# Patient Record
Sex: MALE | Race: WHITE | NOT HISPANIC OR LATINO | Employment: OTHER | URBAN - METROPOLITAN AREA
[De-identification: names, ages, dates, MRNs, and addresses within clinical notes are randomized per-mention and may not be internally consistent; named-entity substitution may affect disease eponyms.]

---

## 2017-02-02 DIAGNOSIS — C61 PROSTATE CANCER (H): Primary | ICD-10-CM

## 2017-02-09 DIAGNOSIS — C61 PROSTATE CANCER (H): ICD-10-CM

## 2017-02-09 LAB — PSA SERPL-MCNC: NORMAL UG/L (ref 0–4)

## 2017-02-09 PROCEDURE — 36415 COLL VENOUS BLD VENIPUNCTURE: CPT | Performed by: UROLOGY

## 2017-02-09 PROCEDURE — 84153 ASSAY OF PSA TOTAL: CPT | Performed by: UROLOGY

## 2017-02-17 ENCOUNTER — OFFICE VISIT (OUTPATIENT)
Dept: UROLOGY | Facility: CLINIC | Age: 82
End: 2017-02-17
Payer: COMMERCIAL

## 2017-02-17 VITALS
HEART RATE: 60 BPM | WEIGHT: 220 LBS | DIASTOLIC BLOOD PRESSURE: 78 MMHG | BODY MASS INDEX: 34.46 KG/M2 | SYSTOLIC BLOOD PRESSURE: 126 MMHG

## 2017-02-17 DIAGNOSIS — Z53.9 ERRONEOUS ENCOUNTER--DISREGARD: Primary | ICD-10-CM

## 2017-02-17 DIAGNOSIS — C61 MALIGNANT NEOPLASM OF PROSTATE (H): ICD-10-CM

## 2017-02-17 DIAGNOSIS — C61 MALIGNANT NEOPLASM OF PROSTATE (H): Primary | ICD-10-CM

## 2017-02-17 PROCEDURE — 96402 CHEMO HORMON ANTINEOPL SQ/IM: CPT | Performed by: UROLOGY

## 2017-02-17 PROCEDURE — 99213 OFFICE O/P EST LOW 20 MIN: CPT | Mod: 25 | Performed by: UROLOGY

## 2017-02-17 NOTE — MR AVS SNAPSHOT
After Visit Summary   2/17/2017    Nixon More    MRN: [id number]           Patient Information     Date Of Birth          1935        Visit Information        Provider Department      2/17/2017 10:10 AM Mark Pino MD [facility] [place]        Today's Diagnoses     Malignant neoplasm of prostate (H)    -  1    Malignant neoplasm of prostate          Care Instructions    The following medication was given:     MEDICATION: Eligard  ROUTE: LUQ  SITE: LUQ - Abd  DOSE: 45mg  LOT #: [id number]  :  [place]  EXPIRATION DATE:  08/18    NDC#: 24794-189-54           Follow-ups after your visit        Follow-up notes from your care team     Return in about 6 months (around 8/17/2017) for PSA and Eligard injection.      Who to contact     If you have questions or need follow up information about today's clinic visit or your schedule please contact [facility] [place] directly at [phone].  Normal or non-critical lab and imaging results will be communicated to you by [employer], letter or phone within 4 business days after the clinic has received the results. If you do not hear from us within 7 days, please contact the clinic through [employer] or phone. If you have a critical or abnormal lab result, we will notify you by phone as soon as possible.  Submit refill requests through [employer] or call your pharmacy and they will forward the refill request to us. Please allow 3 business days for your refill to be completed.          Additional Information About Your Visit        MyChart Information     [employer] gives you secure access to your electronic health record. If you see a primary care provider, you can also send messages to your care team and make appointments. If you have questions, please call your primary care clinic.  If you do not have a primary care provider, please call [phone] and they will assist  you.        Care EveryWhere ID     This is your Care EveryWhere ID. This could be used by other organizations to access your [facility] medical records  [id number]        Your Vitals Were     Pulse BMI (Body Mass Index)                60 34.46 kg/m2           Blood Pressure from Last 3 Encounters:   02/17/17 126/78   10/21/16 122/80   09/27/16 112/60    Weight from Last 3 Encounters:   02/17/17 99.8 kg (220 lb)   10/21/16 101.2 kg (223 lb)   09/27/16 101.9 kg (224 lb 9 oz)              We Performed the Following     MD Instruction for Protocol        Primary Care Provider Office Phone # Fax #    Natalya Lewis [phone]       [address] [place] [address]        Thank you!     Thank you for choosing [facility] [place]  for your care. Our goal is always to provide you with excellent care. Hearing back from our patients is one way we can continue to improve our services. Please take a few minutes to complete the written survey that you may receive in the mail after your visit with us. Thank you!             Your Updated Medication List - Protect others around you: Learn how to safely use, store and throw away your medicines at www.disposemymeds.org.          This list is accurate as of: 2/17/17  2:07 PM.  Always use your most recent med list.                   Brand Name Dispense Instructions for use    aspirin 81 MG tablet     100    1 tab po QD (Once per day)       atenolol 50 MG tablet    TENORMIN    90 tablet    Take 1 tablet (50 mg) by mouth daily       EPINEPHrine 0.3 MG/0.3ML injection    EPIPEN 2-MICHAEL    2 each    Inject 0.3 mLs (0.3 mg) into the muscle once as needed for anaphylaxis       fluorouracil 5 % cream    EFUDEX         ipratropium 0.06 % spray    ATROVENT    1 Box    Spray 2 sprays in nostril 4 times daily as needed for rhinitis       leuprolide 45 MG kit    ELIGARD    1 each    Inject 45 mg Subcutaneous  every 6 months.       losartan-hydrochlorothiazide 100-12.5 MG per tablet    HYZAAR    90 tablet    Take 1 tablet by mouth daily       naproxen 500 MG tablet    NAPROSYN    60 tablet    Take 1 tablet (500 mg) by mouth 2 times daily as needed       nitroglycerin 0.4 MG sublingual tablet    NITROSTAT    30 tablet    Place 1 tablet (0.4 mg) under the tongue every 5 minutes as needed for chest pain       PROSTEON PO      Take 2,000 Units by mouth With vitamin D       simvastatin 40 MG tablet    ZOCOR    90 tablet    Take 1 tablet (40 mg) by mouth At Bedtime

## 2017-02-17 NOTE — PROGRESS NOTES
Office Visit Note  [facility]  [phone]    UROLOGIC DIAGNOSES:   Prostate cancer    CURRENT INTERVENTIONS:   S/P radiotherapy with recurrence, on Eligard    HISTORY:   Spencer returns to clinic today for prostate cancer follow-up. He reports doing well with no symptoms. His PSA remains undetectable.      PAST MEDICAL HISTORY:   Past Medical History   Diagnosis Date     Actinic keratosis      Acute prostatitis 9/23/2004     Angina pectoris (H)      CAD (coronary artery disease)      1/5/2011 lateral ischemia on stress echo, 12/2014 normal stress echo     Dyslipidemia      Emphysema of lung (H)      Essential hypertension, benign      HTN (hypertension)      Hypersomnia with sleep apnea, unspecified      on bipap, partially treated with residual apneas     Hypertrophy (benign) of prostate 5/01     Biopsy 5/01 negative for cancer; PSA 5     Lumbago      MALIGN NEOPL PROSTATE 12/15/2006     Palpitations      Personal history of other malignant neoplasm of skin 1997     basal cell cancer       PAST SURGICAL HISTORY:   Past Surgical History   Procedure Laterality Date     C nonspecific procedure  1988     s/p lumbar laminectomy     C nonspecific procedure  child     hernia repair     C 2nd stage chemosurg mohs  1/09/01     Moh's procedure for basal cell carcinoma     C vitrectomy pars plana remove preretinal membrane  3/09       FAMILY HISTORY:   Family History   Problem Relation Age of Onset     Alzheimer Disease Mother      Hypertension Mother      Cardiovascular Father      D:86 complications fo CHF     Prostate Cancer Brother      Prostate Cancer Brother      Lung Cancer Brother 76       SOCIAL HISTORY:   Social History   Substance Use Topics     Smoking status: Former Smoker     Packs/day: 1.00     Years: 30.00     Types: Cigarettes     Quit date: 1/1/1978     Smokeless tobacco: Never Used     Alcohol use 1.0 oz/week     2 Standard drinks or equivalent per week      Comment: socially       Current  Outpatient Prescriptions   Medication     simvastatin (ZOCOR) 40 MG tablet     losartan-hydrochlorothiazide (HYZAAR) 100-12.5 MG per tablet     naproxen (NAPROSYN) 500 MG tablet     ipratropium (ATROVENT) 0.06 % nasal spray     atenolol (TENORMIN) 50 MG tablet     fluorouracil (EFUDEX) 5 % cream     EPINEPHrine (EPIPEN 2-MICHAEL) 0.3 MG/0.3ML injection     nitroglycerin (NITROSTAT) 0.4 MG SL tablet     Multiple Minerals-Vitamins (PROSTEON PO)     [DISCONTINUED] atenolol-chlorthalidone (TENORETIC 50) 50-25 MG per tablet     leuprolide (ELIGARD) 45 MG injection     ASPIRIN 81 MG OR TABS     No current facility-administered medications for this visit.      Facility-Administered Medications Ordered in Other Visits   Medication     leuprolide (ELIGARD) injection 45 mg         PHYSICAL EXAM:    There were no vitals taken for this visit.    HEENT: Normocephalic and atraumatic   Cardiac: Not done  Back/Flank: Not done  CNS/PNS: Not done  Respiratory: Normal non-labored breathing  Abdomen: Soft nontender and nondistended  Peripheral Vascular: Not done  Mental Status: Not done    Penis: Not done  Scrotal Skin: Not done  Testicles: Not done  Epididymis: Not done  Digital Rectal Exam:     Cystoscopy: Not done    Imaging: None    Urinalysis: UA RESULTS:  No results for input(s): COLOR, APPEARANCE, URINEGLC, URINEBILI, URINEKETONE, SG, UBLD, URINEPH, PROTEIN, UROBILINOGEN, NITRITE, LEUKEST, RBCU, WBCU in the last 94030 hours.    PSA: Undetectable    Post Void Residual:     Other labs: None today      IMPRESSION:  Doing well, PSA undetectable    PLAN:  He received another 6 month injection of Eligard today. We discussed the plan. He will return to clinic in 6 months for another PSA and Eligard injection.    Total Time: 15 min                                      Total in Consultation: 15 min      Mark Pino M.D.

## 2017-02-17 NOTE — PATIENT INSTRUCTIONS
The following medication was given:     MEDICATION: Eligard  ROUTE: LUQ  SITE: [facility] - Abd  DOSE: 45mg  LOT #: [id number]  :  Nataliia  EXPIRATION DATE:  08/18    NDC#: 83602-589-67

## 2017-02-17 NOTE — LETTER
2/17/2017       RE: Nixon More  [address] [place] [address] [address] [facility] [address]     Dear Colleague,    Thank you for referring your patient, Nixon More, to the [facility] [place] at [facility]. Please see a copy of my visit note below.    Office Visit Note  [facility]  [phone]    UROLOGIC DIAGNOSES:   Prostate cancer    CURRENT INTERVENTIONS:   S/P radiotherapy with recurrence, on Eligard    HISTORY:   Spencer returns to clinic today for prostate cancer follow-up. He reports doing well with no symptoms. His PSA remains undetectable.      PAST MEDICAL HISTORY:   Past Medical History   Diagnosis Date     Actinic keratosis      Acute prostatitis 9/23/2004     Angina pectoris (H)      CAD (coronary artery disease)      1/5/2011 lateral ischemia on stress echo, 12/2014 normal stress echo     Dyslipidemia      Emphysema of lung (H)      Essential hypertension, benign      HTN (hypertension)      Hypersomnia with sleep apnea, unspecified      on bipap, partially treated with residual apneas     Hypertrophy (benign) of prostate 5/01     Biopsy 5/01 negative for cancer; PSA 5     Lumbago      MALIGN NEOPL PROSTATE 12/15/2006     Palpitations      Personal history of other malignant neoplasm of skin 1997     basal cell cancer       PAST SURGICAL HISTORY:   Past Surgical History   Procedure Laterality Date     C nonspecific procedure  1988     s/p lumbar laminectomy     C nonspecific procedure  child     hernia repair     C 2nd stage chemosurg mohs  1/09/01     Moh's procedure for basal cell carcinoma     C vitrectomy pars plana remove preretinal membrane  3/09       FAMILY HISTORY:   Family History   Problem Relation Age of Onset     Alzheimer Disease Mother      Hypertension Mother      Cardiovascular Father      D:86 complications fo CHF     Prostate Cancer Brother      Prostate Cancer Brother      Lung Cancer Brother 76        SOCIAL HISTORY:   Social History   Substance Use Topics     Smoking status: Former Smoker     Packs/day: 1.00     Years: 30.00     Types: Cigarettes     Quit date: 1/1/1978     Smokeless tobacco: Never Used     Alcohol use 1.0 oz/week     2 Standard drinks or equivalent per week      Comment: socially       Current Outpatient Prescriptions   Medication     simvastatin (ZOCOR) 40 MG tablet     losartan-hydrochlorothiazide (HYZAAR) 100-12.5 MG per tablet     naproxen (NAPROSYN) 500 MG tablet     ipratropium (ATROVENT) 0.06 % nasal spray     atenolol (TENORMIN) 50 MG tablet     fluorouracil (EFUDEX) 5 % cream     EPINEPHrine (EPIPEN 2-MICHAEL) 0.3 MG/0.3ML injection     nitroglycerin (NITROSTAT) 0.4 MG SL tablet     Multiple Minerals-Vitamins (PROSTEON PO)     [DISCONTINUED] atenolol-chlorthalidone (TENORETIC 50) 50-25 MG per tablet     leuprolide (ELIGARD) 45 MG injection     ASPIRIN 81 MG OR TABS     No current facility-administered medications for this visit.      Facility-Administered Medications Ordered in Other Visits   Medication     leuprolide (ELIGARD) injection 45 mg         PHYSICAL EXAM:    There were no vitals taken for this visit.    HEENT: Normocephalic and atraumatic   Cardiac: Not done  Back/Flank: Not done  CNS/PNS: Not done  Respiratory: Normal non-labored breathing  Abdomen: Soft nontender and nondistended  Peripheral Vascular: Not done  Mental Status: Not done    Penis: Not done  Scrotal Skin: Not done  Testicles: Not done  Epididymis: Not done  Digital Rectal Exam:     Cystoscopy: Not done    Imaging: None    Urinalysis: UA RESULTS:  No results for input(s): COLOR, APPEARANCE, URINEGLC, URINEBILI, URINEKETONE, SG, UBLD, URINEPH, PROTEIN, UROBILINOGEN, NITRITE, LEUKEST, RBCU, WBCU in the last [id number] hours.    PSA: Undetectable    Post Void Residual:     Other labs: None today      IMPRESSION:  Doing well, PSA undetectable    PLAN:  He received another 6 month injection of Eligard today. We  discussed the plan. He will return to clinic in 6 months for another PSA and Eligard injection.    Total Time: 15 min                                      Total in Consultation: 15 min      Mark Pino M.D.

## 2017-02-20 DIAGNOSIS — Z53.9 ERRONEOUS ENCOUNTER--DISREGARD: Primary | ICD-10-CM

## 2017-02-21 DIAGNOSIS — C61 MALIGNANT NEOPLASM OF PROSTATE (H): Primary | ICD-10-CM

## 2017-02-22 NOTE — PROGRESS NOTES
This encounter was opened in error. Please disregard.  This encounter was opened in error. Please disregard.

## 2017-02-23 DIAGNOSIS — Z53.9 ERRONEOUS ENCOUNTER--DISREGARD: Primary | ICD-10-CM

## 2017-03-03 ENCOUNTER — MYC MEDICAL ADVICE (OUTPATIENT)
Dept: PEDIATRICS | Facility: CLINIC | Age: 82
End: 2017-03-03

## 2017-03-03 NOTE — TELEPHONE ENCOUNTER
Janett with Dr. Joshua conte to use econazole or clotrimazole of wife's.     Sent [employer] message.

## 2017-03-27 ENCOUNTER — TRANSFERRED RECORDS (OUTPATIENT)
Dept: HEALTH INFORMATION MANAGEMENT | Facility: CLINIC | Age: 82
End: 2017-03-27

## 2017-08-10 DIAGNOSIS — C61 MALIGNANT NEOPLASM OF PROSTATE (H): Primary | ICD-10-CM

## 2017-08-17 DIAGNOSIS — C61 MALIGNANT NEOPLASM OF PROSTATE (H): ICD-10-CM

## 2017-08-17 PROCEDURE — G0103 PSA SCREENING: HCPCS | Performed by: UROLOGY

## 2017-08-18 ENCOUNTER — OFFICE VISIT (OUTPATIENT)
Dept: PEDIATRICS | Facility: CLINIC | Age: 82
End: 2017-08-18
Payer: COMMERCIAL

## 2017-08-18 VITALS
OXYGEN SATURATION: 94 % | DIASTOLIC BLOOD PRESSURE: 82 MMHG | SYSTOLIC BLOOD PRESSURE: 122 MMHG | WEIGHT: 225 LBS | HEIGHT: 67 IN | TEMPERATURE: 97.4 F | BODY MASS INDEX: 35.31 KG/M2 | HEART RATE: 72 BPM

## 2017-08-18 DIAGNOSIS — F32.0 MILD MAJOR DEPRESSION (H): Primary | ICD-10-CM

## 2017-08-18 DIAGNOSIS — E66.01 MORBID OBESITY, UNSPECIFIED OBESITY TYPE (H): ICD-10-CM

## 2017-08-18 DIAGNOSIS — R09.82 POST-NASAL DRIP: ICD-10-CM

## 2017-08-18 LAB — PSA SERPL-ACNC: <0.01 UG/L (ref 0–4)

## 2017-08-18 PROCEDURE — 99214 OFFICE O/P EST MOD 30 MIN: CPT | Performed by: INTERNAL MEDICINE

## 2017-08-18 ASSESSMENT — PATIENT HEALTH QUESTIONNAIRE - PHQ9: SUM OF ALL RESPONSES TO PHQ QUESTIONS 1-9: 8

## 2017-08-18 NOTE — NURSING NOTE
"Chief Complaint   Patient presents with     Depression     Throat Problem       Initial /82 (BP Location: Right arm, Patient Position: Right side, Cuff Size: Adult Large)  Pulse 72  Temp 97.4  F (36.3  C) (Tympanic)  Ht 5' 7\" (1.702 m)  Wt 225 lb (102.1 kg)  SpO2 94%  BMI 35.24 kg/m2 Estimated body mass index is 35.24 kg/(m^2) as calculated from the following:    Height as of this encounter: 5' 7\" (1.702 m).    Weight as of this encounter: 225 lb (102.1 kg).  Medication Reconciliation: complete  "

## 2017-08-18 NOTE — PATIENT INSTRUCTIONS
Try the atrovent twice daily regularly to see if it helps.  If you are not better in 2 wks, let me know and we will try flonase instead.    DEPRESSION: PATIENT SELF CARE PLAN    Self-Care for Depression  Here s the deal. Your body and mind are really not as separate as most people think.  What you do and think affects how you feel and how you feel influences what you do and think. This means if you do things that people who feel good do, it will help you feel better.  Sometimes this is all it takes.  There is also a place for medication and therapy depending on how severe your depression is, so be sure to consult with your medical provider and/ or Behavioral Health Consultant if your symptoms are worsening or not improving.     In order to better manage my stress, I will:   Exercise  Get some form of exercise, every day. This will help reduce pain and release endorphins, the  feel good  chemicals in your brain. This is almost as good as taking antidepressants!  This is not the same as joining a gym and then never going! (they count on that by the way ) It can be as simple as just going for a walk or doing some gardening, anything that will get you moving.        Hygiene   Maintain good hygiene (Get out of bed in the morning, Make your bed, Brush your teeth, Take a shower, and Get dressed like you were going to work, even if you are unemployed).  If your clothes don't fit try to get ones that do.    Diet  I will strive to eat foods that are good for me, drink plenty of water, and avoid excessive sugar, caffeine, alcohol, and other mood-altering substances.  Some foods that are helpful in depression are: complex carbohydrates, B vitamins, flaxseed, fish or fish oil, fresh fruits and vegetables.    Psychotherapy  Schedule counseling to help you rational brain get your brain circuits back on track.    Medication  Start prozac once daily.    Staying Connected With Others  I will stay in touch with my friends, family  members, and my primary care provider/team.    Use your imagination  Be creative.  We all have a creative side; it doesn t matter if it s oil painting, sand castles, or mud pies! This will also kick up the endorphins.    Witness Beauty  (AKA stop and smell the roses) Take a look outside, even in mid-winter. Notice colors, textures. Watch the squirrels and birds.     Service to others  Be of service to others.  There is always someone else in need.  By helping others we can  get out of ourselves  and remember the really important things.  This also provides opportunities for practicing all the other parts of the program.    Humor  Laugh and be silly!  Adjust your TV habits for less news and crime-drama and more comedy.    Control your stress  Try breathing deep, massage therapy, biofeedback, and meditation. Find time to relax each day.

## 2017-08-18 NOTE — PROGRESS NOTES
"  SUBJECTIVE:   Nixon More is a 81 year old male who presents to clinic today for the following health issues:      Abnormal Mood Symptoms      Duration: intermittent during the last 3 months .  Brother  and wife with significant health issues - cancer treatment.  Has had more issues with concentration - eg forgets time of when they are supposed to do things.  Forgets what he is going to do - start walking and then just stop.  Not interested in things he used to be.    Description:  Depression: YES  Anxiety: no  Panic attacks: no     Accompanying signs and symptoms: see PHQ-9 and JAMSHID scores    History (similar episodes/previous evaluation): None    Precipitating or alleviating factors: None    Therapies tried and outcome: Prozac (Fluoxetine).  Doesn't remember if it helped.  Was seeing a counselor but he retired.    PHQ-9 SCORE 2015   Total Score 7 - -   Total Score - 8 8          Phlegm  Pt has had increase in phlegm for the last 2-3 months in the mornings. Used atrovent but didn't think it helped.  Will cough and produce sputum sometimes.      Problem list and histories reviewed & adjusted, as indicated.  Additional history: as documented    Labs reviewed in EPIC    Reviewed and updated as needed this visit by clinical staffTobacco  Allergies  Meds  Problems  Med Hx  Surg Hx  Fam Hx  Soc Hx        Reviewed and updated as needed this visit by Provider  Allergies  Meds  Problems         ROS:  Constitutional, HEENT, cardiovascular, pulmonary, systems are negative, except as otherwise noted.      OBJECTIVE:   /82 (BP Location: Right arm, Patient Position: Right side, Cuff Size: Adult Large)  Pulse 72  Temp 97.4  F (36.3  C) (Tympanic)  Ht 5' 7\" (1.702 m)  Wt 225 lb (102.1 kg)  SpO2 94%  BMI 35.24 kg/m2  Body mass index is 35.24 kg/(m^2).  GENERAL: healthy, alert and no distress  PSYCH: mentation appears normal, affect flat        ASSESSMENT/PLAN:       1. Mild " major depression (H)  Discussed depression, biopsycosocial model of illness.  Treatment options reviewed including counseling, SSRI, SNRI and wellbutrin.  Medication risks and benefits and side-effects discussed.  Pt elected to try medication per orders plus counseling.  Follow-up with physical in 2 mos or sooner if worsening   - FLUoxetine (PROZAC) 20 MG capsule; Take 1 capsule (20 mg) by mouth daily  Dispense: 30 capsule; Refill: 1    2. Morbid obesity, unspecified obesity type (H)  Encouraged increase exercise and dietary modification    3. Post-nasal drip  Discussed.  Trial atrovent x2wks, if uncontrolled notify and would try flonase.  If that fails, can refer to ENT      See Patient Instructions    Natalya Lewis MD  [facility] BEATRICE    25 min with pt and more than 50% of the time was spent in counseling and coordination of care of the above issues

## 2017-08-23 ENCOUNTER — OFFICE VISIT (OUTPATIENT)
Dept: UROLOGY | Facility: CLINIC | Age: 82
End: 2017-08-23
Payer: COMMERCIAL

## 2017-08-23 VITALS
BODY MASS INDEX: 32.58 KG/M2 | DIASTOLIC BLOOD PRESSURE: 78 MMHG | SYSTOLIC BLOOD PRESSURE: 138 MMHG | HEART RATE: 76 BPM | HEIGHT: 69 IN | WEIGHT: 220 LBS

## 2017-08-23 DIAGNOSIS — C61 MALIGNANT NEOPLASM OF PROSTATE (H): Primary | ICD-10-CM

## 2017-08-23 PROCEDURE — 96402 CHEMO HORMON ANTINEOPL SQ/IM: CPT | Performed by: UROLOGY

## 2017-08-23 PROCEDURE — 99213 OFFICE O/P EST LOW 20 MIN: CPT | Mod: 25 | Performed by: UROLOGY

## 2017-08-23 ASSESSMENT — PAIN SCALES - GENERAL: PAINLEVEL: NO PAIN (0)

## 2017-08-23 NOTE — NURSING NOTE
The following medication was given:     MEDICATION: Eligard 45 mg  ROUTE: SQ  SITE: RLQ - Abd.  DOSE: 45mg  LOT #: [id number]  :  Harmeet  EXPIRATION DATE:  11/2018  NDC#: [id number]  Erika Jose LPN

## 2017-08-23 NOTE — MR AVS SNAPSHOT
After Visit Summary   8/23/2017    Nixon More    MRN: [id number]           Patient Information     Date Of Birth          1935        Visit Information        Provider Department      8/23/2017 9:10 AM Mark Pino MD [facility]        Today's Diagnoses     Malignant neoplasm of prostate    -  1       Follow-ups after your visit        Your next 10 appointments already scheduled     Feb 26, 2018  9:00 AM CST   Return Prostate Cancer with Mark Pino MD   [facility]rology [facility] ([facility])    [address]  [address]  [facility] [address] [phone]              Who to contact     If you have questions or need follow up information about today's clinic visit or your schedule please contact [facility] UROLOGY [facility] directly at [phone].  Normal or non-critical lab and imaging results will be communicated to you by MyChart, letter or phone within 4 business days after the clinic has received the results. If you do not hear from us within 7 days, please contact the clinic through MyChart or phone. If you have a critical or abnormal lab result, we will notify you by phone as soon as possible.  Submit refill requests through "[employer] or call your pharmacy and they will forward the refill request to us. Please allow 3 business days for your refill to be completed.          Additional Information About Your Visit        MyChart Information     "[employer] gives you secure access to your electronic health record. If you see a primary care provider, you can also send messages to your care team and make appointments. If you have questions, please call your primary care clinic.  If you do not have a primary care provider, please call [phone] and they will assist you.        Care EveryWhere ID     This is your Care EveryWhere ID. This  "could be used by other organizations to access your [place] medical records  [id number]        Your Vitals Were     Pulse Height BMI (Body Mass Index)             76 1.753 m (5' 9\") 32.49 kg/m2          Blood Pressure from Last 3 Encounters:   08/23/17 138/78   08/18/17 122/82   02/17/17 126/78    Weight from Last 3 Encounters:   08/23/17 99.8 kg (220 lb)   08/18/17 102.1 kg (225 lb)   02/17/17 99.8 kg (220 lb)              Today, you had the following     No orders found for display       Primary Care Provider Office Phone # Fax #    Natalya Lewis [phone]       [facility] [place] [address]        Equal Access to Services     ANTONIA BRAVO : Nenita alvarado Soleni, waaxda luqadaha, qaybta kaalmada adeegyada, anila wall . So [facility] [phone].    ATENCIÓN: Si habla español, tiene a coelho disposición servicios gratuitos de asistencia lingüística. Llame al [phone].    We comply with applicable federal civil rights laws and [place] laws. We do not discriminate on the basis of race, color, national origin, age, disability sex, sexual orientation or gender identity.            Thank you!     Thank you for choosing [facility]OLOGY CLINIC [place]  for your care. Our goal is always to provide you with excellent care. Hearing back from our patients is one way we can continue to improve our services. Please take a few minutes to complete the written survey that you may receive in the mail after your visit with us. Thank you!             Your Updated Medication List - Protect others around you: Learn how to safely use, store and throw away your medicines at www.disposemymeds.org.          This list is accurate as of: 8/23/17 11:59 PM.  Always use your most recent med list.                   Brand Name Dispense Instructions for use Diagnosis    aspirin 81 MG tablet     100    1 tab po QD (Once per day)    Essential " hypertension, benign       atenolol 50 MG tablet    TENORMIN    90 tablet    Take 1 tablet (50 mg) by mouth daily    Essential hypertension with goal blood pressure less than 140/90       EPINEPHrine 0.3 MG/0.3ML injection 2-pack    EPIPEN 2-MICHAEL    2 each    Inject 0.3 mLs (0.3 mg) into the muscle once as needed for anaphylaxis    Bee sting-induced anaphylaxis, undetermined intent, subsequent encounter       fluorouracil 5 % cream    EFUDEX          FLUoxetine 20 MG capsule    PROzac    30 capsule    Take 1 capsule (20 mg) by mouth daily    Mild major depression (H)       ipratropium 0.06 % spray    ATROVENT    1 Box    Spray 2 sprays in nostril 4 times daily as needed for rhinitis    Nasal congestion       leuprolide 45 MG kit    ELIGARD    1 each    Inject 45 mg Subcutaneous every 6 months.        losartan-hydrochlorothiazide 100-12.5 MG per tablet    HYZAAR    90 tablet    Take 1 tablet by mouth daily    Essential hypertension with goal blood pressure less than 140/90       naproxen 500 MG tablet    NAPROSYN    60 tablet    Take 1 tablet (500 mg) by mouth 2 times daily as needed    Hip pain, chronic, unspecified laterality       nitroGLYcerin 0.4 MG sublingual tablet    NITROSTAT    30 tablet    Place 1 tablet (0.4 mg) under the tongue every 5 minutes as needed for chest pain    Cardiac ischemia       PROSTEON PO      Take 2,000 Units by mouth With vitamin D        simvastatin 40 MG tablet    ZOCOR    90 tablet    Take 1 tablet (40 mg) by mouth At Bedtime    Dyslipidemia

## 2017-08-23 NOTE — LETTER
8/23/2017       RE: Nixon More  [address] [place] [address] [address] [facility] [address]     Dear Colleague,    Thank you for referring your patient, Nixon More, to the [facility]OLOG[facility] [place] [facility]. Please see a copy of my visit note below.    Office Visit Note  [facility]  [phone]    UROLOGIC DIAGNOSES:   Prostate cancer    CURRENT INTERVENTIONS:   S/P radiotherapy with recurrence, on Eligard    HISTORY:   Spencer returns to clinic today for prostate cancer follow-up. He reports doing well with no symptoms. His PSA remains undetectable.      PAST MEDICAL HISTORY:   Past Medical History:   Diagnosis Date     Actinic keratosis      Acute prostatitis 9/23/2004     Angina pectoris (H)      CAD (coronary artery disease)     1/5/2011 lateral ischemia on stress echo, 12/2014 normal stress echo     Dyslipidemia      Emphysema of lung (H)      Essential hypertension, benign      HTN (hypertension)      Hypersomnia with sleep apnea, unspecified     on bipap, partially treated with residual apneas     Hypertrophy (benign) of prostate 5/01    Biopsy 5/01 negative for cancer; PSA 5     Lumbago      MALIGN NEOPL PROSTATE 12/15/2006     Palpitations      Personal history of other malignant neoplasm of skin 1997    basal cell cancer       PAST SURGICAL HISTORY:   Past Surgical History:   Procedure Laterality Date     C 2ND STAGE CHEMOSURG MOHS  1/09/01    Moh's procedure for basal cell carcinoma     C NONSPECIFIC PROCEDURE  1988    s/p lumbar laminectomy     C NONSPECIFIC PROCEDURE  child    hernia repair     C VITRECTOMY PARS PLANA REMOVE PRERETINAL MEMBRANE  3/09       FAMILY HISTORY:   Family History   Problem Relation Age of Onset     Alzheimer Disease Mother      Hypertension Mother      Cardiovascular Father      D:86 complications fo CHF     Prostate Cancer Brother      Lung Cancer Brother 76     Prostate Cancer Brother        SOCIAL  "HISTORY:   Social History   Substance Use Topics     Smoking status: Former Smoker     Packs/day: 1.00     Years: 30.00     Types: Cigarettes     Quit date: 1/1/1978     Smokeless tobacco: Never Used     Alcohol use 1.0 oz/week     2 Standard drinks or equivalent per week      Comment: socially       Current Outpatient Prescriptions   Medication     FLUoxetine (PROZAC) 20 MG capsule     simvastatin (ZOCOR) 40 MG tablet     losartan-hydrochlorothiazide (HYZAAR) 100-12.5 MG per tablet     naproxen (NAPROSYN) 500 MG tablet     ipratropium (ATROVENT) 0.06 % nasal spray     atenolol (TENORMIN) 50 MG tablet     Multiple Minerals-Vitamins (PROSTEON PO)     leuprolide (ELIGARD) 45 MG injection     ASPIRIN 81 MG OR TABS     fluorouracil (EFUDEX) 5 % cream     EPINEPHrine (EPIPEN 2-MICHAEL) 0.3 MG/0.3ML injection     nitroglycerin (NITROSTAT) 0.4 MG SL tablet     [DISCONTINUED] atenolol-chlorthalidone (TENORETIC 50) 50-25 MG per tablet     No current facility-administered medications for this visit.      Facility-Administered Medications Ordered in Other Visits   Medication     leuprolide (ELIGARD) injection 45 mg     PHYSICAL EXAM:  /78  Pulse 76  Ht 1.753 m (5' 9\")  Wt 99.8 kg (220 lb)  BMI 32.49 kg/m2    HEENT: Normocephalic and atraumatic   Cardiac: Not done  Back/Flank: Not done  CNS/PNS: Not done  Respiratory: Normal non-labored breathing  Abdomen: Soft nontender and nondistended  Peripheral Vascular: Not done  Mental Status: Not done    Penis: Not done  Scrotal Skin: Not done  Testicles: Not done  Epididymis: Not done  Digital Rectal Exam:     Cystoscopy: Not done    Imaging: None    Urinalysis: UA RESULTS:  No results for input(s): COLOR, APPEARANCE, URINEGLC, URINEBILI, URINEKETONE, SG, UBLD, URINEPH, PROTEIN, UROBILINOGEN, NITRITE, LEUKEST, RBCU, WBCU in the last 91074 hours.    PSA: Undetectable    Post Void Residual:     Other labs: None today      IMPRESSION:  Doing well, PSA undetectable    PLAN:  We " discussed the treatment plan. He wishes to continue the Eligard. He received another injection today and he will return to clinic again in 6 months for another SA and Eligard injection.    Total Time:  15 minutes                                      Total in Consultation:  15 minutes  Mark Pino M.D.

## 2017-08-25 ENCOUNTER — TELEPHONE (OUTPATIENT)
Dept: PEDIATRICS | Facility: CLINIC | Age: 82
End: 2017-08-25

## 2017-08-25 DIAGNOSIS — F32.0 MILD MAJOR DEPRESSION (H): Primary | ICD-10-CM

## 2017-08-25 RX ORDER — FLUOXETINE 10 MG/1
10 CAPSULE ORAL DAILY
Qty: 30 CAPSULE | Refills: 1 | Status: SHIPPED | OUTPATIENT
Start: 2017-08-25 | End: 2017-10-24

## 2017-08-25 NOTE — TELEPHONE ENCOUNTER
Patient calls.  Started fluoxetine on 8/18 at 20 mg capsule daily.  Reports nausea.  Advised patient that nausea usually will go away after 3 weeks, but if the nausea is bad we can decrease the dose if MD agrees.  He has not vomited.       Huddled with Dr. Mittal to send 10 mg capsule and patient can take 10 mg until nausea-free, then increase to 20 mg.  Patient advised, will follow the plan and then increase to 20 mg when can tolerate it.    New script sent.  Glenys Gallardo RN  Message handled by Nurse Triage.

## 2017-09-20 ENCOUNTER — TELEPHONE (OUTPATIENT)
Dept: PEDIATRICS | Facility: CLINIC | Age: 82
End: 2017-09-20

## 2017-09-20 NOTE — TELEPHONE ENCOUNTER
Patient calling that he had a skin cancer  procedure today and was told by ENT MD that he would be a good candidate for niacinamide and is wondering if Dr. Lewis agrees? [phone]

## 2017-09-22 NOTE — TELEPHONE ENCOUNTER
Who did he see and where?  Please call to get report.  I can only find preliminary evidence that it is helpful in patients who have had kidney transplants.

## 2017-09-22 NOTE — TELEPHONE ENCOUNTER
Dr. Ann actually derm for skin ca.  [phone]  States he is recommending 500 MG per day and that it had been recommended to assist in preventing further skin cancers.  Left message on answering machine for Dr. Ann nurse to call back.   Toyin Turcios RN

## 2017-10-04 ENCOUNTER — TELEPHONE (OUTPATIENT)
Dept: PEDIATRICS | Facility: CLINIC | Age: 82
End: 2017-10-04

## 2017-10-04 DIAGNOSIS — R73.01 IMPAIRED FASTING GLUCOSE: ICD-10-CM

## 2017-10-04 DIAGNOSIS — I10 ESSENTIAL HYPERTENSION: Primary | ICD-10-CM

## 2017-10-04 DIAGNOSIS — E78.5 DYSLIPIDEMIA: ICD-10-CM

## 2017-10-04 NOTE — TELEPHONE ENCOUNTER
Patient calling to get labs done prior to his physical. Orders pended for signature if appropriate.   Toyin Turcios RN

## 2017-10-04 NOTE — TELEPHONE ENCOUNTER
Received a call back from Dr. Ann' nurse. She states that he recommends it as a preventative measure for basil cell and squamous cell ca because it is a B vitamin. She will check with Dr. Ann to see if there is a specific study he can point us to.  Toyin Turcios, RN

## 2017-10-04 NOTE — TELEPHONE ENCOUNTER
Patient calling to get update. Advised had not heard back yet from . Called and left detailed message on machine for nurse to call back.   Toyin Turcios RN

## 2017-10-15 NOTE — TELEPHONE ENCOUNTER
I did some more digging and found more information on it - there is some data that it decreases non-melanoma skin cancers in people who have already had one by about 25%.  Taking Oral nicotinamide (niacinamide) at 500 mg twice daily has been shown to be helpful in 2 small studies.  No adverse events were noted.  However, niacinamide can increase risk of muscle damage with statins.  Mychart message to patient.

## 2017-10-18 ENCOUNTER — HOSPITAL ENCOUNTER (OUTPATIENT)
Dept: LAB | Facility: CLINIC | Age: 82
Discharge: HOME | End: 2017-10-18
Attending: INTERNAL MEDICINE | Admitting: INTERNAL MEDICINE
Payer: COMMERCIAL

## 2017-10-18 DIAGNOSIS — E78.5 DYSLIPIDEMIA: ICD-10-CM

## 2017-10-18 DIAGNOSIS — R73.01 IMPAIRED FASTING GLUCOSE: ICD-10-CM

## 2017-10-18 DIAGNOSIS — I10 ESSENTIAL HYPERTENSION: ICD-10-CM

## 2017-10-18 LAB
ANION GAP SERPL CALCULATED.3IONS-SCNC: 4 MMOL/L (ref 3–14)
BUN SERPL-MCNC: 21 MG/DL (ref 7–30)
CALCIUM SERPL-MCNC: 9 MG/DL (ref 8.5–10.1)
CHLORIDE SERPL-SCNC: 107 MMOL/L (ref 94–109)
CHOLEST SERPL-MCNC: 121 MG/DL
CO2 SERPL-SCNC: 29 MMOL/L (ref 20–32)
CREAT SERPL-MCNC: 0.95 MG/DL (ref 0.66–1.25)
GFR SERPL CREATININE-BSD FRML MDRD: 76 ML/MIN/1.7M2
GLUCOSE SERPL-MCNC: 102 MG/DL (ref 70–99)
HDLC SERPL-MCNC: 46 MG/DL
LDLC SERPL CALC-MCNC: 57 MG/DL
NONHDLC SERPL-MCNC: 75 MG/DL
POTASSIUM SERPL-SCNC: 3.8 MMOL/L (ref 3.4–5.3)
SODIUM SERPL-SCNC: 140 MMOL/L (ref 133–144)
TRIGL SERPL-MCNC: 90 MG/DL

## 2017-10-18 PROCEDURE — 80048 BASIC METABOLIC PNL TOTAL CA: CPT | Performed by: INTERNAL MEDICINE

## 2017-10-18 PROCEDURE — 36415 COLL VENOUS BLD VENIPUNCTURE: CPT | Performed by: INTERNAL MEDICINE

## 2017-10-18 PROCEDURE — 80061 LIPID PANEL: CPT | Performed by: INTERNAL MEDICINE

## 2017-10-23 DIAGNOSIS — E78.5 DYSLIPIDEMIA: ICD-10-CM

## 2017-10-23 RX ORDER — SIMVASTATIN 40 MG
40 TABLET ORAL AT BEDTIME
Qty: 90 TABLET | Refills: 3 | Status: CANCELLED | OUTPATIENT
Start: 2017-10-23

## 2017-10-24 ENCOUNTER — OFFICE VISIT (OUTPATIENT)
Dept: PEDIATRICS | Facility: CLINIC | Age: 82
End: 2017-10-24
Payer: COMMERCIAL

## 2017-10-24 VITALS
HEART RATE: 62 BPM | HEIGHT: 69 IN | BODY MASS INDEX: 32.98 KG/M2 | WEIGHT: 222.7 LBS | DIASTOLIC BLOOD PRESSURE: 76 MMHG | SYSTOLIC BLOOD PRESSURE: 130 MMHG | OXYGEN SATURATION: 96 % | TEMPERATURE: 97.6 F

## 2017-10-24 DIAGNOSIS — I10 ESSENTIAL HYPERTENSION WITH GOAL BLOOD PRESSURE LESS THAN 140/90: ICD-10-CM

## 2017-10-24 DIAGNOSIS — F32.0 MILD MAJOR DEPRESSION (H): ICD-10-CM

## 2017-10-24 DIAGNOSIS — R09.81 NASAL CONGESTION: ICD-10-CM

## 2017-10-24 DIAGNOSIS — E78.5 DYSLIPIDEMIA: ICD-10-CM

## 2017-10-24 DIAGNOSIS — T78.2XXD BEE STING-INDUCED ANAPHYLAXIS, UNDETERMINED INTENT, SUBSEQUENT ENCOUNTER: ICD-10-CM

## 2017-10-24 DIAGNOSIS — M25.559 HIP PAIN, CHRONIC, UNSPECIFIED LATERALITY: ICD-10-CM

## 2017-10-24 DIAGNOSIS — Z23 NEED FOR PROPHYLACTIC VACCINATION AND INOCULATION AGAINST INFLUENZA: ICD-10-CM

## 2017-10-24 DIAGNOSIS — G89.29 HIP PAIN, CHRONIC, UNSPECIFIED LATERALITY: ICD-10-CM

## 2017-10-24 DIAGNOSIS — Z00.00 ROUTINE GENERAL MEDICAL EXAMINATION AT A HEALTH CARE FACILITY: Primary | ICD-10-CM

## 2017-10-24 DIAGNOSIS — T63.444D BEE STING-INDUCED ANAPHYLAXIS, UNDETERMINED INTENT, SUBSEQUENT ENCOUNTER: ICD-10-CM

## 2017-10-24 PROCEDURE — 99207 C PAF COMPLETED  NO CHARGE: CPT | Mod: 25 | Performed by: INTERNAL MEDICINE

## 2017-10-24 PROCEDURE — 99397 PER PM REEVAL EST PAT 65+ YR: CPT | Mod: 25 | Performed by: INTERNAL MEDICINE

## 2017-10-24 PROCEDURE — 90662 IIV NO PRSV INCREASED AG IM: CPT | Performed by: INTERNAL MEDICINE

## 2017-10-24 PROCEDURE — G0008 ADMIN INFLUENZA VIRUS VAC: HCPCS | Performed by: INTERNAL MEDICINE

## 2017-10-24 RX ORDER — LOSARTAN POTASSIUM AND HYDROCHLOROTHIAZIDE 12.5; 1 MG/1; MG/1
1 TABLET ORAL DAILY
Qty: 90 TABLET | Refills: 3 | Status: SHIPPED | OUTPATIENT
Start: 2017-10-24 | End: 2018-10-23

## 2017-10-24 RX ORDER — NAPROXEN 500 MG/1
500 TABLET ORAL 2 TIMES DAILY PRN
Qty: 60 TABLET | Refills: 5 | Status: SHIPPED | OUTPATIENT
Start: 2017-10-24 | End: 2018-04-06

## 2017-10-24 RX ORDER — EPINEPHRINE 0.3 MG/.3ML
0.3 INJECTION SUBCUTANEOUS
Qty: 2 ML | Refills: 1 | Status: SHIPPED | OUTPATIENT
Start: 2017-10-24 | End: 2018-04-06

## 2017-10-24 RX ORDER — SIMVASTATIN 40 MG
40 TABLET ORAL AT BEDTIME
Qty: 90 TABLET | Refills: 3 | Status: SHIPPED | OUTPATIENT
Start: 2017-10-24 | End: 2018-10-23

## 2017-10-24 RX ORDER — FLUOXETINE 10 MG/1
10 CAPSULE ORAL DAILY
Qty: 90 CAPSULE | Refills: 3 | Status: SHIPPED | OUTPATIENT
Start: 2017-10-24 | End: 2018-10-23

## 2017-10-24 RX ORDER — ATENOLOL 50 MG/1
50 TABLET ORAL DAILY
Qty: 90 TABLET | Refills: 3 | Status: SHIPPED | OUTPATIENT
Start: 2017-10-24 | End: 2018-10-23

## 2017-10-24 RX ORDER — IPRATROPIUM BROMIDE 42 UG/1
2 SPRAY, METERED NASAL 4 TIMES DAILY PRN
Qty: 3 BOX | Refills: 11 | Status: SHIPPED | OUTPATIENT
Start: 2017-10-24 | End: 2018-04-06

## 2017-10-24 NOTE — PROGRESS NOTES
SUBJECTIVE:   Nixon More is a 82 year old male who presents for Preventive Visit.  Are you in the first 12 months of your Medicare coverage?  No    Physical   Annual:     Getting at least 3 servings of Calcium per day::  Yes    Bi-annual eye exam::  Yes    Dental care twice a year::  Yes    Sleep apnea or symptoms of sleep apnea::  Sleep apnea    Diet::  Regular (no restrictions)    Frequency of exercise::  2-3 days/week    Duration of exercise::  15-30 minutes    Taking medications regularly::  Yes    Medication side effects::  Significant flushing and Other    Additional concerns today::  YES  skin cancer and niacinamide questions  Some balance concerns - if closes eyes feels a little imbalanced.    COGNITIVE SCREEN  1) Repeat 3 items (Banana, Sunrise, Chair)    2) Clock draw: NORMAL  3) 3 item recall: Recalls 3 objects  Results: 3 items recalled: COGNITIVE IMPAIRMENT LESS LIKELY    Mini-CogTM Copyright S Jessie. Licensed by the author for use in [facility]; reprinted with permission ([email or web address]@[email or web address]). All rights reserved.        Reviewed and updated as needed this visit by clinical staff  Tobacco  Allergies  Meds  Problems         Reviewed and updated as needed this visit by Provider  Allergies  Meds  Problems        Social History   Substance Use Topics     Smoking status: Former Smoker     Packs/day: 1.00     Years: 30.00     Types: Cigarettes     Quit date: 1/1/1978     Smokeless tobacco: Never Used     Alcohol use 1.0 oz/week     2 Standard drinks or equivalent per week      Comment: socially       The patient does not drink >3 drinks per day nor >7 drinks per week.      Today's PHQ-2 Score:   PHQ-2 ( 1999 [employer]) 10/24/2017   Q1: Little interest or pleasure in doing things 0   Q2: Feeling down, depressed or hopeless 0   PHQ-2 Score 0   Q1: Little interest or pleasure in doing things Not at all   Q2: Feeling down, depressed or hopeless Not at all   PHQ-2 Score 0     PHQ-9 SCORE 2/13/2015  "9/27/2016 8/18/2017   Total Score 7 - -   Total Score - 8 8         Do you feel safe in your environment - Yes    Do you have a Health Care Directive?: Yes: Advance Directive has been received and scanned.    Current providers sharing in care for this patient include:   Patient Care Team:  Natalya Lewis MD as PCP - General      Hearing impairment: Yes, Difficulty following a conversation in a noisy restaurant or crowded room.    Need to ask people to speak up or repeat themselves.    Ability to successfully perform activities of daily living: Yes, no assistance needed     Fall risk:  Fallen 2 or more times in the past year?: No  Any fall with injury in the past year?: No      Home safety:  none identified      The following health maintenance items are reviewed in Epic and correct as of today:Health Maintenance   Topic Date Due     DEPRESSION ACTION PLAN Q1 YR  09/19/1953     INFLUENZA VACCINE (SYSTEM ASSIGNED)  09/01/2017     JAMSHID QUESTIONNAIRE 1 YEAR  09/27/2017     FALL RISK ASSESSMENT  09/27/2017     PHQ-9 Q6 MONTHS  02/18/2018     BMP Q1 YR  10/18/2018     LIPID MONITORING Q1 YEAR  10/18/2018     DEXA Q3 YR  12/09/2018     ADVANCE DIRECTIVE PLANNING Q5 YRS  10/15/2020     COLONOSCOPY Q10 YR  09/26/2021     TETANUS Q10 YR  08/14/2022     PNEUMOCOCCAL  Completed     Labs reviewed in EPIC        Review of Systems  Constitutional, HEENT, cardiovascular, pulmonary, GI, , musculoskeletal, neuro, skin, endocrine and psych systems are negative, except as otherwise noted.      OBJECTIVE:   /76  Pulse 62  Temp 97.6  F (36.4  C) (Oral)  Ht 5' 9\" (1.753 m)  Wt 222 lb 11.2 oz (101 kg)  SpO2 96%  BMI 32.89 kg/m2 Estimated body mass index is 32.89 kg/(m^2) as calculated from the following:    Height as of this encounter: 5' 9\" (1.753 m).    Weight as of this encounter: 222 lb 11.2 oz (101 kg).  Physical Exam  GENERAL: healthy, alert and no distress  EYES: Eyes grossly normal to inspection, PERRL and " conjunctivae and sclerae normal  HENT: ear canals and TM's normal, nose and mouth without ulcers or lesions  NECK: no adenopathy, no asymmetry, masses, or scars and thyroid normal to palpation  RESP: lungs clear to auscultation - no rales, rhonchi or wheezes  CV: regular rate and rhythm, normal S1 S2, no S3 or S4, no murmur, click or rub, no peripheral edema and peripheral pulses strong  ABDOMEN: soft, nontender, no hepatosplenomegaly, no masses and bowel sounds normal  MS: no gross musculoskeletal defects noted, no edema  SKIN: no suspicious lesions or rashes  NEURO: Normal strength and tone, mentation intact and speech normal  PSYCH: mentation appears normal, affect normal/bright    ASSESSMENT / PLAN:   1. Routine general medical examination at a health care facility  Routine health education discussed: calcium, diet, exercise, weight, safety. Discussed balance and recommended exercise program    2. Mild major depression (H)  Doing better, continue prozac.  Stress and support discussed  - FLUoxetine (PROZAC) 10 MG capsule; Take 1 capsule (10 mg) by mouth daily  Dispense: 90 capsule; Refill: 3    3. Essential hypertension with goal blood pressure less than 140/90  Controlled, refill medications   - losartan-hydrochlorothiazide (HYZAAR) 100-12.5 MG per tablet; Take 1 tablet by mouth daily  Dispense: 90 tablet; Refill: 3  - atenolol (TENORMIN) 50 MG tablet; Take 1 tablet (50 mg) by mouth daily  Dispense: 90 tablet; Refill: 3    4. Dyslipidemia  Refill statin, discussed risk of niacinamide orally while on statin and should avoid  - simvastatin (ZOCOR) 40 MG tablet; Take 1 tablet (40 mg) by mouth At Bedtime  Dispense: 90 tablet; Refill: 3    5. Nasal congestion  Discussed medication use, refilled  - ipratropium (ATROVENT) 0.06 % spray; Spray 2 sprays in nostril 4 times daily as needed for rhinitis  Dispense: 3 Box; Refill: 11    6. Hip pain, chronic, unspecified laterality  refill  - naproxen (NAPROSYN) 500 MG tablet;  "Take 1 tablet (500 mg) by mouth 2 times daily as needed  Dispense: 60 tablet; Refill: 5    7. Bee sting-induced anaphylaxis, undetermined intent, subsequent encounter  refill  - EPINEPHrine (EPIPEN 2-MICHAEL) 0.3 MG/0.3ML injection 2-pack; Inject 0.3 mLs (0.3 mg) into the muscle once as needed for anaphylaxis  Dispense: 2 mL; Refill: 1    8. Need for prophylactic vaccination and inoculation against influenza    - FLU VACCINE, INCREASED ANTIGEN, PRESV FREE, AGE 65+ [06686]  - Vaccine Administration, Initial [42955]    End of Life Planning:  Patient currently has an advanced directive: Yes.  Practitioner is supportive of decision.    COUNSELING:  Reviewed preventive health counseling, as reflected in patient instructions         Estimated body mass index is 32.89 kg/(m^2) as calculated from the following:    Height as of this encounter: 5' 9\" (1.753 m).    Weight as of this encounter: 222 lb 11.2 oz (101 kg).  Weight management plan: Discussed healthy diet and exercise guidelines and patient will follow up in 12 months in clinic to re-evaluate.   reports that he quit smoking about 39 years ago. His smoking use included Cigarettes. He has a 30.00 pack-year smoking history. He has never used smokeless tobacco.        Appropriate preventive services were discussed with this patient, including applicable screening as appropriate for cardiovascular disease, diabetes, osteopenia/osteoporosis, and glaucoma.  As appropriate for age/gender, discussed screening for colorectal cancer, prostate cancer, breast cancer, and cervical cancer. Checklist reviewing preventive services available has been given to the patient.    Reviewed patients plan of care and provided an AVS. The Basic Care Plan (routine screening as documented in Health Maintenance) for Nixon meets the Care Plan requirement. This Care Plan has been established and reviewed with the Patient and spouse.    Counseling Resources:  ATP IV Guidelines  Pooled Cohorts Equation " Calculator  Breast Cancer Risk Calculator  FRAX Risk Assessment  ICSI Preventive Guidelines  Dietary Guidelines for Americans, 2010  2[employer]'s MyPlate  ASA Prophylaxis  Lung CA Screening    Natalya Lewis MD  [facility] [place]

## 2017-10-24 NOTE — PROGRESS NOTES
Injectable Influenza Immunization Documentation    1.  Is the person to be vaccinated sick today?   No    2. Does the person to be vaccinated have an allergy to a component   of the vaccine?   No  Egg Allergy Algorithm Link    3. Has the person to be vaccinated ever had a serious reaction   to influenza vaccine in the past?   No    4. Has the person to be vaccinated ever had Guillain-Barré syndrome?   No    Form completed by Sarah Saldana MA// October 24, 2017 12:07 PM

## 2017-10-24 NOTE — PATIENT INSTRUCTIONS
Preventive Health Recommendations:   Male Ages 65 and over    Yearly exam:             See your health care provider every year in order to  o   Review health changes.   o   Discuss preventive care.    o   Review your medicines if your doctor has prescribed any.      Every year, have a diabetes test (fasting glucose).     Every year, have a cholesterol test.     Shots:     Get a flu shot each year.     Get a tetanus shot every 10 years. You are due in 2022    Nutrition:     Eat at least 5 servings of fruits and vegetables each day.     Eat whole-grain bread, whole-wheat pasta and brown rice instead of white grains and rice.     Talk to your provider about Calcium and Vitamin D.   Lifestyle    Exercise for at least 150 minutes a week (30 minutes a day, 5 days a week). This will help you control your weight and prevent disease.     Limit alcohol to one drink per day.     No smoking.     Wear sunscreen to prevent skin cancer.     See your dentist every six months for an exam and cleaning.     See your eye doctor every 1 to 2 years to screen for conditions such as glaucoma, macular degeneration, cataracts, etc    At your visit today, we discussed your risk for falls and preventive options. I recommend you consider attending a Falls Prevention Program, like Matter of Balance, to reduce your risk of falls. You can find more information on Matter of Balance and locations here: http://www.[place]healthyaging.org/FallsPrevention/MatterBalance.aspx  Fall Prevention  Falls often occur due to slipping, tripping or losing your balance. Millions of people fall every year and injure themselves. Here are ways to reduce your risk of falling again.  Think about your fall, was there anything that caused your fall that can be fixed, removed, or replaced?  Make your home safe by keeping walkways clear of objects you may trip over.  Use non-slip pads under rugs. Do not use area rugs or small throw rugs.  Use non-slip mats in bathtubs and  showers.  Install handrails and lights on staircases.  Do not walk in poorly lit areas.  Do not stand on chairs or wobbly ladders.  Use caution when reaching overhead or looking upward. This position can cause a loss of balance.  Be sure your shoes fit properly, have non-slip bottoms and are in good condition.   Wear shoes both inside and out. Avoid going barefoot or wearing slippers.  Be cautious when going up and down stairs, curbs, and when walking on uneven sidewalks.  If your balance is poor, consider using a cane or walker.  If your fall was related to alcohol use, stop or limit alcohol intake.   If your fall was related to use of sleeping medicines, talk to your doctor about this. You may need to reduce your dosage at bedtime if you awaken during the night to go to the bathroom.    To reduce the need for nighttime bathroom trips:  Avoid drinking fluids for several hours before going to bed  Empty your bladder before going to bed  Men can keep a urinal at the bedside  Stay as active as you can. Balance, flexibility, strength, and endurance all come from exercise. They all play a role in preventing falls. Ask your healthcare provider which types of activity are right for you.  Get your vision checked on a regular basis.  If you have pets, know where they are before you stand up or walk so you don't trip over them.  Use night lights.  Date Last Reviewed: 11/5/2015 2000-2017 [employer]. [address] [place] [address], [place] [address]. All rights reserved. This information is not intended as a substitute for professional medical care. Always follow your healthcare professional's instructions.

## 2017-10-24 NOTE — MR AVS SNAPSHOT
After Visit Summary   10/24/2017    Nixon More    MRN: [id number]           Patient Information     Date Of Birth          1935        Visit Information        Provider Department      10/24/2017 9:50 AM Natalya Lewis MD [facility]        Today's Diagnoses     Routine general medical examination at a health care facility    -  1    Mild major depression (H)        Essential hypertension with goal blood pressure less than 140/90        Dyslipidemia        Nasal congestion        Hip pain, chronic, unspecified laterality        Bee sting-induced anaphylaxis, undetermined intent, subsequent encounter          Care Instructions      Preventive Health Recommendations:   Male Ages 65 and over    Yearly exam:             See your health care provider every year in order to  o   Review health changes.   o   Discuss preventive care.    o   Review your medicines if your doctor has prescribed any.      Every year, have a diabetes test (fasting glucose).     Every year, have a cholesterol test.     Shots:     Get a flu shot each year.     Get a tetanus shot every 10 years. You are due in 2022    Nutrition:     Eat at least 5 servings of fruits and vegetables each day.     Eat whole-grain bread, whole-wheat pasta and brown rice instead of white grains and rice.     Talk to your provider about Calcium and Vitamin D.   Lifestyle    Exercise for at least 150 minutes a week (30 minutes a day, 5 days a week). This will help you control your weight and prevent disease.     Limit alcohol to one drink per day.     No smoking.     Wear sunscreen to prevent skin cancer.     See your dentist every six months for an exam and cleaning.     See your eye doctor every 1 to 2 years to screen for conditions such as glaucoma, macular degeneration, cataracts, etc    At your visit today, we discussed your risk for falls and preventive options. I recommend you consider attending a Falls Prevention Program, like  Matter of Balance, to reduce your risk of falls. You can find more information on Matter of Balance and locations here: http://www.[place]healthyaging.org/FallsPrevention/MatterBalance.aspx  Fall Prevention  Falls often occur due to slipping, tripping or losing your balance. Millions of people fall every year and injure themselves. Here are ways to reduce your risk of falling again.  Think about your fall, was there anything that caused your fall that can be fixed, removed, or replaced?  Make your home safe by keeping walkways clear of objects you may trip over.  Use non-slip pads under rugs. Do not use area rugs or small throw rugs.  Use non-slip mats in bathtubs and showers.  Install handrails and lights on staircases.  Do not walk in poorly lit areas.  Do not stand on chairs or wobbly ladders.  Use caution when reaching overhead or looking upward. This position can cause a loss of balance.  Be sure your shoes fit properly, have non-slip bottoms and are in good condition.   Wear shoes both inside and out. Avoid going barefoot or wearing slippers.  Be cautious when going up and down stairs, curbs, and when walking on uneven sidewalks.  If your balance is poor, consider using a cane or walker.  If your fall was related to alcohol use, stop or limit alcohol intake.   If your fall was related to use of sleeping medicines, talk to your doctor about this. You may need to reduce your dosage at bedtime if you awaken during the night to go to the bathroom.    To reduce the need for nighttime bathroom trips:  Avoid drinking fluids for several hours before going to bed  Empty your bladder before going to bed  Men can keep a urinal at the bedside  Stay as active as you can. Balance, flexibility, strength, and endurance all come from exercise. They all play a role in preventing falls. Ask your healthcare provider which types of activity are right for you.  Get your vision checked on a regular basis.  If you have pets, know where  they are before you stand up or walk so you don't trip over them.  Use night lights.  Date Last Reviewed: 11/5/2015 2000-2017 [employer]. [address] [facility], [place] [address]. All rights reserved. This information is not intended as a substitute for professional medical care. Always follow your healthcare professional's instructions.            Follow-ups after your visit        Follow-up notes from your care team     Return in about 1 year (around 10/24/2018) for Physical Exam.      Your next 10 appointments already scheduled     Feb 26, 2018  9:00 AM CST   Return Prostate Cancer with Mark Pino MD   [facility] ([facility])    [address]  [address]  [facility] [address] [phone]              Who to contact     If you have questions or need follow up information about today's clinic visit or your schedule please contact [facility] directly at [phone].  Normal or non-critical lab and imaging results will be communicated to you by [employer]hart, letter or phone within 4 business days after the clinic has received the results. If you do not hear from us within 7 days, please contact the clinic through [employer] or phone. If you have a critical or abnormal lab result, we will notify you by phone as soon as possible.  Submit refill requests through [employer] or call your pharmacy and they will forward the refill request to us. Please allow 3 business days for your refill to be completed.          Additional Information About Your Visit        [employer] Information     [employer] gives you secure access to your electronic health record. If you see a primary care provider, you can also send messages to your care team and make appointments. If you have questions, please call your primary care clinic.  If you do not have a primary care provider, please call [phone] and they will assist you.       "  Care EveryWhere ID     This is your Care EveryWhere ID. This could be used by other organizations to access your [facility] medical records  [id number]        Your Vitals Were     Pulse Temperature Height Pulse Oximetry BMI (Body Mass Index)       62 97.6  F (36.4  C) (Oral) 5' 9\" (1.753 m) 96% 32.89 kg/m2        Blood Pressure from Last 3 Encounters:   10/24/17 130/76   08/23/17 138/78   08/18/17 122/82    Weight from Last 3 Encounters:   10/24/17 222 lb 11.2 oz (101 kg)   08/23/17 220 lb (99.8 kg)   08/18/17 225 lb (102.1 kg)              Today, you had the following     No orders found for display         Today's Medication Changes          These changes are accurate as of: 10/24/17 10:49 AM.  If you have any questions, ask your nurse or doctor.               These medicines have changed or have updated prescriptions.        Dose/Directions    FLUoxetine 10 MG capsule   Commonly known as:  PROzac   This may have changed:  Another medication with the same name was removed. Continue taking this medication, and follow the directions you see here.   Used for:  Mild major depression (H)   Changed by:  Natalya Lewis MD        Dose:  10 mg   Take 1 capsule (10 mg) by mouth daily   Quantity:  90 capsule   Refills:  3            Where to get your medicines      These medications were sent to [facility] [address] [place] [address]     Phone:  [phone]     atenolol 50 MG tablet    EPINEPHrine 0.3 MG/0.3ML injection 2-pack    FLUoxetine 10 MG capsule    ipratropium 0.06 % spray    losartan-hydrochlorothiazide 100-12.5 MG per tablet    naproxen 500 MG tablet    simvastatin 40 MG tablet                Primary Care Provider Office Phone # Fax #    Natalya Lewis [phone] [address] [place] [address]        Equal Access to Services     ANTONIA BRAVO AH: Nenita Grey, kirstin conner, miguelybxin " anila farias hayshon morenoalysa wall ah. So [facility] [phone].    ATENCIÓN: Si markel george, tiene a coelho disposición servicios gratuitos de asistencia lingüística. Jaren al [phone].    We comply with applicable federal civil rights laws and [place] laws. We do not discriminate on the basis of race, color, national origin, age, disability, sex, sexual orientation, or gender identity.            Thank you!     Thank you for choosing [facility]  for your care. Our goal is always to provide you with excellent care. Hearing back from our patients is one way we can continue to improve our services. Please take a few minutes to complete the written survey that you may receive in the mail after your visit with us. Thank you!             Your Updated Medication List - Protect others around you: Learn how to safely use, store and throw away your medicines at www.disposemymeds.org.          This list is accurate as of: 10/24/17 10:49 AM.  Always use your most recent med list.                   Brand Name Dispense Instructions for use Diagnosis    aspirin 81 MG tablet     100    1 tab po QD (Once per day)    Essential hypertension, benign       atenolol 50 MG tablet    TENORMIN    90 tablet    Take 1 tablet (50 mg) by mouth daily    Essential hypertension with goal blood pressure less than 140/90       EPINEPHrine 0.3 MG/0.3ML injection 2-pack    EPIPEN 2-MICHAEL    2 mL    Inject 0.3 mLs (0.3 mg) into the muscle once as needed for anaphylaxis    Bee sting-induced anaphylaxis, undetermined intent, subsequent encounter       FLUoxetine 10 MG capsule    PROzac    90 capsule    Take 1 capsule (10 mg) by mouth daily    Mild major depression (H)       ipratropium 0.06 % spray    ATROVENT    3 Box    Spray 2 sprays in nostril 4 times daily as needed for rhinitis    Nasal congestion       leuprolide 45 MG kit    ELIGARD    1 each    Inject 45 mg Subcutaneous every 6 months.         losartan-hydrochlorothiazide 100-12.5 MG per tablet    HYZAAR    90 tablet    Take 1 tablet by mouth daily    Essential hypertension with goal blood pressure less than 140/90       naproxen 500 MG tablet    NAPROSYN    60 tablet    Take 1 tablet (500 mg) by mouth 2 times daily as needed    Hip pain, chronic, unspecified laterality       PROSTEON PO      Take 2,000 Units by mouth With vitamin D        simvastatin 40 MG tablet    ZOCOR    90 tablet    Take 1 tablet (40 mg) by mouth At Bedtime    Dyslipidemia

## 2018-01-22 ENCOUNTER — MYC MEDICAL ADVICE (OUTPATIENT)
Dept: PEDIATRICS | Facility: CLINIC | Age: 83
End: 2018-01-22

## 2018-01-22 NOTE — TELEPHONE ENCOUNTER
Patient having sudden urge to have bowel movements.  Advised fiber and monitoring for patterns.

## 2018-02-14 ENCOUNTER — HOSPITAL ENCOUNTER (OUTPATIENT)
Dept: LAB | Facility: CLINIC | Age: 83
Discharge: HOME | End: 2018-02-14
Attending: UROLOGY | Admitting: UROLOGY
Payer: COMMERCIAL

## 2018-02-14 DIAGNOSIS — C61 PROSTATE CANCER (H): Primary | ICD-10-CM

## 2018-02-14 LAB — PSA SERPL-MCNC: <0.01 UG/L (ref 0–4)

## 2018-02-14 PROCEDURE — 36415 COLL VENOUS BLD VENIPUNCTURE: CPT | Performed by: UROLOGY

## 2018-02-14 PROCEDURE — 84153 ASSAY OF PSA TOTAL: CPT | Performed by: UROLOGY

## 2018-02-19 ENCOUNTER — OFFICE VISIT (OUTPATIENT)
Dept: UROLOGY | Facility: CLINIC | Age: 83
End: 2018-02-19
Payer: COMMERCIAL

## 2018-02-19 VITALS
HEART RATE: 80 BPM | DIASTOLIC BLOOD PRESSURE: 80 MMHG | WEIGHT: 222 LBS | BODY MASS INDEX: 31.78 KG/M2 | SYSTOLIC BLOOD PRESSURE: 120 MMHG | HEIGHT: 70 IN

## 2018-02-19 DIAGNOSIS — C61 MALIGNANT NEOPLASM OF PROSTATE (H): ICD-10-CM

## 2018-02-19 DIAGNOSIS — C61 MALIGNANT NEOPLASM OF PROSTATE (H): Primary | ICD-10-CM

## 2018-02-19 PROCEDURE — 99212 OFFICE O/P EST SF 10 MIN: CPT | Mod: 25 | Performed by: UROLOGY

## 2018-02-19 PROCEDURE — 96402 CHEMO HORMON ANTINEOPL SQ/IM: CPT | Performed by: UROLOGY

## 2018-02-19 RX ORDER — HYDROCORTISONE 2.5 %
CREAM (GRAM) TOPICAL
COMMUNITY
Start: 2017-12-11 | End: 2018-10-23

## 2018-02-19 ASSESSMENT — PAIN SCALES - GENERAL: PAINLEVEL: NO PAIN (0)

## 2018-02-19 NOTE — PROGRESS NOTES
Office Visit Note  [facility]  [phone]    UROLOGIC DIAGNOSES:   Prostate cancer    CURRENT INTERVENTIONS:   S/P hormonal therapy with recurrence, on Eligard    HISTORY:   Nixon returns to clinic today for prostate cancer followup. His PSA remains undetectable. He continues to feel well with no urinary complaints.      PAST MEDICAL HISTORY:   Past Medical History:   Diagnosis Date     Actinic keratosis      Acute prostatitis 9/23/2004     Angina pectoris (H)      CAD (coronary artery disease)     1/5/2011 lateral ischemia on stress echo, 12/2014 normal stress echo     Dyslipidemia      Emphysema of lung (H)      Essential hypertension, benign      Hernia, abdominal      HTN (hypertension)      Hypersomnia with sleep apnea, unspecified     on bipap, partially treated with residual apneas     Hypertrophy (benign) of prostate 5/01    Biopsy 5/01 negative for cancer; PSA 5     Lumbago      MALIGN NEOPL PROSTATE 12/15/2006     Palpitations      Personal history of other malignant neoplasm of skin 1997    basal cell cancer     Prostate cancer (H)        PAST SURGICAL HISTORY:   Past Surgical History:   Procedure Laterality Date     C 2ND STAGE CHEMOSURG MOHS  1/09/01    Moh's procedure for basal cell carcinoma     C NONSPECIFIC PROCEDURE  1988    s/p lumbar laminectomy     C NONSPECIFIC PROCEDURE  child    hernia repair     C VITRECTOMY PARS PLANA REMOVE PRERETINAL MEMBRANE  3/09       FAMILY HISTORY:   Family History   Problem Relation Age of Onset     Alzheimer Disease Mother      Hypertension Mother      Cardiovascular Father      D:86 complications fo CHF     Prostate Cancer Brother      Lung Cancer Brother 76     Prostate Cancer Brother        SOCIAL HISTORY:   Social History   Substance Use Topics     Smoking status: Former Smoker     Packs/day: 1.00     Years: 30.00     Types: Cigarettes     Quit date: 1/1/1978     Smokeless tobacco: Never Used     Alcohol use 1.0 oz/week     2 Standard drinks or  equivalent per week      Comment: socially       Current Outpatient Prescriptions   Medication     EPINEPHrine (EPIPEN 2-MICHAEL) 0.3 MG/0.3ML injection 2-pack     losartan-hydrochlorothiazide (HYZAAR) 100-12.5 MG per tablet     atenolol (TENORMIN) 50 MG tablet     simvastatin (ZOCOR) 40 MG tablet     ipratropium (ATROVENT) 0.06 % spray     naproxen (NAPROSYN) 500 MG tablet     FLUoxetine (PROZAC) 10 MG capsule     Multiple Minerals-Vitamins (PROSTEON PO)     [DISCONTINUED] atenolol-chlorthalidone (TENORETIC 50) 50-25 MG per tablet     leuprolide (ELIGARD) 45 MG injection     ASPIRIN 81 MG OR TABS     No current facility-administered medications for this visit.      Facility-Administered Medications Ordered in Other Visits   Medication     leuprolide (ELIGARD) injection 45 mg         PHYSICAL EXAM:    There were no vitals taken for this visit.    HEENT: Normocephalic and atraumatic   Cardiac: Not done  Back/Flank: Not done  CNS/PNS: Not done  Respiratory: Normal non-labored breathing  Abdomen: Soft nontender and nondistended  Peripheral Vascular: Not done  Mental Status: Not done    Penis: Not done  Scrotal Skin: Not done  Testicles: Not done  Epididymis: Not done  Digital Rectal Exam:     Cystoscopy: Not done    Imaging: None    Urinalysis: UA RESULTS:  No results for input(s): COLOR, APPEARANCE, URINEGLC, URINEBILI, URINEKETONE, SG, UBLD, URINEPH, PROTEIN, UROBILINOGEN, NITRITE, LEUKEST, RBCU, WBCU in the last 32834 hours.    PSA: Undetectable    Post Void Residual:     Other labs: None today      IMPRESSION:  Doing well, PSA undetectable.    PLAN:  He received another 6 month injection of Eligard today. I recommended that he come back again in 6 months for another PSA and Eligard injection.    Total Time: 10 minutes                                      Total in Consultation: 10 minutes      Mark Pino M.D.

## 2018-02-19 NOTE — MR AVS SNAPSHOT
After Visit Summary   2/19/2018    Nixon More    MRN: [id number]           Patient Information     Date Of Birth          1935        Visit Information        Provider Department      2/19/2018 9:00 AM Mark Pino MD [facility]        Today's Diagnoses     Malignant neoplasm of prostate (H)    -  1    Malignant neoplasm of prostate           Follow-ups after your visit        Follow-up notes from your care team     Return in about 6 months (around 8/19/2018) for PSA and Eligard injection.      Your next 10 appointments already scheduled     Aug 20, 2018  9:00 AM CDT   Return Prostate Cancer with Mark Pino MD   [facility] ([facility])    [address]  [address]  [facility] [address] [phone]              Future tests that were ordered for you today     Open Future Orders        Priority Expected Expires Ordered    PSA tumor marker Routine  2/19/2019 2/19/2018            Who to contact     If you have questions or need follow up information about today's clinic visit or your schedule please contact [facility] directly at [phone].  Normal or non-critical lab and imaging results will be communicated to you by MyChart, letter or phone within 4 business days after the clinic has received the results. If you do not hear from us within 7 days, please contact the clinic through MyChart or phone. If you have a critical or abnormal lab result, we will notify you by phone as soon as possible.  Submit refill requests through [employer] or call your pharmacy and they will forward the refill request to us. Please allow 3 business days for your refill to be completed.          Additional Information About Your Visit        MyChart Information     [employer] gives you secure access to your electronic health  "record. If you see a primary care provider, you can also send messages to your care team and make appointments. If you have questions, please call your primary care clinic.  If you do not have a primary care provider, please call [phone] and they will assist you.        Care EveryWhere ID     This is your Care EveryWhere ID. This could be used by other organizations to access your [facility] medical records  [id number]        Your Vitals Were     Pulse Height BMI (Body Mass Index)             80 1.765 m (5' 9.5\") 32.31 kg/m2          Blood Pressure from Last 3 Encounters:   02/19/18 120/80   10/24/17 130/76   08/23/17 138/78    Weight from Last 3 Encounters:   02/19/18 100.7 kg (222 lb)   10/24/17 101 kg (222 lb 11.2 oz)   08/23/17 99.8 kg (220 lb)               Primary Care Provider Office Phone # Fax #    Natalya Lewis [phone] [address] [place] [address]        Equal Access to Services     [facility]: Hadii aad ku hadasho Soomaali, waaxda luqadaha, qaybta kaalmada romain, anila wall . So [facility] [phone].    ATENCIÓN: Si habla español, tiene a coelho disposición servicios gratuitos de asistencia lingüística. [facility] [phone].    We comply with applicable federal civil rights laws and [place] laws. We do not discriminate on the basis of race, color, national origin, age, disability, sex, sexual orientation, or gender identity.            Thank you!     Thank you for choosing [facility] [place]  for your care. Our goal is always to provide you with excellent care. Hearing back from our patients is one way we can continue to improve our services. Please take a few minutes to complete the written survey that you may receive in the mail after your visit with us. Thank you!             Your Updated Medication List - Protect others around you: Learn how to safely use, store and throw away your " medicines at www.disposemymeds.org.          This list is accurate as of 2/19/18  9:40 AM.  Always use your most recent med list.                   Brand Name Dispense Instructions for use Diagnosis    aspirin 81 MG tablet     100    1 tab po QD (Once per day)    Essential hypertension, benign       atenolol 50 MG tablet    TENORMIN    90 tablet    Take 1 tablet (50 mg) by mouth daily    Essential hypertension with goal blood pressure less than 140/90       EPINEPHrine 0.3 MG/0.3ML injection 2-pack    EPIPEN 2-MICHAEL    2 mL    Inject 0.3 mLs (0.3 mg) into the muscle once as needed for anaphylaxis    Bee sting-induced anaphylaxis, undetermined intent, subsequent encounter       FLUoxetine 10 MG capsule    PROzac    90 capsule    Take 1 capsule (10 mg) by mouth daily    Mild major depression (H)       hydrocortisone 2.5 % cream           ipratropium 0.06 % spray    ATROVENT    3 Box    Spray 2 sprays in nostril 4 times daily as needed for rhinitis    Nasal congestion       leuprolide 45 MG kit    ELIGARD    1 each    Inject 45 mg Subcutaneous every 6 months.        losartan-hydrochlorothiazide 100-12.5 MG per tablet    HYZAAR    90 tablet    Take 1 tablet by mouth daily    Essential hypertension with goal blood pressure less than 140/90       naproxen 500 MG tablet    NAPROSYN    60 tablet    Take 1 tablet (500 mg) by mouth 2 times daily as needed    Hip pain, chronic, unspecified laterality       PROSTEON PO      Take 2,000 Units by mouth With vitamin D        simvastatin 40 MG tablet    ZOCOR    90 tablet    Take 1 tablet (40 mg) by mouth At Bedtime    Dyslipidemia

## 2018-02-19 NOTE — NURSING NOTE
The following medication was given:     MEDICATION: Eligard 45 mg  ROUTE: SQ  SITE: LLQ - Abd.  DOSE: 45mg  LOT #: [id number]  :  Nataliia  EXPIRATION DATE:  08/2019  NDC#: [id number] Erika Jose LPN

## 2018-02-19 NOTE — NURSING NOTE
Here for prostate cancer f/u. He tolerated the last Eligard well is having some hot flashes. Erika Jose LPN

## 2018-02-19 NOTE — LETTER
2/19/2018       RE: Nixon More  [address] [place] [address] [address] [place] [address]     Dear Colleague,    Thank you for referring your patient, Nixon More, to the [facility] [place] at [facility]. Please see a copy of my visit note below.    Office Visit Note  [facility]  [phone]    UROLOGIC DIAGNOSES:   Prostate cancer    CURRENT INTERVENTIONS:   S/P hormonal therapy with recurrence, on Eligard    HISTORY:   Nixon returns to clinic today for prostate cancer followup. His PSA remains undetectable. He continues to feel well with no urinary complaints.      PAST MEDICAL HISTORY:   Past Medical History:   Diagnosis Date     Actinic keratosis      Acute prostatitis 9/23/2004     Angina pectoris (H)      CAD (coronary artery disease)     1/5/2011 lateral ischemia on stress echo, 12/2014 normal stress echo     Dyslipidemia      Emphysema of lung (H)      Essential hypertension, benign      Hernia, abdominal      HTN (hypertension)      Hypersomnia with sleep apnea, unspecified     on bipap, partially treated with residual apneas     Hypertrophy (benign) of prostate 5/01    Biopsy 5/01 negative for cancer; PSA 5     Lumbago      MALIGN NEOPL PROSTATE 12/15/2006     Palpitations      Personal history of other malignant neoplasm of skin 1997    basal cell cancer     Prostate cancer (H)        PAST SURGICAL HISTORY:   Past Surgical History:   Procedure Laterality Date     C 2ND STAGE CHEMOSURG MOHS  1/09/01    Moh's procedure for basal cell carcinoma     C NONSPECIFIC PROCEDURE  1988    s/p lumbar laminectomy     C NONSPECIFIC PROCEDURE  child    hernia repair     C VITRECTOMY PARS PLANA REMOVE PRERETINAL MEMBRANE  3/09       FAMILY HISTORY:   Family History   Problem Relation Age of Onset     Alzheimer Disease Mother      Hypertension Mother      Cardiovascular Father      D:86 complications fo CHF     Prostate Cancer Brother       Lung Cancer Brother 76     Prostate Cancer Brother        SOCIAL HISTORY:   Social History   Substance Use Topics     Smoking status: Former Smoker     Packs/day: 1.00     Years: 30.00     Types: Cigarettes     Quit date: 1/1/1978     Smokeless tobacco: Never Used     Alcohol use 1.0 oz/week     2 Standard drinks or equivalent per week      Comment: socially       Current Outpatient Prescriptions   Medication     EPINEPHrine (EPIPEN 2-MICHAEL) 0.3 MG/0.3ML injection 2-pack     losartan-hydrochlorothiazide (HYZAAR) 100-12.5 MG per tablet     atenolol (TENORMIN) 50 MG tablet     simvastatin (ZOCOR) 40 MG tablet     ipratropium (ATROVENT) 0.06 % spray     naproxen (NAPROSYN) 500 MG tablet     FLUoxetine (PROZAC) 10 MG capsule     Multiple Minerals-Vitamins (PROSTEON PO)     [DISCONTINUED] atenolol-chlorthalidone (TENORETIC 50) 50-25 MG per tablet     leuprolide (ELIGARD) 45 MG injection     ASPIRIN 81 MG OR TABS     No current facility-administered medications for this visit.      Facility-Administered Medications Ordered in Other Visits   Medication     leuprolide (ELIGARD) injection 45 mg         PHYSICAL EXAM:    There were no vitals taken for this visit.    HEENT: Normocephalic and atraumatic   Cardiac: Not done  Back/Flank: Not done  CNS/PNS: Not done  Respiratory: Normal non-labored breathing  Abdomen: Soft nontender and nondistended  Peripheral Vascular: Not done  Mental Status: Not done    Penis: Not done  Scrotal Skin: Not done  Testicles: Not done  Epididymis: Not done  Digital Rectal Exam:     Cystoscopy: Not done    Imaging: None    Urinalysis: UA RESULTS:  No results for input(s): COLOR, APPEARANCE, URINEGLC, URINEBILI, URINEKETONE, SG, UBLD, URINEPH, PROTEIN, UROBILINOGEN, NITRITE, LEUKEST, RBCU, WBCU in the last 14350 hours.    PSA: Undetectable    Post Void Residual:     Other labs: None today      IMPRESSION:  Doing well, PSA undetectable.    PLAN:  He received another 6 month injection of Eligard today. I  recommended that he come back again in 6 months for another PSA and Eligard injection.    Total Time: 10 minutes                                      Total in Consultation: 10 minutes      Again, thank you for allowing me to participate in the care of your patient.      Sincerely,    Mark Pino MD

## 2018-02-20 ENCOUNTER — TELEPHONE (OUTPATIENT)
Dept: UROLOGY | Facility: CLINIC | Age: 83
End: 2018-02-20

## 2018-02-20 NOTE — TELEPHONE ENCOUNTER
Nixon was seen yesterday but no exam done by provider . He did receive an Eligard injection. Today he noticed a few drops of blood in the urine at end of stream. None since. He is on ASA and takes Naprosyn prn. He was wondering if Eligard could cause this. Informed Nixon that this is not a side effect seen from Eligard. Instructe [facility] to hold blood thinners if he sees blood again. Will send message to Dr Pino to see if he needs to see patient for the hematuria when patient gets back from vacation in 2 weeks. Erika Jose LPN

## 2018-03-22 ENCOUNTER — MYC MEDICAL ADVICE (OUTPATIENT)
Dept: PEDIATRICS | Facility: CLINIC | Age: 83
End: 2018-03-22

## 2018-03-31 ENCOUNTER — HOSPITAL ENCOUNTER (EMERGENCY)
Facility: CLINIC | Age: 83
Discharge: HOME | End: 2018-03-31
Attending: EMERGENCY MEDICINE | Admitting: EMERGENCY MEDICINE
Payer: COMMERCIAL

## 2018-03-31 VITALS
BODY MASS INDEX: 33.34 KG/M2 | OXYGEN SATURATION: 92 % | WEIGHT: 220 LBS | HEART RATE: 78 BPM | HEIGHT: 68 IN | TEMPERATURE: 98 F | RESPIRATION RATE: 17 BRPM | DIASTOLIC BLOOD PRESSURE: 95 MMHG | SYSTOLIC BLOOD PRESSURE: 135 MMHG

## 2018-03-31 DIAGNOSIS — T78.40XA ALLERGIC REACTION TO DRUG, INITIAL ENCOUNTER: ICD-10-CM

## 2018-03-31 DIAGNOSIS — L50.9 URTICARIA: ICD-10-CM

## 2018-03-31 PROCEDURE — 96375 TX/PRO/DX INJ NEW DRUG ADDON: CPT

## 2018-03-31 PROCEDURE — 96374 THER/PROPH/DIAG INJ IV PUSH: CPT

## 2018-03-31 PROCEDURE — 25000128 H RX IP 250 OP 636: Performed by: EMERGENCY MEDICINE

## 2018-03-31 PROCEDURE — 99284 EMERGENCY DEPT VISIT MOD MDM: CPT | Mod: 25

## 2018-03-31 PROCEDURE — 25000125 ZZHC RX 250: Performed by: EMERGENCY MEDICINE

## 2018-03-31 RX ORDER — DIPHENHYDRAMINE HYDROCHLORIDE 50 MG/ML
12.5 INJECTION INTRAMUSCULAR; INTRAVENOUS ONCE
Status: COMPLETED | OUTPATIENT
Start: 2018-03-31 | End: 2018-03-31

## 2018-03-31 RX ORDER — DIPHENHYDRAMINE HCL 25 MG
25 TABLET ORAL EVERY 6 HOURS PRN
Qty: 56 TABLET | Refills: 0 | Status: SHIPPED | OUTPATIENT
Start: 2018-03-31 | End: 2018-07-07

## 2018-03-31 RX ORDER — FAMOTIDINE 20 MG/1
20 TABLET, FILM COATED ORAL 2 TIMES DAILY
Qty: 28 TABLET | Refills: 0 | Status: SHIPPED | OUTPATIENT
Start: 2018-03-31 | End: 2018-04-14

## 2018-03-31 RX ADMIN — FAMOTIDINE 20 MG: 10 INJECTION, SOLUTION INTRAVENOUS at 05:08

## 2018-03-31 RX ADMIN — DIPHENHYDRAMINE HYDROCHLORIDE 12.5 MG: 50 INJECTION, SOLUTION INTRAMUSCULAR; INTRAVENOUS at 05:01

## 2018-03-31 ASSESSMENT — ENCOUNTER SYMPTOMS
TROUBLE SWALLOWING: 0
VOMITING: 0
NAUSEA: 0

## 2018-03-31 NOTE — ED AVS SNAPSHOT
[facility] Emergency Department    [address]    [facility] [address]    Phone:  [phone]    Fax:  [phone]                                       Nixon More   MRN: [id number]    Department:  [facility] Emergency Department   Date of Visit:  3/31/2018           After Visit Summary Signature Page     I have received my discharge instructions, and my questions have been answered. I have discussed any challenges I see with this plan with the nurse or doctor.    ..........................................................................................................................................  Patient/Patient Representative Signature      ..........................................................................................................................................  Patient Representative Print Name and Relationship to Patient    ..................................................               ................................................  Date                                            Time    ..........................................................................................................................................  Reviewed by Signature/Title    ...................................................              ..............................................  Date                                                            Time

## 2018-03-31 NOTE — ED PROVIDER NOTES
"  History     Chief Complaint:  Pruritis and allegic reaction    HPI   Nixon More is a 82 year old male with a history of hypertension, emphysema, and prostate cancer who presents to the ED for evaluation of hives. The patient reports that he took Naproxen an hour prior to arrival, and developed hives and itching shortly afterwards diffusely over his body. He has not taken anything for the symptoms. He denies any vomiting, nausea, or trouble swallowing.    Allergies:  Augmentin  Bactrim  Bees  Diltiazem  Lisinopril  Penicillins  Sulfa drugs    Medications:    Epinephrine  Hyzaar  Tenormin  Zocor  Atrovent  Naproxen  Prozac  Eligard  aspirin    Past Medical History:    Actinic keratosis  CAD  Dyslipidemia  Emphysema  HTN  Hernia  Sleep apnea  Benign hypertrophy of prostate  Lumbago  Prostate cancer  BCC    Past Surgical History:    Moh's procedure  Lumbar laminectomy  Hernia repair  Vitrectomy    Family History:    Mother: alzheimer's disease, HTN  Father: CHF  Brother: prostate cancer, lung cancer,     Social History:  Marital Status:     Former Smoker  Social alcohol use    Review of Systems   HENT: Negative for trouble swallowing.    Gastrointestinal: Negative for nausea and vomiting.   Skin: Positive for rash.   All other systems reviewed and are negative.    Physical Exam     Patient Vitals for the past 24 hrs:   BP Temp Temp src Pulse Resp SpO2 Height Weight   03/31/18 0503  168/103 - - - - 93 % - -   03/31/18 0444  158/115 98  F (36.7  C) Oral 78 17 94 % 1.727 m (5' 8\") 99.8 kg (220 lb)     Physical Exam  Nursing note and vitals reviewed.  Constitutional: Cooperative.   HENT:   Mouth/Throat: Mucous membranes are normal. No edema in oropharynx  Cardiovascular: Normal rate, regular rhythm and normal heart sounds.  No murmur.  Pulmonary/Chest: Effort normal and breath sounds normal. No respiratory distress. No wheezes. No rales.   Abdominal: Soft. Normal appearance and bowel sounds are normal. No " distension. There is no tenderness. There is no rigidity and no guarding.   Neurological: Alert. Oriented x4  Skin: Skin is warm and dry. No rash noted. Diffuse urticaria.   Psychiatric: Normal mood and affect.     Emergency Department Course   Interventions:  0501 Benadryl 12.5 mg IV  0508 Pepcid 20 mg IV  Please see MAR for full list of medications administered in the ED.    Emergency Department Course:  Nursing notes and vitals reviewed. (7750) I performed an exam of the patient as documented above.     IV inserted. Medicine administered as documented above.    (8632) I rechecked the patient and discussed the results of his workup thus far. His rash has completely resolved.     Findings and plan explained to the Patient. Patient discharged home with instructions regarding supportive care, medications, and reasons to return. The importance of close follow-up was reviewed. The patient was prescribed Benadryl and Pepcid.    I personallyanswered all related questions prior to discharge.     Impression & Plan      Medical Decision Making:  Nixon More is a 82 year old male who presents with diffuse urticarial rash. Most likely etiology is the Naproxen he took 1 hour prior to this. Certainly there could be other causes, but time course seems to fit with this. Fortunately, he shows no other signs of systemic anaphylaxis. No voice changes, throat edema, or wheezing in the lungs, or low blood pressure. He has been observed here in the ED for 1.5 hours and has had complete resolution of his rash and symptoms after administration of antihistamines. Plan of care will be supportive with antihistamines on an outpatient basis with appropriate return precaution and instructions to avoid NSAIDS going forward unless directed to do so by his PCP.    Diagnosis:    ICD-10-CM   1. Urticaria L50.9   2. Allergic reaction to drug, initial encounter T78.40XA     Disposition:  discharged to home    Discharge Medications:  New  Prescriptions    DIPHENHYDRAMINE (BENADRYL) 25 MG TABLET    Take 1 tablet (25 mg) by mouth every 6 hours as needed for itching or other (Hives)    FAMOTIDINE (PEPCID) 20 MG TABLET    Take 1 tablet (20 mg) by mouth 2 times daily for 14 days     Scribe Disclosure:  I, Erika Sparks, am serving as a scribe on 3/31/2018 at 4:39 AM to personally document services performed by Naseem Garibay MD based on my observations and the provider's statements to me.     Erika Sparks  3/31/2018   [facility] EMERGENCY DEPARTMENT       Naseem Garibay MD  03/31/18 0677

## 2018-03-31 NOTE — ED AVS SNAPSHOT
[facility] Emergency Department    [address] [place] [address]    Phone:  [phone]    Fax:  [phone]                                       Nixon More   MRN: [id number]    Department:  [facility] Emergency Department   Date of Visit:  3/31/2018           Patient Information     Date Of Birth          1935        Your diagnoses for this visit were:     Urticaria     Allergic reaction to drug, initial encounter        You were seen by Naseem Garibay MD.      Follow-up Information     Follow up with Natalya Lewis MD.    Specialties:  Internal Medicine, Pediatrics    Why:  this next week    Contact information:    [address] [facility]   [place] [id number] [phone]          Discharge Instructions       Discharge Instructions  Hives or Urticaria    Hives are a rash with raised, swollen, red, itchy spots on the skin. They may look like mosquito bites. Hives change in size, shape and location over time.  Hives can be caused by an infection (usually a virus) or an allergic reaction (to medicine, food, insect stings, or many other things). They can also come because of your own body s reaction to things like body temperature changes or pressure on the skin. Sometimes hives are caused by an inherited problem. Hives are not contagious.    Generally, every Emergency Department visit should have a follow-up clinic visit with either a primary or a specialty clinic/provider. Please follow-up as instructed by your emergency provider today.    Return to the Emergency Department if:    You have trouble breathing.    Your lips or tongue swell up, or you have swelling or tightness in the throat.    You have stomach pain or vomiting (throwing up).    You pass out.    What can I do to help myself?    Fill any prescriptions the provider gave you and take them right away.    Antihistamines (H1 blockers) are the primary treatment for hives. You may be given a  prescription for an antihistamine, or your provider may tell you to use one available without a prescription, such as Benadryl  (diphenhydramine). These medicines relieve the itching and can help make the hives go away.    You may be given a prescription for a steroid. Used long-term, these can have side effects, but are in the short-term. You may notice restlessness or increased appetite. Be sure to take all of the medicine as prescribed.     Sometimes your provider will recommend an H2 antihistamine, such as Zantac  (ranitidine) or Pepcid  (famotidine). These are usually used for stomach problems, but also can help with hives.     Avoid scratching! This makes the hives get worse. You may want to put socks on your hands (or on your child s hands) when sleeping.    Avoid tight clothes, or clothes that rub on your skin.    If the itching is too bad, try cool compresses or cool baths. Avoid hot baths and showers, because they can make hives worse.    If your hives were felt to be related to a serious allergic reaction, you may be given an epinephrine auto-injector (such as EpiPen ) to use at home. Use this if you have a serious allergic reaction and call an ambulance right away. This medicine is used to buy time to get to a hospital, but not to replace hospital care.   If you were given a prescription for medicine here today, be sure to read all of the information (including the package insert) that comes with your prescription.  This will include important information about the medicine, its side effects, and any warnings that you need to know about.  The pharmacist who fills the prescription can provide more information and answer questions you may have about the medicine.  If you have questions or concerns that the pharmacist cannot address, please call or return to the Emergency Department.   Remember that you can always come back to the Emergency Department if you are not able to see your regular provider in the  amount of time listed above, if you get any new symptoms, or if there is anything that worries you.      Your next 10 appointments already scheduled     Aug 20, 2018  9:00 AM CDT   Return Prostate Cancer with Mark Pino MD   [facility] [place] ([facility])    [address] [facility]  [address]  [facility] [id number] [phone]              24 Hour Appointment Hotline       To make an appointment at any [facility], call [phone] ([phone]). If you don't have a family doctor or clinic, we will help you find one. [facility] are conveniently located to serve the needs of you and your family.             Review of your medicines      START taking        Dose / Directions Last dose taken    diphenhydrAMINE 25 MG tablet   Commonly known as:  BENADRYL   Dose:  25 mg   Quantity:  56 tablet        Take 1 tablet (25 mg) by mouth every 6 hours as needed for itching or other (Hives)   Refills:  0        famotidine 20 MG tablet   Commonly known as:  PEPCID   Dose:  20 mg   Quantity:  28 tablet        Take 1 tablet (20 mg) by mouth 2 times daily for 14 days   Refills:  0          Our records show that you are taking the medicines listed below. If these are incorrect, please call your family doctor or clinic.        Dose / Directions Last dose taken    aspirin 81 MG tablet   Quantity:  100        1 tab po QD (Once per day)   Refills:  3        atenolol 50 MG tablet   Commonly known as:  TENORMIN   Dose:  50 mg   Quantity:  90 tablet        Take 1 tablet (50 mg) by mouth daily   Refills:  3        EPINEPHrine 0.3 MG/0.3ML injection 2-pack   Commonly known as:  EPIPEN 2-MICHAEL   Dose:  0.3 mg   Quantity:  2 mL        Inject 0.3 mLs (0.3 mg) into the muscle once as needed for anaphylaxis   Refills:  1        FLUoxetine 10 MG capsule   Commonly known as:  PROzac   Dose:  10 mg   Quantity:  90 capsule        Take 1 capsule (10 mg) by mouth daily    Refills:  3        hydrocortisone 2.5 % cream        Refills:  0        ipratropium 0.06 % spray   Commonly known as:  ATROVENT   Dose:  2 spray   Quantity:  3 Box        Spray 2 sprays in nostril 4 times daily as needed for rhinitis   Refills:  11        leuprolide 45 MG kit   Commonly known as:  ELIGARD   Dose:  45 mg   Quantity:  1 each        Inject 45 mg Subcutaneous every 6 months.   Refills:  12        losartan-hydrochlorothiazide 100-12.5 MG per tablet   Commonly known as:  HYZAAR   Dose:  1 tablet   Quantity:  90 tablet        Take 1 tablet by mouth daily   Refills:  3        naproxen 500 MG tablet   Commonly known as:  NAPROSYN   Dose:  500 mg   Quantity:  60 tablet        Take 1 tablet (500 mg) by mouth 2 times daily as needed   Refills:  5        PROSTEON PO   Dose:  2000 Units        Take 2,000 Units by mouth With vitamin D   Refills:  0        simvastatin 40 MG tablet   Commonly known as:  ZOCOR   Dose:  40 mg   Quantity:  90 tablet        Take 1 tablet (40 mg) by mouth At Bedtime   Refills:  3                Prescriptions were sent or printed at these locations (2 Prescriptions)                   Other Prescriptions                Printed at Department/Unit printer (2 of 2)         diphenhydrAMINE (BENADRYL) 25 MG tablet               famotidine (PEPCID) 20 MG tablet                Orders Needing Specimen Collection     None      Pending Results     No orders found from 3/29/2018 to 4/1/2018.            Pending Culture Results     No orders found from 3/29/2018 to 4/1/2018.            Pending Results Instructions     If you had any lab results that were not finalized at the time of your Discharge, you can call the ED Lab Result RN at [phone]. You will be contacted by this team for any positive Lab results or changes in treatment. The nurses are available 7 days a week from 10A to 6:30P.  You can leave a message 24 hours per day and they will return your call.        Test Results From Your  Hospital Stay               Clinical Quality Measure: Blood Pressure Screening     Your blood pressure was checked while you were in the emergency department today. The last reading we obtained was  BP: (!) 135/95 . Please read the guidelines below about what these numbers mean and what you should do about them.  If your systolic blood pressure (the top number) is less than 120 and your diastolic blood pressure (the bottom number) is less than 80, then your blood pressure is normal. There is nothing more that you need to do about it.  If your systolic blood pressure (the top number) is 120-139 or your diastolic blood pressure (the bottom number) is 80-89, your blood pressure may be higher than it should be. You should have your blood pressure rechecked within a year by a primary care provider.  If your systolic blood pressure (the top number) is 140 or greater or your diastolic blood pressure (the bottom number) is 90 or greater, you may have high blood pressure. High blood pressure is treatable, but if left untreated over time it can put you at risk for heart attack, stroke, or kidney failure. You should have your blood pressure rechecked by a primary care provider within the next 4 weeks.  If your provider in the emergency department today gave you specific instructions to follow-up with your doctor or provider even sooner than that, you should follow that instruction and not wait for up to 4 weeks for your follow-up visit.        Thank you for choosing [facility]       Thank you for choosing [facility] for your care. Our goal is always to provide you with excellent care. Hearing back from our patients is one way we can continue to improve our services. Please take a few minutes to complete the written survey that you may receive in the mail after you visit with us. Thank you!        [facility] Information     [facility] gives you secure access to your electronic health record. If you see a primary care provider, you can  also send messages to your care team and make appointments. If you have questions, please call your primary care clinic.  If you do not have a primary care provider, please call [phone] and they will assist you.        Care EveryWhere ID     This is your Care EveryWhere ID. This could be used by other organizations to access your [facility] medical records  [id number]        Equal Access to Services     ANTONIA BRAVO : Hadii leanne Grey, wapark conner, qaheron kaalmaanup hoyos, anila wall . So [facility] [phone].    ATENCIÓN: Si habla español, tiene a coelho disposición servicios gratuitos de asistencia lingüística. Jaren al [phone].    We comply with applicable federal civil rights laws and [place] laws. We do not discriminate on the basis of race, color, national origin, age, disability, sex, sexual orientation, or gender identity.            After Visit Summary       This is your record. Keep this with you and show to your community pharmacist(s) and doctor(s) at your next visit.

## 2018-03-31 NOTE — DISCHARGE INSTRUCTIONS
Discharge Instructions  Hives or Urticaria    Hives are a rash with raised, swollen, red, itchy spots on the skin. They may look like mosquito bites. Hives change in size, shape and location over time.  Hives can be caused by an infection (usually a virus) or an allergic reaction (to medicine, food, insect stings, or many other things). They can also come because of your own body s reaction to things like body temperature changes or pressure on the skin. Sometimes hives are caused by an inherited problem. Hives are not contagious.    Generally, every Emergency Department visit should have a follow-up clinic visit with either a primary or a specialty clinic/provider. Please follow-up as instructed by your emergency provider today.    Return to the Emergency Department if:    You have trouble breathing.    Your lips or tongue swell up, or you have swelling or tightness in the throat.    You have stomach pain or vomiting (throwing up).    You pass out.    What can I do to help myself?    Fill any prescriptions the provider gave you and take them right away.    Antihistamines (H1 blockers) are the primary treatment for hives. You may be given a prescription for an antihistamine, or your provider may tell you to use one available without a prescription, such as Benadryl  (diphenhydramine). These medicines relieve the itching and can help make the hives go away.    You may be given a prescription for a steroid. Used long-term, these can have side effects, but are in the short-term. You may notice restlessness or increased appetite. Be sure to take all of the medicine as prescribed.     Sometimes your provider will recommend an H2 antihistamine, such as Zantac  (ranitidine) or Pepcid  (famotidine). These are usually used for stomach problems, but also can help with hives.     Avoid scratching! This makes the hives get worse. You may want to put socks on your hands (or on your child s hands) when sleeping.    Avoid tight  clothes, or clothes that rub on your skin.    If the itching is too bad, try cool compresses or cool baths. Avoid hot baths and showers, because they can make hives worse.    If your hives were felt to be related to a serious allergic reaction, you may be given an epinephrine auto-injector (such as EpiPen ) to use at home. Use this if you have a serious allergic reaction and call an ambulance right away. This medicine is used to buy time to get to a hospital, but not to replace hospital care.   If you were given a prescription for medicine here today, be sure to read all of the information (including the package insert) that comes with your prescription.  This will include important information about the medicine, its side effects, and any warnings that you need to know about.  The pharmacist who fills the prescription can provide more information and answer questions you may have about the medicine.  If you have questions or concerns that the pharmacist cannot address, please call or return to the Emergency Department.   Remember that you can always come back to the Emergency Department if you are not able to see your regular provider in the amount of time listed above, if you get any new symptoms, or if there is anything that worries you.

## 2018-03-31 NOTE — ED NOTES
Was doing well went to bed at 11 pm  Woke at about 230 and naproxen for left shoulder pain  Now has itching especially hands  Has rash noted especially  Trunk  And under arms   Feeling nauseated   No trouble breathing  Lungs clear    No trouble swallowing here for iram

## 2018-04-06 ENCOUNTER — OFFICE VISIT (OUTPATIENT)
Dept: PEDIATRICS | Facility: CLINIC | Age: 83
End: 2018-04-06
Payer: COMMERCIAL

## 2018-04-06 VITALS
DIASTOLIC BLOOD PRESSURE: 82 MMHG | OXYGEN SATURATION: 97 % | HEART RATE: 66 BPM | WEIGHT: 228.9 LBS | SYSTOLIC BLOOD PRESSURE: 138 MMHG | BODY MASS INDEX: 34.69 KG/M2 | HEIGHT: 68 IN | TEMPERATURE: 97.7 F

## 2018-04-06 DIAGNOSIS — H91.93 BILATERAL HEARING LOSS, UNSPECIFIED HEARING LOSS TYPE: ICD-10-CM

## 2018-04-06 DIAGNOSIS — M19.90 ARTHRITIS: ICD-10-CM

## 2018-04-06 DIAGNOSIS — T78.40XD ALLERGIC REACTION TO DRUG, SUBSEQUENT ENCOUNTER: Primary | ICD-10-CM

## 2018-04-06 DIAGNOSIS — T63.444D BEE STING-INDUCED ANAPHYLAXIS, UNDETERMINED INTENT, SUBSEQUENT ENCOUNTER: ICD-10-CM

## 2018-04-06 DIAGNOSIS — R42 VERTIGO: ICD-10-CM

## 2018-04-06 DIAGNOSIS — T78.2XXD BEE STING-INDUCED ANAPHYLAXIS, UNDETERMINED INTENT, SUBSEQUENT ENCOUNTER: ICD-10-CM

## 2018-04-06 DIAGNOSIS — R09.81 NASAL CONGESTION: ICD-10-CM

## 2018-04-06 PROCEDURE — 99214 OFFICE O/P EST MOD 30 MIN: CPT | Performed by: INTERNAL MEDICINE

## 2018-04-06 RX ORDER — EPINEPHRINE 0.3 MG/.3ML
0.3 INJECTION SUBCUTANEOUS PRN
Qty: 0.6 ML | Refills: 1 | Status: SHIPPED | OUTPATIENT
Start: 2018-04-06 | End: 2019-07-19

## 2018-04-06 RX ORDER — IPRATROPIUM BROMIDE 42 UG/1
2 SPRAY, METERED NASAL 4 TIMES DAILY PRN
Qty: 3 BOX | Refills: 3 | Status: SHIPPED | OUTPATIENT
Start: 2018-04-06 | End: 2019-07-19

## 2018-04-06 RX ORDER — CELECOXIB 100 MG/1
100 CAPSULE ORAL 2 TIMES DAILY PRN
Qty: 180 CAPSULE | Refills: 1 | Status: ON HOLD | OUTPATIENT
Start: 2018-04-06 | End: 2018-12-05

## 2018-04-06 ASSESSMENT — ANXIETY QUESTIONNAIRES
7. FEELING AFRAID AS IF SOMETHING AWFUL MIGHT HAPPEN: NOT AT ALL
GAD7 TOTAL SCORE: 0
2. NOT BEING ABLE TO STOP OR CONTROL WORRYING: NOT AT ALL
5. BEING SO RESTLESS THAT IT IS HARD TO SIT STILL: NOT AT ALL
3. WORRYING TOO MUCH ABOUT DIFFERENT THINGS: NOT AT ALL
IF YOU CHECKED OFF ANY PROBLEMS ON THIS QUESTIONNAIRE, HOW DIFFICULT HAVE THESE PROBLEMS MADE IT FOR YOU TO DO YOUR WORK, TAKE CARE OF THINGS AT HOME, OR GET ALONG WITH OTHER PEOPLE: NOT DIFFICULT AT ALL
1. FEELING NERVOUS, ANXIOUS, OR ON EDGE: NOT AT ALL
6. BECOMING EASILY ANNOYED OR IRRITABLE: NOT AT ALL

## 2018-04-06 ASSESSMENT — PATIENT HEALTH QUESTIONNAIRE - PHQ9: 5. POOR APPETITE OR OVEREATING: NOT AT ALL

## 2018-04-06 NOTE — PATIENT INSTRUCTIONS
Try the celebrex as needed for joint pain.  It is less likely to cause an allergic reaction.  Have benadryl on hand just in case.    Call to schedule with ENT to check your hearing and inner ear.

## 2018-04-06 NOTE — MR AVS SNAPSHOT
After Visit Summary   4/6/2018    Nixon More    MRN: [id number]           Patient Information     Date Of Birth          1935        Visit Information        Provider Department      4/6/2018 12:50 PM Natalya Lewis MD [facility]        Today's Diagnoses     Arthritis    -  1    Nasal congestion        Bee sting-induced anaphylaxis, undetermined intent, subsequent encounter        Vertigo        Bilateral hearing loss, unspecified hearing loss type          Care Instructions    Try the celebrex as needed for joint pain.  It is less likely to cause an allergic reaction.  Have benadryl on hand just in case.    Call to schedule with ENT to check your hearing and inner ear.          Follow-ups after your visit        Additional Services     OTOLARYNGOLOGY REFERRAL       Your provider has referred you to: N: [facility] [place] [phone]   [email or web address]    Please be aware that coverage of these services is subject to the terms and limitations of your health insurance plan.  Call member services at your health plan with any benefit or coverage questions.      Please bring the following with you to your appointment:    (1) Any X-Rays, CTs or MRIs which have been performed.  Contact the facility where they were done to arrange for  prior to your scheduled appointment.   (2) List of current medications  (3) This referral request   (4) Any documents/labs given to you for this referral                  Follow-up notes from your care team     Return in about 6 months (around 10/6/2018) for Physical Exam.      Your next 10 appointments already scheduled     Aug 20, 2018  9:00 AM CDT   Return Prostate Cancer with Mark Pino MD   [facility] ([facility])    [address] [facility]  [address]  [facility] [address] [phone]              Who to contact     If  "you have questions or need follow up information about today's clinic visit or your schedule please contact [facility] directly at [phone].  Normal or non-critical lab and imaging results will be communicated to you by MyChart, letter or phone within 4 business days after the clinic has received the results. If you do not hear from us within 7 days, please contact the clinic through [employer] or phone. If you have a critical or abnormal lab result, we will notify you by phone as soon as possible.  Submit refill requests through [employer] or call your pharmacy and they will forward the refill request to us. Please allow 3 business days for your refill to be completed.          Additional Information About Your Visit        [employer] Information     [employer] gives you secure access to your electronic health record. If you see a primary care provider, you can also send messages to your care team and make appointments. If you have questions, please call your primary care clinic.  If you do not have a primary care provider, please call [phone] and they will assist you.        Care EveryWhere ID     This is your Care EveryWhere ID. This could be used by other organizations to access your [facility] medical records  [id number]        Your Vitals Were     Pulse Temperature Height Pulse Oximetry BMI (Body Mass Index)       66 97.7  F (36.5  C) (Oral) 5' 8\" (1.727 m) 97% 34.8 kg/m2        Blood Pressure from Last 3 Encounters:   04/06/18 138/82   03/31/18 (!) 135/95   02/19/18 120/80    Weight from Last 3 Encounters:   04/06/18 228 lb 14.4 oz (103.8 kg)   03/31/18 220 lb (99.8 kg)   02/19/18 222 lb (100.7 kg)              We Performed the Following     OTOLARYNGOLOGY REFERRAL          Today's Medication Changes          These changes are accurate as of 4/6/18  1:38 PM.  If you have any questions, ask your nurse or doctor.               Start taking these medicines.        Dose/Directions    celecoxib 100 MG " capsule   Commonly known as:  celeBREX   Used for:  Arthritis   Started by:  Natalya Lewis MD        Dose:  100 mg   Take 1 capsule (100 mg) by mouth 2 times daily as needed for moderate pain   Quantity:  180 capsule   Refills:  1         These medicines have changed or have updated prescriptions.        Dose/Directions    EPINEPHrine 0.3 MG/0.3ML injection 2-pack   Commonly known as:  EPIPEN/ADRENACLICK/or ANY BX GENERIC EQUIV   This may have changed:  when to take this   Used for:  Bee sting-induced anaphylaxis, undetermined intent, subsequent encounter   Changed by:  Natalya Lewis MD        Dose:  0.3 mg   Inject 0.3 mLs (0.3 mg) into the muscle as needed for anaphylaxis   Quantity:  0.6 mL   Refills:  1            Where to get your medicines      These medications were sent to [facility] [address] [place] [address]     Phone:  [phone]     celecoxib 100 MG capsule    EPINEPHrine 0.3 MG/0.3ML injection 2-pack    ipratropium 0.06 % spray                Primary Care Provider Office Phone # Fax #    Natalya Lewis [phone] [address] [place] [address]        Equal Access to Services     TETE BRAVO : Hadii leanne paredeso Soomaali, waaxda luqadaha, qaybta kaalmada adeegyada, anila temple. So [facility] [phone].    ATENCIÓN: Si habla español, tiene a coelho disposición servicios gratuitos de asistencia lingüística. Llame al [phone].    We comply with applicable federal civil rights laws and [place] laws. We do not discriminate on the basis of race, color, national origin, age, disability, sex, sexual orientation, or gender identity.            Thank you!     Thank you for choosing [facility]  for your care. Our goal is always to provide you with excellent care. Hearing back from our patients is one way we can continue to improve our services. Please take a  few minutes to complete the written survey that you may receive in the mail after your visit with us. Thank you!             Your Updated Medication List - Protect others around you: Learn how to safely use, store and throw away your medicines at www.disposemymeds.org.          This list is accurate as of 4/6/18  1:38 PM.  Always use your most recent med list.                   Brand Name Dispense Instructions for use Diagnosis    aspirin 81 MG tablet     100    1 tab po QD (Once per day)    Essential hypertension, benign       atenolol 50 MG tablet    TENORMIN    90 tablet    Take 1 tablet (50 mg) by mouth daily    Essential hypertension with goal blood pressure less than 140/90       celecoxib 100 MG capsule    celeBREX    180 capsule    Take 1 capsule (100 mg) by mouth 2 times daily as needed for moderate pain    Arthritis       diphenhydrAMINE 25 MG tablet    BENADRYL    56 tablet    Take 1 tablet (25 mg) by mouth every 6 hours as needed for itching or other (Hives)        EPINEPHrine 0.3 MG/0.3ML injection 2-pack    EPIPEN/ADRENACLICK/or ANY BX GENERIC EQUIV    0.6 mL    Inject 0.3 mLs (0.3 mg) into the muscle as needed for anaphylaxis    Bee sting-induced anaphylaxis, undetermined intent, subsequent encounter       famotidine 20 MG tablet    PEPCID    28 tablet    Take 1 tablet (20 mg) by mouth 2 times daily for 14 days        FLUoxetine 10 MG capsule    PROzac    90 capsule    Take 1 capsule (10 mg) by mouth daily    Mild major depression (H)       hydrocortisone 2.5 % cream           ipratropium 0.06 % spray    ATROVENT    3 Box    Spray 2 sprays in nostril 4 times daily as needed for rhinitis    Nasal congestion       leuprolide 45 MG kit    ELIGARD    1 each    Inject 45 mg Subcutaneous every 6 months.        losartan-hydrochlorothiazide 100-12.5 MG per tablet    HYZAAR    90 tablet    Take 1 tablet by mouth daily    Essential hypertension with goal blood pressure less than 140/90       PROSTEON PO       Take 2,000 Units by mouth With vitamin D        simvastatin 40 MG tablet    ZOCOR    90 tablet    Take 1 tablet (40 mg) by mouth At Bedtime    Dyslipidemia

## 2018-04-06 NOTE — LETTER
My Depression Action Plan  Name: Nixon More   Date of Birth 1935  Date: 4/6/2018    My doctor: Natalya Lewis   My clinic: [address]  [address]  [place] [address] [phone]          GREEN    ZONE   Good Control    What it looks like:     Things are going generally well. You have normal up s and down s. You may even feel depressed from time to time, but bad moods usually last less than a day.   What you need to do:  1. Continue to care for yourself (see self care plan)  2. Check your depression survival kit and update it as needed  3. Follow your physician s recommendations including any medication.  4. Do not stop taking medication unless you consult with your physician first.           YELLOW         ZONE Getting Worse    What it looks like:     Depression is starting to interfere with your life.     It may be hard to get out of bed; you may be starting to isolate yourself from others.    Symptoms of depression are starting to last most all day and this has happened for several days.     You may have suicidal thoughts but they are not constant.   What you need to do:     1. Call your care team, your response to treatment will improve if you keep your care team informed of your progress. Yellow periods are signs an adjustment may need to be made.     2. Continue your self-care, even if you have to fake it!    3. Talk to someone in your support network    4. Open up your depression survival kit           RED    ZONE Medical Alert - Get Help    What it looks like:     Depression is seriously interfering with your life.     You may experience these or other symptoms: You can t get out of bed most days, can t work or engage in other necessary activities, you have trouble taking care of basic hygiene, or basic responsibilities, thoughts of suicide or death that will not go away, self-injurious behavior.     What you need to do:  1. Call your care  team and request a same-day appointment. If they are not available (weekends or after hours) call your local crisis line, emergency room or 911.            Depression Self Care Plan / Survival Kit    Self-Care for Depression  Here s the deal. Your body and mind are really not as separate as most people think.  What you do and think affects how you feel and how you feel influences what you do and think. This means if you do things that people who feel good do, it will help you feel better.  Sometimes this is all it takes.  There is also a place for medication and therapy depending on how severe your depression is, so be sure to consult with your medical provider and/ or Behavioral Health Consultant if your symptoms are worsening or not improving.     In order to better manage my stress, I will:    Exercise  Get some form of exercise, every day. This will help reduce pain and release endorphins, the  feel good  chemicals in your brain. This is almost as good as taking antidepressants!  This is not the same as joining a gym and then never going! (they count on that by the way ) It can be as simple as just going for a walk or doing some gardening, anything that will get you moving.      Hygiene   Maintain good hygiene (Get out of bed in the morning, Make your bed, Brush your teeth, Take a shower, and Get dressed like you were going to work, even if you are unemployed).  If your clothes don't fit try to get ones that do.    Diet  I will strive to eat foods that are good for me, drink plenty of water, and avoid excessive sugar, caffeine, alcohol, and other mood-altering substances.  Some foods that are helpful in depression are: complex carbohydrates, B vitamins, flaxseed, fish or fish oil, fresh fruits and vegetables.    Psychotherapy  I agree to participate in Individual Therapy (if recommended).    Medication  If prescribed medications, I agree to take them.  Missing doses can result in serious side effects.  I  understand that drinking alcohol, or other illicit drug use, may cause potential side effects.  I will not stop my medication abruptly without first discussing it with my provider.    Staying Connected With Others  I will stay in touch with my friends, family members, and my primary care provider/team.    Use your imagination  Be creative.  We all have a creative side; it doesn t matter if it s oil painting, sand castles, or mud pies! This will also kick up the endorphins.    Witness Beauty  (AKA stop and smell the roses) Take a look outside, even in mid-winter. Notice colors, textures. Watch the squirrels and birds.     Service to others  Be of service to others.  There is always someone else in need.  By helping others we can  get out of ourselves  and remember the really important things.  This also provides opportunities for practicing all the other parts of the program.    Humor  Laugh and be silly!  Adjust your TV habits for less news and crime-drama and more comedy.    Control your stress  Try breathing deep, massage therapy, biofeedback, and meditation. Find time to relax each day.     My support system    Clinic Contact:  Phone number:    Contact 1:  Phone number:    Contact 2:  Phone number:    Confucianist/:  Phone number:    Therapist:  Phone number:    Local crisis center:    Phone number:    Other community support:  Phone number:

## 2018-04-06 NOTE — PROGRESS NOTES
"  SUBJECTIVE:   Nixon More is a 82 year old male who presents to clinic today for the following health issues:      ED/UC Followup:    Facility:  [facility]  Date of visit: 3/31/18  Reason for visit: allergic reaction to naproxen  Current Status: given benadryl and doing a lot better    Discuss medication to take place of naproxen  Occasional dizziness x 2 wks usually when t goes to lay down - has happened 3 times in past 2 weeks.  Once with walking and then the other two while going to lay down.  Less than one minute.  Something \"off kilter\".  Did not feel like vertigo, felt more disoriented.  If in shower and closes eyes, feels less stable.         Problem list and histories reviewed & adjusted, as indicated.  Additional history: as documented      Reviewed and updated as needed this visit by clinical staff  Tobacco  Allergies  Meds  Problems       Reviewed and updated as needed this visit by Provider  Allergies  Meds  Problems         ROS:  Constitutional, HEENT, cardiovascular, pulmonary, neuro systems are negative, except as otherwise noted.    OBJECTIVE:     /82 (BP Location: Right arm, Patient Position: Chair, Cuff Size: Adult Regular)  Pulse 66  Temp 97.7  F (36.5  C) (Oral)  Ht 5' 8\" (1.727 m)  Wt 228 lb 14.4 oz (103.8 kg)  SpO2 97%  BMI 34.8 kg/m2  Body mass index is 34.8 kg/(m^2).  GENERAL: healthy, alert and no distress  EYES: Eyes grossly normal to inspection, PERRL and conjunctivae and sclerae normal  HENT: ear canals and TM's normal, nose and mouth without ulcers or lesions  MS: no gross musculoskeletal defects noted, no edema  NEURO: Normal strength and tone, sensory exam grossly normal, mentation intact, gait abnormal: unable to heel-toe walk and Romberg normal        ASSESSMENT/PLAN:       1. Allergic reaction to drug, subsequent encounter  Unclear what type of reaction as only has had once.  Does tolerate aspirin without difficulty. Reviewed uptodate and should be able to " tolerate some other NSAIDs    2. Arthritis  Change to celevrex for arthritis pain control  - celecoxib (CELEBREX) 100 MG capsule; Take 1 capsule (100 mg) by mouth 2 times daily as needed for moderate pain  Dispense: 180 capsule; Refill: 1    3. Nasal congestion  Discussed medication use, refill  - ipratropium (ATROVENT) 0.06 % spray; Spray 2 sprays in nostril 4 times daily as needed for rhinitis  Dispense: 3 Box; Refill: 3    4. Bee sting-induced anaphylaxis, undetermined intent, subsequent encounter  refill  - EPINEPHrine (EPIPEN/ADRENACLICK/OR ANY BX GENERIC EQUIV) 0.3 MG/0.3ML injection 2-pack; Inject 0.3 mLs (0.3 mg) into the muscle as needed for anaphylaxis  Dispense: 0.6 mL; Refill: 1    5. Vertigo  Start with ENT evaluation.  Consider neuro depending on results.  - OTOLARYNGOLOGY REFERRAL    6. Bilateral hearing loss, unspecified hearing loss type  ENT eval  - OTOLARYNGOLOGY REFERRAL    See Patient Instructions    Natalya Lewis MD  [facility]

## 2018-04-07 ASSESSMENT — PATIENT HEALTH QUESTIONNAIRE - PHQ9: SUM OF ALL RESPONSES TO PHQ QUESTIONS 1-9: 4

## 2018-04-07 ASSESSMENT — ANXIETY QUESTIONNAIRES: GAD7 TOTAL SCORE: 0

## 2018-04-09 ENCOUNTER — HOSPITAL ENCOUNTER (EMERGENCY)
Facility: CLINIC | Age: 83
Discharge: HOME | End: 2018-04-09
Attending: EMERGENCY MEDICINE | Admitting: EMERGENCY MEDICINE
Payer: COMMERCIAL

## 2018-04-09 ENCOUNTER — APPOINTMENT (OUTPATIENT)
Dept: GENERAL RADIOLOGY | Facility: CLINIC | Age: 83
End: 2018-04-09
Attending: EMERGENCY MEDICINE
Payer: COMMERCIAL

## 2018-04-09 VITALS
WEIGHT: 220 LBS | RESPIRATION RATE: 18 BRPM | TEMPERATURE: 98.2 F | DIASTOLIC BLOOD PRESSURE: 126 MMHG | SYSTOLIC BLOOD PRESSURE: 188 MMHG | HEART RATE: 89 BPM | HEIGHT: 68 IN | BODY MASS INDEX: 33.34 KG/M2 | OXYGEN SATURATION: 96 %

## 2018-04-09 DIAGNOSIS — R03.0 ELEVATED BLOOD PRESSURE READING: ICD-10-CM

## 2018-04-09 DIAGNOSIS — M65.4 RADIAL STYLOID TENOSYNOVITIS: ICD-10-CM

## 2018-04-09 PROCEDURE — 99283 EMERGENCY DEPT VISIT LOW MDM: CPT

## 2018-04-09 PROCEDURE — 73110 X-RAY EXAM OF WRIST: CPT | Mod: RT

## 2018-04-09 NOTE — ED PROVIDER NOTES
History     Chief Complaint:  Thumb pain     HPI   Nixon More is a right-handed 82-year-old male who presents with right thumb/wrist pain. The patient reports that he first noticed the pain while shaking hands at [employer] yesterday but states that it was manageable and it actually got better throughout the day. However, he reports that he woke up to go to the bathroom and had significant pain in his right hand and thumb.  He notes that he has arthritis in both of his thumbs but this pain is different and is worse. He denies any recent injury or trauma that could be the cause of his pain. He cannot think of any new activities that he has done that could have caused him to hurt his thumb. The patient did get the shingles vaccination one week ago in his left shoulder and is concerned that this may be the cause of his pain.      Allergies:  Augmentin  Bactrim [Sulfamethoxazole W/Trimethoprim]  Bee Venom  Diltiazem  Lisinopril  Naproxen      Medications:    Celebrex   Pepcid   Losartan-HCTZ   Atenolol   Simvastatin   Prozac     Past Medical History:    Arthritis   CAD   Emphysema   Hyperlipidemia   Hypertension   Hernia   Hypersomnia   Lumbago   Prostate cancer  Retinal vein occlusion    Skin cancer     Past Surgical History:    Hernia repair   Moh's Procedure   Lumbar laminectomy   Vitrectomy pars plana     Family History:    Alzheimer's disease   Hypertension   CHF   Prostate cancer   Lung cancer   Prostate cancer     Social History:  Marital Status:    Presents to the ED with wife   Tobacco Use: Former smoker, 30 pack year history, quit 1978.   Alcohol Use: Socially   PCP: Natalya Lewis     Review of Systems   Musculoskeletal:        Positive for right thumb pain.   All other systems reviewed and are negative.    Physical Exam     Patient Vitals for the past 24 hrs:   BP Temp Temp src Pulse Heart Rate Resp SpO2 Height Weight   04/09/18 0349 (!) 188/126 - - 89 - 18 96 % - -   04/09/18 0257 (!) 185/130 98.2  " F (36.8  C) Oral - 83 20 97 % 1.727 m (5' 8\") 99.8 kg (220 lb)        Physical Exam  Constitutional:  Alert, attentive  Cardiovascular:  2+ radial and ulnar pulses to the bilateral upper extremities   Neurological:  5/5 strength to the radian, ulnar and median motor distributions;      sensation intact to light touch to the radian, ulnar and median distributions  MSK:   Tender along the extensor tendon to the thumb and over the radial styloid; pain reproduced with finkelstein's test; no erythema, warmth, or swelling to the wrist or thumb  Skin:    Skin is warm and dry.       Emergency Department Course   Imaging:  XR Wrist 3 Views  No acute fracture or dislocation. There is arthritis at the thumb carpometacarpal joint.    Radiographic findings were communicated with the patient who voiced understanding of the findings.    Emergency Department Course:  Nursing notes and vitals reviewed.    (0305) I entered the room with my scribe, obtained the history, and performed an exam of the patient as documented above.    The patient was sent for a wrist x-ray while in the emergency department, findings above.      The patient is feeling better after he was placed in the thumb spica splint.     (0415) I went to update the patient on the findings.     Findings and plan explained to the patient. Patient discharged home with instructions regarding supportive care, medications, and reasons to return. The importance of close follow-up was reviewed.     Impression & Plan      Medical Decision Making:  This is a well-appearing 82-year-old male who presents for evaluation of right thumb pain.  Exam is consistent with de Quervain's tenosynovitis.  There is no clear connection between this and his recent zoster booster shot.  There is no exam evidence to suggest septic or inflammatory arthritis.  He feels better with placement of a Velcro thumb spica splint.  He recently developed an allergic reaction to NSAIDs, so I recommended " "Tylenol, ice, splint and supportive cares.  Of note, the patient did have asymptomatic hypertension in the ED despite being on medications.  I recommended primary care follow-up for recheck of his blood pressure and orthopedic follow-up should supportive cares not resolve his tenosynovitis.  He should return immediately for worse pain, redness, swelling, or any other concerns.      Diagnosis:    ICD-10-CM    1. Radial styloid tenosynovitis M65.4     \"DeQuervain's tenosynovitis\"   2. Elevated blood pressure reading R03.0      Disposition:  Discharge         [facility] EMERGENCY DEPARTMENT      Scribe disclosure:  I, Ryan Solomon, am serving as a scribe on 4/9/2018 at 3:05 AM to personally document services performed by Dr. Mota based on my observations and the provider's statements to me.                 Naseem Mota MD  04/09/18 0506    "

## 2018-04-09 NOTE — ED AVS SNAPSHOT
" [facility] Emergency Department    [address]    [facility] [address]    Phone:  [phone]    Fax:  [phone]                                       Nixon More   MRN: [id number]    Department:  [facility] Emergency Department   Date of Visit:  4/9/2018           Patient Information     Date Of Birth          1935        Your diagnoses for this visit were:     Radial styloid tenosynovitis \"DeQuervain's tenosynovitis\"    Elevated blood pressure reading        You were seen by Naseem Mota MD.      Follow-up Information     Follow up with Natalya Lewis MD In 3 days.    Specialties:  Internal Medicine, Pediatrics    Contact information:    [facility] [place] [address]  [phone]          Follow up with [facility] In 3 days.    Contact information:    [address] [place] [address], [facility] [address]  [facility] [address]  [phone]          Discharge Instructions         De Quervain Tenosynovitis    De Quervain tenosynovitis is inflammation of tendons and synovium on the thumb side of the wrist. Tendons are fibers that attach muscle to bone. Synovium is a slick membrane that helps tendons move. Movements done over and over can irritate and inflame these tissues. This can cause pain when you touch or grasp objects, turn or twist your wrist, or make a fist. You may also have pain and swelling near the base of the thumb or numbness along the back of your thumb and index finger. You may also feel the thumb catch or snap when you move it.  Treatment will depend on how bad the pain is. It can often be treated with medicines, injections, splinting, and home care. If your case is severe, you may be referred to a specialist to talk about surgery.  Home care  Your healthcare provider may prescribe medicines to relieve pain and reduce inflammation. A steroid medicine may be injected near the tendons. This reduces swelling. The " healthcare provider may also suggest taking over-the-counter medicines like ibuprofen or naproxen. These help reduce inflammation. Take all medicines only as directed.  The following are general care guidelines:    Avoid repetitive movements of your wrist and thumb.    Note any activity that causes pain or swelling. If possible, avoid or limit that activity.    Put a cold pack on your thumb. You can make your own cold pack by wrapping a plastic bag of ice or bag of frozen vegetables in a thin towel. Hold this to your thumb for up to 20 minutes at a time. Don't put ice directly on the skin.    Your healthcare provider may put a splint on the thumb to hold it still. Use the splint as you have been instructed. In some cases, you may need to use a splint 24 hours a day for 4 to 6 weeks. This will allow the wrist and thumb to heal.  Follow-up care  Follow up with your healthcare provider, or as advised. You may need more treatment if your injury is severe or if your symptoms don't get better. This additional treatment may include local injections, physical therapy, and surgery.  When to seek medical advice  Call your healthcare provider right away if any of these occur:    Increase in pain or swelling    If you have fever, chills, redness, warmth, or drainage    Symptoms get worse after taking medicine    Pain spreads farther down the thumb or into the forearm    Pain continues to get in the way of daily life  Date Last Reviewed: 1/18/2016 2000-2017 [employer]. [address] [place] [address], [place] [id number]. All rights reserved. This information is not intended as a substitute for professional medical care. Always follow your healthcare professional's instructions.      Discharge Instructions  Hypertension - High Blood Pressure    During you visit to the Emergency Department, your blood pressure was higher than the recommended blood pressure.  This may be related to stress, pain, medication or other  temporary conditions. In these cases, your blood pressure may return to normal on its own. If you have a history of high blood pressure, you may need to have your provider adjust your medications. Sometimes, your high measurement here may indicate that you have developed high blood pressure that will stay high unless it is treated. As a general rule, high blood pressure causes problems over years rather than days, weeks, or months. So, while it is important to treat blood pressure, it is rarely important to treat blood pressure immediately. Occasionally we will begin a medication in the Emergency Department; more often we will recommend close follow-up for medications with a primary doctor/clinic.    Generally, every Emergency Department visit should have a follow-up clinic visit with either a primary or a specialty clinic/provider. Please follow-up as instructed by your emergency provider today.    Return to the Emergency Department if you start to have:    A severe headache.    Chest pain.    Shortness of breath.    Weakness or numbness that affects one part of the body.    Confusion.    Vision changes.    Significant swelling of legs and/or eyes.    A reaction to any medication started in the Emergency Department.    What can I do to help myself?    Avoid alcohol.    Take any blood pressure medicine that you are prescribed.    Get a good night s sleep.    Lower your salt intake.    Exercise.    Lose weight.    Manage stress.    See your doctor regularly    If blood pressure medication was started in the Emergency Department:    The medicine may not have an immediate effect. The body and brain determine what blood pressure you have. The medicine s job is to retrain the body s  thermostat  to a lower blood pressure.    You will need to follow up with your provider to see how this medicine is working for you.  If you were given a prescription for medicine here today, be sure to read all of the information (including  the package insert) that comes with your prescription.  This will include important information about the medicine, its side effects, and any warnings that you need to know about.  The pharmacist who fills the prescription can provide more information and answer questions you may have about the medicine.  If you have questions or concerns that the pharmacist cannot address, please call or return to the Emergency Department.   Remember that you can always come back to the Emergency Department if you are not able to see your regular provider in the amount of time listed above, if you get any new symptoms, or if there is anything that worries you.      Your next 10 appointments already scheduled     Aug 20, 2018  9:00 AM CDT   Return Prostate Cancer with Mark Pino MD   [facility] ([facility])    [address]  [address]  [facility] [address] [phone]              24 Hour Appointment Hotline       To make an appointment at any [facility], call [phone] ([phone]). If you don't have a family doctor or clinic, we will help you find one. [facility] are conveniently located to serve the needs of you and your family.             Review of your medicines      Our records show that you are taking the medicines listed below. If these are incorrect, please call your family doctor or clinic.        Dose / Directions Last dose taken    aspirin 81 MG tablet   Quantity:  100        1 tab po QD (Once per day)   Refills:  3        atenolol 50 MG tablet   Commonly known as:  TENORMIN   Dose:  50 mg   Quantity:  90 tablet        Take 1 tablet (50 mg) by mouth daily   Refills:  3        celecoxib 100 MG capsule   Commonly known as:  celeBREX   Dose:  100 mg   Quantity:  180 capsule        Take 1 capsule (100 mg) by mouth 2 times daily as needed for moderate pain   Refills:  1        diphenhydrAMINE 25 MG tablet   Commonly  known as:  BENADRYL   Dose:  25 mg   Quantity:  56 tablet        Take 1 tablet (25 mg) by mouth every 6 hours as needed for itching or other (Hives)   Refills:  0        EPINEPHrine 0.3 MG/0.3ML injection 2-pack   Commonly known as:  EPIPEN/ADRENACLICK/or ANY BX GENERIC EQUIV   Dose:  0.3 mg   Quantity:  0.6 mL        Inject 0.3 mLs (0.3 mg) into the muscle as needed for anaphylaxis   Refills:  1        famotidine 20 MG tablet   Commonly known as:  PEPCID   Dose:  20 mg   Quantity:  28 tablet        Take 1 tablet (20 mg) by mouth 2 times daily for 14 days   Refills:  0        FLUoxetine 10 MG capsule   Commonly known as:  PROzac   Dose:  10 mg   Quantity:  90 capsule        Take 1 capsule (10 mg) by mouth daily   Refills:  3        hydrocortisone 2.5 % cream        Refills:  0        ipratropium 0.06 % spray   Commonly known as:  ATROVENT   Dose:  2 spray   Quantity:  3 Box        Spray 2 sprays in nostril 4 times daily as needed for rhinitis   Refills:  3        leuprolide 45 MG kit   Commonly known as:  ELIGARD   Dose:  45 mg   Quantity:  1 each        Inject 45 mg Subcutaneous every 6 months.   Refills:  12        losartan-hydrochlorothiazide 100-12.5 MG per tablet   Commonly known as:  HYZAAR   Dose:  1 tablet   Quantity:  90 tablet        Take 1 tablet by mouth daily   Refills:  3        PROSTEON PO   Dose:  2000 Units        Take 2,000 Units by mouth With vitamin D   Refills:  0        simvastatin 40 MG tablet   Commonly known as:  ZOCOR   Dose:  40 mg   Quantity:  90 tablet        Take 1 tablet (40 mg) by mouth At Bedtime   Refills:  3                Procedures and tests performed during your visit     Wrist XR, G/E 3 views, right      Orders Needing Specimen Collection     None      Pending Results     Date and Time Order Name Status Description    4/9/2018 0310 Wrist XR, G/E 3 views, right Preliminary             Pending Culture Results     No orders found from 4/7/2018 to 4/10/2018.            Pending  Results Instructions     If you had any lab results that were not finalized at the time of your Discharge, you can call the ED Lab Result RN at [phone]. You will be contacted by this team for any positive Lab results or changes in treatment. The nurses are available 7 days a week from 10A to 6:30P.  You can leave a message 24 hours per day and they will return your call.        Test Results From Your Hospital Stay        4/9/2018  4:02 AM      Narrative     XR WRIST RT G/E 3 VW   4/9/2018 3:24 AM     HISTORY: Pain to distal radius and first metacarpal, possible de  Quervain's.     COMPARISON: None.         Impression     IMPRESSION: No acute fracture or dislocation. 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                Clinical Quality Measure: Blood Pressure Screening     Your blood pressure was checked while you were in the emergency department today. The last reading we obtained was  BP: (!) 188/126 . Please read the guidelines below about what these numbers mean and what you should do about them.  If your systolic blood pressure (the top number) is less than 120 and your diastolic blood pressure (the bottom number) is less than 80, then your blood pressure is normal. There is nothing more that you need to do about it.  If your systolic blood pressure (the top number) is 120-139 or your diastolic blood pressure (the bottom number) is 80-89, your blood pressure may be higher than it should be. You should have your blood pressure rechecked within a year by a primary care provider.  If your systolic blood pressure (the top number) is 140 or greater or your diastolic blood pressure (the bottom number) is 90 or greater, you may have high blood pressure. High blood pressure is treatable, but if left untreated over time it can put you at risk for heart attack, stroke, or kidney failure. You should have your blood pressure rechecked by a primary care provider within the next 4 weeks.  If your  provider in the emergency department today gave you specific instructions to follow-up with your doctor or provider even sooner than that, you should follow that instruction and not wait for up to 4 weeks for your follow-up visit.        Thank you for choosing [facility]       Thank you for choosing [facility] for your care. Our goal is always to provide you with excellent care. Hearing back from our patients is one way we can continue to improve our services. Please take a few minutes to complete the written survey that you may receive in the mail after you visit with us. Thank you!        [employer] Information     [employer] gives you secure access to your electronic health record. If you see a primary care provider, you can also send messages to your care team and make appointments. If you have questions, please call your primary care clinic.  If you do not have a primary care provider, please call [phone] and they will assist you.        Care EveryWhere ID     This is your Care EveryWhere ID. This could be used by other organizations to access your [facility] medical records  [id number]        Equal Access to Services     ANTONIA BRAVO : Hadii leanne paredeso Soleni, waaxda lubinduadaha, qaybta kaalmaanup hoyos, anila wall . So [facility] [phone].    ATENCIÓN: Si habla español, tiene a coelho disposición servicios gratuitos de asistencia lingüística. Llame al [phone].    We comply with applicable federal civil rights laws and [place] laws. We do not discriminate on the basis of race, color, national origin, age, disability, sex, sexual orientation, or gender identity.            After Visit Summary       This is your record. Keep this with you and show to your community pharmacist(s) and doctor(s) at your next visit.

## 2018-04-09 NOTE — ED NOTES
Patient presents to ED due to pain to R thumb radiating to lower forearm. Pain developed on Sunday. Denies any injuries    Hx arthritis

## 2018-04-09 NOTE — DISCHARGE INSTRUCTIONS
De Quervain Tenosynovitis    De Quervain tenosynovitis is inflammation of tendons and synovium on the thumb side of the wrist. Tendons are fibers that attach muscle to bone. Synovium is a slick membrane that helps tendons move. Movements done over and over can irritate and inflame these tissues. This can cause pain when you touch or grasp objects, turn or twist your wrist, or make a fist. You may also have pain and swelling near the base of the thumb or numbness along the back of your thumb and index finger. You may also feel the thumb catch or snap when you move it.  Treatment will depend on how bad the pain is. It can often be treated with medicines, injections, splinting, and home care. If your case is severe, you may be referred to a specialist to talk about surgery.  Home care  Your healthcare provider may prescribe medicines to relieve pain and reduce inflammation. A steroid medicine may be injected near the tendons. This reduces swelling. The healthcare provider may also suggest taking over-the-counter medicines like ibuprofen or naproxen. These help reduce inflammation. Take all medicines only as directed.  The following are general care guidelines:    Avoid repetitive movements of your wrist and thumb.    Note any activity that causes pain or swelling. If possible, avoid or limit that activity.    Put a cold pack on your thumb. You can make your own cold pack by wrapping a plastic bag of ice or bag of frozen vegetables in a thin towel. Hold this to your thumb for up to 20 minutes at a time. Don't put ice directly on the skin.    Your healthcare provider may put a splint on the thumb to hold it still. Use the splint as you have been instructed. In some cases, you may need to use a splint 24 hours a day for 4 to 6 weeks. This will allow the wrist and thumb to heal.  Follow-up care  Follow up with your healthcare provider, or as advised. You may need more treatment if your injury is severe or if your  symptoms don't get better. This additional treatment may include local injections, physical therapy, and surgery.  When to seek medical advice  Call your healthcare provider right away if any of these occur:    Increase in pain or swelling    If you have fever, chills, redness, warmth, or drainage    Symptoms get worse after taking medicine    Pain spreads farther down the thumb or into the forearm    Pain continues to get in the way of daily life  Date Last Reviewed: 1/18/2016 2000-2017 [employer]. [address] [place] [address]. All rights reserved. This information is not intended as a substitute for professional medical care. Always follow your healthcare professional's instructions.      Discharge Instructions  Hypertension - High Blood Pressure    During you visit to the Emergency Department, your blood pressure was higher than the recommended blood pressure.  This may be related to stress, pain, medication or other temporary conditions. In these cases, your blood pressure may return to normal on its own. If you have a history of high blood pressure, you may need to have your provider adjust your medications. Sometimes, your high measurement here may indicate that you have developed high blood pressure that will stay high unless it is treated. As a general rule, high blood pressure causes problems over years rather than days, weeks, or months. So, while it is important to treat blood pressure, it is rarely important to treat blood pressure immediately. Occasionally we will begin a medication in the Emergency Department; more often we will recommend close follow-up for medications with a primary doctor/clinic.    Generally, every Emergency Department visit should have a follow-up clinic visit with either a primary or a specialty clinic/provider. Please follow-up as instructed by your emergency provider today.    Return to the Emergency Department if you start to have:    A severe  headache.    Chest pain.    Shortness of breath.    Weakness or numbness that affects one part of the body.    Confusion.    Vision changes.    Significant swelling of legs and/or eyes.    A reaction to any medication started in the Emergency Department.    What can I do to help myself?    Avoid alcohol.    Take any blood pressure medicine that you are prescribed.    Get a good night s sleep.    Lower your salt intake.    Exercise.    Lose weight.    Manage stress.    See your doctor regularly    If blood pressure medication was started in the Emergency Department:    The medicine may not have an immediate effect. The body and brain determine what blood pressure you have. The medicine s job is to retrain the body s  thermostat  to a lower blood pressure.    You will need to follow up with your provider to see how this medicine is working for you.  If you were given a prescription for medicine here today, be sure to read all of the information (including the package insert) that comes with your prescription.  This will include important information about the medicine, its side effects, and any warnings that you need to know about.  The pharmacist who fills the prescription can provide more information and answer questions you may have about the medicine.  If you have questions or concerns that the pharmacist cannot address, please call or return to the Emergency Department.   Remember that you can always come back to the Emergency Department if you are not able to see your regular provider in the amount of time listed above, if you get any new symptoms, or if there is anything that worries you.

## 2018-04-09 NOTE — ED AVS SNAPSHOT
[facility] Emergency Department    [address]    [facility] [address]    Phone:  [phone]    Fax:  [phone]                                       Nixon More   MRN: [id number]    Department:  [facility] Emergency Department   Date of Visit:  4/9/2018           After Visit Summary Signature Page     I have received my discharge instructions, and my questions have been answered. I have discussed any challenges I see with this plan with the nurse or doctor.    ..........................................................................................................................................  Patient/Patient Representative Signature      ..........................................................................................................................................  Patient Representative Print Name and Relationship to Patient    ..................................................               ................................................  Date                                            Time    ..........................................................................................................................................  Reviewed by Signature/Title    ...................................................              ..............................................  Date                                                            Time

## 2018-05-18 ENCOUNTER — TRANSFERRED RECORDS (OUTPATIENT)
Dept: HEALTH INFORMATION MANAGEMENT | Facility: CLINIC | Age: 83
End: 2018-05-18

## 2018-05-30 ENCOUNTER — OFFICE VISIT (OUTPATIENT)
Dept: PEDIATRICS | Facility: CLINIC | Age: 83
End: 2018-05-30
Payer: COMMERCIAL

## 2018-05-30 VITALS
BODY MASS INDEX: 34.06 KG/M2 | DIASTOLIC BLOOD PRESSURE: 70 MMHG | RESPIRATION RATE: 16 BRPM | WEIGHT: 224 LBS | SYSTOLIC BLOOD PRESSURE: 120 MMHG | OXYGEN SATURATION: 96 % | HEART RATE: 80 BPM | TEMPERATURE: 98.5 F

## 2018-05-30 DIAGNOSIS — J20.9 ACUTE BRONCHITIS, UNSPECIFIED ORGANISM: Primary | ICD-10-CM

## 2018-05-30 PROCEDURE — 99213 OFFICE O/P EST LOW 20 MIN: CPT | Performed by: INTERNAL MEDICINE

## 2018-05-30 RX ORDER — AZITHROMYCIN 500 MG/1
500 TABLET, FILM COATED ORAL DAILY
Qty: 3 TABLET | Refills: 0 | Status: SHIPPED | OUTPATIENT
Start: 2018-05-30 | End: 2018-07-07

## 2018-05-30 NOTE — PROGRESS NOTES
SUBJECTIVE:   Nixon More is a 82 year old male who presents to clinic today for the following health issues:      Acute Illness   Acute illness concerns: cough  Onset: 7 days ago    Fever: YES- on day 2    Chills/Sweats: no    Headache (location?): no    Sinus Pressure:no    Conjunctivitis:  no    Ear Pain: no    Rhinorrhea: YES    Congestion: no    Sore Throat: YES- day 1-2     Cough: YES    Wheeze: YES- slight     Decreased Appetite: no    Nausea: no    Vomiting: no    Diarrhea:  no    Dysuria/Freq.: no    Fatigue/Achiness: YES- slight fatigue    Sick/Strep Exposure: no     Therapies Tried and outcome: coricidin       Problem list and histories reviewed & adjusted, as indicated.      Reviewed and updated as needed this visit by clinical staff  Tobacco  Allergies  Meds  Med Hx  Surg Hx  Fam Hx  Soc Hx      Reviewed and updated as needed this visit by Provider  Tobacco  Allergies  Meds  Problems  Med Hx  Surg Hx  Fam Hx  Soc Hx            OBJECTIVE:     /70 (BP Location: Right arm, Patient Position: Chair, Cuff Size: Adult Large)  Pulse 80  Temp 98.5  F (36.9  C) (Oral)  Resp 16  Wt 224 lb (101.6 kg)  SpO2 96%  BMI 34.06 kg/m2  Body mass index is 34.06 kg/(m^2).  GEN: no distress  SKIN: no rashes  HEENT: PERRL. EOMI. Nasal mucosa normal. OP moist without lesions.  NECK: Supple. No LAD or TM.  LUNGS: Clear to auscultation bilaterally. No rhonchi, rales, wheezes or retractions.  CV: Regular rate and rhythm.  No murmurs, rubs or gallops. Pulses 2+ radial.  ABD: BS+      ASSESSMENT/PLAN:       ICD-10-CM    1. Acute bronchitis, unspecified organism J20.9 azithromycin (ZITHROMAX) 500 MG tablet     Sx are clinically c/w bronchitis. No exam changes to suggest pneumonia.    Azithromycin 500mg (antibiotic)   1 pill per day for 3 days   The medication continues working for 1 week after you stop taking the pills        El Carpio MD  [facility]

## 2018-05-30 NOTE — MR AVS SNAPSHOT
After Visit Summary   5/30/2018    Nixon More    MRN: [id number]           Patient Information     Date Of Birth          1935        Visit Information        Provider Department      5/30/2018 2:00 PM El Carpio MD [facility]        Today's Diagnoses     Acute bronchitis, unspecified organism    -  1      Care Instructions    Azithromycin 500mg (antibiotic)   1 pill per day for 3 days   The medication continues working for 1 week after you stop taking the pills           Follow-ups after your visit        Your next 10 appointments already scheduled     Jun 05, 2018  7:30 AM CDT   Vestibular Eval with Ting Hayden, PT   [facility] ([facility])    [address]  [facility] [address] [phone]            Aug 20, 2018  9:00 AM CDT   Return Prostate Cancer with Mark Pino MD   [facility] [place] ([facility])    [address]  [address]  [facility] [address] [phone]              Who to contact     If you have questions or need follow up information about today's clinic visit or your schedule please contact [facility] directly at [phone].  Normal or non-critical lab and imaging results will be communicated to you by MyChart, letter or phone within 4 business days after the clinic has received the results. If you do not hear from us within 7 days, please contact the clinic through MyChart or phone. If you have a critical or abnormal lab result, we will notify you by phone as soon as possible.  Submit refill requests through frintit or call your pharmacy and they will forward the refill request to us. Please allow 3 business days for your refill to be completed.          Additional Information About Your Visit        [facility] Information     frintit gives you secure access to your electronic health record. If you see a  primary care provider, you can also send messages to your care team and make appointments. If you have questions, please call your primary care clinic.  If you do not have a primary care provider, please call [phone] and they will assist you.        Care EveryWhere ID     This is your Care EveryWhere ID. This could be used by other organizations to access your [facility] medical records  [id number]        Your Vitals Were     Pulse Temperature Respirations Pulse Oximetry BMI (Body Mass Index)       80 98.5  F (36.9  C) (Oral) 16 96% 34.06 kg/m2        Blood Pressure from Last 3 Encounters:   05/30/18 120/70   04/09/18 (!) 188/126   04/06/18 138/82    Weight from Last 3 Encounters:   05/30/18 224 lb (101.6 kg)   04/09/18 220 lb (99.8 kg)   04/06/18 228 lb 14.4 oz (103.8 kg)              Today, you had the following     No orders found for display         Today's Medication Changes          These changes are accurate as of 5/30/18 11:59 PM.  If you have any questions, ask your nurse or doctor.               Start taking these medicines.        Dose/Directions    azithromycin 500 MG tablet   Commonly known as:  ZITHROMAX   Used for:  Acute bronchitis, unspecified organism   Started by:  El Carpio MD        Dose:  500 mg   Take 1 tablet (500 mg) by mouth daily   Quantity:  3 tablet   Refills:  0            Where to get your medicines      Some of these will need a paper prescription and others can be bought over the counter.  Ask your nurse if you have questions.     Bring a paper prescription for each of these medications     azithromycin 500 MG tablet                Primary Care Provider Office Phone # Fax #    Natalya Lewis [phone] [address] [place] [address]        Equal Access to Services     [facility]: kirstin Cornell, anila kohli. So [facility] [phone].    ATENCIÓN: Si  markel george, tiene a coelho disposición servicios gratuitos de asistencia lingüística. Jaren terrell [phone].    We comply with applicable federal civil rights laws and [place] laws. We do not discriminate on the basis of race, color, national origin, age, disability, sex, sexual orientation, or gender identity.            Thank you!     Thank you for choosing [facility]  for your care. Our goal is always to provide you with excellent care. Hearing back from our patients is one way we can continue to improve our services. Please take a few minutes to complete the written survey that you may receive in the mail after your visit with us. Thank you!             Your Updated Medication List - Protect others around you: Learn how to safely use, store and throw away your medicines at www.disposemymeds.org.          This list is accurate as of 5/30/18 11:59 PM.  Always use your most recent med list.                   Brand Name Dispense Instructions for use Diagnosis    aspirin 81 MG tablet     100    1 tab po QD (Once per day)    Essential hypertension, benign       atenolol 50 MG tablet    TENORMIN    90 tablet    Take 1 tablet (50 mg) by mouth daily    Essential hypertension with goal blood pressure less than 140/90       azithromycin 500 MG tablet    ZITHROMAX    3 tablet    Take 1 tablet (500 mg) by mouth daily    Acute bronchitis, unspecified organism       celecoxib 100 MG capsule    celeBREX    180 capsule    Take 1 capsule (100 mg) by mouth 2 times daily as needed for moderate pain    Arthritis       diphenhydrAMINE 25 MG tablet    BENADRYL    56 tablet    Take 1 tablet (25 mg) by mouth every 6 hours as needed for itching or other (Hives)        EPINEPHrine 0.3 MG/0.3ML injection 2-pack    EPIPEN/ADRENACLICK/or ANY BX GENERIC EQUIV    0.6 mL    Inject 0.3 mLs (0.3 mg) into the muscle as needed for anaphylaxis    Bee sting-induced anaphylaxis, undetermined intent, subsequent encounter       FLUoxetine  10 MG capsule    PROzac    90 capsule    Take 1 capsule (10 mg) by mouth daily    Mild major depression (H)       hydrocortisone 2.5 % cream           ipratropium 0.06 % spray    ATROVENT    3 Box    Spray 2 sprays in nostril 4 times daily as needed for rhinitis    Nasal congestion       leuprolide 45 MG kit    ELIGARD    1 each    Inject 45 mg Subcutaneous every 6 months.        losartan-hydrochlorothiazide 100-12.5 MG per tablet    HYZAAR    90 tablet    Take 1 tablet by mouth daily    Essential hypertension with goal blood pressure less than 140/90       PROSTEON PO      Take 2,000 Units by mouth With vitamin D        simvastatin 40 MG tablet    ZOCOR    90 tablet    Take 1 tablet (40 mg) by mouth At Bedtime    Dyslipidemia

## 2018-06-04 NOTE — PATIENT INSTRUCTIONS
Azithromycin 500mg (antibiotic)   1 pill per day for 3 days   The medication continues working for 1 week after you stop taking the pills

## 2018-06-05 ENCOUNTER — HOSPITAL ENCOUNTER (OUTPATIENT)
Dept: PHYSICAL THERAPY | Facility: CLINIC | Age: 83
Setting detail: THERAPIES SERIES
End: 2018-06-05
Attending: OTOLARYNGOLOGY
Payer: COMMERCIAL

## 2018-06-05 PROCEDURE — 95992 CANALITH REPOSITIONING PROC: CPT | Mod: GP | Performed by: PHYSICAL THERAPIST

## 2018-06-05 PROCEDURE — G8981 BODY POS CURRENT STATUS: HCPCS | Mod: GP,CI | Performed by: PHYSICAL THERAPIST

## 2018-06-05 PROCEDURE — 97161 PT EVAL LOW COMPLEX 20 MIN: CPT | Mod: GP | Performed by: PHYSICAL THERAPIST

## 2018-06-05 PROCEDURE — 40000840 ZZHC STATISTIC PT VESTIBULAR VISIT: Performed by: PHYSICAL THERAPIST

## 2018-06-05 PROCEDURE — G8982 BODY POS GOAL STATUS: HCPCS | Mod: GP,CH | Performed by: PHYSICAL THERAPIST

## 2018-06-05 NOTE — PROGRESS NOTES
" 06/05/18 0700   Quick Adds   Quick Adds Vestibular Eval   Type of Visit Initial OP PT Evaluation   General Information   Start of Care Date 06/05/18   Referring Physician Dr Jin   Orders Evaluate and Treat as Indicated   Order Date 05/15/18   Medical Diagnosis Disequilibrium   Onset of illness/injury or Date of Surgery 05/15/18   Surgical/Medical history reviewed Yes   Pertinent history of current vestibular problem (include personal factors and/or comorbidities that impact the POC)  Depression   Pertinent history of current problem (include personal factors and/or comorbidities that impact the POC) Patient reports a feeling of disequilibrium for the past approximately 6 months to a year. He denies vertigo but states he feels an \"aura\" with certain movements. He is not able to identify any specific pattern but does remember feeling like this bending over yesterday. He feels less steady at times while walking ie in the yard and garden. He also feels off when standing in the shower and closing his eyes. He has had one fall when he stumbled in the garden and was unable to catch himself. This feeling of lightheadedness, aura, can also happen in bed at times. Patient reports an overall decreased endurance and activity tolerance of up to 2 blocks. He is more comfortable if he has something to hold onto for longer durations ie shopping cart. He states he has a h/o depression and doesnt feel his current medication is working that well. He feels the depression limits his motivation to do the things he needs to do to counteract his aging and problems that come with that ie weakness, imbalance. He is sedentary most of winter months but is more active with yard work and gardening in warmer months. Patient denies hearing or vision changes. No recent medication changes. Pt is just coming off an URI.    Pertinent Visual History  wears bifocals, progressives. Has prism lenses, just in past 3-4 months. cataract surgery R. "   Prior level of function comment independent with all mobility, no AD.    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   Patient/Family Goals Statement improve balance   Fall Risk Screen   Fall screen completed by PT   Have you fallen 2 or more times in the past year? No  (1 fall in garden)   Have you fallen and had an injury in the past year? No   Is patient a fall risk? No   System Outcome Measures   Outcome Measures BPPV   Dizziness Handicap Inventory (score out of 100) A decrease in score by 17.18 or greater indicates a clinically significant change in symptoms. 8   Pain   Patient currently in pain No   Pain comments pt will get buttock pain with walking, alleviated with rest   Cognitive Status Examination   Orientation orientation to person, place and time   Level of Consciousness alert   Follows Commands and Answers Questions 100% of the time   Personal Safety and Judgment intact   Posture   Posture Forward head position;Protracted shoulders   Bed Mobility   Bed Mobility Comments Indep   Transfer Skills   Transfer Comments Indep sit <> stand, good stability, no LOB or AD needed   Gait Special Tests Dynamic Gait Index   Comments 11/12 on 4 item DGI (9 or less indicates increased fall risk)   Balance Special Tests   Balance Special Tests Modified CTSIB Conditions   Balance Special Tests Modified CTSIB Conditions   Condition 1, seconds 30 Seconds   Condition 2, seconds 30 Seconds   Condition 4, seconds 30 Seconds   Condition 5, seconds 5 Seconds   Sensory Examination   Sensory  Perception no deficits were identified   Coordination   Coordination no deficits were identified   Cervicogenic Screen   Neck ROM sufficient for positional testing   Oculomotor Exam   Smooth Pursuit Normal   Saccades Other   Saccades Comments hypometric vertical, horizontal WNL   VOR Normal   Rapid Head Thrust Normal   Convergence Testing Normal   Infrared Goggle Exam or Frenzel Lense Exam   Vestibular Suppressant in Last 24 Hours? No   Exam completed with Infrared Goggles   Spontaneous Nystagmus Negative   Gaze Evoked Nystagmus Negative   Head Shake Horizontal Nystagmus Negative   [place]-Hallpike (right) Negative   Kilo-Hallpike (Left) Upbeating L torsional   HSCC Supine Roll Test (Right) Negative   HSCC Supine Roll Test (Left) Negative   BPPV Canal(s) L Posterior   BPPV Type Canalithasis   Planned Therapy Interventions   Planned Therapy Interventions balance training;gait training;neuromuscular re-education;other (see comments)   Planned Therapy Interventions Comment CRM   Clinical Impression   Criteria for Skilled Therapeutic Interventions Met yes, treatment indicated   PT Diagnosis s/s L BPPV   Influenced by the following impairments intermittent dizziness, imbalance   Functional limitations due to impairments bed mobility, forward bending during household chores and yard work/gardening, walking    Clinical Presentation Stable/Uncomplicated   Clinical Presentation Rationale responded well to initial CRM   Clinical Decision Making (Complexity) Low complexity   Therapy Frequency 1 time/week   Predicted Duration of Therapy Intervention (days/wks) 5   Risk & Benefits of therapy have been explained Yes   Patient, Family & other staff in agreement with plan of care Yes   Education Assessment   Barriers to Learning No barriers   GOALS   PT Eval Goals 1;2;3   Goal 1   Goal Identifier 1   Goal Description Patient will have negative s/s of BPPV for greater safety with bed mobility and bending forward while in the garden to  prevent.   Target Date 06/22/18   Goal 2   Goal Identifier 2   Goal Description Patient will score 0/100 on DHI indicating resolution of dizziness for unrestricted ability to complete his yard work.   Target Date 06/22/18   Goal 3   Goal Identifier 3   Goal Description Patient will be able to stand on non-compliant surfaces with EC for at least 30 seconds for greater safety walking in the house in the dark or standing in the shower with eyes closed.   Target Date 07/10/18   Goal 4   Goal Identifier 4   Goal Description Patient will be able to walk and perform head turns every couple steps without stagger or LOB for greater safety walking thru a store while looking for items.   Target Date 07/10/18   Total Evaluation Time   Total Evaluation Time (Minutes) 45

## 2018-06-13 ENCOUNTER — HOSPITAL ENCOUNTER (OUTPATIENT)
Dept: PHYSICAL THERAPY | Facility: CLINIC | Age: 83
Setting detail: THERAPIES SERIES
End: 2018-06-13
Attending: OTOLARYNGOLOGY
Payer: COMMERCIAL

## 2018-06-13 PROCEDURE — 97112 NEUROMUSCULAR REEDUCATION: CPT | Mod: GP | Performed by: PHYSICAL THERAPIST

## 2018-06-13 PROCEDURE — 95992 CANALITH REPOSITIONING PROC: CPT | Mod: GP | Performed by: PHYSICAL THERAPIST

## 2018-06-13 PROCEDURE — 40000840 ZZHC STATISTIC PT VESTIBULAR VISIT: Performed by: PHYSICAL THERAPIST

## 2018-06-26 ENCOUNTER — HOSPITAL ENCOUNTER (OUTPATIENT)
Dept: PHYSICAL THERAPY | Facility: CLINIC | Age: 83
Setting detail: THERAPIES SERIES
End: 2018-06-26
Attending: OTOLARYNGOLOGY
Payer: COMMERCIAL

## 2018-06-26 PROCEDURE — G8983 BODY POS D/C STATUS: HCPCS | Mod: GP,CH | Performed by: PHYSICAL THERAPIST

## 2018-06-26 PROCEDURE — 40000840 ZZHC STATISTIC PT VESTIBULAR VISIT: Performed by: PHYSICAL THERAPIST

## 2018-06-26 PROCEDURE — G8982 BODY POS GOAL STATUS: HCPCS | Mod: GP,CH | Performed by: PHYSICAL THERAPIST

## 2018-06-26 PROCEDURE — 97750 PHYSICAL PERFORMANCE TEST: CPT | Mod: GP | Performed by: PHYSICAL THERAPIST

## 2018-06-26 PROCEDURE — 97112 NEUROMUSCULAR REEDUCATION: CPT | Mod: GP | Performed by: PHYSICAL THERAPIST

## 2018-06-26 NOTE — PROGRESS NOTES
"Outpatient Physical Therapy Discharge Note     Patient: Nixon More  : 1935    Beginning/End Dates of Reporting Period:  18 to 2018. Patient was seen a total of 3 visits in this EOC. Treatment included vestibular rehab for CRM and balance training.    Referring Provider: Dr Jin    Therapy Diagnosis: s/s L BPPV     Client Self Report: Patient denies dizziness. He feels his balance is better. Has been able to work out in garden without feeling unsteady. Able to shower this AM, close his eyes without touching the wall and felt fine. Does stabilize himself on his bed briefly when first gets out of bed in dark otherwise is ok.     Objective Measurements:  Objective Measure: mCTSIB  Details: 30\" all conditions- WNL. Condition 5 on foam with EC at [facility] only 5\"  Objective Measure: DGI  Details:  without AD (19 or less indicates increased fall risk)    Outcome Measures (most recent score):  Dizziness Handicap Inventory (score out of 100) A decrease in score by 17.18 or greater indicates a clinically significant change in symptoms.: 4 (was 8 at [facility])- improved  Goals:  Goal Identifier 1   Goal Description Patient will have negative s/s of BPPV for greater safety with bed mobility and bending forward while in the garden to prevent.   Target Date 18    Date Met  18   Progress:     Goal Identifier 2   Goal Description Patient will score 0/100 on DHI indicating resolution of dizziness for unrestricted ability to complete his yard work.   Target Date 18   Date Met     Progress: nearly met, score of 4/100     Goal Identifier 3   Goal Description Patient will be able to stand on non-compliant surfaces with EC for at least 30 seconds for greater safety walking in the house in the dark or standing in the shower with eyes closed.   Target Date 07/10/18   Date Met  18   Progress:     Goal Identifier 4   Goal Description Patient will be able to walk and perform head turns every " couple steps without stagger or LOB for greater safety walking thru a store while looking for items.   Target Date 07/10/18   Date Met  06/26/18   Progress:       Progress Toward Goals:   Progress this reporting period: Patient has made improvements in balance and gait stability with resolution of L BPPV. He has met his goals and is performing normal household duties and yard work/gardening without restriction. Patient has met his goals. He may benefit from additional balance training but is not interested at this time. He is independent in his HEP to progress indep with balance.    Plan:  Discharge from therapy.    Discharge:    Reason for Discharge: Patient has met his goals.    Discharge Plan: Patient to continue home program.

## 2018-06-26 NOTE — PROGRESS NOTES
06/26/18 1000   Signing Clinician's Name / Credentials   Signing clinician's name / credentials Ting Hayden PT   Dynamic Gait Index (Mele and Aguilar Greer, 1995)   Gait Level Surface 3   Change in Gait Speed 3   Gait and Horizontal Head Turns 3   Gait with Vertical Head Turns 3   Gait and Pivot Turns 3   Step Over Obstacle 3   Step Around Obstacles 0   Steps 2   Total Dynamic Gait Index Score  (A score of 19 or less has been correlated to an increased risk of falls in community dwelling older adults, patients with vestibular disorders, and patients with MS.)   Total Score (out of 24) 20   Dynamic Gait Index (DGI):The DGI is a measure of balance during gait that is reliable and valid for the elderly and individuals with Parkinson's disease, MS, vestibular disorders, or s/p stroke. Gait assistive device used: none     Patient score: 20/24  Scores ?19/24 indicate an increased risk for falls according to Jen et al 2000  Minimal Detectable Change = 2.9 in community dwelling elderly according to Stoddard et al 2011    Assessment (rationale for performing, application to patient s function & care plan): Assessment to determine fall risk in patient at last visit. Score indicates he is not at an increased risk for falling.     Minutes billed as physical performance test: 7

## 2018-06-30 ENCOUNTER — OFFICE VISIT (OUTPATIENT)
Dept: URGENT CARE | Facility: URGENT CARE | Age: 83
End: 2018-06-30
Payer: COMMERCIAL

## 2018-06-30 VITALS
WEIGHT: 229 LBS | TEMPERATURE: 97.8 F | SYSTOLIC BLOOD PRESSURE: 152 MMHG | DIASTOLIC BLOOD PRESSURE: 88 MMHG | OXYGEN SATURATION: 96 % | HEART RATE: 67 BPM | BODY MASS INDEX: 34.82 KG/M2

## 2018-06-30 DIAGNOSIS — R30.0 DYSURIA: Primary | ICD-10-CM

## 2018-06-30 DIAGNOSIS — R31.9 HEMATURIA, UNSPECIFIED TYPE: ICD-10-CM

## 2018-06-30 LAB
ALBUMIN UR-MCNC: NEGATIVE MG/DL
APPEARANCE UR: ABNORMAL
BILIRUB UR QL STRIP: NEGATIVE
COLOR UR AUTO: YELLOW
ERYTHROCYTE [DISTWIDTH] IN BLOOD BY AUTOMATED COUNT: 13.4 % (ref 10–15)
GLUCOSE UR STRIP-MCNC: NEGATIVE MG/DL
HCT VFR BLD AUTO: 40.8 % (ref 40–53)
HGB BLD-MCNC: 13.1 G/DL (ref 13.3–17.7)
HGB UR QL STRIP: ABNORMAL
KETONES UR STRIP-MCNC: NEGATIVE MG/DL
LEUKOCYTE ESTERASE UR QL STRIP: NEGATIVE
MCH RBC QN AUTO: 30.3 PG (ref 26.5–33)
MCHC RBC AUTO-ENTMCNC: 32.1 G/DL (ref 31.5–36.5)
MCV RBC AUTO: 94 FL (ref 78–100)
NITRATE UR QL: NEGATIVE
PH UR STRIP: 7 PH (ref 5–7)
PLATELET # BLD AUTO: 161 10E9/L (ref 150–450)
RBC # BLD AUTO: 4.32 10E12/L (ref 4.4–5.9)
RBC #/AREA URNS AUTO: >100 /HPF
SOURCE: ABNORMAL
SP GR UR STRIP: 1.02 (ref 1–1.03)
UROBILINOGEN UR STRIP-ACNC: 0.2 EU/DL (ref 0.2–1)
WBC # BLD AUTO: 6.5 10E9/L (ref 4–11)
WBC #/AREA URNS AUTO: ABNORMAL /HPF

## 2018-06-30 PROCEDURE — 99214 OFFICE O/P EST MOD 30 MIN: CPT | Performed by: FAMILY MEDICINE

## 2018-06-30 PROCEDURE — 36415 COLL VENOUS BLD VENIPUNCTURE: CPT | Performed by: FAMILY MEDICINE

## 2018-06-30 PROCEDURE — 85027 COMPLETE CBC AUTOMATED: CPT | Performed by: FAMILY MEDICINE

## 2018-06-30 PROCEDURE — 81001 URINALYSIS AUTO W/SCOPE: CPT | Performed by: FAMILY MEDICINE

## 2018-06-30 PROCEDURE — 87086 URINE CULTURE/COLONY COUNT: CPT | Performed by: FAMILY MEDICINE

## 2018-06-30 RX ORDER — NITROFURANTOIN 25; 75 MG/1; MG/1
100 CAPSULE ORAL 2 TIMES DAILY
Qty: 14 CAPSULE | Refills: 0 | Status: SHIPPED | OUTPATIENT
Start: 2018-06-30 | End: 2018-07-07

## 2018-06-30 ASSESSMENT — ENCOUNTER SYMPTOMS: HEMATURIA: 1

## 2018-06-30 NOTE — PROGRESS NOTES
SUBJECTIVE:   Nixon More is a 82 year old male presenting with a chief complaint of   Chief Complaint   Patient presents with     Urgent Care     UTI     x 1 day small amount this moring. New medication of Celebrex. Had prostate cancer 10 yrs ago     Complaining of hematuria for the last several days. Less each time.     He recently started a nonsteroidal medication he only took 1 dose.    Past medical history however significant for prostate cancer currently on hormone treatment  He is an established patient of [facility].    UTI    Onset of symptoms was 2day(s).  Course of illness is improving  Severity moderate  Current and associated symptoms dysuria and blood in urine  Treatment and measures tried None  Predisposing factors include history of prostate cancer  Patient denies rigors            Review of Systems   Genitourinary: Positive for hematuria.   All other systems reviewed and are negative.      Past Medical History:   Diagnosis Date     Actinic keratosis      Arthritis      CAD (coronary artery disease)     1/5/2011 lateral ischemia on stress echo, 12/2014 normal stress echo     Dyslipidemia      Emphysema of lung (H)      Essential hypertension, benign      Hernia, abdominal      Hypersomnia with sleep apnea, unspecified     on bipap, partially treated with residual apneas     Hypertrophy (benign) of prostate 5/01    Biopsy 5/01 negative for cancer; PSA 5     Lumbago      Prostate cancer (H) 12/15/2006     Skin cancer, basal cell 1997     Family History   Problem Relation Age of Onset     Alzheimer Disease Mother      Hypertension Mother      Cardiovascular Father      D:86 complications fo CHF     Prostate Cancer Brother      Lung Cancer Brother 76     Prostate Cancer Brother      Current Outpatient Prescriptions   Medication Sig Dispense Refill     ASPIRIN 81 MG OR TABS 1 tab po QD (Once per day) 100 3     atenolol (TENORMIN) 50 MG tablet Take 1 tablet (50 mg) by mouth daily 90 tablet 3      azithromycin (ZITHROMAX) 500 MG tablet Take 1 tablet (500 mg) by mouth daily 3 tablet 0     celecoxib (CELEBREX) 100 MG capsule Take 1 capsule (100 mg) by mouth 2 times daily as needed for moderate pain 180 capsule 1     diphenhydrAMINE (BENADRYL) 25 MG tablet Take 1 tablet (25 mg) by mouth every 6 hours as needed for itching or other (Hives) 56 tablet 0     EPINEPHrine (EPIPEN/ADRENACLICK/OR ANY BX GENERIC EQUIV) 0.3 MG/0.3ML injection 2-pack Inject 0.3 mLs (0.3 mg) into the muscle as needed for anaphylaxis 0.6 mL 1     FLUoxetine (PROZAC) 10 MG capsule Take 1 capsule (10 mg) by mouth daily 90 capsule 3     hydrocortisone 2.5 % cream        ipratropium (ATROVENT) 0.06 % spray Spray 2 sprays in nostril 4 times daily as needed for rhinitis 3 Box 3     leuprolide (ELIGARD) 45 MG injection Inject 45 mg Subcutaneous every 6 months. 1 each 12     losartan-hydrochlorothiazide (HYZAAR) 100-12.5 MG per tablet Take 1 tablet by mouth daily 90 tablet 3     Multiple Minerals-Vitamins (PROSTEON PO) Take 2,000 Units by mouth With vitamin D       simvastatin (ZOCOR) 40 MG tablet Take 1 tablet (40 mg) by mouth At Bedtime 90 tablet 3     [DISCONTINUED] atenolol-chlorthalidone (TENORETIC 50) 50-25 MG per tablet Take 1 tablet by mouth daily 30 tablet 1     Social History   Substance Use Topics     Smoking status: Former Smoker     Packs/day: 1.00     Years: 30.00     Types: Cigarettes     Quit date: 1/1/1978     Smokeless tobacco: Never Used     Alcohol use 1.0 oz/week     2 Standard drinks or equivalent per week      Comment: socially       OBJECTIVE  /88 (BP Location: Left arm, Patient Position: Chair, Cuff Size: Adult Large)  Pulse 67  Temp 97.8  F (36.6  C) (Oral)  Wt 229 lb (103.9 kg)  SpO2 96%  BMI 34.82 kg/m2    Physical Exam   Constitutional: He is oriented to person, place, and time. He appears well-developed.   HENT:   Head: Normocephalic.   Eyes: Pupils are equal, round, and reactive to light.   Neck: Normal  range of motion.   Cardiovascular: Normal rate.    Pulmonary/Chest: Effort normal.   Musculoskeletal: Normal range of motion.   Neurological: He is alert and oriented to person, place, and time.   Skin: Skin is warm.   Psychiatric: He has a normal mood and affect.   Nursing note and vitals reviewed.      Labs:  Results for orders placed or performed in visit on 06/30/18 (from the past 24 hour(s))   *UA reflex to Microscopic and Culture ([facility] (except [place] and [place])   Result Value Ref Range    Color Urine Yellow     Appearance Urine Slightly Cloudy     Glucose Urine Negative NEG^Negative mg/dL    Bilirubin Urine Negative NEG^Negative    Ketones Urine Negative NEG^Negative mg/dL    Specific Gravity Urine 1.020 1.003 - 1.035    Blood Urine Large (A) NEG^Negative    pH Urine 7.0 5.0 - 7.0 pH    Protein Albumin Urine Negative NEG^Negative mg/dL    Urobilinogen Urine 0.2 0.2 - 1.0 EU/dL    Nitrite Urine Negative NEG^Negative    Leukocyte Esterase Urine Negative NEG^Negative    Source Midstream Urine    Urine Microscopic   Result Value Ref Range    WBC Urine 0 - 5 OTO5^0 - 5 /HPF    RBC Urine >100 (A) OTO2^O - 2 /HPF       X-Ray was not done.    ASSESSMENT:      ICD-10-CM    1. Dysuria R30.0 *UA reflex to Microscopic and Culture ([facility] (except [place] and [place])     Urine Microscopic     Urine Culture Aerobic Bacterial     CBC with platelets    2. Hematuria  3. Hx of prostate ca.    Hematuria over the last 1-2 days.  This was gross hematuria also has microscopic hematuria [place].      He did take some recent nonsteroidal medication this could be the source of the bleeding.  However he does have a history of prostate  Malignancy and concern would be for renewal of the disease.    At this point I would treat as is as if it were a bacterial infection I will culture his urine.  I will check his hemoglobin to make sure he has not lost too much blood        Needs to  follow-up with his primary care or urology          Medical Decision Making:    Differential Diagnosis:  UTI: UTI, Dysuria, Kidney Stone, Urethritis and Interstitial Cystitis    Serious Comorbid Conditions:  Adult:  Malignancy    PLAN:    UTI Adult:  macrobid    Followup:    Should be seen by physician or urology in the next 1-2 weeks    There are no Patient Instructions on file for this visit.

## 2018-06-30 NOTE — MR AVS SNAPSHOT
After Visit Summary   6/30/2018    Nixon More    MRN: [id number]           Patient Information     Date Of Birth          1935        Visit Information        Provider Department      6/30/2018 1:50 PM Tonio Ledesma MD [facility]        Today's Diagnoses     Dysuria    -  1    Hematuria, unspecified type           Follow-ups after your visit        Your next 10 appointments already scheduled     Aug 20, 2018  9:00 AM CDT   Return Prostate Cancer with Mark Pino MD   [facility] ([facility])    [address]  [address]  [facility] [address] [phone]              Who to contact     If you have questions or need follow up information about today's clinic visit or your schedule please contact [facility] directly at [phone].  Normal or non-critical lab and imaging results will be communicated to you by MyChart, letter or phone within 4 business days after the clinic has received the results. If you do not hear from us within 7 days, please contact the clinic through [employer] or phone. If you have a critical or abnormal lab result, we will notify you by phone as soon as possible.  Submit refill requests through [employer] or call your pharmacy and they will forward the refill request to us. Please allow 3 business days for your refill to be completed.          Additional Information About Your Visit        MyChart Information     [employer] gives you secure access to your electronic health record. If you see a primary care provider, you can also send messages to your care team and make appointments. If you have questions, please call your primary care clinic.  If you do not have a primary care provider, please call [phone] and they will assist you.        Care EveryWhere ID     This is your Care EveryWhere ID. This could be used by other organizations to access  your [facility] medical records  [id number]        Your Vitals Were     Pulse Temperature Pulse Oximetry BMI (Body Mass Index)          67 97.8  F (36.6  C) (Oral) 96% 34.82 kg/m2         Blood Pressure from Last 3 Encounters:   06/30/18 152/88   05/30/18 120/70   04/09/18 (!) 188/126    Weight from Last 3 Encounters:   06/30/18 229 lb (103.9 kg)   05/30/18 224 lb (101.6 kg)   04/09/18 220 lb (99.8 kg)              We Performed the Following     *UA reflex to Microscopic and Culture ([facility] (except [place] and [place])     CBC with platelets     Urine Culture Aerobic Bacterial     Urine Microscopic          Today's Medication Changes          These changes are accurate as of 6/30/18  2:52 PM.  If you have any questions, ask your nurse or doctor.               Start taking these medicines.        Dose/Directions    nitroFURantoin (macrocrystal-monohydrate) 100 MG capsule   Commonly known as:  MACROBID   Used for:  Dysuria, Hematuria, unspecified type   Started by:  Tonio Ledesma MD        Dose:  100 mg   Take 1 capsule (100 mg) by mouth 2 times daily   Quantity:  14 capsule   Refills:  0            Where to get your medicines      These medications were sent to [facility] [address] [place] [address]     Phone:  [phone]     nitroFURantoin (macrocrystal-monohydrate) 100 MG capsule                Primary Care Provider Office Phone # Fax #    Natalya Lewis [phone]       [facility] [place] [address]        Equal Access to Services     [facility] AH: Hadii leanne alvarado Soleni, waaxda luqadaha, qaybta kaalmada romain, anila temple. So [facility] [phone].    ATENCIÓN: Si habla español, tiene a coelho disposición servicios gratuitos de asistencia lingüística. Llame al [phone].    We comply with applicable federal civil rights laws and [place] laws. We  do not discriminate on the basis of race, color, national origin, age, disability, sex, sexual orientation, or gender identity.            Thank you!     Thank you for choosing [facility]  for your care. Our goal is always to provide you with excellent care. Hearing back from our patients is one way we can continue to improve our services. Please take a few minutes to complete the written survey that you may receive in the mail after your visit with us. Thank you!             Your Updated Medication List - Protect others around you: Learn how to safely use, store and throw away your medicines at www.disposemymeds.org.          This list is accurate as of 6/30/18  2:52 PM.  Always use your most recent med list.                   Brand Name Dispense Instructions for use Diagnosis    aspirin 81 MG tablet     100    1 tab po QD (Once per day)    Essential hypertension, benign       atenolol 50 MG tablet    TENORMIN    90 tablet    Take 1 tablet (50 mg) by mouth daily    Essential hypertension with goal blood pressure less than 140/90       azithromycin 500 MG tablet    ZITHROMAX    3 tablet    Take 1 tablet (500 mg) by mouth daily    Acute bronchitis, unspecified organism       celecoxib 100 MG capsule    celeBREX    180 capsule    Take 1 capsule (100 mg) by mouth 2 times daily as needed for moderate pain    Arthritis       diphenhydrAMINE 25 MG tablet    BENADRYL    56 tablet    Take 1 tablet (25 mg) by mouth every 6 hours as needed for itching or other (Hives)        EPINEPHrine 0.3 MG/0.3ML injection 2-pack    EPIPEN/ADRENACLICK/or ANY BX GENERIC EQUIV    0.6 mL    Inject 0.3 mLs (0.3 mg) into the muscle as needed for anaphylaxis    Bee sting-induced anaphylaxis, undetermined intent, subsequent encounter       FLUoxetine 10 MG capsule    PROzac    90 capsule    Take 1 capsule (10 mg) by mouth daily    Mild major depression (H)       hydrocortisone 2.5 % cream           ipratropium 0.06 % spray     ATROVENT    3 Box    Spray 2 sprays in nostril 4 times daily as needed for rhinitis    Nasal congestion       leuprolide 45 MG kit    ELIGARD    1 each    Inject 45 mg Subcutaneous every 6 months.        losartan-hydrochlorothiazide 100-12.5 MG per tablet    HYZAAR    90 tablet    Take 1 tablet by mouth daily    Essential hypertension with goal blood pressure less than 140/90       nitroFURantoin (macrocrystal-monohydrate) 100 MG capsule    MACROBID    14 capsule    Take 1 capsule (100 mg) by mouth 2 times daily    Dysuria, Hematuria, unspecified type       PROSTEON PO      Take 2,000 Units by mouth With vitamin D        simvastatin 40 MG tablet    ZOCOR    90 tablet    Take 1 tablet (40 mg) by mouth At Bedtime    Dyslipidemia

## 2018-07-01 LAB
BACTERIA SPEC CULT: NORMAL
SPECIMEN SOURCE: NORMAL

## 2018-07-02 ENCOUNTER — MYC MEDICAL ADVICE (OUTPATIENT)
Dept: UROLOGY | Facility: CLINIC | Age: 83
End: 2018-07-02

## 2018-07-03 NOTE — TELEPHONE ENCOUNTER
From: Nixon More  To: Mark Pino MD  Sent: 7/2/2018 4:06 PM CDT  Subject: Do I need an appointment?    On Sat I had blood in my urine in the morning, and a very small amount the next two times I urinated. I went to Urgent Care where they cultured a urine sample and did a blood test. The doctor gave me a prescription for antibiotics. The test results are in.     I don't know how to interpret them. Should I continue taking the antibiotics? I made an appointment to see you on July 12th. Should I keep that appointment?

## 2018-07-03 NOTE — TELEPHONE ENCOUNTER
No infection on his culture  Does not need antibiotics  He needs to see me sometime for a urinalysis and cystoscopy in the office.    Called Niox[facility] back with the information as above. He has appt scheduled for 7/12.

## 2018-07-07 ENCOUNTER — TELEPHONE (OUTPATIENT)
Dept: ADMINISTRATIVE | Facility: CLINIC | Age: 83
End: 2018-07-07

## 2018-07-07 ENCOUNTER — APPOINTMENT (OUTPATIENT)
Dept: ULTRASOUND IMAGING | Facility: CLINIC | Age: 83
End: 2018-07-07
Attending: EMERGENCY MEDICINE
Payer: COMMERCIAL

## 2018-07-07 ENCOUNTER — HOSPITAL ENCOUNTER (EMERGENCY)
Facility: CLINIC | Age: 83
Discharge: HOME | End: 2018-07-07
Attending: EMERGENCY MEDICINE | Admitting: EMERGENCY MEDICINE
Payer: COMMERCIAL

## 2018-07-07 VITALS
SYSTOLIC BLOOD PRESSURE: 145 MMHG | DIASTOLIC BLOOD PRESSURE: 99 MMHG | HEART RATE: 80 BPM | RESPIRATION RATE: 18 BRPM | OXYGEN SATURATION: 96 % | TEMPERATURE: 98.2 F

## 2018-07-07 DIAGNOSIS — R31.9 HEMATURIA, UNSPECIFIED TYPE: ICD-10-CM

## 2018-07-07 LAB
ALBUMIN UR-MCNC: 30 MG/DL
ANION GAP SERPL CALCULATED.3IONS-SCNC: 7 MMOL/L (ref 3–14)
APPEARANCE UR: ABNORMAL
BASOPHILS # BLD AUTO: 0.1 10E9/L (ref 0–0.2)
BASOPHILS NFR BLD AUTO: 1.1 %
BILIRUB UR QL STRIP: NEGATIVE
BUN SERPL-MCNC: 25 MG/DL (ref 7–30)
CALCIUM SERPL-MCNC: 8.8 MG/DL (ref 8.5–10.1)
CHLORIDE SERPL-SCNC: 104 MMOL/L (ref 94–109)
CO2 SERPL-SCNC: 27 MMOL/L (ref 20–32)
COLOR UR AUTO: ABNORMAL
CREAT SERPL-MCNC: 0.97 MG/DL (ref 0.66–1.25)
DIFFERENTIAL METHOD BLD: ABNORMAL
EOSINOPHIL # BLD AUTO: 0.4 10E9/L (ref 0–0.7)
EOSINOPHIL NFR BLD AUTO: 6.5 %
ERYTHROCYTE [DISTWIDTH] IN BLOOD BY AUTOMATED COUNT: 13.2 % (ref 10–15)
GFR SERPL CREATININE-BSD FRML MDRD: 74 ML/MIN/1.7M2
GLUCOSE SERPL-MCNC: 134 MG/DL (ref 70–99)
GLUCOSE UR STRIP-MCNC: NEGATIVE MG/DL
HCT VFR BLD AUTO: 38.9 % (ref 40–53)
HGB BLD-MCNC: 12.9 G/DL (ref 13.3–17.7)
HGB UR QL STRIP: ABNORMAL
IMM GRANULOCYTES # BLD: 0 10E9/L (ref 0–0.4)
IMM GRANULOCYTES NFR BLD: 0.3 %
KETONES UR STRIP-MCNC: NEGATIVE MG/DL
LEUKOCYTE ESTERASE UR QL STRIP: NEGATIVE
LYMPHOCYTES # BLD AUTO: 1.6 10E9/L (ref 0.8–5.3)
LYMPHOCYTES NFR BLD AUTO: 26 %
MCH RBC QN AUTO: 30.9 PG (ref 26.5–33)
MCHC RBC AUTO-ENTMCNC: 33.2 G/DL (ref 31.5–36.5)
MCV RBC AUTO: 93 FL (ref 78–100)
MONOCYTES # BLD AUTO: 0.6 10E9/L (ref 0–1.3)
MONOCYTES NFR BLD AUTO: 9.4 %
NEUTROPHILS # BLD AUTO: 3.5 10E9/L (ref 1.6–8.3)
NEUTROPHILS NFR BLD AUTO: 56.7 %
NITRATE UR QL: NEGATIVE
NRBC # BLD AUTO: 0 10*3/UL
NRBC BLD AUTO-RTO: 0 /100
PH UR STRIP: 7 PH (ref 5–7)
PLATELET # BLD AUTO: 163 10E9/L (ref 150–450)
POTASSIUM SERPL-SCNC: 3.6 MMOL/L (ref 3.4–5.3)
RBC # BLD AUTO: 4.18 10E12/L (ref 4.4–5.9)
RBC #/AREA URNS AUTO: 29 /HPF (ref 0–2)
SODIUM SERPL-SCNC: 138 MMOL/L (ref 133–144)
SOURCE: ABNORMAL
SP GR UR STRIP: 1 (ref 1–1.03)
UROBILINOGEN UR STRIP-MCNC: 0 MG/DL (ref 0–2)
WBC # BLD AUTO: 6.2 10E9/L (ref 4–11)
WBC #/AREA URNS AUTO: 3 /HPF (ref 0–5)

## 2018-07-07 PROCEDURE — 81001 URINALYSIS AUTO W/SCOPE: CPT | Performed by: EMERGENCY MEDICINE

## 2018-07-07 PROCEDURE — 99284 EMERGENCY DEPT VISIT MOD MDM: CPT | Mod: 25

## 2018-07-07 PROCEDURE — 80048 BASIC METABOLIC PNL TOTAL CA: CPT | Performed by: EMERGENCY MEDICINE

## 2018-07-07 PROCEDURE — 85025 COMPLETE CBC W/AUTO DIFF WBC: CPT | Performed by: EMERGENCY MEDICINE

## 2018-07-07 PROCEDURE — 76770 US EXAM ABDO BACK WALL COMP: CPT

## 2018-07-07 ASSESSMENT — ENCOUNTER SYMPTOMS
ABDOMINAL PAIN: 0
FLANK PAIN: 0
HEMATURIA: 1
VOMITING: 0
NAUSEA: 0

## 2018-07-07 NOTE — ED AVS SNAPSHOT
[facility] Emergency Department    [address]    [facility] [address]    Phone:  [phone]    Fax:  [phone]                                       Nixon More   MRN: [id number]    Department:  [facility] Emergency Department   Date of Visit:  7/7/2018           Patient Information     Date Of Birth          1935        Your diagnoses for this visit were:     Hematuria, unspecified type        You were seen by Rupinder Bolton MD.      Follow-up Information     Schedule an appointment as soon as possible for a visit with [facility].    Contact information:    [address]  [address]  [facility] [address] [phone]        Discharge Instructions       Please follow up closely with your urologist.     Please return to the ED if your symptoms worsen or if you develop new or concerning symptoms.         Blood in the Urine    Blood in the urine (hematuria) has many possible causes. If it occurs after an injury (such as a car accident or fall), it is most often a sign of bruising to the kidney or bladder. Common causes of blood in the urine include urinary tract infections, kidney stones, inflammation, tumors, or certain other diseases of the kidney or bladder. Menstruation can cause blood to appear in the urine sample, although it is not coming from the urinary tract.  If only a trace amount of blood is present, it will show up on the urine test, even though the urine may be yellow and not pink or red. This may occur with any of the above conditions, as well as heavy exercise or high fever. In this case, your doctor may want to repeat the urine test on another day. This will show if the blood is still present. If it is, then other tests can be done to find out the cause.  Home care  Follow these home care guidelines:    If your urine does not appear bloody (pink, brown or red) then you do not need to restrict your activity  in any way.    If you can see blood in your urine, rest and avoid heavy exertion until your next exam. Do not use aspirin, blood thinners, or anti-platelet or anti-inflammatory medicines. These include ibuprofen and naproxen. These thin the blood and may increase bleeding.  Follow-up care  Follow up with your healthcare provider, or as advised. If you were injured and had blood in your urine, you should have a repeat urine test in 1 to 2 days. Contact your doctor for this test.  A radiologist will review any X-rays that were taken. You will be told of any new findings that may affect your care.  When to seek medical advice  Call your healthcare provider right away if any of these occur:    Bright red blood or blood clots in the urine (if you did not have this before)    Weakness, dizziness or fainting    New groin, abdominal, or back pain    Fever of 100.4 F (38 C) or higher, or as directed by your healthcare provider    Repeated vomiting    Bleeding from the nose or gums or easy bruising  Date Last Reviewed: 9/1/2016 2000-2017 [employer]. [address] [place] [address]. All rights reserved. This information is not intended as a substitute for professional medical care. Always follow your healthcare professional's instructions.          Your next 10 appointments already scheduled     Jul 12, 2018  2:30 PM CDT   Return Visit with Mark Pnio MD   [facility] ([facility])    [address]  [address]  [facility] [address]   [phone]            Aug 20, 2018  9:00 AM CDT   Return Prostate Cancer with Mark Pino MD   [facility] ([facility])    [address] [facility]  [address]  [facility] [id number] [phone]              24 Hour Appointment Hotline       To make an appointment at any [facility], call [phone] ([phone]).  If you don't have a family doctor or clinic, we will help you find one. [facility] are conveniently located to serve the needs of you and your family.             Review of your medicines      Our records show that you are taking the medicines listed below. If these are incorrect, please call your family doctor or clinic.        Dose / Directions Last dose taken    aspirin 81 MG tablet   Quantity:  100        1 tab po QD (Once per day)   Refills:  3        atenolol 50 MG tablet   Commonly known as:  TENORMIN   Dose:  50 mg   Quantity:  90 tablet        Take 1 tablet (50 mg) by mouth daily   Refills:  3        celecoxib 100 MG capsule   Commonly known as:  celeBREX   Dose:  100 mg   Quantity:  180 capsule        Take 1 capsule (100 mg) by mouth 2 times daily as needed for moderate pain   Refills:  1        EPINEPHrine 0.3 MG/0.3ML injection 2-pack   Commonly known as:  EPIPEN/ADRENACLICK/or ANY BX GENERIC EQUIV   Dose:  0.3 mg   Quantity:  0.6 mL        Inject 0.3 mLs (0.3 mg) into the muscle as needed for anaphylaxis   Refills:  1        FLUoxetine 10 MG capsule   Commonly known as:  PROzac   Dose:  10 mg   Quantity:  90 capsule        Take 1 capsule (10 mg) by mouth daily   Refills:  3        hydrocortisone 2.5 % cream        Refills:  0        ipratropium 0.06 % spray   Commonly known as:  ATROVENT   Dose:  2 spray   Quantity:  3 Box        Spray 2 sprays in nostril 4 times daily as needed for rhinitis   Refills:  3        leuprolide 45 MG kit   Commonly known as:  ELIGARD   Dose:  45 mg   Quantity:  1 each        Inject 45 mg Subcutaneous every 6 months.   Refills:  12        losartan-hydrochlorothiazide 100-12.5 MG per tablet   Commonly known as:  HYZAAR   Dose:  1 tablet   Quantity:  90 tablet        Take 1 tablet by mouth daily   Refills:  3        PROSTEON PO   Dose:  2000 Units        Take 2,000 Units by mouth With vitamin D   Refills:  0        simvastatin 40 MG tablet   Commonly known as:  ZOCOR    Dose:  40 mg   Quantity:  90 tablet        Take 1 tablet (40 mg) by mouth At Bedtime   Refills:  3                Procedures and tests performed during your visit     Basic metabolic panel (BMP)    CBC + differential    Retroperitoneal US    UA with Microscopic      Orders Needing Specimen Collection     None      Pending Results     No orders found from 7/5/2018 to 7/8/2018.            Pending Culture Results     No orders found from 7/5/2018 to 7/8/2018.            Pending Results Instructions     If you had any lab results that were not finalized at the time of your Discharge, you can call the ED Lab Result RN at [phone]. You will be contacted by this team for any positive Lab results or changes in treatment. The nurses are available 7 days a week from 10A to 6:30P.  You can leave a message 24 hours per day and they will return your call.        Test Results From Your Hospital Stay        7/7/2018  5:40 PM      Component Results     Component Value Ref Range & Units Status    WBC 6.2 4.0 - 11.0 10e9/L Final    RBC Count 4.18 (L) 4.4 - 5.9 10e12/L Final    Hemoglobin 12.9 (L) 13.3 - 17.7 g/dL Final    Hematocrit 38.9 (L) 40.0 - 53.0 % Final    MCV 93 78 - 100 fl Final    MCH 30.9 26.5 - 33.0 pg Final    MCHC 33.2 31.5 - 36.5 g/dL Final    RDW 13.2 10.0 - 15.0 % Final    Platelet Count 163 150 - 450 10e9/L Final    Diff Method Automated Method  Final    % Neutrophils 56.7 % Final    % Lymphocytes 26.0 % Final    % Monocytes 9.4 % Final    % Eosinophils 6.5 % Final    % Basophils 1.1 % Final    % Immature Granulocytes 0.3 % Final    Nucleated RBCs 0 0 /100 Final    Absolute Neutrophil 3.5 1.6 - 8.3 10e9/L Final    Absolute Lymphocytes 1.6 0.8 - 5.3 10e9/L Final    Absolute Monocytes 0.6 0.0 - 1.3 10e9/L Final    Absolute Eosinophils 0.4 0.0 - 0.7 10e9/L Final    Absolute Basophils 0.1 0.0 - 0.2 10e9/L Final    Abs Immature Granulocytes 0.0 0 - 0.4 10e9/L Final    Absolute Nucleated RBC 0.0  Final          7/7/2018  5:55 PM      Component Results     Component Value Ref Range & Units Status    Sodium 138 133 - 144 mmol/L Final    Potassium 3.6 3.4 - 5.3 mmol/L Final    Chloride 104 94 - 109 mmol/L Final    Carbon Dioxide 27 20 - 32 mmol/L Final    Anion Gap 7 3 - 14 mmol/L Final    Glucose 134 (H) 70 - 99 mg/dL Final    Urea Nitrogen 25 7 - 30 mg/dL Final    Creatinine 0.97 0.66 - 1.25 mg/dL Final    GFR Estimate 74 >60 mL/min/1.7m2 Final    Non  GFR Calc    GFR Estimate If Black >90 >60 mL/min/1.7m2 Final    African American GFR Calc    Calcium 8.8 8.5 - 10.1 mg/dL Final         7/7/2018  6:07 PM      Component Results     Component Value Ref Range & Units Status    Color Urine Red  Final    Appearance Urine Cloudy  Final    Glucose Urine Negative NEG^Negative mg/dL Final    Bilirubin Urine Negative NEG^Negative Final    Ketones Urine Negative NEG^Negative mg/dL Final    Specific Gravity Urine 1.002 (L) 1.003 - 1.035 Final    Blood Urine Large (A) NEG^Negative Final    pH Urine 7.0 5.0 - 7.0 pH Final    Protein Albumin Urine 30 (A) NEG^Negative mg/dL Final    Urobilinogen mg/dL 0.0 0.0 - 2.0 mg/dL Final    Nitrite Urine Negative NEG^Negative Final    Leukocyte Esterase Urine Negative NEG^Negative Final    Source Midstream Urine  Final    WBC Urine 3 0 - 5 /HPF Final    RBC Urine 29 (H) 0 - 2 /HPF Final         7/7/2018  6:59 PM      Narrative     RENAL ULTRASOUND   7/7/2018 6:38 PM     HISTORY: Hematuria.    COMPARISON: CT abdomen 10/28/2010.    FINDINGS:    Right Kidney: The kidney is normal in size and cortical thickness.   There is a 5 cm cyst off the lower pole cortex and otherwise no masses  are visible.  There is no hydronephrosis or renal calculus.     Left Kidney: The kidney is normal in size and cortical thickness.   There is a 1.5 cm cyst centrally in the mid left kidney. No solid  masses are seen.  There is no hydronephrosis or renal calculus.     The visualized portions of the urinary  bladder appear normal.        Impression     IMPRESSION:  Bilateral renal cysts, unchanged. No specific etiology of  hematuria is identified.    PATRICK CACERES MD                Clinical Quality Measure: Blood Pressure Screening     Your blood pressure was checked while you were in the emergency department today. The last reading we obtained was  BP: (!) 156/108 . Please read the guidelines below about what these numbers mean and what you should do about them.  If your systolic blood pressure (the top number) is less than 120 and your diastolic blood pressure (the bottom number) is less than 80, then your blood pressure is normal. There is nothing more that you need to do about it.  If your systolic blood pressure (the top number) is 120-139 or your diastolic blood pressure (the bottom number) is 80-89, your blood pressure may be higher than it should be. You should have your blood pressure rechecked within a year by a primary care provider.  If your systolic blood pressure (the top number) is 140 or greater or your diastolic blood pressure (the bottom number) is 90 or greater, you may have high blood pressure. High blood pressure is treatable, but if left untreated over time it can put you at risk for heart attack, stroke, or kidney failure. You should have your blood pressure rechecked by a primary care provider within the next 4 weeks.  If your provider in the emergency department today gave you specific instructions to follow-up with your doctor or provider even sooner than that, you should follow that instruction and not wait for up to 4 weeks for your follow-up visit.        Thank you for choosing [facility]       Thank you for choosing [facility] for your care. Our goal is always to provide you with excellent care. Hearing back from our patients is one way we can continue to improve our services. Please take a few minutes to complete the written survey that you may receive in the mail after you visit with us.  Thank you!        [facility] Information     [facility] gives you secure access to your electronic health record. If you see a primary care provider, you can also send messages to your care team and make appointments. If you have questions, please call your primary care clinic.  If you do not have a primary care provider, please call [phone] and they will assist you.        Care EveryWhere ID     This is your Care EveryWhere ID. This could be used by other organizations to access your [place] medical records  [id number]        Equal Access to Services     ANTONIA BRAVO : Nenita Grey, wapark conner, maribel tangalenio hoyos, anila temple. So [facility] [phone].    ATENCIÓN: Si habla español, tiene a coelho disposición servicios gratuitos de asistencia lingüística. Llame al [phone].    We comply with applicable federal civil rights laws and [place] laws. We do not discriminate on the basis of race, color, national origin, age, disability, sex, sexual orientation, or gender identity.            After Visit Summary       This is your record. Keep this with you and show to your community pharmacist(s) and doctor(s) at your next visit.

## 2018-07-07 NOTE — ED AVS SNAPSHOT
[facility] Emergency Department    [address]    [facility] [address]    Phone:  [phone]    Fax:  [phone]                                       Nixon More   MRN: [id number]    Department:  [facility] Emergency Department   Date of Visit:  7/7/2018           After Visit Summary Signature Page     I have received my discharge instructions, and my questions have been answered. I have discussed any challenges I see with this plan with the nurse or doctor.    ..........................................................................................................................................  Patient/Patient Representative Signature      ..........................................................................................................................................  Patient Representative Print Name and Relationship to Patient    ..................................................               ................................................  Date                                            Time    ..........................................................................................................................................  Reviewed by Signature/Title    ...................................................              ..............................................  Date                                                            Time

## 2018-07-07 NOTE — TELEPHONE ENCOUNTER
Telephone Encounter  Author: Alexandru Bañuelos MD    Date: 8:29 AM; 7/7/2018  Patient Name: Nixon More  MRN: [id number]    Paged by  that Mr. Nixon J Turgeon called requesting to speak with urology on call.  I contacted the patient.  Briefly, this is a patient of Dr. Pino's with a history of prostate cancer s/p radiation therapy now on androgen deprivation therapy for recurrence.  He calls as he has had some hematuria since last weekend.  Last weekend he was seen at urgent care and started on empiric antibiotics (culture ultimately negative).  He has a scheduled visit with Dr. Pino on the 12th.  He calls this AM as he noted a clot per urethra last night.  He notes clear urine all day yesterday.  He is not having any trouble emptying the bladder currently, and denies any feelings of faintness or lightheadedness.  I discussed with the patient that while a phone conversation does not replace a physical exam, his signs and symptoms do not seem emergent.   Offered evaluation in the ED but the patient wishes to manage at home.  Questions solicited & answered.  The patient will plan to see how the morning goes and either again call on call urology or head to the ED if he has worsening hematuria, clots preventing bladder emptying, or begins feeling faint or lightheaded.    Strict return precautions given, including but not limited to: fevers > 101.4, chills, an inability to keep food or fluids down, pain not controlled with oral medications, increasing hematuria, or an inability to urinate.    --    Harman Bañuelos MD   Urology Resident  pgr: [phone]    8:29 AM; 7/7/2018

## 2018-07-07 NOTE — ED PROVIDER NOTES
History     Chief Complaint:  Hematuria     HPI   Nixon More is an 82 year old male, with a history of prostate cancer, who presents with hematuria.  The patient states that last week he had an episode of hematuria.  He was evaluated at an urgent care.  There is concern for possible urinary tract infection, therefore he is discharged home on antibiotics.  The urine culture returned unremarkable, therefore the antibiotics were discontinued.  The patient notes that his urine remained clear until yesterday when he developed intermittent episodes of hematuria.  This morning, the hematuria is more pronounced and he notes passing a couple of clots, therefore he decided to present to the emergency department.  He denies fever, chills, abdominal pain, flank pain, dysuria, or other concerns/complaints at this time.  He does note that he follows with Dr. Pino of urology for prostate cancer.  He notes that he has an appointment scheduled with Dr. Pino for 7/12.     Allergies:  Augmentin   Bactrim   Diltiazem   Lisinopril   Naproxen     Medications:    Aspirin   Tenormin   Zithromax   Celebrex   Benadryl   Epinephrine injection 2-pack   Prozac   Atrovent   Eligard   Hyzaar   Prosteon   Macrobid   Zocor     Past Medical History:    Actinic keratosis   Arthritis   Coronary artery disease   Dyslipidemia   Emphysema of lung   Essential hypertension, benign   Hernia, abdominal   Hypersomnia with sleep apnea  Hypertrophy (benign) of prostate   Lumbago   Prostate cancer   Skin cancer, basal cell     Past Surgical History:    Hernia repair   Moh's procedure for basal cell carcinoma   S/P lumbar laminectomy NOS   Vitrectomy PARS plana remove preretinal membrane     Family History:    Alzheimer Disease   Hypertension   Cardiovascular   Prostate cancer   Lung cancer     Social History:  The patient was accompanied to the ED by wife.  Smoking Status: Former  Smokeless Tobacco: No  Alcohol Use: Yes   Marital Status:       Review of Systems   Gastrointestinal: Negative for abdominal pain, nausea and vomiting.   Genitourinary: Positive for hematuria. Negative for flank pain and penile pain.   All other systems reviewed and are negative.    Physical Exam   Vitals:  Patient Vitals for the past 24 hrs:   BP Temp Temp src Pulse Resp SpO2   07/07/18 1830 (!) 156/108 - - 79 - 95 %   07/07/18 1730 - - - 82 - 96 %   07/07/18 1729 - - - - - 97 %   07/07/18 1728 - - - - - 95 %   07/07/18 1715 - - - - - 97 %   07/07/18 1705 (!) 162/103 98.2  F (36.8  C) Oral 88 18 99 %     Physical Exam  Constitutional: The patient is oriented to person, place, and time. Alert and cooperative.  HENT:   Right Ear: External ear normal.   Left Ear: External ear normal.   Nose: Nose normal.   Mouth/Throat: Moist mucous membranes.   Eyes: Conjunctivae, EOM and lids are normal.  Neck: Trachea normal. Normal range of motion. Neck supple.   Cardiovascular: Normal rate, regular rhythm, normal heart sounds, and intact distal pulses.    Pulmonary/Chest: Effort normal and breath sounds equal bilaterally. No crackles or wheezing.   Abdominal: Soft. No tenderness. No rebound and no guarding. No CVA tenderness.   Musculoskeletal: Normal range of motion.  No extremity tenderness or edema.  Neurological: Alert and Oriented. Moves all extremities equally.  Skin: Skin is dry. No rash noted.        Emergency Department Course   Imaging:  Radiology findings were communicated with the patient who voiced understanding of the findings.  Bilateral renal cysts, unchanged. No specific etiology of  hematuria is identified.  Per Radiologist.     Laboratory:  Laboratory findings were communicated with the patient who voiced understanding of the findings.  UA: Urine Blood: Large (A)  CBC: RBC: 4.18 (L), HGB: 12.9 (L), HCT: 38.9 (L) o/w WNL. (WBC 6.2,  )   BMP: Glucose: 134 (H) o/w WNL (Creatinine 0.97)    Emergency Department Course:  Nursing notes and vitals reviewed.  1702 I had  my initial encounter with the patient.  I performed an exam of the patient as documented above.   IV was inserted and blood was drawn for laboratory testing, results above.  The patient was sent for a US while in the emergency department, results above.   The patient provided a urine sample here in the emergency department. This was sent for laboratory testing, findings above.  1921 I rechecked the patient.     1930 I discussed the treatment plan with the patient. They expressed understanding of this plan and consented to discharge. They will be discharged home with instructions for care and follow up. In addition, the patient will return to the emergency department with any new or worsening symptoms.  All questions were answered.    Impression & Plan      Medical Decision Making:  Nixon More is an 82 year male with a history of prostate cancer who has undergone radiation who presents to the Emergency Department for evaluation of hematuria. Upon presentation in the ED, the patient is nontoxic appearing. He is hypertensive, but vitals are otherwise within normal limits and stable. On exam, he is well appearing. He is alert, oriented, and neurologic exam is non-focal. Cardio-Pulmonary exam is unremarkable. Abdomen is soft and non-tender throughout. He has no CVA tenderness. The rest of his exam is as mentioned above. Labs were obtained and are as mentioned above. Notably, he does not have a leukocytosis. Hemoglobin is near patients' baseline at 12.9. Urinalysis is remarkable for large blood and 29 RBCs. There is no evidence to suggest urinary tract infection. Creatinine is within normal limits. Ultrasound of the kidney's and bladder was obtained and demonstrates bilateral renal cysts which are unchanged. There is no specific etiology of the hematuria identified. There is no evidence of hydronephrosis or renal calculus. There is no significant evidence of clot build-up in the urinary bladder. These results were  discussed with the patient and he expresses understanding. The patient was then discussed with the on call urologist who reviewed the patients' imaging. He notes that as long as the patient is able to void he can be discharged to home with follow up with Dr. Brar for likely Cystoscopy. I discussed this with the patient who expresses understanding with this plan. He has an appointment scheduled with Dr. Brar on 07/12/2018. Given that he does not have abdominal tenderness on the exam, I do not feel that further imaging of the abdomen is indicated at this time. Given that he is well appearing, hemodynamically stable, with a relatively unchanged hemoglobin and is able to void, he will be discharged to home. I discussed my recommendation for close follow-up with Dr. Brar at his scheduled appointment. Return instructions were given. He was stable/improved at the time of discharge.    Diagnosis:    ICD-10-CM    1. Hematuria, unspecified type R31.9      Disposition:   Discharge     Scribe Disclosure:  I, Jaron Puri, am serving as a scribe at 5:41 PM on 7/7/2018 to document services personally performed by Rupinder Bolton MD, based on my observations and the provider's statements to me.  7/7/2018   [facility] EMERGENCY DEPARTMENT       Rupinder Bolton MD  07/08/18 0130

## 2018-07-08 NOTE — DISCHARGE INSTRUCTIONS
Please follow up closely with your urologist.     Please return to the ED if your symptoms worsen or if you develop new or concerning symptoms.         Blood in the Urine    Blood in the urine (hematuria) has many possible causes. If it occurs after an injury (such as a car accident or fall), it is most often a sign of bruising to the kidney or bladder. Common causes of blood in the urine include urinary tract infections, kidney stones, inflammation, tumors, or certain other diseases of the kidney or bladder. Menstruation can cause blood to appear in the urine sample, although it is not coming from the urinary tract.  If only a trace amount of blood is present, it will show up on the urine test, even though the urine may be yellow and not pink or red. This may occur with any of the above conditions, as well as heavy exercise or high fever. In this case, your doctor may want to repeat the urine test on another day. This will show if the blood is still present. If it is, then other tests can be done to find out the cause.  Home care  Follow these home care guidelines:    If your urine does not appear bloody (pink, brown or red) then you do not need to restrict your activity in any way.    If you can see blood in your urine, rest and avoid heavy exertion until your next exam. Do not use aspirin, blood thinners, or anti-platelet or anti-inflammatory medicines. These include ibuprofen and naproxen. These thin the blood and may increase bleeding.  Follow-up care  Follow up with your healthcare provider, or as advised. If you were injured and had blood in your urine, you should have a repeat urine test in 1 to 2 days. Contact your doctor for this test.  A radiologist will review any X-rays that were taken. You will be told of any new findings that may affect your care.  When to seek medical advice  Call your healthcare provider right away if any of these occur:    Bright red blood or blood clots in the urine (if you did  not have this before)    Weakness, dizziness or fainting    New groin, abdominal, or back pain    Fever of 100.4 F (38 C) or higher, or as directed by your healthcare provider    Repeated vomiting    Bleeding from the nose or gums or easy bruising  Date Last Reviewed: 9/1/2016 2000-2017 [employer]. [address] [place] [address]. All rights reserved. This information is not intended as a substitute for professional medical care. Always follow your healthcare professional's instructions.

## 2018-07-10 DIAGNOSIS — R31.9 HEMATURIA: Primary | ICD-10-CM

## 2018-07-12 ENCOUNTER — OFFICE VISIT (OUTPATIENT)
Dept: UROLOGY | Facility: CLINIC | Age: 83
End: 2018-07-12
Payer: COMMERCIAL

## 2018-07-12 VITALS
SYSTOLIC BLOOD PRESSURE: 150 MMHG | HEIGHT: 67 IN | WEIGHT: 222 LBS | HEART RATE: 68 BPM | OXYGEN SATURATION: 96 % | BODY MASS INDEX: 34.84 KG/M2 | DIASTOLIC BLOOD PRESSURE: 94 MMHG

## 2018-07-12 DIAGNOSIS — R31.0 GROSS HEMATURIA: Primary | ICD-10-CM

## 2018-07-12 LAB
ALBUMIN UR-MCNC: NEGATIVE MG/DL
APPEARANCE UR: CLEAR
BILIRUB UR QL STRIP: NEGATIVE
COLOR UR AUTO: YELLOW
GLUCOSE UR STRIP-MCNC: NEGATIVE MG/DL
HGB UR QL STRIP: ABNORMAL
KETONES UR STRIP-MCNC: NEGATIVE MG/DL
LEUKOCYTE ESTERASE UR QL STRIP: NEGATIVE
NITRATE UR QL: NEGATIVE
PH UR STRIP: 7 PH (ref 5–7)
SOURCE: ABNORMAL
SP GR UR STRIP: 1.02 (ref 1–1.03)
UROBILINOGEN UR STRIP-ACNC: 0.2 EU/DL (ref 0.2–1)

## 2018-07-12 PROCEDURE — 81003 URINALYSIS AUTO W/O SCOPE: CPT | Performed by: UROLOGY

## 2018-07-12 PROCEDURE — 99213 OFFICE O/P EST LOW 20 MIN: CPT | Performed by: UROLOGY

## 2018-07-12 ASSESSMENT — PAIN SCALES - GENERAL: PAINLEVEL: NO PAIN (0)

## 2018-07-12 NOTE — NURSING NOTE
Chief Complaint   Patient presents with     Clinic Care Coordination - Follow-up     Pt here to follow-up for hematuria     Lis Boo CMA

## 2018-07-12 NOTE — LETTER
7/12/2018       RE: Nixon More  [address] [place] [address] [place] [address]     Dear Colleague,    Thank you for referring your patient, Nixon More, to the [facility] [place] at [facility]. Please see a copy of my visit note below.    Office Visit Note  [facility]  [phone]    UROLOGIC DIAGNOSES:   History of prostate cancer    CURRENT INTERVENTIONS:   Hormonal therapy, prior radiotherapy    HISTORY:   Spencer returns to clinic today because he had an episode of painless gross hematuria. This actually happened twice to him. He was in the emergency department and his urinalysis showed hematuria but no evidence of infection. He was counseled to stop his aspirin. The hematuria has cleared.He has never seen hematuria prior to this.      PAST MEDICAL HISTORY:   Past Medical History:   Diagnosis Date     Actinic keratosis      Arthritis      CAD (coronary artery disease)     1/5/2011 lateral ischemia on stress echo, 12/2014 normal stress echo     Dyslipidemia      Emphysema of lung (H)      Essential hypertension, benign      Hernia, abdominal      Hypersomnia with sleep apnea, unspecified     on bipap, partially treated with residual apneas     Hypertrophy (benign) of prostate 5/01    Biopsy 5/01 negative for cancer; PSA 5     Lumbago      Prostate cancer (H) 12/15/2006     Skin cancer, basal cell 1997       PAST SURGICAL HISTORY:   Past Surgical History:   Procedure Laterality Date     HERNIA REPAIR  child     Moh's procedure for basal cell carcinoma  01/2001     s/p lumbar laminectomy NOS  1988     VITRECTOMY PARS PLANA REMOVE PRERETINAL MEMBRANE   3/09       FAMILY HISTORY:   Family History   Problem Relation Age of Onset     Alzheimer Disease Mother      Hypertension Mother      Cardiovascular Father      D:86 complications fo CHF     Prostate Cancer Brother      Lung Cancer Brother 76     Prostate Cancer Brother        SOCIAL  "HISTORY:   Social History   Substance Use Topics     Smoking status: Former Smoker     Packs/day: 1.00     Years: 30.00     Types: Cigarettes     Quit date: 1/1/1978     Smokeless tobacco: Never Used     Alcohol use 1.0 oz/week     2 Standard drinks or equivalent per week      Comment: socially       Current Outpatient Prescriptions   Medication     atenolol (TENORMIN) 50 MG tablet     FLUoxetine (PROZAC) 10 MG capsule     ipratropium (ATROVENT) 0.06 % spray     losartan-hydrochlorothiazide (HYZAAR) 100-12.5 MG per tablet     Multiple Minerals-Vitamins (PROSTEON PO)     simvastatin (ZOCOR) 40 MG tablet     ASPIRIN 81 MG OR TABS     celecoxib (CELEBREX) 100 MG capsule     EPINEPHrine (EPIPEN/ADRENACLICK/OR ANY BX GENERIC EQUIV) 0.3 MG/0.3ML injection 2-pack     hydrocortisone 2.5 % cream     leuprolide (ELIGARD) 45 MG injection     SHINGRIX injection     [DISCONTINUED] atenolol-chlorthalidone (TENORETIC 50) 50-25 MG per tablet     No current facility-administered medications for this visit.      Facility-Administered Medications Ordered in Other Visits   Medication     leuprolide (ELIGARD) injection 45 mg         PHYSICAL EXAM:    BP (!) 150/94 (BP Location: Left arm, Patient Position: Sitting, Cuff Size: Adult Regular)  Pulse 68  Ht 1.702 m (5' 7\")  Wt 100.7 kg (222 lb)  SpO2 96%  BMI 34.77 kg/m2    HEENT: Normocephalic and atraumatic   Cardiac: Not done  Back/Flank: Not done  CNS/PNS: Not done  Respiratory: Normal non-labored breathing  Abdomen: Soft nontender and nondistended  Peripheral Vascular: Not done  Mental Status: Not done    Penis: Not done  Scrotal Skin: Not done  Testicles: Not done  Epididymis: Not done  Digital Rectal Exam:     Cystoscopy: Not done    Imaging: None    Urinalysis: UA RESULTS:  Recent Labs   Lab Test  07/12/18   1440  07/07/18   1736   COLOR  Yellow  Red   APPEARANCE  Clear  Cloudy   URINEGLC  Negative  Negative   URINEBILI  Negative  Negative   URINEKETONE  Negative  Negative   SG "  1.020  1.002*   UBLD  Trace*  Large*   URINEPH  7.0  7.0   PROTEIN  Negative  30*   UROBILINOGEN  0.2   --    NITRITE  Negative  Negative   LEUKEST  Negative  Negative   RBCU   --   29*   WBCU   --   3       PSA:     Post Void Residual:     Other labs: None today      IMPRESSION:  Hematuria,now resolved    PLAN:  His urinalysis appears clear today. I recommended that he stay off of the aspirin for now.He had a renal ultrasound performed that was negative. I recommended that he have a cystoscopy. He is coming back to clinic next month already for his prostate cancer follow-up, I will put in orders to have a cystoscopy added on for that visit.    Total Time: 15 minutes                                      Total in Consultation: 15 minutes      Mark Pino M.D.

## 2018-07-12 NOTE — MR AVS SNAPSHOT
After Visit Summary   7/12/2018    Nixon More    MRN: [id number]           Patient Information     Date Of Birth          1935        Visit Information        Provider Department      7/12/2018 2:30 PM Mark Pino MD [facility]        Today's Diagnoses     Gross hematuria    -  1       Follow-ups after your visit        Follow-up notes from your care team     Return for Add a cysto on to his 8/20 appt.      Your next 10 appointments already scheduled     Aug 20, 2018  9:00 AM CDT   Return Prostate Cancer with Mark Pino MD   [facility]    [address]  [address]  [facility] [address] [phone]              Who to contact     If you have questions or need follow up information about today's clinic visit or your schedule please contact [facility]OLOG[facility] directly at [phone].  Normal or non-critical lab and imaging results will be communicated to you by Quellanhart, letter or phone within 4 business days after the clinic has received the results. If you do not hear from us within 7 days, please contact the clinic through PrePayMet or phone. If you have a critical or abnormal lab result, we will notify you by phone as soon as possible.  Submit refill requests through Face++ or call your pharmacy and they will forward the refill request to us. Please allow 3 business days for your refill to be completed.          Additional Information About Your Visit        MyChart Information     [employer] gives you secure access to your electronic health record. If you see a primary care provider, you can also send messages to your care team and make appointments. If you have questions, please call your primary care clinic.  If you do not have a primary care provider, please call [phone] and they will assist you.       "  Care EveryWhere ID     This is your Care EveryWhere ID. This could be used by other organizations to access your [place] medical records  [id number]        Your Vitals Were     Pulse Height Pulse Oximetry BMI (Body Mass Index)          68 1.702 m (5' 7\") 96% 34.77 kg/m2         Blood Pressure from Last 3 Encounters:   07/12/18 (!) 150/94   07/07/18 (!) 145/99   06/30/18 152/88    Weight from Last 3 Encounters:   07/12/18 100.7 kg (222 lb)   06/30/18 103.9 kg (229 lb)   05/30/18 101.6 kg (224 lb)              We Performed the Following     UA without Microscopic        Primary Care Provider Office Phone # Fax #    Natalya Lewis [phone] [address] [place] [address]        Equal Access to Services     [facility]: Hadii aad ku hadasho Soleni, waaxda luqadaha, qaybta kaalmada adeegyada, anila wall . So [facility] [phone].    ATENCIÓN: Si habla español, tiene a coelho disposición servicios gratuitos de asistencia lingüística. Llame al [phone].    We comply with applicable federal civil rights laws and [place] laws. We do not discriminate on the basis of race, color, national origin, age, disability, sex, sexual orientation, or gender identity.            Thank you!     Thank you for choosing [facility] [place]  for your care. Our goal is always to provide you with excellent care. Hearing back from our patients is one way we can continue to improve our services. Please take a few minutes to complete the written survey that you may receive in the mail after your visit with us. Thank you!             Your Updated Medication List - Protect others around you: Learn how to safely use, store and throw away your medicines at www.disposemymeds.org.          This list is accurate as of 7/12/18  3:08 PM.  Always use your most recent med list.                   Brand Name Dispense Instructions for use Diagnosis    " aspirin 81 MG tablet     100    1 tab po QD (Once per day)    Essential hypertension, benign       atenolol 50 MG tablet    TENORMIN    90 tablet    Take 1 tablet (50 mg) by mouth daily    Essential hypertension with goal blood pressure less than 140/90       celecoxib 100 MG capsule    celeBREX    180 capsule    Take 1 capsule (100 mg) by mouth 2 times daily as needed for moderate pain    Arthritis       EPINEPHrine 0.3 MG/0.3ML injection 2-pack    EPIPEN/ADRENACLICK/or ANY BX GENERIC EQUIV    0.6 mL    Inject 0.3 mLs (0.3 mg) into the muscle as needed for anaphylaxis    Bee sting-induced anaphylaxis, undetermined intent, subsequent encounter       FLUoxetine 10 MG capsule    PROzac    90 capsule    Take 1 capsule (10 mg) by mouth daily    Mild major depression (H)       hydrocortisone 2.5 % cream           ipratropium 0.06 % spray    ATROVENT    3 Box    Spray 2 sprays in nostril 4 times daily as needed for rhinitis    Nasal congestion       leuprolide 45 MG kit    ELIGARD    1 each    Inject 45 mg Subcutaneous every 6 months.        losartan-hydrochlorothiazide 100-12.5 MG per tablet    HYZAAR    90 tablet    Take 1 tablet by mouth daily    Essential hypertension with goal blood pressure less than 140/90       PROSTEON PO      Take 2,000 Units by mouth With vitamin D        SHINGRIX injection   Generic drug:  zoster vaccine recombinant adjuvanted           simvastatin 40 MG tablet    ZOCOR    90 tablet    Take 1 tablet (40 mg) by mouth At Bedtime    Dyslipidemia

## 2018-07-12 NOTE — PROGRESS NOTES
Office Visit Note  [facility]  [phone]    UROLOGIC DIAGNOSES:   History of prostate cancer    CURRENT INTERVENTIONS:   Hormonal therapy, prior radiotherapy    HISTORY:   Spencer returns to clinic today because he had an episode of painless gross hematuria. This actually happened twice to him. He was in the emergency department and his urinalysis showed hematuria but no evidence of infection. He was counseled to stop his aspirin. The hematuria has cleared.He has never seen hematuria prior to this.      PAST MEDICAL HISTORY:   Past Medical History:   Diagnosis Date     Actinic keratosis      Arthritis      CAD (coronary artery disease)     1/5/2011 lateral ischemia on stress echo, 12/2014 normal stress echo     Dyslipidemia      Emphysema of lung (H)      Essential hypertension, benign      Hernia, abdominal      Hypersomnia with sleep apnea, unspecified     on bipap, partially treated with residual apneas     Hypertrophy (benign) of prostate 5/01    Biopsy 5/01 negative for cancer; PSA 5     Lumbago      Prostate cancer (H) 12/15/2006     Skin cancer, basal cell 1997       PAST SURGICAL HISTORY:   Past Surgical History:   Procedure Laterality Date     HERNIA REPAIR  child     Moh's procedure for basal cell carcinoma  01/2001     s/p lumbar laminectomy NOS  1988     VITRECTOMY PARS PLANA REMOVE PRERETINAL MEMBRANE   3/09       FAMILY HISTORY:   Family History   Problem Relation Age of Onset     Alzheimer Disease Mother      Hypertension Mother      Cardiovascular Father      D:86 complications fo CHF     Prostate Cancer Brother      Lung Cancer Brother 76     Prostate Cancer Brother        SOCIAL HISTORY:   Social History   Substance Use Topics     Smoking status: Former Smoker     Packs/day: 1.00     Years: 30.00     Types: Cigarettes     Quit date: 1/1/1978     Smokeless tobacco: Never Used     Alcohol use 1.0 oz/week     2 Standard drinks or equivalent per week      Comment: socially       Current  "Outpatient Prescriptions   Medication     atenolol (TENORMIN) 50 MG tablet     FLUoxetine (PROZAC) 10 MG capsule     ipratropium (ATROVENT) 0.06 % spray     losartan-hydrochlorothiazide (HYZAAR) 100-12.5 MG per tablet     Multiple Minerals-Vitamins (PROSTEON PO)     simvastatin (ZOCOR) 40 MG tablet     ASPIRIN 81 MG OR TABS     celecoxib (CELEBREX) 100 MG capsule     EPINEPHrine (EPIPEN/ADRENACLICK/OR ANY BX GENERIC EQUIV) 0.3 MG/0.3ML injection 2-pack     hydrocortisone 2.5 % cream     leuprolide (ELIGARD) 45 MG injection     SHINGRIX injection     [DISCONTINUED] atenolol-chlorthalidone (TENORETIC 50) 50-25 MG per tablet     No current facility-administered medications for this visit.      Facility-Administered Medications Ordered in Other Visits   Medication     leuprolide (ELIGARD) injection 45 mg         PHYSICAL EXAM:    BP (!) 150/94 (BP Location: Left arm, Patient Position: Sitting, Cuff Size: Adult Regular)  Pulse 68  Ht 1.702 m (5' 7\")  Wt 100.7 kg (222 lb)  SpO2 96%  BMI 34.77 kg/m2    HEENT: Normocephalic and atraumatic   Cardiac: Not done  Back/Flank: Not done  CNS/PNS: Not done  Respiratory: Normal non-labored breathing  Abdomen: Soft nontender and nondistended  Peripheral Vascular: Not done  Mental Status: Not done    Penis: Not done  Scrotal Skin: Not done  Testicles: Not done  Epididymis: Not done  Digital Rectal Exam:     Cystoscopy: Not done    Imaging: None    Urinalysis: UA RESULTS:  Recent Labs   Lab Test  07/12/18   1440  07/07/18   1736   COLOR  Yellow  Red   APPEARANCE  Clear  Cloudy   URINEGLC  Negative  Negative   URINEBILI  Negative  Negative   URINEKETONE  Negative  Negative   SG  1.020  1.002*   UBLD  Trace*  Large*   URINEPH  7.0  7.0   PROTEIN  Negative  30*   UROBILINOGEN  0.2   --    NITRITE  Negative  Negative   LEUKEST  Negative  Negative   RBCU   --   29*   WBCU   --   3       PSA:     Post Void Residual:     Other labs: None today      IMPRESSION:  Hematuria,now " resolved    PLAN:  His urinalysis appears clear today. I recommended that he stay off of the aspirin for now.He had a renal ultrasound performed that was negative. I recommended that he have a cystoscopy. He is coming back to clinic next month already for his prostate cancer follow-up, I will put in orders to have a cystoscopy added on for that visit.    Total Time: 15 minutes                                      Total in Consultation: 15 minutes      Mark Pino M.D.

## 2018-07-24 ENCOUNTER — OFFICE VISIT (OUTPATIENT)
Dept: PEDIATRICS | Facility: CLINIC | Age: 83
End: 2018-07-24
Payer: COMMERCIAL

## 2018-07-24 VITALS
HEIGHT: 67 IN | TEMPERATURE: 99.6 F | BODY MASS INDEX: 35.67 KG/M2 | HEART RATE: 68 BPM | DIASTOLIC BLOOD PRESSURE: 80 MMHG | OXYGEN SATURATION: 97 % | WEIGHT: 227.3 LBS | SYSTOLIC BLOOD PRESSURE: 140 MMHG

## 2018-07-24 DIAGNOSIS — M25.511 ACUTE PAIN OF RIGHT SHOULDER: Primary | ICD-10-CM

## 2018-07-24 PROCEDURE — 99213 OFFICE O/P EST LOW 20 MIN: CPT | Performed by: INTERNAL MEDICINE

## 2018-07-24 NOTE — PROGRESS NOTES
"  SUBJECTIVE:   Nixon More is a 82 year old male who presents to clinic today for the following health issues:      Musculoskeletal problem/pain      Duration: x 1.5 week    Description  Location: right shoulder.  Fell about 1.5 weeks ago but landed on left side.  Right shoulder has had intermittent issues for decades.  Rotation is especially irritating.  Not sure what did that triggered this episode of pain.  Has very minimal ROM    Intensity:  moderate    Accompanying signs and symptoms: none    History  Previous similar problem: no   Previous evaluation:  none    Precipitating or alleviating factors:  Trauma or overuse: no   Aggravating factors include: any range of motion    Therapies tried and outcome: Ibuprofen for pain with relief        Problem list and histories reviewed & adjusted, as indicated.  Additional history: as documented      Reviewed and updated as needed this visit by clinical staff  Tobacco  Allergies  Meds  Problems  Med Hx  Surg Hx  Fam Hx  Soc Hx        Reviewed and updated as needed this visit by Provider  Allergies  Meds  Problems             OBJECTIVE:     /80 (BP Location: Right arm, Patient Position: Chair, Cuff Size: Adult Regular)  Pulse 68  Temp 99.6  F (37.6  C) (Tympanic)  Ht 5' 7\" (1.702 m)  Wt 227 lb 4.8 oz (103.1 kg)  SpO2 97%  BMI 35.6 kg/m2  Body mass index is 35.6 kg/(m^2).  GENERAL: healthy, alert and no distress  MS: marked reduction active and passive ROM right shoulder without erythema, warmth.  Mild tenderness noted        ASSESSMENT/PLAN:       1. Acute pain of right shoulder  Discussed likely frozen shoulder.  Given degree of limitation ROM and pain, refer for injection and then begin PT.  - [facility] PT, HAND, AND CHIROPRACTIC REFERRAL  - ORTHO  REFERRAL    See Patient Instructions    Natalya Lewis MD  [facility] [place]  "

## 2018-07-24 NOTE — MR AVS SNAPSHOT
After Visit Summary   7/24/2018    Nixon More    MRN: [id number]           Patient Information     Date Of Birth          1935        Visit Information        Provider Department      7/24/2018 4:30 PM Natalya Lewis MD [facility]        Today's Diagnoses     Acute pain of right shoulder    -  1      Care Instructions    Take ibuprofen 2 pills four times daily with food as needed to help with pain.  They will call you to schedule the cortisone injection and the physical therapy.          Follow-ups after your visit        Additional Services     [facility] PT, HAND, AND CHIROPRACTIC REFERRAL       === This order will print in the [facility] Scheduling  Office ===    Physical therapy, hand therapy and chiropractic care are available through:    [facility]  [facility]    Call one easy number to schedule at any of the above locations:  [phone].    Your provider has referred you to Physical Therapy at [facility] or [facility]    Indication/Reason for Referral: shoulder pain  Onset of Illness:  1 wk  Therapy Orders:  Evaluate and Treat  Special Programs:  None  Special Request:  None    Additional Comments for the therapist or chiropractor:      Please be aware that coverage of these services is subject to the terms and limitations of your health insurance plan.  Call member services at your health plan with any benefit or coverage questions.      Please bring the following to your appointment:    >>   Your personal calendar for scheduling future appointments  >>   Comfortable clothing            ORTHO  REFERRAL       [facility] is referring you to the Orthopedic  Services at [facility].       The  Representative will assist you in the coordination of your Orthopedic and Musculoskeletal Care as prescribed by your physician.    The  Representative will call you  within 1 business day to help schedule your appointment, or you may contact the  Representative at:    All areas ~ [phone]     Type of Referral : Non Surgical - for shoulder injection      Timeframe requested: 3 - 5 days    Coverage of these services is subject to the terms and limitations of your health insurance plan.  Please call member services at your health plan with any benefit or coverage questions.      If X-rays, CT or MRI's have been performed, please contact the facility where they were done to arrange for , prior to your scheduled appointment.  Please bring this referral request to your appointment and present it to your specialist.                  Follow-up notes from your care team     Return in about 3 months (around 10/24/2018) for Physical Exam.      Your next 10 appointments already scheduled     Aug 20, 2018  9:00 AM CDT   Return Prostate Cancer with Mark Pino MD, [facility] ([facility])    [address]  [address]  [facility] [address] [phone]              Who to contact     If you have questions or need follow up information about today's clinic visit or your schedule please contact [facility] directly at [phone].  Normal or non-critical lab and imaging results will be communicated to you by MyChart, letter or phone within 4 business days after the clinic has received the results. If you do not hear from us within 7 days, please contact the clinic through [employer] or phone. If you have a critical or abnormal lab result, we will notify you by phone as soon as possible.  Submit refill requests through "[employer]" or call your pharmacy and they will forward the refill request to us. Please allow 3 business days for your refill to be completed.          Additional Information About Your Visit        "[employer]" Information     "[employer]" gives you secure access to  "your electronic health record. If you see a primary care provider, you can also send messages to your care team and make appointments. If you have questions, please call your primary care clinic.  If you do not have a primary care provider, please call [phone] and they will assist you.        Care EveryWhere ID     This is your Care EveryWhere ID. This could be used by other organizations to access your [place] medical records  [id number]        Your Vitals Were     Pulse Temperature Height Pulse Oximetry BMI (Body Mass Index)       68 99.6  F (37.6  C) (Tympanic) 5' 7\" (1.702 m) 97% 35.6 kg/m2        Blood Pressure from Last 3 Encounters:   07/24/18 140/80   07/12/18 (!) 150/94   07/07/18 (!) 145/99    Weight from Last 3 Encounters:   07/24/18 227 lb 4.8 oz (103.1 kg)   07/12/18 222 lb (100.7 kg)   06/30/18 229 lb (103.9 kg)              We Performed the Following     GAIL PT, HAND, AND CHIROPRACTIC REFERRAL     ORTHO  REFERRAL        Primary Care Provider Office Phone # Fax #    Natalya Lewis [phone]       [facility] [place] [address]        Equal Access to Services     [facility]: Hadii leanne paredeso Sosusannaali, waaxda luqadaha, qaybta kaalmada adeegyada, anila wall . So [facility] [phone].    ATENCIÓN: Si habla español, tiene a coelho disposición servicios gratuitos de asistencia lingüística. Llame al [phone].    We comply with applicable federal civil rights laws and [place] laws. We do not discriminate on the basis of race, color, national origin, age, disability, sex, sexual orientation, or gender identity.            Thank you!     Thank you for choosing [facility]  for your care. Our goal is always to provide you with excellent care. Hearing back from our patients is one way we can continue to improve our services. Please take a few minutes to complete the written survey that you may receive in the mail after " your visit with us. Thank you!             Your Updated Medication List - Protect others around you: Learn how to safely use, store and throw away your medicines at www.disposemymeds.org.          This list is accurate as of 7/24/18  5:24 PM.  Always use your most recent med list.                   Brand Name Dispense Instructions for use Diagnosis    aspirin 81 MG tablet     100    1 tab po QD (Once per day)    Essential hypertension, benign       atenolol 50 MG tablet    TENORMIN    90 tablet    Take 1 tablet (50 mg) by mouth daily    Essential hypertension with goal blood pressure less than 140/90       celecoxib 100 MG capsule    celeBREX    180 capsule    Take 1 capsule (100 mg) by mouth 2 times daily as needed for moderate pain    Arthritis       CENTRUM SILVER PO           EPINEPHrine 0.3 MG/0.3ML injection 2-pack    EPIPEN/ADRENACLICK/or ANY BX GENERIC EQUIV    0.6 mL    Inject 0.3 mLs (0.3 mg) into the muscle as needed for anaphylaxis    Bee sting-induced anaphylaxis, undetermined intent, subsequent encounter       FLUoxetine 10 MG capsule    PROzac    90 capsule    Take 1 capsule (10 mg) by mouth daily    Mild major depression (H)       hydrocortisone 2.5 % cream           ipratropium 0.06 % spray    ATROVENT    3 Box    Spray 2 sprays in nostril 4 times daily as needed for rhinitis    Nasal congestion       leuprolide 45 MG kit    ELIGARD    1 each    Inject 45 mg Subcutaneous every 6 months.        losartan-hydrochlorothiazide 100-12.5 MG per tablet    HYZAAR    90 tablet    Take 1 tablet by mouth daily    Essential hypertension with goal blood pressure less than 140/90       PROSTEON PO      Take 2,000 Units by mouth With vitamin D        SHINGRIX injection   Generic drug:  zoster vaccine recombinant adjuvanted           simvastatin 40 MG tablet    ZOCOR    90 tablet    Take 1 tablet (40 mg) by mouth At Bedtime    Dyslipidemia

## 2018-07-24 NOTE — PATIENT INSTRUCTIONS
Take ibuprofen 2 pills four times daily with food as needed to help with pain.  They will call you to schedule the cortisone injection and the physical therapy.

## 2018-07-27 ENCOUNTER — RADIANT APPOINTMENT (OUTPATIENT)
Dept: GENERAL RADIOLOGY | Facility: CLINIC | Age: 83
End: 2018-07-27
Attending: FAMILY MEDICINE
Payer: COMMERCIAL

## 2018-07-27 ENCOUNTER — OFFICE VISIT (OUTPATIENT)
Dept: ORTHOPEDICS | Facility: CLINIC | Age: 83
End: 2018-07-27
Payer: COMMERCIAL

## 2018-07-27 VITALS
WEIGHT: 227 LBS | HEIGHT: 67 IN | SYSTOLIC BLOOD PRESSURE: 128 MMHG | DIASTOLIC BLOOD PRESSURE: 84 MMHG | BODY MASS INDEX: 35.63 KG/M2

## 2018-07-27 DIAGNOSIS — M25.511 PAIN IN JOINT OF RIGHT SHOULDER: ICD-10-CM

## 2018-07-27 DIAGNOSIS — M25.511 PAIN IN JOINT OF RIGHT SHOULDER: Primary | ICD-10-CM

## 2018-07-27 DIAGNOSIS — M75.41 IMPINGEMENT SYNDROME OF RIGHT SHOULDER: ICD-10-CM

## 2018-07-27 PROCEDURE — 20610 DRAIN/INJ JOINT/BURSA W/O US: CPT | Mod: RT | Performed by: FAMILY MEDICINE

## 2018-07-27 PROCEDURE — 73030 X-RAY EXAM OF SHOULDER: CPT | Mod: RT

## 2018-07-27 PROCEDURE — 99203 OFFICE O/P NEW LOW 30 MIN: CPT | Mod: 25 | Performed by: FAMILY MEDICINE

## 2018-07-27 RX ORDER — METHYLPREDNISOLONE ACETATE 40 MG/ML
40 INJECTION, SUSPENSION INTRA-ARTICULAR; INTRALESIONAL; INTRAMUSCULAR; SOFT TISSUE
Status: DISCONTINUED | OUTPATIENT
Start: 2018-07-27 | End: 2018-10-23

## 2018-07-27 RX ADMIN — METHYLPREDNISOLONE ACETATE 40 MG: 40 INJECTION, SUSPENSION INTRA-ARTICULAR; INTRALESIONAL; INTRAMUSCULAR; SOFT TISSUE at 10:51

## 2018-07-27 NOTE — PROGRESS NOTES
ASSESSMENT & PLAN  Patient Instructions     1. Pain in joint of right shoulder    2. Impingement syndrome of right shoulder      -Patient has exacerbation of chronic impingement syndrome caused by significant heterotopic calcification under the acromion as well as likely supraspinatus tendinopathy and partial tearing.  -Patient tolerated cortisone injection today without complication.  Patient was given postprocedure instructions.  -Patient will start formal physical therapy.  -Patient was encouraged to avoid all oral NSAIDs due to side effects.  -Patient will follow-up in 4 weeks for reevaluation and progression of activity.  -Call direct clinic number [[phone]] at any time with questions or concerns.    Albert Yeo MD [facility] Orthopedics and Sports Medicine  [facility]          -----    SUBJECTIVE  Nixon More is a/an 82 year old Right handed male who is seen in consultation at the request of  Natalya Lewis M.D. for evaluation of right shoulder pain. The patient is seen by themselves.    Onset: 1-2 month(s) ago. Patient describes injury as felt he did something that made it feel odd. Doesn't recall specific action, just recalls the sharp pain. Possibly a movement with rotation of arm while reaching for an objection  Location of Pain: right anterior shoulder  Rating of Pain at worst: 4/10  Rating of Pain Currently: 1/10  Worsened by: reaching over shoulder height, use of ipad at chest level  Better with: resting hand on head reliefs some pain  Treatments tried: rest/activity avoidance, ibuprofen and home exercises  Associated symptoms: no distal numbness or tingling; denies swelling or warmth  Orthopedic history: YES - Date: 1980s right foot fracture, ongoing shoulder issues in the past,   Relevant surgical history: NO  Past Medical History:   Diagnosis Date     Actinic keratosis      Arthritis      CAD (coronary artery disease)     1/5/2011 lateral ischemia on stress echo,  "12/2014 normal stress echo     Dyslipidemia      Emphysema of lung (H)      Essential hypertension, benign      Hernia, abdominal      Hypersomnia with sleep apnea, unspecified     on bipap, partially treated with residual apneas     Hypertrophy (benign) of prostate 5/01    Biopsy 5/01 negative for cancer; PSA 5     Lumbago      Prostate cancer (H) 12/15/2006     Skin cancer, basal cell 1997     Social History     Social History     Marital status:      Spouse name: N/A     Number of children: N/A     Years of education: N/A     Occupational History     retired      Social History Main Topics     Smoking status: Former Smoker     Packs/day: 1.00     Years: 30.00     Types: Cigarettes     Quit date: 1/1/1978     Smokeless tobacco: Never Used     Alcohol use 1.0 oz/week     2 Standard drinks or equivalent per week      Comment: socially     Drug use: No     Sexual activity: Yes     Partners: Female     Other Topics Concern     Caffeine Concern No     0-2 cans of soda per day.     Exercise No     Seat Belt Yes     Parent/Sibling W/ Cabg, Mi Or Angioplasty Before 65f 55m? No     Social History Narrative         Patient's past medical, surgical, social, and family histories were reviewed today and no changes are noted.    REVIEW OF SYSTEMS:  10 point ROS is negative other than symptoms noted above in HPI, Past Medical History or as stated below  Constitutional: NEGATIVE for fever, chills, change in weight  Skin: NEGATIVE for worrisome rashes, moles or lesions  GI/: NEGATIVE for bowel or bladder changes  Neuro: NEGATIVE for weakness, dizziness or paresthesias    OBJECTIVE:  /84 (BP Location: Left arm, Patient Position: Chair, Cuff Size: Adult Large)  Ht 5' 7\" (1.702 m)  Wt 227 lb (103 kg)  BMI 35.55 kg/m2   General: healthy, alert and in no distress  HEENT: no scleral icterus or conjunctival erythema  Skin: no suspicious lesions or rash. No jaundice.  CV: regular rhythm by palpation  Resp: normal " respiratory effort without conversational dyspnea   Psych: normal mood and affect  Gait: normal steady gait with appropriate coordination and balance  Neuro: normal light touch sensory exam of the bilateral upper extremities.    MSK:  RIGHT SHOULDER  Inspection:    no swelling, bruising, discoloration, or obvious deformity or asymmetry  Palpation:    Tender about the proximal biceps tendon and supraspinatus insertion. Remainder of bony and tendinous landmarks are nontender.  Active Range of Motion:     Abduction 1450, FF 1650, , IR SI joint.      Scapular dyskinesis absent  Strength:    Scapular plane abduction 4+/5,  ER 5-/5, IR 5-/5, biceps 5/5, triceps 5/5  Special Tests:    Positive: Neer's, Fernández', supraspinatus (empty can) and drop arm/painful arc    Negative: crossed arm adduction, Blaine's, Speed's and Yergason's      Independent visualization of the below image:  Unofficial review of x-rays taken in office today of the right shoulder show no acute fractures dislocations.  Patient has significant heterotopic ossification of the undersurface of the acromion.  Patient is a type I acromion.    Large Joint Injection/Arthocentesis  Date/Time: 7/27/2018 10:51 AM  Performed by: YEO, ALBERT  Authorized by: YEO, ALBERT     Indications:  Pain  Needle Size:  25 G  Guidance: landmark guided    Approach:  Posterior  Location:  Shoulder  Site:  R subacromial bursa  Medications:  40 mg methylPREDNISolone acetate 40 MG/ML  Outcome:  Tolerated well, no immediate complications  Procedure discussed: discussed risks, benefits, and alternatives    Consent Given by:  Patient  Prep: patient was prepped and draped in usual sterile fashion              Albert Yeo MD [facility]

## 2018-07-27 NOTE — LETTER
7/27/2018         RE: Nixon More  [address]  [place] [address]        Dear Colleague,    Thank you for referring your patient, Nixon More, to the [facility]. Please see a copy of my visit note below.    ASSESSMENT & PLAN  Patient Instructions     1. Pain in joint of right shoulder    2. Impingement syndrome of right shoulder      -Patient has exacerbation of chronic impingement syndrome caused by significant heterotopic calcification under the acromion as well as likely supraspinatus tendinopathy and partial tearing.  -Patient tolerated cortisone injection today without complication.  Patient was given postprocedure instructions.  -Patient will start formal physical therapy.  -Patient was encouraged to avoid all oral NSAIDs due to side effects.  -Patient will follow-up in 4 weeks for reevaluation and progression of activity.  -Call direct clinic number [[phone]] at any time with questions or concerns.    Albert Yeo MD [facility] Orthopedics and Sports Medicine  [facility]          -----    SUBJECTIVE  Nixon More is a/an 82 year old Right handed male who is seen in consultation at the request of  Natalya Lewis M.D. for evaluation of right shoulder pain. The patient is seen by themselves.    Onset: 1-2 month(s) ago. Patient describes injury as felt he did something that made it feel odd. Doesn't recall specific action, just recalls the sharp pain. Possibly a movement with rotation of arm while reaching for an objection  Location of Pain: right anterior shoulder  Rating of Pain at worst: 4/10  Rating of Pain Currently: 1/10  Worsened by: reaching over shoulder height, use of ipad at chest level  Better with: resting hand on head reliefs some pain  Treatments tried: rest/activity avoidance, ibuprofen and home exercises  Associated symptoms: no distal numbness or tingling; denies swelling or warmth  Orthopedic history: YES - Date: 1980s right  "foot fracture, ongoing shoulder issues in the past,   Relevant surgical history: NO  Past Medical History:   Diagnosis Date     Actinic keratosis      Arthritis      CAD (coronary artery disease)     1/5/2011 lateral ischemia on stress echo, 12/2014 normal stress echo     Dyslipidemia      Emphysema of lung (H)      Essential hypertension, benign      Hernia, abdominal      Hypersomnia with sleep apnea, unspecified     on bipap, partially treated with residual apneas     Hypertrophy (benign) of prostate 5/01    Biopsy 5/01 negative for cancer; PSA 5     Lumbago      Prostate cancer (H) 12/15/2006     Skin cancer, basal cell 1997     Social History     Social History     Marital status:      Spouse name: N/A     Number of children: N/A     Years of education: N/A     Occupational History     retired      Social History Main Topics     Smoking status: Former Smoker     Packs/day: 1.00     Years: 30.00     Types: Cigarettes     Quit date: 1/1/1978     Smokeless tobacco: Never Used     Alcohol use 1.0 oz/week     2 Standard drinks or equivalent per week      Comment: socially     Drug use: No     Sexual activity: Yes     Partners: Female     Other Topics Concern     Caffeine Concern No     0-2 cans of soda per day.     Exercise No     Seat Belt Yes     Parent/Sibling W/ Cabg, Mi Or Angioplasty Before 65f 55m? No     Social History Narrative         Patient's past medical, surgical, social, and family histories were reviewed today and no changes are noted.    REVIEW OF SYSTEMS:  10 point ROS is negative other than symptoms noted above in HPI, Past Medical History or as stated below  Constitutional: NEGATIVE for fever, chills, change in weight  Skin: NEGATIVE for worrisome rashes, moles or lesions  GI/: NEGATIVE for bowel or bladder changes  Neuro: NEGATIVE for weakness, dizziness or paresthesias    OBJECTIVE:  /84 (BP Location: Left arm, Patient Position: Chair, Cuff Size: Adult Large)  Ht 5' 7\" (1.702 m) "  Wt 227 lb (103 kg)  BMI 35.55 kg/m2   General: healthy, alert and in no distress  HEENT: no scleral icterus or conjunctival erythema  Skin: no suspicious lesions or rash. No jaundice.  CV: regular rhythm by palpation  Resp: normal respiratory effort without conversational dyspnea   Psych: normal mood and affect  Gait: normal steady gait with appropriate coordination and balance  Neuro: normal light touch sensory exam of the bilateral upper extremities.    MSK:  RIGHT SHOULDER  Inspection:    no swelling, bruising, discoloration, or obvious deformity or asymmetry  Palpation:    Tender about the proximal biceps tendon and supraspinatus insertion. Remainder of bony and tendinous landmarks are nontender.  Active Range of Motion:     Abduction 1450, FF 1650, , IR SI joint.      Scapular dyskinesis absent  Strength:    Scapular plane abduction 4+/5,  ER 5-/5, IR 5-/5, biceps 5/5, triceps 5/5  Special Tests:    Positive: Neer's, Fernández', supraspinatus (empty can) and drop arm/painful arc    Negative: crossed arm adduction, [place]'s, Speed's and Yergason's      Independent visualization of the below image:  Unofficial review of x-rays taken in office today of the right shoulder show no acute fractures dislocations.  Patient has significant heterotopic ossification of the undersurface of the acromion.  Patient is a type I acromion.    Large Joint Injection/Arthocentesis  Date/Time: 7/27/2018 10:51 AM  Performed by: YEO, ALBERT  Authorized by: YEO, ALBERT     Indications:  Pain  Needle Size:  25 G  Guidance: landmark guided    Approach:  Posterior  Location:  Shoulder  Site:  R subacromial bursa  Medications:  40 mg methylPREDNISolone acetate 40 MG/ML  Outcome:  Tolerated well, no immediate complications  Procedure discussed: discussed risks, benefits, and alternatives    Consent Given by:  Patient  Prep: patient was prepped and draped in usual sterile fashion              Albert Yeo MD [facility]  [facility]  [facility] Care      Again, thank you for allowing me to participate in the care of your patient.        Sincerely,        Albert Yeo, MD

## 2018-07-27 NOTE — MR AVS SNAPSHOT
After Visit Summary   7/27/2018    Nixon More    MRN: [id number]           Patient Information     Date Of Birth          1935        Visit Information        Provider Department      7/27/2018 10:00 AM Yeo, Albert, MD [facility]        Today's Diagnoses     Pain in joint of right shoulder    -  1    Impingement syndrome of right shoulder          Care Instructions    1. Pain in joint of right shoulder    2. Impingement syndrome of right shoulder      -Patient has exacerbation of chronic impingement syndrome caused by significant heterotopic calcification under the acromion as well as likely supraspinatus tendinopathy and partial tearing.  -Patient tolerated cortisone injection today without complication.  Patient was given postprocedure instructions.  -Patient will start formal physical therapy.  -Patient was encouraged to avoid all oral NSAIDs due to side effects.  -Patient will follow-up in 4 weeks for reevaluation and progression of activity.  -Call direct clinic number [[phone]] at any time with questions or concerns.    Albert Yeo MD [facility] Medicine  [facility]                Follow-ups after your visit        Your next 10 appointments already scheduled     Aug 03, 2018 10:10 AM CDT   (Arrive by 9:55 AM)   GAIL Extremity with Brooke Soria, PT   [facility] [place] [facility]    [address]  [facility] [address] [phone]            Aug 20, 2018  9:00 AM CDT   Return Prostate Cancer with Mark Pino MD, UB CYF   [facility] [place] ([facility])    [address]  [address]  [facility] [address] [phone]              Who to contact     If you have questions or need follow up information about today's clinic visit or your schedule please contact [facility] directly at [phone].  Normal or non-critical lab and imaging results will be communicated to you by MyChart, letter or phone within 4 business days after the clinic has received the results. If you do not hear from us within 7 days, please contact the clinic through SocialRadarhart or phone. If you have a critical or abnormal lab result, we will notify you by phone as soon as possible.  Submit refill requests through Sendbloom or call your pharmacy and they will forward the refill request to us. Please allow 3 business days for your refill to be completed.          Additional Information About Your Visit        SocialRadarharFNZ Information     Sendbloom gives you secure access to your electronic health record. If you see a primary care provider, you can also send messages to your care team and make appointments. If you have questions, please call your primary care clinic.  If you do not have a primary care provider, please call [phone] and they will assist you.        Care EveryWhere ID     This is your Care EveryWhere ID. This could be used by other organizations to access your [place] medical records  [id number]        Your Vitals Were     Height BMI (Body Mass Index)                5' 7\" (1.702 m) 35.55 kg/m2           Blood Pressure from Last 3 Encounters:   07/27/18 128/84   07/24/18 140/80   07/12/18 (!) 150/94    Weight from Last 3 Encounters:   07/27/18 227 lb (103 kg)   07/24/18 227 lb 4.8 oz (103.1 kg)   07/12/18 222 lb (100.7 kg)              We Performed the Following     Large Joint Injection/Arthocentesis        Primary Care Provider Office Phone # Fax #    Natalya Lewis [phone] [address] [place] [address]        Equal Access to Services     [facility] : Nenita Grey, kirstin conner, anila kohli. So [facility] [place] [facility] [phone].    ATENCIÓN: Si habla español, tiene a coelho disposición " servicios gratuitos de asistencia lingüística. Jaren terrell [phone].    We comply with applicable federal civil rights laws and [place] laws. We do not discriminate on the basis of race, color, national origin, age, disability, sex, sexual orientation, or gender identity.            Thank you!     Thank you for choosing [facility] SP[facility]IC[facility]  for your care. Our goal is always to provide you with excellent care. Hearing back from our patients is one way we can continue to improve our services. Please take a few minutes to complete the written survey that you may receive in the mail after your visit with us. Thank you!             Your Updated Medication List - Protect others around you: Learn how to safely use, store and throw away your medicines at www.disposemymeds.org.          This list is accurate as of 7/27/18 10:56 AM.  Always use your most recent med list.                   Brand Name Dispense Instructions for use Diagnosis    aspirin 81 MG tablet     100    1 tab po QD (Once per day)    Essential hypertension, benign       atenolol 50 MG tablet    TENORMIN    90 tablet    Take 1 tablet (50 mg) by mouth daily    Essential hypertension with goal blood pressure less than 140/90       celecoxib 100 MG capsule    celeBREX    180 capsule    Take 1 capsule (100 mg) by mouth 2 times daily as needed for moderate pain    Arthritis       CENTRUM SILVER PO           EPINEPHrine 0.3 MG/0.3ML injection 2-pack    EPIPEN/ADRENACLICK/or ANY BX GENERIC EQUIV    0.6 mL    Inject 0.3 mLs (0.3 mg) into the muscle as needed for anaphylaxis    Bee sting-induced anaphylaxis, undetermined intent, subsequent encounter       FLUoxetine 10 MG capsule    PROzac    90 capsule    Take 1 capsule (10 mg) by mouth daily    Mild major depression (H)       hydrocortisone 2.5 % cream           ipratropium 0.06 % spray    ATROVENT    3 Box    Spray 2 sprays in nostril 4 times daily as needed for rhinitis    Nasal congestion        leuprolide 45 MG kit    ELIGARD    1 each    Inject 45 mg Subcutaneous every 6 months.        losartan-hydrochlorothiazide 100-12.5 MG per tablet    HYZAAR    90 tablet    Take 1 tablet by mouth daily    Essential hypertension with goal blood pressure less than 140/90       PROSTEON PO      Take 2,000 Units by mouth With vitamin D        simvastatin 40 MG tablet    ZOCOR    90 tablet    Take 1 tablet (40 mg) by mouth At Bedtime    Dyslipidemia

## 2018-07-27 NOTE — PATIENT INSTRUCTIONS
1. Pain in joint of right shoulder    2. Impingement syndrome of right shoulder      -Patient has exacerbation of chronic impingement syndrome caused by significant heterotopic calcification under the acromion as well as likely supraspinatus tendinopathy and partial tearing.  -Patient tolerated cortisone injection today without complication.  Patient was given postprocedure instructions.  -Patient will start formal physical therapy.  -Patient was encouraged to avoid all oral NSAIDs due to side effects.  -Patient will follow-up in 4 weeks for reevaluation and progression of activity.  -Call direct clinic number [[phone]] at any time with questions or concerns.    Albert Yeo MD [facility]  [facility] [place]

## 2018-07-31 ENCOUNTER — TRANSFERRED RECORDS (OUTPATIENT)
Dept: HEALTH INFORMATION MANAGEMENT | Facility: CLINIC | Age: 83
End: 2018-07-31

## 2018-08-03 ENCOUNTER — THERAPY VISIT (OUTPATIENT)
Dept: PHYSICAL THERAPY | Facility: CLINIC | Age: 83
End: 2018-08-03
Payer: COMMERCIAL

## 2018-08-03 DIAGNOSIS — M25.511 ACUTE PAIN OF RIGHT SHOULDER: Primary | ICD-10-CM

## 2018-08-03 PROCEDURE — 97161 PT EVAL LOW COMPLEX 20 MIN: CPT | Mod: GP | Performed by: PHYSICAL THERAPIST

## 2018-08-03 PROCEDURE — G8984 CARRY CURRENT STATUS: HCPCS | Mod: GP | Performed by: PHYSICAL THERAPIST

## 2018-08-03 PROCEDURE — 97110 THERAPEUTIC EXERCISES: CPT | Mod: GP | Performed by: PHYSICAL THERAPIST

## 2018-08-03 PROCEDURE — G8985 CARRY GOAL STATUS: HCPCS | Mod: GP | Performed by: PHYSICAL THERAPIST

## 2018-08-03 NOTE — MR AVS SNAPSHOT
After Visit Summary   8/3/2018    Nixon More    MRN: [id number]           Patient Information     Date Of Birth          1935        Visit Information        Provider Department      8/3/2018 10:10 AM Brooke Soria PT [facility]        Today's Diagnoses     Acute pain of right shoulder    -  1       Follow-ups after your visit        Your next 10 appointments already scheduled     Aug 10, 2018 10:10 AM CDT   GAIL Extremity with Brooke Soria PT   [facility] (GAIL [place])    [address]  [facility] [id number] [phone]            Aug 20, 2018  9:00 AM CDT   Return Prostate Cancer with Mark Pino MD, UB CYF   [facility] [place] ([facility] [place])    [address]  [address]  [facility] [address] [phone]              Who to contact     If you have questions or need follow up information about today's clinic visit or your schedule please contact [place] [facility] directly at [phone].  Normal or non-critical lab and imaging results will be communicated to you by MyChart, letter or phone within 4 business days after the clinic has received the results. If you do not hear from us within 7 days, please contact the clinic through Canvitahart or phone. If you have a critical or abnormal lab result, we will notify you by phone as soon as possible.  Submit refill requests through "[employer] or call your pharmacy and they will forward the refill request to us. Please allow 3 business days for your refill to be completed.          Additional Information About Your Visit        MyChart Information     "[employer] gives you secure access to your electronic health record. If you see a primary care provider, you can also send messages to your care team and make  appointments. If you have questions, please call your primary care clinic.  If you do not have a primary care provider, please call [phone] and they will assist you.        Care EveryWhere ID     This is your Care EveryWhere ID. This could be used by other organizations to access your [place] medical records  [id number]         Blood Pressure from Last 3 Encounters:   07/27/18 128/84   07/24/18 140/80   07/12/18 (!) 150/94    Weight from Last 3 Encounters:   07/27/18 103 kg (227 lb)   07/24/18 103.1 kg (227 lb 4.8 oz)   07/12/18 100.7 kg (222 lb)              We Performed the Following     HC PT EVAL, LOW COMPLEXITY     GAIL INITIAL EVAL REPORT     THERAPEUTIC EXERCISES        Primary Care Provider Office Phone # Fax #    Natalya Lewis [phone] [address] [place] [address]        Equal Access to Services     [facility] : Hadii leanne paredeso Que, waaxda luqadaha, qaybta kaalmada romain, anila wall . So [facility] [phone].    ATENCIÓN: Si markel george, tiene a coelho disposición servicios gratuitos de asistencia lingüística. Llame al [phone].    We comply with applicable federal civil rights laws and [place] laws. We do not discriminate on the basis of race, color, national origin, age, disability, sex, sexual orientation, or gender identity.            Thank you!     Thank you for choosing [facility]  for your care. Our goal is always to provide you with excellent care. Hearing back from our patients is one way we can continue to improve our services. Please take a few minutes to complete the written survey that you may receive in the mail after your visit with us. Thank you!             Your Updated Medication List - Protect others around you: Learn how to safely use, store and throw away your medicines at www.disposemymeds.org.          This list is accurate as of 8/3/18  11:31 AM.  Always use your most recent med list.                   Brand Name Dispense Instructions for use Diagnosis    aspirin 81 MG tablet     100    1 tab po QD (Once per day)    Essential hypertension, benign       atenolol 50 MG tablet    TENORMIN    90 tablet    Take 1 tablet (50 mg) by mouth daily    Essential hypertension with goal blood pressure less than 140/90       celecoxib 100 MG capsule    celeBREX    180 capsule    Take 1 capsule (100 mg) by mouth 2 times daily as needed for moderate pain    Arthritis       CENTRUM SILVER PO           EPINEPHrine 0.3 MG/0.3ML injection 2-pack    EPIPEN/ADRENACLICK/or ANY BX GENERIC EQUIV    0.6 mL    Inject 0.3 mLs (0.3 mg) into the muscle as needed for anaphylaxis    Bee sting-induced anaphylaxis, undetermined intent, subsequent encounter       FLUoxetine 10 MG capsule    PROzac    90 capsule    Take 1 capsule (10 mg) by mouth daily    Mild major depression (H)       hydrocortisone 2.5 % cream           ipratropium 0.06 % spray    ATROVENT    3 Box    Spray 2 sprays in nostril 4 times daily as needed for rhinitis    Nasal congestion       leuprolide 45 MG kit    ELIGARD    1 each    Inject 45 mg Subcutaneous every 6 months.        losartan-hydrochlorothiazide 100-12.5 MG per tablet    HYZAAR    90 tablet    Take 1 tablet by mouth daily    Essential hypertension with goal blood pressure less than 140/90       PROSTEON PO      Take 2,000 Units by mouth With vitamin D        simvastatin 40 MG tablet    ZOCOR    90 tablet    Take 1 tablet (40 mg) by mouth At Bedtime    Dyslipidemia

## 2018-08-03 NOTE — PROGRESS NOTES
[facility] Initial Evaluation  Subjective:  Patient is a 82 year old male presenting with rehab right shoulder hpi. The history is provided by the patient. No  was used.   Nixon More is a 82 year old male with a right shoulder condition.  Condition occurred with:  Repetition/overuse.  Condition occurred: for unknown reasons.  This is a new condition  Patient reports long standing intermittent right shoulder pain going back to throwing and playing baseball in high school. In July 2018 he was reaching overhead and rotated his arm with the onset of sharp right shoulder pain. An x ray showed: No acute bony abnormality. Moderate degenerative change at the acromioclavicular joint. Distal acromial morphology is type II or  III and humeral acromial distance appears adequate.  He had a cortisone injection on 7/27/18 which gave him nearly 100% pain relief. Patient is right handed. Chief complaints are of intermittent right lateral shoulder pain and mild stiffness with reaching into back pocket. .    Patient reports pain:  Lateral.  Radiates to:  Upper arm.  Pain is described as aching and is intermittent and reported as 1/10 (since cortisone injection 7/27/18).  Associated symptoms:  Loss of motion/stiffness. Pain is the same all the time.  Symptoms are exacerbated by certain positions, using arm behind back and lifting and relieved by rest, NSAID's and other (cotisone injection).  Since onset symptoms are rapidly improving.  Special tests:  X-ray.      General health as reported by patient is good.  Pertinent medical history includes:  Cancer, depression, diabetes, emphysema, high blood pressure, osteoarthritis, overweight and sleep disorder/apnea.  Medical allergies: yes (sulfa, penicillin).  Other surgeries include:  Other (hernia repair).  Current medications:  Anti-depressants, meds to increase bone density, high blood pressure medication and hormone replacement therapy.   Current occupation is retired.    Primary job tasks include:  Driving, lifting and prolonged sitting.        Red flags:  Foot drop and changes in bowel/bladder.                        Objective:  Standing Alignment:    Cervical/Thoracic:  Forward head  Shoulder/UE:  Rounded shoulders, protracted scapula L, protracted scapula R and elevated scapula R                                       Shoulder Evaluation:  ROM:  AROM:    Flexion:  Left:  140    Right:  140  Extension: Left: 65Right: 55  Abduction:  Left: 155   Right:  150    Internal Rotation:  Left:  80    Right:  70  External Rotation:  Left:  80    Right:  80          Flexion/External Rotation:  Left:  WNL    Right:  WNL  Extension/Internal Rotation:  Left:  To T8    Right:  To waist          Strength:    Flexion: Left:5/5   Pain:    Right: 4/5      Pain:  ++  Extension:  Left: 4/5    Pain:    Right: 4-/5    Pain:  Abduction:  Left: 5/5  Pain:    Right: 4/5      Pain:++  Adduction:  Left: 5/5    Pain:      Internal Rotation:  Left:5/5     Pain:    Right: 5/5     Pain:  External Rotation:   Left:5/5     Pain:   Right:5/5      Pain:+            Stability Testing:  normal      Special Tests:      Right shoulder positive for the following special tests:Impingement  Right shoulder negative for the following special tests:Bursal and Rotator cuff tear  Palpation:  normal      Mobility Tests:    Glenohumeral anterior left:  Hypomobile  Glenohumeral anterior right:  Hypomobile  Glenohumeral posterior left:  Hypomobile  Glenohumeral posterior right:  Hypomobile  Glenohumeral inferior left:  Hypomobile  Glenohumeral inferior right:  Hypomobile                                             General     ROS    Assessment/Plan:    Patient is a 82 year old male with right side shoulder complaints.    Patient has the following significant findings with corresponding treatment plan.                Diagnosis 1:  Right shoulder pain    Pain -  hot/cold therapy and  education  Decreased ROM/flexibility - therapeutic exercise  Decreased strength - therapeutic exercise and therapeutic activities  Impaired muscle performance - neuro re-education  Decreased function - therapeutic activities  Impaired posture - neuro re-education    Therapy Evaluation Codes:   1) History comprised of:   Personal factors that impact the plan of care:      None.    Comorbidity factors that impact the plan of care are:      Diabetes, Depression, Emphysema, High blood pressure, Osteoarthritis, Overweight and Sleep disorder/apnea.     Medications impacting care: Anti-depressant, Bone density and High blood pressure.  2) Examination of Body Systems comprised of:   Body structures and functions that impact the plan of care:      Shoulder.   Activity limitations that impact the plan of care are:      Driving, Dressing and Lifting.  3) Clinical presentation characteristics are:   Stable/Uncomplicated.  4) Decision-Making    Low complexity using standardized patient assessment instrument and/or measureable assessment of functional outcome.  Cumulative Therapy Evaluation is: Low complexity.    Previous and current functional limitations:  (See Goal Flow Sheet for this information)    Short term and Long term goals: (See Goal Flow Sheet for this information)     Communication ability:  Patient appears to be able to clearly communicate and understand verbal and written communication and follow directions correctly.  Treatment Explanation - The following has been discussed with the patient:   RX ordered/plan of care  Anticipated outcomes  Possible risks and side effects  This patient would benefit from PT intervention to resume normal activities.   Rehab potential is good.    Frequency:  1 X week, once daily  Duration:  for 6 weeks  Discharge Plan:  Achieve all LTG.  Independent in home treatment program.  Reach maximal therapeutic benefit.    Please refer to the daily flowsheet for treatment today, total  treatment time and time spent performing 1:1 timed codes.

## 2018-08-10 ENCOUNTER — THERAPY VISIT (OUTPATIENT)
Dept: PHYSICAL THERAPY | Facility: CLINIC | Age: 83
End: 2018-08-10
Payer: COMMERCIAL

## 2018-08-10 DIAGNOSIS — M25.511 ACUTE PAIN OF RIGHT SHOULDER: ICD-10-CM

## 2018-08-10 PROCEDURE — 97112 NEUROMUSCULAR REEDUCATION: CPT | Mod: GP | Performed by: PHYSICAL THERAPIST

## 2018-08-10 PROCEDURE — 97110 THERAPEUTIC EXERCISES: CPT | Mod: GP | Performed by: PHYSICAL THERAPIST

## 2018-08-10 NOTE — PROGRESS NOTES
Subjective:  HPI                    Objective:  System    Physical Exam    General     ROS    Assessment/Plan:    SUBJECTIVE  Subjective changes as noted by pt:   Limited home exercise due to vacation. Minimal R shoulder pain.    Current pain level:  (0/10 at rest, 1/10 intermittently)   Changes in function:  Yes (See Goal flowsheet attached for changes in current functional level)     Adverse reaction to treatment or activity:  None    OBJECTIVE  Changes in objective findings:  Yes, Right shoulder extension actively 65 deg, reach up back to 2 inch above waist. Fair scap control with extension and ER and poor with scaption. Improved reps tolerated with PREs.      ASSESSMENT  Nixon continues to require intervention to meet STG and LTG's: PT  Patient's symptoms are resolving.  Patient is ready to progress to more complex exercises.  Response to therapy has shown an improvement in  ROM , muscle control, posture and function  Progress made towards STG/LTG?  Yes (See Goal flowsheet attached for updates on achievement of STG and LTG)    PLAN  Current treatment program is being advanced to more complex exercises.    PTA/ATC plan:  N/A    Please refer to the daily flowsheet for treatment today, total treatment time and time spent performing 1:1 timed codes.

## 2018-08-10 NOTE — MR AVS SNAPSHOT
After Visit Summary   8/10/2018    Nixon More    MRN: [id number]           Patient Information     Date Of Birth          1935        Visit Information        Provider Department      8/10/2018 10:10 AM Brooke Soria, PT [facility] Physical Therapy        Today's Diagnoses     Acute pain of right shoulder           Follow-ups after your visit        Your next 10 appointments already scheduled     Aug 20, 2018  9:00 AM CDT   Return Prostate Cancer with Mark Pino MD, UB CYF   [facility] [place] ([facility] [place])    [address]  [address]  [facility] [address] [phone]            Aug 21, 2018  9:10 AM CDT   GAIL Extremity with Jose Ventura PT   [facility]    [address]  [facility] [address] [phone]              Who to contact     If you have questions or need follow up information about today's clinic visit or your schedule please contact [facility] PHYSICAL THERAPY directly at [phone].  Normal or non-critical lab and imaging results will be communicated to you by MyChart, letter or phone within 4 business days after the clinic has received the results. If you do not hear from us within 7 days, please contact the clinic through [employer] or phone. If you have a critical or abnormal lab result, we will notify you by phone as soon as possible.  Submit refill requests through [email or web address] or call your pharmacy and they will forward the refill request to us. Please allow 3 business days for your refill to be completed.          Additional Information About Your Visit        MyChart Information     [email or web address] gives you secure access to your electronic health record. If you see a primary care provider, you can also send messages to your care team and make  appointments. If you have questions, please call your primary care clinic.  If you do not have a primary care provider, please call [phone] and they will assist you.        Care EveryWhere ID     This is your Care EveryWhere ID. This could be used by other organizations to access your [facility] medical records  [id number]         Blood Pressure from Last 3 Encounters:   07/27/18 128/84   07/24/18 140/80   07/12/18 (!) 150/94    Weight from Last 3 Encounters:   07/27/18 103 kg (227 lb)   07/24/18 103.1 kg (227 lb 4.8 oz)   07/12/18 100.7 kg (222 lb)              We Performed the Following     NEUROMUSCULAR RE-EDUCATION     THERAPEUTIC EXERCISES        Primary Care Provider Office Phone # Fax #    Natalya Lewis [phone] [address] [place] [address]        Equal Access to Services     [facility]: Hadii leanne alvarado Soleni, waaxda luqadaha, qaybta kaalmada romain, anila wall . So [facility] [phone].    ATENCIÓN: Si habla español, tiene a coelho disposición servicios gratuitos de asistencia lingüística. Jaren al [phone].    We comply with applicable federal civil rights laws and [place] laws. We do not discriminate on the basis of race, color, national origin, age, disability, sex, sexual orientation, or gender identity.            Thank you!     Thank you for choosing [facility] PHYSICAL THERAP[facility]  for your care. Our goal is always to provide you with excellent care. Hearing back from our patients is one way we can continue to improve our services. Please take a few minutes to complete the written survey that you may receive in the mail after your visit with us. Thank you!             Your Updated Medication List - Protect others around you: Learn how to safely use, store and throw away your medicines at www.disposemymeds.org.          This list is accurate as of 8/10/18 11:00 AM.  Always use your  most recent med list.                   Brand Name Dispense Instructions for use Diagnosis    aspirin 81 MG tablet     100    1 tab po QD (Once per day)    Essential hypertension, benign       atenolol 50 MG tablet    TENORMIN    90 tablet    Take 1 tablet (50 mg) by mouth daily    Essential hypertension with goal blood pressure less than 140/90       celecoxib 100 MG capsule    celeBREX    180 capsule    Take 1 capsule (100 mg) by mouth 2 times daily as needed for moderate pain    Arthritis       CENTRUM SILVER PO           EPINEPHrine 0.3 MG/0.3ML injection 2-pack    EPIPEN/ADRENACLICK/or ANY BX GENERIC EQUIV    0.6 mL    Inject 0.3 mLs (0.3 mg) into the muscle as needed for anaphylaxis    Bee sting-induced anaphylaxis, undetermined intent, subsequent encounter       FLUoxetine 10 MG capsule    PROzac    90 capsule    Take 1 capsule (10 mg) by mouth daily    Mild major depression (H)       hydrocortisone 2.5 % cream           ipratropium 0.06 % spray    ATROVENT    3 Box    Spray 2 sprays in nostril 4 times daily as needed for rhinitis    Nasal congestion       leuprolide 45 MG kit    ELIGARD    1 each    Inject 45 mg Subcutaneous every 6 months.        losartan-hydrochlorothiazide 100-12.5 MG per tablet    HYZAAR    90 tablet    Take 1 tablet by mouth daily    Essential hypertension with goal blood pressure less than 140/90       PROSTEON PO      Take 2,000 Units by mouth With vitamin D        simvastatin 40 MG tablet    ZOCOR    90 tablet    Take 1 tablet (40 mg) by mouth At Bedtime    Dyslipidemia

## 2018-08-16 DIAGNOSIS — C61 MALIGNANT NEOPLASM OF PROSTATE (H): ICD-10-CM

## 2018-08-16 PROCEDURE — 36415 COLL VENOUS BLD VENIPUNCTURE: CPT | Performed by: UROLOGY

## 2018-08-16 PROCEDURE — 84153 ASSAY OF PSA TOTAL: CPT | Performed by: UROLOGY

## 2018-08-17 LAB — PSA SERPL-MCNC: <0.01 UG/L (ref 0–4)

## 2018-08-20 ENCOUNTER — OFFICE VISIT (OUTPATIENT)
Dept: UROLOGY | Facility: CLINIC | Age: 83
End: 2018-08-20
Payer: COMMERCIAL

## 2018-08-20 VITALS — BODY MASS INDEX: 35.63 KG/M2 | OXYGEN SATURATION: 96 % | HEART RATE: 82 BPM | WEIGHT: 227 LBS | HEIGHT: 67 IN

## 2018-08-20 DIAGNOSIS — R31.0 GROSS HEMATURIA: Primary | ICD-10-CM

## 2018-08-20 DIAGNOSIS — Z85.46 PERSONAL HISTORY OF MALIGNANT NEOPLASM OF PROSTATE: ICD-10-CM

## 2018-08-20 DIAGNOSIS — C61 MALIGNANT NEOPLASM OF PROSTATE (H): ICD-10-CM

## 2018-08-20 PROCEDURE — 96402 CHEMO HORMON ANTINEOPL SQ/IM: CPT | Performed by: UROLOGY

## 2018-08-20 PROCEDURE — 52000 CYSTOURETHROSCOPY: CPT | Performed by: UROLOGY

## 2018-08-20 RX ORDER — CIPROFLOXACIN 500 MG/1
500 TABLET, FILM COATED ORAL ONCE
Qty: 1 TABLET | Refills: 0 | Status: SHIPPED | OUTPATIENT
Start: 2018-08-20 | End: 2019-02-05

## 2018-08-20 ASSESSMENT — PAIN SCALES - GENERAL: PAINLEVEL: NO PAIN (0)

## 2018-08-20 NOTE — MR AVS SNAPSHOT
"              After Visit Summary   8/20/2018    Nixon More    MRN: [id number]           Patient Information     Date Of Birth          1935        Visit Information        Provider Department      8/20/2018 9:00 AM Mark Pino MD; UB CYF [facility] [place]        Today's Diagnoses     Gross hematuria    -  1    Personal history of malignant neoplasm of prostate          Care Instructions         AFTER YOUR CYSTOSCOPY         You have just completed a cystoscopy, or \"cysto\", which allowed your physician to learn more about your bladder (or to remove a stent placed after surgery). We suggest that you continue to avoid caffeine, fruit juice, and alcohol for the next 24 hours, however, you are encouraged to return to your normal activities.       A few things that are considered normal after your cystoscopy:    * small amount of bleeding (or spotting) that clears within the next 24 hours    * slight burning sensation with urination    * sensation to of needing to avoid more frequently    * the feeling of \"air\" in your urine    * mild discomfort that is relieved with Tylonol        Please contact our office promptly if you:    * develop a fever above 101 degrees    * are unable to urinate    * develop bright red blood that does not stop    * severe pain or swelling        And of course, please contact our office with any concerns or questions [phone]                Follow-ups after your visit        Follow-up notes from your care team     Return in about 6 months (around 2/20/2019) for PSA and Eligard injection.      Your next 10 appointments already scheduled     Aug 21, 2018  9:10 AM CDT   GAIL Extremity with Jose Ventura, PT   [facility] ([facility]    [address]  [facility] [address]   [phone]            Feb 11, 2019  9:00 AM CST   Cystoscopy with Mark Earl " "MD Alvarez, UB CYF   [facility] [place] ([facility] [place])    [address]  [address]  [facility] [address] [phone]              Future tests that were ordered for you today     Open Future Orders        Priority Expected Expires Ordered    PSA tumor marker Routine  8/20/2019 8/20/2018            Who to contact     If you have questions or need follow up information about today's clinic visit or your schedule please contact [facility] [place] directly at [phone].  Normal or non-critical lab and imaging results will be communicated to you by [employer], letter or phone within 4 business days after the clinic has received the results. If you do not hear from us within 7 days, please contact the clinic through [employer] or phone. If you have a critical or abnormal lab result, we will notify you by phone as soon as possible.  Submit refill requests through [employer] or call your pharmacy and they will forward the refill request to us. Please allow 3 business days for your refill to be completed.          Additional Information About Your Visit        [employer] Information     [employer] gives you secure access to your electronic health record. If you see a primary care provider, you can also send messages to your care team and make appointments. If you have questions, please call your primary care clinic.  If you do not have a primary care provider, please call [phone] and they will assist you.        Care EveryWhere ID     This is your Care EveryWhere ID. This could be used by other organizations to access your [facility] medical records  [id number]        Your Vitals Were     Pulse Height Pulse Oximetry BMI (Body Mass Index)          82 1.702 m (5' 7\") 96% 35.55 kg/m2         Blood Pressure from Last 3 Encounters:   07/27/18 128/84   07/24/18 140/80   07/12/18 (!) 150/94    Weight from Last 3 Encounters: "   08/20/18 103 kg (227 lb)   07/27/18 103 kg (227 lb)   07/24/18 103.1 kg (227 lb 4.8 oz)                 Today's Medication Changes          These changes are accurate as of 8/20/18  9:50 AM.  If you have any questions, ask your nurse or doctor.               Start taking these medicines.        Dose/Directions    ciprofloxacin 500 MG tablet   Commonly known as:  CIPRO   Used for:  Gross hematuria   Started by:  Mark Pino MD        Dose:  500 mg   Take 1 tablet (500 mg) by mouth once for 1 dose   Quantity:  1 tablet   Refills:  0            Where to get your medicines      These medications were sent to [facility] [address] [address]     Phone:  [phone]     ciprofloxacin 500 MG tablet                Primary Care Provider Office Phone # Fax #    Natalya Lewis [phone] [address] [place] [address]        Equal Access to Services     [facility] AH: Hadii aad ku hadasho Soomaali, waaxda luqadaha, qaybta kaalmada adeegyada, waxay idiin hayaan josefina wall . So [facility] [phone].    ATENCIÓN: Si habla español, tiene a coelho disposición servicios gratuitos de asistencia lingüística. [facility] [phone].    We comply with applicable federal civil rights laws and [place] laws. We do not discriminate on the basis of race, color, national origin, age, disability, sex, sexual orientation, or gender identity.            Thank you!     Thank you for choosing [facility] [place]  for your care. Our goal is always to provide you with excellent care. Hearing back from our patients is one way we can continue to improve our services. Please take a few minutes to complete the written survey that you may receive in the mail after your visit with us. Thank you!             Your Updated Medication List - Protect others around you: Learn how to safely use,  store and throw away your medicines at www.disposemymeds.org.          This list is accurate as of 8/20/18  9:50 AM.  Always use your most recent med list.                   Brand Name Dispense Instructions for use Diagnosis    aspirin 81 MG tablet     100    1 tab po QD (Once per day)    Essential hypertension, benign       atenolol 50 MG tablet    TENORMIN    90 tablet    Take 1 tablet (50 mg) by mouth daily    Essential hypertension with goal blood pressure less than 140/90       celecoxib 100 MG capsule    celeBREX    180 capsule    Take 1 capsule (100 mg) by mouth 2 times daily as needed for moderate pain    Arthritis       CENTRUM SILVER PO           ciprofloxacin 500 MG tablet    CIPRO    1 tablet    Take 1 tablet (500 mg) by mouth once for 1 dose    Gross hematuria       EPINEPHrine 0.3 MG/0.3ML injection 2-pack    EPIPEN/ADRENACLICK/or ANY BX GENERIC EQUIV    0.6 mL    Inject 0.3 mLs (0.3 mg) into the muscle as needed for anaphylaxis    Bee sting-induced anaphylaxis, undetermined intent, subsequent encounter       FLUoxetine 10 MG capsule    PROzac    90 capsule    Take 1 capsule (10 mg) by mouth daily    Mild major depression (H)       hydrocortisone 2.5 % cream           ipratropium 0.06 % spray    ATROVENT    3 Box    Spray 2 sprays in nostril 4 times daily as needed for rhinitis    Nasal congestion       leuprolide 45 MG kit    ELIGARD    1 each    Inject 45 mg Subcutaneous every 6 months.        losartan-hydrochlorothiazide 100-12.5 MG per tablet    HYZAAR    90 tablet    Take 1 tablet by mouth daily    Essential hypertension with goal blood pressure less than 140/90       PROSTEON PO      Take 2,000 Units by mouth With vitamin D        simvastatin 40 MG tablet    ZOCOR    90 tablet    Take 1 tablet (40 mg) by mouth At Bedtime    Dyslipidemia

## 2018-08-20 NOTE — PATIENT INSTRUCTIONS
"     AFTER YOUR CYSTOSCOPY         You have just completed a cystoscopy, or \"cysto\", which allowed your physician to learn more about your bladder (or to remove a stent placed after surgery). We suggest that you continue to avoid caffeine, fruit juice, and alcohol for the next 24 hours, however, you are encouraged to return to your normal activities.       A few things that are considered normal after your cystoscopy:    * small amount of bleeding (or spotting) that clears within the next 24 hours    * slight burning sensation with urination    * sensation to of needing to avoid more frequently    * the feeling of \"air\" in your urine    * mild discomfort that is relieved with Tylonol        Please contact our office promptly if you:    * develop a fever above 101 degrees    * are unable to urinate    * develop bright red blood that does not stop    * severe pain or swelling        And of course, please contact our office with any concerns or questions [phone]        "

## 2018-08-20 NOTE — LETTER
8/20/2018       RE: Nixon More  [address] [place] [address] [address]  [place] [address]     Dear Colleague,    Thank you for referring your patient, Nixon More, to the [facility] [place] at [facility]. Please see a copy of my visit note below.    Office Visit Note  [facility]  [phone]    UROLOGIC DIAGNOSES:   Prostate cancer and hematuria    CURRENT INTERVENTIONS:   Hormonal therapy, prior radiotherapy    HISTORY:   Spencer returns to clinic today for prostate cancer follow-up and we are also planning on performing a cystoscopy today in order to complete his hematuria workup. His PSA remains undetectable. He has seen no further hematuria since his last visit with me.      PAST MEDICAL HISTORY:   Past Medical History:   Diagnosis Date     Actinic keratosis      Arthritis      CAD (coronary artery disease)     1/5/2011 lateral ischemia on stress echo, 12/2014 normal stress echo     Dyslipidemia      Emphysema of lung (H)      Essential hypertension, benign      Hernia, abdominal      Hypersomnia with sleep apnea, unspecified     on bipap, partially treated with residual apneas     Hypertrophy (benign) of prostate 5/01    Biopsy 5/01 negative for cancer; PSA 5     Lumbago      Prostate cancer (H) 12/15/2006     Skin cancer, basal cell 1997       PAST SURGICAL HISTORY:   Past Surgical History:   Procedure Laterality Date     HERNIA REPAIR  child     Moh's procedure for basal cell carcinoma  01/2001     s/p lumbar laminectomy NOS  1988     VITRECTOMY PARS PLANA REMOVE PRERETINAL MEMBRANE   3/09       FAMILY HISTORY:   Family History   Problem Relation Age of Onset     Alzheimer Disease Mother      Hypertension Mother      Cardiovascular Father      D:86 complications fo CHF     Prostate Cancer Brother      Lung Cancer Brother 76     Prostate Cancer Brother        SOCIAL HISTORY:   Social History   Substance Use Topics     Smoking  status: Former Smoker     Packs/day: 1.00     Years: 30.00     Types: Cigarettes     Quit date: 1/1/1978     Smokeless tobacco: Never Used     Alcohol use 1.0 oz/week     2 Standard drinks or equivalent per week      Comment: socially       Current Outpatient Prescriptions   Medication     ASPIRIN 81 MG OR TABS     atenolol (TENORMIN) 50 MG tablet     celecoxib (CELEBREX) 100 MG capsule     EPINEPHrine (EPIPEN/ADRENACLICK/OR ANY BX GENERIC EQUIV) 0.3 MG/0.3ML injection 2-pack     FLUoxetine (PROZAC) 10 MG capsule     hydrocortisone 2.5 % cream     ipratropium (ATROVENT) 0.06 % spray     leuprolide (ELIGARD) 45 MG injection     losartan-hydrochlorothiazide (HYZAAR) 100-12.5 MG per tablet     Multiple Minerals-Vitamins (PROSTEON PO)     Multiple Vitamins-Minerals (CENTRUM SILVER PO)     simvastatin (ZOCOR) 40 MG tablet     [DISCONTINUED] atenolol-chlorthalidone (TENORETIC 50) 50-25 MG per tablet     Current Facility-Administered Medications   Medication     methylPREDNISolone acetate (DEPO-MEDROL) injection 40 mg     Facility-Administered Medications Ordered in Other Visits   Medication     leuprolide (ELIGARD) injection 45 mg         PHYSICAL EXAM:    There were no vitals taken for this visit.    HEENT: Normocephalic and atraumatic   Cardiac: Not done  Back/Flank: Not done  CNS/PNS: Not done  Respiratory: Normal non-labored breathing  Abdomen: Soft nontender and nondistended  Peripheral Vascular: Not done  Mental Status: Not done    Penis: He had a slightly small urethral meatus that required dilation prior to cystoscopy  Scrotal skin: Normal, no lesions  Testicles: Normal to palpation bilaterally  Epididymis: Normal to palpation bilaterally  Lymphatic: Normal inguinal lymph nodes  Digital Rectal Exam:     Cystoscopy: I used sounds to gently dilate open the urethral meatus and then inserted the cystoscope. The bladder showed some radiation changes with prominent capillaries in the bladder neck and the area of the  prostatic urethra but otherwise cystoscopy was normal throughout. No tumors identified throughout.    Imaging: None    Urinalysis: UA RESULTS:  Recent Labs   Lab Test  07/12/18   1440  07/07/18   1736   COLOR  Yellow  Red   APPEARANCE  Clear  Cloudy   URINEGLC  Negative  Negative   URINEBILI  Negative  Negative   URINEKETONE  Negative  Negative   SG  1.020  1.002*   UBLD  Trace*  Large*   URINEPH  7.0  7.0   PROTEIN  Negative  30*   UROBILINOGEN  0.2   --    NITRITE  Negative  Negative   LEUKEST  Negative  Negative   RBCU   --   29*   WBCU   --   3       PSA: Undetectable    Post Void Residual:     Other labs: None today    IMPRESSION:  Gross hematuria, prostate cancer    PLAN:  We discussed his cystoscopy results. He has radiation changes to the bladder but no tumors identified. The hematuria was likely due to his prior radiotherapy for prostate cancer. We discussed his prostate cancer treatment as well. He is doing well with an undetectable PSA. He received another 6 month injection of Eligard today and I will see him back in 6 months for another PSA and Eligard.    Total Time: 20 min                                      Total in Consultation: 15 min    Again, thank you for allowing me to participate in the care of your patient.      Sincerely,    Mark Pino MD

## 2018-08-20 NOTE — NURSING NOTE
The following medication was given:     MEDICATION: Eligard 45 mg  ROUTE: SQ  SITE: LLQ - Abd.  DOSE: 45mg  LOT #: [id number]  :  alicja  EXPIRATION DATE:  04/2020  NDC#: 27824-114-07  KAMRON Ambrocio CMA    Prior to the start of the procedure and with procedural staff participation, I verbally confirmed the patient s identity using two indicators, relevant allergies, that the procedure was appropriate and matched the consent or emergent situation, and that the correct equipment/implants were available. Immediately prior to starting the procedure I conducted the Time Out with the procedural staff and re-confirmed the patient s name, procedure, and site/side. (The Joint Commission universal protocol was followed.)  Yes    Sedation (Moderate or Deep): None  Pt has signed the consent form stating that we will be doing a CYSTOSCOPY (with or without stent removal) today, and that it is the correct procedure. I verbally confirmed the patient s identity using two indicators, relevant allergies, and that the correct equipment was available. Post-op information given to the pt as needed at check-out. I have sent an appropriate antibiotic to the pharmacy in our building as recommended by the MD. KAMRON Ambrocio CMA

## 2018-08-20 NOTE — PROGRESS NOTES
Office Visit Note  [facility]  [phone]    UROLOGIC DIAGNOSES:   Prostate cancer and hematuria    CURRENT INTERVENTIONS:   Hormonal therapy, prior radiotherapy    HISTORY:   Spencer returns to clinic today for prostate cancer follow-up and we are also planning on performing a cystoscopy today in order to complete his hematuria workup. His PSA remains undetectable. He has seen no further hematuria since his last visit with me.      PAST MEDICAL HISTORY:   Past Medical History:   Diagnosis Date     Actinic keratosis      Arthritis      CAD (coronary artery disease)     1/5/2011 lateral ischemia on stress echo, 12/2014 normal stress echo     Dyslipidemia      Emphysema of lung (H)      Essential hypertension, benign      Hernia, abdominal      Hypersomnia with sleep apnea, unspecified     on bipap, partially treated with residual apneas     Hypertrophy (benign) of prostate 5/01    Biopsy 5/01 negative for cancer; PSA 5     Lumbago      Prostate cancer (H) 12/15/2006     Skin cancer, basal cell 1997       PAST SURGICAL HISTORY:   Past Surgical History:   Procedure Laterality Date     HERNIA REPAIR  child     Moh's procedure for basal cell carcinoma  01/2001     s/p lumbar laminectomy NOS  1988     VITRECTOMY PARS PLANA REMOVE PRERETINAL MEMBRANE   3/09       FAMILY HISTORY:   Family History   Problem Relation Age of Onset     Alzheimer Disease Mother      Hypertension Mother      Cardiovascular Father      D:86 complications fo CHF     Prostate Cancer Brother      Lung Cancer Brother 76     Prostate Cancer Brother        SOCIAL HISTORY:   Social History   Substance Use Topics     Smoking status: Former Smoker     Packs/day: 1.00     Years: 30.00     Types: Cigarettes     Quit date: 1/1/1978     Smokeless tobacco: Never Used     Alcohol use 1.0 oz/week     2 Standard drinks or equivalent per week      Comment: socially       Current Outpatient Prescriptions   Medication     ASPIRIN 81 MG OR TABS      atenolol (TENORMIN) 50 MG tablet     celecoxib (CELEBREX) 100 MG capsule     EPINEPHrine (EPIPEN/ADRENACLICK/OR ANY BX GENERIC EQUIV) 0.3 MG/0.3ML injection 2-pack     FLUoxetine (PROZAC) 10 MG capsule     hydrocortisone 2.5 % cream     ipratropium (ATROVENT) 0.06 % spray     leuprolide (ELIGARD) 45 MG injection     losartan-hydrochlorothiazide (HYZAAR) 100-12.5 MG per tablet     Multiple Minerals-Vitamins (PROSTEON PO)     Multiple Vitamins-Minerals (CENTRUM SILVER PO)     simvastatin (ZOCOR) 40 MG tablet     [DISCONTINUED] atenolol-chlorthalidone (TENORETIC 50) 50-25 MG per tablet     Current Facility-Administered Medications   Medication     methylPREDNISolone acetate (DEPO-MEDROL) injection 40 mg     Facility-Administered Medications Ordered in Other Visits   Medication     leuprolide (ELIGARD) injection 45 mg         PHYSICAL EXAM:    There were no vitals taken for this visit.    HEENT: Normocephalic and atraumatic   Cardiac: Not done  Back/Flank: Not done  CNS/PNS: Not done  Respiratory: Normal non-labored breathing  Abdomen: Soft nontender and nondistended  Peripheral Vascular: Not done  Mental Status: Not done    Penis: He had a slightly small urethral meatus that required dilation prior to cystoscopy  Scrotal skin: Normal, no lesions  Testicles: Normal to palpation bilaterally  Epididymis: Normal to palpation bilaterally  Lymphatic: Normal inguinal lymph nodes  Digital Rectal Exam:     Cystoscopy: I used sounds to gently dilate open the urethral meatus and then inserted the cystoscope. The bladder showed some radiation changes with prominent capillaries in the bladder neck and the area of the prostatic urethra but otherwise cystoscopy was normal throughout. No tumors identified throughout.    Imaging: None    Urinalysis: UA RESULTS:  Recent Labs   Lab Test  07/12/18   1440  07/07/18   1736   COLOR  Yellow  Red   APPEARANCE  Clear  Cloudy   URINEGLC  Negative  Negative   URINEBILI  Negative  Negative    URINEKETONE  Negative  Negative   SG  1.020  1.002*   UBLD  Trace*  Large*   URINEPH  7.0  7.0   PROTEIN  Negative  30*   UROBILINOGEN  0.2   --    NITRITE  Negative  Negative   LEUKEST  Negative  Negative   RBCU   --   29*   WBCU   --   3       PSA: Undetectable    Post Void Residual:     Other labs: None today      IMPRESSION:  Gross hematuria, prostate cancer    PLAN:  We discussed his cystoscopy results. He has radiation changes to the bladder but no tumors identified. The hematuria was likely due to his prior radiotherapy for prostate cancer. We discussed his prostate cancer treatment as well. He is doing well with an undetectable PSA. He received another 6 month injection of Eligard today and I will see him back in 6 months for another PSA and Eligard.    Total Time: 20 min                                      Total in Consultation: 15 min      Mark Pino M.D.

## 2018-08-21 ENCOUNTER — THERAPY VISIT (OUTPATIENT)
Dept: PHYSICAL THERAPY | Facility: CLINIC | Age: 83
End: 2018-08-21
Payer: COMMERCIAL

## 2018-08-21 DIAGNOSIS — M25.511 ACUTE PAIN OF RIGHT SHOULDER: ICD-10-CM

## 2018-08-21 PROCEDURE — 97110 THERAPEUTIC EXERCISES: CPT | Mod: GP | Performed by: PHYSICAL THERAPIST

## 2018-09-11 ENCOUNTER — THERAPY VISIT (OUTPATIENT)
Dept: PHYSICAL THERAPY | Facility: CLINIC | Age: 83
End: 2018-09-11
Payer: COMMERCIAL

## 2018-09-11 DIAGNOSIS — M25.511 ACUTE PAIN OF RIGHT SHOULDER: ICD-10-CM

## 2018-09-11 PROCEDURE — 97110 THERAPEUTIC EXERCISES: CPT | Mod: GP | Performed by: PHYSICAL THERAPIST

## 2018-09-11 NOTE — PROGRESS NOTES
Subjective:  HPI                    Objective:  System    Physical Exam    General     ROS    Assessment/Plan:    DISCHARGE REPORT    Progress reporting period is from eval to 9/11/18.       SUBJECTIVE  Subjective changes noted by patient:  .  Subjective: Fair to poor compliance with HEP.  Continued slow progress. Functioning at 85+% of where he wants to be.    Current pain level is  Current Pain level: 0/10.     Previous pain level was   Initial Pain level:  (0-1/10).   Changes in function:  Yes (See Goal flowsheet attached for changes in current functional level)  Adverse reaction to treatment or activity: None    OBJECTIVE  Changes noted in objective findings:  Yes,   Objective: AROM WNL.  Strength 4+/5 for all mvts.     ASSESSMENT/PLAN  Updated problem list and treatment plan: Diagnosis 1:  R shoulder pain  Decreased strength - therapeutic exercise and therapeutic activities  Impaired muscle performance - neuro re-education  Decreased function - therapeutic activities  STG/LTGs have been met or progress has been made towards goals:  Yes (See Goal flow sheet completed today.)  Assessment of Progress: The patient's condition is improving.  Self Management Plans:  Patient has been instructed in a home treatment program.  Patient is independent in a home treatment program.  I have re-evaluated this patient and find that the nature, scope, duration and intensity of the therapy is appropriate for the medical condition of the patient.      Recommendations:  This patient is ready to be discharged from therapy and continue their home treatment program.    Please refer to the daily flowsheet for treatment today, total treatment time and time spent performing 1:1 timed codes.

## 2018-09-11 NOTE — MR AVS SNAPSHOT
After Visit Summary   9/11/2018    Nixon More    MRN: [id number]           Patient Information     Date Of Birth          1935        Visit Information        Provider Department      9/11/2018 9:40 AM Jose Ventura, PT [facility] Physical Therapy        Today's Diagnoses     Acute pain of right shoulder           Follow-ups after your visit        Your next 10 appointments already scheduled     Feb 11, 2019  9:00 AM CST   Cystoscopy with Mark Pino MD, UB CYF   [facility] [place] ([facility])    [address]  [address]  [facility] [address] [phone]              Who to contact     If you have questions or need follow up information about today's clinic visit or your schedule please contact [facility] directly at [phone].  Normal or non-critical lab and imaging results will be communicated to you by MyChart, letter or phone within 4 business days after the clinic has received the results. If you do not hear from us within 7 days, please contact the clinic through [employer] or phone. If you have a critical or abnormal lab result, we will notify you by phone as soon as possible.  Submit refill requests through [employer] or call your pharmacy and they will forward the refill request to us. Please allow 3 business days for your refill to be completed.          Additional Information About Your Visit        MyChart Information     [employer] gives you secure access to your electronic health record. If you see a primary care provider, you can also send messages to your care team and make appointments. If you have questions, please call your primary care clinic.  If you do not have a primary care provider, please call [phone] and they will assist you.        Care EveryWhere ID     This is your Care EveryWhere ID. This  could be used by other organizations to access your [place] medical records  [id number]         Blood Pressure from Last 3 Encounters:   07/27/18 128/84   07/24/18 140/80   07/12/18 (!) 150/94    Weight from Last 3 Encounters:   08/20/18 103 kg (227 lb)   07/27/18 103 kg (227 lb)   07/24/18 103.1 kg (227 lb 4.8 oz)              We Performed the Following     GAIL PROGRESS NOTES REPORT     THERAPEUTIC EXERCISES        Primary Care Provider Office Phone # Fax #    Natalya Lewis [phone] [address] [place] [address]        Equal Access to Services     [facility]: Hadii leanne prakash hadasho Soleni, waaxda luqadaha, qaybta kaalmada adealysayada, anila wall . So [facility] [phone].    ATENCIÓN: Si habla español, tiene a coelho disposición servicios gratuitos de asistencia lingüística. Llame al [phone].    We comply with applicable federal civil rights laws and [place] laws. We do not discriminate on the basis of race, color, national origin, age, disability, sex, sexual orientation, or gender identity.            Thank you!     Thank you for choosing [facility]  for your care. Our goal is always to provide you with excellent care. Hearing back from our patients is one way we can continue to improve our services. Please take a few minutes to complete the written survey that you may receive in the mail after your visit with us. Thank you!             Your Updated Medication List - Protect others around you: Learn how to safely use, store and throw away your medicines at www.disposemymeds.org.          This list is accurate as of 9/11/18 10:18 AM.  Always use your most recent med list.                   Brand Name Dispense Instructions for use Diagnosis    aspirin 81 MG tablet     100    1 tab po QD (Once per day)    Essential hypertension, benign       atenolol 50 MG tablet    TENORMIN    90 tablet     Take 1 tablet (50 mg) by mouth daily    Essential hypertension with goal blood pressure less than 140/90       celecoxib 100 MG capsule    celeBREX    180 capsule    Take 1 capsule (100 mg) by mouth 2 times daily as needed for moderate pain    Arthritis       CENTRUM SILVER PO           EPINEPHrine 0.3 MG/0.3ML injection 2-pack    EPIPEN/ADRENACLICK/or ANY BX GENERIC EQUIV    0.6 mL    Inject 0.3 mLs (0.3 mg) into the muscle as needed for anaphylaxis    Bee sting-induced anaphylaxis, undetermined intent, subsequent encounter       FLUoxetine 10 MG capsule    PROzac    90 capsule    Take 1 capsule (10 mg) by mouth daily    Mild major depression (H)       hydrocortisone 2.5 % cream           ipratropium 0.06 % spray    ATROVENT    3 Box    Spray 2 sprays in nostril 4 times daily as needed for rhinitis    Nasal congestion       leuprolide 45 MG kit    ELIGARD    1 each    Inject 45 mg Subcutaneous every 6 months.        losartan-hydrochlorothiazide 100-12.5 MG per tablet    HYZAAR    90 tablet    Take 1 tablet by mouth daily    Essential hypertension with goal blood pressure less than 140/90       PROSTEON PO      Take 2,000 Units by mouth With vitamin D        simvastatin 40 MG tablet    ZOCOR    90 tablet    Take 1 tablet (40 mg) by mouth At Bedtime    Dyslipidemia

## 2018-09-21 ENCOUNTER — ALLIED HEALTH/NURSE VISIT (OUTPATIENT)
Dept: NURSING | Facility: CLINIC | Age: 83
End: 2018-09-21
Payer: COMMERCIAL

## 2018-09-21 DIAGNOSIS — Z23 NEED FOR PROPHYLACTIC VACCINATION AND INOCULATION AGAINST INFLUENZA: Primary | ICD-10-CM

## 2018-09-21 PROCEDURE — 90662 IIV NO PRSV INCREASED AG IM: CPT

## 2018-09-21 PROCEDURE — G0008 ADMIN INFLUENZA VIRUS VAC: HCPCS

## 2018-10-15 ENCOUNTER — MYC MEDICAL ADVICE (OUTPATIENT)
Dept: PEDIATRICS | Facility: CLINIC | Age: 83
End: 2018-10-15

## 2018-10-15 DIAGNOSIS — Z13.6 CARDIOVASCULAR SCREENING; LDL GOAL LESS THAN 100: Primary | ICD-10-CM

## 2018-10-17 ENCOUNTER — MYC MEDICAL ADVICE (OUTPATIENT)
Dept: PEDIATRICS | Facility: CLINIC | Age: 83
End: 2018-10-17

## 2018-10-17 ENCOUNTER — HOSPITAL ENCOUNTER (OUTPATIENT)
Dept: LAB | Facility: CLINIC | Age: 83
Discharge: HOME | End: 2018-10-17
Attending: INTERNAL MEDICINE | Admitting: INTERNAL MEDICINE
Payer: COMMERCIAL

## 2018-10-17 DIAGNOSIS — Z13.6 CARDIOVASCULAR SCREENING; LDL GOAL LESS THAN 100: ICD-10-CM

## 2018-10-17 DIAGNOSIS — R73.01 IMPAIRED FASTING GLUCOSE: Primary | ICD-10-CM

## 2018-10-17 DIAGNOSIS — R73.01 IMPAIRED FASTING GLUCOSE: ICD-10-CM

## 2018-10-17 LAB
CHOLEST SERPL-MCNC: 124 MG/DL
HDLC SERPL-MCNC: 41 MG/DL
LDLC SERPL CALC-MCNC: 60 MG/DL
NONHDLC SERPL-MCNC: 83 MG/DL
TRIGL SERPL-MCNC: 117 MG/DL

## 2018-10-17 PROCEDURE — 80048 BASIC METABOLIC PNL TOTAL CA: CPT | Performed by: INTERNAL MEDICINE

## 2018-10-17 PROCEDURE — 36415 COLL VENOUS BLD VENIPUNCTURE: CPT | Performed by: INTERNAL MEDICINE

## 2018-10-17 PROCEDURE — 80061 LIPID PANEL: CPT | Performed by: INTERNAL MEDICINE

## 2018-10-18 LAB
ANION GAP SERPL CALCULATED.3IONS-SCNC: 7 MMOL/L (ref 3–14)
BUN SERPL-MCNC: 20 MG/DL (ref 7–30)
CALCIUM SERPL-MCNC: 8.9 MG/DL (ref 8.5–10.1)
CHLORIDE SERPL-SCNC: 107 MMOL/L (ref 94–109)
CO2 SERPL-SCNC: 29 MMOL/L (ref 20–32)
CREAT SERPL-MCNC: 1.03 MG/DL (ref 0.66–1.25)
GFR SERPL CREATININE-BSD FRML MDRD: 69 ML/MIN/1.7M2
GLUCOSE SERPL-MCNC: 100 MG/DL (ref 70–99)
POTASSIUM SERPL-SCNC: 4.2 MMOL/L (ref 3.4–5.3)
SODIUM SERPL-SCNC: 143 MMOL/L (ref 133–144)

## 2018-10-21 DIAGNOSIS — F32.0 MILD MAJOR DEPRESSION (H): ICD-10-CM

## 2018-10-21 DIAGNOSIS — E78.5 DYSLIPIDEMIA: ICD-10-CM

## 2018-10-21 RX ORDER — SIMVASTATIN 40 MG
TABLET ORAL
Qty: 90 TABLET | Refills: 3 | Status: CANCELLED | OUTPATIENT
Start: 2018-10-21

## 2018-10-21 RX ORDER — FLUOXETINE 10 MG/1
CAPSULE ORAL
Qty: 90 CAPSULE | Refills: 3 | Status: CANCELLED | OUTPATIENT
Start: 2018-10-21

## 2018-10-21 NOTE — TELEPHONE ENCOUNTER
"Requested Prescriptions   Pending Prescriptions Disp Refills     simvastatin (ZOCOR) 40 MG tablet [Pharmacy Med Name: SIMVASTATIN 40MG    TAB]  Last Written Prescription Date:  10/24/2017  Last Fill Quantity: 90 tablet,  # refills: 3   Last office visit: 7/24/2018 with prescribing provider:  Natalya Lewis MD    Future Office Visit:   Next 5 appointments (look out 90 days)     Oct 23, 2018  3:30 PM CDT   [facility] Natalya Lewis MD   [facility]    [address]  [address]  [place] [address] [phone]                  90 tablet 3     Sig: TAKE ONE TABLET BY MOUTH AT BEDTIME    Statins Protocol Passed    10/21/2018  5:47 PM       Passed - LDL on file in past 12 months    Recent Labs   Lab Test  10/17/18   0955   LDL  60            Passed - No abnormal creatine kinase in past 12 months    No lab results found.            Passed - Recent (12 mo) or future (30 days) visit within the authorizing provider's specialty    Patient had office visit in the last 12 months or has a visit in the next 30 days with authorizing provider or within the authorizing provider's specialty.  See \"Patient Info\" tab in inbasket, or \"Choose Columns\" in Meds & Orders section of the refill encounter.             Passed - Patient is age 18 or older        FLUoxetine (PROZAC) 10 MG capsule [Pharmacy Med Name: FLUOXETINE 10MG     CAP]  Last Written Prescription Date:  10/24/2017  Last Fill Quantity: 90 capsule,  # refills: 3   Last office visit: 7/24/2018 with prescribing provider:  Natalya Lewis MD    Future Office Visit:   Next 5 appointments (look out 90 days)     Oct 23, 2018  3:30 PM CDT   [facility]   [facility]    [address]  [address]  [place] [address] [phone]                  90 capsule 3     Sig: TAKE ONE CAPSULE BY MOUTH  DAILY    SSRIs Protocol Failed    " "10/21/2018  5:47 PM       Failed - PHQ-9 score less than 5 in past 6 months    Please review last PHQ-9 score.   PHQ-9 SCORE 9/27/2016 8/18/2017 4/6/2018   Total Score - - -   Total Score 8 8 4     JAMSHID-7 SCORE 9/27/2016 4/6/2018   Total Score 1 0                Passed - Patient is age 18 or older       Passed - Recent (6 mo) or future (30 days) visit within the authorizing provider's specialty    Patient had office visit in the last 6 months or has a visit in the next 30 days with authorizing provider or within the authorizing provider's specialty.  See \"Patient Info\" tab in inbasket, or \"Choose Columns\" in Meds & Orders section of the refill encounter.              "

## 2018-10-23 ENCOUNTER — OFFICE VISIT (OUTPATIENT)
Dept: PEDIATRICS | Facility: CLINIC | Age: 83
End: 2018-10-23
Payer: COMMERCIAL

## 2018-10-23 VITALS
WEIGHT: 225.4 LBS | SYSTOLIC BLOOD PRESSURE: 112 MMHG | HEART RATE: 84 BPM | OXYGEN SATURATION: 97 % | HEIGHT: 67 IN | BODY MASS INDEX: 35.38 KG/M2 | TEMPERATURE: 97.7 F | DIASTOLIC BLOOD PRESSURE: 60 MMHG

## 2018-10-23 DIAGNOSIS — G89.29 CHRONIC RIGHT SHOULDER PAIN: ICD-10-CM

## 2018-10-23 DIAGNOSIS — M25.511 CHRONIC RIGHT SHOULDER PAIN: ICD-10-CM

## 2018-10-23 DIAGNOSIS — H81.10 BENIGN PAROXYSMAL POSITIONAL VERTIGO, UNSPECIFIED LATERALITY: ICD-10-CM

## 2018-10-23 DIAGNOSIS — I10 ESSENTIAL HYPERTENSION WITH GOAL BLOOD PRESSURE LESS THAN 140/90: ICD-10-CM

## 2018-10-23 DIAGNOSIS — I20.9 ANGINA PECTORIS (H): ICD-10-CM

## 2018-10-23 DIAGNOSIS — E66.01 MORBID OBESITY (H): ICD-10-CM

## 2018-10-23 DIAGNOSIS — E78.5 DYSLIPIDEMIA: ICD-10-CM

## 2018-10-23 DIAGNOSIS — J43.8 OTHER EMPHYSEMA (H): ICD-10-CM

## 2018-10-23 DIAGNOSIS — Z00.00 ROUTINE HISTORY AND PHYSICAL EXAMINATION OF ADULT: Primary | ICD-10-CM

## 2018-10-23 DIAGNOSIS — H90.3 BILATERAL SENSORINEURAL HEARING LOSS: ICD-10-CM

## 2018-10-23 DIAGNOSIS — F32.0 MILD MAJOR DEPRESSION (H): ICD-10-CM

## 2018-10-23 PROCEDURE — G0439 PPPS, SUBSEQ VISIT: HCPCS | Performed by: INTERNAL MEDICINE

## 2018-10-23 RX ORDER — SIMVASTATIN 40 MG
40 TABLET ORAL AT BEDTIME
Qty: 90 TABLET | Refills: 3 | Status: SHIPPED | OUTPATIENT
Start: 2018-10-23 | End: 2018-12-11

## 2018-10-23 RX ORDER — FLUOXETINE 10 MG/1
10 CAPSULE ORAL DAILY
Qty: 90 CAPSULE | Refills: 3 | Status: SHIPPED | OUTPATIENT
Start: 2018-10-23 | End: 2019-03-15

## 2018-10-23 RX ORDER — LOSARTAN POTASSIUM AND HYDROCHLOROTHIAZIDE 12.5; 1 MG/1; MG/1
1 TABLET ORAL DAILY
Qty: 90 TABLET | Refills: 3 | Status: ON HOLD | OUTPATIENT
Start: 2018-10-23 | End: 2018-12-05

## 2018-10-23 RX ORDER — ATENOLOL 50 MG/1
50 TABLET ORAL DAILY
Qty: 90 TABLET | Refills: 3 | Status: ON HOLD | OUTPATIENT
Start: 2018-10-23 | End: 2018-12-05

## 2018-10-23 ASSESSMENT — ENCOUNTER SYMPTOMS
DIARRHEA: 1
CONSTIPATION: 1
HEMATURIA: 0
ABDOMINAL PAIN: 0
CHILLS: 0
HEMATOCHEZIA: 0
EYE PAIN: 0
DIZZINESS: 1
COUGH: 0

## 2018-10-23 ASSESSMENT — ACTIVITIES OF DAILY LIVING (ADL)
CURRENT_FUNCTION: NO ASSISTANCE NEEDED
I_NEED_ASSISTANCE_FOR_THE_FOLLOWING_DAILY_ACTIVITIES:: NO ASSISTANCE IS NEEDED

## 2018-10-23 NOTE — PROGRESS NOTES
SUBJECTIVE:   Nixon More is a 83 year old male who presents for Preventive Visit.  Are you in the first 12 months of your Medicare coverage?  No    Physical   Annual:     Getting at least 3 servings of Calcium per day:  Yes    Bi-annual eye exam:  Yes    Dental care twice a year:  Yes    Sleep apnea or symptoms of sleep apnea:  Sleep apnea    Diet:  Regular (no restrictions)    Taking medications regularly:  Yes    Medication side effects:  Other    Additional concerns today:  YES    Ability to successfully perform activities of daily living: no assistance needed    Home Safety:  No safety concerns identified    Hearing Impairment: difficulty following a conversation in a noisy restaurant or crowded room, need to ask people to speak up or repeat themselves and difficulty understanding soft or whispered speech  saw ENT in spring but hasn't seen audiology for hearing aids yet.  Balance was better after PT but worsening again and interested in going back.  Shoulder pain worse again and wondering about PT again.    Fall risk:  Fallen 2 or more times in the past year?: No  Any fall with injury in the past year?: No    COGNITIVE SCREEN  1) Repeat 3 items (Leader, Season, Table)    2) Clock draw: NORMAL  3) 3 item recall: Recalls 2 objects   Results: NORMAL clock, 1-2 items recalled: COGNITIVE IMPAIRMENT LESS LIKELY    Mini-CogTM Copyright S Jessie. Licensed by the author for use in [facility]; reprinted with permission (alfa@.[facility]). All rights reserved.        Reviewed and updated as needed this visit by clinical staff  Tobacco  Allergies  Meds  Problems  Med Hx  Surg Hx  Fam Hx  Soc Hx          Reviewed and updated as needed this visit by Provider  Allergies  Meds  Problems        Social History   Substance Use Topics     Smoking status: Former Smoker     Packs/day: 1.00     Years: 30.00     Types: Cigarettes     Quit date: 1/1/1978     Smokeless tobacco: Never Used     Alcohol use 1.0  oz/week     2 Standard drinks or equivalent per week      Comment: socially       Alcohol Use 10/23/2018   If you drink alcohol do you typically have greater than 3 drinks per day OR greater than 7 drinks per week? No       Today's PHQ-2 Score:   PHQ-2 ( 1999 [employer]) 10/23/2018   Q1: Little interest or pleasure in doing things 0   Q2: Feeling down, depressed or hopeless 0   PHQ-2 Score 0   Q1: Little interest or pleasure in doing things Not at all   Q2: Feeling down, depressed or hopeless Not at all   PHQ-2 Score 0       Do you feel safe in your environment - Yes    Do you have a Health Care Directive?: Yes: Advance Directive has been received and scanned.    Current providers sharing in care for this patient include:   Patient Care Team:  Natalya Lewis MD as PCP - General    The following health maintenance items are reviewed in Epic and correct as of today:  Health Maintenance   Topic Date Due     COPD ACTION PLAN Q1 YR  1935     PHQ-9 Q6 MONTHS  10/06/2018     FALL RISK ASSESSMENT  10/24/2018     DEXA Q3 YR  12/09/2018     JAMSHID QUESTIONNAIRE 1 YEAR  04/06/2019     DEPRESSION ACTION PLAN Q1 YR  04/06/2019     BMP Q1 YR  10/17/2019     LIPID MONITORING Q1 YEAR  10/17/2019     ADVANCE DIRECTIVE PLANNING Q5 YRS  10/15/2020     COLONOSCOPY Q10 YR  09/26/2021     TETANUS Q10 YR  08/14/2022     SPIROMETRY ONETIME  Completed     PNEUMOCOCCAL  Completed     INFLUENZA VACCINE  Completed     Labs reviewed in EPIC        Review of Systems   Constitutional: Negative for chills.   HENT: Negative for congestion and ear pain.    Eyes: Negative for pain.   Respiratory: Negative for cough.    Cardiovascular: Negative for chest pain.   Gastrointestinal: Positive for constipation and diarrhea. Negative for abdominal pain and hematochezia.   Genitourinary: Negative for hematuria.   Neurological: Positive for dizziness.   is noticing a little bit more dyspnea - isn't very active.      OBJECTIVE:   /60 (BP Location: Right  "arm, Patient Position: Chair, Cuff Size: Adult Large)  Pulse 84  Temp 97.7  F (36.5  C) (Oral)  Ht 5' 7\" (1.702 m)  Wt 225 lb 6.4 oz (102.2 kg)  SpO2 97%  BMI 35.3 kg/m2 Estimated body mass index is 35.3 kg/(m^2) as calculated from the following:    Height as of this encounter: 5' 7\" (1.702 m).    Weight as of this encounter: 225 lb 6.4 oz (102.2 kg).  Physical Exam  GENERAL: healthy, alert and no distress  EYES: Eyes grossly normal to inspection, PERRL and conjunctivae and sclerae normal  HENT: ear canals and TM's normal, nose and mouth without ulcers or lesions  NECK: no adenopathy, no asymmetry, masses, or scars and thyroid normal to palpation  RESP: lungs clear to auscultation - no rales, rhonchi or wheezes  CV: regular rate and rhythm, normal S1 S2, no S3 or S4, no murmur, click or rub, no peripheral edema and peripheral pulses strong  ABDOMEN: soft, nontender, no hepatosplenomegaly, no masses and bowel sounds normal  MS: no gross musculoskeletal defects noted, no edema  SKIN: no suspicious lesions or rashes  NEURO: Normal strength and tone, mentation intact and speech normal  PSYCH: mentation appears normal, affect normal/bright      ASSESSMENT / PLAN:   1. Routine history and physical examination of adult  Routine health education discussed: calcium, diet, exercise, weight, safety.     2. Bilateral sensorineural hearing loss  Discussed, refer  - OTOLARYNGOLOGY REFERRAL    3. Benign paroxysmal positional vertigo, unspecified laterality  refer  - PHYSICAL THERAPY REFERRAL; Future    4. Chronic right shoulder pain  refer  - GAIL PT, HAND, AND CHIROPRACTIC REFERRAL; Future    5. Essential hypertension with goal blood pressure less than 140/90  refill  - atenolol (TENORMIN) 50 MG tablet; Take 1 tablet (50 mg) by mouth daily  Dispense: 90 tablet; Refill: 3  - losartan-hydrochlorothiazide (HYZAAR) 100-12.5 MG per tablet; Take 1 tablet by mouth daily  Dispense: 90 tablet; Refill: 3    6. Mild major depression " "(H)  refill  - FLUoxetine (PROZAC) 10 MG capsule; Take 1 capsule (10 mg) by mouth daily  Dispense: 90 capsule; Refill: 3    7. Dyslipidemia  refill  - simvastatin (ZOCOR) 40 MG tablet; Take 1 tablet (40 mg) by mouth At Bedtime  Dispense: 90 tablet; Refill: 3    8. Morbid obesity (H)  Reviewed the treatment options for obesity including diet and exercise regimens.  Emphasized that lifestyle change, and most particularly regular exercise, is a critical component of all treatment plans and offered nutrion referral.  Discussed reasonable weight loss goals and time-frame.     9. Angina pectoris (H)  Controlled on medication     10. Other emphysema (H)  Discussed, encouraged exercise and notify if not getting better and can start inhaler      End of Life Planning:  Patient currently has an advanced directive: Yes.  Practitioner is supportive of decision.    COUNSELING:  Reviewed preventive health counseling, as reflected in patient instructions    BP Readings from Last 1 Encounters:   10/23/18 112/60     Estimated body mass index is 35.3 kg/(m^2) as calculated from the following:    Height as of this encounter: 5' 7\" (1.702 m).    Weight as of this encounter: 225 lb 6.4 oz (102.2 kg).      Weight management plan: Discussed healthy diet and exercise guidelines and patient will follow up in 12 months in clinic to re-evaluate.     reports that he quit smoking about 40 years ago. His smoking use included Cigarettes. He has a 30.00 pack-year smoking history. He has never used smokeless tobacco.      Appropriate preventive services were discussed with this patient, including applicable screening as appropriate for cardiovascular disease, diabetes, osteopenia/osteoporosis, and glaucoma.  As appropriate for age/gender, discussed screening for colorectal cancer, prostate cancer, breast cancer, and cervical cancer. Checklist reviewing preventive services available has been given to the patient.    Reviewed patients plan of care and " provided an AVS. The Intermediate Care Plan ( asthma action plan, low back pain action plan, and migraine action plan) for Nixon meets the Care Plan requirement. This Care Plan has been established and reviewed with the Patient and spouse.    Counseling Resources:  ATP IV Guidelines  Pooled Cohorts Equation Calculator  Breast Cancer Risk Calculator  FRAX Risk Assessment  ICSI Preventive Guidelines  Dietary Guidelines for Americans, 2010  [employer] MyPlate  ASA Prophylaxis  Lung CA Screening    Natalya Lewis MD  [facility]

## 2018-10-23 NOTE — PATIENT INSTRUCTIONS
Preventive Health Recommendations:       Male Ages 65 and over    Yearly exam:             See your health care provider every year in order to  o   Review health changes.   o   Discuss preventive care.    o   Review your medicines if your doctor has prescribed any.    Talk with your health care provider about whether you should have a test to screen for prostate cancer (PSA).    Every 3 years, have a diabetes test (fasting glucose). If you are at risk for diabetes, you should have this test more often.    Every 5 years, have a cholesterol test. Have this test more often if you are at risk for high cholesterol or heart disease.     Every 10 years, have a colonoscopy. Or, have a yearly FIT test (stool test). These exams will check for colon cancer.    Talk to with your health care provider about screening for Abdominal Aortic Aneurysm if you have a family history of AAA or have a history of smoking.  Shots:     Get a flu shot each year.     Get a tetanus shot every 10 years - you are due in 2022.     Talk to your doctor about your pneumonia vaccines. You have had all yours  Nutrition:     Eat at least 5 servings of fruits and vegetables each day.     Eat whole-grain bread, whole-wheat pasta and brown rice instead of white grains and rice.     Get adequate Calcium and Vitamin D.   Lifestyle    Exercise for at least 150 minutes a week (30 minutes a day, 5 days a week). This will help you control your weight and prevent disease.     Limit alcohol to one drink per day.     No smoking.     Wear sunscreen to prevent skin cancer.     See your dentist every six months for an exam and cleaning.     See your eye doctor every 1 to 2 years to screen for conditions such as glaucoma, macular degeneration and cataracts.    Follow-up with ENT specialty care about hearing aids.  They will call you about physical therapy for your balance.  They will call you for physical therapy for your shoulder.

## 2018-10-24 ASSESSMENT — PATIENT HEALTH QUESTIONNAIRE - PHQ9: SUM OF ALL RESPONSES TO PHQ QUESTIONS 1-9: 2

## 2018-10-25 ENCOUNTER — HOSPITAL ENCOUNTER (OUTPATIENT)
Dept: PHYSICAL THERAPY | Facility: CLINIC | Age: 83
Setting detail: THERAPIES SERIES
End: 2018-10-25
Attending: INTERNAL MEDICINE
Payer: COMMERCIAL

## 2018-10-25 DIAGNOSIS — H81.10 BENIGN PAROXYSMAL POSITIONAL VERTIGO, UNSPECIFIED LATERALITY: ICD-10-CM

## 2018-10-25 PROCEDURE — G8982 BODY POS GOAL STATUS: HCPCS | Mod: GP,CH | Performed by: PHYSICAL THERAPIST

## 2018-10-25 PROCEDURE — 40000840 ZZHC STATISTIC PT VESTIBULAR VISIT: Performed by: PHYSICAL THERAPIST

## 2018-10-25 PROCEDURE — 97161 PT EVAL LOW COMPLEX 20 MIN: CPT | Mod: GP | Performed by: PHYSICAL THERAPIST

## 2018-10-25 PROCEDURE — G8981 BODY POS CURRENT STATUS: HCPCS | Mod: GP,CI | Performed by: PHYSICAL THERAPIST

## 2018-10-25 PROCEDURE — 97112 NEUROMUSCULAR REEDUCATION: CPT | Mod: GP | Performed by: PHYSICAL THERAPIST

## 2018-10-31 ENCOUNTER — TELEPHONE (OUTPATIENT)
Dept: PEDIATRICS | Facility: CLINIC | Age: 83
End: 2018-10-31

## 2018-10-31 DIAGNOSIS — H53.2 DIPLOPIA: Primary | ICD-10-CM

## 2018-10-31 NOTE — TELEPHONE ENCOUNTER
"----- Message from Deanna Mary PT sent at 10/31/2018  8:57 AM CDT -----  Regarding: Requesting orders for OT to address \"low vision\"  Iveth Lewis,    I am Nixon' PT you referred him to address ongoing sxs of dizziness and balance limitations. I've included a summary of my assessment below:     Positional testing (-) for s/s of BPPV. Pt demonstrates impaired VOR per DVA assessment, headshake (+) horz R nystagmus with sxs of dizziness consistant with chronic/deconditioned L UVH. Postural and gait stability impaired with increased vestibular utilization without inc risk for falls. Recommended PT to adapt VOR and address reported sxs of dizziness with head and body positional changes towards improved participation in daily activities.    During our evaluation he reports ongoing issues with diplopia despite prism lenses. He also reported that through discussions with his ophthalmologist that his vision has been corrected to the best it can be. We discussed this can make driving \"interesting\" for him at times.    I'm messaging to request orders for OT to address compensatory techniques for \"low vision\". I have cc'd Divya Ewing, our OT in [place] who has specialization with our patients for low vision, to this message.    Please let me know if you have any questions or concerns regarding this request. Thank you for your referral and collaborative care.    Deanna King PT, DPT  "

## 2018-10-31 NOTE — ADDENDUM NOTE
Encounter addended by: Deanna Mary PT on: 10/31/2018  9:08 AM<BR>     Actions taken: Sign clinical note, Flowsheet data copied forward, Flowsheet accepted

## 2018-10-31 NOTE — ADDENDUM NOTE
Encounter addended by: Deanna Mary PT on: 10/30/2018 11:28 PM<BR>     Actions taken: Flowsheet accepted

## 2018-10-31 NOTE — PROGRESS NOTES
10/25/18 1400   Quick Adds   Quick Adds Vestibular Eval   Type of Visit Initial OP PT Evaluation   General Information   Start of Care Date 10/25/18   Referring Physician Natalya Lewis MD   Orders Evaluate and Treat as Indicated   Order Date 10/23/18   Medical Diagnosis Benign paroxysmal positional vertigo, unspecified laterality H81.10    Onset of illness/injury or Date of Surgery 10/01/18  (pt reports gradual return of dizziness approx 1 mo)   Precautions/Limitations no known precautions/limitations   Surgical/Medical history reviewed Yes   Pertinent history of current problem (include personal factors and/or comorbidities that impact the POC) Nixon More is a 82 yo M who arrives for vestibular PT evaluation of BPPV referred by his internist, Dr Natalya Lewis, during preventive visit on 10/23/18. Per chart review of MD visit: reports he is noticing a little bit more dyspnea and has a sedentary lifestyle. Dx with B sensorineural hearing loss and discussed referral to otolaryngology. Reported balance was better after PT but worsening again and interested in going back to PT. Shoulder pain is worse again and pt just started PT for R shoulder at [facility]. See [facility] for other PMH. Pt is familiar with this clinic, participating in PT 6/5/18-6/26/18 for 3 visits with treatment of vestibular rehab, CRM and balance training. Per discharge notes: pt improved balance and gait with resolution of L BPPV. He met his goals performing normal household duties and yardwork without restriction. Recommended additional balance training, but was not interested at the time. Today pt reports: Feels dizziness improved following treatment for BPPV. [place] his walking with head turns was better. His dizziness returned, approximates about a month ago without sudden onset, and a bit more noticeable lately. Dizziness accompanied by unsteadiness and recurs: looking up, quick movements of head, walking in the dark, bending over making it difficult at  times to participate in housework or yardwork. Symptoms last for a couple of seconds. He sunil any falls but has had some near falls. His last fall was over the summer. He has continued with daily activities like yardwork, goes down to basement to do laundry, bending over to clean up cat litter box, carries groceries up and down stairs. Pain in knees makes him slow navigating up/down stairs. Pain:  aches from arthritis .  Feels a little less steady getting to bathroom at night. No current exercise, reports he is poorly motivated for exercise. Drives without limitation but  at a distance sees double a little.  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  Participating PT for his R shoulder at [facility]. Feels a little weaker in general from sedentary lifestyle,  used to go for walks and has recumbent bike in basement he uses a couple times in the winter. Has follow-up with [facility] for hearing assessment. Denies unilateral hearing loss, fluctuating hearing loss, tinnitus, vertigo, dizziness with transitions in bed.   Pertinent Visual History  wears bifocals, progressives. Has prism lenses. cataract surgery R.   Prior level of function comment independent with all mobility, no AD.    Improvement after medication Significant   Patient role/Employment history Retired   [facility]   Home/Community Accessibility Comments 2-3 steps to enter home, main level living with flight of stairs to basement, lives with spouse   Current Assistive Devices Standard Cane  (owns but does not use)   Patient/Family Goals Statement improve balance, resolve dizziness with quick movements   Fall Risk Screen   Fall screen completed by PT   Have you fallen 2 or more times in the past year? No  (1 fall in garden in the summer)   Have you fallen and had an injury in the past year? No   Timed Up and Go score (seconds) n/t (see 4 item DGI)   Is patient a fall risk? No   System Outcome Measures   Dizziness Handicap  Inventory (score out of 100) A decrease in score by 17.18 or greater indicates a clinically significant change in symptoms. 14  (low perception of handicap)   Pain   Patient currently in pain No   Pain comments Pain in knees makes him slow navigating up/down stairs. Pain:  aches from arthritis . Also currently addressing R shoulder pain at [facility]   Cognitive Status Examination   Orientation orientation to person, place and time   Level of Consciousness alert   Follows Commands and Answers Questions 100% of the time   Personal Safety and Judgment intact   Bed Mobility   Bed Mobility Comments Indep   Transfer Skills   Transfer Comments Indep sit <> stand, good stability, no LOB or AD needed   Gait   Gait Comments over short distances indoors without AD: reciprocal heel toe stepping pattern, maintained foot clearance without instability, no notable foot drop, or path deviations   Gait Special Tests   Gait Special Tests 25 FOOT TIMED WALK;DYNAMIC GAIT INDEX   Gait Special Tests 25 Foot Timed Walk   Comments 1.2 m/s (1.2-1.4 m/s WNL)   Gait Special Tests Dynamic Gait Index   Comments 11/12 on 4 item DGI (9 or less indicates increased fall risk); small path deviations walking with head turns   Balance Special Tests Modified CTSIB Conditions   Condition 1, seconds 30 Seconds   Condition 2, seconds 30 Seconds   Condition 4, seconds 30 Seconds   Condition 5, seconds 16 Seconds  (3 trials (sec): 2, 18, 30)   Modified CTSIB Comments impaired postural stability with increased vestibular utilization standing on foam EC   Sensory Examination   Sensory Perception no deficits were identified   Coordination   Coordination no deficits were identified   Cervicogenic Screen   Neck ROM sufficient for positional testing   Oculomotor Exam   Smooth Pursuit Normal   Saccades Normal   VOR Abnormal   Rapid Head Thrust Normal   Convergence Testing Normal   VOR Comments (see DVA assessment below)   Infrared Goggle Exam or Frenzel Lense Exam    Vestibular Suppressant in Last 24 Hours? No   Exam completed with Infrared Goggles   Spontaneous Nystagmus Negative   Gaze Evoked Nystagmus Negative   Head Shake Horizontal Nystagmus Horizontal R   [place]-Hallpike (right) Negative   [place]-Hallpike (Left) Negative   HSCC Supine Roll Test (Right) Negative   HSCC Supine Roll Test (Left) Negative   Positional testing Negative   Head Shake Horizontal Nystagmus comments no dizziness   Dynamic Visual Acuity (DVA)   Static Acuity (LogMar) line 9   Horizontal Head Movement at 2 Hz (LogMar) line 3   DVA Comments 6 line difference (1-2 WNL), no dizziness reported   Planned Therapy Interventions   Planned Therapy Interventions balance training;gait training;neuromuscular re-education   Clinical Impression   Criteria for Skilled Therapeutic Interventions Met yes, treatment indicated   PT Diagnosis (+) s/s of left unilateral vestibular hypofunction   Influenced by the following impairments small path deviations walking with head turns; impaired postural stability with increased vestibular utilization standing on foam EC; impaired VOR per DVA assessment; headshake (+) horz R nystagmus consistant with L UVH;    Functional limitations due to impairments Dizziness accompanied by unsteadiness and recurs: looking up, quick movements of head, walking in the dark, bending over making it difficult at times to participate in housework or yardwork, bending over to clean up cat litter box   Clinical Presentation Stable/Uncomplicated   Clinical Decision Making (Complexity) Low complexity   Therapy Frequency 1 time/week   Predicted Duration of Therapy Intervention (days/wks) 4 weeks   Risk & Benefits of therapy have been explained Yes   Patient, Family & other staff in agreement with plan of care Yes   Clinical Impression Comments Positional testing (-) for s/s of BPPV. Pt demonstrates impaired VOR per DVA assessment, headshake (+) horz R nystagmus with sxs of dizziness consistant with  chronic/deconditioned L UVH. Postural and gait stability impaired with increased vestibular utilization without inc risk for falls. Recommended PT to adapt VOR and address reported sxs of dizziness with head and body positional changes towards improved participation in daily activities.   Education Assessment   Barriers to Learning No barriers   Goal 1   Goal Identifier HEP   Goal Description Pt will be independent with home exercise program towards improved VOR, postural and gait stability.   Target Date 12/04/18   Goal 2   Goal Identifier Postural stability (baseline: romberg foam EC x16 sec across 3 trials)   Goal Description Pt to maintain romberg foam EC x30 sec without LOB to demonstrate improved postural stability with increased vestibular utilization.    Target Date 12/04/18   Goal 3   Goal Identifier VOR (baseline DVA: 6 line difference (1-2 WNL)   Goal Description Pt to demonstrate improved VOR per DVA assessment to WNL with 1-2 line difference.    Target Date 12/04/18   Goal 4   Goal Identifier DHI (baseline 14/100)   Goal Description Pt to score 0/100 per DHI with resolved sxs of dizziness with head and body changes and improved stability for housework and yardwork (ie bending to clean litter box)   Target Date 12/04/18   Total Evaluation Time   Total Evaluation Time (Minutes) 57

## 2018-10-31 NOTE — ADDENDUM NOTE
Encounter addended by: Deanna Mary PT on: 10/30/2018 11:19 PM<BR>     Actions taken: Flowsheet accepted

## 2018-11-01 ENCOUNTER — HOSPITAL ENCOUNTER (OUTPATIENT)
Dept: PHYSICAL THERAPY | Facility: CLINIC | Age: 83
Setting detail: THERAPIES SERIES
End: 2018-11-01
Attending: INTERNAL MEDICINE
Payer: COMMERCIAL

## 2018-11-01 PROCEDURE — 97112 NEUROMUSCULAR REEDUCATION: CPT | Mod: GP | Performed by: PHYSICAL THERAPIST

## 2018-11-01 PROCEDURE — 40000840 ZZHC STATISTIC PT VESTIBULAR VISIT: Performed by: PHYSICAL THERAPIST

## 2018-11-05 ENCOUNTER — HOSPITAL ENCOUNTER (OUTPATIENT)
Dept: PHYSICAL THERAPY | Facility: CLINIC | Age: 83
Setting detail: THERAPIES SERIES
End: 2018-11-05
Attending: INTERNAL MEDICINE
Payer: COMMERCIAL

## 2018-11-05 PROCEDURE — 97112 NEUROMUSCULAR REEDUCATION: CPT | Mod: GP | Performed by: PHYSICAL THERAPIST

## 2018-11-05 PROCEDURE — 40000185 ZZHC STATISTIC PT OUTPT VISIT: Performed by: PHYSICAL THERAPIST

## 2018-12-03 ENCOUNTER — APPOINTMENT (OUTPATIENT)
Dept: GENERAL RADIOLOGY | Facility: CLINIC | Age: 83
End: 2018-12-03
Attending: EMERGENCY MEDICINE
Payer: COMMERCIAL

## 2018-12-03 ENCOUNTER — HOSPITAL ENCOUNTER (OUTPATIENT)
Facility: CLINIC | Age: 83
Setting detail: OBSERVATION
Discharge: HOME | End: 2018-12-05
Attending: EMERGENCY MEDICINE | Admitting: INTERNAL MEDICINE
Payer: COMMERCIAL

## 2018-12-03 DIAGNOSIS — I10 HYPERTENSION GOAL BP (BLOOD PRESSURE) < 140/90: ICD-10-CM

## 2018-12-03 DIAGNOSIS — I48.92 ATRIAL FLUTTER, UNSPECIFIED TYPE (H): ICD-10-CM

## 2018-12-03 DIAGNOSIS — I25.10 CORONARY ARTERY DISEASE INVOLVING NATIVE CORONARY ARTERY OF NATIVE HEART WITHOUT ANGINA PECTORIS: Primary | ICD-10-CM

## 2018-12-03 DIAGNOSIS — I47.19 ATRIAL TACHYCARDIA (H): ICD-10-CM

## 2018-12-03 DIAGNOSIS — S22.31XA CLOSED FRACTURE OF ONE RIB OF RIGHT SIDE, INITIAL ENCOUNTER: ICD-10-CM

## 2018-12-03 LAB
ANION GAP SERPL CALCULATED.3IONS-SCNC: 8 MMOL/L (ref 3–14)
BASOPHILS # BLD AUTO: 0.1 10E9/L (ref 0–0.2)
BASOPHILS NFR BLD AUTO: 1.1 %
BUN SERPL-MCNC: 22 MG/DL (ref 7–30)
CALCIUM SERPL-MCNC: 9.4 MG/DL (ref 8.5–10.1)
CHLORIDE SERPL-SCNC: 106 MMOL/L (ref 94–109)
CO2 SERPL-SCNC: 28 MMOL/L (ref 20–32)
CREAT SERPL-MCNC: 1.04 MG/DL (ref 0.66–1.25)
DIFFERENTIAL METHOD BLD: NORMAL
EOSINOPHIL # BLD AUTO: 0.4 10E9/L (ref 0–0.7)
EOSINOPHIL NFR BLD AUTO: 4.7 %
ERYTHROCYTE [DISTWIDTH] IN BLOOD BY AUTOMATED COUNT: 12.8 % (ref 10–15)
ERYTHROCYTE [DISTWIDTH] IN BLOOD BY AUTOMATED COUNT: 12.9 % (ref 10–15)
GFR SERPL CREATININE-BSD FRML MDRD: 69 ML/MIN/1.7M2
GLUCOSE SERPL-MCNC: 112 MG/DL (ref 70–99)
HCT VFR BLD AUTO: 45.4 % (ref 40–53)
HCT VFR BLD AUTO: 46.4 % (ref 40–53)
HGB BLD-MCNC: 14.8 G/DL (ref 13.3–17.7)
HGB BLD-MCNC: 14.9 G/DL (ref 13.3–17.7)
IMM GRANULOCYTES # BLD: 0 10E9/L (ref 0–0.4)
IMM GRANULOCYTES NFR BLD: 0.4 %
INR PPP: 1.03 (ref 0.86–1.14)
LYMPHOCYTES # BLD AUTO: 1.2 10E9/L (ref 0.8–5.3)
LYMPHOCYTES NFR BLD AUTO: 15.2 %
MAGNESIUM SERPL-MCNC: 2.2 MG/DL (ref 1.6–2.3)
MCH RBC QN AUTO: 30.7 PG (ref 26.5–33)
MCH RBC QN AUTO: 31.2 PG (ref 26.5–33)
MCHC RBC AUTO-ENTMCNC: 32.1 G/DL (ref 31.5–36.5)
MCHC RBC AUTO-ENTMCNC: 32.6 G/DL (ref 31.5–36.5)
MCV RBC AUTO: 96 FL (ref 78–100)
MCV RBC AUTO: 96 FL (ref 78–100)
MONOCYTES # BLD AUTO: 0.6 10E9/L (ref 0–1.3)
MONOCYTES NFR BLD AUTO: 7.9 %
NEUTROPHILS # BLD AUTO: 5.4 10E9/L (ref 1.6–8.3)
NEUTROPHILS NFR BLD AUTO: 70.7 %
NRBC # BLD AUTO: 0 10*3/UL
NRBC BLD AUTO-RTO: 0 /100
NT-PROBNP SERPL-MCNC: 1210 PG/ML (ref 0–1800)
PLATELET # BLD AUTO: 181 10E9/L (ref 150–450)
PLATELET # BLD AUTO: 200 10E9/L (ref 150–450)
POTASSIUM SERPL-SCNC: 3.7 MMOL/L (ref 3.4–5.3)
RBC # BLD AUTO: 4.74 10E12/L (ref 4.4–5.9)
RBC # BLD AUTO: 4.86 10E12/L (ref 4.4–5.9)
SODIUM SERPL-SCNC: 142 MMOL/L (ref 133–144)
TROPONIN I BLD-MCNC: 0.01 UG/L (ref 0–0.08)
TROPONIN I SERPL-MCNC: <0.015 UG/L (ref 0–0.04)
TSH SERPL DL<=0.005 MIU/L-ACNC: 2.63 MU/L (ref 0.4–4)
WBC # BLD AUTO: 7.6 10E9/L (ref 4–11)
WBC # BLD AUTO: 9 10E9/L (ref 4–11)

## 2018-12-03 PROCEDURE — 80048 BASIC METABOLIC PNL TOTAL CA: CPT | Performed by: EMERGENCY MEDICINE

## 2018-12-03 PROCEDURE — 25000132 ZZH RX MED GY IP 250 OP 250 PS 637: Performed by: INTERNAL MEDICINE

## 2018-12-03 PROCEDURE — 96366 THER/PROPH/DIAG IV INF ADDON: CPT

## 2018-12-03 PROCEDURE — 93005 ELECTROCARDIOGRAM TRACING: CPT

## 2018-12-03 PROCEDURE — 25000125 ZZHC RX 250: Performed by: INTERNAL MEDICINE

## 2018-12-03 PROCEDURE — 25000128 H RX IP 250 OP 636: Performed by: INTERNAL MEDICINE

## 2018-12-03 PROCEDURE — 83735 ASSAY OF MAGNESIUM: CPT | Performed by: EMERGENCY MEDICINE

## 2018-12-03 PROCEDURE — 85025 COMPLETE CBC W/AUTO DIFF WBC: CPT | Performed by: EMERGENCY MEDICINE

## 2018-12-03 PROCEDURE — 96376 TX/PRO/DX INJ SAME DRUG ADON: CPT

## 2018-12-03 PROCEDURE — 99285 EMERGENCY DEPT VISIT HI MDM: CPT | Mod: 25

## 2018-12-03 PROCEDURE — 96375 TX/PRO/DX INJ NEW DRUG ADDON: CPT

## 2018-12-03 PROCEDURE — 84484 ASSAY OF TROPONIN QUANT: CPT | Mod: 91 | Performed by: INTERNAL MEDICINE

## 2018-12-03 PROCEDURE — 36415 COLL VENOUS BLD VENIPUNCTURE: CPT | Performed by: INTERNAL MEDICINE

## 2018-12-03 PROCEDURE — 25000125 ZZHC RX 250: Performed by: PHYSICIAN ASSISTANT

## 2018-12-03 PROCEDURE — 25000128 H RX IP 250 OP 636: Performed by: EMERGENCY MEDICINE

## 2018-12-03 PROCEDURE — 25000132 ZZH RX MED GY IP 250 OP 250 PS 637: Performed by: PHYSICIAN ASSISTANT

## 2018-12-03 PROCEDURE — 85610 PROTHROMBIN TIME: CPT | Performed by: EMERGENCY MEDICINE

## 2018-12-03 PROCEDURE — 71046 X-RAY EXAM CHEST 2 VIEWS: CPT

## 2018-12-03 PROCEDURE — 85027 COMPLETE CBC AUTOMATED: CPT | Performed by: INTERNAL MEDICINE

## 2018-12-03 PROCEDURE — 96365 THER/PROPH/DIAG IV INF INIT: CPT

## 2018-12-03 PROCEDURE — 73030 X-RAY EXAM OF SHOULDER: CPT | Mod: LT

## 2018-12-03 PROCEDURE — 84484 ASSAY OF TROPONIN QUANT: CPT

## 2018-12-03 PROCEDURE — 83880 ASSAY OF NATRIURETIC PEPTIDE: CPT | Performed by: EMERGENCY MEDICINE

## 2018-12-03 PROCEDURE — 84443 ASSAY THYROID STIM HORMONE: CPT | Performed by: EMERGENCY MEDICINE

## 2018-12-03 PROCEDURE — G0378 HOSPITAL OBSERVATION PER HR: HCPCS

## 2018-12-03 PROCEDURE — 84484 ASSAY OF TROPONIN QUANT: CPT | Performed by: EMERGENCY MEDICINE

## 2018-12-03 PROCEDURE — 99220 ZZC INITIAL OBSERVATION CARE,LEVL III: CPT | Performed by: INTERNAL MEDICINE

## 2018-12-03 RX ORDER — ATENOLOL 25 MG/1
25 TABLET ORAL ONCE
Status: COMPLETED | OUTPATIENT
Start: 2018-12-03 | End: 2018-12-03

## 2018-12-03 RX ORDER — METOPROLOL TARTRATE 1 MG/ML
5-15 INJECTION, SOLUTION INTRAVENOUS EVERY 6 HOURS PRN
Status: DISCONTINUED | OUTPATIENT
Start: 2018-12-03 | End: 2018-12-05 | Stop reason: HOSPADM

## 2018-12-03 RX ORDER — POTASSIUM CHLORIDE 1.5 G/1.58G
20-40 POWDER, FOR SOLUTION ORAL
Status: DISCONTINUED | OUTPATIENT
Start: 2018-12-03 | End: 2018-12-05 | Stop reason: HOSPADM

## 2018-12-03 RX ORDER — HYDROCHLOROTHIAZIDE 12.5 MG/1
12.5 TABLET ORAL DAILY
Status: DISCONTINUED | OUTPATIENT
Start: 2018-12-04 | End: 2018-12-04

## 2018-12-03 RX ORDER — LOSARTAN POTASSIUM AND HYDROCHLOROTHIAZIDE 12.5; 1 MG/1; MG/1
1 TABLET ORAL DAILY
Status: DISCONTINUED | OUTPATIENT
Start: 2018-12-03 | End: 2018-12-03 | Stop reason: CLARIF

## 2018-12-03 RX ORDER — IPRATROPIUM BROMIDE 42 UG/1
2 SPRAY, METERED NASAL 4 TIMES DAILY PRN
Status: DISCONTINUED | OUTPATIENT
Start: 2018-12-03 | End: 2018-12-05 | Stop reason: HOSPADM

## 2018-12-03 RX ORDER — SIMVASTATIN 40 MG
40 TABLET ORAL AT BEDTIME
Status: DISCONTINUED | OUTPATIENT
Start: 2018-12-03 | End: 2018-12-04

## 2018-12-03 RX ORDER — ASPIRIN 81 MG/1
81 TABLET ORAL DAILY
Status: DISCONTINUED | OUTPATIENT
Start: 2018-12-04 | End: 2018-12-05 | Stop reason: HOSPADM

## 2018-12-03 RX ORDER — ONDANSETRON 4 MG/1
4 TABLET, ORALLY DISINTEGRATING ORAL EVERY 6 HOURS PRN
Status: DISCONTINUED | OUTPATIENT
Start: 2018-12-03 | End: 2018-12-05 | Stop reason: HOSPADM

## 2018-12-03 RX ORDER — MAGNESIUM SULFATE HEPTAHYDRATE 40 MG/ML
4 INJECTION, SOLUTION INTRAVENOUS EVERY 4 HOURS PRN
Status: DISCONTINUED | OUTPATIENT
Start: 2018-12-03 | End: 2018-12-05 | Stop reason: HOSPADM

## 2018-12-03 RX ORDER — METOPROLOL TARTRATE 1 MG/ML
5 INJECTION, SOLUTION INTRAVENOUS ONCE
Status: COMPLETED | OUTPATIENT
Start: 2018-12-03 | End: 2018-12-03

## 2018-12-03 RX ORDER — HYDROMORPHONE HYDROCHLORIDE 1 MG/ML
.3-.5 INJECTION, SOLUTION INTRAMUSCULAR; INTRAVENOUS; SUBCUTANEOUS
Status: DISCONTINUED | OUTPATIENT
Start: 2018-12-03 | End: 2018-12-05 | Stop reason: HOSPADM

## 2018-12-03 RX ORDER — DOCUSATE SODIUM 100 MG/1
100 CAPSULE, LIQUID FILLED ORAL 2 TIMES DAILY
Status: DISCONTINUED | OUTPATIENT
Start: 2018-12-03 | End: 2018-12-05 | Stop reason: HOSPADM

## 2018-12-03 RX ORDER — HYDROCODONE BITARTRATE AND ACETAMINOPHEN 5; 325 MG/1; MG/1
1-2 TABLET ORAL EVERY 4 HOURS PRN
Status: DISCONTINUED | OUTPATIENT
Start: 2018-12-03 | End: 2018-12-05 | Stop reason: HOSPADM

## 2018-12-03 RX ORDER — POTASSIUM CHLORIDE 29.8 MG/ML
20 INJECTION INTRAVENOUS
Status: DISCONTINUED | OUTPATIENT
Start: 2018-12-03 | End: 2018-12-05 | Stop reason: HOSPADM

## 2018-12-03 RX ORDER — LOSARTAN POTASSIUM 100 MG/1
100 TABLET ORAL DAILY
Status: DISCONTINUED | OUTPATIENT
Start: 2018-12-04 | End: 2018-12-05 | Stop reason: HOSPADM

## 2018-12-03 RX ORDER — FLUOXETINE 10 MG/1
10 CAPSULE ORAL DAILY
Status: DISCONTINUED | OUTPATIENT
Start: 2018-12-03 | End: 2018-12-05 | Stop reason: HOSPADM

## 2018-12-03 RX ORDER — NALOXONE HYDROCHLORIDE 0.4 MG/ML
.1-.4 INJECTION, SOLUTION INTRAMUSCULAR; INTRAVENOUS; SUBCUTANEOUS
Status: DISCONTINUED | OUTPATIENT
Start: 2018-12-03 | End: 2018-12-05 | Stop reason: HOSPADM

## 2018-12-03 RX ORDER — ONDANSETRON 2 MG/ML
4 INJECTION INTRAMUSCULAR; INTRAVENOUS EVERY 6 HOURS PRN
Status: DISCONTINUED | OUTPATIENT
Start: 2018-12-03 | End: 2018-12-05 | Stop reason: HOSPADM

## 2018-12-03 RX ORDER — OXYCODONE HYDROCHLORIDE 5 MG/1
5 TABLET ORAL ONCE
Status: COMPLETED | OUTPATIENT
Start: 2018-12-03 | End: 2018-12-03

## 2018-12-03 RX ORDER — POTASSIUM CHLORIDE 7.45 MG/ML
10 INJECTION INTRAVENOUS
Status: DISCONTINUED | OUTPATIENT
Start: 2018-12-03 | End: 2018-12-05 | Stop reason: HOSPADM

## 2018-12-03 RX ORDER — POTASSIUM CHLORIDE 1500 MG/1
20-40 TABLET, EXTENDED RELEASE ORAL
Status: DISCONTINUED | OUTPATIENT
Start: 2018-12-03 | End: 2018-12-05 | Stop reason: HOSPADM

## 2018-12-03 RX ORDER — POTASSIUM CL/LIDO/0.9 % NACL 10MEQ/0.1L
10 INTRAVENOUS SOLUTION, PIGGYBACK (ML) INTRAVENOUS
Status: DISCONTINUED | OUTPATIENT
Start: 2018-12-03 | End: 2018-12-05 | Stop reason: HOSPADM

## 2018-12-03 RX ADMIN — HEPARIN SODIUM 950 UNITS/HR: 10000 INJECTION, SOLUTION INTRAVENOUS at 14:04

## 2018-12-03 RX ADMIN — HEPARIN SODIUM 950 UNITS/HR: 10000 INJECTION, SOLUTION INTRAVENOUS at 18:31

## 2018-12-03 RX ADMIN — METOPROLOL TARTRATE 5 MG: 1 INJECTION, SOLUTION INTRAVENOUS at 20:07

## 2018-12-03 RX ADMIN — ATENOLOL 25 MG: 25 TABLET ORAL at 18:30

## 2018-12-03 RX ADMIN — OXYCODONE HYDROCHLORIDE 5 MG: 5 TABLET ORAL at 15:14

## 2018-12-03 RX ADMIN — DOCUSATE SODIUM 100 MG: 100 CAPSULE, LIQUID FILLED ORAL at 20:25

## 2018-12-03 RX ADMIN — SIMVASTATIN 40 MG: 40 TABLET, FILM COATED ORAL at 21:02

## 2018-12-03 RX ADMIN — METOPROLOL TARTRATE 5 MG: 1 INJECTION, SOLUTION INTRAVENOUS at 22:41

## 2018-12-03 RX ADMIN — METOPROLOL TARTRATE 5 MG: 1 INJECTION, SOLUTION INTRAVENOUS at 21:02

## 2018-12-03 RX ADMIN — FLUOXETINE 10 MG: 10 CAPSULE ORAL at 18:29

## 2018-12-03 RX ADMIN — METOPROLOL TARTRATE 5 MG: 5 INJECTION, SOLUTION INTRAVENOUS at 15:14

## 2018-12-03 RX ADMIN — HYDROCODONE BITARTRATE AND ACETAMINOPHEN 1 TABLET: 5; 325 TABLET ORAL at 20:25

## 2018-12-03 RX ADMIN — METOPROLOL TARTRATE 5 MG: 1 INJECTION, SOLUTION INTRAVENOUS at 20:33

## 2018-12-03 RX ADMIN — Medication 4800 UNITS: at 14:05

## 2018-12-03 ASSESSMENT — ENCOUNTER SYMPTOMS
DIZZINESS: 0
LIGHT-HEADEDNESS: 0
ARTHRALGIAS: 1
PALPITATIONS: 0
SHORTNESS OF BREATH: 0
BACK PAIN: 1
WEAKNESS: 0
HEADACHES: 0

## 2018-12-03 NOTE — ED NOTES
[facility] [place]  ED Nurse Handoff Report    Nixon More is a 83 year old male   ED Chief complaint: Fall  . ED Diagnosis:   Final diagnoses:   Atrial tachycardia (H)     Allergies:   Allergies   Allergen Reactions     Augmentin Rash     Type III hypersensitivity     Bactrim [Sulfamethoxazole W/Trimethoprim] Rash     Serum sickness, type III hypersensitivity       Bee Venom Itching     Diltiazem      Lisinopril Cough     Naproxen Hives       Code Status: Full Code  Activity level - Baseline/Home:  Independent. Activity Level - Current:   Stand with Assist. Lift room needed: No. Bariatric: No   Needed: No   Isolation: No. Infection: Not Applicable.     Vital Signs:   Vitals:    12/03/18 1400 12/03/18 1415 12/03/18 1430 12/03/18 1445   BP: (!) 159/139 (!) 120/97 (!) 166/125 (!) 160/132   Pulse:       Resp:       Temp:       TempSrc:       SpO2: 95%   95%       Cardiac Rhythm:  ,      Pain level:    Patient confused: No. Patient Falls Risk: Yes.   Elimination Status: Has voided   Patient Report - Initial Complaint: fall. Focused Assessment:    11:35 ED Triage Notes Filed Pt presents after slipping and falling today, c/o some right sided back pain, denies hitting head, no LOC. Pt able to ambulate. Also c/o left shoulder pain. Pt alert, oriented x3 ABCs intact     Fall, unknown LOC, denied hitting head. Aflutter RVR. Denies SOB, dizziness, CP.   Tests Performed: labs, ekg, imaging. Abnormal Results:   Labs Ordered and Resulted from Time of ED Arrival Up to the Time of Departure from the ED   BASIC METABOLIC PANEL - Abnormal; Notable for the following:        Result Value    Glucose 112 (*)     All other components within normal limits   CBC WITH PLATELETS DIFFERENTIAL   INR   NT PROBNP INPATIENT   TROPONIN I   PLATELETS MONITORED PER HEPARIN TREATMENT PROTOCOL (FOR MEANINGFUL USE   MEASURE WEIGHT   NOTIFY PHYSICIAN   NOTIFY PHYSICIAN   ISTAT GAS OR ELECTROLYTE NURSING POCT   ISTAT TROPONIN NURSING  POCT   TROPONIN POCT     XR Shoulder Left G/E 3 Views   Final Result   IMPRESSION: No acute osseous abnormality.      ANTWON ASTORGA MD      XR Chest 2 Views   Final Result   IMPRESSION: Suspect right seventh rib fracture. No acute pulmonary   abnormality.      ANTWON ASTORGA MD        .   Treatments provided: heparin gtt  Family Comments: spouse at bedside  OBS brochure/video discussed/provided to patient:  No  ED Medications:   Medications   heparin infusion 25,000 units in 0.45% NaCl 250 mL (950 Units/hr Intravenous New Bag 12/3/18 1404)   metoprolol (LOPRESSOR) injection 5 mg (not administered)   oxyCODONE (ROXICODONE) tablet 5 mg (not administered)   heparin Loading Dose bolus dose from infusion pump 4,800 Units (4,800 Units Intravenous Given 12/3/18 1405)     Drips infusing:  Yes  For the majority of the shift, the patient's behavior Green. Interventions performed were n/a.     Severe Sepsis OR Septic Shock Diagnosis Present: No      ED Nurse Name/Phone Number: Yanci Tyrone,   3:08 PM    RECEIVING UNIT ED HANDOFF REVIEW    Above ED Nurse Handoff Report was reviewed: Yes  Reviewed by: Deepali Smith on December 3, 2018 at 4:33 PM

## 2018-12-03 NOTE — ED PROVIDER NOTES
History     Chief Complaint:  Fall    HPI   Nixon More is a 83 year old male with a history of hyperlipidemia who presents to the emergency department for evaluation after a fall. This morning, he reports slipping on the ice while shoveling and he fell onto his right side. He is now endorsing pain in his back on that side and left shoulder pain, so he decided to present to the ED. Here, he denies any chest pain, dizziness, lightheadedness, or difficulty breathing. With the initial EKG in the emergency department, he was found to have atrial flutter. He denies any noticeable palpations; however, when takes his radial pulse, he states he feels something like an extra beat. He denies any weakness or increased lower extremity edema from baseline. He has no history of diabetes or a prior MI. Denies any other injuries from the fall. Denies hitting his head.     Allergies:  Augmentin  Bactrim  Diltiazem  Lisinopril  Naproxen     Medications:    Baby aspirin  Atenolol  Celebrex  Fluoxetine   Leuprolide injection  Losartan-hydrochlorothiazide  Simvastatin     Past Medical History:    AK  Arthritis  CAD  Dyslipidemia  Lung emphysema  HTN  Abdominal hernia  Hypersomnia w MAYITO  BPH  Lumbago  Prostate cancer  BCC    Past Surgical History:    Hernia repair  Moh's procedure  Lumbar laminectomy  Pars plana vitrectomy, remove preretinal membrane    Family History:    Alzheimer's disease  HTN  CHF  Prostate cancer  Lung cancer    Social History:  Marital Status:   [2]  Former smoker for 30 years.   Social alcohol use.      Review of Systems   Respiratory: Negative for shortness of breath.    Cardiovascular: Negative for chest pain, palpitations and leg swelling.   Musculoskeletal: Positive for arthralgias (left shoulder) and back pain.   Neurological: Negative for dizziness, syncope, weakness, light-headedness and headaches.   All other systems reviewed and are negative.      Physical Exam     Patient Vitals for the past  24 hrs:   BP Temp Temp src Pulse Heart Rate Resp SpO2   12/03/18 1500 (!) 174/139 - - - - - 91 %   12/03/18 1445 (!) 160/132 - - - 130 - 95 %   12/03/18 1430 (!) 166/125 - - - 129 - -   12/03/18 1415 (!) 120/97 - - - 128 - -   12/03/18 1400 (!) 159/139 - - - 128 - 95 %   12/03/18 1345 (!) 158/129 - - - 129 - 95 %   12/03/18 1135 (!) 163/124 97.5  F (36.4  C) Oral 133 133 16 97 %     Physical Exam  Constitutional: comfortable and well appearing.   Head: No external signs of trauma noted to head or face.   Eyes: Pupils are equal, round, and reactive to light. Conjunctiva normal. EOMI.  ENT: MMM.  Normal voice.   Neck: no midline spine tenderness to palpation. Normal ROM.   Cardiovascular: irregular tachycardic rhythm; intact distal pulses.    Respiratory: Effort normal. No respiratory distress. Lungs clear to auscultation bilaterally.   GI: Soft. There is no tenderness. There is no rebound.   Musculoskeletal: No deformities appreciated. Normal ROM. No edema noted.  No cervical, thoracic or lumbar spine tenderness to palpation. No focal chest wall tenderness on exam. No crepitus or ecchymosis noted.   Extremities are non-tender with normal ROM.  Neurological: Alert and Oriented x 3. Speech normal. Moves all extremities equally. CN II-XII intact. Normal strength and sensation in extremities.   Psychiatric: Appropriate mood, affect, and behavior.   Skin: Skin is warm and dry. no rash.        Emergency Department Course   ECG:  Indication: Fall  Time: 1149  Vent. Rate 126 bpm. NJ interval *. QRS duration 78. QT/QTc 326/472. P-R-T axis * -7 -8.  Atrial tachycardia. Inferior infarct, age undetermined. Abnormal ECG. Read time: 1150.    Imaging:  Radiographic findings were communicated with the patient who voiced understanding of the findings.    XR Chest 2 views:   Suspect right seventh rib fracture. No acute pulmonary  Abnormality, As per radiology.     XR Shoulder 3 views, Left:   No acute osseous abnormality, as per  radiology.     Laboratory:  CBC: WBC: 7.6, HGB: 14.9, PLT: 200    BNP: 1210    BMP: Glucose 112 (H), o/w WNL (Creatinine: 1.04)    INR: 1.03    1453 Troponin POCT: 0.01  1517 Troponin: <0.015    Interventions:  1404 Heparin infusion, 950 units/hr, IV  1405 Heparin loading dose, 4800 units, IV  1514 Metoprolol, 5 mg, PO  1514 Oxycodone, 5 mg, PO    Please see MAR for full list of medications administered in the ED.    Emergency Department Course:  Nursing notes and vitals reviewed. (1345) I performed an exam of the patient as documented above.      IV inserted. Medicine administered as documented above. Blood drawn. This was sent to the lab for further testing, results above.     The patient was sent for chest and shoulder XRs while in the emergency department, findings above.      EKG obtained in the ED, see results above.     (1435) I consulted with Palma Rodriguez, hospitalist, regarding the patient's history and presentation here in the emergency department. She would like to now the patient's lab results before deciding on a med-tele vs obs disposition.     (1442) I consulted with Dr. Jon, hospitalist, regarding the patient's history and presentation here in the emergency department. She will come observe the patient before deciding on an admission status.     (1451) I rechecked the patient and discussed the results of his workup thus far.      (1500) I consulted with Dr. Jon and Dr. Mota about the patient and what should be done. After her evaluation, Dr. Jon accepts the patient to tele-obs; she is requesting we discontinue the Heparin, start on Metoprolol, and administer IV pain control.     Findings and plan explained to the Patient who consents to admission. Discussed the patient with Dr. Jon, who will admit the patient to a tele-obs bed for further monitoring, evaluation, and treatment.     I personally reviewed the laboratory and imaging results with the Patient and answered all related  questions prior to admission.     Impression & Plan      Medical Decision Makin year old male presenting after a slip and fall on the ice. CXR revealed a likely right 7th rib fracture, no other pathology. X-ray of left shoulder was negative. No indication for head or spine imaging at this time. No other traumatic injuries identified. Patient noted to be tachycardic on arrival and EKG revealed likely atrial flutter. Troponin was negative. Additional labs are unrevealing. He remained tachycardic in the 110's to 120's. Unfortunately he has an allergy to diltiazem so this cannot be given for rate control. He was given a dose of IV metoprolol. He has remained asymptomatic from the atrial flutter. Given this is new onset atrial flutter, patient will be admitted to observation bed for continued evaluation and management. Discussed the patient with Dr. Jon, who accepted the patient.    Diagnosis:    ICD-10-CM    1. Atrial tachycardia (H) I47.1 CBC + differential     Basic metabolic panel (BMP)     INR     BNP     Troponin I     Troponin I     Troponin POCT     Troponin POCT     CANCELED: Troponin I       Disposition:  Admitted to tele-obs under the care of Dr. Jon    Scribe Disclosure:  I, Sunni Salcido, am serving as a scribe on 12/3/2018 at 1:45 PM to personally document services performed by Rand Bell PA-C based on my observations and the provider's statements to me.     Sunni Salcido  12/3/2018   [facility] EMERGENCY DEPARTMENT       Rand Bell PA-C  [phone]

## 2018-12-03 NOTE — H&P
[facility]    Hospitalist History and Physical    Name: Nixon More    MRN: [id number]  YOB: 1935    Age: 83 year old  Date of Admission:  12/3/2018  Date of Service (when I saw the patient): 12/03/18    Assessment & Plan   Nixon More is a 83 year old male with past medical history significant for hypertension, coronary artery disease, hyperlipidemia, emphysema, sleep apnea presented to the emergency room status post mechanical fall with right-sided chest wall pain.  Chest x-ray shows new right rib fractures.  Patient was also found to be tachycardic and EKG showed new atrial flutter with RVR    New diagnosis of atrial flutter with RVR  --Patient asymptomatic  --We will monitor on telemetry  --IV metoprolol as needed for rapid ventricular rate  --Increased baseline prior to admission atenolol from 50 to 75 mg daily  --IV heparin started in the emergency room will  --Cardiac echo in a.m.  --Serial troponins to rule out ischemia  --Cardiology consult in a.m.  --Admit to telemetry abs patient is asymptomatic.      Right-sided chest wall pain with new right rib fractures  --Pain control with as needed Norco  --IV Dilaudid for breakthrough pain  --We will start on Colace for possible constipation related to narcotics  --Avoiding NSAIDs given new anticoagulation    Coronary artery disease  --Continue aspirin, beta-blocker, ARB and statins    Hypertension: Uncontrolled most likely secondary to pain  --Continue beta-blocker: Atenolol increased to 75 mg, losartan and hydrochlorothiazide  --As needed metoprolol for high blood pressure and tachycardia  --Narcotics for pain control to help with stress reaction and high blood pressure as well    Hyperlipidemia  --On statins    Emphysema  --Stable at this time no evidence of shortness of breath or wheezing on exam today  --Continue prior to admission meds    History of sleep apnea  --Patient can use BiPAP with sleep settings during  hospitalization    No known history of diabetes patient says he is borderline  --We will check hemoglobin A1c          DVT Prophylaxis: IV heparin  Code Status: Full Code    Disposition: Admitted for observation as patient needs cardiac evaluation to rule out ischemia and rate control for new flutter    Primary Care Physician   Natalya Lewis    Chief Complaint   Chest wall pain status post fall    History is obtained from the patient    History of Present Illness   Nixon More is a 83 year old male with past medical history significant for coronary artery disease, hypertension, hyperlipidemia, sleep apnea, emphysema presented to the emergency room after falling.  Patient slipped on ice and landed on his back complained of severe right-sided chest wall pain.  Pain was sharp, 10 out of 10,worse on breathing, reproducible, tender to touch.  Came to the emergency room where x-rays showed new rib fractures.  EKG done in the emergency room showed atrial lateral with RVR.  Patient is being admitted for new diagnosis of atrial flutter with RVR.  He denies any chest pain except for pain localized at the chest wall.  Denies no cardia pressure, heaviness or tightness.  No lightheadedness no dizziness no nausea no diaphoresis no swelling no orthopnea no PND.  No cough no fever no chills.  Review of all the other symptoms are negative    Past Medical History    Past Medical History:   Diagnosis Date     Actinic keratosis      Arthritis      CAD (coronary artery disease)     1/5/2011 lateral ischemia on stress echo, 12/2014 normal stress echo     Dyslipidemia      Emphysema of lung (H)      Essential hypertension, benign      Hernia, abdominal      Hypersomnia with sleep apnea, unspecified     on bipap, partially treated with residual apneas     Hypertrophy (benign) of prostate 5/01    Biopsy 5/01 negative for cancer; PSA 5     Lumbago      Mumps      Prostate cancer (H) 12/15/2006     Skin cancer, basal cell 1997     Spider  veins          Past Surgical History   Past Surgical History:   Procedure Laterality Date     HERNIA REPAIR  child     Moh's procedure for basal cell carcinoma  2001     PROSTATE SURGERY       s/p lumbar laminectomy NOS       VITRECTOMY PARS PLANA REMOVE PRERETINAL MEMBRANE   3/09       Prior to Admission Medications   Prior to Admission Medications   Prescriptions Last Dose Informant Patient Reported? Taking?   ASPIRIN 81 MG OR TABS   Yes No   Si tab po QD (Once per day)   EPINEPHrine (EPIPEN/ADRENACLICK/OR ANY BX GENERIC EQUIV) 0.3 MG/0.3ML injection 2-pack   No No   Sig: Inject 0.3 mLs (0.3 mg) into the muscle as needed for anaphylaxis   FLUoxetine (PROZAC) 10 MG capsule   No No   Sig: Take 1 capsule (10 mg) by mouth daily   Multiple Minerals-Vitamins (PROSTEON PO)   Yes No   Sig: Take 2,000 Units by mouth With vitamin D   Multiple Vitamins-Minerals (CENTRUM SILVER PO)   Yes No   atenolol (TENORMIN) 50 MG tablet   No No   Sig: Take 1 tablet (50 mg) by mouth daily   celecoxib (CELEBREX) 100 MG capsule   No No   Sig: Take 1 capsule (100 mg) by mouth 2 times daily as needed for moderate pain   Patient not taking: Reported on 2018   ipratropium (ATROVENT) 0.06 % spray   No No   Sig: Spray 2 sprays in nostril 4 times daily as needed for rhinitis   leuprolide (ELIGARD) 45 MG injection   Yes No   Sig: Inject 45 mg Subcutaneous every 6 months.   losartan-hydrochlorothiazide (HYZAAR) 100-12.5 MG per tablet   No No   Sig: Take 1 tablet by mouth daily   simvastatin (ZOCOR) 40 MG tablet   No No   Sig: Take 1 tablet (40 mg) by mouth At Bedtime      Facility-Administered Medications: None     Allergies   Allergies   Allergen Reactions     Augmentin Rash     Type III hypersensitivity     Bactrim [Sulfamethoxazole W/Trimethoprim] Rash     Serum sickness, type III hypersensitivity       Bee Venom Itching     Diltiazem      Lisinopril Cough     Naproxen Hives       Social History   Social History   Substance Use  Topics     Smoking status: Former Smoker     Packs/day: 1.00     Years: 30.00     Types: Cigarettes     Quit date: 1/1/1978     Smokeless tobacco: Never Used     Alcohol use 1.0 oz/week     2 Standard drinks or equivalent per week      Comment: socially     Social History     Social History Narrative     Lives with family.  Denies smoking.  Remote history of smoking.  Infrequent use of alcohol    Family History   Significant for coronary artery disease and hypertension    Review of Systems   A Comprehensive greater than 10 system review of systems was carried out.  Pertinent positives and negatives are noted above.  Otherwise negative for contributory information.    Physical Exam   Temp: 97.5  F (36.4  C) Temp src: Oral BP: (!) 163/124 Pulse: 133 Heart Rate: 133 Resp: 16 SpO2: 97 % O2 Device: None (Room air)    Vital Signs with Ranges  Temp:  [97.5  F (36.4  C)] 97.5  F (36.4  C)  Pulse:  [133] 133  Heart Rate:  [133] 133  Resp:  [16] 16  BP: (163)/(124) 163/124  SpO2:  [97 %] 97 %  0 lbs 0 oz    GEN:  Alert, oriented x 3, appears comfortable, no overt distress  HEENT:  Normocephalic/atraumatic, no scleral icterus, no nasal discharge, mouth moist.  CV:  Regular tachycardic no murmur S1 + S2 noted, no S3 or S4.  LUNGS: Poor inspiratory effort otherwise clear to auscultation bilaterally without rales/rhonchi/wheezing/retractions.  Symmetric chest rise on inhalation noted.  ABD:  Active bowel sounds, soft, non-tender/non-distended.  No rebound/guarding/rigidity.  EXT:  No edema.  No cyanosis.   SKIN:  Dry to touch, no exanthems noted in the visualized areas.  NEURO:  Symmetric muscle strength,   No new focal deficits appreciated.    Data   Data reviewed today:  I personally reviewed the EKG tracing showing Atrial flutter with RVR.      Recent Labs  Lab 12/03/18  1313   WBC 7.6   HGB 14.9   HCT 46.4   MCV 96          Recent Labs  Lab 12/03/18  1313      POTASSIUM 3.7   CHLORIDE 106   CO2 28   ANIONGAP 8    *   BUN 22   CR 1.04   GFRESTIMATED 69   GFRESTBLACK 83   MARLENE 9.4       Recent Labs  Lab 12/03/18  1313   NTBNPI 1210     No results for input(s): AST, ALT, GGT, ALKPHOS, BILITOTAL, BILICONJ, BILIDIRECT, VIANCA in the last 168 hours.    Invalid input(s): BILIRUBININDIRECT    Recent Labs  Lab 12/03/18  1313   INR 1.03     No results for input(s): LACT in the last 168 hours.  No results for input(s): TSH in the last 168 hours.    Recent Labs  Lab 12/03/18  1453 12/03/18  1313   TROPONIN 0.01  --    TROPI  --  <0.015     No results for input(s): COLOR, APPEARANCE, URINEGLC, URINEBILI, URINEKETONE, SG, UBLD, URINEPH, PROTEIN, UROBILINOGEN, NITRITE, LEUKEST, RBCU, WBCU in the last 168 hours.    Recent Results (from the past 24 hour(s))   XR Chest 2 Views    Narrative    XR CHEST 2 VW 12/3/2018 12:25 PM    HISTORY: Right posterior rib pain after fall.    COMPARISON: None.    FINDINGS: Suspect right seventh posterior rib fracture. No  consolidation, effusion or pneumothorax. Normal heart size.      Impression    IMPRESSION: Suspect right seventh rib fracture. No acute pulmonary  abnormality.    ANTWON ASTORGA MD   XR Shoulder Left G/E 3 Views    Narrative    XR SHOULDER LT G/E 3 VW 12/3/2018 12:29 PM    HISTORY: Shoulder pain after fall.    COMPARISON: 11/29/2011    FINDINGS: No fracture or malalignment. Mild osseous degenerative  change at the acromioclavicular and glenohumeral joints. Visualized  ribs appear intact.      Impression    IMPRESSION: No acute osseous abnormality.    ANTWON ASTORGA MD

## 2018-12-03 NOTE — IP AVS SNAPSHOT
MRN:[id number]                      After Visit Summary   12/3/2018    Nixon More    MRN: [id number]           Thank you!     Thank you for choosing [facility] for your care. Our goal is always to provide you with excellent care. Hearing back from our patients is one way we can continue to improve our services. Please take a few minutes to complete the written survey that you may receive in the mail after you visit. If you would like to speak to someone directly about your visit please contact Patient Relations at [phone]. Thank you!          Patient Information     Date Of Birth          1935        About your hospital stay     You were admitted on:  December 3, 2018 You last received care in the:  [place] [address] [facility]    You were discharged on:  December 5, 2018        Reason for your hospital stay       You had come in due to the fall and chest pain (rib fractures) but you were admitted for atrial flutter that was causing rapid heart rate.                  Who to Call     For medical emergencies, please call 911.  For non-urgent questions about your medical care, please call your primary care provider or clinic, [phone]          Attending Provider     Provider Specialty    Naseem Mota MD Emergency Medicine    [facility] Center[facility]ao cote MD Emergency Medicine    ArabellaRand gee PA-C Emergency Medicine    [facility]ynal[facility]Lemuel [facility]tuck HospitalDianne minor MD Internal Medicine       Primary Care Provider Office Phone # Fax #    Natalya Lewis [phone]      After Care Instructions     Activity       Your activity upon discharge: activity as tolerated            Diet       Follow this diet upon discharge: Regular                  Follow-up Appointments     Follow-up and recommended labs and tests        Follow up with primary care provider, Natalya Lewis, as needed.   You must follow up with cardiology (elctrophysiology) as planned.                  Your  next 10 appointments already scheduled     Feb 11, 2019  9:00 AM CST   Cystoscopy with Mark Pino MD, UB CYF   [facility] ([facility])    [address] [facility]  [address]  [facility] [address] [phone]              Additional Services     Follow-Up with Electrophysiologist                 Future tests that were ordered for you     NM Lexiscan stress test (nuc card)           EKG 12-lead complete w/read - Clinics                 Pending Results     No orders found from 12/1/2018 to 12/4/2018.            Statement of Approval     Ordered          12/05/18 1342  I have reviewed and agree with all the recommendations and orders detailed in this document.  EFFECTIVE NOW     Approved and electronically signed by:  Curry Hendrickson MD             Admission Information     Date & Time Provider Department Dept. Phone    12/3/2018 Dianne Jon MD [place] [address] [facility] [phone]      Your Vitals Were     Blood Pressure Pulse Temperature Respirations Weight Pulse Oximetry    123/82 (BP Location: Left arm) 72 97.9  F (36.6  C) (Oral) 18 99.7 kg (219 lb 12.8 oz) 95%    BMI (Body Mass Index)                   34.43 kg/m2           MyChart Information     [employer] gives you secure access to your electronic health record. If you see a primary care provider, you can also send messages to your care team and make appointments. If you have questions, please call your primary care clinic.  If you do not have a primary care provider, please call [phone] and they will assist you.        Care EveryWhere ID     This is your Care EveryWhere ID. This could be used by other organizations to access your [facility] medical records  [id number]        Equal Access to Services     ANTONIA BRAVO : kirstin Cornell qaybta kaalmada adeegyada, waxay idiin hayaan adeeg kharash la'aan ah. So [facility]  [phone].    ATENCIÓN: Si markel george, tiene a coelho disposición servicios gratuitos de asistencia lingüística. Jaren terrell [phone].    We comply with applicable federal civil rights laws and [place] laws. We do not discriminate on the basis of race, color, national origin, age, disability, sex, sexual orientation, or gender identity.               Review of your medicines      START taking        Dose / Directions    apixaban ANTICOAGULANT 5 MG tablet   Commonly known as:  ELIQUIS   Used for:  Atrial tachycardia (H)        Dose:  5 mg   Take 1 tablet (5 mg) by mouth 2 times daily   Quantity:  60 tablet   Refills:  0       diltiazem ER COATED BEADS 180 MG 24 hr capsule   Commonly known as:  CARDIZEM CD/CARTIA XT   Used for:  Atrial tachycardia (H)        Dose:  180 mg   Start taking on:  12/6/2018   Take 1 capsule (180 mg) by mouth daily   Quantity:  30 capsule   Refills:  0       losartan 100 MG tablet   Commonly known as:  COZAAR   Used for:  Hypertension goal BP (blood pressure) < 140/90        Dose:  100 mg   Start taking on:  12/6/2018   Take 1 tablet (100 mg) by mouth daily   Quantity:  30 tablet   Refills:  0         CONTINUE these medicines which may have CHANGED, or have new prescriptions. If we are uncertain of the size of tablets/capsules you have at home, strength may be listed as something that might have changed.        Dose / Directions    atenolol 50 MG tablet   Commonly known as:  TENORMIN   This may have changed:  how much to take   Used for:  Atrial tachycardia (H)        Dose:  150 mg   Start taking on:  12/6/2018   Take 3 tablets (150 mg) by mouth daily   Quantity:  45 tablet   Refills:  0         CONTINUE these medicines which have NOT CHANGED        Dose / Directions    aspirin 81 MG tablet   Commonly known as:  ASA   Used for:  Essential hypertension, benign        1 tab po QD (Once per day)   Quantity:  100   Refills:  3       CENTRUM SILVER PO        Refills:  0       EPINEPHrine 0.3  MG/0.3ML injection 2-pack   Commonly known as:  EPIPEN/ADRENACLICK/or ANY BX GENERIC EQUIV   Used for:  Bee sting-induced anaphylaxis, undetermined intent, subsequent encounter        Dose:  0.3 mg   Inject 0.3 mLs (0.3 mg) into the muscle as needed for anaphylaxis   Quantity:  0.6 mL   Refills:  1       FLUoxetine 10 MG capsule   Commonly known as:  PROzac   Used for:  Mild major depression (H)        Dose:  10 mg   Take 1 capsule (10 mg) by mouth daily   Quantity:  90 capsule   Refills:  3       ipratropium 0.06 % nasal spray   Commonly known as:  ATROVENT   Used for:  Nasal congestion        Dose:  2 spray   Spray 2 sprays in nostril 4 times daily as needed for rhinitis   Quantity:  3 Box   Refills:  3       leuprolide 45 MG kit   Commonly known as:  ELIGARD        Dose:  45 mg   Inject 45 mg Subcutaneous every 6 months.   Quantity:  1 each   Refills:  12       PROSTEON PO        Dose:  2000 Units   Take 2,000 Units by mouth With vitamin D   Refills:  0       simvastatin 40 MG tablet   Commonly known as:  ZOCOR   Used for:  Dyslipidemia        Dose:  40 mg   Take 1 tablet (40 mg) by mouth At Bedtime   Quantity:  90 tablet   Refills:  3         STOP taking     celecoxib 100 MG capsule   Commonly known as:  celeBREX           losartan-hydrochlorothiazide 100-12.5 MG tablet   Commonly known as:  HYZAAR                Where to get your medicines      These medications were sent to [facility] [place] [address]     Phone:  [phone]     apixaban ANTICOAGULANT 5 MG tablet    atenolol 50 MG tablet    diltiazem ER COATED BEADS 180 MG 24 hr capsule    losartan 100 MG tablet                Protect others around you: Learn how to safely use, store and throw away your medicines at www.disposemymeds.org.             Medication List: This is a list of all your medications and when to take them. Check marks below indicate your daily home  schedule. Keep this list as a reference.      Medications           Morning Afternoon Evening Bedtime As Needed    apixaban ANTICOAGULANT 5 MG tablet   Commonly known as:  ELIQUIS   Take 1 tablet (5 mg) by mouth 2 times daily   Last time this was given:  5 mg on 12/5/2018  9:28 AM                                      aspirin 81 MG tablet   Commonly known as:  ASA   1 tab po QD (Once per day)                                   atenolol 50 MG tablet   Commonly known as:  TENORMIN   Take 3 tablets (150 mg) by mouth daily   Start taking on:  12/6/2018   Last time this was given:  150 mg on 12/5/2018  9:27 AM                                   CENTRUM SILVER PO                                diltiazem ER COATED BEADS 180 MG 24 hr capsule   Commonly known as:  CARDIZEM CD/CARTIA XT   Take 1 capsule (180 mg) by mouth daily   Start taking on:  12/6/2018   Last time this was given:  120 mg on 12/5/2018  9:27 AM                                   EPINEPHrine 0.3 MG/0.3ML injection 2-pack   Commonly known as:  EPIPEN/ADRENACLICK/or ANY BX GENERIC EQUIV   Inject 0.3 mLs (0.3 mg) into the muscle as needed for anaphylaxis                                FLUoxetine 10 MG capsule   Commonly known as:  PROzac   Take 1 capsule (10 mg) by mouth daily   Last time this was given:  10 mg on 12/5/2018  9:27 AM                                   ipratropium 0.06 % nasal spray   Commonly known as:  ATROVENT   Spray 2 sprays in nostril 4 times daily as needed for rhinitis                                   leuprolide 45 MG kit   Commonly known as:  ELIGARD   Inject 45 mg Subcutaneous every 6 months.                                losartan 100 MG tablet   Commonly known as:  COZAAR   Take 1 tablet (100 mg) by mouth daily   Start taking on:  12/6/2018   Last time this was given:  100 mg on 12/5/2018  9:27 AM                                   PROSTEON PO   Take 2,000 Units by mouth With vitamin D                                simvastatin 40 MG  tablet   Commonly known as:  ZOCOR   Take 1 tablet (40 mg) by mouth At Bedtime   Last time this was given:  40 mg on 12/3/2018  9:02 PM                                             More Information        What Is Atrial Flutter/Atrial Fibrillation?      The heart has its own electrical system. This system makes the signals that start each heartbeat. The heartbeat begins in 1 of the 2 upper chambers of the heart (atria). A problem can make the atria beat faster than normal. The atria may beat fast but still evenly. This problem is called atrial flutter. If the atria beat very fast and also unevenly, it is called atrial fibrillation (AFib).  Causes of Atrial Flutter and Atrial Fibrillation  Causes of these problems can include:    Previous heart attack    High blood pressure    Thyroid problems  In many cases, the cause is unknown.  When the Atria Beat Too Fast  The atria may beat fast only once in a while. This is called a paroxysmal heart rhythm problem. If they beat fast all the time, it is a chronic problem.  Atrial Flutter  With atrial flutter, electrical signals travel around and around inside the atria. These circling signals make the atria beat too fast:    Atrial flutter can cause symptoms similar to AFib. It can also lead to the even faster, uneven rhythms of AFib.  Atrial Fibrillation (AFib)  With AFib, cells in the atria send extra electrical signals. These extra signals make the atria beat very fast. They also beat unevenly:    The atria beat so fast and unevenly that they may quiver instead of steve. If the atria don t contract, they don t move enough blood into the 2 lower chambers of the heart (ventricles). This can cause you to feel dizzy or weak.    Blood that doesn t keep moving can pool and form clots in the atria. These clots can move into other parts of the body and cause serious problems such as a stroke.   Symptoms of Atrial Flutter and AFib  These symptoms include the  following:    Palpitations (a fluttering, fast heartbeat)    Weakness or tiredness    Shortness of breath    Chest pain or tightness    Dizziness or lightheadedness    Fainting spells   Date Last Reviewed: 2/26/2014 2000-2018 [employer] [address] [place] [address]. All rights reserved. This information is not intended as a substitute for professional medical care. Always follow your healthcare professional's instructions.                Apixaban Oral tablet  What is this medicine?  APIXABAN (a PIX a ban) is an anticoagulant (blood thinner). It is used to lower the chance of stroke in people with a medical condition called atrial fibrillation. It is also used to treat or prevent blood clots in the lungs or in the veins.  This medicine may be used for other purposes; ask your health care provider or pharmacist if you have questions.  What should I tell my health care provider before I take this medicine?  They need to know if you have any of these conditions:    bleeding disorders    bleeding in the brain    blood in your stools (black or tarry stools) or if you have blood in your vomit    history of stomach bleeding    kidney disease    liver disease    mechanical heart valve    an unusual or allergic reaction to apixaban, other medicines, foods, dyes, or preservatives    pregnant or trying to get pregnant    breast-feeding  How should I use this medicine?  Take this medicine by mouth with a glass of water. Follow the directions on the prescription label. You can take it with or without food. If it upsets your stomach, take it with food. Take your medicine at regular intervals. Do not take it more often than directed. Do not stop taking except on your doctor's advice. Stopping this medicine may increase your risk of a blot clot. Be sure to refill your prescription before you run out of medicine.  Talk to your pediatrician regarding the use of this medicine in children. Special care  may be needed.  Overdosage: If you think you have taken too much of this medicine contact a poison control center or emergency room at once.  NOTE: This medicine is only for you. Do not share this medicine with others.  What if I miss a dose?  If you miss a dose, take it as soon as you can. If it is almost time for your next dose, take only that dose. Do not take double or extra doses.  What may interact with this medicine?  This medicine may interact with the following:    aspirin and aspirin-like medicines    certain medicines for fungal infections like ketoconazole and itraconazole    certain medicines for seizures like carbamazepine and phenytoin    certain medicines that treat or prevent blood clots like warfarin, enoxaparin, and dalteparin    clarithromycin    NSAIDs, medicines for pain and inflammation, like ibuprofen or naproxen    rifampin    ritonavir    [place]'s wort  This list may not describe all possible interactions. Give your health care provider a list of all the medicines, herbs, non-prescription drugs, or dietary supplements you use. Also tell them if you smoke, drink alcohol, or use illegal drugs. Some items may interact with your medicine.  What should I watch for while using this medicine?  Notify your doctor or health care professional and seek emergency treatment if you develop breathing problems; changes in vision; chest pain; severe, sudden headache; pain, swelling, warmth in the leg; trouble speaking; sudden numbness or weakness of the face, arm, or leg. These can be signs that your condition has gotten worse.  If you are going to have surgery, tell your doctor or health care professional that you are taking this medicine.  Tell your health care professional that you use this medicine before you have a spinal or epidural procedure. Sometimes people who take this medicine have bleeding problems around the spine when they have a spinal or epidural procedure. This bleeding is very rare. If  you have a spinal or epidural procedure while on this medicine, call your health care professional immediately if you have back pain, numbness or tingling (especially in your legs and feet), muscle weakness, paralysis, or loss of bladder or bowel control.  Avoid sports and activities that might cause injury while you are using this medicine. Severe falls or injuries can cause unseen bleeding. Be careful when using sharp tools or knives. Consider using an electric razor. Take special care brushing or flossing your teeth. Report any injuries, bruising, or red spots on the skin to your doctor or health care professional.  What side effects may I notice from receiving this medicine?  Side effects that you should report to your doctor or health care professional as soon as possible:    allergic reactions like skin rash, itching or hives, swelling of the face, lips, or tongue    signs and symptoms of bleeding such as bloody or black, tarry stools; red or dark-brown urine; spitting up blood or brown material that looks like coffee grounds; red spots on the skin; unusual bruising or bleeding from the eye, gums, or nose  This list may not describe all possible side effects. Call your doctor for medical advice about side effects. You may report side effects to FDA at 7-443-FDA-7663.  Where should I keep my medicine?  Keep out of the reach of children.  Store at room temperature between 20 and 25 degrees C (68 and 77 degrees F). Throw away any unused medicine after the expiration date.  NOTE: This sheet is a summary. It may not cover all possible information. If you have questions about this medicine, talk to your doctor, pharmacist, or health care provider.  NOTE:This sheet is a summary. It may not cover all possible information. If you have questions about this medicine, talk to your doctor, pharmacist, or health care provider. Copyright  2016 [employer]                Chest Echocardiography (Transthoracic)     During an  echo, images of your heart appear on a monitor.   An echocardiogram (echo) is an imaging test.  A transthoracic echocardiogram is sometimes called by its abbreviation TTE. It may also be called surface echocardiogram because the images are non-invasive taken from the surface of the chest wall. It helps your healthcare provider evaluate your heart. A more invasive type of echocardiogram involves the ultrasound probe being passed into the esophagus to get images (transesophageal).     This test:    Is safe and generally painless    May cause discomfort from the echo probe being pressed against the bony areas of the chest. This is relieved once the probe is moved.    Can be done in a hospital, test center, or doctor s office    Bounces harmless sound waves (ultrasound) off the heart using a transducer or probe (device that looks like a microphone)    Allows your healthcare provider to evaluate the size and shape of your heart, and the size, thickness and movement of your heart's walls, and the heart's pumping strength    Shows if the heart valves are working correctly, if blood is leaking backwards through your heart valves (regurgitation), or if the heart valves are to narrow (stenosis)    Shows if there is a tumor or infectious growth around your heart valves    Will help your healthcare provider find out if there are problems with the outer lining of your heart (pericardium)    Shows problems with the large blood vessels that enter and leave the heart    Demonstrates blood clots in the heart chambers    Shows abnormal holes between heart chambers  Before your echo    Discuss any questions or concerns you have with your healthcare provider.    Mention any over-the-counter or prescription medicines, herbs, or supplements you re taking.    Allow extra time for checking in. Bring your insurance cards, identification, and any co-payments that are required for the test.    Wear a 2-piece outfit for the test. You may be  asked to remove clothing and jewelry from the waist up. If so, you ll be given a short hospital gown.    An IV (intravenous) catheter may be inserted into a vein in your arm or hand. Contrast or bubbles may be injected during the study.  During your echo    Small pads (electrodes) are placed on your chest to monitor your heartbeat.    A transducer coated with gel is moved firmly over your chest. This device creates the sound waves that make images of your heart. If you are overweight, the technician may have to apply more pressure to the chest wall to improve the quality of the images. This pressure can be uncomfortable over bony areas. Tell your technician if you are uncomfortable.    At times, you may be asked to exhale and hold your breath for a few seconds. Air in your lungs can affect the images.    The transducer may also be used to do a Doppler study. This test measures the direction and speed of blood flowing through the heart. During the test, you may hear a  whooshing  sound. This is the sound of blood flowing through the heart.    The technician may use IV contrast to improve the image quality or agitated saline may be used to follow blood flow through the chambers of the heart.    The images of your heart are stored electronically. This is so your healthcare provider can review them later.  After your echo    Return to normal activity unless your healthcare provider tells you otherwise.    Be sure to keep follow-up appointments.  Your test results  Your healthcare provider will discuss your test results with you during a future office visit. The test results help the healthcare provider plan your treatment and any other tests that are needed.  Date Last Reviewed: 12/1/2016 2000-2018 [email or web address]. [address] [place] [address], [place] [address]. All rights reserved. This information is not intended as a substitute for professional medical care. Always follow your healthcare professional's  instructions.                Acetaminophen; Hydrocodone tablets or capsules  Brand Names: Anexsia, Lorcet, Lorcet HD, Lorcet Plus, Lortab, Norco, Verdrocet, Vicodin, Vicodin ES, Vicodin HP, Xodol  What is this medicine?  ACETAMINOPHEN; HYDROCODONE (a set a RUDDY jcarlos fen; jenny droe KOE done) is a pain reliever. It is used to treat moderate to severe pain.  How should I use this medicine?  Take this medicine by mouth with a glass of water. Follow the directions on the prescription label. You can take it with or without food. If it upsets your stomach, take it with food. Do not take your medicine more often than directed.  A special MedGuide will be given to you by the pharmacist with each prescription and refill. Be sure to read this information carefully each time.  Talk to your pediatrician regarding the use of this medicine in children. Special care may be needed.  What side effects may I notice from receiving this medicine?  Side effects that you should report to your doctor or health care professional as soon as possible:    allergic reactions like skin rash, itching or hives, swelling of the face, lips, or tongue    breathing problems    confusion    redness, blistering, peeling or loosening of the skin, including inside the mouth    signs and symptoms of low blood pressure like dizziness; feeling faint or lightheaded, falls; unusually weak or tired    trouble passing urine or change in the amount of urine    yellowing of the eyes or skin  Side effects that usually do not require medical attention (report to your doctor or health care professional if they continue or are bothersome):    constipation    dry mouth    nausea, vomiting    tiredness  What may interact with this medicine?  This medicine may interact with the following medications:    alcohol    antiviral medicines for HIV or AIDS    atropine    antihistamines for allergy, cough and cold    certain antibiotics like erythromycin, clarithromycin    certain  medicines for anxiety or sleep    certain medicines for bladder problems like oxybutynin, tolterodine    certain medicines for depression like amitriptyline, fluoxetine, sertraline    certain medicines for fungal infections like ketoconazole and itraconazole    certain medicines for Parkinson's disease like benztropine, trihexyphenidyl    certain medicines for seizures like carbamazepine, phenobarbital, phenytoin, primidone    certain medicines for stomach problems like dicyclomine, hyoscyamine    certain medicines for travel sickness like scopolamine    general anesthetics like halothane, isoflurane, methoxyflurane, propofol    ipratropium    local anesthetics like lidocaine, pramoxine, tetracaine    MAOIs like Carbex, Eldepryl, Marplan, Nardil, and Parnate    medicines that relax muscles for surgery    other medicines with acetaminophen    other narcotic medicines for pain or cough    phenothiazines like chlorpromazine, mesoridazine, prochlorperazine, thioridazine    rifampin  What if I miss a dose?  If you miss a dose, take it as soon as you can. If it is almost time for your next dose, take only that dose. Do not take double or extra doses.  Where should I keep my medicine?  Keep out of the reach of children. This medicine can be abused. Keep your medicine in a safe place to protect it from theft. Do not share this medicine with anyone. Selling or giving away this medicine is dangerous and against the law.  Store at room temperature between 15 and 30 degrees C (59 and 86 degrees F).  This medicine may cause harm and death if it is taken by other adults, children, or pets. Return medicine that has not been used to an official disposal site. Contact the [facility] at [phone] or your [facility] government to find a site. If you cannot return the medicine, flush it down the toilet. Do not use the medicine after the expiration date.  What should I tell my health care provider before I take this medicine?  They need  to know if you have any of these conditions:    brain tumor    Crohn's disease, inflammatory bowel disease, or ulcerative colitis    drug abuse or addiction    head injury    heart or circulation problems    if you often drink alcohol    kidney disease or problems going to the bathroom    liver disease    lung disease, asthma, or breathing problems    an unusual or allergic reaction to acetaminophen, hydrocodone, other opioid analgesics, other medicines, foods, dyes, or preservatives    pregnant or trying to get pregnant    breast-feeding  What should I watch for while using this medicine?  Tell your doctor or health care professional if your pain does not go away, if it gets worse, or if you have new or a different type of pain. You may develop tolerance to the medicine. Tolerance means that you will need a higher dose of the medicine for pain relief. Tolerance is normal and is expected if you take the medicine for a long time.  Do not suddenly stop taking your medicine because you may develop a severe reaction. Your body becomes used to the medicine. This does NOT mean you are addicted. Addiction is a behavior related to getting and using a drug for a non-medical reason. If you have pain, you have a medical reason to take pain medicine. Your doctor will tell you how much medicine to take. If your doctor wants you to stop the medicine, the dose will be slowly lowered over time to avoid any side effects.  There are different types of narcotic medicines (opiates). If you take more than one type at the same time or if you are taking another medicine that also causes drowsiness, you may have more side effects. Give your health care provider a list of all medicines you use. Your doctor will tell you how much medicine to take. Do not take more medicine than directed. Call emergency for help if you have problems breathing or unusual sleepiness.  Do not take other medicines that contain acetaminophen with this medicine.  Always read labels carefully. If you have questions, ask your doctor or pharmacist.  If you take too much acetaminophen get medical help right away. Too much acetaminophen can be very dangerous and cause liver damage. Even if you do not have symptoms, it is important to get help right away.  You may get drowsy or dizzy. Do not drive, use machinery, or do anything that needs mental alertness until you know how this medicine affects you. Do not stand or sit up quickly, especially if you are an older patient. This reduces the risk of dizzy or fainting spells. Alcohol may interfere with the effect of this medicine. Avoid alcoholic drinks.  The medicine will cause constipation. Try to have a bowel movement at least every 2 to 3 days. If you do not have a bowel movement for 3 days, call your doctor or health care professional.  Your mouth may get dry. Chewing sugarless gum or sucking hard candy, and drinking plenty of water may help. Contact your doctor if the problem does not go away or is severe.  NOTE:This sheet is a summary. It may not cover all possible information. If you have questions about this medicine, talk to your doctor, pharmacist, or health care provider. Copyright  2018 [employer]              Managing Your Pain While in the Hospital  Introduction  Management of your pain is an important part of your healing. Pain can make it hard to eat, sleep, move, walk or breathe. It can affect your mood and how you relate to others. It can even delay your healing after an illness, injury or surgery.  Definitions  There are two main types of pain:    Acute pain has a start and an end. For example, pain from a surgery or heart attack is acute pain. This type of pain lessens during the healing process.    Chronic, or persistent, pain lasts beyond the usual healing period for an injury. Sometimes it does not have a clear cause. The pain may be constant, or it may come and go. This type of pain is best treated at an  outpatient clinic.  Goals for pain  Our goal is to manage your pain so that you can do what is needed to heal quickly. You should expect some pain after surgery or an injury. We treat different kinds of pain in different ways (medicine and non-medicine). We will do whatever we can to control your pain with the least amount of side effects.  Keeping you safe during your hospital stay is very important. We will check your breathing often if we give you strong pain medicines. We may have to wake you up to make sure the medicines are at a safe level. You and your family should tell us if you have breathing problems or other reactions to your medicines.  Communication  To best manage your pain, we need to know:    Where do you feel pain?    How much does it hurt?    What methods of pain control have helped you in the past? What has not helped?    What pain medicine did you take in the week before you came to the hospital? On average, how much of each medicine did you take each day?    Do you use alcohol or illegal (street) drugs? These habits affect how much pain medicine you may need.    Have you had an allergic or bad reaction to any pain medicine?    Besides medicine, are there other methods that you use to help with pain? For example, taking herbal or natural products or applying ice or heat to a painful area.    Are you in a methadone or suboxone treatment program? If so, we will keep you on your meds while you are in the hospital.  Summary  We will ask you about your pain on a regular basis. We want to know if the pain management methods are helping.  Anxiety and depression can make pain worse. If you have these feelings, please let us know. We want you to feel as comfortable as possible during your hospital stay.  For informational purposes only. Not to replace the advice of your health care provider.  Copyright   2009 [employer]. All rights reserved. [employer] 485241 - REV  03/16.            Fall Prevention  Falls often occur due to slipping, tripping or losing your balance. Millions of people fall every year and injure themselves. Here are ways to reduce your risk of falling again.    Think about your fall, was there anything that caused your fall that can be fixed, removed, or replaced?    Make your home safe by keeping walkways clear of objects you may trip over, such as electric cords.    Use non-slip pads under rugs. Don't use area rugs or small throw rugs.    Use non-slip mats in bathtubs and showers.    Install handrails and lights on staircases. The handrails should be on both sides of the stairs.    Don't walk in poorly lit areas.    Don't stand on chairs or wobbly ladders.    Use caution when reaching overhead or looking upward. This position can cause a loss of balance.    Be sure your shoes fit properly, have non-slip bottoms and are in good condition.     Wear shoes both inside and out. Don't go barefoot or wear slippers.    Be cautious when going up and down stairs, curbs, and when walking on uneven sidewalks.    If your balance is poor, consider using a cane or walker.    If your fall was related to alcohol use, stop or limit alcohol intake.     If your fall was related to use of sleeping medicines, talk to your healthcare provider about this. You may need to reduce your dosage at bedtime if you awaken during the night to go to the bathroom.      To reduce the need for nighttime bathroom trips:  ? Don't drink fluids for several hours before going to bed  ? Empty your bladder before going to bed  ? Men can keep a urinal at the bedside    Stay as active as you can. Balance, flexibility, strength, and endurance all come from exercise. They all play a role in preventing falls. Ask your healthcare provider which types of activity are right for you.    Get your vision checked on a regular basis.    If you have pets, know where they are before you stand up or walk so you don't  trip over them.    Use night lights.    Go over all your medicines with a pharmacist or other healthcare provider to see if any of them could make you more likely to fall.  Date Last Reviewed: 4/1/2018 2000-2018 [employer]. [address] [place] [address], [place] [address]. All rights reserved. This information is not intended as a substitute for professional medical care. Always follow your healthcare professional's instructions.

## 2018-12-03 NOTE — ED TRIAGE NOTES
Pt presents after slipping and falling today, c/o some right sided back pain, denies hitting head, no LOC. Pt able to ambulate. Also c/o left shoulder pain. Pt alert, oriented x3 ABCs intact

## 2018-12-03 NOTE — ED NOTES
Rapid Assessment Note    History:   This is an 83-year-old male with a mechanical fall and left shoulder and right posterior chest wall pain.  He denies dizziness, head injury, or other symptoms leading up to or following the injury.  With movement his pain to the ribs is worse but he denies any shortness of breath or other concerns.    Exam:   Constitutional: Alert, attentive, GCS 15  HENT:    Nose: Nose normal.    Mouth/Throat: Oropharynx is clear, mucous membranes are moist   Eyes: EOM are normal.   CV: tachycardic, regular rhythm; no murmurs, rubs or gallups  Chest: Effort normal and breath sounds normal.   GI:  There is no tenderness. No distension. Normal bowel sounds  MSK: Normal range of motion.    C-spine cleared by NEXUS   No tenderness or stepoffs to the C/T/L-spine   Normal, atraumatic inspection to the back    Right posterior chest wall tenderness without crepitus or stepoffs about the 7th and 8th ribs below the scapula   Vague anterior left shoulder tenderness, normal ROM, normal inspection  Neurological: Alert, attentive  Skin: Skin is warm and dry.      Plan of Care:   I evaluated the patient and developed an initial plan of care. I discussed this plan and explained that I, or one of my partners, would be returning to complete the evaluation. Arabella GARZA to follow up on labs and likely admission for atrial tachycardia workup (appears consistent with AFlutter with variable block, likely in part related to atenolol use). Patient initially declined labs and further workup but remained tachycardic and agreed with above plan.      11/5/2018  [employer]    Portions of this medical record were completed by a scribe. UPON MY REVIEW AND AUTHENTICATION BY ELECTRONIC SIGNATURE, this confirms (a) I performed the applicable clinical services, and (b) the record is accurate.        Naseem Mota MD  12/03/18 0184

## 2018-12-03 NOTE — IP AVS SNAPSHOT
[place] [address] [facility]    [address]    [facility] [address]    Phone:  [phone]    Fax:  [phone]                                       After Visit Summary   12/3/2018    Nixon More    MRN: [id number]           After Visit Summary Signature Page     I have received my discharge instructions, and my questions have been answered. I have discussed any challenges I see with this plan with the nurse or doctor.    ..........................................................................................................................................  Patient/Patient Representative Signature      ..........................................................................................................................................  Patient Representative Print Name and Relationship to Patient    ..................................................               ................................................  Date                                   Time    ..........................................................................................................................................  Reviewed by Signature/Title    ...................................................              ..............................................  Date                                               Time          22EPIC Rev 08/18

## 2018-12-03 NOTE — PHARMACY-ADMISSION MEDICATION HISTORY
Admission medication history interview status for this patient is complete. See [facility] admission navigator for allergy information, prior to admission medications and immunization status.     Medication history interview source(s):Patient  Medication history resources (including written lists, pill bottles, clinic record):None  Primary pharmacy:    Changes made to PTA medication list:  Added:   Deleted:   Changed:     Actions taken by pharmacist (provider contacted, etc):None     Additional medication history information:None    Medication reconciliation/reorder completed by provider prior to medication history? No    Do you take OTC medications (eg tylenol, ibuprofen, fish oil, eye/ear drops, etc)? Y(Y/N)    For patients on insulin therapy: NA (Y/N)        Prior to Admission medications    Medication Sig Last Dose Taking? Auth Provider   ASPIRIN 81 MG OR TABS 1 tab po QD (Once per day) 12/2/2018 at pm Yes    atenolol (TENORMIN) 50 MG tablet Take 1 tablet (50 mg) by mouth daily 12/3/2018 at Unknown time Yes Natalya Lewis MD   FLUoxetine (PROZAC) 10 MG capsule Take 1 capsule (10 mg) by mouth daily 12/2/2018 at Unknown time Yes Natalya Lewis MD   ipratropium (ATROVENT) 0.06 % spray Spray 2 sprays in nostril 4 times daily as needed for rhinitis Past Week at Unknown time Yes Natalya Lewis MD   leuprolide (ELIGARD) 45 MG injection Inject 45 mg Subcutaneous every 6 months. august Yes    losartan-hydrochlorothiazide (HYZAAR) 100-12.5 MG per tablet Take 1 tablet by mouth daily 12/3/2018 at Unknown time Yes Natalya Lewis MD   Multiple Vitamins-Minerals (CENTRUM SILVER PO)  12/3/2018 at Unknown time Yes Reported, Patient   simvastatin (ZOCOR) 40 MG tablet Take 1 tablet (40 mg) by mouth At Bedtime 12/2/2018 at pm Yes Natalya Lewis MD   celecoxib (CELEBREX) 100 MG capsule Take 1 capsule (100 mg) by mouth 2 times daily as needed for moderate pain  Patient not taking: Reported on 7/12/2018 More than a month at Unknown  time  Natalya Lewis MD   EPINEPHrine (EPIPEN/ADRENACLICK/OR ANY BX GENERIC EQUIV) 0.3 MG/0.3ML injection 2-pack Inject 0.3 mLs (0.3 mg) into the muscle as needed for anaphylaxis   Natalya Lewis MD   Multiple Minerals-Vitamins (PROSTEON PO) Take 2,000 Units by mouth With vitamin D   Reported, Patient

## 2018-12-04 ENCOUNTER — APPOINTMENT (OUTPATIENT)
Dept: CARDIOLOGY | Facility: CLINIC | Age: 83
End: 2018-12-04
Attending: INTERNAL MEDICINE
Payer: COMMERCIAL

## 2018-12-04 LAB
ANION GAP SERPL CALCULATED.3IONS-SCNC: 6 MMOL/L (ref 3–14)
BUN SERPL-MCNC: 21 MG/DL (ref 7–30)
CALCIUM SERPL-MCNC: 8.9 MG/DL (ref 8.5–10.1)
CHLORIDE SERPL-SCNC: 106 MMOL/L (ref 94–109)
CO2 SERPL-SCNC: 26 MMOL/L (ref 20–32)
CREAT SERPL-MCNC: 1.06 MG/DL (ref 0.66–1.25)
GFR SERPL CREATININE-BSD FRML MDRD: 67 ML/MIN/1.7M2
GLUCOSE SERPL-MCNC: 116 MG/DL (ref 70–99)
HBA1C MFR BLD: 5.9 % (ref 0–5.6)
INTERPRETATION ECG - MUSE: NORMAL
LMWH PPP CHRO-ACNC: 0.16 IU/ML
LMWH PPP CHRO-ACNC: 0.28 IU/ML
MAGNESIUM SERPL-MCNC: 2.2 MG/DL (ref 1.6–2.3)
POTASSIUM SERPL-SCNC: 4 MMOL/L (ref 3.4–5.3)
SODIUM SERPL-SCNC: 138 MMOL/L (ref 133–144)

## 2018-12-04 PROCEDURE — 80048 BASIC METABOLIC PNL TOTAL CA: CPT | Performed by: INTERNAL MEDICINE

## 2018-12-04 PROCEDURE — 85520 HEPARIN ASSAY: CPT | Performed by: INTERNAL MEDICINE

## 2018-12-04 PROCEDURE — 25000132 ZZH RX MED GY IP 250 OP 250 PS 637: Performed by: INTERNAL MEDICINE

## 2018-12-04 PROCEDURE — 99225 ZZC SUBSEQUENT OBSERVATION CARE,LEVEL II: CPT | Performed by: INTERNAL MEDICINE

## 2018-12-04 PROCEDURE — 93306 TTE W/DOPPLER COMPLETE: CPT | Mod: 26 | Performed by: INTERNAL MEDICINE

## 2018-12-04 PROCEDURE — 83036 HEMOGLOBIN GLYCOSYLATED A1C: CPT | Performed by: INTERNAL MEDICINE

## 2018-12-04 PROCEDURE — 36415 COLL VENOUS BLD VENIPUNCTURE: CPT | Performed by: INTERNAL MEDICINE

## 2018-12-04 PROCEDURE — 96366 THER/PROPH/DIAG IV INF ADDON: CPT

## 2018-12-04 PROCEDURE — 25500064 ZZH RX 255 OP 636: Performed by: INTERNAL MEDICINE

## 2018-12-04 PROCEDURE — 99204 OFFICE O/P NEW MOD 45 MIN: CPT | Mod: 25 | Performed by: INTERNAL MEDICINE

## 2018-12-04 PROCEDURE — G0378 HOSPITAL OBSERVATION PER HR: HCPCS

## 2018-12-04 PROCEDURE — 99207 ZZC CDG-MDM COMPONENT: MEETS LOW - DOWN CODED: CPT | Performed by: INTERNAL MEDICINE

## 2018-12-04 PROCEDURE — 83735 ASSAY OF MAGNESIUM: CPT | Performed by: INTERNAL MEDICINE

## 2018-12-04 RX ORDER — VERAPAMIL HYDROCHLORIDE 120 MG/1
120 TABLET, FILM COATED, EXTENDED RELEASE ORAL DAILY
Status: DISCONTINUED | OUTPATIENT
Start: 2018-12-04 | End: 2018-12-04

## 2018-12-04 RX ORDER — DILTIAZEM HYDROCHLORIDE 120 MG/1
120 CAPSULE, COATED, EXTENDED RELEASE ORAL DAILY
Status: DISCONTINUED | OUTPATIENT
Start: 2018-12-04 | End: 2018-12-05

## 2018-12-04 RX ORDER — ATORVASTATIN CALCIUM 40 MG/1
40 TABLET, FILM COATED ORAL EVERY EVENING
Status: DISCONTINUED | OUTPATIENT
Start: 2018-12-04 | End: 2018-12-05 | Stop reason: HOSPADM

## 2018-12-04 RX ORDER — ATENOLOL 50 MG/1
150 TABLET ORAL DAILY
Status: DISCONTINUED | OUTPATIENT
Start: 2018-12-05 | End: 2018-12-04

## 2018-12-04 RX ORDER — ATENOLOL 50 MG/1
100 TABLET ORAL DAILY
Status: DISCONTINUED | OUTPATIENT
Start: 2018-12-05 | End: 2018-12-04

## 2018-12-04 RX ORDER — ATENOLOL 50 MG/1
50 TABLET ORAL ONCE
Status: COMPLETED | OUTPATIENT
Start: 2018-12-04 | End: 2018-12-04

## 2018-12-04 RX ORDER — ATENOLOL 25 MG/1
25 TABLET ORAL ONCE
Status: COMPLETED | OUTPATIENT
Start: 2018-12-04 | End: 2018-12-04

## 2018-12-04 RX ADMIN — HYDROCODONE BITARTRATE AND ACETAMINOPHEN 1 TABLET: 5; 325 TABLET ORAL at 17:36

## 2018-12-04 RX ADMIN — ATENOLOL 75 MG: 25 TABLET ORAL at 08:03

## 2018-12-04 RX ADMIN — APIXABAN 5 MG: 5 TABLET, FILM COATED ORAL at 10:22

## 2018-12-04 RX ADMIN — LOSARTAN POTASSIUM 100 MG: 100 TABLET ORAL at 08:03

## 2018-12-04 RX ADMIN — ATORVASTATIN CALCIUM 40 MG: 40 TABLET, FILM COATED ORAL at 20:30

## 2018-12-04 RX ADMIN — DILTIAZEM HYDROCHLORIDE 120 MG: 120 CAPSULE, COATED, EXTENDED RELEASE ORAL at 17:36

## 2018-12-04 RX ADMIN — ATENOLOL 50 MG: 50 TABLET ORAL at 20:30

## 2018-12-04 RX ADMIN — ATENOLOL 25 MG: 25 TABLET ORAL at 10:22

## 2018-12-04 RX ADMIN — ASPIRIN 81 MG: 81 TABLET, COATED ORAL at 08:03

## 2018-12-04 RX ADMIN — HUMAN ALBUMIN MICROSPHERES AND PERFLUTREN 3 ML: 10; .22 INJECTION, SOLUTION INTRAVENOUS at 13:00

## 2018-12-04 RX ADMIN — DOCUSATE SODIUM 100 MG: 100 CAPSULE, LIQUID FILLED ORAL at 20:30

## 2018-12-04 RX ADMIN — HYDROCODONE BITARTRATE AND ACETAMINOPHEN 1 TABLET: 5; 325 TABLET ORAL at 03:59

## 2018-12-04 RX ADMIN — HYDROCODONE BITARTRATE AND ACETAMINOPHEN 1 TABLET: 5; 325 TABLET ORAL at 12:20

## 2018-12-04 RX ADMIN — HYDROCODONE BITARTRATE AND ACETAMINOPHEN 1 TABLET: 5; 325 TABLET ORAL at 08:03

## 2018-12-04 RX ADMIN — HYDROCHLOROTHIAZIDE 12.5 MG: 25 TABLET ORAL at 08:03

## 2018-12-04 RX ADMIN — APIXABAN 5 MG: 5 TABLET, FILM COATED ORAL at 20:30

## 2018-12-04 RX ADMIN — DOCUSATE SODIUM 100 MG: 100 CAPSULE, LIQUID FILLED ORAL at 08:03

## 2018-12-04 ASSESSMENT — PAIN DESCRIPTION - DESCRIPTORS: DESCRIPTORS: SHARP;STABBING

## 2018-12-04 NOTE — PROGRESS NOTES
"PRIMARY DIAGNOSIS: \"GENERIC\" NURSING (Fall, Atrial fib/flutter)  OUTPATIENT/OBSERVATION GOALS TO BE MET BEFORE DISCHARGE:  1. ADLs back to baseline: Yes    2. Activity and level of assistance: Ambulating independently.    3. Pain status: Improved-controlled with oral pain medications.    4. Return to near baseline physical activity: Yes     Discharge Planner Nurse   Safe discharge environment identified: Yes  Barriers to discharge: Rate control with heart rhythm, BP control, and anticoagulation.       Entered by: Nona Jones 12/04/2018 10:16 AM     Please review provider order for any additional goals.   Nurse to notify provider when observation goals have been met and patient is ready for discharge.    BP and HR elevated this am.  Scheduled medications given with improvement of VS afterwards.  See new medications orders/dose changes.  Continue to monitor.  "

## 2018-12-04 NOTE — PROGRESS NOTES
X-Cover    Patient on diltiazem drip for a.flutter. Notified about pt having a pause of 3 sec. HR is otherwise well controlled. Discussed with nursing staff, decrease rate of IV diltiazem to 2.5 mg /hr. If keeps on having more pauses , then hold the drip.

## 2018-12-04 NOTE — PLAN OF CARE
Problem: Patient Care Overview  Goal: Plan of Care/Patient Progress Review  Outcome: Improving  Acute Traumatic Pain/ Fall       1.  Pain controlled with oral analgesia: Yes       2.  Vital signs stable: Yes       3.  Diagnotic testing complete: Yes       4.  Cleared from consultants (if applicable): No       5. Return to near baseline physical activity: No

## 2018-12-04 NOTE — PROGRESS NOTES
Cardiology consult dictated.  Newly diagnosed atrial flutter of uncertain duration.  Chads VASC score is 5..  Has a history of obstructive sleep apnea which can be associated with closely with the development of atrial fibrillation atrial flutter.  He is rate appears to be coming under control with oral atenolol.  He is asymptomatic with the atrial flutter.  Will anticoagulate with Eliquis, increase the dose of atenolol to 100 mg/day for better rate control and in approximately 4 weeks I will have him see the electrophysiologist's for possible atrial flutter ablation versus electrical cardioversion.  He should have a stress echocardiogram performed this can be performed as an outpatient.  We will have him follow-up also with his regular cardiologist Dr. Rajat Camp who had seen him until 2014.

## 2018-12-04 NOTE — PROVIDER NOTIFICATION
"   12/03/18 2033 12/03/18 2059 12/03/18 2128   Vital Signs   Heart Rate 131 131 131   Pulse/Heart Rate Source Monitor Monitor Monitor   BP (!) 166/126 (!) 152/116 (!) 137/115     Provider notifie, \"Pt HR in upper 120's -130's. Pt given IV Metoprolol 5mg x3 with no change in HR. BP responding 166/126 to now 137/115. Please advise.\"    @[id number] call back from provider. New order for Metoprolol 5 mg x1. Will administer as ordered, monitor and continue POC.   "

## 2018-12-04 NOTE — CONSULTS
Consult Date:  12/04/2018      CARDIOLOGY CONSULTATION      REFERRING PHYSICIAN:  Hospitalist Service.      INDICATION FOR CARDIAC CONSULTATION:  Newly diagnosed atrial flutter.      Dear Doctor,      It is my pleasure to see your patient, Nixon More, who is a pleasant 83-year-old gentleman who was in the past seen by my partner, Dr. Rajat Camp, and was also seen by Mariola Camilo NP, for angina pectoris and peripheral arterial disease.  He was last seen by them in 2014. This is a patient who slipped on the ice yesterday and landed on his back.  The chest x-ray suspected 7th right-sided rib fracture.  However, when he was in the emergency room, it was noticed that he was in atrial flutter with variable block.  The ventricular rate was on the fast side at 126 beats per minute.  The patient was anticoagulated with intravenous heparin and started on intravenous diltiazem.  He had one 3-second pause.  The patient did not know he was in atrial flutter.  He still does not feel the atrial flutter.  He has been switched to atenolol.  He has been given a total of 75 mg this morning.  His ventricular rate is in the 80s now.  Apart from the musculoskeletal chest discomfort, he is having no symptoms of angina pectoris.  He has had no symptoms of angina pectoris since 2014.  There were no laboratory abnormalities to trigger the atrial fibrillation.  His magnesium was normal at 2.2, potassium was 4.0.  The TSH was normal at 2.63.  All his troponins were undetectable.  His proBNP is in the normal range for an 83-year-old.  Last stress test was performed 4 years ago and this showed no evidence of ischemia.  The patient does have obstructive sleep apnea which is a well-known association with atrial fibrillation and atrial flutter.  He does use CPAP.      PAST MEDICAL HISTORY:   1.  Actinic keratosis.    2.  Arthritis.    3.  Coronary artery disease diagnosed by symptoms and a stress test showing lateral ischemia, though a  stress test in 2014 showed no ischemia.   4.  Dyslipidemia.   5.  Emphysema.   6.  Essential hypertension.   7.  Abdominal hernia.   8.  Obstructive sleep apnea treated with CPAP.   9.  Hypertrophy of the prostate.   10.  Lumbago.   11.  Prostate cancer.   12.  Basal cell cancers.     13.  Mumps.       PAST SURGICAL HISTORY:   1.  Hernia repair.    2.  Mohs procedure for basal cell carcinoma.    3.  Prostatectomy.   4.  Status post lumbar laminectomy.   5.  Vitrectomy with removal of preretinal membrane.      FAMILY HISTORY:  He has a family history of both coronary artery disease and hypertension.      SOCIAL HISTORY:  He is .  He is a former smoker and stopped in 1978.  He drinks modestly with two drinks a week.      ALLERGIES:  AUGMENTIN, BACTRIM, BEE VENOM. LISINOPRIL CAUSED COUGH. NAPROXEN CAUSED HIVES. INTOLERANCE TO DILTIAZEM, BUT THE SIDE EFFECT IS UNKNOWN.       MEDICATIONS IN HOSPITAL:  Aspirin 81 mg per day, atenolol 75 mg per day, docusate sodium 100 mg 2 times a day, Prozac 10 mg per day, heparin bolus plus infusion, losartan 100 mg per day, hydrochlorothiazide 12.5 mg per day, oxycodone 5 mg once for 1 dose, simvastatin 40 mg at bedtime.      REVIEW OF SYSTEMS:   CONSTITUTIONAL:  Tiredness.   EYES:  Wears spectacles.   ENT:  Negative.   CARDIOVASCULAR:  As above.   RESPIRATORY:  Has noticed some shortness of breath.  The pattern has been the same over the last year.   GASTROINTESTINAL:  Nil.   GENITOURINARY:  Nil.   MUSCULOSKELETAL:  Significant discomfort on the right side where he landed on the ice.   NEUROLOGIC:  Nil.   PSYCHIATRIC:  Nil.   ENDOCRINE:  Has a raised hemoglobin A1c at 5.9. Random glucose today was raised at 116.     HEMATOLYMPHATIC:  Nil.   ALLERGY/IMMUNOLOGY:  As above.      PHYSICAL EXAMINATION:   GENERAL:  He is a pleasant man in no apparent distress.  He is moderately obese with a BMI of 35.   VITAL SIGNS:  His pulse rate on the telemetry is atrial flutter at a rate of  approximately 88 beats per minute.  He has variable block.  His blood pressure is 128/91, respirations are 20.  Sats are 98%, afebrile at 97 degrees Fahrenheit.   HEENT:  Examination of the head, eyes, ears, neck, nose and throat unremarkable. Jugular venous pulse is not raised.  Carotids are normal with no bruits.  Trachea is not deviated.  Thyroid gland is not enlarged.   HEART:  Osakis beat is not displaced.  Heart sounds 1 variable, heart sound 2 normal.   CHEST:  Clear to percussion and auscultation with no added sounds.   ABDOMEN:  Reveals severe truncal obesity. No organomegaly noted.    EXTREMITIES:  Pedal pulses are 1-2+.  No peripheral edema noted.  No skin lesions noted.     NEUROLOGIC:  He moves all 4 limbs appropriately with no obvious sensory or motor loss.  He is fully oriented to time, place and person with a normal affect.      LABORATORY DATA:  Sodium is 138, potassium is 4.0, BUN is 21, creatinine is 1.06.  GFR 67.  Hemoglobin A1c 5.9. Magnesium 2.2, calcium is 8.9.  Random glucose this morning 116.  White cell count 9.0, hemoglobin 14.8, platelet count is 181,000.      IMPRESSION:   1.  Asymptomatic atrial flutter with rapid ventricular response.  This is in the setting of probable fractured seventh right-sided rib.  The duration of the atrial flutter is unknown as he is asymptomatic with it.  His CHADS-VASc score is 4, 1 for hypertension, 2 for being over 75, and 1 for probable diabetes, given his hemoglobin A1c is 5.9 and random glucose is raised.  However, his CHADS-VASc score is 5.  I reviewed his CT of his chest from 2016 and he has coronary calcification present in the LAD, circumflex and right coronary artery.  That is vascular disease, which gives an extra point.   2.  Obstructive sleep apnea which could be associated with the jean of atrial fibrillation and atrial flutter.   3.  Recent fall on the ice with fractured seventh rib on the right.   4.  Moderate to severe obesity with a BMI  of 35.      PLAN:   1.  Firstly, the patient needs to be anticoagulated long-term with Eliquis 5 mg twice a day.  We will start the Eliquis now, and 2 hours after starting the Eliquis, we will stop the intravenous heparin.   2.  I will increase the dose of atenolol to 100 mg per day for better rate control.  In approximately 3-4 weeks,  I will have the patient see the electrophysiologist to make a decision whether they would like to proceed with atrial flutter ablation versus electrical cardioversion.  Because the patient is asymptomatic with his atrial flutter and it appears that we will be able to get good rate control, there was no necessity to rapidly convert the patient back to sinus rhythm with ARNULFO guidance.  He should have a repeat of his stress test also to determine if there is any evidence of ischemia.  His last stress echocardiogram in  was normal.  I will also have the patient follow up with his usual cardiologist, Dr. Rajat Camp, who last saw him 4 years ago.      It has been my pleasure to be involved in the care of this very nice patient.         MIKA MOLINA MD, [facility]             D: 2018   T: 2018   MT: VINNY      Name:     YULIAS BYRD   MRN:      [id number]        Account:       [id number]   :      1935           Consult Date:  2018      Document: [id number]

## 2018-12-04 NOTE — PROGRESS NOTES
[facility]  Hospitalist Progress Note  Curry Hendrickson MD 12/04/2018    Reason for Stay (Diagnosis): atrial flutter with RVR         Assessment and Plan:      Summary of Stay: Nixon More is a 83 year old male came to attention on 12/3/2018 after having fallen while shoveling snow.  He came in primarily because of the pain on the right side of his chest what is being admitted due to the finding of atrial flutter with rapid ventricular response which apparently was without symptoms.      Workup in the emergency department further showed a right posterior seventh rib fracture.      Problem List:   1. Chest wall trauma associated with mechanical fall on ice.  Evaluation with chest x-ray shows a right seventh rib fracture.  2. Atrial flutter with rapid ventricular response.  The patient was started on a diltiazem drip and has been having some pauses, but otherwise is stable. His HR is not adequately controlled for discharge at this time.   3. Moderate Aortic Root dilatation.     PLAN:  1.  Increase dose of Atenolol and hold further hydrochlorothiazide.   2.  Consider reducing dose of Losartan as well.   3.  Apixaban for stroke prophylaxis.  4.  Follow up with EP recommended in 4 weeks.   5.  Follow up with repeat eval of aortic root in about 6 months (with MRI preferably)    DVT Prophylaxis: apixaban  Code Status: Full Code  Discharge Dispo: home  Estimated Disch Date / # of Days until Disch: likely tomorrow        Interval History (Subjective):      Chart reviewed, pt interviewed.    I ambulated with the patient for about 200 feet, noting that his HR stayed quite stable at 130.   No remarkable dyspnea. No cough. Discussed the importance of walking several times daily to help keep right lung open (to avoid atelectasis related to immobility and splinting).                  Physical Exam:      Last Vital Signs:  BP (!) 146/111 (BP Location: Left arm)  Pulse 133  Temp 98.4  F (36.9  C) (Oral)  Resp 18   Wt 100.6 kg (221 lb 12.8 oz)  SpO2 95%  BMI 34.74 kg/m2    I/O last 3 completed shifts:  In: 480 [P.O.:480]  Out: -     Constitutional: Awake, alert, cooperative, no apparent distress   Respiratory: Clear to auscultation bilaterally, no crackles or wheezing. Good air entry bilat and minimal evident pain with deep breath.   Cardiovascular: IRRIR no murmur noted   Abdomen: Normal bowel sounds, soft, non-distended, non-tender   Skin: No rashes, no cyanosis, dry to touch   Neuro: Alert and oriented x3, no weakness, numbness, memory loss   Extremities: No edema, normal range of motion   Other(s):        All other systems: Negative          Medications:      All current medications were reviewed with changes reflected in problem list.         Data:      All new lab and imaging data was reviewed.   Labs/Imaging:  Results for orders placed or performed during the hospital encounter of 12/03/18 (from the past 24 hour(s))   Troponin I - Now then in 4 hours x 2    Result Value Ref Range    Troponin I ES <0.015 0.000 - 0.045 ug/L   Heparin 10a Level   Result Value Ref Range    Heparin 10A Level 0.16 IU/mL   Basic metabolic panel   Result Value Ref Range    Sodium 138 133 - 144 mmol/L    Potassium 4.0 3.4 - 5.3 mmol/L    Chloride 106 94 - 109 mmol/L    Carbon Dioxide 26 20 - 32 mmol/L    Anion Gap 6 3 - 14 mmol/L    Glucose 116 (H) 70 - 99 mg/dL    Urea Nitrogen 21 7 - 30 mg/dL    Creatinine 1.06 0.66 - 1.25 mg/dL    GFR Estimate 67 >60 mL/min/1.7m2    GFR Estimate If Black 81 >60 mL/min/1.7m2    Calcium 8.9 8.5 - 10.1 mg/dL   Magnesium   Result Value Ref Range    Magnesium 2.2 1.6 - 2.3 mg/dL   Heparin Xa (10a) Level   Result Value Ref Range    Heparin 10A Level 0.28 IU/mL   Hemoglobin A1c   Result Value Ref Range    Hemoglobin A1C 5.9 (H) 0 - 5.6 %   ECHO COMPLETE WITH OPTISON    Narrative    [id number]  [id number]  [id number]  [id number]^PEDRO^ALETHA^AZFRANCA           [facility]  Echocardiography Laboratory  [address] [place] [address]        Name: YULISA BYRD  MRN: [id number]  : 1935  Study Date: 2018 12:33 PM  Age: 83 yrs  Gender: Male  Patient Location: [facility]  Reason For Study: Afib  Ordering Physician: ALETHA VASQUEZ  Referring Physician: Natalya Lewis MD  Performed By: Andreina Robert SUSAN     BSA: 2.1 m2  Height: 67 in  Weight: 221 lb  HR: 126  BP: 147/104 mmHg  _____________________________________________________________________________  __        Procedure  Complete Portable Echo Adult. Contrast Optison.  _____________________________________________________________________________  __        Interpretation Summary     The visual ejection fraction is estimated at 55%.  Mildly decreased right ventricular systolic function  Moderate aortic root dilatation (4.6 cm).  The ascending aorta is Moderately dilated (4.6 cm).        There is no prior TTE available for comparison. Consider a CT scan to better  evaluate the aortic root and ascending aorta.  _____________________________________________________________________________  __        Left Ventricle  The left ventricle is normal in size. There is mild to moderate concentric  left ventricular hypertrophy. Diastolic function not assessed due to atrial  fibrillation. The visual ejection fraction is estimated at 55%. Regional wall  motion abnormalities cannot be excluded due to limited visualization.     Right Ventricle  Mildly decreased right ventricular systolic function. TAPSE 1.3 c,.     Atria  The left atrium is moderate to severely dilated. The right atrium is  moderately dilated. There is no atrial shunt seen.     Mitral Valve  There is mild mitral annular calcification. The mitral valve leaflets are  mildly thickened. There is trace mitral regurgitation.        Tricuspid Valve  The tricuspid valve is normal in structure and function. There is mild to  moderate (1-2+) tricuspid regurgitation. The right ventricular  systolic  pressure is approximated at 24.9 mmHg plus the right atrial pressure.     Aortic Valve  The aortic valve is trileaflet with aortic valve sclerosis. There is trace  aortic regurgitation. No hemodynamically significant valvular aortic stenosis.     Pulmonic Valve  The pulmonic valve is not well seen, but is grossly normal. There is no  pulmonic valvular regurgitation.     Vessels  Moderate aortic root dilatation. The ascending aorta is Moderately dilated.  The inferior vena cava is not dilated.     Pericardium  There is no pericardial effusion.        Rhythm  The rhythm was atrial fibrillation.  _____________________________________________________________________________  __  MMode/2D Measurements & Calculations  IVSd: 1.5 cm     LVIDd: 5.0 cm  LVIDs: 3.1 cm  LVPWd: 1.6 cm  FS: 37.5 %  LV mass(C)d: 341.1 grams  LV mass(C)dI: 161.7 grams/m2  Ao root diam: 4.6 cm  LA dimension: 6.0 cm  asc Aorta Diam: 4.6 cm  LA/Ao: 1.3  LVOT diam: 2.7 cm  LVOT area: 5.5 cm2  LA Volume (BP): 108.0 ml  LA Volume Index (BP): 51.2 ml/m2  RWT: 0.65           Doppler Measurements & Calculations  MV E max kate: 89.2 cm/sec  MV dec time: 0.07 sec  TR max kate: 248.9 cm/sec  TR max P.9 mmHg  E/E' avg: 10.9  Lateral E/e': 8.2  Medial E/e': 13.7           _____________________________________________________________________________  __           Report approved by: Jb Kumar 2018 01:55 PM

## 2018-12-04 NOTE — PHARMACY
Anticoagulation coverage check.  Patient has Medicare D through [facility] fulfilled deductible.    Xarelto/Eliquis  December: $40  January 2019: $240 ($200 deductible)  Feb-Sept: $40/mo  Oct-Dec: $105/mo    Pradaxa is non-formulary and more expensive.    Jantoven  $5/mo      -SNEHA Day, Pharmacy Technician/Liaison, Discharge Pharmacy *[phone]

## 2018-12-04 NOTE — PLAN OF CARE
Problem: Patient Care Overview  Goal: Plan of Care/Patient Progress Review  Outcome: Improving  Acute Traumatic Pain/ Fall   Pt admitted today 12/03/18 with Fall and right 7th Rib fx. Awake A/O x4 cooperative, spouse at bed side. Ambulated in room SBA. BP (!) 137/115  Pulse 133  Temp 99  F (37.2  C) (Oral)  Resp 16  SpO2 98% BP and HR elevated IV metoprolol given, provider notified (see provider notification note). On IV heparin. Right ribcage pain PRN pain medication given x1 with pt report relief in pain. No C/O chest pain or difficulty breathing. Card consult. Plan for Echo tomorrow. Will monitor and POC.       1.  Pain controlled with oral analgesia: Yes      2.  Vital signs stable: Yes      3.  Diagnotic testing complete: Yes      4.  Cleared from consultants (if applicable): No      5. Return to near baseline physical activity: No

## 2018-12-04 NOTE — PROGRESS NOTES
Reviewed echocardiogram.  The ascending aorta and aortic root are moderately dilated.  Ejection fraction is normal.  There is moderate concentric left ventricular hypertrophy present.  I discussed the case with Dr. parents who called me.  He is heart rate rises into the 120s when exercising.  I did increase his dose of atenolol to 100 mg/day this morning.  This may not control his blood heart rate adequately.  His blood pressure also is in the high 130s-140s which is an adequate for a dilated a sending aorta and also represents poorly controlled blood pressure.  I will add in diltiazem long-acting 120 mg/day.  He has under intolerances diltiazem as a medication in this category.  However in discussion with him he does not have history of allergic reaction nor other issues that he can remember.  He thinks maybe he had some gum recession with this drug which is not a common side effect with this.  In the short-term I would like to use this drug.  It would give us better blood pressure control and better heart rate control.  We will perform an MRI scan in 6 months time to follow the ascending aorta and aortic root.

## 2018-12-04 NOTE — PLAN OF CARE
Problem: Patient Care Overview  Goal: Plan of Care/Patient Progress Review  Outcome: Improving  Acute Traumatic Pain/ Fall        1.  Pain controlled with oral analgesia: Yes        2.  Vital signs stable: Yes        3.  Diagnotic testing complete: Yes        4.  Cleared from consultants (if applicable): No        5. Return to near baseline physical activity: No   BP still elevated, MD aware. No new orders at this time. PO Norco given X1 for pain 3/10 to right side. Denies any other issue. Will continue POC.

## 2018-12-04 NOTE — PLAN OF CARE
Problem: Patient Care Overview  Goal: Plan of Care/Patient Progress Review  BP elevated. SBA. Swelling noted around the 7th rib fracture.Tele a fib CVR. Heparin running at 9.5 mL/hr. INR 0.16, goal 0.15-0.35. C/o of 3/10 pain, Norco given at 0400. Echo scheduled today.

## 2018-12-04 NOTE — PLAN OF CARE
"Problem: Patient Care Overview  Goal: Plan of Care/Patient Progress Review  Outcome: No Change  PRIMARY DIAGNOSIS: \"GENERIC\" NURSING (Afib/flutter, Falls)  OUTPATIENT/OBSERVATION GOALS TO BE MET BEFORE DISCHARGE:  1. ADLs back to baseline: Yes    2. Activity and level of assistance: Ambulating independently.    3. Pain status: Improved-controlled with oral pain medications.    4. Return to near baseline physical activity: Yes     Discharge Planner Nurse   Safe discharge environment identified: Yes; home with wife.   Barriers to discharge: Yes: HR control, BP control, transitioning from IV anticoagulants to oral.       Entered by: Nona Jones 12/04/2018 12:41 PM     Please review provider order for any additional goals.   Nurse to notify provider when observation goals have been met and patient is ready for discharge.    Educated on Eliquis, Norco, falls, pain management, afib/flutter, echo (handouts given to pt/wife).  No further questions thus far.  Echo being completed this afternoon.  Pt has been alert, oriented, very pleasant and cooperative.  Up independently.  Fall precautions in place.  Appetite good.  Bowels and bladder stable per pt.  Writer found abdomen to be semi firm and distended, but pt states having firm BMs, passing flautus, and no abdominal pain.  Trace BLEE.  No numbness or tingling.  Denies SOB, palpitations, diaphoresis, N/V/D.  Tele shows afib/flutter  this shift.  HR improving after am medications (100-110's).  BP continues to be elevated.  MD aware and med adjustments made for better HR and BP control.  Walked halls X1 with wife.  LS dim bases with occasional rhonchi on left.  Clears with coughing.  Productive cough.  Writer RADHA sputum at this time.  Right side without discoloration, but trace edema noted.  Pain worse with coughing.  He is tolerating POEs/ADLs with oral pain medication.  Discharge date TBD, but discharge place will be home with wife when cleared by MD.  IV now " SL.  Heparin gtt stopped after lunch because pt now on oral Eliquis.  Cardiologist writes pt may need ablation vs cardioversion in future.  Magnesium and Potassium levels stable and did not need replacement this shift per protocol.

## 2018-12-05 VITALS
TEMPERATURE: 97.9 F | BODY MASS INDEX: 34.43 KG/M2 | OXYGEN SATURATION: 95 % | HEART RATE: 72 BPM | SYSTOLIC BLOOD PRESSURE: 123 MMHG | RESPIRATION RATE: 18 BRPM | WEIGHT: 219.8 LBS | DIASTOLIC BLOOD PRESSURE: 82 MMHG

## 2018-12-05 PROCEDURE — 25000132 ZZH RX MED GY IP 250 OP 250 PS 637: Performed by: INTERNAL MEDICINE

## 2018-12-05 PROCEDURE — G0378 HOSPITAL OBSERVATION PER HR: HCPCS

## 2018-12-05 PROCEDURE — 99217 ZZC OBSERVATION CARE DISCHARGE: CPT | Performed by: INTERNAL MEDICINE

## 2018-12-05 PROCEDURE — 96366 THER/PROPH/DIAG IV INF ADDON: CPT

## 2018-12-05 PROCEDURE — 99214 OFFICE O/P EST MOD 30 MIN: CPT | Mod: 25 | Performed by: NURSE PRACTITIONER

## 2018-12-05 RX ORDER — DILTIAZEM HYDROCHLORIDE 180 MG/1
180 CAPSULE, COATED, EXTENDED RELEASE ORAL DAILY
Qty: 30 CAPSULE | Refills: 0 | Status: SHIPPED | OUTPATIENT
Start: 2018-12-06 | End: 2018-12-11 | Stop reason: DRUGHIGH

## 2018-12-05 RX ORDER — ATENOLOL 50 MG/1
150 TABLET ORAL DAILY
Status: DISCONTINUED | OUTPATIENT
Start: 2018-12-05 | End: 2018-12-05 | Stop reason: HOSPADM

## 2018-12-05 RX ORDER — LOSARTAN POTASSIUM 100 MG/1
100 TABLET ORAL DAILY
Qty: 30 TABLET | Refills: 0 | Status: SHIPPED | OUTPATIENT
Start: 2018-12-06 | End: 2018-12-26

## 2018-12-05 RX ORDER — ATENOLOL 50 MG/1
150 TABLET ORAL DAILY
Qty: 45 TABLET | Refills: 0 | Status: SHIPPED | OUTPATIENT
Start: 2018-12-06 | End: 2019-01-14 | Stop reason: DRUGHIGH

## 2018-12-05 RX ORDER — DILTIAZEM HYDROCHLORIDE 180 MG/1
180 CAPSULE, COATED, EXTENDED RELEASE ORAL DAILY
Status: DISCONTINUED | OUTPATIENT
Start: 2018-12-06 | End: 2018-12-05 | Stop reason: HOSPADM

## 2018-12-05 RX ADMIN — ATENOLOL 150 MG: 50 TABLET ORAL at 09:27

## 2018-12-05 RX ADMIN — ASPIRIN 81 MG: 81 TABLET, COATED ORAL at 09:27

## 2018-12-05 RX ADMIN — FLUOXETINE 10 MG: 10 CAPSULE ORAL at 09:27

## 2018-12-05 RX ADMIN — DILTIAZEM HYDROCHLORIDE 120 MG: 120 CAPSULE, COATED, EXTENDED RELEASE ORAL at 09:27

## 2018-12-05 RX ADMIN — APIXABAN 5 MG: 5 TABLET, FILM COATED ORAL at 09:28

## 2018-12-05 RX ADMIN — LOSARTAN POTASSIUM 100 MG: 100 TABLET ORAL at 09:27

## 2018-12-05 RX ADMIN — HYDROCODONE BITARTRATE AND ACETAMINOPHEN 1 TABLET: 5; 325 TABLET ORAL at 07:53

## 2018-12-05 RX ADMIN — DOCUSATE SODIUM 100 MG: 100 CAPSULE, LIQUID FILLED ORAL at 09:27

## 2018-12-05 RX ADMIN — HYDROCODONE BITARTRATE AND ACETAMINOPHEN 1 TABLET: 5; 325 TABLET ORAL at 14:39

## 2018-12-05 RX ADMIN — HYDROCODONE BITARTRATE AND ACETAMINOPHEN 1 TABLET: 5; 325 TABLET ORAL at 02:11

## 2018-12-05 NOTE — PLAN OF CARE
Problem: Patient Care Overview  Goal: Plan of Care/Patient Progress Review  Outcome: Therapy, progress toward functional goals as expected  Acute Traumatic Pain     1.  Pain controlled with oral analgesia: Yes      2.  Vital signs stable: Yes      3.  Diagnotic testing complete: Yes      4.  Cleared from consultants (if applicable): Yes      5. Return to near baseline physical activity: Yes    VSS.  A&Ox4, CMS intact, low fat/low Na diet. Independent, daily weights, Tele:  A Fib/A Flutter w/ CVR. Admitted with R. Posterior fx of 7th rib after fall while shoveling. Pain is 3/10, given Norco for pain. EF 55%. Atenolol 150 mg, started elliquis. Will do 3-4 weeks of outpatient cardiology. Pt is discharging home with wife today.

## 2018-12-05 NOTE — PLAN OF CARE
Problem: Patient Care Overview  Goal: Plan of Care/Patient Progress Review  Outcome: Improving  Acute Traumatic Pain/ Fall    Pt awake A/O x4 cooperative ambulated in hallway. VSS with slightly elevated  BP (!) 145/114 (BP Location: Left arm)  Pulse 133  Temp 98.2  F (36.8  C) (Oral)  Resp 18  Wt 100.6 kg (221 lb 12.8 oz)  SpO2 93%  BMI 34.74 kg/m2 scheduled atelonol given AM with one time additional dose per MD. Daily atenolol dose increased.Per MD hold off on giving PRN metoprolol. Tele A-Flutter HR goes to higher 120's to low 130's with activity. Diltiazem po started per cardiology. LS clear. C/o ribcage discomfort with activity and cough prn pain medication given with pt report relief in pain. On po Eliquis. Will monitor and continue POC.       1.  Pain controlled with oral analgesia: Yes      2.  Vital signs stable: Yes      3.  Diagnotic testing complete: Yes      4.  Cleared from consultants (if applicable): Yes      5. Return to near baseline physical activity: No

## 2018-12-05 NOTE — PLAN OF CARE
Problem: Patient Care Overview  Goal: Plan of Care/Patient Progress Review  Outcome: Improving  Acute Traumatic Pain     1.  Pain controlled with oral analgesia: Yes      2.  Vital signs stable: Yes      3.  Diagnotic testing complete: Yes      4.  Cleared from consultants (if applicable): Yes      5. Return to near baseline physical activity: No

## 2018-12-05 NOTE — PLAN OF CARE
Problem: Patient Care Overview  Goal: Plan of Care/Patient Progress Review  Outcome: Improving  Acute Traumatic Pain/Fall    1.  Pain controlled with oral analgesia: Yes      2.  Vital signs stable: Yes      3.  Diagnotic testing complete: Yes      4.  Cleared from consultants (if applicable): Yes      5. Return to near baseline physical activity: No

## 2018-12-05 NOTE — DISCHARGE SUMMARY
[facility] Discharge Summary    Nixon More MRN# [id number]   Age: 83 year old YOB: 1935     Date of Admission:  12/3/2018  Date of Discharge::  12/5/2018  Admitting Physician:  Dianne Jon MD  Discharge Physician:  Curry Hendrickson MD     Home clinic: [facility]           Admission Diagnoses:   Atrial tachycardia (H) [I47.1]  Closed fracture of one rib of right side, initial encounter [S22.31XA]  Atrial flutter, unspecified type (H) [I48.92]          Discharge Diagnosis:   Principal Problem:    Atrial flutter with rapid ventricular response (H)  Active Problems:    Hypertension goal BP (blood pressure) < 140/90    Closed fracture of one rib of right side, initial encounter    Dilated aortic root (H)            Procedures:   Plain films left shoulder  Chest x-ray  Echocardiogram       Interpretation Summary     The visual ejection fraction is estimated at 55%.  Mildly decreased right ventricular systolic function  Moderate aortic root dilatation (4.6 cm).  The ascending aorta is Moderately dilated (4.6 cm).        There is no prior TTE available for comparison. Consider a CT scan to better  evaluate the aortic root and ascending aorta.           Discharge Medications:     Current Discharge Medication List      START taking these medications    Details   apixaban ANTICOAGULANT (ELIQUIS) 5 MG tablet Take 1 tablet (5 mg) by mouth 2 times daily  Qty: 60 tablet, Refills: 0    Associated Diagnoses: Atrial tachycardia (H)      diltiazem ER COATED BEADS (CARDIZEM CD/CARTIA XT) 180 MG 24 hr capsule Take 1 capsule (180 mg) by mouth daily  Qty: 30 capsule, Refills: 0    Associated Diagnoses: Atrial tachycardia (H); Atrial flutter, unspecified type (H)      losartan (COZAAR) 100 MG tablet Take 1 tablet (100 mg) by mouth daily  Qty: 30 tablet, Refills: 0    Associated Diagnoses: Hypertension goal BP (blood pressure) < 140/90         CONTINUE these medications which have CHANGED     Details   atenolol (TENORMIN) 50 MG tablet Take 3 tablets (150 mg) by mouth daily  Qty: 45 tablet, Refills: 0    Associated Diagnoses: Atrial tachycardia (H); Atrial flutter, unspecified type (H)         CONTINUE these medications which have NOT CHANGED    Details   ASPIRIN 81 MG OR TABS 1 tab po QD (Once per day)  Qty: 100, Refills: 3    Associated Diagnoses: Essential hypertension, benign      FLUoxetine (PROZAC) 10 MG capsule Take 1 capsule (10 mg) by mouth daily  Qty: 90 capsule, Refills: 3    Associated Diagnoses: Mild major depression (H)      ipratropium (ATROVENT) 0.06 % spray Spray 2 sprays in nostril 4 times daily as needed for rhinitis  Qty: 3 Box, Refills: 3    Associated Diagnoses: Nasal congestion      leuprolide (ELIGARD) 45 MG injection Inject 45 mg Subcutaneous every 6 months.  Qty: 1 each, Refills: 12    Comments: Per Dr. Mark Pino      Multiple Vitamins-Minerals (CENTRUM SILVER PO)       simvastatin (ZOCOR) 40 MG tablet Take 1 tablet (40 mg) by mouth At Bedtime  Qty: 90 tablet, Refills: 3    Associated Diagnoses: Dyslipidemia      EPINEPHrine (EPIPEN/ADRENACLICK/OR ANY BX GENERIC EQUIV) 0.3 MG/0.3ML injection 2-pack Inject 0.3 mLs (0.3 mg) into the muscle as needed for anaphylaxis  Qty: 0.6 mL, Refills: 1    Associated Diagnoses: Bee sting-induced anaphylaxis, undetermined intent, subsequent encounter      Multiple Minerals-Vitamins (PROSTEON PO) Take 2,000 Units by mouth With vitamin D         STOP taking these medications       celecoxib (CELEBREX) 100 MG capsule Comments:   Reason for Stopping:         losartan-hydrochlorothiazide (HYZAAR) 100-12.5 MG per tablet Comments:   Reason for Stopping:                     Consultations:   Consultation during this admission received from cardiology           Hospital Course:   Nixon More is an 83 year old male came to attention on 12/3/2018 after having fallen while shoveling snow.  He came in primarily because of the pain on the right side  "of his chest but was admitted due to the finding of atrial flutter with rapid ventricular response which apparently was without symptoms.       Workup in the emergency department further showed a right posterior seventh rib fracture.      After admission to telemetry bed, the patient was started on a diltiazem drip, but he developed significant pauses and this was discontinued.  He was already receiving atenolol and that dose was increased though with inadequate rate control.  Subsequently, the patient's hydrochlorothiazide was discontinued in order to permit more \"room\" for increasing rate controlling medications.  Final doses of atenolol and diltiazem are noted above.    /82 (BP Location: Left arm)  Pulse 72  Temp 97.9  F (36.6  C) (Oral)  Resp 18  Wt 99.7 kg (219 lb 12.8 oz)  SpO2 95%  BMI 34.43 kg/m2  On the date of discharge, Mr. Schumacher ambulated in the hallways.  His heart rate did not go above 130 but mostly remained well controlled less than 110.  He was not significantly breathless and denied any dizziness and chest pain.  Chest: Clear to auscultation.  Some right anterior lateral chest tenderness is still noted.  Heart: Irregularly irregular rate and rhythm without rubs or murmurs.  Abdomen: Soft, obese, nontender.  Extremities: No remarkable pitting edema noted.  Muscle tone and bulk appears to be within normal limits.  Gait is normal without ataxia.          Discharge Instructions and Follow-Up:   Discharge diet: Regular   Discharge activity: Activity as tolerated   Discharge follow-up: Follow up with Electrophysiology in about 4 weeks.  Follow up w Dr. Lewis as needed.   The patient will need MRI of the aortic root in about 6 months.  I anticipate this will be completed by cardiology in follow-up.           Discharge Disposition:   Discharged to home      Attestation:  I have reviewed today's vital signs, notes, medications, labs and imaging.  Total time: 35 minutes    Curry Hendrickson, " MD

## 2018-12-05 NOTE — PROGRESS NOTES
Cardiology Progress Note  LORELEI Torres     follows with Dr. Camp       Assessment and Plan:   Admit (12/3) after suffering a fall on ice.  Evidence of Aflutter w/ RVR and rib fractures    PMH:  HTN, abnormal stress test, MAYITO, hyperlipidemia, emphysema    NEW Persistent Asymptomatic Atrial Flutter w/ RVR  -duration unclear  -HR improved w/ increased Atenolol 150mg and Diltiazem ER 120mg  -Eliquis started  (CHADS2 VASc score 5)  -mod/severe LAE  -ambulate pt in chase to assess HR   -plan for EP evaluation in 1 month for consideration of DCCV vs ablation    Mechanical Fall  -rib fracture    CAD:  -Hx of angina and abnormal NUC (14)  -multivessel coronary calcifications noted on CT  -denies any recent CP  -outpt NUC recommended    HTN:  -BP improved since admit  -Diltiazem added and Atenolol escalated  -consider adding back pt's hydrochlorothiazide today  -mod LVH    Mod Asc Aorta dilatation  -root 4.6cm/ asc aorta 4.6cm  -out pt cMRI to assess aorta size in 6 mo    Mild RV Dysfunction  -BNP 1200/ NL LVEF  -no significant s & s of HF  -weight down 2 lbs  (219lb)  -low salt diet    Treated MAYITO               Interval History:   Denies CP/SOB/palpitations  Mild rib pain                Medications:       apixaban ANTICOAGULANT  5 mg Oral BID     aspirin  81 mg Oral Daily     atenolol  150 mg Oral Daily     atorvastatin  40 mg Oral QPM     diltiazem ER COATED BEADS  120 mg Oral Daily     docusate sodium  100 mg Oral BID     FLUoxetine  10 mg Oral Daily     losartan  100 mg Oral Daily            Physical Exam:   Blood pressure 133/89, pulse 72, temperature 97.3  F (36.3  C), temperature source Oral, resp. rate 18, weight 99.7 kg (219 lb 12.8 oz), SpO2 94 %.  Wt Readings from Last 3 Encounters:   12/05/18 99.7 kg (219 lb 12.8 oz)   10/23/18 102.2 kg (225 lb 6.4 oz)   08/20/18 103 kg (227 lb)     I/O last 3 completed shifts:  In: 240 [P.O.:240]  Out: -     CONST:  Alert and oriented  NAD  LUNGS:  CTA bilat  CARDIO:  Irreg,  Irreg   Distant heart tones  ABD:  Obese  +BS  EXT:  No edema  1+DP bilat           Data:   TELE:  Aflutter  HRs  bpm    CBC    Recent Labs  Lab 12/03/18  1744 12/03/18  1313   WBC 9.0 7.6   HGB 14.8 14.9    200       BMP    Recent Labs  Lab 12/04/18  0715 12/03/18  1313    142   POTASSIUM 4.0 3.7   CHLORIDE 106 106   MARLENE 8.9 9.4   CO2 26 28   BUN 21 22   CR 1.06 1.04   * 112*     Recent Labs   Lab Test  10/17/18   0955  10/18/17   0830   10/15/15   0854  10/21/14   0800   CHOL  124  121   < >  118  122   HDL  41  46   < >  42  47   LDL  60  57   < >  54  54   TRIG  117  90   < >  109  106   CHOLHDLRATIO   --    --    --   2.8  2.6    < > = values in this interval not displayed.       TROP  Lab Results   Component Value Date    TROPI <0.015 12/03/2018    TROPI <0.015 12/03/2018    TROPI <0.015 12/03/2018    TROPONIN 0.01 12/03/2018       BNP    Recent Labs  Lab 12/03/18  1313   NTBNPI 1210

## 2018-12-05 NOTE — PLAN OF CARE
Problem: Patient Care Overview  Goal: Plan of Care/Patient Progress Review  Acute Traumatic Pain     1.  Pain controlled with oral analgesia: Yes      2.  Vital signs stable: Yes      3.  Diagnotic testing complete: Yes      4.  Cleared from consultants (if applicable): Yes      5. Return to near baseline physical activity: No     A/Ox4, Independent in room, VSS - HR 70-90s, BP 140s/90s, low fat low NA no caffeine diet, pain 3/10 PRN Norco given, reports urinary urgency this shift, Tele A-flutter, atenolol increased to 150mg, CPAP at bedtime, continue with plan of care.

## 2018-12-05 NOTE — PLAN OF CARE
Problem: Patient Care Overview  Goal: Plan of Care/Patient Progress Review  Outcome: Therapy, progress toward functional goals as expected  Acute Traumatic Pain     1.  Pain controlled with oral analgesia: Yes      2.  Vital signs stable: Yes      3.  Diagnotic testing complete: Yes      4.  Cleared from consultants (if applicable): Yes      5. Return to near baseline physical activity: Yes    VSS, /96.  A&Ox4, CMS intact, low fat/low Na diet. Independent, daily weights, Tele:  A Fib/A Flutter w/ CVR. Admitted with R. Posterior fx of 7th rib after fall while shoveling. Pain is 3/10, given Norco for pain. EF 55%. Atenolol 150 mg, started elliquis. Will do 3-4 weeks of outpatient cardiology. Expected to discharge today. Continue monitoring.

## 2018-12-06 ENCOUNTER — TELEPHONE (OUTPATIENT)
Dept: CARDIOLOGY | Facility: CLINIC | Age: 83
End: 2018-12-06

## 2018-12-06 ENCOUNTER — TELEPHONE (OUTPATIENT)
Dept: PEDIATRICS | Facility: CLINIC | Age: 83
End: 2018-12-06

## 2018-12-06 DIAGNOSIS — S22.32XD CLOSED FRACTURE OF ONE RIB OF LEFT SIDE WITH ROUTINE HEALING, SUBSEQUENT ENCOUNTER: Primary | ICD-10-CM

## 2018-12-06 PROBLEM — S22.31XA CLOSED FRACTURE OF ONE RIB OF RIGHT SIDE, INITIAL ENCOUNTER: Status: ACTIVE | Noted: 2018-12-06

## 2018-12-06 PROBLEM — I77.810 DILATED AORTIC ROOT (H): Status: ACTIVE | Noted: 2018-12-06

## 2018-12-06 RX ORDER — HYDROCODONE BITARTRATE AND ACETAMINOPHEN 5; 325 MG/1; MG/1
1 TABLET ORAL EVERY 4 HOURS PRN
Qty: 18 TABLET | Refills: 0 | Status: SHIPPED | OUTPATIENT
Start: 2018-12-06 | End: 2018-12-21

## 2018-12-06 NOTE — TELEPHONE ENCOUNTER
Please call patient to assess for other symptoms - has risk of pneumonia.  Was discharged from hospital yesterday.  It does look like he was getting hydrocodone-acetaminophen 5-325 1 pill about 3x/d and they failed to send him home with pain medication.  If no fever, chills, cough or new symptoms we can do a refill (he is anticoagulated so can't do NSAID).  Please have a partner in clinic do prescription.  He should also get scheduled for hospital follow-up with me.

## 2018-12-06 NOTE — TELEPHONE ENCOUNTER
Please refer to previous telephone encounter for most up to date information.     Elaine Becker MA

## 2018-12-06 NOTE — TELEPHONE ENCOUNTER
Reason for call:  Other   Patient called regarding (reason for call): call back  Additional comments: Please call patient back as soon as possible because he is in pain from a broken rib. He would like pain medication.    Phone number to reach patient:  Home number on file [phone] (home)    Best Time:  ASAP    Can we leave a detailed message on this number?  YES

## 2018-12-06 NOTE — TELEPHONE ENCOUNTER
Called and spoke with pt that Rx was ready. He requested for it to go down to the pharmacy here in [place]. Rx walked down to pharmacy here.    Marita Bañuelos MA on 12/6/2018 at 1:50 PM

## 2018-12-06 NOTE — TELEPHONE ENCOUNTER
":    Scheduled a ED f/u with  on 12/11/18. Pt was advised by hospitalist that he will be sent home with pain med for his on-going rib fracture pain, but didn't receive any rx for pain med. Pt can't take ibuprofen because of he is on apixaban, tylenol doesn't do anything. Pain kept him up all night, so requesting stronger pain med till he see  next week.     Can leave the rx with FD when ready & notify spouse. Please advise.     ED Notes:  Follow up with Electrophysiology in about 4 weeks.  Follow up w Dr. Lewis as needed.   The patient will need MRI of the aortic root in about 6 months.  I anticipate this will be completed by cardiology in follow-up.    ED/Discharge Protocol    \"Hi, my name is Lamar Meredith, a registered nurse, and I am calling on behalf of Dr. Lewis's office at [facility].  I am calling to follow up and see how things are going for you after your recent visit.\"    \"I see that you were in the (ER/UC/IP) on 12/3/18.    How are you doing now that you are home?\" Feeling better, but is in pain from the rib fracture(see notes above)    Is patient experiencing symptoms that may require a hospital visit?  No    Discharge Instructions    \"Let's review your discharge instructions.  What is/are the follow-up recommendations?  Pt. Response: See above    \"Were you instructed to make a follow-up appointment?\"  Pt. Response: Yes.  Has appointment been made?   Yes      \"When you see the provider, I would recommend that you bring your discharge instructions with you.    Medications    \"How many new medications are you on since your hospitalization/ED visit?\"    0-1  \"How many of your current medicines changed (dose, timing, name, etc.) while you were in the hospital/ED visit?\"   0-1  \"Do you have questions about your medications?\"   No  \"Were you newly diagnosed with heart failure, COPD, diabetes or did you have a heart attack?\"   No  For patients on insulin: \"Did you start on insulin in " "the hospital or did you have your insulin dose changed?\"   No    Medication reconciliation completed? Yes    Was MTM referral placed (*Make sure to put transitions as reason for referral)?   No    Call Summary    \"Do you have any questions or concerns about your condition or care plan at the moment?\"    No  Triage nurse advice given: Call us with any questions/concerns    Patient was in ER 1 in the past year (assess appropriateness of ER visits.)      \"If you have questions or things don't continue to improve, we encourage you contact us through the main clinic number,  [phone].  Even if the clinic is not open, triage nurses are available 24/7 to help you.     We would like you to know that our clinic has extended hours (provide information).  We also have urgent care (provide details on closest location and hours/contact info)\"      \"Thank you for your time and take care!\"    Baylee, RN  Triage Nurse                  "

## 2018-12-06 NOTE — TELEPHONE ENCOUNTER
See my previous note.  Does he have any symptoms of pneumonia or new symptoms?  If not, then medication per other note

## 2018-12-06 NOTE — TELEPHONE ENCOUNTER
Patient was evaluated by cardiology while inpatient for aflutter with RVR (plan 1 month f/u with EP to discuss ablataion). Called patient to discuss any post hospital d/c questions he may have, review medication changes, and confirm f/u appts. Patient denied any questions regarding new medications or changes to PTA medications. RN confirmed with patient that he did receive his rx for Eliquis. Patient denied any SOB, chest pain, or light headedness. RN confirmed with patient that we would like him to schedule a 1 month f/u with EP. Patient advised to call clinic with any cardiac related questions or concerns prior to this gustavo't. Patient verbalized understanding and agreed with plan. RN transferred patient to scheduling to arrange 1 month f/u apt with EP.         12/5/18 cardiology progress note  Admit (12/3) after suffering a fall on ice.  Evidence of Aflutter w/ RVR and rib fractures     PMH:  HTN, abnormal stress test, MAYITO, hyperlipidemia, emphysema     NEW Persistent Asymptomatic Atrial Flutter w/ RVR  -duration unclear  -HR improved w/ increased Atenolol 150mg and Diltiazem ER 120mg  -Eliquis started  (CHADS2 VASc score 5)  -mod/severe LAE  -ambulate pt in chase to assess HR   -plan for EP evaluation in 1 month for consideration of DCCV vs ablation     Mechanical Fall  -rib fracture     CAD:  -Hx of angina and abnormal NUC (14)  -multivessel coronary calcifications noted on CT  -denies any recent CP  -outpt NUC recommended     HTN:  -BP improved since admit  -Diltiazem added and Atenolol escalated  -consider adding back pt's hydrochlorothiazide today  -mod LVH     Mod Asc Aorta dilatation  -root 4.6cm/ asc aorta 4.6cm  -out pt cMRI to assess aorta size in 6 mo     Mild RV Dysfunction  -BNP 1200/ NL LVEF  -no significant s & s of HF  -weight down 2 lbs  (219lb)  -low salt diet     Treated MAYITO

## 2018-12-06 NOTE — TELEPHONE ENCOUNTER
Routing to Covering Provider - Patient denies URI symptoms or fever. Please sign pended rx for .     Place RX up front. Call [phone] when this has been done. Kavitha More, Wife, will .      Patient denies fever & any URI symptoms. He is aware of the risk for pneumonia. Is trying to move around and deep breath.     The pain meds in the hospital worked well to keep the pain lower so he could move around more.

## 2018-12-06 NOTE — TELEPHONE ENCOUNTER
Please contact patient for In-patient follow up.  [phone] (home)     Visit date: 12/3 - D/C'd 12/05/2018  Diagnosis listed:Atrial Tachycardia (H), Coronary Artery Disease Involving Native Coronary Artery Of Native Heart Without Angina Pectori  Number of visits in past 12 months:ED 1/ IP 0

## 2018-12-11 ENCOUNTER — OFFICE VISIT (OUTPATIENT)
Dept: PEDIATRICS | Facility: CLINIC | Age: 83
End: 2018-12-11
Payer: COMMERCIAL

## 2018-12-11 VITALS
SYSTOLIC BLOOD PRESSURE: 122 MMHG | HEIGHT: 67 IN | WEIGHT: 231.3 LBS | HEART RATE: 59 BPM | DIASTOLIC BLOOD PRESSURE: 70 MMHG | BODY MASS INDEX: 36.3 KG/M2 | OXYGEN SATURATION: 97 % | TEMPERATURE: 97.8 F

## 2018-12-11 DIAGNOSIS — R79.89 LOW TESTOSTERONE IN MALE: ICD-10-CM

## 2018-12-11 DIAGNOSIS — E55.9 VITAMIN D DEFICIENCY: ICD-10-CM

## 2018-12-11 DIAGNOSIS — I48.92 ATRIAL FLUTTER WITH RAPID VENTRICULAR RESPONSE (H): Primary | ICD-10-CM

## 2018-12-11 DIAGNOSIS — I10 HYPERTENSION GOAL BP (BLOOD PRESSURE) < 140/90: ICD-10-CM

## 2018-12-11 DIAGNOSIS — Z79.811 LONG TERM (CURRENT) USE OF AROMATASE INHIBITORS: ICD-10-CM

## 2018-12-11 DIAGNOSIS — C61 MALIGNANT NEOPLASM OF PROSTATE (H): ICD-10-CM

## 2018-12-11 DIAGNOSIS — S22.32XD CLOSED FRACTURE OF ONE RIB OF LEFT SIDE WITH ROUTINE HEALING, SUBSEQUENT ENCOUNTER: ICD-10-CM

## 2018-12-11 DIAGNOSIS — E78.5 DYSLIPIDEMIA: ICD-10-CM

## 2018-12-11 DIAGNOSIS — M85.80 OSTEOPENIA, UNSPECIFIED LOCATION: ICD-10-CM

## 2018-12-11 PROCEDURE — 99496 TRANSJ CARE MGMT HIGH F2F 7D: CPT | Performed by: INTERNAL MEDICINE

## 2018-12-11 RX ORDER — HYDROCHLOROTHIAZIDE 25 MG/1
25 TABLET ORAL DAILY
Qty: 90 TABLET | Refills: 1 | Status: SHIPPED | OUTPATIENT
Start: 2018-12-11 | End: 2018-12-20

## 2018-12-11 RX ORDER — ATORVASTATIN CALCIUM 40 MG/1
40 TABLET, FILM COATED ORAL DAILY
Qty: 90 TABLET | Refills: 3 | Status: SHIPPED | OUTPATIENT
Start: 2018-12-11 | End: 2019-12-11

## 2018-12-11 RX ORDER — DILTIAZEM HYDROCHLORIDE 120 MG/1
120 CAPSULE, EXTENDED RELEASE ORAL DAILY
Qty: 30 CAPSULE | Refills: 0 | Status: SHIPPED | OUTPATIENT
Start: 2018-12-11 | End: 2018-12-26

## 2018-12-11 ASSESSMENT — MIFFLIN-ST. JEOR: SCORE: 1712.8

## 2018-12-11 NOTE — PROGRESS NOTES
SUBJECTIVE:   Nixon More is a 83 year old male who presents to clinic today for the following health issues:          Hospital Follow-up Visit:    Hospital/Nursing Home/IP Rehab Facility: [facility]  Date of Admission: 12/3/18  Date of Discharge: 12/5/18  Reason(s) for Admission: atrial flutter  Was trying to shovel and fell and hurt self.              Problems taking medications regularly:  None       Medication changes since discharge: None       Problems adhering to non-medication therapy:  None    Summary of hospitalization:  [facility] discharge summary reviewed  Diagnostic Tests/Treatments reviewed.  Follow up needed: none  Other Healthcare Providers Involved in Patient s Care:         None  Update since discharge: improved but has ankle swelling.  Is also more short of breath - notes with activity.  Does not wake from sleep but is sleeping sitting up due to rib.    Post Discharge Medication Reconciliation: discharge medications reconciled, continue medications without change.  Plan of care communicated with patient and family     Coding guidelines for this visit:  Type of Medical   Decision Making Face-to-Face Visit       within 7 Days of discharge Face-to-Face Visit        within 14 days of discharge   Moderate Complexity [id number]   High Complexity [id number]                Problem list and histories reviewed & adjusted, as indicated.  Additional history: as documented    Labs reviewed in EPIC    Reviewed and updated as needed this visit by clinical staff  Tobacco  Allergies  Meds  Problems  Med Hx  Surg Hx  Fam Hx  Soc Hx        Reviewed and updated as needed this visit by Provider  Tobacco  Allergies  Meds  Problems  Med Hx  Surg Hx  Fam Hx         ROS:  Constitutional, HEENT, cardiovascular, pulmonary, gi and gu systems are negative, except as otherwise noted.    OBJECTIVE:     /70 (BP Location: Left arm, Patient Position: Chair, Cuff Size: Adult Large)   " Pulse 59   Temp 97.8  F (36.6  C) (Tympanic)   Ht 1.702 m (5' 7\")   Wt 104.9 kg (231 lb 4.8 oz)   SpO2 97%   BMI 36.23 kg/m    Body mass index is 36.23 kg/m .  GENERAL: healthy, alert and no distress  NECK: no adenopathy, no asymmetry, masses, or scars and thyroid normal to palpation  RESP: lungs clear to auscultation - no rales, rhonchi or wheezes  CV: regular rate and rhythm, normal S1 S2, no S3 or S4, no murmur, click or rub, no peripheral edema and peripheral pulses strong  ABDOMEN: soft, nontender, no hepatosplenomegaly, no masses and bowel sounds normal  MS: 2+ edema to knees  SKIN: no suspicious lesions or rashes  PSYCH: mentation appears normal, affect normal/bright        ASSESSMENT/PLAN:       1. Atrial flutter with rapid ventricular response (H)  Rate controlled but side effects with diltiazem (had previously as well).  Try dropping dose and reassess in 1-2 wks  - diltiazem ER (DILT-XR) 120 MG 24 hr capsule; Take 1 capsule (120 mg) by mouth daily  Dispense: 30 capsule; Refill: 0    2. Closed fracture of one rib of left side with routine healing, subsequent encounter  Continue pain control  - DX Hip/Pelvis/Spine; Future    3. Hypertension goal BP (blood pressure) < 140/90  Controlled but medication side effects.  Drop dilt and add back hydrochlorothiazide and follow-up 1-2 wks  - diltiazem ER (DILT-XR) 120 MG 24 hr capsule; Take 1 capsule (120 mg) by mouth daily  Dispense: 30 capsule; Refill: 0  - hydrochlorothiazide (HYDRODIURIL) 25 MG tablet; Take 1 tablet (25 mg) by mouth daily  Dispense: 90 tablet; Refill: 1    4. Vitamin D deficiency  Replace, check DEXA    5. Osteopenia, unspecified location  Recheck dexa  - DX Hip/Pelvis/Spine; Future    7. Dyslipidemia  Change to atorvastatin as is on diltiazem  - atorvastatin (LIPITOR) 40 MG tablet; Take 1 tablet (40 mg) by mouth daily  Dispense: 90 tablet; Refill: 3    8. Malignant neoplasm of prostate  Recheck dexa, follow-up with urology as scheduled  - " DX Hip/Pelvis/Spine; Future    9. Low testosterone in male  Due to medication, recheck dexa  - DX Hip/Pelvis/Spine; Future    10. Long term (current) use of aromatase inhibitors  Recheck dexa  - DX Hip/Pelvis/Spine; Future    See Patient Instructions    Natalya Lewis MD  [facility]

## 2018-12-11 NOTE — Clinical Note
I would like Mr. More seen in 1-2 weeks rather than a month to help adjust his medication.  He had swelling as a reaction to diltiazem in the past which is why it was on his allergy list.  He had edema when I saw him yesterday.  I droppped the dilt dose and added back hydrochlorothiazide.  I also changed his statin due to dilt.  I can see him in 1-2 weeks if cardiology can't - please let me know

## 2018-12-11 NOTE — PATIENT INSTRUCTIONS
Drop the diltiazem to 120mg daily.    Start back on hydrochlorothiazide.    They will call you to schedule the bone density test.    I will let cardiology know about what is happening and you will see me or them in 1-2 weeks.

## 2018-12-12 ENCOUNTER — CARE COORDINATION (OUTPATIENT)
Dept: CARDIOLOGY | Facility: CLINIC | Age: 83
End: 2018-12-12

## 2018-12-12 ENCOUNTER — TELEPHONE (OUTPATIENT)
Dept: CARDIOLOGY | Facility: CLINIC | Age: 83
End: 2018-12-12

## 2018-12-12 NOTE — PROGRESS NOTES
Received request from Dr. Walker to aet up pt to see Dr. Camp or ZULMA in next 1-2 weeks. Attempted to call pt with recommendations, left message for pt to call back. LPenfield RN

## 2018-12-13 NOTE — TELEPHONE ENCOUNTER
I entirely understand the difficulty of his situation which is why I am glad you facilitated his rapid follow-up with cardiology.  I did discuss with the patient the difficult situation with the patient and that it might require multiple visits to adjust his medications and hopefully with cardiology.  He did state that he was told the simvastatin needed to change. I agree it was missed at discharge, which can happen in a complex patient and it is why we do rapid follow-up in primary care.  Thanks for your fast response and excellent care.

## 2018-12-13 NOTE — PROGRESS NOTES
Pt called back, informed of recommendations from Dr. Walker & Dr. Lewis. Pt transferred to scheduling to arrange. LPenfield RN

## 2018-12-13 NOTE — TELEPHONE ENCOUNTER
Darion Lewis. I had time to review Mr Sturgeons case this evening after the letter you sent to me today. You will note on the med list on the progress note on Dec 5th which my NP, Neisha Bañuelos generated, that Mr Sturgeon was on atorvastatin. I had switched him from simvastatin to atorvastatin the day before when I had started diltiazem because of the known interactions between the two (rhabdo etc). You will also note that we were not the discharging physicians (the hospitalist service were) and in fact, he was discharged before I had time to see him (see note Dec 5th at 3:54 PM). It was at that time that the simvastatin reappeared on his discharge medications instead of atorvastatin . With respect to the diltiazem, Mr More had very difficult to control atrial flutter which required the addition of a calcium blocker in addition to high dose atenolol 150 mg per day. This did bring the atrial flutter under control in hospital. 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 I had to balance the acute issues at hand which brought him into hospital (i.e the atrial flutter) with possible side effects from a very useful drug in this clinical situation. You will note that this drug was only going to be used in the short term, as I clearly stated at the end of the note, until the more definitive procedure could be performed which would be cardioversion or atrial flutter ablation after consultation with the electrophysiologists. The patient would need at least 3 weeks of anticoagulation before cardioversion or ablation could be performed. The patient was asymptomatic and his rate had come under control which is why ARNULFO/ cardioversion was not performed urgently. Typically , this plan works well for the vast majority of patients. I am sorry that this did not work  out as well for Mr Sturgeon but as experience, I am sure, has shown  you ,  even the best laid plans, in some patients do not work out. I called my nurse Lynette Penfield immediately after I read your note to arrange a rapid follow visit for Mr Barnes to assess him further. I hope that this note gives clarity to Mr Sturgeon cardiac care while he was at [facility] and throws light on the significant number of issues you raised . Thank you. Curry Walker

## 2018-12-19 ENCOUNTER — MYC MEDICAL ADVICE (OUTPATIENT)
Dept: PEDIATRICS | Facility: CLINIC | Age: 83
End: 2018-12-19

## 2018-12-20 ENCOUNTER — DOCUMENTATION ONLY (OUTPATIENT)
Dept: CARDIOLOGY | Facility: CLINIC | Age: 83
End: 2018-12-20

## 2018-12-20 ENCOUNTER — OFFICE VISIT (OUTPATIENT)
Dept: CARDIOLOGY | Facility: CLINIC | Age: 83
End: 2018-12-20
Payer: COMMERCIAL

## 2018-12-20 VITALS
WEIGHT: 225 LBS | HEART RATE: 53 BPM | BODY MASS INDEX: 35.24 KG/M2 | DIASTOLIC BLOOD PRESSURE: 79 MMHG | SYSTOLIC BLOOD PRESSURE: 131 MMHG

## 2018-12-20 DIAGNOSIS — I10 BENIGN ESSENTIAL HYPERTENSION: Primary | ICD-10-CM

## 2018-12-20 DIAGNOSIS — I10 BENIGN ESSENTIAL HYPERTENSION: ICD-10-CM

## 2018-12-20 DIAGNOSIS — I48.92 ATRIAL FLUTTER, UNSPECIFIED TYPE (H): ICD-10-CM

## 2018-12-20 DIAGNOSIS — I10 HYPERTENSION GOAL BP (BLOOD PRESSURE) < 140/90: ICD-10-CM

## 2018-12-20 LAB
ANION GAP SERPL CALCULATED.3IONS-SCNC: 7.8 MMOL/L (ref 6–17)
BUN SERPL-MCNC: 34 MG/DL (ref 7–30)
CALCIUM SERPL-MCNC: 10.1 MG/DL (ref 8.5–10.5)
CHLORIDE SERPL-SCNC: 104 MMOL/L (ref 98–107)
CO2 SERPL-SCNC: 29 MMOL/L (ref 23–29)
CREAT SERPL-MCNC: 1.37 MG/DL (ref 0.7–1.3)
GFR SERPL CREATININE-BSD FRML MDRD: 50 ML/MIN/{1.73_M2}
GLUCOSE SERPL-MCNC: 111 MG/DL (ref 70–105)
POTASSIUM SERPL-SCNC: 3.8 MMOL/L (ref 3.5–5.1)
SODIUM SERPL-SCNC: 137 MMOL/L (ref 136–145)

## 2018-12-20 PROCEDURE — 99214 OFFICE O/P EST MOD 30 MIN: CPT | Performed by: PHYSICIAN ASSISTANT

## 2018-12-20 PROCEDURE — 80048 BASIC METABOLIC PNL TOTAL CA: CPT | Performed by: PHYSICIAN ASSISTANT

## 2018-12-20 PROCEDURE — 93000 ELECTROCARDIOGRAM COMPLETE: CPT | Performed by: PHYSICIAN ASSISTANT

## 2018-12-20 PROCEDURE — 36415 COLL VENOUS BLD VENIPUNCTURE: CPT | Performed by: PHYSICIAN ASSISTANT

## 2018-12-20 RX ORDER — HYDROCHLOROTHIAZIDE 25 MG/1
12.5 TABLET ORAL DAILY
Start: 2018-12-20 | End: 2019-03-15 | Stop reason: ALTCHOICE

## 2018-12-20 NOTE — LETTER
12/20/2018    Natalya Lewis MD  [address] [place] [address]    RE: Nixon RILEY Argenis       Dear Colleague,    I had the pleasure of seeing Nixon More in the [facility].        HPI:   I had the pleasure of seeing Spencer when he came accompanied by his wife Kathya for concerns regarding shortness of breath and arrhythmias.  He was recently hospitalized at [facility] and cared for by Dr. Walker.  He had previously seen Dr. Camp for his history of:     1.  Presumed CAD based on abnormal stress echocardiogram 1/2011 showing lateral ischemia with exertion associated with chest/throat discomfort.  He and Dr. Camp opted to treat this medically, and he has remained on medical treatment.  Most recent stress echocardiogram 12/2014 showed no evidence of exercise-induced ischemia  2.  Preserved ejection fraction based on echocardiogram 12/2018  3.  Hypertension/dyslipidemia  4.  Obstructive sleep apnea on BiPAP therapy  5.  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    Spencer last saw us in clinic in 12/2014, at which time he was doing relatively well, but continued to note some dyspnea on exertion as well as buttock claudication.  Aortoiliac duplex ultrasound 12/2014 showed no evidence of severe peripheral disease.    Unfortunately, he sustained a mechanical fall while slipping on ice on 12/3.  He presented to [facility] where he was incidentally noted to be in atrial flutter with a rapid ventricular response.  Heart rate is in the 120s.  He was started on diltiazem and a heparin drip.  He was later given atenolol, which was increased to 150 mg daily.  Dr. Walker recommended continued rate control and added diltiazem.  On discharge 12/5, he was on atenolol 150 mg daily and diltiazem 180 mg daily.  He had been on Eliquis after his heparin drip was stopped at 5 mg twice daily.    In the hospital, Dr. Walker intended his  "simvastatin to be switched to atorvastatin even the use of diltiazem, but upon discharge, he was sent home on his home dose of simvastatin.  This is subsequently been switched by Dr. Lewis, his primary care physician.  In the hospital, Dr. Walker also had reviewed a history of \"intolerance\" to diltiazem.  Spencer was unsure of what this was, but was thinking it was related to swollen gums.  Weighing the risks and benefits of short-term use versus possible gingival hyperplasia, Dr. Walker opted to start diltiazem.  In retrospect, Spencer believes is side effect was actually swollen ankles, as he was discharged from the hospital and had significant amount of lower extremity edema to the point that he could not even get shoes on.  This reminded him of when he had tried diltiazem in the past.  He does not think it had anything to do with his gums.    When he saw Dr. Lewis 12/11, his weight was up to 231 pounds (discharged 12/5 at 219 pounds), he was complaining of significant lower extremity edema and continued shortness of breath.  As above, Dr. Lewis switched his simvastatin to atorvastatin and decreased his diltiazem from 180-120 mg daily due to concerns regarding side effects (ankle swelling). She also added back hydrochlorothiazide at 25 mg daily (as his losartan hydrochlorothiazide 100/12.5 mg daily had been discontinued in the hospital).    Since she saw Dr. Lewis, Spencer thinks that his swelling is quite a bit better and Kathya agrees.  He still notes some lower extremity edema, but states that it is much improved. It's best in the morning and worsens throughout the day.  He does continue to note shortness of breath, and states that it can happen with exertion or even sometimes at rest.  He denies any orthopnea or PND.  Though just recently moved back to sleeping in the bed after sleeping in a recliner for rib discomfort).  Overall, he does not think his shortness of breath is too bad at the current time.    He " "has a pulse oximeter at home and he checks this routinely.  O2 sats are typically above 95%.  Heart rates are typically in the 70s and 80s.    EKG today, which I over read, showed atrial fibrillation at 75 bpm.  EKG during hospitalization 12/3/18 showed what looks like atrial flutter at 126 bpm.  It is difficult to tell if this is typical or atypical, but looks atypical.  Echocardiogram done 12/2018 showed an EF of 55%.  He had mildly reduced right ventricular systolic function.  Left atrial size was moderate to lead to severely dilated with a left atrial volume index of 51.2 mL/m .  Left atrial size of 6.0 cm.  As above, his aortic root and ascending aorta were both enlarged at 4.6 cm  Last stress echocardiogram 12/2014 was negative for stress-induced wall motion abnormalities.  PFTs done 2/2011 showed mild obstruction  Aortoiliac ultrasound 12/2014 showed evidence of plaquing in the aortoiliac arterial system.  Abdominal aorta was normal in size with a maximum dimension of 2.4 cm    Assessment & Plan:    1.  Atrial arrhythmias    As above, came in in what appears to be atypical atrial flutter, but today is in atrial fibrillation (confirmed with Dr. Leger)    He complains of shortness of breath, but states that overall it is \"not life limiting at this point.\"  It does not appear he has evidence of fluid overload or myocardial ischemia to account for shortness of breath and I do think that this is likely related to his arrhythmia.    Heart rate is under good control at home, and on pulse ox is typically in the 60s-80s.      Today we discussed a number of options, including proceeding with a ARNULFO/DC cardioversion ASAP given his complaints of dyspnea and hopes to get off of diltiazem, which he thinks is causing some lower extremity edema even at a small dose.  Heparin was started on admit 12/3, and he was then switched to Eliquis 12/4 after he was seen by Dr. Walker, so if cardioversion were to take place prior " "to 1/4, he would require ARNULFO.    We also discussed the option of waiting until at least January 4 to proceed with cardioversion to see if resumption of sinus rhythm could improve his dyspnea.    We also discussed waiting until he saw Dr. Lee on 1/8, as previously scheduled, to best assess options.  I explained that he would like to not be a candidate for an ablation given that he has now had both atrial flutter and atrial fibrillation.    PLAN:    At this time, he would like to hold off on proceeding with ARNULFO/DC cardioversion or cardioversion until he meets with Dr. Lee.  He states that his breathing is \"not perfect\" but he can certainly live with it for the next few weeks.      On exam he does not appear fluid overloaded, but I have requested that he continue to weigh himself daily.  With hydrochlorothiazide 25 mg daily back on board and reduction in his diltiazem to 180 mg daily, his weight dropped about 5 pounds since he saw Dr. Lewis.    Continue anticoagulation with Eliquis given CHADSVASc of 4 (hypertension, coronary disease/PVD, age)    I have given him my nurse's direct number, and explained that he needs to seek attention right away if he has heart rate starts to get faster than his typical 70-80 bpm it is getting on his pulse ox, his shortness of breath worsens dramatically or if his lower extremity edema worsens dramatically.  He voiced understanding.  In case he does require ARNULFO/DCCV before he sees Dr. Lee, I have reviewed the risks associated with these.  We discussed Risks, Benefits and Indications of proceeding with transesophageal echocardiogram (ARNULFO), including but not limited to use of conscious sedation, sore throat, esophageal injury and discomfort. We discussed Risk, Benefits and Indication of proceeding with direct current cardioversion (DCCV), including but not limited to use of anesthesia, surface burns, [id number] arrhythmias requiring further treatment, discomfort and stroke.  The " patient voiced understanding and understands that a  will be needed given the use of conscious sedation. A consent form will be signed by the procedural physician.      2.  Coronary disease    He has never had an angiogram, but treated presumptively based on typical angina associated with exercise and abnormality noted on stress echocardiogram 1/2011    Troponins were all negative while hospitalized at [facility], despite rapid heart rate    Echocardiogram with normal ejection fraction 12/2018    Remains on statin, aspirin and beta-blocker.    It was recommended he proceed with stress testing, but I have taken the liberty of canceling that and planning to do it once we are sure his atrial arrhythmias are under good heart rate control.  I do not think there is any urgency to this given that he has had no angina, his ejection fraction is normal and he has had negative troponins while hospitalized despite a rapid heart rate.    PLAN:    We will plan getting a stress test in the coming months to assess for ischemia, once his arrhythmias have been sorted out and a definitive plan is in place    Encouraged him to seek attention should he have recurrent angina (which he states he has not had for years)    Due to Eliquis use, will discontinue aspirin for now    3.  Hypertension    Blood pressure is under adequate control on his current doses of hydrochlorothiazide 25 mg daily, losartan 100 mg daily, diltiazem 120 mg daily and atenolol 150 mg daily    PLAN:    BMP today, and will determine if need to reduce hydrochlorothiazide down to 12.5 mg daily as suggested by Dr. Lewis    Notes that blood pressures under good control and swelling is down, and therefore may not need to adjust this if electrolytes and renal function are stable    4.  Obstructive sleep apnea    He is on BiPAP, but admits that he is not seen his sleep physician for a while    Discussed the possible effects of central sleep apnea on atrial arrhythmias and  heart function    PLAN:    Encouraged him to contact his sleep physician and ensure BiPAP is working appropriately, especially as noted echocardiogram with mildly reduced RV systolic function    5.  Ascending aortic aneurysm    Echo with aortic root and ascending aorta enlarged at 4.6 cm    PLAN:    BP control - will await BMP to address hydrochlorothiazide. Goal SBP ~120 mmHg    Recommended outpatient cMRI to assess aorta size in ~6 months (not yet ordered)    Cherise Bennett PA-C, MSPAS      Orders Placed This Encounter   Procedures     Basic metabolic panel     No orders of the defined types were placed in this encounter.    There are no discontinued medications.      Encounter Diagnoses   Name Primary?     Atrial flutter, unspecified type (H)      Benign essential hypertension Yes       CURRENT MEDICATIONS:  Current Outpatient Medications   Medication Sig Dispense Refill     apixaban ANTICOAGULANT (ELIQUIS) 5 MG tablet Take 1 tablet (5 mg) by mouth 2 times daily 60 tablet 0     ASPIRIN 81 MG OR TABS 1 tab po QD (Once per day) 100 3     atenolol (TENORMIN) 50 MG tablet Take 3 tablets (150 mg) by mouth daily 45 tablet 0     atorvastatin (LIPITOR) 40 MG tablet Take 1 tablet (40 mg) by mouth daily 90 tablet 3     diltiazem ER (DILT-XR) 120 MG 24 hr capsule Take 1 capsule (120 mg) by mouth daily 30 capsule 0     EPINEPHrine (EPIPEN/ADRENACLICK/OR ANY BX GENERIC EQUIV) 0.3 MG/0.3ML injection 2-pack Inject 0.3 mLs (0.3 mg) into the muscle as needed for anaphylaxis 0.6 mL 1     FLUoxetine (PROZAC) 10 MG capsule Take 1 capsule (10 mg) by mouth daily 90 capsule 3     hydrochlorothiazide (HYDRODIURIL) 25 MG tablet Take 1 tablet (25 mg) by mouth daily 90 tablet 1     HYDROcodone-acetaminophen (NORCO) 5-325 MG tablet Take 1 tablet by mouth every 4 hours as needed for pain 18 tablet 0     ipratropium (ATROVENT) 0.06 % spray Spray 2 sprays in nostril 4 times daily as needed for rhinitis 3 Box 3     leuprolide (ELIGARD) 45 MG  injection Inject 45 mg Subcutaneous every 6 months. 1 each 12     losartan (COZAAR) 100 MG tablet Take 1 tablet (100 mg) by mouth daily 30 tablet 0     Multiple Minerals-Vitamins (PROSTEON PO) Take 2,000 Units by mouth With vitamin D       Multiple Vitamins-Minerals (CENTRUM SILVER PO)          ALLERGIES     Allergies   Allergen Reactions     Augmentin Rash     Type III hypersensitivity     Bactrim [Sulfamethoxazole W/Trimethoprim] Rash     Serum sickness, type III hypersensitivity       Bee Venom Itching     Lisinopril Cough     Naproxen Hives       PAST MEDICAL HISTORY:  Past Medical History:   Diagnosis Date     Actinic keratosis      Arthritis      CAD (coronary artery disease)     1/5/2011 lateral ischemia on stress echo, 12/2014 normal stress echo     Dyslipidemia      Emphysema of lung (H)      Essential hypertension, benign      Hernia, abdominal      Hypersomnia with sleep apnea, unspecified     on bipap, partially treated with residual apneas     Hypertrophy (benign) of prostate 5/01    Biopsy 5/01 negative for cancer; PSA 5     Lumbago      Mumps      Prostate cancer (H) 12/15/2006     Skin cancer, basal cell 1997     Spider veins        PAST SURGICAL HISTORY:  Past Surgical History:   Procedure Laterality Date     HERNIA REPAIR  child     Moh's procedure for basal cell carcinoma  01/2001     PROSTATE SURGERY       s/p lumbar laminectomy NOS  1988     VITRECTOMY PARS PLANA REMOVE PRERETINAL MEMBRANE   3/09       FAMILY HISTORY:  Family History   Problem Relation Age of Onset     Alzheimer Disease Mother      Hypertension Mother      Cardiovascular Father         D:86 complications fo CHF     Prostate Cancer Brother      Lung Cancer Brother 76     Prostate Cancer Brother        SOCIAL HISTORY:  Social History     Socioeconomic History     Marital status:      Spouse name: None     Number of children: None     Years of education: None     Highest education level: None   Social Needs     Financial  resource strain: None     Food insecurity - worry: None     Food insecurity - inability: None     Transportation needs - medical: None     Transportation needs - non-medical: None   Occupational History     Occupation: retired   Tobacco Use     Smoking status: Former Smoker     Packs/day: 1.00     Years: 30.00     Pack years: 30.00     Types: Cigarettes     Last attempt to quit: 1978     Years since quittin.9     Smokeless tobacco: Never Used   Substance and Sexual Activity     Alcohol use: Yes     Alcohol/week: 1.0 oz     Types: 2 Standard drinks or equivalent per week     Comment: socially     Drug use: No     Sexual activity: Yes     Partners: Female   Other Topics Concern      Service Not Asked     Blood Transfusions Not Asked     Caffeine Concern No     Comment: 0-2 cans of soda per day.     Occupational Exposure Not Asked     Hobby Hazards Not Asked     Sleep Concern Not Asked     Stress Concern Not Asked     Weight Concern Not Asked     Special Diet Not Asked     Back Care Not Asked     Exercise No     Bike Helmet Not Asked     Seat Belt Yes     Self-Exams Not Asked     Parent/sibling w/ CABG, MI or angioplasty before 65F 55M? No   Social History Narrative     None       Review of Systems:  Skin:  Negative     Eyes:  Positive for glasses  ENT:  Negative    Respiratory:  Positive for dyspnea on exertion  Cardiovascular:  Negative for;syncope or near-syncope;fatigue;dizziness;lightheadedness Positive for;palpitations(occasional with changing positions)  Gastroenterology: Negative for melena;hematochezia  Genitourinary:  not assessed    Musculoskeletal:  Positive for joint stiffness  Neurologic:  Negative    Psychiatric:  Negative    Heme/Lymph/Imm:  Negative    Endocrine:  Positive for diabetes(elevated blood sugars)    Physical Exam:  Vitals: /79   Pulse 53   Wt 102.1 kg (225 lb)   BMI 35.24 kg/m       Constitutional:           Skin:  warm and dry to the touch        Head:   normocephalic        Eyes:           ENT:  no pallor or cyanosis, dentition good        Neck:  no carotid bruit;JVP normal   No HJR    Chest:  clear to auscultation;normal symmetry        Cardiac:   irregularly irregular rhythm                Abdomen:  abdomen soft obese      Vascular: pulses full and equal                                 strong radial pulses, slighly diminshed dorsalis pedis and posterior tibia    Extremities and Back:  no deformities, clubbing, cyanosis, erythema observed        Neurological:  no gross motor deficits          Recent Lab Results:  LIPID RESULTS:  Lab Results   Component Value Date    CHOL 124 10/17/2018    HDL 41 10/17/2018    LDL 60 10/17/2018    TRIG 117 10/17/2018    CHOLHDLRATIO 2.8 10/15/2015       LIVER ENZYME RESULTS:  Lab Results   Component Value Date    AST 29 08/18/2011    ALT 29 08/18/2011       CBC RESULTS:  Lab Results   Component Value Date    WBC 9.0 12/03/2018    RBC 4.74 12/03/2018    HGB 14.8 12/03/2018    HCT 45.4 12/03/2018    MCV 96 12/03/2018    MCH 31.2 12/03/2018    MCHC 32.6 12/03/2018    RDW 12.8 12/03/2018     12/03/2018       BMP RESULTS:  Lab Results   Component Value Date     12/20/2018    POTASSIUM 3.8 12/20/2018    CHLORIDE 104 12/20/2018    CO2 29 12/20/2018    ANIONGAP 7.8 12/20/2018     (H) 12/20/2018    BUN 34 (H) 12/20/2018    CR 1.37 (H) 12/20/2018    GFRESTIMATED 50 (L) 12/20/2018    GFRESTBLACK 60 (L) 12/20/2018    MARLENE 10.1 12/20/2018        A1C RESULTS:  Lab Results   Component Value Date    A1C 5.9 (H) 12/04/2018       INR RESULTS:  Lab Results   Component Value Date    INR 1.03 12/03/2018    INR 1.07 11/27/2007       Thank you for allowing me to participate in the care of your patient.    Sincerely,     Nimisha Bennett PA-C     [facility]

## 2018-12-20 NOTE — PATIENT INSTRUCTIONS
1. EKG today looks like it's actually atrial fibrillation.  In the hospital, you look like you had its cousin, atrial flutter. I'll confirm this with MD.    2. Discussed numerous options:   A) ARNULFO/cardioversion (needed if on Eliquis <4 weeks)   B) Cardioversion (if on Eliquis >4 weeks)   C) Meet with Dr. Lee 1/8/2019 as planned - you've decided on this option for now. CALL if need to change! [phone] (my AFib nurses Sofya and Amalia)    3. Stop aspirin while on Eliquis 5 mg twice a day     4. Blood work today and we'll decide on dose of hydrochlorothiazide    5. Will cancel stress test FOR NOW - reschedule once you're back in normal rhythm OR once we know heart rate is under good control

## 2018-12-20 NOTE — LETTER
12/20/2018    Natalya Lewis MD  [address] [place] [address]    RE: Nixon Kingon       Dear Colleague,    I had the pleasure of seeing Nixon More in the [facility].        Thank you HPI:   I had the pleasure of seeing Spencer when he came accompanied by his wife Kathya for concerns regarding shortness of breath and arrhythmias.  He was recently hospitalized at [facility] and cared for by Dr. Walker.  He had previously seen Dr. Camp for his history of:     1.  Presumed CAD based on abnormal stress echocardiogram 1/2011 showing lateral ischemia with exertion associated with chest/throat discomfort.  He and Dr. Camp opted to treat this medically, and he has remained on medical treatment.  Most recent stress echocardiogram 12/2014 showed no evidence of exercise-induced ischemia  2.  Preserved ejection fraction based on echocardiogram 12/2018  3.  Hypertension/dyslipidemia  4.  Obstructive sleep apnea on BiPAP therapy  5.  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    Spencer last saw us in clinic in 12/2014, at which time he was doing relatively well, but continued to note some dyspnea on exertion as well as buttock claudication.  Aortoiliac duplex ultrasound 12/2014 showed no evidence of severe peripheral disease.    Unfortunately, he sustained a mechanical fall while slipping on ice on 12/3.  He presented to [facility] where he was incidentally noted to be in atrial flutter with a rapid ventricular response.  Heart rate is in the 120s.  He was started on diltiazem and a heparin drip.  He was later given atenolol, which was increased to 150 mg daily.  Dr. Walker recommended continued rate control and added diltiazem.  On discharge 12/5, he was on atenolol 150 mg daily and diltiazem 180 mg daily.  He had been on Eliquis after his heparin drip was stopped at 5 mg twice daily.    In the hospital, Dr. Walker intended his  "simvastatin to be switched to atorvastatin even the use of diltiazem, but upon discharge, he was sent home on his home dose of simvastatin.  This is subsequently been switched by Dr. Lewis, his primary care physician.  In the hospital, Dr. Walker also had reviewed a history of \"intolerance\" to diltiazem.  Spencer was unsure of what this was, but was thinking it was related to swollen gums.  Weighing the risks and benefits of short-term use versus possible gingival hyperplasia, Dr. Walker opted to start diltiazem.  In retrospect, Spencer believes is side effect was actually swollen ankles, as he was discharged from the hospital and had significant amount of lower extremity edema to the point that he could not even get shoes on.  This reminded him of when he had tried diltiazem in the past.  He does not think it had anything to do with his gums.    When he saw Dr. Lewis 12/11, his weight was up to 231 pounds (discharged 12/5 at 219 pounds), he was complaining of significant lower extremity edema and continued shortness of breath.  As above, Dr. Lewis switched his simvastatin to atorvastatin and decreased his diltiazem from 180-120 mg daily due to concerns regarding side effects (ankle swelling). She also added back hydrochlorothiazide at 25 mg daily (as his losartan hydrochlorothiazide 100/12.5 mg daily had been discontinued in the hospital).    Since she saw Dr. Lewis, Spencer thinks that his swelling is quite a bit better and Kathya agrees.  He still notes some lower extremity edema, but states that it is much improved. It's best in the morning and worsens throughout the day.  He does continue to note shortness of breath, and states that it can happen with exertion or even sometimes at rest.  He denies any orthopnea or PND.  Though just recently moved back to sleeping in the bed after sleeping in a recliner for rib discomfort).  Overall, he does not think his shortness of breath is too bad at the current time.    He " "has a pulse oximeter at home and he checks this routinely.  O2 sats are typically above 95%.  Heart rates are typically in the 70s and 80s.    EKG today, which I over read, showed atrial fibrillation at 75 bpm.  EKG during hospitalization 12/3/18 showed what looks like atrial flutter at 126 bpm.  It is difficult to tell if this is typical or atypical, but looks atypical.  Echocardiogram done 12/2018 showed an EF of 55%.  He had mildly reduced right ventricular systolic function.  Left atrial size was moderate to lead to severely dilated with a left atrial volume index of 51.2 mL/m .  Left atrial size of 6.0 cm.  As above, his aortic root and ascending aorta were both enlarged at 4.6 cm  Last stress echocardiogram 12/2014 was negative for stress-induced wall motion abnormalities.  PFTs done 2/2011 showed mild obstruction  Aortoiliac ultrasound 12/2014 showed evidence of plaquing in the aortoiliac arterial system.  Abdominal aorta was normal in size with a maximum dimension of 2.4 cm    Assessment & Plan:    1.  Atrial arrhythmias    As above, came in in what appears to be atypical atrial flutter, but today is in atrial fibrillation (confirmed with Dr. Leger)    He complains of shortness of breath, but states that overall it is \"not life limiting at this point.\"  It does not appear he has evidence of fluid overload or myocardial ischemia to account for shortness of breath and I do think that this is likely related to his arrhythmia.    Heart rate is under good control at home, and on pulse ox is typically in the 60s-80s.      Today we discussed a number of options, including proceeding with a ARNULFO/DC cardioversion ASAP given his complaints of dyspnea and hopes to get off of diltiazem, which he thinks is causing some lower extremity edema even at a small dose.  Heparin was started on admit 12/3, and he was then switched to Eliquis 12/4 after he was seen by Dr. Walker, so if cardioversion were to take place prior " "to 1/4, he would require ARNULFO.    We also discussed the option of waiting until at least January 4 to proceed with cardioversion to see if resumption of sinus rhythm could improve his dyspnea.    We also discussed waiting until he saw Dr. Lee on 1/8, as previously scheduled, to best assess options.  I explained that he would like to not be a candidate for an ablation given that he has now had both atrial flutter and atrial fibrillation.    PLAN:    At this time, he would like to hold off on proceeding with ARNULFO/DC cardioversion or cardioversion until he meets with Dr. Lee.  He states that his breathing is \"not perfect\" but he can certainly live with it for the next few weeks.      On exam he does not appear fluid overloaded, but I have requested that he continue to weigh himself daily.  With hydrochlorothiazide 25 mg daily back on board and reduction in his diltiazem to 180 mg daily, his weight dropped about 5 pounds since he saw Dr. Lewis.    Continue anticoagulation with Eliquis given CHADSVASc of 4 (hypertension, coronary disease/PVD, age)    I have given him my nurse's direct number, and explained that he needs to seek attention right away if he has heart rate starts to get faster than his typical 70-80 bpm it is getting on his pulse ox, his shortness of breath worsens dramatically or if his lower extremity edema worsens dramatically.  He voiced understanding.  In case he does require ARNULFO/DCCV before he sees Dr. Lee, I have reviewed the risks associated with these.  We discussed Risks, Benefits and Indications of proceeding with transesophageal echocardiogram (ARNULFO), including but not limited to use of conscious sedation, sore throat, esophageal injury and discomfort. We discussed Risk, Benefits and Indication of proceeding with direct current cardioversion (DCCV), including but not limited to use of anesthesia, surface burns, [id number] arrhythmias requiring further treatment, discomfort and stroke.  The " patient voiced understanding and understands that a  will be needed given the use of conscious sedation. A consent form will be signed by the procedural physician.      2.  Coronary disease    He has never had an angiogram, but treated presumptively based on typical angina associated with exercise and abnormality noted on stress echocardiogram 1/2011    Troponins were all negative while hospitalized at [facility], despite rapid heart rate    Echocardiogram with normal ejection fraction 12/2018    Remains on statin, aspirin and beta-blocker.    It was recommended he proceed with stress testing, but I have taken the liberty of canceling that and planning to do it once we are sure his atrial arrhythmias are under good heart rate control.  I do not think there is any urgency to this given that he has had no angina, his ejection fraction is normal and he has had negative troponins while hospitalized despite a rapid heart rate.    PLAN:    We will plan getting a stress test in the coming months to assess for ischemia, once his arrhythmias have been sorted out and a definitive plan is in place    Encouraged him to seek attention should he have recurrent angina (which he states he has not had for years)    Due to Eliquis use, will discontinue aspirin for now    3.  Hypertension    Blood pressure is under adequate control on his current doses of hydrochlorothiazide 25 mg daily, losartan 100 mg daily, diltiazem 120 mg daily and atenolol 150 mg daily    PLAN:    BMP today, and will determine if need to reduce hydrochlorothiazide down to 12.5 mg daily as suggested by Dr. Lewis    Notes that blood pressures under good control and swelling is down, and therefore may not need to adjust this if electrolytes and renal function are stable    4.  Obstructive sleep apnea    He is on BiPAP, but admits that he is not seen his sleep physician for a while    Discussed the possible effects of central sleep apnea on atrial arrhythmias and  heart function    PLAN:    Encouraged him to contact his sleep physician and ensure BiPAP is working appropriately, especially as noted echocardiogram with mildly reduced RV systolic function    5.  Ascending aortic aneurysm    Echo with aortic root and ascending aorta enlarged at 4.6 cm    PLAN:    BP control - will await BMP to address hydrochlorothiazide. Goal SBP ~120 mmHg    Recommended outpatient cMRI to assess aorta size in ~6 months (not yet ordered)    Cherise Bennett PA-C, MSPAS      Orders Placed This Encounter   Procedures     Basic metabolic panel     No orders of the defined types were placed in this encounter.    There are no discontinued medications.      Encounter Diagnoses   Name Primary?     Atrial flutter, unspecified type (H)      Benign essential hypertension Yes       CURRENT MEDICATIONS:  Current Outpatient Medications   Medication Sig Dispense Refill     apixaban ANTICOAGULANT (ELIQUIS) 5 MG tablet Take 1 tablet (5 mg) by mouth 2 times daily 60 tablet 0     ASPIRIN 81 MG OR TABS 1 tab po QD (Once per day) 100 3     atenolol (TENORMIN) 50 MG tablet Take 3 tablets (150 mg) by mouth daily 45 tablet 0     atorvastatin (LIPITOR) 40 MG tablet Take 1 tablet (40 mg) by mouth daily 90 tablet 3     diltiazem ER (DILT-XR) 120 MG 24 hr capsule Take 1 capsule (120 mg) by mouth daily 30 capsule 0     EPINEPHrine (EPIPEN/ADRENACLICK/OR ANY BX GENERIC EQUIV) 0.3 MG/0.3ML injection 2-pack Inject 0.3 mLs (0.3 mg) into the muscle as needed for anaphylaxis 0.6 mL 1     FLUoxetine (PROZAC) 10 MG capsule Take 1 capsule (10 mg) by mouth daily 90 capsule 3     hydrochlorothiazide (HYDRODIURIL) 25 MG tablet Take 1 tablet (25 mg) by mouth daily 90 tablet 1     HYDROcodone-acetaminophen (NORCO) 5-325 MG tablet Take 1 tablet by mouth every 4 hours as needed for pain 18 tablet 0     ipratropium (ATROVENT) 0.06 % spray Spray 2 sprays in nostril 4 times daily as needed for rhinitis 3 Box 3     leuprolide (ELIGARD) 45 MG  injection Inject 45 mg Subcutaneous every 6 months. 1 each 12     losartan (COZAAR) 100 MG tablet Take 1 tablet (100 mg) by mouth daily 30 tablet 0     Multiple Minerals-Vitamins (PROSTEON PO) Take 2,000 Units by mouth With vitamin D       Multiple Vitamins-Minerals (CENTRUM SILVER PO)          ALLERGIES     Allergies   Allergen Reactions     Augmentin Rash     Type III hypersensitivity     Bactrim [Sulfamethoxazole W/Trimethoprim] Rash     Serum sickness, type III hypersensitivity       Bee Venom Itching     Lisinopril Cough     Naproxen Hives       PAST MEDICAL HISTORY:  Past Medical History:   Diagnosis Date     Actinic keratosis      Arthritis      CAD (coronary artery disease)     1/5/2011 lateral ischemia on stress echo, 12/2014 normal stress echo     Dyslipidemia      Emphysema of lung (H)      Essential hypertension, benign      Hernia, abdominal      Hypersomnia with sleep apnea, unspecified     on bipap, partially treated with residual apneas     Hypertrophy (benign) of prostate 5/01    Biopsy 5/01 negative for cancer; PSA 5     Lumbago      Mumps      Prostate cancer (H) 12/15/2006     Skin cancer, basal cell 1997     Spider veins        PAST SURGICAL HISTORY:  Past Surgical History:   Procedure Laterality Date     HERNIA REPAIR  child     Moh's procedure for basal cell carcinoma  01/2001     PROSTATE SURGERY       s/p lumbar laminectomy NOS  1988     VITRECTOMY PARS PLANA REMOVE PRERETINAL MEMBRANE   3/09       FAMILY HISTORY:  Family History   Problem Relation Age of Onset     Alzheimer Disease Mother      Hypertension Mother      Cardiovascular Father         D:86 complications fo CHF     Prostate Cancer Brother      Lung Cancer Brother 76     Prostate Cancer Brother        SOCIAL HISTORY:  Social History     Socioeconomic History     Marital status:      Spouse name: None     Number of children: None     Years of education: None     Highest education level: None   Social Needs     Financial  resource strain: None     Food insecurity - worry: None     Food insecurity - inability: None     Transportation needs - medical: None     Transportation needs - non-medical: None   Occupational History     Occupation: retired   Tobacco Use     Smoking status: Former Smoker     Packs/day: 1.00     Years: 30.00     Pack years: 30.00     Types: Cigarettes     Last attempt to quit: 1978     Years since quittin.9     Smokeless tobacco: Never Used   Substance and Sexual Activity     Alcohol use: Yes     Alcohol/week: 1.0 oz     Types: 2 Standard drinks or equivalent per week     Comment: socially     Drug use: No     Sexual activity: Yes     Partners: Female   Other Topics Concern      Service Not Asked     Blood Transfusions Not Asked     Caffeine Concern No     Comment: 0-2 cans of soda per day.     Occupational Exposure Not Asked     Hobby Hazards Not Asked     Sleep Concern Not Asked     Stress Concern Not Asked     Weight Concern Not Asked     Special Diet Not Asked     Back Care Not Asked     Exercise No     Bike Helmet Not Asked     Seat Belt Yes     Self-Exams Not Asked     Parent/sibling w/ CABG, MI or angioplasty before 65F 55M? No   Social History Narrative     None       Review of Systems:  Skin:  Negative     Eyes:  Positive for glasses  ENT:  Negative    Respiratory:  Positive for dyspnea on exertion  Cardiovascular:  Negative for;syncope or near-syncope;fatigue;dizziness;lightheadedness Positive for;palpitations(occasional with changing positions)  Gastroenterology: Negative for melena;hematochezia  Genitourinary:  not assessed    Musculoskeletal:  Positive for joint stiffness  Neurologic:  Negative    Psychiatric:  Negative    Heme/Lymph/Imm:  Negative    Endocrine:  Positive for diabetes(elevated blood sugars)    Physical Exam:  Vitals: /79   Pulse 53   Wt 102.1 kg (225 lb)   BMI 35.24 kg/m       Constitutional:           Skin:  warm and dry to the touch        Head:   normocephalic        Eyes:           ENT:  no pallor or cyanosis, dentition good        Neck:  no carotid bruit;JVP normal   No HJR    Chest:  clear to auscultation;normal symmetry        Cardiac:   irregularly irregular rhythm                Abdomen:  abdomen soft obese      Vascular: pulses full and equal                                 strong radial pulses, slighly diminshed dorsalis pedis and posterior tibia    Extremities and Back:  no deformities, clubbing, cyanosis, erythema observed        Neurological:  no gross motor deficits          Recent Lab Results:  LIPID RESULTS:  Lab Results   Component Value Date    CHOL 124 10/17/2018    HDL 41 10/17/2018    LDL 60 10/17/2018    TRIG 117 10/17/2018    CHOLHDLRATIO 2.8 10/15/2015       LIVER ENZYME RESULTS:  Lab Results   Component Value Date    AST 29 08/18/2011    ALT 29 08/18/2011       CBC RESULTS:  Lab Results   Component Value Date    WBC 9.0 12/03/2018    RBC 4.74 12/03/2018    HGB 14.8 12/03/2018    HCT 45.4 12/03/2018    MCV 96 12/03/2018    MCH 31.2 12/03/2018    MCHC 32.6 12/03/2018    RDW 12.8 12/03/2018     12/03/2018       BMP RESULTS:  Lab Results   Component Value Date     12/20/2018    POTASSIUM 3.8 12/20/2018    CHLORIDE 104 12/20/2018    CO2 29 12/20/2018    ANIONGAP 7.8 12/20/2018     (H) 12/20/2018    BUN 34 (H) 12/20/2018    CR 1.37 (H) 12/20/2018    GFRESTIMATED 50 (L) 12/20/2018    GFRESTBLACK 60 (L) 12/20/2018    MARLENE 10.1 12/20/2018        A1C RESULTS:  Lab Results   Component Value Date    A1C 5.9 (H) 12/04/2018       INR RESULTS:  Lab Results   Component Value Date    INR 1.03 12/03/2018    INR 1.07 11/27/2007           CC  Natalya Lewis MD  [address] [place] [address]                for allowing me to participate in the care of your patient.      Sincerely,     JUDAH IslasC     [facility]    cc:   Natalya Lewsi MD  [address] [place] [address]

## 2018-12-20 NOTE — PROGRESS NOTES
Pls call pt and let him know we got blood work back - kidneys are a bit stressed on full hydrochlorothiazide 25 mg daily (started 12/11 by PCP)    * DECREASE hydrochlorothiazide to 12.5 mg daily (1/2 of current Rx) - I have put in Epic.  * Repeat BMP at DI appt 1/8 (I have ordered but needs to be scheduled).  * Remind him to call [facility] docs and make sure BiPAP is working    Component      Latest Ref Rng & Units 12/4/2018 12/20/2018   Sodium      136 - 145 mmol/L 138 137   Potassium      3.5 - 5.1 mmol/L 4.0 3.8   Chloride      98 - 107 mmol/L 106 104   Carbon Dioxide      23 - 29 mmol/L 26 29   Anion Gap      6 - 17 mmol/L 6 7.8   Glucose      70 - 105 mg/dL 116 (H) 111 (H)   Urea Nitrogen      7 - 30 mg/dL 21 34 (H)   Creatinine      0.70 - 1.30 mg/dL 1.06 1.37 (H)   GFR Estimate      >60 mL/min/1.73:m2 67 50 (L)   GFR Estimate If Black      >60 mL/min/1.73:m2 81 60 (L)   Calcium      8.5 - 10.5 mg/dL 8.9 10.1

## 2018-12-20 NOTE — PROGRESS NOTES
HPI:   I had the pleasure of seeing Spencer when he came accompanied by his wife Kathya for concerns regarding shortness of breath and arrhythmias.  He was recently hospitalized at [facility] [place] and cared for by Dr. Walker.  He had previously seen Dr. Camp for his history of:     1.  Presumed CAD based on abnormal stress echocardiogram 1/2011 showing lateral ischemia with exertion associated with chest/throat discomfort.  He and Dr. Camp opted to treat this medically, and he has remained on medical treatment.  Most recent stress echocardiogram 12/2014 showed no evidence of exercise-induced ischemia  2.  Preserved ejection fraction based on echocardiogram 12/2018  3.  Hypertension/dyslipidemia  4.  Obstructive sleep apnea on BiPAP therapy  5.  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    Spencer last saw us in clinic in 12/2014, at which time he was doing relatively well, but continued to note some dyspnea on exertion as well as buttock claudication.  Aortoiliac duplex ultrasound 12/2014 showed no evidence of severe peripheral disease.    Unfortunately, he sustained a mechanical fall while slipping on ice on 12/3.  He presented to [facility] where he was incidentally noted to be in atrial flutter with a rapid ventricular response.  Heart rate is in the 120s.  He was started on diltiazem and a heparin drip.  He was later given atenolol, which was increased to 150 mg daily.  Dr. Walker recommended continued rate control and added diltiazem.  On discharge 12/5, he was on atenolol 150 mg daily and diltiazem 180 mg daily.  He had been on Eliquis after his heparin drip was stopped at 5 mg twice daily.    In the hospital, Dr. Walker intended his simvastatin to be switched to atorvastatin even the use of diltiazem, but upon discharge, he was sent home on his home dose of simvastatin.  This is subsequently been switched by Dr. Lewis, his primary care physician.  In the hospital,  "Dr. Walker also had reviewed a history of \"intolerance\" to diltiazem.  Spencer was unsure of what this was, but was thinking it was related to swollen gums.  Weighing the risks and benefits of short-term use versus possible gingival hyperplasia, Dr. Walker opted to start diltiazem.  In retrospect, Spencer believes is side effect was actually swollen ankles, as he was discharged from the hospital and had significant amount of lower extremity edema to the point that he could not even get shoes on.  This reminded him of when he had tried diltiazem in the past.  He does not think it had anything to do with his gums.    When he saw Dr. Lewis 12/11, his weight was up to 231 pounds (discharged 12/5 at 219 pounds), he was complaining of significant lower extremity edema and continued shortness of breath.  As above, Dr. Lewis switched his simvastatin to atorvastatin and decreased his diltiazem from 180-120 mg daily due to concerns regarding side effects (ankle swelling). She also added back hydrochlorothiazide at 25 mg daily (as his losartan hydrochlorothiazide 100/12.5 mg daily had been discontinued in the hospital).    Since she saw Dr. Lewis, Spencer thinks that his swelling is quite a bit better and Ktahya agrees.  He still notes some lower extremity edema, but states that it is much improved. It's best in the morning and worsens throughout the day.  He does continue to note shortness of breath, and states that it can happen with exertion or even sometimes at rest.  He denies any orthopnea or PND.  Though just recently moved back to sleeping in the bed after sleeping in a recliner for rib discomfort).  Overall, he does not think his shortness of breath is too bad at the current time.    He has a pulse oximeter at home and he checks this routinely.  O2 sats are typically above 95%.  Heart rates are typically in the 70s and 80s.    EKG today, which I over read, showed atrial fibrillation at 75 bpm.  EKG during hospitalization " "12/3/18 showed what looks like atrial flutter at 126 bpm.  It is difficult to tell if this is typical or atypical, but looks atypical.  Echocardiogram done 12/2018 showed an EF of 55%.  He had mildly reduced right ventricular systolic function.  Left atrial size was moderate to lead to severely dilated with a left atrial volume index of 51.2 mL/m .  Left atrial size of 6.0 cm.  As above, his aortic root and ascending aorta were both enlarged at 4.6 cm  Last stress echocardiogram 12/2014 was negative for stress-induced wall motion abnormalities.  PFTs done 2/2011 showed mild obstruction  Aortoiliac ultrasound 12/2014 showed evidence of plaquing in the aortoiliac arterial system.  Abdominal aorta was normal in size with a maximum dimension of 2.4 cm    Assessment & Plan:    1.  Atrial arrhythmias    As above, came in in what appears to be atypical atrial flutter, but today is in atrial fibrillation (confirmed with Dr. Leger)    He complains of shortness of breath, but states that overall it is \"not life limiting at this point.\"  It does not appear he has evidence of fluid overload or myocardial ischemia to account for shortness of breath and I do think that this is likely related to his arrhythmia.    Heart rate is under good control at home, and on pulse ox is typically in the 60s-80s.      Today we discussed a number of options, including proceeding with a ARNULFO/DC cardioversion ASAP given his complaints of dyspnea and hopes to get off of diltiazem, which he thinks is causing some lower extremity edema even at a small dose.  Heparin was started on admit 12/3, and he was then switched to Eliquis 12/4 after he was seen by Dr. Walker, so if cardioversion were to take place prior to 1/4, he would require ARNULFO.    We also discussed the option of waiting until at least January 4 to proceed with cardioversion to see if resumption of sinus rhythm could improve his dyspnea.    We also discussed waiting until he saw Dr." "Jesus on 1/8, as previously scheduled, to best assess options.  I explained that he would like to not be a candidate for an ablation given that he has now had both atrial flutter and atrial fibrillation.    PLAN:    At this time, he would like to hold off on proceeding with ARNULFO/DC cardioversion or cardioversion until he meets with Dr. Lee.  He states that his breathing is \"not perfect\" but he can certainly live with it for the next few weeks.      On exam he does not appear fluid overloaded, but I have requested that he continue to weigh himself daily.  With hydrochlorothiazide 25 mg daily back on board and reduction in his diltiazem to 180 mg daily, his weight dropped about 5 pounds since he saw Dr. Lewis.    Continue anticoagulation with Eliquis given CHADSVASc of 4 (hypertension, coronary disease/PVD, age)    I have given him my nurse's direct number, and explained that he needs to seek attention right away if he has heart rate starts to get faster than his typical 70-80 bpm it is getting on his pulse ox, his shortness of breath worsens dramatically or if his lower extremity edema worsens dramatically.  He voiced understanding.  In case he does require ARNULFO/DCCV before he sees Dr. Lee, I have reviewed the risks associated with these.  We discussed Risks, Benefits and Indications of proceeding with transesophageal echocardiogram (ARNULFO), including but not limited to use of conscious sedation, sore throat, esophageal injury and discomfort. We discussed Risk, Benefits and Indication of proceeding with direct current cardioversion (DCCV), including but not limited to use of anesthesia, surface burns, [id number] arrhythmias requiring further treatment, discomfort and stroke.  The patient voiced understanding and understands that a  will be needed given the use of conscious sedation. A consent form will be signed by the procedural physician.      2.  Coronary disease    He has never had an angiogram, but " treated presumptively based on typical angina associated with exercise and abnormality noted on stress echocardiogram 1/2011    Troponins were all negative while hospitalized at [facility], despite rapid heart rate    Echocardiogram with normal ejection fraction 12/2018    Remains on statin, aspirin and beta-blocker.    It was recommended he proceed with stress testing, but I have taken the liberty of canceling that and planning to do it once we are sure his atrial arrhythmias are under good heart rate control.  I do not think there is any urgency to this given that he has had no angina, his ejection fraction is normal and he has had negative troponins while hospitalized despite a rapid heart rate.    PLAN:    We will plan getting a stress test in the coming months to assess for ischemia, once his arrhythmias have been sorted out and a definitive plan is in place    Encouraged him to seek attention should he have recurrent angina (which he states he has not had for years)    Due to Eliquis use, will discontinue aspirin for now    3.  Hypertension    Blood pressure is under adequate control on his current doses of hydrochlorothiazide 25 mg daily, losartan 100 mg daily, diltiazem 120 mg daily and atenolol 150 mg daily    PLAN:    BMP today, and will determine if need to reduce hydrochlorothiazide down to 12.5 mg daily as suggested by Dr. Lewis    Notes that blood pressures under good control and swelling is down, and therefore may not need to adjust this if electrolytes and renal function are stable    4.  Obstructive sleep apnea    He is on BiPAP, but admits that he is not seen his sleep physician for a while    Discussed the possible effects of central sleep apnea on atrial arrhythmias and heart function    PLAN:    Encouraged him to contact his sleep physician and ensure BiPAP is working appropriately, especially as noted echocardiogram with mildly reduced RV systolic function    5.  Ascending aortic aneurysm    Echo  with aortic root and ascending aorta enlarged at 4.6 cm    PLAN:    BP control - will await BMP to address hydrochlorothiazide. Goal SBP ~120 mmHg    Recommended outpatient cMRI to assess aorta size in ~6 months (not yet ordered)    Cherise Bennett PA-C, MSPAS      Orders Placed This Encounter   Procedures     Basic metabolic panel     No orders of the defined types were placed in this encounter.    There are no discontinued medications.      Encounter Diagnoses   Name Primary?     Atrial flutter, unspecified type (H)      Benign essential hypertension Yes       CURRENT MEDICATIONS:  Current Outpatient Medications   Medication Sig Dispense Refill     apixaban ANTICOAGULANT (ELIQUIS) 5 MG tablet Take 1 tablet (5 mg) by mouth 2 times daily 60 tablet 0     ASPIRIN 81 MG OR TABS 1 tab po QD (Once per day) 100 3     atenolol (TENORMIN) 50 MG tablet Take 3 tablets (150 mg) by mouth daily 45 tablet 0     atorvastatin (LIPITOR) 40 MG tablet Take 1 tablet (40 mg) by mouth daily 90 tablet 3     diltiazem ER (DILT-XR) 120 MG 24 hr capsule Take 1 capsule (120 mg) by mouth daily 30 capsule 0     EPINEPHrine (EPIPEN/ADRENACLICK/OR ANY BX GENERIC EQUIV) 0.3 MG/0.3ML injection 2-pack Inject 0.3 mLs (0.3 mg) into the muscle as needed for anaphylaxis 0.6 mL 1     FLUoxetine (PROZAC) 10 MG capsule Take 1 capsule (10 mg) by mouth daily 90 capsule 3     hydrochlorothiazide (HYDRODIURIL) 25 MG tablet Take 1 tablet (25 mg) by mouth daily 90 tablet 1     HYDROcodone-acetaminophen (NORCO) 5-325 MG tablet Take 1 tablet by mouth every 4 hours as needed for pain 18 tablet 0     ipratropium (ATROVENT) 0.06 % spray Spray 2 sprays in nostril 4 times daily as needed for rhinitis 3 Box 3     leuprolide (ELIGARD) 45 MG injection Inject 45 mg Subcutaneous every 6 months. 1 each 12     losartan (COZAAR) 100 MG tablet Take 1 tablet (100 mg) by mouth daily 30 tablet 0     Multiple Minerals-Vitamins (PROSTEON PO) Take 2,000 Units by mouth With vitamin D        Multiple Vitamins-Minerals (CENTRUM SILVER PO)          ALLERGIES     Allergies   Allergen Reactions     Augmentin Rash     Type III hypersensitivity     Bactrim [Sulfamethoxazole W/Trimethoprim] Rash     Serum sickness, type III hypersensitivity       Bee Venom Itching     Lisinopril Cough     Naproxen Hives       PAST MEDICAL HISTORY:  Past Medical History:   Diagnosis Date     Actinic keratosis      Arthritis      CAD (coronary artery disease)     1/5/2011 lateral ischemia on stress echo, 12/2014 normal stress echo     Dyslipidemia      Emphysema of lung (H)      Essential hypertension, benign      Hernia, abdominal      Hypersomnia with sleep apnea, unspecified     on bipap, partially treated with residual apneas     Hypertrophy (benign) of prostate 5/01    Biopsy 5/01 negative for cancer; PSA 5     Lumbago      Mumps      Prostate cancer (H) 12/15/2006     Skin cancer, basal cell 1997     Spider veins        PAST SURGICAL HISTORY:  Past Surgical History:   Procedure Laterality Date     HERNIA REPAIR  child     Moh's procedure for basal cell carcinoma  01/2001     PROSTATE SURGERY       s/p lumbar laminectomy NOS  1988     VITRECTOMY PARS PLANA REMOVE PRERETINAL MEMBRANE   3/09       FAMILY HISTORY:  Family History   Problem Relation Age of Onset     Alzheimer Disease Mother      Hypertension Mother      Cardiovascular Father         D:86 complications fo CHF     Prostate Cancer Brother      Lung Cancer Brother 76     Prostate Cancer Brother        SOCIAL HISTORY:  Social History     Socioeconomic History     Marital status:      Spouse name: None     Number of children: None     Years of education: None     Highest education level: None   Social Needs     Financial resource strain: None     Food insecurity - worry: None     Food insecurity - inability: None     Transportation needs - medical: None     Transportation needs - non-medical: None   Occupational History     Occupation: retired   Tobacco  Use     Smoking status: Former Smoker     Packs/day: 1.00     Years: 30.00     Pack years: 30.00     Types: Cigarettes     Last attempt to quit: 1978     Years since quittin.9     Smokeless tobacco: Never Used   Substance and Sexual Activity     Alcohol use: Yes     Alcohol/week: 1.0 oz     Types: 2 Standard drinks or equivalent per week     Comment: socially     Drug use: No     Sexual activity: Yes     Partners: Female   Other Topics Concern      Service Not Asked     Blood Transfusions Not Asked     Caffeine Concern No     Comment: 0-2 cans of soda per day.     Occupational Exposure Not Asked     Hobby Hazards Not Asked     Sleep Concern Not Asked     Stress Concern Not Asked     Weight Concern Not Asked     Special Diet Not Asked     Back Care Not Asked     Exercise No     Bike Helmet Not Asked     Seat Belt Yes     Self-Exams Not Asked     Parent/sibling w/ CABG, MI or angioplasty before 65F 55M? No   Social History Narrative     None       Review of Systems:  Skin:  Negative     Eyes:  Positive for glasses  ENT:  Negative    Respiratory:  Positive for dyspnea on exertion  Cardiovascular:  Negative for;syncope or near-syncope;fatigue;dizziness;lightheadedness Positive for;palpitations(occasional with changing positions)  Gastroenterology: Negative for melena;hematochezia  Genitourinary:  not assessed    Musculoskeletal:  Positive for joint stiffness  Neurologic:  Negative    Psychiatric:  Negative    Heme/Lymph/Imm:  Negative    Endocrine:  Positive for diabetes(elevated blood sugars)    Physical Exam:  Vitals: /79   Pulse 53   Wt 102.1 kg (225 lb)   BMI 35.24 kg/m      Constitutional:           Skin:  warm and dry to the touch        Head:  normocephalic        Eyes:           ENT:  no pallor or cyanosis, dentition good        Neck:  no carotid bruit;JVP normal   No HJR    Chest:  clear to auscultation;normal symmetry        Cardiac:   irregularly irregular rhythm                 Abdomen:  abdomen soft obese      Vascular: pulses full and equal                                 strong radial pulses, slighly diminshed dorsalis pedis and posterior tibia    Extremities and Back:  no deformities, clubbing, cyanosis, erythema observed        Neurological:  no gross motor deficits          Recent Lab Results:  LIPID RESULTS:  Lab Results   Component Value Date    CHOL 124 10/17/2018    HDL 41 10/17/2018    LDL 60 10/17/2018    TRIG 117 10/17/2018    CHOLHDLRATIO 2.8 10/15/2015       LIVER ENZYME RESULTS:  Lab Results   Component Value Date    AST 29 08/18/2011    ALT 29 08/18/2011       CBC RESULTS:  Lab Results   Component Value Date    WBC 9.0 12/03/2018    RBC 4.74 12/03/2018    HGB 14.8 12/03/2018    HCT 45.4 12/03/2018    MCV 96 12/03/2018    MCH 31.2 12/03/2018    MCHC 32.6 12/03/2018    RDW 12.8 12/03/2018     12/03/2018       BMP RESULTS:  Lab Results   Component Value Date     12/20/2018    POTASSIUM 3.8 12/20/2018    CHLORIDE 104 12/20/2018    CO2 29 12/20/2018    ANIONGAP 7.8 12/20/2018     (H) 12/20/2018    BUN 34 (H) 12/20/2018    CR 1.37 (H) 12/20/2018    GFRESTIMATED 50 (L) 12/20/2018    GFRESTBLACK 60 (L) 12/20/2018    MARLENE 10.1 12/20/2018        A1C RESULTS:  Lab Results   Component Value Date    A1C 5.9 (H) 12/04/2018       INR RESULTS:  Lab Results   Component Value Date    INR 1.03 12/03/2018    INR 1.07 11/27/2007           CC  Natalya Lewis MD  [id number] [facility] [place] [address]

## 2018-12-21 ENCOUNTER — MYC MEDICAL ADVICE (OUTPATIENT)
Dept: PEDIATRICS | Facility: CLINIC | Age: 83
End: 2018-12-21

## 2018-12-21 DIAGNOSIS — S22.32XD CLOSED FRACTURE OF ONE RIB OF LEFT SIDE WITH ROUTINE HEALING, SUBSEQUENT ENCOUNTER: ICD-10-CM

## 2018-12-21 RX ORDER — HYDROCODONE BITARTRATE AND ACETAMINOPHEN 5; 325 MG/1; MG/1
1 TABLET ORAL EVERY 4 HOURS PRN
Qty: 18 TABLET | Refills: 0 | Status: SHIPPED | OUTPATIENT
Start: 2018-12-21 | End: 2019-03-11

## 2018-12-21 NOTE — PROGRESS NOTES
Spoke with patient regarding BMP results 12/20- to let him know  - kidneys are a bit stressed on full hydrochlorothiazide 25 mg daily (started 12/11 by PCP)    * DECREASE hydrochlorothiazide to 12.5 mg daily (1/2 of current Rx) - I have put in Epic.  * Repeat BMP at DI appt 1/8 (I have ordered but needs to be scheduled).  * Remind him to call [facility] docs and make sure BiPAP is working    Patient provided verbal understanding of above.  BIANCA Gabriel

## 2018-12-21 NOTE — TELEPHONE ENCOUNTER
Wife/Patient requesting refill of Hydrocodone to help control pain from 12/3/18 rib fracture. Please advise.     Refill request for: HYDROCODONE-APAP 5/325 MG - Q4HRS PRN  Walk the RX to our pharmacy.      Last rx written: 12/6/18 # 18    **[place]  reviewed- last filled: 12/6  Last OV: 12/11/18  for closed fracture of 1 rib    Unable to fill per standing order routed to covering provider for approval.    Nona Giron RN

## 2018-12-26 ENCOUNTER — TELEPHONE (OUTPATIENT)
Dept: CARDIOLOGY | Facility: CLINIC | Age: 83
End: 2018-12-26

## 2018-12-26 DIAGNOSIS — I10 HYPERTENSION GOAL BP (BLOOD PRESSURE) < 140/90: ICD-10-CM

## 2018-12-26 DIAGNOSIS — I47.19 ATRIAL TACHYCARDIA (H): ICD-10-CM

## 2018-12-26 DIAGNOSIS — I48.92 ATRIAL FLUTTER WITH RAPID VENTRICULAR RESPONSE (H): ICD-10-CM

## 2018-12-26 RX ORDER — DILTIAZEM HYDROCHLORIDE 120 MG/1
120 CAPSULE, EXTENDED RELEASE ORAL DAILY
Qty: 30 CAPSULE | Refills: 5 | Status: SHIPPED | OUTPATIENT
Start: 2018-12-26 | End: 2019-07-03

## 2018-12-26 NOTE — TELEPHONE ENCOUNTER
Pt wife calling asking about the OV on 1/8 with Dr Lee and wondering if this was for the Cardioversion.  Per Cherise note pt wanted to wait and talk to Dr Lee prior to ARNULFO/Cardioversion.  Wife states understanding.  Pt will have been on Eliquis for 4 weeks once he sees Dr Lee per pt wife.  Eliquis and Diltiazem refills are needed.  escript sent for with 5 monthly refills.  No further questions at this time. Jaguar

## 2018-12-28 DIAGNOSIS — I10 HYPERTENSION GOAL BP (BLOOD PRESSURE) < 140/90: ICD-10-CM

## 2018-12-28 RX ORDER — LOSARTAN POTASSIUM 100 MG/1
100 TABLET ORAL DAILY
Qty: 30 TABLET | Refills: 0 | Status: SHIPPED | OUTPATIENT
Start: 2018-12-28 | End: 2019-01-28

## 2018-12-29 ENCOUNTER — HOSPITAL ENCOUNTER (EMERGENCY)
Facility: CLINIC | Age: 83
Discharge: HOME | End: 2018-12-29
Attending: EMERGENCY MEDICINE | Admitting: EMERGENCY MEDICINE
Payer: COMMERCIAL

## 2018-12-29 ENCOUNTER — APPOINTMENT (OUTPATIENT)
Dept: GENERAL RADIOLOGY | Facility: CLINIC | Age: 83
End: 2018-12-29
Attending: EMERGENCY MEDICINE
Payer: COMMERCIAL

## 2018-12-29 VITALS
SYSTOLIC BLOOD PRESSURE: 142 MMHG | OXYGEN SATURATION: 95 % | DIASTOLIC BLOOD PRESSURE: 111 MMHG | RESPIRATION RATE: 20 BRPM | TEMPERATURE: 98.1 F | WEIGHT: 222 LBS | HEART RATE: 86 BPM | HEIGHT: 68 IN | BODY MASS INDEX: 33.65 KG/M2

## 2018-12-29 DIAGNOSIS — J40 BRONCHITIS: ICD-10-CM

## 2018-12-29 LAB
ANION GAP SERPL CALCULATED.3IONS-SCNC: 7 MMOL/L (ref 3–14)
BASOPHILS # BLD AUTO: 0.1 10E9/L (ref 0–0.2)
BASOPHILS NFR BLD AUTO: 1.5 %
BUN SERPL-MCNC: 27 MG/DL (ref 7–30)
CALCIUM SERPL-MCNC: 8.9 MG/DL (ref 8.5–10.1)
CHLORIDE SERPL-SCNC: 105 MMOL/L (ref 94–109)
CO2 SERPL-SCNC: 28 MMOL/L (ref 20–32)
CREAT SERPL-MCNC: 1.13 MG/DL (ref 0.66–1.25)
DIFFERENTIAL METHOD BLD: NORMAL
EOSINOPHIL # BLD AUTO: 0.2 10E9/L (ref 0–0.7)
EOSINOPHIL NFR BLD AUTO: 4.8 %
ERYTHROCYTE [DISTWIDTH] IN BLOOD BY AUTOMATED COUNT: 13.2 % (ref 10–15)
FLUAV+FLUBV AG SPEC QL: NEGATIVE
FLUAV+FLUBV AG SPEC QL: NEGATIVE
GFR SERPL CREATININE-BSD FRML MDRD: 61 ML/MIN/{1.73_M2}
GLUCOSE SERPL-MCNC: 102 MG/DL (ref 70–99)
HCT VFR BLD AUTO: 44.1 % (ref 40–53)
HGB BLD-MCNC: 14.2 G/DL (ref 13.3–17.7)
IMM GRANULOCYTES # BLD: 0 10E9/L (ref 0–0.4)
IMM GRANULOCYTES NFR BLD: 0.4 %
LYMPHOCYTES # BLD AUTO: 0.9 10E9/L (ref 0.8–5.3)
LYMPHOCYTES NFR BLD AUTO: 19.6 %
MCH RBC QN AUTO: 30.4 PG (ref 26.5–33)
MCHC RBC AUTO-ENTMCNC: 32.2 G/DL (ref 31.5–36.5)
MCV RBC AUTO: 94 FL (ref 78–100)
MONOCYTES # BLD AUTO: 0.7 10E9/L (ref 0–1.3)
MONOCYTES NFR BLD AUTO: 14.6 %
NEUTROPHILS # BLD AUTO: 2.7 10E9/L (ref 1.6–8.3)
NEUTROPHILS NFR BLD AUTO: 59.1 %
NRBC # BLD AUTO: 0 10*3/UL
NRBC BLD AUTO-RTO: 0 /100
PLATELET # BLD AUTO: 179 10E9/L (ref 150–450)
POTASSIUM SERPL-SCNC: 3.9 MMOL/L (ref 3.4–5.3)
RBC # BLD AUTO: 4.67 10E12/L (ref 4.4–5.9)
SODIUM SERPL-SCNC: 140 MMOL/L (ref 133–144)
SPECIMEN SOURCE: NORMAL
TROPONIN I SERPL-MCNC: <0.015 UG/L (ref 0–0.04)
WBC # BLD AUTO: 4.6 10E9/L (ref 4–11)

## 2018-12-29 PROCEDURE — 99285 EMERGENCY DEPT VISIT HI MDM: CPT | Mod: 25

## 2018-12-29 PROCEDURE — 87804 INFLUENZA ASSAY W/OPTIC: CPT | Performed by: EMERGENCY MEDICINE

## 2018-12-29 PROCEDURE — 93005 ELECTROCARDIOGRAM TRACING: CPT

## 2018-12-29 PROCEDURE — 71046 X-RAY EXAM CHEST 2 VIEWS: CPT

## 2018-12-29 PROCEDURE — 84484 ASSAY OF TROPONIN QUANT: CPT | Performed by: EMERGENCY MEDICINE

## 2018-12-29 PROCEDURE — 80048 BASIC METABOLIC PNL TOTAL CA: CPT | Performed by: EMERGENCY MEDICINE

## 2018-12-29 PROCEDURE — 85025 COMPLETE CBC W/AUTO DIFF WBC: CPT | Performed by: EMERGENCY MEDICINE

## 2018-12-29 RX ORDER — PREDNISONE 20 MG/1
TABLET ORAL
Qty: 10 TABLET | Refills: 0 | Status: SHIPPED | OUTPATIENT
Start: 2018-12-29 | End: 2019-01-04

## 2018-12-29 RX ORDER — ALBUTEROL SULFATE 90 UG/1
2 AEROSOL, METERED RESPIRATORY (INHALATION) EVERY 4 HOURS PRN
Qty: 1 INHALER | Refills: 0 | Status: SHIPPED | OUTPATIENT
Start: 2018-12-29 | End: 2019-03-11

## 2018-12-29 ASSESSMENT — ENCOUNTER SYMPTOMS
VOMITING: 1
DIARRHEA: 0
NAUSEA: 1
COUGH: 1
DIZZINESS: 1

## 2018-12-29 ASSESSMENT — MIFFLIN-ST. JEOR: SCORE: 1686.49

## 2018-12-29 NOTE — ED PROVIDER NOTES
History     Chief Complaint:  Cough and Dizziness    The history is provided by the patient and the spouse.      Nixon More is a 83 year old male with past medical history pertinent for atrial flutter, atrial fibrillation, CAD, and hypertension on Eliquis who presents to the emergency department today for evaluation of cough and dizziness. He denies any current dizziness. He highlights a recent history of 3 day hospitalization after falling on his driveway and breaking his rib on 12/3 with incident finding for atrial flutter. He was placed on Eliquis subsequently after discharge. At 1210, patient claims he was having a coughing fit after talking on the phone with subsequent onset of gradually worsening dizziness like wooziness like he was going to faint that prompted him to call EMS. He denies any syncope but asserts he vomited after EMS acquired EKG. His symptoms have resolved. Additionally, patient states he has been coughing the last two days with an episode of emesis around two days ago. He denies any diarrhea. He claims sick contact as his wife has had a cold recently.     Allergies:  Augmentin  Bactrim  Been venom  Lisinopril  Naproxen    Medications:    Eliquis  Lipitor  Losartan  Prozac  Hydrochlorothiazide   Diltiazem  Tenormin   Cozaar     Past Medical History:    Actinic keratosis  Atrial fibrillation  Arthritis  CAD  Dyslipidemia  Atrial flutter  Hypertension  Abdominal hernia  Spinal stenosis  Hypertrophy of prostate  Lumbago  Mumps  Prostate cancer  Basal cell skin cancer  Spider veins     Past Surgical History:    Prostate surgery  Lumbar laminectomy  Hernia repair  Moh's procedure for basal cell carcinoma  Vitrectomy    Family History:    Mother: alzheimer disease, hypertension  Father: CHF  Brother: prostate cancer, lung cancer    Social History:  The patient was accompanied to the ED by wife.  Smoking Status: Former Smoker    Review of Systems   Respiratory: Positive for cough.   "  Gastrointestinal: Positive for nausea and vomiting. Negative for diarrhea.   Neurological: Positive for dizziness. Negative for syncope.   All other systems reviewed and are negative.    Physical Exam     Patient Vitals for the past 24 hrs:   BP Temp Pulse Heart Rate Resp SpO2 Height Weight   12/29/18 1715 (!) 142/111 -- 86 82 20 95 % -- --   12/29/18 1700 (!) 143/105 -- 71 82 15 94 % -- --   12/29/18 1645 (!) 154/96 -- 83 84 11 93 % -- --   12/29/18 1630 (!) 136/113 -- 71 71 10 91 % -- --   12/29/18 1615 (!) 136/103 -- 81 69 8 96 % -- --   12/29/18 1600 (!) 125/97 -- 75 82 12 90 % -- --   12/29/18 1545 131/90 -- 79 77 12 95 % -- --   12/29/18 1530 -- -- -- 94 30 96 % -- --   12/29/18 1515 (!) 115/28 -- 70 69 9 96 % -- --   12/29/18 1500 (!) 137/103 -- 73 84 12 95 % -- --   12/29/18 1445 (!) 144/98 -- 87 67 20 96 % -- --   12/29/18 1430 144/82 -- 79 -- -- 98 % -- --   12/29/18 1402 (!) 145/102 98.1  F (36.7  C) 87 -- 20 98 % 1.727 m (5' 8\") 100.7 kg (222 lb)     Physical Exam  Constitutional: Vital signs reviewed as above.  Head: No external signs of trauma noted.  Nose: non congested  Eyes: Pupils are equal, round, and reactive to light.   Oropharynx: Non erythematous. No exudate noted.  Neck: No JVD noted  Cardiovascular: Normal rate, irregular rhythm and normal heart sounds.  No murmur heard. Equal B/L peripheral pulses.  Pulmonary/Chest: No respiratory distress. Patient has expiratory wheezes. Patient has no rales.   Gastrointestinal: Soft. There is no tenderness.   Musculoskeletal/Extremities: There is B/L 1+ edema noted in the ankles. Normal tone.  Neurological: Patient is alert and oriented to person, place, and time.   Skin: Skin is warm and dry. There is no diaphoresis noted.   Psychiatric: The patient appears calm.    Emergency Department Course     ECG:  ECG taken at 1500, ECG read at 1505  Atrial flutter with variable AV block  Minimal voltage criteria for LVH, may be normal variant  Inferior infarct, " age undetermined  Abnormal ECG  When compared to EKG on 12/3/2018, atrial flutter with RVR has resolved.  Rate 78 bpm. MO interval * ms. QRS duration 76 ms. QT/QTc 436/486 ms. P-R-T axes * -6 5.    Imaging:  Radiology findings were communicated with the patient who voiced understanding of the findings.    Chest XR,  PA & LAT  The lungs appear grossly clear. No pleural effusion or pneumothorax. Due to underpenetration, the right rib suspected fracture is less well seen compared to 12/3/2018.  LILIA OLIVAS MD  Reading per radiology    Laboratory:  Laboratory findings were communicated with the patient who voiced understanding of the findings.    Influenza A/B antigen: A Negative, B Negative  CBC: WBC 4.6, HGB 14.2,   BMP: glucose 102 (H) o/w WNL (Creatinine 1.13)  Troponin (Collected 1441): <0.015    Emergency Department Course:    1440 Nursing notes and vitals reviewed.    1441 IV was inserted and blood was drawn for laboratory testing, results above.    1445 I performed an exam of the patient as documented above.     1530 The patient was sent for a x-ray while in the emergency department, results above.     1608 Recheck and update.    1702 Recheck and update.    1735 I personally reviewed the imaging and laboratory results with the patient and answered all related questions prior to discharge.    Impression & Plan      Medical Decision Making:  Nixon More is a 83 year old male who presents to the emergency department today for evaluation of a cough.  Please see the HPI and exam for specifics.  The patient states he also noted a little bit of lightheadedness but this was transient and is not there right now.  The patient notes that he had quite a bit of coughing prior to the episode of dizziness that led him to call EMS.  He does have a known history of A.  Flutter and is anticoagulated on Eliquis.  He states that because of the cough he has actually been sleeping in a chair the last few nights and  thinks that may be one reason his ankles are more swollen.  The patient is remained stable in the ED.  He has a controlled ventricular response with his atrial flutter.  Lab work is normal.  The patient's influenza test and chest x-ray are negative.  At this time I believe he can be discharged I will encourage close outpatient follow-up but insisted that he return to the ED should he feel concernedly worse as this is a holiday weekend.  Anticipatory guidance given to patient and wife prior to discharge.    Diagnosis:    ICD-10-CM   1. Bronchitis J40     Disposition:   The patient is discharged to home.    Discharge Medications:     Review of your medicines      UNREVIEWED medicines. Ask your doctor about these medicines      Dose / Directions   apixaban ANTICOAGULANT 5 MG tablet  Commonly known as:  ELIQUIS  Used for:  Atrial tachycardia (H)      Dose:  5 mg  Take 1 tablet (5 mg) by mouth 2 times daily  Quantity:  60 tablet  Refills:  5     atenolol 50 MG tablet  Commonly known as:  TENORMIN  Used for:  Atrial tachycardia (H), Atrial flutter, unspecified type (H)      Dose:  150 mg  Take 3 tablets (150 mg) by mouth daily  Quantity:  45 tablet  Refills:  0     atorvastatin 40 MG tablet  Commonly known as:  LIPITOR  Used for:  Dyslipidemia      Dose:  40 mg  Take 1 tablet (40 mg) by mouth daily  Quantity:  90 tablet  Refills:  3     CENTRUM SILVER PO      Refills:  0     diltiazem  MG 24 hr capsule  Commonly known as:  DILT-XR  Used for:  Atrial flutter with rapid ventricular response (H), Hypertension goal BP (blood pressure) < 140/90      Dose:  120 mg  Take 1 capsule (120 mg) by mouth daily  Quantity:  30 capsule  Refills:  5     EPINEPHrine 0.3 MG/0.3ML injection 2-pack  Commonly known as:  EPIPEN/ADRENACLICK/or ANY BX GENERIC EQUIV  Used for:  Bee sting-induced anaphylaxis, undetermined intent, subsequent encounter      Dose:  0.3 mg  Inject 0.3 mLs (0.3 mg) into the muscle as needed for  anaphylaxis  Quantity:  0.6 mL  Refills:  1     FLUoxetine 10 MG capsule  Commonly known as:  PROzac  Used for:  Mild major depression (H)      Dose:  10 mg  Take 1 capsule (10 mg) by mouth daily  Quantity:  90 capsule  Refills:  3     hydrochlorothiazide 25 MG tablet  Commonly known as:  HYDRODIURIL  Used for:  Hypertension goal BP (blood pressure) < 140/90      Dose:  12.5 mg  Take 0.5 tablets (12.5 mg) by mouth daily  Refills:  0     HYDROcodone-acetaminophen 5-325 MG tablet  Commonly known as:  NORCO  Used for:  Closed fracture of one rib of left side with routine healing, subsequent encounter      Dose:  1 tablet  Take 1 tablet by mouth every 4 hours as needed for pain  Quantity:  18 tablet  Refills:  0     ipratropium 0.06 % nasal spray  Commonly known as:  ATROVENT  Used for:  Nasal congestion      Dose:  2 spray  Spray 2 sprays in nostril 4 times daily as needed for rhinitis  Quantity:  3 Box  Refills:  3     leuprolide 45 MG kit  Commonly known as:  ELIGARD      Dose:  45 mg  Inject 45 mg Subcutaneous every 6 months.  Quantity:  1 each  Refills:  12     losartan 100 MG tablet  Commonly known as:  COZAAR  Used for:  Hypertension goal BP (blood pressure) < 140/90      Dose:  100 mg  Take 1 tablet (100 mg) by mouth daily  Quantity:  30 tablet  Refills:  0     PROSTEON PO      Dose:  2000 Units  Take 2,000 Units by mouth With vitamin D  Refills:  0        START taking      Dose / Directions   albuterol 108 (90 Base) MCG/ACT inhaler  Commonly known as:  PROAIR HFA      Dose:  2 puff  Inhale 2 puffs into the lungs every 4 hours as needed for wheezing  Quantity:  1 Inhaler  Refills:  0     predniSONE 20 MG tablet  Commonly known as:  DELTASONE      Take two tablets (= 40mg) each day for 5 (five) days.  Quantity:  10 tablet  Refills:  0           Where to get your medicines      Some of these will need a paper prescription and others can be bought over the counter. Ask your nurse if you have questions.    Bring a  paper prescription for each of these medications    albuterol 108 (90 Base) MCG/ACT inhaler    predniSONE 20 MG tablet       Scribe Disclosure:  I, Patrick Trujillo, am serving as a scribe at 2:53 PM on 12/29/2018 to document services personally performed by Sherman Iniguez DO based on my observations and the provider's statements to me.    [facility] EMERGENCY DEPARTMENT       Sherman Iniguez DO  12/29/18 [id number]

## 2018-12-29 NOTE — ED AVS SNAPSHOT
[facility] Emergency Department  [address]  [facility] [address]  Phone:  [phone]  Fax:  [phone]                                    Nixon More   MRN: [id number]    Department:  [facility] Emergency Department   Date of Visit:  12/29/2018           After Visit Summary Signature Page    I have received my discharge instructions, and my questions have been answered. I have discussed any challenges I see with this plan with the nurse or doctor.    ..........................................................................................................................................  Patient/Patient Representative Signature      ..........................................................................................................................................  Patient Representative Print Name and Relationship to Patient    ..................................................               ................................................  Date                                   Time    ..........................................................................................................................................  Reviewed by Signature/Title    ...................................................              ..............................................  Date                                               Time          22EPIC Rev 08/18

## 2018-12-29 NOTE — ED TRIAGE NOTES
Pt presents with cough and cold for 2 days. Reports hard coughing spell this morning followed by sudden onset dizziness which resolved after about 10 minutes. Denies any pain. Emesis twice today.

## 2018-12-31 LAB — INTERPRETATION ECG - MUSE: NORMAL

## 2019-01-04 ENCOUNTER — OFFICE VISIT (OUTPATIENT)
Dept: PEDIATRICS | Facility: CLINIC | Age: 84
End: 2019-01-04
Payer: COMMERCIAL

## 2019-01-04 VITALS
WEIGHT: 224.2 LBS | BODY MASS INDEX: 33.98 KG/M2 | SYSTOLIC BLOOD PRESSURE: 106 MMHG | HEART RATE: 72 BPM | HEIGHT: 68 IN | TEMPERATURE: 97.7 F | DIASTOLIC BLOOD PRESSURE: 70 MMHG

## 2019-01-04 DIAGNOSIS — J43.8 OTHER EMPHYSEMA (H): ICD-10-CM

## 2019-01-04 DIAGNOSIS — I48.92 ATRIAL FIBRILLATION AND FLUTTER (H): ICD-10-CM

## 2019-01-04 DIAGNOSIS — I48.91 ATRIAL FIBRILLATION AND FLUTTER (H): ICD-10-CM

## 2019-01-04 DIAGNOSIS — S22.31XA CLOSED FRACTURE OF ONE RIB OF RIGHT SIDE, INITIAL ENCOUNTER: ICD-10-CM

## 2019-01-04 DIAGNOSIS — J20.9 ACUTE BRONCHITIS WITH SYMPTOMS > 10 DAYS: Primary | ICD-10-CM

## 2019-01-04 PROCEDURE — 99214 OFFICE O/P EST MOD 30 MIN: CPT | Performed by: INTERNAL MEDICINE

## 2019-01-04 RX ORDER — AZITHROMYCIN 500 MG/1
500 TABLET, FILM COATED ORAL DAILY
Qty: 3 TABLET | Refills: 0 | Status: SHIPPED | OUTPATIENT
Start: 2019-01-04 | End: 2019-02-05

## 2019-01-04 ASSESSMENT — MIFFLIN-ST. JEOR: SCORE: 1696.46

## 2019-01-04 NOTE — PROGRESS NOTES
"  SUBJECTIVE:   Nixon More is a 83 year old male who presents to clinic today for the following health issues:      ED/UC Followup:    Facility:  [facility]  Date of visit: 12/29/18  Reason for visit: Bronchitis - symptoms started about 12/26  Current Status: finished prednisone. currently still SOB and cough - using albuterol but not that much.  Has slight greenish sputum.  Clear rhinorrhea. states SOB is due to Afib seeing cardio Tuesday.  Sleeping up on wedges.  No fever.  Chest pain improving    Leg swelling is down and creatinine better with 1/2 hydrochlorothiazide tab.  Has cardiology appointment next week with cardiology       Problem list and histories reviewed & adjusted, as indicated.  Additional history: as documented    Labs reviewed in EPIC    Reviewed and updated as needed this visit by clinical staff  Tobacco  Allergies  Meds  Problems  Med Hx  Surg Hx  Fam Hx  Soc Hx        Reviewed and updated as needed this visit by Provider  Allergies  Meds  Problems         ROS:  Constitutional, HEENT, cardiovascular, pulmonary, gi and gu systems are negative, except as otherwise noted.    OBJECTIVE:     /70 (BP Location: Right arm, Patient Position: Chair, Cuff Size: Adult Regular)   Pulse 72   Temp 97.7  F (36.5  C) (Tympanic)   Ht 1.727 m (5' 8\")   Wt 101.7 kg (224 lb 3.2 oz)   BMI 34.09 kg/m    Body mass index is 34.09 kg/m .  GENERAL: alert and mild distress  EYES: Eyes grossly normal to inspection, PERRL and conjunctivae and sclerae normal  HENT: ear canals and TM's normal, nose and mouth without ulcers or lesions.  Tonsils and uvula with pettechia  NECK: no adenopathy, no asymmetry, masses, or scars   RESP: lungs with rales bilateral  CV: regular rate and rhythm, normal S1 S2, no S3 or S4, no murmur, click or rub, no peripheral edema and peripheral pulses strong  MS: no gross musculoskeletal defects noted, no edema        ASSESSMENT/PLAN:       1. Acute bronchitis with symptoms > " 10 days  Persistent symptoms despite prednisone and inhaler.  Antibiotic per orders and follow-up if not improving in 2-3d or asap if worsening   - azithromycin (ZITHROMAX) 500 MG tablet; Take 1 tablet (500 mg) by mouth daily  Dispense: 3 tablet; Refill: 0    2. Other emphysema (H)  Inhaler use discussed    3. Atrial fibrillation and flutter (H)  Follow-up with cardiology    4. Closed fracture of one rib of right side, initial encounter  Continue symptomatic cares      See Patient Instructions    Natalya Lewis MD  [facility]

## 2019-01-06 PROBLEM — I48.92 ATRIAL FIBRILLATION AND FLUTTER (H): Status: ACTIVE | Noted: 2018-12-03

## 2019-01-06 PROBLEM — I48.91 ATRIAL FIBRILLATION AND FLUTTER (H): Status: ACTIVE | Noted: 2018-12-03

## 2019-01-08 ENCOUNTER — OFFICE VISIT (OUTPATIENT)
Dept: CARDIOLOGY | Facility: CLINIC | Age: 84
End: 2019-01-08
Attending: NURSE PRACTITIONER
Payer: COMMERCIAL

## 2019-01-08 VITALS
HEIGHT: 68 IN | BODY MASS INDEX: 34.1 KG/M2 | WEIGHT: 225 LBS | DIASTOLIC BLOOD PRESSURE: 72 MMHG | HEART RATE: 86 BPM | SYSTOLIC BLOOD PRESSURE: 123 MMHG

## 2019-01-08 DIAGNOSIS — I48.19 PERSISTENT ATRIAL FIBRILLATION (H): Primary | ICD-10-CM

## 2019-01-08 DIAGNOSIS — R06.09 DOE (DYSPNEA ON EXERTION): ICD-10-CM

## 2019-01-08 DIAGNOSIS — I10 HYPERTENSION GOAL BP (BLOOD PRESSURE) < 140/90: ICD-10-CM

## 2019-01-08 DIAGNOSIS — G47.33 OSA (OBSTRUCTIVE SLEEP APNEA): ICD-10-CM

## 2019-01-08 LAB
ANION GAP SERPL CALCULATED.3IONS-SCNC: 7.1 MMOL/L (ref 6–17)
BUN SERPL-MCNC: 22 MG/DL (ref 7–30)
CALCIUM SERPL-MCNC: 9.9 MG/DL (ref 8.5–10.5)
CHLORIDE SERPL-SCNC: 103 MMOL/L (ref 98–107)
CO2 SERPL-SCNC: 31 MMOL/L (ref 23–29)
CREAT SERPL-MCNC: 1.25 MG/DL (ref 0.7–1.3)
GFR SERPL CREATININE-BSD FRML MDRD: 55 ML/MIN/{1.73_M2}
GLUCOSE SERPL-MCNC: 127 MG/DL (ref 70–105)
POTASSIUM SERPL-SCNC: 4.1 MMOL/L (ref 3.5–5.1)
SODIUM SERPL-SCNC: 137 MMOL/L (ref 136–145)

## 2019-01-08 PROCEDURE — 99214 OFFICE O/P EST MOD 30 MIN: CPT | Performed by: INTERNAL MEDICINE

## 2019-01-08 PROCEDURE — 80048 BASIC METABOLIC PNL TOTAL CA: CPT | Performed by: PHYSICIAN ASSISTANT

## 2019-01-08 PROCEDURE — 36415 COLL VENOUS BLD VENIPUNCTURE: CPT | Performed by: PHYSICIAN ASSISTANT

## 2019-01-08 ASSESSMENT — MIFFLIN-ST. JEOR: SCORE: 1700.09

## 2019-01-08 NOTE — PROGRESS NOTES
HPI and Plan:   See dictation    Orders Placed This Encounter   Procedures     Follow-Up with Cardiac Advanced Practice Provider       No orders of the defined types were placed in this encounter.      There are no discontinued medications.      Encounter Diagnoses   Name Primary?     Persistent atrial fibrillation (H) Yes     Atrial flutter, unspecified type (H)        CURRENT MEDICATIONS:  Current Outpatient Medications   Medication Sig Dispense Refill     albuterol (PROAIR HFA) 108 (90 Base) MCG/ACT inhaler Inhale 2 puffs into the lungs every 4 hours as needed for wheezing 1 Inhaler 0     apixaban ANTICOAGULANT (ELIQUIS) 5 MG tablet Take 1 tablet (5 mg) by mouth 2 times daily 60 tablet 5     atenolol (TENORMIN) 50 MG tablet Take 3 tablets (150 mg) by mouth daily 45 tablet 0     atorvastatin (LIPITOR) 40 MG tablet Take 1 tablet (40 mg) by mouth daily 90 tablet 3     diltiazem ER (DILT-XR) 120 MG 24 hr capsule Take 1 capsule (120 mg) by mouth daily 30 capsule 5     EPINEPHrine (EPIPEN/ADRENACLICK/OR ANY BX GENERIC EQUIV) 0.3 MG/0.3ML injection 2-pack Inject 0.3 mLs (0.3 mg) into the muscle as needed for anaphylaxis 0.6 mL 1     FLUoxetine (PROZAC) 10 MG capsule Take 1 capsule (10 mg) by mouth daily 90 capsule 3     hydrochlorothiazide (HYDRODIURIL) 25 MG tablet Take 0.5 tablets (12.5 mg) by mouth daily       HYDROcodone-acetaminophen (NORCO) 5-325 MG tablet Take 1 tablet by mouth every 4 hours as needed for pain 18 tablet 0     ipratropium (ATROVENT) 0.06 % spray Spray 2 sprays in nostril 4 times daily as needed for rhinitis 3 Box 3     leuprolide (ELIGARD) 45 MG injection Inject 45 mg Subcutaneous every 6 months. 1 each 12     losartan (COZAAR) 100 MG tablet Take 1 tablet (100 mg) by mouth daily 30 tablet 0     Multiple Minerals-Vitamins (PROSTEON PO) Take 2,000 Units by mouth With vitamin D       Multiple Vitamins-Minerals (CENTRUM SILVER PO)        azithromycin (ZITHROMAX) 500 MG tablet Take 1 tablet (500 mg) by  mouth daily (Patient not taking: Reported on 1/8/2019) 3 tablet 0       ALLERGIES     Allergies   Allergen Reactions     Augmentin Rash     Type III hypersensitivity     Bactrim [Sulfamethoxazole W/Trimethoprim] Rash     Serum sickness, type III hypersensitivity       Bee Venom Itching     Lisinopril Cough     Naproxen Hives       PAST MEDICAL HISTORY:  Past Medical History:   Diagnosis Date     Actinic keratosis      Arthritis      CAD (coronary artery disease)     1/5/2011 lateral ischemia on stress echo, 12/2014 normal stress echo     Dyslipidemia      Emphysema of lung (H)      Essential hypertension, benign      Hernia, abdominal      Hypersomnia with sleep apnea, unspecified     on bipap, partially treated with residual apneas     Hypertrophy (benign) of prostate 5/01    Biopsy 5/01 negative for cancer; PSA 5     Lumbago      Mumps      Prostate cancer (H) 12/15/2006     Skin cancer, basal cell 1997     Spider veins        PAST SURGICAL HISTORY:  Past Surgical History:   Procedure Laterality Date     HERNIA REPAIR  child     Moh's procedure for basal cell carcinoma  01/2001     PROSTATE SURGERY       s/p lumbar laminectomy NOS  1988     VITRECTOMY PARS PLANA REMOVE PRERETINAL MEMBRANE   3/09       FAMILY HISTORY:  Family History   Problem Relation Age of Onset     Alzheimer Disease Mother      Hypertension Mother      Cardiovascular Father         D:86 complications fo CHF     Prostate Cancer Brother      Lung Cancer Brother 76     Prostate Cancer Brother        SOCIAL HISTORY:  Social History     Socioeconomic History     Marital status:      Spouse name: None     Number of children: None     Years of education: None     Highest education level: None   Social Needs     Financial resource strain: None     Food insecurity - worry: None     Food insecurity - inability: None     Transportation needs - medical: None     Transportation needs - non-medical: None   Occupational History     Occupation: retired    Tobacco Use     Smoking status: Former Smoker     Packs/day: 1.00     Years: 30.00     Pack years: 30.00     Types: Cigarettes     Last attempt to quit: 1978     Years since quittin.0     Smokeless tobacco: Never Used   Substance and Sexual Activity     Alcohol use: Yes     Alcohol/week: 1.0 oz     Types: 2 Standard drinks or equivalent per week     Comment: socially     Drug use: No     Sexual activity: Yes     Partners: Female   Other Topics Concern      Service Not Asked     Blood Transfusions Not Asked     Caffeine Concern No     Comment: 0-2 cans of soda per day.     Occupational Exposure Not Asked     Hobby Hazards Not Asked     Sleep Concern Not Asked     Stress Concern Not Asked     Weight Concern Not Asked     Special Diet Not Asked     Back Care Not Asked     Exercise No     Bike Helmet Not Asked     Seat Belt Yes     Self-Exams Not Asked     Parent/sibling w/ CABG, MI or angioplasty before 65F 55M? No   Social History Narrative     None       Review of Systems:  Skin:  Negative       Eyes:  Positive for glasses    ENT:  Negative      Respiratory:  Positive for dyspnea on exertion Back R rib pain s/p fracture 2018   Cardiovascular:    Positive for;palpitations    Gastroenterology: Negative      Genitourinary:  not assessed      Musculoskeletal:  Positive for joint stiffness    Neurologic:  Negative      Psychiatric:  Negative      Heme/Lymph/Imm:  Negative      Endocrine:  Positive for diabetes      [id number]

## 2019-01-08 NOTE — LETTER
"1/8/2019      Natalya Lewis MD  [address] [place] [address]      RE: Nixon More       Dear Colleague,    I had the pleasure of seeing Nixon More in the [facility].    Service Date: 01/08/2019      HISTORY OF PRESENT ILLNESS:    I had the pleasure of seeing Mr. Spencer More, a delightful 83-year-old gentleman, for persistent atrial fibrillation and dyspnea.      Mr. More has been followed in our clinic by Dr. Camp.  He has the following medical issues:   1.  Recent onset persistent atrial fibrillation, see details below.   2.  Angina with abnormal stress echocardiogram in 2011 showing lateral ischemia.  Treated medically with resolution of angina.  Normal LV function.   3.  Hypertension.   4.  Dyslipidemia.   5.  Obesity.   6.  Obstructive sleep apnea on BiPAP therapy.   7.  Ascending aorta aneurysm at 4.6 cm (echocardiography 12/2018).     8.  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     Mr. More was noted to be in atypical atrial flutter with RVR during hospitalization in early 12/2018.  He had a fall while after slipping on ice on 12/03/2018.  He was incidentally noted to be tachycardic (atrial flutter) in the ER.  In reviewing the admission electrocardiogram, the arrhythmia looks atypical.  The patient was admitted and was seen in consultation by Dr. Curry Walker.  He was placed on apixaban.  An echocardiogram showed normal LV function with ascending aorta and aortic root dilatation.  He was sent home on atenolol 150 mg daily and diltiazem  mg daily ago.      About 10 days ago, the patient developed cough and was later diagnosed with \"bronchitis.\"  He has not had a fever, but tells me he is still coughing, albeit he has started to feel better.  Before he developed a bronchitis, the patient complained of dyspnea on exertion.  Walking even small distances would make him short of breath.      In looking back " "through his records, a note from 12/2014 by Estrella Camilo clearly states that shortness of breath with exertion was one of his major complaints even back then.  Estrella stated he had a \"difficult time catching his breath for a while, but it is now evident even while walking.\"  His wife acknowledges that dyspnea on exertion has been a chronic problem, but may have been a little worse recently.      The patient has not had chest pain, orthopnea or PND.  He has had some issues with chronic mild lower extremity edema, but this has never been a serious concern.      The patient uses his BiPAP every night.  He does not drink much alcohol.  He quit smoking in 1978, but smoked heavily for approximately 40 years.      PHYSICAL EXAMINATION:   VITAL SIGNS:  Blood pressure 123/72, pulse 86 and irregular, weight 102 kg (225 pounds), height 173 cm.   GENERAL:  He is a pleasant gentleman accompanied by his wife.  He is wearing a mask and is coughing occasionally.  He is in no acute distress.   HEENT:  Normocephalic.   NECK:  Thick, supple, without carotid bruits, 1+ carotid pulses.   LUNGS:  With fair air movement bilaterally.  No crackles or wheezes.   CARDIOVASCULAR:  ?JVP, irregularly irregular rhythm which is not fast, between 70 and 90.  No apparent gallop or murmur.   ABDOMEN:  Quite obese, soft, nontender.   EXTREMITIES:  Trace edema.   SKIN:  No rash.   BACK:  No CVA tenderness.   NEUROLOGIC:  Alert and oriented x3.      DIAGNOSTIC STUDIES:    Recent laboratory tests show sodium 137, potassium 4.1, creatinine 1.25.  Troponin less than 0.015.  Hematocrit 44%.      His most recent 12-lead ECG from 12/29/2018 showed atrial fibrillation with controlled ventricular rate.      His echocardiogram from 12/2018 showed LVEF of 55% with mildly decreased right ventricular systolic function, moderate to severe biatrial enlargement and 1 to 2+ tricuspid regurgitation.  There was aortic root and ascending aorta dilatation as noted above. "        IMPRESSION:    1.  Persistent atrial fibrillation.  The time of onset of this arrhythmia is unknown.  AF was recognized in early December when admitted after a mechanical fall but probably started months or even years earlier.  Echocardiography shows severe biatrial enlargement.      In my opinion, we should not further interfere with his atrial fibrillation.  This is likely a longstanding arrhythmia and the significant atrial remodeling and age of 83 will make maintenance of sinus rhythm quite difficult.  In addition, the patient's dyspnea on exertion is not new.  I do acknowledge a possible recent deterioration in his breathing, but there are other important causes to consider in his case include deconditioning, severe obesity, aging and COPD.      I did discuss with the patient the alternative of pursuing sinus rhythm with antiarrhythmic drug therapy (amiodarone) and cardioversion.  He is not a good candidate for catheter ablation.      After a good discussion, Mr. More has agreed to continue his current medical therapy, essentially rate control and anticoagulation.  He wants to see how he feels after his bronchitis resolves.  If he is unhappy with how he feels and is very limited physically, I encouraged him to give us a call.  I would then start amiodarone and schedule cardioversion 2-3 weeks later.      RECOMMENDATIONS:   1.  Continue atenolol, diltiazem and apixaban.   2.  Follow-up with hCerise in 3 months.   3.  He will need regular surveillance for his aortic disease.     I do appreciate the opportunity to be part of this delightful patient's care.  Please feel free to contact me with any additional questions.         JANY BRAXTON MD, FACC         cc:      Natalya Lewis MD    [facility]   [address] [place] [address]      Nimisha Bennett MD     [facility]   [address]   [place] [id number]          D: 01/08/2019   T: 01/08/2019   MT:        Name:     YULISA BYRD   MRN:      [id number]        Account:      [id number]   :      1935           Service Date: 2019      Document: [id number]           Outpatient Encounter Medications as of 2019   Medication Sig Dispense Refill     albuterol (PROAIR HFA) 108 (90 Base) MCG/ACT inhaler Inhale 2 puffs into the lungs every 4 hours as needed for wheezing 1 Inhaler 0     apixaban ANTICOAGULANT (ELIQUIS) 5 MG tablet Take 1 tablet (5 mg) by mouth 2 times daily 60 tablet 5     atenolol (TENORMIN) 50 MG tablet Take 3 tablets (150 mg) by mouth daily 45 tablet 0     atorvastatin (LIPITOR) 40 MG tablet Take 1 tablet (40 mg) by mouth daily 90 tablet 3     diltiazem ER (DILT-XR) 120 MG 24 hr capsule Take 1 capsule (120 mg) by mouth daily 30 capsule 5     EPINEPHrine (EPIPEN/ADRENACLICK/OR ANY BX GENERIC EQUIV) 0.3 MG/0.3ML injection 2-pack Inject 0.3 mLs (0.3 mg) into the muscle as needed for anaphylaxis 0.6 mL 1     FLUoxetine (PROZAC) 10 MG capsule Take 1 capsule (10 mg) by mouth daily 90 capsule 3     hydrochlorothiazide (HYDRODIURIL) 25 MG tablet Take 0.5 tablets (12.5 mg) by mouth daily       HYDROcodone-acetaminophen (NORCO) 5-325 MG tablet Take 1 tablet by mouth every 4 hours as needed for pain 18 tablet 0     ipratropium (ATROVENT) 0.06 % spray Spray 2 sprays in nostril 4 times daily as needed for rhinitis 3 Box 3     leuprolide (ELIGARD) 45 MG injection Inject 45 mg Subcutaneous every 6 months. 1 each 12     losartan (COZAAR) 100 MG tablet Take 1 tablet (100 mg) by mouth daily 30 tablet 0     Multiple Minerals-Vitamins (PROSTEON PO) Take 2,000 Units by mouth With vitamin D       Multiple Vitamins-Minerals (CENTRUM SILVER PO)        azithromycin (ZITHROMAX) 500 MG tablet Take 1 tablet (500 mg) by mouth daily (Patient not taking: Reported on 2019) 3 tablet 0     No facility-administered encounter medications on file as of 2019.        Again, thank you for allowing me to  participate in the care of your patient.      Sincerely,    Jorge Luis Lee MD     [facility]

## 2019-01-08 NOTE — PROGRESS NOTES
"Service Date: 01/08/2019      HISTORY OF PRESENT ILLNESS:    I had the pleasure of seeing Mr. Spencer More, a delightful 83-year-old gentleman, for persistent atrial fibrillation and dyspnea.      Mr. More has been followed in our clinic by Dr. Camp.  He has the following medical issues:   1.  Recent onset persistent atrial fibrillation, see details below.   2.  Angina with abnormal stress echocardiogram in 2011 showing lateral ischemia.  Treated medically with resolution of angina.  Normal LV function.   3.  Hypertension.   4.  Dyslipidemia.   5.  Obesity.   6.  Obstructive sleep apnea on BiPAP therapy.   7.  Ascending aorta aneurysm at 4.6 cm (echocardiography 12/2018).     8.  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     Mr. More was noted to be in atypical atrial flutter with RVR during hospitalization in early 12/2018.  He had a fall while after slipping on ice on 12/03/2018.  He was incidentally noted to be tachycardic (atrial flutter) in the ER.  In reviewing the admission electrocardiogram, the arrhythmia looks atypical.  The patient was admitted and was seen in consultation by Dr. Curry Walker.  He was placed on apixaban.  An echocardiogram showed normal LV function with ascending aorta and aortic root dilatation.  He was sent home on atenolol 150 mg daily and diltiazem  mg daily ago.      About 10 days ago, the patient developed cough and was later diagnosed with \"bronchitis.\"  He has not had a fever, but tells me he is still coughing, albeit he has started to feel better.  Before he developed a bronchitis, the patient complained of dyspnea on exertion.  Walking even small distances would make him short of breath.      In looking back through his records, a note from 12/2014 by Estrella Camilo clearly states that shortness of breath with exertion was one of his major complaints even back then.  Estrella stated he had a \"difficult time catching his breath for a while, " "but it is now evident even while walking.\"  His wife acknowledges that dyspnea on exertion has been a chronic problem, but may have been a little worse recently.      The patient has not had chest pain, orthopnea or PND.  He has had some issues with chronic mild lower extremity edema, but this has never been a serious concern.      The patient uses his BiPAP every night.  He does not drink much alcohol.  He quit smoking in 1978, but smoked heavily for approximately 40 years.      PHYSICAL EXAMINATION:   VITAL SIGNS:  Blood pressure 123/72, pulse 86 and irregular, weight 102 kg (225 pounds), height 173 cm.   GENERAL:  He is a pleasant gentleman accompanied by his wife.  He is wearing a mask and is coughing occasionally.  He is in no acute distress.   HEENT:  Normocephalic.   NECK:  Thick, supple, without carotid bruits, 1+ carotid pulses.   LUNGS:  With fair air movement bilaterally.  No crackles or wheezes.   CARDIOVASCULAR:  ?JVP, irregularly irregular rhythm which is not fast, between 70 and 90.  No apparent gallop or murmur.   ABDOMEN:  Quite obese, soft, nontender.   EXTREMITIES:  Trace edema.   SKIN:  No rash.   BACK:  No CVA tenderness.   NEUROLOGIC:  Alert and oriented x3.      DIAGNOSTIC STUDIES:    Recent laboratory tests show sodium 137, potassium 4.1, creatinine 1.25.  Troponin less than 0.015.  Hematocrit 44%.      His most recent 12-lead ECG from 12/29/2018 showed atrial fibrillation with controlled ventricular rate.      His echocardiogram from 12/2018 showed LVEF of 55% with mildly decreased right ventricular systolic function, moderate to severe biatrial enlargement and 1 to 2+ tricuspid regurgitation.  There was aortic root and ascending aorta dilatation as noted above.        IMPRESSION:    1.  Persistent atrial fibrillation.  The time of onset of this arrhythmia is unknown.  AF was recognized in early December when admitted after a mechanical fall but probably started months or even years earlier. "  Echocardiography shows severe biatrial enlargement.      In my opinion, we should not further interfere with his atrial fibrillation.  This is likely a longstanding arrhythmia and the significant atrial remodeling and age of 83 will make maintenance of sinus rhythm quite difficult.  In addition, the patient's dyspnea on exertion is not new.  I do acknowledge a possible recent deterioration in his breathing, but there are other important causes to consider in his case include deconditioning, severe obesity, aging and COPD.      I did discuss with the patient the alternative of pursuing sinus rhythm with antiarrhythmic drug therapy (amiodarone) and cardioversion.  He is not a good candidate for catheter ablation.      After a good discussion, Mr. Byrd has agreed to continue his current medical therapy, essentially rate control and anticoagulation.  He wants to see how he feels after his bronchitis resolves.  If he is unhappy with how he feels and is very limited physically, I encouraged him to give us a call.  I would then start amiodarone and schedule cardioversion 2-3 weeks later.      RECOMMENDATIONS:   1.  Continue atenolol, diltiazem and apixaban.   2.  Follow-up with Cherise in 3 months.   3.  He will need regular surveillance for his aortic disease.     I do appreciate the opportunity to be part of this delightful patient's care.  Please feel free to contact me with any additional questions.         JANY BRAXTON MD, [facility]         cc:      Natalya Lewis MD    [facility]   [address] [place] [address]      Nimisha Bennett MD     [facility]   [address]   [place] [address]          D: 2019   T: 2019   MT: LAWRENCE      Name:     YULISA BYRD   MRN:      [id number]        Account:      [id number]   :      1935           Service Date: 2019      Document: [id number]

## 2019-01-08 NOTE — LETTER
1/8/2019    Natalya Lewis MD  [address] [place] [address]    RE: Nixon More       Dear Colleague,    I had the pleasure of seeing Nixon More in the [facility].    HPI and Plan:   See dictation    Orders Placed This Encounter   Procedures     Follow-Up with Cardiac Advanced Practice Provider       No orders of the defined types were placed in this encounter.      There are no discontinued medications.      Encounter Diagnoses   Name Primary?     Persistent atrial fibrillation (H) Yes     Atrial flutter, unspecified type (H)        CURRENT MEDICATIONS:  Current Outpatient Medications   Medication Sig Dispense Refill     albuterol (PROAIR HFA) 108 (90 Base) MCG/ACT inhaler Inhale 2 puffs into the lungs every 4 hours as needed for wheezing 1 Inhaler 0     apixaban ANTICOAGULANT (ELIQUIS) 5 MG tablet Take 1 tablet (5 mg) by mouth 2 times daily 60 tablet 5     atenolol (TENORMIN) 50 MG tablet Take 3 tablets (150 mg) by mouth daily 45 tablet 0     atorvastatin (LIPITOR) 40 MG tablet Take 1 tablet (40 mg) by mouth daily 90 tablet 3     diltiazem ER (DILT-XR) 120 MG 24 hr capsule Take 1 capsule (120 mg) by mouth daily 30 capsule 5     EPINEPHrine (EPIPEN/ADRENACLICK/OR ANY BX GENERIC EQUIV) 0.3 MG/0.3ML injection 2-pack Inject 0.3 mLs (0.3 mg) into the muscle as needed for anaphylaxis 0.6 mL 1     FLUoxetine (PROZAC) 10 MG capsule Take 1 capsule (10 mg) by mouth daily 90 capsule 3     hydrochlorothiazide (HYDRODIURIL) 25 MG tablet Take 0.5 tablets (12.5 mg) by mouth daily       HYDROcodone-acetaminophen (NORCO) 5-325 MG tablet Take 1 tablet by mouth every 4 hours as needed for pain 18 tablet 0     ipratropium (ATROVENT) 0.06 % spray Spray 2 sprays in nostril 4 times daily as needed for rhinitis 3 Box 3     leuprolide (ELIGARD) 45 MG injection Inject 45 mg Subcutaneous every 6 months. 1 each 12     losartan (COZAAR) 100 MG tablet Take 1 tablet (100 mg) by mouth daily  30 tablet 0     Multiple Minerals-Vitamins (PROSTEON PO) Take 2,000 Units by mouth With vitamin D       Multiple Vitamins-Minerals (CENTRUM SILVER PO)        azithromycin (ZITHROMAX) 500 MG tablet Take 1 tablet (500 mg) by mouth daily (Patient not taking: Reported on 1/8/2019) 3 tablet 0       ALLERGIES     Allergies   Allergen Reactions     Augmentin Rash     Type III hypersensitivity     Bactrim [Sulfamethoxazole W/Trimethoprim] Rash     Serum sickness, type III hypersensitivity       Bee Venom Itching     Lisinopril Cough     Naproxen Hives       PAST MEDICAL HISTORY:  Past Medical History:   Diagnosis Date     Actinic keratosis      Arthritis      CAD (coronary artery disease)     1/5/2011 lateral ischemia on stress echo, 12/2014 normal stress echo     Dyslipidemia      Emphysema of lung (H)      Essential hypertension, benign      Hernia, abdominal      Hypersomnia with sleep apnea, unspecified     on bipap, partially treated with residual apneas     Hypertrophy (benign) of prostate 5/01    Biopsy 5/01 negative for cancer; PSA 5     Lumbago      Mumps      Prostate cancer (H) 12/15/2006     Skin cancer, basal cell 1997     Spider veins        PAST SURGICAL HISTORY:  Past Surgical History:   Procedure Laterality Date     HERNIA REPAIR  child     Moh's procedure for basal cell carcinoma  01/2001     PROSTATE SURGERY       s/p lumbar laminectomy NOS  1988     VITRECTOMY PARS PLANA REMOVE PRERETINAL MEMBRANE   3/09       FAMILY HISTORY:  Family History   Problem Relation Age of Onset     Alzheimer Disease Mother      Hypertension Mother      Cardiovascular Father         D:86 complications fo CHF     Prostate Cancer Brother      Lung Cancer Brother 76     Prostate Cancer Brother        SOCIAL HISTORY:  Social History     Socioeconomic History     Marital status:      Spouse name: None     Number of children: None     Years of education: None     Highest education level: None   Social Needs     Financial  resource strain: None     Food insecurity - worry: None     Food insecurity - inability: None     Transportation needs - medical: None     Transportation needs - non-medical: None   Occupational History     Occupation: retired   Tobacco Use     Smoking status: Former Smoker     Packs/day: 1.00     Years: 30.00     Pack years: 30.00     Types: Cigarettes     Last attempt to quit: 1978     Years since quittin.0     Smokeless tobacco: Never Used   Substance and Sexual Activity     Alcohol use: Yes     Alcohol/week: 1.0 oz     Types: 2 Standard drinks or equivalent per week     Comment: socially     Drug use: No     Sexual activity: Yes     Partners: Female   Other Topics Concern      Service Not Asked     Blood Transfusions Not Asked     Caffeine Concern No     Comment: 0-2 cans of soda per day.     Occupational Exposure Not Asked     Hobby Hazards Not Asked     Sleep Concern Not Asked     Stress Concern Not Asked     Weight Concern Not Asked     Special Diet Not Asked     Back Care Not Asked     Exercise No     Bike Helmet Not Asked     Seat Belt Yes     Self-Exams Not Asked     Parent/sibling w/ CABG, MI or angioplasty before 65F 55M? No   Social History Narrative     None       Review of Systems:  Skin:  Negative       Eyes:  Positive for glasses    ENT:  Negative      Respiratory:  Positive for dyspnea on exertion Back R rib pain s/p fracture 2018   Cardiovascular:    Positive for;palpitations    Gastroenterology: Negative      Genitourinary:  not assessed      Musculoskeletal:  Positive for joint stiffness    Neurologic:  Negative      Psychiatric:  Negative      Heme/Lymph/Imm:  Negative      Endocrine:  Positive for diabetes      [id number]          Thank you for allowing me to participate in the care of your patient.      Sincerely,     Jorge Luis Lee MD     [facility]    cc:   LORELEI Manrique CNP  [address]  [place] [address]

## 2019-01-09 ENCOUNTER — TELEPHONE (OUTPATIENT)
Dept: CARDIOLOGY | Facility: CLINIC | Age: 84
End: 2019-01-09

## 2019-01-09 NOTE — TELEPHONE ENCOUNTER
Pt was concerned with Eliquis and ketoconazole topical and per Lexicomp/UptoDate there is no interaction.  Wife made aware as pt was napping.  Jaugar

## 2019-01-14 DIAGNOSIS — I48.19 PERSISTENT ATRIAL FIBRILLATION (H): Primary | ICD-10-CM

## 2019-01-14 RX ORDER — ATENOLOL 100 MG/1
150 TABLET ORAL DAILY
Qty: 135 TABLET | Refills: 3 | Status: SHIPPED | OUTPATIENT
Start: 2019-01-14 | End: 2019-12-27

## 2019-01-25 ENCOUNTER — TRANSFERRED RECORDS (OUTPATIENT)
Dept: HEALTH INFORMATION MANAGEMENT | Facility: CLINIC | Age: 84
End: 2019-01-25

## 2019-01-28 DIAGNOSIS — I10 HYPERTENSION GOAL BP (BLOOD PRESSURE) < 140/90: ICD-10-CM

## 2019-01-28 NOTE — TELEPHONE ENCOUNTER
"Requested Prescriptions   Pending Prescriptions Disp Refills     losartan (COZAAR) 100 MG tablet [Pharmacy Med Name: LOSARTAN 100MG   TAB]  Last Written Prescription Date:  12/28/2018  Last Fill Quantity: 30 tablet,  # refills: 0   Last office visit: 01/04/2019 with prescribing provider:  Natalya Lewis MD   Future Office Visit:     30 tablet 0     Sig: TAKE 1 TABLET BY MOUTH ONCE DAILY    Angiotensin-II Receptors Passed - 1/28/2019 10:57 AM       Passed - Blood pressure under 140/90 in past 12 months    BP Readings from Last 3 Encounters:   01/08/19 123/72   01/04/19 106/70   12/29/18 (!) 142/111                Passed - Recent (12 mo) or future (30 days) visit within the authorizing provider's specialty    Patient had office visit in the last 12 months or has a visit in the next 30 days with authorizing provider or within the authorizing provider's specialty.  See \"Patient Info\" tab in inbasket, or \"Choose Columns\" in Meds & Orders section of the refill encounter.             Passed - Medication is active on med list       Passed - Patient is age 18 or older       Passed - Normal serum creatinine on file in past 12 months    Recent Labs   Lab Test 01/08/19  1214  07/28/16  0858   CR 1.25   < >  --    CREAT  --   --  1.0    < > = values in this interval not displayed.            Passed - Normal serum potassium on file in past 12 months    Recent Labs   Lab Test 01/08/19  1214   POTASSIUM 4.1                      "

## 2019-01-30 RX ORDER — LOSARTAN POTASSIUM 100 MG/1
TABLET ORAL
Qty: 90 TABLET | Refills: 3 | Status: SHIPPED | OUTPATIENT
Start: 2019-01-30 | End: 2019-12-26

## 2019-01-30 NOTE — TELEPHONE ENCOUNTER
Return in about 9 months (around 10/4/2019) for Physical Exam    We sent #30 days supply last time as patient was seeing cardiology-patient saw cardiology on 1/8/19.    Per cardiology, plan was to continue atenolol, diltiazem and apixaban.  Ok to fill losartan?     Routing to PCP to review.    Glenys Gallardo, RN  Message handled by Nurse Triage.

## 2019-02-05 ENCOUNTER — OFFICE VISIT (OUTPATIENT)
Dept: PEDIATRICS | Facility: CLINIC | Age: 84
End: 2019-02-05
Payer: COMMERCIAL

## 2019-02-05 VITALS
HEART RATE: 95 BPM | WEIGHT: 226.2 LBS | DIASTOLIC BLOOD PRESSURE: 76 MMHG | OXYGEN SATURATION: 96 % | SYSTOLIC BLOOD PRESSURE: 118 MMHG | TEMPERATURE: 99.1 F | HEIGHT: 68 IN | BODY MASS INDEX: 34.28 KG/M2

## 2019-02-05 DIAGNOSIS — N40.0 HYPERTROPHY OF PROSTATE WITHOUT URINARY OBSTRUCTION: ICD-10-CM

## 2019-02-05 DIAGNOSIS — N30.01 ACUTE CYSTITIS WITH HEMATURIA: Primary | ICD-10-CM

## 2019-02-05 LAB
ALBUMIN UR-MCNC: 30 MG/DL
APPEARANCE UR: ABNORMAL
BACTERIA #/AREA URNS HPF: ABNORMAL /HPF
BILIRUB UR QL STRIP: NEGATIVE
COLOR UR AUTO: YELLOW
GLUCOSE UR STRIP-MCNC: 100 MG/DL
HGB UR QL STRIP: ABNORMAL
KETONES UR STRIP-MCNC: NEGATIVE MG/DL
LEUKOCYTE ESTERASE UR QL STRIP: ABNORMAL
MUCOUS THREADS #/AREA URNS LPF: PRESENT /LPF
NITRATE UR QL: NEGATIVE
NON-SQ EPI CELLS #/AREA URNS LPF: ABNORMAL /LPF
PH UR STRIP: 6 PH (ref 5–7)
RBC #/AREA URNS AUTO: ABNORMAL /HPF
SOURCE: ABNORMAL
SP GR UR STRIP: 1.02 (ref 1–1.03)
UROBILINOGEN UR STRIP-ACNC: 0.2 EU/DL (ref 0.2–1)
WBC #/AREA URNS AUTO: >100 /HPF

## 2019-02-05 PROCEDURE — 87088 URINE BACTERIA CULTURE: CPT | Performed by: PHYSICIAN ASSISTANT

## 2019-02-05 PROCEDURE — 99214 OFFICE O/P EST MOD 30 MIN: CPT | Performed by: PHYSICIAN ASSISTANT

## 2019-02-05 PROCEDURE — 87086 URINE CULTURE/COLONY COUNT: CPT | Performed by: PHYSICIAN ASSISTANT

## 2019-02-05 PROCEDURE — 87186 SC STD MICRODIL/AGAR DIL: CPT | Performed by: PHYSICIAN ASSISTANT

## 2019-02-05 PROCEDURE — 81001 URINALYSIS AUTO W/SCOPE: CPT | Performed by: PHYSICIAN ASSISTANT

## 2019-02-05 RX ORDER — CIPROFLOXACIN 500 MG/1
500 TABLET, FILM COATED ORAL 2 TIMES DAILY
Qty: 20 TABLET | Refills: 0 | Status: SHIPPED | OUTPATIENT
Start: 2019-02-05 | End: 2019-03-11

## 2019-02-05 ASSESSMENT — MIFFLIN-ST. JEOR: SCORE: 1705.54

## 2019-02-05 NOTE — PROGRESS NOTES
"  SUBJECTIVE:   Nixon More is a 83 year old male, accompanied by wife, who presents to clinic today for the following health issues:    Genitourinary - Male  Onset: 1 day    Description:   Dysuria (painful urination): YES  Hematuria (blood in urine): no   Frequency: YES  Are you urinating at night : YES  Hesitancy (delay in urine): no   Retention (unable to empty): YES  Decrease in urinary flow: YES  Incontinence: YES    Progression of Symptoms:  same    Accompanying Signs & Symptoms:  Fever: no   Back/Flank pain: no   Urethral discharge: no   Testicle lumps/masses/pain: no   Nausea and/or vomiting: YES  Abdominal pain: no     History:   History of frequent UTI's: no   History of kidney stones: no   History of hernias: YES  Personal or Family history of Prostate problems: YES- self --prostate cancer and father  Sexually active: no     Precipitating factors:   none    Alleviating factors:  none    ROS:  ROS otherwise negative    OBJECTIVE:                                                    /76 (BP Location: Right arm, Patient Position: Sitting, Cuff Size: Adult Large)   Pulse 95   Temp 99.1  F (37.3  C) (Tympanic)   Ht 1.727 m (5' 8\")   Wt 102.6 kg (226 lb 3.2 oz)   SpO2 96%   BMI 34.39 kg/m    Body mass index is 34.39 kg/m .   GENERAL: alert, no distress  NECK: no tenderness, no adenopathy  RESP: lungs clear to auscultation - no rales, no rhonchi, no wheezes  CV: regular rates and rhythm, normal S1 S2, no S3 or S4 and no murmur, no click or rub  ABDOMEN: soft, no tenderness  BACK: no CVA tenderness  Diagnostic test results:  Results for orders placed or performed in visit on 02/05/19 (from the past 24 hour(s))   UA reflex to Microscopic and Culture   Result Value Ref Range    Color Urine Yellow     Appearance Urine Slightly Cloudy     Glucose Urine 100 (A) NEG^Negative mg/dL    Bilirubin Urine Negative NEG^Negative    Ketones Urine Negative NEG^Negative mg/dL    Specific Gravity Urine 1.020 1.003 - " 1.035    Blood Urine Moderate (A) NEG^Negative    pH Urine 6.0 5.0 - 7.0 pH    Protein Albumin Urine 30 (A) NEG^Negative mg/dL    Urobilinogen Urine 0.2 0.2 - 1.0 EU/dL    Nitrite Urine Negative NEG^Negative    Leukocyte Esterase Urine Moderate (A) NEG^Negative    Source Midstream Urine    Urine Microscopic   Result Value Ref Range    WBC Urine >100 (A) OTO5^0 - 5 /HPF    RBC Urine O - 2 OTO2^O - 2 /HPF    Squamous Epithelial /LPF Urine Few FEW^Few /LPF    Bacteria Urine Moderate (A) NEG^Negative /HPF    Mucous Urine Present (A) NEG^Negative /LPF        ASSESSMENT/PLAN:                                                    (N30.01) Acute cystitis with hematuria  (primary encounter diagnosis)  Comment: begin antibiotics. UC pending.   Plan: UA reflex to Microscopic and Culture, Urine         Culture Aerobic Bacterial, Urine Microscopic,         ciprofloxacin (CIPRO) 500 MG tablet            (N40.0) Hypertrophy of prostate without urinary obstruction  Comment:   Plan:     Rajesh Grace PA-C  [facility]

## 2019-02-07 LAB
BACTERIA SPEC CULT: ABNORMAL
SPECIMEN SOURCE: ABNORMAL

## 2019-02-11 DIAGNOSIS — C61 MALIGNANT NEOPLASM OF PROSTATE (H): ICD-10-CM

## 2019-02-15 DIAGNOSIS — C61 PROSTATE CANCER (H): Primary | ICD-10-CM

## 2019-02-19 ENCOUNTER — OFFICE VISIT (OUTPATIENT)
Dept: UROLOGY | Facility: CLINIC | Age: 84
End: 2019-02-19
Payer: COMMERCIAL

## 2019-02-19 VITALS
WEIGHT: 225 LBS | DIASTOLIC BLOOD PRESSURE: 60 MMHG | SYSTOLIC BLOOD PRESSURE: 120 MMHG | HEART RATE: 63 BPM | OXYGEN SATURATION: 100 % | BODY MASS INDEX: 34.1 KG/M2 | HEIGHT: 68 IN

## 2019-02-19 DIAGNOSIS — C61 PROSTATE CANCER (H): Primary | ICD-10-CM

## 2019-02-19 DIAGNOSIS — C61 MALIGNANT NEOPLASM OF PROSTATE (H): ICD-10-CM

## 2019-02-19 LAB
ALBUMIN UR-MCNC: NEGATIVE MG/DL
APPEARANCE UR: CLEAR
BILIRUB UR QL STRIP: NEGATIVE
COLOR UR AUTO: YELLOW
GLUCOSE UR STRIP-MCNC: NEGATIVE MG/DL
HGB UR QL STRIP: NEGATIVE
KETONES UR STRIP-MCNC: ABNORMAL MG/DL
LEUKOCYTE ESTERASE UR QL STRIP: NEGATIVE
NITRATE UR QL: NEGATIVE
PH UR STRIP: 6 PH (ref 5–7)
PSA SERPL-MCNC: <0.04 NG/ML (ref 0–4)
SOURCE: ABNORMAL
SP GR UR STRIP: 1.02 (ref 1–1.03)
UROBILINOGEN UR STRIP-ACNC: 0.2 EU/DL (ref 0.2–1)

## 2019-02-19 PROCEDURE — 99213 OFFICE O/P EST LOW 20 MIN: CPT | Mod: 25 | Performed by: UROLOGY

## 2019-02-19 PROCEDURE — 81003 URINALYSIS AUTO W/O SCOPE: CPT | Performed by: UROLOGY

## 2019-02-19 PROCEDURE — 96402 CHEMO HORMON ANTINEOPL SQ/IM: CPT | Performed by: UROLOGY

## 2019-02-19 PROCEDURE — 36415 COLL VENOUS BLD VENIPUNCTURE: CPT | Performed by: UROLOGY

## 2019-02-19 PROCEDURE — 84153 ASSAY OF PSA TOTAL: CPT | Performed by: UROLOGY

## 2019-02-19 ASSESSMENT — MIFFLIN-ST. JEOR: SCORE: 1700.09

## 2019-02-19 ASSESSMENT — PAIN SCALES - GENERAL: PAINLEVEL: NO PAIN (0)

## 2019-02-19 NOTE — NURSING NOTE
Chief Complaint   Patient presents with     Prostate Cancer     Patient here today for 6 month SD PSA and Lupron and Exam and UA     UA RESULTS:  Recent Labs   Lab Test 02/19/19  1014 02/05/19  0856   COLOR Yellow Yellow   APPEARANCE Clear Slightly Cloudy   URINEGLC Negative 100*   URINEBILI Negative Negative   URINEKETONE Trace* Negative   SG 1.020 1.020   UBLD Negative Moderate*   URINEPH 6.0 6.0   PROTEIN Negative 30*   UROBILINOGEN 0.2 0.2   NITRITE Negative Negative   LEUKEST Negative Moderate*   RBCU  --  O - 2   WBCU  --  >100*       Patient was given a 6month Lupron today    The following medication was given:     MEDICATION:  Lupron Depot 45 mg  ROUTE: IM  SITE: LUQ - Gluteus  DOSE: 45mg  LOT #: [id number]  : Melba  EXPIRATION DATE: 05/30/2021  NDC#: 0017-6820-38  Was there drug waste? No  Multi-dose vial: Constanza Roland CMA  February 19, 2019

## 2019-02-19 NOTE — LETTER
RE: Nixon More  [address] [place] [address] [address]  [place] [address]     Dear Colleague,    Thank you for referring your patient, Nixon More, to the [facility] at [facility]. Please see a copy of my visit note below.    Office Visit Note  [facility]  [phone]    UROLOGIC DIAGNOSES:   Prostate cancer and hematuria    CURRENT INTERVENTIONS:   Hormonal therapy, prior radiotherapy, negative hematuria workup in 2018    HISTORY:   Spencer returns to clinic today for prostate cancer follow-up. He continues to have no urinary symptoms or complaints.      PAST MEDICAL HISTORY:   Past Medical History:   Diagnosis Date     Actinic keratosis      Arthritis      CAD (coronary artery disease)     1/5/2011 lateral ischemia on stress echo, 12/2014 normal stress echo     Dyslipidemia      Emphysema of lung (H)      Essential hypertension, benign      Hernia, abdominal      Hypersomnia with sleep apnea, unspecified     on bipap, partially treated with residual apneas     Hypertrophy (benign) of prostate 5/01    Biopsy 5/01 negative for cancer; PSA 5     Lumbago      Mumps      Prostate cancer (H) 12/15/2006     Skin cancer, basal cell 1997     Spider veins        PAST SURGICAL HISTORY:   Past Surgical History:   Procedure Laterality Date     HERNIA REPAIR  child     Moh's procedure for basal cell carcinoma  01/2001     PROSTATE SURGERY       s/p lumbar laminectomy NOS  1988     VITRECTOMY PARS PLANA REMOVE PRERETINAL MEMBRANE   3/09       FAMILY HISTORY:   Family History   Problem Relation Age of Onset     Alzheimer Disease Mother      Hypertension Mother      Cardiovascular Father         D:86 complications fo CHF     Prostate Cancer Brother      Lung Cancer Brother 76     Prostate Cancer Brother        SOCIAL HISTORY:   Social History     Tobacco Use     Smoking status: Former Smoker     Packs/day: 1.00     Years: 30.00     Pack years:  "30.     Types: Cigarettes     Last attempt to quit: 1978     Years since quittin.1     Smokeless tobacco: Never Used   Substance Use Topics     Alcohol use: Yes     Alcohol/week: 1.0 oz     Types: 2 Standard drinks or equivalent per week     Comment: socially       Current Outpatient Medications   Medication     apixaban ANTICOAGULANT (ELIQUIS) 5 MG tablet     atenolol (TENORMIN) 100 MG tablet     atorvastatin (LIPITOR) 40 MG tablet     diltiazem ER (DILT-XR) 120 MG 24 hr capsule     EPINEPHrine (EPIPEN/ADRENACLICK/OR ANY BX GENERIC EQUIV) 0.3 MG/0.3ML injection 2-pack     FLUoxetine (PROZAC) 10 MG capsule     hydrochlorothiazide (HYDRODIURIL) 25 MG tablet     HYDROcodone-acetaminophen (NORCO) 5-325 MG tablet     ipratropium (ATROVENT) 0.06 % spray     losartan (COZAAR) 100 MG tablet     Multiple Minerals-Vitamins (PROSTEON PO)     Multiple Vitamins-Minerals (CENTRUM SILVER PO)     albuterol (PROAIR HFA) 108 (90 Base) MCG/ACT inhaler     leuprolide (ELIGARD) 45 MG injection     No current facility-administered medications for this visit.          PHYSICAL EXAM:    /60 (BP Location: Left arm, Patient Position: Sitting, Cuff Size: Adult Regular)   Pulse 63   Ht 1.727 m (5' 8\")   Wt 102.1 kg (225 lb)   SpO2 100%   BMI 34.21 kg/m       Constitutional: Well developed. Conversant and in no acute distress  Eyes: Anicteric sclera, conjunctiva clear, normal extraocular movements  ENT: Normocephalic and atraumatic,   Skin: Warm and dry. No rashes or lesions  Cardiac: No peripheral edema  Back/Flank: Not done  CNS/PNS: Normal musculature and movements, moves all extremities normally  Respiratory: Normal non-labored breathing  Abdomen: Soft nontender and nondistended  Peripheral Vascular: No peripheral edema  Mental Status/Psych: Alert and Oriented x 3. Normal mood and affect    Penis: Not done  Scrotal Skin: Not done  Testicles: Not done  Epididymis: Not done  Digital Rectal Exam:     Cystoscopy: Not " done    Imaging: None    Urinalysis: UA RESULTS:  Recent Labs   Lab Test 02/05/19  0856   COLOR Yellow   APPEARANCE Slightly Cloudy   URINEGLC 100*   URINEBILI Negative   URINEKETONE Negative   SG 1.020   UBLD Moderate*   URINEPH 6.0   PROTEIN 30*   UROBILINOGEN 0.2   NITRITE Negative   LEUKEST Moderate*   RBCU O - 2   WBCU >100*     PSA: unrdetectable    Post Void Residual:     Other labs: None today      IMPRESSION:  Doing well, PSA undetectable    PLAN:  He is doing well with an undetectable PSA. He has minimal side effects from the hormonal therapy. I recommended that he continue hormonal therapy and he agreed. He received a 6 month injection of Lupron today and I'll see him back in 6 months for another PSA and to likely continue hormonal therapy      Mark Pino M.D.

## 2019-02-19 NOTE — PROGRESS NOTES
Office Visit Note  [facility]  [phone]    UROLOGIC DIAGNOSES:   Prostate cancer and hematuria    CURRENT INTERVENTIONS:   Hormonal therapy, prior radiotherapy, negative hematuria workup in 2018    HISTORY:   Spencer returns to clinic today for prostate cancer follow-up. He continues to have no urinary symptoms or complaints.      PAST MEDICAL HISTORY:   Past Medical History:   Diagnosis Date     Actinic keratosis      Arthritis      CAD (coronary artery disease)     2011 lateral ischemia on stress echo, 2014 normal stress echo     Dyslipidemia      Emphysema of lung (H)      Essential hypertension, benign      Hernia, abdominal      Hypersomnia with sleep apnea, unspecified     on bipap, partially treated with residual apneas     Hypertrophy (benign) of prostate     Biopsy  negative for cancer; PSA 5     Lumbago      Mumps      Prostate cancer (H) 12/15/2006     Skin cancer, basal cell 1997     Spider veins        PAST SURGICAL HISTORY:   Past Surgical History:   Procedure Laterality Date     HERNIA REPAIR  child     Moh's procedure for basal cell carcinoma  2001     PROSTATE SURGERY       s/p lumbar laminectomy NOS  1988     VITRECTOMY PARS PLANA REMOVE PRERETINAL MEMBRANE   3/09       FAMILY HISTORY:   Family History   Problem Relation Age of Onset     Alzheimer Disease Mother      Hypertension Mother      Cardiovascular Father         D:86 complications fo CHF     Prostate Cancer Brother      Lung Cancer Brother 76     Prostate Cancer Brother        SOCIAL HISTORY:   Social History     Tobacco Use     Smoking status: Former Smoker     Packs/day: 1.00     Years: 30.00     Pack years: 30.00     Types: Cigarettes     Last attempt to quit: 1978     Years since quittin.1     Smokeless tobacco: Never Used   Substance Use Topics     Alcohol use: Yes     Alcohol/week: 1.0 oz     Types: 2 Standard drinks or equivalent per week     Comment: socially       Current Outpatient  "Medications   Medication     apixaban ANTICOAGULANT (ELIQUIS) 5 MG tablet     atenolol (TENORMIN) 100 MG tablet     atorvastatin (LIPITOR) 40 MG tablet     diltiazem ER (DILT-XR) 120 MG 24 hr capsule     EPINEPHrine (EPIPEN/ADRENACLICK/OR ANY BX GENERIC EQUIV) 0.3 MG/0.3ML injection 2-pack     FLUoxetine (PROZAC) 10 MG capsule     hydrochlorothiazide (HYDRODIURIL) 25 MG tablet     HYDROcodone-acetaminophen (NORCO) 5-325 MG tablet     ipratropium (ATROVENT) 0.06 % spray     losartan (COZAAR) 100 MG tablet     Multiple Minerals-Vitamins (PROSTEON PO)     Multiple Vitamins-Minerals (CENTRUM SILVER PO)     albuterol (PROAIR HFA) 108 (90 Base) MCG/ACT inhaler     leuprolide (ELIGARD) 45 MG injection     No current facility-administered medications for this visit.          PHYSICAL EXAM:    /60 (BP Location: Left arm, Patient Position: Sitting, Cuff Size: Adult Regular)   Pulse 63   Ht 1.727 m (5' 8\")   Wt 102.1 kg (225 lb)   SpO2 100%   BMI 34.21 kg/m      Constitutional: Well developed. Conversant and in no acute distress  Eyes: Anicteric sclera, conjunctiva clear, normal extraocular movements  ENT: Normocephalic and atraumatic,   Skin: Warm and dry. No rashes or lesions  Cardiac: No peripheral edema  Back/Flank: Not done  CNS/PNS: Normal musculature and movements, moves all extremities normally  Respiratory: Normal non-labored breathing  Abdomen: Soft nontender and nondistended  Peripheral Vascular: No peripheral edema  Mental Status/Psych: Alert and Oriented x 3. Normal mood and affect    Penis: Not done  Scrotal Skin: Not done  Testicles: Not done  Epididymis: Not done  Digital Rectal Exam:     Cystoscopy: Not done    Imaging: None    Urinalysis: UA RESULTS:  Recent Labs   Lab Test 02/05/19  0856   COLOR Yellow   APPEARANCE Slightly Cloudy   URINEGLC 100*   URINEBILI Negative   URINEKETONE Negative   SG 1.020   UBLD Moderate*   URINEPH 6.0   PROTEIN 30*   UROBILINOGEN 0.2   NITRITE Negative   LEUKEST " Moderate*   RBCU O - 2   WBCU >100*       PSA: unrdetectable    Post Void Residual:     Other labs: None today      IMPRESSION:  Doing well, PSA undetectable    PLAN:  He is doing well with an undetectable PSA. He has minimal side effects from the hormonal therapy. I recommended that he continue hormonal therapy and he agreed. He received a 6 month injection of Lupron today and I'll see him back in 6 months for another PSA and to likely continue hormonal therapy      Mark Pino M.D.

## 2019-03-11 ENCOUNTER — TELEPHONE (OUTPATIENT)
Dept: PEDIATRICS | Facility: CLINIC | Age: 84
End: 2019-03-11

## 2019-03-11 ENCOUNTER — VIRTUAL VISIT (OUTPATIENT)
Dept: PEDIATRICS | Facility: CLINIC | Age: 84
End: 2019-03-11
Payer: COMMERCIAL

## 2019-03-11 DIAGNOSIS — M79.644 PAIN OF RIGHT THUMB: Primary | ICD-10-CM

## 2019-03-11 PROCEDURE — G2012 BRIEF CHECK IN BY MD/QHP: HCPCS | Performed by: NURSE PRACTITIONER

## 2019-03-11 RX ORDER — ONDANSETRON 4 MG/1
4 TABLET, ORALLY DISINTEGRATING ORAL EVERY 8 HOURS PRN
Qty: 20 TABLET | Refills: 0 | Status: SHIPPED | OUTPATIENT
Start: 2019-03-11 | End: 2019-03-15

## 2019-03-11 RX ORDER — COLCHICINE 0.6 MG/1
TABLET ORAL
Qty: 30 TABLET | Refills: 0 | Status: SHIPPED | OUTPATIENT
Start: 2019-03-11 | End: 2019-07-19

## 2019-03-11 NOTE — TELEPHONE ENCOUNTER
Reason for call:  Patient reporting a symptom    Symptom or request: continued pain in rt thumb, now has nausea and was denied boarding on return flight home. Is currently in hotel and is miles from ER w/o a car. Wife is unsure what to do.    Duration (how long have symptoms been present): since approx 3am this morning    Have you been treated for this before? No    Additional comments: Please call back ASAP    Phone Number patient can be reached at:  Cell number on file:    Telephone Information:   Mobile [phone] Kavitha King Time:  ASAP    Can we leave a detailed message on this number:  YES    Call taken on 3/11/2019 at 4:52 PM by Katrin Avila

## 2019-03-11 NOTE — TELEPHONE ENCOUNTER
Spouse calling to notify the following:     - pt woke up around 3 am with R thumb pain  - R thumb is swollen & painful to touch  - denies fever, chills, redness, warm to touch, open laceration, bleeding/discharge, injury to thumb, in-grown nail or hx of gout  - they are currently in [place], flight at 2 pm today to come home  - offered an e-visit, they don't have computer to tablet to initiate the e-visit  - requesting a phone visit with a provider asap    After huddling with Estee, scheduled a phone visit with aVlentine today at 10 am.     Baylee, RN  Triage Nurse

## 2019-03-11 NOTE — PROGRESS NOTES
Subjective:   Nixon More is a 83 year old male who scheduled a phone visit to discuss Thumb pain.    CC:   - pt woke up around 3 am with R thumb pain  - R thumb is swollen & painful to touch  - denies fever, chills, redness, warm to touch, open laceration, bleeding/discharge, injury to thumb, in-grown nail or hx of gout  - they are currently in [place], flight at 2 pm today to come home  - requesting a phone visit with a provider asap    He notes his daughter is a NP and thought it was gout. He describes pain as sharp, and is at 4-6/10. He has never had gout before. Aggravating factors: moving, alleviating factors: elevating, icing. Pain started in the middle of the night. He does feel he has been eating gout trigger foods. No new medications recently. Hasn't tried anything to relieve pain yet.     Medical, surgical and social histories reviewed.  Current Outpatient Medications   Medication Sig Dispense Refill     albuterol (PROAIR HFA) 108 (90 Base) MCG/ACT inhaler Inhale 2 puffs into the lungs every 4 hours as needed for wheezing 1 Inhaler 0     apixaban ANTICOAGULANT (ELIQUIS) 5 MG tablet Take 1 tablet (5 mg) by mouth 2 times daily 60 tablet 5     atenolol (TENORMIN) 100 MG tablet Take 1.5 tablets (150 mg) by mouth daily 135 tablet 3     atorvastatin (LIPITOR) 40 MG tablet Take 1 tablet (40 mg) by mouth daily 90 tablet 3     diltiazem ER (DILT-XR) 120 MG 24 hr capsule Take 1 capsule (120 mg) by mouth daily 30 capsule 5     EPINEPHrine (EPIPEN/ADRENACLICK/OR ANY BX GENERIC EQUIV) 0.3 MG/0.3ML injection 2-pack Inject 0.3 mLs (0.3 mg) into the muscle as needed for anaphylaxis 0.6 mL 1     FLUoxetine (PROZAC) 10 MG capsule Take 1 capsule (10 mg) by mouth daily 90 capsule 3     hydrochlorothiazide (HYDRODIURIL) 25 MG tablet Take 0.5 tablets (12.5 mg) by mouth daily       HYDROcodone-acetaminophen (NORCO) 5-325 MG tablet Take 1 tablet by mouth every 4 hours as needed for pain 18 tablet 0     ipratropium (ATROVENT)  0.06 % spray Spray 2 sprays in nostril 4 times daily as needed for rhinitis 3 Box 3     leuprolide (ELIGARD) 45 MG injection Inject 45 mg Subcutaneous every 6 months. 1 each 12     losartan (COZAAR) 100 MG tablet TAKE 1 TABLET BY MOUTH ONCE DAILY 90 tablet 3     Multiple Minerals-Vitamins (PROSTEON PO) Take 2,000 Units by mouth With vitamin D       Multiple Vitamins-Minerals (CENTRUM SILVER PO)          Objective:   Phone visit, speech, thought content within normal limits. Affective component of speech within normal limits. Rate and tone within normal limits.    Assessment:  ASSESSMENT / PLAN:  (M07.719) Pain of right thumb  (primary encounter diagnosis)  Comment: we disucssed options for pain as he is flying home today. We reviewed his symptoms do sound like gout and he admits he has been eating foods that can be  Atrigger. He will hold his statin and losartan during taking colchicine.   Plan: colchicine (COLCYRS) 0.6 MG tablet, ondansetron        (ZOFRAN-ODT) 4 MG ODT tab              There are no Patient Instructions on file for this visit.        A total of 7 minutes was spent on the phone with the patient.

## 2019-03-11 NOTE — TELEPHONE ENCOUNTER
Wife calling back, patient took 2 Colchicine and 1 Tramadol 50 mg, pain isn't improved.     They are in a hotel in [place] right now without a car. They are leaving on a flight tomorrow at 2 pm.     Advised on Tylenol. Call back in the morning with an update.     Wife agrees with plan.

## 2019-03-15 ENCOUNTER — TELEPHONE (OUTPATIENT)
Dept: CARDIOLOGY | Facility: CLINIC | Age: 84
End: 2019-03-15

## 2019-03-15 ENCOUNTER — OFFICE VISIT (OUTPATIENT)
Dept: PEDIATRICS | Facility: CLINIC | Age: 84
End: 2019-03-15
Payer: COMMERCIAL

## 2019-03-15 VITALS
HEIGHT: 68 IN | SYSTOLIC BLOOD PRESSURE: 114 MMHG | HEART RATE: 74 BPM | OXYGEN SATURATION: 97 % | WEIGHT: 222.8 LBS | BODY MASS INDEX: 33.77 KG/M2 | DIASTOLIC BLOOD PRESSURE: 64 MMHG | TEMPERATURE: 98 F

## 2019-03-15 DIAGNOSIS — H26.9 CATARACT OF LEFT EYE, UNSPECIFIED CATARACT TYPE: ICD-10-CM

## 2019-03-15 DIAGNOSIS — G47.30 HYPERSOMNIA WITH SLEEP APNEA: ICD-10-CM

## 2019-03-15 DIAGNOSIS — I10 ESSENTIAL HYPERTENSION: ICD-10-CM

## 2019-03-15 DIAGNOSIS — G47.10 HYPERSOMNIA WITH SLEEP APNEA: ICD-10-CM

## 2019-03-15 DIAGNOSIS — I48.92 ATRIAL FIBRILLATION AND FLUTTER (H): ICD-10-CM

## 2019-03-15 DIAGNOSIS — M79.644 PAIN OF RIGHT THUMB: ICD-10-CM

## 2019-03-15 DIAGNOSIS — I20.9 ANGINA PECTORIS (H): ICD-10-CM

## 2019-03-15 DIAGNOSIS — I48.91 ATRIAL FIBRILLATION AND FLUTTER (H): ICD-10-CM
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
## 2019-03-15 DIAGNOSIS — Z01.818 PREOP GENERAL PHYSICAL EXAM: Primary | ICD-10-CM

## 2019-03-15 DIAGNOSIS — F32.0 MILD MAJOR DEPRESSION (H): ICD-10-CM

## 2019-03-15 DIAGNOSIS — R68.89 EXERCISE INTOLERANCE: ICD-10-CM

## 2019-03-15 PROBLEM — E66.01 MORBID OBESITY (H): Status: RESOLVED | Noted: 2018-10-23 | Resolved: 2019-03-15

## 2019-03-15 PROCEDURE — 99215 OFFICE O/P EST HI 40 MIN: CPT | Performed by: INTERNAL MEDICINE

## 2019-03-15 RX ORDER — ESCITALOPRAM OXALATE 5 MG/1
5 TABLET ORAL DAILY
Qty: 30 TABLET | Refills: 1 | Status: SHIPPED | OUTPATIENT
Start: 2019-03-15 | End: 2019-06-26

## 2019-03-15 ASSESSMENT — MIFFLIN-ST. JEOR: SCORE: 1690.11

## 2019-03-15 NOTE — PROGRESS NOTES
[facility] [place]  [address]  [address]  [place] [address] [phone]  Dept: [phone]    PRE-OP EVALUATION:  Today's date: 3/15/2019    Nixon More (: 1935) presents for pre-operative evaluation assessment as requested by Dr. Riedel.  He requires evaluation and anesthesia risk assessment prior to undergoing surgery/procedure for treatment of cataract Left .    Proposed Surgery/ Procedure: cataract surgery   Date of Surgery/ Procedure: 2019  Time of Surgery/ Procedure: unsure  Hospital/Surgical Facility: [facility]  Fax number for surgical facility: [phone]  Primary Physician: Natalya Lewis  Type of Anesthesia Anticipated: to be determined    Patient has a Health Care Directive or Living Will:  YES on file    1. NO - Do you have a history of heart attack, stroke, stent, bypass or surgery on an artery in the head, neck, heart or legs?  2. NO - Do you ever have any pain or discomfort in your chest?  3. NO - Do you have a history of  Heart Failure?  4. YES - ARE YOUR TROUBLED BY SHORTNESS OF BREATH WHEN WALKING ON THE LEVEL, UP A SLIGHT HILL OR AT NIGHT?  BP has been low, thinks that relates.  Has checked oxygen and it is mid-high 90's during this  5. NO - Do you currently have a cold, bronchitis or other respiratory infection?  6. NO - Do you have a cough, shortness of breath or wheezing?  7. NO - Do you sometimes get pains in the calves of your legs when you walk?  8. NO - Do you or anyone in your family have previous history of blood clots?  9. NO - Do you or does anyone in your family have a serious bleeding problem such as prolonged bleeding following surgeries or cuts?  10. NO - Have you ever had problems with anemia or been told to take iron pills?  11. NO - Have you had any abnormal blood loss such as black, tarry or bloody stools, or abnormal vaginal bleeding?  12. NO - Have you ever had a blood transfusion?  13. YES - HAVE YOU OR  ANY OF YOUR RELATIVES EVER HAD PROBLEMS WITH ANESTHESIA?   14. YES - DO YOU HAVE SLEEP APNEA, EXCESSIVE SNORING OR DAYTIME DROWSINESS?   15. NO - Do you have any prosthetic heart valves?  16. NO - Do you have prosthetic joints?  17. NO - Is there any chance that you may be pregnant?      HPI:     HPI related to upcoming procedure: Repair of second cataract      A-FIB - Patient has a longstanding history of chronic A-fib currently on rate control. Current treatment regimen includes Apixaban for stroke prevention and denies significant symptoms of lightheadedness, palpitations or dyspnea.                                                                                                                                                                             .  HYPERTENSION - Patient has longstanding history of HTN , currently denies any symptoms referable to elevated blood pressure. Specifically denies chest pain, palpitations, dyspnea, orthopnea, PND or peripheral edema. Blood pressure readings have been in normal range, low side of normal.  Just drop hydrochlorothiazide dose to 12.5mg. Current medication regimen is as listed below. Patient denies any side effects of medication.                                                                                                                                                                                          .  SLEEP PROBLEM - Patient has a longstanding history of known MAYITO.. On CPAP with control                                                                                                                                   .    MEDICAL HISTORY:     Patient Active Problem List    Diagnosis Date Noted     Malignant neoplasm of prostate 12/15/2006     Priority: High     S/p radiation therapy and has now recurred.  Seeing Dr. Brar, on [facility].       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     Priority: Medium     Mild, noted on colonoscopy for hematochezia       Bee sting-induced anaphylaxis 05/13/2011     Priority: Medium     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  on bipap, controlled  Problem list name updated by automated process. Provider to review       Other emphysema (H) 06/16/2005     Priority: Medium     Hypertrophy of prostate without urinary obstruction 09/23/2004     Priority: Medium     Health Care Home 01/29/2013     Priority: Low     Maranda Newsome RN-BSN, [facility]  [phone].  FPA / [facility]       DX V65.8 REPLACED WITH [id number] HEALTH CARE HOME (04/08/2013)       Advance Care Planning 08/23/2011     Priority: Low     Advance Care Planning 10/15/2015: Receipt of ACP document:  Received: Health Care Directive which was witnessed or notarized on 10-12-11.  Document previously scanned on 2-25-14.  Validation form completed and sent to be scanned.  Code Status reflects choices in most recent ACP document.  Confirmed/documented  designated decision maker(s).  Added by Patricia Hansen RN Advance Care Planning Liaison with [employer]  Patient states has Advance Directive and will bring in a copy to clinic. 8/23/2011          CARDIOVASCULAR SCREENING; LDL GOAL LESS THAN 100 02/10/2010     Priority: Low     Personal history of other malignant neoplasm of skin 08/26/2002     Priority: Low     Lumbago 08/26/2002     Priority: Low      Past Medical History:   Diagnosis Date     Actinic keratosis      Arthritis      CAD (coronary artery disease)     1/5/2011 lateral ischemia on stress echo, 12/2014 normal stress echo     Dyslipidemia      Emphysema of lung (H)      Essential hypertension, benign      Hernia, abdominal      Hypersomnia with sleep apnea, unspecified     on bipap, partially treated with residual apneas     Hypertrophy (benign) of prostate 5/01    Biopsy 5/01 negative for cancer; PSA 5     Lumbago      Mumps      Prostate cancer (H) 12/15/2006     Skin cancer, basal cell 1997     Spider veins      Past Surgical History:   Procedure Laterality Date     HERNIA REPAIR  child     Moh's procedure for basal cell carcinoma  01/2001     PROSTATE SURGERY       s/p lumbar laminectomy NOS  1988     VITRECTOMY PARS PLANA REMOVE PRERETINAL MEMBRANE   3/09     Current Outpatient Medications   Medication Sig Dispense Refill     apixaban ANTICOAGULANT (ELIQUIS) 5 MG tablet Take 1 tablet (5 mg) by mouth 2 times daily 60 tablet 5     atenolol (TENORMIN) 100 MG tablet Take 1.5 tablets (150 mg) by mouth daily 135 tablet 3     atorvastatin (LIPITOR) 40 MG tablet Take 1 tablet (40 mg) by mouth daily 90 tablet 3     diltiazem ER (DILT-XR) 120 MG 24 hr capsule Take 1 capsule (120 mg) by mouth daily 30 capsule 5     EPINEPHrine (EPIPEN/ADRENACLICK/OR ANY BX GENERIC EQUIV) 0.3 MG/0.3ML injection 2-pack Inject 0.3 mLs (0.3 mg) into the muscle as needed for anaphylaxis 0.6 mL 1     escitalopram (LEXAPRO) 5 MG tablet Take 1 tablet (5 mg) by mouth daily 30  tablet 1     ipratropium (ATROVENT) 0.06 % spray Spray 2 sprays in nostril 4 times daily as needed for rhinitis 3 Box 3     losartan (COZAAR) 100 MG tablet TAKE 1 TABLET BY MOUTH ONCE DAILY 90 tablet 3     Multiple Minerals-Vitamins (PROSTEON PO) Take 2,000 Units by mouth With vitamin D       Multiple Vitamins-Minerals (CENTRUM SILVER PO)        colchicine (COLCYRS) 0.6 MG tablet 0.6 mg 3 times daily on the first day of flare; maximum total dose: 1.8 mg on day 1. Days 2 and beyond: 0.6 mg once or twice daily until flare resolves (Patient not taking: Reported on 3/15/2019) 30 tablet 0     leuprolide (ELIGARD) 45 MG injection Inject 45 mg Subcutaneous every 6 months. 1 each 12     OTC products: None, except as noted above    Allergies   Allergen Reactions     Augmentin Rash     Type III hypersensitivity     Bactrim [Sulfamethoxazole W/Trimethoprim] Rash     Serum sickness, type III hypersensitivity       Bee Venom Itching     Lisinopril Cough     Naproxen Hives      Latex Allergy: NO    Social History     Tobacco Use     Smoking status: Former Smoker     Packs/day: 1.00     Years: 30.00     Pack years: 30.00     Types: Cigarettes     Last attempt to quit: 1978     Years since quittin.2     Smokeless tobacco: Never Used   Substance Use Topics     Alcohol use: Yes     Alcohol/week: 1.0 oz     Types: 2 Standard drinks or equivalent per week     Comment: socially     History   Drug Use No       REVIEW OF SYSTEMS:   Has not been feeling motivated.  Had side effect with higher dose fluoxetine but not sure it is working at 10mg.  Can't find energy to get out of house and try to exercise.      Hand still painful with swelling since ER visit 3/11 but getting better.    Constitutional, HEENT, cardiovascular, pulmonary, gi and gu systems are negative, except as otherwise noted.    EXAM:   /64 (BP Location: Right arm, Patient Position: Chair, Cuff Size: Adult Large)   Pulse 74   Temp 98  F (36.7  C) (Tympanic)    "Ht 1.727 m (5' 8\")   Wt 101.1 kg (222 lb 12.8 oz)   SpO2 97%   BMI 33.88 kg/m    GENERAL APPEARANCE: healthy, alert and no distress  HENT: ear canals and TM's normal and nose and mouth without ulcers or lesions  RESP: lungs clear to auscultation - no rales, rhonchi or wheezes  CV: regular rate and rhythm, normal S1 S2, no S3 or S4 and no murmur, click or rub   ABDOMEN: soft, nontender, no HSM or masses and bowel sounds normal  MS: rt thumb and thenar eminence swelling without erythema  SKIN: no suspicious lesions or rashes  NEURO:  mentation intact and speech normal    DIAGNOSTICS:   No labs or EKG required for low risk surgery (cataract, skin procedure, breast biopsy, etc)    Recent Labs   Lab Test 01/08/19  1214 12/29/18  1441  12/04/18  0715 12/03/18  1744 12/03/18  1313  08/18/11  1202   HGB  --  14.2  --   --  14.8 14.9   < >  --    PLT  --  179  --   --  181 200   < >  --    INR  --   --   --   --   --  1.03  --   --     140   < > 138  --  142   < >  --    POTASSIUM 4.1 3.9   < > 4.0  --  3.7   < >  --    CR 1.25 1.13   < > 1.06  --  1.04   < >  --    A1C  --   --   --  5.9*  --   --   --  5.8    < > = values in this interval not displayed.    Echo with normal EF 12/18    IMPRESSION:   Reason for surgery/procedure: cataract requiring repair  Diagnosis/reason for consult: evaluation for safety prior to surgery    The proposed surgical procedure is considered LOW risk.    REVISED CARDIAC RISK INDEX  The patient has the following serious cardiovascular risks for perioperative complications such as (MI, PE, VFib and 3  AV Block):  Coronary Artery Disease (MI, positive stress test, angina, Qs on EKG)  INTERPRETATION: 0 risks: Class I (very low risk - 0.4% complication rate)    The patient has the following additional risks for perioperative complications:  No identified additional risks      ICD-10-CM    1. Preop general physical exam Z01.818    2. Angina pectoris (H) I20.9    3. Atrial fibrillation and " flutter (H) I48.91     I48.92    4. Essential hypertension I10    5. Hypersomnia with sleep apnea G47.10     G47.30    6. Pain of right thumb M79.644 ORTHO  REFERRAL   7. Cataract of left eye, unspecified cataract type H26.9    8. Fall, initial encounter W19.XXXA    9. Exercise intolerance R68.89    10. Mild major depression (H) F32.0 GAIL PT, HAND, AND CHIROPRACTIC REFERRAL     escitalopram (LEXAPRO) 5 MG tablet       RECOMMENDATIONS:     -Low BP, continue with lower dose hydrochlorothiazide and potentially stop.  Await cardiology input about further changes.  Does not impact ability to have surgery.  Has follow-up with cardiology 3/25.    -Adjust anti-depressant per orders and follow-up to recheck in 4-6 wks    -Falls risk, discussed and ordered PT.    --Patient is to take all scheduled medications on the day of surgery EXCEPT for modifications listed below.    APPROVAL GIVEN to proceed with proposed procedure, without further diagnostic evaluation       Signed Electronically by: Natalya Lewis MD    40 min with pt and more than 50% of the time was spent in counseling and coordination of care of the above issues     Copy of this evaluation report is provided to requesting physician.    [facility] Preop Guidelines    Revised Cardiac Risk Index

## 2019-03-15 NOTE — PATIENT INSTRUCTIONS
Before Your Surgery      Call your surgeon if there is any change in your health. This includes signs of a cold or flu (such as a sore throat, runny nose, cough, rash or fever).    Do not smoke, drink alcohol or take over the counter medicine (unless your surgeon or primary care doctor tells you to) for the 24 hours before and after surgery.    If you take prescribed drugs: do not take losartan-hydrochlorothiazide the morning of surgery, do take everything else that morning    Eating and drinking prior to surgery: follow the instructions from your surgeon    Take a shower or bath the night before surgery. Use the soap your surgeon gave you to gently clean your skin. If you do not have soap from your surgeon, use your regular soap. Do not shave or scrub the surgery site.  Wear clean pajamas and have clean sheets on your bed.     Change the fluoxetine to escitalopram daily and let me know if worsening.  Otherwise follow-up in 4-6 weeks.      They will call you to schedule physical therapy  At your visit today, we discussed your risk for falls and preventive options.   At your visit today, we discussed your risk for falls and preventive options. I recommend you consider attending a Falls Prevention Program, like [facility] [employer] [facility], to reduce your risk of falls. You can find more information on [facility] and locations here: http://www.[place].org/FallsPrevention/[facility].aspx      Fall Prevention  Falls often occur due to slipping, tripping or losing your balance. Millions of people fall every year and injure themselves. Here are ways to reduce your risk of falling again.  Think about your fall, was there anything that caused your fall that can be fixed, removed, or replaced?  Make your home safe by keeping walkways clear of objects you may trip over, such as electric cords.  Use non-slip pads under rugs. Don't use area rugs or small throw rugs.  Use non-slip mats in bathtubs and showers.  Install  handrails and lights on staircases. The handrails should be on both sides of the stairs.  Don't walk in poorly lit areas.  Don't stand on chairs or wobbly ladders.  Use caution when reaching overhead or looking upward. This position can cause a loss of balance.  Be sure your shoes fit properly, have non-slip bottoms and are in good condition.   Wear shoes both inside and out. Don't go barefoot or wear slippers.  Be cautious when going up and down stairs, curbs, and when walking on uneven sidewalks.  If your balance is poor, consider using a cane or walker.  If your fall was related to alcohol use, stop or limit alcohol intake.   If your fall was related to use of sleeping medicines, talk to your healthcare provider about this. You may need to reduce your dosage at bedtime if you awaken during the night to go to the bathroom.    To reduce the need for nighttime bathroom trips:  Don't drink fluids for several hours before going to bed  Empty your bladder before going to bed  Men can keep a urinal at the bedside  Stay as active as you can. Balance, flexibility, strength, and endurance all come from exercise. They all play a role in preventing falls. Ask your healthcare provider which types of activity are right for you.  Get your vision checked on a regular basis.  If you have pets, know where they are before you stand up or walk so you don't trip over them.  Use night lights.  Go over all your medicines with a pharmacist or other healthcare provider to see if any of them could make you more likely to fall.  Date Last Reviewed: 4/1/2018 2000-2018 [employer]. [address] [place] [address], [place] [address]. All rights reserved. This information is not intended as a substitute for professional medical care. Always follow your healthcare professional's instructions.

## 2019-03-15 NOTE — TELEPHONE ENCOUNTER
Patient called reporting in the last two weeks he has been having episodes of lightheadedness.  Has not been monitoring his BP readings as he has been on vacation.  Was seen today for pre op physical and  BP reading was 114/64 and he was feeling fine.  Indicated he is in a funk and not sure if his depression is causing some of his symptoms.  Recommended now that patient is back home from vacation to take BP 1-2 times daily for the next few days and especially when he exhibits his lightheadedness and contact us on Monday with BP readings and symptoms.   Recommended to stay well hydrated and identify times of his episodes.  Offered earlier appt with Cherise Bennett.  Moved appt from 4/4 to 3/25.  Patient will call with update on 3/18.  BIANCA Gabriel

## 2019-03-18 NOTE — TELEPHONE ENCOUNTER
Patient called with BP readings:  3/14  2pm BP 92/57 HR 43  3/15  9am 92/70 HR 67  3/16  9am 102/62 HR 72  9pm 138/92 HR 72  3/17  11am /84 HR 64  2pm /52 HR 52--reports SOB and lightheaded after exertion  9pm /76 HR 73  3/18 9am 111/84 HR 63     Patient recently stopped hydrochlorothiazide on 3/15 as he was seen by PCP for per op physical.  Patient also not sure if his symptoms are related to depression as he has not been himself.  prozac was stopped on 3/15 and lexapro 5mg was initiated.   Instructed patient to continue to monitor BP readings espeically when having lightheadedness.  Will update Dr Lee regarding above.  BIANCA Gabriel

## 2019-03-18 NOTE — TELEPHONE ENCOUNTER
Recent events and BP measurements noted.  Agree with holding hydrochlorothiazide.  Is there a specific question for me?    DI

## 2019-03-19 NOTE — TELEPHONE ENCOUNTER
Spoke to patient regarding recommendation per Dr Lee.  He agreed with holding hydrochlorothiazide.  Continue to monitor BP and HR.  Bring numbers in at follow up appt with Cherise Bennett on 3/25.  Patient provided verbal understanding.  BIANCA Gabriel

## 2019-03-21 ENCOUNTER — TRANSFERRED RECORDS (OUTPATIENT)
Dept: HEALTH INFORMATION MANAGEMENT | Facility: CLINIC | Age: 84
End: 2019-03-21

## 2019-03-25 ENCOUNTER — OFFICE VISIT (OUTPATIENT)
Dept: CARDIOLOGY | Facility: CLINIC | Age: 84
End: 2019-03-25
Attending: INTERNAL MEDICINE
Payer: COMMERCIAL

## 2019-03-25 VITALS
HEIGHT: 66 IN | HEART RATE: 80 BPM | BODY MASS INDEX: 36.16 KG/M2 | WEIGHT: 225 LBS | SYSTOLIC BLOOD PRESSURE: 136 MMHG | DIASTOLIC BLOOD PRESSURE: 82 MMHG

## 2019-03-25 DIAGNOSIS — I77.810 AORTIC ROOT DILATATION (H): ICD-10-CM

## 2019-03-25 DIAGNOSIS — I48.19 PERSISTENT ATRIAL FIBRILLATION (H): Primary | ICD-10-CM

## 2019-03-25 PROCEDURE — 99214 OFFICE O/P EST MOD 30 MIN: CPT | Performed by: PHYSICIAN ASSISTANT

## 2019-03-25 PROCEDURE — 93000 ELECTROCARDIOGRAM COMPLETE: CPT | Performed by: PHYSICIAN ASSISTANT

## 2019-03-25 ASSESSMENT — MIFFLIN-ST. JEOR: SCORE: 1668.34

## 2019-03-25 NOTE — PATIENT INSTRUCTIONS
1. Reviewed BPs. Your cuff read about the same as our cuff, so I think it's verified!    2. Stay off of hydrochlorothiazide as BPs are under good control at home. Consider compression stockings for mild edema and limit salt in diet    3. EKG continued to show atrial fibrillation with controlled response on current medications    4. 24 hour Holter to check average HR and MRA to check aorta before Dr. Camp appt Summer 2019. My nurses are @ [phone]

## 2019-03-25 NOTE — PROGRESS NOTES
"HPI:   I had the pleasure of seeing Spencer when he came for follow up of atrial fibrillation.  He is an 83 year old who sees Dr. Lee and Dr. Camp for his history of:    1. Atrial Arrhythmias with atypical atrial flutter and atrial fibrillation first diagnosed 12/2018 in the setting of a normal ejection fraction.  A rate control/anticoagulation strategy was recommended for his CHADSVASc of 4 (hypertension, presumed coronary disease and age)  2.  On chronic anticoagulation with Eliquis 5 mg twice daily secondary to CHADSVASc of 4  3.  Hypertension, dyslipidemia  4.  Obstructive sleep apnea on BiPAP therapy  5.  Ascending aorta and aortic root dilatation (4.6 cm, 4.6 cm) noted on echocardiogram 12/2018  6.  Presumed coronary disease based on stress echocardiogram 1/2011.  Dr. Camp angioma opted to treat this medically in the absence of symptoms.  Repeat stress test 12/2014 was negative for ischemia    When Spencer was seen 1/2019 by Dr. Lee, he was doing well.  They reviewed his chronic dyspnea, and Dr. Lee noted that with severe biatrial enlargement, he was unlikely to be able to maintain sinus rhythm.  As his dyspnea was not new (though may have been slightly worse with the onset of arrhythmia), Dr. Lee recommended continued rate control/anticoagulation versus amiodarone/DC cardioversion.  Spencer agreed that he would like to proceed with just a rate control/anticoagulation strategy.    Since then, Spencer feels like he is \"stable.\"  He does note that his blood pressure has been low, which prompted Dr. Lewis to discontinue hydrochlorothiazide 25 mg daily 3/2019.  Blood pressures at home (on his cuff which was checked today and is accurate), have been 90s-110s, with the vast majority of these in the 100s.  He was quite surprised to see blood pressure is so elevated today, noting that he took his antihypertensives about an hour ago.    He denies any problems with change in his chronic lower extremity edema.  Denies " "orthopnea or PND.  He thinks his breathing is \"stable.\"  He has not had any problems with chest discomfort.  He has mild orthostasis, but he states that this did get better after discontinuation of the hydrochlorothiazide.    EKG today, which I overread, showed atrial fibrillation at 78 bpm.  EKG during hospitalization 12/3/18 showed atypical atrial flutter at 126 bpm   Echocardiogram done 12/2018 showed an EF of 55%.  He had mildly reduced right ventricular systolic function.  Left atrial size was moderate to lead to severely dilated with a left atrial volume index of 51.2 mL/m .  Left atrial size of 6.0 cm.  As above, his aortic root and ascending aorta were both enlarged at 4.6 cm    Stress echocardiogram 12/2014 was negative for stress-induced wall motion abnormalities.  PFTs done 2/2011 showed mild obstruction    Aortoiliac ultrasound 12/2014 showed evidence of plaquing in the aortoiliac arterial system.  Abdominal aorta was normal in size with a maximum dimension of 2.4 cm    Assessment & Plan:    1.  Persistent atrial arrhythmias    As above, he has had both atypical atrial flutter and atrial fibrillation, and a rate control/anticoagulation strategy has been recommended.  His dyspnea did seem a bit worse with the onset of arrhythmia, but overall, Dr. Lee felt maintenance of sinus rhythm would be quite difficult and as he was comfortable with his breathing status, no attempt at antiarrhythmic therapy/DC cardioversion was made.  Spencer continues to feel like this is a good strategy    He is not having any issues with bleeding on Eliquis 5 mg twice daily    Preserved ejection fraction    PLAN:    Continue Diltiazem 120 mg and atenolol 150 mg daily.  Holter monitor prior to his appointment with Dr. Camp this summer    Continue anticoagulation with Eliquis.  Last hemoglobin 12/2018 normal at 14.2 g/dL      2.  Ascending aortic aneurysm    Echo done 12/2018 while hospitalized showed ascending aorta and aortic root " of 4.6 cm    PLAN:    Continuing tight blood pressure control    Aortic MRA as requested 6/2019 prior to Dr. Camp's appointment    3.  Hypertension    Blood pressure was surprisingly high today, and we checked his cuff which was accurate    Blood pressures at home of been ranging in the 90s-110s, prompting reduction in his antihypertensives    He will continue to watch this carefully, and I explained that we would rather have his blood pressure on the low side given his aortic aneurysm.    PLAN:    Continue atenolol and diltiazem and losartan      Cherise Bennett PA-C, MSPAS      Orders Placed This Encounter   Procedures     MR Cardiac with Contrast     MRA Angiogram chest w & w/o contrast     Follow-Up with Cardiologist     EKG 12-lead complete w/read - Clinics (performed today)     Holter Monitor 24 hour Adult Pediatric     No orders of the defined types were placed in this encounter.    There are no discontinued medications.      Encounter Diagnoses   Name Primary?     Persistent atrial fibrillation (H) Yes     Aortic root dilatation (H)        CURRENT MEDICATIONS:  Current Outpatient Medications   Medication Sig Dispense Refill     apixaban ANTICOAGULANT (ELIQUIS) 5 MG tablet Take 1 tablet (5 mg) by mouth 2 times daily 60 tablet 5     atenolol (TENORMIN) 100 MG tablet Take 1.5 tablets (150 mg) by mouth daily 135 tablet 3     atorvastatin (LIPITOR) 40 MG tablet Take 1 tablet (40 mg) by mouth daily 90 tablet 3     colchicine (COLCYRS) 0.6 MG tablet 0.6 mg 3 times daily on the first day of flare; maximum total dose: 1.8 mg on day 1. Days 2 and beyond: 0.6 mg once or twice daily until flare resolves 30 tablet 0     diltiazem ER (DILT-XR) 120 MG 24 hr capsule Take 1 capsule (120 mg) by mouth daily 30 capsule 5     EPINEPHrine (EPIPEN/ADRENACLICK/OR ANY BX GENERIC EQUIV) 0.3 MG/0.3ML injection 2-pack Inject 0.3 mLs (0.3 mg) into the muscle as needed for anaphylaxis 0.6 mL 1     escitalopram (LEXAPRO) 5 MG tablet Take 1  tablet (5 mg) by mouth daily 30 tablet 1     ipratropium (ATROVENT) 0.06 % spray Spray 2 sprays in nostril 4 times daily as needed for rhinitis 3 Box 3     leuprolide (ELIGARD) 45 MG injection Inject 45 mg Subcutaneous every 6 months. 1 each 12     losartan (COZAAR) 100 MG tablet TAKE 1 TABLET BY MOUTH ONCE DAILY 90 tablet 3     Multiple Minerals-Vitamins (PROSTEON PO) Take 2,000 Units by mouth With vitamin D       Multiple Vitamins-Minerals (CENTRUM SILVER PO)          ALLERGIES     Allergies   Allergen Reactions     Augmentin Rash     Type III hypersensitivity     Bactrim [Sulfamethoxazole W/Trimethoprim] Rash     Serum sickness, type III hypersensitivity       Bee Venom Itching     Lisinopril Cough     Naproxen Hives       PAST MEDICAL HISTORY:  Past Medical History:   Diagnosis Date     Actinic keratosis      Arthritis      CAD (coronary artery disease)     1/5/2011 lateral ischemia on stress echo, 12/2014 normal stress echo     Dyslipidemia      Emphysema of lung (H)      Essential hypertension, benign      Hernia, abdominal      Hypersomnia with sleep apnea, unspecified     on bipap, partially treated with residual apneas     Hypertrophy (benign) of prostate 5/01    Biopsy 5/01 negative for cancer; PSA 5     Lumbago      Mumps      Prostate cancer (H) 12/15/2006     Skin cancer, basal cell 1997     Spider veins        PAST SURGICAL HISTORY:  Past Surgical History:   Procedure Laterality Date     HERNIA REPAIR  child     Moh's procedure for basal cell carcinoma  01/2001     PROSTATE SURGERY       s/p lumbar laminectomy NOS  1988     VITRECTOMY PARS PLANA REMOVE PRERETINAL MEMBRANE   3/09       FAMILY HISTORY:  Family History   Problem Relation Age of Onset     Alzheimer Disease Mother      Hypertension Mother      Cardiovascular Father         D:86 complications fo CHF     Prostate Cancer Brother      Lung Cancer Brother 76     Prostate Cancer Brother        SOCIAL HISTORY:  Social History     Socioeconomic  History     Marital status:      Spouse name: None     Number of children: None     Years of education: None     Highest education level: None   Occupational History     Occupation: retired   Social Needs     Financial resource strain: None     Food insecurity:     Worry: None     Inability: None     Transportation needs:     Medical: None     Non-medical: None   Tobacco Use     Smoking status: Former Smoker     Packs/day: 1.00     Years: 30.00     Pack years: 30.00     Types: Cigarettes     Last attempt to quit: 1978     Years since quittin.2     Smokeless tobacco: Never Used   Substance and Sexual Activity     Alcohol use: Yes     Alcohol/week: 1.0 oz     Types: 2 Standard drinks or equivalent per week     Comment: socially     Drug use: No     Sexual activity: Yes     Partners: Female   Lifestyle     Physical activity:     Days per week: None     Minutes per session: None     Stress: None   Relationships     Social connections:     Talks on phone: None     Gets together: None     Attends Bahai service: None     Active member of club or organization: None     Attends meetings of clubs or organizations: None     Relationship status: None     Intimate partner violence:     Fear of current or ex partner: None     Emotionally abused: None     Physically abused: None     Forced sexual activity: None   Other Topics Concern      Service Not Asked     Blood Transfusions Not Asked     Caffeine Concern No     Comment: 0-2 cans of soda per day.     Occupational Exposure Not Asked     Hobby Hazards Not Asked     Sleep Concern Not Asked     Stress Concern Not Asked     Weight Concern Not Asked     Special Diet Not Asked     Back Care Not Asked     Exercise No     Bike Helmet Not Asked     Seat Belt Yes     Self-Exams Not Asked     Parent/sibling w/ CABG, MI or angioplasty before 65F 55M? No   Social History Narrative     None       Review of Systems:  Skin:  Negative     Eyes:  Positive for  "glasses  ENT:  Negative    Respiratory:  Positive for dyspnea on exertion  Cardiovascular:  Negative for;palpitations;chest pain;lightheadedness;dizziness Positive for;edema;exercise intolerance  Gastroenterology: Negative for melena;hematochezia  Genitourinary:  not assessed    Musculoskeletal:  Positive for joint stiffness  Neurologic:  Negative    Psychiatric:  Negative    Heme/Lymph/Imm:  Negative    Endocrine:  Positive for diabetes    Physical Exam:  Vitals: /82   Pulse 80   Ht 1.676 m (5' 6\")   Wt 102.1 kg (225 lb)   BMI 36.32 kg/m      Constitutional:  cooperative, alert and oriented, well developed, well nourished, in no acute distress        Skin:  warm and dry to the touch        Head:  normocephalic        Eyes:  pupils equal and round;conjunctivae and lids unremarkable;sclera white        ENT:  no pallor or cyanosis, dentition good        Neck:  no carotid bruit;JVP normal   No HJR    Chest:  clear to auscultation;normal symmetry        Cardiac:   irregularly irregular rhythm                Abdomen:  abdomen soft obese      Vascular:   pulses below the femoral arteries are diminished                                    Extremities and Back:  no deformities, clubbing, cyanosis, erythema observed        Neurological:  no gross motor deficits          Recent Lab Results:  LIPID RESULTS:  Lab Results   Component Value Date    CHOL 124 10/17/2018    HDL 41 10/17/2018    LDL 60 10/17/2018    TRIG 117 10/17/2018    CHOLHDLRATIO 2.8 10/15/2015       LIVER ENZYME RESULTS:  Lab Results   Component Value Date    AST 29 08/18/2011    ALT 29 08/18/2011       CBC RESULTS:  Lab Results   Component Value Date    WBC 4.6 12/29/2018    RBC 4.67 12/29/2018    HGB 14.2 12/29/2018    HCT 44.1 12/29/2018    MCV 94 12/29/2018    MCH 30.4 12/29/2018    MCHC 32.2 12/29/2018    RDW 13.2 12/29/2018     12/29/2018       BMP RESULTS:  Lab Results   Component Value Date     01/08/2019    POTASSIUM 4.1 " 01/08/2019    CHLORIDE 103 01/08/2019    CO2 31 (H) 01/08/2019    ANIONGAP 7.1 01/08/2019     (H) 01/08/2019    BUN 22 01/08/2019    CR 1.25 01/08/2019    GFRESTIMATED 55 (L) 01/08/2019    GFRESTBLACK 67 01/08/2019    MARLENE 9.9 01/08/2019        A1C RESULTS:  Lab Results   Component Value Date    A1C 5.9 (H) 12/04/2018       INR RESULTS:  Lab Results   Component Value Date    INR 1.03 12/03/2018    INR 1.07 11/27/2007

## 2019-03-25 NOTE — LETTER
"3/25/2019    Natalya Lewis MD  [address] [place] [address]    RE: Nixon More       Dear Colleague,    I had the pleasure of seeing Nixon RILEY Argenis in the [facility].    HPI:   I had the pleasure of seeing Spencer when he came for follow up of atrial fibrillation.  He is an 83 year old who sees Dr. Lee and Dr. Camp for his history of:    1. Atrial Arrhythmias with atypical atrial flutter and atrial fibrillation first diagnosed 12/2018 in the setting of a normal ejection fraction.  A rate control/anticoagulation strategy was recommended for his CHADSVASc of 4 (hypertension, presumed coronary disease and age)  2.  On chronic anticoagulation with Eliquis 5 mg twice daily secondary to CHADSVASc of 4  3.  Hypertension, dyslipidemia  4.  Obstructive sleep apnea on BiPAP therapy  5.  Ascending aorta and aortic root dilatation (4.6 cm, 4.6 cm) noted on echocardiogram 12/2018  6.  Presumed coronary disease based on stress echocardiogram 1/2011.  Dr. Camp angioma opted to treat this medically in the absence of symptoms.  Repeat stress test 12/2014 was negative for ischemia    When Spencer was seen 1/2019 by Dr. Lee, he was doing well.  They reviewed his chronic dyspnea, and Dr. Lee noted that with severe biatrial enlargement, he was unlikely to be able to maintain sinus rhythm.  As his dyspnea was not new (though may have been slightly worse with the onset of arrhythmia), Dr. Lee recommended continued rate control/anticoagulation versus amiodarone/DC cardioversion.  Spencer agreed that he would like to proceed with just a rate control/anticoagulation strategy.    Since then, Spencer feels like he is \"stable.\"  He does note that his blood pressure has been low, which prompted Dr. Lewis to discontinue hydrochlorothiazide 25 mg daily 3/2019.  Blood pressures at home (on his cuff which was checked today and is accurate), have been 90s-110s, with the vast majority of these in the " "100s.  He was quite surprised to see blood pressure is so elevated today, noting that he took his antihypertensives about an hour ago.    He denies any problems with change in his chronic lower extremity edema.  Denies orthopnea or PND.  He thinks his breathing is \"stable.\"  He has not had any problems with chest discomfort.  He has mild orthostasis, but he states that this did get better after discontinuation of the hydrochlorothiazide.    EKG today, which I overread, showed atrial fibrillation at 78 bpm.  EKG during hospitalization 12/3/18 showed atypical atrial flutter at 126 bpm   Echocardiogram done 12/2018 showed an EF of 55%.  He had mildly reduced right ventricular systolic function.  Left atrial size was moderate to lead to severely dilated with a left atrial volume index of 51.2 mL/m .  Left atrial size of 6.0 cm.  As above, his aortic root and ascending aorta were both enlarged at 4.6 cm    Stress echocardiogram 12/2014 was negative for stress-induced wall motion abnormalities.  PFTs done 2/2011 showed mild obstruction    Aortoiliac ultrasound 12/2014 showed evidence of plaquing in the aortoiliac arterial system.  Abdominal aorta was normal in size with a maximum dimension of 2.4 cm    Assessment & Plan:    1.  Persistent atrial arrhythmias    As above, he has had both atypical atrial flutter and atrial fibrillation, and a rate control/anticoagulation strategy has been recommended.  His dyspnea did seem a bit worse with the onset of arrhythmia, but overall, Dr. Lee felt maintenance of sinus rhythm would be quite difficult and as he was comfortable with his breathing status, no attempt at antiarrhythmic therapy/DC cardioversion was made.  Spencer continues to feel like this is a good strategy    He is not having any issues with bleeding on Eliquis 5 mg twice daily    Preserved ejection fraction    PLAN:    Continue Diltiazem 120 mg and atenolol 150 mg daily.  Holter monitor prior to his appointment with Dr." Conrado this summer    Continue anticoagulation with Eliquis.  Last hemoglobin 12/2018 normal at 14.2 g/dL      2.  Ascending aortic aneurysm    Echo done 12/2018 while hospitalized showed ascending aorta and aortic root of 4.6 cm    PLAN:    Continuing tight blood pressure control    Aortic MRA as requested 6/2019 prior to Dr. Camp's appointment    3.  Hypertension    Blood pressure was surprisingly high today, and we checked his cuff which was accurate    Blood pressures at home of been ranging in the 90s-110s, prompting reduction in his antihypertensives    He will continue to watch this carefully, and I explained that we would rather have his blood pressure on the low side given his aortic aneurysm.    PLAN:    Continue atenolol and diltiazem and losartan      Cherise Bennett PA-C, MSPAS      Orders Placed This Encounter   Procedures     MR Cardiac with Contrast     MRA Angiogram chest w & w/o contrast     Follow-Up with Cardiologist     EKG 12-lead complete w/read - Clinics (performed today)     Holter Monitor 24 hour Adult Pediatric     No orders of the defined types were placed in this encounter.    There are no discontinued medications.      Encounter Diagnoses   Name Primary?     Persistent atrial fibrillation (H) Yes     Aortic root dilatation (H)        CURRENT MEDICATIONS:  Current Outpatient Medications   Medication Sig Dispense Refill     apixaban ANTICOAGULANT (ELIQUIS) 5 MG tablet Take 1 tablet (5 mg) by mouth 2 times daily 60 tablet 5     atenolol (TENORMIN) 100 MG tablet Take 1.5 tablets (150 mg) by mouth daily 135 tablet 3     atorvastatin (LIPITOR) 40 MG tablet Take 1 tablet (40 mg) by mouth daily 90 tablet 3     colchicine (COLCYRS) 0.6 MG tablet 0.6 mg 3 times daily on the first day of flare; maximum total dose: 1.8 mg on day 1. Days 2 and beyond: 0.6 mg once or twice daily until flare resolves 30 tablet 0     diltiazem ER (DILT-XR) 120 MG 24 hr capsule Take 1 capsule (120 mg) by mouth daily 30  capsule 5     EPINEPHrine (EPIPEN/ADRENACLICK/OR ANY BX GENERIC EQUIV) 0.3 MG/0.3ML injection 2-pack Inject 0.3 mLs (0.3 mg) into the muscle as needed for anaphylaxis 0.6 mL 1     escitalopram (LEXAPRO) 5 MG tablet Take 1 tablet (5 mg) by mouth daily 30 tablet 1     ipratropium (ATROVENT) 0.06 % spray Spray 2 sprays in nostril 4 times daily as needed for rhinitis 3 Box 3     leuprolide (ELIGARD) 45 MG injection Inject 45 mg Subcutaneous every 6 months. 1 each 12     losartan (COZAAR) 100 MG tablet TAKE 1 TABLET BY MOUTH ONCE DAILY 90 tablet 3     Multiple Minerals-Vitamins (PROSTEON PO) Take 2,000 Units by mouth With vitamin D       Multiple Vitamins-Minerals (CENTRUM SILVER PO)          ALLERGIES     Allergies   Allergen Reactions     Augmentin Rash     Type III hypersensitivity     Bactrim [Sulfamethoxazole W/Trimethoprim] Rash     Serum sickness, type III hypersensitivity       Bee Venom Itching     Lisinopril Cough     Naproxen Hives       PAST MEDICAL HISTORY:  Past Medical History:   Diagnosis Date     Actinic keratosis      Arthritis      CAD (coronary artery disease)     1/5/2011 lateral ischemia on stress echo, 12/2014 normal stress echo     Dyslipidemia      Emphysema of lung (H)      Essential hypertension, benign      Hernia, abdominal      Hypersomnia with sleep apnea, unspecified     on bipap, partially treated with residual apneas     Hypertrophy (benign) of prostate 5/01    Biopsy 5/01 negative for cancer; PSA 5     Lumbago      Mumps      Prostate cancer (H) 12/15/2006     Skin cancer, basal cell 1997     Spider veins        PAST SURGICAL HISTORY:  Past Surgical History:   Procedure Laterality Date     HERNIA REPAIR  child     Moh's procedure for basal cell carcinoma  01/2001     PROSTATE SURGERY       s/p lumbar laminectomy NOS  1988     VITRECTOMY PARS PLANA REMOVE PRERETINAL MEMBRANE   3/09       FAMILY HISTORY:  Family History   Problem Relation Age of Onset     Alzheimer Disease Mother       Hypertension Mother      Cardiovascular Father         D:86 complications fo CHF     Prostate Cancer Brother      Lung Cancer Brother 76     Prostate Cancer Brother        SOCIAL HISTORY:  Social History     Socioeconomic History     Marital status:      Spouse name: None     Number of children: None     Years of education: None     Highest education level: None   Occupational History     Occupation: retired   Social Needs     Financial resource strain: None     Food insecurity:     Worry: None     Inability: None     Transportation needs:     Medical: None     Non-medical: None   Tobacco Use     Smoking status: Former Smoker     Packs/day: 1.00     Years: 30.00     Pack years: 30.00     Types: Cigarettes     Last attempt to quit: 1978     Years since quittin.2     Smokeless tobacco: Never Used   Substance and Sexual Activity     Alcohol use: Yes     Alcohol/week: 1.0 oz     Types: 2 Standard drinks or equivalent per week     Comment: socially     Drug use: No     Sexual activity: Yes     Partners: Female   Lifestyle     Physical activity:     Days per week: None     Minutes per session: None     Stress: None   Relationships     Social connections:     Talks on phone: None     Gets together: None     Attends Muslim service: None     Active member of club or organization: None     Attends meetings of clubs or organizations: None     Relationship status: None     Intimate partner violence:     Fear of current or ex partner: None     Emotionally abused: None     Physically abused: None     Forced sexual activity: None   Other Topics Concern      Service Not Asked     Blood Transfusions Not Asked     Caffeine Concern No     Comment: 0-2 cans of soda per day.     Occupational Exposure Not Asked     Hobby Hazards Not Asked     Sleep Concern Not Asked     Stress Concern Not Asked     Weight Concern Not Asked     Special Diet Not Asked     Back Care Not Asked     Exercise No     Bike Helmet Not  "Asked     Seat Belt Yes     Self-Exams Not Asked     Parent/sibling w/ CABG, MI or angioplasty before 65F 55M? No   Social History Narrative     None       Review of Systems:  Skin:  Negative     Eyes:  Positive for glasses  ENT:  Negative    Respiratory:  Positive for dyspnea on exertion  Cardiovascular:  Negative for;palpitations;chest pain;lightheadedness;dizziness Positive for;edema;exercise intolerance  Gastroenterology: Negative for melena;hematochezia  Genitourinary:  not assessed    Musculoskeletal:  Positive for joint stiffness  Neurologic:  Negative    Psychiatric:  Negative    Heme/Lymph/Imm:  Negative    Endocrine:  Positive for diabetes    Physical Exam:  Vitals: /82   Pulse 80   Ht 1.676 m (5' 6\")   Wt 102.1 kg (225 lb)   BMI 36.32 kg/m       Constitutional:  cooperative, alert and oriented, well developed, well nourished, in no acute distress        Skin:  warm and dry to the touch        Head:  normocephalic        Eyes:  pupils equal and round;conjunctivae and lids unremarkable;sclera white        ENT:  no pallor or cyanosis, dentition good        Neck:  no carotid bruit;JVP normal   No HJR    Chest:  clear to auscultation;normal symmetry        Cardiac:   irregularly irregular rhythm                Abdomen:  abdomen soft obese      Vascular:   pulses below the femoral arteries are diminished                                    Extremities and Back:  no deformities, clubbing, cyanosis, erythema observed        Neurological:  no gross motor deficits          Recent Lab Results:  LIPID RESULTS:  Lab Results   Component Value Date    CHOL 124 10/17/2018    HDL 41 10/17/2018    LDL 60 10/17/2018    TRIG 117 10/17/2018    CHOLHDLRATIO 2.8 10/15/2015       LIVER ENZYME RESULTS:  Lab Results   Component Value Date    AST 29 08/18/2011    ALT 29 08/18/2011       CBC RESULTS:  Lab Results   Component Value Date    WBC 4.6 12/29/2018    RBC 4.67 12/29/2018    HGB 14.2 12/29/2018    HCT 44.1 " 12/29/2018    MCV 94 12/29/2018    MCH 30.4 12/29/2018    MCHC 32.2 12/29/2018    RDW 13.2 12/29/2018     12/29/2018       BMP RESULTS:  Lab Results   Component Value Date     01/08/2019    POTASSIUM 4.1 01/08/2019    CHLORIDE 103 01/08/2019    CO2 31 (H) 01/08/2019    ANIONGAP 7.1 01/08/2019     (H) 01/08/2019    BUN 22 01/08/2019    CR 1.25 01/08/2019    GFRESTIMATED 55 (L) 01/08/2019    GFRESTBLACK 67 01/08/2019    MARLENE 9.9 01/08/2019        A1C RESULTS:  Lab Results   Component Value Date    A1C 5.9 (H) 12/04/2018       INR RESULTS:  Lab Results   Component Value Date    INR 1.03 12/03/2018    INR 1.07 11/27/2007       Thank you for allowing me to participate in the care of your patient.    Sincerely,     Nimisha Bennett PA-C     [facility]

## 2019-03-25 NOTE — LETTER
"3/25/2019    Natalya Lewis MD  [address] [place] [address]    RE: Nixon More       Dear Colleague,    I had the pleasure of seeing Nixon RILEY Argenis in the [facility].    HPI:   I had the pleasure of seeing Spencer when he came for follow up of atrial fibrillation.  He is an 83 year old who sees Dr. Lee and Dr. Camp for his history of:    1. Atrial Arrhythmias with atypical atrial flutter and atrial fibrillation first diagnosed 12/2018 in the setting of a normal ejection fraction.  A rate control/anticoagulation strategy was recommended for his CHADSVASc of 4 (hypertension, presumed coronary disease and age)  2.  On chronic anticoagulation with Eliquis 5 mg twice daily secondary to CHADSVASc of 4  3.  Hypertension, dyslipidemia  4.  Obstructive sleep apnea on BiPAP therapy  5.  Ascending aorta and aortic root dilatation (4.6 cm, 4.6 cm) noted on echocardiogram 12/2018  6.  Presumed coronary disease based on stress echocardiogram 1/2011.  Dr. Camp angioma opted to treat this medically in the absence of symptoms.  Repeat stress test 12/2014 was negative for ischemia    When Spencer was seen 1/2019 by Dr. Lee, he was doing well.  They reviewed his chronic dyspnea, and Dr. Lee noted that with severe biatrial enlargement, he was unlikely to be able to maintain sinus rhythm.  As his dyspnea was not new (though may have been slightly worse with the onset of arrhythmia), Dr. Lee recommended continued rate control/anticoagulation versus amiodarone/DC cardioversion.  Spencer agreed that he would like to proceed with just a rate control/anticoagulation strategy.    Since then, Spencer feels like he is \"stable.\"  He does note that his blood pressure has been low, which prompted Dr. Lewis to discontinue hydrochlorothiazide 25 mg daily 3/2019.  Blood pressures at home (on his cuff which was checked today and is accurate), have been 90s-110s, with the vast majority of these in the " "100s.  He was quite surprised to see blood pressure is so elevated today, noting that he took his antihypertensives about an hour ago.    He denies any problems with change in his chronic lower extremity edema.  Denies orthopnea or PND.  He thinks his breathing is \"stable.\"  He has not had any problems with chest discomfort.  He has mild orthostasis, but he states that this did get better after discontinuation of the hydrochlorothiazide.    EKG today, which I overread, showed atrial fibrillation at 78 bpm.  EKG during hospitalization 12/3/18 showed atypical atrial flutter at 126 bpm   Echocardiogram done 12/2018 showed an EF of 55%.  He had mildly reduced right ventricular systolic function.  Left atrial size was moderate to lead to severely dilated with a left atrial volume index of 51.2 mL/m .  Left atrial size of 6.0 cm.  As above, his aortic root and ascending aorta were both enlarged at 4.6 cm    Stress echocardiogram 12/2014 was negative for stress-induced wall motion abnormalities.  PFTs done 2/2011 showed mild obstruction    Aortoiliac ultrasound 12/2014 showed evidence of plaquing in the aortoiliac arterial system.  Abdominal aorta was normal in size with a maximum dimension of 2.4 cm    Assessment & Plan:    1.  Persistent atrial arrhythmias    As above, he has had both atypical atrial flutter and atrial fibrillation, and a rate control/anticoagulation strategy has been recommended.  His dyspnea did seem a bit worse with the onset of arrhythmia, but overall, Dr. Lee felt maintenance of sinus rhythm would be quite difficult and as he was comfortable with his breathing status, no attempt at antiarrhythmic therapy/DC cardioversion was made.  Spencer continues to feel like this is a good strategy    He is not having any issues with bleeding on Eliquis 5 mg twice daily    Preserved ejection fraction    PLAN:    Continue Diltiazem 120 mg and atenolol 150 mg daily.  Holter monitor prior to his appointment with Dr." Conrado this summer    Continue anticoagulation with Eliquis.  Last hemoglobin 12/2018 normal at 14.2 g/dL      2.  Ascending aortic aneurysm    Echo done 12/2018 while hospitalized showed ascending aorta and aortic root of 4.6 cm    PLAN:    Continuing tight blood pressure control    Aortic MRA as requested 6/2019 prior to Dr. Camp's appointment    3.  Hypertension    Blood pressure was surprisingly high today, and we checked his cuff which was accurate    Blood pressures at home of been ranging in the 90s-110s, prompting reduction in his antihypertensives    He will continue to watch this carefully, and I explained that we would rather have his blood pressure on the low side given his aortic aneurysm.    PLAN:    Continue atenolol and diltiazem and losartan      Cherise Bennett PA-C, MSPAS      Orders Placed This Encounter   Procedures     MR Cardiac with Contrast     MRA Angiogram chest w & w/o contrast     Follow-Up with Cardiologist     EKG 12-lead complete w/read - Clinics (performed today)     Holter Monitor 24 hour Adult Pediatric     No orders of the defined types were placed in this encounter.    There are no discontinued medications.      Encounter Diagnoses   Name Primary?     Persistent atrial fibrillation (H) Yes     Aortic root dilatation (H)        CURRENT MEDICATIONS:  Current Outpatient Medications   Medication Sig Dispense Refill     apixaban ANTICOAGULANT (ELIQUIS) 5 MG tablet Take 1 tablet (5 mg) by mouth 2 times daily 60 tablet 5     atenolol (TENORMIN) 100 MG tablet Take 1.5 tablets (150 mg) by mouth daily 135 tablet 3     atorvastatin (LIPITOR) 40 MG tablet Take 1 tablet (40 mg) by mouth daily 90 tablet 3     colchicine (COLCYRS) 0.6 MG tablet 0.6 mg 3 times daily on the first day of flare; maximum total dose: 1.8 mg on day 1. Days 2 and beyond: 0.6 mg once or twice daily until flare resolves 30 tablet 0     diltiazem ER (DILT-XR) 120 MG 24 hr capsule Take 1 capsule (120 mg) by mouth daily 30  capsule 5     EPINEPHrine (EPIPEN/ADRENACLICK/OR ANY BX GENERIC EQUIV) 0.3 MG/0.3ML injection 2-pack Inject 0.3 mLs (0.3 mg) into the muscle as needed for anaphylaxis 0.6 mL 1     escitalopram (LEXAPRO) 5 MG tablet Take 1 tablet (5 mg) by mouth daily 30 tablet 1     ipratropium (ATROVENT) 0.06 % spray Spray 2 sprays in nostril 4 times daily as needed for rhinitis 3 Box 3     leuprolide (ELIGARD) 45 MG injection Inject 45 mg Subcutaneous every 6 months. 1 each 12     losartan (COZAAR) 100 MG tablet TAKE 1 TABLET BY MOUTH ONCE DAILY 90 tablet 3     Multiple Minerals-Vitamins (PROSTEON PO) Take 2,000 Units by mouth With vitamin D       Multiple Vitamins-Minerals (CENTRUM SILVER PO)          ALLERGIES     Allergies   Allergen Reactions     Augmentin Rash     Type III hypersensitivity     Bactrim [Sulfamethoxazole W/Trimethoprim] Rash     Serum sickness, type III hypersensitivity       Bee Venom Itching     Lisinopril Cough     Naproxen Hives       PAST MEDICAL HISTORY:  Past Medical History:   Diagnosis Date     Actinic keratosis      Arthritis      CAD (coronary artery disease)     1/5/2011 lateral ischemia on stress echo, 12/2014 normal stress echo     Dyslipidemia      Emphysema of lung (H)      Essential hypertension, benign      Hernia, abdominal      Hypersomnia with sleep apnea, unspecified     on bipap, partially treated with residual apneas     Hypertrophy (benign) of prostate 5/01    Biopsy 5/01 negative for cancer; PSA 5     Lumbago      Mumps      Prostate cancer (H) 12/15/2006     Skin cancer, basal cell 1997     Spider veins        PAST SURGICAL HISTORY:  Past Surgical History:   Procedure Laterality Date     HERNIA REPAIR  child     Moh's procedure for basal cell carcinoma  01/2001     PROSTATE SURGERY       s/p lumbar laminectomy NOS  1988     VITRECTOMY PARS PLANA REMOVE PRERETINAL MEMBRANE   3/09       FAMILY HISTORY:  Family History   Problem Relation Age of Onset     Alzheimer Disease Mother       Hypertension Mother      Cardiovascular Father         D:86 complications fo CHF     Prostate Cancer Brother      Lung Cancer Brother 76     Prostate Cancer Brother        SOCIAL HISTORY:  Social History     Socioeconomic History     Marital status:      Spouse name: None     Number of children: None     Years of education: None     Highest education level: None   Occupational History     Occupation: retired   Social Needs     Financial resource strain: None     Food insecurity:     Worry: None     Inability: None     Transportation needs:     Medical: None     Non-medical: None   Tobacco Use     Smoking status: Former Smoker     Packs/day: 1.00     Years: 30.00     Pack years: 30.00     Types: Cigarettes     Last attempt to quit: 1978     Years since quittin.2     Smokeless tobacco: Never Used   Substance and Sexual Activity     Alcohol use: Yes     Alcohol/week: 1.0 oz     Types: 2 Standard drinks or equivalent per week     Comment: socially     Drug use: No     Sexual activity: Yes     Partners: Female   Lifestyle     Physical activity:     Days per week: None     Minutes per session: None     Stress: None   Relationships     Social connections:     Talks on phone: None     Gets together: None     Attends Holiness service: None     Active member of club or organization: None     Attends meetings of clubs or organizations: None     Relationship status: None     Intimate partner violence:     Fear of current or ex partner: None     Emotionally abused: None     Physically abused: None     Forced sexual activity: None   Other Topics Concern      Service Not Asked     Blood Transfusions Not Asked     Caffeine Concern No     Comment: 0-2 cans of soda per day.     Occupational Exposure Not Asked     Hobby Hazards Not Asked     Sleep Concern Not Asked     Stress Concern Not Asked     Weight Concern Not Asked     Special Diet Not Asked     Back Care Not Asked     Exercise No     Bike Helmet Not  "Asked     Seat Belt Yes     Self-Exams Not Asked     Parent/sibling w/ CABG, MI or angioplasty before 65F 55M? No   Social History Narrative     None       Review of Systems:  Skin:  Negative     Eyes:  Positive for glasses  ENT:  Negative    Respiratory:  Positive for dyspnea on exertion  Cardiovascular:  Negative for;palpitations;chest pain;lightheadedness;dizziness Positive for;edema;exercise intolerance  Gastroenterology: Negative for melena;hematochezia  Genitourinary:  not assessed    Musculoskeletal:  Positive for joint stiffness  Neurologic:  Negative    Psychiatric:  Negative    Heme/Lymph/Imm:  Negative    Endocrine:  Positive for diabetes    Physical Exam:  Vitals: /82   Pulse 80   Ht 1.676 m (5' 6\")   Wt 102.1 kg (225 lb)   BMI 36.32 kg/m       Constitutional:  cooperative, alert and oriented, well developed, well nourished, in no acute distress        Skin:  warm and dry to the touch        Head:  normocephalic        Eyes:  pupils equal and round;conjunctivae and lids unremarkable;sclera white        ENT:  no pallor or cyanosis, dentition good        Neck:  no carotid bruit;JVP normal   No HJR    Chest:  clear to auscultation;normal symmetry        Cardiac:   irregularly irregular rhythm                Abdomen:  abdomen soft obese      Vascular:   pulses below the femoral arteries are diminished                                    Extremities and Back:  no deformities, clubbing, cyanosis, erythema observed        Neurological:  no gross motor deficits          Recent Lab Results:  LIPID RESULTS:  Lab Results   Component Value Date    CHOL 124 10/17/2018    HDL 41 10/17/2018    LDL 60 10/17/2018    TRIG 117 10/17/2018    CHOLHDLRATIO 2.8 10/15/2015       LIVER ENZYME RESULTS:  Lab Results   Component Value Date    AST 29 08/18/2011    ALT 29 08/18/2011       CBC RESULTS:  Lab Results   Component Value Date    WBC 4.6 12/29/2018    RBC 4.67 12/29/2018    HGB 14.2 12/29/2018    HCT 44.1 " 12/29/2018    MCV 94 12/29/2018    MCH 30.4 12/29/2018    MCHC 32.2 12/29/2018    RDW 13.2 12/29/2018     12/29/2018       BMP RESULTS:  Lab Results   Component Value Date     01/08/2019    POTASSIUM 4.1 01/08/2019    CHLORIDE 103 01/08/2019    CO2 31 (H) 01/08/2019    ANIONGAP 7.1 01/08/2019     (H) 01/08/2019    BUN 22 01/08/2019    CR 1.25 01/08/2019    GFRESTIMATED 55 (L) 01/08/2019    GFRESTBLACK 67 01/08/2019    MARLENE 9.9 01/08/2019        A1C RESULTS:  Lab Results   Component Value Date    A1C 5.9 (H) 12/04/2018       INR RESULTS:  Lab Results   Component Value Date    INR 1.03 12/03/2018    INR 1.07 11/27/2007       Thank you for allowing me to participate in the care of your patient.      Sincerely,     JOSE DANIEL Islas-C     [facility]    cc:   Jorge Luis Lee MD  [address]  [place] [id number]

## 2019-03-28 ENCOUNTER — THERAPY VISIT (OUTPATIENT)
Dept: PHYSICAL THERAPY | Facility: CLINIC | Age: 84
End: 2019-03-28
Attending: INTERNAL MEDICINE
Payer: COMMERCIAL

## 2019-03-28 DIAGNOSIS — F32.0 MILD MAJOR DEPRESSION (H): ICD-10-CM

## 2019-03-28 DIAGNOSIS — M62.81 GENERALIZED MUSCLE WEAKNESS: ICD-10-CM

## 2019-03-28 PROCEDURE — 97161 PT EVAL LOW COMPLEX 20 MIN: CPT | Mod: GP | Performed by: PHYSICAL THERAPIST

## 2019-03-28 PROCEDURE — 97110 THERAPEUTIC EXERCISES: CPT | Mod: GP | Performed by: PHYSICAL THERAPIST

## 2019-03-28 PROCEDURE — 97112 NEUROMUSCULAR REEDUCATION: CPT | Mod: GP | Performed by: PHYSICAL THERAPIST

## 2019-03-28 NOTE — PROGRESS NOTES
[facility] Initial Evaluation  Subjective:  The history is provided by the patient. No  was used.   General   Condition requiring PT:  Weakness and poor balance  Associated condition:  Fall  Chronicity:  New  Onset date of current episode/exacerbation comment:  Pt c/o weakness/balance dysfunction following fall 2 months ago.  States tripped on step/curb.  Also notes fall on icy driveway 4-5 months ago.  MD order date 3/15/19.  PMH: BPPV with PT 10/2018, intermittent dizziness, R foot drop following Lx surgery 1988    Site of Pain: weakness vs pain in core and L/E.  Frequency:  Constant  Pain Scale: weakness vs pain.  Associated symptoms:  Loss of strength and dizziness  Pain is:  The same all the time  Symptoms exacerbated by:  Ascending stairs, bending/squatting, descending stairs, standing, walking and transfers  Symptoms relieved by:  Rest  Progression since onset:  Unchanged  General health as reported by patient:  Fair  Please check all that apply to your current or past medical history:  Cancer, depression, heart problems, high blood pressure, incontinence, overweight and sleep disorder/apnea  Medical allergies: See EPIC.  Other surgeries:  Cancer surgery and orthopedic surgery  Medications you are currently taking:  Anti-depressants, meds to increase bone density, cardiac medication, hormone replacement therapy and high blood pressure medication  Occupation comment:  Retired                        Objective:    Gait:    Gait Type:  Antalgic               Ankle/Foot Evaluation  ROM:  AROM is normal.      Strength wnl ankle: L ankel 4/5, R  4-/5.            FUNCTIONAL TESTS:           Proprioception:  Stork Balance Test: Left: 10sec  Right: 5 sec        Lumbar/SI Evaluation  ROM:  Arom wnl lumbar: WFL.    Strength: fair core strength-abdominal 3+/5                                                      Hip Evaluation  HIP AROM:  AROM:    Left Hip:     Normal    Right Hip:    Normal                    Hip Strength:  : glute med and max 4/5. : glute med and max 4-/5.                                   Knee Evaluation:  ROM:  AROM: normal  Strength wnl knee: B knee 4/5.                            General     ROS    Assessment/Plan:    Patient is a 83 year old male with Generalized weakness/balance dysfunction complaints.    Patient has the following significant findings with corresponding treatment plan.                Diagnosis 1:  Generalized weakness/balance dysfunction   Decreased strength - therapeutic exercise and therapeutic activities  Decreased proprioception - neuro re-education and therapeutic activities  Impaired gait - gait training  Impaired muscle performance - neuro re-education  Decreased function - therapeutic activities  Impaired posture - neuro re-education    Therapy Evaluation Codes:     Cumulative Therapy Evaluation is: Low complexity.    Previous and current functional limitations:  (See Goal Flow Sheet for this information)    Short term and Long term goals: (See Goal Flow Sheet for this information)     Communication ability:  Patient appears to be able to clearly communicate and understand verbal and written communication and follow directions correctly.  Treatment Explanation - The following has been discussed with the patient:   RX ordered/plan of care  Anticipated outcomes  Possible risks and side effects  This patient would benefit from PT intervention to resume normal activities.   Rehab potential is good.    Frequency:  1 X week, once daily  Duration:  for 8 weeks  Discharge Plan:  Achieve all LTG.  Independent in home treatment program.  Reach maximal therapeutic benefit.    Please refer to the daily flowsheet for treatment today, total treatment time and time spent performing 1:1 timed codes.

## 2019-04-01 DIAGNOSIS — R32 URINARY INCONTINENCE, UNSPECIFIED TYPE: Primary | ICD-10-CM

## 2019-04-02 ENCOUNTER — TELEPHONE (OUTPATIENT)
Dept: CARDIOLOGY | Facility: CLINIC | Age: 84
End: 2019-04-02

## 2019-04-02 NOTE — TELEPHONE ENCOUNTER
Pt calling because his Pharmacist mention the potential for muscle pain while taking Diltiazem 120 mg and Atorvastatin 40 mg. Pt Diltiazem dose was decreased from 180 mg. Pt does state that he has some pain in his buttocks, groin area, but no muscle aches. Did note that Dr Lewis is no managing pt Atorvastatin, so asked that pt call PCP office and this can be discussed, with a potential to decrease the dose and see if this would help pt issues.  Pt states understanding. JNelsonBIANCA

## 2019-04-04 ENCOUNTER — OFFICE VISIT (OUTPATIENT)
Dept: UROLOGY | Facility: CLINIC | Age: 84
End: 2019-04-04
Payer: COMMERCIAL

## 2019-04-04 ENCOUNTER — TELEPHONE (OUTPATIENT)
Dept: CARDIOLOGY | Facility: CLINIC | Age: 84
End: 2019-04-04

## 2019-04-04 VITALS
HEART RATE: 73 BPM | DIASTOLIC BLOOD PRESSURE: 90 MMHG | OXYGEN SATURATION: 95 % | WEIGHT: 225 LBS | HEIGHT: 68 IN | BODY MASS INDEX: 34.1 KG/M2 | SYSTOLIC BLOOD PRESSURE: 150 MMHG

## 2019-04-04 DIAGNOSIS — R39.15 URINARY URGENCY: Primary | ICD-10-CM

## 2019-04-04 LAB
ALBUMIN UR-MCNC: 30 MG/DL
APPEARANCE UR: CLEAR
BILIRUB UR QL STRIP: NEGATIVE
COLOR UR AUTO: YELLOW
GLUCOSE UR STRIP-MCNC: NEGATIVE MG/DL
HGB UR QL STRIP: ABNORMAL
KETONES UR STRIP-MCNC: NEGATIVE MG/DL
LEUKOCYTE ESTERASE UR QL STRIP: ABNORMAL
NITRATE UR QL: NEGATIVE
PH UR STRIP: 6 PH (ref 5–7)
RESIDUAL VOLUME (RV) (EXTERNAL): 22
SOURCE: ABNORMAL
SP GR UR STRIP: 1.02 (ref 1–1.03)
UROBILINOGEN UR STRIP-ACNC: 0.2 EU/DL (ref 0.2–1)

## 2019-04-04 PROCEDURE — 51798 US URINE CAPACITY MEASURE: CPT | Performed by: UROLOGY

## 2019-04-04 PROCEDURE — 99213 OFFICE O/P EST LOW 20 MIN: CPT | Mod: 25 | Performed by: UROLOGY

## 2019-04-04 PROCEDURE — 81003 URINALYSIS AUTO W/O SCOPE: CPT | Performed by: UROLOGY

## 2019-04-04 RX ORDER — HYDROCHLOROTHIAZIDE 25 MG/1
12.5 TABLET ORAL DAILY
COMMUNITY
End: 2019-07-09

## 2019-04-04 ASSESSMENT — MIFFLIN-ST. JEOR: SCORE: 1700.09

## 2019-04-04 ASSESSMENT — PAIN SCALES - GENERAL: PAINLEVEL: NO PAIN (0)

## 2019-04-04 NOTE — TELEPHONE ENCOUNTER
Pt wife called and states that she can not get an OV for pt until after June and wondering if the MRI that is ordered needs to be done prior to OV.  Explained that yes all testing should be done before OV and that this writer will discuss with Dr Camp team to help with f/u.  No further questions. Jaguar

## 2019-04-04 NOTE — PROGRESS NOTES
Office Visit Note  [facility]  [phone]    UROLOGIC DIAGNOSES:   Prostate cancer and hematuria    CURRENT INTERVENTIONS:   Hormonal therapy, prior radiotherapy, negative hematuria workup in 2018    HISTORY:   Spencer returns to clinic today because he has been experiencing worsening urinary urgency over the past few months. He says that he normally feels fine and sleeps through the night fine but when he stands up. Also reports stronger urge to urinate. He has not had leakage yet but can have a difficult time getting to the bathroom in time. He has seen no gross hematuria or other abnormalities.      PAST MEDICAL HISTORY:   Past Medical History:   Diagnosis Date     Actinic keratosis      Arthritis      CAD (coronary artery disease)     1/5/2011 lateral ischemia on stress echo, 12/2014 normal stress echo     Dyslipidemia      Emphysema of lung (H)      Essential hypertension, benign      Hernia, abdominal      Hypersomnia with sleep apnea, unspecified     on bipap, partially treated with residual apneas     Hypertrophy (benign) of prostate 5/01    Biopsy 5/01 negative for cancer; PSA 5     Lumbago      Mumps      Prostate cancer (H) 12/15/2006     Skin cancer, basal cell 1997     Spider veins        PAST SURGICAL HISTORY:   Past Surgical History:   Procedure Laterality Date     HERNIA REPAIR  child     Moh's procedure for basal cell carcinoma  01/2001     PROSTATE SURGERY       s/p lumbar laminectomy NOS  1988     VITRECTOMY PARS PLANA REMOVE PRERETINAL MEMBRANE   3/09       FAMILY HISTORY:   Family History   Problem Relation Age of Onset     Alzheimer Disease Mother      Hypertension Mother      Cardiovascular Father         D:86 complications fo CHF     Prostate Cancer Brother      Lung Cancer Brother 76     Prostate Cancer Brother        SOCIAL HISTORY:   Social History     Tobacco Use     Smoking status: Former Smoker     Packs/day: 1.00     Years: 30.00     Pack years: 30.00     Types: Cigarettes      Last attempt to quit: 1978     Years since quittin.2     Smokeless tobacco: Never Used   Substance Use Topics     Alcohol use: Yes     Alcohol/week: 1.0 oz     Types: 2 Standard drinks or equivalent per week     Comment: socially       Current Outpatient Medications   Medication     apixaban ANTICOAGULANT (ELIQUIS) 5 MG tablet     atenolol (TENORMIN) 100 MG tablet     atorvastatin (LIPITOR) 40 MG tablet     colchicine (COLCYRS) 0.6 MG tablet     diltiazem ER (DILT-XR) 120 MG 24 hr capsule     EPINEPHrine (EPIPEN/ADRENACLICK/OR ANY BX GENERIC EQUIV) 0.3 MG/0.3ML injection 2-pack     escitalopram (LEXAPRO) 5 MG tablet     ipratropium (ATROVENT) 0.06 % spray     leuprolide (ELIGARD) 45 MG injection     losartan (COZAAR) 100 MG tablet     Multiple Minerals-Vitamins (PROSTEON PO)     Multiple Vitamins-Minerals (CENTRUM SILVER PO)     No current facility-administered medications for this visit.      Facility-Administered Medications Ordered in Other Visits   Medication     leuprolide (LUPRON DEPOT) kit 45 mg         PHYSICAL EXAM:    There were no vitals taken for this visit.    Constitutional: Well developed. Conversant and in no acute distress  Eyes: Anicteric sclera, conjunctiva clear, normal extraocular movements  ENT: Normocephalic and atraumatic,   Skin: Warm and dry. No rashes or lesions  Cardiac: No peripheral edema  Back/Flank: Not done  CNS/PNS: Normal musculature and movements, moves all extremities normally  Respiratory: Normal non-labored breathing  Abdomen: Soft nontender and nondistended  Peripheral Vascular: No peripheral edema  Mental Status/Psych: Alert and Oriented x 3. Normal mood and affect    Penis: Not done  Scrotal Skin: Not done  Testicles: Not done  Epididymis: Not done  Digital Rectal Exam:     Cystoscopy: Not done    Imaging: None    Urinalysis: UA RESULTS:  Recent Labs   Lab Test 19  1014 19  0856   COLOR Yellow Yellow   APPEARANCE Clear Slightly Cloudy   URINEGLC  Negative 100*   URINEBILI Negative Negative   URINEKETONE Trace* Negative   SG 1.020 1.020   UBLD Negative Moderate*   URINEPH 6.0 6.0   PROTEIN Negative 30*   UROBILINOGEN 0.2 0.2   NITRITE Negative Negative   LEUKEST Negative Moderate*   RBCU  --  O - 2   WBCU  --  >100*       PSA: undetectable    Post Void Residual: 22mL    Other labs: None today      IMPRESSION:  Urinary urgency    PLAN:  He has urinary urgency. This can develop  A decade or more after radiation  Treatment for prostate cancer. His radiation was 12 years ago. He is not really worried about the issue he just wanted to make certain that there were no abnormalities. He was provided reassurance that the urinalysis appears normal today and he is emptying his bladder well. We will continue the plan is each other in August for his prostate cancer treatment.    Total Time: 15 minutes                                      Total in Consultation: 15 minutes      Mark Pino M.D.

## 2019-04-04 NOTE — NURSING NOTE
Chief Complaint   Patient presents with     Clinic Care Coordination - Follow-up     Pt here for incontinence   PVR is 22mL  Lis Boo CMA

## 2019-04-04 NOTE — LETTER
4/4/2019       RE: Nixon More  [address] [place] [address] [address]  [place] [address]     Dear Colleague,    Thank you for referring your patient, Nixon More, to the [facility] at [facility]. Please see a copy of my visit note below.    Office Visit Note  [facility]  [phone]    UROLOGIC DIAGNOSES:   Prostate cancer and hematuria    CURRENT INTERVENTIONS:   Hormonal therapy, prior radiotherapy, negative hematuria workup in 2018    HISTORY:   Spencer returns to clinic today because he has been experiencing worsening urinary urgency over the past few months. He says that he normally feels fine and sleeps through the night fine but when he stands up. Also submitted stronger urge to urinate. He has not had leakage yet but can have a difficult time getting to the bathroom in time. He has seen no gross hematuria or other abnormalities.      PAST MEDICAL HISTORY:   Past Medical History:   Diagnosis Date     Actinic keratosis      Arthritis      CAD (coronary artery disease)     1/5/2011 lateral ischemia on stress echo, 12/2014 normal stress echo     Dyslipidemia      Emphysema of lung (H)      Essential hypertension, benign      Hernia, abdominal      Hypersomnia with sleep apnea, unspecified     on bipap, partially treated with residual apneas     Hypertrophy (benign) of prostate 5/01    Biopsy 5/01 negative for cancer; PSA 5     Lumbago      Mumps      Prostate cancer (H) 12/15/2006     Skin cancer, basal cell 1997     Spider veins        PAST SURGICAL HISTORY:   Past Surgical History:   Procedure Laterality Date     HERNIA REPAIR  child     Moh's procedure for basal cell carcinoma  01/2001     PROSTATE SURGERY       s/p lumbar laminectomy NOS  1988     VITRECTOMY PARS PLANA REMOVE PRERETINAL MEMBRANE   3/09       FAMILY HISTORY:   Family History   Problem Relation Age of Onset     Alzheimer Disease Mother      Hypertension Mother       Cardiovascular Father         D:86 complications fo CHF     Prostate Cancer Brother      Lung Cancer Brother 76     Prostate Cancer Brother        SOCIAL HISTORY:   Social History     Tobacco Use     Smoking status: Former Smoker     Packs/day: 1.00     Years: 30.00     Pack years: 30.00     Types: Cigarettes     Last attempt to quit: 1978     Years since quittin.2     Smokeless tobacco: Never Used   Substance Use Topics     Alcohol use: Yes     Alcohol/week: 1.0 oz     Types: 2 Standard drinks or equivalent per week     Comment: socially       Current Outpatient Medications   Medication     apixaban ANTICOAGULANT (ELIQUIS) 5 MG tablet     atenolol (TENORMIN) 100 MG tablet     atorvastatin (LIPITOR) 40 MG tablet     colchicine (COLCYRS) 0.6 MG tablet     diltiazem ER (DILT-XR) 120 MG 24 hr capsule     EPINEPHrine (EPIPEN/ADRENACLICK/OR ANY BX GENERIC EQUIV) 0.3 MG/0.3ML injection 2-pack     escitalopram (LEXAPRO) 5 MG tablet     ipratropium (ATROVENT) 0.06 % spray     leuprolide (ELIGARD) 45 MG injection     losartan (COZAAR) 100 MG tablet     Multiple Minerals-Vitamins (PROSTEON PO)     Multiple Vitamins-Minerals (CENTRUM SILVER PO)     No current facility-administered medications for this visit.      Facility-Administered Medications Ordered in Other Visits   Medication     leuprolide (LUPRON DEPOT) kit 45 mg         PHYSICAL EXAM:    There were no vitals taken for this visit.    Constitutional: Well developed. Conversant and in no acute distress  Eyes: Anicteric sclera, conjunctiva clear, normal extraocular movements  ENT: Normocephalic and atraumatic,   Skin: Warm and dry. No rashes or lesions  Cardiac: No peripheral edema  Back/Flank: Not done  CNS/PNS: Normal musculature and movements, moves all extremities normally  Respiratory: Normal non-labored breathing  Abdomen: Soft nontender and nondistended  Peripheral Vascular: No peripheral edema  Mental Status/Psych: Alert and Oriented x 3. Normal  mood and affect    Penis: Not done  Scrotal Skin: Not done  Testicles: Not done  Epididymis: Not done  Digital Rectal Exam:     Cystoscopy: Not done    Imaging: None    Urinalysis: UA RESULTS:  Recent Labs   Lab Test 02/19/19  1014 02/05/19  0856   COLOR Yellow Yellow   APPEARANCE Clear Slightly Cloudy   URINEGLC Negative 100*   URINEBILI Negative Negative   URINEKETONE Trace* Negative   SG 1.020 1.020   UBLD Negative Moderate*   URINEPH 6.0 6.0   PROTEIN Negative 30*   UROBILINOGEN 0.2 0.2   NITRITE Negative Negative   LEUKEST Negative Moderate*   RBCU  --  O - 2   WBCU  --  >100*       PSA: undetectable    Post Void Residual: 22mL    Other labs: None today      IMPRESSION:  Urinary urgency    PLAN:  He has urinary urgency. This can develop  A decade or more after radiation  Treatment for prostate cancer, history was 12 years ago. He is not really worried about the issue he just wanted to make certain that there were no abnormalities. He was provided reassurance that the urinalysis appears normal today and he is emptying his bladder well. We will continue the plan is each other in August for his prostate cancer treatment.    Total Time: 15 minutes                                      Total in Consultation: 15 minutes      Mark Pino M.D.

## 2019-04-29 ENCOUNTER — MYC MEDICAL ADVICE (OUTPATIENT)
Dept: PEDIATRICS | Facility: CLINIC | Age: 84
End: 2019-04-29

## 2019-04-30 ENCOUNTER — E-VISIT (OUTPATIENT)
Dept: PEDIATRICS | Facility: CLINIC | Age: 84
End: 2019-04-30
Payer: COMMERCIAL

## 2019-04-30 DIAGNOSIS — F32.0 MILD MAJOR DEPRESSION (H): ICD-10-CM

## 2019-04-30 PROCEDURE — 99444 ZZC PHYSICIAN ONLINE EVALUATION & MANAGEMENT SERVICE: CPT | Mod: GY | Performed by: INTERNAL MEDICINE

## 2019-04-30 RX ORDER — ESCITALOPRAM OXALATE 10 MG/1
10 TABLET ORAL DAILY
Qty: 30 TABLET | Refills: 1 | Status: SHIPPED | OUTPATIENT
Start: 2019-04-30 | End: 2019-06-26

## 2019-04-30 ASSESSMENT — PATIENT HEALTH QUESTIONNAIRE - PHQ9
10. IF YOU CHECKED OFF ANY PROBLEMS, HOW DIFFICULT HAVE THESE PROBLEMS MADE IT FOR YOU TO DO YOUR WORK, TAKE CARE OF THINGS AT HOME, OR GET ALONG WITH OTHER PEOPLE: NOT DIFFICULT AT ALL
SUM OF ALL RESPONSES TO PHQ QUESTIONS 1-9: 6
SUM OF ALL RESPONSES TO PHQ QUESTIONS 1-9: 6

## 2019-04-30 NOTE — PATIENT INSTRUCTIONS
Thank you for choosing us for your care. I have placed an order for a prescription so that you can start treatment. View your full visit summary for details by clicking on the link below. Your pharmacist will able to address any questions you may have about the medication.      If you're not feeling better within 4-6 weeks please schedule an appointment.  You can schedule an appointment right here in [facility], or call [phone]  If the visit is for the same symptoms as your e-visit, we'll refund the cost of your e-visit if seen within seven days.      Depression: Tips to Help Yourself    As your healthcare providers help treat your depression, you can also help yourself. Keep in mind that your illness affects you emotionally, physically, mentally, and socially. So full recovery will take time. Take care of your body and your soul, and be patient with yourself as you get better.  Self-care    Educate yourself. Read about treatment and medicine options. If you have the energy, attend local conferences or support groups. Keep a list of useful websites and helpful books and use them as needed. This illness is not your fault. Don t blame yourself for your depression.    Manage early symptoms. If you notice symptoms returning, experience triggers, or identify other factors that may lead to a depressive episode, get help as soon as possible. Ask trusted friends and family to monitor your behavior and let you know if they see anything of concern.    Work with your provider. Find a provider you can trust. Communicate honestly with that person and share information on your treatment for depression and your reaction to medicines.    Be prepared for a crisis. Know what to do if you experience a crisis. Keep the phone number of a crisis hotline and know the location of your community's urgent care centers and the closest emergency department.    Hold off on big decisions. Depression can cloud your judgment. So wait until you  feel better before making major life decisions, such as changing jobs, moving, or getting  or .    Be patient. Recovering from depression is a process. Don t be discouraged if it takes some time to feel better.    Keep it simple. Depression saps your energy and concentration. So you won t be able to do all the things you used to do. Set small goals and do what you can.    Be with others. Don t isolate yourself--you ll only feel worse. Try to be with other people. And take part in fun activities when you can. Go to a movie, [employer], [employer] service, or social event. Talk openly with people you can trust. And accept help when it s offered.  Take care of your body  People with depression often lose the desire to take care of themselves. That only makes their problems worse. During treatment and afterward, make a point to:    Exercise. It s a great way to take care of your body. And studies have shown that exercise helps fight depression.    Avoid drugs and alcohol. These may ease the pain in the short term. But they ll only make your problems worse in the long run.    Get relief from stress. Ask your healthcare provider for relaxation exercises and techniques to help relieve stress.    Eat right. A balanced and healthy diet helps keep your body healthy.  Date Last Reviewed: 1/1/2017 2000-2018 [employer]. [address] [place] [address], [place] [id number]. All rights reserved. This information is not intended as a substitute for professional medical care. Always follow your healthcare professional's instructions.          Depression Affects Your Mind and Body    Everyone feels sad or  blue  from time to time for a few days or weeks. Depression is when these feelings don't go away and they interfere with daily life.  Depression is a real illness that can develop at any age. It is one of the most common mental health problems in the [place] Depression makes you feel sad, helpless, and hopeless. It  gets in the way of your life and relationships. It inhibits your ability to think and act. But, with help, you can feel better again.  Depression affects your whole body  Brain chemicals affect your body as well as your mood. So depression may do more than just make you feel low. You may also feel bad physically. Depression can:    Cause trouble with mental tasks such as remembering, concentrating, or making decisions    Make you feel nervous and jumpy    Cause trouble sleeping. Or you may sleep too much    Change your appetite    Cause headaches, stomachaches, or other aches and pains    Drain your body of energy  Depression and other illness  It is common for people who have chronic health problems to also have depression. It can often be hard to tell which one caused the other. A person might become depressed after finding out they have a health problem. But some studies suggest being depressed may make certain health problems more likely. And some depressed people stop taking care of themselves. This may make them more likely to get sick.  Date Last Reviewed: 1/1/2017 2000-2018 [employer]. [address] [place] [address], [place] [address]. All rights reserved. This information is not intended as a substitute for professional medical care. Always follow your healthcare professional's instructions.

## 2019-05-01 ASSESSMENT — PATIENT HEALTH QUESTIONNAIRE - PHQ9: SUM OF ALL RESPONSES TO PHQ QUESTIONS 1-9: 6

## 2019-05-14 ENCOUNTER — HOSPITAL ENCOUNTER (OUTPATIENT)
Dept: CARDIOLOGY | Facility: CLINIC | Age: 84
Discharge: HOME | End: 2019-05-14
Attending: PHYSICIAN ASSISTANT | Admitting: PHYSICIAN ASSISTANT
Payer: COMMERCIAL

## 2019-05-14 VITALS — DIASTOLIC BLOOD PRESSURE: 84 MMHG | SYSTOLIC BLOOD PRESSURE: 134 MMHG

## 2019-05-14 DIAGNOSIS — I77.810 AORTIC ROOT DILATATION (H): ICD-10-CM

## 2019-05-14 LAB
CREAT BLD-MCNC: 1 MG/DL (ref 0.66–1.25)
GFR SERPL CREATININE-BSD FRML MDRD: 72 ML/MIN/{1.73_M2}

## 2019-05-14 PROCEDURE — 82565 ASSAY OF CREATININE: CPT

## 2019-05-14 PROCEDURE — 71555 MRI ANGIO CHEST W OR W/O DYE: CPT | Mod: 26 | Performed by: INTERNAL MEDICINE

## 2019-05-14 PROCEDURE — 25500064 ZZH RX 255 OP 636: Performed by: PHYSICIAN ASSISTANT

## 2019-05-14 PROCEDURE — A9585 GADOBUTROL INJECTION: HCPCS | Performed by: PHYSICIAN ASSISTANT

## 2019-05-14 PROCEDURE — 71555 MRI ANGIO CHEST W OR W/O DYE: CPT

## 2019-05-14 PROCEDURE — 25000132 ZZH RX MED GY IP 250 OP 250 PS 637: Performed by: INTERNAL MEDICINE

## 2019-05-14 RX ORDER — METHYLPREDNISOLONE SODIUM SUCCINATE 125 MG/2ML
125 INJECTION, POWDER, LYOPHILIZED, FOR SOLUTION INTRAMUSCULAR; INTRAVENOUS
Status: DISCONTINUED | OUTPATIENT
Start: 2019-05-14 | End: 2019-05-15 | Stop reason: HOSPADM

## 2019-05-14 RX ORDER — ACYCLOVIR 200 MG/1
0-1 CAPSULE ORAL
Status: DISCONTINUED | OUTPATIENT
Start: 2019-05-14 | End: 2019-05-15 | Stop reason: HOSPADM

## 2019-05-14 RX ORDER — DIAZEPAM 5 MG
5 TABLET ORAL EVERY 30 MIN PRN
Status: COMPLETED | OUTPATIENT
Start: 2019-05-14 | End: 2019-05-14

## 2019-05-14 RX ORDER — ONDANSETRON 2 MG/ML
4 INJECTION INTRAMUSCULAR; INTRAVENOUS
Status: DISCONTINUED | OUTPATIENT
Start: 2019-05-14 | End: 2019-05-15 | Stop reason: HOSPADM

## 2019-05-14 RX ORDER — DIPHENHYDRAMINE HCL 25 MG
25 CAPSULE ORAL
Status: DISCONTINUED | OUTPATIENT
Start: 2019-05-14 | End: 2019-05-15 | Stop reason: HOSPADM

## 2019-05-14 RX ORDER — DIPHENHYDRAMINE HYDROCHLORIDE 50 MG/ML
25-50 INJECTION INTRAMUSCULAR; INTRAVENOUS
Status: DISCONTINUED | OUTPATIENT
Start: 2019-05-14 | End: 2019-05-15 | Stop reason: HOSPADM

## 2019-05-14 RX ORDER — GADOBUTROL 604.72 MG/ML
5-65 INJECTION INTRAVENOUS ONCE
Status: COMPLETED | OUTPATIENT
Start: 2019-05-14 | End: 2019-05-14

## 2019-05-14 RX ADMIN — DIAZEPAM 2.5 MG: 5 TABLET ORAL at 09:24

## 2019-05-14 RX ADMIN — GADOBUTROL 20 ML: 604.72 INJECTION INTRAVENOUS at 10:16

## 2019-05-14 RX ADMIN — DIAZEPAM 5 MG: 5 TABLET ORAL at 09:06

## 2019-05-14 NOTE — PROGRESS NOTES
Pt here for CMR.  Pt arrived early for sedation per protocol.  Pt has been NPO.   verified.  BP adequate for medication per policy.  Pt given PO Valium with no complications.

## 2019-05-21 ENCOUNTER — HOSPITAL ENCOUNTER (OUTPATIENT)
Dept: CARDIOLOGY | Facility: CLINIC | Age: 84
Discharge: HOME | End: 2019-05-21
Attending: PHYSICIAN ASSISTANT | Admitting: PHYSICIAN ASSISTANT
Payer: COMMERCIAL

## 2019-05-21 DIAGNOSIS — I77.810 AORTIC ROOT DILATATION (H): ICD-10-CM

## 2019-05-21 DIAGNOSIS — I48.19 PERSISTENT ATRIAL FIBRILLATION (H): ICD-10-CM

## 2019-05-21 PROCEDURE — 93225 XTRNL ECG REC<48 HRS REC: CPT

## 2019-05-21 PROCEDURE — 93227 XTRNL ECG REC<48 HR R&I: CPT | Performed by: INTERNAL MEDICINE

## 2019-06-11 ENCOUNTER — OFFICE VISIT (OUTPATIENT)
Dept: CARDIOLOGY | Facility: CLINIC | Age: 84
End: 2019-06-11
Payer: COMMERCIAL

## 2019-06-11 VITALS
BODY MASS INDEX: 34.33 KG/M2 | SYSTOLIC BLOOD PRESSURE: 134 MMHG | WEIGHT: 226.5 LBS | DIASTOLIC BLOOD PRESSURE: 79 MMHG | HEIGHT: 68 IN | HEART RATE: 64 BPM | OXYGEN SATURATION: 96 %

## 2019-06-11 DIAGNOSIS — I48.19 PERSISTENT ATRIAL FIBRILLATION (H): ICD-10-CM

## 2019-06-11 DIAGNOSIS — I25.10 CORONARY ARTERY DISEASE INVOLVING NATIVE CORONARY ARTERY OF NATIVE HEART WITHOUT ANGINA PECTORIS: ICD-10-CM

## 2019-06-11 DIAGNOSIS — I77.810 AORTIC ROOT DILATATION (H): ICD-10-CM

## 2019-06-11 DIAGNOSIS — R06.09 DYSPNEA ON EXERTION: Primary | ICD-10-CM

## 2019-06-11 PROCEDURE — 99204 OFFICE O/P NEW MOD 45 MIN: CPT | Performed by: INTERNAL MEDICINE

## 2019-06-11 PROCEDURE — 93000 ELECTROCARDIOGRAM COMPLETE: CPT | Performed by: INTERNAL MEDICINE

## 2019-06-11 ASSESSMENT — MIFFLIN-ST. JEOR: SCORE: 1706.9

## 2019-06-11 NOTE — LETTER
6/11/2019    Natalya Lewis MD  [address] [place] [address]    RE: Nixon RILEY Argenis       Dear Colleague,    I had the pleasure of seeing Nixon More in the [facility].    HPI and Plan:   This 83-year-old gentleman is seen, accompanied by his wife and daughter, with concerns of shortness of breath and dyspnea, sometimes on exertion.  I have not seen him since 2013.  At one time he had some exertional chest pain with an abnormal stress study but on medical therapy his symptoms resolved and his stress study follow-up was normal.  He has had chronic some degree of dyspnea on exertion.  He is a prior smoker with over 30-pack-year history although he quit perhaps 20 years ago.  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      He had been stable until he was noted, after presenting with a fall while shoveling snow in December 2018, with atrial fibrillation with rapid ventricular response.  An echo continued to show normal left ventricular function and wall motion, very dilated atria, possible mild decrease in RV function, and a dilated a sending aorta at 46 mm.  Anticoagulation and rate control therapy were recommended.  He and his wife and daughter are concerned because he is complaining to dyspnea on exertion, which he feels is worse than it was a few years ago and may be even worse than prior to his diagnosis with atrial fibrillation.  However it is a very vague pattern and it is hard to sort out.  His wife notes sometimes he is just puttering around the house and is short of breath, his daughter notes that sometimes when he is doing things he holds his breath, and he acknowledges that, and then has some shortness of breath.  He does get some dyspnea on exertion but it is difficult to sort out if this is really changed from his history.  When I had him walk he clearly has a somewhat difficult gait with  some balance issues.  He is recently been evaluated in balance clinic.  He and his wife note that when he is at the grocery store using a cart he can power up and down the [place] with much less fatigue and shortness of breath.  They are not aware of any wheezing.   He did have a Holter after starting medical therapy and his average heart rate was controlled.  His peak heart rate 130 but it was not clear when he was doing at that time.  He pressed the button for shortness of breath twice but his heart rate was in the 70s and 80s at those times and he does not remember why he noted shortness of breath.  With his CPAP he is not aware of any orthopnea or PND.  He was first started on diltiazem in addition to his beta-blocker, for rate control, he got some pedal edema.  When the dose was cut back and he was put on a little bit of hydrochlorothiazide that has resolved without any change in his sense of how well he is breathing.  I reviewed remote outside records of PFTs in 2011 which had some mild to moderate decrease in DLCO but essentially normal flow volume loops.  He denies orthostasis, dizziness, syncope.  He has not had any of his chest pain with activity that he had at one point in time.  He had a follow-up MRI recently which showed his ascending aorta at 4.4 x 4.3, and aortic root at 4.5 x 3.9.  He does note that some non-first-degree relatives have had a sending aortic and abdominal aortic dissections.    Exam is detailed below.;  Weight today 226, unchanged in the last 5 months.  BMI 34.  Blood pressure 134/79 with a pulse of low 60s and irregular  Lungs-reasonable air movement, no crackles wheeze or increased effort  Cardiac-distant heart sounds, irregular rhythm without murmur gallop or heave.  There is no JVD or HJR  Abdomen-quite obese but soft and nontender without bruits  Extremities-pulses intact.  There is focal trace pitting edema just at  the sock line but nowhere else.    EKG demonstrates atrial  fibrillation with controlled ventricular response.  Recent labs from the beginning of this year showed normal lites lites and creatinine 1.25 with estimated GFR 55.  Cholesterol good with total cholesterol 124, HDL 41, LDL 60, triglycerides 117, and hemoglobin 14.2 on 12/29/2018    Impression/plan    1-shortness of breath, some dyspnea on exertion but pattern unclear.  I suspect this is multifactorial.  He is always had some shortness of breath.  I think he has some underlying COPD.  He also seems to be holding his breath under certain circumstances.  He is somewhat deconditioned and he has significant gait difficulties which increase the effort of walking.  At this point in time I think a significant heart failure component is unlikely given his normal ejection fraction and the above symptoms and findings.  I have discussed all this with him and his family.  I recommended that we need some further data.  He is going to use his oximeter to assess his oxygen concentrations and his heart rate when he is having any of his sensation of shortness of breath symptoms, and sort out when this is occurring and how much exertion, if any it is associated with.  He will also compare these when he is using a cart in the grocery store versus just walking around his neighborhood.  I will then have him follow-up in a month to reevaluate these.  At that time we may need to decide whether his useful to recommend PFTs to see if there is some reversible component there is a major problem with him.  He does admit that if he paces himself he can go out and do most of the things he likes to do.  I did fill out a handicap parking permit for him today.    2-atrial fibrillation, persistent/chronic.  On anticoagulation and tolerating this without bleeding.  Very large atria so agree unlikely to be able to maintain sinus rhythm even with cardioversion.  I am not convinced it is causing symptoms.  He sometimes thinks he is more symptomatic with  fatigue/dyspnea than before the diagnosis but that seems less likely as he was in rapid response previously and not now.  I will have him assessed to make sure he has appropriate chronotropic response by assessing his heart rate response to activity when he gets dyspnea, using his oximeter as above.    3-previous diagnosis of angina and coronary disease.  Currently normal ejection fraction, no chest discomfort, and again inconsistent that he has a dyspnea symptom that would be an anginal equivalent.  We will again reassess when we get more information about when he is having his limitations and symptoms as above.    4-aortic root and ascending aortic dilatation.  Mild to moderate.  I would recommend a repeat echo in a year given he just had an evaluation with an MRI that was stable over the echo 6 months previously.      We will have him proceed above with further data and then follow-up in a month.    Orders Placed This Encounter   Procedures     Follow-Up with Cardiac Advanced Practice Provider     EKG 12-lead complete w/read - Clinics (performed today)       Orders Placed This Encounter   Medications     Loperamide HCl (IMODIUM OR)     Sig: Take by mouth daily as needed       There are no discontinued medications.      Encounter Diagnoses   Name Primary?     Dyspnea on exertion Yes     Persistent atrial fibrillation (H)      Aortic root dilatation (H)      Coronary artery disease involving native coronary artery of native heart without angina pectoris        CURRENT MEDICATIONS:  Current Outpatient Medications   Medication Sig Dispense Refill     apixaban ANTICOAGULANT (ELIQUIS) 5 MG tablet Take 1 tablet (5 mg) by mouth 2 times daily 60 tablet 5     atenolol (TENORMIN) 100 MG tablet Take 1.5 tablets (150 mg) by mouth daily 135 tablet 3     atorvastatin (LIPITOR) 40 MG tablet Take 1 tablet (40 mg) by mouth daily 90 tablet 3     diltiazem ER (DILT-XR) 120 MG 24 hr capsule Take 1 capsule (120 mg) by mouth daily 30  capsule 5     EPINEPHrine (EPIPEN/ADRENACLICK/OR ANY BX GENERIC EQUIV) 0.3 MG/0.3ML injection 2-pack Inject 0.3 mLs (0.3 mg) into the muscle as needed for anaphylaxis 0.6 mL 1     escitalopram (LEXAPRO) 10 MG tablet Take 1 tablet (10 mg) by mouth daily 30 tablet 1     hydrochlorothiazide (HYDRODIURIL) 25 MG tablet Take 12.5 mg by mouth daily        ipratropium (ATROVENT) 0.06 % spray Spray 2 sprays in nostril 4 times daily as needed for rhinitis 3 Box 3     leuprolide (ELIGARD) 45 MG injection Inject 45 mg Subcutaneous every 6 months. 1 each 12     Loperamide HCl (IMODIUM OR) Take by mouth daily as needed       losartan (COZAAR) 100 MG tablet TAKE 1 TABLET BY MOUTH ONCE DAILY 90 tablet 3     Multiple Minerals-Vitamins (PROSTEON PO) Take 2,000 Units by mouth With vitamin D       Multiple Vitamins-Minerals (CENTRUM SILVER PO) Take by mouth daily        colchicine (COLCYRS) 0.6 MG tablet 0.6 mg 3 times daily on the first day of flare; maximum total dose: 1.8 mg on day 1. Days 2 and beyond: 0.6 mg once or twice daily until flare resolves (Patient not taking: Reported on 4/4/2019) 30 tablet 0     escitalopram (LEXAPRO) 5 MG tablet Take 1 tablet (5 mg) by mouth daily (Patient not taking: Reported on 6/11/2019) 30 tablet 1       ALLERGIES     Allergies   Allergen Reactions     Augmentin Rash     Type III hypersensitivity     Bactrim [Sulfamethoxazole W/Trimethoprim] Rash     Serum sickness, type III hypersensitivity       Bee Venom Itching     Sulfa Drugs Hives     Lisinopril Cough     Naproxen Hives       PAST MEDICAL HISTORY:  Past Medical History:   Diagnosis Date     Actinic keratosis      Arthritis      Atrial fibrillation and flutter (H) 12/3/2018     CAD (coronary artery disease)     1/5/2011 lateral ischemia on stress echo, 12/2014 normal stress echo     Coronary artery disease 1/1/2011    Abnormal stress test 1/2011 and controlled with medical mgmt      Dyslipidemia      Emphysema of lung (H)      Essential  hypertension, benign      Hernia, abdominal      Hypersomnia with sleep apnea, unspecified     on bipap, partially treated with residual apneas     Hypertrophy (benign) of prostate     Biopsy  negative for cancer; PSA 5     Lumbago      Mumps      Prostate cancer (H) 12/15/2006     Skin cancer, basal cell 1997     Spider veins        PAST SURGICAL HISTORY:  Past Surgical History:   Procedure Laterality Date     HERNIA REPAIR  child     Moh's procedure for basal cell carcinoma  2001     PROSTATE SURGERY       s/p lumbar laminectomy NOS  1988     VITRECTOMY PARS PLANA REMOVE PRERETINAL MEMBRANE   3/09       FAMILY HISTORY:  Family History   Problem Relation Age of Onset     Alzheimer Disease Mother      Hypertension Mother      Cardiovascular Father         D:86 complications fo CHF     Prostate Cancer Brother      Lung Cancer Brother 76     Prostate Cancer Brother        SOCIAL HISTORY:  Social History     Socioeconomic History     Marital status:      Spouse name: None     Number of children: None     Years of education: None     Highest education level: None   Occupational History     Occupation: retired   Social Needs     Financial resource strain: None     Food insecurity:     Worry: None     Inability: None     Transportation needs:     Medical: None     Non-medical: None   Tobacco Use     Smoking status: Former Smoker     Packs/day: 1.00     Years: 30.00     Pack years: 30.00     Types: Cigarettes     Start date:      Last attempt to quit: 1978     Years since quittin.4     Smokeless tobacco: Never Used   Substance and Sexual Activity     Alcohol use: Yes     Alcohol/week: 1.0 oz     Types: 2 Standard drinks or equivalent per week     Comment: socially     Drug use: No     Sexual activity: Yes     Partners: Female   Lifestyle     Physical activity:     Days per week: None     Minutes per session: None     Stress: None   Relationships     Social connections:     Talks on phone:  "None     Gets together: None     Attends [employer] service: None     Active member of club or organization: None     Attends meetings of clubs or organizations: None     Relationship status: None     Intimate partner violence:     Fear of current or ex partner: None     Emotionally abused: None     Physically abused: None     Forced sexual activity: None   Other Topics Concern      Service Not Asked     Blood Transfusions Not Asked     Caffeine Concern No     Comment: 0-2 cans of soda per day.     Occupational Exposure Not Asked     Hobby Hazards Not Asked     Sleep Concern Not Asked     Stress Concern Not Asked     Weight Concern Not Asked     Special Diet Not Asked     Back Care Not Asked     Exercise No     Bike Helmet Not Asked     Seat Belt Yes     Self-Exams Not Asked     Parent/sibling w/ CABG, MI or angioplasty before 65F 55M? No   Social History Narrative     None       Review of Systems:  Skin:  Negative       Eyes:  Positive for glasses cataract surgery  ENT:  Negative      Respiratory:  Positive for sleep apnea;CPAP increased GALDAMEZ, stairs   Cardiovascular:  Negative;chest pain;palpitations;dizziness;lightheadedness;syncope or near-syncope;cyanosis Positive for;fatigue;edema;exercise intolerance    Gastroenterology: Negative      Genitourinary:  Positive for urinary frequency;urgency;nocturia    Musculoskeletal:  Negative      Neurologic:  Negative      Psychiatric:  Positive for depression    Heme/Lymph/Imm:  Positive for allergies    Endocrine:  Negative        Physical Exam:  Vitals: /79 (BP Location: Right arm, Cuff Size: Adult Large)   Pulse 64   Ht 1.727 m (5' 8\")   Wt 102.7 kg (226 lb 8 oz)   SpO2 96%   BMI 34.44 kg/m       Constitutional:  cooperative, alert and oriented, well developed, well nourished, in no acute distress        Skin:  warm and dry to the touch          Head:  normocephalic        Eyes:  pupils equal and round;conjunctivae and lids unremarkable;sclera white    "     Lymph:      ENT:  no pallor or cyanosis, dentition good        Neck:  no carotid bruit;JVP normal;carotid pulses are full and equal bilaterally   No HJR    Respiratory:  clear to auscultation;normal symmetry;normal respiratory excursion         Cardiac: normal S1 and S2;no murmurs, gallops or rubs detected irregularly irregular rhythm distant heart sounds            pulses full and equal                                   strong radial pulses, slighly diminshed dorsalis pedis and posterior tibia    GI:  abdomen soft;non-tender;no bruits obese      Extremities and Muscular Skeletal:  no deformities, clubbing, cyanosis, erythema observed   bilateral LE edema;trace;pitting(focally just above sock line)          Neurological:  no gross motor deficits        Psych:  affect appropriate, oriented to time, person and place        CC  Natalya Lewis MD  [address] [place] [address]            Thank you for allowing me to participate in the care of your patient.    Sincerely,     Rajat Camp MD     [facility]

## 2019-06-11 NOTE — PROGRESS NOTES
HPI and Plan:   This 83-year-old gentleman is seen, accompanied by his wife and daughter, with concerns of shortness of breath and dyspnea, sometimes on exertion.  I have not seen him since 2013.  At one time he had some exertional chest pain with an abnormal stress study but on medical therapy his symptoms resolved and his stress study follow-up was normal.  He has had chronic some degree of dyspnea on exertion.  He is a prior smoker with over 30-pack-year history although he quit perhaps 20 years ago.  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      He had been stable until he was noted, after presenting with a fall while shoveling snow in December 2018, with atrial fibrillation with rapid ventricular response.  An echo continued to show normal left ventricular function and wall motion, very dilated atria, possible mild decrease in RV function, and a dilated a sending aorta at 46 mm.  Anticoagulation and rate control therapy were recommended.  He and his wife and daughter are concerned because he is complaining to dyspnea on exertion, which he feels is worse than it was a few years ago and may be even worse than prior to his diagnosis with atrial fibrillation.  However it is a very vague pattern and it is hard to sort out.  His wife notes sometimes he is just puttering around the house and is short of breath, his daughter notes that sometimes when he is doing things he holds his breath, and he acknowledges that, and then has some shortness of breath.  He does get some dyspnea on exertion but it is difficult to sort out if this is really changed from his history.  When I had him walk he clearly has a somewhat difficult gait with some balance issues.  He is recently been evaluated in balance clinic.  He and his wife note that when he is at the grocery store using a cart he can power up and down the [place] with much less fatigue and shortness of breath.   They are not aware of any wheezing.   He did have a Holter after starting medical therapy and his average heart rate was controlled.  His peak heart rate 130 but it was not clear when he was doing at that time.  He pressed the button for shortness of breath twice but his heart rate was in the 70s and 80s at those times and he does not remember why he noted shortness of breath.  With his CPAP he is not aware of any orthopnea or PND.  He was first started on diltiazem in addition to his beta-blocker, for rate control, he got some pedal edema.  When the dose was cut back and he was put on a little bit of hydrochlorothiazide that has resolved without any change in his sense of how well he is breathing.  I reviewed remote outside records of PFTs in 2011 which had some mild to moderate decrease in DLCO but essentially normal flow volume loops.  He denies orthostasis, dizziness, syncope.  He has not had any of his chest pain with activity that he had at one point in time.  He had a follow-up MRI recently which showed his ascending aorta at 4.4 x 4.3, and aortic root at 4.5 x 3.9.  He does note that some non-first-degree relatives have had a sending aortic and abdominal aortic dissections.    Exam is detailed below.;  Weight today 226, unchanged in the last 5 months.  BMI 34.  Blood pressure 134/79 with a pulse of low 60s and irregular  Lungs-reasonable air movement, no crackles wheeze or increased effort  Cardiac-distant heart sounds, irregular rhythm without murmur gallop or heave.  There is no JVD or HJR  Abdomen-quite obese but soft and nontender without bruits  Extremities-pulses intact.  There is focal trace pitting edema just at  the sock line but nowhere else.    EKG demonstrates atrial fibrillation with controlled ventricular response.  Recent labs from the beginning of this year showed normal lites lites and creatinine 1.25 with estimated GFR 55.  Cholesterol good with total cholesterol 124, HDL 41, LDL 60,  triglycerides 117, and hemoglobin 14.2 on 12/29/2018    Impression/plan    1-shortness of breath, some dyspnea on exertion but pattern unclear.  I suspect this is multifactorial.  He is always had some shortness of breath.  I think he has some underlying COPD.  He also seems to be holding his breath under certain circumstances.  He is somewhat deconditioned and he has significant gait difficulties which increase the effort of walking.  At this point in time I think a significant heart failure component is unlikely given his normal ejection fraction and the above symptoms and findings.  I have discussed all this with him and his family.  I recommended that we need some further data.  He is going to use his oximeter to assess his oxygen concentrations and his heart rate when he is having any of his sensation of shortness of breath symptoms, and sort out when this is occurring and how much exertion, if any it is associated with.  He will also compare these when he is using a cart in the grocery store versus just walking around his neighborhood.  I will then have him follow-up in a month to reevaluate these.  At that time we may need to decide whether his useful to recommend PFTs to see if there is some reversible component there is a major problem with him.  He does admit that if he paces himself he can go out and do most of the things he likes to do.  I did fill out a handicap parking permit for him today.    2-atrial fibrillation, persistent/chronic.  On anticoagulation and tolerating this without bleeding.  Very large atria so agree unlikely to be able to maintain sinus rhythm even with cardioversion.  I am not convinced it is causing symptoms.  He sometimes thinks he is more symptomatic with fatigue/dyspnea than before the diagnosis but that seems less likely as he was in rapid response previously and not now.  I will have him assessed to make sure he has appropriate chronotropic response by assessing his heart  rate response to activity when he gets dyspnea, using his oximeter as above.    3-previous diagnosis of angina and coronary disease.  Currently normal ejection fraction, no chest discomfort, and again inconsistent that he has a dyspnea symptom that would be an anginal equivalent.  We will again reassess when we get more information about when he is having his limitations and symptoms as above.    4-aortic root and ascending aortic dilatation.  Mild to moderate.  I would recommend a repeat echo in a year given he just had an evaluation with an MRI that was stable over the echo 6 months previously.      We will have him proceed above with further data and then follow-up in a month.    Orders Placed This Encounter   Procedures     Follow-Up with Cardiac Advanced Practice Provider     EKG 12-lead complete w/read - Clinics (performed today)       Orders Placed This Encounter   Medications     Loperamide HCl (IMODIUM OR)     Sig: Take by mouth daily as needed       There are no discontinued medications.      Encounter Diagnoses   Name Primary?     Dyspnea on exertion Yes     Persistent atrial fibrillation (H)      Aortic root dilatation (H)      Coronary artery disease involving native coronary artery of native heart without angina pectoris        CURRENT MEDICATIONS:  Current Outpatient Medications   Medication Sig Dispense Refill     apixaban ANTICOAGULANT (ELIQUIS) 5 MG tablet Take 1 tablet (5 mg) by mouth 2 times daily 60 tablet 5     atenolol (TENORMIN) 100 MG tablet Take 1.5 tablets (150 mg) by mouth daily 135 tablet 3     atorvastatin (LIPITOR) 40 MG tablet Take 1 tablet (40 mg) by mouth daily 90 tablet 3     diltiazem ER (DILT-XR) 120 MG 24 hr capsule Take 1 capsule (120 mg) by mouth daily 30 capsule 5     EPINEPHrine (EPIPEN/ADRENACLICK/OR ANY BX GENERIC EQUIV) 0.3 MG/0.3ML injection 2-pack Inject 0.3 mLs (0.3 mg) into the muscle as needed for anaphylaxis 0.6 mL 1     escitalopram (LEXAPRO) 10 MG tablet Take 1  tablet (10 mg) by mouth daily 30 tablet 1     hydrochlorothiazide (HYDRODIURIL) 25 MG tablet Take 12.5 mg by mouth daily        ipratropium (ATROVENT) 0.06 % spray Spray 2 sprays in nostril 4 times daily as needed for rhinitis 3 Box 3     leuprolide (ELIGARD) 45 MG injection Inject 45 mg Subcutaneous every 6 months. 1 each 12     Loperamide HCl (IMODIUM OR) Take by mouth daily as needed       losartan (COZAAR) 100 MG tablet TAKE 1 TABLET BY MOUTH ONCE DAILY 90 tablet 3     Multiple Minerals-Vitamins (PROSTEON PO) Take 2,000 Units by mouth With vitamin D       Multiple Vitamins-Minerals (CENTRUM SILVER PO) Take by mouth daily        colchicine (COLCYRS) 0.6 MG tablet 0.6 mg 3 times daily on the first day of flare; maximum total dose: 1.8 mg on day 1. Days 2 and beyond: 0.6 mg once or twice daily until flare resolves (Patient not taking: Reported on 4/4/2019) 30 tablet 0     escitalopram (LEXAPRO) 5 MG tablet Take 1 tablet (5 mg) by mouth daily (Patient not taking: Reported on 6/11/2019) 30 tablet 1       ALLERGIES     Allergies   Allergen Reactions     Augmentin Rash     Type III hypersensitivity     Bactrim [Sulfamethoxazole W/Trimethoprim] Rash     Serum sickness, type III hypersensitivity       Bee Venom Itching     Sulfa Drugs Hives     Lisinopril Cough     Naproxen Hives       PAST MEDICAL HISTORY:  Past Medical History:   Diagnosis Date     Actinic keratosis      Arthritis      Atrial fibrillation and flutter (H) 12/3/2018     CAD (coronary artery disease)     1/5/2011 lateral ischemia on stress echo, 12/2014 normal stress echo     Coronary artery disease 1/1/2011    Abnormal stress test 1/2011 and controlled with medical mgmt      Dyslipidemia      Emphysema of lung (H)      Essential hypertension, benign      Hernia, abdominal      Hypersomnia with sleep apnea, unspecified     on bipap, partially treated with residual apneas     Hypertrophy (benign) of prostate 5/01    Biopsy 5/01 negative for cancer; PSA 5      Lumbago      Mumps      Prostate cancer (H) 12/15/2006     Skin cancer, basal cell 1997     Spider veins        PAST SURGICAL HISTORY:  Past Surgical History:   Procedure Laterality Date     HERNIA REPAIR  child     Moh's procedure for basal cell carcinoma  2001     PROSTATE SURGERY       s/p lumbar laminectomy NOS  1988     VITRECTOMY PARS PLANA REMOVE PRERETINAL MEMBRANE   3/09       FAMILY HISTORY:  Family History   Problem Relation Age of Onset     Alzheimer Disease Mother      Hypertension Mother      Cardiovascular Father         D:86 complications fo CHF     Prostate Cancer Brother      Lung Cancer Brother 76     Prostate Cancer Brother        SOCIAL HISTORY:  Social History     Socioeconomic History     Marital status:      Spouse name: None     Number of children: None     Years of education: None     Highest education level: None   Occupational History     Occupation: retired   Social Needs     Financial resource strain: None     Food insecurity:     Worry: None     Inability: None     Transportation needs:     Medical: None     Non-medical: None   Tobacco Use     Smoking status: Former Smoker     Packs/day: 1.00     Years: 30.00     Pack years: 30.00     Types: Cigarettes     Start date:      Last attempt to quit: 1978     Years since quittin.4     Smokeless tobacco: Never Used   Substance and Sexual Activity     Alcohol use: Yes     Alcohol/week: 1.0 oz     Types: 2 Standard drinks or equivalent per week     Comment: socially     Drug use: No     Sexual activity: Yes     Partners: Female   Lifestyle     Physical activity:     Days per week: None     Minutes per session: None     Stress: None   Relationships     Social connections:     Talks on phone: None     Gets together: None     Attends Nondenominational service: None     Active member of club or organization: None     Attends meetings of clubs or organizations: None     Relationship status: None     Intimate partner violence:      "Fear of current or ex partner: None     Emotionally abused: None     Physically abused: None     Forced sexual activity: None   Other Topics Concern      Service Not Asked     Blood Transfusions Not Asked     Caffeine Concern No     Comment: 0-2 cans of soda per day.     Occupational Exposure Not Asked     Hobby Hazards Not Asked     Sleep Concern Not Asked     Stress Concern Not Asked     Weight Concern Not Asked     Special Diet Not Asked     Back Care Not Asked     Exercise No     Bike Helmet Not Asked     Seat Belt Yes     Self-Exams Not Asked     Parent/sibling w/ CABG, MI or angioplasty before 65F 55M? No   Social History Narrative     None       Review of Systems:  Skin:  Negative       Eyes:  Positive for glasses cataract surgery  ENT:  Negative      Respiratory:  Positive for sleep apnea;CPAP increased GALDAMEZ, stairs   Cardiovascular:  Negative;chest pain;palpitations;dizziness;lightheadedness;syncope or near-syncope;cyanosis Positive for;fatigue;edema;exercise intolerance    Gastroenterology: Negative      Genitourinary:  Positive for urinary frequency;urgency;nocturia    Musculoskeletal:  Negative      Neurologic:  Negative      Psychiatric:  Positive for depression    Heme/Lymph/Imm:  Positive for allergies    Endocrine:  Negative        Physical Exam:  Vitals: /79 (BP Location: Right arm, Cuff Size: Adult Large)   Pulse 64   Ht 1.727 m (5' 8\")   Wt 102.7 kg (226 lb 8 oz)   SpO2 96%   BMI 34.44 kg/m      Constitutional:  cooperative, alert and oriented, well developed, well nourished, in no acute distress        Skin:  warm and dry to the touch          Head:  normocephalic        Eyes:  pupils equal and round;conjunctivae and lids unremarkable;sclera white        Lymph:      ENT:  no pallor or cyanosis, dentition good        Neck:  no carotid bruit;JVP normal;carotid pulses are full and equal bilaterally   No HJR    Respiratory:  clear to auscultation;normal symmetry;normal respiratory " excursion         Cardiac: normal S1 and S2;no murmurs, gallops or rubs detected irregularly irregular rhythm distant heart sounds            pulses full and equal                                   strong radial pulses, slighly diminshed dorsalis pedis and posterior tibia    GI:  abdomen soft;non-tender;no bruits obese      Extremities and Muscular Skeletal:  no deformities, clubbing, cyanosis, erythema observed   bilateral LE edema;trace;pitting(focally just above sock line)          Neurological:  no gross motor deficits        Psych:  affect appropriate, oriented to time, person and place        CC  Natalya Lewis MD  [address] [place] [address]

## 2019-07-03 ENCOUNTER — TELEPHONE (OUTPATIENT)
Dept: CARDIOLOGY | Facility: CLINIC | Age: 84
End: 2019-07-03

## 2019-07-03 DIAGNOSIS — I47.19 ATRIAL TACHYCARDIA (H): ICD-10-CM

## 2019-07-03 DIAGNOSIS — I10 HYPERTENSION GOAL BP (BLOOD PRESSURE) < 140/90: ICD-10-CM

## 2019-07-03 DIAGNOSIS — I48.92 ATRIAL FLUTTER WITH RAPID VENTRICULAR RESPONSE (H): ICD-10-CM

## 2019-07-03 RX ORDER — DILTIAZEM HYDROCHLORIDE 120 MG/1
120 CAPSULE, EXTENDED RELEASE ORAL DAILY
Qty: 90 CAPSULE | Refills: 3 | Status: SHIPPED | OUTPATIENT
Start: 2019-07-03 | End: 2020-06-29

## 2019-07-03 NOTE — TELEPHONE ENCOUNTER
07/03/19 Pt called for refill on Eliquis. Rx sent to [facility]. Pt voiced understanding. Magdalene 10:27 am

## 2019-07-09 ENCOUNTER — OFFICE VISIT (OUTPATIENT)
Dept: CARDIOLOGY | Facility: CLINIC | Age: 84
End: 2019-07-09
Attending: INTERNAL MEDICINE
Payer: COMMERCIAL

## 2019-07-09 ENCOUNTER — DOCUMENTATION ONLY (OUTPATIENT)
Dept: CARDIOLOGY | Facility: CLINIC | Age: 84
End: 2019-07-09

## 2019-07-09 VITALS
HEART RATE: 66 BPM | WEIGHT: 226.9 LBS | HEIGHT: 68 IN | SYSTOLIC BLOOD PRESSURE: 142 MMHG | DIASTOLIC BLOOD PRESSURE: 86 MMHG | BODY MASS INDEX: 34.39 KG/M2

## 2019-07-09 DIAGNOSIS — M79.605 BILATERAL LOWER EXTREMITY PAIN: ICD-10-CM

## 2019-07-09 DIAGNOSIS — R09.89 OTHER SPECIFIED SYMPTOMS AND SIGNS INVOLVING THE CIRCULATORY AND RESPIRATORY SYSTEMS: ICD-10-CM

## 2019-07-09 DIAGNOSIS — I77.9 AORTA DISORDER (H): ICD-10-CM

## 2019-07-09 DIAGNOSIS — I70.90 ATHEROSCLEROSIS OF ARTERY: ICD-10-CM

## 2019-07-09 DIAGNOSIS — M79.604 BILATERAL LOWER EXTREMITY PAIN: ICD-10-CM

## 2019-07-09 DIAGNOSIS — R06.09 DYSPNEA ON EXERTION: ICD-10-CM

## 2019-07-09 DIAGNOSIS — R06.02 SOB (SHORTNESS OF BREATH): Primary | ICD-10-CM

## 2019-07-09 DIAGNOSIS — I48.19 PERSISTENT ATRIAL FIBRILLATION (H): ICD-10-CM

## 2019-07-09 DIAGNOSIS — I73.9 CLAUDICATION OF GLUTEAL REGION (H): Primary | ICD-10-CM

## 2019-07-09 LAB
ANION GAP SERPL CALCULATED.3IONS-SCNC: 11.1 MMOL/L (ref 6–17)
BUN SERPL-MCNC: 24 MG/DL (ref 7–30)
CALCIUM SERPL-MCNC: 10 MG/DL (ref 8.5–10.5)
CHLORIDE SERPL-SCNC: 105 MMOL/L (ref 98–107)
CO2 SERPL-SCNC: 29 MMOL/L (ref 23–29)
CREAT SERPL-MCNC: 1.38 MG/DL (ref 0.7–1.3)
ERYTHROCYTE [DISTWIDTH] IN BLOOD BY AUTOMATED COUNT: 14.1 % (ref 10–15)
GFR SERPL CREATININE-BSD FRML MDRD: 49 ML/MIN/{1.73_M2}
GLUCOSE SERPL-MCNC: 149 MG/DL (ref 70–105)
HCT VFR BLD AUTO: 42.3 % (ref 40–53)
HGB BLD-MCNC: 13.8 G/DL (ref 13.3–17.7)
MCH RBC QN AUTO: 30.5 PG (ref 26.5–33)
MCHC RBC AUTO-ENTMCNC: 32.6 G/DL (ref 31.5–36.5)
MCV RBC AUTO: 94 FL (ref 78–100)
NT-PROBNP SERPL-MCNC: 1759 PG/ML (ref 0–450)
PLATELET # BLD AUTO: 159 10E9/L (ref 150–450)
POTASSIUM SERPL-SCNC: 4.1 MMOL/L (ref 3.5–5.1)
RBC # BLD AUTO: 4.52 10E12/L (ref 4.4–5.9)
SODIUM SERPL-SCNC: 141 MMOL/L (ref 136–145)
WBC # BLD AUTO: 5.8 10E9/L (ref 4–11)

## 2019-07-09 PROCEDURE — 36415 COLL VENOUS BLD VENIPUNCTURE: CPT | Performed by: INTERNAL MEDICINE

## 2019-07-09 PROCEDURE — 99214 OFFICE O/P EST MOD 30 MIN: CPT | Performed by: PHYSICIAN ASSISTANT

## 2019-07-09 PROCEDURE — 80048 BASIC METABOLIC PNL TOTAL CA: CPT | Performed by: INTERNAL MEDICINE

## 2019-07-09 PROCEDURE — 83880 ASSAY OF NATRIURETIC PEPTIDE: CPT | Performed by: INTERNAL MEDICINE

## 2019-07-09 PROCEDURE — 85027 COMPLETE CBC AUTOMATED: CPT | Performed by: INTERNAL MEDICINE

## 2019-07-09 RX ORDER — TORSEMIDE 20 MG/1
20 TABLET ORAL DAILY
Qty: 30 TABLET | Refills: 2 | Status: SHIPPED | OUTPATIENT
Start: 2019-07-09 | End: 2019-07-15

## 2019-07-09 ASSESSMENT — MIFFLIN-ST. JEOR: SCORE: 1708.71

## 2019-07-09 NOTE — PATIENT INSTRUCTIONS
1. Reviewed your continued breathing issues.   O2 sats looked good!  HR came up appropriately with walking     2. Get blood work today. We'll call with results to determine how much torsemide (diuretic) we should use    3.  My nurse Nona: [phone]

## 2019-07-09 NOTE — PROGRESS NOTES
HPI:   I had the pleasure of seeing Spencer when he and Kavitha came for follow up of atrial fibrillation.  He is an 83 year old who sees Dr. Lee and Dr. Camp for his history of:     1. Atrial Arrhythmias with atypical atrial flutter and atrial fibrillation first diagnosed 12/2018 in the setting of a normal ejection fraction.  A rate control/anticoagulation strategy was recommended for his CHADSVASc of 4 (hypertension, presumed coronary disease and age)  2.  On chronic anticoagulation with Eliquis 5 mg twice daily secondary to CHADSVASc of 4  3.   Chronic dyspnea, noted by Dr. Camp 6/2019.  30-pack-year history of tobacco use, quitting~2000  4.  Obstructive sleep apnea on ASV therapy  5.  Ascending aorta and aortic root dilatation (4.6 cm, 4.6 cm) noted on echocardiogram 12/2018  6.  Presumed coronary disease based on stress echocardiogram 1/2011.  Dr. Camp angioma opted to treat this medically in the absence of symptoms.  Repeat stress test 12/2014 was negative for ischemia   7. Hypertension, dyslipidemia      When I last saw Spencer 3/2019, he was doing pretty well.  He had had some hypotension, prompting Natalya Lewis MD to discontinue hydrochlorothiazide.  His cuff was accurate, but his blood pressure was quite elevated at our appointment.  I made no changes, but requested close follow-up.  We got an aortic MRA and Holter monitor prior to his appointment with Dr. Camp.    He saw Dr. Camp 6/2019, and he reviewed his MRA and Holter monitor (see below).  He complained of some shortness of breath, likely due to some underlying COPD.  Dr. Camp noted that his breathing did not change and he was placed on hydrochlorothiazide.  It did not change with reduction in his rate controlling medications.  He had not had any chest discomfort.  He noted that he had a difficult gait with balance issues, and his breathing it seemed much improved when he could use a cart to go up and on the aisles in the grocery store rather than  "when he was just walking around on his own.  Dr. Camp did not believe he had a significant heart failure component, but recommended that he start checking his heart rate and O2 sats when he felt short of breath.      TODAY - O2 sats at home 97% at rest and even when feels SOB, is still in the high 90s.      - Had to stop 3 times while walking across the [place] today. Walked in clinic with me with pulse ox. Didn't have to stop but \"got close.\"  O2 sats stayed >95% and HR yevgeniy quickly from 60s-120s and stayed 110-120 with exertion. SOB was reproduced.    - Legs tired with walking. Hips/buttocks are tight with walking but no claudication in calves. Thinks that this affects gait causing issues with breathing as well.    - No complaints of chest pain, pressure or tightness.  No orthopnea, PND or change in chronic LE edema.       Diagnostic Testing:    EKG 6/2019, which I overread, showed atrial fibrillation at 79 bpm.  EKG during hospitalization 12/3/18 showed atypical atrial flutter at 126 bpm     MRA chest 5/2019 showed aortic root was borderline dilated (4 x 3.9 cm).  The ascending aorta was mildly dilated (4.4 x 4.5 at the level of bifurcation of pulmonary arteries)  Holter monitor 5/21/2019 showed atrial fibrillation with an average heart rate of 75 bpm.  Range was  bpm.  There were no symptom medic positive.  He had 2 \"events\" which correlated with chronic atrial fibrillation with heart rates in the 70s and 80s.  Echocardiogram done 12/2018 showed an EF of 55%.  He had mildly reduced right ventricular systolic function.  Left atrial size was moderately-severely dilated with a left atrial volume index of 51.2 mL/m .  Left atrial size of 6.0 cm.  As above, his aortic root and ascending aorta were both enlarged at 4.6 cm     Stress echocardiogram 12/2014 was negative for stress-induced wall motion abnormalities.    PFTs done 2/2011 showed mild obstruction     Aortoiliac ultrasound 12/2014 showed evidence of " plaquing in the aortoiliac arterial system.  Abdominal aorta was normal in size with a maximum dimension of 2.4 cm    Assessment & Plan:    1.  Shortness of breath    O2 sats OK    Doesn't appear to have chronotropic incompetence given good HR response with exertion. No CP    Appears to have more HJR today; no crackles or significant edema    PLAN:    BMP, BNP and CBC today    Will try torsemide instead of hydrochlorothiazide given R sided abnormalities on Echo 12/2018 and HJR today    Pulmonary w/u.  Sees [facility]      2.  Ascending aortic aneurysm    MRA as above    C/o buttock/leg pain ... ?gluteal claudication?    PLAN:    Dr. Camp recommended echocardiogram to evaluate this 6/2020 given that his MRA was stable    ABIs/Aorta and Iliac arterial ultrasound    3.  Permanent atrial fibrillation    Holter monitor revealed heart rate was under good control, with a good range of heart rates    Remains on anticoagulation, diltiazem and metoprolol    PLAN:    Continue meds    Cherise Bennett PA-C, MSPAS      Orders Placed This Encounter   Procedures     US CAT Doppler with Exercise Bilateral     US Aorta/Ivc/Iliac Duplex Complete     Basic metabolic panel     N terminal pro BNP outpatient     CBC with platelets     Pulmonary Medicine Referral     No orders of the defined types were placed in this encounter.    There are no discontinued medications.      Encounter Diagnoses   Name Primary?     Dyspnea on exertion      Persistent atrial fibrillation (H)      Claudication of gluteal region (H) Yes     Atherosclerosis of artery      Bilateral lower extremity pain      Aorta disorder (H)      Other specified symptoms and signs involving the circulatory and respiratory systems         CURRENT MEDICATIONS:  Current Outpatient Medications   Medication Sig Dispense Refill     apixaban ANTICOAGULANT (ELIQUIS) 5 MG tablet Take 1 tablet (5 mg) by mouth 2 times daily 60 tablet 11     atenolol (TENORMIN) 100 MG tablet Take 1.5  tablets (150 mg) by mouth daily 135 tablet 3     atorvastatin (LIPITOR) 40 MG tablet Take 1 tablet (40 mg) by mouth daily 90 tablet 3     diltiazem ER (DILT-XR) 120 MG 24 hr capsule Take 1 capsule (120 mg) by mouth daily 90 capsule 3     EPINEPHrine (EPIPEN/ADRENACLICK/OR ANY BX GENERIC EQUIV) 0.3 MG/0.3ML injection 2-pack Inject 0.3 mLs (0.3 mg) into the muscle as needed for anaphylaxis 0.6 mL 1     escitalopram (LEXAPRO) 10 MG tablet Take 1 tablet (10 mg) by mouth daily Needs appointment for further refills. 30 tablet 0     hydrochlorothiazide (HYDRODIURIL) 25 MG tablet Take 12.5 mg by mouth daily        ipratropium (ATROVENT) 0.06 % spray Spray 2 sprays in nostril 4 times daily as needed for rhinitis 3 Box 3     leuprolide (ELIGARD) 45 MG injection Inject 45 mg Subcutaneous every 6 months. 1 each 12     Loperamide HCl (IMODIUM OR) Take by mouth daily as needed       losartan (COZAAR) 100 MG tablet TAKE 1 TABLET BY MOUTH ONCE DAILY 90 tablet 3     Multiple Minerals-Vitamins (PROSTEON PO) Take 2,000 Units by mouth With vitamin D       Multiple Vitamins-Minerals (CENTRUM SILVER PO) Take by mouth daily        colchicine (COLCYRS) 0.6 MG tablet 0.6 mg 3 times daily on the first day of flare; maximum total dose: 1.8 mg on day 1. Days 2 and beyond: 0.6 mg once or twice daily until flare resolves (Patient not taking: Reported on 4/4/2019) 30 tablet 0       ALLERGIES     Allergies   Allergen Reactions     Augmentin Rash     Type III hypersensitivity     Bactrim [Sulfamethoxazole W/Trimethoprim] Rash     Serum sickness, type III hypersensitivity       Bee Venom Itching     Sulfa Drugs Hives     Lisinopril Cough     Naproxen Hives       PAST MEDICAL HISTORY:  Past Medical History:   Diagnosis Date     Actinic keratosis      Arthritis      Atrial fibrillation and flutter (H) 12/3/2018     CAD (coronary artery disease)     1/5/2011 lateral ischemia on stress echo, 12/2014 normal stress echo     Coronary artery disease  2011    Abnormal stress test 2011 and controlled with medical mgmt      Dyslipidemia      Emphysema of lung (H)      Essential hypertension, benign      Hernia, abdominal      Hypersomnia with sleep apnea, unspecified     on bipap, partially treated with residual apneas     Hypertrophy (benign) of prostate     Biopsy  negative for cancer; PSA 5     Lumbago      Mumps      Prostate cancer (H) 12/15/2006     Skin cancer, basal cell 1997     Spider veins        PAST SURGICAL HISTORY:  Past Surgical History:   Procedure Laterality Date     HERNIA REPAIR  child     Moh's procedure for basal cell carcinoma  2001     PROSTATE SURGERY       s/p lumbar laminectomy NOS  1988     VITRECTOMY PARS PLANA REMOVE PRERETINAL MEMBRANE   3/09       FAMILY HISTORY:  Family History   Problem Relation Age of Onset     Alzheimer Disease Mother      Hypertension Mother      Cardiovascular Father         D:86 complications fo CHF     Prostate Cancer Brother      Lung Cancer Brother 76     Prostate Cancer Brother        SOCIAL HISTORY:  Social History     Socioeconomic History     Marital status:      Spouse name: None     Number of children: None     Years of education: None     Highest education level: None   Occupational History     Occupation: retired   Social Needs     Financial resource strain: None     Food insecurity:     Worry: None     Inability: None     Transportation needs:     Medical: None     Non-medical: None   Tobacco Use     Smoking status: Former Smoker     Packs/day: 1.00     Years: 30.00     Pack years: 30.00     Types: Cigarettes     Start date:      Last attempt to quit: 1978     Years since quittin.5     Smokeless tobacco: Never Used   Substance and Sexual Activity     Alcohol use: Yes     Alcohol/week: 1.0 oz     Types: 2 Standard drinks or equivalent per week     Comment: socially     Drug use: No     Sexual activity: Yes     Partners: Female   Lifestyle     Physical activity:  "    Days per week: None     Minutes per session: None     Stress: None   Relationships     Social connections:     Talks on phone: None     Gets together: None     Attends Latter day service: None     Active member of club or organization: None     Attends meetings of clubs or organizations: None     Relationship status: None     Intimate partner violence:     Fear of current or ex partner: None     Emotionally abused: None     Physically abused: None     Forced sexual activity: None   Other Topics Concern      Service Not Asked     Blood Transfusions Not Asked     Caffeine Concern No     Comment: 0-2 cans of soda per day.     Occupational Exposure Not Asked     Hobby Hazards Not Asked     Sleep Concern Not Asked     Stress Concern Not Asked     Weight Concern Not Asked     Special Diet Not Asked     Back Care Not Asked     Exercise No     Bike Helmet Not Asked     Seat Belt Yes     Self-Exams Not Asked     Parent/sibling w/ CABG, MI or angioplasty before 65F 55M? No   Social History Narrative     None       Review of Systems:  Skin:  Negative     Eyes:  Positive for glasses  ENT:  Negative    Respiratory:  Positive for sleep apnea;CPAP  Cardiovascular:  chest pain;palpitations;dizziness;lightheadedness;syncope or near-syncope;cyanosis;Negative for Positive for;fatigue;edema;exercise intolerance  Gastroenterology: Negative    Genitourinary:  Positive for urinary frequency;urgency;nocturia  Musculoskeletal:  Positive for joint pain  Neurologic:  Negative    Psychiatric:  Positive for depression  Heme/Lymph/Imm:  Positive for allergies  Endocrine:  Negative      Physical Exam:  Vitals: /86   Pulse 66   Ht 1.727 m (5' 8\")   Wt 102.9 kg (226 lb 14.4 oz)   BMI 34.50 kg/m      Constitutional:  cooperative, alert and oriented, well developed, well nourished, in no acute distress        Skin:  warm and dry to the touch        Head:  normocephalic        Eyes:  pupils equal and round;conjunctivae and lids " unremarkable;sclera white        ENT:  no pallor or cyanosis, dentition good        Neck:  no carotid bruit;JVP normal;carotid pulses are full and equal bilaterally hepatojugular reflux No HJR    Chest:  clear to auscultation;normal symmetry;normal respiratory excursion        Cardiac: normal S1 and S2;no murmurs, gallops or rubs detected irregularly irregular rhythm distant heart sounds              Abdomen:  abdomen soft;non-tender;no bruits obese Firm    Vascular:   pulses below the femoral arteries are diminished                               strong radial pulses, slighly diminshed dorsalis pedis and posterior tibia    Extremities and Back:  no deformities, clubbing, cyanosis, erythema observed        Neurological:  no gross motor deficits          Recent Lab Results:  LIPID RESULTS:  Lab Results   Component Value Date    CHOL 124 10/17/2018    HDL 41 10/17/2018    LDL 60 10/17/2018    TRIG 117 10/17/2018    CHOLHDLRATIO 2.8 10/15/2015       LIVER ENZYME RESULTS:  Lab Results   Component Value Date    AST 29 08/18/2011    ALT 29 08/18/2011       CBC RESULTS:  Lab Results   Component Value Date    WBC 5.8 07/09/2019    RBC 4.52 07/09/2019    HGB 13.8 07/09/2019    HCT 42.3 07/09/2019    MCV 94 07/09/2019    MCH 30.5 07/09/2019    MCHC 32.6 07/09/2019    RDW 14.1 07/09/2019     07/09/2019       BMP RESULTS:  Lab Results   Component Value Date     07/09/2019    POTASSIUM 4.1 07/09/2019    CHLORIDE 105 07/09/2019    CO2 29 07/09/2019    ANIONGAP 11.1 07/09/2019     (H) 07/09/2019    BUN 24 07/09/2019    CR 1.38 (H) 07/09/2019    GFRESTIMATED 49 (L) 07/09/2019    GFRESTBLACK 60 (L) 07/09/2019    MARLENE 10.0 07/09/2019        A1C RESULTS:  Lab Results   Component Value Date    A1C 5.9 (H) 12/04/2018       INR RESULTS:  Lab Results   Component Value Date    INR 1.03 12/03/2018    INR 1.07 11/27/2007           CC  Natalya Lewis MD  [address] [place] [address]

## 2019-07-09 NOTE — LETTER
7/9/2019    Natalya Lewis MD  [address] [place] [address]    RE: Nixon More       Dear Colleague,    I had the pleasure of seeing Nixon RILEY Argenis in the [facility].    HPI:   I had the pleasure of seeing Spencer when he and Kavitha came for follow up of atrial fibrillation.  He is an 83 year old who sees Dr. Lee and Dr. Camp for his history of:     1. Atrial Arrhythmias with atypical atrial flutter and atrial fibrillation first diagnosed 12/2018 in the setting of a normal ejection fraction.  A rate control/anticoagulation strategy was recommended for his CHADSVASc of 4 (hypertension, presumed coronary disease and age)  2.  On chronic anticoagulation with Eliquis 5 mg twice daily secondary to CHADSVASc of 4  3.    Chronic dyspnea, noted by Dr. Camp 6/2019.  30-pack-year history of tobacco use, quitting~2000  4.  Obstructive sleep apnea on  ASV therapy  5.  Ascending aorta and aortic root dilatation (4.6 cm, 4.6 cm) noted on echocardiogram 12/2018  6.  Presumed coronary disease based on stress echocardiogram 1/2011.  Dr. Camp angioma opted to treat this medically in the absence of symptoms.  Repeat stress test 12/2014 was negative for ischemia   7. Hypertension, dyslipidemia      When I last saw Spencer 3/2019, he was doing pretty well.  He had had some hypotension, prompting Natalya Lewis MD to discontinue hydrochlorothiazide.  His cuff was accurate, but his blood pressure was quite elevated at our appointment.  I made no changes, but requested close follow-up.  We got an aortic MRA and Holter monitor prior to his appointment with Dr. Camp.    He saw Dr. Camp 6/2019, and he reviewed his MRA and Holter monitor (see below).  He complained of some shortness of breath, likely due to some underlying COPD.  Dr. Camp noted that his breathing did not change and he was placed on hydrochlorothiazide.  It did not change with reduction in his rate controlling medications.  " He had not had any chest discomfort.  He noted that he had a difficult gait with balance issues, and his breathing it seemed much improved when he could use a cart to go up and on the aisles in the grocery store rather than when he was just walking around on his own.  Dr. Camp did not believe he had a significant heart failure component, but recommended that he start checking his heart rate and O2 sats when he felt short of breath.      TODAY - O2 sats at home 97% at rest and even when feels SOB, is still in the high 90s.      - Had to stop 3 times while walking across the [place] today. Walked in clinic with me with pulse ox. Didn't have to stop but \"got close.\"  O2 sats stayed >95% and HR yevgeniy quickly from 60s-120s and stayed 110-120 with exertion. SOB was reproduced.    - Legs tired with walking. Hips/buttocks are tight with walking but no claudication in calves. Thinks that this affects gait causing issues with breathing as well.    - No complaints of chest pain, pressure or tightness.  No orthopnea, PND or change in chronic LE edema.       Diagnostic Testing:    EKG 6/2019, which I overread, showed atrial fibrillation at 7 9 bpm.  EKG during hospitalization 12/3/18 showed atypical atrial flutter at 126 bpm     MRA chest 5/2019 showed aortic root was borderline dilated (4 x 3.9 cm).  The ascending aorta was mildly dilated (4.4 x 4.5 at the level of bifurcation of pulmonary arteries)  Holter monitor 5/21/2019 showed atrial fibrillation with an average heart rate of 75 bpm.  Range was  bpm.  There were no symptom medic positive.  He had 2 \"events\" which correlated with chronic atrial fibrillation with heart rates in the 70s and 80s.  Echocardiogram done 12/2018 showed an EF of 55%.  He had mildly reduced right ventricular systolic function.  Left atrial size was moderately-severely dilated with a left atrial volume index of 51.2 mL/m .  Left atrial size of 6.0 cm.  As above, his aortic root and ascending " aorta were both enlarged at 4.6 cm     Stress echocardiogram 12/2014 was negative for stress-induced wall motion abnormalities.    PFTs done 2/2011 showed mild obstruction     Aortoiliac ultrasound 12/2014 showed evidence of plaquing in the aortoiliac arterial system.  Abdominal aorta was normal in size with a maximum dimension of 2.4 cm    Assessment & Plan:    1.  Shortness of breath    O2 sats OK    Doesn't appear to have chronotropic incompetence given good HR response with exertion. No CP    Appears to have more HJR today; no crackles or significant edema    PLAN:    BMP, BNP and CBC today    Will try torsemide instead of hydrochlorothiazide given R sided abnormalities on Echo 12/2018 and HJR today    Pulmonary w/u.  Sees [facility]      2.  Ascending aortic aneurysm    MRA as above    C/o buttock/leg pain ... ?gluteal claudication?    PLAN:    Dr. Camp recommended echocardiogram to evaluate this 6/2020 given that his MRA was stable    ABIs/Aorta and Iliac arterial ultrasound    3.  Permanent atrial fibrillation    Holter monitor revealed heart rate was under good control, with a good range of heart rates    Remains on anticoagulation, diltiazem and metoprolol    PLAN:    Continue meds    Cherise Bennett PA-C, MSPAS      Orders Placed This Encounter   Procedures     US CAT Doppler with Exercise Bilateral     US Aorta/Ivc/Iliac Duplex Complete     Basic metabolic panel     N terminal pro BNP outpatient     CBC with platelets     Pulmonary Medicine Referral     No orders of the defined types were placed in this encounter.    There are no discontinued medications.      Encounter Diagnoses   Name Primary?     Dyspnea on exertion      Persistent atrial fibrillation (H)      Claudication of gluteal region (H) Yes     Atherosclerosis of artery      Bilateral lower extremity pain      Aorta disorder (H)      Other specified symptoms and signs involving the circulatory and respiratory systems         CURRENT  MEDICATIONS:  Current Outpatient Medications   Medication Sig Dispense Refill     apixaban ANTICOAGULANT (ELIQUIS) 5 MG tablet Take 1 tablet (5 mg) by mouth 2 times daily 60 tablet 11     atenolol (TENORMIN) 100 MG tablet Take 1.5 tablets (150 mg) by mouth daily 135 tablet 3     atorvastatin (LIPITOR) 40 MG tablet Take 1 tablet (40 mg) by mouth daily 90 tablet 3     diltiazem ER (DILT-XR) 120 MG 24 hr capsule Take 1 capsule (120 mg) by mouth daily 90 capsule 3     EPINEPHrine (EPIPEN/ADRENACLICK/OR ANY BX GENERIC EQUIV) 0.3 MG/0.3ML injection 2-pack Inject 0.3 mLs (0.3 mg) into the muscle as needed for anaphylaxis 0.6 mL 1     escitalopram (LEXAPRO) 10 MG tablet Take 1 tablet (10 mg) by mouth daily Needs appointment for further refills. 30 tablet 0     hydrochlorothiazide (HYDRODIURIL) 25 MG tablet Take 12.5 mg by mouth daily        ipratropium (ATROVENT) 0.06 % spray Spray 2 sprays in nostril 4 times daily as needed for rhinitis 3 Box 3     leuprolide (ELIGARD) 45 MG injection Inject 45 mg Subcutaneous every 6 months. 1 each 12     Loperamide HCl (IMODIUM OR) Take by mouth daily as needed       losartan (COZAAR) 100 MG tablet TAKE 1 TABLET BY MOUTH ONCE DAILY 90 tablet 3     Multiple Minerals-Vitamins (PROSTEON PO) Take 2,000 Units by mouth With vitamin D       Multiple Vitamins-Minerals (CENTRUM SILVER PO) Take by mouth daily        colchicine (COLCYRS) 0.6 MG tablet 0.6 mg 3 times daily on the first day of flare; maximum total dose: 1.8 mg on day 1. Days 2 and beyond: 0.6 mg once or twice daily until flare resolves (Patient not taking: Reported on 4/4/2019) 30 tablet 0       ALLERGIES     Allergies   Allergen Reactions     Augmentin Rash     Type III hypersensitivity     Bactrim [Sulfamethoxazole W/Trimethoprim] Rash     Serum sickness, type III hypersensitivity       Bee Venom Itching     Sulfa Drugs Hives     Lisinopril Cough     Naproxen Hives       PAST MEDICAL HISTORY:  Past Medical History:   Diagnosis Date      Actinic keratosis      Arthritis      Atrial fibrillation and flutter (H) 12/3/2018     CAD (coronary artery disease)     2011 lateral ischemia on stress echo, 2014 normal stress echo     Coronary artery disease 2011    Abnormal stress test 2011 and controlled with medical mgmt      Dyslipidemia      Emphysema of lung (H)      Essential hypertension, benign      Hernia, abdominal      Hypersomnia with sleep apnea, unspecified     on bipap, partially treated with residual apneas     Hypertrophy (benign) of prostate     Biopsy  negative for cancer; PSA 5     Lumbago      Mumps      Prostate cancer (H) 12/15/2006     Skin cancer, basal cell 1997     Spider veins        PAST SURGICAL HISTORY:  Past Surgical History:   Procedure Laterality Date     HERNIA REPAIR  child     Moh's procedure for basal cell carcinoma  2001     PROSTATE SURGERY       s/p lumbar laminectomy NOS  1988     VITRECTOMY PARS PLANA REMOVE PRERETINAL MEMBRANE   3/09       FAMILY HISTORY:  Family History   Problem Relation Age of Onset     Alzheimer Disease Mother      Hypertension Mother      Cardiovascular Father         D:86 complications fo CHF     Prostate Cancer Brother      Lung Cancer Brother 76     Prostate Cancer Brother        SOCIAL HISTORY:  Social History     Socioeconomic History     Marital status:      Spouse name: None     Number of children: None     Years of education: None     Highest education level: None   Occupational History     Occupation: retired   Social Needs     Financial resource strain: None     Food insecurity:     Worry: None     Inability: None     Transportation needs:     Medical: None     Non-medical: None   Tobacco Use     Smoking status: Former Smoker     Packs/day: 1.00     Years: 30.00     Pack years: 30.00     Types: Cigarettes     Start date:      Last attempt to quit: 1978     Years since quittin.5     Smokeless tobacco: Never Used   Substance and Sexual  "Activity     Alcohol use: Yes     Alcohol/week: 1.0 oz     Types: 2 Standard drinks or equivalent per week     Comment: socially     Drug use: No     Sexual activity: Yes     Partners: Female   Lifestyle     Physical activity:     Days per week: None     Minutes per session: None     Stress: None   Relationships     Social connections:     Talks on phone: None     Gets together: None     Attends L[employer] service: None     Active member of club or organization: None     Attends meetings of clubs or organizations: None     Relationship status: None     Intimate partner violence:     Fear of current or ex partner: None     Emotionally abused: None     Physically abused: None     Forced sexual activity: None   Other Topics Concern      Service Not Asked     Blood Transfusions Not Asked     Caffeine Concern No     Comment: 0-2 cans of soda per day.     Occupational Exposure Not Asked     Hobby Hazards Not Asked     Sleep Concern Not Asked     Stress Concern Not Asked     Weight Concern Not Asked     Special Diet Not Asked     Back Care Not Asked     Exercise No     Bike Helmet Not Asked     Seat Belt Yes     Self-Exams Not Asked     Parent/sibling w/ CABG, MI or angioplasty before 65F 55M? No   Social History Narrative     None       Review of Systems:  Skin:  Negative     Eyes:  Positive for glasses  ENT:  Negative    Respiratory:  Positive for sleep apnea;CPAP  Cardiovascular:  chest pain;palpitations;dizziness;lightheadedness;syncope or near-syncope;cyanosis;Negative for Positive for;fatigue;edema;exercise intolerance  Gastroenterology: Negative    Genitourinary:  Positive for urinary frequency;urgency;nocturia  Musculoskeletal:  Positive for joint pain  Neurologic:  Negative    Psychiatric:  Positive for depression  Heme/Lymph/Imm:  Positive for allergies  Endocrine:  Negative      Physical Exam:  Vitals: /86   Pulse 66   Ht 1.727 m (5' 8\")   Wt 102.9 kg (226 lb 14.4 oz)   BMI 34.50 kg/m   "     Constitutional:  cooperative, alert and oriented, well developed, well nourished, in no acute distress        Skin:  warm and dry to the touch        Head:  normocephalic        Eyes:  pupils equal and round;conjunctivae and lids unremarkable;sclera white        ENT:  no pallor or cyanosis, dentition good        Neck:  no carotid bruit;JVP normal;carotid pulses are full and equal bilaterally hepatojugular reflux No HJR    Chest:  clear to auscultation;normal symmetry;normal respiratory excursion        Cardiac: normal S1 and S2;no murmurs, gallops or rubs detected irregularly irregular rhythm distant heart sounds              Abdomen:  abdomen soft;non-tender;no bruits obese Firm    Vascular:   pulses below the femoral arteries are diminished                               strong radial pulses, slighly diminshed dorsalis pedis and posterior tibia    Extremities and Back:  no deformities, clubbing, cyanosis, erythema observed        Neurological:  no gross motor deficits          Recent Lab Results:  LIPID RESULTS:  Lab Results   Component Value Date    CHOL 124 10/17/2018    HDL 41 10/17/2018    LDL 60 10/17/2018    TRIG 117 10/17/2018    CHOLHDLRATIO 2.8 10/15/2015       LIVER ENZYME RESULTS:  Lab Results   Component Value Date    AST 29 08/18/2011    ALT 29 08/18/2011       CBC RESULTS:  Lab Results   Component Value Date    WBC 5.8 07/09/2019    RBC 4.52 07/09/2019    HGB 13.8 07/09/2019    HCT 42.3 07/09/2019    MCV 94 07/09/2019    MCH 30.5 07/09/2019    MCHC 32.6 07/09/2019    RDW 14.1 07/09/2019     07/09/2019       BMP RESULTS:  Lab Results   Component Value Date     07/09/2019    POTASSIUM 4.1 07/09/2019    CHLORIDE 105 07/09/2019    CO2 29 07/09/2019    ANIONGAP 11.1 07/09/2019     (H) 07/09/2019    BUN 24 07/09/2019    CR 1.38 (H) 07/09/2019    GFRESTIMATED 49 (L) 07/09/2019    GFRESTBLACK 60 (L) 07/09/2019    MARLENE 10.0 07/09/2019        A1C RESULTS:  Lab Results   Component Value  Date    A1C 5.9 (H) 12/04/2018       INR RESULTS:  Lab Results   Component Value Date    INR 1.03 12/03/2018    INR 1.07 11/27/2007         Thank you for allowing me to participate in the care of your patient.    Sincerely,     Nimisha Bennett PA-C     [facility]

## 2019-07-09 NOTE — PROGRESS NOTES
Called Spencer to discuss labs.  Hgb fine, BNP up. BMP with Cr up from 1.25 in 1/2019 to 1.38     STOP hydrochlorothiazide 25 mg daily  START torsemide 20 mg   Take 40 mg Wed   Take 40 mg Th   Take 20 mg F-M    CALL Monday with update.  Will have schedulers call him to set up ABIs/US testing    Component      Latest Ref Rng & Units 1/8/2019 7/9/2019   Sodium      136 - 145 mmol/L 137 141   Potassium      3.5 - 5.1 mmol/L 4.1 4.1   Chloride      98 - 107 mmol/L 103 105   Carbon Dioxide      23 - 29 mmol/L 31 (H) 29   Anion Gap      6 - 17 mmol/L 7.1 11.1   Glucose      70 - 105 mg/dL 127 (H) 149 (H)   Urea Nitrogen      7 - 30 mg/dL 22 24   Creatinine      0.70 - 1.30 mg/dL 1.25 1.38 (H)   GFR Estimate      >60 mL/min/1.73:m2 55 (L) 49 (L)   GFR Estimate If Black      >60 mL/min/1.73:m2 67 60 (L)   Calcium      8.5 - 10.5 mg/dL 9.9 10.0     Component      Latest Ref Rng & Units 12/3/2018 7/9/2019   N-Terminal Pro BNP Inpatient      0 - 1,800 pg/mL 1,210    N-Terminal Pro Bnp      0 - 450 pg/mL  1,759 (H)     Component      Latest Ref Rng & Units 12/3/2018 7/9/2019   WBC      4.0 - 11.0 10e9/L 9.0 5.8   RBC Count      4.4 - 5.9 10e12/L 4.74 4.52   Hemoglobin      13.3 - 17.7 g/dL 14.8 13.8   Hematocrit      40.0 - 53.0 % 45.4 42.3   MCV      78 - 100 fl 96 94   MCH      26.5 - 33.0 pg 31.2 30.5   MCHC      31.5 - 36.5 g/dL 32.6 32.6   RDW      10.0 - 15.0 % 12.8 14.1   Platelet Count      150 - 450 10e9/L 181 159

## 2019-07-09 NOTE — LETTER
7/9/2019    Natalya Lewis MD  [address] [place] [address]    RE: Nixon More       Dear Colleague,    I had the pleasure of seeing Nixon RILEY Argenis in the [facility].    HPI:   I had the pleasure of seeing Spencer when he and Kavitha came for follow up of atrial fibrillation.  He is an 83 year old who sees Dr. Lee and Dr. Camp for his history of:     1. Atrial Arrhythmias with atypical atrial flutter and atrial fibrillation first diagnosed 12/2018 in the setting of a normal ejection fraction.  A rate control/anticoagulation strategy was recommended for his CHADSVASc of 4 (hypertension, presumed coronary disease and age)  2.  On chronic anticoagulation with Eliquis 5 mg twice daily secondary to CHADSVASc of 4  3.    Chronic dyspnea, noted by Dr. Camp 6/2019.  30-pack-year history of tobacco use, quitting~2000  4.  Obstructive sleep apnea on  ASV therapy  5.  Ascending aorta and aortic root dilatation (4.6 cm, 4.6 cm) noted on echocardiogram 12/2018  6.  Presumed coronary disease based on stress echocardiogram 1/2011.  Dr. Camp angioma opted to treat this medically in the absence of symptoms.  Repeat stress test 12/2014 was negative for ischemia   7. Hypertension, dyslipidemia      When I last saw Spencer 3/2019, he was doing pretty well.  He had had some hypotension, prompting Natalya Lewis MD to discontinue hydrochlorothiazide.  His cuff was accurate, but his blood pressure was quite elevated at our appointment.  I made no changes, but requested close follow-up.  We got an aortic MRA and Holter monitor prior to his appointment with Dr. Camp.    He saw Dr. Camp 6/2019, and he reviewed his MRA and Holter monitor (see below).  He complained of some shortness of breath, likely due to some underlying COPD.  Dr. Camp noted that his breathing did not change and he was placed on hydrochlorothiazide.  It did not change with reduction in his rate controlling medications.  " He had not had any chest discomfort.  He noted that he had a difficult gait with balance issues, and his breathing it seemed much improved when he could use a cart to go up and on the aisles in the grocery store rather than when he was just walking around on his own.  Dr. Camp did not believe he had a significant heart failure component, but recommended that he start checking his heart rate and O2 sats when he felt short of breath.      TODAY - O2 sats at home 97% at rest and even when feels SOB, is still in the high 90s.      - Had to stop 3 times while walking across the [place] today. Walked in clinic with me with pulse ox. Didn't have to stop but \"got close.\"  O2 sats stayed >95% and HR yevgeniy quickly from 60s-120s and stayed 110-120 with exertion. SOB was reproduced.    - Legs tired with walking. Hips/buttocks are tight with walking but no claudication in calves. Thinks that this affects gait causing issues with breathing as well.    - No complaints of chest pain, pressure or tightness.  No orthopnea, PND or change in chronic LE edema.       Diagnostic Testing:    EKG 6/2019, which I overread, showed atrial fibrillation at 7 9 bpm.  EKG during hospitalization 12/3/18 showed atypical atrial flutter at 126 bpm     MRA chest 5/2019 showed aortic root was borderline dilated (4 x 3.9 cm).  The ascending aorta was mildly dilated (4.4 x 4.5 at the level of bifurcation of pulmonary arteries)  Holter monitor 5/21/2019 showed atrial fibrillation with an average heart rate of 75 bpm.  Range was  bpm.  There were no symptom medic positive.  He had 2 \"events\" which correlated with chronic atrial fibrillation with heart rates in the 70s and 80s.  Echocardiogram done 12/2018 showed an EF of 55%.  He had mildly reduced right ventricular systolic function.  Left atrial size was moderately-severely dilated with a left atrial volume index of 51.2 mL/m .  Left atrial size of 6.0 cm.  As above, his aortic root and ascending " aorta were both enlarged at 4.6 cm     Stress echocardiogram 12/2014 was negative for stress-induced wall motion abnormalities.    PFTs done 2/2011 showed mild obstruction     Aortoiliac ultrasound 12/2014 showed evidence of plaquing in the aortoiliac arterial system.  Abdominal aorta was normal in size with a maximum dimension of 2.4 cm    Assessment & Plan:    1.  Shortness of breath    O2 sats OK    Doesn't appear to have chronotropic incompetence given good HR response with exertion. No CP    Appears to have more HJR today; no crackles or significant edema    PLAN:    BMP, BNP and CBC today    Will try torsemide instead of hydrochlorothiazide given R sided abnormalities on Echo 12/2018 and HJR today    Pulmonary w/u.  Sees [facility]      2.  Ascending aortic aneurysm    MRA as above    C/o buttock/leg pain ... ?gluteal claudication?    PLAN:    Dr. Camp recommended echocardiogram to evaluate this 6/2020 given that his MRA was stable    ABIs/Aorta and Iliac arterial ultrasound    3.  Permanent atrial fibrillation    Holter monitor revealed heart rate was under good control, with a good range of heart rates    Remains on anticoagulation, diltiazem and metoprolol    PLAN:    Continue meds    Cherise Bennett PA-C, MSPAS      Orders Placed This Encounter   Procedures     US CAT Doppler with Exercise Bilateral     US Aorta/Ivc/Iliac Duplex Complete     Basic metabolic panel     N terminal pro BNP outpatient     CBC with platelets     Pulmonary Medicine Referral     No orders of the defined types were placed in this encounter.    There are no discontinued medications.      Encounter Diagnoses   Name Primary?     Dyspnea on exertion      Persistent atrial fibrillation (H)      Claudication of gluteal region (H) Yes     Atherosclerosis of artery      Bilateral lower extremity pain      Aorta disorder (H)      Other specified symptoms and signs involving the circulatory and respiratory systems         CURRENT  MEDICATIONS:  Current Outpatient Medications   Medication Sig Dispense Refill     apixaban ANTICOAGULANT (ELIQUIS) 5 MG tablet Take 1 tablet (5 mg) by mouth 2 times daily 60 tablet 11     atenolol (TENORMIN) 100 MG tablet Take 1.5 tablets (150 mg) by mouth daily 135 tablet 3     atorvastatin (LIPITOR) 40 MG tablet Take 1 tablet (40 mg) by mouth daily 90 tablet 3     diltiazem ER (DILT-XR) 120 MG 24 hr capsule Take 1 capsule (120 mg) by mouth daily 90 capsule 3     EPINEPHrine (EPIPEN/ADRENACLICK/OR ANY BX GENERIC EQUIV) 0.3 MG/0.3ML injection 2-pack Inject 0.3 mLs (0.3 mg) into the muscle as needed for anaphylaxis 0.6 mL 1     escitalopram (LEXAPRO) 10 MG tablet Take 1 tablet (10 mg) by mouth daily Needs appointment for further refills. 30 tablet 0     hydrochlorothiazide (HYDRODIURIL) 25 MG tablet Take 12.5 mg by mouth daily        ipratropium (ATROVENT) 0.06 % spray Spray 2 sprays in nostril 4 times daily as needed for rhinitis 3 Box 3     leuprolide (ELIGARD) 45 MG injection Inject 45 mg Subcutaneous every 6 months. 1 each 12     Loperamide HCl (IMODIUM OR) Take by mouth daily as needed       losartan (COZAAR) 100 MG tablet TAKE 1 TABLET BY MOUTH ONCE DAILY 90 tablet 3     Multiple Minerals-Vitamins (PROSTEON PO) Take 2,000 Units by mouth With vitamin D       Multiple Vitamins-Minerals (CENTRUM SILVER PO) Take by mouth daily        colchicine (COLCYRS) 0.6 MG tablet 0.6 mg 3 times daily on the first day of flare; maximum total dose: 1.8 mg on day 1. Days 2 and beyond: 0.6 mg once or twice daily until flare resolves (Patient not taking: Reported on 4/4/2019) 30 tablet 0       ALLERGIES     Allergies   Allergen Reactions     Augmentin Rash     Type III hypersensitivity     Bactrim [Sulfamethoxazole W/Trimethoprim] Rash     Serum sickness, type III hypersensitivity       Bee Venom Itching     Sulfa Drugs Hives     Lisinopril Cough     Naproxen Hives       PAST MEDICAL HISTORY:  Past Medical History:   Diagnosis Date      Actinic keratosis      Arthritis      Atrial fibrillation and flutter (H) 12/3/2018     CAD (coronary artery disease)     2011 lateral ischemia on stress echo, 2014 normal stress echo     Coronary artery disease 2011    Abnormal stress test 2011 and controlled with medical mgmt      Dyslipidemia      Emphysema of lung (H)      Essential hypertension, benign      Hernia, abdominal      Hypersomnia with sleep apnea, unspecified     on bipap, partially treated with residual apneas     Hypertrophy (benign) of prostate     Biopsy  negative for cancer; PSA 5     Lumbago      Mumps      Prostate cancer (H) 12/15/2006     Skin cancer, basal cell 1997     Spider veins        PAST SURGICAL HISTORY:  Past Surgical History:   Procedure Laterality Date     HERNIA REPAIR  child     Moh's procedure for basal cell carcinoma  2001     PROSTATE SURGERY       s/p lumbar laminectomy NOS  1988     VITRECTOMY PARS PLANA REMOVE PRERETINAL MEMBRANE   3/09       FAMILY HISTORY:  Family History   Problem Relation Age of Onset     Alzheimer Disease Mother      Hypertension Mother      Cardiovascular Father         D:86 complications fo CHF     Prostate Cancer Brother      Lung Cancer Brother 76     Prostate Cancer Brother        SOCIAL HISTORY:  Social History     Socioeconomic History     Marital status:      Spouse name: None     Number of children: None     Years of education: None     Highest education level: None   Occupational History     Occupation: retired   Social Needs     Financial resource strain: None     Food insecurity:     Worry: None     Inability: None     Transportation needs:     Medical: None     Non-medical: None   Tobacco Use     Smoking status: Former Smoker     Packs/day: 1.00     Years: 30.00     Pack years: 30.00     Types: Cigarettes     Start date:      Last attempt to quit: 1978     Years since quittin.5     Smokeless tobacco: Never Used   Substance and Sexual  "Activity     Alcohol use: Yes     Alcohol/week: 1.0 oz     Types: 2 Standard drinks or equivalent per week     Comment: socially     Drug use: No     Sexual activity: Yes     Partners: Female   Lifestyle     Physical activity:     Days per week: None     Minutes per session: None     Stress: None   Relationships     Social connections:     Talks on phone: None     Gets together: None     Attends Cheondoism service: None     Active member of club or organization: None     Attends meetings of clubs or organizations: None     Relationship status: None     Intimate partner violence:     Fear of current or ex partner: None     Emotionally abused: None     Physically abused: None     Forced sexual activity: None   Other Topics Concern      Service Not Asked     Blood Transfusions Not Asked     Caffeine Concern No     Comment: 0-2 cans of soda per day.     Occupational Exposure Not Asked     Hobby Hazards Not Asked     Sleep Concern Not Asked     Stress Concern Not Asked     Weight Concern Not Asked     Special Diet Not Asked     Back Care Not Asked     Exercise No     Bike Helmet Not Asked     Seat Belt Yes     Self-Exams Not Asked     Parent/sibling w/ CABG, MI or angioplasty before 65F 55M? No   Social History Narrative     None       Review of Systems:  Skin:  Negative     Eyes:  Positive for glasses  ENT:  Negative    Respiratory:  Positive for sleep apnea;CPAP  Cardiovascular:  chest pain;palpitations;dizziness;lightheadedness;syncope or near-syncope;cyanosis;Negative for Positive for;fatigue;edema;exercise intolerance  Gastroenterology: Negative    Genitourinary:  Positive for urinary frequency;urgency;nocturia  Musculoskeletal:  Positive for joint pain  Neurologic:  Negative    Psychiatric:  Positive for depression  Heme/Lymph/Imm:  Positive for allergies  Endocrine:  Negative      Physical Exam:  Vitals: /86   Pulse 66   Ht 1.727 m (5' 8\")   Wt 102.9 kg (226 lb 14.4 oz)   BMI 34.50 kg/m   "     Constitutional:  cooperative, alert and oriented, well developed, well nourished, in no acute distress        Skin:  warm and dry to the touch        Head:  normocephalic        Eyes:  pupils equal and round;conjunctivae and lids unremarkable;sclera white        ENT:  no pallor or cyanosis, dentition good        Neck:  no carotid bruit;JVP normal;carotid pulses are full and equal bilaterally hepatojugular reflux No HJR    Chest:  clear to auscultation;normal symmetry;normal respiratory excursion        Cardiac: normal S1 and S2;no murmurs, gallops or rubs detected irregularly irregular rhythm distant heart sounds              Abdomen:  abdomen soft;non-tender;no bruits obese Firm    Vascular:   pulses below the femoral arteries are diminished                               strong radial pulses, slighly diminshed dorsalis pedis and posterior tibia    Extremities and Back:  no deformities, clubbing, cyanosis, erythema observed        Neurological:  no gross motor deficits          Recent Lab Results:  LIPID RESULTS:  Lab Results   Component Value Date    CHOL 124 10/17/2018    HDL 41 10/17/2018    LDL 60 10/17/2018    TRIG 117 10/17/2018    CHOLHDLRATIO 2.8 10/15/2015       LIVER ENZYME RESULTS:  Lab Results   Component Value Date    AST 29 08/18/2011    ALT 29 08/18/2011       CBC RESULTS:  Lab Results   Component Value Date    WBC 5.8 07/09/2019    RBC 4.52 07/09/2019    HGB 13.8 07/09/2019    HCT 42.3 07/09/2019    MCV 94 07/09/2019    MCH 30.5 07/09/2019    MCHC 32.6 07/09/2019    RDW 14.1 07/09/2019     07/09/2019       BMP RESULTS:  Lab Results   Component Value Date     07/09/2019    POTASSIUM 4.1 07/09/2019    CHLORIDE 105 07/09/2019    CO2 29 07/09/2019    ANIONGAP 11.1 07/09/2019     (H) 07/09/2019    BUN 24 07/09/2019    CR 1.38 (H) 07/09/2019    GFRESTIMATED 49 (L) 07/09/2019    GFRESTBLACK 60 (L) 07/09/2019    MARLENE 10.0 07/09/2019        A1C RESULTS:  Lab Results   Component Value  Date    A1C 5.9 (H) 12/04/2018       INR RESULTS:  Lab Results   Component Value Date    INR 1.03 12/03/2018    INR 1.07 11/27/2007           CC  Natalya Lewis MD  [address] [place] [address] [place] [address]                    Thank you for allowing me to participate in the care of your patient.      Sincerely,     Nimisha Bennett PA-C     [facility]    cc:   Natalya Lewis MD  [address] [place] [address]

## 2019-07-15 ENCOUNTER — TELEPHONE (OUTPATIENT)
Dept: CARDIOLOGY | Facility: CLINIC | Age: 84
End: 2019-07-15

## 2019-07-15 DIAGNOSIS — R06.02 SOB (SHORTNESS OF BREATH): ICD-10-CM

## 2019-07-15 RX ORDER — TORSEMIDE 20 MG/1
40 TABLET ORAL DAILY
Start: 2019-07-15 | End: 2019-08-08 | Stop reason: DRUGHIGH

## 2019-07-15 NOTE — TELEPHONE ENCOUNTER
"Pt called to give Cherise SKY \"update\".he discontinued the hydrochlorothiazide as directed.  He took the 40 mg torsemide for 2 days( wed, Thursday) and then took 20 mg for the last 4 days as directed.  His wt was down to 215# over the weekend, but was 218 today. Overall he feels his breathing is better, not as SOB as he was, still has an occasional feeling \"like Im not getting enough air\", but thinks sometimes it worse if he is feeling anxious.  He has made an appointment to see Pulmonary, but is asking \"do I need to keep that\". Advise I will send message to Cherise for review-jed campuzano  "

## 2019-07-15 NOTE — TELEPHONE ENCOUNTER
I spoke w/ margaux, he agrees to stay on the 40mg torsemide, took 20 this am, so will add another dose in for today. He has appt w/ his PMD on Friday so will get a BMP done then. He agrees to keep the pulmonary appt., and I transferred him to scheduling to set up a return appt w/ a BMP-mmunns lpn

## 2019-07-15 NOTE — TELEPHONE ENCOUNTER
Glad he's feeling a bit better    1. Go back up to 40 mg torsemide daily. I have updated Epic.   2. Get BMP before Friday of this week to ensure renal fxn OK after starting torsemide on 7/10 (I have ordered - PCP's is fine    3. Given h/o tobacco use (quit in 2000 with 30 pyh), would keep appt with Pulmonology    4. When we talk about results of BMP later this week we can determine if he'll stay on this dose (and will send new Rx if needed).    5. See me 2-3 weeks with BMP. I've ordered - needs to be scheduled. We'll discuss leg testing at that time

## 2019-07-19 ENCOUNTER — TELEPHONE (OUTPATIENT)
Dept: CARDIOLOGY | Facility: CLINIC | Age: 84
End: 2019-07-19

## 2019-07-19 ENCOUNTER — OFFICE VISIT (OUTPATIENT)
Dept: PEDIATRICS | Facility: CLINIC | Age: 84
End: 2019-07-19
Payer: COMMERCIAL

## 2019-07-19 VITALS
SYSTOLIC BLOOD PRESSURE: 102 MMHG | TEMPERATURE: 97.7 F | HEIGHT: 68 IN | HEART RATE: 83 BPM | WEIGHT: 223.8 LBS | OXYGEN SATURATION: 97 % | DIASTOLIC BLOOD PRESSURE: 60 MMHG | BODY MASS INDEX: 33.92 KG/M2

## 2019-07-19 DIAGNOSIS — Z85.46 PERSONAL HISTORY OF MALIGNANT NEOPLASM OF PROSTATE: ICD-10-CM

## 2019-07-19 DIAGNOSIS — R06.02 SOB (SHORTNESS OF BREATH): ICD-10-CM

## 2019-07-19 DIAGNOSIS — F32.0 MILD MAJOR DEPRESSION (H): ICD-10-CM

## 2019-07-19 DIAGNOSIS — R09.81 NASAL CONGESTION: ICD-10-CM

## 2019-07-19 DIAGNOSIS — T78.2XXD BEE STING-INDUCED ANAPHYLAXIS, UNDETERMINED INTENT, SUBSEQUENT ENCOUNTER: ICD-10-CM

## 2019-07-19 DIAGNOSIS — C61 MALIGNANT NEOPLASM OF PROSTATE (H): Primary | ICD-10-CM

## 2019-07-19 DIAGNOSIS — T63.444D BEE STING-INDUCED ANAPHYLAXIS, UNDETERMINED INTENT, SUBSEQUENT ENCOUNTER: ICD-10-CM

## 2019-07-19 DIAGNOSIS — M85.80 OSTEOPENIA, UNSPECIFIED LOCATION: ICD-10-CM

## 2019-07-19 DIAGNOSIS — M89.9 BONE DISORDER: ICD-10-CM

## 2019-07-19 LAB
ANION GAP SERPL CALCULATED.3IONS-SCNC: 5 MMOL/L (ref 3–14)
BUN SERPL-MCNC: 36 MG/DL (ref 7–30)
CALCIUM SERPL-MCNC: 8.9 MG/DL (ref 8.5–10.1)
CHLORIDE SERPL-SCNC: 107 MMOL/L (ref 94–109)
CO2 SERPL-SCNC: 31 MMOL/L (ref 20–32)
CREAT SERPL-MCNC: 1.38 MG/DL (ref 0.66–1.25)
GFR SERPL CREATININE-BSD FRML MDRD: 47 ML/MIN/{1.73_M2}
GLUCOSE SERPL-MCNC: 142 MG/DL (ref 70–99)
POTASSIUM SERPL-SCNC: 3.7 MMOL/L (ref 3.4–5.3)
PSA SERPL-MCNC: <0.01 UG/L (ref 0–4)
SODIUM SERPL-SCNC: 143 MMOL/L (ref 133–144)

## 2019-07-19 PROCEDURE — 99214 OFFICE O/P EST MOD 30 MIN: CPT | Performed by: INTERNAL MEDICINE

## 2019-07-19 PROCEDURE — 84153 ASSAY OF PSA TOTAL: CPT | Performed by: PHYSICIAN ASSISTANT

## 2019-07-19 PROCEDURE — 80048 BASIC METABOLIC PNL TOTAL CA: CPT | Performed by: PHYSICIAN ASSISTANT

## 2019-07-19 PROCEDURE — 36415 COLL VENOUS BLD VENIPUNCTURE: CPT | Performed by: PHYSICIAN ASSISTANT

## 2019-07-19 RX ORDER — IPRATROPIUM BROMIDE 42 UG/1
2 SPRAY, METERED NASAL 4 TIMES DAILY PRN
Qty: 3 BOX | Refills: 3 | Status: SHIPPED | OUTPATIENT
Start: 2019-07-19 | End: 2020-10-27

## 2019-07-19 RX ORDER — ESCITALOPRAM OXALATE 10 MG/1
10 TABLET ORAL DAILY
Qty: 90 TABLET | Refills: 3 | Status: SHIPPED | OUTPATIENT
Start: 2019-07-19 | End: 2020-07-30

## 2019-07-19 RX ORDER — EPINEPHRINE 0.3 MG/.3ML
0.3 INJECTION SUBCUTANEOUS PRN
Qty: 0.6 ML | Refills: 1 | Status: SHIPPED | OUTPATIENT
Start: 2019-07-19 | End: 2022-11-16

## 2019-07-19 ASSESSMENT — MIFFLIN-ST. JEOR: SCORE: 1694.65

## 2019-07-19 ASSESSMENT — ANXIETY QUESTIONNAIRES
3. WORRYING TOO MUCH ABOUT DIFFERENT THINGS: NOT AT ALL
5. BEING SO RESTLESS THAT IT IS HARD TO SIT STILL: NOT AT ALL
IF YOU CHECKED OFF ANY PROBLEMS ON THIS QUESTIONNAIRE, HOW DIFFICULT HAVE THESE PROBLEMS MADE IT FOR YOU TO DO YOUR WORK, TAKE CARE OF THINGS AT HOME, OR GET ALONG WITH OTHER PEOPLE: NOT DIFFICULT AT ALL
2. NOT BEING ABLE TO STOP OR CONTROL WORRYING: NOT AT ALL
GAD7 TOTAL SCORE: 0
7. FEELING AFRAID AS IF SOMETHING AWFUL MIGHT HAPPEN: NOT AT ALL
1. FEELING NERVOUS, ANXIOUS, OR ON EDGE: NOT AT ALL
6. BECOMING EASILY ANNOYED OR IRRITABLE: NOT AT ALL

## 2019-07-19 ASSESSMENT — PATIENT HEALTH QUESTIONNAIRE - PHQ9
5. POOR APPETITE OR OVEREATING: NOT AT ALL
SUM OF ALL RESPONSES TO PHQ QUESTIONS 1-9: 1

## 2019-07-19 NOTE — PROGRESS NOTES
"Subjective     Nixon More is a 83 year old male who presents to clinic today for the following health issues:    HPI   Depression Followup    How are you doing with your depression since your last visit? Improved overall physical a lot better    Are you having other symptoms that might be associated with depression? No    Have you had a significant life event?  No     Are you feeling anxious or having panic attacks?   No    Do you have any concerns with your use of alcohol or other drugs? No    Social History     Tobacco Use     Smoking status: Former Smoker     Packs/day: 1.00     Years: 30.00     Pack years: 30.00     Types: Cigarettes     Start date:      Last attempt to quit: 1978     Years since quittin.5     Smokeless tobacco: Never Used   Substance Use Topics     Alcohol use: Yes     Alcohol/week: 1.0 oz     Types: 2 Standard drinks or equivalent per week     Comment: socially     Drug use: No     PHQ 2018 10/23/2018 2019   PHQ-9 Total Score 4 2 6   Q9: Thoughts of better off dead/self-harm past 2 weeks Not at all Not at all Not at all     JAMSHID-7 SCORE 2016   Total Score 1 0         Suicide Assessment Five-step Evaluation and Treatment (SAFE-T)    Amount of exercise or physical activity: None    Problems taking medications regularly: No    Medication side effects: none    Diet: regular (no restrictions)      Reviewed and updated as needed this visit by Provider  Tobacco  Meds  Problems         Review of Systems         Objective    /60 (BP Location: Right arm, Patient Position: Chair, Cuff Size: Adult Regular)   Pulse 83   Temp 97.7  F (36.5  C) (Tympanic)   Ht 1.727 m (5' 8\")   Wt 101.5 kg (223 lb 12.8 oz)   SpO2 97%   BMI 34.03 kg/m    Body mass index is 34.03 kg/m .  Physical Exam   GENERAL: healthy, alert and no distress  PSYCH: mentation appears normal, affect normal/bright            Assessment & Plan     1. SOB (shortness of breath)  Discussed need " "for exercise to improve   - Basic metabolic panel    2. Personal history of malignant neoplasm of prostate  Recheck labs and follow-up with urology as scheduled  - PSA tumor marker    3. Mild major depression (H)  Improved.  Discussed stress and coping, encouraged exercise.  Continue medications   - escitalopram (LEXAPRO) 10 MG tablet; Take 1 tablet (10 mg) by mouth daily  Dispense: 90 tablet; Refill: 3    4. Bee sting-induced anaphylaxis, undetermined intent, subsequent encounter  Refill and discusse import of checking expiration and keeping with him   - EPINEPHrine (EPIPEN/ADRENACLICK/OR ANY BX GENERIC EQUIV) 0.3 MG/0.3ML injection 2-pack; Inject 0.3 mLs (0.3 mg) into the muscle as needed for anaphylaxis  Dispense: 0.6 mL; Refill: 1    5. Nasal congestion  refill  - ipratropium (ATROVENT) 0.06 % nasal spray; Spray 2 sprays in nostril 4 times daily as needed for rhinitis  Dispense: 3 Box; Refill: 3    6. Malignant neoplasm of prostate    - DEXA HIP/PELVIS/SPINE - Future; Future    7. Osteopenia, unspecified location    - DEXA HIP/PELVIS/SPINE - Future; Future    8. Bone disorder    - DEXA HIP/PELVIS/SPINE - Future; Future     BMI:   Estimated body mass index is 34.03 kg/m  as calculated from the following:    Height as of this encounter: 1.727 m (5' 8\").    Weight as of this encounter: 101.5 kg (223 lb 12.8 oz).   Weight management plan: Discussed healthy diet and exercise guidelines        See Patient Instructions    Return in about 4 months (around 11/19/2019) for Physical Exam.    Natalya Lewis MD  [facility]    "

## 2019-07-19 NOTE — TELEPHONE ENCOUNTER
Patient called inquiring about his BMP results done at his PCP office today.  Informed patient that results were not completed yet.  Patient indicated he was gong to take torsemide 20mg over the weekend until he hears back from us on Monday.  Will update JOSE DANIEL Guallpa.  BIANCA Gabriel

## 2019-07-19 NOTE — PATIENT INSTRUCTIONS
They will call you to schedule the bone density test.    Continue on the lexapro for at least 6 months and perhaps more.    Follow-up with Dr. Brar and cardiology as scheduled.

## 2019-07-20 ASSESSMENT — ANXIETY QUESTIONNAIRES: GAD7 TOTAL SCORE: 0

## 2019-07-21 NOTE — TELEPHONE ENCOUNTER
Pls let pt know that BMP is stable after the hydrochlorothiazide was switched to torsemide.    He was taking torsemide 40 mg daily and decreased to 20 mg daily over the weekend.    Pls have him pay attn to weight, breathing, swelling, etc. Stay on the 20 mg daily based on the BUN/Cr.    See me with repeat BMP as planned.    Víctorx - Cherise    Component      Latest Ref Rng & Units 1/8/2019 7/9/2019 7/19/2019   Sodium      133 - 144 mmol/L 137 141 143   Potassium      3.4 - 5.3 mmol/L 4.1 4.1 3.7   Chloride      94 - 109 mmol/L 103 105 107   Carbon Dioxide      20 - 32 mmol/L 31 (H) 29 31   Anion Gap      3 - 14 mmol/L 7.1 11.1 5   Glucose      70 - 99 mg/dL 127 (H) 149 (H) 142 (H)   Urea Nitrogen      7 - 30 mg/dL 22 24 36 (H)   Creatinine      0.66 - 1.25 mg/dL 1.25 1.38 (H) 1.38 (H)   GFR Estimate      >60 mL/min/1.73:m2 55 (L) 49 (L) 47 (L)   GFR Estimate If Black      >60 mL/min/1.73:m2 67 60 (L) 55 (L)   Calcium      8.5 - 10.1 mg/dL 9.9 10.0 8.9

## 2019-07-23 ENCOUNTER — HOSPITAL ENCOUNTER (OUTPATIENT)
Dept: ULTRASOUND IMAGING | Facility: CLINIC | Age: 84
End: 2019-07-23
Attending: PHYSICIAN ASSISTANT
Payer: COMMERCIAL

## 2019-07-23 ENCOUNTER — HOSPITAL ENCOUNTER (OUTPATIENT)
Dept: ULTRASOUND IMAGING | Facility: CLINIC | Age: 84
Discharge: HOME | End: 2019-07-23
Attending: PHYSICIAN ASSISTANT | Admitting: PHYSICIAN ASSISTANT
Payer: COMMERCIAL

## 2019-07-23 DIAGNOSIS — I77.9 AORTA DISORDER (H): ICD-10-CM

## 2019-07-23 DIAGNOSIS — R09.89 OTHER SPECIFIED SYMPTOMS AND SIGNS INVOLVING THE CIRCULATORY AND RESPIRATORY SYSTEMS: ICD-10-CM

## 2019-07-23 DIAGNOSIS — I73.9 CLAUDICATION OF GLUTEAL REGION (H): ICD-10-CM

## 2019-07-23 PROCEDURE — 93978 VASCULAR STUDY: CPT | Mod: 26 | Performed by: INTERNAL MEDICINE

## 2019-07-23 PROCEDURE — 93924 LWR XTR VASC STDY BILAT: CPT | Mod: 26 | Performed by: INTERNAL MEDICINE

## 2019-07-23 PROCEDURE — 93924 LWR XTR VASC STDY BILAT: CPT

## 2019-07-23 PROCEDURE — 93978 VASCULAR STUDY: CPT

## 2019-07-31 ENCOUNTER — TRANSFERRED RECORDS (OUTPATIENT)
Dept: HEALTH INFORMATION MANAGEMENT | Facility: CLINIC | Age: 84
End: 2019-07-31

## 2019-08-02 ENCOUNTER — DOCUMENTATION ONLY (OUTPATIENT)
Dept: CARDIOLOGY | Facility: CLINIC | Age: 84
End: 2019-08-02

## 2019-08-05 ENCOUNTER — TRANSFERRED RECORDS (OUTPATIENT)
Dept: HEALTH INFORMATION MANAGEMENT | Facility: CLINIC | Age: 84
End: 2019-08-05

## 2019-08-06 ENCOUNTER — TELEPHONE (OUTPATIENT)
Dept: CARDIOLOGY | Facility: CLINIC | Age: 84
End: 2019-08-06

## 2019-08-06 DIAGNOSIS — R06.09 DYSPNEA ON EXERTION: Primary | ICD-10-CM

## 2019-08-07 NOTE — PROGRESS NOTES
HPI:   I had the pleasure of seeing Spencer when he and Kavitha came for follow up of atrial fibrillation.  He is an 83 year old who sees Dr. Lee and Dr. Camp for his history of:     1. Atrial Arrhythmias with atypical atrial flutter and atrial fibrillation first diagnosed 12/2018 in the setting of a normal ejection fraction.  A rate control/anticoagulation strategy was recommended  2. Chronic anticoagulation with Eliquis 5 mg twice daily secondary to CHADSVASc of 4 (HTN, presumed CAD, age)  3. Chronic dyspnea, noted by Dr. Camp 6/2019.  30-pack-year history of tobacco use, quitting~2000. Saw Dr. Moreno and PFTs showed minimal restriction and no obstruction.  4. Obstructive sleep apnea on ASV therapy for complex Central and Obstructive Sleep Apnea  5. Ascending aorta and aortic root dilatation (4.6 cm, 4.6 cm) noted on echocardiogram 12/2018  6. Presumed coronary disease based on stress echocardiogram 1/2011.  Dr. Camp opted to treat this medically in the absence of symptoms.  Repeat stress test 12/2014 was negative for ischemia   7. Hypertension with some hypotension prompting discontinuation of HCTZ by PCP  8. Dyslipidemia    I saw Spencer 7/9 at which time he continued to c/o a bit of GALDAMEZ. Dr. Camp noted this was unlikely to be due to heart failure and rec'd he start checking his HR and O2 sats when feeling SOB. O2 satts were wnl with exertion, but HRs quickly jumped to 110-120 with exertion. Also c/o hip/buttock with walking, noting that issues with gait also seemed to affect his breathing.    He had some JVD and HJR on exam, so I started torsmemide in lieu of hydrochlorothiazide and recommended ABIs/aorta and iliac arterial US for questionable gluteal claudication. I also recommended he see Pulm given concerns for COPD.    He called back as instructed and was feeling better after starting torsemide.     Saw Dr. Moreno from [facility] 7/31. No obstruction or emphysema noted. Minimal restriction. NIF strength was  "slightly low and repeating this was recommended with possible need for Sniff Test to check Diaphragmatic paralysis.    Interval History:  Feels breathing is better but note that weight really hasn't changed dramatically. Still had to stop while walking across sky[place] due to hip/buttock pain, but not breathing. This is an improvement.  No edema, orthopnea or PND.    Remains compliant with BiPAP/ASV. Dr. Moreno noted that he may be candidate for AFib ablation with him, but could not see echo results from 12/2018, which showed LA was already moderately-severely dilated with volume index of 51 mmHg. Therefore, would likely be unsuccessful despite good compliance with BIPAP.    No palpitations. Breathing, as above, is better.      Diagnostic Testing:  Aortoiliac US 7/23/2019 showed sacular AAA with mild enlargement with max diameter 3.3 x 2.2. <50% stenosis in aorta, iliac arteries  Exercise ABIs 7/23/2019 with normal ABIs and normal response to exercise. Developed calf and thigh tiredness after 2.5 minutes    EKG 7/22/2019 showed AFib at 79 bpm    MRA chest 5/2019 showed aortic root was borderline dilated (4 x 3.9 cm).  The ascending aorta was mildly dilated (4.4 x 4.5 at the level of bifurcation of pulmonary arteries)  Holter monitor 5/21/2019 showed atrial fibrillation with an average heart rate of 75 bpm.  Range was  bpm.  There were no symptom medic positive.  He had 2 \"events\" which correlated with chronic atrial fibrillation with heart rates in the 70s and 80s.  Echocardiogram done 12/2018 showed an EF of 55%.  He had mildly reduced right ventricular systolic function.  Left atrial size was moderately-severely dilated with a left atrial volume index of 51.2 mL/m .  Left atrial size of 6.0 cm.  As above, his aortic root and ascending aorta were both enlarged at 4.6 cm     Stress echocardiogram 12/2014 was negative for stress-induced wall motion abnormalities.     PFTs done 2/2011 showed mild " obstruction    Assessment & Plan:    1. SOB    O2 sats OK. Hgb 7/2019 wnl    No chronotropic intolerance noted on      Has been on torsemide 20-40 mg daily since 7/9 and breathing has definitely improved    Met with Dr. Moreno and no underlying lung dz noted. Has NIF test (?) tomorrow    PLAN:    See what lung test shows    Continue torsemide 20 mg daily. BMP in ~10 days to ensure renal function is not worsening. We'll call with results to see how he's doing and arrange f/u    To watch for sxs/signs of dehydration and call if this occurs      2. Permanent Atrial Fibrillation    Holter 5/2019 showed good avg HR control with good rate variability on CCB and BB    Rate control strategy given asx'c status and low likelihood of success given large LA    Continues warfarin    PLAN:    Continue routine f/u    3. Leg/Buttock Discomfort    No evidence of PAD on ABIs/aorta/iliac US    This actually limited him more than breathing on today's walk into the clinic    PLAN:    I'll let Dr. Lewis know that he's noted continued discomfort despite normal vascular tests    4. Aortic Aneurysm (Root, Ascending and Infrarenal)    MRA as above, with root 4 x 3.9 cm and ascending 4.4 x 4.5 at the level of bifurcation of PAs    Echo 12/2018 with aortic root and ascending aorta of 4.6 cm    Sacular infrarenal AAA with mild enlargement noted (3.3 x 2.2) on US, as above. This has increased from 2.6 x 2.5 in 12/2014    PLAN:    Dr. Camp rec'd echocardiogram for assessment of thoracic aorta 6/2020    I will reach out to Dr. Camp given sacular infrarenal AAA    Cherise Bennett PA-C, MSPAS      Orders Placed This Encounter   Procedures     Basic metabolic panel     Orders Placed This Encounter   Medications     torsemide (DEMADEX) 20 MG tablet     Sig: Take 20 mg by mouth daily     Medications Discontinued During This Encounter   Medication Reason     torsemide (DEMADEX) 20 MG tablet Dose adjustment       Encounter Diagnoses   Name Primary?      SOB (shortness of breath)      Persistent atrial fibrillation (H) Yes       CURRENT MEDICATIONS:  Current Outpatient Medications   Medication Sig Dispense Refill     apixaban ANTICOAGULANT (ELIQUIS) 5 MG tablet Take 1 tablet (5 mg) by mouth 2 times daily 60 tablet 11     atenolol (TENORMIN) 100 MG tablet Take 1.5 tablets (150 mg) by mouth daily 135 tablet 3     atorvastatin (LIPITOR) 40 MG tablet Take 1 tablet (40 mg) by mouth daily 90 tablet 3     diltiazem ER (DILT-XR) 120 MG 24 hr capsule Take 1 capsule (120 mg) by mouth daily 90 capsule 3     EPINEPHrine (EPIPEN/ADRENACLICK/OR ANY BX GENERIC EQUIV) 0.3 MG/0.3ML injection 2-pack Inject 0.3 mLs (0.3 mg) into the muscle as needed for anaphylaxis 0.6 mL 1     escitalopram (LEXAPRO) 10 MG tablet Take 1 tablet (10 mg) by mouth daily 90 tablet 3     ipratropium (ATROVENT) 0.06 % nasal spray Spray 2 sprays in nostril 4 times daily as needed for rhinitis 3 Box 3     leuprolide (ELIGARD) 45 MG injection Inject 45 mg Subcutaneous every 6 months. 1 each 12     Loperamide HCl (IMODIUM OR) Take by mouth daily as needed       losartan (COZAAR) 100 MG tablet TAKE 1 TABLET BY MOUTH ONCE DAILY 90 tablet 3     Multiple Minerals-Vitamins (PROSTEON PO) Take 2,000 Units by mouth With vitamin D       torsemide (DEMADEX) 20 MG tablet Take 20 mg by mouth daily       ASPIRIN NOT PRESCRIBED (INTENTIONAL) continuous prn for other Antiplatelet medication not prescribed intentionally due to Current anticoagulant therapy (warfarin/enoxaparin)       Multiple Vitamins-Minerals (CENTRUM SILVER PO) Take by mouth daily          ALLERGIES     Allergies   Allergen Reactions     Augmentin Rash     Type III hypersensitivity     Bactrim [Sulfamethoxazole W/Trimethoprim] Rash     Serum sickness, type III hypersensitivity       Bee Venom Itching     Sulfa Drugs Hives     Lisinopril Cough     Naproxen Hives       PAST MEDICAL HISTORY:  Past Medical History:   Diagnosis Date     Actinic keratosis       Arthritis      Atrial fibrillation and flutter (H) 12/3/2018     CAD (coronary artery disease)     2011 lateral ischemia on stress echo, 2014 normal stress echo     Coronary artery disease 2011    Abnormal stress test 2011 and controlled with medical mgmt      Dyslipidemia      Emphysema of lung (H)      Essential hypertension, benign      Hernia, abdominal      Hypersomnia with sleep apnea, unspecified     on bipap, partially treated with residual apneas     Hypertrophy (benign) of prostate     Biopsy  negative for cancer; PSA 5     Lumbago      Mumps      Prostate cancer (H) 12/15/2006     Skin cancer, basal cell 1997     Spider veins        PAST SURGICAL HISTORY:  Past Surgical History:   Procedure Laterality Date     HERNIA REPAIR  child     Moh's procedure for basal cell carcinoma  2001     PROSTATE SURGERY       s/p lumbar laminectomy NOS  1988     VITRECTOMY PARS PLANA REMOVE PRERETINAL MEMBRANE   3/09       FAMILY HISTORY:  Family History   Problem Relation Age of Onset     Alzheimer Disease Mother      Hypertension Mother      Cardiovascular Father         D:86 complications fo CHF     Prostate Cancer Brother      Lung Cancer Brother 76     Prostate Cancer Brother        SOCIAL HISTORY:  Social History     Socioeconomic History     Marital status:      Spouse name: None     Number of children: None     Years of education: None     Highest education level: None   Occupational History     Occupation: retired   Social Needs     Financial resource strain: None     Food insecurity:     Worry: None     Inability: None     Transportation needs:     Medical: None     Non-medical: None   Tobacco Use     Smoking status: Former Smoker     Packs/day: 1.00     Years: 30.00     Pack years: 30.00     Types: Cigarettes     Start date:      Last attempt to quit: 1978     Years since quittin.6     Smokeless tobacco: Never Used   Substance and Sexual Activity     Alcohol use: Yes     " Alcohol/week: 1.0 oz     Types: 2 Standard drinks or equivalent per week     Comment: socially     Drug use: No     Sexual activity: Yes     Partners: Female   Lifestyle     Physical activity:     Days per week: None     Minutes per session: None     Stress: None   Relationships     Social connections:     Talks on phone: None     Gets together: None     Attends Christian service: None     Active member of club or organization: None     Attends meetings of clubs or organizations: None     Relationship status: None     Intimate partner violence:     Fear of current or ex partner: None     Emotionally abused: None     Physically abused: None     Forced sexual activity: None   Other Topics Concern      Service Not Asked     Blood Transfusions Not Asked     Caffeine Concern No     Comment: 0-2 cans of soda per day.     Occupational Exposure Not Asked     Hobby Hazards Not Asked     Sleep Concern Not Asked     Stress Concern Not Asked     Weight Concern Not Asked     Special Diet Not Asked     Back Care Not Asked     Exercise No     Bike Helmet Not Asked     Seat Belt Yes     Self-Exams Not Asked     Parent/sibling w/ CABG, MI or angioplasty before 65F 55M? No   Social History Narrative     None       Review of Systems:  Skin:  Negative     Eyes:  Positive for glasses  ENT:  Negative    Respiratory:  Positive for sleep apnea;CPAP  Cardiovascular:  chest pain;palpitations;dizziness;lightheadedness;syncope or near-syncope;cyanosis;Negative for Positive for;fatigue;edema;exercise intolerance  Gastroenterology: Negative    Genitourinary:  Positive for urinary frequency;urgency;nocturia  Musculoskeletal:  Positive for joint pain  Neurologic:  Negative    Psychiatric:  Positive for depression  Heme/Lymph/Imm:  Positive for allergies  Endocrine:  Negative      Physical Exam:  Vitals: /69 (BP Location: Right arm, Cuff Size: Adult Large)   Pulse 72   Ht 1.727 m (5' 8\")   Wt 102.7 kg (226 lb 6.4 oz)   BMI 34.42 " kg/m      Constitutional:  cooperative, alert and oriented, well developed, well nourished, in no acute distress        Skin:  warm and dry to the touch        Head:  normocephalic        Eyes:  pupils equal and round;conjunctivae and lids unremarkable;sclera white        ENT:  no pallor or cyanosis, dentition good        Neck:  no carotid bruit;carotid pulses are full and equal bilaterally JVP 8-10;JVP 10-12 No HJR    Chest:  clear to auscultation;normal symmetry;normal respiratory excursion        Cardiac: normal S1 and S2;no murmurs, gallops or rubs detected irregularly irregular rhythm distant heart sounds              Abdomen:  abdomen soft;non-tender;no bruits obese Firm    Vascular:   pulses below the femoral arteries are diminished                               strong radial pulses, slighly diminshed dorsalis pedis and posterior tibia    Extremities and Back:  no deformities, clubbing, cyanosis, erythema observed;no edema        Neurological:  no gross motor deficits          Recent Lab Results:  LIPID RESULTS:  Lab Results   Component Value Date    CHOL 124 10/17/2018    HDL 41 10/17/2018    LDL 60 10/17/2018    TRIG 117 10/17/2018    CHOLHDLRATIO 2.8 10/15/2015       LIVER ENZYME RESULTS:  Lab Results   Component Value Date    AST 29 08/18/2011    ALT 29 08/18/2011       CBC RESULTS:  Lab Results   Component Value Date    WBC 5.8 07/09/2019    RBC 4.52 07/09/2019    HGB 13.8 07/09/2019    HCT 42.3 07/09/2019    MCV 94 07/09/2019    MCH 30.5 07/09/2019    MCHC 32.6 07/09/2019    RDW 14.1 07/09/2019     07/09/2019       BMP RESULTS:  Lab Results   Component Value Date     08/08/2019    POTASSIUM 3.6 08/08/2019    CHLORIDE 104 08/08/2019    CO2 29 08/08/2019    ANIONGAP 11.6 08/08/2019     (H) 08/08/2019    BUN 30 08/08/2019    CR 1.49 (H) 08/08/2019    GFRESTIMATED 45 (L) 08/08/2019    GFRESTBLACK 55 (L) 08/08/2019    MARLENE 9.3 08/08/2019        A1C RESULTS:  Lab Results   Component Value  Date    A1C 5.9 (H) 12/04/2018

## 2019-08-08 ENCOUNTER — OFFICE VISIT (OUTPATIENT)
Dept: CARDIOLOGY | Facility: CLINIC | Age: 84
End: 2019-08-08
Attending: PHYSICIAN ASSISTANT
Payer: COMMERCIAL

## 2019-08-08 ENCOUNTER — DOCUMENTATION ONLY (OUTPATIENT)
Dept: CARDIOLOGY | Facility: CLINIC | Age: 84
End: 2019-08-08

## 2019-08-08 VITALS
DIASTOLIC BLOOD PRESSURE: 69 MMHG | WEIGHT: 226.4 LBS | HEIGHT: 68 IN | HEART RATE: 72 BPM | BODY MASS INDEX: 34.31 KG/M2 | SYSTOLIC BLOOD PRESSURE: 122 MMHG

## 2019-08-08 DIAGNOSIS — R06.02 SOB (SHORTNESS OF BREATH): ICD-10-CM

## 2019-08-08 DIAGNOSIS — I48.19 PERSISTENT ATRIAL FIBRILLATION (H): Primary | ICD-10-CM

## 2019-08-08 DIAGNOSIS — R06.09 DYSPNEA ON EXERTION: ICD-10-CM

## 2019-08-08 LAB
ANION GAP SERPL CALCULATED.3IONS-SCNC: 11.6 MMOL/L (ref 6–17)
BUN SERPL-MCNC: 30 MG/DL (ref 7–30)
CALCIUM SERPL-MCNC: 9.3 MG/DL (ref 8.5–10.5)
CHLORIDE SERPL-SCNC: 104 MMOL/L (ref 98–107)
CO2 SERPL-SCNC: 29 MMOL/L (ref 23–29)
CREAT SERPL-MCNC: 1.49 MG/DL (ref 0.7–1.3)
GFR SERPL CREATININE-BSD FRML MDRD: 45 ML/MIN/{1.73_M2}
GLUCOSE SERPL-MCNC: 135 MG/DL (ref 70–105)
POTASSIUM SERPL-SCNC: 3.6 MMOL/L (ref 3.5–5.1)
SODIUM SERPL-SCNC: 141 MMOL/L (ref 136–145)

## 2019-08-08 PROCEDURE — 36415 COLL VENOUS BLD VENIPUNCTURE: CPT | Performed by: PHYSICIAN ASSISTANT

## 2019-08-08 PROCEDURE — 80048 BASIC METABOLIC PNL TOTAL CA: CPT | Performed by: PHYSICIAN ASSISTANT

## 2019-08-08 PROCEDURE — 99214 OFFICE O/P EST MOD 30 MIN: CPT | Performed by: PHYSICIAN ASSISTANT

## 2019-08-08 RX ORDER — TORSEMIDE 20 MG/1
20 TABLET ORAL DAILY
COMMUNITY
End: 2019-09-30

## 2019-08-08 ASSESSMENT — MIFFLIN-ST. JEOR: SCORE: 1706.44

## 2019-08-08 NOTE — LETTER
8/8/2019    Natalya Lewis MD  [address] [place] [address]    RE: Nixon More       Dear Colleague,    I had the pleasure of seeing Nixon OSVALDO More in the [facility].    HPI:   I had the pleasure of seeing Spencer when he and Kavitha came for follow up of atrial fibrillation.  He is an 83 year old who sees Dr. Lee and Dr. Camp for his history of:     1. Atrial Arrhythmias with atypical atrial flutter and atrial fibrillation first diagnosed 12/2018 in the setting of a normal ejection fraction.  A rate control/anticoagulation strategy was recommended  2. Chronic anticoagulation with Eliquis 5 mg twice daily secondary to CHADSVASc of 4 (HTN, presumed CAD, age)  3. Chronic dyspnea, noted by Dr. Camp 6/2019.  30-pack-year history of tobacco use, quitting~2000. Saw Dr. Moreno and PFTs showed minimal restriction and no obstruction.  4. Obstructive sleep apnea on ASV therapy  for complex Central and Obstructive Sleep Apnea  5. Ascending aorta and aortic root dilatation (4.6 cm, 4.6 cm) noted on echocardiogram 12/2018  6. Presumed coronary disease based on stress echocardiogram 1/2011.  Dr. Camp opted to treat this medically in the absence of symptoms.  Repeat stress test 12/2014 was negative for ischemia   7. Hypertension with some hypotension prompting discontinuation of HCTZ by PCP  8. Dyslipidemia    I saw Spencer 7/9 at which time he continued to c/o a bit of GALDAMEZ. Dr. Camp noted this was unlikely to be due to heart failure and rec'd he start checking his HR and O2 sats when feeling SOB. O2 satts were wnl with exertion, but HRs quickly jumped to 110-120 with exertion. Also c/o hip/buttock with walking, noting that issues with gait also seemed to affect his breathing.    He had some JVD and HJR on exam, so I started torsmemide in lieu of hydrochlorothiazide and recommended ABIs/aorta and iliac arterial US for questionable gluteal claudication. I also recommended he see  "Pulm given concerns for COPD.    He called back as instructed and was feeling better after starting torsemide.     Saw Dr. Moreno from [facility] 7/31. No obstruction or emphysema noted. Minimal restriction. NIF strength was slightly low and repeating this was recommended with possible need for Sniff Test to check Diaphragmatic paralysis.    Interval History:  Feels breathing is better but note that weight really hasn't changed dramatically. Still had to stop while walking across skyway due to hip/buttock pain, but not breathing. This is an improvement.  No edema, orthopnea or PND.    Remains compliant with BiPAP/ASV. Dr. Moreno noted that he may be candidate for AFib ablation with him, but could not see echo results from 12/2018, which showed LA was already moderately-severely dilated with volume index of 51 mmHg. Therefore, would likely be unsuccessful despite good compliance with BIPAP.    No palpitations. Breathing, as above, is better.      Diagnostic Testing:  Aortoiliac US 7/23/2019 showed sacular AAA with mild enlargement with max diameter 3.3 x 2.2. <50% stenosis in aorta, iliac arteries  Exercise ABIs 7/23/2019 with normal ABIs and normal response to exercise. Developed calf and thigh tiredness after 2.5 minutes    EKG 7/22/2019 showed AFib at 79 bpm    MRA chest 5/2019 showed aortic root was borderline dilated (4 x 3.9 cm).  The ascending aorta was mildly dilated (4.4 x 4.5 at the level of bifurcation of pulmonary arteries)  Holter monitor 5/21/2019 showed atrial fibrillation with an average heart rate of 75 bpm.  Range was  bpm.  There were no symptom medic positive.  He had 2 \"events\" which correlated with chronic atrial fibrillation with heart rates in the 70s and 80s.  Echocardiogram done 12/2018 showed an EF of 55%.  He had mildly reduced right ventricular systolic function.  Left atrial size was moderately-severely dilated with a left atrial volume index of 51.2 mL/m .  Left atrial size of 6.0 " cm.  As above, his aortic root and ascending aorta were both enlarged at 4.6 cm     Stress echocardiogram 12/2014 was negative for stress-induced wall motion abnormalities.     PFTs done 2/2011 showed mild obstruction    Assessment & Plan:    1. SOB    O2 sats OK. Hgb 7/2019 wnl    No chronotropic intolerance noted on      Has been on torsemide 20-40 mg daily since 7/9 and breathing has definitely improved    Met with Dr. Moreno and no underlying lung dz noted. Has NIF test (?) tomorrow    PLAN:    See what lung test shows    Continue torsemide 20 mg daily. BMP in ~10 days to ensure renal function is not worsening. We'll call with results to see how he's doing and arrange f/u    To watch for sxs/signs of dehydration and call if this occurs      2. Permanent Atrial Fibrillation    Holter 5/2019 showed good avg HR control with good rate variability on CCB and BB    Rate control strategy given asx'c status and low likelihood of success given large LA    Continues warfarin    PLAN:    Continue routine f/u    3. Leg/Buttock Discomfort    No evidence of PAD on ABIs/aorta/iliac US    This actually limited him more than breathing on today's walk into the clinic    PLAN:    I'll let Dr. Lewis know that he's noted continued discomfort despite normal vascular tests    4. Aortic Aneurysm (Root, Ascending and Infrarenal)    MRA as above, with root 4 x 3.9 cm and ascending 4.4 x 4.5 at the level of bifurcation of PAs    Echo 12/2018 with aortic root and ascending aorta of 4.6 cm    Sacular infrarenal AAA with mild enlargement noted (3.3 x 2.2) on US, as above. This has increased from 2.6 x 2.5 in 12/2014    PLAN:    Dr. Camp rec'd echocardiogram for assessment of thoracic aorta 6/2020    I will reach out to Dr. Camp given sacular infrarenal AAA    Cherise Bennett PA-C, MSPAS      Orders Placed This Encounter   Procedures     Basic metabolic panel     Orders Placed This Encounter   Medications     torsemide (DEMADEX) 20 MG tablet      Sig: Take 20 mg by mouth daily     Medications Discontinued During This Encounter   Medication Reason     torsemide (DEMADEX) 20 MG tablet Dose adjustment       Encounter Diagnoses   Name Primary?     SOB (shortness of breath)      Persistent atrial fibrillation (H) Yes       CURRENT MEDICATIONS:  Current Outpatient Medications   Medication Sig Dispense Refill     apixaban ANTICOAGULANT (ELIQUIS) 5 MG tablet Take 1 tablet (5 mg) by mouth 2 times daily 60 tablet 11     atenolol (TENORMIN) 100 MG tablet Take 1.5 tablets (150 mg) by mouth daily 135 tablet 3     atorvastatin (LIPITOR) 40 MG tablet Take 1 tablet (40 mg) by mouth daily 90 tablet 3     diltiazem ER (DILT-XR) 120 MG 24 hr capsule Take 1 capsule (120 mg) by mouth daily 90 capsule 3     EPINEPHrine (EPIPEN/ADRENACLICK/OR ANY BX GENERIC EQUIV) 0.3 MG/0.3ML injection 2-pack Inject 0.3 mLs (0.3 mg) into the muscle as needed for anaphylaxis 0.6 mL 1     escitalopram (LEXAPRO) 10 MG tablet Take 1 tablet (10 mg) by mouth daily 90 tablet 3     ipratropium (ATROVENT) 0.06 % nasal spray Spray 2 sprays in nostril 4 times daily as needed for rhinitis 3 Box 3     leuprolide (ELIGARD) 45 MG injection Inject 45 mg Subcutaneous every 6 months. 1 each 12     Loperamide HCl (IMODIUM OR) Take by mouth daily as needed       losartan (COZAAR) 100 MG tablet TAKE 1 TABLET BY MOUTH ONCE DAILY 90 tablet 3     Multiple Minerals-Vitamins (PROSTEON PO) Take 2,000 Units by mouth With vitamin D       torsemide (DEMADEX) 20 MG tablet Take 20 mg by mouth daily       ASPIRIN NOT PRESCRIBED (INTENTIONAL) continuous prn for other Antiplatelet medication not prescribed intentionally due to Current anticoagulant therapy (warfarin/enoxaparin)       Multiple Vitamins-Minerals (CENTRUM SILVER PO) Take by mouth daily          ALLERGIES     Allergies   Allergen Reactions     Augmentin Rash     Type III hypersensitivity     Bactrim [Sulfamethoxazole W/Trimethoprim] Rash     Serum sickness, type  III hypersensitivity       Bee Venom Itching     Sulfa Drugs Hives     Lisinopril Cough     Naproxen Hives       PAST MEDICAL HISTORY:  Past Medical History:   Diagnosis Date     Actinic keratosis      Arthritis      Atrial fibrillation and flutter (H) 12/3/2018     CAD (coronary artery disease)     1/5/2011 lateral ischemia on stress echo, 12/2014 normal stress echo     Coronary artery disease 1/1/2011    Abnormal stress test 1/2011 and controlled with medical mgmt      Dyslipidemia      Emphysema of lung (H)      Essential hypertension, benign      Hernia, abdominal      Hypersomnia with sleep apnea, unspecified     on bipap, partially treated with residual apneas     Hypertrophy (benign) of prostate 5/01    Biopsy 5/01 negative for cancer; PSA 5     Lumbago      Mumps      Prostate cancer (H) 12/15/2006     Skin cancer, basal cell 1997     Spider veins        PAST SURGICAL HISTORY:  Past Surgical History:   Procedure Laterality Date     HERNIA REPAIR  child     Moh's procedure for basal cell carcinoma  01/2001     PROSTATE SURGERY       s/p lumbar laminectomy NOS  1988     VITRECTOMY PARS PLANA REMOVE PRERETINAL MEMBRANE   3/09       FAMILY HISTORY:  Family History   Problem Relation Age of Onset     Alzheimer Disease Mother      Hypertension Mother      Cardiovascular Father         D:86 complications fo CHF     Prostate Cancer Brother      Lung Cancer Brother 76     Prostate Cancer Brother        SOCIAL HISTORY:  Social History     Socioeconomic History     Marital status:      Spouse name: None     Number of children: None     Years of education: None     Highest education level: None   Occupational History     Occupation: retired   Social Needs     Financial resource strain: None     Food insecurity:     Worry: None     Inability: None     Transportation needs:     Medical: None     Non-medical: None   Tobacco Use     Smoking status: Former Smoker     Packs/day: 1.00     Years: 30.00     Pack years:  30.00     Types: Cigarettes     Start date:      Last attempt to quit: 1978     Years since quittin.6     Smokeless tobacco: Never Used   Substance and Sexual Activity     Alcohol use: Yes     Alcohol/week: 1.0 oz     Types: 2 Standard drinks or equivalent per week     Comment: socially     Drug use: No     Sexual activity: Yes     Partners: Female   Lifestyle     Physical activity:     Days per week: None     Minutes per session: None     Stress: None   Relationships     Social connections:     Talks on phone: None     Gets together: None     Attends [employer] service: None     Active member of club or organization: None     Attends meetings of clubs or organizations: None     Relationship status: None     Intimate partner violence:     Fear of current or ex partner: None     Emotionally abused: None     Physically abused: None     Forced sexual activity: None   Other Topics Concern      Service Not Asked     Blood Transfusions Not Asked     Caffeine Concern No     Comment: 0-2 cans of soda per day.     Occupational Exposure Not Asked     Hobby Hazards Not Asked     Sleep Concern Not Asked     Stress Concern Not Asked     Weight Concern Not Asked     Special Diet Not Asked     Back Care Not Asked     Exercise No     Bike Helmet Not Asked     Seat Belt Yes     Self-Exams Not Asked     Parent/sibling w/ CABG, MI or angioplasty before 65F 55M? No   Social History Narrative     None       Review of Systems:  Skin:  Negative     Eyes:  Positive for glasses  ENT:  Negative    Respiratory:  Positive for sleep apnea;CPAP  Cardiovascular:  chest pain;palpitations;dizziness;lightheadedness;syncope or near-syncope;cyanosis;Negative for Positive for;fatigue;edema;exercise intolerance  Gastroenterology: Negative    Genitourinary:  Positive for urinary frequency;urgency;nocturia  Musculoskeletal:  Positive for joint pain  Neurologic:  Negative    Psychiatric:  Positive for depression  Heme/Lymph/Imm:   "Positive for allergies  Endocrine:  Negative      Physical Exam:  Vitals: /69 (BP Location: Right arm, Cuff Size: Adult Large)   Pulse 72   Ht 1.727 m (5' 8\")   Wt 102.7 kg (226 lb 6.4 oz)   BMI 34.42 kg/m       Constitutional:  cooperative, alert and oriented, well developed, well nourished, in no acute distress        Skin:  warm and dry to the touch        Head:  normocephalic        Eyes:  pupils equal and round;conjunctivae and lids unremarkable;sclera white        ENT:  no pallor or cyanosis, dentition good        Neck:  no carotid bruit;carotid pulses are full and equal bilaterally JVP 8-10;JVP 10-12 No HJR    Chest:  clear to auscultation;normal symmetry;normal respiratory excursion        Cardiac: normal S1 and S2;no murmurs, gallops or rubs detected irregularly irregular rhythm distant heart sounds              Abdomen:  abdomen soft;non-tender;no bruits obese Firm    Vascular:   pulses below the femoral arteries are diminished                               strong radial pulses, slighly diminshed dorsalis pedis and posterior tibia    Extremities and Back:  no deformities, clubbing, cyanosis, erythema observed;no edema        Neurological:  no gross motor deficits          Recent Lab Results:  LIPID RESULTS:  Lab Results   Component Value Date    CHOL 124 10/17/2018    HDL 41 10/17/2018    LDL 60 10/17/2018    TRIG 117 10/17/2018    CHOLHDLRATIO 2.8 10/15/2015       LIVER ENZYME RESULTS:  Lab Results   Component Value Date    AST 29 08/18/2011    ALT 29 08/18/2011       CBC RESULTS:  Lab Results   Component Value Date    WBC 5.8 07/09/2019    RBC 4.52 07/09/2019    HGB 13.8 07/09/2019    HCT 42.3 07/09/2019    MCV 94 07/09/2019    MCH 30.5 07/09/2019    MCHC 32.6 07/09/2019    RDW 14.1 07/09/2019     07/09/2019       BMP RESULTS:  Lab Results   Component Value Date     08/08/2019    POTASSIUM 3.6 08/08/2019    CHLORIDE 104 08/08/2019    CO2 29 08/08/2019    ANIONGAP 11.6 08/08/2019    "  (H) 08/08/2019    BUN 30 08/08/2019    CR 1.49 (H) 08/08/2019    GFRESTIMATED 45 (L) 08/08/2019    GFRESTBLACK 55 (L) 08/08/2019    MARLENE 9.3 08/08/2019        A1C RESULTS:  Lab Results   Component Value Date    A1C 5.9 (H) 12/04/2018         Thank you for allowing me to participate in the care of your patient.    Sincerely,     Nimisha Bennett PA-C     [facility]

## 2019-08-08 NOTE — PROGRESS NOTES
Dr. Lewis - I saw Spencer today. Breathing is much better on torsemide. I'll repeat a BMP in ~7-10 days.    He continues to c/o gluteal/thigh discomfort. I got ABIs and aorta/iliac US to ensure did not have dz upstream that could be causing this, but all tests looked OK.    He does have a h/o spinal dz ... given these continued complaints would you consider looking for spinal stenosis?  I think you see him in a few months.    Thx much!    Cherise Bennett, EP PA

## 2019-08-08 NOTE — PATIENT INSTRUCTIONS
1. Reviewed your blood work - kidneys are a bit more stressed but it's really helping your breathing   ** Continue torsemide 20 mg daily   ** Continue dietary potassium   ** Watch for any dehydrations    2. During week of 8.19, get blood work (non-fasting) - [place] is fine. We'll call with results. At that time, we'll see if we need to back off on the torsemide at all    3. I'll let Dr. Lewis know that your buttocks/thighs/hips are still really bothering you, but our leg testing looks fine. ?MRI??    4. My nurse Nona: [phone]

## 2019-08-08 NOTE — PROGRESS NOTES
Dr. Camp - ?? Follow-up for AAA?    I saw Spencer in f/u for GALDAMEZ. Torsemide has helped. He has seen Dr. Moreno and getting sniff test tomorrow. Surprisingly, no COPD/emphysema    Due to c/o gluteal/thigh discomfort with exertion, I got ABIs and aorto/iliac US to look for inflow dz. This did not show any vascular cause of his sxs BUT an infrarenal saccular AAA 3.3 x 2.2 cm noted. Back in 2014, this was 2.6x 2.5 cm.    You are seeing him 6/2020 with echo for his known aortic root/ascending aortic aneurysm f/u. I was not planning on getting AAA US at that time given it was still quite small (3.3 x 2.2). Pls let me know if you'd like anything different.    Chriss Luong

## 2019-08-09 ENCOUNTER — TRANSFERRED RECORDS (OUTPATIENT)
Dept: HEALTH INFORMATION MANAGEMENT | Facility: CLINIC | Age: 84
End: 2019-08-09

## 2019-08-12 ENCOUNTER — DOCUMENTATION ONLY (OUTPATIENT)
Dept: CARDIOLOGY | Facility: CLINIC | Age: 84
End: 2019-08-12

## 2019-08-12 ENCOUNTER — MYC MEDICAL ADVICE (OUTPATIENT)
Dept: PEDIATRICS | Facility: CLINIC | Age: 84
End: 2019-08-12

## 2019-08-12 DIAGNOSIS — M48.062 SPINAL STENOSIS OF LUMBAR REGION WITH NEUROGENIC CLAUDICATION: Primary | ICD-10-CM

## 2019-08-12 NOTE — PROGRESS NOTES
It looked like Dr. Moreno wanted to see the echocardiogram results, so pls make sure he brings that. They could also bring my AVS from last week    Pls have Med Recs send my OV from last week to her ASAP.    Collins FISHER

## 2019-08-12 NOTE — TELEPHONE ENCOUNTER
I called and left a message to bring echo and AVS to Dr Moreno visit, they are out of town, and asked me to leave them the info on phone-mmunns lpn

## 2019-08-12 NOTE — PROGRESS NOTES
Thanks - I will talk to him about options.  He did have mild spinal stenosis on MRI in 2013.  PT might be a good next step.

## 2019-08-12 NOTE — PROGRESS NOTES
"pts wife Kathya called, they are asking \"which papers\" to bring to office visit w/ DR Moreno-I see he had echo, navi-thnx mmunns lpn  "

## 2019-08-15 ENCOUNTER — HOSPITAL ENCOUNTER (OUTPATIENT)
Dept: LAB | Facility: CLINIC | Age: 84
Discharge: HOME | End: 2019-08-15
Attending: UROLOGY | Admitting: PHYSICIAN ASSISTANT
Payer: COMMERCIAL

## 2019-08-15 DIAGNOSIS — R06.02 SOB (SHORTNESS OF BREATH): ICD-10-CM

## 2019-08-15 DIAGNOSIS — Z85.46 PERSONAL HISTORY OF MALIGNANT NEOPLASM OF PROSTATE: Primary | ICD-10-CM

## 2019-08-15 DIAGNOSIS — I48.19 PERSISTENT ATRIAL FIBRILLATION (H): ICD-10-CM

## 2019-08-15 LAB
ANION GAP SERPL CALCULATED.3IONS-SCNC: 3 MMOL/L (ref 3–14)
BUN SERPL-MCNC: 30 MG/DL (ref 7–30)
CALCIUM SERPL-MCNC: 9.3 MG/DL (ref 8.5–10.1)
CHLORIDE SERPL-SCNC: 105 MMOL/L (ref 94–109)
CO2 SERPL-SCNC: 34 MMOL/L (ref 20–32)
CREAT SERPL-MCNC: 1.46 MG/DL (ref 0.66–1.25)
GFR SERPL CREATININE-BSD FRML MDRD: 44 ML/MIN/{1.73_M2}
GLUCOSE SERPL-MCNC: 118 MG/DL (ref 70–99)
POTASSIUM SERPL-SCNC: 4.2 MMOL/L (ref 3.4–5.3)
PSA SERPL-MCNC: <0.01 UG/L (ref 0–4)
SODIUM SERPL-SCNC: 142 MMOL/L (ref 133–144)

## 2019-08-15 PROCEDURE — 84153 ASSAY OF PSA TOTAL: CPT | Performed by: UROLOGY

## 2019-08-15 PROCEDURE — 80048 BASIC METABOLIC PNL TOTAL CA: CPT | Performed by: PHYSICIAN ASSISTANT

## 2019-08-15 PROCEDURE — 36415 COLL VENOUS BLD VENIPUNCTURE: CPT | Performed by: UROLOGY

## 2019-08-15 PROCEDURE — 36415 COLL VENOUS BLD VENIPUNCTURE: CPT | Performed by: PHYSICIAN ASSISTANT

## 2019-08-15 NOTE — TELEPHONE ENCOUNTER
Please call pt - message not read.  Also hasn't read a message since May.  Please confirm he still has [employer] access.

## 2019-08-15 NOTE — TELEPHONE ENCOUNTER
Called and spoke with patient who advised he could walk like 100 ft and then has to rest. He is open to PT he said a barrier can be with completing the exercises. Waiting to hear back with you recommendation. Ting Irwin MA

## 2019-08-16 ENCOUNTER — DOCUMENTATION ONLY (OUTPATIENT)
Dept: CARDIOLOGY | Facility: CLINIC | Age: 84
End: 2019-08-16

## 2019-08-16 NOTE — PROGRESS NOTES
Pls let Spencer and Kathya know that BMP is stable. At our OV 8/8 I kept him on torsemide 20 mg daily.    As long as he thinks this continues to help his breathing, would not make any changes. See me back 3-4 m with BMP but call if issues with breathing, swelling, weight or dehydration on this dose.    Thx - Cherise        Component      Latest Ref Rng & Units 7/9/2019 7/19/2019 8/8/2019 8/15/2019   Sodium      133 - 144 mmol/L 141 143 141 142   Potassium      3.4 - 5.3 mmol/L 4.1 3.7 3.6 4.2   Chloride      94 - 109 mmol/L 105 107 104 105   Carbon Dioxide      20 - 32 mmol/L 29 31 29 34 (H)   Anion Gap      3 - 14 mmol/L 11.1 5 11.6 3   Glucose      70 - 99 mg/dL 149 (H) 142 (H) 135 (H) 118 (H)   Urea Nitrogen      7 - 30 mg/dL 24 36 (H) 30 30   Creatinine      0.66 - 1.25 mg/dL 1.38 (H) 1.38 (H) 1.49 (H) 1.46 (H)   GFR Estimate      >60 mL/min/1.73:m2 49 (L) 47 (L) 45 (L) 44 (L)   GFR Estimate If Black      >60 mL/min/1.73:m2 60 (L) 55 (L) 55 (L) 51 (L)   Calcium      8.5 - 10.1 mg/dL 10.0 8.9 9.3 9.3

## 2019-08-19 NOTE — TELEPHONE ENCOUNTER
Notified patient's wife. She will let him know the referral was placed.  Nicole, [facility]

## 2019-08-19 NOTE — TELEPHONE ENCOUNTER
I called and spoke w/ bryon, Spencer will continue with the 20mg of torsemide, and schedule appt to see Cherise in 3-4 mo w/ a BMP-thy will call back to get that se up-mmindus lpn

## 2019-08-19 NOTE — TELEPHONE ENCOUNTER
Order placed, please let pt know.  Will consider other options if not helping.  Please ask about mychart use, as he hasn't been looking at messages

## 2019-08-21 ENCOUNTER — OFFICE VISIT (OUTPATIENT)
Dept: UROLOGY | Facility: CLINIC | Age: 84
End: 2019-08-21
Payer: COMMERCIAL

## 2019-08-21 VITALS — WEIGHT: 226 LBS | HEART RATE: 78 BPM | BODY MASS INDEX: 34.25 KG/M2 | OXYGEN SATURATION: 96 % | HEIGHT: 68 IN

## 2019-08-21 DIAGNOSIS — C61 PROSTATE CANCER (H): Primary | ICD-10-CM

## 2019-08-21 DIAGNOSIS — C61 MALIGNANT NEOPLASM OF PROSTATE (H): ICD-10-CM

## 2019-08-21 PROCEDURE — 99213 OFFICE O/P EST LOW 20 MIN: CPT | Mod: 25 | Performed by: UROLOGY

## 2019-08-21 PROCEDURE — 96402 CHEMO HORMON ANTINEOPL SQ/IM: CPT | Performed by: UROLOGY

## 2019-08-21 ASSESSMENT — MIFFLIN-ST. JEOR: SCORE: 1704.63

## 2019-08-21 ASSESSMENT — PAIN SCALES - GENERAL: PAINLEVEL: NO PAIN (0)

## 2019-08-21 NOTE — LETTER
8/21/2019       RE: Nixon More  [address] [place] [address] Dr  [place] [address]     Dear Colleague,    Thank you for referring your patient, Nixon More, to the [facility] [place] [facility]. Please see a copy of my visit note below.    Office Visit Note  [facility]  [phone]    UROLOGIC DIAGNOSES:   Prostate cancer and hematuria    CURRENT INTERVENTIONS:   Hormonal therapy, prior radiotherapy, negative hematuria workup in 2018    HISTORY:   Spencer returns to clinic today for prostate cancer follow-up. His PSA remains undetectable. He is taking a loop diuretic in the morning and has frequency in the morning but otherwise no new urinary complaints.      PAST MEDICAL HISTORY:   Past Medical History:   Diagnosis Date     Actinic keratosis      Arthritis      Atrial fibrillation and flutter (H) 12/3/2018     CAD (coronary artery disease)     1/5/2011 lateral ischemia on stress echo, 12/2014 normal stress echo     Coronary artery disease 1/1/2011    Abnormal stress test 1/2011 and controlled with medical mgmt      Dyslipidemia      Emphysema of lung (H)      Essential hypertension, benign      Hernia, abdominal      Hypersomnia with sleep apnea, unspecified     on bipap, partially treated with residual apneas     Hypertrophy (benign) of prostate 5/01    Biopsy 5/01 negative for cancer; PSA 5     Lumbago      Mumps      Prostate cancer (H) 12/15/2006     Skin cancer, basal cell 1997     Spider veins        PAST SURGICAL HISTORY:   Past Surgical History:   Procedure Laterality Date     HERNIA REPAIR  child     Moh's procedure for basal cell carcinoma  01/2001     PROSTATE SURGERY       s/p lumbar laminectomy NOS  1988     VITRECTOMY PARS PLANA REMOVE PRERETINAL MEMBRANE   3/09       FAMILY HISTORY:   Family History   Problem Relation Age of Onset     Alzheimer Disease Mother      Hypertension Mother      Cardiovascular Father  "        D:86 complications fo CHF     Prostate Cancer Brother      Lung Cancer Brother 76     Prostate Cancer Brother        SOCIAL HISTORY:   Social History     Tobacco Use     Smoking status: Former Smoker     Packs/day: 1.00     Years: 30.00     Pack years: 30.00     Types: Cigarettes     Start date:      Last attempt to quit: 1978     Years since quittin.6     Smokeless tobacco: Never Used   Substance Use Topics     Alcohol use: Yes     Alcohol/week: 1.0 oz     Types: 2 Standard drinks or equivalent per week     Comment: socially       Current Outpatient Medications   Medication     apixaban ANTICOAGULANT (ELIQUIS) 5 MG tablet     ASPIRIN NOT PRESCRIBED (INTENTIONAL)     atenolol (TENORMIN) 100 MG tablet     atorvastatin (LIPITOR) 40 MG tablet     diltiazem ER (DILT-XR) 120 MG 24 hr capsule     EPINEPHrine (EPIPEN/ADRENACLICK/OR ANY BX GENERIC EQUIV) 0.3 MG/0.3ML injection 2-pack     escitalopram (LEXAPRO) 10 MG tablet     ipratropium (ATROVENT) 0.06 % nasal spray     leuprolide (ELIGARD) 45 MG injection     Loperamide HCl (IMODIUM OR)     losartan (COZAAR) 100 MG tablet     Multiple Minerals-Vitamins (PROSTEON PO)     Multiple Vitamins-Minerals (CENTRUM SILVER PO)     torsemide (DEMADEX) 20 MG tablet     No current facility-administered medications for this visit.      Facility-Administered Medications Ordered in Other Visits   Medication     leuprolide (LUPRON DEPOT) kit 45 mg         PHYSICAL EXAM:    Pulse 78   Ht 1.727 m (5' 8\")   Wt 102.5 kg (226 lb)   SpO2 96%   BMI 34.36 kg/m       Constitutional: Well developed. Conversant and in no acute distress  Eyes: Anicteric sclera, conjunctiva clear, normal extraocular movements  ENT: Normocephalic and atraumatic,   Skin: Warm and dry. No rashes or lesions  Cardiac: No peripheral edema  Back/Flank: Not done  CNS/PNS: Normal musculature and movements, moves all extremities normally  Respiratory: Normal non-labored breathing  Abdomen: Soft " nontender and nondistended  Peripheral Vascular: No peripheral edema  Mental Status/Psych: Alert and Oriented x 3. Normal mood and affect    Penis: Not done  Scrotal Skin: Not done  Testicles: Not done  Epididymis: Not done  Digital Rectal Exam:     Cystoscopy: Not done    Imaging: None    Urinalysis: UA RESULTS:  Recent Labs   Lab Test 04/04/19  0935  02/05/19  0856   COLOR Yellow   < > Yellow   APPEARANCE Clear   < > Slightly Cloudy   URINEGLC Negative   < > 100*   URINEBILI Negative   < > Negative   URINEKETONE Negative   < > Negative   SG 1.020   < > 1.020   UBLD Trace*   < > Moderate*   URINEPH 6.0   < > 6.0   PROTEIN 30*   < > 30*   UROBILINOGEN 0.2   < > 0.2   NITRITE Negative   < > Negative   LEUKEST Trace*   < > Moderate*   RBCU  --   --  O - 2   WBCU  --   --  >100*    < > = values in this interval not displayed.       PSA: undetectable    Post Void Residual:     Other labs: None today      IMPRESSION:  Doing well, PSA undetectable    PLAN:  He continues to do well with an undetectable PSA.We discussed formal therapy and he wishes to continue hormonal therapy. He received a 6 month injection of Lupron today.I will see him back again in 6 months.      Mark Pino M.D.              Again, thank you for allowing me to participate in the care of your patient.      Sincerely,    Mark Pino MD

## 2019-08-21 NOTE — PROGRESS NOTES
Office Visit Note  [facility]  [phone]    UROLOGIC DIAGNOSES:   Prostate cancer and hematuria    CURRENT INTERVENTIONS:   Hormonal therapy, prior radiotherapy, negative hematuria workup in 2018    HISTORY:   Spencer returns to clinic today for prostate cancer follow-up. His PSA remains undetectable. He is taking a loop diuretic in the morning and has frequency in the morning but otherwise no new urinary complaints.      PAST MEDICAL HISTORY:   Past Medical History:   Diagnosis Date     Actinic keratosis      Arthritis      Atrial fibrillation and flutter (H) 12/3/2018     CAD (coronary artery disease)     1/5/2011 lateral ischemia on stress echo, 12/2014 normal stress echo     Coronary artery disease 1/1/2011    Abnormal stress test 1/2011 and controlled with medical mgmt      Dyslipidemia      Emphysema of lung (H)      Essential hypertension, benign      Hernia, abdominal      Hypersomnia with sleep apnea, unspecified     on bipap, partially treated with residual apneas     Hypertrophy (benign) of prostate 5/01    Biopsy 5/01 negative for cancer; PSA 5     Lumbago      Mumps      Prostate cancer (H) 12/15/2006     Skin cancer, basal cell 1997     Spider veins        PAST SURGICAL HISTORY:   Past Surgical History:   Procedure Laterality Date     HERNIA REPAIR  child     Moh's procedure for basal cell carcinoma  01/2001     PROSTATE SURGERY       s/p lumbar laminectomy NOS  1988     VITRECTOMY PARS PLANA REMOVE PRERETINAL MEMBRANE   3/09       FAMILY HISTORY:   Family History   Problem Relation Age of Onset     Alzheimer Disease Mother      Hypertension Mother      Cardiovascular Father         D:86 complications fo CHF     Prostate Cancer Brother      Lung Cancer Brother 76     Prostate Cancer Brother        SOCIAL HISTORY:   Social History     Tobacco Use     Smoking status: Former Smoker     Packs/day: 1.00     Years: 30.00     Pack years: 30.00     Types: Cigarettes     Start date: 1948     Last  "attempt to quit: 1978     Years since quittin.6     Smokeless tobacco: Never Used   Substance Use Topics     Alcohol use: Yes     Alcohol/week: 1.0 oz     Types: 2 Standard drinks or equivalent per week     Comment: socially       Current Outpatient Medications   Medication     apixaban ANTICOAGULANT (ELIQUIS) 5 MG tablet     ASPIRIN NOT PRESCRIBED (INTENTIONAL)     atenolol (TENORMIN) 100 MG tablet     atorvastatin (LIPITOR) 40 MG tablet     diltiazem ER (DILT-XR) 120 MG 24 hr capsule     EPINEPHrine (EPIPEN/ADRENACLICK/OR ANY BX GENERIC EQUIV) 0.3 MG/0.3ML injection 2-pack     escitalopram (LEXAPRO) 10 MG tablet     ipratropium (ATROVENT) 0.06 % nasal spray     leuprolide (ELIGARD) 45 MG injection     Loperamide HCl (IMODIUM OR)     losartan (COZAAR) 100 MG tablet     Multiple Minerals-Vitamins (PROSTEON PO)     Multiple Vitamins-Minerals (CENTRUM SILVER PO)     torsemide (DEMADEX) 20 MG tablet     No current facility-administered medications for this visit.      Facility-Administered Medications Ordered in Other Visits   Medication     leuprolide (LUPRON DEPOT) kit 45 mg         PHYSICAL EXAM:    Pulse 78   Ht 1.727 m (5' 8\")   Wt 102.5 kg (226 lb)   SpO2 96%   BMI 34.36 kg/m      Constitutional: Well developed. Conversant and in no acute distress  Eyes: Anicteric sclera, conjunctiva clear, normal extraocular movements  ENT: Normocephalic and atraumatic,   Skin: Warm and dry. No rashes or lesions  Cardiac: No peripheral edema  Back/Flank: Not done  CNS/PNS: Normal musculature and movements, moves all extremities normally  Respiratory: Normal non-labored breathing  Abdomen: Soft nontender and nondistended  Peripheral Vascular: No peripheral edema  Mental Status/Psych: Alert and Oriented x 3. Normal mood and affect    Penis: Not done  Scrotal Skin: Not done  Testicles: Not done  Epididymis: Not done  Digital Rectal Exam:     Cystoscopy: Not done    Imaging: None    Urinalysis: UA RESULTS:  Recent Labs "   Lab Test 04/04/19  0935  02/05/19  0856   COLOR Yellow   < > Yellow   APPEARANCE Clear   < > Slightly Cloudy   URINEGLC Negative   < > 100*   URINEBILI Negative   < > Negative   URINEKETONE Negative   < > Negative   SG 1.020   < > 1.020   UBLD Trace*   < > Moderate*   URINEPH 6.0   < > 6.0   PROTEIN 30*   < > 30*   UROBILINOGEN 0.2   < > 0.2   NITRITE Negative   < > Negative   LEUKEST Trace*   < > Moderate*   RBCU  --   --  O - 2   WBCU  --   --  >100*    < > = values in this interval not displayed.       PSA: undetectable    Post Void Residual:     Other labs: None today      IMPRESSION:  Doing well, PSA undetectable    PLAN:  He continues to do well with an undetectable PSA.We discussed formal therapy and he wishes to continue hormonal therapy. He received a 6 month injection of Lupron today.I will see him back again in 6 months.      Mark Pino M.D.

## 2019-08-21 NOTE — NURSING NOTE
Pt denies any voiding trouble.  Pt on new diuretic.  Mid afternoon he is better.  Pt is having leaking issues.  Pt leaks the closer he gets to the toilet.  Pt has had prostate radiation.  KAMRON Hughes CMA

## 2019-08-21 NOTE — LETTER
8/21/2019       RE: Nixon More  [address] [place] [address] [address]  [place] [address]     Dear Colleague,    Thank you for referring your patient, Nixon More, to the [facility] [place] at [facility]. Please see a copy of my visit note below.    Office Visit Note  [facility]  [phone]    UROLOGIC DIAGNOSES:   Prostate cancer and hematuria    CURRENT INTERVENTIONS:   Hormonal therapy, prior radiotherapy, negative hematuria workup in 2018    HISTORY:   Spencer returns to clinic today for prostate cancer follow-up. His PSA remains undetectable. He is taking a loop diuretic in the morning and has frequency in the morning but otherwise no new urinary complaints.      PAST MEDICAL HISTORY:   Past Medical History:   Diagnosis Date     Actinic keratosis      Arthritis      Atrial fibrillation and flutter (H) 12/3/2018     CAD (coronary artery disease)     1/5/2011 lateral ischemia on stress echo, 12/2014 normal stress echo     Coronary artery disease 1/1/2011    Abnormal stress test 1/2011 and controlled with medical mgmt      Dyslipidemia      Emphysema of lung (H)      Essential hypertension, benign      Hernia, abdominal      Hypersomnia with sleep apnea, unspecified     on bipap, partially treated with residual apneas     Hypertrophy (benign) of prostate 5/01    Biopsy 5/01 negative for cancer; PSA 5     Lumbago      Mumps      Prostate cancer (H) 12/15/2006     Skin cancer, basal cell 1997     Spider veins        PAST SURGICAL HISTORY:   Past Surgical History:   Procedure Laterality Date     HERNIA REPAIR  child     Moh's procedure for basal cell carcinoma  01/2001     PROSTATE SURGERY       s/p lumbar laminectomy NOS  1988     VITRECTOMY PARS PLANA REMOVE PRERETINAL MEMBRANE   3/09       FAMILY HISTORY:   Family History   Problem Relation Age of Onset     Alzheimer Disease Mother      Hypertension Mother      Cardiovascular Father  "        D:86 complications fo CHF     Prostate Cancer Brother      Lung Cancer Brother 76     Prostate Cancer Brother        SOCIAL HISTORY:   Social History     Tobacco Use     Smoking status: Former Smoker     Packs/day: 1.00     Years: 30.00     Pack years: 30.00     Types: Cigarettes     Start date:      Last attempt to quit: 1978     Years since quittin.6     Smokeless tobacco: Never Used   Substance Use Topics     Alcohol use: Yes     Alcohol/week: 1.0 oz     Types: 2 Standard drinks or equivalent per week     Comment: socially       Current Outpatient Medications   Medication     apixaban ANTICOAGULANT (ELIQUIS) 5 MG tablet     ASPIRIN NOT PRESCRIBED (INTENTIONAL)     atenolol (TENORMIN) 100 MG tablet     atorvastatin (LIPITOR) 40 MG tablet     diltiazem ER (DILT-XR) 120 MG 24 hr capsule     EPINEPHrine (EPIPEN/ADRENACLICK/OR ANY BX GENERIC EQUIV) 0.3 MG/0.3ML injection 2-pack     escitalopram (LEXAPRO) 10 MG tablet     ipratropium (ATROVENT) 0.06 % nasal spray     leuprolide (ELIGARD) 45 MG injection     Loperamide HCl (IMODIUM OR)     losartan (COZAAR) 100 MG tablet     Multiple Minerals-Vitamins (PROSTEON PO)     Multiple Vitamins-Minerals (CENTRUM SILVER PO)     torsemide (DEMADEX) 20 MG tablet     No current facility-administered medications for this visit.      Facility-Administered Medications Ordered in Other Visits   Medication     leuprolide (LUPRON DEPOT) kit 45 mg         PHYSICAL EXAM:    Pulse 78   Ht 1.727 m (5' 8\")   Wt 102.5 kg (226 lb)   SpO2 96%   BMI 34.36 kg/m       Constitutional: Well developed. Conversant and in no acute distress  Eyes: Anicteric sclera, conjunctiva clear, normal extraocular movements  ENT: Normocephalic and atraumatic,   Skin: Warm and dry. No rashes or lesions  Cardiac: No peripheral edema  Back/Flank: Not done  CNS/PNS: Normal musculature and movements, moves all extremities normally  Respiratory: Normal non-labored breathing  Abdomen: Soft " nontender and nondistended  Peripheral Vascular: No peripheral edema  Mental Status/Psych: Alert and Oriented x 3. Normal mood and affect    Penis: Not done  Scrotal Skin: Not done  Testicles: Not done  Epididymis: Not done  Digital Rectal Exam:     Cystoscopy: Not done    Imaging: None    Urinalysis: UA RESULTS:  Recent Labs   Lab Test 04/04/19  0935  02/05/19  0856   COLOR Yellow   < > Yellow   APPEARANCE Clear   < > Slightly Cloudy   URINEGLC Negative   < > 100*   URINEBILI Negative   < > Negative   URINEKETONE Negative   < > Negative   SG 1.020   < > 1.020   UBLD Trace*   < > Moderate*   URINEPH 6.0   < > 6.0   PROTEIN 30*   < > 30*   UROBILINOGEN 0.2   < > 0.2   NITRITE Negative   < > Negative   LEUKEST Trace*   < > Moderate*   RBCU  --   --  O - 2   WBCU  --   --  >100*    < > = values in this interval not displayed.       PSA: undetectable    Post Void Residual:     Other labs: None today    IMPRESSION:  Doing well, PSA undetectable    PLAN:  He continues to do well with an undetectable PSA.We discussed formal therapy and he wishes to continue hormonal therapy. He received a 6 month injection of Lupron today.I will see him back again in 6 months.      Mark Pino M.D.

## 2019-08-21 NOTE — NURSING NOTE
The following medication was given:     MEDICATION: Lupron Depot 45 mg  ROUTE: IM  SITE: [facility]  DOSE: 45mg  LOT #: [id number]  :  fabrizio  EXPIRATION DATE:  29sep2021  NDC#: 8147-9470-34  KAMRON Jarquin

## 2019-08-22 ENCOUNTER — TELEPHONE (OUTPATIENT)
Dept: NURSING | Facility: CLINIC | Age: 84
End: 2019-08-22

## 2019-08-22 NOTE — TELEPHONE ENCOUNTER
Called patient to follow-up with him after injection. Patient states that he is going to try tylenol and icing for discomfort. If pain does not go away patient will call back. Otherwise, patient will monitor.     Veronica Hansen LPN

## 2019-08-22 NOTE — TELEPHONE ENCOUNTER
.  [facility]: Nurse Triage Note  SITUATION/BACKGROUND                                                      Nixon More is a 83 year old male who wife calls to report soreness on right buttock after injection of Lupron yesterday. Patient receives shot every 6 months. No reaction on previously.  Rates pain 6/10. Slight swelling  And redness noted on entire right buttock. Injection site had bandage still in place.   Wife removed. Injection site appeared normal. No drainage noted. No rash or  change in skin integrity reported.   Wife stated that patient says that buttock pain is better just lying down and not moving. No fever noted.  Denies any swelling of lips, tongue, throat, difficultly breathing or swallowing. Or S/ S of protocol.     Allergies:   Allergies   Allergen Reactions     Augmentin Rash     Type III hypersensitivity     Bactrim [Sulfamethoxazole W/Trimethoprim] Rash     Serum sickness, type III hypersensitivity       Bee Venom Itching     Sulfa Drugs Hives     Lisinopril Cough     Naproxen Hives     Routed to Urology for follow up call regarding Lupron injection.       RECOMMENDATION/PLAN                                                      RECOMMENDED DISPOSITION: home care instructions given per protocol.   Apply cool compress to buttock for swelling and warm for pain relief. Take pain medication for relief. If no improvement report to Clinic with fever or worsening symptoms.  Seek ED care with swelling of tongue, throat, hives, problems with breathing/ swollen.  Will comply with recommendation: Yes    If further questions/concerns or if symptoms do not improve, worsen or new symptoms develop, call your PCP or [phone] to talk with the Resident on call, as soon as possible.    Guideline used: IM injection (Lupron)    Telephone Triage Protocols for Nurses, Fifth Edition, Rand West, RN, RN

## 2019-08-23 ENCOUNTER — NURSE TRIAGE (OUTPATIENT)
Dept: NURSING | Facility: CLINIC | Age: 84
End: 2019-08-23

## 2019-09-09 ENCOUNTER — ANCILLARY PROCEDURE (OUTPATIENT)
Dept: BONE DENSITY | Facility: CLINIC | Age: 84
End: 2019-09-09
Attending: INTERNAL MEDICINE
Payer: COMMERCIAL

## 2019-09-09 DIAGNOSIS — C61 MALIGNANT NEOPLASM OF PROSTATE (H): ICD-10-CM

## 2019-09-09 DIAGNOSIS — M89.9 BONE DISORDER: ICD-10-CM

## 2019-09-09 DIAGNOSIS — M85.80 OSTEOPENIA, UNSPECIFIED LOCATION: ICD-10-CM

## 2019-09-09 PROCEDURE — 77085 DXA BONE DENSITY AXL VRT FX: CPT | Performed by: INTERNAL MEDICINE

## 2019-09-30 DIAGNOSIS — I48.91 ATRIAL FIBRILLATION AND FLUTTER (H): Primary | ICD-10-CM

## 2019-09-30 DIAGNOSIS — I10 BENIGN ESSENTIAL HYPERTENSION: ICD-10-CM

## 2019-09-30 DIAGNOSIS — I48.92 ATRIAL FIBRILLATION AND FLUTTER (H): Primary | ICD-10-CM

## 2019-09-30 RX ORDER — TORSEMIDE 20 MG/1
20 TABLET ORAL DAILY
Qty: 90 TABLET | Refills: 0 | Status: SHIPPED | OUTPATIENT
Start: 2019-09-30 | End: 2020-01-08

## 2019-10-10 ENCOUNTER — TRANSFERRED RECORDS (OUTPATIENT)
Dept: HEALTH INFORMATION MANAGEMENT | Facility: CLINIC | Age: 84
End: 2019-10-10

## 2019-10-16 ENCOUNTER — OFFICE VISIT (OUTPATIENT)
Dept: UROLOGY | Facility: CLINIC | Age: 84
End: 2019-10-16
Payer: COMMERCIAL

## 2019-10-16 VITALS — HEART RATE: 72 BPM | HEIGHT: 68 IN | OXYGEN SATURATION: 97 % | WEIGHT: 226 LBS | BODY MASS INDEX: 34.25 KG/M2

## 2019-10-16 DIAGNOSIS — C61 PROSTATE CANCER (H): Primary | ICD-10-CM

## 2019-10-16 PROCEDURE — 99213 OFFICE O/P EST LOW 20 MIN: CPT | Performed by: UROLOGY

## 2019-10-16 ASSESSMENT — PAIN SCALES - GENERAL: PAINLEVEL: NO PAIN (0)

## 2019-10-16 ASSESSMENT — MIFFLIN-ST. JEOR: SCORE: 1699.63

## 2019-10-16 NOTE — PROGRESS NOTES
Office Visit Note  [facility]  [phone]    UROLOGIC DIAGNOSES:   Prostate cancer and hematuria    CURRENT INTERVENTIONS:   Hormonal therapy, prior radiotherapy, negative hematuria work-up 2018    HISTORY:   Spencer returns to clinic today because after his most recent Lupron injection he had 36 hours where he felt very unwell and had a hard time getting out of bed.  He did not have any fevers during this time.  He had no rash or changes at the injection site.  He says that his symptoms resolved and he has felt fine since that time.  He still has some urinary frequency from the loop diuretic he takes but otherwise he has felt completely fine at that time.      PAST MEDICAL HISTORY:   Past Medical History:   Diagnosis Date     Actinic keratosis      Arthritis      Atrial fibrillation and flutter (H) 12/3/2018     CAD (coronary artery disease)     1/5/2011 lateral ischemia on stress echo, 12/2014 normal stress echo     Coronary artery disease 1/1/2011    Abnormal stress test 1/2011 and controlled with medical mgmt      Dyslipidemia      Emphysema of lung (H)      Essential hypertension, benign      Hernia, abdominal      Hypersomnia with sleep apnea, unspecified     on bipap, partially treated with residual apneas     Hypertrophy (benign) of prostate 5/01    Biopsy 5/01 negative for cancer; PSA 5     Lumbago      Mumps      Prostate cancer (H) 12/15/2006     Skin cancer, basal cell 1997     Spider veins        PAST SURGICAL HISTORY:   Past Surgical History:   Procedure Laterality Date     HERNIA REPAIR  child     Moh's procedure for basal cell carcinoma  01/2001     PROSTATE SURGERY       s/p lumbar laminectomy NOS  1988     VITRECTOMY PARS PLANA REMOVE PRERETINAL MEMBRANE   3/09       FAMILY HISTORY:   Family History   Problem Relation Age of Onset     Alzheimer Disease Mother      Hypertension Mother      Cardiovascular Father         D:86 complications fo CHF     Prostate Cancer Brother      Lung  "Cancer Brother 76     Prostate Cancer Brother        SOCIAL HISTORY:   Social History     Tobacco Use     Smoking status: Former Smoker     Packs/day: 1.00     Years: 30.00     Pack years: 30.00     Types: Cigarettes     Start date:      Last attempt to quit: 1978     Years since quittin.8     Smokeless tobacco: Never Used   Substance Use Topics     Alcohol use: Yes     Alcohol/week: 1.7 standard drinks     Types: 2 Standard drinks or equivalent per week     Comment: socially       Current Outpatient Medications   Medication     apixaban ANTICOAGULANT (ELIQUIS) 5 MG tablet     ASPIRIN NOT PRESCRIBED (INTENTIONAL)     atenolol (TENORMIN) 100 MG tablet     atorvastatin (LIPITOR) 40 MG tablet     diltiazem ER (DILT-XR) 120 MG 24 hr capsule     escitalopram (LEXAPRO) 10 MG tablet     ipratropium (ATROVENT) 0.06 % nasal spray     Loperamide HCl (IMODIUM OR)     losartan (COZAAR) 100 MG tablet     Multiple Minerals-Vitamins (PROSTEON PO)     Multiple Vitamins-Minerals (CENTRUM SILVER PO)     torsemide (DEMADEX) 20 MG tablet     EPINEPHrine (EPIPEN/ADRENACLICK/OR ANY BX GENERIC EQUIV) 0.3 MG/0.3ML injection 2-pack     leuprolide (ELIGARD) 45 MG injection     No current facility-administered medications for this visit.      Facility-Administered Medications Ordered in Other Visits   Medication     leuprolide (LUPRON DEPOT) kit 45 mg         PHYSICAL EXAM:    Pulse 72   Ht 1.727 m (5' 8\")   Wt 102.5 kg (226 lb)   SpO2 97%   BMI 34.36 kg/m      Constitutional: Well developed. Conversant and in no acute distress  Eyes: Anicteric sclera, conjunctiva clear, normal extraocular movements  ENT: Normocephalic and atraumatic,   Skin: Warm and dry. No rashes or lesions  Cardiac: No peripheral edema  Back/Flank: Not done  CNS/PNS: Normal musculature and movements, moves all extremities normally  Respiratory: Normal non-labored breathing  Abdomen: Soft nontender and nondistended  Peripheral Vascular: No peripheral " edema  Mental Status/Psych: Alert and Oriented x 3. Normal mood and affect  I examined the injection site in the gluteal muscle from his Lupron injection.  Examination is normal.  I cannot tell where the injection was given.    Penis: Not done  Scrotal Skin: Not done  Testicles: Not done  Epididymis: Not done  Digital Rectal Exam:     Cystoscopy: Not done    Imaging: None    Urinalysis: UA RESULTS:  Recent Labs   Lab Test 04/04/19  0935  02/05/19  0856   COLOR Yellow   < > Yellow   APPEARANCE Clear   < > Slightly Cloudy   URINEGLC Negative   < > 100*   URINEBILI Negative   < > Negative   URINEKETONE Negative   < > Negative   SG 1.020   < > 1.020   UBLD Trace*   < > Moderate*   URINEPH 6.0   < > 6.0   PROTEIN 30*   < > 30*   UROBILINOGEN 0.2   < > 0.2   NITRITE Negative   < > Negative   LEUKEST Trace*   < > Moderate*   RBCU  --   --  O - 2   WBCU  --   --  >100*    < > = values in this interval not displayed.       PSA: Undetectable    Post Void Residual:     Other labs: None today      IMPRESSION:  Possible reaction from Lupron injection    PLAN:  It is possible that he had a reaction from his Lupron injection but it does not sound like a typical allergic reaction.  He has had previous Lupron injections with no problems.  No evidence of problems at the injection site today after our discussion we did decide to proceed with his next Lupron injection in February and will monitor him closely afterwards.      Mark Pino M.D.

## 2019-10-16 NOTE — NURSING NOTE
Pt was in bed after last shot pt was in bed for 36 hours after last lupron.  Pt states he hurt to move.  Pt wants to talk side effects.  Pt did good with shot before he was in bed for 36 hours.  Pt leaks on the way to the restroom.  Pt wants to talk to JOSESITO Hughes CMA

## 2019-10-16 NOTE — LETTER
10/16/2019       RE: Nixon More  [address] [place] [address] Dr  [place] [address]     Dear Colleague,    Thank you for referring your patient, Nixon More, to the [facility] [place] at [facility]. Please see a copy of my visit note below.    Office Visit Note  [facility]  [phone]    UROLOGIC DIAGNOSES:   Prostate cancer and hematuria    CURRENT INTERVENTIONS:   Hormonal therapy, prior radiotherapy, negative hematuria work-up 2018    HISTORY:   Spencer returns to clinic today because after his most recent Lupron injection he had 36 hours where he felt very unwell and had a hard time getting out of bed.  He did not have any fevers during this time.  He had no rash or changes at the injection site.  He says that his symptoms resolved and he has felt fine since that time.  He still has some urinary frequency from the loop diuretic he takes but otherwise he has felt completely fine at that time.      PAST MEDICAL HISTORY:   Past Medical History:   Diagnosis Date     Actinic keratosis      Arthritis      Atrial fibrillation and flutter (H) 12/3/2018     CAD (coronary artery disease)     1/5/2011 lateral ischemia on stress echo, 12/2014 normal stress echo     Coronary artery disease 1/1/2011    Abnormal stress test 1/2011 and controlled with medical mgmt      Dyslipidemia      Emphysema of lung (H)      Essential hypertension, benign      Hernia, abdominal      Hypersomnia with sleep apnea, unspecified     on bipap, partially treated with residual apneas     Hypertrophy (benign) of prostate 5/01    Biopsy 5/01 negative for cancer; PSA 5     Lumbago      Mumps      Prostate cancer (H) 12/15/2006     Skin cancer, basal cell 1997     Spider veins        PAST SURGICAL HISTORY:   Past Surgical History:   Procedure Laterality Date     HERNIA REPAIR  child     Moh's procedure for basal cell carcinoma  01/2001     PROSTATE SURGERY        "s/p lumbar laminectomy NOS  1988     VITRECTOMY PARS PLANA REMOVE PRERETINAL MEMBRANE   3/09       FAMILY HISTORY:   Family History   Problem Relation Age of Onset     Alzheimer Disease Mother      Hypertension Mother      Cardiovascular Father         D:86 complications fo CHF     Prostate Cancer Brother      Lung Cancer Brother 76     Prostate Cancer Brother        SOCIAL HISTORY:   Social History     Tobacco Use     Smoking status: Former Smoker     Packs/day: 1.00     Years: 30.00     Pack years: 30.00     Types: Cigarettes     Start date:      Last attempt to quit: 1978     Years since quittin.8     Smokeless tobacco: Never Used   Substance Use Topics     Alcohol use: Yes     Alcohol/week: 1.7 standard drinks     Types: 2 Standard drinks or equivalent per week     Comment: socially       Current Outpatient Medications   Medication     apixaban ANTICOAGULANT (ELIQUIS) 5 MG tablet     ASPIRIN NOT PRESCRIBED (INTENTIONAL)     atenolol (TENORMIN) 100 MG tablet     atorvastatin (LIPITOR) 40 MG tablet     diltiazem ER (DILT-XR) 120 MG 24 hr capsule     escitalopram (LEXAPRO) 10 MG tablet     ipratropium (ATROVENT) 0.06 % nasal spray     Loperamide HCl (IMODIUM OR)     losartan (COZAAR) 100 MG tablet     Multiple Minerals-Vitamins (PROSTEON PO)     Multiple Vitamins-Minerals (CENTRUM SILVER PO)     torsemide (DEMADEX) 20 MG tablet     EPINEPHrine (EPIPEN/ADRENACLICK/OR ANY BX GENERIC EQUIV) 0.3 MG/0.3ML injection 2-pack     leuprolide (ELIGARD) 45 MG injection     No current facility-administered medications for this visit.      Facility-Administered Medications Ordered in Other Visits   Medication     leuprolide (LUPRON DEPOT) kit 45 mg         PHYSICAL EXAM:    Pulse 72   Ht 1.727 m (5' 8\")   Wt 102.5 kg (226 lb)   SpO2 97%   BMI 34.36 kg/m       Constitutional: Well developed. Conversant and in no acute distress  Eyes: Anicteric sclera, conjunctiva clear, normal extraocular movements  ENT: " Normocephalic and atraumatic,   Skin: Warm and dry. No rashes or lesions  Cardiac: No peripheral edema  Back/Flank: Not done  CNS/PNS: Normal musculature and movements, moves all extremities normally  Respiratory: Normal non-labored breathing  Abdomen: Soft nontender and nondistended  Peripheral Vascular: No peripheral edema  Mental Status/Psych: Alert and Oriented x 3. Normal mood and affect  I examined the injection site in the gluteal muscle from his Lupron injection.  Examination is normal.  I cannot tell where the injection was given.    Penis: Not done  Scrotal Skin: Not done  Testicles: Not done  Epididymis: Not done  Digital Rectal Exam:     Cystoscopy: Not done    Imaging: None    Urinalysis: UA RESULTS:  Recent Labs   Lab Test 04/04/19  0935  02/05/19  0856   COLOR Yellow   < > Yellow   APPEARANCE Clear   < > Slightly Cloudy   URINEGLC Negative   < > 100*   URINEBILI Negative   < > Negative   URINEKETONE Negative   < > Negative   SG 1.020   < > 1.020   UBLD Trace*   < > Moderate*   URINEPH 6.0   < > 6.0   PROTEIN 30*   < > 30*   UROBILINOGEN 0.2   < > 0.2   NITRITE Negative   < > Negative   LEUKEST Trace*   < > Moderate*   RBCU  --   --  O - 2   WBCU  --   --  >100*    < > = values in this interval not displayed.       PSA: Undetectable    Post Void Residual:     Other labs: None today      IMPRESSION:  Possible reaction from Lupron injection    PLAN:  It is possible that he had a reaction from his Lupron injection but it does not sound like a typical allergic reaction.  He has had previous Lupron injections with no problems.  No evidence of problems at the injection site today after our discussion we did decide to proceed with his next Lupron injection in February and will monitor him closely afterwards.      Mark Pino M.D.              Again, thank you for allowing me to participate in the care of your patient.      Sincerely,    Mark Pino MD

## 2019-10-29 DIAGNOSIS — M79.89 SWELLING OF LIMB: Primary | ICD-10-CM

## 2019-10-30 NOTE — PROGRESS NOTES
"Re-opened in attempt to retrieve Dr. Camp's response from 8/10/2019 (likely sent as \"reply\" and therefore not saved in Epic).  "

## 2019-10-30 NOTE — PROGRESS NOTES
HPI:   I had the pleasure of seeing Spencer when he and Kavitha came for follow up of atrial fibrillation.  He is an 83 year old who sees Dr. Lee and Dr. Camp for his history of:     1. Atrial Arrhythmias with atypical atrial flutter and AFib first diagnosed 12/2018 in the setting of a normal ejection fraction. Rate control/anticoagulation strategy was recommended as he was asx'c  2. Chronic anticoagulation with Eliquis 5 mg twice daily secondary to CHADSVASc of 4 (HTN, presumed CAD, age)  3. Chronic dyspnea, noted by Dr. Camp 6/2019.  30-pack-year history of tobacco use, quitting~2000. Saw Dr. Moreno and PFTs showed minimal restriction and no obstruction.  4. Obstructive sleep apnea on ASV therapy for complex Central and Obstructive Sleep Apnea  5. Ascending aorta and aortic root dilatation (4.6 cm, 4.6 cm) noted on echocardiogram 12/2018  6. Presumed coronary disease based on stress echocardiogram 1/2011.  Dr. Camp opted to treat this medically in the absence of symptoms.  Repeat stress test 12/2014 was negative for ischemia   7. Hypertension with some hypotension prompting discontinuation of HCTZ by PCP  8. Dyslipidemia    I saw Spencer 8/2019 at which time he felt breathing was better after switching hydrochlorothiazide to torsemide. Dr. Moreno from Pulmonology had seen him 7/2019 and no obstruction or emphysema noted. NIF test was recommended. His leg/buttock discomfort remained a bit of a mystery given ABIs/aorta/iliac US were wnl. Continued torsemide was recommended and routine follow-up with BMP was arranged.    Subsequently, Dr Lewis recommended PT vs spinal MRI for his continued leg/buttock discomfort. Dr. Moreno noted that he had mild restrictive lung dz. His NIF test returned at ~75% of normal. Dr. Moreno has recommended he see Neurology.      Interval History:  Notes that breathing is still doing better on the torsemide c/w the HCTZ.  He still had to stop a few times on the way over the [place] but due to  "leg/hip discomfort, not breathing.    Dr. Moreno asked him to see Neurology (Dr. Lackey) and then f/u with Dr. Moreno following. He sees Dr. Lackey in late January.    No complaints of chest pain, pressure or tightness.  No orthopnea, PND or edema. No change in exercise tolerance or breathing. Overall, he is feeling well and \"better.\"    Still using ASV device for apnea.  Hasn't gotten to PT for back yet.    Diagnostic Testing:    PFTs done 8/2019 showed mild restriction. NIF ~75% of normal, without significant change c/w 7/2019  Aortoiliac US 7/23/2019 showed sacular AAA with mild enlargement with max diameter 3.3 x 2.2. <50% stenosis in aorta, iliac arteries  Exercise ABIs 7/23/2019 with normal ABIs and normal response to exercise. Developed calf and thigh tiredness after 2.5 minutes     EKG 7/22/2019 showed AFib at 79 bpm     MRA chest 5/2019 showed aortic root was borderline dilated (4 x 3.9 cm).  The ascending aorta was mildly dilated (4.4 x 4.5 at the level of bifurcation of pulmonary arteries)  Holter monitor 5/21/2019 showed atrial fibrillation with an average heart rate of 75 bpm.  Range was  bpm.  He had 2 \"events\" which correlated with chronic atrial fibrillation with heart rates in the 70-80s.  Echocardiogram done 12/2018 showed an EF of 55%.  He had mildly reduced right ventricular systolic function. LA size was moderately-severely dilated with a left atrial volume index of 51.2 mL/m .  Left atrial size of 6.0 cm.  As above, his aortic root and ascending aorta were both enlarged at 4.6 cm     Stress echocardiogram 12/2014 was negative for stress-induced wall motion abnormalities.    Component      Latest Ref Rng & Units 7/19/2019 8/8/2019 8/15/2019 10/31/2019   Sodium      133 - 144 mmol/L 143 141 142 142   Potassium      3.4 - 5.3 mmol/L 3.7 3.6 4.2 3.4   Chloride      94 - 109 mmol/L 107 104 105 107   Carbon Dioxide      20 - 32 mmol/L 31 29 34 (H) 28   Anion Gap      3 - 14 mmol/L 5 11.6 3 7 "   Glucose      70 - 99 mg/dL 142 (H) 135 (H) 118 (H) 173 (H)   Urea Nitrogen      7 - 30 mg/dL 36 (H) 30 30 28   Creatinine      0.66 - 1.25 mg/dL 1.38 (H) 1.49 (H) 1.46 (H) 1.26 (H)   GFR Estimate      >60 mL/min/1.73:m2 47 (L) 45 (L) 44 (L) 53 (L)   GFR Estimate If Black      >60 mL/min/1.73:m2 55 (L) 55 (L) 51 (L) 61   Calcium      8.5 - 10.1 mg/dL 8.9 9.3 9.3 9.2       Assessment & Plan:    1. SOB    O2 sats and Hgb 7/2019 have been wnl. No chronotropic incompetence noted.     Torsemide improved but did not completely relieve this.  Blood work looks better and he's feeling quite a bit better on the torsemide    PLAN:    Continue torsemide 20 mg daily    Weigh self daily. Call if weight >3 pounds in 1 day or 5 pounds in 1 week      See Neurology as planned    Call if issues prior to appt 6/2020 with Dr. Camp      2. Permanent Atrial Fibrillation    Holter 5/2019 showed good avg HR control with good rate variability on CCB and BB    Rate control strategy given asx'c status and low likelihood of success given large LA    Continues warfarin     PLAN:    Continue routine f/u     3. Leg/Buttock Discomfort    No evidence of PAD on ABIs/aorta/iliac US    Dr. Lewis has recommended PT vs MRI to evaluate for spinal stenosis.     PLAN:    Continue follow-up with Dr. Lewis    PT as recommneded     4. Aortic Aneurysm (Root, Ascending and Infrarenal)    MRA as above, with root 4 x 3.9 cm and ascending 4.4 x 4.5 at the level of bifurcation of PAs    Echo 12/2018 with aortic root and ascending aorta of 4.6 cm    Sacular infrarenal AAA with mild enlargement noted (3.3 x 2.2) on US, as above. This has increased from 2.6 x 2.5 in 12/2014.    Dr. Camp notified of small infrarenal aneurysm, but his reply (8/10/2019) is no longer available in Epic.     PLAN:    Echocardiogram with Dr. Capm for assessment of thoracic aorta 6/2020. At that appt, can determine if/when follow-up for this infrarenal aneurysm should be done      Cherise  KAYLA Bennett, MSPAS      Orders Placed This Encounter   Procedures     Follow-Up with Cardiologist     EKG 12-lead complete w/read - Clinics     Echocardiogram Complete     No orders of the defined types were placed in this encounter.    Medications Discontinued During This Encounter   Medication Reason     Multiple Vitamins-Minerals (CENTRUM SILVER PO) Stopped by Patient         Encounter Diagnoses   Name Primary?     Dyspnea on exertion      Atrial fibrillation and flutter (H) Yes     Benign essential hypertension        CURRENT MEDICATIONS:  Current Outpatient Medications   Medication Sig Dispense Refill     apixaban ANTICOAGULANT (ELIQUIS) 5 MG tablet Take 1 tablet (5 mg) by mouth 2 times daily 60 tablet 11     atenolol (TENORMIN) 100 MG tablet Take 1.5 tablets (150 mg) by mouth daily 135 tablet 3     atorvastatin (LIPITOR) 40 MG tablet Take 1 tablet (40 mg) by mouth daily 90 tablet 3     diltiazem ER (DILT-XR) 120 MG 24 hr capsule Take 1 capsule (120 mg) by mouth daily 90 capsule 3     EPINEPHrine (EPIPEN/ADRENACLICK/OR ANY BX GENERIC EQUIV) 0.3 MG/0.3ML injection 2-pack Inject 0.3 mLs (0.3 mg) into the muscle as needed for anaphylaxis 0.6 mL 1     escitalopram (LEXAPRO) 10 MG tablet Take 1 tablet (10 mg) by mouth daily 90 tablet 3     ipratropium (ATROVENT) 0.06 % nasal spray Spray 2 sprays in nostril 4 times daily as needed for rhinitis 3 Box 3     leuprolide (ELIGARD) 45 MG injection Inject 45 mg Subcutaneous every 6 months. 1 each 12     Loperamide HCl (IMODIUM OR) Take by mouth daily as needed       losartan (COZAAR) 100 MG tablet TAKE 1 TABLET BY MOUTH ONCE DAILY 90 tablet 3     Multiple Minerals-Vitamins (PROSTEON PO) Take 2,000 Units by mouth With vitamin D       torsemide (DEMADEX) 20 MG tablet Take 1 tablet (20 mg) by mouth daily 90 tablet 0     ASPIRIN NOT PRESCRIBED (INTENTIONAL) continuous prn for other Antiplatelet medication not prescribed intentionally due to Current anticoagulant therapy  (warfarin/enoxaparin)         ALLERGIES     Allergies   Allergen Reactions     Augmentin Rash     Type III hypersensitivity     Bactrim [Sulfamethoxazole W/Trimethoprim] Rash     Serum sickness, type III hypersensitivity       Bee Venom Itching     Sulfa Drugs Hives     Lisinopril Cough     Naproxen Hives       PAST MEDICAL HISTORY:  Past Medical History:   Diagnosis Date     Actinic keratosis      Arthritis      Atrial fibrillation and flutter (H) 12/3/2018     CAD (coronary artery disease)     1/5/2011 lateral ischemia on stress echo, 12/2014 normal stress echo     Coronary artery disease 1/1/2011    Abnormal stress test 1/2011 and controlled with medical mgmt      Dyslipidemia      Emphysema of lung (H)      Essential hypertension, benign      Hernia, abdominal      Hypersomnia with sleep apnea, unspecified     on bipap, partially treated with residual apneas     Hypertrophy (benign) of prostate 5/01    Biopsy 5/01 negative for cancer; PSA 5     Lumbago      Mumps      Prostate cancer (H) 12/15/2006     Skin cancer, basal cell 1997     Spider veins        PAST SURGICAL HISTORY:  Past Surgical History:   Procedure Laterality Date     HERNIA REPAIR  child     Moh's procedure for basal cell carcinoma  01/2001     PROSTATE SURGERY       s/p lumbar laminectomy NOS  1988     VITRECTOMY PARS PLANA REMOVE PRERETINAL MEMBRANE   3/09       FAMILY HISTORY:  Family History   Problem Relation Age of Onset     Alzheimer Disease Mother      Hypertension Mother      Cardiovascular Father         D:86 complications fo CHF     Prostate Cancer Brother      Lung Cancer Brother 76     Prostate Cancer Brother        SOCIAL HISTORY:  Social History     Socioeconomic History     Marital status:      Spouse name: None     Number of children: None     Years of education: None     Highest education level: None   Occupational History     Occupation: retired   Social Needs     Financial resource strain: None     Food insecurity:      Worry: None     Inability: None     Transportation needs:     Medical: None     Non-medical: None   Tobacco Use     Smoking status: Former Smoker     Packs/day: 1.00     Years: 30.00     Pack years: 30.00     Types: Cigarettes     Start date:      Last attempt to quit: 1978     Years since quittin.8     Smokeless tobacco: Never Used   Substance and Sexual Activity     Alcohol use: Yes     Alcohol/week: 1.7 standard drinks     Types: 2 Standard drinks or equivalent per week     Comment: socially     Drug use: No     Sexual activity: Yes     Partners: Female   Lifestyle     Physical activity:     Days per week: None     Minutes per session: None     Stress: None   Relationships     Social connections:     Talks on phone: None     Gets together: None     Attends Worship service: None     Active member of club or organization: None     Attends meetings of clubs or organizations: None     Relationship status: None     Intimate partner violence:     Fear of current or ex partner: None     Emotionally abused: None     Physically abused: None     Forced sexual activity: None   Other Topics Concern      Service Not Asked     Blood Transfusions Not Asked     Caffeine Concern No     Comment: 0-2 cans of soda per day.     Occupational Exposure Not Asked     Hobby Hazards Not Asked     Sleep Concern Not Asked     Stress Concern Not Asked     Weight Concern Not Asked     Special Diet Not Asked     Back Care Not Asked     Exercise No     Bike Helmet Not Asked     Seat Belt Yes     Self-Exams Not Asked     Parent/sibling w/ CABG, MI or angioplasty before 65F 55M? No   Social History Narrative     None       Review of Systems:  Skin:  Negative     Eyes:  Positive for glasses  ENT:  Negative    Respiratory:  Positive for sleep apnea;CPAP  Cardiovascular:  chest pain;palpitations;dizziness;lightheadedness;syncope or near-syncope;cyanosis;Negative for Positive for;fatigue;edema;exercise  "intolerance  Gastroenterology: Negative for hematochezia  Genitourinary:  Positive for urinary frequency;urgency;nocturia  Musculoskeletal:  Positive for joint pain  Neurologic:  Negative    Psychiatric:  Positive for depression  Heme/Lymph/Imm:  Positive for allergies  Endocrine:  Negative      Physical Exam:  Vitals: /70   Pulse 62   Ht 1.727 m (5' 8\")   Wt 103.5 kg (228 lb 1.6 oz)   BMI 34.68 kg/m      Constitutional:  cooperative, alert and oriented, well developed, well nourished, in no acute distress        Skin:  warm and dry to the touch        Head:  normocephalic        Eyes:  pupils equal and round;conjunctivae and lids unremarkable;sclera white        ENT:  no pallor or cyanosis, dentition good        Neck:  no carotid bruit;carotid pulses are full and equal bilaterally JVP 8-10;JVP elevated No HJR    Chest:  clear to auscultation;normal symmetry;normal respiratory excursion        Cardiac: normal S1 and S2;no murmurs, gallops or rubs detected irregularly irregular rhythm distant heart sounds              Abdomen:  abdomen soft;non-tender;no bruits obese      Vascular:   pulses below the femoral arteries are diminished                               strong radial pulses, slighly diminshed dorsalis pedis and posterior tibia    Extremities and Back:  no deformities, clubbing, cyanosis, erythema observed        Neurological:  no gross motor deficits          Recent Lab Results:  LIPID RESULTS:  Lab Results   Component Value Date    CHOL 124 10/17/2018    HDL 41 10/17/2018    LDL 60 10/17/2018    TRIG 117 10/17/2018    CHOLHDLRATIO 2.8 10/15/2015       LIVER ENZYME RESULTS:  Lab Results   Component Value Date    AST 29 08/18/2011    ALT 29 08/18/2011       CBC RESULTS:  Lab Results   Component Value Date    WBC 5.8 07/09/2019    RBC 4.52 07/09/2019    HGB 13.8 07/09/2019    HCT 42.3 07/09/2019    MCV 94 07/09/2019    MCH 30.5 07/09/2019    MCHC 32.6 07/09/2019    RDW 14.1 07/09/2019     " 07/09/2019       A1C RESULTS:  Lab Results   Component Value Date    A1C 5.9 (H) 12/04/2018

## 2019-10-31 DIAGNOSIS — M79.89 SWELLING OF LIMB: ICD-10-CM

## 2019-10-31 LAB
ANION GAP SERPL CALCULATED.3IONS-SCNC: 7 MMOL/L (ref 3–14)
BUN SERPL-MCNC: 28 MG/DL (ref 7–30)
CALCIUM SERPL-MCNC: 9.2 MG/DL (ref 8.5–10.1)
CHLORIDE SERPL-SCNC: 107 MMOL/L (ref 94–109)
CO2 SERPL-SCNC: 28 MMOL/L (ref 20–32)
CREAT SERPL-MCNC: 1.26 MG/DL (ref 0.66–1.25)
GFR SERPL CREATININE-BSD FRML MDRD: 53 ML/MIN/{1.73_M2}
GLUCOSE SERPL-MCNC: 173 MG/DL (ref 70–99)
POTASSIUM SERPL-SCNC: 3.4 MMOL/L (ref 3.4–5.3)
SODIUM SERPL-SCNC: 142 MMOL/L (ref 133–144)

## 2019-10-31 PROCEDURE — 80048 BASIC METABOLIC PNL TOTAL CA: CPT | Performed by: PHYSICIAN ASSISTANT

## 2019-10-31 PROCEDURE — 36415 COLL VENOUS BLD VENIPUNCTURE: CPT | Performed by: PHYSICIAN ASSISTANT

## 2019-11-01 ENCOUNTER — OFFICE VISIT (OUTPATIENT)
Dept: CARDIOLOGY | Facility: CLINIC | Age: 84
End: 2019-11-01
Attending: PHYSICIAN ASSISTANT
Payer: COMMERCIAL

## 2019-11-01 VITALS
HEART RATE: 62 BPM | DIASTOLIC BLOOD PRESSURE: 70 MMHG | BODY MASS INDEX: 34.57 KG/M2 | SYSTOLIC BLOOD PRESSURE: 123 MMHG | HEIGHT: 68 IN | WEIGHT: 228.1 LBS

## 2019-11-01 DIAGNOSIS — R06.09 DYSPNEA ON EXERTION: ICD-10-CM

## 2019-11-01 DIAGNOSIS — I48.92 ATRIAL FIBRILLATION AND FLUTTER (H): Primary | ICD-10-CM

## 2019-11-01 DIAGNOSIS — I10 BENIGN ESSENTIAL HYPERTENSION: ICD-10-CM

## 2019-11-01 DIAGNOSIS — I48.91 ATRIAL FIBRILLATION AND FLUTTER (H): Primary | ICD-10-CM

## 2019-11-01 PROCEDURE — 99214 OFFICE O/P EST MOD 30 MIN: CPT | Performed by: PHYSICIAN ASSISTANT

## 2019-11-01 ASSESSMENT — MIFFLIN-ST. JEOR: SCORE: 1709.15

## 2019-11-01 NOTE — PATIENT INSTRUCTIONS
1. Glad breathing is better.  Would continue the torsemide 20 mg daily    2. Consider compression socks, especially if going to be standing a lot and put feet up whenever possible    3. Blood work yesterday showed kidneys were actually a bit better than last time!    4. Continue to weigh self daily!  If weight >3 pounds in 1 day or >5 pounds in 1 week, CALL!!!!!!  [phone] ([place])    5. Increase potassium intake in diet or call and we can give potassium supplement.     6. See Dr. Camp with echo in 6/2020 but CALL if issues prior!  [phone]

## 2019-11-01 NOTE — LETTER
11/1/2019    Natalya Lewis MD  [id number] [place] [address]    RE: Nixon More       Dear Colleague,    I had the pleasure of seeing Nixon RILEY Argenis in the [facility].    HPI:   I had the pleasure of seeing Spencer when he and Kavitha came for follow up of atrial fibrillation.  He is an 83 year old who sees Dr. Lee and Dr. Camp for his history of:     1. Atrial Arrhythmias with atypical atrial flutter and AFib first diagnosed 12/2018 in the setting of a normal ejection fraction. Rate control/anticoagulation strategy was recommended as he was asx'c  2. Chronic anticoagulation with Eliquis 5 mg twice daily secondary to CHADSVASc of 4 (HTN, presumed CAD, age)  3. Chronic dyspnea, noted by Dr. Camp 6/2019.  30-pack-year history of tobacco use, quitting~2000. Saw Dr. Moreno and PFTs showed minimal restriction and no obstruction.  4. Obstructive sleep apnea on ASV therapy for complex Central and Obstructive Sleep Apnea  5. Ascending aorta and aortic root dilatation (4.6 cm, 4.6 cm) noted on echocardiogram 12/2018  6. Presumed coronary disease based on stress echocardiogram 1/2011.  Dr. Camp opted to treat this medically in the absence of symptoms.  Repeat stress test 12/2014 was negative for ischemia   7. Hypertension with some hypotension prompting discontinuation of HCTZ by PCP  8. Dyslipidemia    I saw Spencer 8/2019 at which time he felt breathing was better after switching hydrochlorothiazide to torsemide. Dr. Moreno from Pulmonology had seen him 7/2019 and no obstruction or emphysema noted. NIF test was recommended. His leg/buttock discomfort remained a bit of a mystery given ABIs/aorta/iliac US were wnl. Continued torsemide was recommended and routine follow-up with BMP was arranged.    Subsequently, Dr Lewis recommended PT vs spinal MRI for his continued leg/buttock discomfort. Dr. Moreno noted that he had mild restrictive lung dz. His NIF test returned at ~75% of  "normal. Dr. Moreno has recommended he see Neurology.      Interval History:  Notes that breathing is still doing better on the torsemide c/w the HCTZ.  He still had to stop a few times on the way over the [place] but due to leg/hip discomfort, not breathing.    Dr. Moreno asked him to see Neurology (Dr. Lackey) and then f/u with Dr. Moreno following. He sees Dr. Lackey in late January.    No complaints of chest pain, pressure or tightness.  No orthopnea, PND or edema. No change in exercise tolerance or breathing. Overall, he is feeling well and \"better.\"    Still using ASV device for apnea.  Hasn't gotten to PT for back yet.    Diagnostic Testing:    PFTs done 8/2019 showed mild restriction. NIF ~75% of normal, without significant change c/w 7/2019  Aortoiliac US 7/23/2019 showed sacular AAA with mild enlargement with max diameter 3.3 x 2.2. <50% stenosis in aorta, iliac arteries  Exercise ABIs 7/23/2019 with normal ABIs and normal response to exercise. Developed calf and thigh tiredness after 2.5 minutes     EKG 7/22/2019 showed AFib at 79 bpm     MRA chest 5/2019 showed aortic root was borderline dilated (4 x 3.9 cm).  The ascending aorta was mildly dilated (4.4 x 4.5 at the level of bifurcation of pulmonary arteries)  Holter monitor 5/21/2019 showed atrial fibrillation with an average heart rate of 75 bpm.  Range was  bpm.  He had 2 \"events\" which correlated with chronic atrial fibrillation with heart rates in the 70-80s.  Echocardiogram done 12/2018 showed an EF of 55%.  He had mildly reduced right ventricular systolic function. LA size was moderately-severely dilated with a left atrial volume index of 51.2 mL/m .  Left atrial size of 6.0 cm.  As above, his aortic root and ascending aorta were both enlarged at 4.6 cm     Stress echocardiogram 12/2014 was negative for stress-induced wall motion abnormalities.    Component      Latest Ref Rng & Units 7/19/2019 8/8/2019 8/15/2019 10/31/2019   Sodium      133 - " 144 mmol/L 143 141 142 142   Potassium      3.4 - 5.3 mmol/L 3.7 3.6 4.2 3.4   Chloride      94 - 109 mmol/L 107 104 105 107   Carbon Dioxide      20 - 32 mmol/L 31 29 34 (H) 28   Anion Gap      3 - 14 mmol/L 5 11.6 3 7   Glucose      70 - 99 mg/dL 142 (H) 135 (H) 118 (H) 173 (H)   Urea Nitrogen      7 - 30 mg/dL 36 (H) 30 30 28   Creatinine      0.66 - 1.25 mg/dL 1.38 (H) 1.49 (H) 1.46 (H) 1.26 (H)   GFR Estimate      >60 mL/min/1.73:m2 47 (L) 45 (L) 44 (L) 53 (L)   GFR Estimate If Black      >60 mL/min/1.73:m2 55 (L) 55 (L) 51 (L) 61   Calcium      8.5 - 10.1 mg/dL 8.9 9.3 9.3 9.2       Assessment & Plan:    1. SOB    O2 sats and Hgb 7/2019 have been wnl. No chronotropic incompetence noted.     Torsemide improved but did not completely relieve this.  Blood work looks better and he's feeling quite a bit better on the torsemide    PLAN:    Continue torsemide 20 mg daily    Weigh self daily. Call if weight >3 pounds in 1 day or 5 pounds in 1 week      See Neurology as planned    Call if issues prior to appt 6/2020 with Dr. Camp      2. Permanent Atrial Fibrillation    Holter 5/2019 showed good avg HR control with good rate variability on CCB and BB    Rate control strategy given asx'c status and low likelihood of success given large LA    Continues warfarin     PLAN:    Continue routine f/u     3. Leg/Buttock Discomfort    No evidence of PAD on ABIs/aorta/iliac US    Dr. Lewis has recommended PT vs MRI to evaluate for spinal stenosis.     PLAN:    Continue follow-up with Dr. Lewis    PT as recommneded     4. Aortic Aneurysm (Root, Ascending and Infrarenal)    MRA as above, with root 4 x 3.9 cm and ascending 4.4 x 4.5 at the level of bifurcation of PAs    Echo 12/2018 with aortic root and ascending aorta of 4.6 cm    Sacular infrarenal AAA with mild enlargement noted (3.3 x 2.2) on US, as above. This has increased from 2.6 x 2.5 in 12/2014.    Dr. Camp notified of small infrarenal aneurysm, but his reply  (8/10/2019) is no longer available in Epic.     PLAN:    Echocardiogram with Dr. Camp for assessment of thoracic aorta 6/2020. At that appt, can determine if/when follow-up for this infrarenal aneurysm should be done      Cherise Bennett PA-C, MSPAS      Orders Placed This Encounter   Procedures     Follow-Up with Cardiologist     EKG 12-lead complete w/read - Clinics     Echocardiogram Complete     No orders of the defined types were placed in this encounter.    Medications Discontinued During This Encounter   Medication Reason     Multiple Vitamins-Minerals (CENTRUM SILVER PO) Stopped by Patient         Encounter Diagnoses   Name Primary?     Dyspnea on exertion      Atrial fibrillation and flutter (H) Yes     Benign essential hypertension        CURRENT MEDICATIONS:  Current Outpatient Medications   Medication Sig Dispense Refill     apixaban ANTICOAGULANT (ELIQUIS) 5 MG tablet Take 1 tablet (5 mg) by mouth 2 times daily 60 tablet 11     atenolol (TENORMIN) 100 MG tablet Take 1.5 tablets (150 mg) by mouth daily 135 tablet 3     atorvastatin (LIPITOR) 40 MG tablet Take 1 tablet (40 mg) by mouth daily 90 tablet 3     diltiazem ER (DILT-XR) 120 MG 24 hr capsule Take 1 capsule (120 mg) by mouth daily 90 capsule 3     EPINEPHrine (EPIPEN/ADRENACLICK/OR ANY BX GENERIC EQUIV) 0.3 MG/0.3ML injection 2-pack Inject 0.3 mLs (0.3 mg) into the muscle as needed for anaphylaxis 0.6 mL 1     escitalopram (LEXAPRO) 10 MG tablet Take 1 tablet (10 mg) by mouth daily 90 tablet 3     ipratropium (ATROVENT) 0.06 % nasal spray Spray 2 sprays in nostril 4 times daily as needed for rhinitis 3 Box 3     leuprolide (ELIGARD) 45 MG injection Inject 45 mg Subcutaneous every 6 months. 1 each 12     Loperamide HCl (IMODIUM OR) Take by mouth daily as needed       losartan (COZAAR) 100 MG tablet TAKE 1 TABLET BY MOUTH ONCE DAILY 90 tablet 3     Multiple Minerals-Vitamins (PROSTEON PO) Take 2,000 Units by mouth With vitamin D       torsemide  (DEMADEX) 20 MG tablet Take 1 tablet (20 mg) by mouth daily 90 tablet 0     ASPIRIN NOT PRESCRIBED (INTENTIONAL) continuous prn for other Antiplatelet medication not prescribed intentionally due to Current anticoagulant therapy (warfarin/enoxaparin)         ALLERGIES     Allergies   Allergen Reactions     Augmentin Rash     Type III hypersensitivity     Bactrim [Sulfamethoxazole W/Trimethoprim] Rash     Serum sickness, type III hypersensitivity       Bee Venom Itching     Sulfa Drugs Hives     Lisinopril Cough     Naproxen Hives       PAST MEDICAL HISTORY:  Past Medical History:   Diagnosis Date     Actinic keratosis      Arthritis      Atrial fibrillation and flutter (H) 12/3/2018     CAD (coronary artery disease)     1/5/2011 lateral ischemia on stress echo, 12/2014 normal stress echo     Coronary artery disease 1/1/2011    Abnormal stress test 1/2011 and controlled with medical mgmt      Dyslipidemia      Emphysema of lung (H)      Essential hypertension, benign      Hernia, abdominal      Hypersomnia with sleep apnea, unspecified     on bipap, partially treated with residual apneas     Hypertrophy (benign) of prostate 5/01    Biopsy 5/01 negative for cancer; PSA 5     Lumbago      Mumps      Prostate cancer (H) 12/15/2006     Skin cancer, basal cell 1997     Spider veins        PAST SURGICAL HISTORY:  Past Surgical History:   Procedure Laterality Date     HERNIA REPAIR  child     Moh's procedure for basal cell carcinoma  01/2001     PROSTATE SURGERY       s/p lumbar laminectomy NOS  1988     VITRECTOMY PARS PLANA REMOVE PRERETINAL MEMBRANE   3/09       FAMILY HISTORY:  Family History   Problem Relation Age of Onset     Alzheimer Disease Mother      Hypertension Mother      Cardiovascular Father         D:86 complications fo CHF     Prostate Cancer Brother      Lung Cancer Brother 76     Prostate Cancer Brother        SOCIAL HISTORY:  Social History     Socioeconomic History     Marital status:      Spouse  name: None     Number of children: None     Years of education: None     Highest education level: None   Occupational History     Occupation: retired   Social Needs     Financial resource strain: None     Food insecurity:     Worry: None     Inability: None     Transportation needs:     Medical: None     Non-medical: None   Tobacco Use     Smoking status: Former Smoker     Packs/day: 1.00     Years: 30.00     Pack years: 30.00     Types: Cigarettes     Start date:      Last attempt to quit: 1978     Years since quittin.8     Smokeless tobacco: Never Used   Substance and Sexual Activity     Alcohol use: Yes     Alcohol/week: 1.7 standard drinks     Types: 2 Standard drinks or equivalent per week     Comment: socially     Drug use: No     Sexual activity: Yes     Partners: Female   Lifestyle     Physical activity:     Days per week: None     Minutes per session: None     Stress: None   Relationships     Social connections:     Talks on phone: None     Gets together: None     Attends Rastafari service: None     Active member of club or organization: None     Attends meetings of clubs or organizations: None     Relationship status: None     Intimate partner violence:     Fear of current or ex partner: None     Emotionally abused: None     Physically abused: None     Forced sexual activity: None   Other Topics Concern      Service Not Asked     Blood Transfusions Not Asked     Caffeine Concern No     Comment: 0-2 cans of soda per day.     Occupational Exposure Not Asked     Hobby Hazards Not Asked     Sleep Concern Not Asked     Stress Concern Not Asked     Weight Concern Not Asked     Special Diet Not Asked     Back Care Not Asked     Exercise No     Bike Helmet Not Asked     Seat Belt Yes     Self-Exams Not Asked     Parent/sibling w/ CABG, MI or angioplasty before 65F 55M? No   Social History Narrative     None       Review of Systems:  Skin:  Negative     Eyes:  Positive for glasses  ENT:   "Negative    Respiratory:  Positive for sleep apnea;CPAP  Cardiovascular:  chest pain;palpitations;dizziness;lightheadedness;syncope or near-syncope;cyanosis;Negative for Positive for;fatigue;edema;exercise intolerance  Gastroenterology: Negative for hematochezia  Genitourinary:  Positive for urinary frequency;urgency;nocturia  Musculoskeletal:  Positive for joint pain  Neurologic:  Negative    Psychiatric:  Positive for depression  Heme/Lymph/Imm:  Positive for allergies  Endocrine:  Negative      Physical Exam:  Vitals: /70   Pulse 62   Ht 1.727 m (5' 8\")   Wt 103.5 kg (228 lb 1.6 oz)   BMI 34.68 kg/m       Constitutional:  cooperative, alert and oriented, well developed, well nourished, in no acute distress        Skin:  warm and dry to the touch        Head:  normocephalic        Eyes:  pupils equal and round;conjunctivae and lids unremarkable;sclera white        ENT:  no pallor or cyanosis, dentition good        Neck:  no carotid bruit;carotid pulses are full and equal bilaterally JVP 8-10;JVP elevated No HJR    Chest:  clear to auscultation;normal symmetry;normal respiratory excursion        Cardiac: normal S1 and S2;no murmurs, gallops or rubs detected irregularly irregular rhythm distant heart sounds              Abdomen:  abdomen soft;non-tender;no bruits obese      Vascular:   pulses below the femoral arteries are diminished                               strong radial pulses, slighly diminshed dorsalis pedis and posterior tibia    Extremities and Back:  no deformities, clubbing, cyanosis, erythema observed        Neurological:  no gross motor deficits          Recent Lab Results:  LIPID RESULTS:  Lab Results   Component Value Date    CHOL 124 10/17/2018    HDL 41 10/17/2018    LDL 60 10/17/2018    TRIG 117 10/17/2018    CHOLHDLRATIO 2.8 10/15/2015       LIVER ENZYME RESULTS:  Lab Results   Component Value Date    AST 29 08/18/2011    ALT 29 08/18/2011       CBC RESULTS:  Lab Results   Component " Value Date    WBC 5.8 07/09/2019    RBC 4.52 07/09/2019    HGB 13.8 07/09/2019    HCT 42.3 07/09/2019    MCV 94 07/09/2019    MCH 30.5 07/09/2019    MCHC 32.6 07/09/2019    RDW 14.1 07/09/2019     07/09/2019       A1C RESULTS:  Lab Results   Component Value Date    A1C 5.9 (H) 12/04/2018         Thank you for allowing me to participate in the care of your patient.    Sincerely,     Nimisha Bennett PA-C     [facility]

## 2019-11-08 ENCOUNTER — OFFICE VISIT (OUTPATIENT)
Dept: PEDIATRICS | Facility: CLINIC | Age: 84
End: 2019-11-08
Payer: COMMERCIAL

## 2019-11-08 VITALS
OXYGEN SATURATION: 97 % | HEART RATE: 87 BPM | BODY MASS INDEX: 35.43 KG/M2 | WEIGHT: 225.7 LBS | HEIGHT: 67 IN | DIASTOLIC BLOOD PRESSURE: 60 MMHG | SYSTOLIC BLOOD PRESSURE: 108 MMHG | TEMPERATURE: 96.9 F

## 2019-11-08 DIAGNOSIS — Z91.81 PERSONAL HISTORY OF FALL: ICD-10-CM

## 2019-11-08 DIAGNOSIS — F34.1 DYSTHYMIA: ICD-10-CM

## 2019-11-08 DIAGNOSIS — K62.7 RADIATION PROCTITIS: ICD-10-CM

## 2019-11-08 DIAGNOSIS — M48.062 SPINAL STENOSIS OF LUMBAR REGION WITH NEUROGENIC CLAUDICATION: ICD-10-CM

## 2019-11-08 DIAGNOSIS — E66.01 MORBID OBESITY (H): ICD-10-CM

## 2019-11-08 DIAGNOSIS — R26.9 ABNORMAL GAIT: ICD-10-CM

## 2019-11-08 DIAGNOSIS — J43.8 OTHER EMPHYSEMA (H): ICD-10-CM

## 2019-11-08 DIAGNOSIS — C61 MALIGNANT NEOPLASM OF PROSTATE (H): ICD-10-CM

## 2019-11-08 DIAGNOSIS — M25.552 PAIN OF BOTH HIP JOINTS: ICD-10-CM

## 2019-11-08 DIAGNOSIS — I10 BENIGN ESSENTIAL HYPERTENSION: ICD-10-CM

## 2019-11-08 DIAGNOSIS — E55.9 VITAMIN D DEFICIENCY: ICD-10-CM

## 2019-11-08 DIAGNOSIS — R73.01 IMPAIRED FASTING GLUCOSE: ICD-10-CM

## 2019-11-08 DIAGNOSIS — G47.10 HYPERSOMNIA WITH SLEEP APNEA: ICD-10-CM

## 2019-11-08 DIAGNOSIS — N18.30 CKD (CHRONIC KIDNEY DISEASE) STAGE 3, GFR 30-59 ML/MIN (H): ICD-10-CM

## 2019-11-08 DIAGNOSIS — Z00.00 ENCOUNTER FOR MEDICARE ANNUAL WELLNESS EXAM: Primary | ICD-10-CM

## 2019-11-08 DIAGNOSIS — G47.30 HYPERSOMNIA WITH SLEEP APNEA: ICD-10-CM

## 2019-11-08 DIAGNOSIS — E78.5 DYSLIPIDEMIA: ICD-10-CM

## 2019-11-08 DIAGNOSIS — M25.551 PAIN OF BOTH HIP JOINTS: ICD-10-CM

## 2019-11-08 PROCEDURE — 99213 OFFICE O/P EST LOW 20 MIN: CPT | Mod: 25 | Performed by: INTERNAL MEDICINE

## 2019-11-08 PROCEDURE — 99397 PER PM REEVAL EST PAT 65+ YR: CPT | Performed by: INTERNAL MEDICINE

## 2019-11-08 PROCEDURE — 99207 C PAF COMPLETED  NO CHARGE: CPT | Performed by: INTERNAL MEDICINE

## 2019-11-08 RX ORDER — ALBUTEROL SULFATE 90 UG/1
2 AEROSOL, METERED RESPIRATORY (INHALATION) EVERY 4 HOURS PRN
Qty: 1 INHALER | Refills: 3 | Status: SHIPPED | OUTPATIENT
Start: 2019-11-08 | End: 2020-10-27 | Stop reason: ALTCHOICE

## 2019-11-08 ASSESSMENT — ENCOUNTER SYMPTOMS
NAUSEA: 0
HEADACHES: 0
CONSTIPATION: 0
CHILLS: 0
HEARTBURN: 0
WEAKNESS: 1
NERVOUS/ANXIOUS: 0
COUGH: 0
SORE THROAT: 0
PARESTHESIAS: 1
PALPITATIONS: 0
ABDOMINAL PAIN: 0
SHORTNESS OF BREATH: 1
DIARRHEA: 1
ARTHRALGIAS: 1
HEMATOCHEZIA: 1
FEVER: 0
FREQUENCY: 1
DYSURIA: 0
MYALGIAS: 1
HEMATURIA: 0

## 2019-11-08 ASSESSMENT — MIFFLIN-ST. JEOR: SCORE: 1682.4

## 2019-11-08 ASSESSMENT — ACTIVITIES OF DAILY LIVING (ADL): CURRENT_FUNCTION: NO ASSISTANCE NEEDED

## 2019-11-08 NOTE — PROGRESS NOTES
"SUBJECTIVE:   Nixon More is a 84 year old male who presents for Preventive Visit.  Are you in the first 12 months of your Medicare coverage?  No    Healthy Habits:     In general, how would you rate your overall health?  Fair    Frequency of exercise:  1 day/week    Duration of exercise:  Less than 15 minutes    Do you usually eat at least 4 servings of fruit and vegetables a day, include whole grains    & fiber and avoid regularly eating high fat or \"junk\" foods?  No    Taking medications regularly:  Yes    Medication side effects:  None    Ability to successfully perform activities of daily living:  No assistance needed    Home Safety:  No safety concerns identified    Hearing Impairment:  Difficulty following a conversation in a noisy restaurant or crowded room, feel that people are mumbling or not speaking clearly, difficulty following dialogue in the theater and difficulty understanding soft or whispered speech    In the past 6 months, have you been bothered by leaking of urine? Yes    In general, how would you rate your overall mental or emotional health?  Good      PHQ-2 Total Score: 0    Additional concerns today:  No    Do you feel safe in your environment? Yes    Have you ever done Advance Care Planning? (For example, a Health Directive, POLST, or a discussion with a medical provider or your loved ones about your wishes): Yes, advance care planning is on file.      Fall risk  Fallen 2 or more times in the past year?: Yes  Any fall with injury in the past year?: No  Has some balance issues and if gets off balance has a harder time recovering.  Has had PT referral but didn't go.  Walks a little bent over.    Cognitive Screening   1) Repeat 3 items (Leader, Season, Table)    2) Clock draw: NORMAL  3) 3 item recall: Recalls 3 objects  Results: 3 items recalled: COGNITIVE IMPAIRMENT LESS LIKELY    Mini-CogTM Copyright S Jessie. Licensed by the author for use in [facility]; reprinted with " permission (alfa@[facility]). All rights reserved.      Do you have sleep apnea, excessive snoring or daytime drowsiness?: no    Reviewed and updated as needed this visit by clinical staff  Tobacco  Allergies  Meds  Problems  Med Hx  Surg Hx  Fam Hx  Soc Hx          Reviewed and updated as needed this visit by Provider  Tobacco  Allergies  Meds  Problems  Med Hx  Surg Hx  Fam Hx        Social History     Tobacco Use     Smoking status: Former Smoker     Packs/day: 1.00     Years: 30.00     Pack years: 30.00     Types: Cigarettes     Start date:      Last attempt to quit: 1978     Years since quittin.8     Smokeless tobacco: Never Used   Substance Use Topics     Alcohol use: Yes     Alcohol/week: 1.7 standard drinks     Types: 2 Standard drinks or equivalent per week     Comment: socially         Alcohol Use 2019   Prescreen: >3 drinks/day or >7 drinks/week? No   Prescreen: >3 drinks/day or >7 drinks/week? -       Depression - mostly ok but change in seasons has been hard.  PHQ-9 SCORE 10/23/2018 2019 2019   PHQ-9 Total Score - - -   PHQ-9 Total Score MyChart - 6 (Mild depression) -   PHQ-9 Total Score 2 6 1      Chronic dyspnea - working with cardiology, still with dyspnea at times but normal sats but better since torsemide.  Normal EF but could have component of diastolic dysfunction.    COPD - no inhaler right now, isn't sure if it might help with dyspnea      Current providers sharing in care for this patient include:   Patient Care Team:  Natalya Lewis MD as PCP - General  Natalya Lewis MD as Assigned PCP    The following health maintenance items are reviewed in Epic and correct as of today:  Health Maintenance   Topic Date Due     ANNUAL REVIEW OF HM ORDERS  1935     COPD ACTION PLAN  1935     LIPID  10/17/2019     PHQ-9  2020     JAMSHID ASSESSMENT  2020     BMP  10/31/2020     MEDICARE ANNUAL WELLNESS VISIT  2020     FALL RISK ASSESSMENT   "11/08/2020     COLONOSCOPY  09/26/2021     DTAP/TDAP/TD IMMUNIZATION (3 - Td) 08/14/2022     DEXA  09/09/2022     ADVANCE CARE PLANNING  11/08/2024     SPIROMETRY  Completed     DEPRESSION ACTION PLAN  Completed     INFLUENZA VACCINE  Completed     PNEUMOCOCCAL IMMUNIZATION 65+ LOW/MEDIUM RISK  Completed     ZOSTER IMMUNIZATION  Completed     IPV IMMUNIZATION  Aged Out     MENINGITIS IMMUNIZATION  Aged Out     Labs reviewed in EPIC      Review of Systems   Constitutional: Negative for chills and fever.   HENT: Positive for hearing loss. Negative for congestion, ear pain and sore throat.    Eyes: Positive for visual disturbance.   Respiratory: Positive for shortness of breath. Negative for cough.    Cardiovascular: Positive for peripheral edema. Negative for chest pain and palpitations.   Gastrointestinal: Positive for diarrhea and hematochezia. Negative for abdominal pain, constipation, heartburn and nausea.   Genitourinary: Positive for frequency, impotence and urgency. Negative for discharge, dysuria, genital sores and hematuria.   Musculoskeletal: Positive for arthralgias and myalgias.   Skin: Negative for rash.   Neurological: Positive for weakness and paresthesias. Negative for headaches.   Psychiatric/Behavioral: Negative for mood changes. The patient is not nervous/anxious.    urinary urgency - sees urology, likely due to radiation  BM frequency - likely due to radiation proctitis      OBJECTIVE:   /60 (BP Location: Right arm, Patient Position: Chair, Cuff Size: Adult Large)   Pulse 87   Temp 96.9  F (36.1  C) (Tympanic)   Ht 1.702 m (5' 7\")   Wt 102.4 kg (225 lb 11.2 oz)   SpO2 97%   BMI 35.35 kg/m   Estimated body mass index is 35.35 kg/m  as calculated from the following:    Height as of this encounter: 1.702 m (5' 7\").    Weight as of this encounter: 102.4 kg (225 lb 11.2 oz).  Physical Exam  GENERAL: alert, no distress and elderly  EYES: Eyes grossly normal to inspection, PERRL and " conjunctivae and sclerae normal  HENT: ear canals and TM's normal, nose and mouth without ulcers or lesions  NECK: no adenopathy, no asymmetry, masses, or scars and thyroid normal to palpation  RESP: lungs clear to auscultation - no rales, rhonchi or wheezes  CV: regular rate and rhythm, normal S1 S2, no S3 or S4, no murmur, click or rub, no peripheral edema and peripheral pulses strong  ABDOMEN: soft, nontender, no hepatosplenomegaly, no masses and bowel sounds normal  MS: fay mild ankle edema to lower mid calf  SKIN: no suspicious lesions or rashes  NEURO: Normal strength and tone, mentation intact and speech normal  PSYCH: mentation appears normal, affect normal/bright    Diagnostic Test Results:  Labs reviewed in Epic    ASSESSMENT / PLAN:   1. Encounter for Medicare annual wellness exam  Routine health education discussed: calcium, diet, exercise, weight, safety.     2. Morbid obesity (H)  Discussed import of increasing activity, watching diet    3. CKD (chronic kidney disease) stage 3, GFR 30-59 ml/min (H)  Just checked by cardiology and stable    4. Personal history of fall  Discussed health risk of falls and import of addressing underlying gait abnormality.  Agrees to PT  - PHYSICAL THERAPY REFERRAL; Future    5. Abnormal gait  As above  - PHYSICAL THERAPY REFERRAL; Future    6. Pain of both hip joints  As above  - PHYSICAL THERAPY REFERRAL; Future    7. Other emphysema (H)  Discussed could play a role in his dyspnea, add inhaler and consider daily preventive agent based on response  - albuterol (PROAIR HFA/PROVENTIL HFA/VENTOLIN HFA) 108 (90 Base) MCG/ACT inhaler; Inhale 2 puffs into the lungs every 4 hours as needed for shortness of breath / dyspnea or wheezing  Dispense: 1 Inhaler; Refill: 3    8. Dyslipidemia  Recheck, continue statin  - Lipid panel reflex to direct LDL Fasting; Future    9. Hypersomnia with sleep apnea  Continue CPAP    10. Benign essential hypertension  Controlled, continue medications  "    11. Vitamin D deficiency  recheck  - **Vitamin D Deficiency FUTURE anytime; Future    12. Impaired fasting glucose  Discussed exercise and diet change.  Recent sugar in diabetic range, recheck with fasting  - **A1C FUTURE anytime; Future  - **Glucose FUTURE anytime; Future    13. Dysthymia  Discussed lack of motivation and impact on his overall health, continue medications and consider counseling.  Begin PT    14. Malignant neoplasm of prostate  Follow-up with urology as scheduled    15. Radiation proctitis  Reviewed recs from urology and GI, start fiber supplement    16. Spinal stenosis of lumbar region with neurogenic claudication  PT referral, discussed walking and safety      COUNSELING:  Reviewed preventive health counseling, as reflected in patient instructions    Estimated body mass index is 35.35 kg/m  as calculated from the following:    Height as of this encounter: 1.702 m (5' 7\").    Weight as of this encounter: 102.4 kg (225 lb 11.2 oz).    Weight management plan: Discussed healthy diet and exercise guidelines     reports that he quit smoking about 41 years ago. His smoking use included cigarettes. He started smoking about 71 years ago. He has a 30.00 pack-year smoking history. He has never used smokeless tobacco.      Appropriate preventive services were discussed with this patient, including applicable screening as appropriate for cardiovascular disease, diabetes, osteopenia/osteoporosis, and glaucoma.  As appropriate for age/gender, discussed screening for colorectal cancer, prostate cancer, breast cancer, and cervical cancer. Checklist reviewing preventive services available has been given to the patient.    Reviewed patients plan of care and provided an AVS. The Basic Care Plan (routine screening as documented in Health Maintenance) for Nixon meets the Care Plan requirement. This Care Plan has been established and reviewed with the Patient and spouse.    Counseling Resources:  ATP IV " Guidelines  Pooled Cohorts Equation Calculator  Breast Cancer Risk Calculator  FRAX Risk Assessment  ICSI Preventive Guidelines  Dietary Guidelines for Americans, 2010  [employer] MyPlate  ASA Prophylaxis  Lung CA Screening    Natalya Lewis MD  [facility]    Identified Health Risks:    He is at risk for falling and has been provided with information to reduce the risk of falling at home.

## 2019-11-08 NOTE — PATIENT INSTRUCTIONS
Patient Education   Personalized Prevention Plan  You are due for the preventive services outlined below.  Your care team is available to assist you in scheduling these services.  If you have already completed any of these items, please share that information with your care team to update in your medical record.  Health Maintenance Due   Topic Date Due     ANNUAL REVIEW OF HM ORDERS  1935     COPD Action Plan  1935     Cholesterol Lab  10/17/2019     Annual Wellness Visit  10/23/2019       Preventing Falls in the Home  An adult or child can fall for many reasons. If you are an older adult, you may fall because your reaction time slows down. Your muscles and joints may get stiff, weak, or less flexible because of illness, medicines, or a physical condition. These things can also make a child more likely to fall or be injured in a fall.  Other health problems that make falls more likely include:    Arthritis    Dizziness or lightheadedness when you get out of bed (orthostatic hypotension)    History of a stroke    Dizziness    Anemia    Certain medicines taken for mental illness    Problems with balance or gait    History of falls with or without an injury    Changes in vision (vision impairment)    Changes in thinking skills and memory (cognitive impairment)  Injuries from a fall can include broken bones, dislocated joints, and cuts. When these injuries are serious enough, they can make it impossible for you or a child who is injured in a fall to live on his or her own.  Prevention tips  To help prevent falls and fall-related injuries, follow the tips below.   Floors  Make floors safer by doing the following:     Put nonskid pads under area rugs.    Remove throw rugs.    Replace worn floor coverings.    Tack carpets firmly to each step on carpeted stairs. Put nonskid strips on the edges of uncarpeted stairs.    Keep floors and stairs free of clutter and cords.    Arrange furniture so there are clear  pathways.    Clean up any spills right away.    Wear shoes that fit.  Bathrooms    Make bathrooms safer by doing the following:     Install grab bars in the tub or shower.    Apply nonskid strips or put a nonskid rubber mat in the tub or shower.    Sit on a bath chair to bathe.    Use bathmats with nonskid backing.  Lighting and the environment  Improve lighting in your home by doing the following:     Keep a flashlight in each room. Or put a lamp next to the bed within easy reach.    Put nightlights in the bedrooms, hallways, kitchen, and bathrooms.    Make sure all stairways have good lighting.    Take your time when going up and down stairs.    Put handrails on both sides of stairs and in walkways for more support. To prevent injury to your wrist or arm, don t use handrails to pull yourself up.    Install grab bars to pull yourself up.    Move or rearrange items that you use often. This will make them easier to find or reach.    Look at your home to find any safety hazards. Especially look at doorways, walkways, and the driveway. Remove or repair any safety problems that you find.  Date Last Reviewed: 8/1/2016 2000-2018 [employer]. [address], [place] [address]. All rights reserved. This information is not intended as a substitute for professional medical care. Always follow your healthcare professional's instructions.

## 2019-11-11 PROBLEM — S22.31XA CLOSED FRACTURE OF ONE RIB OF RIGHT SIDE, INITIAL ENCOUNTER: Status: RESOLVED | Noted: 2018-12-06 | Resolved: 2019-11-11

## 2019-12-04 NOTE — ADDENDUM NOTE
Encounter addended by: Deanna Mary, PT on: 12/4/2019 12:17 PM   Actions taken: Flowsheet data copied forward, Flowsheet accepted

## 2019-12-04 NOTE — PROGRESS NOTES
"   11/05/18 0900   Signing Clinician's Name / Credentials   Signing clinician's name / credentials Deanna Palaciomarcel PT, DPT   Session Number   Session Number DISCHARGE NOTE: Episode of care 10/25/18-11/5/18. Pt participated in 3 vestbular PT visits total to address (+) s/s of left unilateral vestibular hypofunction. Pt's final PT visit 11/5/18. On 11/21/18 pt canceled his future PT appointments reporting his is \"done with PT\" and is therefore being discharged at this time. See below for details of final visit.    Authorization status 3 of 5, [facility] approved 5 visits through 12/4/18    Goal 1   Goal Identifier HEP   Goal Description Pt will be independent with home exercise program towards improved VOR, postural and gait stability.   Target Date 12/04/18   Goal 2   Goal Identifier Postural stability (baseline: romberg foam EC x16 sec across 3 trials)   Goal Description Pt to maintain romberg foam EC x30 sec without LOB to demonstrate improved postural stability with increased vestibular utilization.    Target Date 12/04/18   Goal 3   Goal Identifier VOR (baseline DVA: 6 line difference (1-2 WNL)   Goal Description Pt to demonstrate improved VOR per DVA assessment to WNL with 1-2 line difference.    Target Date 12/04/18   Goal 4   Goal Identifier DHI (baseline 14/100)   Goal Description Pt to score 0/100 per DHI with resolved sxs of dizziness with head and body changes and improved stability for housework and yardwork (ie bending to clean litter box)   Target Date 12/04/18   Subjective Report   Subjective Report Pt arrives for 2nd PT session following evaluation. Reports he didn't do exercises provided since last seen. Reports feeling \"down/not motivated\"  to keep up with exs provided. Feels like it is taking too much time. Reports he continues to catch his toes when stepping up/on unsteady surfaces. No falls.   Treatment Interventions   Interventions Neuromuscular Re-education   Neuromuscular Re-education   Skilled " "Intervention (see treatment detail below)   Patient Response (see treatment detail below)   Treatment Detail EDUACTION: therapist re-ed on purpose of exs and encouraged to participate in exs during commercial breaks when watching TV for improved compliance, pt encouraged to identify his goals in therapy to improve motivation - reports he doesnt want to catch toes when walking, reports he is not as unsteady bending over to clean litter box pt reports he is willing to do exercise for 5 minutes once a day, he reports using 2 cushions he stands on for balance exs and relies on the wall to catch his balance during exs DYNAMIC BALANCE: 1) step ups on to 2\" foam - therapist cues for inc EDDIE - pt self tracking reps for multi tasking, reports feeling a bit unsteady, no LOB and maintains foot clearance; 2) step ups on sturdy stair 6\" - set- up with 1 UE support, faded to no UE support, maintains foot clearance without R toes catching- therapist ed on continued ex towards pt's goal of improved foot clearance with step-ups onto landing in kitchen as exericse  kitchen as exercise STATIC BALANCE: 1) romberg foam EC - mild sway, no LOB 3x30 sec, timed duration of exs  for feedback to patient on time to complete exs provided with him agreeing to 5 minutes of exericse,  pt needs additional cues after referancing  handouts for approraite technique, able to continue (I) and self tracking reps - repeated ed on techniques to recover from LOB vs relying on falling into wall behind him, open eyes and stepping response.    Progress progressing towards goals 1 and 2, pt has poor motivation to participate in exs at home but continues to be concerned about his balance with decreased foot clearance uneven surfaces. Unable to complete full reassessment of goals d/t pt canceling future sessions.   Education   Learner Patient   Readiness Acceptance   Method Booklet/handout;Explanation;Demonstration   Response Verbalizes Understanding;Demonstrates " Understanding   Education Comments (above)   Plan   Home program step up/down on foam and firm step,  romberg EO on foam, EC feet apart on foam, VORx1 in standing,    Updates to plan of care DISCHARGE. Pt requests to discontinue therapy following 11/5/18 visit.   Plan for next session reassess DVA, progress gaze stablization, balance and gait HEP prn, address any ongoing sxs of dizziness/unsteadiness : looking up, quick movements of head, walking in the dark, bending over making it difficult at times to participate in housework or yardwork, bending over to clean up cat litter box   Total Session Time   Timed Code Treatment Minutes 30   Total Treatment Time (sum of timed and untimed services) 30

## 2019-12-04 NOTE — ADDENDUM NOTE
Encounter addended by: Deanna Mary, PT on: 12/4/2019 12:36 PM   Actions taken: Flowsheet data copied forward, Clinical Note Signed, Flowsheet accepted

## 2019-12-05 ENCOUNTER — HOSPITAL ENCOUNTER (OUTPATIENT)
Dept: PHYSICAL THERAPY | Facility: CLINIC | Age: 84
Setting detail: THERAPIES SERIES
End: 2019-12-05
Attending: INTERNAL MEDICINE
Payer: COMMERCIAL

## 2019-12-05 DIAGNOSIS — Z91.81 PERSONAL HISTORY OF FALL: ICD-10-CM

## 2019-12-05 DIAGNOSIS — M25.551 PAIN OF BOTH HIP JOINTS: ICD-10-CM

## 2019-12-05 DIAGNOSIS — R26.9 ABNORMAL GAIT: ICD-10-CM

## 2019-12-05 DIAGNOSIS — M25.552 PAIN OF BOTH HIP JOINTS: ICD-10-CM

## 2019-12-05 PROCEDURE — 97110 THERAPEUTIC EXERCISES: CPT | Mod: GP | Performed by: PHYSICAL THERAPIST

## 2019-12-05 PROCEDURE — 97161 PT EVAL LOW COMPLEX 20 MIN: CPT | Mod: GP | Performed by: PHYSICAL THERAPIST

## 2019-12-11 DIAGNOSIS — E78.5 DYSLIPIDEMIA: ICD-10-CM

## 2019-12-12 RX ORDER — ATORVASTATIN CALCIUM 40 MG/1
TABLET, FILM COATED ORAL
Qty: 90 TABLET | Refills: 3 | Status: SHIPPED | OUTPATIENT
Start: 2019-12-12 | End: 2020-12-16

## 2019-12-12 NOTE — TELEPHONE ENCOUNTER
"Routing refill request to provider for review/approval because:  Labs not current:  LDL  T'd up 1 month for provider review.      Requested Prescriptions   Pending Prescriptions Disp Refills     atorvastatin (LIPITOR) 40 MG tablet [Pharmacy Med Name: ATORVASTATIN 40MG   TAB]  0     Sig: TAKE 1 TABLET BY MOUTH ONCE DAILY   Last Written Prescription Date:  12/11/2018  Last Fill Quantity: 90,  # refills: 3   Last office visit: 11/8/2019 with prescribing provider:     Future Office Visit:        Statins Protocol Failed - 12/11/2019  5:46 PM        Failed - LDL on file in past 12 months     Recent Labs   Lab Test 10/17/18  0955   LDL 60             Passed - No abnormal creatine kinase in past 12 months     No lab results found.             Passed - Recent (12 mo) or future (30 days) visit within the authorizing provider's specialty     Patient has had an office visit with the authorizing provider or a provider within the authorizing providers department within the previous 12 mos or has a future within next 30 days. See \"Patient Info\" tab in inbasket, or \"Choose Columns\" in Meds & Orders section of the refill encounter.              Passed - Medication is active on med list        Passed - Patient is age 18 or older      Lanny Mendez RN      "

## 2019-12-20 ENCOUNTER — APPOINTMENT (OUTPATIENT)
Dept: MRI IMAGING | Facility: CLINIC | Age: 84
End: 2019-12-20
Attending: EMERGENCY MEDICINE
Payer: COMMERCIAL

## 2019-12-20 ENCOUNTER — HOSPITAL ENCOUNTER (EMERGENCY)
Facility: CLINIC | Age: 84
Discharge: HOME | End: 2019-12-20
Attending: EMERGENCY MEDICINE | Admitting: EMERGENCY MEDICINE
Payer: COMMERCIAL

## 2019-12-20 VITALS
OXYGEN SATURATION: 97 % | WEIGHT: 224.87 LBS | SYSTOLIC BLOOD PRESSURE: 152 MMHG | HEART RATE: 66 BPM | TEMPERATURE: 98.5 F | RESPIRATION RATE: 16 BRPM | BODY MASS INDEX: 35.22 KG/M2 | DIASTOLIC BLOOD PRESSURE: 113 MMHG

## 2019-12-20 DIAGNOSIS — R11.2 NON-INTRACTABLE VOMITING WITH NAUSEA, UNSPECIFIED VOMITING TYPE: ICD-10-CM

## 2019-12-20 DIAGNOSIS — H53.9 VISION CHANGES: ICD-10-CM

## 2019-12-20 LAB
ANION GAP SERPL CALCULATED.3IONS-SCNC: 7 MMOL/L (ref 3–14)
BASOPHILS # BLD AUTO: 0.1 10E9/L (ref 0–0.2)
BASOPHILS NFR BLD AUTO: 1 %
BUN SERPL-MCNC: 27 MG/DL (ref 7–30)
CALCIUM SERPL-MCNC: 9.9 MG/DL (ref 8.5–10.1)
CHLORIDE SERPL-SCNC: 106 MMOL/L (ref 94–109)
CO2 SERPL-SCNC: 28 MMOL/L (ref 20–32)
CREAT SERPL-MCNC: 1.18 MG/DL (ref 0.66–1.25)
DIFFERENTIAL METHOD BLD: NORMAL
EOSINOPHIL # BLD AUTO: 0.2 10E9/L (ref 0–0.7)
EOSINOPHIL NFR BLD AUTO: 2.8 %
ERYTHROCYTE [DISTWIDTH] IN BLOOD BY AUTOMATED COUNT: 12.9 % (ref 10–15)
GFR SERPL CREATININE-BSD FRML MDRD: 57 ML/MIN/{1.73_M2}
GLUCOSE SERPL-MCNC: 121 MG/DL (ref 70–99)
HCT VFR BLD AUTO: 42.4 % (ref 40–53)
HGB BLD-MCNC: 13.9 G/DL (ref 13.3–17.7)
IMM GRANULOCYTES # BLD: 0 10E9/L (ref 0–0.4)
IMM GRANULOCYTES NFR BLD: 0.4 %
LYMPHOCYTES # BLD AUTO: 1.5 10E9/L (ref 0.8–5.3)
LYMPHOCYTES NFR BLD AUTO: 20.2 %
MCH RBC QN AUTO: 30.8 PG (ref 26.5–33)
MCHC RBC AUTO-ENTMCNC: 32.8 G/DL (ref 31.5–36.5)
MCV RBC AUTO: 94 FL (ref 78–100)
MONOCYTES # BLD AUTO: 0.5 10E9/L (ref 0–1.3)
MONOCYTES NFR BLD AUTO: 6.2 %
NEUTROPHILS # BLD AUTO: 5 10E9/L (ref 1.6–8.3)
NEUTROPHILS NFR BLD AUTO: 69.4 %
NRBC # BLD AUTO: 0 10*3/UL
NRBC BLD AUTO-RTO: 0 /100
PLATELET # BLD AUTO: 190 10E9/L (ref 150–450)
POTASSIUM SERPL-SCNC: 3.7 MMOL/L (ref 3.4–5.3)
RBC # BLD AUTO: 4.51 10E12/L (ref 4.4–5.9)
SODIUM SERPL-SCNC: 141 MMOL/L (ref 133–144)
WBC # BLD AUTO: 7.2 10E9/L (ref 4–11)

## 2019-12-20 PROCEDURE — 70553 MRI BRAIN STEM W/O & W/DYE: CPT

## 2019-12-20 PROCEDURE — 96374 THER/PROPH/DIAG INJ IV PUSH: CPT | Mod: 59

## 2019-12-20 PROCEDURE — 25000132 ZZH RX MED GY IP 250 OP 250 PS 637: Performed by: EMERGENCY MEDICINE

## 2019-12-20 PROCEDURE — 25500064 ZZH RX 255 OP 636: Performed by: EMERGENCY MEDICINE

## 2019-12-20 PROCEDURE — 85025 COMPLETE CBC W/AUTO DIFF WBC: CPT | Performed by: EMERGENCY MEDICINE

## 2019-12-20 PROCEDURE — 70544 MR ANGIOGRAPHY HEAD W/O DYE: CPT

## 2019-12-20 PROCEDURE — A9585 GADOBUTROL INJECTION: HCPCS | Performed by: EMERGENCY MEDICINE

## 2019-12-20 PROCEDURE — 25000128 H RX IP 250 OP 636

## 2019-12-20 PROCEDURE — 70549 MR ANGIOGRAPH NECK W/O&W/DYE: CPT

## 2019-12-20 PROCEDURE — 99285 EMERGENCY DEPT VISIT HI MDM: CPT | Mod: 25

## 2019-12-20 PROCEDURE — 93005 ELECTROCARDIOGRAM TRACING: CPT

## 2019-12-20 PROCEDURE — 80048 BASIC METABOLIC PNL TOTAL CA: CPT | Performed by: EMERGENCY MEDICINE

## 2019-12-20 RX ORDER — LORAZEPAM 1 MG/1
1 TABLET ORAL ONCE
Status: COMPLETED | OUTPATIENT
Start: 2019-12-20 | End: 2019-12-20

## 2019-12-20 RX ORDER — LORAZEPAM 2 MG/ML
0.5 INJECTION INTRAMUSCULAR ONCE
Status: COMPLETED | OUTPATIENT
Start: 2019-12-20 | End: 2019-12-20

## 2019-12-20 RX ORDER — LORAZEPAM 2 MG/ML
INJECTION INTRAMUSCULAR
Status: COMPLETED
Start: 2019-12-20 | End: 2019-12-20

## 2019-12-20 RX ORDER — GADOBUTROL 604.72 MG/ML
10 INJECTION INTRAVENOUS ONCE
Status: COMPLETED | OUTPATIENT
Start: 2019-12-20 | End: 2019-12-20

## 2019-12-20 RX ADMIN — GADOBUTROL 10 ML: 604.72 INJECTION INTRAVENOUS at 18:30

## 2019-12-20 RX ADMIN — LORAZEPAM 0.5 MG: 2 INJECTION INTRAMUSCULAR at 17:45

## 2019-12-20 RX ADMIN — LORAZEPAM 1 MG: 1 TABLET ORAL at 16:44

## 2019-12-20 RX ADMIN — LORAZEPAM 0.5 MG: 2 INJECTION INTRAMUSCULAR; INTRAVENOUS at 17:45

## 2019-12-20 ASSESSMENT — ENCOUNTER SYMPTOMS
VOMITING: 1
WEAKNESS: 0
NAUSEA: 1
LIGHT-HEADEDNESS: 0
DIZZINESS: 0
HEADACHES: 0
NUMBNESS: 0

## 2019-12-20 NOTE — ED PROVIDER NOTES
History     Chief Complaint:  Visual Disturbance     HPI   Nixon More is a 84 year old male with a history of hypertension, hyperlipidemia, CAD, and atrial fibrillation currently on Eliquis who presents accompanied by his daughter for evaluation of a visual disturbance. 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 He reports that he has previously had similar issues that resolve when he closes his eyes for a period of time, however after closing his eyes his problem did not improve today. After approximately five minutes he started to develop nausea and had one episode of vomiting. Within the next few minutes the patient's visual disturbance resolved, and about five minutes after his first episode of vomiting he had two additional episodes of vomiting. At that time the patient's blurred vision was completely resolved. About an hour later the patient had another episode of vomiting. Later in the day the patient's wife talked to her doctor about the patient's symptoms and they advised that he seek evaluation in the ED. Here in the ED, the patient reports feeling completely improved. He denies any recent dizziness, lightheadedness, headache, speech difficulties, numbness, or weakness. He has previously seen an ophthalmologist regarding these episodes of blurred vision.     Allergies:  Augmentin   Bactrim  Sulfa drugs  Lisinopril  Naproxen      Medications:    Albuterol inhaler  Eliquis   Atenolol  Lipitor  Diltiazem ER   EpiPen  Lexapro  Atrovent   Eligard   Imodium  Cozaar  Prosteon  Demadex     Past Medical History:    Actinic keratosis   Arthritis  Atrial fibrillation and flutter   CAD   Dyslipidemia  Hypertension  Emphysema of lung   Abdominal hernia  Hypersomnia with sleep apnea  Hypertrophy of prostate   Lumbago  Mumps  Prostate cancer  Basal cell skin  cancer  Spider veins     Past Surgical History:    Hernia repair  Mohs procedure  Prostate surgery  Lumbar laminectomy  Vitrectomy parsplana remove preretinal membrane     Family History:    Alzheimer disease - Mother   Hypertension - Mother  CHF - Father   Prostate cancer - Brother  Lung cancer - Brother    Social History:  Tobacco use:    Former smoker - 1.00 packs / day for 30 years, quit 1978   Alcohol use:    Positive, socially   Drug use:    Negative   Marital status:       Accompanied to ED by:  Daughter      Review of Systems   Eyes: Positive for visual disturbance.   Gastrointestinal: Positive for nausea and vomiting.   Neurological: Negative for dizziness, weakness, light-headedness, numbness and headaches.   All other systems reviewed and are negative.      Physical Exam   First Vitals:  BP: (!) 137/100  Pulse: 66  Heart Rate: 66  Temp: 98.5  F (36.9  C)  Resp: 16  Weight: 102 kg (224 lb 13.9 oz)  SpO2: 98 %    Physical Exam  Constitutional: Alert, attentive  HENT:    Nose: Nose normal.    Mouth/Throat: Oropharynx is clear, mucous membranes are moist  Eyes: EOM are normal. Pupils are equal, round, and reactive to light.   CV: Regular rate and rhythm, no murmurs, rubs or gallops.  Chest: Effort normal and breath sounds normal.   GI: No distension. There is no tenderness  MSK: Normal range of motion.   Neurological:   GCS 15; A/Ox3; Cranial nerves 2-12 intact;   5/5 strength throughout the upper and lower extremities;   sensation intact to light touch throughout the upper and lower extremities;   2+ DTRs to the bilateral upper and lower extremities (biceps, BRs, patellar, achilles);   normal fine motor coordination intact bilaterally;   normal gait   No meningismus   Skin: Skin is warm and dry.        Emergency Department Course   ECG (16:47:18):  Indication: Screening for cardiovascular disease.   Rate 49 bpm. FL interval * ms. QRS duration 76 ms. QT/QTc 498/449 ms. P-R-T axes * 1 18.    Interpretation: Atrial fibrillation with slow ventricular response, Cannot rule out inferior infarct, Abnormal ECG   Agree with computer interpretation. Yes   No significant change compared to EKG dated 6/11/2019.   Interpreted at 1649 by Dr. Mota.      Imaging:  Radiographic findings were communicated with the patient and family who voiced understanding of the findings.    MRA Neck (Carotids) wo & w Contrast:  MRA NECK:  1.  No evidence of hemodynamically significant stenosis within either internal carotid artery within the neck.  Per radiology.     MR Brain wo & w Contrast:  MRI BRAIN:  1.  Mildly motion degraded exam.  2.  No definite evidence of acute intracranial hemorrhage, mass effect, or infarction.  3.  Moderate nonspecific white matter changes.  4.  Mild to moderate brain parenchymal volume loss  Per radiology.     MRA Brain ([place]) wo Contrast:  MRA HEAD:  1.  No evidence of pathologic vascular occlusion or saccular aneurysm within the visualized intracranial circulation by MR angiography  Per radiology.     Laboratory:  CBC: WNL (WBC 7.2, HGB 13.9, )   BMP: Glucose 121 high, GFR estimate 57 low, o/w WNL (Creatinine 1.18)     Interventions:  1644 Ativan 1 mg PO   1745 Ativan 0.5 mg IV     Emergency Department Course:  Nursing notes and vitals reviewed.  1620: I performed an exam of the patient as documented above.     1911: I updated and reassessed the patient.     I personally reviewed the laboratory results with the patient and daughter and answered all related questions prior to discharge.     Findings and plan explained to the Patient and daughter. Patient discharged home with instructions regarding supportive care, medications, and reasons to return. The importance of close follow-up was reviewed.     Impression & Plan      Medical Decision Making:  This is an 84-year-old male with multiple medical problems who presents for evaluation of vision changes and vomiting episodes  described as above, with suspicion for possible underlying vertiginous episode.  Given double vision and the above symptoms, TIA is also considered.  Screening labs are reassuring and MR brain and MRA head neck are negative for evidence of vertebral artery dissection or cerebral infarction.  Symptoms remain resolved for recheck.  I recommended PCP follow-up in 3 to 5 days and strict return precautions for any worse or recurrent concerning symptoms.  Ophthalmology follow-up in 1 to 2 weeks for recheck.    Diagnosis:    ICD-10-CM   1. Vision changes H53.9   2. Non-intractable vomiting with nausea, unspecified vomiting type R11.2     Disposition:  Discharged to home.       I, Oscar West, am serving as a scribe at 4:20 PM on 12/20/2019 to document services personally performed by Dr. Mota, based on my observations and the provider's statements to me.    [facility] EMERGENCY DEPARTMENT       Naseem Mota MD  12/20/19 8020

## 2019-12-20 NOTE — ED TRIAGE NOTES
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  Pt explains it like his eyes were looking different directions lasting about 5 minutes.  Pt doesn't think he was dizzy, he was able to walk to bathroom.  Pt then had n/v X3 of the course of an hour. Went home and now denies any complaints.  "

## 2019-12-20 NOTE — ED AVS SNAPSHOT
[facility] Emergency Department  [address]  [facility] [address]  Phone:  [phone]  Fax:  [phone]                                    Nixon More   MRN: [id number]    Department:  [facility] Emergency Department   Date of Visit:  12/20/2019           After Visit Summary Signature Page    I have received my discharge instructions, and my questions have been answered. I have discussed any challenges I see with this plan with the nurse or doctor.    ..........................................................................................................................................  Patient/Patient Representative Signature      ..........................................................................................................................................  Patient Representative Print Name and Relationship to Patient    ..................................................               ................................................  Date                                   Time    ..........................................................................................................................................  Reviewed by Signature/Title    ...................................................              ..............................................  Date                                               Time          22EPIC Rev 08/18

## 2019-12-21 LAB — INTERPRETATION ECG - MUSE: NORMAL

## 2019-12-23 ENCOUNTER — TELEPHONE (OUTPATIENT)
Dept: PEDIATRICS | Facility: CLINIC | Age: 84
End: 2019-12-23

## 2019-12-23 DIAGNOSIS — E78.5 DYSLIPIDEMIA: ICD-10-CM

## 2019-12-23 DIAGNOSIS — E55.9 VITAMIN D DEFICIENCY: ICD-10-CM

## 2019-12-23 DIAGNOSIS — R73.01 IMPAIRED FASTING GLUCOSE: ICD-10-CM

## 2019-12-23 DIAGNOSIS — R30.0 DYSURIA: ICD-10-CM

## 2019-12-23 DIAGNOSIS — R30.0 DYSURIA: Primary | ICD-10-CM

## 2019-12-23 DIAGNOSIS — N39.0 ACUTE UTI: ICD-10-CM

## 2019-12-23 LAB
ALBUMIN UR-MCNC: NEGATIVE MG/DL
APPEARANCE UR: CLEAR
BACTERIA #/AREA URNS HPF: ABNORMAL /HPF
BILIRUB UR QL STRIP: NEGATIVE
COLOR UR AUTO: YELLOW
GLUCOSE UR STRIP-MCNC: NEGATIVE MG/DL
HBA1C MFR BLD: 5.5 % (ref 0–5.6)
HGB UR QL STRIP: ABNORMAL
KETONES UR STRIP-MCNC: NEGATIVE MG/DL
LEUKOCYTE ESTERASE UR QL STRIP: ABNORMAL
NITRATE UR QL: NEGATIVE
NON-SQ EPI CELLS #/AREA URNS LPF: ABNORMAL /LPF
PH UR STRIP: 6.5 PH (ref 5–7)
RBC #/AREA URNS AUTO: ABNORMAL /HPF
SOURCE: ABNORMAL
SP GR UR STRIP: 1.01 (ref 1–1.03)
UROBILINOGEN UR STRIP-ACNC: 0.2 EU/DL (ref 0.2–1)
WBC #/AREA URNS AUTO: ABNORMAL /HPF

## 2019-12-23 PROCEDURE — 87086 URINE CULTURE/COLONY COUNT: CPT | Performed by: INTERNAL MEDICINE

## 2019-12-23 PROCEDURE — 81001 URINALYSIS AUTO W/SCOPE: CPT | Performed by: INTERNAL MEDICINE

## 2019-12-23 PROCEDURE — 87186 SC STD MICRODIL/AGAR DIL: CPT | Performed by: INTERNAL MEDICINE

## 2019-12-23 PROCEDURE — 87088 URINE BACTERIA CULTURE: CPT | Performed by: INTERNAL MEDICINE

## 2019-12-23 PROCEDURE — 82947 ASSAY GLUCOSE BLOOD QUANT: CPT | Performed by: INTERNAL MEDICINE

## 2019-12-23 PROCEDURE — 80061 LIPID PANEL: CPT | Performed by: INTERNAL MEDICINE

## 2019-12-23 PROCEDURE — 36415 COLL VENOUS BLD VENIPUNCTURE: CPT | Performed by: INTERNAL MEDICINE

## 2019-12-23 PROCEDURE — 83036 HEMOGLOBIN GLYCOSYLATED A1C: CPT | Performed by: INTERNAL MEDICINE

## 2019-12-23 PROCEDURE — 82306 VITAMIN D 25 HYDROXY: CPT | Performed by: INTERNAL MEDICINE

## 2019-12-23 RX ORDER — CIPROFLOXACIN 500 MG/1
500 TABLET, FILM COATED ORAL 2 TIMES DAILY
Qty: 14 TABLET | Refills: 0 | Status: SHIPPED | OUTPATIENT
Start: 2019-12-23 | End: 2020-10-21

## 2019-12-23 NOTE — TELEPHONE ENCOUNTER
Reason for Call:  Call back /Symptom    Detailed comments: Wife of pt, Kavitha Calling, CTC on file for her, Pt was seen in ER on 12/20/19 and is requesting a Hosp f/u. Pt is also reporting burning during urination since this morning and is concerned about having a UTI. Wife has procedure today and is hoping for a call back in the next couple hours if at all possible. Please advise on both concerns, Thanks!    Phone Number Patient can be reached at: Cell number on file:    Telephone Information:   Mobile [phone]     Number is Wife's number to call back    Best Time: in the next 2 hours    Can we leave a detailed message on this number? YES    Call taken on 12/23/2019 at 8:55 AM by Niharika Garay

## 2019-12-23 NOTE — TELEPHONE ENCOUNTER
Pt needs to at least do a lab-only urine test.  I can't see him tomorrow, could add on 12/27 at 11:50 but that is not soon enough with his urinary symptoms.  Please find out pharmacy he would want.

## 2019-12-23 NOTE — TELEPHONE ENCOUNTER
"Called pt's wife. Spoke with pt and pt's wife.    Pt was seen in ER on 12/20/19. Was recommended a ER f/u appt with PCP.    TC/MA: Please call and schedule appt for ER f/u. When done, route to PCP for advisement on the following concerns.    Dr Lewis: Spoke to the pt. He's been experiencing some mild urinary symptoms since ER visit (on 12/20/19).    C/o intermittent burning with urination, states \"it feels like pressure\". Pain within the shaft of penis. Pain is currently 2/2 out of 10. Worsening delayed urination, Hx of BPH.    Denies flank pain, abdominal pain, fever, Hx of UTI.    Pt was not cath-ed in ER.    Hx of urinary incontinence. Wear pad, admits that he is not changing or washing often. Discussed with pt that they need to dispose or effectively wash daily, as this can increase risks of urinary infections.    Pt has been taking Torsemide, admits to polyuria. Stopped taking today as polyuria inhibits ADLs outside of home.    Please advise. Pt wondering if they need to be treated for UTI.     - Beltran \"Hayder\" BIANCA Rodriguez  Triage [facility]    "

## 2019-12-23 NOTE — TELEPHONE ENCOUNTER
"Called the pt.    Agreed to be seen on Friday, 12/27/19 at 11:50am for ER follow up.    TC/MA: Please schedule accordingly, per Dr Lewis.    Pt agreed to lab test. Pt is currently taking wife to appt and will be out most of the day. Plans to come in after, unsure what time that will be. Review lab hours.    - Beltran \"Hayder\" BIANCA Rodriguez  Triage [facility]    "

## 2019-12-24 LAB
CHOLEST SERPL-MCNC: 115 MG/DL
GLUCOSE SERPL-MCNC: 155 MG/DL (ref 70–99)
HDLC SERPL-MCNC: 36 MG/DL
LDLC SERPL CALC-MCNC: 38 MG/DL
NONHDLC SERPL-MCNC: 79 MG/DL
TRIGL SERPL-MCNC: 207 MG/DL

## 2019-12-26 LAB
BACTERIA SPEC CULT: ABNORMAL
DEPRECATED CALCIDIOL+CALCIFEROL SERPL-MC: 32 UG/L (ref 20–75)
SPECIMEN SOURCE: ABNORMAL

## 2019-12-26 NOTE — PROGRESS NOTES
ED Visit 12/20/19  Patient presents for evaluation of vision changes and vomiting episodes, with suspicion for possible underlying vertiginous episode.  Given double vision and the above symptoms, TIA is also considered.  Screening labs are reassuring and MR brain and MRA head neck are negative for evidence of vertebral artery dissection or cerebral infarction.  Symptoms remain resolved for recheck.    Rupinder ARTEAGA RN, BSN

## 2019-12-27 ENCOUNTER — OFFICE VISIT (OUTPATIENT)
Dept: PEDIATRICS | Facility: CLINIC | Age: 84
End: 2019-12-27
Payer: COMMERCIAL

## 2019-12-27 VITALS
SYSTOLIC BLOOD PRESSURE: 110 MMHG | DIASTOLIC BLOOD PRESSURE: 70 MMHG | OXYGEN SATURATION: 97 % | HEIGHT: 67 IN | TEMPERATURE: 98.1 F | WEIGHT: 229 LBS | BODY MASS INDEX: 35.94 KG/M2 | HEART RATE: 66 BPM | RESPIRATION RATE: 18 BRPM

## 2019-12-27 DIAGNOSIS — N39.0 ACUTE UTI: ICD-10-CM

## 2019-12-27 DIAGNOSIS — I10 HYPERTENSION GOAL BP (BLOOD PRESSURE) < 140/90: ICD-10-CM

## 2019-12-27 DIAGNOSIS — R42 VERTIGO: ICD-10-CM

## 2019-12-27 DIAGNOSIS — I48.19 PERSISTENT ATRIAL FIBRILLATION (H): ICD-10-CM

## 2019-12-27 DIAGNOSIS — H53.9 VISUAL DISTURBANCE: Primary | ICD-10-CM

## 2019-12-27 PROCEDURE — 99214 OFFICE O/P EST MOD 30 MIN: CPT | Performed by: INTERNAL MEDICINE

## 2019-12-27 RX ORDER — LOSARTAN POTASSIUM 100 MG/1
100 TABLET ORAL DAILY
Qty: 90 TABLET | Refills: 3 | Status: SHIPPED | OUTPATIENT
Start: 2019-12-27 | End: 2021-03-12

## 2019-12-27 RX ORDER — ATENOLOL 100 MG/1
150 TABLET ORAL DAILY
Qty: 135 TABLET | Refills: 3 | Status: SHIPPED | OUTPATIENT
Start: 2019-12-27 | End: 2020-12-29

## 2019-12-27 SDOH — HEALTH STABILITY: MENTAL HEALTH: HOW OFTEN DO YOU HAVE 6 OR MORE DRINKS ON ONE OCCASION?: NEVER

## 2019-12-27 SDOH — HEALTH STABILITY: MENTAL HEALTH: HOW OFTEN DO YOU HAVE A DRINK CONTAINING ALCOHOL?: NEVER

## 2019-12-27 ASSESSMENT — MIFFLIN-ST. JEOR: SCORE: 1697.37

## 2019-12-27 NOTE — PROGRESS NOTES
"Subjective     Nixon More is a 84 year old male who presents to clinic today for the following health issues:    HPI   ED/UC Followup:    Facility:  [facility], ED  Date of visit: 12/20/19  Reason for visit: visual disturbances with nausea and emesis  Current Status: Better, although has a question concerning medications    Had episode of double vision.  Has had in past and had prism in glasses to help with it.  Saw optometry a few months ago.  Closes one eye and disorientation will resolve.  Had a milder episode the next day as well.      UTI - on cipro and better    Depression - wondering about SAD light.  Feeling more down.  Wife's cancer worsening and winter always gets him down.    Reviewed and updated as needed this visit by Provider  Allergies  Meds  Problems         Review of Systems         Objective    /70 (BP Location: Right arm, Patient Position: Sitting, Cuff Size: Adult Regular)   Pulse 66   Temp 98.1  F (36.7  C) (Tympanic)   Resp 18   Ht 1.702 m (5' 7\")   Wt 103.9 kg (229 lb)   SpO2 97%   BMI 35.87 kg/m    Body mass index is 35.87 kg/m .  Physical Exam   GENERAL: healthy, alert and no distress  PSYCH: mentation appears normal and affect flat    Diagnostic Test Results:  Labs reviewed in Epic        Assessment & Plan     1. Visual disturbance  Unclear if this is primary issue but history suggestive, refer  - OPHTHALMOLOGY ADULT REFERRAL    2. Persistent atrial fibrillation  refill  - atenolol (TENORMIN) 100 MG tablet; Take 1.5 tablets (150 mg) by mouth daily  Dispense: 135 tablet; Refill: 3    3. Hypertension goal BP (blood pressure) < 140/90  refill  - losartan (COZAAR) 100 MG tablet; Take 1 tablet (100 mg) by mouth daily  Dispense: 90 tablet; Refill: 3    4. Vertigo  May be primary cause.  Just stopped PT for balance as felt too stressful.  Consider ENT evaluation if ophtho evaluation unrevealing    5. Acute UTI  Complete antibiotic, follow-up with urology as " scheduled      See Patient Instructions    No follow-ups on file.    Natalya Lewis MD  [facility]

## 2019-12-27 NOTE — PATIENT INSTRUCTIONS
Call to schedule with an ophthalmologist to talk about the eye issues.  If they think the eye isn't the primary problem, then I can refer you to an ENT to do inner ear testing.    Start a SAD light, use a 10,000 lux light and sit in front of it for about 20-30 mins most mornings.

## 2020-01-05 NOTE — PROGRESS NOTES
12/05/19 0900   Quick Adds   Quick Adds Certification   Type of Visit Initial OP PT Evaluation   General Information   Start of Care Date 12/05/19   Referring Physician Natalya Lewis MD    Orders Evaluate and Treat as Indicated   Order Date 11/08/19   Medical Diagnosis Personal history of fall Z91.81 Abnormal gait R26.9  Pain of both hip joints M25.551, M25.552    Onset of illness/injury or Date of Surgery 11/08/19  (order date (pt reports long hx of balance impairments))   Precautions/Limitations fall precautions   Surgical/Medical history reviewed Yes   Pertinent history of current problem (include personal factors and/or comorbidities that impact the POC) 85 yo M arrives for OP PT evaluation of his balance. REPORTED LIMITATIONS: getting in/out of chairs, negotiating stairs, issues with balance on unsteady surfaces. BALANCE/FALLS: 12/2018 fell in driveway - broken ribs. Has noticed decline in balance over the past year, stumbling or uneven surafces. Other falls without injuries: in the summer, falls forward reaching down to  something over a ledge/sloped driveway, reaching to floor/scooting in office chair, fell off riding lawnmower - has difficulty returning to standing  PRIOR PT: 1) at the [facility] 9 months ago (03/2019) to address wakness/balance dysfunction s/p fall 2 months prior. 2) Pt is familiar with this clinic and this therapist with prior EOC 10/25/18-11/5/18 to address balance impairments and (+) s/s of left unilateral vestibular hypofunction. Pt participated in 2-3 visits during both episodes of care and canceled/requesting d/c d/t poor motivation to participate in therapy/exercise. 3) Pt also has hx of L BPPV with successful treatment in 06/2018. OTHER PMH: R foot drop following Lx surgery 1988. During hospitalization after fall a year ago, found to have AFib/flutter and BP medications adjusted, now taking blood thinner.  MIGRAINES: also reports visual aura migraines as separate as the visual  "distortion he gets that resolves once he closes his eyes.  FATIGUE: Reports shortness of breath/quick to fatigue over the past year since dx with AFib. Reports limited walking tolerance d/t fatigue, but he takes O2 sat and it is above 90%.    Pertinent Visual History   VISUAL DISTURBANCE/\"DIZZINESS\": reports occassionally gets dizzy spells - once every couple of months for about 5 minutes. Describes dizziness as \"feels like I'm cross-eyed\". Denies dizziness provoked by bed mobility, head turns. Eyes crossing can occur when sitting still. Able to resolve sxs by closing his eyes. Most recently saw optometrist a couple months ago. Hx of cateract surgery L eye within the past year. At one point had prism glasses for correction, but still had issues seeing double, saw better without prisms and had them removed. With current perscription, at times with glasses on he will close one eye to sharpen the imagine - done more frequently before his newest lenses.    Previous/Current Treatment Physical Therapy   Improvement after PT No  (endorses poor compliance with therapy/exercise historically)   Current Community Support Housekeeping service;Other/Comments  (yard service)   Patient role/Employment history Retired  ()   Living environment House/[facility]   Home/Community Accessibility Comments 1 level home with 2 stairs to enter and 11 stairs to basement - stairs on both.  STAIRS: reports limitations d/t L knee \"catches\".     Current Assistive Devices Standard Cane   ADL Devices Wall Grab Bar;Raised Toilet Seat   Patient/Family Goals Statement pt not sure what his goal is, but willing to improve walking/balance   General Information Comments EXERCISE: not intersted in exercise, reports he is poorly motivated to participate in both (I) and group exericse, but is willing to try PT again and \"see how it goes\". Years ago would go to [facility] to walk around track and use a couple of machines with spouse. Spouse has other " medical issues now and stopped going.     Fall Risk Screen   Fall screen completed by PT   Have you fallen 2 or more times in the past year? Yes   Have you fallen and had an injury in the past year? Yes   Timed Up and Go score (seconds) n/t (see MARCIE and GRACE for falls assessments)   Is patient a fall risk? Yes;Department fall risk interventions implemented   Fall screen comments Considered inc risk for falls based on falls report of multiple falls and fall with injury ~1 year ago. However, per DGI and EDWARDS gait and postural stability assessments pt is above fall risk cuttoff, but SLS < 5 sec = inc fall risk. Pt reports when he moves impulsively/disctracted he falls   Abuse Screen (yes response referral indicated)   Feels Unsafe at Home or Work/School no   Feels Threatened by Someone no   Does Anyone Try to Keep You From Having Contact with Others or Doing Things Outside Your Home? no   Physical Signs of Abuse Present no   Pain   Patient currently in pain No   Cognitive Status Examination   Level of Consciousness alert   Follows Commands and Answers Questions 100% of the time;able to follow multistep instructions   Personal Safety and Judgment intact   Cognitive Comment MEMORY: Pt reports gradual changes in memory. Able to recall more recent events. Eg. doesnt remember what he did at [facility] for PT or why he was there.    Observation   Observation overweight   Posture   Posture Comments bilat genu valgum   Strength   Strength Comments see functional assessment (30 sec sit <> stand)   Bed Mobility   Bed Mobility Comments indep   Transfer Skills   Transfer Comments Mod(I) - unable to rise without UE support   Gait   Gait Comments no AD , R foot slap, no instability   Gait Special Tests   Gait Special Tests DYNAMIC GAIT INDEX   Gait Special Tests Dynamic Gait Index   Comments 21/24 (</= 19 = inc fall risk)   Balance Special Tests   Balance Special Tests Sit to stand reps;Single leg stance right;Single leg stance left;Edwards  balance   Balance Special Tests Priest Balance   Comments 47/56 (</=45 = inc fall risk)   Balance Special Tests Single Leg Stance Right,   Comments < 3 sec   Balance Special Tests Single Leg Stance Left   Comments < 3 sec   Balance Special Tests Sit to Stand Reps in 30 Seconds   Reps in 30 seconds 9  (13 reps without UE support = norms for age/gender)   Height 18   Comments unable to rise without UE support, completed with BUE support   Sensory Examination   Sensory Perception Comments reports some numbness R shin   Coordination   Coordination no deficits were identified   Muscle Tone   Muscle Tone Comments R foot drop   Modality Interventions   Planned Modality Interventions Comments per therapist discretion   Planned Therapy Interventions   Planned Therapy Interventions balance training;gait training;neuromuscular re-education;strengthening;stretching;transfer training   Clinical Impression   Criteria for Skilled Therapeutic Interventions Met yes, treatment indicated   PT Diagnosis postural and gait instability with increased risk for falls with high level balance tasks   Influenced by the following impairments PMH significant for R foot drop following Lx surgery 1988, Report of: multiple falls and fall with injury within the past year, quick to fatigue since dx with AFib ~ 1 year ago, occasional double vision unable to be corrected by prisms, reports some numbness R shin; assessment: overweight, impaired functional LE strength, R footdrop   Functional limitations due to impairments pt's fatigue limits walking tolerance, impaired dynamic postural stability limits functional bending/reaching, impaired dynamic gait stability limits safety negotiating uneven surfaces, impaired LE strength limits functional transfers from standard height chair   Clinical Presentation Stable/Uncomplicated   Clinical Decision Making (Complexity) Low complexity   Therapy Frequency 2 times/Week   Predicted Duration of Therapy Intervention  "(days/wks) 4 weeks   Risk & Benefits of therapy have been explained Yes   Patient, Family & other staff in agreement with plan of care Yes   Clinical Impression Comments Appropriate to participate in PT to address impairments above/goals below towards decreased fall risk. Plan for vestibular assessment/treatment prn based on any continued dizziness per pt report and based on hx of BPPV. However, suspect current dizziness reported as \"eyes go crossed\" related to his visual impairments that have not been corrected with eyeware. Pt may also benefit from OT assessment from low vision specialist for compensatory strategies. PT plans to communicate with MD and OT prn based on further assessment.   Education Assessment   Barriers to Learning Emotional  (endorses poor compliance with therapy/exercise historically)   GOALS   PT Eval Goals 1;2;3;4   Goal 1   Goal Identifier EXERCISE (baseline: not intersted in exercise, reports he is poorly motivated to participate in both (I) or group exericse, but is willing to try PT again and \"see how it goes\")   Goal Description Pt to report consistancy with exercise routine 3x/week incooporating strengthening, balance and walking program for progress towards all goals and established routine to continue following d/c from therapy for maintainance.   Target Date 01/09/20   Goal 2   Goal Identifier SLS (baseline: < 3 seconds bilat)   Goal Description Pt to maintain SLS >/= 5 sec bilat LEs to demo improved postural stability with decreased risk for falls.   Target Date 01/09/20   Goal 3   Goal Identifier 30 sec sit <> stand (baseline: 9 reps, unable to rise without UE support, completed with BUE support; 13 reps without UE support = norms for age/gender)   Goal Description Pt to complete 9 reps without UE support towards improved functional LE strength and greater ease to rise from chair and toliet in home.   Target Date 01/09/20   Goal 4   Goal Identifier Falls report (baseline: " negotiating uneven surfaces, bending/reaching/scooting, quick movements/multitasking)   Goal Description Pt to demonstrate maintained stability or appropriate balance reactions to prevent falls with high level multitasking/bending/reaching/scooting/obsticle negotiation set-up by therapist, in addition to pt report of no falls throughout EOC.   Target Date 01/09/20   Total Evaluation Time   PT Eval, Low Complexity Minutes (13009) 50   Therapy Certification   Certification date from 12/05/19   Certification date to 01/09/20   Medical Diagnosis Personal history of fall Z91.81 Abnormal gait R26.9  Pain of both hip joints M25.551, M25.552

## 2020-01-05 NOTE — PROGRESS NOTES
"                                                                                              [facility]        OUTPATIENT PHYSICAL THERAPY FUNCTIONAL EVALUATION  PLAN OF TREATMENT FOR OUTPATIENT REHABILITATION  (COMPLETE FOR INITIAL CLAIMS ONLY)  Patient's Last Name, First Name, M.I.  YOB: 1935  Nixon More     Provider's Name   [facility]   Medical Record No.  [id number]     Start of Care Date:  12/05/19   Onset Date:  11/08/19(order date (pt reports long hx of balance impairments))   Type:     _X__PT   ____OT  ____SLP Medical Diagnosis:  Personal history of fall Z91.81 Abnormal gait R26.9  Pain of both hip joints M25.551, M25.552      PT Diagnosis:  postural and gait instability with increased risk for falls with high level balance tasks Visits from SOC:  1                              __________________________________________________________________________________  Plan of Treatment/Functional Goals:  balance training, gait training, neuromuscular re-education, strengthening, stretching, transfer training           GOALS  EXERCISE (baseline: not intersted in exercise, reports he is poorly motivated to participate in both (I) or group exericse, but is willing to try PT again and \"see how it goes\")  Pt to report consistancy with exercise routine 3x/week incooporating strengthening, balance and walking program for progress towards all goals and established routine to continue following d/c from therapy for maintainance.  01/09/20    SLS (baseline: < 3 seconds bilat)  Pt to maintain SLS >/= 5 sec bilat LEs to demo improved postural stability with decreased risk for falls.  01/09/20    30 sec sit <> stand (baseline: 9 reps, unable to rise without UE support, completed with BUE support; 13 reps without UE support = norms for age/gender)  Pt to complete 9 reps without UE support towards improved functional LE strength and greater ease to rise from chair " and teri in home.  01/09/20    Falls report (baseline: negotiating uneven surfaces, bending/reaching/scooting, quick movements/multitasking)  Pt to demonstrate maintained stability or appropriate balance reactions to prevent falls with high level multitasking/bending/reaching/scooting/obsticle negotiation set-up by therapist, in addition to pt report of no falls throughout EOC.  01/09/20          Therapy Frequency:  2 times/Week   Predicted Duration of Therapy Intervention:  4 weeks    Deanna Mary, PT                                    I CERTIFY THE NEED FOR THESE SERVICES FURNISHED UNDER        THIS PLAN OF TREATMENT AND WHILE UNDER MY CARE     (Physician co-signature of this document indicates review and certification of the therapy plan).                Certification Date From:  12/05/19   Certification Date To:  01/09/20    Referring Provider:  Natalya Lewis MD     Initial Assessment  See Epic Evaluation- Start of Care Date: 12/05/19

## 2020-01-05 NOTE — PROGRESS NOTES
"Outpatient Physical Therapy Discharge Note     Patient: Nixon More  : 1935    Beginning/End Dates of Reporting Period:  19: Evaluation only during this EOC. Pt scheduled follow-up appointments but on 19 called to cancel all apts scheduled \"changed his mind, not the right time of the year\"    Referring Provider: Natalya Lewis MD     Therapy Diagnosis: postural and gait instability with increased risk for falls with high level balance tasks     Client Self Report: (see eval)    Goals:  Goal Identifier EXERCISE (baseline: not intersted in exercise, reports he is poorly motivated to participate in both (I) or group exericse, but is willing to try PT again and \"see how it goes\")   Goal Description Pt to report consistancy with exercise routine 3x/week incorporating strengthening, balance and walking program for progress towards all goals and established routine to continue following d/c from therapy for maintenance.   Target Date 20   Date Met      Progress:     Goal Identifier SLS (baseline: < 3 seconds bilat)   Goal Description Pt to maintain SLS >/= 5 sec bilat LEs to demo improved postural stability with decreased risk for falls.   Target Date 20   Date Met      Progress:     Goal Identifier 30 sec sit <> stand (baseline: 9 reps, unable to rise without UE support, completed with BUE support; 13 reps without UE support = norms for age/gender)   Goal Description Pt to complete 9 reps without UE support towards improved functional LE strength and greater ease to rise from chair and toilet in home.   Target Date 20   Date Met      Progress:     Goal Identifier Falls report (baseline: negotiating uneven surfaces, bending/reaching/scooting, quick movements/multitasking)   Goal Description Pt to demonstrate maintained stability or appropriate balance reactions to prevent falls with high level multitasking/bending/reaching/scooting/obstacle negotiation set-up by therapist, in " "addition to pt report of no falls throughout EOC.   Target Date 01/09/20   Date Met      Progress:       Progress Toward Goals:   Unable to reassess. Pt requested discharge from therapy following evaluation.     Plan:  Discharge from therapy.    Discharge:    Reason for Discharge: Patient chooses to discontinue therapy.    PT's plan of care established on evaluation: Appropriate to participate in PT to address goals above towards decreased fall risk. Plan for vestibular assessment/treatment prn based on any continued dizziness per pt report based on hx of BPPV. However, suspect current dizziness reported as \"eyes go crossed\" related to his visual impairments that have not been corrected with eyeware. Pt may also benefit from OT assessment from low vision specialist for compensatory strategies. PT plans to communicate with MD and OT prn based on further assessment.    Discharge Plan: Communication with referring MD re. PT POC on evaluation and pt's preference not to participate in therapy at this time for any preferred follow-up at MD's discretion.     "

## 2020-01-07 ENCOUNTER — TELEPHONE (OUTPATIENT)
Dept: PEDIATRICS | Facility: CLINIC | Age: 85
End: 2020-01-07

## 2020-01-07 ENCOUNTER — HOSPITAL ENCOUNTER (OUTPATIENT)
Dept: LAB | Facility: CLINIC | Age: 85
Discharge: HOME | End: 2020-01-07
Attending: INTERNAL MEDICINE | Admitting: INTERNAL MEDICINE
Payer: COMMERCIAL

## 2020-01-07 DIAGNOSIS — E55.9 VITAMIN D DEFICIENCY: Primary | ICD-10-CM

## 2020-01-07 DIAGNOSIS — E78.5 HYPERLIPIDEMIA: ICD-10-CM

## 2020-01-07 DIAGNOSIS — E78.5 DYSLIPIDEMIA: ICD-10-CM

## 2020-01-07 DIAGNOSIS — R73.01 IMPAIRED FASTING GLUCOSE: ICD-10-CM

## 2020-01-07 PROCEDURE — 83036 HEMOGLOBIN GLYCOSYLATED A1C: CPT | Performed by: INTERNAL MEDICINE

## 2020-01-07 PROCEDURE — 82947 ASSAY GLUCOSE BLOOD QUANT: CPT | Performed by: INTERNAL MEDICINE

## 2020-01-07 PROCEDURE — 82306 VITAMIN D 25 HYDROXY: CPT | Performed by: INTERNAL MEDICINE

## 2020-01-07 PROCEDURE — 36415 COLL VENOUS BLD VENIPUNCTURE: CPT | Performed by: INTERNAL MEDICINE

## 2020-01-07 PROCEDURE — 80061 LIPID PANEL: CPT | Performed by: INTERNAL MEDICINE

## 2020-01-07 NOTE — TELEPHONE ENCOUNTER
----- Message from Estee Lux CMA sent at 1/7/2020 10:00 AM CST -----  Regarding: Needs lab orders...  Natalya-    Sorry to bother you in multiple medias but Spencer is at [facility] requesting blood draw but there are no orders in  or from previous bloodwork done on 12-23-19. Can you advise please and thanks!    Estee

## 2020-01-08 DIAGNOSIS — I48.92 ATRIAL FIBRILLATION AND FLUTTER (H): ICD-10-CM

## 2020-01-08 DIAGNOSIS — I10 BENIGN ESSENTIAL HYPERTENSION: ICD-10-CM

## 2020-01-08 DIAGNOSIS — I48.91 ATRIAL FIBRILLATION AND FLUTTER (H): ICD-10-CM

## 2020-01-08 RX ORDER — TORSEMIDE 20 MG/1
20 TABLET ORAL DAILY
Qty: 90 TABLET | Refills: 3 | Status: SHIPPED | OUTPATIENT
Start: 2020-01-08 | End: 2020-10-27

## 2020-01-09 ENCOUNTER — TRANSFERRED RECORDS (OUTPATIENT)
Dept: HEALTH INFORMATION MANAGEMENT | Facility: CLINIC | Age: 85
End: 2020-01-09

## 2020-01-10 LAB
CHOLEST SERPL-MCNC: NORMAL MG/DL
DEPRECATED CALCIDIOL+CALCIFEROL SERPL-MC: NORMAL UG/L (ref 20–75)
GLUCOSE SERPL-MCNC: NORMAL MG/DL (ref 70–99)
HBA1C MFR BLD: NORMAL % (ref 0–5.6)
HDLC SERPL-MCNC: NORMAL MG/DL
LDLC SERPL CALC-MCNC: NORMAL MG/DL (ref 0–130)
NONHDLC SERPL-MCNC: NORMAL MG/DL
TRIGL SERPL-MCNC: NORMAL MG/DL

## 2020-01-14 NOTE — PLAN OF CARE
Last seen 9/13/19 sent to provider for approval Problem: Patient Care Overview  Goal: Plan of Care/Patient Progress Review  Outcome: Improving  Acute Pain/Fall     1.  Pain controlled with oral analgesia: Yes      2.  Vital signs stable: Yes      3.  Diagnotic testing complete: Yes      4.  Cleared from consultants (if applicable): No      5. Return to near baseline physical activity: No

## 2020-01-29 ENCOUNTER — HOSPITAL ENCOUNTER (OUTPATIENT)
Dept: LAB | Facility: CLINIC | Age: 85
Discharge: HOME | End: 2020-01-29
Attending: PSYCHIATRY & NEUROLOGY | Admitting: PSYCHIATRY & NEUROLOGY
Payer: COMMERCIAL

## 2020-01-29 DIAGNOSIS — R94.2 NONSPECIFIC ABNORMAL RESULTS OF PULMONARY SYSTEM FUNCTION STUDY: ICD-10-CM

## 2020-01-29 DIAGNOSIS — R29.2 ABNORMAL REFLEX: ICD-10-CM

## 2020-01-29 DIAGNOSIS — R06.02 SHORTNESS OF BREATH: ICD-10-CM

## 2020-01-29 DIAGNOSIS — R06.09 DYSPNEA ON EXERTION: Primary | ICD-10-CM

## 2020-01-29 LAB
ALT SERPL W P-5'-P-CCNC: 30 U/L (ref 0–70)
AST SERPL W P-5'-P-CCNC: 36 U/L (ref 0–45)
BILIRUB DIRECT SERPL-MCNC: 0.2 MG/DL (ref 0–0.2)
BILIRUB SERPL-MCNC: 0.7 MG/DL (ref 0.2–1.3)
CK SERPL-CCNC: 87 U/L (ref 30–300)

## 2020-01-29 PROCEDURE — 84450 TRANSFERASE (AST) (SGOT): CPT | Performed by: PSYCHIATRY & NEUROLOGY

## 2020-01-29 PROCEDURE — 36415 COLL VENOUS BLD VENIPUNCTURE: CPT | Performed by: PSYCHIATRY & NEUROLOGY

## 2020-01-29 PROCEDURE — 82247 BILIRUBIN TOTAL: CPT | Performed by: PSYCHIATRY & NEUROLOGY

## 2020-01-29 PROCEDURE — 82550 ASSAY OF CK (CPK): CPT | Performed by: PSYCHIATRY & NEUROLOGY

## 2020-01-29 PROCEDURE — 84460 ALANINE AMINO (ALT) (SGPT): CPT | Performed by: PSYCHIATRY & NEUROLOGY

## 2020-01-29 PROCEDURE — 82085 ASSAY OF ALDOLASE: CPT | Performed by: PSYCHIATRY & NEUROLOGY

## 2020-01-29 PROCEDURE — 82248 BILIRUBIN DIRECT: CPT | Performed by: PSYCHIATRY & NEUROLOGY

## 2020-01-30 LAB — ALDOLASE SERPL-CCNC: 3.6 U/L (ref 1.5–8.1)

## 2020-02-12 DIAGNOSIS — N40.0 ENLARGED PROSTATE: Primary | ICD-10-CM

## 2020-02-20 DIAGNOSIS — N40.0 ENLARGED PROSTATE: ICD-10-CM

## 2020-02-20 PROCEDURE — 84153 ASSAY OF PSA TOTAL: CPT | Performed by: UROLOGY

## 2020-02-20 PROCEDURE — 36415 COLL VENOUS BLD VENIPUNCTURE: CPT | Performed by: UROLOGY

## 2020-02-21 LAB — PSA SERPL-MCNC: <0.01 UG/L (ref 0–4)

## 2020-02-26 ENCOUNTER — OFFICE VISIT (OUTPATIENT)
Dept: UROLOGY | Facility: CLINIC | Age: 85
End: 2020-02-26
Payer: COMMERCIAL

## 2020-02-26 VITALS
BODY MASS INDEX: 35.16 KG/M2 | DIASTOLIC BLOOD PRESSURE: 60 MMHG | HEART RATE: 70 BPM | HEIGHT: 67 IN | SYSTOLIC BLOOD PRESSURE: 102 MMHG | WEIGHT: 224 LBS

## 2020-02-26 DIAGNOSIS — C61 PROSTATE CANCER (H): Primary | ICD-10-CM

## 2020-02-26 PROCEDURE — 99214 OFFICE O/P EST MOD 30 MIN: CPT | Performed by: UROLOGY

## 2020-02-26 ASSESSMENT — PAIN SCALES - GENERAL: PAINLEVEL: NO PAIN (0)

## 2020-02-26 ASSESSMENT — MIFFLIN-ST. JEOR: SCORE: 1674.69

## 2020-02-26 NOTE — PROGRESS NOTES
Office Visit Note  [facility]  [phone]    UROLOGIC DIAGNOSES:   Prostate cancer and hematuria    CURRENT INTERVENTIONS:   Hormonal therapy.  Prior radiotherapy, negative hematuria work-up 2018    HISTORY:   Spencer Returns to clinic today for prostate cancer follow-up.  He reports feeling well.  He has minimal side effects from the Lupron therapy.  He has no urinary symptoms at this time.  He does still use a loop diuretic during the day so he has frequency during the day but he has no nocturia.  He has questions today about intermittent hormonal therapy as a cost saving measure.      PAST MEDICAL HISTORY:   Past Medical History:   Diagnosis Date     Actinic keratosis      Arthritis      Atrial fibrillation and flutter (H) 12/3/2018     CAD (coronary artery disease)     1/5/2011 lateral ischemia on stress echo, 12/2014 normal stress echo     Coronary artery disease 1/1/2011    Abnormal stress test 1/2011 and controlled with medical mgmt      Dyslipidemia      Emphysema of lung (H)      Essential hypertension, benign      Hernia, abdominal      Hypersomnia with sleep apnea, unspecified     on bipap, partially treated with residual apneas     Hypertrophy (benign) of prostate 5/01    Biopsy 5/01 negative for cancer; PSA 5     Lumbago      Mumps      Prostate cancer (H) 12/15/2006     Skin cancer, basal cell 1997     Spider veins        PAST SURGICAL HISTORY:   Past Surgical History:   Procedure Laterality Date     HERNIA REPAIR  child     Moh's procedure for basal cell carcinoma  01/2001     PROSTATE SURGERY       s/p lumbar laminectomy NOS  1988     VITRECTOMY PARS PLANA REMOVE PRERETINAL MEMBRANE   3/09       FAMILY HISTORY:   Family History   Problem Relation Age of Onset     Alzheimer Disease Mother      Hypertension Mother      Cardiovascular Father         D:86 complications fo CHF     Prostate Cancer Brother      Lung Cancer Brother 76     Prostate Cancer Brother        SOCIAL HISTORY:   Social  "History     Tobacco Use     Smoking status: Former Smoker     Packs/day: 1.00     Types: Cigarettes     Start date:      Last attempt to quit: 1978     Years since quittin.1     Smokeless tobacco: Never Used   Substance Use Topics     Alcohol use: Never     Frequency: Never     Binge frequency: Never       Current Outpatient Medications   Medication     albuterol (PROAIR HFA/PROVENTIL HFA/VENTOLIN HFA) 108 (90 Base) MCG/ACT inhaler     apixaban ANTICOAGULANT (ELIQUIS) 5 MG tablet     ASPIRIN NOT PRESCRIBED (INTENTIONAL)     atenolol (TENORMIN) 100 MG tablet     atorvastatin (LIPITOR) 40 MG tablet     ciprofloxacin (CIPRO) 500 MG tablet     diltiazem ER (DILT-XR) 120 MG 24 hr capsule     EPINEPHrine (EPIPEN/ADRENACLICK/OR ANY BX GENERIC EQUIV) 0.3 MG/0.3ML injection 2-pack     escitalopram (LEXAPRO) 10 MG tablet     ipratropium (ATROVENT) 0.06 % nasal spray     leuprolide (ELIGARD) 45 MG injection     Loperamide HCl (IMODIUM OR)     losartan (COZAAR) 100 MG tablet     Multiple Minerals-Vitamins (PROSTEON PO)     torsemide (DEMADEX) 20 MG tablet     No current facility-administered medications for this visit.      Facility-Administered Medications Ordered in Other Visits   Medication     leuprolide (LUPRON DEPOT) kit 45 mg         PHYSICAL EXAM:    /60   Pulse 70   Ht 1.702 m (5' 7\")   Wt 101.6 kg (224 lb)   BMI 35.08 kg/m      Constitutional: Well developed. Conversant and in no acute distress  Eyes: Anicteric sclera, conjunctiva clear, normal extraocular movements  ENT: Normocephalic and atraumatic,   Skin: Warm and dry. No rashes or lesions  Cardiac: No peripheral edema  Back/Flank: Not done  CNS/PNS: Normal musculature and movements, moves all extremities normally  Respiratory: Normal non-labored breathing  Abdomen: Soft nontender and nondistended  Peripheral Vascular: No peripheral edema  Mental Status/Psych: Alert and Oriented x 3. Normal mood and affect    Penis: Not done  Scrotal Skin: " Not done  Testicles: Not done  Epididymis: Not done  Digital Rectal Exam:     Cystoscopy: Not done    Imaging: None    Urinalysis: UA RESULTS:  Recent Labs   Lab Test 12/23/19  1518   COLOR Yellow   APPEARANCE Clear   URINEGLC Negative   URINEBILI Negative   URINEKETONE Negative   SG 1.015   UBLD Moderate*   URINEPH 6.5   PROTEIN Negative   UROBILINOGEN 0.2   NITRITE Negative   LEUKEST Small*   RBCU O - 2   WBCU 5-10*       PSA: Undetectable    Post Void Residual:     Other labs: None today      IMPRESSION:  Doing well, PSA undetectable    PLAN:  He continues to do well on hormonal therapy with an undetectable PSA.  We discussed the pros as well as risks of intermittent hormonal therapy versus continuous hormonal therapy.  After discussing his options he wishes to try intermittent hormonal therapy.  Therefore, we did not give a Lupron injection today.  I will see him back in 6 months to recheck his PSA.  At that time we can look at the PSA and decide whether or not he wishes to continue with another injection.      Mark Pino M.D.

## 2020-02-26 NOTE — LETTER
2/26/2020       RE: Nixon More  [address] [place] [address] Dr  [place] [address]     Dear Colleague,    Thank you for referring your patient, Nixon More, to the [facility] [place] [facility]. Please see a copy of my visit note below.    Office Visit Note  [facility]  [phone]    UROLOGIC DIAGNOSES:   Prostate cancer and hematuria    CURRENT INTERVENTIONS:   Hormonal therapy.  Prior radiotherapy, negative hematuria work-up 2018    HISTORY:   Spencer Returns to clinic today for prostate cancer follow-up.  He reports feeling well.  He has minimal side effects from the Lupron therapy.  He has no urinary symptoms at this time.  He does still use a loop diuretic during the day so he has frequency during the day but he has no nocturia.  He has questions today about intermittent hormonal therapy as a cost saving measure.      PAST MEDICAL HISTORY:   Past Medical History:   Diagnosis Date     Actinic keratosis      Arthritis      Atrial fibrillation and flutter (H) 12/3/2018     CAD (coronary artery disease)     1/5/2011 lateral ischemia on stress echo, 12/2014 normal stress echo     Coronary artery disease 1/1/2011    Abnormal stress test 1/2011 and controlled with medical mgmt      Dyslipidemia      Emphysema of lung (H)      Essential hypertension, benign      Hernia, abdominal      Hypersomnia with sleep apnea, unspecified     on bipap, partially treated with residual apneas     Hypertrophy (benign) of prostate 5/01    Biopsy 5/01 negative for cancer; PSA 5     Lumbago      Mumps      Prostate cancer (H) 12/15/2006     Skin cancer, basal cell 1997     Spider veins        PAST SURGICAL HISTORY:   Past Surgical History:   Procedure Laterality Date     HERNIA REPAIR  child     Moh's procedure for basal cell carcinoma  01/2001     PROSTATE SURGERY       s/p lumbar laminectomy NOS  1988     VITRECTOMY PARS PLANA REMOVE PRERETINAL  "MEMBRANE   3/09       FAMILY HISTORY:   Family History   Problem Relation Age of Onset     Alzheimer Disease Mother      Hypertension Mother      Cardiovascular Father         D:86 complications fo CHF     Prostate Cancer Brother      Lung Cancer Brother 76     Prostate Cancer Brother        SOCIAL HISTORY:   Social History     Tobacco Use     Smoking status: Former Smoker     Packs/day: 1.00     Types: Cigarettes     Start date:      Last attempt to quit: 1978     Years since quittin.1     Smokeless tobacco: Never Used   Substance Use Topics     Alcohol use: Never     Frequency: Never     Binge frequency: Never       Current Outpatient Medications   Medication     albuterol (PROAIR HFA/PROVENTIL HFA/VENTOLIN HFA) 108 (90 Base) MCG/ACT inhaler     apixaban ANTICOAGULANT (ELIQUIS) 5 MG tablet     ASPIRIN NOT PRESCRIBED (INTENTIONAL)     atenolol (TENORMIN) 100 MG tablet     atorvastatin (LIPITOR) 40 MG tablet     ciprofloxacin (CIPRO) 500 MG tablet     diltiazem ER (DILT-XR) 120 MG 24 hr capsule     EPINEPHrine (EPIPEN/ADRENACLICK/OR ANY BX GENERIC EQUIV) 0.3 MG/0.3ML injection 2-pack     escitalopram (LEXAPRO) 10 MG tablet     ipratropium (ATROVENT) 0.06 % nasal spray     leuprolide (ELIGARD) 45 MG injection     Loperamide HCl (IMODIUM OR)     losartan (COZAAR) 100 MG tablet     Multiple Minerals-Vitamins (PROSTEON PO)     torsemide (DEMADEX) 20 MG tablet     No current facility-administered medications for this visit.      Facility-Administered Medications Ordered in Other Visits   Medication     leuprolide (LUPRON DEPOT) kit 45 mg         PHYSICAL EXAM:    /60   Pulse 70   Ht 1.702 m (5' 7\")   Wt 101.6 kg (224 lb)   BMI 35.08 kg/m       Constitutional: Well developed. Conversant and in no acute distress  Eyes: Anicteric sclera, conjunctiva clear, normal extraocular movements  ENT: Normocephalic and atraumatic,   Skin: Warm and dry. No rashes or lesions  Cardiac: No peripheral " edema  Back/Flank: Not done  CNS/PNS: Normal musculature and movements, moves all extremities normally  Respiratory: Normal non-labored breathing  Abdomen: Soft nontender and nondistended  Peripheral Vascular: No peripheral edema  Mental Status/Psych: Alert and Oriented x 3. Normal mood and affect    Penis: Not done  Scrotal Skin: Not done  Testicles: Not done  Epididymis: Not done  Digital Rectal Exam:     Cystoscopy: Not done    Imaging: None    Urinalysis: UA RESULTS:  Recent Labs   Lab Test 12/23/19  1518   COLOR Yellow   APPEARANCE Clear   URINEGLC Negative   URINEBILI Negative   URINEKETONE Negative   SG 1.015   UBLD Moderate*   URINEPH 6.5   PROTEIN Negative   UROBILINOGEN 0.2   NITRITE Negative   LEUKEST Small*   RBCU O - 2   WBCU 5-10*       PSA: Undetectable    Post Void Residual:     Other labs: None today      IMPRESSION:  Doing well, PSA undetectable    PLAN:  He continues to do well on hormonal therapy with an undetectable PSA.  We discussed the pros as well as risks of intermittent hormonal therapy versus continuous hormonal therapy.  After discussing his options he wishes to try intermittent hormonal therapy.  Therefore, we did not give a Lupron injection today.  I will see him back in 6 months to recheck his PSA.  At that time we can look at the PSA and decide whether or not he wishes to continue with another injection.      Mark Pino M.D.

## 2020-03-11 ENCOUNTER — OFFICE VISIT (OUTPATIENT)
Dept: ONCOLOGY | Facility: CLINIC | Age: 85
End: 2020-03-11
Attending: GENETIC COUNSELOR, MS
Payer: COMMERCIAL

## 2020-03-11 DIAGNOSIS — C61 MALIGNANT NEOPLASM OF PROSTATE (H): ICD-10-CM

## 2020-03-11 DIAGNOSIS — Z80.41 FAMILY HISTORY OF MALIGNANT NEOPLASM OF OVARY: ICD-10-CM

## 2020-03-11 DIAGNOSIS — Z80.42 FAMILY HISTORY OF PROSTATE CANCER: ICD-10-CM

## 2020-03-11 DIAGNOSIS — C61 MALIGNANT NEOPLASM OF PROSTATE (H): Primary | ICD-10-CM

## 2020-03-11 PROCEDURE — 96040 ZZH GENETIC COUNSELING, EACH 30 MINUTES: CPT | Mod: ZF | Performed by: GENETIC COUNSELOR, MS

## 2020-03-11 NOTE — PROGRESS NOTES
3/11/2020    Referring Provider: self referred    Presenting Information:   I met with Nixon More and his daughter, Nimisha, today for genetic counseling at the Cancer Risk Management Program at the [facility] to discuss his personal and family history of cancer.  He is here today to review this history and available genetic testing options.    Personal History:  Nixon is a 84 year old male. He was diagnosed with Jose 7 prostate cancer in his 60's; treatment included radiation and hormonal therapy.  He reports a normal colonoscopy within the past 5-6 years, and no history of colon polyps.  He reports multiple basal cell carcinomas and sees a dermatologist annually. He reports being screened in the past for aortic aneurysm, due to his family history     Family History: (Please see scanned pedigree for detailed family history information)    His brother passed away in his late 70's, and had a history of prostate cancer (Jose 9, diagnosed at age 64) and small cell lung cancer.    One maternal aunt had a history of ovarian cancer    His father was diagnosed with prostate cancer and passed away at age 88.  He had a history of abdominal aortic aneurysm.      One paternal uncle had a history of bladder cancer, diagnosed in his 70's, and was a smoker    One paternal uncle had a history of lung cancer    One paternal aunt and one paternal uncle had a history of aortic aneurysm.     His maternal ethnicity is Austrian/Hungarian/Chinese. His paternal ethnicity is Polish.  There is no known Ashkenazi Restorationism ancestry on either side of his family.     Discussion:    Nixon's personal and family history of prostate cancer, as well as family history of ovarian cancer, is suggestive of a possible hereditary cancer syndrome.    We reviewed the features of sporadic, familial, and hereditary cancers. Based on his personal and family history, Nixon meets current National Comprehensive Cancer Network (NCCN) criteria for  germline genetic testing.       We discussed the natural history and genetics of Hereditary Breast and Ovarian Cancer (HBOC) syndrome, caused by mutations in the BRCA1/2 genes. We discussed that there are additional genes that could cause increased risk for prostate and/or ovarian cancer.  Martin syndrome, caused by mutations on the MLH1, MSH2, MSH6, PMS2 and EPCAM genes is a common hereditary cause of ovarian cancer, along with BRCA1 and BRCA2.  Genetic testing for genes related to hereditary prostate cancer often include BRCA1, BRCA2, OPAL, CHEK2, PALB2, MLH1, MSH2, MSH6, PMS2, as well as HOXB13.  As many of these genes present with overlapping features in a family and accurate cancer risk cannot always be established based upon the pedigree analysis alone, it would be reasonable for Nixon to consider panel genetic testing to analyze multiple genes at once.    A detailed handout regarding HBOC, Martin syndrome, and the information we discussed was provided to Nixon at the end of our appointment today and can be found in the after visit summary. Topics included: inheritance pattern, cancer risks, cancer screening recommendations, and also risks, benefits and limitations of testing.    We discussed available genetic testing due to Nixon' personal and family history of cancer, including guidelines based and expanded panel options.  Nixon expressed interest in learning as much information as possible regarding the history of prostate and ovarian cancers.  Therefore, we discussed the CancerNext panel:  Genetic testing is available for 34 genes associated with hereditary cancer: CancerNext (APC, OPAL, BARD1, BMPR1A, BRCA1, BRCA2, BRIP1, CDH1, CDK4, CDKN2A, CHEK2, DICER1, EPCAM, GREM1, HOXB13, MLH1, MRE11A, MSH2, MSH6, MUTYH, NBN, NF1, PALB2, PMS2, POLD1, POLE, PTEN, RAD50, RAD51C, RAD51D, SMAD4, SMARCA4, STK11, and TP53).  We discussed that many of the genes in the CancerNext panel are associated with specific hereditary  cancer syndromes and published management guidelines: Hereditary Breast and Ovarian Cancer syndrome (BRCA1, BRCA2), Martin syndrome (MLH1, MSH2, MSH6, PMS2, EPCAM), Familial Adenomatous Polyposis (APC), Hereditary Diffuse Gastric Cancer (CDH1), Familial Atypical Multiple Mole Melanoma syndrome (CDK4, CDKN2A), Juvenile Polyposis syndrome (BMPR1A, SMAD4), Cowden syndrome (PTEN), Li Fraumeni syndrome (TP53), Peutz-Jeghers syndrome (STK11), MUTYH Associated Polyposis (MUTYH), and Neurofibromatosis type 1 (NF1).   The OPAL, BRIP1, CHEK2, GREM1, NBN, PALB2, POLD1, POLE, RAD51C, and RAD51D genes are associated with increased cancer risk and have published management guidelines for certain cancers.    The remaining genes (BARD1, DICER1, HOXB13, MRE11A, RAD50, and SMARCA4) are associated with increased cancer risk and may allow us to make medical recommendations when mutations are identified.    Nixon was provided with a detailed brochure from [employer] explaining the CancerNext testing.  Consent was obtained and genetic testing for CancerNext was sent to [facility]. Turn around time: approximately 4-6 weeks.  Testing will be placed on hold by the laboratory to determine insurance coverage/expected cost prior to proceeding.        Medical Management: We reviewed that the information from genetic testing may determine additional cancer screening for which Nixon/his close relatives may qualify (i.e. mammogram and breast MRI for female relatives, more frequent colonoscopies, more frequent dermatologic exams, etc.), options for risk reducing surgeries which may be consider for close relatives, as well as the possibility of targeted chemotherapies for certain cancers should they be needed in the future (i.e. immunotherapy for individuals with Martin syndrome, PARP inhibitors, etc.). These recommendations will be discussed in detail once genetic testing is completed.     Plan:  1) Today Nixon elected to proceed  with the CancerNext panel ordered through [employer].  2) This information should be available in 4-6 weeks, however this may be delayed for insurance coverage/expected cost.  3) Nixon will return to clinic to discuss the results.    Face to face time: 45 minutes    Birgit Vanessa MS, [facility]  Licensed Genetic Counselor  [phone]

## 2020-03-11 NOTE — LETTER
3/11/2020       RE: Nixon More  [address] [place] [address] Dr  [place] [address]     Dear Colleague,    Thank you for referring your patient, Nixon More, to the [facility]. Please see a copy of my visit note below.    3/11/2020    Referring Provider: self referred    Presenting Information:   I met with Nixon More and his daughter, Nimisha, today for genetic counseling at the Cancer Risk Management Program at the [facility] to discuss his personal and family history of cancer.  He is here today to review this history and available genetic testing options.    Personal History:  Nixon is a 84 year old male. He was diagnosed with [place] 7 prostate cancer in his 60's; treatment included radiation and hormonal therapy.  He reports a normal colonoscopy within the past 5-6 years, and no history of colon polyps.  He reports multiple basal cell carcinomas and sees a dermatologist annually. He reports being screened in the past for aortic aneurysm, due to his family history     Family History: (Please see scanned pedigree for detailed family history information)    His brother passed away in his late 70's, and had a history of prostate cancer (Jose 9, diagnosed at age 64) and small cell lung cancer.    One maternal aunt had a history of ovarian cancer    His father was diagnosed with prostate cancer and passed away at age 88.  He had a history of abdominal aortic aneurysm.      One paternal uncle had a history of bladder cancer, diagnosed in his 70's, and was a smoker    One paternal uncle had a history of lung cancer    One paternal aunt and one paternal uncle had a history of aortic aneurysm.     His maternal ethnicity is Faroese/Turks and [place] Islander/Lithuanian. His paternal ethnicity is Sami.  There is no known Ashkenazi Pentecostalism ancestry on either side of his family.     Discussion:    Nixon's personal and family history of prostate cancer, as well as family history of ovarian cancer, is suggestive  of a possible hereditary cancer syndrome.    We reviewed the features of sporadic, familial, and hereditary cancers. Based on his personal and family history, Nixon meets current National Comprehensive Cancer Network (NCCN) criteria for germline genetic testing.       We discussed the natural history and genetics of Hereditary Breast and Ovarian Cancer (HBOC) syndrome, caused by mutations in the BRCA1/2 genes. We discussed that there are additional genes that could cause increased risk for prostate and/or ovarian cancer.  Martin syndrome, caused by mutations on the MLH1, MSH2, MSH6, PMS2 and EPCAM genes is a common hereditary cause of ovarian cancer, along with BRCA1 and BRCA2.  Genetic testing for genes related to hereditary prostate cancer often include BRCA1, BRCA2, OPAL, CHEK2, PALB2, MLH1, MSH2, MSH6, PMS2, as well as HOXB13.  As many of these genes present with overlapping features in a family and accurate cancer risk cannot always be established based upon the pedigree analysis alone, it would be reasonable for Nixon to consider panel genetic testing to analyze multiple genes at once.    A detailed handout regarding HBOC, Martin syndrome, and the information we discussed was provided to Nixon at the end of our appointment today and can be found in the after visit summary. Topics included: inheritance pattern, cancer risks, cancer screening recommendations, and also risks, benefits and limitations of testing.    We discussed available genetic testing due to Nixon' personal and family history of cancer, including guidelines based and expanded panel options.  Nixon expressed interest in learning as much information as possible regarding the history of prostate and ovarian cancers.  Therefore, we discussed the CancerNext panel:  Genetic testing is available for 34 genes associated with hereditary cancer: CancerNext (APC, OPAL, BARD1, BMPR1A, BRCA1, BRCA2, BRIP1, CDH1, CDK4, CDKN2A, CHEK2, DICER1, EPCAM, GREM1,  HOXB13, MLH1, MRE11A, MSH2, MSH6, MUTYH, NBN, NF1, PALB2, PMS2, POLD1, POLE, PTEN, RAD50, RAD51C, RAD51D, SMAD4, SMARCA4, STK11, and TP53).  We discussed that many of the genes in the CancerNext panel are associated with specific hereditary cancer syndromes and published management guidelines: Hereditary Breast and Ovarian Cancer syndrome (BRCA1, BRCA2), Martin syndrome (MLH1, MSH2, MSH6, PMS2, EPCAM), Familial Adenomatous Polyposis (APC), Hereditary Diffuse Gastric Cancer (CDH1), Familial Atypical Multiple Mole Melanoma syndrome (CDK4, CDKN2A), Juvenile Polyposis syndrome (BMPR1A, SMAD4), Cowden syndrome (PTEN), Li Fraumeni syndrome (TP53), Peutz-Jeghers syndrome (STK11), MUTYH Associated Polyposis (MUTYH), and Neurofibromatosis type 1 (NF1).   The OPAL, BRIP1, CHEK2, GREM1, NBN, PALB2, POLD1, POLE, RAD51C, and RAD51D genes are associated with increased cancer risk and have published management guidelines for certain cancers.    The remaining genes (BARD1, DICER1, HOXB13, MRE11A, RAD50, and SMARCA4) are associated with increased cancer risk and may allow us to make medical recommendations when mutations are identified.    Nixon was provided with a detailed brochure from [employer] explaining the CancerNext testing.  Consent was obtained and genetic testing for CancerNext was sent to [facility]. Turn around time: approximately 4-6 weeks.  Testing will be placed on hold by the laboratory to determine insurance coverage/expected cost prior to proceeding.        Medical Management: We reviewed that the information from genetic testing may determine additional cancer screening for which Nixon/his close relatives may qualify (i.e. mammogram and breast MRI for female relatives, more frequent colonoscopies, more frequent dermatologic exams, etc.), options for risk reducing surgeries which may be consider for close relatives, as well as the possibility of targeted chemotherapies for certain cancers should  they be needed in the future (i.e. immunotherapy for individuals with Martin syndrome, PARP inhibitors, etc.). These recommendations will be discussed in detail once genetic testing is completed.     Plan:  1) Today Nixon elected to proceed with the CancerNext panel ordered through [employer].  2) This information should be available in 4-6 weeks, however this may be delayed for insurance coverage/expected cost.  3) Nixon will return to clinic to discuss the results.    Face to face time: 45 minutes    Birgit Vanessa MS, [facility]  Licensed Genetic Counselor  [phone]

## 2020-03-11 NOTE — LETTER
Cancer Risk Management  Program Locations    [facility]  [facility]  Mailing Address  Cancer Risk Management Program  [facility]  [address]  [place] [address]    New patient appointments  [phone]  March 12, 2020    Nixon More  [address]  [place] [address]      Dear Nixon,    It was a pleasure meeting with you at the [facility] on 3/11/2020. Here is a copy of the progress note from your recent genetic counseling visit to the Cancer Risk Management Program. If you have any additional questions, please feel free to call.    Referring Provider: self referred    Presenting Information:   I met with Nixon More and his daughter, Nimisha, today for genetic counseling at the Cancer Risk Management Program at the [facility] to discuss his personal and family history of cancer.  He is here today to review this history and available genetic testing options.    Personal History:  Nixon is a 84 year old male. He was diagnosed with Jose 7 prostate cancer in his 60's; treatment included radiation and hormonal therapy.  He reports a normal colonoscopy within the past 5-6 years, and no history of colon polyps.  He reports multiple basal cell carcinomas and sees a dermatologist annually. He reports being screened in the past for aortic aneurysm, due to his family history     Family History: (Please see scanned pedigree for detailed family history information)    His brother passed away in his late 70's, and had a history of prostate cancer ([place] 9, diagnosed at age 64) and small cell lung cancer.    One maternal aunt had a history of ovarian cancer    His father was diagnosed with prostate cancer and passed away at age 88.  He had a history of abdominal aortic aneurysm.       One paternal uncle had a history of bladder cancer, diagnosed in his 70's, and was a smoker    One paternal uncle had a history of lung cancer    One paternal aunt and one paternal uncle had a history of aortic aneurysm.     His maternal ethnicity is Ethiopian/Moldovan/Montserratian. His paternal ethnicity is Occitan.  There is no known Ashkenazi Orthodox ancestry on either side of his family.     Discussion:    Nixon's personal and family history of prostate cancer, as well as family history of ovarian cancer, is suggestive of a possible hereditary cancer syndrome.    We reviewed the features of sporadic, familial, and hereditary cancers. Based on his personal and family history, Nixon meets current National Comprehensive Cancer Network (NCCN) criteria for germline genetic testing.       We discussed the natural history and genetics of Hereditary Breast and Ovarian Cancer (HBOC) syndrome, caused by mutations in the BRCA1/2 genes. We discussed that there are additional genes that could cause increased risk for prostate and/or ovarian cancer.  Martin syndrome, caused by mutations on the MLH1, MSH2, MSH6, PMS2 and EPCAM genes is a common hereditary cause of ovarian cancer, along with BRCA1 and BRCA2.  Genetic testing for genes related to hereditary prostate cancer often include BRCA1, BRCA2, OPAL, CHEK2, PALB2, MLH1, MSH2, MSH6, PMS2, as well as HOXB13.  As many of these genes present with overlapping features in a family and accurate cancer risk cannot always be established based upon the pedigree analysis alone, it would be reasonable for Nixon to consider panel genetic testing to analyze multiple genes at once.    A detailed handout regarding HBOC, Martin syndrome, and the information we discussed was provided to Nixon at the end of our appointment today and can be found in the after visit summary. Topics included: inheritance pattern, cancer risks, cancer screening recommendations, and also risks, benefits and limitations of  testing.    We discussed available genetic testing due to Nixon' personal and family history of cancer, including guidelines based and expanded panel options.  Nixon expressed interest in learning as much information as possible regarding the history of prostate and ovarian cancers.  Therefore, we discussed the CancerNext panel:  Genetic testing is available for 34 genes associated with hereditary cancer: CancerNext (APC, OPAL, BARD1, BMPR1A, BRCA1, BRCA2, BRIP1, CDH1, CDK4, CDKN2A, CHEK2, DICER1, EPCAM, GREM1, HOXB13, MLH1, MRE11A, MSH2, MSH6, MUTYH, NBN, NF1, PALB2, PMS2, POLD1, POLE, PTEN, RAD50, RAD51C, RAD51D, SMAD4, SMARCA4, STK11, and TP53).  We discussed that many of the genes in the CancerNext panel are associated with specific hereditary cancer syndromes and published management guidelines: Hereditary Breast and Ovarian Cancer syndrome (BRCA1, BRCA2), Martin syndrome (MLH1, MSH2, MSH6, PMS2, EPCAM), Familial Adenomatous Polyposis (APC), Hereditary Diffuse Gastric Cancer (CDH1), Familial Atypical Multiple Mole Melanoma syndrome (CDK4, CDKN2A), Juvenile Polyposis syndrome (BMPR1A, SMAD4), Cowden syndrome (PTEN), Li Fraumeni syndrome (TP53), Peutz-Jeghers syndrome (STK11), MUTYH Associated Polyposis (MUTYH), and Neurofibromatosis type 1 (NF1).   The OPAL, BRIP1, CHEK2, GREM1, NBN, PALB2, POLD1, POLE, RAD51C, and RAD51D genes are associated with increased cancer risk and have published management guidelines for certain cancers.    The remaining genes (BARD1, DICER1, HOXB13, MRE11A, RAD50, and SMARCA4) are associated with increased cancer risk and may allow us to make medical recommendations when mutations are identified.    Nixon was provided with a detailed brochure from [facility] explaining the CancerNext testing.  Consent was obtained and genetic testing for CancerNext was sent to [facility]. Turn around time: approximately 4-6 weeks.  Testing will be placed on hold by the laboratory to  determine insurance coverage/expected cost prior to proceeding.        Medical Management: We reviewed that the information from genetic testing may determine additional cancer screening for which Nixon/his close relatives may qualify (i.e. mammogram and breast MRI for female relatives, more frequent colonoscopies, more frequent dermatologic exams, etc.), options for risk reducing surgeries which may be consider for close relatives, as well as the possibility of targeted chemotherapies for certain cancers should they be needed in the future (i.e. immunotherapy for individuals with Martin syndrome, PARP inhibitors, etc.). These recommendations will be discussed in detail once genetic testing is completed.     Plan:  1) Today Nixon elected to proceed with the CancerNext panel ordered through [facility].  2) This information should be available in 4-6 weeks, however this may be delayed for insurance coverage/expected cost.  3) Nixon will return to clinic to discuss the results.    Birgit Vanessa MS, [facility]  Licensed Genetic Counselor  [phone]

## 2020-03-11 NOTE — PATIENT INSTRUCTIONS
Assessing Cancer Risk  Only about 5-10% of cancers are thought to be due to an inherited cancer susceptibility gene.    These families often have:    Several people with the same or related types of cancer    Cancers diagnosed at a young age (before age 50)    Individuals with more than one primary cancer    Multiple generations of the family affected with cancer    Some people may be candidates for genetic testing of more than one gene.  For these families, genetic testing using a cancer panel may be offered.  These panels will test different genes known to increase the risk for breast, ovarian, uterine, and/or other cancers. All of the genes discussed below have published clinical management guidelines for individuals who are found to carry a mutation. The purpose of this handout is to serve as a brief summary of the genes analyzed by the panels used to inquire about hereditary breast and gynecologic cancer:  OPAL, BRCA1, BRCA2, BRIP1, CDH1, CHEK2, MLH1, MSH2, MSH6, PMS2, EPCAM, PTEN, PALB2, RAD51C, RAD51D, and TP53.  ______________________________________________________________________________  Hereditary Breast and Ovarian Cancer Syndrome   (BRCA1 and BRCA2)  A single mutation in one of the copies of BRCA1 or BRCA2 increases the risk for breast and ovarian cancer, among others.  The risk for pancreatic cancer and melanoma may also be slightly increased in some families.  The chart below shows the chance that someone with a BRCA mutation would develop cancer in his or her lifetime1,2,3,4.        A person s ethnic background is also important to consider, as individuals of Ashkenazi Religion ancestry have a higher chance of having a BRCA gene mutation.  There are three BRCA mutations that occur more frequently in this population.    Martin Syndrome   (MLH1, MSH2, MSH6, PMS2, and EPCAM)  Currently five genes are known to cause Martin Syndrome: MLH1, MSH2, MSH6, PMS2, and EPCAM.  A single mutation in one of the  Martin Syndrome genes increases the risk for colon, endometrial, ovarian, and stomach cancers.  Other cancers that occur less commonly in Martin Syndrome include urinary tract, skin, and brain cancers.  The chart below shows the chance that a person with Martin syndrome would develop cancer in his or her lifetime5.      *Cancer risk varies depending on Martin syndrome gene found    Cowden Syndrome   (PTEN)  Cowden syndrome is a hereditary condition that increases the risk for breast, thyroid, endometrial, colon, and kidney cancer.  Cowden syndrome is caused by a mutation in the PTEN gene.  A single mutation in one of the copies of PTEN causes Cowden syndrome and increases cancer risk.  The chart below shows the chance that someone with a PTEN mutation would develop cancer in their lifetime6,7.  Other benign features seen in some individuals with Cowden syndrome include benign skin lesions (facial papules, keratoses, lipomas), learning disability, autism, thyroid nodules, colon polyps, and larger head size.      *One recent study found breast cancer risk to be increased to 85%    Li-Fraumeni Syndrome   (TP53)  Li-Fraumeni Syndrome (LFS) is a cancer predisposition syndrome caused by a mutation in the TP53 gene. A single mutation in one of the copies of TP53 increases the risk for multiple cancers. Individuals with LFS are at an increased risk for developing cancer at a young age. The lifetime risk for development of a LFS-associated cancer is 50% by age 30 and 90% by age 60.   Core Cancers: Sarcomas, Breast, Brain, Lung, Leukemias/Lymphomas, Adrenocortical carcinomas  Other Cancers: Gastrointestinal, Thyroid, Skin, Genitourinary    Hereditary Diffuse Gastric Cancer   (CDH1)  Currently, one gene is known to cause hereditary diffuse gastric cancer (HDGC): CDH1.  Individuals with HDGC are at increased risk for diffuse gastric cancer and lobular breast cancer. Of people diagnosed with HDGC, 30-50% have a mutation in the CDH1  gene.  This suggests there are likely other genes that may cause HDGC that have not been identified yet.      Lifetime Cancer Risks    General Population HDGC    Diffuse Gastric  <1% ~80%   Breast 12% 39-52%         Additional Genes  OPAL  OPAL is a moderate-risk breast cancer gene. Women who have a mutation in OPAL can have between a 2-4 fold increased risk for breast cancer compared to the general population8. OPAL mutations have also been associated with increased risk for pancreatic cancer, however an estimate of this cancer risk is not well understood9. Individuals who inherit two OPAL mutations have a condition called ataxia-telangiectasia (AT).  This rare autosomal recessive condition affects the nervous system and immune system, and is associated with progressive cerebellar ataxia beginning in childhood.  Individuals with ataxia-telangiectasia often have a weakened immune system and have an increased risk for childhood cancers.    PALB2  Mutations in PALB2 have been shown to increase the risk of breast cancer up to 33-58% in some families; where individuals fall within this risk range is dependent upon family [id number]. PALB2 mutations have also been associated with increased risk for pancreatic cancer, although this risk has not been quantified yet.  Individuals who inherit two PALB2 mutations--one from their mother and one from their father--have a condition called Fanconi Anemia.  This rare autosomal recessive condition is associated with short stature, developmental delay, bone marrow failure, and increased risk for childhood cancers.    CHEK2   CHEK2 is a moderate-risk breast cancer gene.  Women who have a mutation in CHEK2 have around a 2-fold increased risk for breast cancer compared to the general population, and this risk may be higher depending upon family history.11,12,13 Mutations in CHEK2 have also been shown to increase the risk of a number of other cancers, including colon and prostate, however  these cancer risks are currently not well understood.    BRIP1, RAD51C and RAD51D  Mutations in BRIP1, RAD51C, and RAD51D have been shown to increase the risk of ovarian cancer and possibly female breast cancer as well14,15 .       Lifetime Cancer Risk    General Population BRIP1 RAD51C RAD51D   Ovarian 1-2% ~5-8% ~5-9% ~7-15%           Inheritance  All of the cancer syndromes reviewed above are inherited in an autosomal dominant pattern.  This means that if a parent has a mutation, each of his or her children will have a 50% chance of inheriting that same mutation.  Therefore, each child--male or female--would have a 50% chance of being at increased risk for developing cancer.      Image obtained from Genetics Home Reference, 2013     Mutations in some genes can occur de joey, which means that a person s mutation occurred for the first time in them and was not inherited from a parent.  Now that they have the mutation, however, it can be passed on to future generations.    Genetic Testing  Genetic testing involves a blood test and will look at the genetic information in the OPAL, BRCA1, BRCA2, BRIP1, CDH1, CHEK2, MLH1, MSH2, MSH6, PMS2, EPCAM, PTEN, PALB2, RAD51C, RAD51D, and TP53 genes for any harmful mutations that are associated with increased cancer risk.  If possible, it is recommended that the person(s) who has had cancer be tested before other family members.  That person will give us the most useful information about whether or not a specific gene is associated with the cancer in the family.    Results  There are three possible results of genetic testing:    Positive--a harmful mutation was identified in one or more of the genes    Negative--no mutation was identified in any of the genes on this panel    Variant of unknown significance--a variation in one of the genes was identified, but it is unclear how this impacts cancer risk in the family    Advantages and Disadvantages   There are advantages and  disadvantages to genetic testing.    Advantages    May clarify your cancer risk    Can help you make medical decisions    May explain the cancers in your family    May give useful information to your family members (if you share your results)    Disadvantages    Possible negative emotional impact of learning about inherited cancer risk    Uncertainty in interpreting a negative test result in some situations    Possible genetic discrimination concerns (see below)    Genetic Information Nondiscrimination Act (TIRSO)  TIRSO is a federal law that protects individuals from health insurance or employment discrimination based on a genetic test result alone.  Although rare, there are currently no legal discrimination protections in terms of life insurance, long term care, or disability insurances.  Visit the [employer] website to learn more.    Reducing Cancer Risk  All of the genes described above have nationally recognized cancer screening guidelines that would be recommended for individuals who test positive.  In addition to increased cancer screening, surgeries may be offered or recommended to reduce cancer risk.  Recommendations are based upon an individual s genetic test result as well as their personal and family history of cancer.    Questions to Think About Regarding Genetic Testing:    What effect will the test result have on me and my relationship with my family members if I have an inherited gene mutation?  If I don t have a gene mutation?    Should I share my test results, and how will my family react to this news, which may also affect them?    Are my children ready to learn new information that may one day affect their own health?    Hereditary Cancer Resources    FORCE: Facing Our Risk of Cancer Empowered facingourrisk.org   Bright Pink bebrightpink.org   Li-Fraumeni Syndrome Association lfsassociation.org   PTEN World PTENworld.com   No stomach for cancer, Inc.  nostomachforcancer.org   Stomach cancer relief network Scrnet.org   Collaborative Group of the Americas on Inherited Colorectal Cancer (CGA) cgaicc.com    Cancer Care cancercare.org   American Cancer Society (ACS) cancer.org   National Cancer [place] (NCI) cancer.gov     Please call us if you have any questions or concerns.   Cancer Risk Management Program [phone] ([phone])  ? Ryan Medina, MS, [facility] [phone]  ? Rita Davies, MS, [facility]  [phone]  ? Birgit Vanessa, MS, [facility]  [phone]  ? Laya Shah, MS, [facility] [phone]  ? Thuy Lora, MS, [facility] [phone]  ? Katia Fletcher, MS, [facility]  [phone]  ? Alecia Michel, MS, [facility]  [phone]    References  1. Kelechi AMARAL, Marlena PDP, Prince S, Alessandro JACKSON, James JE, Pola JL, Samy N, Ender H, Dion O, Jamshid A, Haini B, Raditammie P, Manluigikicharley S, Beba DM, Contreras N, Russell E, Hebert H, Zac E, Jarett J, Groncynthia J, Kate B, Opalus H, Thorlacius S, Eerola H, Nevcharleylinna H, Jalen K, Jamel OP. Average risks of breast and ovarian cancer associated with BRCA1 or BRCA2 mutations detected in case series unselected for family history: a combined analysis of 222 studies. Am J Hum Georgie. 2003;72:1117-30.  2. Silvia N, Maricel M, Marshall G.  BRCA1 and BRCA2 Hereditary Breast and Ovarian Cancer. Gene Reviews online. 2013.  3. Sushil YC, Fox S, Armani G, Bearden S. Breast cancer risk among male BRCA1 and BRCA2 mutation carriers. J Natl Cancer Inst. 2007;99:1811-4.  4. Too ROBERTS, Cathy I, Asif J, Christy E, Mariah ER, Janelle F. Risk of breast cancer in male BRCA2 carriers. J Med Georgie. 2010;47:710-1.  5. National Comprehensive Cancer Network. Clinical practice guidelines in oncology, colorectal cancer screening. Available online (registration required). 2015.  6. Camden ROSADO, Nohemy J, Linwood J, Lia LA, Edwin WOMACK, Vianca C. Lifetime cancer risks in individuals with germline PTEN mutations. Clin Cancer Res. 2012;18:400-7.  7. Pilarski R. Cowden  Syndrome: A Critical Review of the Clinical Literature. J Georgie . 2009:18:13-27.  8. Arun A, Ruben D, Morgan S, Estee P, Minerva T, Michelle M, Michael B, Marco H, Alisson R, Belinda K, Faye L, Too DG, Beba D, Malachi DF, Hugo MR, The Breast Cancer Susceptibility Collaboration (UK) & Chrissie POZO. OPAL mutations that cause ataxia-telangiectasia are breast cancer susceptibility alleles. Nature Genetics. 2006;38:873-875  9. Meño N , Lynn Y, Janee J, Link L, Aline GM , Gene ML, Gallinger S, Antony AG, Syngal S, Kiko ML, Hernan J , Ronnie R, Lurdes SZ, Brenton JR, Royal VE, Joel M, Vobrandyn B, Radha N, Sven RH, Johanne KW, and Han AP. OPAL mutations in patients with hereditary pancreatic cancer. Cancer Discover. 2012;2:41-46  10. Kelechi PATRICK, et al. Breast-Cancer Risk in Families with Mutations in PALB2. NEJM. 2014; 371(6):497-506.  11. CHEK2 Breast Cancer Case-Control Consortium. CHEK2*1100delC and susceptibility to breast cancer: A collaborative analysis involving 10,860 breast cancer cases and 9,065 controls from 10 studies. Am J Hum Georgie, 74 (2004), pp. 5416-5815  12. Mainor T, Parul S, Demetrius K, et al. Spectrum of Mutations in BRCA1, BRCA2, CHEK2, and TP53 in Families at High Risk of Breast Cancer. KAYLA. 2006;295(12):2848-0882.   13. Maurizio C, Galina D, Mati A, et al. Risk of breast cancer in women with a CHEK2 mutation with and without a family history of breast cancer. J Clin Oncol. 2011;29:3814-3862.  14. Faustino H, Roseline E, Theresa SJ, et al. Contribution of germline mutations in the RAD51B, RAD51C, and RAD51D genes to ovarian cancer in the population. J Clin Oncol. 2015;33(26):2061-4133. Doi:10.1200/JCO.2015.61.2408.  15. Donya T, Melissa MERCADO, Shirlene P, et al. Mutations in BRIP1 confer high risk of ovarian cancer. Bharti Georgie. 2011;43(11):2850-5906. doi:10.1038/ng.955.

## 2020-03-11 NOTE — NURSING NOTE
Chief Complaint   Patient presents with     Labs Only     genteics test kit provided drawn via venipuncture by RN in lab     There were no vitals taken for this visit.    Labs collected and sent from right antecubital venipuncture in lab by RN. Pt tolerated well.   Pt checked in for next appointment.    Mikayla Howe RN

## 2020-03-12 LAB — MISCELLANEOUS TEST: NORMAL

## 2020-03-23 DIAGNOSIS — I47.19 ATRIAL TACHYCARDIA (H): ICD-10-CM

## 2020-03-23 LAB — LAB SCANNED RESULT: NORMAL

## 2020-03-23 NOTE — TELEPHONE ENCOUNTER
Patient called wanting 90 day refill for eliquis versus 30 day. Sent script to pharmacy.  BIANCA Gabriel

## 2020-04-20 ENCOUNTER — VIRTUAL VISIT (OUTPATIENT)
Dept: ONCOLOGY | Facility: CLINIC | Age: 85
End: 2020-04-20
Attending: GENETIC COUNSELOR, MS
Payer: COMMERCIAL

## 2020-04-20 DIAGNOSIS — Z80.42 FAMILY HISTORY OF PROSTATE CANCER: ICD-10-CM

## 2020-04-20 DIAGNOSIS — Z80.41 FAMILY HISTORY OF MALIGNANT NEOPLASM OF OVARY: ICD-10-CM

## 2020-04-20 DIAGNOSIS — C61 MALIGNANT NEOPLASM OF PROSTATE (H): Primary | ICD-10-CM

## 2020-04-20 PROCEDURE — 40000072 ZZH STATISTIC GENETIC COUNSELING, < 16 MIN

## 2020-04-20 NOTE — LETTER
Cancer Risk Management  Program [facility]  [facility]  [facility]  [facility]  Mailing Address  Cancer Risk Management Program  [facility]  [address]  [place] [address]    New patient appointments  [phone]  April 22, 2020    Nixon More  [address] [place] [address] [address]  [place] [address]      Dear Nixon,    It was a pleasure speaking with you on the phone on 4/20/2020. Here is a copy of the progress note from our discussion. If you have any additional questions, please feel free to call.    Referring Provider: self referred    Presenting Information:  I spoke to Nixon by phone today to discuss his genetic testing results. His blood was drawn on 3/11/2020. CancerNext panel genetic testing was ordered from [facility]. This testing was done because of Nixon's personal and family history of cancer.    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 This variant is called [id number].S759G .  Given the uncertain significance of this result, medical management decisions should NOT be made based on this test result alone.    Nixon is negative for clinically significant mutations in the APC, OPAL, BARD1, BMPR1A, BRCA1, BRCA2, BRIP1, CDH1, CDK4, CDKN2A, CHEK2, DICER1, EPCAM, GREM1, HOXB13, MLH1, MRE11A, MSH2, MSH6, MUTYH, NBN, NF1, PALB2, PMS2, POLD1, POLE, PTEN, RAD50, RAD51C, RAD51D, SMAD4, SMARCA4, STK11, and TP53 genes. No mutations were found in any of the 34 genes analyzed. This test involved sequencing and deletion/duplication analysis of all genes with the exception of EPCAM and GREM1 (deletions only).  We reviewed the autosomal dominant inheritance of these genes. Nixon cannot pass on a currently identifiable mutation in these  genes to his children based on this test result. Mutations in these genes do not skip generations.      Interpretation:  We discussed several different interpretations of this inconclusive test result. It is not clear if this variant in the OPAL gene is associated with increased cancer risk.  1. This variant may be a benign change that does not increase cancer risk.  2. This variant may be a harmful mutation associated with moderate increased risks for certain cancers:    The lifetime breast cancer risk for women who carry one OPAL mutation is approximately 24-48%, which is a 2-4 fold increase compared to the general population lifetime population risk of 12%. Some mutations in the OPAL gene may confer a higher risk for breast cancer.    We also discussed the association of OPAL mutations with increased risk for pancreatic and prostate cancer; however data is still limited in this area.  Though no exact lifetime risks are available at this time, there may be an increased risk for other cancers.     In rare situations in which both parents have a harmful mutation in the OPAL gene, their children each have a 25% risk for ataxia-telangiectasia. Ataxia-telangiectasia is a disorder that affects the nervous system, immune system, and other systems.  Individuals with this disorder have trouble with balance and coordinating movements (called ataxia) beginning in childhood.  Affected individuals also have widened blood vessels called telangiectasias. People with ataxia-telangiectasia often have a weakened immune system and have an increased risk of developing childhood cancers.  For individuals of childbearing age with OPAL mutations, genetic counseling and genetic testing may be advised for their partners.    The laboratory is working to determine if this variant is harmful or benign, and they will contact me if it is reclassified. If this variant is determined to be a benign change, there may be a different gene or combination  of genes and environment that are associated with the cancers in this family.    Inheritance:  We reviewed the autosomal dominant inheritance of this variant in the OPAL gene. We discussed that Nixon has a 50% chance to pass this variant to each of his children. Likewise, each of his siblings has a 50% risk of having the same variant. Because it is unclear what, if any, risk is associated with this variant, clinical genetic testing for this OPAL variant alone is not recommended for relatives.    Based on this test result, Spencer cannot pass on a currently identifiable mutation to his children in the 34 genes analyzed.  This assessment may change should this VUS be reclassified, changes in personal/family history, or as new testing technologies become available.      Family Studies:  The laboratory may offer additional research based testing for specific relatives in order to help reclassify this variant.    Screening:  Based on this inconclusive test result, it is important for Nixon and his relatives to refer back to the family history for appropriate cancer screening.      We discussed that Spencer's close male relatives are likely at some increased risk for prostate cancer due to the family history.  These relatives are encouraged to discuss this history with their care providers.      His maternal aunt's close female relatives may remain at increased risk for developing ovarian cancer due to her history, and should discuss availability of screening with their primary care/gyn provider      Other population cancer screening options, such as those recommended by the American Cancer Society and the National Comprehensive Cancer Network (NCCN), are also appropriate for Nixon and his family. These screening recommendations may change if there are changes to Nixon's personal and/or family history of cancer. Final screening recommendations should be made by each individual's managing physician.    Additional Testing  Considerations:  Although Nixon's genetic testing result is inconclusive, other relatives may still carry a harmful gene mutation associated with an increased risk for cancer. Spencer's family history is significant for prostate and ovarian cancer.  Genetic counseling is recommended for close relatives of these family members to discuss genetic testing options.  If any of these relatives do pursue genetic testing, Nixon is encouraged to contact me so that we may review the impact of their test results on him.     Summary:  We do not have an explanation for Nixon's personal and family history cancer. While no genetic changes were identified, Nixon may still be at risk for certain cancers due to family history, environmental factors, or other genetic causes not identified by this test.  Because of that, it is important that he continue with cancer screening based on his personal and family history as discussed above.    Genetic testing is rapidly advancing, and new cancer susceptibility genes will most likely be identified in the future. Therefore, I encouraged Nixon to contact me annually or if there are changes in his personal or family history. This may change how we assess his cancer risk, screening, and the testing we would offer.    Plan:  1.  I will provide Nixon with a copy of his test results with this letter.    2.  He plans to follow-up with his managing physicians.  3.  He should contact me if his family history changes.  4.  I will contact Nixon if the laboratory informs me that this VUS has been reclassified.  This may change screening and testing recommendations for Nixon and his relatives.    If Nixon has any further questions, he is encouraged to contact me at [phone].    Birgit Vanessa MS, [facility]  Licensed, Certified Genetic Counselor  [facility]

## 2020-04-20 NOTE — Clinical Note
Please print and send a copy of this letter and the patient's genetic testing report to the patient.    Please enclose test report: SEND OUTS MISCELLANEOUS order #[id number]

## 2020-04-20 NOTE — PROGRESS NOTES
"4/20/2020    Referring Provider: self referred    Presenting Information:  I spoke to Nixon by phone today to discuss his genetic testing results. His blood was drawn on 3/11/2020. CancerNext panel genetic testing was ordered from [facility]. This testing was done because of Nixon's personal and family history of cancer.    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 This variant is called p.S759G .  Given the uncertain significance of this result, medical management decisions should NOT be made based on this test result alone.    Nixon is negative for clinically significant mutations in the APC, OPAL, BARD1, BMPR1A, BRCA1, BRCA2, BRIP1, CDH1, CDK4, CDKN2A, CHEK2, DICER1, EPCAM, GREM1, HOXB13, MLH1, MRE11A, MSH2, MSH6, MUTYH, NBN, NF1, PALB2, PMS2, POLD1, POLE, PTEN, RAD50, RAD51C, RAD51D, SMAD4, SMARCA4, STK11, and TP53 genes. No mutations were found in any of the 34 genes analyzed. This test involved sequencing and deletion/duplication analysis of all genes with the exception of EPCAM and GREM1 (deletions only).  We reviewed the autosomal dominant inheritance of these genes. Nixon cannot pass on a currently identifiable mutation in these genes to his children based on this test result. Mutations in these genes do not skip generations.      A copy of the test report can be found in the Laboratory tab, dated 3/11/2020, and named \"[facility]\". The report is scanned in as a linked document.    Interpretation:  We discussed several different interpretations of this inconclusive test result. It is not clear if this variant in the OPAL gene is associated with increased cancer risk.  1. This variant may be a benign change that does not increase cancer risk.  2. This variant may be a harmful mutation associated with moderate increased risks for certain cancers:    The lifetime breast cancer risk for women who " carry one OPAL mutation is approximately 24-48%, which is a 2-4 fold increase compared to the general population lifetime population risk of 12%. Some mutations in the OPAL gene may confer a higher risk for breast cancer.    We also discussed the association of OPAL mutations with increased risk for pancreatic and prostate cancer; however data is still limited in this area.  Though no exact lifetime risks are available at this time, there may be an increased risk for other cancers.     In rare situations in which both parents have a harmful mutation in the OPAL gene, their children each have a 25% risk for ataxia-telangiectasia. Ataxia-telangiectasia is a disorder that affects the nervous system, immune system, and other systems.  Individuals with this disorder have trouble with balance and coordinating movements (called ataxia) beginning in childhood.  Affected individuals also have widened blood vessels called telangiectasias. People with ataxia-telangiectasia often have a weakened immune system and have an increased risk of developing childhood cancers.  For individuals of childbearing age with OPAL mutations, genetic counseling and genetic testing may be advised for their partners.    The laboratory is working to determine if this variant is harmful or benign, and they will contact me if it is reclassified. If this variant is determined to be a benign change, there may be a different gene or combination of genes and environment that are associated with the cancers in this family.    Inheritance:  We reviewed the autosomal dominant inheritance of this variant in the OPAL gene. We discussed that Nixon has a 50% chance to pass this variant to each of his children. Likewise, each of his siblings has a 50% risk of having the same variant. Because it is unclear what, if any, risk is associated with this variant, clinical genetic testing for this OPAL variant alone is not recommended for relatives.    Based on this test  result, Spencer cannot pass on a currently identifiable mutation to his children in the 34 genes analyzed.  This assessment may change should this VUS be reclassified, changes in personal/family history, or as new testing technologies become available.      Family Studies:  The laboratory may offer additional research based testing for specific relatives in order to help reclassify this variant.    Screening:  Based on this inconclusive test result, it is important for Nixon and his relatives to refer back to the family history for appropriate cancer screening.      We discussed that Spencer's close male relatives are likely at some increased risk for prostate cancer due to the family history.  These relatives are encouraged to discuss this history with their care providers.      His maternal aunt's close female relatives may remain at increased risk for developing ovarian cancer due to her history, and should discuss availability of screening with their primary care/gyn provider      Other population cancer screening options, such as those recommended by the American Cancer Society and the National Comprehensive Cancer Network (NCCN), are also appropriate for Nixon and his family. These screening recommendations may change if there are changes to Nixon's personal and/or family history of cancer. Final screening recommendations should be made by each individual's managing physician.    Additional Testing Considerations:  Although Nixon's genetic testing result is inconclusive, other relatives may still carry a harmful gene mutation associated with an increased risk for cancer. Spencer's family history is significant for prostate and ovarian cancer.  Genetic counseling is recommended for close relatives of these family members to discuss genetic testing options.  If any of these relatives do pursue genetic testing, Nixon is encouraged to contact me so that we may review the impact of their test results on him.     Summary:  We  do not have an explanation for Nixon's personal and family history cancer. While no genetic changes were identified, Nixon may still be at risk for certain cancers due to family history, environmental factors, or other genetic causes not identified by this test.  Because of that, it is important that he continue with cancer screening based on his personal and family history as discussed above.    Genetic testing is rapidly advancing, and new cancer susceptibility genes will most likely be identified in the future. Therefore, I encouraged Nixon to contact me annually or if there are changes in his personal or family history. This may change how we assess his cancer risk, screening, and the testing we would offer.    Plan:  1.  I will provide Nixon with a copy of his test results with this letter.    2.  He plans to follow-up with his managing physicians.  3.  He should contact me if his family history changes.  4.  I will contact Nixon if the laboratory informs me that this VUS has been reclassified.  This may change screening and testing recommendations for Nixon and his relatives.    If Nixon has any further questions, he is encouraged to contact me at [phone].    Time spent over phone: 7 minutes    Birgit Vanessa MS, [facility]  Licensed, Certified Genetic Counselor  [facility]  Phone: [phone]

## 2020-05-11 ENCOUNTER — TRANSFERRED RECORDS (OUTPATIENT)
Dept: HEALTH INFORMATION MANAGEMENT | Facility: CLINIC | Age: 85
End: 2020-05-11

## 2020-05-18 DIAGNOSIS — Z11.59 ENCOUNTER FOR SCREENING FOR OTHER VIRAL DISEASES: Primary | ICD-10-CM

## 2020-06-12 ENCOUNTER — E-VISIT (OUTPATIENT)
Dept: PEDIATRICS | Facility: CLINIC | Age: 85
End: 2020-06-12
Payer: COMMERCIAL

## 2020-06-12 ENCOUNTER — HOSPITAL ENCOUNTER (OUTPATIENT)
Dept: LAB | Facility: CLINIC | Age: 85
Discharge: HOME | End: 2020-06-12
Attending: DENTIST | Admitting: NURSE PRACTITIONER
Payer: COMMERCIAL

## 2020-06-12 ENCOUNTER — TELEPHONE (OUTPATIENT)
Dept: PEDIATRICS | Facility: CLINIC | Age: 85
End: 2020-06-12

## 2020-06-12 DIAGNOSIS — D50.0 IRON DEFICIENCY ANEMIA DUE TO CHRONIC BLOOD LOSS: ICD-10-CM

## 2020-06-12 DIAGNOSIS — Z11.59 ENCOUNTER FOR SCREENING FOR OTHER VIRAL DISEASES: ICD-10-CM

## 2020-06-12 DIAGNOSIS — K92.1 BLOOD IN STOOL: ICD-10-CM

## 2020-06-12 DIAGNOSIS — K92.1 BLOOD IN STOOL: Primary | ICD-10-CM

## 2020-06-12 LAB
ERYTHROCYTE [DISTWIDTH] IN BLOOD BY AUTOMATED COUNT: 13.2 % (ref 10–15)
HCT VFR BLD AUTO: 38.4 % (ref 40–53)
HGB BLD-MCNC: 12 G/DL (ref 13.3–17.7)
MCH RBC QN AUTO: 30.3 PG (ref 26.5–33)
MCHC RBC AUTO-ENTMCNC: 31.3 G/DL (ref 31.5–36.5)
MCV RBC AUTO: 97 FL (ref 78–100)
PLATELET # BLD AUTO: 204 10E9/L (ref 150–450)
RBC # BLD AUTO: 3.96 10E12/L (ref 4.4–5.9)
SARS-COV-2 RNA SPEC QL NAA+PROBE: NOT DETECTED
SPECIMEN SOURCE: NORMAL
WBC # BLD AUTO: 6.4 10E9/L (ref 4–11)

## 2020-06-12 PROCEDURE — 99207 ZZC NO BILLABLE SERVICE THIS VISIT: CPT

## 2020-06-12 PROCEDURE — U0003 INFECTIOUS AGENT DETECTION BY NUCLEIC ACID (DNA OR RNA); SEVERE ACUTE RESPIRATORY SYNDROME CORONAVIRUS 2 (SARS-COV-2) (CORONAVIRUS DISEASE [COVID-19]), AMPLIFIED PROBE TECHNIQUE, MAKING USE OF HIGH THROUGHPUT TECHNOLOGIES AS DESCRIBED BY CMS-2020-01-R: HCPCS | Mod: 90 | Performed by: COLON & RECTAL SURGERY

## 2020-06-12 PROCEDURE — 36415 COLL VENOUS BLD VENIPUNCTURE: CPT | Performed by: NURSE PRACTITIONER

## 2020-06-12 PROCEDURE — 99207 ZZC NO BILLABLE SERVICE THIS VISIT: CPT | Performed by: INTERNAL MEDICINE

## 2020-06-12 PROCEDURE — 99000 SPECIMEN HANDLING OFFICE-LAB: CPT | Performed by: COLON & RECTAL SURGERY

## 2020-06-12 PROCEDURE — 85027 COMPLETE CBC AUTOMATED: CPT | Performed by: NURSE PRACTITIONER

## 2020-06-12 NOTE — TELEPHONE ENCOUNTER
Called and attempted to schedule patient for lab.  Patient refuses to schedule.  Prefers to walk in.  Divya Hansen, CMA

## 2020-06-15 ENCOUNTER — HOSPITAL ENCOUNTER (OUTPATIENT)
Facility: CLINIC | Age: 85
Discharge: HOME | End: 2020-06-15
Attending: COLON & RECTAL SURGERY | Admitting: COLON & RECTAL SURGERY
Payer: COMMERCIAL

## 2020-06-15 VITALS
OXYGEN SATURATION: 96 % | HEART RATE: 57 BPM | DIASTOLIC BLOOD PRESSURE: 91 MMHG | TEMPERATURE: 98.1 F | SYSTOLIC BLOOD PRESSURE: 145 MMHG | RESPIRATION RATE: 18 BRPM

## 2020-06-15 LAB — FLEXIBLE SIGMOIDOSCOPY: NORMAL

## 2020-06-15 PROCEDURE — 45330 DIAGNOSTIC SIGMOIDOSCOPY: CPT | Performed by: COLON & RECTAL SURGERY

## 2020-06-15 RX ORDER — ONDANSETRON 2 MG/ML
4 INJECTION INTRAMUSCULAR; INTRAVENOUS EVERY 6 HOURS PRN
Status: DISCONTINUED | OUTPATIENT
Start: 2020-06-15 | End: 2020-06-15 | Stop reason: HOSPADM

## 2020-06-15 RX ORDER — NALOXONE HYDROCHLORIDE 0.4 MG/ML
.1-.4 INJECTION, SOLUTION INTRAMUSCULAR; INTRAVENOUS; SUBCUTANEOUS
Status: DISCONTINUED | OUTPATIENT
Start: 2020-06-15 | End: 2020-06-15 | Stop reason: HOSPADM

## 2020-06-15 RX ORDER — ONDANSETRON 4 MG/1
4 TABLET, ORALLY DISINTEGRATING ORAL EVERY 6 HOURS PRN
Status: DISCONTINUED | OUTPATIENT
Start: 2020-06-15 | End: 2020-06-15 | Stop reason: HOSPADM

## 2020-06-15 RX ORDER — FLUMAZENIL 0.1 MG/ML
0.2 INJECTION, SOLUTION INTRAVENOUS
Status: DISCONTINUED | OUTPATIENT
Start: 2020-06-15 | End: 2020-06-15 | Stop reason: HOSPADM

## 2020-06-15 RX ORDER — LIDOCAINE 40 MG/G
CREAM TOPICAL
Status: DISCONTINUED | OUTPATIENT
Start: 2020-06-15 | End: 2020-06-15 | Stop reason: HOSPADM

## 2020-06-15 RX ORDER — ONDANSETRON 2 MG/ML
4 INJECTION INTRAMUSCULAR; INTRAVENOUS
Status: DISCONTINUED | OUTPATIENT
Start: 2020-06-15 | End: 2020-06-15 | Stop reason: HOSPADM

## 2020-06-15 NOTE — H&P
Pre-Endoscopy History and Physical     Nixon More MRN# [id number]   YOB: 1935 Age: 84 year old     Date of Procedure: 6/15/2020  Primary care provider: Natalya Lewis  Type of Endoscopy: colonoscopy  Reason for Procedure: bleeding  Type of Anesthesia Anticipated: No Sedation    HPI:    Nixon is a 84 year old male who will be undergoing the above procedure.      A history and physical has been performed. The patient's medications and allergies have been reviewed. The risks and benefits of the procedure and the sedation options and risks were discussed with the patient.  All questions were answered and informed consent was obtained.      He denies a personal or family history of anesthesia complications or bleeding disorders.     Allergies   Allergen Reactions     Augmentin Rash     Type III hypersensitivity     Bactrim [Sulfamethoxazole W/Trimethoprim] Rash     Serum sickness, type III hypersensitivity       Bee Venom Itching     Sulfa Drugs Hives     Lisinopril Cough     Naproxen Hives        Prior to Admission Medications   Prescriptions Last Dose Informant Patient Reported? Taking?   ASPIRIN NOT PRESCRIBED (INTENTIONAL) Unknown at Unknown time  Yes No   Sig: continuous prn for other Antiplatelet medication not prescribed intentionally due to Current anticoagulant therapy (warfarin/enoxaparin)   EPINEPHrine (EPIPEN/ADRENACLICK/OR ANY BX GENERIC EQUIV) 0.3 MG/0.3ML injection 2-pack Unknown at Unknown time  No No   Sig: Inject 0.3 mLs (0.3 mg) into the muscle as needed for anaphylaxis   Loperamide HCl (IMODIUM OR) Unknown at Unknown time  Yes No   Sig: Take by mouth daily as needed   Multiple Minerals-Vitamins (PROSTEON PO) 6/14/2020 at Unknown time  Yes Yes   albuterol (PROAIR HFA/PROVENTIL HFA/VENTOLIN HFA) 108 (90 Base) MCG/ACT inhaler Unknown at Unknown time  No No   Sig: Inhale 2 puffs into the lungs every 4 hours as needed for shortness of breath / dyspnea or wheezing   Patient not taking:  Reported on 2/26/2020   apixaban ANTICOAGULANT (ELIQUIS) 5 MG tablet Past Month at Unknown time  No Yes   Sig: Take 1 tablet (5 mg) by mouth 2 times daily   atenolol (TENORMIN) 100 MG tablet 6/14/2020 at Unknown time  No Yes   Sig: Take 1.5 tablets (150 mg) by mouth daily   atorvastatin (LIPITOR) 40 MG tablet 6/14/2020 at Unknown time  No Yes   Sig: TAKE 1 TABLET BY MOUTH ONCE DAILY   ciprofloxacin (CIPRO) 500 MG tablet 6/14/2020 at Unknown time  No Yes   Sig: Take 1 tablet (500 mg) by mouth 2 times daily   diltiazem ER (DILT-XR) 120 MG 24 hr capsule 6/14/2020 at Unknown time  No Yes   Sig: Take 1 capsule (120 mg) by mouth daily   escitalopram (LEXAPRO) 10 MG tablet 6/14/2020 at Unknown time  No Yes   Sig: Take 1 tablet (10 mg) by mouth daily   ipratropium (ATROVENT) 0.06 % nasal spray Unknown at Unknown time  No No   Sig: Spray 2 sprays in nostril 4 times daily as needed for rhinitis   leuprolide (ELIGARD) 45 MG injection 6/14/2020 at Unknown time  Yes Yes   Sig: Inject 45 mg Subcutaneous every 6 months.   losartan (COZAAR) 100 MG tablet 6/14/2020 at Unknown time  No Yes   Sig: Take 1 tablet (100 mg) by mouth daily   torsemide (DEMADEX) 20 MG tablet 6/14/2020 at Unknown time  No Yes   Sig: Take 1 tablet (20 mg) by mouth daily      Facility-Administered Medications: None       Patient Active Problem List   Diagnosis     Personal history of other malignant neoplasm of skin     Lumbago     Hypertrophy of prostate without urinary obstruction     Other emphysema (H)     Hypersomnia with sleep apnea     Malignant neoplasm of prostate     Impotence of organic origin     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, lower limb     CARDIOVASCULAR SCREENING; LDL GOAL LESS THAN 100     Vitamin D deficiency     Branch retinal vein occlusion     Bee sting-induced anaphylaxis     Advance Care Planning     Radiation proctitis     Coronary artery disease     Impaired fasting glucose     Health Care Home     Dyslipidemia     Benign essential  hypertension     Spinal stenosis of lumbar region with neurogenic claudication     Dysthymia     Atrial fibrillation and flutter (H)     Dilated aortic root (H)     Generalized muscle weakness     Obesity (BMI 35.0-39.9) with comorbidity (H)     CKD (chronic kidney disease) stage 3, GFR 30-59 ml/min (H)        Past Medical History:   Diagnosis Date     Actinic keratosis      Arthritis      Atrial fibrillation and flutter (H) 12/3/2018     CAD (coronary artery disease)     2011 lateral ischemia on stress echo, 2014 normal stress echo     Coronary artery disease 2011    Abnormal stress test 2011 and controlled with medical mgmt      Dyslipidemia      Emphysema of lung (H)      Essential hypertension, benign      Hernia, abdominal      Hypersomnia with sleep apnea, unspecified     on bipap, partially treated with residual apneas     Hypertrophy (benign) of prostate     Biopsy  negative for cancer; PSA 5     Lumbago      Mumps      Prostate cancer (H) 12/15/2006     Skin cancer, basal cell 1997     Spider veins         Past Surgical History:   Procedure Laterality Date     HERNIA REPAIR  child     Moh's procedure for basal cell carcinoma  2001     PROSTATE SURGERY       s/p lumbar laminectomy NOS  1988     VITRECTOMY PARS PLANA REMOVE PRERETINAL MEMBRANE   3/09       Social History     Tobacco Use     Smoking status: Former Smoker     Packs/day: 1.00     Types: Cigarettes     Start date:      Last attempt to quit: 1978     Years since quittin.4     Smokeless tobacco: Never Used   Substance Use Topics     Alcohol use: Never     Frequency: Never     Binge frequency: Never       Family History   Problem Relation Age of Onset     Alzheimer Disease Mother      Hypertension Mother      Cardiovascular Father         D:86 complications fo CHF     Prostate Cancer Brother      Lung Cancer Brother 76     Prostate Cancer Brother        REVIEW OF SYSTEMS:     5 point ROS negative except as noted  "above in HPI, including Gen., Resp., CV, GI &  system review.      PHYSICAL EXAM:   /76   Temp 98.1  F (36.7  C)   Resp 18   SpO2 98%  Estimated body mass index is 35.08 kg/m  as calculated from the following:    Height as of 2/26/20: 1.702 m (5' 7\").    Weight as of 2/26/20: 101.6 kg (224 lb).   GENERAL APPEARANCE: healthy and alert  MENTAL STATUS: alert  AIRWAY EXAM: Mallampatti Class II (visualization of the soft palate, fauces, and uvula)  RESP: lungs clear to auscultation - no rales, rhonchi or wheezes  CV: regular rates and rhythm      DIAGNOSTICS:    Not indicated      IMPRESSION   ASA Class 2 - Mild systemic disease        PLAN:       Plan for sigmoidoscopy. We discussed the risks, benefits and alternatives and the patient wished to proceed.    The above has been forwarded to the consulting provider.      Signed Electronically by: Sunni Patton MD, MD  Neisha 15, 2020    "

## 2020-06-15 NOTE — DISCHARGE INSTRUCTIONS
Understanding Diverticulosis and Diverticulitis     Pouches or diverticula usually occur in the lower part of the colon called the sigmoid.      Diverticulitis occurs when the pouches become inflamed.     The colon (large intestine) is the last part of the digestive tract. It absorbs water from stool and changes it from a liquid to a solid. In certain cases, small pouches called diverticula can form in the colon wall. This condition is called diverticulosis. The pouches can become infected. If this happens, it becomes a more serious problem called diverticulitis. These problems can be painful. But they can be managed.   Managing Your Condition  Diet changes or taking medications are often tried first. These may be enough to bring relief. If the case is bad, surgery may be done. You and your doctor can discuss the plan that is best for you.  If You Have Diverticulosis  Diet changes are often enough to control symptoms. The main changes are adding fiber (roughage) and drinking more water. Fiber absorbs water as it travels through your colon. This helps your stool stay soft and move smoothly. Water helps this process. If needed, you may be told to take over-the-counter stool softeners. To help relieve pain, antispasmodic medications may be prescribed.  If You Have Diverticulitis  Treatment depends on how bad your symptoms are.  For mild symptoms: You may be put on a liquid diet for a short time. You may also be prescribed antibiotics. If these two steps relieve your symptoms, you may then be prescribed a high-fiber diet. If you still have symptoms, your doctor will discuss further treatment options with you.  For severe symptoms: You may need to be admitted to the hospital. There, you can be given IV antibiotics and fluids. Once symptoms are under control, the above treatments may be tried. If these don t control your condition, your doctor may discuss the option of having surgery with you.  [facility]  [facility]  Help keep your colon healthy with a diet that includes plenty of high-fiber fruits, vegetables, and whole grains. Drink plenty of liquids like water and juice. Your doctor may also recommend avoiding seeds and nuts.          [id number] Amberly Landaverde, [address] [place] [address], [place] [address]. All rights reserved. This information is not intended as a substitute for professional medical care. Always follow your healthcare professional's instructions.    HIGH FIBER DIET  Fiber is present in all fruits, vegetables, cereals and grains. Fiber passes through the body undigested. A high fiber diet helps food move through the intestinal tract. The added bulk is helpful in preventing constipation. In people with diverticulosis it serves to clean out the pouches along the colon wall while preventing new ones from forming. A high fiber diet also reduces the risk of colon cancer, decreases blood cholesterol and prevents high blood sugar in people with diabetes.    The foods listed below are high in fiber and should be included in your diet. If you are not used to high fiber foods, start with 1 or 2 foods from this list. Every 3-4 days add a new one to your diet until you are eating 4 high fiber foods per day. This should give you 20-35 Gm of fiber/day. It is also important to drink a lot of water when you are on this diet (6-8 glasses a day). Water causes the fiber to swell and increases the benefit.    FOODS HIGH IN DIETARY FIBER:  BREADS: Made with 100% whole wheat flour; manohar, wheat or rye crackers; tortillas, bran muffins  CEREALS: Whole grain cereal with bran (Chex, Raisin Bran, Corn Bran), oatmeal, rolled oats, granola, wheat flakes, brown rice  NUTS: Any nuts  FRUITS: All fresh fruits along with edible skins, (bananas, citrus fruit, mangoes, pears, prunes, raisins, apples, pineapple, apricot, melon, jams and marmalades), fruit juices (especially prune juice)  VEGETABLES: All types, preferably raw or lightly  cooked: especially, celery, eggplant, potatoes, spinach, broccoli, brussel sprouts, winter squash, carrots, cauliflower, soybeans, lentils, fresh and dried beans of all kinds  OTHER: Popcorn, any spices      [id number] Amberly Landaverde, [address] [place] [address], [place] [address]. All rights reserved. This information is not intended as a substitute for professional medical care. Always follow your healthcare professional's instructions.

## 2020-06-19 ENCOUNTER — PATIENT OUTREACH (OUTPATIENT)
Dept: ONCOLOGY | Facility: CLINIC | Age: 85
End: 2020-06-19

## 2020-06-19 NOTE — TELEPHONE ENCOUNTER
Spoke to patient. He said he is feeling much better since the colonoscopy. Patient states that he isn't having any bleeding. He said he quit eating strawberries as well. He is in the process of moving so he has been more active and said it has improved the way he's been feeling.     Patient stated that GI suggested starting a formalin treatment. He is going to call and follow-up with Dr. Patton and discuss the formalin further. No follow-up scheduled yet. Stressed importance with patient about following-up even though he is feeling better.    Patient agreed to start an iron supplement per PCP suggestion. He also agreed to call if he starts to feel unwell. Discussed s/s of low hemoglobin with patient.   Rupinder ARTEAGA RN, BSN

## 2020-06-29 ENCOUNTER — TRANSFERRED RECORDS (OUTPATIENT)
Dept: HEALTH INFORMATION MANAGEMENT | Facility: CLINIC | Age: 85
End: 2020-06-29

## 2020-06-29 DIAGNOSIS — I10 HYPERTENSION GOAL BP (BLOOD PRESSURE) < 140/90: ICD-10-CM

## 2020-06-29 DIAGNOSIS — I48.92 ATRIAL FLUTTER WITH RAPID VENTRICULAR RESPONSE (H): ICD-10-CM

## 2020-06-29 RX ORDER — DILTIAZEM HYDROCHLORIDE 120 MG/1
120 CAPSULE, EXTENDED RELEASE ORAL DAILY
Qty: 90 CAPSULE | Refills: 0 | Status: SHIPPED | OUTPATIENT
Start: 2020-06-29 | End: 2020-09-17

## 2020-07-29 DIAGNOSIS — F32.0 MILD MAJOR DEPRESSION (H): ICD-10-CM

## 2020-07-30 RX ORDER — ESCITALOPRAM OXALATE 10 MG/1
10 TABLET ORAL DAILY
Qty: 90 TABLET | Refills: 0 | Status: SHIPPED | OUTPATIENT
Start: 2020-07-30 | End: 2020-10-21

## 2020-08-11 DIAGNOSIS — C61 PROSTATE CANCER (H): ICD-10-CM

## 2020-08-11 DIAGNOSIS — D50.0 IRON DEFICIENCY ANEMIA DUE TO CHRONIC BLOOD LOSS: ICD-10-CM

## 2020-08-11 LAB
ERYTHROCYTE [DISTWIDTH] IN BLOOD BY AUTOMATED COUNT: 13.7 % (ref 10–15)
HCT VFR BLD AUTO: 37.5 % (ref 40–53)
HGB BLD-MCNC: 11.9 G/DL (ref 13.3–17.7)
MCH RBC QN AUTO: 30.5 PG (ref 26.5–33)
MCHC RBC AUTO-ENTMCNC: 31.7 G/DL (ref 31.5–36.5)
MCV RBC AUTO: 96 FL (ref 78–100)
PLATELET # BLD AUTO: 169 10E9/L (ref 150–450)
RBC # BLD AUTO: 3.9 10E12/L (ref 4.4–5.9)
WBC # BLD AUTO: 5.6 10E9/L (ref 4–11)

## 2020-08-11 PROCEDURE — 85027 COMPLETE CBC AUTOMATED: CPT | Performed by: INTERNAL MEDICINE

## 2020-08-11 PROCEDURE — 83540 ASSAY OF IRON: CPT | Performed by: INTERNAL MEDICINE

## 2020-08-11 PROCEDURE — 84153 ASSAY OF PSA TOTAL: CPT | Performed by: INTERNAL MEDICINE

## 2020-08-11 PROCEDURE — 36415 COLL VENOUS BLD VENIPUNCTURE: CPT | Performed by: INTERNAL MEDICINE

## 2020-08-11 PROCEDURE — 83550 IRON BINDING TEST: CPT | Performed by: INTERNAL MEDICINE

## 2020-08-12 ENCOUNTER — TRANSFERRED RECORDS (OUTPATIENT)
Dept: HEALTH INFORMATION MANAGEMENT | Facility: CLINIC | Age: 85
End: 2020-08-12

## 2020-08-12 LAB
IRON SATN MFR SERPL: 19 % (ref 15–46)
IRON SERPL-MCNC: 54 UG/DL (ref 35–180)
PSA SERPL-MCNC: <0.01 UG/L (ref 0–4)
TIBC SERPL-MCNC: 278 UG/DL (ref 240–430)

## 2020-08-14 ENCOUNTER — TRANSFERRED RECORDS (OUTPATIENT)
Dept: HEALTH INFORMATION MANAGEMENT | Facility: CLINIC | Age: 85
End: 2020-08-14

## 2020-08-17 ENCOUNTER — TRANSFERRED RECORDS (OUTPATIENT)
Dept: HEALTH INFORMATION MANAGEMENT | Facility: CLINIC | Age: 85
End: 2020-08-17

## 2020-08-19 ENCOUNTER — OFFICE VISIT (OUTPATIENT)
Dept: UROLOGY | Facility: CLINIC | Age: 85
End: 2020-08-19
Payer: COMMERCIAL

## 2020-08-19 VITALS
HEIGHT: 67 IN | DIASTOLIC BLOOD PRESSURE: 64 MMHG | BODY MASS INDEX: 35.16 KG/M2 | OXYGEN SATURATION: 97 % | SYSTOLIC BLOOD PRESSURE: 118 MMHG | HEART RATE: 68 BPM | WEIGHT: 224 LBS

## 2020-08-19 DIAGNOSIS — C61 PROSTATE CANCER (H): Primary | ICD-10-CM

## 2020-08-19 PROCEDURE — 99213 OFFICE O/P EST LOW 20 MIN: CPT | Performed by: UROLOGY

## 2020-08-19 ASSESSMENT — PAIN SCALES - GENERAL: PAINLEVEL: NO PAIN (0)

## 2020-08-19 ASSESSMENT — MIFFLIN-ST. JEOR: SCORE: 1674.69

## 2020-08-19 NOTE — LETTER
8/19/2020       RE: Nixon More  [address]  [facility] [address]     Dear Colleague,    Thank you for referring your patient, Nixon More, to the [facility] [place] [facility]. Please see a copy of my visit note below.    Office Visit Note  [facility]  [phone]    UROLOGIC DIAGNOSES:   Prostate cancer and hematuria    CURRENT INTERVENTIONS:   Intermittent hormonal therapy. Prior radiotherapy, negative hematuria workup 2018    HISTORY:   Spencer returns to clinic today for prostate cancer follow-up.  His most recent Lupron injection was now 1 year ago and his PSA remains undetectable.  He continues to feel well.  He really did not have many symptoms from the hormonal therapy so now that he is off of it he does not feel much different.      PAST MEDICAL HISTORY:   Past Medical History:   Diagnosis Date     Actinic keratosis      Arthritis      Atrial fibrillation and flutter (H) 12/3/2018     CAD (coronary artery disease)     1/5/2011 lateral ischemia on stress echo, 12/2014 normal stress echo     Coronary artery disease 1/1/2011    Abnormal stress test 1/2011 and controlled with medical mgmt      Dyslipidemia      Emphysema of lung (H)      Essential hypertension, benign      Hernia, abdominal      Hypersomnia with sleep apnea, unspecified     on bipap, partially treated with residual apneas     Hypertrophy (benign) of prostate 5/01    Biopsy 5/01 negative for cancer; PSA 5     Lumbago      Mumps      Prostate cancer (H) 12/15/2006     Skin cancer, basal cell 1997     Spider veins        PAST SURGICAL HISTORY:   Past Surgical History:   Procedure Laterality Date     HERNIA REPAIR  child     Moh's procedure for basal cell carcinoma  01/2001     PROSTATE SURGERY       s/p lumbar laminectomy NOS  1988     SIGMOIDOSCOPY FLEXIBLE N/A 6/15/2020    Procedure: SIGMOIDOSCOPY, FLEXIBLE;  Surgeon: Sunni Patton  MD Ra;  Location: [facility]     VITRECTOMY PARS PLANA REMOVE PRERETINAL MEMBRANE   3/09       FAMILY HISTORY:   Family History   Problem Relation Age of Onset     Alzheimer Disease Mother      Hypertension Mother      Cardiovascular Father         D:86 complications fo CHF     Prostate Cancer Brother      Lung Cancer Brother 76     Prostate Cancer Brother        SOCIAL HISTORY:   Social History     Socioeconomic History     Marital status:      Spouse name: Not on file     Number of children: Not on file     Years of education: Not on file     Highest education level: Not on file   Occupational History     Occupation: retired   Social Needs     Financial resource strain: Not on file     Food insecurity     Worry: Not on file     Inability: Not on file     Transportation needs     Medical: Not on file     Non-medical: Not on file   Tobacco Use     Smoking status: Former Smoker     Packs/day: 1.00     Types: Cigarettes     Start date:      Last attempt to quit: 1978     Years since quittin.6     Smokeless tobacco: Never Used   Substance and Sexual Activity     Alcohol use: Never     Frequency: Never     Binge frequency: Never     Drug use: No     Sexual activity: Yes     Partners: Female   Lifestyle     Physical activity     Days per week: Not on file     Minutes per session: Not on file     Stress: Not on file   Relationships     Social connections     Talks on phone: Not on file     Gets together: Not on file     Attends [employer] service: Not on file     Active member of club or organization: Not on file     Attends meetings of clubs or organizations: Not on file     Relationship status: Not on file     Intimate partner violence     Fear of current or ex partner: Not on file     Emotionally abused: Not on file     Physically abused: Not on file     Forced sexual activity: Not on file   Other Topics Concern      Service Not Asked     Blood Transfusions Not Asked     Caffeine Concern No      Comment: 0-2 cans of soda per day.     Occupational Exposure Not Asked     Hobby Hazards Not Asked     Sleep Concern Not Asked     Stress Concern Not Asked     Weight Concern Not Asked     Special Diet Not Asked     Back Care Not Asked     Exercise No     Bike Helmet Not Asked     Seat Belt Yes     Self-Exams Not Asked     Parent/sibling w/ CABG, MI or angioplasty before 65F 55M? No   Social History Narrative     Not on file       Review Of Systems:  Skin: No rash, pruritis, or skin pigmentation  Eyes: No changes in vision  Ears/Nose/Throat: No changes in hearing, no nosebleeds  Respiratory: No shortness of breath, dyspnea on exertion, cough, or hemoptysis  Cardiovascular: No chest pain or palpitations  Gastrointestinal: No diarrhea or constipation. No abdominal pain. No hematochezia  Genitourinary: see HPI  Musculoskeletal: No pain or swelling of joints, normal range of motion  Neurologic: No weakness or tremors  Psychiatric: No recent changes in memory or mood  Hematologic/Lymphatic/Immunologic: No easy bruising or enlarged lymph nodes  Endocrine: No weight gain or loss      PHYSICAL EXAM:    There were no vitals taken for this visit.    Constitutional: Well developed. Conversant and in no acute distress  Eyes: Anicteric sclera, conjunctiva clear, normal extraocular movements  ENT: Normocephalic and atraumatic,   Skin: Warm and dry. No rashes or lesions  Cardiac: No peripheral edema  Back/Flank: Not done  CNS/PNS: Normal musculature and movements, moves all extremities normally  Respiratory: Normal non-labored breathing  Abdomen: Soft nontender and nondistended  Peripheral Vascular: No peripheral edema  Mental Status/Psych: Alert and Oriented x 3. Normal mood and affect    Penis: Not done  Scrotal Skin: Not done  Testicles: Not done  Epididymis: Not done  Digital Rectal Exam:     Cystoscopy: Not done    Imaging: None    Urinalysis: UA RESULTS:  Recent Labs   Lab Test 12/23/19  1518   COLOR Yellow   APPEARANCE  Clear   URINEGLC Negative   URINEBILI Negative   URINEKETONE Negative   SG 1.015   UBLD Moderate*   URINEPH 6.5   PROTEIN Negative   UROBILINOGEN 0.2   NITRITE Negative   LEUKEST Small*   RBCU O - 2   WBCU 5-10*       PSA: Undetectable    Post Void Residual:     Other labs: None today      IMPRESSION:  Doing well, PSA undetectable    PLAN:  He is doing well.  His PSA remains undetectable at this time despite being off of the hormonal therapy.  We will check another PSA in 6 months.      Mark Pino M.D.

## 2020-08-19 NOTE — PROGRESS NOTES
Office Visit Note  [facility]  [phone]    UROLOGIC DIAGNOSES:   Prostate cancer and hematuria    CURRENT INTERVENTIONS:   Intermittent hormonal therapy. Prior radiotherapy, negative hematuria workup 2018    HISTORY:   Spencer returns to clinic today for prostate cancer follow-up.  His most recent Lupron injection was now 1 year ago and his PSA remains undetectable.  He continues to feel well.  He really did not have many symptoms from the hormonal therapy so now that he is off of it he does not feel much different.      PAST MEDICAL HISTORY:   Past Medical History:   Diagnosis Date     Actinic keratosis      Arthritis      Atrial fibrillation and flutter (H) 12/3/2018     CAD (coronary artery disease)     1/5/2011 lateral ischemia on stress echo, 12/2014 normal stress echo     Coronary artery disease 1/1/2011    Abnormal stress test 1/2011 and controlled with medical mgmt      Dyslipidemia      Emphysema of lung (H)      Essential hypertension, benign      Hernia, abdominal      Hypersomnia with sleep apnea, unspecified     on bipap, partially treated with residual apneas     Hypertrophy (benign) of prostate 5/01    Biopsy 5/01 negative for cancer; PSA 5     Lumbago      Mumps      Prostate cancer (H) 12/15/2006     Skin cancer, basal cell 1997     Spider veins        PAST SURGICAL HISTORY:   Past Surgical History:   Procedure Laterality Date     HERNIA REPAIR  child     Moh's procedure for basal cell carcinoma  01/2001     PROSTATE SURGERY       s/p lumbar laminectomy NOS  1988     SIGMOIDOSCOPY FLEXIBLE N/A 6/15/2020    Procedure: SIGMOIDOSCOPY, FLEXIBLE;  Surgeon: Sunni Patton MD;  Location:  GI     VITRECTOMY PARS PLANA REMOVE PRERETINAL MEMBRANE   3/09       FAMILY HISTORY:   Family History   Problem Relation Age of Onset     Alzheimer Disease Mother      Hypertension Mother      Cardiovascular Father         D:86 complications fo CHF     Prostate Cancer Brother      Lung Cancer  Brother 76     Prostate Cancer Brother        SOCIAL HISTORY:   Social History     Socioeconomic History     Marital status:      Spouse name: Not on file     Number of children: Not on file     Years of education: Not on file     Highest education level: Not on file   Occupational History     Occupation: retired   Social Needs     Financial resource strain: Not on file     Food insecurity     Worry: Not on file     Inability: Not on file     Transportation needs     Medical: Not on file     Non-medical: Not on file   Tobacco Use     Smoking status: Former Smoker     Packs/day: 1.00     Types: Cigarettes     Start date:      Last attempt to quit: 1978     Years since quittin.6     Smokeless tobacco: Never Used   Substance and Sexual Activity     Alcohol use: Never     Frequency: Never     Binge frequency: Never     Drug use: No     Sexual activity: Yes     Partners: Female   Lifestyle     Physical activity     Days per week: Not on file     Minutes per session: Not on file     Stress: Not on file   Relationships     Social connections     Talks on phone: Not on file     Gets together: Not on file     Attends [employer] service: Not on file     Active member of club or organization: Not on file     Attends meetings of clubs or organizations: Not on file     Relationship status: Not on file     Intimate partner violence     Fear of current or ex partner: Not on file     Emotionally abused: Not on file     Physically abused: Not on file     Forced sexual activity: Not on file   Other Topics Concern      Service Not Asked     Blood Transfusions Not Asked     Caffeine Concern No     Comment: 0-2 cans of soda per day.     Occupational Exposure Not Asked     Hobby Hazards Not Asked     Sleep Concern Not Asked     Stress Concern Not Asked     Weight Concern Not Asked     Special Diet Not Asked     Back Care Not Asked     Exercise No     Bike Helmet Not Asked     Seat Belt Yes     Self-Exams Not  Asked     Parent/sibling w/ CABG, MI or angioplasty before 65F 55M? No   Social History Narrative     Not on file       Review Of Systems:  Skin: No rash, pruritis, or skin pigmentation  Eyes: No changes in vision  Ears/Nose/Throat: No changes in hearing, no nosebleeds  Respiratory: No shortness of breath, dyspnea on exertion, cough, or hemoptysis  Cardiovascular: No chest pain or palpitations  Gastrointestinal: No diarrhea or constipation. No abdominal pain. No hematochezia  Genitourinary: see HPI  Musculoskeletal: No pain or swelling of joints, normal range of motion  Neurologic: No weakness or tremors  Psychiatric: No recent changes in memory or mood  Hematologic/Lymphatic/Immunologic: No easy bruising or enlarged lymph nodes  Endocrine: No weight gain or loss      PHYSICAL EXAM:    There were no vitals taken for this visit.    Constitutional: Well developed. Conversant and in no acute distress  Eyes: Anicteric sclera, conjunctiva clear, normal extraocular movements  ENT: Normocephalic and atraumatic,   Skin: Warm and dry. No rashes or lesions  Cardiac: No peripheral edema  Back/Flank: Not done  CNS/PNS: Normal musculature and movements, moves all extremities normally  Respiratory: Normal non-labored breathing  Abdomen: Soft nontender and nondistended  Peripheral Vascular: No peripheral edema  Mental Status/Psych: Alert and Oriented x 3. Normal mood and affect    Penis: Not done  Scrotal Skin: Not done  Testicles: Not done  Epididymis: Not done  Digital Rectal Exam:     Cystoscopy: Not done    Imaging: None    Urinalysis: UA RESULTS:  Recent Labs   Lab Test 12/23/19  1518   COLOR Yellow   APPEARANCE Clear   URINEGLC Negative   URINEBILI Negative   URINEKETONE Negative   SG 1.015   UBLD Moderate*   URINEPH 6.5   PROTEIN Negative   UROBILINOGEN 0.2   NITRITE Negative   LEUKEST Small*   RBCU O - 2   WBCU 5-10*       PSA: Undetectable    Post Void Residual:     Other labs: None today      IMPRESSION:  Doing well, PSA  undetectable    PLAN:  He is doing well.  His PSA remains undetectable at this time despite being off of the hormonal therapy.  We will check another PSA in 6 months.      Mark Pino M.D.

## 2020-09-14 ENCOUNTER — HOSPITAL ENCOUNTER (OUTPATIENT)
Dept: CARDIOLOGY | Facility: CLINIC | Age: 85
Discharge: HOME | End: 2020-09-14
Attending: PHYSICIAN ASSISTANT | Admitting: PHYSICIAN ASSISTANT
Payer: COMMERCIAL

## 2020-09-14 DIAGNOSIS — I10 BENIGN ESSENTIAL HYPERTENSION: ICD-10-CM

## 2020-09-14 DIAGNOSIS — I48.92 ATRIAL FIBRILLATION AND FLUTTER (H): ICD-10-CM

## 2020-09-14 DIAGNOSIS — I48.91 ATRIAL FIBRILLATION AND FLUTTER (H): ICD-10-CM

## 2020-09-14 PROCEDURE — 93306 TTE W/DOPPLER COMPLETE: CPT

## 2020-09-14 PROCEDURE — 93306 TTE W/DOPPLER COMPLETE: CPT | Mod: 26 | Performed by: INTERNAL MEDICINE

## 2020-09-14 PROCEDURE — 93000 ELECTROCARDIOGRAM COMPLETE: CPT | Performed by: PHYSICIAN ASSISTANT

## 2020-09-14 PROCEDURE — 25500064 ZZH RX 255 OP 636: Performed by: PHYSICIAN ASSISTANT

## 2020-09-14 RX ADMIN — HUMAN ALBUMIN MICROSPHERES AND PERFLUTREN 3 ML: 10; .22 INJECTION, SOLUTION INTRAVENOUS at 13:46

## 2020-09-17 ENCOUNTER — VIRTUAL VISIT (OUTPATIENT)
Dept: CARDIOLOGY | Facility: CLINIC | Age: 85
End: 2020-09-17
Payer: COMMERCIAL

## 2020-09-17 DIAGNOSIS — I48.92 ATRIAL FLUTTER WITH RAPID VENTRICULAR RESPONSE (H): ICD-10-CM

## 2020-09-17 DIAGNOSIS — I77.810 AORTIC ROOT DILATATION (H): ICD-10-CM

## 2020-09-17 DIAGNOSIS — I48.19 PERSISTENT ATRIAL FIBRILLATION (H): ICD-10-CM

## 2020-09-17 DIAGNOSIS — I48.92 ATRIAL FIBRILLATION AND FLUTTER (H): Primary | ICD-10-CM

## 2020-09-17 DIAGNOSIS — I48.91 ATRIAL FIBRILLATION AND FLUTTER (H): Primary | ICD-10-CM

## 2020-09-17 DIAGNOSIS — I10 HYPERTENSION GOAL BP (BLOOD PRESSURE) < 140/90: ICD-10-CM

## 2020-09-17 PROCEDURE — 99214 OFFICE O/P EST MOD 30 MIN: CPT | Mod: 95 | Performed by: INTERNAL MEDICINE

## 2020-09-17 RX ORDER — DILTIAZEM HYDROCHLORIDE 120 MG/1
120 CAPSULE, EXTENDED RELEASE ORAL DAILY
Qty: 90 CAPSULE | Refills: 3 | Status: SHIPPED | OUTPATIENT
Start: 2020-09-17 | End: 2021-07-15

## 2020-09-17 NOTE — PROGRESS NOTES
"Nixon More is a 84 year old male who is being evaluated via a billable video visit.      The patient has been notified of following:     \"This video visit will be conducted via a call between you and your physician/provider. We have found that certain health care needs can be provided without the need for an in-person physical exam.  This service lets us provide the care you need with a video conversation.  If a prescription is necessary we can send it directly to your pharmacy.  If lab work is needed we can place an order for that and you can then stop by our lab to have the test done at a later time.    Video visits are billed at different rates depending on your insurance coverage.  Please reach out to your insurance provider with any questions.    If during the course of the call the physician/provider feels a video visit is not appropriate, you will not be charged for this service.\"    Patient has given verbal consent for Video visit?Yes  How would you like to obtain your AVS? MyChart  If you are dropped from the video visit, the video invite should be resent to: Text to cell phone: [phone]  Will anyone else be joining your video visit? No     Review Of Systems  Skin: NEGATIVE  Eyes:Ears/Nose/Throat: glasses  Respiratory: GALDAMEZ,uses BiPap  Cardiovascular: no issues, edema  Gastrointestinal: NEGATIVE  Genitourinary: CKD   Musculoskeletal: general leg weakness  Neurologic: NEGATIVE  Psychiatric: NEGATIVE  Hematologic/Lymphatic/Immunologic: NEGATIVE  Endocrine:  NEGATIVE  Vitals - Patient Reported  Systolic (Patient Reported): 115  Diastolic (Patient Reported): 70  Weight (Patient Reported): 99.8 kg (220 lb)  Pulse (Patient Reported): 60    HPI and Plan:   This 84-year-old gentleman is seen, via video visit due to pandemic restrictions, in follow-up of his history of now persistent fibrillation with a history of RVR, on rate control strategy, chronic shortness of breath and dyspnea with abnormal negative " inspiratory force but also responded to some diuretics of some possible diastolic heart failure, dilated ascending aorta, moderate, and hypertension essential. At one time he had some exertional chest pain with an abnormal stress study but on medical therapy his symptoms resolved and his stress study follow-up was normal.    He is a prior smoker with over 30-pack-year history although he quit perhaps 20 years ago.  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      Overall he remained stable the last 6 months.  He does get some fatigue walking but has hip and low back pain.  He states when he uses a cart at the grocery store he can go much further much more easily.  Still has some limiting dyspnea but is used to it and seems to manage it.  He is not having orthopnea, PND, new edema.  He does have chronic mild edema but on exam today it appears to only be trace to 1+, the skin is without significant stasis changes erythema or cellulitis.  He was reportedly given a diagnosis also of peripheral venous insufficiency in the past but is not really using the pressure stockings that were given to him then.    He checks his oximeter regularly and states his heart rate is usually in the 60s on his current regimen with that.  He is having no bleeding episodes on his anticoagulation.  Denies falls, syncope or near syncope, palpitations, chest discomfort with activity or at other times.     He had an MRI in 2019 which showed his ascending aorta at 4.4 x 4.3, and aortic root at 4.5 x 3.9.  He does note that some non-first-degree relatives have had acsending aortic and abdominal aortic dissections.  Follow-up echo this year notes normal left ventricular function, ascending aorta at 4.5 and aortic root of 4.4 cm.  No change compared to an echo of 2018    He takes his blood pressure regularly at home and states it is usually around 115-120 systolic.  He is not having  any orthostasis or dizziness.    Exam limited by video visit due to pandemic.  Weight today 224, unchanged in the last 56months.  BMI 34.  Blood pressure 115/70 with a pulse of low 60s and irregular  Lungs-normal effort, no cough wheeze or other findings by video and can talk in long sentences  Cardiac-no appearance of JVD  Abdomen-obese  Extremities-examined via video.  As noted there is trace to 1+ pitting edema USP up pretibial.  No stasis changes cellulitis erythema trauma ulceration     In August this year his hemoglobin was 11.9, unchanged from 12 in June of this year.     Impression/plan     1-chronic dyspnea/shortness of breath.  Significantly possible chronic diastolic heart failure though probably minimal.  Some of it is due to the work of activity from his imbalance and he was hip pain.  Also some musculus weakness by PFTs.  No significant change this year.  No current evidence of volume overload.  .  2-atrial fibrillation, persistent/chronic.  On anticoagulation and tolerating this without bleeding.  Very large atria so  unlikely to be able to maintain sinus rhythm even with cardioversion.        3-previous diagnosis of angina and coronary disease.  Currently normal ejection fraction, no chest discomfort, and again inconsistent that he has a dyspnea symptom that would be an anginal equivalent.  We will again reassess when we get more information about when he is having his limitations and symptoms as above.     4-aortic root and ascending aortic dilatation.  Mild to moderate.  An echo this year no change from 2018.  Recommend follow-up in 2 years.    5.-Chronic anticoagulation.  Tolerating this and he can be continued.    Overall from a cardiovascular standpoint I think he is stable and functional.  Recommend he continue his current regimen and I have renewed some of his diltiazem today.  His other medications are still good for a while longer before needing renewal..  He will follow-up in clinic in  a year.       Orders Placed This Encounter   Procedures     Follow-Up with Cardiac Advanced Practice Provider       Orders Placed This Encounter   Medications     diltiazem ER (DILT-XR) 120 MG 24 hr capsule     Sig: Take 1 capsule (120 mg) by mouth daily     Dispense:  90 capsule     Refill:  3       Medications Discontinued During This Encounter   Medication Reason     diltiazem ER (DILT-XR) 120 MG 24 hr capsule Reorder         Encounter Diagnoses   Name Primary?     Atrial fibrillation and flutter (H) Yes     Persistent atrial fibrillation (H)      Aortic root dilatation (H)      Atrial flutter with rapid ventricular response (H)      Hypertension goal BP (blood pressure) < 140/90        CURRENT MEDICATIONS:  Current Outpatient Medications   Medication Sig Dispense Refill     apixaban ANTICOAGULANT (ELIQUIS) 5 MG tablet Take 1 tablet (5 mg) by mouth 2 times daily 180 tablet 2     atenolol (TENORMIN) 100 MG tablet Take 1.5 tablets (150 mg) by mouth daily 135 tablet 3     atorvastatin (LIPITOR) 40 MG tablet TAKE 1 TABLET BY MOUTH ONCE DAILY 90 tablet 3     diltiazem ER (DILT-XR) 120 MG 24 hr capsule Take 1 capsule (120 mg) by mouth daily 90 capsule 3     EPINEPHrine (EPIPEN/ADRENACLICK/OR ANY BX GENERIC EQUIV) 0.3 MG/0.3ML injection 2-pack Inject 0.3 mLs (0.3 mg) into the muscle as needed for anaphylaxis 0.6 mL 1     escitalopram (LEXAPRO) 10 MG tablet Take 1 tablet (10 mg) by mouth daily 90 tablet 0     ipratropium (ATROVENT) 0.06 % nasal spray Spray 2 sprays in nostril 4 times daily as needed for rhinitis 3 Box 3     Loperamide HCl (IMODIUM OR) Take by mouth daily as needed       losartan (COZAAR) 100 MG tablet Take 1 tablet (100 mg) by mouth daily 90 tablet 3     Multiple Minerals-Vitamins (PROSTEON PO) Take 2,000 Units by mouth 4 times daily       torsemide (DEMADEX) 20 MG tablet Take 1 tablet (20 mg) by mouth daily 90 tablet 3     albuterol (PROAIR HFA/PROVENTIL HFA/VENTOLIN HFA) 108 (90 Base) MCG/ACT inhaler  Inhale 2 puffs into the lungs every 4 hours as needed for shortness of breath / dyspnea or wheezing (Patient not taking: Reported on 2/26/2020) 1 Inhaler 3     ASPIRIN NOT PRESCRIBED (INTENTIONAL) continuous prn for other Antiplatelet medication not prescribed intentionally due to Current anticoagulant therapy (warfarin/enoxaparin)       ciprofloxacin (CIPRO) 500 MG tablet Take 1 tablet (500 mg) by mouth 2 times daily 14 tablet 0     leuprolide (ELIGARD) 45 MG injection Inject 45 mg Subcutaneous every 6 months. 1 each 12       ALLERGIES     Allergies   Allergen Reactions     Augmentin Rash     Type III hypersensitivity     Bactrim [Sulfamethoxazole W/Trimethoprim] Rash     Serum sickness, type III hypersensitivity       Bee Venom Itching     Sulfa Drugs Hives     Celebrex [Celecoxib]      Lisinopril Cough     Naproxen Hives       PAST MEDICAL HISTORY:  Past Medical History:   Diagnosis Date     Actinic keratosis      Arthritis      Atrial fibrillation and flutter (H) 12/3/2018     CAD (coronary artery disease)     1/5/2011 lateral ischemia on stress echo, 12/2014 normal stress echo     Coronary artery disease 1/1/2011    Abnormal stress test 1/2011 and controlled with medical mgmt      Dyslipidemia      Emphysema of lung (H)      Essential hypertension, benign      Hernia, abdominal      Hypersomnia with sleep apnea, unspecified     on bipap, partially treated with residual apneas     Hypertrophy (benign) of prostate 5/01    Biopsy 5/01 negative for cancer; PSA 5     Lumbago      Mumps      Prostate cancer (H) 12/15/2006     Skin cancer, basal cell 1997     Spider veins        PAST SURGICAL HISTORY:  Past Surgical History:   Procedure Laterality Date     HERNIA REPAIR  child     Moh's procedure for basal cell carcinoma  01/2001     PROSTATE SURGERY       s/p lumbar laminectomy NOS  1988     SIGMOIDOSCOPY FLEXIBLE N/A 6/15/2020    Procedure: SIGMOIDOSCOPY, FLEXIBLE;  Surgeon: Sunni Patton MD;  Location: [facility]      VITRECTOMY PARS PLANA REMOVE PRERETINAL MEMBRANE   3/09       FAMILY HISTORY:  Family History   Problem Relation Age of Onset     Alzheimer Disease Mother      Hypertension Mother      Cardiovascular Father         D:86 complications fo CHF     Prostate Cancer Brother      Lung Cancer Brother 76     Prostate Cancer Brother        SOCIAL HISTORY:  Social History     Socioeconomic History     Marital status:      Spouse name: None     Number of children: None     Years of education: None     Highest education level: None   Occupational History     Occupation: retired   Social Needs     Financial resource strain: None     Food insecurity     Worry: None     Inability: None     Transportation needs     Medical: None     Non-medical: None   Tobacco Use     Smoking status: Former Smoker     Packs/day: 1.00     Types: Cigarettes     Start date:      Last attempt to quit: 1978     Years since quittin.7     Smokeless tobacco: Never Used   Substance and Sexual Activity     Alcohol use: Never     Frequency: Never     Binge frequency: Never     Drug use: No     Sexual activity: Yes     Partners: Female   Lifestyle     Physical activity     Days per week: None     Minutes per session: None     Stress: None   Relationships     Social connections     Talks on phone: None     Gets together: None     Attends Restorationist service: None     Active member of club or organization: None     Attends meetings of clubs or organizations: None     Relationship status: None     Intimate partner violence     Fear of current or ex partner: None     Emotionally abused: None     Physically abused: None     Forced sexual activity: None   Other Topics Concern      Service Not Asked     Blood Transfusions Not Asked     Caffeine Concern No     Comment: 0-2 cans of soda per day.     Occupational Exposure Not Asked     Hobby Hazards Not Asked     Sleep Concern Not Asked     Stress Concern Not Asked     Weight Concern Not  Asked     Special Diet Not Asked     Back Care Not Asked     Exercise No     Bike Helmet Not Asked     Seat Belt Yes     Self-Exams Not Asked     Parent/sibling w/ CABG, MI or angioplasty before 65F 55M? No   Social History Narrative     None             CC  No referring provider defined for this encounter.          Telephone number of patient: Home # [phone]        Video-Visit Details    Type of service:  Video Visit    Video Start Time: 4:14 PM  Video End Time: 4:40 PM    Originating Location (pt. Location): Home    Distant Location (provider location):  [facility]     Platform used for Video Visit: Quinten Camp MD

## 2020-09-17 NOTE — LETTER
"9/17/2020    Natalya Lewis MD  [address] [place] [address]    RE: Nixon More       Dear Colleague,    I had the pleasure of seeing Nixon More in the [facility].    Nixon More is a 84 year old male who is being evaluated via a billable video visit.      The patient has been notified of following:     \"This video visit will be conducted via a call between you and your physician/provider. We have found that certain health care needs can be provided without the need for an in-person physical exam.  This service lets us provide the care you need with a video conversation.  If a prescription is necessary we can send it directly to your pharmacy.  If lab work is needed we can place an order for that and you can then stop by our lab to have the test done at a later time.    Video visits are billed at different rates depending on your insurance coverage.  Please reach out to your insurance provider with any questions.    If during the course of the call the physician/provider feels a video visit is not appropriate, you will not be charged for this service.\"    Patient has given verbal consent for Video visit?Yes  How would you like to obtain your AVS? MyChart  If you are dropped from the video visit, the video invite should be resent to: Text to cell phone: [phone]  Will anyone else be joining your video visit? No     Review Of Systems  Skin: NEGATIVE  Eyes:Ears/Nose/Throat: glasses  Respiratory: GALDAMEZ,uses BiPap  Cardiovascular: no issues, edema  Gastrointestinal: NEGATIVE  Genitourinary: CKD   Musculoskeletal: general leg weakness  Neurologic: NEGATIVE  Psychiatric: NEGATIVE  Hematologic/Lymphatic/Immunologic: NEGATIVE  Endocrine:  NEGATIVE  Vitals - Patient Reported  Systolic (Patient Reported): 115  Diastolic (Patient Reported): 70  Weight (Patient Reported): 99.8 kg (220 lb)  Pulse (Patient Reported): 60    HPI and Plan:   This 84-year-old gentleman is " seen, via video visit due to pandemic restrictions, in follow-up of his history of now persistent fibrillation with a history of RVR, on rate control strategy, chronic shortness of breath and dyspnea with abnormal negative inspiratory force but also responded to some diuretics of some possible diastolic heart failure, dilated ascending aorta, moderate, and hypertension essential. At one time he had some exertional chest pain with an abnormal stress study but on medical therapy his symptoms resolved and his stress study follow-up was normal.    He is a prior smoker with over 30-pack-year history although he quit perhaps 20 years ago.  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      Overall he remained stable the last 6 months.  He does get some fatigue walking but has hip and low back pain.  He states when he uses a cart at the grocery store he can go much further much more easily.  Still has some limiting dyspnea but is used to it and seems to manage it.  He is not having orthopnea, PND, new edema.  He does have chronic mild edema but on exam today it appears to only be trace to 1+, the skin is without significant stasis changes erythema or cellulitis.  He was reportedly given a diagnosis also of peripheral venous insufficiency in the past but is not really using the pressure stockings that were given to him then.    He checks his oximeter regularly and states his heart rate is usually in the 60s on his current regimen with that.  He is having no bleeding episodes on his anticoagulation.  Denies falls, syncope or near syncope, palpitations, chest discomfort with activity or at other times.     He had an MRI in 2019 which showed his ascending aorta at 4.4 x 4.3, and aortic root at 4.5 x 3.9.  He does note that some non-first-degree relatives have had acsending aortic and abdominal aortic dissections.  Follow-up echo this year notes normal left  ventricular function, ascending aorta at 4.5 and aortic root of 4.4 cm.  No change compared to an echo of 2018    He takes his blood pressure regularly at home and states it is usually around 115-120 systolic.  He is not having any orthostasis or dizziness.    Exam limited by video visit due to pandemic.  Weight today 224, unchanged in the last 56months.  BMI 34.  Blood pressure 115/70 with a pulse of low 60s and irregular  Lungs-normal effort, no cough wheeze or other findings by video and can talk in long sentences  Cardiac-no appearance of JVD  Abdomen-obese  Extremities-examined via video.  As noted there is trace to 1+ pitting edema FDC up pretibial.  No stasis changes cellulitis erythema trauma ulceration     In August this year his hemoglobin was 11.9, unchanged from 12 in June of this year.     Impression/plan     1-chronic dyspnea/shortness of breath.  Significantly possible chronic diastolic heart failure though probably minimal.  Some of it is due to the work of activity from his imbalance and he was hip pain.  Also some musculus weakness by PFTs.  No significant change this year.  No current evidence of volume overload.  .  2-atrial fibrillation, persistent/chronic.  On anticoagulation and tolerating this without bleeding.  Very large atria so  unlikely to be able to maintain sinus rhythm even with cardioversion.        3-previous diagnosis of angina and coronary disease.  Currently normal ejection fraction, no chest discomfort, and again inconsistent that he has a dyspnea symptom that would be an anginal equivalent.  We will again reassess when we get more information about when he is having his limitations and symptoms as above.     4-aortic root and ascending aortic dilatation.  Mild to moderate.  An echo this year no change from 2018.  Recommend follow-up in 2 years.    5.-Chronic anticoagulation.  Tolerating this and he can be continued.    Overall from a cardiovascular standpoint I think he is  stable and functional.  Recommend he continue his current regimen and I have renewed some of his diltiazem today.  His other medications are still good for a while longer before needing renewal..  He will follow-up in clinic in a year.       Orders Placed This Encounter   Procedures     Follow-Up with Cardiac Advanced Practice Provider       Orders Placed This Encounter   Medications     diltiazem ER (DILT-XR) 120 MG 24 hr capsule     Sig: Take 1 capsule (120 mg) by mouth daily     Dispense:  90 capsule     Refill:  3       Medications Discontinued During This Encounter   Medication Reason     diltiazem ER (DILT-XR) 120 MG 24 hr capsule Reorder         Encounter Diagnoses   Name Primary?     Atrial fibrillation and flutter (H) Yes     Persistent atrial fibrillation (H)      Aortic root dilatation (H)      Atrial flutter with rapid ventricular response (H)      Hypertension goal BP (blood pressure) < 140/90        CURRENT MEDICATIONS:  Current Outpatient Medications   Medication Sig Dispense Refill     apixaban ANTICOAGULANT (ELIQUIS) 5 MG tablet Take 1 tablet (5 mg) by mouth 2 times daily 180 tablet 2     atenolol (TENORMIN) 100 MG tablet Take 1.5 tablets (150 mg) by mouth daily 135 tablet 3     atorvastatin (LIPITOR) 40 MG tablet TAKE 1 TABLET BY MOUTH ONCE DAILY 90 tablet 3     diltiazem ER (DILT-XR) 120 MG 24 hr capsule Take 1 capsule (120 mg) by mouth daily 90 capsule 3     EPINEPHrine (EPIPEN/ADRENACLICK/OR ANY BX GENERIC EQUIV) 0.3 MG/0.3ML injection 2-pack Inject 0.3 mLs (0.3 mg) into the muscle as needed for anaphylaxis 0.6 mL 1     escitalopram (LEXAPRO) 10 MG tablet Take 1 tablet (10 mg) by mouth daily 90 tablet 0     ipratropium (ATROVENT) 0.06 % nasal spray Spray 2 sprays in nostril 4 times daily as needed for rhinitis 3 Box 3     Loperamide HCl (IMODIUM OR) Take by mouth daily as needed       losartan (COZAAR) 100 MG tablet Take 1 tablet (100 mg) by mouth daily 90 tablet 3     Multiple Minerals-Vitamins  (PROSTEON PO) Take 2,000 Units by mouth 4 times daily       torsemide (DEMADEX) 20 MG tablet Take 1 tablet (20 mg) by mouth daily 90 tablet 3     albuterol (PROAIR HFA/PROVENTIL HFA/VENTOLIN HFA) 108 (90 Base) MCG/ACT inhaler Inhale 2 puffs into the lungs every 4 hours as needed for shortness of breath / dyspnea or wheezing (Patient not taking: Reported on 2/26/2020) 1 Inhaler 3     ASPIRIN NOT PRESCRIBED (INTENTIONAL) continuous prn for other Antiplatelet medication not prescribed intentionally due to Current anticoagulant therapy (warfarin/enoxaparin)       ciprofloxacin (CIPRO) 500 MG tablet Take 1 tablet (500 mg) by mouth 2 times daily 14 tablet 0     leuprolide (ELIGARD) 45 MG injection Inject 45 mg Subcutaneous every 6 months. 1 each 12       ALLERGIES     Allergies   Allergen Reactions     Augmentin Rash     Type III hypersensitivity     Bactrim [Sulfamethoxazole W/Trimethoprim] Rash     Serum sickness, type III hypersensitivity       Bee Venom Itching     Sulfa Drugs Hives     Celebrex [Celecoxib]      Lisinopril Cough     Naproxen Hives       PAST MEDICAL HISTORY:  Past Medical History:   Diagnosis Date     Actinic keratosis      Arthritis      Atrial fibrillation and flutter (H) 12/3/2018     CAD (coronary artery disease)     1/5/2011 lateral ischemia on stress echo, 12/2014 normal stress echo     Coronary artery disease 1/1/2011    Abnormal stress test 1/2011 and controlled with medical mgmt      Dyslipidemia      Emphysema of lung (H)      Essential hypertension, benign      Hernia, abdominal      Hypersomnia with sleep apnea, unspecified     on bipap, partially treated with residual apneas     Hypertrophy (benign) of prostate 5/01    Biopsy 5/01 negative for cancer; PSA 5     Lumbago      Mumps      Prostate cancer (H) 12/15/2006     Skin cancer, basal cell 1997     Spider veins        PAST SURGICAL HISTORY:  Past Surgical History:   Procedure Laterality Date     HERNIA REPAIR  child     Moh's procedure  for basal cell carcinoma  2001     PROSTATE SURGERY       s/p lumbar laminectomy NOS  1988     SIGMOIDOSCOPY FLEXIBLE N/A 6/15/2020    Procedure: SIGMOIDOSCOPY, FLEXIBLE;  Surgeon: Sunni Patton MD;  Location: [facility]     VITRECTOMY PARS PLANA REMOVE PRERETINAL MEMBRANE   3/09       FAMILY HISTORY:  Family History   Problem Relation Age of Onset     Alzheimer Disease Mother      Hypertension Mother      Cardiovascular Father         D:86 complications fo CHF     Prostate Cancer Brother      Lung Cancer Brother 76     Prostate Cancer Brother        SOCIAL HISTORY:  Social History     Socioeconomic History     Marital status:      Spouse name: None     Number of children: None     Years of education: None     Highest education level: None   Occupational History     Occupation: retired   Social Needs     Financial resource strain: None     Food insecurity     Worry: None     Inability: None     Transportation needs     Medical: None     Non-medical: None   Tobacco Use     Smoking status: Former Smoker     Packs/day: 1.00     Types: Cigarettes     Start date:      Last attempt to quit: 1978     Years since quittin.7     Smokeless tobacco: Never Used   Substance and Sexual Activity     Alcohol use: Never     Frequency: Never     Binge frequency: Never     Drug use: No     Sexual activity: Yes     Partners: Female   Lifestyle     Physical activity     Days per week: None     Minutes per session: None     Stress: None   Relationships     Social connections     Talks on phone: None     Gets together: None     Attends [employer] service: None     Active member of club or organization: None     Attends meetings of clubs or organizations: None     Relationship status: None     Intimate partner violence     Fear of current or ex partner: None     Emotionally abused: None     Physically abused: None     Forced sexual activity: None   Other Topics Concern      Service Not Asked     Blood  Transfusions Not Asked     Caffeine Concern No     Comment: 0-2 cans of soda per day.     Occupational Exposure Not Asked     Hobby Hazards Not Asked     Sleep Concern Not Asked     Stress Concern Not Asked     Weight Concern Not Asked     Special Diet Not Asked     Back Care Not Asked     Exercise No     Bike Helmet Not Asked     Seat Belt Yes     Self-Exams Not Asked     Parent/sibling w/ CABG, MI or angioplasty before 65F 55M? No   Social History Narrative     None             CC  No referring provider defined for this encounter.          Telephone number of patient: Home # [phone]        Video-Visit Details    Type of service:  Video Visit    Video Start Time: 4:14 PM  Video End Time: 4:40 PM    Originating Location (pt. Location): Home    Distant Location (provider location):  [facility]     Platform used for Video Visit: Quinten      Thank you for allowing me to participate in the care of your patient.    Sincerely,     Rajat Camp MD     [facility]

## 2020-10-16 DIAGNOSIS — R33.9 URINARY RETENTION: Primary | ICD-10-CM

## 2020-10-20 DIAGNOSIS — F32.0 MILD MAJOR DEPRESSION (H): ICD-10-CM

## 2020-10-21 RX ORDER — ESCITALOPRAM OXALATE 10 MG/1
TABLET ORAL
Qty: 90 TABLET | Refills: 0 | Status: SHIPPED | OUTPATIENT
Start: 2020-10-21 | End: 2020-10-27

## 2020-10-21 NOTE — TELEPHONE ENCOUNTER
PHQ-9 SCORE 10/23/2018 4/30/2019 7/19/2019   PHQ-9 Total Score - - -   PHQ-9 Total Score MyChart - 6 (Mild depression) -   PHQ-9 Total Score 2 6 1     Wife very sick now.  Please try calling to see if able to do a virtual AEV with me next month.  If not, try to just get PHQ9 and ok to defer appointment

## 2020-10-21 NOTE — TELEPHONE ENCOUNTER
Routing refill request to provider for review/approval because:  Due to PHQ    Marita Camarena RN

## 2020-10-23 ENCOUNTER — MYC MEDICAL ADVICE (OUTPATIENT)
Dept: PEDIATRICS | Facility: CLINIC | Age: 85
End: 2020-10-23

## 2020-10-23 NOTE — TELEPHONE ENCOUNTER
Spoke to patient. Scheduled for AWV as well as sent AwesomeTouch message to update PHQ-9.  Rupinder ARTEAGA RN, BSN

## 2020-10-27 ENCOUNTER — VIRTUAL VISIT (OUTPATIENT)
Dept: PEDIATRICS | Facility: CLINIC | Age: 85
End: 2020-10-27
Payer: COMMERCIAL

## 2020-10-27 DIAGNOSIS — I10 BENIGN ESSENTIAL HYPERTENSION: ICD-10-CM

## 2020-10-27 DIAGNOSIS — H91.93 BILATERAL HEARING LOSS, UNSPECIFIED HEARING LOSS TYPE: ICD-10-CM

## 2020-10-27 DIAGNOSIS — F32.0 MILD MAJOR DEPRESSION (H): ICD-10-CM

## 2020-10-27 DIAGNOSIS — R09.81 NASAL CONGESTION: ICD-10-CM

## 2020-10-27 DIAGNOSIS — I20.9 ANGINA PECTORIS (H): ICD-10-CM

## 2020-10-27 DIAGNOSIS — Z00.00 ENCOUNTER FOR MEDICARE ANNUAL WELLNESS EXAM: Primary | ICD-10-CM

## 2020-10-27 DIAGNOSIS — E66.01 MORBID OBESITY (H): ICD-10-CM

## 2020-10-27 DIAGNOSIS — I47.19 ATRIAL TACHYCARDIA (H): ICD-10-CM

## 2020-10-27 DIAGNOSIS — D64.9 ANEMIA, UNSPECIFIED TYPE: ICD-10-CM

## 2020-10-27 DIAGNOSIS — R73.01 IMPAIRED FASTING GLUCOSE: ICD-10-CM

## 2020-10-27 DIAGNOSIS — J43.8 OTHER EMPHYSEMA (H): ICD-10-CM

## 2020-10-27 DIAGNOSIS — N18.30 STAGE 3 CHRONIC KIDNEY DISEASE, UNSPECIFIED WHETHER STAGE 3A OR 3B CKD (H): ICD-10-CM

## 2020-10-27 PROCEDURE — G0439 PPPS, SUBSEQ VISIT: HCPCS | Mod: 95 | Performed by: INTERNAL MEDICINE

## 2020-10-27 RX ORDER — ESCITALOPRAM OXALATE 10 MG/1
10 TABLET ORAL DAILY
Qty: 90 TABLET | Refills: 3 | Status: SHIPPED | OUTPATIENT
Start: 2020-10-27 | End: 2021-10-21

## 2020-10-27 RX ORDER — IPRATROPIUM BROMIDE 42 UG/1
2 SPRAY, METERED NASAL 4 TIMES DAILY PRN
Qty: 3 BOX | Refills: 3 | Status: SHIPPED | OUTPATIENT
Start: 2020-10-27 | End: 2023-01-03

## 2020-10-27 RX ORDER — ALBUTEROL SULFATE 90 UG/1
2 POWDER, METERED RESPIRATORY (INHALATION) EVERY 6 HOURS PRN
Qty: 1 INHALER | Refills: 3 | Status: SHIPPED | OUTPATIENT
Start: 2020-10-27 | End: 2021-10-22

## 2020-10-27 RX ORDER — TORSEMIDE 10 MG/1
10 TABLET ORAL DAILY
Qty: 90 TABLET | Refills: 3 | Status: SHIPPED | OUTPATIENT
Start: 2020-10-27 | End: 2021-01-06

## 2020-10-27 ASSESSMENT — ENCOUNTER SYMPTOMS
ARTHRALGIAS: 1
NERVOUS/ANXIOUS: 0
COUGH: 1
DYSURIA: 0
JOINT SWELLING: 0
HEADACHES: 0
FEVER: 0
HEMATOCHEZIA: 1
HEMATURIA: 0
PALPITATIONS: 0
HEARTBURN: 0
SHORTNESS OF BREATH: 1
EYE PAIN: 0
FREQUENCY: 1
WEAKNESS: 1
NAUSEA: 0
DIARRHEA: 0
CONSTIPATION: 0
PARESTHESIAS: 1
DIZZINESS: 1
SORE THROAT: 0
ABDOMINAL PAIN: 0
MYALGIAS: 0
CHILLS: 0

## 2020-10-27 ASSESSMENT — ACTIVITIES OF DAILY LIVING (ADL)
CURRENT_FUNCTION: MONEY MANAGEMENT REQUIRES ASSISTANCE
CURRENT_FUNCTION: PREPARING MEALS REQUIRES ASSISTANCE
CURRENT_FUNCTION: NO ASSISTANCE NEEDED

## 2020-10-27 NOTE — PATIENT INSTRUCTIONS
If you are interested in a hearing aid, going to [facility] can be a great option.  I will place an audiology referral and you can decide when you want to do it.  [facility]  [facility] - [phone]    Drop the torsemide to 10mg daily (half of your current tab and a whole one of the new tab).  Let me know if youa re starting to get shortness of breath of leg swelling.      Talk to Dr. Brar about your urinary symptoms if dropping the torsemide doesn't help enough.      Start using your SAD lamp until about April.    Try albuterol 2 puffs four times daily  -  I sent a new prescription    Let me know if you want to try physical therapy    Schedule a lab-only appointment to recheck the anemia    Patient Education   Personalized Prevention Plan  You are up to date on your preventive services outlined below.        Exercise for a Healthier Heart     Exercise with a friend. When activity is fun, you're more likely to stick with it.   You may wonder how you can improve the health of your heart. If you re thinking about exercise, you re on the right track. You don t need to become an athlete, but you do need a certain amount of brisk exercise to help strengthen your heart. If you have been diagnosed with a heart condition, your doctor may recommend exercise to help stabilize your condition. To help make exercise a habit, choose safe, fun activities.  Be sure to check with your healthcare provider before starting an exercise program.  Why exercise?  Exercising regularly offers many healthy rewards. It can help you do all of the following:    Improve your blood cholesterol level to help prevent further heart trouble    Lower your blood pressure to help prevent a stroke or heart attack    Control diabetes, or reduce your risk of getting this disease    Improve your heart and lung function    Reach and maintain a healthy weight    Make your muscles stronger and more limber so you can stay active    Prevent falls and  fractures by slowing the loss of bone mass (osteoporosis)    Manage stress better    Reduce your blood pressure    Improve your sense of self and your body image  Exercise tips  Ease into your routine. Set small goals. Then build on them.  Exercise on most days. Aim for a total of 150 or more minutes of moderate to  vigorous intensity activity each week. Consider 40 minutes, 3 to 4 times a week. For best results, activity should last for 40 minutes on average. It is OK to work up to the 40 minute period over time. Examples of moderate-intensity activity is walking 1 mile in 15 minutes or 30 to 45 minutes of yard work.  Step up your daily activity level. Along with your exercise program, try being more active throughout the day. Walk instead of drive. Do more household tasks or yard work.  Choose one or more activities you enjoy. Walking is one of the easiest things you can do. You can also try swimming, riding a bike, dancing, or taking an exercise class.  Stop exercising and call your doctor if you:    Have chest pain or feel dizzy or lightheaded    Feel burning, tightness, pressure, or heaviness in your chest, neck, shoulders, back, or arms    Have unusual shortness of breath    Have increased joint or muscle pain    Have palpitations or an irregular heartbeat  Date Last Reviewed: 5/1/2016 2000-2019 [employer]. [address] [place] [address]. All rights reserved. This information is not intended as a substitute for professional medical care. Always follow your healthcare professional's instructions.          Understanding USDA MyPlate  The USDA (U.S. Department of Agriculture) has guidelines to help you make healthy food choices. These are called MyPlate. MyPlate shows the food groups that make up healthy meals using the image of a place setting. Before you eat, think about the healthiest choices for what to put onto your plate or into your cup or bowl. To learn more about building a  healthy plate, visit www.choosemyplate.gov.    The food groups    Fruits. Any fruit or 100% fruit juice counts as part of the Fruit Group. Fruits may be fresh, canned, frozen, or dried, and may be whole, cut-up, or pureed. Make half your plate fruits and vegetables.    Vegetables. Any vegetable or 100% vegetable juice counts as a member of the Vegetable Group. Vegetables may be fresh, frozen, canned, or dried. They can be served raw or cooked and may be whole, cut-up, or mashed. Make half your plate fruits and vegetables.    Grains. All foods made from grains are part of the Grains Group. These include wheat, rice, oats, cornmeal, and barley such as bread, pasta, oatmeal, cereal, tortillas, and grits. Grains should be no more than a quarter of your plate. At least half of your grains should be whole grains.    Protein. This group includes meat, poultry, seafood, beans and peas, eggs, processed soy products (like tofu), nuts (including nut butters), and seeds. Make protein choices no more than a quarter of your plate. Meat and poultry choices should be lean or low fat.    Dairy. All fluid milk products and foods made from milk that contain calcium, like yogurt and cheese, are part of the Dairy Group. (Foods that have little calcium, such as cream, butter, and cream cheese, are not part of the group.) Most dairy choices should be low-fat or fat-free.    Oils. These are fats that are liquid at room temperature. They include canola, corn, olive, soybean, and sunflower oil. Foods that are mainly oil include mayonnaise, certain salad dressings, and soft margarines. You should have only 5 to 7 teaspoons of oils a day. You probably already get this much from the food you eat.  Date Last Reviewed: 8/1/2017 2000-2019 [employer]. [address] [place] [place] [address], [place] [address]. All rights reserved. This information is not intended as a substitute for professional medical care. Always follow your healthcare  professional's instructions.          Signs of Hearing Loss     Hearing much better with one ear can be a sign of hearing loss.     Hearing loss is a problem shared by many people. In fact, it is one of the most common health conditions, particularly as people age. Most people over age 65 have some hearing loss, and by age 80, almost everyone does. Because hearing loss usually occurs slowly over the years, you may not realize your hearing ability has gotten worse.  Have your hearing checked  Contact your healthcare provider if you:    Have to strain to hear normal conversation    Have to watch other people s faces very carefully to follow what they re saying    Need to ask people to repeat what they ve said    Often misunderstand what people are saying    Turn the volume of the television or radio up so high that others complain    Feel that people are mumbling when they re talking to you    Find that the effort to hear leaves you feeling tired and irritated    Notice, when using the phone, that you hear better with one ear than the other  Date Last Reviewed: 12/1/2016 2000-2019 [employer]. [address] [place] [address]. All rights reserved. This information is not intended as a substitute for professional medical care. Always follow your healthcare professional's instructions.          Urinary Incontinence (Male)    Urinary incontinence means not being able to control the release of urine from the bladder.  Causes  Common causes of urinary incontinence in men include:    Infection    Certain medicines    Aging    Poor pelvic muscle tone    Bladder spasms    Obesity    Urinary retention  Nervous system diseases, diabetes, sleep apnea, urinary tract infections, prostate surgery, and pelvic trauma can also cause incontinence. Constipation and smoking have also been identified as risk factors.  Symptoms    Urge incontinence (also called  overactive bladder ) is a sudden urge to urinate  even though there may not be much urine in the bladder. The need to urinate often during the night is common. It is due to bladder spasms.    Stress incontinence is involuntary urine leakage that can occur with sneezing, coughing, and other actions that put stress on the bladder.  Treatment  Treatment of urinary incontinence depends on the cause. Infections of the bladder are treated with antibiotics. Urinary retention is treated with a bladder catheter.  Home care  Follow these guidelines when caring for yourself at home:    Don't consume foods and drinks that may irritate the bladder. These include drinks containing alcohol, caffeinate, or carbonation; chocolate; and acidic fruits and juices.    Limit fluid intake to 6 to 8 cups a day.    Lose weight if you are overweight. This will reduce your symptoms.    If needed, wear absorbent pads to catch urine. Change pads frequently to maintain hygiene and prevent skin and bladder infections.    Bathe daily to maintain good hygiene.    If an antibiotic was prescribed to treat a bladder infection, be sure to take it until finished, even if you are feeling better before then. This is to make sure your infection has cleared.    If a catheter was left in place, it is important to keep bacteria from getting into the collection bag. Don't disconnect the catheter from the collection bag.    Use a leg band to secure the catheter drainage tube, so it does not pull on the catheter. Drain the collection bag when it becomes full using the drain spout at the bottom of the bag. Don't disconnect the bag from the catheter.    Don't pull on or try to remove a catheter. The catheter must be removed by a healthcare provider.  Follow-up care  Follow up with your healthcare provider, or as advised.  When to seek medical advice  Call your healthcare provider right away if any of these occur:    Fever over 100.4 F (38 C), or as directed by your healthcare provider    Bladder pain or  fullness    Abdominal swelling, nausea, or vomiting    Back pain    Weakness, dizziness, or fainting    If a catheter was left in place, return if:  ? Catheter falls out  ? Catheter stops draining for 6 hours  Date Last Reviewed: 10/1/2017    6334-9145 [employer]. [address] [place] [address]. All rights reserved. This information is not intended as a substitute for professional medical care. Always follow your healthcare professional's instructions.          Preventing Falls in the Home  An adult or child can fall for many reasons. If you are an older adult, you may fall because your reaction time slows down. Your muscles and joints may get stiff, weak, or less flexible because of illness, medicines, or a physical condition. These things can also make a child more likely to fall or be injured in a fall.  Other health problems that make falls more likely include:    Arthritis    Dizziness or lightheadedness when you get out of bed (orthostatic hypotension)    History of a stroke    Dizziness    Anemia    Certain medicines taken for mental illness or to control blood pressure.    Problems with balance or gait    Bladder or urinary problems    History of falls with or without an injury    Changes in vision (vision impairment)    Changes in thinking skills and memory (cognitive impairment)  Injuries from a fall can include broken bones, dislocated joints, internal bleeding and cuts. When these injuries are serious enough, they can make it impossible for you or a child who is injured in a fall to live on his or her ownhome.  Prevention tips  To help prevent falls and fall-related injuries, follow the tips below.   Floors  Make floors safer by doing the following:     Put nonskid pads under area rugs.    Remove throw rugs.    Replace worn floor coverings.    Tack carpets firmly to each step on carpeted stairs. Put nonskid strips on the edges of uncarpeted stairs.    Keep floors and stairs free  of clutter and cords.    Arrange furniture so there are clear pathways.    Clean up any spills right away.    Wear shoes that fit.  Bathrooms    Make bathrooms safer by doing the following:     Install grab bars in the tub or shower.    Apply nonskid strips or put a nonskid rubber mat in the tub or shower.    Sit on a bath chair to bathe.    Use bathmats with nonskid backing.  Lighting and the environment  Improve lighting in your home by doing the following:     Keep a flashlight in each room. Or put a lamp next to the bed within easy reach.    Put nightlights in the bedrooms, hallways, kitchen, and bathrooms.    Make sure all stairways have good lighting.    Take your time when going up and down stairs.    Put handrails on both sides of stairs and in walkways for more support. To prevent injury to your wrist or arm, don t use handrails to pull yourself up.    Install grab bars to pull yourself up.    Move or rearrange items that you use often. This will make them easier to find or reach.    Look at your home to find any safety hazards. Especially look at doorways, walkways, and the driveway. Remove or repair any safety problems that you find.  Date Last Reviewed: 8/1/2016 2000-2019 [employer]. [address] [facility], [place] [address]. All rights reserved. This information is not intended as a substitute for professional medical care. Always follow your healthcare professional's instructions.

## 2020-10-27 NOTE — PROGRESS NOTES
"Nixon More is a 85 year old male who is being evaluated via a billable video visit.      The patient has been notified of following:     \"This video visit will be conducted via a call between you and your physician/provider. We have found that certain health care needs can be provided without the need for an in-person physical exam.  This service lets us provide the care you need with a video conversation.  If a prescription is necessary we can send it directly to your pharmacy.  If lab work is needed we can place an order for that and you can then stop by our lab to have the test done at a later time.    Video visits are billed at different rates depending on your insurance coverage.  Please reach out to your insurance provider with any questions.    If during the course of the call the physician/provider feels a video visit is not appropriate, you will not be charged for this service.\"    Patient has given verbal consent for Video visit? Yes  How would you like to obtain your AVS? MyChart  If you are dropped from the video visit, the video invite should be resent to: Text to cell phone: [phone]  Will anyone else be joining your video visit? No      Subjective     Nixon More is a 85 year old male who presents today via video visit for the following health issues:    Healthy Habits:     In general, how would you rate your overall health?  Good    Frequency of exercise:  1 day/week    Duration of exercise:  15-30 minutes    Do you usually eat at least 4 servings of fruit and vegetables a day, include whole grains    & fiber and avoid regularly eating high fat or \"junk\" foods?  No    Taking medications regularly:  Yes    Barriers to taking medications:  None    Medication side effects:  None    Ability to successfully perform activities of daily living:  No assistance needed    Home Safety:  No safety concerns identified    Hearing Impairment:  Difficulty following a conversation in a noisy restaurant " or crowded room and difficulty understanding soft or whispered speech    In the past 6 months, have you been bothered by leaking of urine? Yes    In general, how would you rate your overall mental or emotional health?  Good      PHQ-2 Total Score: 1    Additional concerns today:  Yes       Annual Wellness Visit    Patient has been advised of split billing requirements and indicates understanding: Yes     Are you in the first 12 months of your Medicare Part B coverage?  No      There were no vitals taken for this visit.  Weight: Unable to obtain due to video visit  Height: Unable to obtain due to video visit  BMI: Unable to obtain due to video visit  Blood Pressure: Unable to obtain due to video visit    Mental Health:    In general, how would you rate your overall mental or emotional health? good  PHQ-2 Score: (P) 1  PHQ-9 SCORE 10/23/2018 2019 2019   PHQ-9 Total Score - - -   PHQ-9 Total Score MyChart - 6 (Mild depression) -   PHQ-9 Total Score 2 6 1        Do you feel safe in your environment? Yes    Have you ever done Advance Care Planning? (For example, a Health Directive, POLST, or a discussion with a medical provider or your loved ones about your wishes)? Yes, advance care planning is on file.    Fall risk:  Fallen 2 or more times in the past year?: Yes  Any fall with injury in the past year?: No    Cognitive Screenin) Repeat 3 items (Leader, Season, Table)    2) Clock draw: NORMAL  3) 3 item recall: Recalls 3 objects  Results: 3 items recalled: COGNITIVE IMPAIRMENT LESS LIKELY    [employer]TM Estephania Roman. Licensed by the author for use in [facility]; reprinted with permission (alfa@.[email or web address]). All rights reserved.      Do you have sleep apnea, excessive snoring or daytime drowsiness?: yes    Current providers sharing in care for this patient include:   Patient Care Team:  Natalya Lewis MD as PCP - General  Natalya Lewis MD as Assigned PCP    Having some episodes of  lightheadedness.  Is home caring for wife on hospice.  BP at home (per daughter who is an RN) 80/50, 87/58, but then 161/129 (while talking in right arm)         Video Start Time: 3:35        Review of Systems   Occasional shortness of breath, doesn't think to use inhaler.    Mood worse some day  Gluteal pain and trouble walking sometimes.  Constitutional, HEENT, cardiovascular, pulmonary, gi and gu systems are negative, except as otherwise noted.      Objective         Vitals:  No vitals were obtained today due to virtual visit.    Physical Exam     GENERAL: Healthy, alert and no distress  EYES: Eyes grossly normal to inspection.  No discharge or erythema, or obvious scleral/conjunctival abnormalities.  RESP: No audible wheeze, cough, or visible cyanosis.  No visible retractions or increased work of breathing.    SKIN: Visible skin clear. No significant rash, abnormal pigmentation or lesions.  NEURO: Cranial nerves grossly intact.  Mentation and speech appropriate for age.  PSYCH: Mentation appears normal, affect normal/bright, judgement and insight intact, normal speech and appearance well-groomed.              Assessment & Plan     Encounter for Medicare annual wellness exam  Routine health education discussed: calcium, diet, exercise, weight, safety.     Other emphysema (H)  Discussed using inhaler if short of breath and refilled  - albuterol (PROAIR RESPICLICK) 108 (90 Base) MCG/ACT inhaler; Inhale 2 puffs into the lungs every 6 hours as needed for shortness of breath / dyspnea or wheezing    Mild major depression (H)  Discussed wife's illness and coping.  Feels medication is helpful, refilled  - escitalopram (LEXAPRO) 10 MG tablet; Take 1 tablet (10 mg) by mouth daily    Morbid obesity (H)  Discussed diet and exericse    Angina pectoris (H)  Continue risk factor reduction      Atrial tachycardia (H)  Continue anticoagulation  - apixaban ANTICOAGULANT (ELIQUIS) 5 MG tablet; Take 1 tablet (5 mg) by mouth 2 times  "daily    Stage 3 chronic kidney disease, unspecified whether stage 3a or 3b CKD  Recheck labs  - Albumin Random Urine Quantitative with Creat Ratio; Future  - **Basic metabolic panel FUTURE anytime; Future    Benign essential hypertension  Controlled, continue medications.  Watch for orthostasis, may need to adjust  - torsemide (DEMADEX) 10 MG tablet; Take 1 tablet (10 mg) by mouth daily    Nasal congestion  refill  - ipratropium (ATROVENT) 0.06 % nasal spray; Spray 2 sprays in nostril 4 times daily as needed for rhinitis    Impaired fasting glucose  recheck  - **A1C FUTURE anytime; Future    Anemia, unspecified type  Evaluation for cause and recheck with labs  - CBC with platelets differential; Future  - Reticulocyte count; Future  - Blood Morphology Pathologist Review; Future  - Vitamin B12; Future  - Folate; Future    Bilateral hearing loss, unspecified hearing loss type  Discussed hearing loss, would like referral  - AUDIOLOGY ADULT REFERRAL; Future     BMI:   Estimated body mass index is 35.08 kg/m  as calculated from the following:    Height as of 8/19/20: 1.702 m (5' 7\").    Weight as of 8/19/20: 101.6 kg (224 lb).   Weight management plan: Discussed healthy diet and exercise guidelines         See Patient Instructions    Return in about 53 weeks (around 11/2/2021) for Annual Wellness Visit.    [facility]atal[facility] Lewis MD  [facility]      Video-Visit Details    Type of service:  Video Visit    Video End Time:4:23 PM    Originating Location (pt. Location): Home    Distant Location (provider location):  [facility]     Platform used for Video Visit: Ar[employer]          Answers for HPI/ROS submitted by the patient on 10/27/2020   Annual Exam:  In general, how would you rate your overall physical health?: good  Frequency of exercise:: 1 day/week  Do you usually eat at least 4 servings of fruit and vegetables a day, include whole grains & fiber, and avoid regularly eating high fat or " "\"junk\" foods? : No  Taking medications regularly:: Yes  Activities of Daily Living: preparing meals requires assistance, money management requires assistance  Home safety: no safety concerns identified  Hearing Impairment:: difficulty following a conversation in a noisy restaurant or crowded room, difficulty understanding soft or whispered speech  In the past 6 months, have you been bothered by leaking of urine?: Yes  abdominal pain: No  Blood in stool: Yes - known radiation damage  Blood in urine: No  chest pain: No  chills: No  congestion: Yes  constipation: No  cough: Yes  diarrhea: No  dizziness: Yes  ear pain: No  eye pain: No  nervous/anxious: No  fever: No  frequency: Yes  genital sores: No  headaches: No  hearing loss: Yes  heartburn: No  arthralgias: Yes  joint swelling: No  peripheral edema: Yes  mood changes: No  myalgias: No  nausea: No  dysuria: No  palpitations: No  Skin sensation changes: Yes  sore throat: No  urgency: Yes  rash: No  shortness of breath: Yes  visual disturbance: Yes  weakness: Yes  impotence: Yes  penile discharge: No  In general, how would you rate your overall mental or emotional health?: good  Additional concerns today:: Yes  Duration of exercise:: 15-30 minutes      He is at risk for lack of exercise and has been provided with information to increase physical activity for the benefit of his well-being.  The patient was counseled and encouraged to consider modifying their diet and eating habits. He was provided with information on recommended healthy diet options.  The patient was provided with written information regarding signs of hearing loss.  Information on urinary incontinence discussed - follow-up with urology  He is at risk for falling and has been provided with information to reduce the risk of falling at home.  "

## 2020-11-11 ENCOUNTER — ALLIED HEALTH/NURSE VISIT (OUTPATIENT)
Dept: NURSING | Facility: CLINIC | Age: 85
End: 2020-11-11
Payer: COMMERCIAL

## 2020-11-11 VITALS — DIASTOLIC BLOOD PRESSURE: 80 MMHG | SYSTOLIC BLOOD PRESSURE: 130 MMHG

## 2020-11-11 DIAGNOSIS — C61 PROSTATE CANCER (H): ICD-10-CM

## 2020-11-11 DIAGNOSIS — R73.01 IMPAIRED FASTING GLUCOSE: ICD-10-CM

## 2020-11-11 DIAGNOSIS — Z01.30 BP CHECK: Primary | ICD-10-CM

## 2020-11-11 DIAGNOSIS — D64.9 ANEMIA, UNSPECIFIED TYPE: ICD-10-CM

## 2020-11-11 DIAGNOSIS — R33.9 URINARY RETENTION: ICD-10-CM

## 2020-11-11 DIAGNOSIS — N18.30 STAGE 3 CHRONIC KIDNEY DISEASE, UNSPECIFIED WHETHER STAGE 3A OR 3B CKD (H): ICD-10-CM

## 2020-11-11 LAB
ALBUMIN UR-MCNC: NEGATIVE MG/DL
ANION GAP SERPL CALCULATED.3IONS-SCNC: 7 MMOL/L (ref 3–14)
APPEARANCE UR: CLEAR
BASOPHILS # BLD AUTO: 0.1 10E9/L (ref 0–0.2)
BASOPHILS NFR BLD AUTO: 1 %
BILIRUB UR QL STRIP: NEGATIVE
BUN SERPL-MCNC: 22 MG/DL (ref 7–30)
CALCIUM SERPL-MCNC: 9.8 MG/DL (ref 8.5–10.1)
CHLORIDE SERPL-SCNC: 110 MMOL/L (ref 94–109)
CO2 SERPL-SCNC: 25 MMOL/L (ref 20–32)
COLOR UR AUTO: YELLOW
COPATH REPORT: NORMAL
CREAT SERPL-MCNC: 1.16 MG/DL (ref 0.66–1.25)
CREAT UR-MCNC: 116 MG/DL
DIFFERENTIAL METHOD BLD: ABNORMAL
EOSINOPHIL # BLD AUTO: 0.4 10E9/L (ref 0–0.7)
EOSINOPHIL NFR BLD AUTO: 6.2 %
ERYTHROCYTE [DISTWIDTH] IN BLOOD BY AUTOMATED COUNT: 13.8 % (ref 10–15)
FOLATE SERPL-MCNC: 42 NG/ML
GFR SERPL CREATININE-BSD FRML MDRD: 58 ML/MIN/{1.73_M2}
GLUCOSE SERPL-MCNC: 118 MG/DL (ref 70–99)
GLUCOSE UR STRIP-MCNC: NEGATIVE MG/DL
HBA1C MFR BLD: 5.6 % (ref 0–5.6)
HCT VFR BLD AUTO: 38.6 % (ref 40–53)
HGB BLD-MCNC: 12.1 G/DL (ref 13.3–17.7)
HGB UR QL STRIP: NEGATIVE
KETONES UR STRIP-MCNC: NEGATIVE MG/DL
LEUKOCYTE ESTERASE UR QL STRIP: NEGATIVE
LYMPHOCYTES # BLD AUTO: 1.5 10E9/L (ref 0.8–5.3)
LYMPHOCYTES NFR BLD AUTO: 25.3 %
MCH RBC QN AUTO: 30.2 PG (ref 26.5–33)
MCHC RBC AUTO-ENTMCNC: 31.3 G/DL (ref 31.5–36.5)
MCV RBC AUTO: 96 FL (ref 78–100)
MICROALBUMIN UR-MCNC: 11 MG/L
MICROALBUMIN/CREAT UR: 9.57 MG/G CR (ref 0–17)
MONOCYTES # BLD AUTO: 0.6 10E9/L (ref 0–1.3)
MONOCYTES NFR BLD AUTO: 9.6 %
NEUTROPHILS # BLD AUTO: 3.4 10E9/L (ref 1.6–8.3)
NEUTROPHILS NFR BLD AUTO: 57.9 %
NITRATE UR QL: NEGATIVE
PH UR STRIP: 6.5 PH (ref 5–7)
PLATELET # BLD AUTO: 196 10E9/L (ref 150–450)
POTASSIUM SERPL-SCNC: 3.5 MMOL/L (ref 3.4–5.3)
PSA SERPL-MCNC: <0.01 UG/L (ref 0–4)
RBC # BLD AUTO: 4.01 10E12/L (ref 4.4–5.9)
RETICS # AUTO: 116.9 10E9/L (ref 25–95)
RETICS/RBC NFR AUTO: 2.9 % (ref 0.5–2)
SODIUM SERPL-SCNC: 142 MMOL/L (ref 133–144)
SOURCE: NORMAL
SP GR UR STRIP: 1.02 (ref 1–1.03)
UROBILINOGEN UR STRIP-ACNC: 0.2 EU/DL (ref 0.2–1)
VIT B12 SERPL-MCNC: 531 PG/ML (ref 193–986)
WBC # BLD AUTO: 5.9 10E9/L (ref 4–11)

## 2020-11-11 PROCEDURE — 82043 UR ALBUMIN QUANTITATIVE: CPT | Performed by: INTERNAL MEDICINE

## 2020-11-11 PROCEDURE — 82607 VITAMIN B-12: CPT | Performed by: INTERNAL MEDICINE

## 2020-11-11 PROCEDURE — 36415 COLL VENOUS BLD VENIPUNCTURE: CPT | Performed by: INTERNAL MEDICINE

## 2020-11-11 PROCEDURE — 81003 URINALYSIS AUTO W/O SCOPE: CPT | Performed by: INTERNAL MEDICINE

## 2020-11-11 PROCEDURE — 83036 HEMOGLOBIN GLYCOSYLATED A1C: CPT | Performed by: INTERNAL MEDICINE

## 2020-11-11 PROCEDURE — 80048 BASIC METABOLIC PNL TOTAL CA: CPT | Performed by: INTERNAL MEDICINE

## 2020-11-11 PROCEDURE — 85025 COMPLETE CBC W/AUTO DIFF WBC: CPT | Performed by: INTERNAL MEDICINE

## 2020-11-11 PROCEDURE — 85045 AUTOMATED RETICULOCYTE COUNT: CPT | Performed by: INTERNAL MEDICINE

## 2020-11-11 PROCEDURE — 84153 ASSAY OF PSA TOTAL: CPT | Performed by: INTERNAL MEDICINE

## 2020-11-11 PROCEDURE — 82746 ASSAY OF FOLIC ACID SERUM: CPT | Performed by: INTERNAL MEDICINE

## 2020-11-11 NOTE — NURSING NOTE
Nixon More is a 85 year old patient who comes in today for a Blood Pressure check.  Initial BP:  /80 (BP Location: Right arm, Patient Position: Chair, Cuff Size: Adult Large)      Data Unavailable  Disposition: follow-up as previously indicated by provider and results routed to provider

## 2020-11-25 NOTE — TELEPHONE ENCOUNTER
RECORDS STATUS - ALL OTHER DIAGNOSIS      RECORDS RECEIVED FROM: [facility]   DATE RECEIVED: 12/30/2020   NOTES STATUS DETAILS   OFFICE NOTE from referring provider Complete Natalya Lewis MD   OFFICE NOTE from medical oncologist N/A [facility] 4/20/2020 Virtual Visit Malignant Neoplasm of prostate     3/11/2020 Office Visit Malignant Neoplasm of prostate     DISCHARGE SUMMARY from hospital N/A [facility] 6/15/2020 Sigmoidoscopy   DISCHARGE REPORT from the ER     OPERATIVE REPORT N/A    MEDICATION LIST Complete [facility]   CLINICAL TRIAL TREATMENTS TO DATE     LABS     PATHOLOGY REPORTS     ANYTHING RELATED TO DIAGNOSIS Complete Labs last updated on 11/11/2020   GENONOMIC TESTING     TYPE:     IMAGING (NEED IMAGES & REPORT)     CT SCANS     MRI     MAMMO     ULTRASOUND     PET

## 2020-12-04 ENCOUNTER — TRANSFERRED RECORDS (OUTPATIENT)
Dept: HEALTH INFORMATION MANAGEMENT | Facility: CLINIC | Age: 85
End: 2020-12-04

## 2020-12-07 ENCOUNTER — TRANSFERRED RECORDS (OUTPATIENT)
Dept: HEALTH INFORMATION MANAGEMENT | Facility: CLINIC | Age: 85
End: 2020-12-07

## 2020-12-15 ENCOUNTER — ANCILLARY PROCEDURE (OUTPATIENT)
Dept: GENERAL RADIOLOGY | Facility: CLINIC | Age: 85
End: 2020-12-15
Attending: FAMILY MEDICINE
Payer: COMMERCIAL

## 2020-12-15 ENCOUNTER — OFFICE VISIT (OUTPATIENT)
Dept: ORTHOPEDICS | Facility: CLINIC | Age: 85
End: 2020-12-15
Payer: COMMERCIAL

## 2020-12-15 VITALS — HEIGHT: 68 IN | BODY MASS INDEX: 33.65 KG/M2 | WEIGHT: 222 LBS

## 2020-12-15 DIAGNOSIS — M25.512 ACUTE PAIN OF LEFT SHOULDER: Primary | ICD-10-CM

## 2020-12-15 DIAGNOSIS — M25.512 ACUTE PAIN OF LEFT SHOULDER: ICD-10-CM

## 2020-12-15 PROCEDURE — 20610 DRAIN/INJ JOINT/BURSA W/O US: CPT | Mod: LT | Performed by: FAMILY MEDICINE

## 2020-12-15 PROCEDURE — 99207 PR DROP WITH A PROCEDURE: CPT | Performed by: FAMILY MEDICINE

## 2020-12-15 PROCEDURE — 73030 X-RAY EXAM OF SHOULDER: CPT | Mod: LT | Performed by: RADIOLOGY

## 2020-12-15 RX ORDER — LIDOCAINE HYDROCHLORIDE 10 MG/ML
4 INJECTION, SOLUTION EPIDURAL; INFILTRATION; INTRACAUDAL; PERINEURAL
Status: DISCONTINUED | OUTPATIENT
Start: 2020-12-15 | End: 2021-10-22

## 2020-12-15 RX ORDER — METHYLPREDNISOLONE ACETATE 40 MG/ML
40 INJECTION, SUSPENSION INTRA-ARTICULAR; INTRALESIONAL; INTRAMUSCULAR; SOFT TISSUE
Status: DISCONTINUED | OUTPATIENT
Start: 2020-12-15 | End: 2021-10-22

## 2020-12-15 RX ADMIN — LIDOCAINE HYDROCHLORIDE 4 ML: 10 INJECTION, SOLUTION EPIDURAL; INFILTRATION; INTRACAUDAL; PERINEURAL at 14:29

## 2020-12-15 RX ADMIN — METHYLPREDNISOLONE ACETATE 40 MG: 40 INJECTION, SUSPENSION INTRA-ARTICULAR; INTRALESIONAL; INTRAMUSCULAR; SOFT TISSUE at 14:29

## 2020-12-15 ASSESSMENT — MIFFLIN-ST. JEOR: SCORE: 1676.49

## 2020-12-15 NOTE — PROGRESS NOTES
SPORTS & ORTHOPEDIC WALK-IN VISIT 12/15/2020    Primary Care Physician: Dr. Lewis    12/14/20 - Does not recall any injury or overuse at the time that would have cause his L shoulder pain.  Had prior visit to [facility] 2 years ago for similar issue. Recalls that they may have given a CSI which resolved Sx.    Currently having difficulties with ADL due to shoulder pain.    Most of the pain is located near RC insertion on lateral aspect of L shoulder.    Pain with resisted ER and unable to complete empty can test.    Reason for visit:     What part of your body is injured / painful?  left shoulder    What caused the injury /pain? Unsure    How long ago did your injury occur or pain begin? yesterday    What are your most bothersome symptoms? Pain    How would you characterize your symptom?  aching and dull    What makes your symptoms better? Rest    What makes your symptoms worse? Movement    Have you been previously seen for this problem? No    Medical History:    Any recent changes to your medical history? No    Any new medication prescribed since last visit? No    Have you had surgery on this body part before? No    Social History:    Occupation: retired    Handedness: Right    Exercise: walking    Review of Systems:    Do you have fever, chills, weight loss? No    Do you have any vision problems? Yes, double vision intermittent    Do you have any chest pain or edema? No    Do you have any shortness of breath or wheezing?  Yes, chronic    Do you have stomach problems? No    Do you have any numbness or focal weakness? R leg (from back surgery 1988) numbness (foot drop)    Do you have diabetes? Yes, potentially prediabetic    Do you have problems with bleeding or clotting? Blood thinner eloquis    Do you have an rashes or other skin lesions? No       Large Joint Injection/Arthocentesis: L subacromial bursa    Date/Time: 12/15/2020 2:29 PM  Performed by: Rajat Bañuelos DO  Authorized by: Rajat Bañuelos DO      Indications:  Pain  Needle Size:  22 G  Guidance: landmark guided    Approach:  Posterolateral  Location:  Shoulder      Site:  L subacromial bursa  Medications:  40 mg methylPREDNISolone 40 MG/ML; 4 mL lidocaine (PF) 1 %  Outcome:  Tolerated well, no immediate complications  Procedure discussed: discussed risks, benefits, and alternatives    Consent Given by:  Patient  Timeout: timeout called immediately prior to procedure    Prep: patient was prepped and draped in usual sterile fashion     Scribed by Ethan Mora ATC, ATC for Dr. Bañuelos on 12/15/20 at 2:30PM, based on the provider's statements to me.    Shoulder Injection - subacromial space     The patient was informed of the risks and the benefits of the procedure and a written consent was signed.    The patient s left shoulder was prepped with Betadine in sterile fashion.   40 mg of triamcinolone suspension was drawn up into a 5 mL syringe with 4 mL of 1% lidocaine.  Injection was performed with sub-sterile technique.  A 1.5-inch 22-gauge needle was used to enter the subacromial space at the posterior glenohumeral joint.  There were no complications. The patient tolerated the procedure well. There was negligible bleeding.   The patient was instructed to ice the shoulder upon leaving clinic and refrain from overuse over the next 3 days.   The patient was instructed to call or go to the emergency room with any unusual pain, swelling, redness, or if otherwise concerned.            CHIEF COMPLAINT:  Pain of the Left Shoulder       HISTORY OF PRESENT ILLNESS  Mr. More is a pleasant 85 year old male who presents to clinic today with left shoulder pain.  Spencer started to experience an acute pain in his left shoulder yesterday with no clear inciting event.  He points to the lateral aspect of his shoulder, pain is somewhat focal and somewhat vague in nature.  He has great difficulty lifting his arm.  He denies any numbness or tingling, no weakness in the arm.  He  has a history of intermittent shoulder pain, this has happened in the past although he is not sure if it was his right or his left shoulder.  He did have a corticosteroid injection, which resolved his pain.        Additional history: as documented    MEDICAL HISTORY  Patient Active Problem List   Diagnosis     Personal history of other malignant neoplasm of skin     Lumbago     Hypertrophy of prostate without urinary obstruction     Other emphysema (H)     Hypersomnia with sleep apnea     Malignant neoplasm of prostate     Impotence of organic origin     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, lower limb     CARDIOVASCULAR SCREENING; LDL GOAL LESS THAN 100     Vitamin D deficiency     Branch retinal vein occlusion     Bee sting-induced anaphylaxis     Advance Care Planning     Radiation proctitis     Coronary artery disease     Impaired fasting glucose     Health Care Home     Dyslipidemia     Benign essential hypertension     Spinal stenosis of lumbar region with neurogenic claudication     Dysthymia     Atrial fibrillation and flutter (H)     Dilated aortic root (H)     Generalized muscle weakness     Obesity (BMI 35.0-39.9) with comorbidity (H)     CKD (chronic kidney disease) stage 3, GFR 30-59 ml/min     Mild major depression (H)     Angina pectoris (H)       Current Outpatient Medications   Medication Sig Dispense Refill     albuterol (PROAIR RESPICLICK) 108 (90 Base) MCG/ACT inhaler Inhale 2 puffs into the lungs every 6 hours as needed for shortness of breath / dyspnea or wheezing 1 Inhaler 3     apixaban ANTICOAGULANT (ELIQUIS) 5 MG tablet Take 1 tablet (5 mg) by mouth 2 times daily 180 tablet 3     ASPIRIN NOT PRESCRIBED (INTENTIONAL) continuous prn for other Antiplatelet medication not prescribed intentionally due to Current anticoagulant therapy (warfarin/enoxaparin)       atenolol (TENORMIN) 100 MG tablet Take 1.5 tablets (150 mg) by mouth daily 135 tablet 3     atorvastatin (LIPITOR) 40 MG tablet TAKE 1  "TABLET BY MOUTH ONCE DAILY 90 tablet 3     diltiazem ER (DILT-XR) 120 MG 24 hr capsule Take 1 capsule (120 mg) by mouth daily 90 capsule 3     EPINEPHrine (EPIPEN/ADRENACLICK/OR ANY BX GENERIC EQUIV) 0.3 MG/0.3ML injection 2-pack Inject 0.3 mLs (0.3 mg) into the muscle as needed for anaphylaxis 0.6 mL 1     escitalopram (LEXAPRO) 10 MG tablet Take 1 tablet (10 mg) by mouth daily 90 tablet 3     ipratropium (ATROVENT) 0.06 % nasal spray Spray 2 sprays in nostril 4 times daily as needed for rhinitis 3 Box 3     Loperamide HCl (IMODIUM OR) Take by mouth daily as needed       losartan (COZAAR) 100 MG tablet Take 1 tablet (100 mg) by mouth daily 90 tablet 3     Multiple Minerals-Vitamins (PROSTEON PO) Take 2,000 Units by mouth 4 times daily       torsemide (DEMADEX) 10 MG tablet Take 1 tablet (10 mg) by mouth daily 90 tablet 3       Allergies   Allergen Reactions     Augmentin Rash     Type III hypersensitivity     Bactrim [Sulfamethoxazole W/Trimethoprim] Rash     Serum sickness, type III hypersensitivity       Bee Venom Itching     Sulfa Drugs Hives     Celebrex [Celecoxib]      Lisinopril Cough     Naproxen Hives       Family History   Problem Relation Age of Onset     Alzheimer Disease Mother      Hypertension Mother      Cardiovascular Father         D:86 complications fo CHF     Prostate Cancer Brother      Lung Cancer Brother 76     Prostate Cancer Brother        Additional medical/Social/Surgical histories reviewed in [facility] and updated as appropriate.        PHYSICAL EXAM  Vitals:    12/15/20 1329   Weight: 100.7 kg (222 lb)   Height: 1.727 m (5' 8\")     General  - normal appearance, in no obvious distress  HEENT  - conjunctivae not injected, moist mucous membranes  CV  - normal radial pulse  Pulm  - normal respiratory pattern, non-labored  Musculoskeletal - left shoulder  - inspection: normal bone and joint alignment, no obvious deformity, no scapular winging, no AC step-off  - palpation: mildly tender RC " insertion, normal clavicle, non-tender AC  - ROM: painful initiation of flexion, cannot flex past 30 deg  - strength: 5/5  strength, 5/5 in all shoulder planes  - special tests:  Unable to perform   Neuro  - no sensory or motor deficit, grossly normal coordination, normal muscle tone  Skin  - no ecchymosis, erythema, warmth, or induration, no obvious rash  Psych  - interactive, appropriate, normal mood and affect         ASSESSMENT & PLAN  Mr. More is a 85 year old male who presents to clinic today with acute on chronic left shoulder pain.    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    It does seem that Spencer's pain is likely secondary to an acute subacromial impingement.  We discussed treatment options including relative rest, strengthening, oral analgesics, and corticosteroid injections.  Through shared decision making we did decide to perform a corticosteroid injection today (see procedure note).  If this injection  does not bring him any relief I would consider advanced imaging or formal physical therapy.    It was a pleasure seeing Spencer today.    Aside to and separate from the injection we spent approximately 25 minutes in the room, discussing the above.    Rajat Bañuelos DO, [facility]  Primary Care Sports Medicine      This note was constructed using Dragon dictation software, please excuse any minor errors in spelling, grammar, or syntax.

## 2020-12-15 NOTE — LETTER
12/15/2020         RE: Nixon More  [address]  [place] [place] [address]        Dear Colleague,    Thank you for referring your patient, Nixon More, to the [facility]. Please see a copy of my visit note below.          SPORTS & ORTHOPEDIC WALK-IN VISIT 12/15/2020    Primary Care Physician: Dr. Lewis    12/14/20 - Does not recall any injury or overuse at the time that would have cause his L shoulder pain.  Had prior visit to [facility] 2 years ago for similar issue. Recalls that they may have given a CSI which resolved Sx.    Currently having difficulties with ADL due to shoulder pain.    Most of the pain is located near RC insertion on lateral aspect of L shoulder.    Pain with resisted ER and unable to complete empty can test.    Reason for visit:     What part of your body is injured / painful?  left shoulder    What caused the injury /pain? Unsure    How long ago did your injury occur or pain begin? yesterday    What are your most bothersome symptoms? Pain    How would you characterize your symptom?  aching and dull    What makes your symptoms better? Rest    What makes your symptoms worse? Movement    Have you been previously seen for this problem? No    Medical History:    Any recent changes to your medical history? No    Any new medication prescribed since last visit? No    Have you had surgery on this body part before? No    Social History:    Occupation: retired    Handedness: Right    Exercise: walking    Review of Systems:    Do you have fever, chills, weight loss? No    Do you have any vision problems? Yes, double vision intermittent    Do you have any chest pain or edema? No    Do you have any shortness of breath or wheezing?  Yes, chronic    Do you have stomach problems? No    Do you have any numbness or focal weakness? R leg (from back surgery 1988) numbness (foot drop)    Do you have diabetes? Yes, potentially prediabetic    Do you have  problems with bleeding or clotting? Blood thinner eloquis    Do you have an rashes or other skin lesions? No       Large Joint Injection/Arthocentesis: L subacromial bursa    Date/Time: 12/15/2020 2:29 PM  Performed by: Rajat Bañuelos DO  Authorized by: Rajat Bañeulos DO     Indications:  Pain  Needle Size:  22 G  Guidance: landmark guided    Approach:  Posterolateral  Location:  Shoulder      Site:  L subacromial bursa  Medications:  40 mg methylPREDNISolone 40 MG/ML; 4 mL lidocaine (PF) 1 %  Outcome:  Tolerated well, no immediate complications  Procedure discussed: discussed risks, benefits, and alternatives    Consent Given by:  Patient  Timeout: timeout called immediately prior to procedure    Prep: patient was prepped and draped in usual sterile fashion     Scribed by Ethan Mora ATC, ATC for Dr. Bañuelos on 12/15/20 at 2:30PM, based on the provider's statements to me.    Shoulder Injection - subacromial space     The patient was informed of the risks and the benefits of the procedure and a written consent was signed.    The patient s left shoulder was prepped with Betadine in sterile fashion.   40 mg of triamcinolone suspension was drawn up into a 5 mL syringe with 4 mL of 1% lidocaine.  Injection was performed with sub-sterile technique.  A 1.5-inch 22-gauge needle was used to enter the subacromial space at the posterior glenohumeral joint.  There were no complications. The patient tolerated the procedure well. There was negligible bleeding.   The patient was instructed to ice the shoulder upon leaving clinic and refrain from overuse over the next 3 days.   The patient was instructed to call or go to the emergency room with any unusual pain, swelling, redness, or if otherwise concerned.            CHIEF COMPLAINT:  Pain of the Left Shoulder       HISTORY OF PRESENT ILLNESS  Mr. More is a pleasant 85 year old male who presents to clinic today with left shoulder pain.  Spencer started to experience an acute  pain in his left shoulder yesterday with no clear inciting event.  He points to the lateral aspect of his shoulder, pain is somewhat focal and somewhat vague in nature.  He has great difficulty lifting his arm.  He denies any numbness or tingling, no weakness in the arm.  He has a history of intermittent shoulder pain, this has happened in the past although he is not sure if it was his right or his left shoulder.  He did have a corticosteroid injection, which resolved his pain.        Additional history: as documented    MEDICAL HISTORY  Patient Active Problem List   Diagnosis     Personal history of other malignant neoplasm of skin     Lumbago     Hypertrophy of prostate without urinary obstruction     Other emphysema (H)     Hypersomnia with sleep apnea     Malignant neoplasm of prostate     Impotence of organic origin     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, lower limb     CARDIOVASCULAR SCREENING; LDL GOAL LESS THAN 100     Vitamin D deficiency     Branch retinal vein occlusion     Bee sting-induced anaphylaxis     Advance Care Planning     Radiation proctitis     Coronary artery disease     Impaired fasting glucose     Health Care Home     Dyslipidemia     Benign essential hypertension     Spinal stenosis of lumbar region with neurogenic claudication     Dysthymia     Atrial fibrillation and flutter (H)     Dilated aortic root (H)     Generalized muscle weakness     Obesity (BMI 35.0-39.9) with comorbidity (H)     CKD (chronic kidney disease) stage 3, GFR 30-59 ml/min     Mild major depression (H)     Angina pectoris (H)       Current Outpatient Medications   Medication Sig Dispense Refill     albuterol (PROAIR RESPICLICK) 108 (90 Base) MCG/ACT inhaler Inhale 2 puffs into the lungs every 6 hours as needed for shortness of breath / dyspnea or wheezing 1 Inhaler 3     apixaban ANTICOAGULANT (ELIQUIS) 5 MG tablet Take 1 tablet (5 mg) by mouth 2 times daily 180 tablet 3     ASPIRIN NOT PRESCRIBED (INTENTIONAL)  "continuous prn for other Antiplatelet medication not prescribed intentionally due to Current anticoagulant therapy (warfarin/enoxaparin)       atenolol (TENORMIN) 100 MG tablet Take 1.5 tablets (150 mg) by mouth daily 135 tablet 3     atorvastatin (LIPITOR) 40 MG tablet TAKE 1 TABLET BY MOUTH ONCE DAILY 90 tablet 3     diltiazem ER (DILT-XR) 120 MG 24 hr capsule Take 1 capsule (120 mg) by mouth daily 90 capsule 3     EPINEPHrine (EPIPEN/ADRENACLICK/OR ANY BX GENERIC EQUIV) 0.3 MG/0.3ML injection 2-pack Inject 0.3 mLs (0.3 mg) into the muscle as needed for anaphylaxis 0.6 mL 1     escitalopram (LEXAPRO) 10 MG tablet Take 1 tablet (10 mg) by mouth daily 90 tablet 3     ipratropium (ATROVENT) 0.06 % nasal spray Spray 2 sprays in nostril 4 times daily as needed for rhinitis 3 Box 3     Loperamide HCl (IMODIUM OR) Take by mouth daily as needed       losartan (COZAAR) 100 MG tablet Take 1 tablet (100 mg) by mouth daily 90 tablet 3     Multiple Minerals-Vitamins (PROSTEON PO) Take 2,000 Units by mouth 4 times daily       torsemide (DEMADEX) 10 MG tablet Take 1 tablet (10 mg) by mouth daily 90 tablet 3       Allergies   Allergen Reactions     Augmentin Rash     Type III hypersensitivity     Bactrim [Sulfamethoxazole W/Trimethoprim] Rash     Serum sickness, type III hypersensitivity       Bee Venom Itching     Sulfa Drugs Hives     Celebrex [Celecoxib]      Lisinopril Cough     Naproxen Hives       Family History   Problem Relation Age of Onset     Alzheimer Disease Mother      Hypertension Mother      Cardiovascular Father         D:86 complications fo CHF     Prostate Cancer Brother      Lung Cancer Brother 76     Prostate Cancer Brother        Additional medical/Social/Surgical histories reviewed in [facility] and updated as appropriate.        PHYSICAL EXAM  Vitals:    12/15/20 1329   Weight: 100.7 kg (222 lb)   Height: 1.727 m (5' 8\")     General  - normal appearance, in no obvious distress  HEENT  - conjunctivae not " injected, moist mucous membranes  CV  - normal radial pulse  Pulm  - normal respiratory pattern, non-labored  Musculoskeletal - left shoulder  - inspection: normal bone and joint alignment, no obvious deformity, no scapular winging, no AC step-off  - palpation: mildly tender RC insertion, normal clavicle, non-tender AC  - ROM: painful initiation of flexion, cannot flex past 30 deg  - strength: 5/5  strength, 5/5 in all shoulder planes  - special tests:  Unable to perform   Neuro  - no sensory or motor deficit, grossly normal coordination, normal muscle tone  Skin  - no ecchymosis, erythema, warmth, or induration, no obvious rash  Psych  - interactive, appropriate, normal mood and affect         ASSESSMENT & PLAN  Mr. More is a 85 year old male who presents to clinic today with acute on chronic left shoulder pain.    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    It does seem that Spencer's pain is likely secondary to an acute subacromial impingement.  We discussed treatment options including relative rest, strengthening, oral analgesics, and corticosteroid injections.  Through shared decision making we did decide to perform a corticosteroid injection today (see procedure note).  If this injection  does not bring him any relief I would consider advanced imaging or formal physical therapy.    It was a pleasure seeing Spencer today.    Aside to and separate from the injection we spent approximately 25 minutes in the room, discussing the above.    Rajat Bañuelos DO, [facility]  Primary Care Sports Medicine      This note was constructed using Dragon dictation software, please excuse any minor errors in spelling, grammar, or syntax.    Rajat Bañuelos DO

## 2020-12-29 DIAGNOSIS — I48.19 PERSISTENT ATRIAL FIBRILLATION (H): ICD-10-CM

## 2020-12-29 RX ORDER — ATENOLOL 100 MG/1
150 TABLET ORAL DAILY
Qty: 135 TABLET | Refills: 2 | Status: SHIPPED | OUTPATIENT
Start: 2020-12-29 | End: 2021-06-24

## 2020-12-30 ENCOUNTER — VIRTUAL VISIT (OUTPATIENT)
Dept: ONCOLOGY | Facility: CLINIC | Age: 85
End: 2020-12-30
Attending: INTERNAL MEDICINE
Payer: COMMERCIAL

## 2020-12-30 ENCOUNTER — PRE VISIT (OUTPATIENT)
Dept: ONCOLOGY | Facility: CLINIC | Age: 85
End: 2020-12-30

## 2020-12-30 VITALS — BODY MASS INDEX: 33.65 KG/M2 | HEIGHT: 68 IN | WEIGHT: 222 LBS

## 2020-12-30 DIAGNOSIS — D64.9 ANEMIA, UNSPECIFIED TYPE: ICD-10-CM

## 2020-12-30 PROCEDURE — 99204 OFFICE O/P NEW MOD 45 MIN: CPT | Mod: 95 | Performed by: INTERNAL MEDICINE

## 2020-12-30 PROCEDURE — 999N001193 HC VIDEO/TELEPHONE VISIT; NO CHARGE

## 2020-12-30 ASSESSMENT — MIFFLIN-ST. JEOR: SCORE: 1676.49

## 2020-12-30 ASSESSMENT — PAIN SCALES - GENERAL: PAINLEVEL: NO PAIN (0)

## 2020-12-30 NOTE — LETTER
"    12/30/2020         RE: Nixon More  [address]  [facility] [address]        Dear Colleague,    Thank you for referring your patient, Nixon More, to the [facility]. Please see a copy of my visit note below.    Nixon More is a 85 year old male who is being evaluated via a billable video visit.      The patient has been notified of following:     \"This video visit will be conducted via a call between you and your physician/provider. We have found that certain health care needs can be provided without the need for an in-person physical exam.  This service lets us provide the care you need with a video conversation.  If a prescription is necessary we can send it directly to your pharmacy.  If lab work is needed we can place an order for that and you can then stop by our lab to have the test done at a later time.    Video visits are billed at different rates depending on your insurance coverage.  Please reach out to your insurance provider with any questions.    If during the course of the call the physician/provider feels a video visit is not appropriate, you will not be charged for this service.\"    Patient has given verbal consent for Video visit? Yes  How would you like to obtain your AVS? MyChart  If you are dropped from the video visit, the video invite should be resent to: # [phone]  Will anyone else be joining your video visit? Yes: daughter . How would they like to receive their invitation? Send to e-mail at:  [phone]      Video-Visit Details    Type of service:  Video Visit    Video Start Time:  11:06 am  Video End Time:  11:19 am  Originating Location (pt. Location): Home    Distant Location (provider location):  [facility]     Platform used for Video Visit: Arcadio Barnes CMA      [facility]    Hematology/Oncology New Patient Note      Today's Date: 12/30/20    Reason for " Consult: anemia      HISTORY OF PRESENT ILLNESS: Nixon More is a 85 year old male with PMHx of HTN, CAD, prostate cancer in 2006, atrial fibrillation/flutter on chronic apixaban, who presents with anemia.       On chart review, patient has had a normal hemoglobin, but in June 2020, his PCP noted that he developed a mild anemia in the 12 range.  Repeat labs in August and November show continued mild anemia with hemoglobin of around 12.  WBC and platelet count are normal.      He notes that he maybe feels a bit more short of breath with exertion.       He has history of prostate cancer s/p radiation and on intermittent ADT.  He most recently had Lupron in July 2019.  PSA remains undetectable.    Spencer notes that in September 2020, he received a cortisone injection.  After that, he had some hematuria.  He also had some rectal bleeding, for which he is following with colorectal surgery.  He was felt to have radiation proctitis and hemorrhoids, and he was treated with formalin.  He says that the bleeding has since resolved.          REVIEW OF SYSTEMS:   14 point ROS was reviewed and is negative other than as noted above in HPI.       HOME MEDICATIONS:  Current Outpatient Medications   Medication Sig Dispense Refill     apixaban ANTICOAGULANT (ELIQUIS) 5 MG tablet Take 1 tablet (5 mg) by mouth 2 times daily 180 tablet 3     atenolol (TENORMIN) 100 MG tablet Take 1.5 tablets (150 mg) by mouth daily 135 tablet 2     atorvastatin (LIPITOR) 40 MG tablet Take 1 tablet (40 mg) by mouth daily 90 tablet 0     diltiazem ER (DILT-XR) 120 MG 24 hr capsule Take 1 capsule (120 mg) by mouth daily 90 capsule 3     EPINEPHrine (EPIPEN/ADRENACLICK/OR ANY BX GENERIC EQUIV) 0.3 MG/0.3ML injection 2-pack Inject 0.3 mLs (0.3 mg) into the muscle as needed for anaphylaxis 0.6 mL 1     escitalopram (LEXAPRO) 10 MG tablet Take 1 tablet (10 mg) by mouth daily 90 tablet 3     ipratropium (ATROVENT) 0.06 % nasal spray Spray 2 sprays in nostril 4  times daily as needed for rhinitis 3 Box 3     Loperamide HCl (IMODIUM OR) Take by mouth daily as needed       losartan (COZAAR) 100 MG tablet Take 1 tablet (100 mg) by mouth daily 90 tablet 3     Multiple Minerals-Vitamins (PROSTEON PO) Take 2,000 Units by mouth 4 times daily       torsemide (DEMADEX) 10 MG tablet Take 1 tablet (10 mg) by mouth daily 90 tablet 3     albuterol (PROAIR RESPICLICK) 108 (90 Base) MCG/ACT inhaler Inhale 2 puffs into the lungs every 6 hours as needed for shortness of breath / dyspnea or wheezing (Patient not taking: Reported on 12/30/2020) 1 Inhaler 3     ASPIRIN NOT PRESCRIBED (INTENTIONAL) continuous prn for other Antiplatelet medication not prescribed intentionally due to Current anticoagulant therapy (warfarin/enoxaparin)           ALLERGIES:  Allergies   Allergen Reactions     Augmentin Rash     Type III hypersensitivity     Bactrim [Sulfamethoxazole W/Trimethoprim] Rash     Serum sickness, type III hypersensitivity       Bee Venom Itching     Sulfa Drugs Hives     Celebrex [Celecoxib]      Lisinopril Cough     Naproxen Hives         PAST MEDICAL HISTORY:  Past Medical History:   Diagnosis Date     Actinic keratosis      Arthritis      Atrial fibrillation and flutter (H) 12/3/2018     CAD (coronary artery disease)     1/5/2011 lateral ischemia on stress echo, 12/2014 normal stress echo     Coronary artery disease 1/1/2011    Abnormal stress test 1/2011 and controlled with medical mgmt      Dyslipidemia      Emphysema of lung (H)      Essential hypertension, benign      Hernia, abdominal      Hypersomnia with sleep apnea, unspecified     on bipap, partially treated with residual apneas     Hypertrophy (benign) of prostate 5/01    Biopsy 5/01 negative for cancer; PSA 5     Lumbago      Mumps      Prostate cancer (H) 12/15/2006     Skin cancer, basal cell 1997     Spider veins          PAST SURGICAL HISTORY:  Past Surgical History:   Procedure Laterality Date     HERNIA REPAIR  child      Moh's procedure for basal cell carcinoma  2001     PROSTATE SURGERY       s/p lumbar laminectomy NOS  1988     SIGMOIDOSCOPY FLEXIBLE N/A 6/15/2020    Procedure: SIGMOIDOSCOPY, FLEXIBLE;  Surgeon: Sunni Patton MD;  Location:  GI     VITRECTOMY PARS PLANA REMOVE PRERETINAL MEMBRANE   3/09         SOCIAL HISTORY:  Social History     Socioeconomic History     Marital status:      Spouse name: Not on file     Number of children: Not on file     Years of education: Not on file     Highest education level: Not on file   Occupational History     Occupation: retired   Social Needs     Financial resource strain: Not on file     Food insecurity     Worry: Not on file     Inability: Not on file     Transportation needs     Medical: Not on file     Non-medical: Not on file   Tobacco Use     Smoking status: Former Smoker     Packs/day: 1.00     Types: Cigarettes     Start date:      Quit date: 1978     Years since quittin.0     Smokeless tobacco: Never Used   Substance and Sexual Activity     Alcohol use: Never     Frequency: Never     Binge frequency: Never     Drug use: No     Sexual activity: Yes     Partners: Female   Lifestyle     Physical activity     Days per week: Not on file     Minutes per session: Not on file     Stress: Not on file   Relationships     Social connections     Talks on phone: Not on file     Gets together: Not on file     Attends Buddhist service: Not on file     Active member of club or organization: Not on file     Attends meetings of clubs or organizations: Not on file     Relationship status: Not on file     Intimate partner violence     Fear of current or ex partner: Not on file     Emotionally abused: Not on file     Physically abused: Not on file     Forced sexual activity: Not on file   Other Topics Concern      Service Not Asked     Blood Transfusions Not Asked     Caffeine Concern No     Comment: 0-2 cans of soda per day.     Occupational Exposure  Not Asked     Hobby Hazards Not Asked     Sleep Concern Not Asked     Stress Concern Not Asked     Weight Concern Not Asked     Special Diet Not Asked     Back Care Not Asked     Exercise No     Bike Helmet Not Asked     Seat Belt Yes     Self-Exams Not Asked     Parent/sibling w/ CABG, MI or angioplasty before 65F 55M? No   Social History Narrative     Not on file         FAMILY HISTORY:  Family History   Problem Relation Age of Onset     Alzheimer Disease Mother      Hypertension Mother      Cardiovascular Father         D:86 complications fo CHF     Prostate Cancer Brother      Lung Cancer Brother 76     Prostate Cancer Brother          PHYSICAL EXAM:  ECO  GENERAL/CONSTITUTIONAL: No acute distress. Healthy, alert. Daughter also on video call.  EYES: No redness or discharge.    RESPIRATORY: No audible wheeze, cough, or visible cyanosis.  No visible retractions or increased work of breathing.  Able to speak fully in complete sentences.  NEUROLOGIC: Alert, oriented, answers questions appropriately. No tremor. Mentation intact and speech normal  INTEGUMENTARY: No jaundice.    PSYCHIATRIC:  Mentation appears normal, affect normal/bright, judgement and insight intact, normal speech and appearance well-groomed.    The rest of a comprehensive physical exam is deferred due to public health emergency video visit restrictions.      LABS:  CBC RESULTS:   Recent Labs   Lab Test 20  0737   WBC 5.9   RBC 4.01*   HGB 12.1*   HCT 38.6*   MCV 96   MCH 30.2   MCHC 31.3*   RDW 13.8          Recent Labs   Lab Test 20  0737 20  1012 19  1637 19  1637     --   --  141   POTASSIUM 3.5  --   --  3.7   CHLORIDE 110*  --   --  106   CO2 25  --   --  28   ANIONGAP 7  --   --  7   * Canceled, Test credited   < > 121*   BUN 22  --   --  27   CR 1.16  --   --  1.18   MARLENE 9.8  --   --  9.9    < > = values in this interval not displayed.         PATHOLOGY:  Peripheral smear 20:  FINAL  DIAGNOSIS:   Peripheral blood morphology:   - Normocytic hypochromic anemia, mild.   - Reticulocytosis, mild (2.9%; absolute reticulocyte count: 116.9 - normal    range: 25 - 95).   - Normal white blood cells and platelets.     COMMENT:   The differential diagnosis of normocytic hypochromic anemia includes iron   deficiency, anemia of chronic   disease and sideroblastic anemia.  There is evidence of red cell   regeneration.  Blood loss and low-grade   hemolysis should be added to the differential diagnosis.  Correlation with    clinical findings is recommended.       ASSESSMENT/PLAN:  Nixon More is a 85 year old male with:    1) Anemia: Mild anemia since June 2020.  Normocytic.  He has a mild reticulocytosis.  WBC and platelets are normal.  Possible etiologies include iron deficiency, blood loss, low-grade hemolysis, anemia of chronic disease, early MDS.  He has been on chronic anticoagulation with apixaban.  He had some urine and rectal bleeding recently, which has resolved.  We will repeat his CBC and peripheral smear, as well check iron and ferritin labs.  I will also check hemolysis labs, as well as SPEP.  With the recent hematuria, will also check UA and urine cytology.  If it worsens or no etiology is found, we may consider bone marrow biopsy.  -he will get labs done at [facility] after labs are done to review results    2) History of prostate cancer: s/p radiation and on intermittent ADT  -follows with urology, Dr. Pino    3) Cardiac: on chronic anticoagulation with apixaban for atrial fibrillation.    -follows with cardiology      Nimisha Mercado MD  Hematology/Oncology  [facility]              Again, thank you for allowing me to participate in the care of your patient.        Sincerely,        Nimisha Mercado MD

## 2020-12-30 NOTE — PROGRESS NOTES
"Nixon More is a 85 year old male who is being evaluated via a billable video visit.      The patient has been notified of following:     \"This video visit will be conducted via a call between you and your physician/provider. We have found that certain health care needs can be provided without the need for an in-person physical exam.  This service lets us provide the care you need with a video conversation.  If a prescription is necessary we can send it directly to your pharmacy.  If lab work is needed we can place an order for that and you can then stop by our lab to have the test done at a later time.    Video visits are billed at different rates depending on your insurance coverage.  Please reach out to your insurance provider with any questions.    If during the course of the call the physician/provider feels a video visit is not appropriate, you will not be charged for this service.\"    Patient has given verbal consent for Video visit? Yes  How would you like to obtain your AVS? MyChart  If you are dropped from the video visit, the video invite should be resent to: # [phone]  Will anyone else be joining your video visit? Yes: daughter . How would they like to receive their invitation? Send to e-mail at:  [phone]      Video-Visit Details    Type of service:  Video Visit    Video Start Time:  11:06 am  Video End Time:  11:19 am  Originating Location (pt. Location): Home    Distant Location (provider location):  [facility]     Platform used for Video Visit: Arcadio Barnes CMA      [facility]    Hematology/Oncology New Patient Note      Today's Date: 12/30/20    Reason for Consult: anemia      HISTORY OF PRESENT ILLNESS: Nixon More is a 85 year old male with PMHx of HTN, CAD, prostate cancer in 2006, atrial fibrillation/flutter on chronic apixaban, who presents with anemia.       On chart review, patient has had a normal " hemoglobin, but in June 2020, his PCP noted that he developed a mild anemia in the 12 range.  Repeat labs in August and November show continued mild anemia with hemoglobin of around 12.  WBC and platelet count are normal.      He notes that he maybe feels a bit more short of breath with exertion.       He has history of prostate cancer s/p radiation and on intermittent ADT.  He most recently had Lupron in July 2019.  PSA remains undetectable.    Spencer notes that in September 2020, he received a cortisone injection.  After that, he had some hematuria.  He also had some rectal bleeding, for which he is following with colorectal surgery.  He was felt to have radiation proctitis and hemorrhoids, and he was treated with formalin.  He says that the bleeding has since resolved.          REVIEW OF SYSTEMS:   14 point ROS was reviewed and is negative other than as noted above in HPI.       HOME MEDICATIONS:  Current Outpatient Medications   Medication Sig Dispense Refill     apixaban ANTICOAGULANT (ELIQUIS) 5 MG tablet Take 1 tablet (5 mg) by mouth 2 times daily 180 tablet 3     atenolol (TENORMIN) 100 MG tablet Take 1.5 tablets (150 mg) by mouth daily 135 tablet 2     atorvastatin (LIPITOR) 40 MG tablet Take 1 tablet (40 mg) by mouth daily 90 tablet 0     diltiazem ER (DILT-XR) 120 MG 24 hr capsule Take 1 capsule (120 mg) by mouth daily 90 capsule 3     EPINEPHrine (EPIPEN/ADRENACLICK/OR ANY BX GENERIC EQUIV) 0.3 MG/0.3ML injection 2-pack Inject 0.3 mLs (0.3 mg) into the muscle as needed for anaphylaxis 0.6 mL 1     escitalopram (LEXAPRO) 10 MG tablet Take 1 tablet (10 mg) by mouth daily 90 tablet 3     ipratropium (ATROVENT) 0.06 % nasal spray Spray 2 sprays in nostril 4 times daily as needed for rhinitis 3 Box 3     Loperamide HCl (IMODIUM OR) Take by mouth daily as needed       losartan (COZAAR) 100 MG tablet Take 1 tablet (100 mg) by mouth daily 90 tablet 3     Multiple Minerals-Vitamins (PROSTEON PO) Take 2,000 Units by  mouth 4 times daily       torsemide (DEMADEX) 10 MG tablet Take 1 tablet (10 mg) by mouth daily 90 tablet 3     albuterol (PROAIR RESPICLICK) 108 (90 Base) MCG/ACT inhaler Inhale 2 puffs into the lungs every 6 hours as needed for shortness of breath / dyspnea or wheezing (Patient not taking: Reported on 12/30/2020) 1 Inhaler 3     ASPIRIN NOT PRESCRIBED (INTENTIONAL) continuous prn for other Antiplatelet medication not prescribed intentionally due to Current anticoagulant therapy (warfarin/enoxaparin)           ALLERGIES:  Allergies   Allergen Reactions     Augmentin Rash     Type III hypersensitivity     Bactrim [Sulfamethoxazole W/Trimethoprim] Rash     Serum sickness, type III hypersensitivity       Bee Venom Itching     Sulfa Drugs Hives     Celebrex [Celecoxib]      Lisinopril Cough     Naproxen Hives         PAST MEDICAL HISTORY:  Past Medical History:   Diagnosis Date     Actinic keratosis      Arthritis      Atrial fibrillation and flutter (H) 12/3/2018     CAD (coronary artery disease)     1/5/2011 lateral ischemia on stress echo, 12/2014 normal stress echo     Coronary artery disease 1/1/2011    Abnormal stress test 1/2011 and controlled with medical mgmt      Dyslipidemia      Emphysema of lung (H)      Essential hypertension, benign      Hernia, abdominal      Hypersomnia with sleep apnea, unspecified     on bipap, partially treated with residual apneas     Hypertrophy (benign) of prostate 5/01    Biopsy 5/01 negative for cancer; PSA 5     Lumbago      Mumps      Prostate cancer (H) 12/15/2006     Skin cancer, basal cell 1997     Spider veins          PAST SURGICAL HISTORY:  Past Surgical History:   Procedure Laterality Date     HERNIA REPAIR  child     Moh's procedure for basal cell carcinoma  01/2001     PROSTATE SURGERY       s/p lumbar laminectomy NOS  1988     SIGMOIDOSCOPY FLEXIBLE N/A 6/15/2020    Procedure: SIGMOIDOSCOPY, FLEXIBLE;  Surgeon: Sunni Patton MD;  Location:  GI      VITRECTOMY PARS PLANA REMOVE PRERETINAL MEMBRANE   3/09         SOCIAL HISTORY:  Social History     Socioeconomic History     Marital status:      Spouse name: Not on file     Number of children: Not on file     Years of education: Not on file     Highest education level: Not on file   Occupational History     Occupation: retired   Social Needs     Financial resource strain: Not on file     Food insecurity     Worry: Not on file     Inability: Not on file     Transportation needs     Medical: Not on file     Non-medical: Not on file   Tobacco Use     Smoking status: Former Smoker     Packs/day: 1.00     Types: Cigarettes     Start date:      Quit date: 1978     Years since quittin.0     Smokeless tobacco: Never Used   Substance and Sexual Activity     Alcohol use: Never     Frequency: Never     Binge frequency: Never     Drug use: No     Sexual activity: Yes     Partners: Female   Lifestyle     Physical activity     Days per week: Not on file     Minutes per session: Not on file     Stress: Not on file   Relationships     Social connections     Talks on phone: Not on file     Gets together: Not on file     Attends Holiness service: Not on file     Active member of club or organization: Not on file     Attends meetings of clubs or organizations: Not on file     Relationship status: Not on file     Intimate partner violence     Fear of current or ex partner: Not on file     Emotionally abused: Not on file     Physically abused: Not on file     Forced sexual activity: Not on file   Other Topics Concern      Service Not Asked     Blood Transfusions Not Asked     Caffeine Concern No     Comment: 0-2 cans of soda per day.     Occupational Exposure Not Asked     Hobby Hazards Not Asked     Sleep Concern Not Asked     Stress Concern Not Asked     Weight Concern Not Asked     Special Diet Not Asked     Back Care Not Asked     Exercise No     Bike Helmet Not Asked     Seat Belt Yes     Self-Exams  Not Asked     Parent/sibling w/ CABG, MI or angioplasty before 65F 55M? No   Social History Narrative     Not on file         FAMILY HISTORY:  Family History   Problem Relation Age of Onset     Alzheimer Disease Mother      Hypertension Mother      Cardiovascular Father         D:86 complications fo CHF     Prostate Cancer Brother      Lung Cancer Brother 76     Prostate Cancer Brother          PHYSICAL EXAM:  ECO  GENERAL/CONSTITUTIONAL: No acute distress. Healthy, alert. Daughter also on video call.  EYES: No redness or discharge.    RESPIRATORY: No audible wheeze, cough, or visible cyanosis.  No visible retractions or increased work of breathing.  Able to speak fully in complete sentences.  NEUROLOGIC: Alert, oriented, answers questions appropriately. No tremor. Mentation intact and speech normal  INTEGUMENTARY: No jaundice.    PSYCHIATRIC:  Mentation appears normal, affect normal/bright, judgement and insight intact, normal speech and appearance well-groomed.    The rest of a comprehensive physical exam is deferred due to public health emergency video visit restrictions.      LABS:  CBC RESULTS:   Recent Labs   Lab Test 20  0737   WBC 5.9   RBC 4.01*   HGB 12.1*   HCT 38.6*   MCV 96   MCH 30.2   MCHC 31.3*   RDW 13.8          Recent Labs   Lab Test 20  0737 20  1012 19  1637 19  1637     --   --  141   POTASSIUM 3.5  --   --  3.7   CHLORIDE 110*  --   --  106   CO2 25  --   --  28   ANIONGAP 7  --   --  7   * Canceled, Test credited   < > 121*   BUN 22  --   --  27   CR 1.16  --   --  1.18   MARLENE 9.8  --   --  9.9    < > = values in this interval not displayed.         PATHOLOGY:  Peripheral smear 20:  FINAL DIAGNOSIS:   Peripheral blood morphology:   - Normocytic hypochromic anemia, mild.   - Reticulocytosis, mild (2.9%; absolute reticulocyte count: 116.9 - normal    range: 25 - 95).   - Normal white blood cells and platelets.     COMMENT:   The  differential diagnosis of normocytic hypochromic anemia includes iron   deficiency, anemia of chronic   disease and sideroblastic anemia.  There is evidence of red cell   regeneration.  Blood loss and low-grade   hemolysis should be added to the differential diagnosis.  Correlation with    clinical findings is recommended.       ASSESSMENT/PLAN:  Nixon More is a 85 year old male with:    1) Anemia: Mild anemia since June 2020.  Normocytic.  He has a mild reticulocytosis.  WBC and platelets are normal.  Possible etiologies include iron deficiency, blood loss, low-grade hemolysis, anemia of chronic disease, early MDS.  He has been on chronic anticoagulation with apixaban.  He had some urine and rectal bleeding recently, which has resolved.  We will repeat his CBC and peripheral smear, as well check iron and ferritin labs.  I will also check hemolysis labs, as well as SPEP.  With the recent hematuria, will also check UA and urine cytology.  If it worsens or no etiology is found, we may consider bone marrow biopsy.  -he will get labs done at [facility] after labs are done to review results    2) History of prostate cancer: s/p radiation and on intermittent ADT  -follows with urology, Dr. Pino    3) Cardiac: on chronic anticoagulation with apixaban for atrial fibrillation.    -follows with cardiology      Nimisha Mercado MD  Hematology/Oncology  [facility]

## 2020-12-30 NOTE — LETTER
"    12/30/2020         RE: Nixon More  [address]  [facility] [address]        Dear Colleague,    Thank you for referring your patient, Nixon More, to the [facility]. Please see a copy of my visit note below.    Nixon More is a 85 year old male who is being evaluated via a billable video visit.      The patient has been notified of following:     \"This video visit will be conducted via a call between you and your physician/provider. We have found that certain health care needs can be provided without the need for an in-person physical exam.  This service lets us provide the care you need with a video conversation.  If a prescription is necessary we can send it directly to your pharmacy.  If lab work is needed we can place an order for that and you can then stop by our lab to have the test done at a later time.    Video visits are billed at different rates depending on your insurance coverage.  Please reach out to your insurance provider with any questions.    If during the course of the call the physician/provider feels a video visit is not appropriate, you will not be charged for this service.\"    Patient has given verbal consent for Video visit? Yes  How would you like to obtain your AVS? MyChart  If you are dropped from the video visit, the video invite should be resent to: # [phone]  Will anyone else be joining your video visit? Yes: daughter . How would they like to receive their invitation? Send to e-mail at:  [phone]      Video-Visit Details    Type of service:  Video Visit    Video Start Time:  11:06 am  Video End Time:  11:19 am  Originating Location (pt. Location): Home    Distant Location (provider location):  [facility]     Platform used for Video Visit: Arcadio Barnes CMA      [facility]    Hematology/Oncology New Patient Note      Today's Date: 12/30/20    Reason for " Consult: anemia      HISTORY OF PRESENT ILLNESS: Nixon More is a 85 year old male with PMHx of HTN, CAD, prostate cancer in 2006, atrial fibrillation/flutter on chronic apixaban, who presents with anemia.       On chart review, patient has had a normal hemoglobin, but in June 2020, his PCP noted that he developed a mild anemia in the 12 range.  Repeat labs in August and November show continued mild anemia with hemoglobin of around 12.  WBC and platelet count are normal.      He notes that he maybe feels a bit more short of breath with exertion.       He has history of prostate cancer s/p radiation and on intermittent ADT.  He most recently had Lupron in July 2019.  PSA remains undetectable.    Spencer notes that in September 2020, he received a cortisone injection.  After that, he had some hematuria.  He also had some rectal bleeding, for which he is following with colorectal surgery.  He was felt to have radiation proctitis and hemorrhoids, and he was treated with formalin.  He says that the bleeding has since resolved.          REVIEW OF SYSTEMS:   14 point ROS was reviewed and is negative other than as noted above in HPI.       HOME MEDICATIONS:  Current Outpatient Medications   Medication Sig Dispense Refill     apixaban ANTICOAGULANT (ELIQUIS) 5 MG tablet Take 1 tablet (5 mg) by mouth 2 times daily 180 tablet 3     atenolol (TENORMIN) 100 MG tablet Take 1.5 tablets (150 mg) by mouth daily 135 tablet 2     atorvastatin (LIPITOR) 40 MG tablet Take 1 tablet (40 mg) by mouth daily 90 tablet 0     diltiazem ER (DILT-XR) 120 MG 24 hr capsule Take 1 capsule (120 mg) by mouth daily 90 capsule 3     EPINEPHrine (EPIPEN/ADRENACLICK/OR ANY BX GENERIC EQUIV) 0.3 MG/0.3ML injection 2-pack Inject 0.3 mLs (0.3 mg) into the muscle as needed for anaphylaxis 0.6 mL 1     escitalopram (LEXAPRO) 10 MG tablet Take 1 tablet (10 mg) by mouth daily 90 tablet 3     ipratropium (ATROVENT) 0.06 % nasal spray Spray 2 sprays in nostril 4  times daily as needed for rhinitis 3 Box 3     Loperamide HCl (IMODIUM OR) Take by mouth daily as needed       losartan (COZAAR) 100 MG tablet Take 1 tablet (100 mg) by mouth daily 90 tablet 3     Multiple Minerals-Vitamins (PROSTEON PO) Take 2,000 Units by mouth 4 times daily       torsemide (DEMADEX) 10 MG tablet Take 1 tablet (10 mg) by mouth daily 90 tablet 3     albuterol (PROAIR RESPICLICK) 108 (90 Base) MCG/ACT inhaler Inhale 2 puffs into the lungs every 6 hours as needed for shortness of breath / dyspnea or wheezing (Patient not taking: Reported on 12/30/2020) 1 Inhaler 3     ASPIRIN NOT PRESCRIBED (INTENTIONAL) continuous prn for other Antiplatelet medication not prescribed intentionally due to Current anticoagulant therapy (warfarin/enoxaparin)           ALLERGIES:  Allergies   Allergen Reactions     Augmentin Rash     Type III hypersensitivity     Bactrim [Sulfamethoxazole W/Trimethoprim] Rash     Serum sickness, type III hypersensitivity       Bee Venom Itching     Sulfa Drugs Hives     Celebrex [Celecoxib]      Lisinopril Cough     Naproxen Hives         PAST MEDICAL HISTORY:  Past Medical History:   Diagnosis Date     Actinic keratosis      Arthritis      Atrial fibrillation and flutter (H) 12/3/2018     CAD (coronary artery disease)     1/5/2011 lateral ischemia on stress echo, 12/2014 normal stress echo     Coronary artery disease 1/1/2011    Abnormal stress test 1/2011 and controlled with medical mgmt      Dyslipidemia      Emphysema of lung (H)      Essential hypertension, benign      Hernia, abdominal      Hypersomnia with sleep apnea, unspecified     on bipap, partially treated with residual apneas     Hypertrophy (benign) of prostate 5/01    Biopsy 5/01 negative for cancer; PSA 5     Lumbago      Mumps      Prostate cancer (H) 12/15/2006     Skin cancer, basal cell 1997     Spider veins          PAST SURGICAL HISTORY:  Past Surgical History:   Procedure Laterality Date     HERNIA REPAIR  child      Moh's procedure for basal cell carcinoma  2001     PROSTATE SURGERY       s/p lumbar laminectomy NOS  1988     SIGMOIDOSCOPY FLEXIBLE N/A 6/15/2020    Procedure: SIGMOIDOSCOPY, FLEXIBLE;  Surgeon: Sunni Patton MD;  Location:  GI     VITRECTOMY PARS PLANA REMOVE PRERETINAL MEMBRANE   3/09         SOCIAL HISTORY:  Social History     Socioeconomic History     Marital status:      Spouse name: Not on file     Number of children: Not on file     Years of education: Not on file     Highest education level: Not on file   Occupational History     Occupation: retired   Social Needs     Financial resource strain: Not on file     Food insecurity     Worry: Not on file     Inability: Not on file     Transportation needs     Medical: Not on file     Non-medical: Not on file   Tobacco Use     Smoking status: Former Smoker     Packs/day: 1.00     Types: Cigarettes     Start date:      Quit date: 1978     Years since quittin.0     Smokeless tobacco: Never Used   Substance and Sexual Activity     Alcohol use: Never     Frequency: Never     Binge frequency: Never     Drug use: No     Sexual activity: Yes     Partners: Female   Lifestyle     Physical activity     Days per week: Not on file     Minutes per session: Not on file     Stress: Not on file   Relationships     Social connections     Talks on phone: Not on file     Gets together: Not on file     Attends Holiness service: Not on file     Active member of club or organization: Not on file     Attends meetings of clubs or organizations: Not on file     Relationship status: Not on file     Intimate partner violence     Fear of current or ex partner: Not on file     Emotionally abused: Not on file     Physically abused: Not on file     Forced sexual activity: Not on file   Other Topics Concern      Service Not Asked     Blood Transfusions Not Asked     Caffeine Concern No     Comment: 0-2 cans of soda per day.     Occupational Exposure  Not Asked     Hobby Hazards Not Asked     Sleep Concern Not Asked     Stress Concern Not Asked     Weight Concern Not Asked     Special Diet Not Asked     Back Care Not Asked     Exercise No     Bike Helmet Not Asked     Seat Belt Yes     Self-Exams Not Asked     Parent/sibling w/ CABG, MI or angioplasty before 65F 55M? No   Social History Narrative     Not on file         FAMILY HISTORY:  Family History   Problem Relation Age of Onset     Alzheimer Disease Mother      Hypertension Mother      Cardiovascular Father         D:86 complications fo CHF     Prostate Cancer Brother      Lung Cancer Brother 76     Prostate Cancer Brother          PHYSICAL EXAM:  ECO  GENERAL/CONSTITUTIONAL: No acute distress. Healthy, alert. Daughter also on video call.  EYES: No redness or discharge.    RESPIRATORY: No audible wheeze, cough, or visible cyanosis.  No visible retractions or increased work of breathing.  Able to speak fully in complete sentences.  NEUROLOGIC: Alert, oriented, answers questions appropriately. No tremor. Mentation intact and speech normal  INTEGUMENTARY: No jaundice.    PSYCHIATRIC:  Mentation appears normal, affect normal/bright, judgement and insight intact, normal speech and appearance well-groomed.    The rest of a comprehensive physical exam is deferred due to public health emergency video visit restrictions.      LABS:  CBC RESULTS:   Recent Labs   Lab Test 20  0737   WBC 5.9   RBC 4.01*   HGB 12.1*   HCT 38.6*   MCV 96   MCH 30.2   MCHC 31.3*   RDW 13.8          Recent Labs   Lab Test 20  0737 20  1012 19  1637 19  1637     --   --  141   POTASSIUM 3.5  --   --  3.7   CHLORIDE 110*  --   --  106   CO2 25  --   --  28   ANIONGAP 7  --   --  7   * Canceled, Test credited   < > 121*   BUN 22  --   --  27   CR 1.16  --   --  1.18   MARLENE 9.8  --   --  9.9    < > = values in this interval not displayed.         PATHOLOGY:  Peripheral smear 20:  FINAL  DIAGNOSIS:   Peripheral blood morphology:   - Normocytic hypochromic anemia, mild.   - Reticulocytosis, mild (2.9%; absolute reticulocyte count: 116.9 - normal    range: 25 - 95).   - Normal white blood cells and platelets.     COMMENT:   The differential diagnosis of normocytic hypochromic anemia includes iron   deficiency, anemia of chronic   disease and sideroblastic anemia.  There is evidence of red cell   regeneration.  Blood loss and low-grade   hemolysis should be added to the differential diagnosis.  Correlation with    clinical findings is recommended.       ASSESSMENT/PLAN:  Nixon More is a 85 year old male with:    1) Anemia: Mild anemia since June 2020.  Normocytic.  He has a mild reticulocytosis.  WBC and platelets are normal.  Possible etiologies include iron deficiency, blood loss, low-grade hemolysis, anemia of chronic disease, early MDS.  He has been on chronic anticoagulation with apixaban.  He had some urine and rectal bleeding recently, which has resolved.  We will repeat his CBC and peripheral smear, as well check iron and ferritin labs.  I will also check hemolysis labs, as well as SPEP.  With the recent hematuria, will also check UA and urine cytology.  If it worsens or no etiology is found, we may consider bone marrow biopsy.  -he will get labs done at [facility] after labs are done to review results    2) History of prostate cancer: s/p radiation and on intermittent ADT  -follows with urology, Dr. Pino    3) Cardiac: on chronic anticoagulation with apixaban for atrial fibrillation.    -follows with cardiology      Nimisha Mercado MD  Hematology/Oncology  [facility]              Again, thank you for allowing me to participate in the care of your patient.        Sincerely,        Nimisha Mercado MD

## 2021-01-06 DIAGNOSIS — I10 BENIGN ESSENTIAL HYPERTENSION: ICD-10-CM

## 2021-01-06 RX ORDER — TORSEMIDE 10 MG/1
10 TABLET ORAL DAILY
Qty: 90 TABLET | Refills: 3 | Status: SHIPPED | OUTPATIENT
Start: 2021-01-06 | End: 2021-06-18

## 2021-01-12 DIAGNOSIS — D64.9 ANEMIA, UNSPECIFIED TYPE: ICD-10-CM

## 2021-01-12 LAB
ALBUMIN UR-MCNC: NEGATIVE MG/DL
APPEARANCE UR: CLEAR
BASOPHILS # BLD AUTO: 0.1 10E9/L (ref 0–0.2)
BASOPHILS NFR BLD AUTO: 0.8 %
BILIRUB UR QL STRIP: NEGATIVE
COLOR UR AUTO: YELLOW
DAT IGG-SP REAG RBC-IMP: NORMAL
DIFFERENTIAL METHOD BLD: ABNORMAL
EOSINOPHIL # BLD AUTO: 0.3 10E9/L (ref 0–0.7)
EOSINOPHIL NFR BLD AUTO: 4.7 %
ERYTHROCYTE [DISTWIDTH] IN BLOOD BY AUTOMATED COUNT: 13.6 % (ref 10–15)
FOLATE SERPL-MCNC: 49.5 NG/ML
GLUCOSE UR STRIP-MCNC: NEGATIVE MG/DL
HCT VFR BLD AUTO: 39.2 % (ref 40–53)
HGB BLD-MCNC: 12.1 G/DL (ref 13.3–17.7)
HGB UR QL STRIP: NEGATIVE
KETONES UR STRIP-MCNC: NEGATIVE MG/DL
LDH SERPL L TO P-CCNC: 210 U/L (ref 85–227)
LEUKOCYTE ESTERASE UR QL STRIP: NEGATIVE
LYMPHOCYTES # BLD AUTO: 1.3 10E9/L (ref 0.8–5.3)
LYMPHOCYTES NFR BLD AUTO: 22.4 %
MCH RBC QN AUTO: 29.4 PG (ref 26.5–33)
MCHC RBC AUTO-ENTMCNC: 30.9 G/DL (ref 31.5–36.5)
MCV RBC AUTO: 95 FL (ref 78–100)
MONOCYTES # BLD AUTO: 0.6 10E9/L (ref 0–1.3)
MONOCYTES NFR BLD AUTO: 10.3 %
NEUTROPHILS # BLD AUTO: 3.7 10E9/L (ref 1.6–8.3)
NEUTROPHILS NFR BLD AUTO: 61.8 %
NITRATE UR QL: NEGATIVE
PH UR STRIP: 6.5 PH (ref 5–7)
PLATELET # BLD AUTO: 183 10E9/L (ref 150–450)
RBC # BLD AUTO: 4.11 10E12/L (ref 4.4–5.9)
RETICS # AUTO: 80.6 10E9/L (ref 25–95)
RETICS/RBC NFR AUTO: 2 % (ref 0.5–2)
SOURCE: NORMAL
SP GR UR STRIP: 1.02 (ref 1–1.03)
UROBILINOGEN UR STRIP-ACNC: 0.2 EU/DL (ref 0.2–1)
VIT B12 SERPL-MCNC: 543 PG/ML (ref 193–986)
WBC # BLD AUTO: 5.9 10E9/L (ref 4–11)

## 2021-01-12 PROCEDURE — 85045 AUTOMATED RETICULOCYTE COUNT: CPT | Performed by: INTERNAL MEDICINE

## 2021-01-12 PROCEDURE — 86880 COOMBS TEST DIRECT: CPT | Performed by: INTERNAL MEDICINE

## 2021-01-12 PROCEDURE — 84443 ASSAY THYROID STIM HORMONE: CPT | Performed by: INTERNAL MEDICINE

## 2021-01-12 PROCEDURE — 99N1109 PR STATISTIC MORPHOLOGY W/INTERP HISTOLOGY TC 85060: Performed by: PATHOLOGY

## 2021-01-12 PROCEDURE — 82607 VITAMIN B-12: CPT | Performed by: INTERNAL MEDICINE

## 2021-01-12 PROCEDURE — 83010 ASSAY OF HAPTOGLOBIN QUANT: CPT | Performed by: INTERNAL MEDICINE

## 2021-01-12 PROCEDURE — 83883 ASSAY NEPHELOMETRY NOT SPEC: CPT | Mod: 59 | Performed by: INTERNAL MEDICINE

## 2021-01-12 PROCEDURE — 82746 ASSAY OF FOLIC ACID SERUM: CPT | Performed by: INTERNAL MEDICINE

## 2021-01-12 PROCEDURE — 88112 CYTOPATH CELL ENHANCE TECH: CPT

## 2021-01-12 PROCEDURE — 85025 COMPLETE CBC W/AUTO DIFF WBC: CPT | Performed by: INTERNAL MEDICINE

## 2021-01-12 PROCEDURE — 83550 IRON BINDING TEST: CPT | Performed by: INTERNAL MEDICINE

## 2021-01-12 PROCEDURE — 99N1036 PR STATISTIC TOTAL PROTEIN: Performed by: INTERNAL MEDICINE

## 2021-01-12 PROCEDURE — 83883 ASSAY NEPHELOMETRY NOT SPEC: CPT | Performed by: INTERNAL MEDICINE

## 2021-01-12 PROCEDURE — 82728 ASSAY OF FERRITIN: CPT | Performed by: INTERNAL MEDICINE

## 2021-01-12 PROCEDURE — 85060 BLOOD SMEAR INTERPRETATION: CPT | Performed by: PATHOLOGY

## 2021-01-12 PROCEDURE — 83615 LACTATE (LD) (LDH) ENZYME: CPT | Performed by: INTERNAL MEDICINE

## 2021-01-12 PROCEDURE — 83540 ASSAY OF IRON: CPT | Performed by: INTERNAL MEDICINE

## 2021-01-12 PROCEDURE — 84165 PROTEIN E-PHORESIS SERUM: CPT | Performed by: PATHOLOGY

## 2021-01-12 PROCEDURE — 36415 COLL VENOUS BLD VENIPUNCTURE: CPT | Performed by: INTERNAL MEDICINE

## 2021-01-12 PROCEDURE — 81003 URINALYSIS AUTO W/O SCOPE: CPT | Performed by: INTERNAL MEDICINE

## 2021-01-12 PROCEDURE — 80053 COMPREHEN METABOLIC PANEL: CPT | Performed by: INTERNAL MEDICINE

## 2021-01-13 LAB
ALBUMIN SERPL ELPH-MCNC: 3.7 G/DL (ref 3.7–5.1)
ALBUMIN SERPL-MCNC: 3.3 G/DL (ref 3.4–5)
ALP SERPL-CCNC: 64 U/L (ref 40–150)
ALPHA1 GLOB SERPL ELPH-MCNC: 0.3 G/DL (ref 0.2–0.4)
ALPHA2 GLOB SERPL ELPH-MCNC: 0.9 G/DL (ref 0.5–0.9)
ALT SERPL W P-5'-P-CCNC: 33 U/L (ref 0–70)
ANION GAP SERPL CALCULATED.3IONS-SCNC: 7 MMOL/L (ref 3–14)
AST SERPL W P-5'-P-CCNC: 32 U/L (ref 0–45)
B-GLOBULIN SERPL ELPH-MCNC: 0.9 G/DL (ref 0.6–1)
BILIRUB SERPL-MCNC: 0.6 MG/DL (ref 0.2–1.3)
BUN SERPL-MCNC: 24 MG/DL (ref 7–30)
CALCIUM SERPL-MCNC: 9.2 MG/DL (ref 8.5–10.1)
CHLORIDE SERPL-SCNC: 109 MMOL/L (ref 94–109)
CO2 SERPL-SCNC: 26 MMOL/L (ref 20–32)
COPATH REPORT: NORMAL
COPATH REPORT: NORMAL
CREAT SERPL-MCNC: 1.18 MG/DL (ref 0.66–1.25)
FERRITIN SERPL-MCNC: 544 NG/ML (ref 26–388)
GAMMA GLOB SERPL ELPH-MCNC: 1.2 G/DL (ref 0.7–1.6)
GFR SERPL CREATININE-BSD FRML MDRD: 57 ML/MIN/{1.73_M2}
GLUCOSE SERPL-MCNC: 130 MG/DL (ref 70–99)
HAPTOGLOB SERPL-MCNC: 133 MG/DL (ref 32–197)
IRON SATN MFR SERPL: 25 % (ref 15–46)
IRON SERPL-MCNC: 68 UG/DL (ref 35–180)
KAPPA LC UR-MCNC: 7.36 MG/DL (ref 0.33–1.94)
KAPPA LC/LAMBDA SER: 3.27 {RATIO} (ref 0.26–1.65)
LAMBDA LC SERPL-MCNC: 2.25 MG/DL (ref 0.57–2.63)
M PROTEIN SERPL ELPH-MCNC: 0 G/DL
POTASSIUM SERPL-SCNC: 3.9 MMOL/L (ref 3.4–5.3)
PROT PATTERN SERPL ELPH-IMP: NORMAL
PROT SERPL-MCNC: 7.3 G/DL (ref 6.8–8.8)
SODIUM SERPL-SCNC: 142 MMOL/L (ref 133–144)
TIBC SERPL-MCNC: 275 UG/DL (ref 240–430)
TSH SERPL DL<=0.005 MIU/L-ACNC: 3.35 MU/L (ref 0.4–4)

## 2021-01-21 ENCOUNTER — VIRTUAL VISIT (OUTPATIENT)
Dept: ONCOLOGY | Facility: CLINIC | Age: 86
End: 2021-01-21
Attending: INTERNAL MEDICINE
Payer: COMMERCIAL

## 2021-01-21 DIAGNOSIS — D64.9 ANEMIA, UNSPECIFIED TYPE: Primary | ICD-10-CM

## 2021-01-21 PROCEDURE — 99214 OFFICE O/P EST MOD 30 MIN: CPT | Mod: 95 | Performed by: INTERNAL MEDICINE

## 2021-01-21 PROCEDURE — 999N001193 HC VIDEO/TELEPHONE VISIT; NO CHARGE

## 2021-01-21 NOTE — PROGRESS NOTES
Spencer is a 85 year old who is being evaluated via a billable video visit.      How would you like to obtain your AVS? [employer]  If the video visit is dropped, the invitation should be resent by: Other e-mail: Marie  Will anyone else be joining your video visit? Yes: Daughter. How would they like to receive their invitation? Text to cell phone: [phone]      Suellen Miller CMA on 1/21/2021 at 9:01 AM      Video Start Time: 9:22 AM    Video-Visit Details    Type of service:  Video Visit    Video End Time:9:42 AM    Originating Location (pt. Location): Home    Distant Location (provider location):  [facility]     Platform used for Video Visit: [employer]       [facility]    Hematology/Oncology Established Patient Follow-up Note      Today's Date: 01/21/21    Reason for Follow-up: anemia    HISTORY OF PRESENT ILLNESS: Nixon More is a 85 year old male with PMHx of HTN, CAD, prostate cancer in 2006, atrial fibrillation/flutter on chronic apixaban, who presents with anemia.        On chart review, patient has had a normal hemoglobin, but in June 2020, his PCP noted that he developed a mild anemia in the 12 range.  Repeat labs in August and November show continued mild anemia with hemoglobin of around 12.  WBC and platelet count are normal.       He notes that he maybe feels a bit more short of breath with exertion.        He has history of prostate cancer s/p radiation and on intermittent ADT.  He most recently had Lupron in July 2019.  PSA remains undetectable.     Spencer notes that in September 2020, he received a cortisone injection.  After that, he had some hematuria.  He also had some rectal bleeding, for which he is following with colorectal surgery.  He was felt to have radiation proctitis and hemorrhoids, and he was treated with formalin.     INTERIM HISTORY: Spencer says that he is doing well.  He still intermittently has very small amounts of rectal  bleeding.        REVIEW OF SYSTEMS:   14 point ROS was reviewed and is negative other than as noted above in HPI.       HOME MEDICATIONS:  Current Outpatient Medications   Medication Sig Dispense Refill     apixaban ANTICOAGULANT (ELIQUIS) 5 MG tablet Take 1 tablet (5 mg) by mouth 2 times daily 180 tablet 3     ASPIRIN NOT PRESCRIBED (INTENTIONAL) continuous prn for other Antiplatelet medication not prescribed intentionally due to Current anticoagulant therapy (warfarin/enoxaparin)       atenolol (TENORMIN) 100 MG tablet Take 1.5 tablets (150 mg) by mouth daily 135 tablet 2     atorvastatin (LIPITOR) 40 MG tablet Take 1 tablet (40 mg) by mouth daily 90 tablet 0     diltiazem ER (DILT-XR) 120 MG 24 hr capsule Take 1 capsule (120 mg) by mouth daily 90 capsule 3     EPINEPHrine (EPIPEN/ADRENACLICK/OR ANY BX GENERIC EQUIV) 0.3 MG/0.3ML injection 2-pack Inject 0.3 mLs (0.3 mg) into the muscle as needed for anaphylaxis 0.6 mL 1     escitalopram (LEXAPRO) 10 MG tablet Take 1 tablet (10 mg) by mouth daily 90 tablet 3     ipratropium (ATROVENT) 0.06 % nasal spray Spray 2 sprays in nostril 4 times daily as needed for rhinitis 3 Box 3     Loperamide HCl (IMODIUM OR) Take by mouth daily as needed       losartan (COZAAR) 100 MG tablet Take 1 tablet (100 mg) by mouth daily 90 tablet 3     Multiple Minerals-Vitamins (PROSTEON PO) Take 2,000 Units by mouth 4 times daily       torsemide (DEMADEX) 10 MG tablet Take 1 tablet (10 mg) by mouth daily 90 tablet 3     albuterol (PROAIR RESPICLICK) 108 (90 Base) MCG/ACT inhaler Inhale 2 puffs into the lungs every 6 hours as needed for shortness of breath / dyspnea or wheezing (Patient not taking: Reported on 12/30/2020) 1 Inhaler 3         ALLERGIES:  Allergies   Allergen Reactions     Augmentin Rash     Type III hypersensitivity     Bactrim [Sulfamethoxazole W/Trimethoprim] Rash     Serum sickness, type III hypersensitivity       Bee Venom Itching     Sulfa Drugs Hives     Celebrex  [Celecoxib]      Lisinopril Cough     Naproxen Hives         PAST MEDICAL HISTORY:  Past Medical History:   Diagnosis Date     Actinic keratosis      Arthritis      Atrial fibrillation and flutter (H) 12/3/2018     CAD (coronary artery disease)     2011 lateral ischemia on stress echo, 2014 normal stress echo     Coronary artery disease 2011    Abnormal stress test 2011 and controlled with medical mgmt      Dyslipidemia      Emphysema of lung (H)      Essential hypertension, benign      Hernia, abdominal      Hypersomnia with sleep apnea, unspecified     on bipap, partially treated with residual apneas     Hypertrophy (benign) of prostate     Biopsy  negative for cancer; PSA 5     Lumbago      Mumps      Prostate cancer (H) 12/15/2006     Skin cancer, basal cell 1997     Spider veins          PAST SURGICAL HISTORY:  Past Surgical History:   Procedure Laterality Date     HERNIA REPAIR  child     Moh's procedure for basal cell carcinoma  2001     PROSTATE SURGERY       s/p lumbar laminectomy NOS  1988     SIGMOIDOSCOPY FLEXIBLE N/A 6/15/2020    Procedure: SIGMOIDOSCOPY, FLEXIBLE;  Surgeon: Sunni Patton MD;  Location: [facility]     VITRECTOMY PARS PLANA REMOVE PRERETINAL MEMBRANE   3/09         SOCIAL HISTORY:  Social History     Socioeconomic History     Marital status:      Spouse name: Not on file     Number of children: Not on file     Years of education: Not on file     Highest education level: Not on file   Occupational History     Occupation: retired   Social Needs     Financial resource strain: Not on file     Food insecurity     Worry: Not on file     Inability: Not on file     Transportation needs     Medical: Not on file     Non-medical: Not on file   Tobacco Use     Smoking status: Former Smoker     Packs/day: 1.00     Types: Cigarettes     Start date:      Quit date: 1978     Years since quittin.0     Smokeless tobacco: Never Used   Substance and Sexual  Activity     Alcohol use: Never     Frequency: Never     Binge frequency: Never     Drug use: No     Sexual activity: Yes     Partners: Female   Lifestyle     Physical activity     Days per week: Not on file     Minutes per session: Not on file     Stress: Not on file   Relationships     Social connections     Talks on phone: Not on file     Gets together: Not on file     Attends [employer]ist service: Not on file     Active member of club or organization: Not on file     Attends meetings of clubs or organizations: Not on file     Relationship status: Not on file     Intimate partner violence     Fear of current or ex partner: Not on file     Emotionally abused: Not on file     Physically abused: Not on file     Forced sexual activity: Not on file   Other Topics Concern      Service Not Asked     Blood Transfusions Not Asked     Caffeine Concern No     Comment: 0-2 cans of soda per day.     Occupational Exposure Not Asked     Hobby Hazards Not Asked     Sleep Concern Not Asked     Stress Concern Not Asked     Weight Concern Not Asked     Special Diet Not Asked     Back Care Not Asked     Exercise No     Bike Helmet Not Asked     Seat Belt Yes     Self-Exams Not Asked     Parent/sibling w/ CABG, MI or angioplasty before 65F 55M? No   Social History Narrative     Not on file         FAMILY HISTORY:  Family History   Problem Relation Age of Onset     Alzheimer Disease Mother      Hypertension Mother      Cardiovascular Father         D:86 complications fo CHF     Prostate Cancer Brother      Lung Cancer Brother 76     Prostate Cancer Brother          PHYSICAL EXAM:  ECO  GENERAL/CONSTITUTIONAL: No acute distress. Healthy, alert. Daughter also on video call.  EYES: No redness or discharge.    RESPIRATORY: No audible wheeze, cough, or visible cyanosis.  No visible retractions or increased work of breathing.  Able to speak fully in complete sentences.  NEUROLOGIC: Alert, oriented, answers questions appropriately.  No tremor. Mentation intact and speech normal  INTEGUMENTARY: No jaundice.    PSYCHIATRIC:  Mentation appears normal, affect normal/bright, judgement and insight intact, normal speech and appearance well-groomed.     The rest of a comprehensive physical exam is deferred due to public [facility] emergency video visit restrictions.      LABS:  CBC RESULTS:   Recent Labs   Lab Test 01/12/21  1311   WBC 5.9   RBC 4.11*   HGB 12.1*   HCT 39.2*   MCV 95   MCH 29.4   MCHC 30.9*   RDW 13.6          Recent Labs   Lab Test 01/12/21  1311 11/11/20  0737    142   POTASSIUM 3.9 3.5   CHLORIDE 109 110*   CO2 26 25   ANIONGAP 7 7   * 118*   BUN 24 22   CR 1.18 1.16   MARLENE 9.2 9.8     Component      Latest Ref Rng & Units 1/12/2021   Albumin Fraction      3.7 - 5.1 g/dL 3.7   Alpha 1 Fraction      0.2 - 0.4 g/dL 0.3   Alpha 2 Fraction      0.5 - 0.9 g/dL 0.9   Beta Fraction      0.6 - 1.0 g/dL 0.9   Gamma Fraction      0.7 - 1.6 g/dL 1.2   Monoclonal Peak      0.0 g/dL 0.0   ELP Interpretation:       Essentially normal electrophoretic pattern.  No obvious monoclonal proteins seen. . . .   Iron      35 - 180 ug/dL 68   Iron Binding Cap      240 - 430 ug/dL 275   Iron Saturation Index      15 - 46 % 25   Kappa Free Lt Chain      0.33 - 1.94 mg/dL 7.36 (H)   Lambda Free Lt Chain      0.57 - 2.63 mg/dL 2.25   Kappa Lambda Ratio      0.26 - 1.65 3.27 (H)   % Retic      0.5 - 2.0 % 2.0   Absolute Retic      25 - 95 10e9/L 80.6   Ferritin      26 - 388 ng/mL 544 (H)   Folate      >5.4 ng/mL 49.5   Haptoglobin      32 - 197 mg/dL 133   Lactate Dehydrogenase      85 - 227 U/L 210   TSH      0.40 - 4.00 mU/L 3.35   Vitamin B12      193 - 986 pg/mL 543   Direct Antiglobulin       Neg       PATHOLOGY:  Peripheral smear 1/12/21:  FINAL DIAGNOSIS:   Peripheral blood.   - Normochromic normocytic anemia.     COMMENT:   There is nothing specific on the peripheral smear morphologically   identified to determine the cause of the    anemia.  It may be multifactorial.  Blood loss, medication or toxin   reaction, chronic disease, renal disease,   splenic sequestration, endocrine or autoimmune abnormalities, nutritional   deficiencies, various disorders   involving the bone marrow, etc. would be in the differential diagnosis.     No obvious dysplastic changes or   blasts are seen on the peripheral smear.  Clinical correlation is   required.         ASSESSMENT/PLAN:  Nixon More is a 85 year old male with:    1) Anemia: Mild anemia since June 2020.  Normocytic.  He has a mild reticulocytosis (now resolved).  WBC and platelets are normal.  Possible etiologies considered include iron deficiency, blood loss, low-grade hemolysis, anemia of chronic disease, early MDS.  He has been on chronic anticoagulation with apixaban.  He had some urine and rectal bleeding recently, which has resolved.      His iron labs are normal, with elevated ferritin, likely reactive.  There is no evidence of hemolysis.   He occasionally gets rectal bleeding due to history of radiation proctitis, but it sounds like a very small amount.  SPEP was normal.  Serum free light chains slightly elevated, likely non-specific.  He has a mild CKD.  Peripheral smear was non-specific.      His hemoglobin remains stable at 12.1.  He is asymptomatic.  We reviewed options of continued observation versus doing bone marrow biopsy.  His other 2 cell lines are normal, and anemia is mild, and bone marrow biopsy may not be of high yield at this time, and if he has an MDS, treatment would likely be observation.  He says that he is also going work on losing weight and cardiac rehab.  We decided on observation for now, and repeat another CBC in 4 months, which is very reasonable.  If hemoglobin trends down significantly, or if he develops abnormalities in the other cell lines, then we will proceed with bone marrow biopsy.      -RTC in 4 months with CBC/diff, reticulocyte count, ferritin,  iron     2) History of prostate cancer: s/p radiation and on intermittent ADT  -follows with urology, Dr. Pino     3) Cardiac: on chronic anticoagulation with apixaban for atrial fibrillation.    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  He underwent flex sig in June 2020.  He follows with colorectal surgery, Dr. Patton.       Nimisha Mercado MD  Hematology/Oncology  [facility]    Total time spent on day of visit, including review of tests, obtaining/reviewing separately obtained history, ordering medications/tests/procedures, communicating with PCP/consultants, and documenting in electronic medical record: 30 minutes

## 2021-01-21 NOTE — LETTER
1/21/2021         RE: Nixon More  [address]  [place] [address]        Dear Colleague,    Thank you for referring your patient, Nixon More, to the [facility]. Please see a copy of my visit note below.    Spencer is a 85 year old who is being evaluated via a billable video visit.      How would you like to obtain your AVS? MyChart  If the video visit is dropped, the invitation should be resent by: Other e-mail: MyChart  Will anyone else be joining your video visit? Yes: Daughter. How would they like to receive their invitation? Text to cell phone: [phone]      Suellen Miller CMA on 1/21/2021 at 9:01 AM      Video Start Time: 9:22 AM    Video-Visit Details    Type of service:  Video Visit    Video End Time:9:42 AM    Originating Location (pt. Location): Home    Distant Location (provider location):  [facility]     Platform used for Video Visit: NightOwl       [facility]    Hematology/Oncology Established Patient Follow-up Note      Today's Date: 01/21/21    Reason for Follow-up: anemia    HISTORY OF PRESENT ILLNESS: Nixon More is a 85 year old male with PMHx of HTN, CAD, prostate cancer in 2006, atrial fibrillation/flutter on chronic apixaban, who presents with anemia.        On chart review, patient has had a normal hemoglobin, but in June 2020, his PCP noted that he developed a mild anemia in the 12 range.  Repeat labs in August and November show continued mild anemia with hemoglobin of around 12.  WBC and platelet count are normal.       He notes that he maybe feels a bit more short of breath with exertion.        He has history of prostate cancer s/p radiation and on intermittent ADT.  He most recently had Lupron in July 2019.  PSA remains undetectable.     Spencer notes that in September 2020, he received a cortisone injection.  After that, he had some hematuria.  He also had some rectal  bleeding, for which he is following with colorectal surgery.  He was felt to have radiation proctitis and hemorrhoids, and he was treated with formalin.     INTERIM HISTORY: Spencer says that he is doing well.  He still intermittently has very small amounts of rectal bleeding.        REVIEW OF SYSTEMS:   14 point ROS was reviewed and is negative other than as noted above in HPI.       HOME MEDICATIONS:  Current Outpatient Medications   Medication Sig Dispense Refill     apixaban ANTICOAGULANT (ELIQUIS) 5 MG tablet Take 1 tablet (5 mg) by mouth 2 times daily 180 tablet 3     ASPIRIN NOT PRESCRIBED (INTENTIONAL) continuous prn for other Antiplatelet medication not prescribed intentionally due to Current anticoagulant therapy (warfarin/enoxaparin)       atenolol (TENORMIN) 100 MG tablet Take 1.5 tablets (150 mg) by mouth daily 135 tablet 2     atorvastatin (LIPITOR) 40 MG tablet Take 1 tablet (40 mg) by mouth daily 90 tablet 0     diltiazem ER (DILT-XR) 120 MG 24 hr capsule Take 1 capsule (120 mg) by mouth daily 90 capsule 3     EPINEPHrine (EPIPEN/ADRENACLICK/OR ANY BX GENERIC EQUIV) 0.3 MG/0.3ML injection 2-pack Inject 0.3 mLs (0.3 mg) into the muscle as needed for anaphylaxis 0.6 mL 1     escitalopram (LEXAPRO) 10 MG tablet Take 1 tablet (10 mg) by mouth daily 90 tablet 3     ipratropium (ATROVENT) 0.06 % nasal spray Spray 2 sprays in nostril 4 times daily as needed for rhinitis 3 Box 3     Loperamide HCl (IMODIUM OR) Take by mouth daily as needed       losartan (COZAAR) 100 MG tablet Take 1 tablet (100 mg) by mouth daily 90 tablet 3     Multiple Minerals-Vitamins (PROSTEON PO) Take 2,000 Units by mouth 4 times daily       torsemide (DEMADEX) 10 MG tablet Take 1 tablet (10 mg) by mouth daily 90 tablet 3     albuterol (PROAIR RESPICLICK) 108 (90 Base) MCG/ACT inhaler Inhale 2 puffs into the lungs every 6 hours as needed for shortness of breath / dyspnea or wheezing (Patient not taking: Reported on 12/30/2020) 1 Inhaler 3          ALLERGIES:  Allergies   Allergen Reactions     Augmentin Rash     Type III hypersensitivity     Bactrim [Sulfamethoxazole W/Trimethoprim] Rash     Serum sickness, type III hypersensitivity       Bee Venom Itching     Sulfa Drugs Hives     Celebrex [Celecoxib]      Lisinopril Cough     Naproxen Hives         PAST MEDICAL HISTORY:  Past Medical History:   Diagnosis Date     Actinic keratosis      Arthritis      Atrial fibrillation and flutter (H) 12/3/2018     CAD (coronary artery disease)     1/5/2011 lateral ischemia on stress echo, 12/2014 normal stress echo     Coronary artery disease 1/1/2011    Abnormal stress test 1/2011 and controlled with medical mgmt      Dyslipidemia      Emphysema of lung (H)      Essential hypertension, benign      Hernia, abdominal      Hypersomnia with sleep apnea, unspecified     on bipap, partially treated with residual apneas     Hypertrophy (benign) of prostate 5/01    Biopsy 5/01 negative for cancer; PSA 5     Lumbago      Mumps      Prostate cancer (H) 12/15/2006     Skin cancer, basal cell 1997     Spider veins          PAST SURGICAL HISTORY:  Past Surgical History:   Procedure Laterality Date     HERNIA REPAIR  child     Moh's procedure for basal cell carcinoma  01/2001     PROSTATE SURGERY       s/p lumbar laminectomy NOS  1988     SIGMOIDOSCOPY FLEXIBLE N/A 6/15/2020    Procedure: SIGMOIDOSCOPY, FLEXIBLE;  Surgeon: Sunni Patton MD;  Location:  GI     VITRECTOMY PARS PLANA REMOVE PRERETINAL MEMBRANE   3/09         SOCIAL HISTORY:  Social History     Socioeconomic History     Marital status:      Spouse name: Not on file     Number of children: Not on file     Years of education: Not on file     Highest education level: Not on file   Occupational History     Occupation: retired   Social Needs     Financial resource strain: Not on file     Food insecurity     Worry: Not on file     Inability: Not on file     Transportation needs     Medical: Not on file      Non-medical: Not on file   Tobacco Use     Smoking status: Former Smoker     Packs/day: 1.00     Types: Cigarettes     Start date:      Quit date: 1978     Years since quittin.0     Smokeless tobacco: Never Used   Substance and Sexual Activity     Alcohol use: Never     Frequency: Never     Binge frequency: Never     Drug use: No     Sexual activity: Yes     Partners: Female   Lifestyle     Physical activity     Days per week: Not on file     Minutes per session: Not on file     Stress: Not on file   Relationships     Social connections     Talks on phone: Not on file     Gets together: Not on file     Attends Baptism service: Not on file     Active member of club or organization: Not on file     Attends meetings of clubs or organizations: Not on file     Relationship status: Not on file     Intimate partner violence     Fear of current or ex partner: Not on file     Emotionally abused: Not on file     Physically abused: Not on file     Forced sexual activity: Not on file   Other Topics Concern      Service Not Asked     Blood Transfusions Not Asked     Caffeine Concern No     Comment: 0-2 cans of soda per day.     Occupational Exposure Not Asked     Hobby Hazards Not Asked     Sleep Concern Not Asked     Stress Concern Not Asked     Weight Concern Not Asked     Special Diet Not Asked     Back Care Not Asked     Exercise No     Bike Helmet Not Asked     Seat Belt Yes     Self-Exams Not Asked     Parent/sibling w/ CABG, MI or angioplasty before 65F 55M? No   Social History Narrative     Not on file         FAMILY HISTORY:  Family History   Problem Relation Age of Onset     Alzheimer Disease Mother      Hypertension Mother      Cardiovascular Father         D:86 complications fo CHF     Prostate Cancer Brother      Lung Cancer Brother 76     Prostate Cancer Brother          PHYSICAL EXAM:  ECO  GENERAL/CONSTITUTIONAL: No acute distress. Healthy, alert. Daughter also on video call.  EYES:  No redness or discharge.    RESPIRATORY: No audible wheeze, cough, or visible cyanosis.  No visible retractions or increased work of breathing.  Able to speak fully in complete sentences.  NEUROLOGIC: Alert, oriented, answers questions appropriately. No tremor. Mentation intact and speech normal  INTEGUMENTARY: No jaundice.    PSYCHIATRIC:  Mentation appears normal, affect normal/bright, judgement and insight intact, normal speech and appearance well-groomed.     The rest of a comprehensive physical exam is deferred due to public health emergency video visit restrictions.      LABS:  CBC RESULTS:   Recent Labs   Lab Test 01/12/21  1311   WBC 5.9   RBC 4.11*   HGB 12.1*   HCT 39.2*   MCV 95   MCH 29.4   MCHC 30.9*   RDW 13.6          Recent Labs   Lab Test 01/12/21  1311 11/11/20  0737    142   POTASSIUM 3.9 3.5   CHLORIDE 109 110*   CO2 26 25   ANIONGAP 7 7   * 118*   BUN 24 22   CR 1.18 1.16   MARLENE 9.2 9.8     Component      Latest Ref Rng & Units 1/12/2021   Albumin Fraction      3.7 - 5.1 g/dL 3.7   Alpha 1 Fraction      0.2 - 0.4 g/dL 0.3   Alpha 2 Fraction      0.5 - 0.9 g/dL 0.9   Beta Fraction      0.6 - 1.0 g/dL 0.9   Gamma Fraction      0.7 - 1.6 g/dL 1.2   Monoclonal Peak      0.0 g/dL 0.0   ELP Interpretation:       Essentially normal electrophoretic pattern.  No obvious monoclonal proteins seen. . . .   Iron      35 - 180 ug/dL 68   Iron Binding Cap      240 - 430 ug/dL 275   Iron Saturation Index      15 - 46 % 25   Kappa Free Lt Chain      0.33 - 1.94 mg/dL 7.36 (H)   Lambda Free Lt Chain      0.57 - 2.63 mg/dL 2.25   Kappa Lambda Ratio      0.26 - 1.65 3.27 (H)   % Retic      0.5 - 2.0 % 2.0   Absolute Retic      25 - 95 10e9/L 80.6   Ferritin      26 - 388 ng/mL 544 (H)   Folate      >5.4 ng/mL 49.5   Haptoglobin      32 - 197 mg/dL 133   Lactate Dehydrogenase      85 - 227 U/L 210   TSH      0.40 - 4.00 mU/L 3.35   Vitamin B12      193 - 986 pg/mL 543   Direct Antiglobulin        Neg       PATHOLOGY:  Peripheral smear 1/12/21:  FINAL DIAGNOSIS:   Peripheral blood.   - Normochromic normocytic anemia.     COMMENT:   There is nothing specific on the peripheral smear morphologically   identified to determine the cause of the   anemia.  It may be multifactorial.  Blood loss, medication or toxin   reaction, chronic disease, renal disease,   splenic sequestration, endocrine or autoimmune abnormalities, nutritional   deficiencies, various disorders   involving the bone marrow, etc. would be in the differential diagnosis.     No obvious dysplastic changes or   blasts are seen on the peripheral smear.  Clinical correlation is   required.         ASSESSMENT/PLAN:  Nixon More is a 85 year old male with:    1) Anemia: Mild anemia since June 2020.  Normocytic.  He has a mild reticulocytosis (now resolved).  WBC and platelets are normal.  Possible etiologies considered include iron deficiency, blood loss, low-grade hemolysis, anemia of chronic disease, early MDS.  He has been on chronic anticoagulation with apixaban.  He had some urine and rectal bleeding recently, which has resolved.      His iron labs are normal, with elevated ferritin, likely reactive.  There is no evidence of hemolysis.   He occasionally gets rectal bleeding due to history of radiation proctitis, but it sounds like a very small amount.  SPEP was normal.  Serum free light chains slightly elevated, likely non-specific.  He has a mild CKD.  Peripheral smear was non-specific.      His hemoglobin remains stable at 12.1.  He is asymptomatic.  We reviewed options of continued observation versus doing bone marrow biopsy.  His other 2 cell lines are normal, and anemia is mild, and bone marrow biopsy may not be of high yield at this time, and if he has an MDS, treatment would likely be observation.  He says that he is also going work on losing weight and cardiac rehab.  We decided on observation for now, and repeat another CBC in 4 months,  which is very reasonable.  If hemoglobin trends down significantly, or if he develops abnormalities in the other cell lines, then we will proceed with bone marrow biopsy.      -RTC in 4 months with CBC/diff, reticulocyte count, ferritin, iron     2) History of prostate cancer: s/p radiation and on intermittent ADT  -follows with urology, Dr. Pino     3) Cardiac: on chronic anticoagulation with apixaban for atrial fibrillation.    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  He underwent flex sig in June 2020.  He follows with colorectal surgery, Dr. Patton.       Nimisha Mercado MD  Hematology/Oncology  [facility]    Total time spent on day of visit, including review of tests, obtaining/reviewing separately obtained history, ordering medications/tests/procedures, communicating with PCP/consultants, and documenting in electronic medical record: 30 minutes        Again, thank you for allowing me to participate in the care of your patient.        Sincerely,        Nimisha Mercado MD

## 2021-01-21 NOTE — LETTER
1/21/2021         RE: Nixon More  [address]  [facility] [address]        Dear Colleague,    Thank you for referring your patient, Nixon More, to the [facility]. Please see a copy of my visit note below.    Spencer is a 85 year old who is being evaluated via a billable video visit.      How would you like to obtain your AVS? MyChart  If the video visit is dropped, the invitation should be resent by: Other e-mail: MyChart  Will anyone else be joining your video visit? Yes: Daughter. How would they like to receive their invitation? Text to cell phone: [phone]      Suellen Miller CMA on 1/21/2021 at 9:01 AM      Video Start Time: 9:22 AM    Video-Visit Details    Type of service:  Video Visit    Video End Time:9:42 AM    Originating Location (pt. Location): Home    Distant Location (provider location):  [facility]     Platform used for Video Visit: [employer]       [facility]    Hematology/Oncology Established Patient Follow-up Note      Today's Date: 01/21/21    Reason for Follow-up: anemia    HISTORY OF PRESENT ILLNESS: Nixon More is a 85 year old male with PMHx of HTN, CAD, prostate cancer in 2006, atrial fibrillation/flutter on chronic apixaban, who presents with anemia.        On chart review, patient has had a normal hemoglobin, but in June 2020, his PCP noted that he developed a mild anemia in the 12 range.  Repeat labs in August and November show continued mild anemia with hemoglobin of around 12.  WBC and platelet count are normal.       He notes that he maybe feels a bit more short of breath with exertion.        He has history of prostate cancer s/p radiation and on intermittent ADT.  He most recently had Lupron in July 2019.  PSA remains undetectable.     Spencer notes that in September 2020, he received a cortisone injection.  After that, he had some hematuria.  He also had some rectal  bleeding, for which he is following with colorectal surgery.  He was felt to have radiation proctitis and hemorrhoids, and he was treated with formalin.     INTERIM HISTORY: Spencer says that he is doing well.  He still intermittently has very small amounts of rectal bleeding.        REVIEW OF SYSTEMS:   14 point ROS was reviewed and is negative other than as noted above in HPI.       HOME MEDICATIONS:  Current Outpatient Medications   Medication Sig Dispense Refill     apixaban ANTICOAGULANT (ELIQUIS) 5 MG tablet Take 1 tablet (5 mg) by mouth 2 times daily 180 tablet 3     ASPIRIN NOT PRESCRIBED (INTENTIONAL) continuous prn for other Antiplatelet medication not prescribed intentionally due to Current anticoagulant therapy (warfarin/enoxaparin)       atenolol (TENORMIN) 100 MG tablet Take 1.5 tablets (150 mg) by mouth daily 135 tablet 2     atorvastatin (LIPITOR) 40 MG tablet Take 1 tablet (40 mg) by mouth daily 90 tablet 0     diltiazem ER (DILT-XR) 120 MG 24 hr capsule Take 1 capsule (120 mg) by mouth daily 90 capsule 3     EPINEPHrine (EPIPEN/ADRENACLICK/OR ANY BX GENERIC EQUIV) 0.3 MG/0.3ML injection 2-pack Inject 0.3 mLs (0.3 mg) into the muscle as needed for anaphylaxis 0.6 mL 1     escitalopram (LEXAPRO) 10 MG tablet Take 1 tablet (10 mg) by mouth daily 90 tablet 3     ipratropium (ATROVENT) 0.06 % nasal spray Spray 2 sprays in nostril 4 times daily as needed for rhinitis 3 Box 3     Loperamide HCl (IMODIUM OR) Take by mouth daily as needed       losartan (COZAAR) 100 MG tablet Take 1 tablet (100 mg) by mouth daily 90 tablet 3     Multiple Minerals-Vitamins (PROSTEON PO) Take 2,000 Units by mouth 4 times daily       torsemide (DEMADEX) 10 MG tablet Take 1 tablet (10 mg) by mouth daily 90 tablet 3     albuterol (PROAIR RESPICLICK) 108 (90 Base) MCG/ACT inhaler Inhale 2 puffs into the lungs every 6 hours as needed for shortness of breath / dyspnea or wheezing (Patient not taking: Reported on 12/30/2020) 1 Inhaler 3          ALLERGIES:  Allergies   Allergen Reactions     Augmentin Rash     Type III hypersensitivity     Bactrim [Sulfamethoxazole W/Trimethoprim] Rash     Serum sickness, type III hypersensitivity       Bee Venom Itching     Sulfa Drugs Hives     Celebrex [Celecoxib]      Lisinopril Cough     Naproxen Hives         PAST MEDICAL HISTORY:  Past Medical History:   Diagnosis Date     Actinic keratosis      Arthritis      Atrial fibrillation and flutter (H) 12/3/2018     CAD (coronary artery disease)     1/5/2011 lateral ischemia on stress echo, 12/2014 normal stress echo     Coronary artery disease 1/1/2011    Abnormal stress test 1/2011 and controlled with medical mgmt      Dyslipidemia      Emphysema of lung (H)      Essential hypertension, benign      Hernia, abdominal      Hypersomnia with sleep apnea, unspecified     on bipap, partially treated with residual apneas     Hypertrophy (benign) of prostate 5/01    Biopsy 5/01 negative for cancer; PSA 5     Lumbago      Mumps      Prostate cancer (H) 12/15/2006     Skin cancer, basal cell 1997     Spider veins          PAST SURGICAL HISTORY:  Past Surgical History:   Procedure Laterality Date     HERNIA REPAIR  child     Moh's procedure for basal cell carcinoma  01/2001     PROSTATE SURGERY       s/p lumbar laminectomy NOS  1988     SIGMOIDOSCOPY FLEXIBLE N/A 6/15/2020    Procedure: SIGMOIDOSCOPY, FLEXIBLE;  Surgeon: Sunni Patton MD;  Location:  GI     VITRECTOMY PARS PLANA REMOVE PRERETINAL MEMBRANE   3/09         SOCIAL HISTORY:  Social History     Socioeconomic History     Marital status:      Spouse name: Not on file     Number of children: Not on file     Years of education: Not on file     Highest education level: Not on file   Occupational History     Occupation: retired   Social Needs     Financial resource strain: Not on file     Food insecurity     Worry: Not on file     Inability: Not on file     Transportation needs     Medical: Not on file      Non-medical: Not on file   Tobacco Use     Smoking status: Former Smoker     Packs/day: 1.00     Types: Cigarettes     Start date:      Quit date: 1978     Years since quittin.0     Smokeless tobacco: Never Used   Substance and Sexual Activity     Alcohol use: Never     Frequency: Never     Binge frequency: Never     Drug use: No     Sexual activity: Yes     Partners: Female   Lifestyle     Physical activity     Days per week: Not on file     Minutes per session: Not on file     Stress: Not on file   Relationships     Social connections     Talks on phone: Not on file     Gets together: Not on file     Attends Latter day service: Not on file     Active member of club or organization: Not on file     Attends meetings of clubs or organizations: Not on file     Relationship status: Not on file     Intimate partner violence     Fear of current or ex partner: Not on file     Emotionally abused: Not on file     Physically abused: Not on file     Forced sexual activity: Not on file   Other Topics Concern      Service Not Asked     Blood Transfusions Not Asked     Caffeine Concern No     Comment: 0-2 cans of soda per day.     Occupational Exposure Not Asked     Hobby Hazards Not Asked     Sleep Concern Not Asked     Stress Concern Not Asked     Weight Concern Not Asked     Special Diet Not Asked     Back Care Not Asked     Exercise No     Bike Helmet Not Asked     Seat Belt Yes     Self-Exams Not Asked     Parent/sibling w/ CABG, MI or angioplasty before 65F 55M? No   Social History Narrative     Not on file         FAMILY HISTORY:  Family History   Problem Relation Age of Onset     Alzheimer Disease Mother      Hypertension Mother      Cardiovascular Father         D:86 complications fo CHF     Prostate Cancer Brother      Lung Cancer Brother 76     Prostate Cancer Brother          PHYSICAL EXAM:  ECO  GENERAL/CONSTITUTIONAL: No acute distress. Healthy, alert. Daughter also on video call.  EYES:  No redness or discharge.    RESPIRATORY: No audible wheeze, cough, or visible cyanosis.  No visible retractions or increased work of breathing.  Able to speak fully in complete sentences.  NEUROLOGIC: Alert, oriented, answers questions appropriately. No tremor. Mentation intact and speech normal  INTEGUMENTARY: No jaundice.    PSYCHIATRIC:  Mentation appears normal, affect normal/bright, judgement and insight intact, normal speech and appearance well-groomed.     The rest of a comprehensive physical exam is deferred due to public health emergency video visit restrictions.      LABS:  CBC RESULTS:   Recent Labs   Lab Test 01/12/21  1311   WBC 5.9   RBC 4.11*   HGB 12.1*   HCT 39.2*   MCV 95   MCH 29.4   MCHC 30.9*   RDW 13.6          Recent Labs   Lab Test 01/12/21  1311 11/11/20  0737    142   POTASSIUM 3.9 3.5   CHLORIDE 109 110*   CO2 26 25   ANIONGAP 7 7   * 118*   BUN 24 22   CR 1.18 1.16   MARLENE 9.2 9.8     Component      Latest Ref Rng & Units 1/12/2021   Albumin Fraction      3.7 - 5.1 g/dL 3.7   Alpha 1 Fraction      0.2 - 0.4 g/dL 0.3   Alpha 2 Fraction      0.5 - 0.9 g/dL 0.9   Beta Fraction      0.6 - 1.0 g/dL 0.9   Gamma Fraction      0.7 - 1.6 g/dL 1.2   Monoclonal Peak      0.0 g/dL 0.0   ELP Interpretation:       Essentially normal electrophoretic pattern.  No obvious monoclonal proteins seen. . . .   Iron      35 - 180 ug/dL 68   Iron Binding Cap      240 - 430 ug/dL 275   Iron Saturation Index      15 - 46 % 25   Kappa Free Lt Chain      0.33 - 1.94 mg/dL 7.36 (H)   Lambda Free Lt Chain      0.57 - 2.63 mg/dL 2.25   Kappa Lambda Ratio      0.26 - 1.65 3.27 (H)   % Retic      0.5 - 2.0 % 2.0   Absolute Retic      25 - 95 10e9/L 80.6   Ferritin      26 - 388 ng/mL 544 (H)   Folate      >5.4 ng/mL 49.5   Haptoglobin      32 - 197 mg/dL 133   Lactate Dehydrogenase      85 - 227 U/L 210   TSH      0.40 - 4.00 mU/L 3.35   Vitamin B12      193 - 986 pg/mL 543   Direct Antiglobulin        Neg       PATHOLOGY:  Peripheral smear 1/12/21:  FINAL DIAGNOSIS:   Peripheral blood.   - Normochromic normocytic anemia.     COMMENT:   There is nothing specific on the peripheral smear morphologically   identified to determine the cause of the   anemia.  It may be multifactorial.  Blood loss, medication or toxin   reaction, chronic disease, renal disease,   splenic sequestration, endocrine or autoimmune abnormalities, nutritional   deficiencies, various disorders   involving the bone marrow, etc. would be in the differential diagnosis.     No obvious dysplastic changes or   blasts are seen on the peripheral smear.  Clinical correlation is   required.         ASSESSMENT/PLAN:  Nixon More is a 85 year old male with:    1) Anemia: Mild anemia since June 2020.  Normocytic.  He has a mild reticulocytosis (now resolved).  WBC and platelets are normal.  Possible etiologies considered include iron deficiency, blood loss, low-grade hemolysis, anemia of chronic disease, early MDS.  He has been on chronic anticoagulation with apixaban.  He had some urine and rectal bleeding recently, which has resolved.      His iron labs are normal, with elevated ferritin, likely reactive.  There is no evidence of hemolysis.   He occasionally gets rectal bleeding due to history of radiation proctitis, but it sounds like a very small amount.  SPEP was normal.  Serum free light chains slightly elevated, likely non-specific.  He has a mild CKD.  Peripheral smear was non-specific.      His hemoglobin remains stable at 12.1.  He is asymptomatic.  We reviewed options of continued observation versus doing bone marrow biopsy.  His other 2 cell lines are normal, and anemia is mild, and bone marrow biopsy may not be of high yield at this time, and if he has an MDS, treatment would likely be observation.  He says that he is also going work on losing weight and cardiac rehab.  We decided on observation for now, and repeat another CBC in 4 months,  which is very reasonable.  If hemoglobin trends down significantly, or if he develops abnormalities in the other cell lines, then we will proceed with bone marrow biopsy.      -RTC in 4 months with CBC/diff, reticulocyte count, ferritin, iron     2) History of prostate cancer: s/p radiation and on intermittent ADT  -follows with urology, Dr. Pino     3) Cardiac: on chronic anticoagulation with apixaban for atrial fibrillation.    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  He underwent flex sig in June 2020.  He follows with colorectal surgery, Dr. Patton.       Nimisha Mercado MD  Hematology/Oncology  [facility]    Total time spent on day of visit, including review of tests, obtaining/reviewing separately obtained history, ordering medications/tests/procedures, communicating with PCP/consultants, and documenting in electronic medical record: 30 minutes        Again, thank you for allowing me to participate in the care of your patient.        Sincerely,        Nimisha Mercado MD

## 2021-01-29 ENCOUNTER — IMMUNIZATION (OUTPATIENT)
Dept: NURSING | Facility: CLINIC | Age: 86
End: 2021-01-29
Payer: COMMERCIAL

## 2021-01-29 PROCEDURE — 91300 PR COVID VAC PFIZER DIL RECON 30 MCG/0.3 ML IM: CPT

## 2021-01-29 PROCEDURE — 0001A PR COVID VAC PFIZER DIL RECON 30 MCG/0.3 ML IM: CPT

## 2021-02-09 ENCOUNTER — TRANSFERRED RECORDS (OUTPATIENT)
Dept: HEALTH INFORMATION MANAGEMENT | Facility: CLINIC | Age: 86
End: 2021-02-09

## 2021-02-15 DIAGNOSIS — C61 PROSTATE CANCER (H): Primary | ICD-10-CM

## 2021-02-15 DIAGNOSIS — C61 PROSTATE CANCER (H): ICD-10-CM

## 2021-02-15 PROCEDURE — 84153 ASSAY OF PSA TOTAL: CPT | Performed by: UROLOGY

## 2021-02-15 PROCEDURE — 36415 COLL VENOUS BLD VENIPUNCTURE: CPT | Performed by: UROLOGY

## 2021-02-16 LAB — PSA SERPL-MCNC: <0.01 UG/L (ref 0–4)

## 2021-02-17 ENCOUNTER — VIRTUAL VISIT (OUTPATIENT)
Dept: UROLOGY | Facility: CLINIC | Age: 86
End: 2021-02-17
Payer: COMMERCIAL

## 2021-02-17 VITALS — HEIGHT: 69 IN | WEIGHT: 223 LBS | BODY MASS INDEX: 33.03 KG/M2

## 2021-02-17 DIAGNOSIS — C61 PROSTATE CANCER (H): Primary | ICD-10-CM

## 2021-02-17 PROCEDURE — 99213 OFFICE O/P EST LOW 20 MIN: CPT | Mod: 95 | Performed by: UROLOGY

## 2021-02-17 ASSESSMENT — MIFFLIN-ST. JEOR: SCORE: 1696.9

## 2021-02-17 ASSESSMENT — PAIN SCALES - GENERAL: PAINLEVEL: NO PAIN (0)

## 2021-02-17 NOTE — LETTER
2/17/2021       RE: Nixon More  [address]  [facility] [address]     Dear Colleague,    Thank you for referring your patient, Nixon More, to the [facility] [place] [facility]. Please see a copy of my visit note below.    Spencer is a 85 year old who is being evaluated via a billable video visit.      How would you like to obtain your AVS? MyChart  If the video visit is dropped, the invitation should be resent by: Text to cell phone: [phone]  Will anyone else be joining your video visit? No         Office Visit Note  [facility]  [phone]    UROLOGIC DIAGNOSES:   [place] 4+3=7 Prostate cancer and hematuria    CURRENT INTERVENTIONS:   Intermittent hormonal therapy, prior radiotherapy 2007, recurrence 2009, negative hematuria workup 2018    HISTORY:   Spencer is set up for virtual visit for prostate cancer follow-up today.  It has now been a year and a half since his most recent Lupron injection and the PSA remains undetectable.  He continues to feel well with no urinary complaints.    He reports that unfortunately his wife passed away in November but overall he is adjusting well with help from family.      PAST MEDICAL HISTORY:   Past Medical History:   Diagnosis Date     Actinic keratosis      Arthritis      Atrial fibrillation and flutter (H) 12/3/2018     CAD (coronary artery disease)     1/5/2011 lateral ischemia on stress echo, 12/2014 normal stress echo     Coronary artery disease 1/1/2011    Abnormal stress test 1/2011 and controlled with medical mgmt      Dyslipidemia      Emphysema of lung (H)      Essential hypertension, benign      Hernia, abdominal      Hypersomnia with sleep apnea, unspecified     on bipap, partially treated with residual apneas     Hypertrophy (benign) of prostate 5/01    Biopsy 5/01 negative for cancer; PSA 5     Lumbago      Mumps      Prostate cancer (H) 12/15/2006      Skin cancer, basal cell 1997     Spider veins        PAST SURGICAL HISTORY:   Past Surgical History:   Procedure Laterality Date     HERNIA REPAIR  child     Moh's procedure for basal cell carcinoma  2001     PROSTATE SURGERY       s/p lumbar laminectomy NOS  1988     SIGMOIDOSCOPY FLEXIBLE N/A 6/15/2020    Procedure: SIGMOIDOSCOPY, FLEXIBLE;  Surgeon: Sunni Patton MD;  Location: [facility]     VITRECTOMY PARS PLANA REMOVE PRERETINAL MEMBRANE   3/09       FAMILY HISTORY:   Family History   Problem Relation Age of Onset     Alzheimer Disease Mother      Hypertension Mother      Cardiovascular Father         D:86 complications fo CHF     Prostate Cancer Brother      Lung Cancer Brother 76     Prostate Cancer Brother        SOCIAL HISTORY:   Social History     Tobacco Use     Smoking status: Former Smoker     Packs/day: 1.00     Types: Cigarettes     Start date:      Quit date: 1978     Years since quittin.1     Smokeless tobacco: Never Used   Substance Use Topics     Alcohol use: Never     Frequency: Never     Binge frequency: Never       REVIEW OF SYSTEMS:  Skin: No rash, pruritis, or skin pigmentation  Eyes: No changes in vision  Ears/Nose/Throat: No changes in hearing, no nosebleeds  Respiratory: No shortness of breath, dyspnea on exertion, cough, or hemoptysis  Cardiovascular: No chest pain or palpitations  Gastrointestinal: No diarrhea or constipation. No abdominal pain. No hematochezia  Genitourinary: see HPI  Musculoskeletal: No pain or swelling of joints, normal range of motion  Neurologic: No weakness or tremors  Psychiatric: No recent changes in memory or mood  Hematologic/Lymphatic/Immunologic: No easy bruising or enlarged lymph nodes  Endocrine: No weight gain or loss      PHYSICAL EXAM:    General: Alert and oriented to time, place, and self. In NAD   HEENT: Head AT/NC, EOMI, CN Grossly intact   Lungs: no respiratory distress, or pursed lip breathing   Heart: No obvious jugular venous  distension present   Musculoskeltal: Normal movements. Normal appearing musculature  Skin: no suspicious lesions or rashes   Neuro: Alert, oriented, speech and mentation normal; moving all 4 extremities equally.   Psych: affect and mood normal    Imaging: None    Urinalysis: UA RESULTS:  Recent Labs   Lab Test 01/12/21  1312 12/23/19  1518 12/23/19  1518   COLOR Yellow   < > Yellow   APPEARANCE Clear   < > Clear   URINEGLC Negative   < > Negative   URINEBILI Negative   < > Negative   URINEKETONE Negative   < > Negative   SG 1.020   < > 1.015   UBLD Negative   < > Moderate*   URINEPH 6.5   < > 6.5   PROTEIN Negative   < > Negative   UROBILINOGEN 0.2   < > 0.2   NITRITE Negative   < > Negative   LEUKEST Negative   < > Small*   RBCU  --   --  O - 2   WBCU  --   --  5-10*    < > = values in this interval not displayed.       PSA: Undetectable    Post Void Residual:     Other labs: None today    IMPRESSION:  Doing well, PSA undetectable    PLAN:  He continues to do very well off of hormonal therapy and his PSA remains undetectable.  I recommended that we simply continue to follow the PSA and only give him more hormonal therapy in the future if the PSA should rise.  I will see him back in 6 months for his next PSA check.    Mark Pino M.D.          Video Start Time: 10:05 AM  Video-Visit Details    Type of service:  Video Visit    Video End Time:10:14 AM    Originating Location (pt. Location): Home    Distant Location (provider location):  [facility] [place]     Platform used for Video Visit: [employer]

## 2021-02-17 NOTE — PROGRESS NOTES
Spencer is a 85 year old who is being evaluated via a billable video visit.      How would you like to obtain your AVS? MyChart  If the video visit is dropped, the invitation should be resent by: Text to cell phone: [phone]  Will anyone else be joining your video visit? No         Office Visit Note  [facility]  [phone]    UROLOGIC DIAGNOSES:   [place] 4+3=7 Prostate cancer and hematuria    CURRENT INTERVENTIONS:   Intermittent hormonal therapy, prior radiotherapy 2007, recurrence 2009, negative hematuria workup 2018    HISTORY:   Spencer is set up for virtual visit for prostate cancer follow-up today.  It has now been a year and a half since his most recent Lupron injection and the PSA remains undetectable.  He continues to feel well with no urinary complaints.    He reports that unfortunately his wife passed away in November but overall he is adjusting well with help from family.      PAST MEDICAL HISTORY:   Past Medical History:   Diagnosis Date     Actinic keratosis      Arthritis      Atrial fibrillation and flutter (H) 12/3/2018     CAD (coronary artery disease)     1/5/2011 lateral ischemia on stress echo, 12/2014 normal stress echo     Coronary artery disease 1/1/2011    Abnormal stress test 1/2011 and controlled with medical mgmt      Dyslipidemia      Emphysema of lung (H)      Essential hypertension, benign      Hernia, abdominal      Hypersomnia with sleep apnea, unspecified     on bipap, partially treated with residual apneas     Hypertrophy (benign) of prostate 5/01    Biopsy 5/01 negative for cancer; PSA 5     Lumbago      Mumps      Prostate cancer (H) 12/15/2006     Skin cancer, basal cell 1997     Spider veins        PAST SURGICAL HISTORY:   Past Surgical History:   Procedure Laterality Date     HERNIA REPAIR  child     Moh's procedure for basal cell carcinoma  01/2001     PROSTATE SURGERY       s/p lumbar laminectomy NOS  1988     SIGMOIDOSCOPY FLEXIBLE N/A 6/15/2020    Procedure:  SIGMOIDOSCOPY, FLEXIBLE;  Surgeon: Sunni Patton MD;  Location: [facility]     VITRECTOMY PARS PLANA REMOVE PRERETINAL MEMBRANE   3/09       FAMILY HISTORY:   Family History   Problem Relation Age of Onset     Alzheimer Disease Mother      Hypertension Mother      Cardiovascular Father         D:86 complications fo CHF     Prostate Cancer Brother      Lung Cancer Brother 76     Prostate Cancer Brother        SOCIAL HISTORY:   Social History     Tobacco Use     Smoking status: Former Smoker     Packs/day: 1.00     Types: Cigarettes     Start date:      Quit date: 1978     Years since quittin.1     Smokeless tobacco: Never Used   Substance Use Topics     Alcohol use: Never     Frequency: Never     Binge frequency: Never       REVIEW OF SYSTEMS:  Skin: No rash, pruritis, or skin pigmentation  Eyes: No changes in vision  Ears/Nose/Throat: No changes in hearing, no nosebleeds  Respiratory: No shortness of breath, dyspnea on exertion, cough, or hemoptysis  Cardiovascular: No chest pain or palpitations  Gastrointestinal: No diarrhea or constipation. No abdominal pain. No hematochezia  Genitourinary: see HPI  Musculoskeletal: No pain or swelling of joints, normal range of motion  Neurologic: No weakness or tremors  Psychiatric: No recent changes in memory or mood  Hematologic/Lymphatic/Immunologic: No easy bruising or enlarged lymph nodes  Endocrine: No weight gain or loss      PHYSICAL EXAM:    General: Alert and oriented to time, place, and self. In NAD   HEENT: Head AT/NC, EOMI, CN Grossly intact   Lungs: no respiratory distress, or pursed lip breathing   Heart: No obvious jugular venous distension present   Musculoskeltal: Normal movements. Normal appearing musculature  Skin: no suspicious lesions or rashes   Neuro: Alert, oriented, speech and mentation normal; moving all 4 extremities equally.   Psych: affect and mood normal    Imaging: None    Urinalysis: UA RESULTS:  Recent Labs   Lab Test  01/12/21  1312 12/23/19  1518 12/23/19  1518   COLOR Yellow   < > Yellow   APPEARANCE Clear   < > Clear   URINEGLC Negative   < > Negative   URINEBILI Negative   < > Negative   URINEKETONE Negative   < > Negative   SG 1.020   < > 1.015   UBLD Negative   < > Moderate*   URINEPH 6.5   < > 6.5   PROTEIN Negative   < > Negative   UROBILINOGEN 0.2   < > 0.2   NITRITE Negative   < > Negative   LEUKEST Negative   < > Small*   RBCU  --   --  O - 2   WBCU  --   --  5-10*    < > = values in this interval not displayed.       PSA: Undetectable    Post Void Residual:     Other labs: None today      IMPRESSION:  Doing well, PSA undetectable    PLAN:  He continues to do very well off of hormonal therapy and his PSA remains undetectable.  I recommended that we simply continue to follow the PSA and only give him more hormonal therapy in the future if the PSA should rise.  I will see him back in 6 months for his next PSA check.      Mark Pino M.D.              Video Start Time: 10:05 AM  Video-Visit Details    Type of service:  Video Visit    Video End Time:10:14 AM    Originating Location (pt. Location): Home    Distant Location (provider location):  [facility] [place]     Platform used for Video Visit: [employer]

## 2021-02-19 ENCOUNTER — IMMUNIZATION (OUTPATIENT)
Dept: NURSING | Facility: CLINIC | Age: 86
End: 2021-02-19
Attending: GENERAL PRACTICE
Payer: COMMERCIAL

## 2021-02-19 PROCEDURE — 91300 PR COVID VAC PFIZER DIL RECON 30 MCG/0.3 ML IM: CPT

## 2021-02-19 PROCEDURE — 0002A PR COVID VAC PFIZER DIL RECON 30 MCG/0.3 ML IM: CPT

## 2021-03-07 ENCOUNTER — MYC MEDICAL ADVICE (OUTPATIENT)
Dept: UROLOGY | Facility: CLINIC | Age: 86
End: 2021-03-07

## 2021-03-16 DIAGNOSIS — E78.5 DYSLIPIDEMIA: ICD-10-CM

## 2021-03-17 RX ORDER — ATORVASTATIN CALCIUM 40 MG/1
TABLET, FILM COATED ORAL
Qty: 90 TABLET | Refills: 0 | Status: SHIPPED | OUTPATIENT
Start: 2021-03-17 | End: 2021-06-16

## 2021-03-17 NOTE — TELEPHONE ENCOUNTER
Routing refill request to provider for review/approval because:  Labs not current:  REKHA SMITH RN, BSN

## 2021-05-17 DIAGNOSIS — D64.9 ANEMIA, UNSPECIFIED TYPE: ICD-10-CM

## 2021-05-17 DIAGNOSIS — C61 PROSTATE CANCER (H): ICD-10-CM

## 2021-05-17 DIAGNOSIS — E78.5 DYSLIPIDEMIA: ICD-10-CM

## 2021-05-17 LAB
BASOPHILS # BLD AUTO: 0.1 10E9/L (ref 0–0.2)
BASOPHILS NFR BLD AUTO: 1 %
CHOLEST SERPL-MCNC: 121 MG/DL
DIFFERENTIAL METHOD BLD: ABNORMAL
EOSINOPHIL # BLD AUTO: 0.3 10E9/L (ref 0–0.7)
EOSINOPHIL NFR BLD AUTO: 6.7 %
ERYTHROCYTE [DISTWIDTH] IN BLOOD BY AUTOMATED COUNT: 13.7 % (ref 10–15)
HCT VFR BLD AUTO: 41.6 % (ref 40–53)
HDLC SERPL-MCNC: 39 MG/DL
HGB BLD-MCNC: 13.1 G/DL (ref 13.3–17.7)
LDLC SERPL CALC-MCNC: 55 MG/DL
LYMPHOCYTES # BLD AUTO: 1.3 10E9/L (ref 0.8–5.3)
LYMPHOCYTES NFR BLD AUTO: 26.4 %
MCH RBC QN AUTO: 29.5 PG (ref 26.5–33)
MCHC RBC AUTO-ENTMCNC: 31.5 G/DL (ref 31.5–36.5)
MCV RBC AUTO: 94 FL (ref 78–100)
MONOCYTES # BLD AUTO: 0.4 10E9/L (ref 0–1.3)
MONOCYTES NFR BLD AUTO: 8.8 %
NEUTROPHILS # BLD AUTO: 2.7 10E9/L (ref 1.6–8.3)
NEUTROPHILS NFR BLD AUTO: 57.1 %
NONHDLC SERPL-MCNC: 82 MG/DL
PLATELET # BLD AUTO: 186 10E9/L (ref 150–450)
RBC # BLD AUTO: 4.44 10E12/L (ref 4.4–5.9)
RETICS # AUTO: 92.5 10E9/L (ref 25–95)
RETICS/RBC NFR AUTO: 2.1 % (ref 0.5–2)
TRIGL SERPL-MCNC: 137 MG/DL
WBC # BLD AUTO: 4.8 10E9/L (ref 4–11)

## 2021-05-17 PROCEDURE — 83550 IRON BINDING TEST: CPT | Performed by: INTERNAL MEDICINE

## 2021-05-17 PROCEDURE — 82728 ASSAY OF FERRITIN: CPT | Performed by: INTERNAL MEDICINE

## 2021-05-17 PROCEDURE — 36415 COLL VENOUS BLD VENIPUNCTURE: CPT | Performed by: INTERNAL MEDICINE

## 2021-05-17 PROCEDURE — 85025 COMPLETE CBC W/AUTO DIFF WBC: CPT | Performed by: INTERNAL MEDICINE

## 2021-05-17 PROCEDURE — 84153 ASSAY OF PSA TOTAL: CPT | Performed by: INTERNAL MEDICINE

## 2021-05-17 PROCEDURE — 83540 ASSAY OF IRON: CPT | Performed by: INTERNAL MEDICINE

## 2021-05-17 PROCEDURE — 80061 LIPID PANEL: CPT | Performed by: INTERNAL MEDICINE

## 2021-05-17 PROCEDURE — 85045 AUTOMATED RETICULOCYTE COUNT: CPT | Performed by: INTERNAL MEDICINE

## 2021-05-18 LAB
FERRITIN SERPL-MCNC: 474 NG/ML (ref 26–388)
IRON SATN MFR SERPL: 24 % (ref 15–46)
IRON SERPL-MCNC: 65 UG/DL (ref 35–180)
PSA SERPL-MCNC: <0.01 UG/L (ref 0–4)
TIBC SERPL-MCNC: 268 UG/DL (ref 240–430)

## 2021-05-20 ENCOUNTER — VIRTUAL VISIT (OUTPATIENT)
Dept: ONCOLOGY | Facility: CLINIC | Age: 86
End: 2021-05-20
Attending: INTERNAL MEDICINE
Payer: COMMERCIAL

## 2021-05-20 DIAGNOSIS — D64.9 ANEMIA, UNSPECIFIED TYPE: Primary | ICD-10-CM

## 2021-05-20 PROCEDURE — 99212 OFFICE O/P EST SF 10 MIN: CPT | Mod: 95 | Performed by: INTERNAL MEDICINE

## 2021-05-20 NOTE — LETTER
5/20/2021         RE: Nixon More  [address]  [facility] [address]        Dear Colleague,    Thank you for referring your patient, Nixon More, to the [facility]. Please see a copy of my visit note below.    Spencer is a 85 year old who is being evaluated via a billable video visit.      How would you like to obtain your AVS? MyChart  If the video visit is dropped, the invitation should be resent by: Text to cell phone: [phone]  Will anyone else be joining your video visit? Constanza Miller, RADHA on 5/20/2021 at 9:53 AM      Video Start Time: 10:17 AM  Video-Visit Details    Type of service:  Video Visit    Video End Time:10:21 AM    Originating Location (pt. Location): Home    Distant Location (provider location):  [facility]     Platform used for Video Visit: [place]           [facility]    Hematology/Oncology Established Patient Follow-up Note      Today's Date: 05/20/21    Reason for Follow-up: anemia    HISTORY OF PRESENT ILLNESS: Nixon More is a 85 year old male with PMHx of HTN, CAD, prostate cancer in 2006, atrial fibrillation/flutter on chronic apixaban, who presents with anemia.        On chart review, patient has had a normal hemoglobin, but in June 2020, his PCP noted that he developed a mild anemia in the 12 range.  Repeat labs in August and November show continued mild anemia with hemoglobin of around 12.  WBC and platelet count are normal.       He notes that he maybe feels a bit more short of breath with exertion.        He has history of prostate cancer s/p radiation and on intermittent ADT.  He most recently had Lupron in July 2019.  PSA remains undetectable.     Spencer notes that in September 2020, he received a cortisone injection.  After that, he had some hematuria.  He also had some rectal bleeding, for which he is following with colorectal surgery.  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         INTERIM HISTORY: Spencer says that he has been feeling well.  He denies any new symptoms today.      REVIEW OF SYSTEMS:   14 point ROS was reviewed and is negative other than as noted above in HPI.       HOME MEDICATIONS:  Current Outpatient Medications   Medication Sig Dispense Refill     apixaban ANTICOAGULANT (ELIQUIS) 5 MG tablet Take 1 tablet (5 mg) by mouth 2 times daily 180 tablet 3     ASPIRIN NOT PRESCRIBED (INTENTIONAL) continuous prn for other Antiplatelet medication not prescribed intentionally due to Current anticoagulant therapy (warfarin/enoxaparin)       atenolol (TENORMIN) 100 MG tablet Take 1.5 tablets (150 mg) by mouth daily 135 tablet 2     atorvastatin (LIPITOR) 40 MG tablet TAKE 1 TABLET BY MOUTH EVERY DAY 90 tablet 0     diltiazem ER (DILT-XR) 120 MG 24 hr capsule Take 1 capsule (120 mg) by mouth daily 90 capsule 3     EPINEPHrine (EPIPEN/ADRENACLICK/OR ANY BX GENERIC EQUIV) 0.3 MG/0.3ML injection 2-pack Inject 0.3 mLs (0.3 mg) into the muscle as needed for anaphylaxis 0.6 mL 1     escitalopram (LEXAPRO) 10 MG tablet Take 1 tablet (10 mg) by mouth daily 90 tablet 3     ipratropium (ATROVENT) 0.06 % nasal spray Spray 2 sprays in nostril 4 times daily as needed for rhinitis 3 Box 3     Loperamide HCl (IMODIUM OR) Take by mouth daily as needed       losartan (COZAAR) 100 MG tablet Take 1 tablet (100 mg) by mouth daily 90 tablet 2     Multiple Minerals-Vitamins (PROSTEON PO) Take 2,000 Units by mouth 4 times daily       torsemide (DEMADEX) 10 MG tablet Take 1 tablet (10 mg) by mouth daily 90 tablet 3     albuterol (PROAIR RESPICLICK) 108 (90 Base) MCG/ACT inhaler Inhale 2 puffs into the lungs every 6 hours as needed for shortness of breath / dyspnea or wheezing (Patient not taking: Reported on 12/30/2020) 1 Inhaler 3         ALLERGIES:  Allergies   Allergen Reactions     Augmentin Rash     Type III hypersensitivity      Bactrim [Sulfamethoxazole W/Trimethoprim] Rash     Serum sickness, type III hypersensitivity       Bee Venom Itching     Sulfa Drugs Hives     Celebrex [Celecoxib]      Lisinopril Cough     Naproxen Hives         PAST MEDICAL HISTORY:  Past Medical History:   Diagnosis Date     Actinic keratosis      Arthritis      Atrial fibrillation and flutter (H) 12/3/2018     CAD (coronary artery disease)     1/5/2011 lateral ischemia on stress echo, 12/2014 normal stress echo     Coronary artery disease 1/1/2011    Abnormal stress test 1/2011 and controlled with medical mgmt      Dyslipidemia      Emphysema of lung (H)      Essential hypertension, benign      Hernia, abdominal      Hypersomnia with sleep apnea, unspecified     on bipap, partially treated with residual apneas     Hypertrophy (benign) of prostate 5/01    Biopsy 5/01 negative for cancer; PSA 5     Lumbago      Mumps      Prostate cancer (H) 12/15/2006     Skin cancer, basal cell 1997     Spider veins          PAST SURGICAL HISTORY:  Past Surgical History:   Procedure Laterality Date     HERNIA REPAIR  child     Moh's procedure for basal cell carcinoma  01/2001     PROSTATE SURGERY       s/p lumbar laminectomy NOS  1988     SIGMOIDOSCOPY FLEXIBLE N/A 6/15/2020    Procedure: SIGMOIDOSCOPY, FLEXIBLE;  Surgeon: Sunni Patton MD;  Location:  GI     VITRECTOMY PARS PLANA REMOVE PRERETINAL MEMBRANE   3/09         SOCIAL HISTORY:  Social History     Socioeconomic History     Marital status:      Spouse name: Not on file     Number of children: Not on file     Years of education: Not on file     Highest education level: Not on file   Occupational History     Occupation: retired   Social Needs     Financial resource strain: Not on file     Food insecurity     Worry: Not on file     Inability: Not on file     Transportation needs     Medical: Not on file     Non-medical: Not on file   Tobacco Use     Smoking status: Former Smoker     Packs/day: 1.00      Types: Cigarettes     Start date:      Quit date: 1978     Years since quittin.4     Smokeless tobacco: Never Used   Substance and Sexual Activity     Alcohol use: Never     Frequency: Never     Binge frequency: Never     Drug use: No     Sexual activity: Yes     Partners: Female   Lifestyle     Physical activity     Days per week: Not on file     Minutes per session: Not on file     Stress: Not on file   Relationships     Social connections     Talks on phone: Not on file     Gets together: Not on file     Attends Muslim service: Not on file     Active member of club or organization: Not on file     Attends meetings of clubs or organizations: Not on file     Relationship status: Not on file     Intimate partner violence     Fear of current or ex partner: Not on file     Emotionally abused: Not on file     Physically abused: Not on file     Forced sexual activity: Not on file   Other Topics Concern      Service Not Asked     Blood Transfusions Not Asked     Caffeine Concern No     Comment: 0-2 cans of soda per day.     Occupational Exposure Not Asked     Hobby Hazards Not Asked     Sleep Concern Not Asked     Stress Concern Not Asked     Weight Concern Not Asked     Special Diet Not Asked     Back Care Not Asked     Exercise No     Bike Helmet Not Asked     Seat Belt Yes     Self-Exams Not Asked     Parent/sibling w/ CABG, MI or angioplasty before 65F 55M? No   Social History Narrative     Not on file         FAMILY HISTORY:  Family History   Problem Relation Age of Onset     Alzheimer Disease Mother      Hypertension Mother      Cardiovascular Father         D:86 complications fo CHF     Prostate Cancer Brother      Lung Cancer Brother 76     Prostate Cancer Brother          PHYSICAL EXAM:  ECO  GENERAL/CONSTITUTIONAL: No acute distress. Healthy, alert. Daughter also on video call.  RESPIRATORY: No audible wheeze, cough, or visible cyanosis.  No visible retractions or increased work of  breathing.  Able to speak fully in complete sentences.  NEUROLOGIC: Alert, oriented, answers questions appropriately. No tremor. Mentation intact and speech normal  INTEGUMENTARY: No jaundice.    PSYCHIATRIC:  Mentation appears normal, affect normal/bright, judgement and insight intact, normal speech and appearance well-groomed.     The rest of a comprehensive physical exam is deferred due to public [facility] emergency video visit restrictions.      LABS:  CBC RESULTS:   Recent Labs   Lab Test 05/17/21  1034   WBC 4.8   RBC 4.44   HGB 13.1*   HCT 41.6   MCV 94   MCH 29.5   MCHC 31.5   RDW 13.7        Component      Latest Ref Rng & Units 5/17/2021   Iron      35 - 180 ug/dL 65   Iron Binding Cap      240 - 430 ug/dL 268   Iron Saturation Index      15 - 46 % 24   % Retic      0.5 - 2.0 % 2.1 (H)   Absolute Retic      25 - 95 10e9/L 92.5   Ferritin      26 - 388 ng/mL 474 (H)         PATHOLOGY:  Peripheral smear 1/12/21:  FINAL DIAGNOSIS:   Peripheral blood.   - Normochromic normocytic anemia.     COMMENT:   There is nothing specific on the peripheral smear morphologically   identified to determine the cause of the   anemia.  It may be multifactorial.  Blood loss, medication or toxin   reaction, chronic disease, renal disease,   splenic sequestration, endocrine or autoimmune abnormalities, nutritional   deficiencies, various disorders   involving the bone marrow, etc. would be in the differential diagnosis.     No obvious dysplastic changes or   blasts are seen on the peripheral smear.  Clinical correlation is   required.         ASSESSMENT/PLAN:  Nixon More is a 85 year old male with:    1) Anemia: Mild anemia since June 2020.  Normocytic.  He has a very mild reticulocytosis.  WBC and platelets are normal.  Possible etiologies considered include iron deficiency, blood loss, low-grade hemolysis, anemia of chronic disease, early MDS.  He has been on chronic anticoagulation with apixaban.  He had some urine  and rectal bleeding recently, which has resolved.      His iron labs are normal, with elevated ferritin, likely reactive.  There is no evidence of hemolysis.   He occasionally gets rectal bleeding due to history of radiation proctitis, but it sounds like a very small amount.  SPEP was normal.  Serum free light chains slightly elevated, likely non-specific.  He has a mild CKD.  Peripheral smear was non-specific.      His hemoglobin remains stable at 12.1.  He is asymptomatic.  We reviewed options of continued observation versus doing bone marrow biopsy.  His other 2 cell lines are normal, and anemia is mild, and bone marrow biopsy may not be of high yield at this time, and if he has an MDS, treatment would likely be observation.  He says that he is also going work on losing weight and cardiac rehab.  We decided on observation for now, which is very reasonable.  If hemoglobin trends down significantly, or if he develops abnormalities in the other cell lines, then we will proceed with bone marrow biopsy.      Hemoglobin is stable/improved at 13.1.  We will continue to monitor.    -RTC in 6 months with CBC/diff.  If remain stable then, can likely go out to annual checks at that point.     2) History of prostate cancer: s/p radiation and on intermittent ADT  -follows with urology, Dr. Pino     3) Cardiac: on chronic anticoagulation with apixaban for atrial fibrillation.    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  He underwent flex sig in June 2020.  He follows with colorectal surgery, Dr. Patton.       Nimisha Mercado MD  Hematology/Oncology  [facility]    Total time spent on day of visit, including review of tests, obtaining/reviewing separately obtained history, ordering medications/tests/procedures, communicating with PCP/consultants, and documenting in electronic medical record: 10  minutes        Again, thank you for allowing me to participate in the care of your patient.        Sincerely,        Nimisha Mercado MD

## 2021-05-20 NOTE — PROGRESS NOTES
Spencer is a 85 year old who is being evaluated via a billable video visit.      How would you like to obtain your AVS? MyChart  If the video visit is dropped, the invitation should be resent by: Text to cell phone: [phone]  Will anyone else be joining your video visit? Constanza Miller CMA on 5/20/2021 at 9:53 AM      Video Start Time: 10:17 AM  Video-Visit Details    Type of service:  Video Visit    Video End Time:10:21 AM    Originating Location (pt. Location): Home    Distant Location (provider location):  [facility]     Platform used for Video Visit: Well           [facility]    Hematology/Oncology Established Patient Follow-up Note      Today's Date: 05/20/21    Reason for Follow-up: anemia    HISTORY OF PRESENT ILLNESS: Nixon More is a 85 year old male with PMHx of HTN, CAD, prostate cancer in 2006, atrial fibrillation/flutter on chronic apixaban, who presents with anemia.        On chart review, patient has had a normal hemoglobin, but in June 2020, his PCP noted that he developed a mild anemia in the 12 range.  Repeat labs in August and November show continued mild anemia with hemoglobin of around 12.  WBC and platelet count are normal.       He notes that he maybe feels a bit more short of breath with exertion.        He has history of prostate cancer s/p radiation and on intermittent ADT.  He most recently had Lupron in July 2019.  PSA remains undetectable.     Spencer notes that in September 2020, he received a cortisone injection.  After that, he had some hematuria.  He also had some rectal bleeding, for which he is following with colorectal surgery.  He was felt to have radiation proctitis and hemorrhoids, and he was treated with formalin.         INTERIM HISTORY: Spencer says that he has been feeling well.  He denies any new symptoms today.      REVIEW OF SYSTEMS:   14 point ROS was reviewed and is negative other than as noted above in  HPI.       HOME MEDICATIONS:  Current Outpatient Medications   Medication Sig Dispense Refill     apixaban ANTICOAGULANT (ELIQUIS) 5 MG tablet Take 1 tablet (5 mg) by mouth 2 times daily 180 tablet 3     ASPIRIN NOT PRESCRIBED (INTENTIONAL) continuous prn for other Antiplatelet medication not prescribed intentionally due to Current anticoagulant therapy (warfarin/enoxaparin)       atenolol (TENORMIN) 100 MG tablet Take 1.5 tablets (150 mg) by mouth daily 135 tablet 2     atorvastatin (LIPITOR) 40 MG tablet TAKE 1 TABLET BY MOUTH EVERY DAY 90 tablet 0     diltiazem ER (DILT-XR) 120 MG 24 hr capsule Take 1 capsule (120 mg) by mouth daily 90 capsule 3     EPINEPHrine (EPIPEN/ADRENACLICK/OR ANY BX GENERIC EQUIV) 0.3 MG/0.3ML injection 2-pack Inject 0.3 mLs (0.3 mg) into the muscle as needed for anaphylaxis 0.6 mL 1     escitalopram (LEXAPRO) 10 MG tablet Take 1 tablet (10 mg) by mouth daily 90 tablet 3     ipratropium (ATROVENT) 0.06 % nasal spray Spray 2 sprays in nostril 4 times daily as needed for rhinitis 3 Box 3     Loperamide HCl (IMODIUM OR) Take by mouth daily as needed       losartan (COZAAR) 100 MG tablet Take 1 tablet (100 mg) by mouth daily 90 tablet 2     Multiple Minerals-Vitamins (PROSTEON PO) Take 2,000 Units by mouth 4 times daily       torsemide (DEMADEX) 10 MG tablet Take 1 tablet (10 mg) by mouth daily 90 tablet 3     albuterol (PROAIR RESPICLICK) 108 (90 Base) MCG/ACT inhaler Inhale 2 puffs into the lungs every 6 hours as needed for shortness of breath / dyspnea or wheezing (Patient not taking: Reported on 12/30/2020) 1 Inhaler 3         ALLERGIES:  Allergies   Allergen Reactions     Augmentin Rash     Type III hypersensitivity     Bactrim [Sulfamethoxazole W/Trimethoprim] Rash     Serum sickness, type III hypersensitivity       Bee Venom Itching     Sulfa Drugs Hives     Celebrex [Celecoxib]      Lisinopril Cough     Naproxen Hives         PAST MEDICAL HISTORY:  Past Medical History:   Diagnosis  Date     Actinic keratosis      Arthritis      Atrial fibrillation and flutter (H) 12/3/2018     CAD (coronary artery disease)     2011 lateral ischemia on stress echo, 2014 normal stress echo     Coronary artery disease 2011    Abnormal stress test 2011 and controlled with medical mgmt      Dyslipidemia      Emphysema of lung (H)      Essential hypertension, benign      Hernia, abdominal      Hypersomnia with sleep apnea, unspecified     on bipap, partially treated with residual apneas     Hypertrophy (benign) of prostate     Biopsy  negative for cancer; PSA 5     Lumbago      Mumps      Prostate cancer (H) 12/15/2006     Skin cancer, basal cell 1997     Spider veins          PAST SURGICAL HISTORY:  Past Surgical History:   Procedure Laterality Date     HERNIA REPAIR  child     Moh's procedure for basal cell carcinoma  2001     PROSTATE SURGERY       s/p lumbar laminectomy NOS  1988     SIGMOIDOSCOPY FLEXIBLE N/A 6/15/2020    Procedure: SIGMOIDOSCOPY, FLEXIBLE;  Surgeon: Sunni Patton MD;  Location: [facility]     VITRECTOMY PARS PLANA REMOVE PRERETINAL MEMBRANE   3/09         SOCIAL HISTORY:  Social History     Socioeconomic History     Marital status:      Spouse name: Not on file     Number of children: Not on file     Years of education: Not on file     Highest education level: Not on file   Occupational History     Occupation: retired   Social Needs     Financial resource strain: Not on file     Food insecurity     Worry: Not on file     Inability: Not on file     Transportation needs     Medical: Not on file     Non-medical: Not on file   Tobacco Use     Smoking status: Former Smoker     Packs/day: 1.00     Types: Cigarettes     Start date:      Quit date: 1978     Years since quittin.4     Smokeless tobacco: Never Used   Substance and Sexual Activity     Alcohol use: Never     Frequency: Never     Binge frequency: Never     Drug use: No     Sexual activity: Yes      Partners: Female   Lifestyle     Physical activity     Days per week: Not on file     Minutes per session: Not on file     Stress: Not on file   Relationships     Social connections     Talks on phone: Not on file     Gets together: Not on file     Attends Restorationism service: Not on file     Active member of club or organization: Not on file     Attends meetings of clubs or organizations: Not on file     Relationship status: Not on file     Intimate partner violence     Fear of current or ex partner: Not on file     Emotionally abused: Not on file     Physically abused: Not on file     Forced sexual activity: Not on file   Other Topics Concern      Service Not Asked     Blood Transfusions Not Asked     Caffeine Concern No     Comment: 0-2 cans of soda per day.     Occupational Exposure Not Asked     Hobby Hazards Not Asked     Sleep Concern Not Asked     Stress Concern Not Asked     Weight Concern Not Asked     Special Diet Not Asked     Back Care Not Asked     Exercise No     Bike Helmet Not Asked     Seat Belt Yes     Self-Exams Not Asked     Parent/sibling w/ CABG, MI or angioplasty before 65F 55M? No   Social History Narrative     Not on file         FAMILY HISTORY:  Family History   Problem Relation Age of Onset     Alzheimer Disease Mother      Hypertension Mother      Cardiovascular Father         D:86 complications fo CHF     Prostate Cancer Brother      Lung Cancer Brother 76     Prostate Cancer Brother          PHYSICAL EXAM:  ECO  GENERAL/CONSTITUTIONAL: No acute distress. Healthy, alert. Daughter also on video call.  RESPIRATORY: No audible wheeze, cough, or visible cyanosis.  No visible retractions or increased work of breathing.  Able to speak fully in complete sentences.  NEUROLOGIC: Alert, oriented, answers questions appropriately. No tremor. Mentation intact and speech normal  INTEGUMENTARY: No jaundice.    PSYCHIATRIC:  Mentation appears normal, affect normal/bright, judgement and  insight intact, normal speech and appearance well-groomed.     The rest of a comprehensive physical exam is deferred due to public health emergency video visit restrictions.      LABS:  CBC RESULTS:   Recent Labs   Lab Test 05/17/21  1034   WBC 4.8   RBC 4.44   HGB 13.1*   HCT 41.6   MCV 94   MCH 29.5   MCHC 31.5   RDW 13.7        Component      Latest Ref Rng & Units 5/17/2021   Iron      35 - 180 ug/dL 65   Iron Binding Cap      240 - 430 ug/dL 268   Iron Saturation Index      15 - 46 % 24   % Retic      0.5 - 2.0 % 2.1 (H)   Absolute Retic      25 - 95 10e9/L 92.5   Ferritin      26 - 388 ng/mL 474 (H)         PATHOLOGY:  Peripheral smear 1/12/21:  FINAL DIAGNOSIS:   Peripheral blood.   - Normochromic normocytic anemia.     COMMENT:   There is nothing specific on the peripheral smear morphologically   identified to determine the cause of the   anemia.  It may be multifactorial.  Blood loss, medication or toxin   reaction, chronic disease, renal disease,   splenic sequestration, endocrine or autoimmune abnormalities, nutritional   deficiencies, various disorders   involving the bone marrow, etc. would be in the differential diagnosis.     No obvious dysplastic changes or   blasts are seen on the peripheral smear.  Clinical correlation is   required.         ASSESSMENT/PLAN:  Nixon More is a 85 year old male with:    1) Anemia: Mild anemia since June 2020.  Normocytic.  He has a very mild reticulocytosis.  WBC and platelets are normal.  Possible etiologies considered include iron deficiency, blood loss, low-grade hemolysis, anemia of chronic disease, early MDS.  He has been on chronic anticoagulation with apixaban.  He had some urine and rectal bleeding recently, which has resolved.      His iron labs are normal, with elevated ferritin, likely reactive.  There is no evidence of hemolysis.   He occasionally gets rectal bleeding due to history of radiation proctitis, but it sounds like a very small  amount.  SPEP was normal.  Serum free light chains slightly elevated, likely non-specific.  He has a mild CKD.  Peripheral smear was non-specific.      His hemoglobin remains stable at 12.1.  He is asymptomatic.  We reviewed options of continued observation versus doing bone marrow biopsy.  His other 2 cell lines are normal, and anemia is mild, and bone marrow biopsy may not be of high yield at this time, and if he has an MDS, treatment would likely be observation.  He says that he is also going work on losing weight and cardiac rehab.  We decided on observation for now, which is very reasonable.  If hemoglobin trends down significantly, or if he develops abnormalities in the other cell lines, then we will proceed with bone marrow biopsy.      Hemoglobin is stable/improved at 13.1.  We will continue to monitor.    -RTC in 6 months with CBC/diff.  If remain stable then, can likely go out to annual checks at that point.     2) History of prostate cancer: s/p radiation and on intermittent ADT  -follows with urology, Dr. Pino     3) Cardiac: on chronic anticoagulation with apixaban for atrial fibrillation.    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  He underwent flex sig in June 2020.  He follows with colorectal surgery, Dr. Patton.       Nimisha Mercado MD  Hematology/Oncology  [facility]    Total time spent on day of visit, including review of tests, obtaining/reviewing separately obtained history, ordering medications/tests/procedures, communicating with PCP/consultants, and documenting in electronic medical record: 10 minutes

## 2021-05-20 NOTE — LETTER
5/20/2021         RE: Nixon More  [address]  [facility] [address]        Dear Colleague,    Thank you for referring your patient, Nixon More, to the [facility]. Please see a copy of my visit note below.    Spencer is a 85 year old who is being evaluated via a billable video visit.      How would you like to obtain your AVS? MyChart  If the video visit is dropped, the invitation should be resent by: Text to cell phone: [phone]  Will anyone else be joining your video visit? Constanza Miller, RADHA on 5/20/2021 at 9:53 AM      Video Start Time: 10:17 AM  Video-Visit Details    Type of service:  Video Visit    Video End Time:10:21 AM    Originating Location (pt. Location): Home    Distant Location (provider location):  [facility]     Platform used for Video Visit: Yushino           [facility]    Hematology/Oncology Established Patient Follow-up Note      Today's Date: 05/20/21    Reason for Follow-up: anemia    HISTORY OF PRESENT ILLNESS: Nixon More is a 85 year old male with PMHx of HTN, CAD, prostate cancer in 2006, atrial fibrillation/flutter on chronic apixaban, who presents with anemia.        On chart review, patient has had a normal hemoglobin, but in June 2020, his PCP noted that he developed a mild anemia in the 12 range.  Repeat labs in August and November show continued mild anemia with hemoglobin of around 12.  WBC and platelet count are normal.       He notes that he maybe feels a bit more short of breath with exertion.        He has history of prostate cancer s/p radiation and on intermittent ADT.  He most recently had Lupron in July 2019.  PSA remains undetectable.     Spencer notes that in September 2020, he received a cortisone injection.  After that, he had some hematuria.  He also had some rectal bleeding, for which he is following with colorectal surgery.  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         INTERIM HISTORY: Spencer says that he has been feeling well.  He denies any new symptoms today.      REVIEW OF SYSTEMS:   14 point ROS was reviewed and is negative other than as noted above in HPI.       HOME MEDICATIONS:  Current Outpatient Medications   Medication Sig Dispense Refill     apixaban ANTICOAGULANT (ELIQUIS) 5 MG tablet Take 1 tablet (5 mg) by mouth 2 times daily 180 tablet 3     ASPIRIN NOT PRESCRIBED (INTENTIONAL) continuous prn for other Antiplatelet medication not prescribed intentionally due to Current anticoagulant therapy (warfarin/enoxaparin)       atenolol (TENORMIN) 100 MG tablet Take 1.5 tablets (150 mg) by mouth daily 135 tablet 2     atorvastatin (LIPITOR) 40 MG tablet TAKE 1 TABLET BY MOUTH EVERY DAY 90 tablet 0     diltiazem ER (DILT-XR) 120 MG 24 hr capsule Take 1 capsule (120 mg) by mouth daily 90 capsule 3     EPINEPHrine (EPIPEN/ADRENACLICK/OR ANY BX GENERIC EQUIV) 0.3 MG/0.3ML injection 2-pack Inject 0.3 mLs (0.3 mg) into the muscle as needed for anaphylaxis 0.6 mL 1     escitalopram (LEXAPRO) 10 MG tablet Take 1 tablet (10 mg) by mouth daily 90 tablet 3     ipratropium (ATROVENT) 0.06 % nasal spray Spray 2 sprays in nostril 4 times daily as needed for rhinitis 3 Box 3     Loperamide HCl (IMODIUM OR) Take by mouth daily as needed       losartan (COZAAR) 100 MG tablet Take 1 tablet (100 mg) by mouth daily 90 tablet 2     Multiple Minerals-Vitamins (PROSTEON PO) Take 2,000 Units by mouth 4 times daily       torsemide (DEMADEX) 10 MG tablet Take 1 tablet (10 mg) by mouth daily 90 tablet 3     albuterol (PROAIR RESPICLICK) 108 (90 Base) MCG/ACT inhaler Inhale 2 puffs into the lungs every 6 hours as needed for shortness of breath / dyspnea or wheezing (Patient not taking: Reported on 12/30/2020) 1 Inhaler 3         ALLERGIES:  Allergies   Allergen Reactions     Augmentin Rash     Type III hypersensitivity      Bactrim [Sulfamethoxazole W/Trimethoprim] Rash     Serum sickness, type III hypersensitivity       Bee Venom Itching     Sulfa Drugs Hives     Celebrex [Celecoxib]      Lisinopril Cough     Naproxen Hives         PAST MEDICAL HISTORY:  Past Medical History:   Diagnosis Date     Actinic keratosis      Arthritis      Atrial fibrillation and flutter (H) 12/3/2018     CAD (coronary artery disease)     1/5/2011 lateral ischemia on stress echo, 12/2014 normal stress echo     Coronary artery disease 1/1/2011    Abnormal stress test 1/2011 and controlled with medical mgmt      Dyslipidemia      Emphysema of lung (H)      Essential hypertension, benign      Hernia, abdominal      Hypersomnia with sleep apnea, unspecified     on bipap, partially treated with residual apneas     Hypertrophy (benign) of prostate 5/01    Biopsy 5/01 negative for cancer; PSA 5     Lumbago      Mumps      Prostate cancer (H) 12/15/2006     Skin cancer, basal cell 1997     Spider veins          PAST SURGICAL HISTORY:  Past Surgical History:   Procedure Laterality Date     HERNIA REPAIR  child     Moh's procedure for basal cell carcinoma  01/2001     PROSTATE SURGERY       s/p lumbar laminectomy NOS  1988     SIGMOIDOSCOPY FLEXIBLE N/A 6/15/2020    Procedure: SIGMOIDOSCOPY, FLEXIBLE;  Surgeon: Sunni Patton MD;  Location:  GI     VITRECTOMY PARS PLANA REMOVE PRERETINAL MEMBRANE   3/09         SOCIAL HISTORY:  Social History     Socioeconomic History     Marital status:      Spouse name: Not on file     Number of children: Not on file     Years of education: Not on file     Highest education level: Not on file   Occupational History     Occupation: retired   Social Needs     Financial resource strain: Not on file     Food insecurity     Worry: Not on file     Inability: Not on file     Transportation needs     Medical: Not on file     Non-medical: Not on file   Tobacco Use     Smoking status: Former Smoker     Packs/day: 1.00      Types: Cigarettes     Start date:      Quit date: 1978     Years since quittin.4     Smokeless tobacco: Never Used   Substance and Sexual Activity     Alcohol use: Never     Frequency: Never     Binge frequency: Never     Drug use: No     Sexual activity: Yes     Partners: Female   Lifestyle     Physical activity     Days per week: Not on file     Minutes per session: Not on file     Stress: Not on file   Relationships     Social connections     Talks on phone: Not on file     Gets together: Not on file     Attends Anabaptist service: Not on file     Active member of club or organization: Not on file     Attends meetings of clubs or organizations: Not on file     Relationship status: Not on file     Intimate partner violence     Fear of current or ex partner: Not on file     Emotionally abused: Not on file     Physically abused: Not on file     Forced sexual activity: Not on file   Other Topics Concern      Service Not Asked     Blood Transfusions Not Asked     Caffeine Concern No     Comment: 0-2 cans of soda per day.     Occupational Exposure Not Asked     Hobby Hazards Not Asked     Sleep Concern Not Asked     Stress Concern Not Asked     Weight Concern Not Asked     Special Diet Not Asked     Back Care Not Asked     Exercise No     Bike Helmet Not Asked     Seat Belt Yes     Self-Exams Not Asked     Parent/sibling w/ CABG, MI or angioplasty before 65F 55M? No   Social History Narrative     Not on file         FAMILY HISTORY:  Family History   Problem Relation Age of Onset     Alzheimer Disease Mother      Hypertension Mother      Cardiovascular Father         D:86 complications fo CHF     Prostate Cancer Brother      Lung Cancer Brother 76     Prostate Cancer Brother          PHYSICAL EXAM:  ECO  GENERAL/CONSTITUTIONAL: No acute distress. Healthy, alert. Daughter also on video call.  RESPIRATORY: No audible wheeze, cough, or visible cyanosis.  No visible retractions or increased work of  breathing.  Able to speak fully in complete sentences.  NEUROLOGIC: Alert, oriented, answers questions appropriately. No tremor. Mentation intact and speech normal  INTEGUMENTARY: No jaundice.    PSYCHIATRIC:  Mentation appears normal, affect normal/bright, judgement and insight intact, normal speech and appearance well-groomed.     The rest of a comprehensive physical exam is deferred due to public [facility] emergency video visit restrictions.      LABS:  CBC RESULTS:   Recent Labs   Lab Test 05/17/21  1034   WBC 4.8   RBC 4.44   HGB 13.1*   HCT 41.6   MCV 94   MCH 29.5   MCHC 31.5   RDW 13.7        Component      Latest Ref Rng & Units 5/17/2021   Iron      35 - 180 ug/dL 65   Iron Binding Cap      240 - 430 ug/dL 268   Iron Saturation Index      15 - 46 % 24   % Retic      0.5 - 2.0 % 2.1 (H)   Absolute Retic      25 - 95 10e9/L 92.5   Ferritin      26 - 388 ng/mL 474 (H)         PATHOLOGY:  Peripheral smear 1/12/21:  FINAL DIAGNOSIS:   Peripheral blood.   - Normochromic normocytic anemia.     COMMENT:   There is nothing specific on the peripheral smear morphologically   identified to determine the cause of the   anemia.  It may be multifactorial.  Blood loss, medication or toxin   reaction, chronic disease, renal disease,   splenic sequestration, endocrine or autoimmune abnormalities, nutritional   deficiencies, various disorders   involving the bone marrow, etc. would be in the differential diagnosis.     No obvious dysplastic changes or   blasts are seen on the peripheral smear.  Clinical correlation is   required.         ASSESSMENT/PLAN:  Nixon More is a 85 year old male with:    1) Anemia: Mild anemia since June 2020.  Normocytic.  He has a very mild reticulocytosis.  WBC and platelets are normal.  Possible etiologies considered include iron deficiency, blood loss, low-grade hemolysis, anemia of chronic disease, early MDS.  He has been on chronic anticoagulation with apixaban.  He had some urine  and rectal bleeding recently, which has resolved.      His iron labs are normal, with elevated ferritin, likely reactive.  There is no evidence of hemolysis.   He occasionally gets rectal bleeding due to history of radiation proctitis, but it sounds like a very small amount.  SPEP was normal.  Serum free light chains slightly elevated, likely non-specific.  He has a mild CKD.  Peripheral smear was non-specific.      His hemoglobin remains stable at 12.1.  He is asymptomatic.  We reviewed options of continued observation versus doing bone marrow biopsy.  His other 2 cell lines are normal, and anemia is mild, and bone marrow biopsy may not be of high yield at this time, and if he has an MDS, treatment would likely be observation.  He says that he is also going work on losing weight and cardiac rehab.  We decided on observation for now, which is very reasonable.  If hemoglobin trends down significantly, or if he develops abnormalities in the other cell lines, then we will proceed with bone marrow biopsy.      Hemoglobin is stable/improved at 13.1.  We will continue to monitor.    -RTC in 6 months with CBC/diff.  If remain stable then, can likely go out to annual checks at that point.     2) History of prostate cancer: s/p radiation and on intermittent ADT  -follows with urology, Dr. Pino     3) Cardiac: on chronic anticoagulation with apixaban for atrial fibrillation.    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  He underwent flex sig in June 2020.  He follows with colorectal surgery, Dr. Patton.       Nimisha Mercado MD  Hematology/Oncology  [facility]    Total time spent on day of visit, including review of tests, obtaining/reviewing separately obtained history, ordering medications/tests/procedures, communicating with PCP/consultants, and documenting in electronic medical record: 10  minutes        Again, thank you for allowing me to participate in the care of your patient.        Sincerely,        Nimisha Mercado MD

## 2021-05-21 NOTE — PATIENT INSTRUCTIONS
Per chart review, appt request order has been deferred until 8/20 by RENETTA Morocho RN on 5/21/2021 at 4:25 PM

## 2021-06-11 ENCOUNTER — MYC MEDICAL ADVICE (OUTPATIENT)
Dept: PEDIATRICS | Facility: CLINIC | Age: 86
End: 2021-06-11

## 2021-06-11 DIAGNOSIS — R41.3 MEMORY LOSS: ICD-10-CM

## 2021-06-11 DIAGNOSIS — M85.80 OSTEOPENIA, UNSPECIFIED LOCATION: ICD-10-CM

## 2021-06-11 DIAGNOSIS — I10 BENIGN ESSENTIAL HYPERTENSION: ICD-10-CM

## 2021-06-11 DIAGNOSIS — E55.9 VITAMIN D DEFICIENCY: Primary | ICD-10-CM

## 2021-06-11 DIAGNOSIS — C61 MALIGNANT NEOPLASM OF PROSTATE (H): ICD-10-CM

## 2021-06-11 DIAGNOSIS — F32.0 MILD MAJOR DEPRESSION (H): ICD-10-CM

## 2021-06-11 DIAGNOSIS — Z79.811 AROMATASE INHIBITOR USE: ICD-10-CM

## 2021-06-11 DIAGNOSIS — E66.01 MORBID OBESITY (H): ICD-10-CM

## 2021-06-11 NOTE — TELEPHONE ENCOUNTER
Multiple questions across two [employer] messages.  Replied.  Should be seen.  Please call to help schedule, ok to use WILMER.  Please introduce self as one question is about RN care coordination

## 2021-06-11 NOTE — TELEPHONE ENCOUNTER
"Called the pt.    Introduced myself and the pt and the other PALRupinder. Provided pt with PAL Ext Mobility number ([phone]).    Pt scheduled for in-person with Dr Lewis using WILMER.      - Beltran \"Hayder\" BIANCA Rodriguez - Patient Advocate Liason (PAL)  [facility]    "

## 2021-06-15 DIAGNOSIS — E78.5 DYSLIPIDEMIA: ICD-10-CM

## 2021-06-16 ENCOUNTER — TELEPHONE (OUTPATIENT)
Dept: ENDOCRINOLOGY | Facility: CLINIC | Age: 86
End: 2021-06-16

## 2021-06-16 RX ORDER — ATORVASTATIN CALCIUM 40 MG/1
TABLET, FILM COATED ORAL
Qty: 90 TABLET | Refills: 0 | Status: SHIPPED | OUTPATIENT
Start: 2021-06-16 | End: 2021-09-22

## 2021-06-17 NOTE — TELEPHONE ENCOUNTER
Has an appointment next week.  We can discuss need for orders then.      ECHO and EKG should be done with his cardiologist and recommend scheduling with them which hasn't been done yet.

## 2021-06-17 NOTE — TELEPHONE ENCOUNTER
Please review patient mychart message and advise.    1) Patient's daughter would like patient to be tested for ADHD. She is ok to discuss this with PCP at upcoming visit.  2) Needs orders for echo and & EKG  3) Ok to hold off on care coordinator for now. (PAL was introduced)  4) Wants to know if patient should have another vertebral fracture analysis along with the DEXA  5) Also wants to know if patient should have a spine x-ray.    Thanks!  Rupinder ARTEAGA RN, BSN

## 2021-06-18 ENCOUNTER — VIRTUAL VISIT (OUTPATIENT)
Dept: ENDOCRINOLOGY | Facility: CLINIC | Age: 86
End: 2021-06-18
Payer: COMMERCIAL

## 2021-06-18 VITALS — HEIGHT: 67 IN | WEIGHT: 224 LBS | BODY MASS INDEX: 35.16 KG/M2

## 2021-06-18 DIAGNOSIS — E55.9 VITAMIN D DEFICIENCY: ICD-10-CM

## 2021-06-18 DIAGNOSIS — N18.30 STAGE 3 CHRONIC KIDNEY DISEASE, UNSPECIFIED WHETHER STAGE 3A OR 3B CKD (H): Primary | ICD-10-CM

## 2021-06-18 DIAGNOSIS — I12.9 HYPERTENSIVE CHRONIC KIDNEY DISEASE WITH STAGE 1 THROUGH STAGE 4 CHRONIC KIDNEY DISEASE, OR UNSPECIFIED CHRONIC KIDNEY DISEASE: ICD-10-CM

## 2021-06-18 DIAGNOSIS — I10 BENIGN ESSENTIAL HYPERTENSION: ICD-10-CM

## 2021-06-18 DIAGNOSIS — I77.9 AORTA DISORDER (H): ICD-10-CM

## 2021-06-18 DIAGNOSIS — M85.80 OSTEOPENIA, UNSPECIFIED LOCATION: ICD-10-CM

## 2021-06-18 DIAGNOSIS — I10 HYPERTENSION, UNSPECIFIED TYPE: ICD-10-CM

## 2021-06-18 DIAGNOSIS — R06.09 DYSPNEA ON EXERTION: ICD-10-CM

## 2021-06-18 DIAGNOSIS — R06.09 DYSPNEA ON EXERTION: Primary | ICD-10-CM

## 2021-06-18 DIAGNOSIS — M47.816 OSTEOARTHRITIS OF LUMBAR SPINE, UNSPECIFIED SPINAL OSTEOARTHRITIS COMPLICATION STATUS: ICD-10-CM

## 2021-06-18 DIAGNOSIS — E66.01 MORBID OBESITY (H): ICD-10-CM

## 2021-06-18 DIAGNOSIS — Z86.39 H/O VITAMIN D DEFICIENCY: ICD-10-CM

## 2021-06-18 DIAGNOSIS — C61 MALIGNANT NEOPLASM OF PROSTATE (H): ICD-10-CM

## 2021-06-18 LAB
ANION GAP SERPL CALCULATED.3IONS-SCNC: 4 MMOL/L (ref 3–14)
BUN SERPL-MCNC: 28 MG/DL (ref 7–30)
CALCIUM SERPL-MCNC: 9.5 MG/DL (ref 8.5–10.1)
CHLORIDE SERPL-SCNC: 109 MMOL/L (ref 94–109)
CO2 SERPL-SCNC: 29 MMOL/L (ref 20–32)
CREAT SERPL-MCNC: 1.44 MG/DL (ref 0.66–1.25)
ERYTHROCYTE [DISTWIDTH] IN BLOOD BY AUTOMATED COUNT: 14.1 % (ref 10–15)
GFR SERPL CREATININE-BSD FRML MDRD: 44 ML/MIN/{1.73_M2}
GLUCOSE SERPL-MCNC: 119 MG/DL (ref 70–99)
HCT VFR BLD AUTO: 41.1 % (ref 40–53)
HGB BLD-MCNC: 12.8 G/DL (ref 13.3–17.7)
MCH RBC QN AUTO: 29.2 PG (ref 26.5–33)
MCHC RBC AUTO-ENTMCNC: 31.1 G/DL (ref 31.5–36.5)
MCV RBC AUTO: 94 FL (ref 78–100)
NT-PROBNP SERPL-MCNC: 1027 PG/ML (ref 0–450)
PLATELET # BLD AUTO: 198 10E9/L (ref 150–450)
POTASSIUM SERPL-SCNC: 4.4 MMOL/L (ref 3.4–5.3)
PTH-INTACT SERPL-MCNC: 55 PG/ML (ref 18–80)
RBC # BLD AUTO: 4.38 10E12/L (ref 4.4–5.9)
SODIUM SERPL-SCNC: 142 MMOL/L (ref 133–144)
WBC # BLD AUTO: 5.6 10E9/L (ref 4–11)

## 2021-06-18 PROCEDURE — 83970 ASSAY OF PARATHORMONE: CPT | Performed by: INTERNAL MEDICINE

## 2021-06-18 PROCEDURE — 85027 COMPLETE CBC AUTOMATED: CPT | Performed by: INTERNAL MEDICINE

## 2021-06-18 PROCEDURE — 82306 VITAMIN D 25 HYDROXY: CPT | Performed by: INTERNAL MEDICINE

## 2021-06-18 PROCEDURE — 80048 BASIC METABOLIC PNL TOTAL CA: CPT | Performed by: INTERNAL MEDICINE

## 2021-06-18 PROCEDURE — 83880 ASSAY OF NATRIURETIC PEPTIDE: CPT | Performed by: INTERNAL MEDICINE

## 2021-06-18 PROCEDURE — 36415 COLL VENOUS BLD VENIPUNCTURE: CPT | Performed by: INTERNAL MEDICINE

## 2021-06-18 PROCEDURE — 99205 OFFICE O/P NEW HI 60 MIN: CPT | Mod: 95 | Performed by: INTERNAL MEDICINE

## 2021-06-18 RX ORDER — TORSEMIDE 10 MG/1
10 TABLET ORAL DAILY
Qty: 90 TABLET | Refills: 0 | Status: SHIPPED | OUTPATIENT
Start: 2021-06-18 | End: 2021-06-24

## 2021-06-18 ASSESSMENT — PAIN SCALES - GENERAL: PAINLEVEL: NO PAIN (0)

## 2021-06-18 ASSESSMENT — MIFFLIN-ST. JEOR: SCORE: 1669.69

## 2021-06-18 NOTE — NURSING NOTE
"Chief Complaint   Patient presents with     New Patient     new MWM consult       Vitals:    06/18/21 1043   Weight: 101.6 kg (224 lb)   Height: 1.702 m (5' 7\")       Body mass index is 35.08 kg/m .                            "

## 2021-06-18 NOTE — PROGRESS NOTES
"Spencer is a 85 year old who is being evaluated via a billable video visit.      How would you like to obtain your AVS? MyChart  If the video visit is dropped, the invitation should be resent by: Text to cell phone: [phone]  Will anyone else be joining your video visit? Daughter Amber, nurse      New Medical Weight Management Consult    PATIENT:  Nixon More  MRN:         [id number]  :         1935  JAQUELINE:         2021    Dear Colleagues,    I had the pleasure of seeing your patient, Nixon More. Full intake/assessment was done to determine barriers to weight loss success and develop a treatment plan. Nixon More is a 85 year old male interested in treatment of medical problems associated with excess weight. He has a height of 5' 7\", a weight of 224 lbs 0 oz, and the calculated Body mass index is 35.08 kg/m .    ASSESSMENT/PLAN:  -Obesity, Body mass index is 35.08 kg/m . in setting of the following endocrine issues: (which will likely be addressed at follow-up visits with PMD and I if needed, see joint orders)     Osteopenia (by DEXA , exam limited by KEVIN L-spine)     Remote h/o vit D deficiency many years ago (), patient is not taking any vit D or calcium supplements now, complicated by renal insufficiency (there would be a role for rx w/ calcitriol here IF PTH was elevated, but it is not)    Patient's daughter is present at visit and is a nurse and would like the following done:  -labs of CMP, A1c, iCa+, Mg+, Phos, NTproBNP (some of these are in epic already with my name on them?) and stated that pts PMD did not do them, so patient's daughter wants me to do them now. She states that patient has follow-up appt on Tuesday so that the labs will be followed up by PMD. She states that patient has Diffuse idiopathic skeletal hyperostosis (DISH) diagnosis, but this is not able to be confirmed objectively possibly due to lack of evidence/access to all of patient's medical records (missing " "CE data).  Visit is challenging bc patient's daughter fades into and out of the visit repeatedly and much of the visit is spent with her asking for lab/medical evaluation requests related to DISH and calcium/PTH metabolism, rather than focusing on food/activity/pharmacologic mgmt of Body mass index is 35.08 kg/m .    Patient's daughter said some of the following:  \" . . . it was hard for him to get in and out of boat because of his body habitus so is motivated to lose weight so he can get in and out of the boat easier\" after a recent fishing trip    \" . . . he is less active since moving out of his house & moving into his apt on 1 level & since his wife passed away\" after patient recently lost his wife    \" . . . get legs stronger and belly smaller . . . \" with regards to weight loss/PT goals. She has him setup to do PT/OT/cardiac rehab as activity plan/in-home fall assessment risk    The patient reports he is: \"Not taking any vitamin D or calcium right now, it has been 6 months.\" patient's daugher the nurse, confirms.    Nixon is a patient with mature onset obesity with significant element of familial/genetic influence and with current health consequences. He does need aggressive weight loss plan due to BMI>30 and serious co-morbid conditions.  Nixon More Patient endorses the following eating, sleeping, and exercise habits (see survey below).    His problem is complicated by see above/below & His ability to lose weight is impacted by see above/below -- age, deconditioning, afib, osteopenia, renal insufficiency . . .     PLAN:   Programmatic/Healthy Living  Ancillary testing:  N/A. METABOLIC LABS REQUESTED BY DAUGHTER, SEE ORDERS  Food Plan:  See below.  Activity Plan:  PT/Pool Therapy and cardiac rehab.  Supplementary:   N/A.    Medication:  TBD IF needed in future- limited by CAD/Afib/anticaogulants/age and associated risks & medication may not be necessary to promote weight loss/improved fitness/mobility " "goals of patient and his daughter    Orders Placed This Encounter   Procedures     NUTRITION REFERRAL      Patient Instructions:    Nice to talk with you in virtual clinic    After thorough chart review (limited by availability of medical records) as per your requests of eval/mgmt of \"DISH\" and calcium/vit D/metabolic evaluation ---management of your Osteopenia and possible vitamin D deficiency are being deferred to Dr. Lewis, your Internist, who you are seeing next week. If she has additional Qs or concerns, she can refer you to the Endocrinology Clinic for further evaluation. In general, a DXA scan can be done q2 yrs for surveillance if desired. Make sure you are doing safe weight bearing exercises and getting enough dietary sources of calcium and vitamin D.    t appears your labs are already pending? If not, then  Please go to any [facility] lab (if not done in past 24 hours). Follow standard safety precautions for COVID-19, practice social isolation, hand hygiene, do not touch your face, nose, or mouth, wear mask if have to go out in public to be respectful of community.  Please contact us to schedule at any of our [facility] lab locations. Call [phone] ([phone]), select option 1.    Please meet with dietician for HTN, CKD    1,000 - 1,200 calorie meal plan to lose 1 lbs weekly without exercise (based on REE estimate [phone])  Ht 1.702 m (5' 7\")   Wt 101.6 kg (224 lb)   BMI 35.08 kg/m      Use meal replacements such as Divya's meals, Lean Cuisines, Healthy Choice, Smart Ones, Weight Watchers Meals, and Slim Fast and Glucerna shakes and supplement with fresh fruits and vegetables  Please drink a lot of water daily. Most people typically need about 2 liters of water daily and more if they are exercising, have a large weight, or have a fever or illness. Add Crystal Light for flavoring if desired. But no pop with calories in it.  Please keep a food journal of what you eat, calories in " what you eat  Consider using applications for smart phones such as [email or web address]  Focus on wet volumetrics, meaning, eat more foods that are high in water and fiber such as fruits and vegetables in order to get that full feeling. These are also good for your overall health as well.  Check out Dr. Marisa Diop from [facility] - she has cookbooks with low calorie volumetric recipes  Try Cooking Light recipes for low calorie meal preparation and planning    Activity: PT/OT in home assessment pending for fall risks & cardiac rehab exercise    Interested in working with a health ?  Health coaches work with you to improve your overall health and wellbeing.  They look at the whole person, and may involve discussion of different areas of life, including, but not limited to the four pillars of health (sleep, exercise, nutrition, and stress management). Discuss with your care team if you would like to start working a health .     Health Coaching-3 Pack:      $99 for three health coaching visits (self pay; insurance does not cover health coaching)    Visits are done via phone at this time    Coaching is a partnership between the  and the client; Coaches do not prescribe or diagnose    Coaching helps inspire the client to reach his/her personal goals   - To schedule your first health  visit, call  [phone]  - To find out more about health coaching, contact [facility]  [place] at [email or web address]    Please consider health psychologist to discuss how mood, depression and/or anxiety impact your eating.    Psychological Providers    Check with your insurance to see if the psychologist is covered, if not, ask your insurance company for a referral.    [facility]   Steven Hong, PhD,    [phone]  Deanna Vera, PhD, LP  [phone]  Beatris Mitchell, PhD                 [phone]    Tonio Correa, Chayy,LP             [phone]    [place]  Niharika Jasso, PhD,    [phone]      [facility]:           [phone]  We send a referral and patient is notified.    [facility]:  [phone]  Divya Jaffe PsyD, [facility]  Eden Reeves PSyD, LP         [phone]    [facility]   To schedule, please call member services on the back of your card.    MARBELLA Shrestha PsyD,LP,ABPP [phone]    [place]  Shalom Salcedo MA, [phone]    [place]  Chay HewittyD, [phone]    [place]  Chay VirgenyD, [phone]  (fibromyalgia issues)    [facility] [place]  [phone]    [place]  Melba Mendoza PsyD,LP  [phone]  Naseem Menezes, PhD, LP  [phone]  Naseem Fuentes, PhD, LP             [phone]    [place]  Jaya Vega , [facility], LMFT [phone]    [place]  Suellen Escobedo, ChayyD, [phone]    ACE Bragg PsyD, LP   [phone]     [place]      Outreach [phone]   Perlita Jesus MA, LP  - ext. [phone]  Naseem Rendon, PhD, LP - ext [phone]  Rolan Dodd PsyD, LP - ext [phone]    [place]  Rigo Potts, PhD, [phone]  Sherman Spence PsyD, [phone]          [place]  Cherelle Emery, PhD, [phone]    IoniaEddie Short PsyD, LP  [phone]            Call updates to BIANCA Cunningham    [phone]  Last updated: 2019      RTC: quarterly as needed    Please use Adeyoh to schedule your appointment(s) or call [phone] to schedule in [facility]      Best Wishes,  Dr. Carmelita Hidalgo MD, MPH  Endocrinologist      LABS: reviewed local data in Epic & CE  ENDO CALCIUM LABS-[facility] Latest Ref Rng & Units 2021   CALCIUM 8.5 - 10.1 mg/dL 9.5   MAGNESIUM 1.6 - 2.3 mg/dL    ALBUMIN 3.4 - 5.0 g/dL    BUN 7 - 30 mg/dL 28   CREATININE 0.66 - 1.25 mg/dL 1.44 (H)   CREATININE 0.66 - 1.25 mg/dL    PARATHYROID HORMONE  "INTACT 18 - 80 pg/mL 55   ALKPHOS 40 - 150 U/L    25 OH VIT D2 ug/L    25 OH VIT D3 ug/L    25 OH VIT D TOTAL 30 - 75 ug/L    VITAMIN D DEFICIENCY SCREENING 20 - 75 ug/L    PROTEIN, TOTAL 6.8 - 8.8 g/dL    CK TOTAL 30 - 300 U/L      Results for MACK BYRD (MRN [id number]) as of 6/19/2021 11:18   Ref. Range 7/9/2019 15:22 6/18/2021 16:02   N-Terminal Pro Bnp Latest Ref Range: 0 - 450 pg/mL 1,759 (H) 1,027 (H)       IMAGING: reviewed local data in Epic & CE  nothing documenting DISH. DEXA below c/w DJD spine.    9/9/19: DEXA:  \"FINDINGS:               Lumbar Spine (L1-L4)      T-score:  2.1, marked degenerative changes present                Left Femoral Neck            T-score:  -2.1               Right Femoral Neck          T-score:  -1.3                  Lumbar (L1-L4) BMD: 1.496  Previous: 1.389                          Total Hip Mean BMD: 1.018  Previous: 0.971     Comparison is made to another DXA performed on the same Lunar Prodigy  machine on 12/09/2015.  LATERAL VERTEBRAL ASSESSMENT  Procedure:  Vertebral fracture assessment was performed in the lateral decubitus position using a SoZo Global Prodigy  densitometer.  Indications for VFA: T-score of -1.0 or worse, age (male >79) and height loss > 1.5\"  Confounding factors for VFA: Arthritis/degenerative disc disease.  The LVA scan is interpretable from T7 to L5.     VFA Findings: Using the semi-quantitative analysis of Cat there was evidence of no spinal deformity  VFA Impression: Nixon Byrd has no vertebral fractures identified on the VFA.   IMPRESSION  Osteopenia., Degenerative changes of the lumbar spine which may falsely elevate results.\"      He has the following co-morbidities:       6/17/2021   I have the following health issues associated with obesity: Heart Disease, High Blood Pressure, Sleep Apnea, Cancer, Osteoarthritis (joint disease)   I have the following symptoms associated with obesity: Knee Pain, Lower Extremity Swelling, Back Pain, " "Fatigue, Hip Pain       Patient Goals 6/17/2021   I am interested in having a healthier weight to diminish current health problems: Yes   I am interested in having a healthier weight in order to prevent future health problems: Yes   I am interested in having a healthier weight in order to have a future surgery: Yes   If yes, please indicate which surgery? Possible hip or knee       Referring Provider 6/17/2021   Please name the provider who referred you to Medical Weight Management.  If you do not know, please answer: \"I Don't Know\". Dr Katina Lewis       Weight History 6/17/2021   Would you describe your weight gain as gradual? Yes   I became overweight: As an Adult   The following factors have contributed to my weight gain:  After Quitting Smoking, Eating Wrong Types of Food, Eating Too Much, Lack of Exercise   I have tried the following methods to lose weight: Watching Portions or Calories, Weight Watchers   My lowest weight since age 20 was: 155   My highest weight since age 20 was: 226   The most weight I have ever lost was: (lbs) 20   I have the following family history of obesity/being overweight:  One or more of my siblings are overweight, Many of my relatives are overweight   Has anyone in your family had weight loss surgery? Yes   How has your weight changed over the last year?  Gained   How many pounds? 6       Diet Recall Review with Patient 6/17/2021   Do you typically eat breakfast? Yes   If you do eat breakfast, what types of food do you eat? Cereal ,fruit, pancakes,  oatmeal   Do you typically eat lunch? Yes   If you do eat lunch, what types of food do you typically eat?  lunchmeat, bread   Do you typically eat supper? Yes   If you do eat supper, what types of food do you typically eat? Some frozen meals, stew ,broccolli   Do you typically eat snacks? Yes   If you do snack, what types of food do you typically eat? Cheese sticks, peanuts ,pistachios, herring   Do you like vegetables?  Yes   Do you " drink water? Yes   How many glasses of juice do you drink in a typical day? 0   How many of glasses of milk do you drink in a typical day? 1   If you do drink milk, what type? 2%   How many 8oz glasses of sugar containing drinks such as Kaz-Aid/sweet tea do you drink in a day? 0   How many cans/bottles of sugar pop/soda/tea/sports drinks do you drink in a day? 0   How many cans/bottles of diet pop/soda/tea or sports drink do you drink in a day? 0   How often do you have a drink of alcohol? Monthly or Less   If you do drink, how many drinks might you have in a day? 1 or 2       Eating Habits 6/17/2021   Generally, my meals include foods like these: bread, pasta, rice, potatoes, corn, crackers, sweet dessert, pop, or juice. A Few Times a Week   Generally, my meals include foods like these: fried meats, brats, burgers, french fries, pizza, cheese, chips, or ice cream. Once a Week   Eat fast food (like McDonalds, BurCorvalius, Taco Bell). Less Than Weekly   Eat at a buffet or sit-down restaurant. Less Than Weekly   Eat most of my meals in front of the TV or computer. Less Than Weekly   Often skip meals, eat at random times, have no regular eating times. Less Than Weekly   Rarely sit down for a meal but snack or graze throughout.  Less Than Weekly   Eat extra snacks between meals. A Few Times a Week   Eat most of my food at the end of the day. A Few Times a Week   Eat in the middle of the night or wake up at night to eat. Never   Eat extra snacks to prevent or correct low blood sugar. Never   Eat to prevent acid reflux or stomach pain. Never   Worry about not having enough food to eat. Never   Have you been to the food shelf at least a few times this year? No   I eat when I am depressed. Less Than Weekly   I eat when I am stressed. Never   I eat when I am bored. Less Than Weekly   I eat when I am anxious. Never   I eat when I am happy or as a reward. Less Than Weekly   I feel hungry all the time even if I just have  eaten. Never   Feeling full is important to me. Less Than Weekly   I finish all the food on my plate even if I am already full. A Few Times a Week   I can't resist eating delicious food or walk past the good food/smell. Less Than Weekly   I eat/snack without noticing that I am eating. Never   I eat when I am preparing the meal. Less Than Weekly   I eat more than usual when I see others eating. Less Than Weekly   I have trouble not eating sweets, ice cream, cookies, or chips if they are around the house. A Few Times a Week   I think about food all day. Never   What foods, if any, do you crave? None       Amount of Food 6/17/2021   I make myself vomit what I have eaten or use laxatives to get rid of food. Never   I eat a large amount of food, like a loaf of bread, a box of cookies, a pint/quart of ice cream, all at once. Never   I eat a large amount of food even when I am not hungry. Never   I eat rapidly. Monthly   I eat alone because I feel embarrassed and do not want others to see how much I have eaten. Never   I eat until I am uncomfortably full. Never   I feel bad, disgusted, or guilty after I overeat. Never   I make myself vomit what I have eaten or use laxatives to get rid of food. Never       Activity/Exercise History 6/17/2021   How much of a typical 12 hour day do you spend sitting? Most of the Day   How much of a typical 12 hour day do you spend lying down? Less Than Half the Day   How much of a typical day do you spend walking/standing? Less Than Half the Day   How many hours (not including work) do you spend on the TV/Video Games/Computer/Tablet/Phone? 4-5 Hours   How many times a week are you active for the purpose of exercise? Never   What keeps you from being more active? Pain, Shortness of Breath   How many total minutes do you spend doing some activity for the purpose of exercising when you exercise? None       PAST MEDICAL HISTORY:    [facility] (remote CE) data:  Past Medical History:   Diagnosis  Date     Arthropathy     Asthma     Atrial fibrillation     Essential hypertension     Past Surgical History:   Procedure Laterality Date     HERNIA REPAIR     LAMINECTOMY       Past Medical History:   Diagnosis Date     Actinic keratosis      Arthritis      Atrial fibrillation and flutter (H) 12/3/2018     CAD (coronary artery disease)     1/5/2011 lateral ischemia on stress echo, 12/2014 normal stress echo     Coronary artery disease 1/1/2011    Abnormal stress test 1/2011 and controlled with medical mgmt      Dyslipidemia      Emphysema of lung (H)      Essential hypertension, benign      Hernia, abdominal      Hypersomnia with sleep apnea, unspecified     on bipap, partially treated with residual apneas     Hypertrophy (benign) of prostate 5/01    Biopsy 5/01 negative for cancer; PSA 5     Lumbago      Mumps      Prostate cancer (H) 12/15/2006     Skin cancer, basal cell 1997     Spider veins        Work/Social History Reviewed With Patient 6/17/2021   My employment status is: Retired   My job is: N/A   How many hours do you spend commuting to work daily?  N/A   What is your marital status? Single   Do you have children? Yes   If you have children, are they overweight? Yes   Who do you live with?  Bianka (cat)   Are they supportive of your health goals? No   Who does the food shopping?  Me       Mental Health History Reviewed With Patient 6/17/2021   Have you ever been physically or sexually abused? No   If yes, do you feel that the abuse is affecting your weight? N/A   If yes, would you like to talk to a counselor about the abuse? N/A   How often in the past 2 weeks have you felt little interest or pleasure in doing things? Not at all   Over the past 2 weeks how often have you felt down, depressed, or hopeless? Not at all       Sleep History Reviewed With Patient 6/17/2021   How many hours do you sleep at night? 8   Do you think that you snore loudly or has anybody ever heard you snore loudly (louder than  talking or so loud it can be heard behind a shut door)? No   Has anyone seen or heard you stop breathing during your sleep? Yes   Do you often feel tired, fatigued, or sleepy during the day? No   Do you have a TV/Computer in your bedroom? Yes       MEDICATIONS: MEDICATIONS IN THIS CHART MAY NOT BE ACCURATE.    Current Outpatient Medications   Medication Sig Dispense Refill     apixaban ANTICOAGULANT (ELIQUIS) 5 MG tablet Take 1 tablet (5 mg) by mouth 2 times daily 180 tablet 3     ASPIRIN NOT PRESCRIBED (INTENTIONAL) continuous prn for other Antiplatelet medication not prescribed intentionally due to Current anticoagulant therapy (warfarin/enoxaparin)       atenolol (TENORMIN) 100 MG tablet Take 1.5 tablets (150 mg) by mouth daily 135 tablet 2     atorvastatin (LIPITOR) 40 MG tablet TAKE 1 TABLET BY MOUTH EVERY DAY 90 tablet 0     diltiazem ER (DILT-XR) 120 MG 24 hr capsule Take 1 capsule (120 mg) by mouth daily 90 capsule 3     EPINEPHrine (EPIPEN/ADRENACLICK/OR ANY BX GENERIC EQUIV) 0.3 MG/0.3ML injection 2-pack Inject 0.3 mLs (0.3 mg) into the muscle as needed for anaphylaxis 0.6 mL 1     escitalopram (LEXAPRO) 10 MG tablet Take 1 tablet (10 mg) by mouth daily 90 tablet 3     ipratropium (ATROVENT) 0.06 % nasal spray Spray 2 sprays in nostril 4 times daily as needed for rhinitis 3 Box 3     Loperamide HCl (IMODIUM OR) Take by mouth daily as needed       losartan (COZAAR) 100 MG tablet Take 1 tablet (100 mg) by mouth daily 90 tablet 2     torsemide (DEMADEX) 10 MG tablet Take 1 tablet (10 mg) by mouth daily 90 tablet 3     albuterol (PROAIR RESPICLICK) 108 (90 Base) MCG/ACT inhaler Inhale 2 puffs into the lungs every 6 hours as needed for shortness of breath / dyspnea or wheezing (Patient not taking: Reported on 6/18/2021) 1 Inhaler 3     Multiple Minerals-Vitamins (PROSTEON PO) Take 2,000 Units by mouth 4 times daily         ALLERGIES:   Allergies   Allergen Reactions     Augmentin Rash     Type III  hypersensitivity     Bactrim [Sulfamethoxazole W/Trimethoprim] Rash     Serum sickness, type III hypersensitivity       Bee Venom Itching     Sulfa Drugs Hives     Celebrex [Celecoxib]      Lisinopril Cough     Naproxen Hives       PHYSICAL EXAM:  Physical Examination:  No vitals, video visit  GENERAL: Healthy, alert and no distress, daughter Amber who is a nurse jets in and out of the visit virtually, at times the lack of band with is disruptive (?she says its due to sharing band with )  EYES: Eyes grossly normal to inspection.  No discharge or erythema, or obvious scleral/conjunctival abnormalities.  RESP: No audible wheeze, cough, or visible cyanosis.  No visible retractions or increased work of breathing.    SKIN: Visible skin clear. No significant rash, abnormal pigmentation or lesions.  NEURO: Cranial nerves grossly intact.  Mentation and speech appropriate for age.  PSYCH: Mentation appears normal, affect normal/bright, judgement and insight intact, normal speech and appearance well-groomed.    During this virtual visit the patient is located in [place], patient verifies this as the location during the entirety of this visit.       Video-Visit Details    Type of service:  Video Visit    Video: Start: 06/18/2021 11:17 am   Stop: 06/18/2021 12:02 pm    Originating Location (pt. Location): Home    Distant Location (provider location):  [facility] [place]     Platform used for Video Visit: Gist       Total Time: 80 min spent on EXTENSIVE chart review including outside records, evaluation, management, counseling, education, & motivational interviewing with greater than 50% of the total time was spent on counseling and coordinating care and documentation. Visit limitations were due to lack of availability of all medical records and band with issues on part of patient/family and an apparent unanticipated metabolic evaluation requested by patient's daughter in Medical Weight  Management Clinic context. Patient was seen with daughter, Nimisha Rodas (proxy for Spencer More), who is a nurse and she had requested additional evaluation which was completed. Rest of this evaluation TBD jointly by patient's PMD, Dr. Natalya Lewis in the days/weeks/months to follow.

## 2021-06-18 NOTE — PATIENT INSTRUCTIONS
"Nice to talk with you in virtual clinic    After thorough chart review (limited by availability of medical records) as per your requests of eval/mgmt of \"DISH\" and calcium/vit D/metabolic evaluation ---management of your Osteopenia and possible vitamin D deficiency are being deferred to Dr. Lewis, your Internist, who you are seeing next week. If she has additional Qs or concerns, she can refer you to the Endocrinology Clinic for further evaluation. In general, a DXA scan can be done q2 yrs for surveillance if desired. Make sure you are doing safe weight bearing exercises and getting enough dietary sources of calcium and vitamin D.    t appears your labs are already pending? If not, then  Please go to any [facility] lab (if not done in past 24 hours). Follow standard safety precautions for COVID-19, practice social isolation, hand hygiene, do not touch your face, nose, or mouth, wear mask if have to go out in public to be respectful of community.  Please contact us to schedule at any of our [facility] lab locations. Call [phone] ([phone]), select option 1.    Please meet with dietician for HTN, CKD    1,000 - 1,200 calorie meal plan to lose 1 lbs weekly without exercise (based on REE estimate 8060-7291)  Ht 1.702 m (5' 7\")   Wt 101.6 kg (224 lb)   BMI 35.08 kg/m      Use meal replacements such as Dviya's meals, Lean Cuisines, Healthy Choice, Smart Ones, Weight Watchers Meals, and Slim Fast and Glucerna shakes and supplement with fresh fruits and vegetables  Please drink a lot of water daily. Most people typically need about 2 liters of water daily and more if they are exercising, have a large weight, or have a fever or illness. Add Crystal Light for flavoring if desired. But no pop with calories in it.  Please keep a food journal of what you eat, calories in what you eat  Consider using applications for smart phones such as 3PointDataPal  Focus on wet volumetrics, meaning, eat more foods " that are high in water and fiber such as fruits and vegetables in order to get that full feeling. These are also good for your overall health as well.  Check out Dr. Marisa Diop from [facility] - she has cookbooks with low calorie volumetric recipes  Try Cooking Light recipes for low calorie meal preparation and planning    Activity: PT/OT in home assessment pending for fall risks & cardiac rehab exercise    Interested in working with a health ?  Health coaches work with you to improve your overall health and wellbeing.  They look at the whole person, and may involve discussion of different areas of life, including, but not limited to the four pillars of health (sleep, exercise, nutrition, and stress management). Discuss with your care team if you would like to start working a health .     Health Coaching-3 Pack:      $99 for three health coaching visits (self pay; insurance does not cover health coaching)    Visits are done via phone at this time    Coaching is a partnership between the  and the client; Coaches do not prescribe or diagnose    Coaching helps inspire the client to reach his/her personal goals   - To schedule your first health  visit, call  [phone]  - To find out more about health coaching, contact [facility] at [email or web address]    Please consider health psychologist to discuss how mood, depression and/or anxiety impact your eating.    Psychological Providers    Check with your insurance to see if the psychologist is covered, if not, ask your insurance company for a referral.    [facility]   Steven Hong, PhD, LP   [phone]  Deanna Vera, PhD, LP  [phone]  Beatris Mitchell, PhD                 [phone]    Tonio Correa, Chayy,LP             [phone]    [place]  Niharika Jasso, PhD, LP  [phone]      [facility]:           [phone]  We send a referral and patient is  notified.    [facility]:  [phone]  Divya Jaffe, PsyD, [facility]  Eden Reeves, PSyD, LP         [phone]    [facility]   To schedule, please call member services on the back of your card.    MARBELLA Shrestha, PsyD,LP,ABPP [phone]    [place]  Shalom Salcedo MA, [phone]    [place]  Mandy Mayfield PsyD, [phone]    [place]  Radha Mccormick, PsyD, [phone]  (fibromyalgia issues)    [facility]  [phone]    [place]  Melba Mendoza,PsyD,LP  [phone]  Naseem Menezes, PhD, LP  [phone]  Naseem Fuentes, PhD, LP             [phone]    [place]  Jaya Vega , [facility], LMFT [phone]    [place]  Suellen Escobedo, PsyD, [phone]    ACE Bragg, PsyD, LP   [phone]     [place]      Outreach [phone]   Perlita Jesus MA, LP  - ext. [phone]  Naseem Rednon, PhD, LP - ext [phone]  Rolan Dodd PsyD, LP - ext [phone]    [place]  Rigo Potts, PhD, [phone]  Sherman Spence PsyD, [phone]          [place]  Cherelle Emery, PhD, [phone]    [place] PsyD, [place]  [phone]            Call updates to BIANCA Cunningham    [phone]  Last updated: 2019      RTC: quarterly as needed    Please use Covestorhart to schedule your appointment(s) or call [phone] to schedule in [facility]      Best Wishes,  Dr. Carmelita Hidalgo MD, MPH  Endocrinologist

## 2021-06-18 NOTE — LETTER
"2021       RE: Nixon More  [address]  [facility] [address]     Dear Colleague,    Thank you for referring your patient, Nixon More, to the [facility]NA[facility] [place] at [facility]. Please see a copy of my visit note below.    Spencer is a 85 year old who is being evaluated via a billable video visit.      How would you like to obtain your AVS? MyChart  If the video visit is dropped, the invitation should be resent by: Text to cell phone: [phone]  Will anyone else be joining your video visit? Daughter Amber, nurse      New Medical Weight Management Consult    PATIENT:  Nixon More  MRN:         [id number]  :         1935  JAQUELINE:         2021    Dear Colleagues,    I had the pleasure of seeing your patient, Nixon More. Full intake/assessment was done to determine barriers to weight loss success and develop a treatment plan. Nixon More is a 85 year old male interested in treatment of medical problems associated with excess weight. He has a height of 5' 7\", a weight of 224 lbs 0 oz, and the calculated Body mass index is 35.08 kg/m .    ASSESSMENT/PLAN:  -Obesity, Body mass index is 35.08 kg/m . in setting of the following endocrine issues: (which will likely be addressed at follow-up visits with PMD and I if needed, see joint orders)     Osteopenia (by DEXA , exam limited by KEVIN L-spine)     Remote h/o vit D deficiency many years ago (), patient is not taking any vit D or calcium supplements now, complicated by renal insufficiency (there would be a role for rx w/ calcitriol here IF PTH was elevated, but it is not)    Patient's daughter is present at visit and is a nurse and would like the following done:  -labs of CMP, A1c, iCa+, Mg+, Phos, NTproBNP (some of these are in epic already with my name on them?) and stated that pts PMD did not do them, so patient's daughter wants me to do " "them now. She states that patient has follow-up appt on Tuesday so that the labs will be followed up by PMD. She states that patient has Diffuse idiopathic skeletal hyperostosis (DISH) diagnosis, but this is not able to be confirmed objectively possibly due to lack of evidence/access to all of patient's medical records (missing CE data).  Visit is challenging bc patient's daughter fades into and out of the visit repeatedly and much of the visit is spent with her asking for lab/medical evaluation requests related to DISH and calcium/PTH metabolism, rather than focusing on food/activity/pharmacologic mgmt of Body mass index is 35.08 kg/m .    Patient's daughter said some of the following:  \" . . . it was hard for him to get in and out of boat because of his body habitus so is motivated to lose weight so he can get in and out of the boat easier\" after a recent fishing trip    \" . . . he is less active since moving out of his house & moving into his apt on 1 level & since his wife passed away\" after patient recently lost his wife    \" . . . get legs stronger and belly smaller . . . \" with regards to weight loss/PT goals. She has him setup to do PT/OT/cardiac rehab as activity plan/in-home fall assessment risk    The patient reports he is: \"Not taking any vitamin D or calcium right now, it has been 6 months.\" patient's daugher the nurse, confirms.    Nixon is a patient with mature onset obesity with significant element of familial/genetic influence and with current health consequences. He does need aggressive weight loss plan due to BMI>30 and serious co-morbid conditions.  Nixon More Patient endorses the following eating, sleeping, and exercise habits (see survey below).    His problem is complicated by see above/below & His ability to lose weight is impacted by see above/below -- age, deconditioning, afib, osteopenia, renal insufficiency . . .     PLAN:   Programmatic/Healthy Living  Ancillary testing:  N/A. " "METABOLIC LABS REQUESTED BY DAUGHTER, SEE ORDERS  Food Plan:  See below.  Activity Plan:  PT/Pool Therapy and cardiac rehab.  Supplementary:   N/A.    Medication:  TBD IF needed in future- limited by CAD/Afib/anticaogulants/age and associated risks & medication may not be necessary to promote weight loss/improved fitness/mobility goals of patient and his daughter    Orders Placed This Encounter   Procedures     NUTRITION REFERRAL      Patient Instructions:    Nice to talk with you in virtual clinic    After thorough chart review (limited by availability of medical records) as per your requests of eval/mgmt of \"DISH\" and calcium/vit D/metabolic evaluation ---management of your Osteopenia and possible vitamin D deficiency are being deferred to Dr. Lewis, your Internist, who you are seeing next week. If she has additional Qs or concerns, she can refer you to the Endocrinology Clinic for further evaluation. In general, a DXA scan can be done q2 yrs for surveillance if desired. Make sure you are doing safe weight bearing exercises and getting enough dietary sources of calcium and vitamin D.    t appears your labs are already pending? If not, then  Please go to any [facility] lab (if not done in past 24 hours). Follow standard safety precautions for COVID-19, practice social isolation, hand hygiene, do not touch your face, nose, or mouth, wear mask if have to go out in public to be respectful of community.  Please contact us to schedule at any of our [facility] lab locations. Call [phone] ([phone]), select option 1.    Please meet with dietician for HTN, CKD    1,000 - 1,200 calorie meal plan to lose 1 lbs weekly without exercise (based on REE estimate [id number])  Ht 1.702 m (5' 7\")   Wt 101.6 kg (224 lb)   BMI 35.08 kg/m      Use meal replacements such as Divya's meals, Lean Cuisines, Healthy Choice, Smart Ones, Weight Watchers Meals, and Slim Fast and Glucerna shakes and supplement with fresh " fruits and vegetables  Please drink a lot of water daily. Most people typically need about 2 liters of water daily and more if they are exercising, have a large weight, or have a fever or illness. Add Crystal Light for flavoring if desired. But no pop with calories in it.  Please keep a food journal of what you eat, calories in what you eat  Consider using applications for smart phones such as Dahu  Focus on wet volumetrics, meaning, eat more foods that are high in water and fiber such as fruits and vegetables in order to get that full feeling. These are also good for your overall health as well.  Check out Dr. Marisa Diop from [facility] - she has cookbooks with low calorie volumetric recipes  Try Cooking Light recipes for low calorie meal preparation and planning    Activity: PT/OT in home assessment pending for fall risks & cardiac rehab exercise    Interested in working with a health ?  Health coaches work with you to improve your overall health and wellbeing.  They look at the whole person, and may involve discussion of different areas of life, including, but not limited to the four pillars of health (sleep, exercise, nutrition, and stress management). Discuss with your care team if you would like to start working a health .     Health Coaching-3 Pack:      $99 for three health coaching visits (self pay; insurance does not cover health coaching)    Visits are done via phone at this time    Coaching is a partnership between the  and the client; Coaches do not prescribe or diagnose    Coaching helps inspire the client to reach his/her personal goals   - To schedule your first health  visit, call  [phone]  - To find out more about health coaching, contact [facility]  [place] at [email or web address]    Please consider health psychologist to discuss how mood, depression and/or anxiety impact your eating.    Psychological Providers    Check with your insurance to see if the psychologist  is covered, if not, ask your insurance company for a referral.    [facility]   Steven Hong, PhD, LP   [phone]  Deanna Vera, PhD, LP  [phone]  Beatris Mitchell, PhD                 [phone]    Tonio Correa, Psy,LP             [phone]    [place]  Niharika Jasso, PhD, LP  [phone]      [facility] Service:           [phone]  We send a referral and patient is notified.    [place]  [facility]:  [phone]  Divya Jaffe, PsyD, [facility]  Eden Reeves, PSyD, LP         [phone]    [facility]   To schedule, please call member services on the back of your card.    MARBELLA Shrestha PsyD,LP,ABPP [phone]    [place]  Shalom Salcedo MA, [phone]    [place]  Mandy Mayfield, PsyD, [phone]    [place]  Radha Mccormick, PsyD, [phone]  (fibromyalgia issues)    ACE Conroy  [phone]    [place]  Melba Mendoza,PsyD,LP  [phone]  Naseem Menezes, PhD, LP  [phone]  Naseem Fuentes, PhD, LP             [phone]    [place]  Jaya Vega , [facility], LMFT [phone]    [place]  Suellen Escobedo, PsyD, [phone]    ACE Bragg, PsyD, LP   [phone]     [place]      Outreach [phone]   Perlita Jesus MA, LP  - ext. [phone]  Naseem Rendon, PhD, LP - ext [phone]  Rolan Dodd, Jose, LP - ext [phone]    [place]  Rigo Potts, PhD, [phone]  Sherman Spence, PsyD, [phone]          [place]  Cherelle Emery, PhD, [phone]    OnaMariusz Short PsyD, LP  [phone]            Call updates to Marisa RN    [phone]  Last updated: 2019      RTC: quarterly as needed    Please use Telespree to schedule your appointment(s) or call [phone] to schedule in [facility]      Best Wishes,  Dr. Carmelita Hidalgo MD,  "MPH  Endocrinologist      LABS: reviewed local data in [facility]  ENDO CALCIUM LABS-UMP Latest Ref Rng & Units 6/18/2021   CALCIUM 8.5 - 10.1 mg/dL 9.5   MAGNESIUM 1.6 - 2.3 mg/dL    ALBUMIN 3.4 - 5.0 g/dL    BUN 7 - 30 mg/dL 28   CREATININE 0.66 - 1.25 mg/dL 1.44 (H)   CREATININE 0.66 - 1.25 mg/dL    PARATHYROID HORMONE INTACT 18 - 80 pg/mL 55   ALKPHOS 40 - 150 U/L    25 OH VIT D2 ug/L    25 OH VIT D3 ug/L    25 OH VIT D TOTAL 30 - 75 ug/L    VITAMIN D DEFICIENCY SCREENING 20 - 75 ug/L    PROTEIN, TOTAL 6.8 - 8.8 g/dL    CK TOTAL 30 - 300 U/L      Results for MACK BYRD (MRN [id number]) as of 6/19/2021 11:18   Ref. Range 7/9/2019 15:22 6/18/2021 16:02   N-Terminal Pro Bnp Latest Ref Range: 0 - 450 pg/mL 1,759 (H) 1,027 (H)       IMAGING: reviewed local data in Epic & CE  nothing documenting DISH. DEXA below c/w DJD spine.    9/9/19: DEXA:  \"FINDINGS:               Lumbar Spine (L1-L4)      T-score:  2.1, marked degenerative changes present                Left Femoral Neck            T-score:  -2.1               Right Femoral Neck          T-score:  -1.3                  Lumbar (L1-L4) BMD: 1.496  Previous: 1.389                          Total Hip Mean BMD: 1.018  Previous: 0.971     Comparison is made to another DXA performed on the same Lunar Prodigy  machine on 12/09/2015.  LATERAL VERTEBRAL ASSESSMENT  Procedure:  Vertebral fracture assessment was performed in the lateral decubitus position using a [employer] Prodigy  densitometer.  Indications for VFA: T-score of -1.0 or worse, age (male >79) and height loss > 1.5\"  Confounding factors for VFA: Arthritis/degenerative disc disease.  The LVA scan is interpretable from T7 to L5.     VFA Findings: Using the semi-quantitative analysis of Genant there was evidence of no spinal deformity  VFA Impression: Nixon Byrd has no vertebral fractures identified on the VFA.   IMPRESSION  Osteopenia., Degenerative changes of the lumbar spine which may falsely elevate " "results.\"      He has the following co-morbidities:       6/17/2021   I have the following health issues associated with obesity: Heart Disease, High Blood Pressure, Sleep Apnea, Cancer, Osteoarthritis (joint disease)   I have the following symptoms associated with obesity: Knee Pain, Lower Extremity Swelling, Back Pain, Fatigue, Hip Pain       Patient Goals 6/17/2021   I am interested in having a healthier weight to diminish current health problems: Yes   I am interested in having a healthier weight in order to prevent future health problems: Yes   I am interested in having a healthier weight in order to have a future surgery: Yes   If yes, please indicate which surgery? Possible hip or knee       Referring Provider 6/17/2021   Please name the provider who referred you to Medical Weight Management.  If you do not know, please answer: \"I Don't Know\". Dr Katina Lewis       Weight History 6/17/2021   Would you describe your weight gain as gradual? Yes   I became overweight: As an Adult   The following factors have contributed to my weight gain:  After Quitting Smoking, Eating Wrong Types of Food, Eating Too Much, Lack of Exercise   I have tried the following methods to lose weight: Watching Portions or Calories, Weight Watchers   My lowest weight since age 20 was: 155   My highest weight since age 20 was: 226   The most weight I have ever lost was: (lbs) 20   I have the following family history of obesity/being overweight:  One or more of my siblings are overweight, Many of my relatives are overweight   Has anyone in your family had weight loss surgery? Yes   How has your weight changed over the last year?  Gained   How many pounds? 6       Diet Recall Review with Patient 6/17/2021   Do you typically eat breakfast? Yes   If you do eat breakfast, what types of food do you eat? Cereal ,fruit, pancakes,  oatmeal   Do you typically eat lunch? Yes   If you do eat lunch, what types of food do you typically eat?  lunchmeat, " bread   Do you typically eat supper? Yes   If you do eat supper, what types of food do you typically eat? Some frozen meals, stew ,broccolli   Do you typically eat snacks? Yes   If you do snack, what types of food do you typically eat? Cheese sticks, peanuts ,pistachios, herring   Do you like vegetables?  Yes   Do you drink water? Yes   How many glasses of juice do you drink in a typical day? 0   How many of glasses of milk do you drink in a typical day? 1   If you do drink milk, what type? 2%   How many 8oz glasses of sugar containing drinks such as Kaz-Aid/sweet tea do you drink in a day? 0   How many cans/bottles of sugar pop/soda/tea/sports drinks do you drink in a day? 0   How many cans/bottles of diet pop/soda/tea or sports drink do you drink in a day? 0   How often do you have a drink of alcohol? Monthly or Less   If you do drink, how many drinks might you have in a day? 1 or 2       Eating Habits 6/17/2021   Generally, my meals include foods like these: bread, pasta, rice, potatoes, corn, crackers, sweet dessert, pop, or juice. A Few Times a Week   Generally, my meals include foods like these: fried meats, brats, burgers, french fries, pizza, cheese, chips, or ice cream. Once a Week   Eat fast food (like McDonalds, Burger Ghulam, Taco Bell). Less Than Weekly   Eat at a buffet or sit-down restaurant. Less Than Weekly   Eat most of my meals in front of the TV or computer. Less Than Weekly   Often skip meals, eat at random times, have no regular eating times. Less Than Weekly   Rarely sit down for a meal but snack or graze throughout.  Less Than Weekly   Eat extra snacks between meals. A Few Times a Week   Eat most of my food at the end of the day. A Few Times a Week   Eat in the middle of the night or wake up at night to eat. Never   Eat extra snacks to prevent or correct low blood sugar. Never   Eat to prevent acid reflux or stomach pain. Never   Worry about not having enough food to eat. Never   Have you been  to the food shelf at least a few times this year? No   I eat when I am depressed. Less Than Weekly   I eat when I am stressed. Never   I eat when I am bored. Less Than Weekly   I eat when I am anxious. Never   I eat when I am happy or as a reward. Less Than Weekly   I feel hungry all the time even if I just have eaten. Never   Feeling full is important to me. Less Than Weekly   I finish all the food on my plate even if I am already full. A Few Times a Week   I can't resist eating delicious food or walk past the good food/smell. Less Than Weekly   I eat/snack without noticing that I am eating. Never   I eat when I am preparing the meal. Less Than Weekly   I eat more than usual when I see others eating. Less Than Weekly   I have trouble not eating sweets, ice cream, cookies, or chips if they are around the house. A Few Times a Week   I think about food all day. Never   What foods, if any, do you crave? None       Amount of Food 6/17/2021   I make myself vomit what I have eaten or use laxatives to get rid of food. Never   I eat a large amount of food, like a loaf of bread, a box of cookies, a pint/quart of ice cream, all at once. Never   I eat a large amount of food even when I am not hungry. Never   I eat rapidly. Monthly   I eat alone because I feel embarrassed and do not want others to see how much I have eaten. Never   I eat until I am uncomfortably full. Never   I feel bad, disgusted, or guilty after I overeat. Never   I make myself vomit what I have eaten or use laxatives to get rid of food. Never       Activity/Exercise History 6/17/2021   How much of a typical 12 hour day do you spend sitting? Most of the Day   How much of a typical 12 hour day do you spend lying down? Less Than Half the Day   How much of a typical day do you spend walking/standing? Less Than Half the Day   How many hours (not including work) do you spend on the TV/Video Games/Computer/Tablet/Phone? 4-5 Hours   How many times a week are you  active for the purpose of exercise? Never   What keeps you from being more active? Pain, Shortness of Breath   How many total minutes do you spend doing some activity for the purpose of exercising when you exercise? None       PAST MEDICAL HISTORY:    [facility] (remote CE) data:  Past Medical History:   Diagnosis Date     Arthropathy     Asthma     Atrial fibrillation     Essential hypertension     Past Surgical History:   Procedure Laterality Date     HERNIA REPAIR     LAMINECTOMY       Past Medical History:   Diagnosis Date     Actinic keratosis      Arthritis      Atrial fibrillation and flutter (H) 12/3/2018     CAD (coronary artery disease)     1/5/2011 lateral ischemia on stress echo, 12/2014 normal stress echo     Coronary artery disease 1/1/2011    Abnormal stress test 1/2011 and controlled with medical mgmt      Dyslipidemia      Emphysema of lung (H)      Essential hypertension, benign      Hernia, abdominal      Hypersomnia with sleep apnea, unspecified     on bipap, partially treated with residual apneas     Hypertrophy (benign) of prostate 5/01    Biopsy 5/01 negative for cancer; PSA 5     Lumbago      Mumps      Prostate cancer (H) 12/15/2006     Skin cancer, basal cell 1997     Spider veins        Work/Social History Reviewed With Patient 6/17/2021   My employment status is: Retired   My job is: N/A   How many hours do you spend commuting to work daily?  N/A   What is your marital status? Single   Do you have children? Yes   If you have children, are they overweight? Yes   Who do you live with?  Bianka (cat)   Are they supportive of your health goals? No   Who does the food shopping?  Me       Mental Health History Reviewed With Patient 6/17/2021   Have you ever been physically or sexually abused? No   If yes, do you feel that the abuse is affecting your weight? N/A   If yes, would you like to talk to a counselor about the abuse? N/A   How often in the past 2 weeks have you felt little interest or  pleasure in doing things? Not at all   Over the past 2 weeks how often have you felt down, depressed, or hopeless? Not at all       Sleep History Reviewed With Patient 6/17/2021   How many hours do you sleep at night? 8   Do you think that you snore loudly or has anybody ever heard you snore loudly (louder than talking or so loud it can be heard behind a shut door)? No   Has anyone seen or heard you stop breathing during your sleep? Yes   Do you often feel tired, fatigued, or sleepy during the day? No   Do you have a TV/Computer in your bedroom? Yes       MEDICATIONS: MEDICATIONS IN THIS CHART MAY NOT BE ACCURATE.    Current Outpatient Medications   Medication Sig Dispense Refill     apixaban ANTICOAGULANT (ELIQUIS) 5 MG tablet Take 1 tablet (5 mg) by mouth 2 times daily 180 tablet 3     ASPIRIN NOT PRESCRIBED (INTENTIONAL) continuous prn for other Antiplatelet medication not prescribed intentionally due to Current anticoagulant therapy (warfarin/enoxaparin)       atenolol (TENORMIN) 100 MG tablet Take 1.5 tablets (150 mg) by mouth daily 135 tablet 2     atorvastatin (LIPITOR) 40 MG tablet TAKE 1 TABLET BY MOUTH EVERY DAY 90 tablet 0     diltiazem ER (DILT-XR) 120 MG 24 hr capsule Take 1 capsule (120 mg) by mouth daily 90 capsule 3     EPINEPHrine (EPIPEN/ADRENACLICK/OR ANY BX GENERIC EQUIV) 0.3 MG/0.3ML injection 2-pack Inject 0.3 mLs (0.3 mg) into the muscle as needed for anaphylaxis 0.6 mL 1     escitalopram (LEXAPRO) 10 MG tablet Take 1 tablet (10 mg) by mouth daily 90 tablet 3     ipratropium (ATROVENT) 0.06 % nasal spray Spray 2 sprays in nostril 4 times daily as needed for rhinitis 3 Box 3     Loperamide HCl (IMODIUM OR) Take by mouth daily as needed       losartan (COZAAR) 100 MG tablet Take 1 tablet (100 mg) by mouth daily 90 tablet 2     torsemide (DEMADEX) 10 MG tablet Take 1 tablet (10 mg) by mouth daily 90 tablet 3     albuterol (PROAIR RESPICLICK) 108 (90 Base) MCG/ACT inhaler Inhale 2 puffs into the  lungs every 6 hours as needed for shortness of breath / dyspnea or wheezing (Patient not taking: Reported on 6/18/2021) 1 Inhaler 3     Multiple Minerals-Vitamins (PROSTEON PO) Take 2,000 Units by mouth 4 times daily         ALLERGIES:   Allergies   Allergen Reactions     Augmentin Rash     Type III hypersensitivity     Bactrim [Sulfamethoxazole W/Trimethoprim] Rash     Serum sickness, type III hypersensitivity       Bee Venom Itching     Sulfa Drugs Hives     Celebrex [Celecoxib]      Lisinopril Cough     Naproxen Hives       PHYSICAL EXAM:  Physical Examination:  No vitals, video visit  GENERAL: Healthy, alert and no distress, daughter Amber who is a nurse jets in and out of the visit virtually, at times the lack of band with is disruptive (?she says its due to sharing band with )  EYES: Eyes grossly normal to inspection.  No discharge or erythema, or obvious scleral/conjunctival abnormalities.  RESP: No audible wheeze, cough, or visible cyanosis.  No visible retractions or increased work of breathing.    SKIN: Visible skin clear. No significant rash, abnormal pigmentation or lesions.  NEURO: Cranial nerves grossly intact.  Mentation and speech appropriate for age.  PSYCH: Mentation appears normal, affect normal/bright, judgement and insight intact, normal speech and appearance well-groomed.    During this virtual visit the patient is located in [place], patient verifies this as the location during the entirety of this visit.       Video-Visit Details    Type of service:  Video Visit    Video: Start: 06/18/2021 11:17 am   Stop: 06/18/2021 12:02 pm    Originating Location (pt. Location): Home    Distant Location (provider location):  [facility] [place]     Platform used for Video Visit: [email or web address]       Total Time: 80 min spent on EXTENSIVE chart review including outside records, evaluation, management, counseling, education, & motivational interviewing with greater than 50% of the  total time was spent on counseling and coordinating care and documentation. Visit limitations were due to lack of availability of all medical records and band with issues on part of patient/family and an apparent unanticipated metabolic evaluation requested by patient's daughter in Medical Weight Management Clinic context. Patient was seen with daughter, Nimisha Rodas (proxy for Spencer More), who is a nurse and she had requested additional evaluation which was completed. Rest of this evaluation TBD jointly by patient's PMD, Dr. Natalya Lewis in the days/weeks/months to follow.      Carmelita Hidalgo MD

## 2021-06-21 ENCOUNTER — TELEPHONE (OUTPATIENT)
Dept: ENDOCRINOLOGY | Facility: CLINIC | Age: 86
End: 2021-06-21

## 2021-06-21 LAB — DEPRECATED CALCIDIOL+CALCIFEROL SERPL-MC: 27 UG/L (ref 20–75)

## 2021-06-21 NOTE — TELEPHONE ENCOUNTER
Nutrition Education Scheduling Outreach #1:    Call to patient to schedule. Left message with phone number to call to schedule.    Plan for 2nd outreach attempt within 1 week.    MariaD el Carmen Fletcher  [facility]  Diabetes and Nutrition Scheduling

## 2021-06-22 ENCOUNTER — OFFICE VISIT (OUTPATIENT)
Dept: PEDIATRICS | Facility: CLINIC | Age: 86
End: 2021-06-22
Payer: COMMERCIAL

## 2021-06-22 VITALS
TEMPERATURE: 98.5 F | HEART RATE: 65 BPM | WEIGHT: 236 LBS | BODY MASS INDEX: 36.96 KG/M2 | SYSTOLIC BLOOD PRESSURE: 116 MMHG | OXYGEN SATURATION: 96 % | DIASTOLIC BLOOD PRESSURE: 71 MMHG

## 2021-06-22 DIAGNOSIS — J43.8 OTHER EMPHYSEMA (H): ICD-10-CM

## 2021-06-22 DIAGNOSIS — M25.562 CHRONIC PAIN OF LEFT KNEE: ICD-10-CM

## 2021-06-22 DIAGNOSIS — G89.29 CHRONIC PAIN OF LEFT KNEE: ICD-10-CM

## 2021-06-22 DIAGNOSIS — M85.851 OSTEOPENIA OF BOTH HIPS: ICD-10-CM

## 2021-06-22 DIAGNOSIS — I20.9 ANGINA PECTORIS (H): ICD-10-CM

## 2021-06-22 DIAGNOSIS — F32.0 MILD MAJOR DEPRESSION (H): ICD-10-CM

## 2021-06-22 DIAGNOSIS — M85.852 OSTEOPENIA OF BOTH HIPS: ICD-10-CM

## 2021-06-22 DIAGNOSIS — N18.30 STAGE 3 CHRONIC KIDNEY DISEASE, UNSPECIFIED WHETHER STAGE 3A OR 3B CKD (H): ICD-10-CM

## 2021-06-22 DIAGNOSIS — I50.32 CHRONIC DIASTOLIC HEART FAILURE (H): Primary | ICD-10-CM

## 2021-06-22 PROCEDURE — 99215 OFFICE O/P EST HI 40 MIN: CPT | Performed by: INTERNAL MEDICINE

## 2021-06-22 PROCEDURE — 96127 BRIEF EMOTIONAL/BEHAV ASSMT: CPT | Performed by: INTERNAL MEDICINE

## 2021-06-22 RX ORDER — RISEDRONATE SODIUM 35 MG/1
35 TABLET, FILM COATED ORAL
Qty: 12 TABLET | Refills: 3 | Status: SHIPPED | OUTPATIENT
Start: 2021-06-22 | End: 2022-02-09

## 2021-06-22 ASSESSMENT — PATIENT HEALTH QUESTIONNAIRE - PHQ9: SUM OF ALL RESPONSES TO PHQ QUESTIONS 1-9: 3

## 2021-06-22 NOTE — PATIENT INSTRUCTIONS
Schedule the ECHO.    Try to take the torsemide daily, even half a pill.  Talk to cardiology about possibly changing this to spironolactone.    Call to schedule with neuropsychology: [facility] [place] [phone]     They should call you to schedule cardiac rehab.  It is important to continue with exercise once the program is done.    Take vitamin D 1000 units in the summer and 2000 units in the winter    They will call you to schedule about the knee pain.    They will call you to schedule the home health falls evaluation     See your eye doctor about the vision concerns

## 2021-06-22 NOTE — PROGRESS NOTES
Assessment & Plan     Chronic diastolic heart failure (H)  Discussed last cardiology note, import of taking torsemide regularly.  Take 1/2 dose and/or take later if has to go out.  DIscuss options with cardiology for medication changes at upcoming appointment   - HOME CARE NURSING REFERRAL    Stage 3 chronic kidney disease, unspecified whether stage 3a or 3b CKD  Recheck labs, discussed goals of control.    - **Basic metabolic panel FUTURE anytime; Future  - HOME CARE NURSING REFERRAL    Mild major depression (H)  Continue medications, discussed stress and coping and supports.  With vaccination, his senior building is opening up which should help  - HOME CARE NURSING REFERRAL    Angina pectoris (H)  Stable.  Discussed risk factor reduction and benefits of exercise.  Interested in cardiac rehab  - CARDIAC REHAB REFERRAL; Future  - HOME CARE NURSING REFERRAL    Osteopenia of both hips  Discussed dexa findings and fracture risk.  Discussed medication options and prefers weekly bisphosphonate  - RISEdronate (ACTONEL) 35 MG tablet; Take 1 tablet (35 mg) by mouth every 7 days  - HOME CARE NURSING REFERRAL    Chronic pain of left knee  Refer to see if injection would be helpful  - Orthopedic  Referral; Future  - HOME CARE NURSING REFERRAL    DISH on x-ray from 2007  No thoracic pain.  Doubt it is cause of his other symptoms and recommend defer further evaluation at this time    Other emphysema (H)  Not needing inhaler.  May have symptoms as starts to increase activity and will notify me of that.       Addendum: given difficulty walking with spinal stenosis, a walker was prescribed on 6/29/21 for use in and outside of the home.  Walkers are better support for patients with spinal stenosis than a cane or crutches.  He is capable of using a walker.  It would be best for him to have a seat to rest when he gets shortness of breath in the home while doing ADL's given his heart failure.    I spent a total of 41 minutes  "on the day of the visit.   Time spent doing chart review, history and exam, documentation and further activities per the note       BMI:   Estimated body mass index is 36.96 kg/m  as calculated from the following:    Height as of 6/18/21: 1.702 m (5' 7\").    Weight as of this encounter: 107 kg (236 lb).   Weight management plan: Discussed healthy diet and exercise guidelines    See Patient Instructions    Return in about 4 months (around 10/22/2021) for Physical Exam.    Natalya Lewis MD  [facility] BEATRICE Palmer is a 85 year old who presents for the following health issues  accompanied by his daughter:    HPI     Follow up - feels like doing ok.  Lost spouse.  Is cooking for self to eating things that he shouldn't.    Questions about medication     Mood - more memory issues.  Hard to live alone.  PHQ-9 SCORE 4/30/2019 7/19/2019 6/22/2021   PHQ-9 Total Score - - -   PHQ-9 Total Score MyChart 6 (Mild depression) - -   PHQ-9 Total Score 6 1 3        CHF - has appointment with cardiology.  Hasn't taken his torsemide for 2 days as can't tolerate the urinary urgency.is wearing compression socks.  Is up 10 lbs, probably 5 lbs recently.    Weight management - had first appointment.  Feels better when weighs less    Left knee pain - worse with doing stairs.      Back pain - has pain in the morning with getting up.  Better overnight.  Low back is the issue.  No thoracic pain      Review of Systems         Objective    /71 (BP Location: Right arm, Cuff Size: Adult Large)   Pulse 65   Temp 98.5  F (36.9  C) (Tympanic)   Wt 107 kg (236 lb)   SpO2 96%   BMI 36.96 kg/m    Body mass index is 36.96 kg/m .  Physical Exam   GENERAL: healthy, alert and no distress  MS: 2+ edema to mid-calf            "

## 2021-06-23 ENCOUNTER — DOCUMENTATION ONLY (OUTPATIENT)
Dept: CARDIOLOGY | Facility: CLINIC | Age: 86
End: 2021-06-23

## 2021-06-23 NOTE — PROGRESS NOTES
Call from pts daughter Amber carpenter (NP) re some increased sxs her father is having.(pt gave permission to address with amber)  She reports he is more SOB, also extra wt of 5 pounds. Pedal edema, and noncompliance with taking his diuretic    She reports he has issues with bladder due to prostate CA, and finds that he has been holding his torsemide dose if he has to leave the house.  He recently held this for 2 days, and then noted to be more SOB, with pedal edema.    He saw DR Wright yesterday, who did refer him back to cardiology.    Pt had appt for echo 7-19-21 and a Follow-up w/ Cherise 7-    Dtr Amber did not think he should wait that long for evaluation, she thinks his wt was up another 5# from yesterday.    I asked that he see MD in clinic for eval, but nothing avail till end of next week.  Cherise did have opening tomorrow, and pt was booked in to see her /. Last visit was 9-2020 w/ DR Camp who is retired.  Cherise has seen this patient in the past.    Plan to keep the echo on the schedule , and see Cherise in the meantime.    jed campuzano

## 2021-06-24 ENCOUNTER — OFFICE VISIT (OUTPATIENT)
Dept: CARDIOLOGY | Facility: CLINIC | Age: 86
End: 2021-06-24
Attending: INTERNAL MEDICINE
Payer: COMMERCIAL

## 2021-06-24 VITALS
BODY MASS INDEX: 34.86 KG/M2 | HEIGHT: 68 IN | SYSTOLIC BLOOD PRESSURE: 102 MMHG | WEIGHT: 230 LBS | HEART RATE: 65 BPM | DIASTOLIC BLOOD PRESSURE: 67 MMHG

## 2021-06-24 DIAGNOSIS — I10 BENIGN ESSENTIAL HYPERTENSION: ICD-10-CM

## 2021-06-24 DIAGNOSIS — I77.810 AORTIC ROOT DILATATION (H): ICD-10-CM

## 2021-06-24 DIAGNOSIS — I48.19 PERSISTENT ATRIAL FIBRILLATION (H): Primary | ICD-10-CM

## 2021-06-24 PROCEDURE — 99214 OFFICE O/P EST MOD 30 MIN: CPT | Performed by: PHYSICIAN ASSISTANT

## 2021-06-24 PROCEDURE — 93000 ELECTROCARDIOGRAM COMPLETE: CPT | Performed by: PHYSICIAN ASSISTANT

## 2021-06-24 RX ORDER — TORSEMIDE 10 MG/1
10 TABLET ORAL DAILY
Qty: 90 TABLET | Refills: 0 | COMMUNITY
Start: 2021-06-24 | End: 2021-07-22

## 2021-06-24 RX ORDER — METOPROLOL SUCCINATE 100 MG/1
150 TABLET, EXTENDED RELEASE ORAL DAILY
Qty: 135 TABLET | Refills: 1 | Status: SHIPPED | OUTPATIENT
Start: 2021-06-24 | End: 2021-06-24

## 2021-06-24 RX ORDER — METOPROLOL SUCCINATE 100 MG/1
150 TABLET, EXTENDED RELEASE ORAL DAILY
Qty: 45 TABLET | Refills: 1 | Status: SHIPPED | OUTPATIENT
Start: 2021-06-24 | End: 2021-11-23

## 2021-06-24 ASSESSMENT — MIFFLIN-ST. JEOR: SCORE: 1704.83

## 2021-06-24 NOTE — PATIENT INSTRUCTIONS
Spencer - it was nice to see you and Amber today!    1. Reviewed the issues you've had with increased swelling and shortness of breath  2. Also noted that the torsemide really makes you have to urinate super urgently BUT you have evidence of fluid overload (belly, especially)  3. Also looked at blood work - kidneys are a little stressed. Fluid level is a little high (but still better than 7/2019)    PLAN:  1. Stop atenolol  150 daily  2. Start metoprolol  mg daily (1.5 tabs) - this should have less effect on BP and still control AFib  3. See me back after echo    [phone]

## 2021-06-24 NOTE — LETTER
6/24/2021    Natalya Lewis MD  [address] [place] [address]    RE: Nixon More       Dear Colleague,    I had the pleasure of seeing Nixon More in the [facility].    [facility]    I had the pleasure of seeing Spencer when he came for follow up of AFib and LE edema.  This 85 year old sees Dr. Lee and had seen Dr. Camp for his history of:    1. Permanent AFib -  atypical atrial flutter and AFib first diagnosed 12/2018 in the setting of a normal EF. Rate control/anticoagulation strategy was recommended as he was asx'c  2. Chronic AC with Eliquis 5 mg twice daily secondary to CHADSVASc of 5 (HFpEF, HTN, presumed CAD, age)  3. Chronic GALDAMEZ, noted by Dr. Camp 6/2019.  30-pack-year history of tobacco use, quitting~2000. Saw Dr. Moreno and PFTs showed minimal restriction, muscular weakness and no obstruction. HFpEF, obesity, deconditioning all contributing  4. HFpEF - hydrochlorothiazide stopped in past d/t hypotension. Breathing improved on torsemide. Now holding it occasionally d/t urinary frequency.   5. Ascending aorta and aortic root dilatation (4.4 cm, 4.6 cm) noted on echocardiogram 6/2021 - stable  6. Presumed CAD based on stress echocardiogram 1/2011.  Dr. Camp opted to treat this medically in the absence of symptoms.  Repeat stress test 12/2014 was negative for ischemia   7. HTN with some hypotension prompting discontinuation of HCTZ by PCP  8.  MAYITO - on ASV therapy for complex Central and Obstructive Sleep Apnea  9. Peripheral Venous Insufficiency   10. Dyslipidemia    I saw Spencer 11/2019 at which time he was feeling better from a breathing standpoint on torsemide. His AFib was under good control, no PAD had been seen on US and follow-up of aorta was rec'd per Dr. Camp. He last had a virtual visit 9/2020 and which time things were stable.  He continued to have fatigue with hip and back pain, just mild LE  "edema. Aorta was stable. Annual follow-up rec'd, with echo planned in 2 years.    We got a MyChart message 6/16 from his daughter requesting Cardiac Rehab referral, noting stable SOB with weight to 226#. She noted HFpEF dx and wondered if spironolactone could be added (Dr. Lewis had decreased his torsemide from 20 to 10 mg daily d/t lightheadedness and excessive UO). An NTpBNP level was rec'd. She thought echo was also warranted (though Dr. Camp rec'd 2022). These were ordered per her request.    Dr. Lewis saw him 6/22 and noted he wasn't taking torsemide routinely d/t urinary urgency. He was wearing his compression stockings but had been eating more sodium. Weight was up 5-10#.     Daughter Amber called my nurse Nona 6/23 d/t increased sxs of SOB, pedal edema. I had a cancellation and his appt with me was moved up.      Interval History:  Notes increasing SOB/GALDAMEZ, especially when not taking the torsemide. His daughter Amber noted this started about 1 year ago even when he was taking the torsemide 10 mg daily.  He states he was unable to tolerate the 20 mg due to urinary frequency/urgency.    No orthopnea/PND and sleeps in a regular bed.  Admits to eating more sodium as recently moved into more of an Assisted Living type situation.    He is wearing compression stockings today, but notes he did not wear them for a long time because he was not having any trouble with lower extremity edema.    Of note, it turns out he is not taking any inhalers, though he does have albuterol inhaler ordered.    BP is typically about 100 mmHg.  No dizziness, lightheadedness.  No syncope or falls.        VITALS:  Vitals: /67   Pulse 65   Ht 1.715 m (5' 7.5\")   Wt 104.3 kg (230 lb)   BMI 35.49 kg/m      Diagnostic Testing:  EKG today which I overread on atenolol 150 mg daily showed atypical atrial flutter 66 bpm.  Echo 9/2020 with EF 55-60%. No RWMA. Nl RV. 1+MR. Mild MAC. 1+TR with RVSP  23 mmHg. Ao sclerosis. Mod root " "dilation 4.4 cm and ascending aorta mod dilated 4.6 cm  PFTs 8/2019 showed mild restriction. NIF ~75% of normal, without significant change c/w 7/2019  Aortoiliac US 7/23/2019 showed sacular AAA with mild enlargement with max diameter 3.3 x 2.2. <50% stenosis in aorta, iliac arteries  Exercise ABIs 7/23/2019 with normal ABIs and normal response to exercise. Developed calf and thigh tiredness after 2.5 minutes  EKG 7/22/2019 showed AFib at 79 bpm  MRA chest 5/2019 showed aortic root was borderline dilated (4 x 3.9 cm).  The ascending aorta was mildly dilated (4.4 x 4.5 at the level of bifurcation of pulmonary arteries)  Holter monitor 5/21/2019 showed atrial fibrillation with an average heart rate of 75 bpm.  Range was  bpm.  He had 2 \"events\" which correlated with chronic atrial fibrillation with heart rates in the 70-80s.  Echocardiogram 12/2018 showed an EF of 55%.  He had mildly reduced right ventricular systolic function. LA size was moderately-severely dilated with a left atrial volume index of 51.2 mL/m .  Left atrial size of 6.0 cm.  As above, his aortic root and ascending aorta were both enlarged at 4.6 cm  Stress echocardiogram 12/2014 was negative for stress-induced wall motion abnormalities.  Component      Latest Ref Rng & Units 12/3/2018 7/9/2019 6/18/2021   N-Terminal Pro BNP Inpatient      0 - 1,800 pg/mL 1,210     N-Terminal Pro Bnp      0 - 450 pg/mL  1,759 (H) 1,027 (H)     Component      Latest Ref Rng & Units 12/20/2019 11/11/2020 6/18/2021   Sodium      133 - 144 mmol/L 141 142 142   Potassium      3.4 - 5.3 mmol/L 3.7 3.5 4.4   Chloride      94 - 109 mmol/L 106 110 (H) 109   Carbon Dioxide      20 - 32 mmol/L 28 25 29   Anion Gap      3 - 14 mmol/L 7 7 4   Glucose      70 - 99 mg/dL 121 (H) 118 (H) 119 (H)   Urea Nitrogen      7 - 30 mg/dL 27 22 28   Creatinine      0.66 - 1.25 mg/dL 1.18 1.16 1.44 (H)   GFR Estimate      >60 mL/min/1.73:m2 57 (L) 58 (L) 44 (L)   GFR Estimate If Black     "  >60 mL/min/1.73:m2 66 67 51 (L)   Calcium      8.5 - 10.1 mg/dL 9.9 9.8 9.5       Plan:  Stop atenolol 150 mg daily  Start metoprolol  mg daily, which I hope will have less of an effect on blood pressure  Get echo as planned  See me back to review    Assessment/Plan:    1. HFpEF    Complains of significant frequency/urgency with torsemide, and would like to switch this to spironolactone.  His daughter Amber is a nurse practitioner and she thinks that this would help improve his swelling/decrease urgency of urination.  I explained that he would likely have significant issues with fluid retention (as he already demonstrates this with only torsemide 10 mg).  Furthermore, his blood pressure is quite low    She brings in an article from [employer] indicating that spironolactone can be used with HFpEF, which I told her I agreed with, but this was concerned about her dad's hypotension    PLAN:    We agreed that we would try switching beta-blocker therapy to one that is not metabolized through the kidneys given his increasing creatinine.  We will try metoprolol  mg daily    Get echo as planned    Limit sodium in diet, wear compression stockings    I encouraged him to take his torsemide 10 mg daily, even if he is busy in the morning and cannot take this early afternoon    I am hopeful that metoprolol XL will have less of an effect on his blood pressure than atenolol (in the setting of renal insufficiency), and we be able to start spironolactone as Amber would like    Reviewed PFTs very briefly; copy printed out for cath change    2. Permanent A. fib    Heart rates under good control on atenolol 150 mg daily and diltiazem 120 mg daily    Remains on Eliquis 5 mg twice daily.      PLAN:    As above, will switch his atenolol 150 to metoprolol  mg daily given renal insufficiency and relative hypotension    Continue Eliquis 5 mg daily.  This is acceptable given age/weight/creatinine        Cherise Bennett PA-C,  MSPAS      Orders Placed This Encounter   Procedures     Follow-Up with Cardiac Advanced Practice Provider     EKG [facility] ([facility] performed today     Orders Placed This Encounter   Medications     torsemide (DEMADEX) 10 MG tablet     Sig: Take 1 tablet (10 mg) by mouth daily     Dispense:  90 tablet     Refill:  0     DISCONTD: metoprolol succinate ER (TOPROL-XL) 100 MG 24 hr tablet     Sig: Take 1.5 tablets (150 mg) by mouth daily     Dispense:  135 tablet     Refill:  1     Replaces atenolol 150 mg daily     metoprolol succinate ER (TOPROL-XL) 100 MG 24 hr tablet     Sig: Take 1.5 tablets (150 mg) by mouth daily     Dispense:  45 tablet     Refill:  1     Pls disregard the 90 day (#135) Rx I just sent!     Medications Discontinued During This Encounter   Medication Reason     torsemide (DEMADEX) 10 MG tablet      atenolol-chlorthalidone (TENORETIC 50) 50-25 MG per tablet Medication Reconciliation Clean Up     atenolol (TENORMIN) 100 MG tablet      metoprolol succinate ER (TOPROL-XL) 100 MG 24 hr tablet          Encounter Diagnoses   Name Primary?     Persistent atrial fibrillation (H) Yes     Aortic root dilatation (H)      Benign essential hypertension        CURRENT MEDICATIONS:  Current Outpatient Medications   Medication Sig Dispense Refill     albuterol (PROAIR RESPICLICK) 108 (90 Base) MCG/ACT inhaler Inhale 2 puffs into the lungs every 6 hours as needed for shortness of breath / dyspnea or wheezing 1 Inhaler 3     apixaban ANTICOAGULANT (ELIQUIS) 5 MG tablet Take 1 tablet (5 mg) by mouth 2 times daily 180 tablet 3     ASPIRIN NOT PRESCRIBED (INTENTIONAL) continuous prn for other Antiplatelet medication not prescribed intentionally due to Current anticoagulant therapy (warfarin/enoxaparin)       atorvastatin (LIPITOR) 40 MG tablet TAKE 1 TABLET BY MOUTH EVERY DAY 90 tablet 0     diltiazem ER (DILT-XR) 120 MG 24 hr capsule Take 1 capsule (120 mg) by mouth daily 90 capsule 3      "EPINEPHrine (EPIPEN/ADRENACLICK/OR ANY BX GENERIC EQUIV) 0.3 MG/0.3ML injection 2-pack Inject 0.3 mLs (0.3 mg) into the muscle as needed for anaphylaxis 0.6 mL 1     escitalopram (LEXAPRO) 10 MG tablet Take 1 tablet (10 mg) by mouth daily 90 tablet 3     ipratropium (ATROVENT) 0.06 % nasal spray Spray 2 sprays in nostril 4 times daily as needed for rhinitis 3 Box 3     Loperamide HCl (IMODIUM OR) Take by mouth daily as needed       losartan (COZAAR) 100 MG tablet Take 1 tablet (100 mg) by mouth daily 90 tablet 2     metoprolol succinate ER (TOPROL-XL) 100 MG 24 hr tablet Take 1.5 tablets (150 mg) by mouth daily 45 tablet 1     RISEdronate (ACTONEL) 35 MG tablet Take 1 tablet (35 mg) by mouth every 7 days 12 tablet 3     torsemide (DEMADEX) 10 MG tablet Take 1 tablet (10 mg) by mouth daily 90 tablet 0       ALLERGIES     Allergies   Allergen Reactions     Augmentin Rash     Type III hypersensitivity     Bactrim [Sulfamethoxazole W/Trimethoprim] Rash     Serum sickness, type III hypersensitivity       Bee Venom Itching     Sulfa Drugs Hives     Celebrex [Celecoxib]      Lisinopril Cough     Naproxen Hives         Review of Systems:  Skin:  Negative     Eyes:  Positive for glasses  ENT:  Negative    Respiratory:  Positive for sleep apnea;CPAP  Cardiovascular:  chest pain;palpitations;dizziness;lightheadedness;syncope or near-syncope;cyanosis;Negative for Positive for;fatigue;edema;exercise intolerance  Gastroenterology: Negative for hematochezia  Genitourinary:  Positive for urinary frequency;urgency;nocturia  Musculoskeletal:  Positive for joint pain  Neurologic:  Negative    Psychiatric:  Positive for depression  Heme/Lymph/Imm:  Positive for allergies  Endocrine:  Negative diabetes    Physical Exam:  Vitals: /67   Pulse 65   Ht 1.715 m (5' 7.5\")   Wt 104.3 kg (230 lb)   BMI 35.49 kg/m      Constitutional:  cooperative, alert and oriented, well developed, well nourished, in no acute distress        Skin:  " warm and dry to the touch        Head:  normocephalic        Eyes:  pupils equal and round;conjunctivae and lids unremarkable;sclera white        ENT:  not assessed this visit        Neck:  no carotid bruit;carotid pulses are full and equal bilaterally JVP 8-10;JVP elevated;hepatojugular reflux      Chest:  clear to auscultation;normal symmetry;normal respiratory excursion        Cardiac: normal S1 and S2;no murmurs, gallops or rubs detected irregularly irregular rhythm distant heart sounds              Abdomen:    obese Firm    Vascular:   pulses below the femoral arteries are diminished                               strong radial pulses, slighly diminshed dorsalis pedis and posterior tibia    Extremities and Back:  no deformities, clubbing, cyanosis, erythema observed        Neurological:  no gross motor deficits            PAST MEDICAL HISTORY:  Past Medical History:   Diagnosis Date     Actinic keratosis      Arthritis      Atrial fibrillation and flutter (H) 12/3/2018     CAD (coronary artery disease)     1/5/2011 lateral ischemia on stress echo, 12/2014 normal stress echo     Coronary artery disease 1/1/2011    Abnormal stress test 1/2011 and controlled with medical mgmt      Dyslipidemia      Emphysema of lung (H)      Essential hypertension, benign      Hernia, abdominal      Hypersomnia with sleep apnea, unspecified     on bipap, partially treated with residual apneas     Hypertrophy (benign) of prostate 5/01    Biopsy 5/01 negative for cancer; PSA 5     Lumbago      Mumps      Prostate cancer (H) 12/15/2006     Skin cancer, basal cell 1997     Spider veins        PAST SURGICAL HISTORY:  Past Surgical History:   Procedure Laterality Date     HERNIA REPAIR  child     Moh's procedure for basal cell carcinoma  01/2001     PROSTATE SURGERY       s/p lumbar laminectomy NOS  1988     SIGMOIDOSCOPY FLEXIBLE N/A 6/15/2020    Procedure: SIGMOIDOSCOPY, FLEXIBLE;  Surgeon: Sunni Patton MD;  Location:  GI      VITRECTOMY PARS PLANA REMOVE PRERETINAL MEMBRANE   3/09       FAMILY HISTORY:  Family History   Problem Relation Age of Onset     Alzheimer Disease Mother      Hypertension Mother      Cardiovascular Father         D:86 complications fo CHF     Prostate Cancer Brother      Lung Cancer Brother 76     Prostate Cancer Brother        SOCIAL HISTORY:  Social History     Socioeconomic History     Marital status:      Spouse name: None     Number of children: None     Years of education: None     Highest education level: None   Occupational History     Occupation: retired   Social Needs     Financial resource strain: None     Food insecurity     Worry: None     Inability: None     Transportation needs     Medical: None     Non-medical: None   Tobacco Use     Smoking status: Former Smoker     Packs/day: 1.00     Types: Cigarettes     Start date:      Quit date: 1978     Years since quittin.5     Smokeless tobacco: Never Used   Substance and Sexual Activity     Alcohol use: Never     Frequency: Never     Binge frequency: Never     Drug use: No     Sexual activity: Yes     Partners: Female   Lifestyle     Physical activity     Days per week: None     Minutes per session: None     Stress: None   Relationships     Social connections     Talks on phone: None     Gets together: None     Attends [employer]al service: None     Active member of club or organization: None     Attends meetings of clubs or organizations: None     Relationship status: None     Intimate partner violence     Fear of current or ex partner: None     Emotionally abused: None     Physically abused: None     Forced sexual activity: None   Other Topics Concern      Service Not Asked     Blood Transfusions Not Asked     Caffeine Concern No     Comment: 0-2 cans of soda per day.     Occupational Exposure Not Asked     Hobby Hazards Not Asked     Sleep Concern Not Asked     Stress Concern Not Asked     Weight Concern Not Asked     Special  Diet Not Asked     Back Care Not Asked     Exercise No     Bike Helmet Not Asked     Seat Belt Yes     Self-Exams Not Asked     Parent/sibling w/ CABG, MI or angioplasty before 65F 55M? No   Social History Narrative     None       Thank you for allowing me to participate in the care of your patient.      Sincerely,     Nimisha Bennett PA-C     [facility]    cc:   Rajat Camp MD  [address]  [place]  [place] [address]

## 2021-06-24 NOTE — PROGRESS NOTES
[facility]    I had the pleasure of seeing Spencer when he came for follow up of AFib and LE edema.  This 85 year old sees Dr. Lee and had seen Dr. Camp for his history of:    1. Permanent AFib -  atypical atrial flutter and AFib first diagnosed 12/2018 in the setting of a normal EF. Rate control/anticoagulation strategy was recommended as he was asx'c  2. Chronic AC with Eliquis 5 mg twice daily secondary to CHADSVASc of 5 (HFpEF, HTN, presumed CAD, age)  3. Chronic GALDAMEZ, noted by Dr. Camp 6/2019.  30-pack-year history of tobacco use, quitting~2000. Saw Dr. Moreno and PFTs showed minimal restriction, muscular weakness and no obstruction. HFpEF, obesity, deconditioning all contributing  4. HFpEF - hydrochlorothiazide stopped in past d/t hypotension. Breathing improved on torsemide. Now holding it occasionally d/t urinary frequency.   5. Ascending aorta and aortic root dilatation (4.4 cm, 4.6 cm) noted on echocardiogram 6/2021 - stable  6. Presumed CAD based on stress echocardiogram 1/2011.  Dr. Camp opted to treat this medically in the absence of symptoms.  Repeat stress test 12/2014 was negative for ischemia   7. HTN with some hypotension prompting discontinuation of HCTZ by PCP  8.  MAYITO - on ASV therapy for complex Central and Obstructive Sleep Apnea  9. Peripheral Venous Insufficiency   10. Dyslipidemia    I saw Spencer 11/2019 at which time he was feeling better from a breathing standpoint on torsemide. His AFib was under good control, no PAD had been seen on US and follow-up of aorta was rec'd per Dr. Camp. He last had a virtual visit 9/2020 and which time things were stable.  He continued to have fatigue with hip and back pain, just mild LE edema. Aorta was stable. Annual follow-up rec'd, with echo planned in 2 years.    We got a MyChart message 6/16 from his daughter requesting Cardiac Rehab referral, noting stable SOB with weight to 226#. She noted HFpEF dx and wondered if spironolactone  "could be added (Dr. Lewis had decreased his torsemide from 20 to 10 mg daily d/t lightheadedness and excessive UO). An NTpBNP level was rec'd. She thought echo was also warranted (though Dr. Camp rec'd 2022). These were ordered per her request.    Dr. Lewis saw him 6/22 and noted he wasn't taking torsemide routinely d/t urinary urgency. He was wearing his compression stockings but had been eating more sodium. Weight was up 5-10#.     Daughter Amber called my nurse Nona 6/23 d/t increased sxs of SOB, pedal edema. I had a cancellation and his appt with me was moved up.      Interval History:  Notes increasing SOB/GALDAMEZ, especially when not taking the torsemide. His daughter Amber noted this started about 1 year ago even when he was taking the torsemide 10 mg daily.  He states he was unable to tolerate the 20 mg due to urinary frequency/urgency.    No orthopnea/PND and sleeps in a regular bed.  Admits to eating more sodium as recently moved into more of an Assisted Living type situation.    He is wearing compression stockings today, but notes he did not wear them for a long time because he was not having any trouble with lower extremity edema.    Of note, it turns out he is not taking any inhalers, though he does have albuterol inhaler ordered.    BP is typically about 100 mmHg.  No dizziness, lightheadedness.  No syncope or falls.        VITALS:  Vitals: /67   Pulse 65   Ht 1.715 m (5' 7.5\")   Wt 104.3 kg (230 lb)   BMI 35.49 kg/m      Diagnostic Testing:  EKG today which I overread on atenolol 150 mg daily showed atypical atrial flutter 66 bpm.  Echo 9/2020 with EF 55-60%. No RWMA. Nl RV. 1+MR. Mild MAC. 1+TR with RVSP  23 mmHg. Ao sclerosis. Mod root dilation 4.4 cm and ascending aorta mod dilated 4.6 cm  PFTs 8/2019 showed mild restriction. NIF ~75% of normal, without significant change c/w 7/2019  Aortoiliac US 7/23/2019 showed sacular AAA with mild enlargement with max diameter 3.3 x 2.2. <50% " "stenosis in aorta, iliac arteries  Exercise ABIs 7/23/2019 with normal ABIs and normal response to exercise. Developed calf and thigh tiredness after 2.5 minutes  EKG 7/22/2019 showed AFib at 79 bpm  MRA chest 5/2019 showed aortic root was borderline dilated (4 x 3.9 cm).  The ascending aorta was mildly dilated (4.4 x 4.5 at the level of bifurcation of pulmonary arteries)  Holter monitor 5/21/2019 showed atrial fibrillation with an average heart rate of 75 bpm.  Range was  bpm.  He had 2 \"events\" which correlated with chronic atrial fibrillation with heart rates in the 70-80s.  Echocardiogram 12/2018 showed an EF of 55%.  He had mildly reduced right ventricular systolic function. LA size was moderately-severely dilated with a left atrial volume index of 51.2 mL/m .  Left atrial size of 6.0 cm.  As above, his aortic root and ascending aorta were both enlarged at 4.6 cm  Stress echocardiogram 12/2014 was negative for stress-induced wall motion abnormalities.  Component      Latest Ref Rng & Units 12/3/2018 7/9/2019 6/18/2021   N-Terminal Pro BNP Inpatient      0 - 1,800 pg/mL 1,210     N-Terminal Pro Bnp      0 - 450 pg/mL  1,759 (H) 1,027 (H)     Component      Latest Ref Rng & Units 12/20/2019 11/11/2020 6/18/2021   Sodium      133 - 144 mmol/L 141 142 142   Potassium      3.4 - 5.3 mmol/L 3.7 3.5 4.4   Chloride      94 - 109 mmol/L 106 110 (H) 109   Carbon Dioxide      20 - 32 mmol/L 28 25 29   Anion Gap      3 - 14 mmol/L 7 7 4   Glucose      70 - 99 mg/dL 121 (H) 118 (H) 119 (H)   Urea Nitrogen      7 - 30 mg/dL 27 22 28   Creatinine      0.66 - 1.25 mg/dL 1.18 1.16 1.44 (H)   GFR Estimate      >60 mL/min/1.73:m2 57 (L) 58 (L) 44 (L)   GFR Estimate If Black      >60 mL/min/1.73:m2 66 67 51 (L)   Calcium      8.5 - 10.1 mg/dL 9.9 9.8 9.5       Plan:  Stop atenolol 150 mg daily  Start metoprolol  mg daily, which I hope will have less of an effect on blood pressure  Get echo as planned  See me back to " review    Assessment/Plan:    1. HFpEF    Complains of significant frequency/urgency with torsemide, and would like to switch this to spironolactone.  His daughter Amber is a nurse practitioner and she thinks that this would help improve his swelling/decrease urgency of urination.  I explained that he would likely have significant issues with fluid retention (as he already demonstrates this with only torsemide 10 mg).  Furthermore, his blood pressure is quite low    She brings in an article from Up-To-Date indicating that spironolactone can be used with HFpEF, which I told her I agreed with, but this was concerned about her dad's hypotension    PLAN:    We agreed that we would try switching beta-blocker therapy to one that is not metabolized through the kidneys given his increasing creatinine.  We will try metoprolol  mg daily    Get echo as planned    Limit sodium in diet, wear compression stockings    I encouraged him to take his torsemide 10 mg daily, even if he is busy in the morning and cannot take this early afternoon    I am hopeful that metoprolol XL will have less of an effect on his blood pressure than atenolol (in the setting of renal insufficiency), and we be able to start spironolactone as Amber would like    Reviewed PFTs very briefly; copy printed out for cath change    2. Permanent A. fib    Heart rates under good control on atenolol 150 mg daily and diltiazem 120 mg daily    Remains on Eliquis 5 mg twice daily.      PLAN:    As above, will switch his atenolol 150 to metoprolol  mg daily given renal insufficiency and relative hypotension    Continue Eliquis 5 mg daily.  This is acceptable given age/weight/creatinine        Cherise Bennett PA-C, MSPAS      Orders Placed This Encounter   Procedures     Follow-Up with Cardiac Advanced Practice Provider     EKG [facility] ([facility])- performed today     Orders Placed This Encounter   Medications     torsemide (DEMADEX) 10 MG  tablet     Sig: Take 1 tablet (10 mg) by mouth daily     Dispense:  90 tablet     Refill:  0     DISCONTD: metoprolol succinate ER (TOPROL-XL) 100 MG 24 hr tablet     Sig: Take 1.5 tablets (150 mg) by mouth daily     Dispense:  135 tablet     Refill:  1     Replaces atenolol 150 mg daily     metoprolol succinate ER (TOPROL-XL) 100 MG 24 hr tablet     Sig: Take 1.5 tablets (150 mg) by mouth daily     Dispense:  45 tablet     Refill:  1     Pls disregard the 90 day (#135) Rx I just sent!     Medications Discontinued During This Encounter   Medication Reason     torsemide (DEMADEX) 10 MG tablet      atenolol-chlorthalidone (TENORETIC 50) 50-25 MG per tablet Medication Reconciliation Clean Up     atenolol (TENORMIN) 100 MG tablet      metoprolol succinate ER (TOPROL-XL) 100 MG 24 hr tablet          Encounter Diagnoses   Name Primary?     Persistent atrial fibrillation (H) Yes     Aortic root dilatation (H)      Benign essential hypertension        CURRENT MEDICATIONS:  Current Outpatient Medications   Medication Sig Dispense Refill     albuterol (PROAIR RESPICLICK) 108 (90 Base) MCG/ACT inhaler Inhale 2 puffs into the lungs every 6 hours as needed for shortness of breath / dyspnea or wheezing 1 Inhaler 3     apixaban ANTICOAGULANT (ELIQUIS) 5 MG tablet Take 1 tablet (5 mg) by mouth 2 times daily 180 tablet 3     ASPIRIN NOT PRESCRIBED (INTENTIONAL) continuous prn for other Antiplatelet medication not prescribed intentionally due to Current anticoagulant therapy (warfarin/enoxaparin)       atorvastatin (LIPITOR) 40 MG tablet TAKE 1 TABLET BY MOUTH EVERY DAY 90 tablet 0     diltiazem ER (DILT-XR) 120 MG 24 hr capsule Take 1 capsule (120 mg) by mouth daily 90 capsule 3     EPINEPHrine (EPIPEN/ADRENACLICK/OR ANY BX GENERIC EQUIV) 0.3 MG/0.3ML injection 2-pack Inject 0.3 mLs (0.3 mg) into the muscle as needed for anaphylaxis 0.6 mL 1     escitalopram (LEXAPRO) 10 MG tablet Take 1 tablet (10 mg) by mouth daily 90 tablet 3      "ipratropium (ATROVENT) 0.06 % nasal spray Spray 2 sprays in nostril 4 times daily as needed for rhinitis 3 Box 3     Loperamide HCl (IMODIUM OR) Take by mouth daily as needed       losartan (COZAAR) 100 MG tablet Take 1 tablet (100 mg) by mouth daily 90 tablet 2     metoprolol succinate ER (TOPROL-XL) 100 MG 24 hr tablet Take 1.5 tablets (150 mg) by mouth daily 45 tablet 1     RISEdronate (ACTONEL) 35 MG tablet Take 1 tablet (35 mg) by mouth every 7 days 12 tablet 3     torsemide (DEMADEX) 10 MG tablet Take 1 tablet (10 mg) by mouth daily 90 tablet 0       ALLERGIES     Allergies   Allergen Reactions     Augmentin Rash     Type III hypersensitivity     Bactrim [Sulfamethoxazole W/Trimethoprim] Rash     Serum sickness, type III hypersensitivity       Bee Venom Itching     Sulfa Drugs Hives     Celebrex [Celecoxib]      Lisinopril Cough     Naproxen Hives         Review of Systems:  Skin:  Negative     Eyes:  Positive for glasses  ENT:  Negative    Respiratory:  Positive for sleep apnea;CPAP  Cardiovascular:  chest pain;palpitations;dizziness;lightheadedness;syncope or near-syncope;cyanosis;Negative for Positive for;fatigue;edema;exercise intolerance  Gastroenterology: Negative for hematochezia  Genitourinary:  Positive for urinary frequency;urgency;nocturia  Musculoskeletal:  Positive for joint pain  Neurologic:  Negative    Psychiatric:  Positive for depression  Heme/Lymph/Imm:  Positive for allergies  Endocrine:  Negative diabetes    Physical Exam:  Vitals: /67   Pulse 65   Ht 1.715 m (5' 7.5\")   Wt 104.3 kg (230 lb)   BMI 35.49 kg/m      Constitutional:  cooperative, alert and oriented, well developed, well nourished, in no acute distress        Skin:  warm and dry to the touch        Head:  normocephalic        Eyes:  pupils equal and round;conjunctivae and lids unremarkable;sclera white        ENT:  not assessed this visit        Neck:  no carotid bruit;carotid pulses are full and equal bilaterally JVP " 8-10;JVP elevated;hepatojugular reflux      Chest:  clear to auscultation;normal symmetry;normal respiratory excursion        Cardiac: normal S1 and S2;no murmurs, gallops or rubs detected irregularly irregular rhythm distant heart sounds              Abdomen:    obese Firm    Vascular:   pulses below the femoral arteries are diminished                               strong radial pulses, slighly diminshed dorsalis pedis and posterior tibia    Extremities and Back:  no deformities, clubbing, cyanosis, erythema observed        Neurological:  no gross motor deficits            PAST MEDICAL HISTORY:  Past Medical History:   Diagnosis Date     Actinic keratosis      Arthritis      Atrial fibrillation and flutter (H) 12/3/2018     CAD (coronary artery disease)     1/5/2011 lateral ischemia on stress echo, 12/2014 normal stress echo     Coronary artery disease 1/1/2011    Abnormal stress test 1/2011 and controlled with medical mgmt      Dyslipidemia      Emphysema of lung (H)      Essential hypertension, benign      Hernia, abdominal      Hypersomnia with sleep apnea, unspecified     on bipap, partially treated with residual apneas     Hypertrophy (benign) of prostate 5/01    Biopsy 5/01 negative for cancer; PSA 5     Lumbago      Mumps      Prostate cancer (H) 12/15/2006     Skin cancer, basal cell 1997     Spider veins        PAST SURGICAL HISTORY:  Past Surgical History:   Procedure Laterality Date     HERNIA REPAIR  child     Moh's procedure for basal cell carcinoma  01/2001     PROSTATE SURGERY       s/p lumbar laminectomy NOS  1988     SIGMOIDOSCOPY FLEXIBLE N/A 6/15/2020    Procedure: SIGMOIDOSCOPY, FLEXIBLE;  Surgeon: Sunni Patton MD;  Location: [facility]     VITRECTOMY PARS PLANA REMOVE PRERETINAL MEMBRANE   3/09       FAMILY HISTORY:  Family History   Problem Relation Age of Onset     Alzheimer Disease Mother      Hypertension Mother      Cardiovascular Father         D:86 complications fo CHF     Prostate  Cancer Brother      Lung Cancer Brother 76     Prostate Cancer Brother        SOCIAL HISTORY:  Social History     Socioeconomic History     Marital status:      Spouse name: None     Number of children: None     Years of education: None     Highest education level: None   Occupational History     Occupation: retired   Social Needs     Financial resource strain: None     Food insecurity     Worry: None     Inability: None     Transportation needs     Medical: None     Non-medical: None   Tobacco Use     Smoking status: Former Smoker     Packs/day: 1.00     Types: Cigarettes     Start date:      Quit date: 1978     Years since quittin.5     Smokeless tobacco: Never Used   Substance and Sexual Activity     Alcohol use: Never     Frequency: Never     Binge frequency: Never     Drug use: No     Sexual activity: Yes     Partners: Female   Lifestyle     Physical activity     Days per week: None     Minutes per session: None     Stress: None   Relationships     Social connections     Talks on phone: None     Gets together: None     Attends Restorationism service: None     Active member of club or organization: None     Attends meetings of clubs or organizations: None     Relationship status: None     Intimate partner violence     Fear of current or ex partner: None     Emotionally abused: None     Physically abused: None     Forced sexual activity: None   Other Topics Concern      Service Not Asked     Blood Transfusions Not Asked     Caffeine Concern No     Comment: 0-2 cans of soda per day.     Occupational Exposure Not Asked     Hobby Hazards Not Asked     Sleep Concern Not Asked     Stress Concern Not Asked     Weight Concern Not Asked     Special Diet Not Asked     Back Care Not Asked     Exercise No     Bike Helmet Not Asked     Seat Belt Yes     Self-Exams Not Asked     Parent/sibling w/ CABG, MI or angioplasty before 65F 55M? No   Social History Narrative     None

## 2021-06-25 NOTE — TELEPHONE ENCOUNTER
Nutrition Education Scheduling Outreach #2:    Call to patient to schedule. Left message with phone number to call to schedule.    Maria Del Carmen Fletcher  [facility]  Diabetes and Nutrition Scheduling

## 2021-06-28 ENCOUNTER — HOSPITAL ENCOUNTER (OUTPATIENT)
Dept: CARDIAC REHAB | Facility: CLINIC | Age: 86
End: 2021-06-28
Attending: INTERNAL MEDICINE
Payer: COMMERCIAL

## 2021-06-28 DIAGNOSIS — I20.9 ANGINA PECTORIS (H): ICD-10-CM

## 2021-06-28 PROCEDURE — 93798 PHYS/QHP OP CAR RHAB W/ECG: CPT

## 2021-06-28 PROCEDURE — 93797 PHYS/QHP OP CAR RHAB WO ECG: CPT | Mod: XU

## 2021-06-29 ENCOUNTER — TELEPHONE (OUTPATIENT)
Dept: PEDIATRICS | Facility: CLINIC | Age: 86
End: 2021-06-29

## 2021-06-29 DIAGNOSIS — M62.81 GENERALIZED MUSCLE WEAKNESS: ICD-10-CM

## 2021-06-29 DIAGNOSIS — M48.062 SPINAL STENOSIS OF LUMBAR REGION WITH NEUROGENIC CLAUDICATION: Primary | ICD-10-CM

## 2021-06-29 NOTE — TELEPHONE ENCOUNTER
Thanks.  I think just cardiac rehab for now.  Is he interested in a walker for use with longer distances (I would suggest the one with a seat that folds down too)?  I am happy to do a prescription

## 2021-06-29 NOTE — TELEPHONE ENCOUNTER
Spoke to patient. He would like an order for a walker. He would like to use [facility]. Pended DME order.  Thanks!  Rupinder ARTEAGA RN, BSN

## 2021-06-29 NOTE — TELEPHONE ENCOUNTER
"Spoke to patient. He saw Cardiac Rehab yesterday. He said that he believes he had a falls evaluation by Rachel with [facility]. He said he thought she told him he was a low falls risk but said \"don't quote me on that\"    Called Rachel from [facility]. Left message to call back.    ______________________________________________    Spoke to Rachel. She did two separate fall risk assessments with patient. She states that he is a min-moderate falls risk. She said he is a minimum falls risk for short distances. She recommends that for lengthy distances, that patient has a walker.     Rupinder ARTEAGA RN, BSN    "

## 2021-06-29 NOTE — TELEPHONE ENCOUNTER
Received voicemail from Rachel, PHYSICAL THERAPY at [facility].    Per Rachel, she did the home care admission for patient and wanted to advise PCP that patient will not be admitted into home care. Patient is not home bound and is still driving around in his vehicle.     She suggests either outpatient PHYSICAL THERAPY or outpatient cardiac rehab.  Rupinder ARTEAGA RN, BSN

## 2021-06-29 NOTE — TELEPHONE ENCOUNTER
Please let patient know.  I thought they would do the falls evaluation even if not home bound.      I ordered cardiac rehab and suggest starting with that - is he scheduled?

## 2021-07-01 ENCOUNTER — VIRTUAL VISIT (OUTPATIENT)
Dept: ENDOCRINOLOGY | Facility: CLINIC | Age: 86
End: 2021-07-01
Payer: COMMERCIAL

## 2021-07-01 ENCOUNTER — HOSPITAL ENCOUNTER (OUTPATIENT)
Dept: CARDIAC REHAB | Facility: CLINIC | Age: 86
End: 2021-07-01
Attending: INTERNAL MEDICINE
Payer: COMMERCIAL

## 2021-07-01 DIAGNOSIS — Z71.3 NUTRITIONAL COUNSELING: Primary | ICD-10-CM

## 2021-07-01 DIAGNOSIS — E66.9 OBESITY: ICD-10-CM

## 2021-07-01 DIAGNOSIS — N18.30 STAGE 3 CHRONIC KIDNEY DISEASE, UNSPECIFIED WHETHER STAGE 3A OR 3B CKD (H): ICD-10-CM

## 2021-07-01 PROCEDURE — 97802 MEDICAL NUTRITION INDIV IN: CPT | Mod: 95 | Performed by: DIETITIAN, REGISTERED

## 2021-07-01 PROCEDURE — 93798 PHYS/QHP OP CAR RHAB W/ECG: CPT

## 2021-07-01 NOTE — PROGRESS NOTES
"Nixon More is a 85 year old male who is being evaluated via a billable video visit.      The patient has been notified of following:     \"This video visit will be conducted via a call between you and your physician/provider. We have found that certain health care needs can be provided without the need for an in-person physical exam.  This service lets us provide the care you need with a video conversation.  If a prescription is necessary we can send it directly to your pharmacy.  If lab work is needed we can place an order for that and you can then stop by our lab to have the test done at a later time.    Video visits are billed at different rates depending on your insurance coverage.  Please reach out to your insurance provider with any questions.    If during the course of the call the physician/provider feels a video visit is not appropriate, you will not be charged for this service.\"    Patient has given verbal consent for Video visit? Yes  How would you like to obtain your AVS? MyChart  If you are dropped from the video visit, the video invite should be resent to: Text to cell phone: [phone]  Will anyone else be joining your video visit? Daughters: Mony   {If patient encounters technical issues they should call [phone]      Video-Visit Details    Type of service:  Video Visit    Video Start Time:9:03 AM  Video End Time: 9:53 AM    Originating Location (pt. Location): Home    Distant Location (provider location):  [facility] [place]     Platform used for Video Visit: [employer]ep[employer]    During this virtual visit the patient is located in [place], patient verifies this as the location during the entirety of this visit.     New Weight Management Nutrition Consultation    Nixon More is a 85 year old male presents today for new weight management nutrition consultation.  Patient referred by MD Rocky on June 25, 2021.    Patient with Co-morbidities of obesity " "including:  Type II DM no  Renal Failure no - CKD3 yes  Sleep apnea yes  Hypertension yes  Dyslipidemia no  Joint pain yes  Back pain  yes  GERD no    CHF yes    Anthropometrics:  Estimated body mass index is 35.49 kg/m  as calculated from the following:    Height as of 6/24/21: 1.715 m (5' 7.5\").    Weight as of 6/24/21: 104.3 kg (230 lb).    Medications for Weight Loss:  None - (?possibly naltrexon)    NUTRITION HISTORY  Today - Information reported per visit with pt and daughters present (Maria Del Carmen and Amber).     Pt's meals/food previously prepared by pt's wife who has fairly recently passed (previous wife prepared meals as well). Now, he often enjoys food from [employer].  Eating 3 meals/day + snacks. Daughters very involved in care.     Recent food recall:  Breakfast: cereal, milk, fruit  Lunch: lunch meat/hot dog/ pot roast  Dinner: Smart One/ Lean Cuisine  Afternoon Snacks: cheez-it, peanuts (low sodium); herring/wine sauce, low sodium spam  Beverages: small cans mt dew/pepsi (1/day); water milk (2%), V8, flavored water,   Alcohol: rarely - once       Physical Activity:  Walking 10 min walks    Nutrition Prescription  Recommended energy/nutrient modification.  1200 calories/day (per MD)  Low Sodium diet    Nutrition Diagnosis  Obesity r/t long history of self-monitoring deficit and excessive energy intake aeb BMI >30.    Nutrition Intervention    Materials/education   -Provided on portion control and healthy food choices, meal and snack planning and websites, sample meal plans  -Provided on Volumetric eating to help satiety level on fewer calories; portion control and healthy food choices (Plate Method and Volumetrics handouts)  - Provided handouts/education on Low sodium diet,   -Discussed healthy choices for meal/snack options, Meal replacement options/smoothies, [employer]age meal delivery choices (per daughter request)  - Answered pt and pt's daughters' questions     Patient demonstrates understanding.    Expected " "Engagement: good  Follow-Up Plans: review diet ed, healthy snack choices per preferece, option for meal replacement/smoothie     Nutrition Goals  1) Keep food journal/ meal log (phone gustavo options to track calories: MyFitnessPal, Lose-it, Noom)  2). Follow 1200 calorie/day plan   - limiting saturated fat < 7% calories (84 kca/ ~9g sat fat) - limit butter, try plant oil/spreads (smart balance/earth balance); limit red and processed meats    -  protein (~60-80g)  3). Follow low sodium diet <2g (see handout below)     - limit processes meats (deli meat, hot dogs, canned meats, Lox/herring);   - choose fresh/frozen unbreaded meats, eggs, poultry, fish   - fresh/frozen veggies, fruit, unsalted nuts   - Look at sodium level on food labels  4). Eat slowly (20-30 minutes per meal), chewing foods well (25 chews per bite/applesauce consistency)  5). Follow 9\" Plate method (1/2 plate non-starchy vegetables/fruit, 1/4 plate lean protein, 1/4 plate whole grain starch - no more than 1/2 cup carb/meal)    6). Reduce calorie containing beverages by 50% (reduce from 2% milk to 1%; choose flavored water over soda)  8). Increase activity as able     Frozen Meal Replacements  Healthy Choice  Lean Cuisine  Atkins Meals  Smart Ones      Healthy Recipe Resources:  \"The Volumetrics Eating Plan\" by Alison Diop, Ph.D.  https://www.Carbonetworks.LogicLadder/  www.Digital Reef  www.TransBioTec.org  Dash Diet Recipes [facility]  www.extension.[facility] - the recipe box  \"Cooking that Counts\" by editors of CookingLight  https://www.[facility]/communityculinary/[facility]_Cookbook_KT_Final_11-6_NoCrops-compressed.pdf  Https://www.choosemyplate.gov/myplatekitchen  https://snaped.fns.usda.gov/recipes-menus - SNAP recipes  https://www.diabetesfoodhub.org/all-recipes.html  https://www.mealime.com/      Tips for Low Sodium Diet  http://www.fvfiles.[email or web address]    The Plate Method  http://www.GeoGames.LogicLadder/149349px.pdf    Protein Sources for Weight " Loss  http://[email or web address]     Carbohydrates  http://[email or web address]     Mindful Eating  http://[email or web address]     Summary of Volumetrics Eating Plan  http://[email or web address]     Follow-Up:  1 month     Time spent with patient: 50 minutes.  Valeria Mckeon RD, LD

## 2021-07-01 NOTE — PATIENT INSTRUCTIONS
"Darion Palmer,    Follow-up with RD in 1 month    Thank you,    Valeria Mckeon, RD, LD  If you would like to schedule or reschedule an appointment with the RD, please call [phone]    Nutrition Goals    1) Keep food journal/ meal log (phone gustavo options to track calories: MyFitnessPal, Lose-it, Noom)  2). Follow 1200 calorie/day plan   - limiting saturated fat < 7% calories (84 kca/ ~9g sat fat) - limit butter, try plant oil/spreads (smart balance/earth balance); limit red and processed meats    -  protein (~60-80g)  3). Follow low sodium diet <2g (see handout below)     - limit processes meats (deli meat, hot dogs, canned meats, Lox/herring);   - choose fresh/frozen unbreaded meats, eggs, poultry, fish   - fresh/frozen veggies, fruit, unsalted nuts   - Look at sodium level on food labels   4). Eat slowly (20-30 minutes per meal), chewing foods well (25 chews per bite/applesauce consistency)  5). Follow 9\" Plate method (1/2 plate non-starchy vegetables/fruit, 1/4 plate lean protein, 1/4 plate whole grain starch - no more than 1/2 cup carb/meal)    6). Reduce calorie containing beverages by 50% (reduce from 2% milk to 1%; choose flavored water over soda)  8). Increase activity as able     Frozen Meal Replacements  Healthy Choice  Lean Cuisine  Atkins Meals  Smart Ones      Healthy Recipe Resources:  \"The Volumetrics Eating Plan\" by Alison Diop, Ph.D.  https://www.Maker Studiostive.Retrofit/  www.Quartzy.Retrofit  www.oldSofie Biosciencespt.org  Dash Diet Recipes [facility]  www.extension.[facility].edu - the recipe box  \"Cooking that Counts\" by editors of CookingLight  https://www.[facility].edu/communityculinary/[facility]_Cookbook_KT_Final_11-6_NoCrops-compressed.pdf  Https://www.choosemyplate.gov/myplatekitchen  https://snaped.fns.usda.gov/recipes-menus - SNAP recipes  https://www.diabetesfoodhub.org/all-recipes.html  https://www.CodeStreet.com/      Tips for Low Sodium Diet  http://www.Project Talents.Retrofit/584303.pdf    The Plate " Method  http://www.AFS Technologies/822742in.pdf    Protein Sources for Weight Loss  http://fvfiles.com/587133.pdf     Carbohydrates  http://fvfiles.com/087065.pdf     Mindful Eating  http://AFS Technologies/569632.pdf     Summary of Volumetrics Eating Plan  http://fvfiles.com/571726.pdf       Interested in working with a health ?  Health coaches work with you to improve your overall health and wellbeing.  They look at the whole person, and may involve discussion of different areas of life, including, but not limited to the four pillars of health (sleep, exercise, nutrition, and stress management). Discuss with your care team if you would like to start working a health .    Health Coaching-3 Pack:    $99 for three health coaching visits    Visits may be done in person or via phone    Coaching is a partnership between the  and the client; Coaches do not prescribe or diagnose    Coaching helps inspire the client to reach his/her personal goals      Virtual Support Groups are Available             Healthy Lifestyle Support Group      All are welcome!     Facilitator: Palma Geller, Certified Health     - Meets once a month on a Friday from 12:30pm to 1:30pm.  - Due to Covid-19 she is doing this Support Group virtually using Microsoft Teams.  - 60 minutes of small group guided discussion, support and resources.  - Please email Palma directly at [email or web address] to receive monthly invitations.  - If you opt to participate in individual health coaching sessions or for general questions, contact Palma via email.  - Palma will send out invites for each session, so the phone number and the conference ID may change for each session.

## 2021-07-01 NOTE — LETTER
"7/1/2021       RE: Nixon More  [address]  [place] [place] [address]     Dear Colleague,    Thank you for referring your patient, Nixon More, to the [facility] WEIGHT MANAGEMENT CLINIC [place] at [facility]. Please see a copy of my visit note below.    Nixon More is a 85 year old male who is being evaluated via a billable video visit.      The patient has been notified of following:     \"This video visit will be conducted via a call between you and your physician/provider. We have found that certain health care needs can be provided without the need for an in-person physical exam.  This service lets us provide the care you need with a video conversation.  If a prescription is necessary we can send it directly to your pharmacy.  If lab work is needed we can place an order for that and you can then stop by our lab to have the test done at a later time.    Video visits are billed at different rates depending on your insurance coverage.  Please reach out to your insurance provider with any questions.    If during the course of the call the physician/provider feels a video visit is not appropriate, you will not be charged for this service.\"    Patient has given verbal consent for Video visit? Yes  How would you like to obtain your AVS? MyChart  If you are dropped from the video visit, the video invite should be resent to: Text to cell phone: [phone]  Will anyone else be joining your video visit? Daughters: Maria Del Carmen and Kae   {If patient encounters technical issues they should call [phone]      Video-Visit Details    Type of service:  Video Visit    Video Start Time:9:03 AM  Video End Time: 9:53 AM    Originating Location (pt. Location): Home    Distant Location (provider location):  [facility] WEIGHT MANAGEMENT [facility]     Platform used for Video Visit: Logical Choice Technologies    During this virtual visit the patient is located " "Neurology       Patient Care Team:  Abdirashid Mccabe MD as PCP - General (Cardiology)    Chief complaint encephalopathy      Subjective .     History:   Last night nursing noted patient had worsened encephalopathy and was reported to be having episodes of \"stiffening\" and neurology was contacted, ordered 500 mg Keppra twice daily.  Patient was also noted to be removing telemetry and IV lines and soft mitts were ordered.  He was also noted to be confused and not following commands, received 1 dose of Ativan 0.25 mg and also became increasingly lethargic.  Resisted passive eye opening.  Was moving all extremities spontaneously.  Patient was unarousable this morning and LP/spinal fluid exam was planned given lack of obvious systemic cause of encephalopathy.  His wife declined this.  Patient very limited historian, but when I wake him and ask how he is feeling, he states \"I have been sleeping but I guess I'm okay.\"  He does not answer other questions.    ROS: Not obtainable    Objective     Vital Signs   Blood pressure 176/87, pulse 75, temperature 99 °F (37.2 °C), temperature source Oral, resp. rate 12, height 170.2 cm (67.01\"), weight 91.6 kg (202 lb), SpO2 97 %.    Physical Exam:              Neuro: Elderly white man lying sleeping, does initially wake to verbal and tactile stim, able to name his wife at the bedside, but then appears to fall asleep again and is not arousable again.   Does not answer other questions except as above.  Pupils 2.5 mm and reactive, does briefly regard and extraocular movement spontaneously intact; no obvious facial asymmetry  Motor: Spontaneous antigravity movement of bilateral upper extremities and shifts legs in the bed; no asymmetry noted  Neck supple  Coughs frequently    Results Review:              Renal function panel today with glucose 116 calcium 8.4 albumin 2.9  TSH normal at 3.66  Ammonia normal at 47  Lactate 1.4 procalcitonin 0.1  CBC with white count 7.48 H&H 10.6 and " "33 platelets 227 ABG overnight 7.4 4/38/73/26  EEG this morning showed moderate generalized slowing, no epileptiform activity  Follow-up head CT images reviewed, agree no acute intracranial abnormality but bilateral mastoiditis and possible acute ethmoid sinusitis      Assessment/Plan     89-year-old man with dementia requiring caregiver, with recent cough, sleepiness and worsened confusion over baseline.  1.  Encephalopathy-  Given advanced dementia at baseline, would expect worsening at night.   He has had a fluctuating course this admission, unarousable this morning after receiving Keppra and Ativan.    Given lack of identifiable systemic illness, plan had been made for spinal fluid exam but his wife refused.  Head CT does show mastoiditis and possible acute ethmoid sinusitis, and given cough, consideration could be given to treatment.  Although still extremely sleepy, slightly more lucid for me today, and able to answer a question, and recognizes his wife.  Pending course overnight, consider repeat UA and chest x-ray tomorrow.  Would avoid Ativan.  Can use low-dose Haldol if needed.    2.  \"Stiffening\" episodes-I think these are less likely to be seizure and given probable sedation, will discontinue Keppra.  We can continue to monitor for seizures and restart if needed.      I discussed the patients findings and my recommendations with family    Amarilys Horowitz MD  09/11/20  16:59    " "in [place], patient verifies this as the location during the entirety of this visit.     New Weight Management Nutrition Consultation    Nixon More is a 85 year old male presents today for new weight management nutrition consultation.  Patient referred by MD Rocky on June 25, 2021.    Patient with Co-morbidities of obesity including:  Type II DM no  Renal Failure no - CKD3 yes  Sleep apnea yes  Hypertension yes  Dyslipidemia no  Joint pain yes  Back pain  yes  GERD no    CHF yes    Anthropometrics:  Estimated body mass index is 35.49 kg/m  as calculated from the following:    Height as of 6/24/21: 1.715 m (5' 7.5\").    Weight as of 6/24/21: 104.3 kg (230 lb).    Medications for Weight Loss:  None - (?possibly naltrexon)    NUTRITION HISTORY  Today - Information reported per visit with pt and daughters present (Maria Del Carmen and Amber).     Pt's meals/food previously prepared by pt's wife who has fairly recently passed (previous wife prepared meals as well). Now, he often enjoys food from moziy.  Eating 3 meals/day + snacks. Daughters very involved in care.     Recent food recall:  Breakfast: cereal, milk, fruit  Lunch: lunch meat/hot dog/ pot roast  Dinner: Smart One/ Lean Cuisine  Afternoon Snacks: cheez-it, peanuts (low sodium); herring/wine sauce, low sodium spam  Beverages: small cans mt dew/pepsi (1/day); water milk (2%), V8, flavored water,   Alcohol: rarely - once       Physical Activity:  Walking 10 min walks    Nutrition Prescription  Recommended energy/nutrient modification.  1200 calories/day (per MD)  Low Sodium diet    Nutrition Diagnosis  Obesity r/t long history of self-monitoring deficit and excessive energy intake aeb BMI >30.    Nutrition Intervention    Materials/education   -Provided on portion control and healthy food choices, meal and snack planning and websites, sample meal plans  -Provided on Volumetric eating to help satiety level on fewer calories; portion control and healthy food choices " "(Plate Method and Volumetrics handouts)  - Provided handouts/education on Low sodium diet,   -Discussed healthy choices for meal/snack options, Meal replacement options/smoothies, Optage meal delivery choices (per daughter request)  - Answered pt and pt's daughters' questions     Patient demonstrates understanding.    Expected Engagement: good  Follow-Up Plans: review diet ed, healthy snack choices per preferece, option for meal replacement/smoothie     Nutrition Goals  1) Keep food journal/ meal log (phone gustavo options to track calories: MyFitnessPal, Lose-it, Noom)  2). Follow 1200 calorie/day plan   - limiting saturated fat < 7% calories (84 kca/ ~9g sat fat) - limit butter, try plant oil/spreads (smart balance/earth balance); limit red and processed meats    -  protein (~60-80g)  3). Follow low sodium diet <2g (see handout below)     - limit processes meats (deli meat, hot dogs, canned meats, Lox/herring);   - choose fresh/frozen unbreaded meats, eggs, poultry, fish   - fresh/frozen veggies, fruit, unsalted nuts   - Look at sodium level on food labels  4). Eat slowly (20-30 minutes per meal), chewing foods well (25 chews per bite/applesauce consistency)  5). Follow 9\" Plate method (1/2 plate non-starchy vegetables/fruit, 1/4 plate lean protein, 1/4 plate whole grain starch - no more than 1/2 cup carb/meal)    6). Reduce calorie containing beverages by 50% (reduce from 2% milk to 1%; choose flavored water over soda)  8). Increase activity as able     Frozen Meal Replacements  Healthy Choice  Lean Cuisine  Atkins Meals  Smart Ones      Healthy Recipe Resources:  \"The Volumetrics Eating Plan\" by Alison Diop, Ph.D.  https://www.WHI Solution.Ancera/  www.MOMENTFACE SRO.Ancera  www.oldVostupt.org  Dash Diet Recipes [facility]  www.extension.[facility] - the recipe box  \"Cooking that Counts\" by editors of " CookingLight  https://www.[facility]/communityculinF[place]/[facility]_Cookbook_KT_Final_11-6_NoCrops-compressed.pdf  Https://www.choosemyplate.gov/myplatekitchen  https://snaped.fns.usda.gov/recipes-menus - SNAP recipes  https://www.diabetesfoodhub.org/all-recipes.html  https://www.mealime.com/      Tips for Low Sodium Diet  http://www.fvfiles.com/144851.pdf    The Plate Method  http://www.Inside Social/552843rg.pdf    Protein Sources for Weight Loss  http://fvfiles.com/064920.pdf     Carbohydrates  http://fvfiles.com/169275.pdf     Mindful Eating  http://Inside Social/517472.pdf     Summary of Volumetrics Eating Plan  http://fvfiles.com/394562.pdf     Follow-Up:  1 month     Time spent with patient: 50 minutes.  Valeria Mckeon, SKY, LD

## 2021-07-06 ENCOUNTER — HOSPITAL ENCOUNTER (OUTPATIENT)
Dept: CARDIAC REHAB | Facility: CLINIC | Age: 86
End: 2021-07-06
Attending: INTERNAL MEDICINE
Payer: COMMERCIAL

## 2021-07-06 PROCEDURE — 93798 PHYS/QHP OP CAR RHAB W/ECG: CPT | Performed by: REHABILITATION PRACTITIONER

## 2021-07-09 ENCOUNTER — OFFICE VISIT (OUTPATIENT)
Dept: ORTHOPEDICS | Facility: CLINIC | Age: 86
End: 2021-07-09
Payer: COMMERCIAL

## 2021-07-09 ENCOUNTER — ANCILLARY PROCEDURE (OUTPATIENT)
Dept: GENERAL RADIOLOGY | Facility: CLINIC | Age: 86
End: 2021-07-09
Attending: FAMILY MEDICINE
Payer: COMMERCIAL

## 2021-07-09 ENCOUNTER — HOSPITAL ENCOUNTER (OUTPATIENT)
Dept: CARDIAC REHAB | Facility: CLINIC | Age: 86
End: 2021-07-09
Attending: INTERNAL MEDICINE
Payer: COMMERCIAL

## 2021-07-09 VITALS
BODY MASS INDEX: 34.86 KG/M2 | DIASTOLIC BLOOD PRESSURE: 62 MMHG | WEIGHT: 230 LBS | SYSTOLIC BLOOD PRESSURE: 106 MMHG | HEIGHT: 68 IN

## 2021-07-09 DIAGNOSIS — G89.29 CHRONIC PAIN OF LEFT KNEE: ICD-10-CM

## 2021-07-09 DIAGNOSIS — M25.562 CHRONIC PAIN OF LEFT KNEE: ICD-10-CM

## 2021-07-09 DIAGNOSIS — M17.12 PRIMARY OSTEOARTHRITIS OF LEFT KNEE: Primary | ICD-10-CM

## 2021-07-09 PROCEDURE — 93798 PHYS/QHP OP CAR RHAB W/ECG: CPT

## 2021-07-09 PROCEDURE — 99214 OFFICE O/P EST MOD 30 MIN: CPT | Performed by: FAMILY MEDICINE

## 2021-07-09 PROCEDURE — 73562 X-RAY EXAM OF KNEE 3: CPT | Performed by: FAMILY MEDICINE

## 2021-07-09 ASSESSMENT — MIFFLIN-ST. JEOR: SCORE: 1704.83

## 2021-07-09 NOTE — PATIENT INSTRUCTIONS
1. Primary osteoarthritis of left knee    2. Chronic pain of left knee      -Patient has left knee pain due to arthritis  -Patient will start home exercise program.  Handouts were given today  -Patient may continue with cardiac rehab as tolerated.  -Patient will follow up if pain increases for possible cortisone injection.  We also discussed potential Synvisc injections in the future if indicated.  -Call direct clinic number [[phone]] at any time with questions or concerns.    Albert Yeo MD [facility]  [facility] [place]

## 2021-07-09 NOTE — LETTER
7/9/2021         RE: Nixon More  [address]  [place] [address]        Dear Colleague,    Thank you for referring your patient, Nixon More, to the [facility] SPORTS MEDICINE CLINIC [place]. Please see a copy of my visit note below.    ASSESSMENT & PLAN  Patient Instructions     1. Primary osteoarthritis of left knee    2. Chronic pain of left knee      -Patient has left knee pain due to arthritis  -Patient will start home exercise program.  Handouts were given today  -Patient may continue with cardiac rehab as tolerated.  -Patient will follow up if pain increases for possible cortisone injection.  We also discussed potential Synvisc injections in the future if indicated.  -Call direct clinic number [[phone]] at any time with questions or concerns.    Albert Yeo MD [facility] Orthoped[facility] Sports Medicine  [facility] [place]          -----    SUBJECTIVE  Nixon More is a/an 85 year old male who is seen in consultation at the request of  Natalya Lewis M.D. for evaluation of left knee pain. The patient is seen by themselves. States had 2nd covid vaccine on 2/19/21    Onset: 1 month(s) ago. Reports insidious onset without acute precipitating event.  Location of Pain: left medial aspect of the knee.   Rating of Pain at worst: 3/10  Rating of Pain Currently: 1/10  Worsened by: stairs, walking for long periods, cardiac rehab exercises  Better with: rest  Treatments tried: rest/activity avoidance  Associated symptoms: feeling of instability  Orthopedic history: Fx patella 10 year ago, unknown side   Relevant surgical history: NO  Social history: social history: retired    Past Medical History:   Diagnosis Date     Actinic keratosis      Arthritis      Atrial fibrillation and flutter (H) 12/3/2018     CAD (coronary artery disease)     1/5/2011 lateral ischemia on stress echo, 12/2014 normal stress echo     Coronary artery disease 1/1/2011    Abnormal stress  test 2011 and controlled with medical mgmt      Dyslipidemia      Emphysema of lung (H)      Essential hypertension, benign      Hernia, abdominal      Hypersomnia with sleep apnea, unspecified     on bipap, partially treated with residual apneas     Hypertrophy (benign) of prostate     Biopsy  negative for cancer; PSA 5     Lumbago      Mumps      Prostate cancer (H) 12/15/2006     Skin cancer, basal cell 1997     Spider veins      Social History     Socioeconomic History     Marital status:      Spouse name: Not on file     Number of children: Not on file     Years of education: Not on file     Highest education level: Not on file   Occupational History     Occupation: retired   Social Needs     Financial resource strain: Not on file     Food insecurity     Worry: Not on file     Inability: Not on file     Transportation needs     Medical: Not on file     Non-medical: Not on file   Tobacco Use     Smoking status: Former Smoker     Packs/day: 1.00     Types: Cigarettes     Start date:      Quit date: 1978     Years since quittin.5     Smokeless tobacco: Never Used   Substance and Sexual Activity     Alcohol use: Never     Frequency: Never     Binge frequency: Never     Drug use: No     Sexual activity: Yes     Partners: Female   Lifestyle     Physical activity     Days per week: Not on file     Minutes per session: Not on file     Stress: Not on file   Relationships     Social connections     Talks on phone: Not on file     Gets together: Not on file     Attends Buddhism service: Not on file     Active member of club or organization: Not on file     Attends meetings of clubs or organizations: Not on file     Relationship status: Not on file     Intimate partner violence     Fear of current or ex partner: Not on file     Emotionally abused: Not on file     Physically abused: Not on file     Forced sexual activity: Not on file   Other Topics Concern      Service Not Asked      "Blood Transfusions Not Asked     Caffeine Concern No     Comment: 0-2 cans of soda per day.     Occupational Exposure Not Asked     Hobby Hazards Not Asked     Sleep Concern Not Asked     Stress Concern Not Asked     Weight Concern Not Asked     Special Diet Not Asked     Back Care Not Asked     Exercise No     Bike Helmet Not Asked     Seat Belt Yes     Self-Exams Not Asked     Parent/sibling w/ CABG, MI or angioplasty before 65F 55M? No   Social History Narrative     Not on file         Patient's past medical, surgical, social, and family histories were reviewed today and no changes are noted.    REVIEW OF SYSTEMS:  10 point ROS is negative other than symptoms noted above in HPI, Past Medical History or as stated below  Constitutional: NEGATIVE for fever, chills, change in weight  Skin: NEGATIVE for worrisome rashes, moles or lesions  GI/: NEGATIVE for bowel or bladder changes  Neuro: NEGATIVE for weakness, dizziness or paresthesias    OBJECTIVE:  /62   Ht 1.715 m (5' 7.5\")   Wt 104.3 kg (230 lb)   BMI 35.49 kg/m     General: healthy, alert and in no distress  HEENT: no scleral icterus or conjunctival erythema  Skin: no suspicious lesions or rash. No jaundice.  CV: no pedal edema  Resp: normal respiratory effort without conversational dyspnea   Psych: normal mood and affect  Gait: normal steady gait with appropriate coordination and balance  Neuro: Normal light sensory exam of lower extremity  MSK:  LEFT KNEE  Inspection:    normal alignment  Palpation:    Tender about the medial patellar facet and medial joint line. Remainder of bony and ligamentous landmarks are nontender.    Trace effusion is present    Patellofemoral crepitus is Present  Range of Motion:     00 extension to 1200 flexion  Strength:    Quadriceps grossly intact    Extensor mechanism intact  Special Tests:    Positive: none    Negative: MCL/valgus stress (0 & 30 deg), LCL/varus stress (0 & 30 deg), Lachman's, anterior drawer, posterior " José Miguel hurtado's    Independent visualization of the below image:  Recent Results (from the past 24 hour(s))   XR Knee Standing AP Bilat Ellerbe Bilat Lat Left    Narrative    Medial compartment bone-on-bone arthritis with osteophytes.  Mild   calcinosis of the lateral compartment.  No acute fracture or dislocation.           Albert Yeo MD [facility]        Again, thank you for allowing me to participate in the care of your patient.        Sincerely,        Albert Yeo, MD

## 2021-07-09 NOTE — PROGRESS NOTES
ASSESSMENT & PLAN  Patient Instructions     1. Primary osteoarthritis of left knee    2. Chronic pain of left knee      -Patient has left knee pain due to arthritis  -Patient will start home exercise program.  Handouts were given today  -Patient may continue with cardiac rehab as tolerated.  -Patient will follow up if pain increases for possible cortisone injection.  We also discussed potential Synvisc injections in the future if indicated.  -Call direct clinic number [[phone]] at any time with questions or concerns.    Albert Yeo MD [facility]  [facility]          -----    SUBJECTIVE  Nixon More is a/an 85 year old male who is seen in consultation at the request of  Natalya Lewis M.D. for evaluation of left knee pain. The patient is seen by themselves. States had 2nd covid vaccine on 2/19/21    Onset: 1 month(s) ago. Reports insidious onset without acute precipitating event.  Location of Pain: left medial aspect of the knee.   Rating of Pain at worst: 3/10  Rating of Pain Currently: 1/10  Worsened by: stairs, walking for long periods, cardiac rehab exercises  Better with: rest  Treatments tried: rest/activity avoidance  Associated symptoms: feeling of instability  Orthopedic history: Fx patella 10 year ago, unknown side   Relevant surgical history: NO  Social history: social history: retired    Past Medical History:   Diagnosis Date     Actinic keratosis      Arthritis      Atrial fibrillation and flutter (H) 12/3/2018     CAD (coronary artery disease)     1/5/2011 lateral ischemia on stress echo, 12/2014 normal stress echo     Coronary artery disease 1/1/2011    Abnormal stress test 1/2011 and controlled with medical mgmt      Dyslipidemia      Emphysema of lung (H)      Essential hypertension, benign      Hernia, abdominal      Hypersomnia with sleep apnea, unspecified     on bipap, partially treated with residual apneas     Hypertrophy (benign) of  prostate     Biopsy  negative for cancer; PSA 5     Lumbago      Mumps      Prostate cancer (H) 12/15/2006     Skin cancer, basal cell 1997     Spider veins      Social History     Socioeconomic History     Marital status:      Spouse name: Not on file     Number of children: Not on file     Years of education: Not on file     Highest education level: Not on file   Occupational History     Occupation: retired   Social Needs     Financial resource strain: Not on file     Food insecurity     Worry: Not on file     Inability: Not on file     Transportation needs     Medical: Not on file     Non-medical: Not on file   Tobacco Use     Smoking status: Former Smoker     Packs/day: 1.00     Types: Cigarettes     Start date:      Quit date: 1978     Years since quittin.5     Smokeless tobacco: Never Used   Substance and Sexual Activity     Alcohol use: Never     Frequency: Never     Binge frequency: Never     Drug use: No     Sexual activity: Yes     Partners: Female   Lifestyle     Physical activity     Days per week: Not on file     Minutes per session: Not on file     Stress: Not on file   Relationships     Social connections     Talks on phone: Not on file     Gets together: Not on file     Attends Islam service: Not on file     Active member of club or organization: Not on file     Attends meetings of clubs or organizations: Not on file     Relationship status: Not on file     Intimate partner violence     Fear of current or ex partner: Not on file     Emotionally abused: Not on file     Physically abused: Not on file     Forced sexual activity: Not on file   Other Topics Concern      Service Not Asked     Blood Transfusions Not Asked     Caffeine Concern No     Comment: 0-2 cans of soda per day.     Occupational Exposure Not Asked     Hobby Hazards Not Asked     Sleep Concern Not Asked     Stress Concern Not Asked     Weight Concern Not Asked     Special Diet Not Asked     Back  "Care Not Asked     Exercise No     Bike Helmet Not Asked     Seat Belt Yes     Self-Exams Not Asked     Parent/sibling w/ CABG, MI or angioplasty before 65F 55M? No   Social History Narrative     Not on file         Patient's past medical, surgical, social, and family histories were reviewed today and no changes are noted.    REVIEW OF SYSTEMS:  10 point ROS is negative other than symptoms noted above in HPI, Past Medical History or as stated below  Constitutional: NEGATIVE for fever, chills, change in weight  Skin: NEGATIVE for worrisome rashes, moles or lesions  GI/: NEGATIVE for bowel or bladder changes  Neuro: NEGATIVE for weakness, dizziness or paresthesias    OBJECTIVE:  /62   Ht 1.715 m (5' 7.5\")   Wt 104.3 kg (230 lb)   BMI 35.49 kg/m     General: healthy, alert and in no distress  HEENT: no scleral icterus or conjunctival erythema  Skin: no suspicious lesions or rash. No jaundice.  CV: no pedal edema  Resp: normal respiratory effort without conversational dyspnea   Psych: normal mood and affect  Gait: normal steady gait with appropriate coordination and balance  Neuro: Normal light sensory exam of lower extremity  MSK:  LEFT KNEE  Inspection:    normal alignment  Palpation:    Tender about the medial patellar facet and medial joint line. Remainder of bony and ligamentous landmarks are nontender.    Trace effusion is present    Patellofemoral crepitus is Present  Range of Motion:     00 extension to 1200 flexion  Strength:    Quadriceps grossly intact    Extensor mechanism intact  Special Tests:    Positive: none    Negative: MCL/valgus stress (0 & 30 deg), LCL/varus stress (0 & 30 deg), Lachman's, anterior drawer, posterior drawer, José Miguel's    Independent visualization of the below image:  Recent Results (from the past 24 hour(s))   XR Knee Standing AP Bilat [place] Bilat Lat Left    Narrative    Medial compartment bone-on-bone arthritis with osteophytes.  Mild   calcinosis of the lateral " compartment.  No acute fracture or dislocation.           Albert Yeo MD [facility]

## 2021-07-13 ENCOUNTER — HOSPITAL ENCOUNTER (OUTPATIENT)
Dept: CARDIAC REHAB | Facility: CLINIC | Age: 86
End: 2021-07-13
Attending: INTERNAL MEDICINE
Payer: COMMERCIAL

## 2021-07-13 PROCEDURE — 93798 PHYS/QHP OP CAR RHAB W/ECG: CPT

## 2021-07-14 ENCOUNTER — TELEPHONE (OUTPATIENT)
Dept: PEDIATRICS | Facility: CLINIC | Age: 86
End: 2021-07-14

## 2021-07-14 DIAGNOSIS — E78.5 DYSLIPIDEMIA: ICD-10-CM

## 2021-07-14 NOTE — TELEPHONE ENCOUNTER
Request for additional information and updated prescription for walker from [facility].  Completed forms and documentation faxed to [facility].  Divya Hansen, CMA

## 2021-07-15 ENCOUNTER — HOSPITAL ENCOUNTER (OUTPATIENT)
Dept: CARDIAC REHAB | Facility: CLINIC | Age: 86
End: 2021-07-15
Attending: INTERNAL MEDICINE
Payer: COMMERCIAL

## 2021-07-15 DIAGNOSIS — I48.92 ATRIAL FLUTTER WITH RAPID VENTRICULAR RESPONSE (H): ICD-10-CM

## 2021-07-15 DIAGNOSIS — I10 HYPERTENSION GOAL BP (BLOOD PRESSURE) < 140/90: ICD-10-CM

## 2021-07-15 PROCEDURE — 93798 PHYS/QHP OP CAR RHAB W/ECG: CPT

## 2021-07-15 RX ORDER — ATORVASTATIN CALCIUM 40 MG/1
TABLET, FILM COATED ORAL
Qty: 90 TABLET | Refills: 0 | OUTPATIENT
Start: 2021-07-15

## 2021-07-15 RX ORDER — DILTIAZEM HYDROCHLORIDE 120 MG/1
120 CAPSULE, EXTENDED RELEASE ORAL DAILY
Qty: 90 CAPSULE | Refills: 3 | Status: SHIPPED | OUTPATIENT
Start: 2021-07-15 | End: 2022-08-08

## 2021-07-16 NOTE — PROGRESS NOTES
[facility]    I had the pleasure of seeing Spencer when he and daughter Amber came for follow up of AFib.  This 85 year old sees Dr. Lee and previously saw Dr. Camp for his history of:    1. Permanent AFib -  atypical atrial flutter and AFib first diagnosed 12/2018 in the setting of a normal EF. Rate control/anticoagulation strategy was recommended as he was asx'c  2. Chronic AC with Eliquis 5 mg twice daily secondary to CHADSVASc of 5 (HFpEF, HTN, presumed CAD, age)  3. Chronic GALDAMEZ, noted by Dr. Camp 6/2019.  30-pack-year history of tobacco use, quitting~2000. Saw Dr. Moreno and PFTs showed minimal restriction, muscular weakness and no obstruction. HFpEF, obesity, deconditioning all contributing  4. HFpEF - hydrochlorothiazide stopped in past d/t hypotension. Breathing improved on torsemide. Now holding it occasionally d/t urinary frequency.   5. Ascending aorta and aortic root dilatation (4.4 cm, 4.6 cm) noted on echocardiogram 6/2021 - stable  6. Presumed CAD based on stress echocardiogram 1/2011.  Dr. Camp opted to treat this medically in the absence of symptoms.  Repeat stress test 12/2014 was negative for ischemia   7. HTN with some hypotension prompting discontinuation of HCTZ by PCP  8.  MAYITO - on ASV therapy for complex Central and Obstructive Sleep Apnea. Last visit 2/2021  9. Peripheral Venous Insufficiency   10. Dyslipidemia    I saw Spencer and Amber 6/24 at which time we reviewed his increasing SOB/GALDAMEZ, especially when he had skipped torsemide doses.  He had been eating a lot more sodium as he had moved into an assisted living.  Amber, a nurse, asked if he could be switched to spironolactone as he complained of significant urinary urgency on torsemide.  I held off on this given his hypotension ( mmHg), recommended echocardiogram, limiting his sodium, wearing compression stockings and stopped his atenolol (renally cleared) in favor of metoprolol  mg daily.  I was hopeful  "that the metoprolol XL would have less of an effect on his blood pressure, and we would be able to adjust diuretic therapy at this visit.  Of note, weight was 230#at 6/24 visit.    Interval History  Since switching the atenolol to the Metoprolol Xl 150 mg daily and starting Cardiac Rehab thinks he's doing \"a lot better.\" Daughter Amber, an NP, agrees.  Walked from front of building to elevator today without stopping or severe SOB, which wouldn't have been the case last month when I saw him.    At Cardiac Rehab, looks like BPs are still occasionally getting into 90s, but the last 2 times have been 110s.     Edema seems \"pretty good\" even when he doesn't wear compression stockings. No orthopnea, PND.     VITALS:  Vitals: /78   Pulse 84   Ht 1.715 m (5' 7.5\")   Wt 105.4 kg (232 lb 4.8 oz)   SpO2 96%   BMI 35.85 kg/m      Diagnostic Testing:  EKG 6/24/2021 on atenolol 150 mg daily showed atypical atrial flutter 66 bpm.  Echo 7/2021 EF 55%. Nl RV. No sig valve dz. Asc aor 4.2 cm (previously read 4.5 cm on 9/2020 echo)  Echo 9/2020 with EF 55-60%. No RWMA. Nl RV. 1+MR. Mild MAC. 1+TR with RVSP  23 mmHg. Ao sclerosis. Mod root dilation 4.4 cm and ascending aorta mod dilated 4.6 cm  PFTs 8/2019 showed mild restriction. NIF ~75% of normal, without significant change c/w 7/2019  Aortoiliac US 7/23/2019 showed sacular AAA with mild enlargement with max diameter 3.3 x 2.2. <50% stenosis in aorta, iliac arteries  Exercise ABIs 7/23/2019 with normal ABIs and normal response to exercise. Developed calf and thigh tiredness after 2.5 minutes  EKG 7/22/2019 showed AFib at 79 bpm  MRA chest 5/2019 showed aortic root was borderline dilated (4 x 3.9 cm).  The ascending aorta was mildly dilated (4.4 x 4.5 at the level of bifurcation of pulmonary arteries)  Holter monitor 5/21/2019 showed atrial fibrillation with an average heart rate of 75 bpm.  Range was  bpm.  He had 2 \"events\" which correlated with chronic atrial " fibrillation with heart rates in the 70-80s.  Echocardiogram 12/2018 showed an EF of 55%.  He had mildly reduced right ventricular systolic function. LA size was moderately-severely dilated with a left atrial volume index of 51.2 mL/m .  Left atrial size of 6.0 cm.  As above, his aortic root and ascending aorta were both enlarged at 4.6 cm  Stress echocardiogram 12/2014 was negative for stress-induced wall motion abnormalities.  Component      Latest Ref Rng & Units 12/3/2018 7/9/2019 6/18/2021   N-Terminal Pro BNP Inpatient      0 - 1,800 pg/mL 1,210       N-Terminal Pro Bnp      0 - 450 pg/mL   1,759 (H) 1,027 (H)      Component      Latest Ref Rng & Units 5/17/2021 6/18/2021   WBC      4.0 - 11.0 10e9/L 4.8 5.6   RBC Count      4.4 - 5.9 10e12/L 4.44 4.38 (L)   Hemoglobin      13.3 - 17.7 g/dL 13.1 (L) 12.8 (L)   Hematocrit      40.0 - 53.0 % 41.6 41.1   MCV      78 - 100 fl 94 94   MCH      26.5 - 33.0 pg 29.5 29.2   MCHC      31.5 - 36.5 g/dL 31.5 31.1 (L)   RDW      10.0 - 15.0 % 13.7 14.1   Platelet Count      150 - 450 10e9/L 186 198     Component      Latest Ref Rng & Units 12/20/2019 11/11/2020 6/18/2021   Sodium      133 - 144 mmol/L 141 142 142   Potassium      3.4 - 5.3 mmol/L 3.7 3.5 4.4   Chloride      94 - 109 mmol/L 106 110 (H) 109   Carbon Dioxide      20 - 32 mmol/L 28 25 29   Anion Gap      3 - 14 mmol/L 7 7 4   Glucose      70 - 99 mg/dL 121 (H) 118 (H) 119 (H)   Urea Nitrogen      7 - 30 mg/dL 27 22 28   Creatinine      0.66 - 1.25 mg/dL 1.18 1.16 1.44 (H)   GFR Estimate      >60 mL/min/1.73:m2 57 (L) 58 (L) 44 (L)   GFR Estimate If Black      >60 mL/min/1.73:m2 66 67 51 (L)   Calcium      8.5 - 10.1 mg/dL 9.9 9.8 9.5         Plan:  Decrease torsemide from 10 to 5 mg daily  Start spironolactone 12.5 mg daily  BMP 1 week      Assessment/Plan:    1. SOB/GALDAMEZ    Has element of HFpEF, but complains of significant frequency/urgency with torsemide 20 mg daily.  Was not consistently taking torsemide 10  mg daily d/t continued urgency/frequency    Hemoglobin minimally low 6/18 (12.8 g/dL)    PFTs 2019 with mild restriction    Updated echo with stable/nl EF and valves    PLAN:    Long d/w pt and daughter. Given his QOL changes with the urinary frequency/urgency, hopes to be able to manage fluid with lower dose of torsemide. Will decrease torsemide from 10 to 5 mg daily     Start spironolactone 12.5 mg daily    BMP 1 week. We'll call to review    Spencer knows that spironolactone is not as good of a diuretic as torsemide and it's very possible he may have issues with fluid retention and increased SOB.  He and Amber know to watch this very carefully, calling for weight increase >3# in 1 day or >5# in 1 week.       2. Permanent AFib/atypical AFlutter    As above, switched his atenolol to metoprolol XL d/t renal insufficiency    HR on exam in 80s    PLAN:    Continue metoprolol  mg daily      Cherise Bennett PA-C, MSPAS      Orders Placed This Encounter   Procedures     Basic metabolic panel     Orders Placed This Encounter   Medications     Cholecalciferol (VITAMIN D-3) 25 MCG (1000 UT) CAPS     Sig: Take by mouth daily     torsemide (DEMADEX) 10 MG tablet     Sig: Take 1/2 tablet daily (12.5 mg)     Dispense:  90 tablet     Refill:  0     spironolactone (ALDACTONE) 25 MG tablet     Sig: Take 0.5 tablets (12.5 mg) by mouth daily     Dispense:  30 tablet     Refill:  1     Medications Discontinued During This Encounter   Medication Reason     torsemide (DEMADEX) 10 MG tablet Reorder         Encounter Diagnoses   Name Primary?     Persistent atrial fibrillation (H)      Benign essential hypertension        CURRENT MEDICATIONS:  Current Outpatient Medications   Medication Sig Dispense Refill     albuterol (PROAIR RESPICLICK) 108 (90 Base) MCG/ACT inhaler Inhale 2 puffs into the lungs every 6 hours as needed for shortness of breath / dyspnea or wheezing 1 Inhaler 3     apixaban ANTICOAGULANT (ELIQUIS) 5 MG tablet Take 1  tablet (5 mg) by mouth 2 times daily 180 tablet 3     atorvastatin (LIPITOR) 40 MG tablet TAKE 1 TABLET BY MOUTH EVERY DAY 90 tablet 0     Cholecalciferol (VITAMIN D-3) 25 MCG (1000 UT) CAPS Take by mouth daily       diltiazem ER (DILT-XR) 120 MG 24 hr capsule Take 1 capsule (120 mg) by mouth daily 90 capsule 3     EPINEPHrine (EPIPEN/ADRENACLICK/OR ANY BX GENERIC EQUIV) 0.3 MG/0.3ML injection 2-pack Inject 0.3 mLs (0.3 mg) into the muscle as needed for anaphylaxis 0.6 mL 1     escitalopram (LEXAPRO) 10 MG tablet Take 1 tablet (10 mg) by mouth daily 90 tablet 3     ipratropium (ATROVENT) 0.06 % nasal spray Spray 2 sprays in nostril 4 times daily as needed for rhinitis 3 Box 3     Loperamide HCl (IMODIUM OR) Take by mouth daily as needed       losartan (COZAAR) 100 MG tablet Take 1 tablet (100 mg) by mouth daily 90 tablet 2     metoprolol succinate ER (TOPROL-XL) 100 MG 24 hr tablet Take 1.5 tablets (150 mg) by mouth daily 45 tablet 1     spironolactone (ALDACTONE) 25 MG tablet Take 0.5 tablets (12.5 mg) by mouth daily 30 tablet 1     torsemide (DEMADEX) 10 MG tablet Take 1/2 tablet daily (12.5 mg) 90 tablet 0     ASPIRIN NOT PRESCRIBED (INTENTIONAL) continuous prn for other Antiplatelet medication not prescribed intentionally due to Current anticoagulant therapy (warfarin/enoxaparin) (Patient not taking: Reported on 7/22/2021)       RISEdronate (ACTONEL) 35 MG tablet Take 1 tablet (35 mg) by mouth every 7 days (Patient not taking: Reported on 7/22/2021) 12 tablet 3       ALLERGIES     Allergies   Allergen Reactions     Augmentin Rash     Type III hypersensitivity     Bactrim [Sulfamethoxazole W/Trimethoprim] Rash     Serum sickness, type III hypersensitivity       Bee Venom Itching     Sulfa Drugs Hives     Celebrex [Celecoxib]      Lisinopril Cough     Naproxen Hives         Review of Systems:  Skin:  Negative     Eyes:  Positive for glasses  ENT:  Negative    Respiratory:  Positive for sleep  "apnea;CPAP  Cardiovascular:  chest pain;palpitations;dizziness;lightheadedness;syncope or near-syncope;cyanosis;Negative for Positive for;fatigue;edema;exercise intolerance  Gastroenterology: Negative for hematochezia  Genitourinary:  Positive for urinary frequency;urgency;nocturia  Musculoskeletal:  Positive for joint pain  Neurologic:  Negative    Psychiatric:  Positive for depression  Heme/Lymph/Imm:  Positive for allergies  Endocrine:  Negative diabetes    Physical Exam:  Vitals: /78   Pulse 84   Ht 1.715 m (5' 7.5\")   Wt 105.4 kg (232 lb 4.8 oz)   SpO2 96%   BMI 35.85 kg/m      Constitutional:  cooperative, alert and oriented, well developed, well nourished, in no acute distress        Skin:  warm and dry to the touch        Head:  normocephalic        Eyes:  pupils equal and round;conjunctivae and lids unremarkable;sclera white        ENT:  not assessed this visit        Neck:  no carotid bruit;carotid pulses are full and equal bilaterally JVP elevated;hepatojugular reflux      Chest:  clear to auscultation;normal symmetry;normal respiratory excursion        Cardiac: normal S1 and S2;no murmurs, gallops or rubs detected irregularly irregular rhythm distant heart sounds              Abdomen:    obese      Vascular:   pulses below the femoral arteries are diminished                               strong radial pulses, slighly diminshed dorsalis pedis and posterior tibia    Extremities and Back:  no deformities, clubbing, cyanosis, erythema observed        Neurological:  no gross motor deficits            PAST MEDICAL HISTORY:  Past Medical History:   Diagnosis Date     Actinic keratosis      Arthritis      Atrial fibrillation and flutter (H) 12/3/2018     CAD (coronary artery disease)     1/5/2011 lateral ischemia on stress echo, 12/2014 normal stress echo     Coronary artery disease 1/1/2011    Abnormal stress test 1/2011 and controlled with medical mgmt      Dyslipidemia      Emphysema of lung (H)  "     Essential hypertension, benign      Hernia, abdominal      Hypersomnia with sleep apnea, unspecified     on bipap, partially treated with residual apneas     Hypertrophy (benign) of prostate     Biopsy  negative for cancer; PSA 5     Lumbago      Mumps      Prostate cancer (H) 12/15/2006     Skin cancer, basal cell 1997     Spider veins        PAST SURGICAL HISTORY:  Past Surgical History:   Procedure Laterality Date     HERNIA REPAIR  child     Moh's procedure for basal cell carcinoma  2001     PROSTATE SURGERY       s/p lumbar laminectomy NOS  1988     SIGMOIDOSCOPY FLEXIBLE N/A 6/15/2020    Procedure: SIGMOIDOSCOPY, FLEXIBLE;  Surgeon: Sunni Patton MD;  Location: [facility]     VITRECTOMY PARS PLANA REMOVE PRERETINAL MEMBRANE   3/09       FAMILY HISTORY:  Family History   Problem Relation Age of Onset     Alzheimer Disease Mother      Hypertension Mother      Cardiovascular Father         D:86 complications fo CHF     Prostate Cancer Brother      Lung Cancer Brother 76     Prostate Cancer Brother        SOCIAL HISTORY:  Social History     Socioeconomic History     Marital status:      Spouse name: None     Number of children: None     Years of education: None     Highest education level: None   Occupational History     Occupation: retired   Tobacco Use     Smoking status: Former Smoker     Packs/day: 1.00     Types: Cigarettes     Start date:      Quit date: 1978     Years since quittin.5     Smokeless tobacco: Never Used   Substance and Sexual Activity     Alcohol use: Never     Drug use: No     Sexual activity: Yes     Partners: Female   Other Topics Concern      Service Not Asked     Blood Transfusions Not Asked     Caffeine Concern No     Comment: 0-2 cans of soda per day.     Occupational Exposure Not Asked     Hobby Hazards Not Asked     Sleep Concern Not Asked     Stress Concern Not Asked     Weight Concern Not Asked     Special Diet Not Asked     Back Care Not  Asked     Exercise No     Bike Helmet Not Asked     Seat Belt Yes     Self-Exams Not Asked     Parent/sibling w/ CABG, MI or angioplasty before 65F 55M? No   Social History Narrative     None     Social Determinants of Health     Financial Resource Strain:      Difficulty of Paying Living Expenses:    Food Insecurity:      Worried About Running Out of Food in the Last Year:      Ran Out of Food in the Last Year:    Transportation Needs:      Lack of Transportation (Medical):      Lack of Transportation (Non-Medical):    Physical Activity:      Days of Exercise per Week:      Minutes of Exercise per Session:    Stress:      Feeling of Stress :    Social Connections:      Frequency of Communication with Friends and Family:      Frequency of Social Gatherings with Friends and Family:      Attends [employer] Services:      Active Member of Clubs or Organizations:      Attends Club or Organization Meetings:      Marital Status:    Intimate Partner Violence:      Fear of Current or Ex-Partner:      Emotionally Abused:      Physically Abused:      Sexually Abused:

## 2021-07-16 NOTE — TELEPHONE ENCOUNTER
Pt called with a refill request of his Metoprolol.  Made pt aware that he had a refill available until he has his OV with Cherise on 7/22. Pt states understanding. [place]

## 2021-07-19 ENCOUNTER — HOSPITAL ENCOUNTER (OUTPATIENT)
Dept: BONE DENSITY | Facility: CLINIC | Age: 86
End: 2021-07-19
Attending: INTERNAL MEDICINE
Payer: COMMERCIAL

## 2021-07-19 ENCOUNTER — HOSPITAL ENCOUNTER (OUTPATIENT)
Dept: CARDIOLOGY | Facility: CLINIC | Age: 86
End: 2021-07-19
Attending: PHYSICIAN ASSISTANT
Payer: COMMERCIAL

## 2021-07-19 DIAGNOSIS — M85.80 OSTEOPENIA, UNSPECIFIED LOCATION: ICD-10-CM

## 2021-07-19 DIAGNOSIS — R06.09 DYSPNEA ON EXERTION: ICD-10-CM

## 2021-07-19 DIAGNOSIS — Z79.811 AROMATASE INHIBITOR USE: ICD-10-CM

## 2021-07-19 DIAGNOSIS — I77.9 AORTA DISORDER (H): Primary | ICD-10-CM

## 2021-07-19 LAB — LVEF ECHO: NORMAL

## 2021-07-19 PROCEDURE — 999N000208 ECHOCARDIOGRAM COMPLETE

## 2021-07-19 PROCEDURE — 77085 DXA BONE DENSITY AXL VRT FX: CPT

## 2021-07-19 PROCEDURE — 255N000002 HC RX 255 OP 636: Performed by: PHYSICIAN ASSISTANT

## 2021-07-19 PROCEDURE — 93306 TTE W/DOPPLER COMPLETE: CPT | Mod: 26 | Performed by: INTERNAL MEDICINE

## 2021-07-19 RX ADMIN — HUMAN ALBUMIN MICROSPHERES AND PERFLUTREN 3 ML: 10; .22 INJECTION, SOLUTION INTRAVENOUS at 10:37

## 2021-07-20 ENCOUNTER — HOSPITAL ENCOUNTER (OUTPATIENT)
Dept: CARDIAC REHAB | Facility: CLINIC | Age: 86
End: 2021-07-20
Attending: INTERNAL MEDICINE
Payer: COMMERCIAL

## 2021-07-20 PROCEDURE — 93798 PHYS/QHP OP CAR RHAB W/ECG: CPT

## 2021-07-22 ENCOUNTER — OFFICE VISIT (OUTPATIENT)
Dept: CARDIOLOGY | Facility: CLINIC | Age: 86
End: 2021-07-22
Payer: COMMERCIAL

## 2021-07-22 ENCOUNTER — HOSPITAL ENCOUNTER (OUTPATIENT)
Dept: CARDIAC REHAB | Facility: CLINIC | Age: 86
End: 2021-07-22
Attending: INTERNAL MEDICINE
Payer: COMMERCIAL

## 2021-07-22 VITALS
HEART RATE: 84 BPM | WEIGHT: 232.3 LBS | DIASTOLIC BLOOD PRESSURE: 78 MMHG | OXYGEN SATURATION: 96 % | HEIGHT: 68 IN | BODY MASS INDEX: 35.21 KG/M2 | SYSTOLIC BLOOD PRESSURE: 123 MMHG

## 2021-07-22 DIAGNOSIS — I48.19 PERSISTENT ATRIAL FIBRILLATION (H): ICD-10-CM

## 2021-07-22 DIAGNOSIS — I10 BENIGN ESSENTIAL HYPERTENSION: ICD-10-CM

## 2021-07-22 PROCEDURE — 93798 PHYS/QHP OP CAR RHAB W/ECG: CPT | Performed by: REHABILITATION PRACTITIONER

## 2021-07-22 PROCEDURE — 99214 OFFICE O/P EST MOD 30 MIN: CPT | Performed by: PHYSICIAN ASSISTANT

## 2021-07-22 RX ORDER — SPIRONOLACTONE 25 MG/1
12.5 TABLET ORAL DAILY
Qty: 30 TABLET | Refills: 1 | Status: SHIPPED | OUTPATIENT
Start: 2021-07-22 | End: 2021-09-13

## 2021-07-22 RX ORDER — TORSEMIDE 10 MG/1
TABLET ORAL
Qty: 90 TABLET | Refills: 0
Start: 2021-07-22 | End: 2021-08-17

## 2021-07-22 RX ORDER — CHOLECALCIFEROL (VITAMIN D3) 25 MCG
1 CAPSULE ORAL DAILY
COMMUNITY

## 2021-07-22 ASSESSMENT — MIFFLIN-ST. JEOR: SCORE: 1715.27

## 2021-07-22 NOTE — PATIENT INSTRUCTIONS
Spencer - it was nice to see you and Amber today!    1. Reviewed that breathing seems a bit better with more exercise, which is good news!  2. Reviewed that BP seems to have come up on metoprolol instead of atenolol.    PLAN:  1. OK to decrease to torsemide to 1/2 tablet daily (5 mg) and START spironolactone 12.5 (1/2 of 25 mg tab)    2. WATCH for fluid buildup/trouble breathing/needing to stop more.  CALL if weight goes up more than 3# overnight or 5# in 1 week.  [phone]  3. Get BMP in 1 week!  We'll call with results and get update.

## 2021-07-22 NOTE — LETTER
7/22/2021    Natalya Lewis MD  [address] [place] [address]    RE: Nixon More       Dear Colleague,    I had the pleasure of seeing Nixon More in the [facility].    [facility]    I had the pleasure of seeing Spencer when he and daughter Amber came for follow up of AFib.  This 85 year old sees Dr. Lee and previously saw Dr. Camp for his history of:    1. Permanent AFib -  atypical atrial flutter and AFib first diagnosed 12/2018 in the setting of a normal EF. Rate control/anticoagulation strategy was recommended as he was asx'c  2. Chronic AC with Eliquis 5 mg twice daily secondary to CHADSVASc of 5 (HFpEF, HTN, presumed CAD, age)  3. Chronic GALDAMEZ, noted by Dr. Camp 6/2019.  30-pack-year history of tobacco use, quitting~2000. Saw Dr. Moreno and PFTs showed minimal restriction, muscular weakness and no obstruction. HFpEF, obesity, deconditioning all contributing  4. HFpEF - hydrochlorothiazide stopped in past d/t hypotension. Breathing improved on torsemide. Now holding it occasionally d/t urinary frequency.   5. Ascending aorta and aortic root dilatation (4.4 cm, 4.6 cm) noted on echocardiogram 6/2021 - stable  6. Presumed CAD based on stress echocardiogram 1/2011.  Dr. Camp opted to treat this medically in the absence of symptoms.  Repeat stress test 12/2014 was negative for ischemia   7. HTN with some hypotension prompting discontinuation of HCTZ by PCP  8.  MAYITO - on ASV therapy for complex Central and Obstructive Sleep Apnea. Last visit 2/2021  9. Peripheral Venous Insufficiency   10. Dyslipidemia    I saw Spencer and Amber 6/24 at which time we reviewed his increasing SOB/GALDAMEZ, especially when he had skipped torsemide doses.  He had been eating a lot more sodium as he had moved into an assisted living.  Amber, a nurse, asked if he could be switched to spironolactone as he complained of significant urinary urgency  "on torsemide.  I held off on this given his hypotension ( mmHg), recommended echocardiogram, limiting his sodium, wearing compression stockings and stopped his atenolol (renally cleared) in favor of metoprolol  mg daily.  I was hopeful that the metoprolol XL would have less of an effect on his blood pressure, and we would be able to adjust diuretic therapy at this visit.  Of note, weight was 230#at 6/24 visit.    Interval History  Since switching the atenolol to the Metoprolol Xl 150 mg daily and starting Cardiac Rehab thinks he's doing \"a lot better.\" Daughter Amber, an NP, agrees.  Walked from front of building to elevator today without stopping or severe SOB, which wouldn't have been the case last month when I saw him.    At Cardiac Rehab, looks like BPs are still occasionally getting into 90s, but the last 2 times have been 110s.     Edema seems \"pretty good\" even when he doesn't wear compression stockings. No orthopnea, PND.     VITALS:  Vitals: /78   Pulse 84   Ht 1.715 m (5' 7.5\")   Wt 105.4 kg (232 lb 4.8 oz)   SpO2 96%   BMI 35.85 kg/m      Diagnostic Testing:  EKG 6/24/2021 on atenolol 150 mg daily showed atypical atrial flutter 66 bpm.  Echo 7/2021 EF 55%. Nl RV. No sig valve dz. Asc aor 4.2 cm (previously read 4.5 cm on 9/2020 echo)  Echo 9/2020 with EF 55-60%. No RWMA. Nl RV. 1+MR. Mild MAC. 1+TR with RVSP  23 mmHg. Ao sclerosis. Mod root dilation 4.4 cm and ascending aorta mod dilated 4.6 cm  PFTs 8/2019 showed mild restriction. NIF ~75% of normal, without significant change c/w 7/2019  Aortoiliac US 7/23/2019 showed sacular AAA with mild enlargement with max diameter 3.3 x 2.2. <50% stenosis in aorta, iliac arteries  Exercise ABIs 7/23/2019 with normal ABIs and normal response to exercise. Developed calf and thigh tiredness after 2.5 minutes  EKG 7/22/2019 showed AFib at 79 bpm  MRA chest 5/2019 showed aortic root was borderline dilated (4 x 3.9 cm).  The ascending aorta was " "mildly dilated (4.4 x 4.5 at the level of bifurcation of pulmonary arteries)  Holter monitor 5/21/2019 showed atrial fibrillation with an average heart rate of 75 bpm.  Range was  bpm.  He had 2 \"events\" which correlated with chronic atrial fibrillation with heart rates in the 70-80s.  Echocardiogram 12/2018 showed an EF of 55%.  He had mildly reduced right ventricular systolic function. LA size was moderately-severely dilated with a left atrial volume index of 51.2 mL/m .  Left atrial size of 6.0 cm.  As above, his aortic root and ascending aorta were both enlarged at 4.6 cm  Stress echocardiogram 12/2014 was negative for stress-induced wall motion abnormalities.  Component      Latest Ref Rng & Units 12/3/2018 7/9/2019 6/18/2021   N-Terminal Pro BNP Inpatient      0 - 1,800 pg/mL 1,210       N-Terminal Pro Bnp      0 - 450 pg/mL   1,759 (H) 1,027 (H)      Component      Latest Ref Rng & Units 5/17/2021 6/18/2021   WBC      4.0 - 11.0 10e9/L 4.8 5.6   RBC Count      4.4 - 5.9 10e12/L 4.44 4.38 (L)   Hemoglobin      13.3 - 17.7 g/dL 13.1 (L) 12.8 (L)   Hematocrit      40.0 - 53.0 % 41.6 41.1   MCV      78 - 100 fl 94 94   MCH      26.5 - 33.0 pg 29.5 29.2   MCHC      31.5 - 36.5 g/dL 31.5 31.1 (L)   RDW      10.0 - 15.0 % 13.7 14.1   Platelet Count      150 - 450 10e9/L 186 198     Component      Latest Ref Rng & Units 12/20/2019 11/11/2020 6/18/2021   Sodium      133 - 144 mmol/L 141 142 142   Potassium      3.4 - 5.3 mmol/L 3.7 3.5 4.4   Chloride      94 - 109 mmol/L 106 110 (H) 109   Carbon Dioxide      20 - 32 mmol/L 28 25 29   Anion Gap      3 - 14 mmol/L 7 7 4   Glucose      70 - 99 mg/dL 121 (H) 118 (H) 119 (H)   Urea Nitrogen      7 - 30 mg/dL 27 22 28   Creatinine      0.66 - 1.25 mg/dL 1.18 1.16 1.44 (H)   GFR Estimate      >60 mL/min/1.73:m2 57 (L) 58 (L) 44 (L)   GFR Estimate If Black      >60 mL/min/1.73:m2 66 67 51 (L)   Calcium      8.5 - 10.1 mg/dL 9.9 9.8 9.5         Plan:  Decrease torsemide " from 10 to 5 mg daily  Start spironolactone 12.5 mg daily  BMP 1 week      Assessment/Plan:    1. SOB/GALDAMEZ    Has element of HFpEF, but complains of significant frequency/urgency with torsemide 20 mg daily.  Was not consistently taking torsemide 10 mg daily d/t continued urgency/frequency    Hemoglobin minimally low 6/18 (12.8 g/dL)    PFTs 2019 with mild restriction    Updated echo with stable/nl EF and valves    PLAN:    Long d/w pt and daughter. Given his QOL changes with the urinary frequency/urgency, hopes to be able to manage fluid with lower dose of torsemide. Will decrease torsemide from 10 to 5 mg daily     Start spironolactone 12.5 mg daily    BMP 1 week. We'll call to review    Sepncer knows that spironolactone is not as good of a diuretic as torsemide and it's very possible he may have issues with fluid retention and increased SOB.  He and Amber know to watch this very carefully, calling for weight increase >3# in 1 day or >5# in 1 week.       2. Permanent AFib/atypical AFlutter    As above, switched his atenolol to metoprolol XL d/t renal insufficiency    HR on exam in 80s    PLAN:    Continue metoprolol  mg daily      Cherise Bennett PA-C, MSPAS      Orders Placed This Encounter   Procedures     Basic metabolic panel     Orders Placed This Encounter   Medications     Cholecalciferol (VITAMIN D-3) 25 MCG (1000 UT) CAPS     Sig: Take by mouth daily     torsemide (DEMADEX) 10 MG tablet     Sig: Take 1/2 tablet daily (12.5 mg)     Dispense:  90 tablet     Refill:  0     spironolactone (ALDACTONE) 25 MG tablet     Sig: Take 0.5 tablets (12.5 mg) by mouth daily     Dispense:  30 tablet     Refill:  1     Medications Discontinued During This Encounter   Medication Reason     torsemide (DEMADEX) 10 MG tablet Reorder         Encounter Diagnoses   Name Primary?     Persistent atrial fibrillation (H)      Benign essential hypertension        CURRENT MEDICATIONS:  Current Outpatient Medications   Medication Sig  Dispense Refill     albuterol (PROAIR RESPICLICK) 108 (90 Base) MCG/ACT inhaler Inhale 2 puffs into the lungs every 6 hours as needed for shortness of breath / dyspnea or wheezing 1 Inhaler 3     apixaban ANTICOAGULANT (ELIQUIS) 5 MG tablet Take 1 tablet (5 mg) by mouth 2 times daily 180 tablet 3     atorvastatin (LIPITOR) 40 MG tablet TAKE 1 TABLET BY MOUTH EVERY DAY 90 tablet 0     Cholecalciferol (VITAMIN D-3) 25 MCG (1000 UT) CAPS Take by mouth daily       diltiazem ER (DILT-XR) 120 MG 24 hr capsule Take 1 capsule (120 mg) by mouth daily 90 capsule 3     EPINEPHrine (EPIPEN/ADRENACLICK/OR ANY BX GENERIC EQUIV) 0.3 MG/0.3ML injection 2-pack Inject 0.3 mLs (0.3 mg) into the muscle as needed for anaphylaxis 0.6 mL 1     escitalopram (LEXAPRO) 10 MG tablet Take 1 tablet (10 mg) by mouth daily 90 tablet 3     ipratropium (ATROVENT) 0.06 % nasal spray Spray 2 sprays in nostril 4 times daily as needed for rhinitis 3 Box 3     Loperamide HCl (IMODIUM OR) Take by mouth daily as needed       losartan (COZAAR) 100 MG tablet Take 1 tablet (100 mg) by mouth daily 90 tablet 2     metoprolol succinate ER (TOPROL-XL) 100 MG 24 hr tablet Take 1.5 tablets (150 mg) by mouth daily 45 tablet 1     spironolactone (ALDACTONE) 25 MG tablet Take 0.5 tablets (12.5 mg) by mouth daily 30 tablet 1     torsemide (DEMADEX) 10 MG tablet Take 1/2 tablet daily (12.5 mg) 90 tablet 0     ASPIRIN NOT PRESCRIBED (INTENTIONAL) continuous prn for other Antiplatelet medication not prescribed intentionally due to Current anticoagulant therapy (warfarin/enoxaparin) (Patient not taking: Reported on 7/22/2021)       RISEdronate (ACTONEL) 35 MG tablet Take 1 tablet (35 mg) by mouth every 7 days (Patient not taking: Reported on 7/22/2021) 12 tablet 3       ALLERGIES     Allergies   Allergen Reactions     Augmentin Rash     Type III hypersensitivity     Bactrim [Sulfamethoxazole W/Trimethoprim] Rash     Serum sickness, type III hypersensitivity       Bee  "Venom Itching     Sulfa Drugs Hives     Celebrex [Celecoxib]      Lisinopril Cough     Naproxen Hives         Review of Systems:  Skin:  Negative     Eyes:  Positive for glasses  ENT:  Negative    Respiratory:  Positive for sleep apnea;CPAP  Cardiovascular:  chest pain;palpitations;dizziness;lightheadedness;syncope or near-syncope;cyanosis;Negative for Positive for;fatigue;edema;exercise intolerance  Gastroenterology: Negative for hematochezia  Genitourinary:  Positive for urinary frequency;urgency;nocturia  Musculoskeletal:  Positive for joint pain  Neurologic:  Negative    Psychiatric:  Positive for depression  Heme/Lymph/Imm:  Positive for allergies  Endocrine:  Negative diabetes    Physical Exam:  Vitals: /78   Pulse 84   Ht 1.715 m (5' 7.5\")   Wt 105.4 kg (232 lb 4.8 oz)   SpO2 96%   BMI 35.85 kg/m      Constitutional:  cooperative, alert and oriented, well developed, well nourished, in no acute distress        Skin:  warm and dry to the touch        Head:  normocephalic        Eyes:  pupils equal and round;conjunctivae and lids unremarkable;sclera white        ENT:  not assessed this visit        Neck:  no carotid bruit;carotid pulses are full and equal bilaterally JVP elevated;hepatojugular reflux      Chest:  clear to auscultation;normal symmetry;normal respiratory excursion        Cardiac: normal S1 and S2;no murmurs, gallops or rubs detected irregularly irregular rhythm distant heart sounds              Abdomen:    obese      Vascular:   pulses below the femoral arteries are diminished                               strong radial pulses, slighly diminshed dorsalis pedis and posterior tibia    Extremities and Back:  no deformities, clubbing, cyanosis, erythema observed        Neurological:  no gross motor deficits            PAST MEDICAL HISTORY:  Past Medical History:   Diagnosis Date     Actinic keratosis      Arthritis      Atrial fibrillation and flutter (H) 12/3/2018     CAD (coronary artery " disease)     2011 lateral ischemia on stress echo, 2014 normal stress echo     Coronary artery disease 2011    Abnormal stress test 2011 and controlled with medical mgmt      Dyslipidemia      Emphysema of lung (H)      Essential hypertension, benign      Hernia, abdominal      Hypersomnia with sleep apnea, unspecified     on bipap, partially treated with residual apneas     Hypertrophy (benign) of prostate     Biopsy  negative for cancer; PSA 5     Lumbago      Mumps      Prostate cancer (H) 12/15/2006     Skin cancer, basal cell 1997     Spider veins        PAST SURGICAL HISTORY:  Past Surgical History:   Procedure Laterality Date     HERNIA REPAIR  child     Moh's procedure for basal cell carcinoma  2001     PROSTATE SURGERY       s/p lumbar laminectomy NOS  1988     SIGMOIDOSCOPY FLEXIBLE N/A 6/15/2020    Procedure: SIGMOIDOSCOPY, FLEXIBLE;  Surgeon: Sunni Patton MD;  Location: [facility]     VITRECTOMY PARS PLANA REMOVE PRERETINAL MEMBRANE   3/09       FAMILY HISTORY:  Family History   Problem Relation Age of Onset     Alzheimer Disease Mother      Hypertension Mother      Cardiovascular Father         D:86 complications fo CHF     Prostate Cancer Brother      Lung Cancer Brother 76     Prostate Cancer Brother        SOCIAL HISTORY:  Social History     Socioeconomic History     Marital status:      Spouse name: None     Number of children: None     Years of education: None     Highest education level: None   Occupational History     Occupation: retired   Tobacco Use     Smoking status: Former Smoker     Packs/day: 1.00     Types: Cigarettes     Start date:      Quit date: 1978     Years since quittin.5     Smokeless tobacco: Never Used   Substance and Sexual Activity     Alcohol use: Never     Drug use: No     Sexual activity: Yes     Partners: Female   Other Topics Concern      Service Not Asked     Blood Transfusions Not Asked     Caffeine Concern No      Comment: 0-2 cans of soda per day.     Occupational Exposure Not Asked     Hobby Hazards Not Asked     Sleep Concern Not Asked     Stress Concern Not Asked     Weight Concern Not Asked     Special Diet Not Asked     Back Care Not Asked     Exercise No     Bike Helmet Not Asked     Seat Belt Yes     Self-Exams Not Asked     Parent/sibling w/ CABG, MI or angioplasty before 65F 55M? No   Social History Narrative     None     Social Determinants of Health     Financial Resource Strain:      Difficulty of Paying Living Expenses:    Food Insecurity:      Worried About Running Out of Food in the Last Year:      Ran Out of Food in the Last Year:    Transportation Needs:      Lack of Transportation (Medical):      Lack of Transportation (Non-Medical):    Physical Activity:      Days of Exercise per Week:      Minutes of Exercise per Session:    Stress:      Feeling of Stress :    Social Connections:      Frequency of Communication with Friends and Family:      Frequency of Social Gatherings with Friends and Family:      Attends Nondenominational Services:      Active Member of Clubs or Organizations:      Attends Club or Organization Meetings:      Marital Status:    Intimate Partner Violence:      Fear of Current or Ex-Partner:      Emotionally Abused:      Physically Abused:      Sexually Abused:                    Thank you for allowing me to participate in the care of your patient.      Sincerely,     Nimisha Bennett PA-C     [facility]  cc:   Nimisha Bennett PA-C  [address] [address]

## 2021-07-23 ENCOUNTER — MYC MEDICAL ADVICE (OUTPATIENT)
Dept: PEDIATRICS | Facility: CLINIC | Age: 86
End: 2021-07-23

## 2021-07-26 ENCOUNTER — TELEPHONE (OUTPATIENT)
Dept: CARDIOLOGY | Facility: CLINIC | Age: 86
End: 2021-07-26

## 2021-07-26 NOTE — TELEPHONE ENCOUNTER
Patient called inquiring if he should start taking his actonel.   Had recent bone density test ordered by PCP.  It was noted he was to continue this medication.  He reports he has never started it.  Discussed with patient that this is managed by his PCP.  Recommended he reach out to Dr Lewis for further instructions.  Patient provided verbal understanding.  BIANCA Gabriel

## 2021-07-27 ENCOUNTER — HOSPITAL ENCOUNTER (OUTPATIENT)
Dept: CARDIAC REHAB | Facility: CLINIC | Age: 86
End: 2021-07-27
Attending: INTERNAL MEDICINE
Payer: COMMERCIAL

## 2021-07-27 PROCEDURE — 93798 PHYS/QHP OP CAR RHAB W/ECG: CPT

## 2021-07-27 NOTE — TELEPHONE ENCOUNTER
See [employer] message  Called [facility] clinic ([phone]). Was advised by intake rep that they are not able to schedule for neuropsychology and this has to be done by the clinic coordinator, MA or RN at the clinic. Intake rep is sending a message to that team to reach out to patient again. Provided her with patient's daughter's phone number as best contact. Also advised ok to send message about scheduling via [employer].    PCP: Patient's daughter sent updated on Actonel:    By the way, my dad has not yet started the Actonel - we wanted him to do his DEXA scan first (done on ), and he's also had some recent changes in his cardiac meds, so he's going to wait to start Actonel until the first week of August (after his renal labs on  and his wife's  on ).    Thanks!  Replied to patient's [employer] message.  Rupinder ARTEAGA RN, BSN

## 2021-07-28 ENCOUNTER — TELEPHONE (OUTPATIENT)
Dept: UROLOGY | Facility: CLINIC | Age: 86
End: 2021-07-28

## 2021-07-28 DIAGNOSIS — C61 PROSTATE CANCER (H): Primary | ICD-10-CM

## 2021-07-28 NOTE — TELEPHONE ENCOUNTER
[facility] Call Center    Phone Message    May a detailed message be left on voicemail: yes     Reason for Call: Other: Pt would like a call back to see if he can get an order for PSA for the [facility] clinic as he will be getting a blood draw there tomrorrow     Action Taken: Message routed to:  [facility] Uro    Travel Screening: Not Applicable

## 2021-07-29 ENCOUNTER — LAB (OUTPATIENT)
Dept: LAB | Facility: CLINIC | Age: 86
End: 2021-07-29
Payer: COMMERCIAL

## 2021-07-29 ENCOUNTER — HOSPITAL ENCOUNTER (OUTPATIENT)
Dept: CARDIAC REHAB | Facility: CLINIC | Age: 86
End: 2021-07-29
Attending: INTERNAL MEDICINE
Payer: COMMERCIAL

## 2021-07-29 DIAGNOSIS — I10 BENIGN ESSENTIAL HYPERTENSION: ICD-10-CM

## 2021-07-29 DIAGNOSIS — N18.30 STAGE 3 CHRONIC KIDNEY DISEASE, UNSPECIFIED WHETHER STAGE 3A OR 3B CKD (H): ICD-10-CM

## 2021-07-29 DIAGNOSIS — R73.01 IMPAIRED FASTING GLUCOSE: ICD-10-CM

## 2021-07-29 DIAGNOSIS — C61 PROSTATE CANCER (H): ICD-10-CM

## 2021-07-29 DIAGNOSIS — E66.01 MORBID OBESITY (H): ICD-10-CM

## 2021-07-29 LAB — HBA1C MFR BLD: 5.8 % (ref 0–5.6)

## 2021-07-29 PROCEDURE — 83036 HEMOGLOBIN GLYCOSYLATED A1C: CPT

## 2021-07-29 PROCEDURE — 36415 COLL VENOUS BLD VENIPUNCTURE: CPT

## 2021-07-29 PROCEDURE — 84100 ASSAY OF PHOSPHORUS: CPT

## 2021-07-29 PROCEDURE — 93798 PHYS/QHP OP CAR RHAB W/ECG: CPT | Performed by: REHABILITATION PRACTITIONER

## 2021-07-29 PROCEDURE — 84153 ASSAY OF PSA TOTAL: CPT

## 2021-07-29 PROCEDURE — 83735 ASSAY OF MAGNESIUM: CPT

## 2021-07-29 PROCEDURE — 80048 BASIC METABOLIC PNL TOTAL CA: CPT

## 2021-07-30 ENCOUNTER — DOCUMENTATION ONLY (OUTPATIENT)
Dept: CARDIOLOGY | Facility: CLINIC | Age: 86
End: 2021-07-30

## 2021-07-30 DIAGNOSIS — I10 BENIGN ESSENTIAL HYPERTENSION: Primary | ICD-10-CM

## 2021-07-30 LAB
ANION GAP SERPL CALCULATED.3IONS-SCNC: 3 MMOL/L (ref 3–14)
BUN SERPL-MCNC: 24 MG/DL (ref 7–30)
CALCIUM SERPL-MCNC: 10.4 MG/DL (ref 8.5–10.1)
CHLORIDE BLD-SCNC: 110 MMOL/L (ref 94–109)
CO2 SERPL-SCNC: 27 MMOL/L (ref 20–32)
CREAT SERPL-MCNC: 1.42 MG/DL (ref 0.66–1.25)
GFR SERPL CREATININE-BSD FRML MDRD: 45 ML/MIN/1.73M2
GLUCOSE BLD-MCNC: 116 MG/DL (ref 70–99)
MAGNESIUM SERPL-MCNC: 2.2 MG/DL (ref 1.6–2.3)
PHOSPHATE SERPL-MCNC: 4.8 MG/DL (ref 2.5–4.5)
POTASSIUM BLD-SCNC: 4.4 MMOL/L (ref 3.4–5.3)
PSA SERPL-MCNC: <0.01 UG/L (ref 0–4)
SODIUM SERPL-SCNC: 140 MMOL/L (ref 133–144)

## 2021-07-30 NOTE — PROGRESS NOTES
Called Spencer to review BMP after starting spironolactone 12.5 mg daily and reducing torsemide from 10 to 5 mg daily on 7/22 (per pt/daughter request given urinary frequency).    Weight at my OV 7/22 was 232# (was 230# on 6/24).    1. What is weight?  2. How is breathing?  At our visit 7/22 noted no severe SOB or stopping over skyway, which he'd had to do in June)    Asked him to call back.    Component      Latest Ref Rng & Units 6/18/2021 7/29/2021   Sodium      133 - 144 mmol/L 142 140   Potassium      3.4 - 5.3 mmol/L 4.4 4.4   Chloride      94 - 109 mmol/L 109 110 (H)   Carbon Dioxide      20 - 32 mmol/L 29 27   Anion Gap      3 - 14 mmol/L 4 3   Glucose      70 - 99 mg/dL 119 (H) 116 (H)   Urea Nitrogen      7 - 30 mg/dL 28 24   Creatinine      0.66 - 1.25 mg/dL 1.44 (H) 1.42 (H)   GFR Estimate      >60 mL/min/1.73m2 44 (L) 45 (L)   GFR Estimate If Black      >60 mL/min/1.73:m2 51 (L)    Calcium      8.5 - 10.1 mg/dL 9.5 10.4 (H)

## 2021-08-02 NOTE — CONFIDENTIAL NOTE
Can you please follow-up with neurpsych to ensure this is scheduled? I see an encounter from 7/27, but I don't think they have been in contact to schedule

## 2021-08-02 NOTE — TELEPHONE ENCOUNTER
The pt read the my chart message that was sent to him on 7/27/21 on 7/28/21.     I also left him a number to call the clinic back to see if he needed help getting this scheduled.     Candice Morocho on 8/2/2021 at 9:15 AM

## 2021-08-03 ENCOUNTER — HOSPITAL ENCOUNTER (OUTPATIENT)
Dept: CARDIAC REHAB | Facility: CLINIC | Age: 86
End: 2021-08-03
Attending: INTERNAL MEDICINE
Payer: COMMERCIAL

## 2021-08-03 DIAGNOSIS — E83.52 HYPERCALCEMIA: Primary | ICD-10-CM

## 2021-08-03 PROCEDURE — 93798 PHYS/QHP OP CAR RHAB W/ECG: CPT | Performed by: OCCUPATIONAL THERAPIST

## 2021-08-03 NOTE — PROGRESS NOTES
Patient called with update and reports feeling well.  Weight fluctuates no more than 2 pounds from baseline weight of 226#.  Breathing is the same as it was at OV on 7/22.  Has not used inhaler.  Patient will call if he has any further issues.  Will update JOSE DANIEL Guallpa.  BIANCA Gabriel

## 2021-08-05 ENCOUNTER — HOSPITAL ENCOUNTER (OUTPATIENT)
Dept: CARDIAC REHAB | Facility: CLINIC | Age: 86
End: 2021-08-05
Attending: INTERNAL MEDICINE
Payer: COMMERCIAL

## 2021-08-05 PROCEDURE — 93798 PHYS/QHP OP CAR RHAB W/ECG: CPT

## 2021-08-05 RX ORDER — RISEDRONATE SODIUM 30 MG/1
30 TABLET, FILM COATED ORAL
COMMUNITY
Start: 2021-08-05 | End: 2021-10-22

## 2021-08-05 NOTE — TELEPHONE ENCOUNTER
Actonel added to med list as historical.  It sounds like Candice has reached out to them regarding the neuropsych scheduling issue. Will close encounter and can re-open if needed

## 2021-08-11 ENCOUNTER — HOSPITAL ENCOUNTER (OUTPATIENT)
Dept: CARDIAC REHAB | Facility: CLINIC | Age: 86
End: 2021-08-11
Attending: INTERNAL MEDICINE
Payer: COMMERCIAL

## 2021-08-11 PROCEDURE — 93798 PHYS/QHP OP CAR RHAB W/ECG: CPT | Performed by: REHABILITATION PRACTITIONER

## 2021-08-13 ENCOUNTER — HOSPITAL ENCOUNTER (OUTPATIENT)
Dept: CARDIAC REHAB | Facility: CLINIC | Age: 86
End: 2021-08-13
Attending: INTERNAL MEDICINE
Payer: COMMERCIAL

## 2021-08-13 PROCEDURE — 93798 PHYS/QHP OP CAR RHAB W/ECG: CPT

## 2021-08-17 ENCOUNTER — HOSPITAL ENCOUNTER (OUTPATIENT)
Dept: CARDIAC REHAB | Facility: CLINIC | Age: 86
End: 2021-08-17
Attending: INTERNAL MEDICINE
Payer: COMMERCIAL

## 2021-08-17 ENCOUNTER — APPOINTMENT (OUTPATIENT)
Dept: GENERAL RADIOLOGY | Facility: CLINIC | Age: 86
End: 2021-08-17
Attending: EMERGENCY MEDICINE
Payer: COMMERCIAL

## 2021-08-17 ENCOUNTER — HOSPITAL ENCOUNTER (EMERGENCY)
Facility: CLINIC | Age: 86
Discharge: HOME | End: 2021-08-17
Attending: EMERGENCY MEDICINE | Admitting: EMERGENCY MEDICINE
Payer: COMMERCIAL

## 2021-08-17 VITALS
OXYGEN SATURATION: 94 % | DIASTOLIC BLOOD PRESSURE: 73 MMHG | SYSTOLIC BLOOD PRESSURE: 117 MMHG | TEMPERATURE: 97.7 F | RESPIRATION RATE: 24 BRPM | HEART RATE: 66 BPM

## 2021-08-17 DIAGNOSIS — R06.09 DOE (DYSPNEA ON EXERTION): ICD-10-CM

## 2021-08-17 DIAGNOSIS — N18.30 STAGE 3 CHRONIC KIDNEY DISEASE, UNSPECIFIED WHETHER STAGE 3A OR 3B CKD (H): ICD-10-CM

## 2021-08-17 DIAGNOSIS — Z79.01 ANTICOAGULATED: ICD-10-CM

## 2021-08-17 LAB
ANION GAP SERPL CALCULATED.3IONS-SCNC: 5 MMOL/L (ref 3–14)
ATRIAL RATE - MUSE: 267 BPM
BASOPHILS # BLD AUTO: 0.1 10E3/UL (ref 0–0.2)
BASOPHILS NFR BLD AUTO: 1 %
BUN SERPL-MCNC: 31 MG/DL (ref 7–30)
CALCIUM SERPL-MCNC: 9.1 MG/DL (ref 8.5–10.1)
CHLORIDE BLD-SCNC: 111 MMOL/L (ref 94–109)
CO2 SERPL-SCNC: 23 MMOL/L (ref 20–32)
CREAT SERPL-MCNC: 1.53 MG/DL (ref 0.66–1.25)
DIASTOLIC BLOOD PRESSURE - MUSE: NORMAL MMHG
EOSINOPHIL # BLD AUTO: 0.3 10E3/UL (ref 0–0.7)
EOSINOPHIL NFR BLD AUTO: 6 %
ERYTHROCYTE [DISTWIDTH] IN BLOOD BY AUTOMATED COUNT: 14.6 % (ref 10–15)
GFR SERPL CREATININE-BSD FRML MDRD: 41 ML/MIN/1.73M2
GLUCOSE BLD-MCNC: 144 MG/DL (ref 70–99)
HCT VFR BLD AUTO: 40.7 % (ref 40–53)
HGB BLD-MCNC: 12.9 G/DL (ref 13.3–17.7)
IMM GRANULOCYTES # BLD: 0 10E3/UL
IMM GRANULOCYTES NFR BLD: 0 %
INTERPRETATION ECG - MUSE: NORMAL
LYMPHOCYTES # BLD AUTO: 1.7 10E3/UL (ref 0.8–5.3)
LYMPHOCYTES NFR BLD AUTO: 29 %
MCH RBC QN AUTO: 29.2 PG (ref 26.5–33)
MCHC RBC AUTO-ENTMCNC: 31.7 G/DL (ref 31.5–36.5)
MCV RBC AUTO: 92 FL (ref 78–100)
MONOCYTES # BLD AUTO: 0.5 10E3/UL (ref 0–1.3)
MONOCYTES NFR BLD AUTO: 9 %
NEUTROPHILS # BLD AUTO: 3.3 10E3/UL (ref 1.6–8.3)
NEUTROPHILS NFR BLD AUTO: 55 %
NRBC # BLD AUTO: 0 10E3/UL
NRBC BLD AUTO-RTO: 0 /100
NT-PROBNP SERPL-MCNC: 723 PG/ML (ref 0–1800)
P AXIS - MUSE: -84 DEGREES
PLATELET # BLD AUTO: 202 10E3/UL (ref 150–450)
POTASSIUM BLD-SCNC: 4.6 MMOL/L (ref 3.4–5.3)
PR INTERVAL - MUSE: NORMAL MS
QRS DURATION - MUSE: 76 MS
QT - MUSE: 422 MS
QTC - MUSE: 381 MS
R AXIS - MUSE: 4 DEGREES
RBC # BLD AUTO: 4.42 10E6/UL (ref 4.4–5.9)
SODIUM SERPL-SCNC: 139 MMOL/L (ref 133–144)
SYSTOLIC BLOOD PRESSURE - MUSE: NORMAL MMHG
T AXIS - MUSE: 34 DEGREES
TROPONIN I SERPL-MCNC: <0.015 UG/L (ref 0–0.04)
VENTRICULAR RATE- MUSE: 49 BPM
WBC # BLD AUTO: 5.9 10E3/UL (ref 4–11)

## 2021-08-17 PROCEDURE — 93798 PHYS/QHP OP CAR RHAB W/ECG: CPT

## 2021-08-17 PROCEDURE — 80069 RENAL FUNCTION PANEL: CPT | Performed by: EMERGENCY MEDICINE

## 2021-08-17 PROCEDURE — 258N000003 HC RX IP 258 OP 636: Performed by: EMERGENCY MEDICINE

## 2021-08-17 PROCEDURE — 99285 EMERGENCY DEPT VISIT HI MDM: CPT | Mod: 25

## 2021-08-17 PROCEDURE — 84100 ASSAY OF PHOSPHORUS: CPT

## 2021-08-17 PROCEDURE — 36415 COLL VENOUS BLD VENIPUNCTURE: CPT | Performed by: EMERGENCY MEDICINE

## 2021-08-17 PROCEDURE — 83880 ASSAY OF NATRIURETIC PEPTIDE: CPT | Performed by: EMERGENCY MEDICINE

## 2021-08-17 PROCEDURE — 85025 COMPLETE CBC W/AUTO DIFF WBC: CPT | Performed by: EMERGENCY MEDICINE

## 2021-08-17 PROCEDURE — 96360 HYDRATION IV INFUSION INIT: CPT | Mod: 59

## 2021-08-17 PROCEDURE — 71046 X-RAY EXAM CHEST 2 VIEWS: CPT

## 2021-08-17 PROCEDURE — 84484 ASSAY OF TROPONIN QUANT: CPT | Performed by: EMERGENCY MEDICINE

## 2021-08-17 PROCEDURE — 93005 ELECTROCARDIOGRAM TRACING: CPT | Mod: 59

## 2021-08-17 RX ADMIN — SODIUM CHLORIDE 500 ML: 9 INJECTION, SOLUTION INTRAVENOUS at 15:00

## 2021-08-17 ASSESSMENT — ENCOUNTER SYMPTOMS
COUGH: 0
FEVER: 0
VOMITING: 0
DIARRHEA: 0
LIGHT-HEADEDNESS: 0

## 2021-08-17 NOTE — DISCHARGE INSTRUCTIONS
You may have been a little dehydrated which caused your low blood pressure.  It is certainly reasonable to discuss your torsemide and spironolactone dosing with your cardiologist.  Your heart rate was low as well but this has normalized.  I would not make change to your metoprolol based off today's visit, but this should be readdressed with cardiology as well.  You have any return of shortness of breath, chest pain, palpitations, or other concerns you need to return immediately to the emergency department or see a provider to check your blood pressure and heart rate.  Discuss with cardiology and primary care if you require referral to nephrology.

## 2021-08-17 NOTE — ED PROVIDER NOTES
History   Chief Complaint:  Shortness of Breath     The history is provided by the patient.      Nixon More is an 85 year old male with history of atrial fibrillation on Eliquis and diastolic CHF who presents with shortness of breath. Spencer noticed he was more short of breath with exertion this morning when walking to his car. He went to his cardiac rehab appointment and needed to lean on the counter for support due to this dyspnea. He walked on the treadmill but used a speed of 1.5 rather than his typical 1.8. He then had to sit down after walking to catch his breath. His BP was checked and found to be low at 88/60 (typical is 125/80 when he measures at home) and this prompted his visit. He denies any lightheadedness, chest pain, fever, cough, vomiting, or diarrhea. He also denies any recent change in medications or fluctuation in weight. He has no dyspnea or other symptoms at rest during interview.    Echocardiogram w contrast from [facility] visit on 06/18/2021, read on 07/19/2021:  1. The left ventricle is normal in size. The visual ejection fraction is  estimated at 55%.  2. The right ventricle is normal in structure, function and size.  3. No valve disease.  4. The ascending aorta is Mildly dilated. 4.2cm.    Review of Systems   Constitutional: Negative for fever and unexpected weight change.   Respiratory: Positive for shortness of breath (with exertion). Negative for cough.    Cardiovascular: Negative for chest pain.   Gastrointestinal: Negative for diarrhea and vomiting.   Neurological: Negative for light-headedness.   All other systems reviewed and are negative.    Allergies:  Augmentin  Bactrim  Bee Venom  Sulfa Drugs  Celebrex  Lisinopril  Naproxen    Medications:  Eliquis   Lipitor  Diltiazem  Lexapro  Imodium  Cozaar  Toprol  Aldactone  Demadex    Past Medical History:    Actinic keratosis  Asthma   Arthritis  Atrial fibrillation  CAD  Lung emphysema  Hypertension  Abdominal  hernia  Benign prostate hypertrophy  Lumbago  Mumps  Prostate cancer  Skin cancer, basal cell  Chronic diastolic heart failure  Depression  Obesity  Chronic kidney disease  Spinal stenosis, lumbar region    Past Surgical History:    Hernia repair  Moh's procedure  Prostate surgery  Lumbar laminectomy  Sigmoidoscopy  Vitrectomy     Family History:    Alzheimer disease, mother  Hypertension, mother  CHF, father  Prostate cancer, brother  Lung cancer, brother    Social History:  PCP: Natalya Lewis  Presents to the ED alone. His daughter, Amber, is involved in care.    Physical Exam     Patient Vitals for the past 24 hrs:   BP Temp Temp src Pulse Resp SpO2   08/17/21 1730 117/73 -- -- 66 24 94 %   08/17/21 1715 118/71 -- -- 65 10 96 %   08/17/21 1700 106/74 -- -- 65 23 94 %   08/17/21 1645 116/74 -- -- 65 11 94 %   08/17/21 1600 104/72 -- -- 66 11 96 %   08/17/21 1545 97/68 -- -- 66 10 94 %   08/17/21 1530 102/69 -- -- 65 30 94 %   08/17/21 1515 98/66 -- -- 66 -- 96 %   08/17/21 1500 90/60 -- -- 64 -- 97 %   08/17/21 1445 99/67 97.7  F (36.5  C) Oral 64 18 95 %   08/17/21 1430 (!) 88/64 -- -- 58 25 97 %   08/17/21 1415 (!) 86/54 -- -- (!) 48 10 98 %   08/17/21 1400 97/67 -- -- -- 17 --   08/17/21 1347 (!) 86/50 -- -- -- -- --   08/17/21 1345 -- (!) 95.7  F (35.4  C) Temporal (!) 42 16 100 %     Physical Exam  General: Well-developed and well-nourished. Well appearing elderly  man. Cooperative.  Head:  Atraumatic.  Eyes:  Conjunctivae, lids, and sclerae are normal.  Neck:  Supple. Normal range of motion.  CV:  Bradycardic rate and irregularly irregular rhythm. Normal heart sounds with no murmurs, rubs, or gallops detected.  Resp:  No respiratory distress. Clear to auscultation bilaterally without decreased breath sounds, wheezing, rales, or rhonchi.  GI:  Soft. Non-distended. Non-tender.    MS:  Normal ROM. No bilateral lower extremity edema with compression stockings in place.  Skin:  Warm. Non-diaphoretic. No  pallor.  Neuro:  Awake. A&Ox3. Normal strength.  Psych: Normal mood and affect. Normal speech.  Vitals reviewed.    Emergency Department Course     EKG  Time: 1359  Rate 49 bpm. QRS duration 76. QT/QTc 422/381.   Atrial flutter with variable AV block  Low voltage QRS  Inferior infarct, age undetermined  Abnormal ECG   No acute ST changes.  No significant change as compared to prior, dated 06/24/2021.     Imaging:  Chest  XR:  PA and lateral views of the chest were obtained. Stable   mild enlargement of the cardiac silhouette. Atherosclerotic vascular   calcification of the aortic knob. Mild basilar pulmonary opacities,   likely atelectasis. No significant pleural effusion or pneumothorax.     Reading per radiology     Laboratory:  CBC: WBC 5.9, HGB 12.9(L),    BMP: Glucose 144(H), Chloride 111(H), Urea Nitrogen 31(H), GFR 41(L), o/w WNL (Creatinine: 1.53(H))    Troponin (Collected 1407): <0.015  BNP: 723    Emergency Department Course:  Reviewed:  I reviewed the patient's nursing notes, vitals, and past medical records.     Assessments:  1403 I performed an assessment and examination of the patient as noted above.      1706 I rechecked the patient. He ambulated here and was not short of breath and had no SpO2 reading below 90%. He and I talked to his daughter on the phone, and they both feel comfortable with discharge. Findings and plan explained to the patient and daughter. Patient discharged home with instructions regarding supportive care, medications, and reasons to return. The importance of close follow-up was reviewed.    Interventions:  1500  mL IV Bolus     Disposition:  The patient was discharged home.     Impression & Plan   Medical Decision Making:  Spencer is an 85 year old man with a history of atrial fibrillation/flutter on Eliquis as well as CHF on torsemide and spironolactone who presents with exertional dyspnea starting this morning.  He went to cardiac rehab and they noted him to be  bradycardic with hypotension to the 80s.  Upon arrival he has no symptoms at rest.  He is initially hypotensive to the 80s but this responded quite rapidly to IV fluids.  His EKG reveals atrial flutter with bradycardic rate at 40s.  This also improved with IV fluids.  His EKG is similar to prior and there are no ischemic changes.  Troponin is undetectable.  He does not appear volume overloaded on exam with BNP of 723 and chest x-ray without pulmonary edema or pleural effusion.  I have suspicion that his hypotension was related to relative volume depletion.  There is no evidence of pneumonia or other infection with no leukocytosis.  Creatinine appears to be slowly trending upwards at 1.53 today.  BUN of 31 may be representative of dehydration.  After the fluids and resolution of his abnormal vital signs, Spencer ambulated in the emergency department.  He did not have any lightheadedness or shortness of breath and is comfortable with plan for discharge.  I went over the results with Spencer and his daughter, Amber, by phone.  They verbalized understanding of the importance of following up to ensure he is improving.  His daughter notes there have been some medication changes last month and wonders if these may be contributing.  I have certainly recommended she discuss these with the cardiologist and make sure he does not need dose adjustments.  She will talk with his primary care provider regarding if he requires referral to nephrology given his slowly trending upward creatinine although this is likely tied to his volume status.  Spencer understands the importance of returning to the emergency department if he has recurrent symptoms like today or any new concerns.  All of his questions were answered and he verbalized understanding. Amenable to discharge.     Covid-19  Nixon RILEY Argenis was evaluated during a global COVID-19 pandemic, which necessitated consideration that the patient might be at risk for infection with the SARS-CoV-2  virus that causes COVID-19.   Applicable protocols for evaluation were followed during the patient's care.     Diagnosis:    ICD-10-CM    1. GALDAMEZ (dyspnea on exertion)  R06.00    2. Anticoagulated  Z79.01      Scribe Disclosure:  I, Erika Malcolm, am serving as a scribe at 2:03 PM on 8/17/2021 to document services personally performed by Sierra Toledo MD based on my observations and the provider's statements to me.       Sierra Toledo MD  08/19/21 1108

## 2021-08-17 NOTE — ED NOTES
Bed: ED16  Expected date:   Expected time:   Means of arrival:   Comments:  Cardiac rehab pt in triage

## 2021-08-17 NOTE — ED TRIAGE NOTES
Pt presents to ED from cardiac rehab appt. Pt states that he became short of breath at his appt today and his BP started out fine, but became low with walking on the treadmill (going 0.5mph for 8 minutes). Pt reports that he normally does not become sob with walking. BP taken at cardiac rehab was 82/60. Hx NSTEMI. ABC intact.     Rhythm strip brought over from cardiac rehab on chart - heart rate lower than normal in the 40's today.

## 2021-08-18 ENCOUNTER — MYC MEDICAL ADVICE (OUTPATIENT)
Dept: PEDIATRICS | Facility: CLINIC | Age: 86
End: 2021-08-18

## 2021-08-18 DIAGNOSIS — N18.30 STAGE 3 CHRONIC KIDNEY DISEASE, UNSPECIFIED WHETHER STAGE 3A OR 3B CKD (H): Primary | ICD-10-CM

## 2021-08-19 ENCOUNTER — HOSPITAL ENCOUNTER (OUTPATIENT)
Dept: CARDIAC REHAB | Facility: CLINIC | Age: 86
End: 2021-08-19
Attending: INTERNAL MEDICINE
Payer: COMMERCIAL

## 2021-08-19 LAB
ALBUMIN SERPL-MCNC: 3.5 G/DL (ref 3.4–5)
ALP SERPL-CCNC: 64 U/L (ref 40–150)
ALT SERPL W P-5'-P-CCNC: 32 U/L (ref 0–70)
ANION GAP SERPL CALCULATED.3IONS-SCNC: 9 MMOL/L (ref 3–14)
AST SERPL W P-5'-P-CCNC: 34 U/L (ref 0–45)
BILIRUB SERPL-MCNC: 0.8 MG/DL (ref 0.2–1.3)
BUN SERPL-MCNC: 32 MG/DL (ref 7–30)
CALCIUM SERPL-MCNC: 9.3 MG/DL (ref 8.5–10.1)
CHLORIDE BLD-SCNC: 111 MMOL/L (ref 94–109)
CO2 SERPL-SCNC: 21 MMOL/L (ref 20–32)
CREAT SERPL-MCNC: 1.53 MG/DL (ref 0.66–1.25)
GFR SERPL CREATININE-BSD FRML MDRD: 41 ML/MIN/1.73M2
GLUCOSE BLD-MCNC: 134 MG/DL (ref 70–99)
PHOSPHATE SERPL-MCNC: 4.3 MG/DL (ref 2.5–4.5)
POTASSIUM BLD-SCNC: 5 MMOL/L (ref 3.4–5.3)
PROT SERPL-MCNC: 7.3 G/DL (ref 6.8–8.8)
SODIUM SERPL-SCNC: 141 MMOL/L (ref 133–144)

## 2021-08-19 PROCEDURE — 93798 PHYS/QHP OP CAR RHAB W/ECG: CPT | Performed by: REHABILITATION PRACTITIONER

## 2021-08-19 ASSESSMENT — ENCOUNTER SYMPTOMS
SHORTNESS OF BREATH: 1
UNEXPECTED WEIGHT CHANGE: 0

## 2021-08-19 NOTE — TELEPHONE ENCOUNTER
I spoke with the pt's daughter and the lab appointment and appt with Dr. Lewis have been made.   Candice Morocho on 8/19/2021 at 1:12 PM

## 2021-08-19 NOTE — TELEPHONE ENCOUNTER
TC: Please call patient's daughter at [phone] to schedule patient for lab appointment next week and follow-up visit with Dr. Lewis.  Thanks!  Rupinder ARTEAGA RN, BSN

## 2021-08-19 NOTE — TELEPHONE ENCOUNTER
Please review patient mychart message and advise. Patient's daughter requesting the following lab orders:            ionized calcium            CMP            serum albumin (included in CMP)            alk phos (included in CMP)            repeat urine albumin/creatinine ratio            repeat phosphorus            repeat PTH (already ordered)    She would like to have her father evaluated for adynamic bone disease/renaly osteodystrophy. Please advise.    Thanks!  Rupinder ARTEAGA RN, BSN

## 2021-08-23 ENCOUNTER — MYC MEDICAL ADVICE (OUTPATIENT)
Dept: PEDIATRICS | Facility: CLINIC | Age: 86
End: 2021-08-23

## 2021-08-23 ENCOUNTER — TELEPHONE (OUTPATIENT)
Dept: ENDOCRINOLOGY | Facility: CLINIC | Age: 86
End: 2021-08-23

## 2021-08-23 DIAGNOSIS — R06.09 DYSPNEA ON EXERTION: Primary | ICD-10-CM

## 2021-08-23 NOTE — TELEPHONE ENCOUNTER
lvm to schedu;e 4 week follow up with Valeria Mckeon. Left call center phone number and sent [employer].

## 2021-08-24 ENCOUNTER — HOSPITAL ENCOUNTER (OUTPATIENT)
Dept: CARDIAC REHAB | Facility: CLINIC | Age: 86
End: 2021-08-24
Attending: INTERNAL MEDICINE
Payer: COMMERCIAL

## 2021-08-24 PROCEDURE — 93798 PHYS/QHP OP CAR RHAB W/ECG: CPT

## 2021-08-24 NOTE — TELEPHONE ENCOUNTER
Lab orders signed  Please contact lab about cancelling the labs that were added on to blood from ED. They won't be able to delete data from chart but can prevent an additional charge for it

## 2021-08-24 NOTE — TELEPHONE ENCOUNTER
Called [facility]. Advised of issue running labs too soon for the CMP and Phosphorus labs. Was advised that the reimbursement can be done by the lab supervisor. Was transferred to supervisor's voicemail. Left message to call back.    Sent [employer] message to patient.    Rupinder ARTEAGA RN, BSN

## 2021-08-24 NOTE — TELEPHONE ENCOUNTER
Received call back from manager at [facility] [employer]. They credited back lab charges for patient. Sent [employer] message to patient.  Rupinder ARTEAGA RN, BSN

## 2021-08-24 NOTE — TELEPHONE ENCOUNTER
Please review patient [employer] message and advise. Patient was seen in ED on 8/17/21. Patient's daughter then sent [employer] message requesting labs. Labs were pended on 8/18/21. Two of the pended labs were run from blood at the ED. Patient's daughter is requesting that those labs be re-ordered since they were run too soon and that they be credited back for the labs run on 8/17/21. Please advise.      Thanks!  Rupinder ARTEAGA RN, BSN

## 2021-08-25 ENCOUNTER — LAB (OUTPATIENT)
Dept: LAB | Facility: CLINIC | Age: 86
End: 2021-08-25
Payer: COMMERCIAL

## 2021-08-25 DIAGNOSIS — N18.30 STAGE 3 CHRONIC KIDNEY DISEASE, UNSPECIFIED WHETHER STAGE 3A OR 3B CKD (H): ICD-10-CM

## 2021-08-25 DIAGNOSIS — R06.09 DYSPNEA ON EXERTION: ICD-10-CM

## 2021-08-25 DIAGNOSIS — E83.52 HYPERCALCEMIA: ICD-10-CM

## 2021-08-25 LAB
CA-I BLD-MCNC: 5 MG/DL (ref 4.4–5.2)
PTH-INTACT SERPL-MCNC: 38 PG/ML (ref 18–80)

## 2021-08-25 PROCEDURE — 82043 UR ALBUMIN QUANTITATIVE: CPT

## 2021-08-25 PROCEDURE — 83970 ASSAY OF PARATHORMONE: CPT

## 2021-08-25 PROCEDURE — 80053 COMPREHEN METABOLIC PANEL: CPT

## 2021-08-25 PROCEDURE — 84100 ASSAY OF PHOSPHORUS: CPT

## 2021-08-25 PROCEDURE — 36415 COLL VENOUS BLD VENIPUNCTURE: CPT

## 2021-08-25 PROCEDURE — 82330 ASSAY OF CALCIUM: CPT

## 2021-08-26 ENCOUNTER — HOSPITAL ENCOUNTER (OUTPATIENT)
Dept: CARDIAC REHAB | Facility: CLINIC | Age: 86
End: 2021-08-26
Attending: INTERNAL MEDICINE
Payer: COMMERCIAL

## 2021-08-26 ENCOUNTER — VIRTUAL VISIT (OUTPATIENT)
Dept: UROLOGY | Facility: CLINIC | Age: 86
End: 2021-08-26
Payer: COMMERCIAL

## 2021-08-26 ENCOUNTER — TELEPHONE (OUTPATIENT)
Dept: UROLOGY | Facility: CLINIC | Age: 86
End: 2021-08-26

## 2021-08-26 VITALS — BODY MASS INDEX: 35.16 KG/M2 | WEIGHT: 232 LBS | HEIGHT: 68 IN

## 2021-08-26 DIAGNOSIS — C61 PROSTATE CANCER (H): Primary | ICD-10-CM

## 2021-08-26 LAB
ALBUMIN SERPL-MCNC: 3.4 G/DL (ref 3.4–5)
ALP SERPL-CCNC: 64 U/L (ref 40–150)
ALT SERPL W P-5'-P-CCNC: 31 U/L (ref 0–70)
ANION GAP SERPL CALCULATED.3IONS-SCNC: 3 MMOL/L (ref 3–14)
AST SERPL W P-5'-P-CCNC: 31 U/L (ref 0–45)
BILIRUB SERPL-MCNC: 0.7 MG/DL (ref 0.2–1.3)
BUN SERPL-MCNC: 28 MG/DL (ref 7–30)
CALCIUM SERPL-MCNC: 9.1 MG/DL (ref 8.5–10.1)
CHLORIDE BLD-SCNC: 112 MMOL/L (ref 94–109)
CO2 SERPL-SCNC: 25 MMOL/L (ref 20–32)
CREAT SERPL-MCNC: 1.29 MG/DL (ref 0.66–1.25)
GFR SERPL CREATININE-BSD FRML MDRD: 51 ML/MIN/1.73M2
GLUCOSE BLD-MCNC: 103 MG/DL (ref 70–99)
PHOSPHATE SERPL-MCNC: 4.1 MG/DL (ref 2.5–4.5)
POTASSIUM BLD-SCNC: 4.8 MMOL/L (ref 3.4–5.3)
PROT SERPL-MCNC: 7.3 G/DL (ref 6.8–8.8)
SODIUM SERPL-SCNC: 140 MMOL/L (ref 133–144)

## 2021-08-26 PROCEDURE — 99213 OFFICE O/P EST LOW 20 MIN: CPT | Mod: 95 | Performed by: PHYSICIAN ASSISTANT

## 2021-08-26 PROCEDURE — 93798 PHYS/QHP OP CAR RHAB W/ECG: CPT

## 2021-08-26 ASSESSMENT — ENCOUNTER SYMPTOMS
CHILLS: 0
DYSURIA: 0
FLANK PAIN: 0
SHORTNESS OF BREATH: 1
FEVER: 0
VOMITING: 0
FATIGUE: 0
HEMATURIA: 0
NAUSEA: 0
APPETITE CHANGE: 0
UNEXPECTED WEIGHT CHANGE: 0

## 2021-08-26 ASSESSMENT — MIFFLIN-ST. JEOR: SCORE: 1713.91

## 2021-08-26 ASSESSMENT — PAIN SCALES - GENERAL: PAINLEVEL: MILD PAIN (2)

## 2021-08-26 NOTE — TELEPHONE ENCOUNTER
----- Message from Rand Martínez sent at 8/26/2021  2:41 PM CDT -----  PSA and return both in 6 months.    [facility]

## 2021-08-26 NOTE — PROGRESS NOTES
Subjective      CHIEF COMPLAINT/REASON FOR VISIT   Patient of Dr. Pino's seen on my calendar  Prostate cancer recheck    HISTORY OF PRESENT ILLNESS   Mr. More is a very pleasant 85 year old gentleman, who presents today for prostate cancer recheck.  He was last seen by Dr. Pino on 02/17/21.      Jose 4+3=7 Prostate cancer and hematuria  Intermittent hormonal therapy, prior radiotherapy 2007, recurrence 2009, negative hematuria workup 2018     His last Lupron injection was 2 years ago, and his PSA has been undetectable. Plan is to only give him hormone therapy if PSA would rise. Following on a 6 month basis.    Since last time we saw him, patient has been doing well.  He is going through cardiac rehab.  Last week he did have an episode there where they sent him to the emergency department.  Patient was found to be dehydrated.  He does note some occasional shortness of breath otherwise doing well.    Denies any hematuria or dysuria.  He does have a history of hematuria.  He does note that he has urinary urgency and some occasional urge incontinence particularly in the overnight hours.  Patient was recently switched from a loop diuretic to a potassium sparing diuretic in the form of spironolactone.  He does note that this is contributing to his symptomatology.    The following portions of the patient's history were reviewed and updated as appropriate: allergies, current medications, past family history, past medical history, past social history, past surgical history, and problem list.     REVIEW OF SYSTEMS   Review of Systems   Constitutional: Negative for appetite change, chills, fatigue, fever and unexpected weight change.   Respiratory: Positive for shortness of breath (Occasionally).    Cardiovascular: Negative for chest pain.   Gastrointestinal: Negative for nausea and vomiting.   Genitourinary: Positive for urgency. Negative for dysuria, flank pain and hematuria.      Per HPI.     Patient Active  Problem List   Diagnosis     Personal history of other malignant neoplasm of skin     Lumbago     Hypertrophy of prostate without urinary obstruction     Other emphysema (H)     Hypersomnia with sleep apnea     Malignant neoplasm of prostate     Impotence of organic origin     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, lower limb     CARDIOVASCULAR SCREENING; LDL GOAL LESS THAN 100     Vitamin D deficiency     Branch retinal vein occlusion     Bee sting-induced anaphylaxis     Advance Care Planning     Radiation proctitis     Coronary artery disease     Impaired fasting glucose     Health Care Home     Dyslipidemia     Benign essential hypertension     Spinal stenosis of lumbar region with neurogenic claudication     Dysthymia     Atrial fibrillation and flutter (H)     Dilated aortic root (H)     Generalized muscle weakness     Obesity (BMI 35.0-39.9) with comorbidity (H)     CKD (chronic kidney disease) stage 3, GFR 30-59 ml/min     Mild major depression (H)     Angina pectoris (H)     Chronic diastolic heart failure (H)      Past Medical History:   Diagnosis Date     Actinic keratosis      Arthritis      Atrial fibrillation and flutter (H) 12/3/2018     CAD (coronary artery disease)     1/5/2011 lateral ischemia on stress echo, 12/2014 normal stress echo     Coronary artery disease 1/1/2011    Abnormal stress test 1/2011 and controlled with medical mgmt      Dyslipidemia      Emphysema of lung (H)      Essential hypertension, benign      Hernia, abdominal      Hypersomnia with sleep apnea, unspecified     on bipap, partially treated with residual apneas     Hypertrophy (benign) of prostate 5/01    Biopsy 5/01 negative for cancer; PSA 5     Lumbago      Mumps      Prostate cancer (H) 12/15/2006     Skin cancer, basal cell 1997     Spider veins       Objective      PHYSICAL EXAM   There were no vitals taken for this visit.   GENERAL: Healthy, alert and no distress  EYES: Eyes grossly normal to inspection.  No discharge or  erythema, or obvious scleral/conjunctival abnormalities.  HENT: Normal cephalic/atraumatic.  External ears, nose and mouth without ulcers or lesions.  No nasal drainage visible.  NECK: No asymmetry, visible masses or scars  RESP: No audible wheeze, cough, or visible cyanosis.  No visible retractions or increased work of breathing.    MS: No gross musculoskeletal defects noted.  Normal range of motion.  No visible edema.  SKIN: Visible skin clear. No significant rash, abnormal pigmentation or lesions.  NEURO: Cranial nerves grossly intact.  Mentation and speech appropriate for age.  PSYCH: Mentation appears normal, affect normal/bright, judgement and insight intact, normal speech and appearance well-groomed.    LABORATORY     Lab Results   Component Value Date    PSA <0.01 07/29/2021    PSA <0.01 05/17/2021      Recent Labs   Lab Test 01/12/21  1312 12/23/19  1518   COLOR Yellow Yellow   APPEARANCE Clear Clear   URINEGLC Negative Negative   URINEBILI Negative Negative   URINEKETONE Negative Negative   SG 1.020 1.015   UBLD Negative Moderate*   URINEPH 6.5 6.5   PROTEIN Negative Negative   UROBILINOGEN 0.2 0.2   NITRITE Negative Negative   LEUKEST Negative Small*   RBCU  --  O - 2   WBCU  --  5-10*     Assessment & Plan    1. Prostate cancer (H)        I had the pleasure today meeting with Mr. More to discuss follow-up for his prostate cancer.  I am pleased to inform him his PSA remains undetectable at less than 0.01.  This is great news.    -Recommendation would be for follow-up in 6 months with a PSA and return visit.  Would consider intermittent Lupron if PSA becomes detectable.    In regards to his urinary incontinence, patient notes that this is somewhat attributed to his diuretic.  He does endorse urgency with some urge incontinence.  He is okay with where he is now.    -Contact us in the interim if urgency and urge incontinence worsen.  We could consider a trial of medication management and a postvoid  residual make sure you are emptying well.    Signed by:       Lanny Devine PA-C 8/26/2021    Spencer is a 85 year old who is being evaluated via a billable video visit.      How would you like to obtain your AVS? MyChart  If the video visit is dropped, the invitation should be resent by:Will anyone else be joining your video visit? No      Video Start Time: 1302  Video-Visit Details    Type of service:  Video Visit    Video End Time:1313    Originating Location (pt. Location): Home    Distant Location (provider location):  [facility] [place]     Platform used for Video Visit: [employer]per[employer]

## 2021-08-26 NOTE — LETTER
8/26/2021       RE: Nixon More  [address]  [facility] [address]     Dear Colleague,    Thank you for referring your patient, Nixon More, to the [facility]OLOG[facility] [place] at [facility]. Please see a copy of my visit note below.    Subjective      CHIEF COMPLAINT/REASON FOR VISIT   Patient of Dr. Pino's seen on my calendar  Prostate cancer recheck    HISTORY OF PRESENT ILLNESS   Mr. More is a very pleasant 85 year old gentleman, who presents today for prostate cancer recheck.  He was last seen by Dr. Pino on 02/17/21.      Slaughters 4+3=7 Prostate cancer and hematuria  Intermittent hormonal therapy, prior radiotherapy 2007, recurrence 2009, negative hematuria workup 2018     His last Lupron injection was 2 years ago, and his PSA has been undetectable. Plan is to only give him hormone therapy if PSA would rise. Following on a 6 month basis.    Since last time we saw him, patient has been doing well.  He is going through cardiac rehab.  Last week he did have an episode there where they sent him to the emergency department.  Patient was found to be dehydrated.  He does note some occasional shortness of breath otherwise doing well.    Denies any hematuria or dysuria.  He does have a history of hematuria.  He does note that he has urinary urgency and some occasional urge incontinence particularly in the overnight hours.  Patient was recently switched from a loop diuretic to a potassium sparing diuretic in the form of spironolactone.  He does note that this is contributing to his symptomatology.    The following portions of the patient's history were reviewed and updated as appropriate: allergies, current medications, past family history, past medical history, past social history, past surgical history, and problem list.     REVIEW OF SYSTEMS   Review of Systems   Constitutional: Negative for appetite change, chills, fatigue,  fever and unexpected weight change.   Respiratory: Positive for shortness of breath (Occasionally).    Cardiovascular: Negative for chest pain.   Gastrointestinal: Negative for nausea and vomiting.   Genitourinary: Positive for urgency. Negative for dysuria, flank pain and hematuria.      Per HPI.     Patient Active Problem List   Diagnosis     Personal history of other malignant neoplasm of skin     Lumbago     Hypertrophy of prostate without urinary obstruction     Other emphysema (H)     Hypersomnia with sleep apnea     Malignant neoplasm of prostate     Impotence of organic origin     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, lower limb     CARDIOVASCULAR SCREENING; LDL GOAL LESS THAN 100     Vitamin D deficiency     Branch retinal vein occlusion     Bee sting-induced anaphylaxis     Advance Care Planning     Radiation proctitis     Coronary artery disease     Impaired fasting glucose     Health Care Home     Dyslipidemia     Benign essential hypertension     Spinal stenosis of lumbar region with neurogenic claudication     Dysthymia     Atrial fibrillation and flutter (H)     Dilated aortic root (H)     Generalized muscle weakness     Obesity (BMI 35.0-39.9) with comorbidity (H)     CKD (chronic kidney disease) stage 3, GFR 30-59 ml/min     Mild major depression (H)     Angina pectoris (H)     Chronic diastolic heart failure (H)      Past Medical History:   Diagnosis Date     Actinic keratosis      Arthritis      Atrial fibrillation and flutter (H) 12/3/2018     CAD (coronary artery disease)     1/5/2011 lateral ischemia on stress echo, 12/2014 normal stress echo     Coronary artery disease 1/1/2011    Abnormal stress test 1/2011 and controlled with medical mgmt      Dyslipidemia      Emphysema of lung (H)      Essential hypertension, benign      Hernia, abdominal      Hypersomnia with sleep apnea, unspecified     on bipap, partially treated with residual apneas     Hypertrophy (benign) of prostate 5/01    Biopsy 5/01  negative for cancer; PSA 5     Lumbago      Mumps      Prostate cancer (H) 12/15/2006     Skin cancer, basal cell 1997     Spider veins       Objective      PHYSICAL EXAM   There were no vitals taken for this visit.   GENERAL: Healthy, alert and no distress  EYES: Eyes grossly normal to inspection.  No discharge or erythema, or obvious scleral/conjunctival abnormalities.  HENT: Normal cephalic/atraumatic.  External ears, nose and mouth without ulcers or lesions.  No nasal drainage visible.  NECK: No asymmetry, visible masses or scars  RESP: No audible wheeze, cough, or visible cyanosis.  No visible retractions or increased work of breathing.    MS: No gross musculoskeletal defects noted.  Normal range of motion.  No visible edema.  SKIN: Visible skin clear. No significant rash, abnormal pigmentation or lesions.  NEURO: Cranial nerves grossly intact.  Mentation and speech appropriate for age.  PSYCH: Mentation appears normal, affect normal/bright, judgement and insight intact, normal speech and appearance well-groomed.    LABORATORY     Lab Results   Component Value Date    PSA <0.01 07/29/2021    PSA <0.01 05/17/2021      Recent Labs   Lab Test 01/12/21  1312 12/23/19  1518   COLOR Yellow Yellow   APPEARANCE Clear Clear   URINEGLC Negative Negative   URINEBILI Negative Negative   URINEKETONE Negative Negative   SG 1.020 1.015   UBLD Negative Moderate*   URINEPH 6.5 6.5   PROTEIN Negative Negative   UROBILINOGEN 0.2 0.2   NITRITE Negative Negative   LEUKEST Negative Small*   RBCU  --  O - 2   WBCU  --  5-10*     Assessment & Plan    1. Prostate cancer (H)        I had the pleasure today meeting with Mr. More to discuss follow-up for his prostate cancer.  I am pleased to inform him his PSA remains undetectable at less than 0.01.  This is great news.    -Recommendation would be for follow-up in 6 months with a PSA and return visit.  Would consider intermittent Lupron if PSA becomes detectable.    In regards to his  urinary incontinence, patient notes that this is somewhat attributed to his diuretic.  He does endorse urgency with some urge incontinence.  He is okay with where he is now.    -Contact us in the interim if urgency and urge incontinence worsen.  We could consider a trial of medication management and a postvoid residual make sure you are emptying well.    Signed by:       Lanny Devine PA-C 8/26/2021    Spencer is a 85 year old who is being evaluated via a billable video visit.      How would you like to obtain your AVS? MyChart  If the video visit is dropped, the invitation should be resent by:Will anyone else be joining your video visit? No      Video Start Time: 1302  Video-Visit Details    Type of service:  Video Visit    Video End Time:1313    Originating Location (pt. Location): Home    Distant Location (provider location):  [facility]OLOGY [facility] [place]     Platform used for Video Visit: [employer]

## 2021-08-26 NOTE — PATIENT INSTRUCTIONS
-Recommendation would be for follow-up in 6 months with a PSA and return visit.  Would consider intermittent Lupron if PSA becomes detectable.    -Contact us in the interim if urgency and urge incontinence worsen.  We could consider a trial of medication management and a postvoid residual make sure you are emptying well.

## 2021-08-27 ENCOUNTER — DOCUMENTATION ONLY (OUTPATIENT)
Dept: CARDIOLOGY | Facility: CLINIC | Age: 86
End: 2021-08-27

## 2021-08-27 ENCOUNTER — MYC MEDICAL ADVICE (OUTPATIENT)
Dept: CARDIOLOGY | Facility: CLINIC | Age: 86
End: 2021-08-27

## 2021-08-27 DIAGNOSIS — I50.32 CHRONIC DIASTOLIC HEART FAILURE (H): Primary | ICD-10-CM

## 2021-08-27 LAB
CREAT UR-MCNC: 179 MG/DL
MICROALBUMIN UR-MCNC: 40 MG/L
MICROALBUMIN/CREAT UR: 22.35 MG/G CR (ref 0–17)

## 2021-08-27 NOTE — PROGRESS NOTES
Daughter Amber called with update on patient.  She reports patient is doing well off of torsemide.  Weight has been stable.  Does not wear compression socks daily.  Reports no LIAT.  Reviewed medication and doses are current with our medication list.  Daughter is inquiring on when he needs to follow up.  Will update Cherise regarding above.  BIANCA Gabriel

## 2021-08-27 NOTE — PROGRESS NOTES
That's great!      Continue just spironolactone 12.5 mg daily.    See me 3 m with BMP but call if weight gain/edema beforehand. I've ordered    Chriss Luong

## 2021-08-27 NOTE — PROGRESS NOTES
Called Spencer to review CMP results on spironolactone 12.5 mg daily. Torsemide 5 mg daily has been held since he was in the ER 8/17 b/c SBP 80s. At that time, had not required compression stockings, had no edema and weight was stable.  Wanted to get update on this as well as confirm meds.    Called daughter Amber, who accompanies him to visits; requested call back to get update.    Current [facility] Med list indicates Diltiazem 120 and metoprolol  for permanent AFib, Spironolactone 12.5 mg daily and losartan 100 mg daily for HFpEF.       Component      Latest Ref Rng & Units 8/17/2021 8/25/2021   Sodium      133 - 144 mmol/L 141 140   Potassium      3.4 - 5.3 mmol/L 5.0 4.8   Chloride      94 - 109 mmol/L 111 (H) 112 (H)   Carbon Dioxide      20 - 32 mmol/L 21 25   Anion Gap      3 - 14 mmol/L 9 3   Urea Nitrogen      7 - 30 mg/dL 32 (H) 28   Creatinine      0.66 - 1.25 mg/dL 1.53 (H) 1.29 (H)   Calcium      8.5 - 10.1 mg/dL 9.3 9.1   Glucose      70 - 99 mg/dL 134 (H) 103 (H)   Alkaline Phosphatase      40 - 150 U/L 64 64   AST      0 - 45 U/L 34 31   ALT      0 - 70 U/L 32 31   Protein Total      6.8 - 8.8 g/dL 7.3 7.3   Albumin      3.4 - 5.0 g/dL 3.5 3.4   Bilirubin Total      0.2 - 1.3 mg/dL 0.8 0.7   GFR Estimate      >60 mL/min/1.73m2 41 (L) 51 (L)

## 2021-08-31 ENCOUNTER — HOSPITAL ENCOUNTER (OUTPATIENT)
Dept: CARDIAC REHAB | Facility: CLINIC | Age: 86
End: 2021-08-31
Attending: INTERNAL MEDICINE
Payer: COMMERCIAL

## 2021-08-31 PROCEDURE — 93798 PHYS/QHP OP CAR RHAB W/ECG: CPT | Performed by: OCCUPATIONAL THERAPIST

## 2021-09-02 ENCOUNTER — HOSPITAL ENCOUNTER (OUTPATIENT)
Dept: CARDIAC REHAB | Facility: CLINIC | Age: 86
End: 2021-09-02
Attending: INTERNAL MEDICINE
Payer: COMMERCIAL

## 2021-09-02 ENCOUNTER — TELEPHONE (OUTPATIENT)
Dept: UROLOGY | Facility: CLINIC | Age: 86
End: 2021-09-02

## 2021-09-02 PROCEDURE — 93798 PHYS/QHP OP CAR RHAB W/ECG: CPT | Performed by: OCCUPATIONAL THERAPIST

## 2021-09-02 NOTE — TELEPHONE ENCOUNTER
----- Message from Rand Martínez sent at 8/26/2021  2:41 PM CDT -----  PSA and return both in 6 months.    [facility]

## 2021-09-03 ENCOUNTER — VIRTUAL VISIT (OUTPATIENT)
Dept: ENDOCRINOLOGY | Facility: CLINIC | Age: 86
End: 2021-09-03
Payer: COMMERCIAL

## 2021-09-03 DIAGNOSIS — I10 HYPERTENSION, UNSPECIFIED TYPE: ICD-10-CM

## 2021-09-03 DIAGNOSIS — N18.30 STAGE 3 CHRONIC KIDNEY DISEASE, UNSPECIFIED WHETHER STAGE 3A OR 3B CKD (H): ICD-10-CM

## 2021-09-03 DIAGNOSIS — E66.9 OBESITY: ICD-10-CM

## 2021-09-03 DIAGNOSIS — Z71.3 NUTRITIONAL COUNSELING: Primary | ICD-10-CM

## 2021-09-03 PROCEDURE — 97803 MED NUTRITION INDIV SUBSEQ: CPT | Mod: 95 | Performed by: DIETITIAN, REGISTERED

## 2021-09-03 NOTE — PATIENT INSTRUCTIONS
"Darion Palmer,    Follow-up with RD in 1 month    Thank you,    Valeria Mckeon, RD, LD  If you would like to schedule or reschedule an appointment with the RD, please call [phone]    Nutrition Goals    1) Keep food journal/ meal log (phone gustavo options to track calories: MyFitnessPal, Lose-it, Noom)  2). Follow 1200 calorie/day plan   - limiting saturated fat < 7% calories (84 kca/ ~9g sat fat) - limit butter, try plant oil/spreads (smart balance/earth balance); limit red and processed meats    -  protein (~60-80g)  3). Follow low sodium diet <2g (see handout below)     - limit processes meats (deli meat, hot dogs, canned meats, Lox/herring);   - choose fresh/frozen unbreaded meats, eggs, poultry, fish   - fresh/frozen veggies, fruit, unsalted nuts   - Look at sodium level on food labels  4). Eat slowly (20-30 minutes per meal), chewing foods well (25 chews per bite/applesauce consistency)  5).Eat 3 meals/day - Avoid snacking   Try Meal Replacement and/or meal service   Example:   - M Health Shake for breakfast    - Mom's Meals for L or D meal   - My Plate Method for 3rd meal    6). Continue to avoid/reduce calorie containing beverages (keep up the great work being soda free!)    8). Increase activity as able -Cardiac rehab and stay active at home gym - 9 min Treadmill and 15-20min on new step    Frozen Meal Replacements  Healthy Choice  Lean Cuisine  Atkins Meals  Smart Ones      Healthy Recipe Resources:  \"The Volumetrics Eating Plan\" by Alison Diop, Ph.D.  https://www.[employer].Dormir/  www.IntelliFlo.com  www.oldwayspt.org  Dash Diet Recipes [facility]  www.extension.[facility].edu - the recipe box  \"Cooking that Counts\" by editors of CookingLight  https://www.[facility]/communityculinary/KC_Cookbook_KT_Final_11-6_NoCrops-compressed.pdf  Https://www.choosemyplate.gov/myplatekitchen  https://snaped.fns.usda.gov/recipes-menus - SNAP " recipes  https://www.diabetesfoodhub.org/all-recipes.html  https://www.CirclePublish.com/      Tips for Low Sodium Diet  http://www.fvfiles.com/625367.pdf    The Plate Method  http://www.[employer]/053644uu.pdf    Protein Sources for Weight Loss  http://fvfiles.com/491165.pdf     Carbohydrates  http://fvfiles.com/681131.pdf     Mindful Eating  http://[employer]/043842.pdf     Summary of Volumetrics Eating Plan  http://[email or web address]         Interested in working with a health ?  Health coaches work with you to improve your overall health and wellbeing.  They look at the whole person, and may involve discussion of different areas of life, including, but not limited to the four pillars of health (sleep, exercise, nutrition, and stress management). Discuss with your care team if you would like to start working a health .    Health Coaching-3 Pack:    $99 for three health coaching visits    Visits may be done in person or via phone    Coaching is a partnership between the  and the client; Coaches do not prescribe or diagnose    Coaching helps inspire the client to reach his/her personal goals      Virtual Support Groups are Available             Healthy Lifestyle Support Group      All are welcome!     Facilitator: Palma Geller, Certified Health     - Meets once a month on a Friday from 12:30pm to 1:30pm.  - Due to Covid-19 she is doing this Support Group virtually using Microsoft Teams.  - 60 minutes of small group guided discussion, support and resources.  - Please email Palma directly at [email or web address] to receive monthly invitations.  - If you opt to participate in individual health coaching sessions or for general questions, contact Palma via email.  - Palma will send out invites for each session, so the phone number and the conference ID may change for each session.

## 2021-09-03 NOTE — PROGRESS NOTES
"Nixon More is a 85 year old male who is being evaluated via a billable video visit.      The patient has been notified of following:     \"This video visit will be conducted via a call between you and your physician/provider. We have found that certain health care needs can be provided without the need for an in-person physical exam.  This service lets us provide the care you need with a video conversation.  If a prescription is necessary we can send it directly to your pharmacy.  If lab work is needed we can place an order for that and you can then stop by our lab to have the test done at a later time.    Video visits are billed at different rates depending on your insurance coverage.  Please reach out to your insurance provider with any questions.    If during the course of the call the physician/provider feels a video visit is not appropriate, you will not be charged for this service.\"    Patient has given verbal consent for Video visit? Yes  How would you like to obtain your AVS? MyChart  If you are dropped from the video visit, the video invite should be resent to: Text to cell phone: [phone]  Will anyone else be joining your video visit? Daughters: Mony   {If patient encounters technical issues they should call [phone]      Video-Visit Details    Type of service:  Video Visit    Video Start Time:9:03 AM  Video End Time: 9:30 AM    Originating Location (pt. Location): Home    Distant Location (provider location):  [facility]NAG[facility] [place]     Platform used for Video Visit: [employer]    During this virtual visit the patient is located in [place], patient verifies this as the location during the entirety of this visit.     Return Weight Management Nutrition Consultation    Nixon More is a 85 year old male presents today for new weight management nutrition consultation. Accompanied by daughter, Amber. Patient referred by MD Rocky on June 25, 2021.    Patient " "with Co-morbidities of obesity including:  Type II DM no  Renal Failure no - CKD3 yes  Sleep apnea yes  Hypertension yes  Dyslipidemia no  Joint pain yes  Back pain  yes  GERD no    CHF yes    Anthropometrics:  Estimated body mass index is 35.49 kg/m  as calculated from the following:    Height as of 6/24/21: 1.715 m (5' 7.5\").    Weight as of 6/24/21: 104.3 kg (230 lb).  No new weight - pt states same    Medications for Weight Loss:  None    NUTRITION HISTORY  Daughters (Maria Del Carmen and Amber) involved in care.    Pt's meals/food previously prepared by pt's wife who has fairly recently passed (previous wife prepared meals as well). Now, he often enjoys food from [employer].  Eating 3 meals/day + snacks.    Challenge: meal prep    Recent food recall:  Breakfast: cereal, milk, fruit  Lunch: sandwiches (low sodium spam or liver sausage)  Dinner: Smart One/ Lean Cuisine  Afternoon Snacks: cheez-it, club crackers, harvest grain, peanuts (low sodium), carrot, grapes  Beverages: no more soda, water, milk (2%),   Alcohol: rarely - once     Physical Activity:  Treadmill 9 min 1.8 mi/hr 15-20min on new step - doing cardiac rehab 2 x week    Progress Towards Previous Goals  1) Keep food journal/ meal log (phone gustavo options to track calories: MyFitnessPal, Lose-it, Noom) - not met  2). Follow 1200 calorie/day plan improving, continues, using smart balance                - limiting saturated fat < 7% calories (84 kca/ ~9g sat fat) - limit butter, try plant oil/spreads (smart balance/earth balance); limit red and processed meats               -  protein (~60-80g)  3). Follow low sodium diet <2g (see handout below) - improving, continues               - limit processes meats (deli meat, hot dogs, canned meats, Lox/herring);              - choose fresh/frozen unbreaded meats, eggs, poultry, fish              - fresh/frozen veggies, fruit, unsalted nuts              - Look at sodium level on food labels  4). Eat slowly (20-30 minutes per " "meal), chewing foods well (25 chews per bite/applesauce consistency) - did not discuss  5). Follow 9\" Plate method (1/2 plate non-starchy vegetables/fruit, 1/4 plate lean protein, 1/4 plate whole grain starch - no more than 1/2 cup carb/meal)  - no change  6). Reduce calorie containing beverages by 50% (reduce from 2% milk to 1%; choose flavored water over soda) - improving, continues - no soda  8). Increase activity as able improving, continues     Nutrition Prescription  Recommended energy/nutrient modification.  1200 calories/day (per MD)  Low Sodium diet    Nutrition Diagnosis  Obesity r/t long history of self-monitoring deficit and excessive energy intake aeb BMI >30.    Nutrition Intervention    Materials/education   -Reviewed on Volumetric eating to help satiety level on fewer calories; portion control and healthy food choices (Plate Method and Volumetrics handouts)  - Reviewed Low sodium diet - encouraged reducing processed meats and salty snacks; increasing fruit/veg snack choices to support low sodium  -Discussed/reviewed healthy choices for meal/snack options, Meal replacement options/smoothies, Mom's meals, M health shake (1 seving 20 g prot 110 lashell, 400mg)  - Answered pt and pt's daughters' questions   - Provided encouragement and support  - AVS via [employer]    Patient demonstrates understanding.    Expected Engagement: good  Follow-Up Plans: review diet ed, healthy snack choices per preferece, option for meal replacement/smoothie     Nutrition Goals  1) Keep food journal/ meal log (phone gustavo options to track calories: MyFitnessPal, Lose-it, Noom)  2). Follow 1200 calorie/day plan   - limiting saturated fat < 7% calories (84 kca/ ~9g sat fat) - limit butter, try plant oil/spreads (smart balance/earth balance); limit red and processed meats    -  protein (~60-80g)  3). Follow low sodium diet <2g (see handout below)     - limit processes meats (deli meat, hot dogs, canned meats, Lox/herring);   - choose " "fresh/frozen unbreaded meats, eggs, poultry, fish   - fresh/frozen veggies, fruit, unsalted nuts   - Look at sodium level on food labels  4). Eat slowly (20-30 minutes per meal), chewing foods well (25 chews per bite/applesauce consistency)  5).Eat 3 meals/day - Avoid snacking   Try Meal Replacement and/or meal service   Example:   - M Health Shake for breakfast    - Mom's Meals for L or D meal   - My Plate Method for 3rd meal    6). Continue to avoid/reduce calorie containing beverages (keep up the great work being soda free!)    8). Increase activity as able -Cardiac rehab and stay active at home gym - 9 min Treadmill and 15-20min on new step    Frozen Meal Replacements  Healthy Choice  Lean Cuisine  Atkins Meals  Smart Ones      Healthy Recipe Resources:  \"The Volumetrics Eating Plan\" by Alison Diop, Ph.D.  https://www.Jildy/  www.[employer]oPayments  www.[employer]  Dash Diet Recipes [facility]  www.extension.[facility] - the recipe box  \"Cooking that Counts\" by editors of [employer]  https://www.[facility]/communityculinary/[facility]_Cookbook_KT_Final_11-6_NoCrops-compressed.pdf  Https://www.choosemyplate.gov/myplatekitchen  https://snaped.fns.usda.gov/recipes-menus - SNAP recipes  https://www.diabetesfoodhub.org/all-recipes.html  https://www.mealime.com/      Tips for Low Sodium Diet  http://www.fvfiles.com/027300.pdf    The Plate Method  http://www.[employer]/783691ke.pdf    Protein Sources for Weight Loss  http://fvfiles.com/372738.pdf     Carbohydrates  http://fvfiles.com/119734.pdf     Mindful Eating  http://[employer]/353146.pdf     Summary of Volumetrics Eating Plan  http://fvfiles.com/251934.pdf     Follow-Up:  1 month     Time spent with patient: 27 minutes.  Valeria Mckeon RD, LD      "

## 2021-09-03 NOTE — LETTER
"9/3/2021       RE: Nixon More  [address]  [facility] [address]     Dear Colleague,    Thank you for referring your patient, Nixon More, to the [facility] [place] [facility]. Please see a copy of my visit note below.    Nixon More is a 85 year old male who is being evaluated via a billable video visit.      The patient has been notified of following:     \"This video visit will be conducted via a call between you and your physician/provider. We have found that certain health care needs can be provided without the need for an in-person physical exam.  This service lets us provide the care you need with a video conversation.  If a prescription is necessary we can send it directly to your pharmacy.  If lab work is needed we can place an order for that and you can then stop by our lab to have the test done at a later time.    Video visits are billed at different rates depending on your insurance coverage.  Please reach out to your insurance provider with any questions.    If during the course of the call the physician/provider feels a video visit is not appropriate, you will not be charged for this service.\"    Patient has given verbal consent for Video visit? Yes  How would you like to obtain your AVS? MyChart  If you are dropped from the video visit, the video invite should be resent to: Text to cell phone: [phone]  Will anyone else be joining your video visit? Daughters: Maria Del Carmen and Kae   {If patient encounters technical issues they should call [phone]      Video-Visit Details    Type of service:  Video Visit    Video Start Time:9:03 AM  Video End Time: 9:30 AM    Originating Location (pt. Location): Home    Distant Location (provider location):  [facility] [employer]     Platform used for Video Visit: [employer]    During this virtual visit the patient is located " "in [place], patient verifies this as the location during the entirety of this visit.     Return Weight Management Nutrition Consultation    Nixon More is a 85 year old male presents today for new weight management nutrition consultation. Accompanied by daughter, Amber. Patient referred by MD Rocky on June 25, 2021.    Patient with Co-morbidities of obesity including:  Type II DM no  Renal Failure no - CKD3 yes  Sleep apnea yes  Hypertension yes  Dyslipidemia no  Joint pain yes  Back pain  yes  GERD no    CHF yes    Anthropometrics:  Estimated body mass index is 35.49 kg/m  as calculated from the following:    Height as of 6/24/21: 1.715 m (5' 7.5\").    Weight as of 6/24/21: 104.3 kg (230 lb).  No new weight - pt states same    Medications for Weight Loss:  None    NUTRITION HISTORY  Daughters (Maria Del Carmen and Amber) involved in care.    Pt's meals/food previously prepared by pt's wife who has fairly recently passed (previous wife prepared meals as well). Now, he often enjoys food from [employer]  Eating 3 meals/day + snacks.    Challenge: meal prep    Recent food recall:  Breakfast: cereal, milk, fruit  Lunch: sandwiches (low sodium spam or liver sausage)  Dinner: Smart One/ Lean Cuisine  Afternoon Snacks: cheez-it, club crackers, harvest grain, peanuts (low sodium), carrot, grapes  Beverages: no more soda, water, milk (2%),   Alcohol: rarely - once     Physical Activity:  Treadmill 9 min 1.8 mi/hr 15-20min on new step - doing cardiac rehab 2 x week    Progress Towards Previous Goals  1) Keep food journal/ meal log (phone gustavo options to track calories: MyFitnessPal, Lose-it, Noom) - not met  2). Follow 1200 calorie/day plan improving, continues, using smart balance                - limiting saturated fat < 7% calories (84 kca/ ~9g sat fat) - limit butter, try plant oil/spreads (smart balance/earth balance); limit red and processed meats               -  protein (~60-80g)  3). Follow low sodium diet <2g (see handout " "below) - improving, continues               - limit processes meats (deli meat, hot dogs, canned meats, Lox/herring);              - choose fresh/frozen unbreaded meats, eggs, poultry, fish              - fresh/frozen veggies, fruit, unsalted nuts              - Look at sodium level on food labels  4). Eat slowly (20-30 minutes per meal), chewing foods well (25 chews per bite/applesauce consistency) - did not discuss  5). Follow 9\" Plate method (1/2 plate non-starchy vegetables/fruit, 1/4 plate lean protein, 1/4 plate whole grain starch - no more than 1/2 cup carb/meal)  - no change  6). Reduce calorie containing beverages by 50% (reduce from 2% milk to 1%; choose flavored water over soda) - improving, continues - no soda  8). Increase activity as able improving, continues     Nutrition Prescription  Recommended energy/nutrient modification.  1200 calories/day (per MD)  Low Sodium diet    Nutrition Diagnosis  Obesity r/t long history of self-monitoring deficit and excessive energy intake aeb BMI >30.    Nutrition Intervention    Materials/education   -Reviewed on Volumetric eating to help satiety level on fewer calories; portion control and healthy food choices (Plate Method and Volumetrics handouts)  - Reviewed Low sodium diet - encouraged reducing processed meats and salty snacks; increasing fruit/veg snack choices to support low sodium  -Discussed/reviewed healthy choices for meal/snack options, Meal replacement options/smoothies, Mom's meals, M health shake (1 seving 20 g prot 110 lashell, 400mg)  - Answered pt and pt's daughters' questions   - Provided encouragement and support  - AVS via StepUp    Patient demonstrates understanding.    Expected Engagement: good  Follow-Up Plans: review diet ed, healthy snack choices per preferece, option for meal replacement/smoothie     Nutrition Goals  1) Keep food journal/ meal log (phone gustavo options to track calories: MyFitnessPal, Lose-it, Noom)  2). Follow 1200 calorie/day " "plan   - limiting saturated fat < 7% calories (84 kca/ ~9g sat fat) - limit butter, try plant oil/spreads (smart balance/earth balance); limit red and processed meats    -  protein (~60-80g)  3). Follow low sodium diet <2g (see handout below)     - limit processes meats (deli meat, hot dogs, canned meats, Lox/herring);   - choose fresh/frozen unbreaded meats, eggs, poultry, fish   - fresh/frozen veggies, fruit, unsalted nuts   - Look at sodium level on food labels  4). Eat slowly (20-30 minutes per meal), chewing foods well (25 chews per bite/applesauce consistency)  5).Eat 3 meals/day - Avoid snacking   Try Meal Replacement and/or meal service   Example:   - M Health Shake for breakfast    - Mom's Meals for L or D meal   - My Plate Method for 3rd meal    6). Continue to avoid/reduce calorie containing beverages (keep up the great work being soda free!)    8). Increase activity as able -Cardiac rehab and stay active at home gym - 9 min Treadmill and 15-20min on new step    Frozen Meal Replacements  Healthy Choice  Lean Cuisine  Atkins Meals  Smart Ones      Healthy Recipe Resources:  \"The Volumetrics Eating Plan\" by Alison Diop, Ph.D.  https://www.Togethera/  www.Motilo  www.[place].org  Dash Diet Recipes [facility]  www.extension.[facility] - the recipe box  \"Cooking that Counts\" by editors of [employer]  https://www.[facility]/communityculinary/[facility]_Cookbook_KT_Final_11-6_NoCrops-compressed.pdf  Https://www.choosemyplate.gov/myplatekitchen  https://snaped.fns.usda.gov/recipes-menus - SNAP recipes  https://www.diabetesfoodhub.org/all-recipes.html  https://www.UsingMiles.com/      Tips for Low Sodium Diet  http://www.fvfiles.com/361033.pdf    The Plate Method  http://www.[employer]/371127wn.pdf    Protein Sources for Weight Loss  http://fvfiles.com/050057.pdf     Carbohydrates  http://fvfiles.com/540741.pdf     Mindful Eating  http://[employer]/745723.pdf     Summary of Volumetrics Eating " Plan  http://[employer] studios/627785.pdf     Follow-Up:  1 month     Time spent with patient: 27 minutes.  Valeria Mckeon RD, LD

## 2021-09-07 ENCOUNTER — TELEPHONE (OUTPATIENT)
Dept: ENDOCRINOLOGY | Facility: CLINIC | Age: 86
End: 2021-09-07

## 2021-09-07 ENCOUNTER — HOSPITAL ENCOUNTER (OUTPATIENT)
Dept: CARDIAC REHAB | Facility: CLINIC | Age: 86
End: 2021-09-07
Attending: INTERNAL MEDICINE
Payer: COMMERCIAL

## 2021-09-07 PROCEDURE — 93798 PHYS/QHP OP CAR RHAB W/ECG: CPT | Performed by: REHABILITATION PRACTITIONER

## 2021-09-07 NOTE — TELEPHONE ENCOUNTER
shalinim to schedule 4 week follow up with dietitian, left call center phone number and sent mychart.

## 2021-09-08 NOTE — TELEPHONE ENCOUNTER
3 month follow up scheduled for 11/23.  Patient to have BMP completed on 11/16 at PCP office.  BIANCA Gabriel

## 2021-09-09 ENCOUNTER — HOSPITAL ENCOUNTER (OUTPATIENT)
Dept: CARDIAC REHAB | Facility: CLINIC | Age: 86
End: 2021-09-09
Attending: INTERNAL MEDICINE
Payer: COMMERCIAL

## 2021-09-09 PROCEDURE — 93798 PHYS/QHP OP CAR RHAB W/ECG: CPT

## 2021-09-13 DIAGNOSIS — I48.19 PERSISTENT ATRIAL FIBRILLATION (H): ICD-10-CM

## 2021-09-13 DIAGNOSIS — I10 BENIGN ESSENTIAL HYPERTENSION: ICD-10-CM

## 2021-09-13 RX ORDER — SPIRONOLACTONE 25 MG/1
12.5 TABLET ORAL DAILY
Qty: 45 TABLET | Refills: 0 | Status: SHIPPED | OUTPATIENT
Start: 2021-09-13 | End: 2021-12-01

## 2021-09-14 ENCOUNTER — HOSPITAL ENCOUNTER (OUTPATIENT)
Dept: CARDIAC REHAB | Facility: CLINIC | Age: 86
End: 2021-09-14
Attending: INTERNAL MEDICINE
Payer: COMMERCIAL

## 2021-09-14 PROCEDURE — 93798 PHYS/QHP OP CAR RHAB W/ECG: CPT | Performed by: OCCUPATIONAL THERAPIST

## 2021-09-16 ENCOUNTER — HOSPITAL ENCOUNTER (OUTPATIENT)
Dept: CARDIAC REHAB | Facility: CLINIC | Age: 86
End: 2021-09-16
Attending: INTERNAL MEDICINE
Payer: COMMERCIAL

## 2021-09-16 PROCEDURE — 93798 PHYS/QHP OP CAR RHAB W/ECG: CPT

## 2021-09-20 DIAGNOSIS — E78.5 DYSLIPIDEMIA: ICD-10-CM

## 2021-09-21 ENCOUNTER — HOSPITAL ENCOUNTER (OUTPATIENT)
Dept: CARDIAC REHAB | Facility: CLINIC | Age: 86
End: 2021-09-21
Attending: INTERNAL MEDICINE
Payer: COMMERCIAL

## 2021-09-21 PROCEDURE — 93798 PHYS/QHP OP CAR RHAB W/ECG: CPT | Performed by: REHABILITATION PRACTITIONER

## 2021-09-22 RX ORDER — ATORVASTATIN CALCIUM 40 MG/1
TABLET, FILM COATED ORAL
Qty: 90 TABLET | Refills: 0 | Status: SHIPPED | OUTPATIENT
Start: 2021-09-22 | End: 2021-10-22

## 2021-09-28 ENCOUNTER — HOSPITAL ENCOUNTER (OUTPATIENT)
Dept: CARDIAC REHAB | Facility: CLINIC | Age: 86
End: 2021-09-28
Attending: INTERNAL MEDICINE
Payer: COMMERCIAL

## 2021-09-28 PROCEDURE — 93798 PHYS/QHP OP CAR RHAB W/ECG: CPT

## 2021-09-30 ENCOUNTER — HOSPITAL ENCOUNTER (OUTPATIENT)
Dept: CARDIAC REHAB | Facility: CLINIC | Age: 86
End: 2021-09-30
Attending: INTERNAL MEDICINE
Payer: COMMERCIAL

## 2021-09-30 PROCEDURE — 93798 PHYS/QHP OP CAR RHAB W/ECG: CPT | Performed by: OCCUPATIONAL THERAPIST

## 2021-10-05 ENCOUNTER — HOSPITAL ENCOUNTER (OUTPATIENT)
Dept: CARDIAC REHAB | Facility: CLINIC | Age: 86
End: 2021-10-05
Attending: INTERNAL MEDICINE
Payer: COMMERCIAL

## 2021-10-05 PROCEDURE — 93798 PHYS/QHP OP CAR RHAB W/ECG: CPT

## 2021-10-07 ENCOUNTER — HOSPITAL ENCOUNTER (OUTPATIENT)
Dept: CARDIAC REHAB | Facility: CLINIC | Age: 86
End: 2021-10-07
Attending: INTERNAL MEDICINE
Payer: COMMERCIAL

## 2021-10-07 PROCEDURE — 93798 PHYS/QHP OP CAR RHAB W/ECG: CPT | Performed by: OCCUPATIONAL THERAPIST

## 2021-10-14 ENCOUNTER — HOSPITAL ENCOUNTER (OUTPATIENT)
Dept: CARDIAC REHAB | Facility: CLINIC | Age: 86
End: 2021-10-14
Attending: INTERNAL MEDICINE
Payer: COMMERCIAL

## 2021-10-14 PROCEDURE — 93798 PHYS/QHP OP CAR RHAB W/ECG: CPT | Performed by: OCCUPATIONAL THERAPIST

## 2021-10-19 ENCOUNTER — HOSPITAL ENCOUNTER (OUTPATIENT)
Dept: CARDIAC REHAB | Facility: CLINIC | Age: 86
End: 2021-10-19
Attending: INTERNAL MEDICINE
Payer: COMMERCIAL

## 2021-10-19 PROBLEM — F32.9 MAJOR DEPRESSION: Status: ACTIVE | Noted: 2020-10-27

## 2021-10-19 PROCEDURE — 93798 PHYS/QHP OP CAR RHAB W/ECG: CPT

## 2021-10-22 ENCOUNTER — OFFICE VISIT (OUTPATIENT)
Dept: PEDIATRICS | Facility: CLINIC | Age: 86
End: 2021-10-22
Payer: COMMERCIAL

## 2021-10-22 ENCOUNTER — HOSPITAL ENCOUNTER (OUTPATIENT)
Dept: CARDIAC REHAB | Facility: CLINIC | Age: 86
End: 2021-10-22
Attending: INTERNAL MEDICINE
Payer: COMMERCIAL

## 2021-10-22 VITALS
OXYGEN SATURATION: 97 % | HEART RATE: 68 BPM | DIASTOLIC BLOOD PRESSURE: 64 MMHG | HEIGHT: 68 IN | WEIGHT: 230 LBS | BODY MASS INDEX: 34.86 KG/M2 | TEMPERATURE: 97 F | SYSTOLIC BLOOD PRESSURE: 118 MMHG | RESPIRATION RATE: 16 BRPM

## 2021-10-22 DIAGNOSIS — N18.30 STAGE 3 CHRONIC KIDNEY DISEASE, UNSPECIFIED WHETHER STAGE 3A OR 3B CKD (H): ICD-10-CM

## 2021-10-22 DIAGNOSIS — J43.8 OTHER EMPHYSEMA (H): ICD-10-CM

## 2021-10-22 DIAGNOSIS — R73.01 IMPAIRED FASTING GLUCOSE: ICD-10-CM

## 2021-10-22 DIAGNOSIS — T78.2XXD BEE STING-INDUCED ANAPHYLAXIS, UNDETERMINED INTENT, SUBSEQUENT ENCOUNTER: ICD-10-CM

## 2021-10-22 DIAGNOSIS — E78.5 DYSLIPIDEMIA: ICD-10-CM

## 2021-10-22 DIAGNOSIS — I10 HYPERTENSION GOAL BP (BLOOD PRESSURE) < 140/90: ICD-10-CM

## 2021-10-22 DIAGNOSIS — T63.444D BEE STING-INDUCED ANAPHYLAXIS, UNDETERMINED INTENT, SUBSEQUENT ENCOUNTER: ICD-10-CM

## 2021-10-22 DIAGNOSIS — F32.0 MILD MAJOR DEPRESSION (H): ICD-10-CM

## 2021-10-22 DIAGNOSIS — I47.19 ATRIAL TACHYCARDIA (H): ICD-10-CM

## 2021-10-22 DIAGNOSIS — Z00.00 ENCOUNTER FOR MEDICARE ANNUAL WELLNESS EXAM: Primary | ICD-10-CM

## 2021-10-22 DIAGNOSIS — E66.01 MORBID OBESITY (H): ICD-10-CM

## 2021-10-22 LAB — HBA1C MFR BLD: 6 % (ref 0–5.6)

## 2021-10-22 PROCEDURE — 36415 COLL VENOUS BLD VENIPUNCTURE: CPT | Performed by: INTERNAL MEDICINE

## 2021-10-22 PROCEDURE — 83036 HEMOGLOBIN GLYCOSYLATED A1C: CPT | Performed by: INTERNAL MEDICINE

## 2021-10-22 PROCEDURE — 83735 ASSAY OF MAGNESIUM: CPT | Performed by: INTERNAL MEDICINE

## 2021-10-22 PROCEDURE — 99214 OFFICE O/P EST MOD 30 MIN: CPT | Mod: 25 | Performed by: INTERNAL MEDICINE

## 2021-10-22 PROCEDURE — 84100 ASSAY OF PHOSPHORUS: CPT | Performed by: INTERNAL MEDICINE

## 2021-10-22 PROCEDURE — G0439 PPPS, SUBSEQ VISIT: HCPCS | Performed by: INTERNAL MEDICINE

## 2021-10-22 PROCEDURE — 82043 UR ALBUMIN QUANTITATIVE: CPT | Performed by: INTERNAL MEDICINE

## 2021-10-22 PROCEDURE — 80048 BASIC METABOLIC PNL TOTAL CA: CPT | Performed by: INTERNAL MEDICINE

## 2021-10-22 PROCEDURE — 93798 PHYS/QHP OP CAR RHAB W/ECG: CPT

## 2021-10-22 RX ORDER — LOSARTAN POTASSIUM 100 MG/1
100 TABLET ORAL DAILY
Qty: 90 TABLET | Refills: 3 | Status: SHIPPED | OUTPATIENT
Start: 2021-10-22 | End: 2022-11-17

## 2021-10-22 RX ORDER — ATORVASTATIN CALCIUM 40 MG/1
40 TABLET, FILM COATED ORAL DAILY
Qty: 90 TABLET | Refills: 3 | Status: SHIPPED | OUTPATIENT
Start: 2021-10-22 | End: 2022-12-27

## 2021-10-22 RX ORDER — ESCITALOPRAM OXALATE 10 MG/1
10 TABLET ORAL DAILY
Qty: 90 TABLET | Refills: 3 | Status: SHIPPED | OUTPATIENT
Start: 2021-10-22 | End: 2022-11-17

## 2021-10-22 RX ORDER — ALBUTEROL SULFATE 90 UG/1
2 POWDER, METERED RESPIRATORY (INHALATION) EVERY 6 HOURS PRN
Qty: 1 EACH | Refills: 1 | Status: SHIPPED | OUTPATIENT
Start: 2021-10-22 | End: 2021-10-25

## 2021-10-22 ASSESSMENT — ENCOUNTER SYMPTOMS
SHORTNESS OF BREATH: 1
DYSURIA: 0
HEADACHES: 0
HEARTBURN: 0
SORE THROAT: 0
EYE PAIN: 1
CHILLS: 0
DIARRHEA: 1
HEMATURIA: 1
COUGH: 1
FREQUENCY: 0
HEMATOCHEZIA: 1
ABDOMINAL PAIN: 0
CONSTIPATION: 1
FEVER: 0
WEAKNESS: 1
DIZZINESS: 1
MYALGIAS: 0
NERVOUS/ANXIOUS: 0
NAUSEA: 0
JOINT SWELLING: 0
ARTHRALGIAS: 1
PALPITATIONS: 0
PARESTHESIAS: 1

## 2021-10-22 ASSESSMENT — ANXIETY QUESTIONNAIRES
7. FEELING AFRAID AS IF SOMETHING AWFUL MIGHT HAPPEN: NOT AT ALL
GAD7 TOTAL SCORE: 0
4. TROUBLE RELAXING: NOT AT ALL
7. FEELING AFRAID AS IF SOMETHING AWFUL MIGHT HAPPEN: NOT AT ALL
GAD7 TOTAL SCORE: 0
3. WORRYING TOO MUCH ABOUT DIFFERENT THINGS: NOT AT ALL
1. FEELING NERVOUS, ANXIOUS, OR ON EDGE: NOT AT ALL
2. NOT BEING ABLE TO STOP OR CONTROL WORRYING: NOT AT ALL
GAD7 TOTAL SCORE: 0
6. BECOMING EASILY ANNOYED OR IRRITABLE: NOT AT ALL
5. BEING SO RESTLESS THAT IT IS HARD TO SIT STILL: NOT AT ALL
8. IF YOU CHECKED OFF ANY PROBLEMS, HOW DIFFICULT HAVE THESE MADE IT FOR YOU TO DO YOUR WORK, TAKE CARE OF THINGS AT HOME, OR GET ALONG WITH OTHER PEOPLE?: NOT DIFFICULT AT ALL

## 2021-10-22 ASSESSMENT — PATIENT HEALTH QUESTIONNAIRE - PHQ9
SUM OF ALL RESPONSES TO PHQ QUESTIONS 1-9: 2
10. IF YOU CHECKED OFF ANY PROBLEMS, HOW DIFFICULT HAVE THESE PROBLEMS MADE IT FOR YOU TO DO YOUR WORK, TAKE CARE OF THINGS AT HOME, OR GET ALONG WITH OTHER PEOPLE: NOT DIFFICULT AT ALL
SUM OF ALL RESPONSES TO PHQ QUESTIONS 1-9: 2

## 2021-10-22 ASSESSMENT — MIFFLIN-ST. JEOR: SCORE: 1699.83

## 2021-10-22 ASSESSMENT — ACTIVITIES OF DAILY LIVING (ADL): CURRENT_FUNCTION: HOUSEWORK REQUIRES ASSISTANCE

## 2021-10-22 NOTE — PATIENT INSTRUCTIONS
You can sign up for the WEL program after you finish rehab if you are interested.    Start on the risedronate for your bones.    Follow-up with Dr. Brar and let him know about blood in your urine    Get your tetanus in August at your pharmacy      Patient Education   Personalized Prevention Plan  You are due for the preventive services outlined below.  Your care team is available to assist you in scheduling these services.  If you have already completed any of these items, please share that information with your care team to update in your medical record.       Exercise for a Healthier Heart  You may wonder how you can improve the health of your heart. If you re thinking about exercise, you re on the right track. You don t need to become an athlete. But you do need a certain amount of brisk exercise to help strengthen your heart. If you have been diagnosed with a heart condition, your healthcare provider may advise exercise to help stabilize your condition. To help make exercise a habit, choose safe, fun activities.      Exercise with a friend. When activity is fun, you're more likely to stick with it.   Before you start  Check with your healthcare provider before starting an exercise program. This is especially important if you have not been active for a while. It's also important if you have a long-term (chronic) health problem such as heart disease, diabetes, or obesity. Or if you are at high risk for having these problems.   Why exercise?  Exercising regularly offers many healthy rewards. It can help you do all of the following:     Improve your blood cholesterol level to help prevent further heart trouble    Lower your blood pressure to help prevent a stroke or heart attack    Control diabetes, or reduce your risk of getting this disease    Improve your heart and lung function    Reach and stay at a healthy weight    Make your muscles stronger so you can stay active    Prevent falls and fractures by slowing  the loss of bone mass (osteoporosis)    Manage stress better    Reduce your blood pressure    Improve your sense of self and your body image  Exercise tips      Ease into your routine. Set small goals. Then build on them. If you are not sure what your activity level should be, talk with your healthcare provider first before starting an exercise routine.    Exercise on most days. Aim for a total of 150 minutes (2 hours and 30 minutes) or more of moderate-intensity aerobic activity each week. Or 75 minutes (1 hour and 15 minutes) or more of vigorous-intensity aerobic activity each week. Or try for a combination of both. Moderate activity means that you breathe heavier and your heart rate increases but you can still talk. Think about doing 40 minutes of moderate exercise, 3 to 4 times a week. For best results, activity should last for about 40 minutes to lower blood pressure and cholesterol. It's OK to work up to the 40-minute period over time. Examples of moderate-intensity activity are walking 1 mile in 15 minutes. Or doing 30 to 45 minutes of yard work.    Step up your daily activity level.  Along with your exercise program, try being more active the whole day. Walk instead of drive. Or park further away so that you take more steps each day. Do more household tasks or yard work. You may not be able to meet the advised mount of physical activity. But doing some moderate- or vigorous-intensity aerobic activity can help reduce your risk for heart disease. Your healthcare provider can help you figure out what is best for you.    Choose 1 or more activities you enjoy.  Walking is one of the easiest things you can do. You can also try swimming, riding a bike, dancing, or taking an exercise class.    When to call your healthcare provider  Call your healthcare provider if you have any of these:     Chest pain or feel dizzy or lightheaded    Burning, tightness, pressure, or heaviness in your chest, neck, shoulders, back, or  arms    Abnormal shortness of breath    More joint or muscle pain    A very fast or irregular heartbeat (palpitations)  [employer] last reviewed this educational content on 7/1/2019 2000-2021 [employer]. All rights reserved. This information is not intended as a substitute for professional medical care. Always follow your healthcare professional's instructions.          Signs of Hearing Loss      Hearing much better with one ear can be a sign of hearing loss.   Hearing loss is a problem shared by many people. In fact, it is one of the most common health problems, particularly as people age. Most people age 65 and older have some hearing loss. By age 80, almost everyone does. Hearing loss often occurs slowly over the years. So you may not realize your hearing has gotten worse.  Have your hearing checked  Call your healthcare provider if you:    Have to strain to hear normal conversation    Have to watch other people s faces very carefully to follow what they re saying    Need to ask people to repeat what they ve said    Often misunderstand what people are saying    Turn the volume of the television or radio up so high that others complain    Feel that people are mumbling when they re talking to you    Find that the effort to hear leaves you feeling tired and irritated    Notice, when using the phone, that you hear better with one ear than the other  [employer] last reviewed this educational content on 1/1/2020 2000-2021 [employer]. All rights reserved. This information is not intended as a substitute for professional medical care. Always follow your healthcare professional's instructions.        Gentle Skin Care  Below is a list of products our providers recommend for gentle skin care.  Moisturizers:    Lighter; Cetaphil Cream, CeraVe, Aveeno and Vanicream Light     Thicker; Aquaphor Ointment, Vaseline, Petrolium Jelly, Eucerin and Vanicream    Avoid Lotions (too thin)  Mild  Cleansers:    Dove- Fragrance Free    CeraVe     Vanicream Cleansing Bar    Cetaphil Cleanser     Aquaphor 2 in1 Gentle Wash and Shampoo      Laundry Products:    All Free and Clear    Cheer Free    Generic Brands are okay as long as they are  Fragrance Free      Avoid fabric softeners  and dryer sheets Sunscreens: SPF 30 or greater      Sunscreens that contain Zinc Oxide or Titanium Dioxide should be applied, these are physical blockers. Spray or  chemical  sunscreens should be avoided.       Shampoo and Conditioners:    Free and Clear by Vanicream    Aquaphor 2 in 1 Gentle Wash and Shampoo    California Baby  super sensitive   Oils:    Mineral Oil     Emu Oil     For some patients, coconut and sunflower seed oil       Generic Products are an okay substitute, but make sure they are fragrance free.  *Avoid product that have fragrance added to them. Organic does not mean  fragrance free.  In fact patients with sensitive skin can become quite irritated by organic products.      1. Daily bathing is recommended. Make sure you are applying a good moisturizer after bathing every time.  2. Use Moisturizing creams at least twice daily to the whole body. Your provider may recommend a lighter or heavier moisturizer based on your child s severity and that time of year it is.  3. Creams are more moisturizing than lotions  4. Products should be fragrance free- soaps, creams, detergents.    Care Plan:  1. Keep bathing and showering short, less than 15 minutes   2. Always use lukewarm warm when possible. AVOID very HOT or COLD water  3. DO NOT use bubble bath  4. Limit the use of soaps. Focus on the skin folds, face, armpits, groin and feet  5. Do NOT vigorously scrub when you cleanse your skin  6. After bathing, PAT your skin lightly with a towel. DO NOT rub or scrub when drying  7. ALWAYS apply a moisturizer immediately after bathing. This helps to  lock in  the moisture. * IF YOU WERE PRESCRIBED A TOPICAL MEDICATION, APPLY YOUR  MEDICATION FIRST THEN COVER WITH YOUR DAILY MOISTURIZER  8. Reapply moisturizing agents at least twice daily to your whole body  9. Do not use products such as powders, perfumes, or colognes on your skin  10. Avoid saunas and steam baths. This temperature is too HOT  11. Avoid tight or  scratchy  clothing such as wool  12. Always wash new clothing before wearing them for the first time  13. Sometimes a humidifier or vaporizer can be used at night can help the dry skin. Remember to keep it clean to avoid mold growth.

## 2021-10-22 NOTE — LETTER
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          GREEN ZONE       Doing well today      Usual level of activity and exercise    Usual amount of cough and mucus    No shortness of breath    Usual level of health (thinking clearly, sleeping well, feel like eating) Actions:      Take daily medicines    Use oxygen as prescribed    Follow regular exercise and diet plan    Avoid cigarette smoke and other irritants that harm the lungs           YELLOW ZONE          Having a bad day or flare up      Short of breath more than usual    A lot more sputum (mucus) than usual    Sputum looks yellow, green, tan, brown or bloody    More coughing or wheezing    Fever or chills    Less energy; trouble completing activities    Trouble thinking or focusing    Using quick relief inhaler or nebulizer more often    Poor sleep; symptoms wake me up    Do not feel like eating Actions:      Get plenty of rest    Take daily medicines    Use quick relief inhaler every 4 hours    If you use oxygen, call you doctor to see if you should adjust your oxygen    Do breathing exercises or other things to help you relax    Let a loved one, friend or neighbor know you are feeling worse    Call your care team if you have 2 or more symptoms.  Start taking steroids or antibiotics if directed by your care team           RED ZONE       Need medical care now      Severe shortness of breath (feel you can't breathe)    Fever, chills    Not enough breath to do any activity    Trouble coughing up mucus, walking or  talking    Blood in mucus    Frequent coughing   Rescue medicines are not working    Not able to sleep because of breathing    Feel confused or drowsy    Chest pain    Actions:      Call your health care team.  If you cannot reach your care team, call 911 or go to the emergency room.        Annual Reminders:  Meet with Care Team, Flu Shot every Fall

## 2021-10-22 NOTE — PROGRESS NOTES
"SUBJECTIVE:   Nixon More is a 86 year old male who presents for Preventive Visit.      Patient has been advised of split billing requirements and indicates understanding: Yes   Are you in the first 12 months of your Medicare coverage?  No    Healthy Habits:     In general, how would you rate your overall health?  Good    Frequency of exercise:  1 day/week    Duration of exercise:  15-30 minutes    Do you usually eat at least 4 servings of fruit and vegetables a day, include whole grains    & fiber and avoid regularly eating high fat or \"junk\" foods?  No    Taking medications regularly:  Yes    Medication side effects:  None    Ability to successfully perform activities of daily living:  Housework requires assistance    Home Safety:  No safety concerns identified    Hearing Impairment:  Difficulty following a conversation in a noisy restaurant or crowded room, feel that people are mumbling or not speaking clearly, need to ask people to speak up or repeat themselves, find that men's voices are easier to understand than woman's and difficulty understanding soft or whispered speech    In the past 6 months, have you been bothered by leaking of urine? Yes    In general, how would you rate your overall mental or emotional health?  Good      PHQ-2 Total Score: 2    Additional concerns today:  Yes (-lab work (daughter wrote in with concerns) -PND, -BLOOD IN STOOL / URINE, SOB)    PHQ-9 SCORE 7/19/2019 6/22/2021 10/22/2021   PHQ-9 Total Score - - -   PHQ-9 Total Score MyChart - - 2 (Minimal depression)   PHQ-9 Total Score 1 3 2      HAS BEEN DOING REHAB AND ABOUT DONE.    Osteopenia - not taking actonel.  Forgot about it.  Wondering about kidney function and issues.  Not needing inhaler  Prostate cancer - follows with Dr. Brar and off medications and doing well.    Do you feel safe in your environment? Yes    Have you ever done Advance Care Planning? (For example, a Health Directive, POLST, or a discussion with a " medical provider or your loved ones about your wishes): Yes, advance care planning is on file.        Fall risk  Fallen 2 or more times in the past year?: (P) No  Any fall with injury in the past year?: (P) No    Cognitive Screening   1) Repeat 3 items (Leader, Season, Table)    2) Clock draw: NORMAL  3) 3 item recall: Recalls 3 objects  Results: 3 items recalled: COGNITIVE IMPAIRMENT LESS LIKELY    Mini-CogTM Copyright AGNES Roman. Licensed by the author for use in [facility]; reprinted with permission (alfa@[facility]). All rights reserved.      Do you have sleep apnea, excessive snoring or daytime drowsiness?: yes    Reviewed and updated as needed this visit by clinical staff  Tobacco  Allergies  Meds  Problems  Med Hx  Surg Hx  Fam Hx  Soc Hx          Reviewed and updated as needed this visit by Provider   Allergies  Meds  Problems            Social History     Tobacco Use     Smoking status: Former Smoker     Packs/day: 1.00     Types: Cigarettes     Start date:      Quit date: 1978     Years since quittin.8     Smokeless tobacco: Never Used   Substance Use Topics     Alcohol use: Never         Alcohol Use 10/22/2021   Prescreen: >3 drinks/day or >7 drinks/week? No   Prescreen: >3 drinks/day or >7 drinks/week? -       Current providers sharing in care for this patient include:   Patient Care Team:  Natalya Lewis MD as PCP - General  Natalya Lewis MD as Assigned PCP  Mark Pino MD as Assigned Surgical Provider  Ruth Almonte, RN as Specialty Care Coordinator (Oncology)  Nimisha Mercado MD as Assigned Cancer Care Provider  Carmelita Hidalgo MD as Assigned Endocrinology Provider  Nimisha Bennett PA-C as Assigned Heart and Vascular Provider  Yeo, Albert, MD as Assigned Musculoskeletal Provider    The following health maintenance items are reviewed in Epic and correct as of today:  Health Maintenance Due   Topic Date Due     COPD ACTION  "PLAN  Never done     FALL RISK ASSESSMENT  10/27/2021     Labs reviewed in EPIC        Review of Systems   Constitutional: Negative for chills and fever.   HENT: Positive for congestion and hearing loss. Negative for ear pain and sore throat.    Eyes: Positive for pain and visual disturbance.   Respiratory: Positive for cough and shortness of breath.    Cardiovascular: Positive for peripheral edema. Negative for chest pain and palpitations.   Gastrointestinal: Positive for constipation, diarrhea and hematochezia. Negative for abdominal pain, heartburn and nausea.   Genitourinary: Positive for hematuria, impotence and urgency. Negative for discharge, dysuria, frequency and genital sores.   Musculoskeletal: Positive for arthralgias. Negative for joint swelling and myalgias.   Skin: Positive for rash.   Neurological: Positive for dizziness, weakness and paresthesias. Negative for headaches.   Psychiatric/Behavioral: Negative for mood changes. The patient is not nervous/anxious.    knee pain - amada left knee.  Rash - sees derm and not moisturizing.  Uses triamcinolone prn      OBJECTIVE:   /64 (BP Location: Right arm, Patient Position: Chair, Cuff Size: Adult Large)   Pulse 68   Temp 97  F (36.1  C) (Temporal)   Resp 16   Ht 1.715 m (5' 7.5\")   Wt 104.3 kg (230 lb)   SpO2 97%   BMI 35.49 kg/m   Estimated body mass index is 35.49 kg/m  as calculated from the following:    Height as of this encounter: 1.715 m (5' 7.5\").    Weight as of this encounter: 104.3 kg (230 lb).  Physical Exam  GENERAL: healthy, alert and no distress  EYES: Eyes grossly normal to inspection, PERRL and conjunctivae and sclerae normal  HENT: ear canals and TM's normal, nose and mouth without ulcers or lesions  NECK: no adenopathy, no asymmetry, masses, or scars and thyroid normal to palpation  RESP: lungs clear to auscultation - no rales, rhonchi or wheezes  CV: regular rate and rhythm, normal S1 S2, no S3 or S4, no murmur, click or rub, " no peripheral edema and peripheral pulses strong  ABDOMEN: soft, nontender, no hepatosplenomegaly, no masses and bowel sounds normal  MS: no gross musculoskeletal defects noted, no edema  SKIN: no suspicious lesions or rashes  NEURO: Normal strength and tone, mentation intact and speech normal  PSYCH: mentation appears normal, affect normal/bright    Diagnostic Test Results:  Labs reviewed in Epic    ASSESSMENT / PLAN:   Encounter for Medicare annual wellness exam  Routine health education discussed: calcium, diet, exercise, weight, safety.     Other emphysema (H)  Controlled, continue as needed inhaler    Atrial tachycardia (H)  Continue anticoagulation, good rate control  - apixaban ANTICOAGULANT (ELIQUIS) 5 MG tablet; Take 1 tablet (5 mg) by mouth 2 times daily    Dyslipidemia  Continue statin  - atorvastatin (LIPITOR) 40 MG tablet; Take 1 tablet (40 mg) by mouth daily    Mild major depression (H)  Discussed stress and coping, continue medication   - escitalopram (LEXAPRO) 10 MG tablet; Take 1 tablet (10 mg) by mouth daily    Hypertension goal BP (blood pressure) < 140/90  Controlled, continue medication and check labs  - Basic metabolic panel; Future  - losartan (COZAAR) 100 MG tablet; Take 1 tablet (100 mg) by mouth daily  - Basic metabolic panel    Bee sting-induced anaphylaxis, undetermined intent, subsequent encounter  Discussed epi-pen, will request refill in the fall    Obesity (BMI 35.0-39.9) with comorbidity (H)  Discussed increasing activity and healthy diet    Stage 3 chronic kidney disease, unspecified whether stage 3a or 3b CKD (H)  recheck  - Albumin Random Urine Quantitative with Creat Ratio; Future  - Magnesium; Future  - Phosphorus; Future  - Albumin Random Urine Quantitative with Creat Ratio  - Magnesium  - Phosphorus    Impaired fasting glucose  Recheck.  Discussed diet and exercise  - Hemoglobin A1c; Future  - Hemoglobin A1c/    Patient has been advised of split billing requirements and  "indicates understanding: No  COUNSELING:  Reviewed preventive health counseling, as reflected in patient instructions    Estimated body mass index is 35.49 kg/m  as calculated from the following:    Height as of this encounter: 1.715 m (5' 7.5\").    Weight as of this encounter: 104.3 kg (230 lb).    Weight management plan: Discussed healthy diet and exercise guidelines    He reports that he quit smoking about 43 years ago. His smoking use included cigarettes. He started smoking about 73 years ago. He smoked 1.00 pack per day. He has never used smokeless tobacco.      Appropriate preventive services were discussed with this patient, including applicable screening as appropriate for cardiovascular disease, diabetes, osteopenia/osteoporosis, and glaucoma.  As appropriate for age/gender, discussed screening for colorectal cancer, prostate cancer, breast cancer, and cervical cancer. Checklist reviewing preventive services available has been given to the patient.    Reviewed patients plan of care and provided an AVS. The Basic Care Plan (routine screening as documented in Health Maintenance) for Nixon meets the Care Plan requirement. This Care Plan has been established and reviewed with the Patient.    Counseling Resources:  ATP IV Guidelines  Pooled Cohorts Equation Calculator  Breast Cancer Risk Calculator  Breast Cancer: Medication to Reduce Risk  FRAX Risk Assessment  ICSI Preventive Guidelines  Dietary Guidelines for Americans, 2010  USDA's MyPlate  ASA Prophylaxis  Lung CA Screening    Natalya Lewis MD  [facility]    Identified Health Risks:    He is at risk for lack of exercise and has been provided with information to increase physical activity for the benefit of his well-being.  Encourage wel program  The patient was provided with written information regarding signs of hearing loss.  Chronic urinary incontinence - follows with urology  Answers for HPI/ROS submitted by the patient on " 10/22/2021  If you checked off any problems, how difficult have these problems made it for you to do your work, take care of things at home, or get along with other people?: Not difficult at all  PHQ9 TOTAL SCORE: 2  JAMSHID 7 TOTAL SCORE: 0

## 2021-10-23 ASSESSMENT — ANXIETY QUESTIONNAIRES: GAD7 TOTAL SCORE: 0

## 2021-10-23 ASSESSMENT — PATIENT HEALTH QUESTIONNAIRE - PHQ9: SUM OF ALL RESPONSES TO PHQ QUESTIONS 1-9: 2

## 2021-10-24 LAB
ANION GAP SERPL CALCULATED.3IONS-SCNC: 8 MMOL/L (ref 3–14)
BUN SERPL-MCNC: 36 MG/DL (ref 7–30)
CALCIUM SERPL-MCNC: 9.5 MG/DL (ref 8.5–10.1)
CHLORIDE BLD-SCNC: 113 MMOL/L (ref 94–109)
CO2 SERPL-SCNC: 20 MMOL/L (ref 20–32)
CREAT SERPL-MCNC: 1.34 MG/DL (ref 0.66–1.25)
CREAT UR-MCNC: 226 MG/DL
GFR SERPL CREATININE-BSD FRML MDRD: 48 ML/MIN/1.73M2
GLUCOSE BLD-MCNC: 74 MG/DL (ref 70–99)
MAGNESIUM SERPL-MCNC: 2.4 MG/DL (ref 1.6–2.3)
MICROALBUMIN UR-MCNC: 21 MG/L
MICROALBUMIN/CREAT UR: 9.29 MG/G CR (ref 0–17)
PHOSPHATE SERPL-MCNC: 4.3 MG/DL (ref 2.5–4.5)
POTASSIUM BLD-SCNC: 4.8 MMOL/L (ref 3.4–5.3)
SODIUM SERPL-SCNC: 141 MMOL/L (ref 133–144)

## 2021-10-25 RX ORDER — ALBUTEROL SULFATE 90 UG/1
2 AEROSOL, METERED RESPIRATORY (INHALATION) EVERY 4 HOURS PRN
Qty: 8 G | Refills: 0 | Status: SHIPPED | OUTPATIENT
Start: 2021-10-25 | End: 2023-01-03

## 2021-10-25 NOTE — TELEPHONE ENCOUNTER
Routing refill request to provider for review/approval because:  Pharmacy sent message stating that albuterol (proair respiclick) is not covered by insurance, requesting alternate medication be sent.     Kavitha Richey RN on 10/25/2021 at 10:50 AM

## 2021-10-26 ENCOUNTER — HOSPITAL ENCOUNTER (OUTPATIENT)
Dept: CARDIAC REHAB | Facility: CLINIC | Age: 86
End: 2021-10-26
Attending: INTERNAL MEDICINE
Payer: COMMERCIAL

## 2021-10-26 PROCEDURE — 93798 PHYS/QHP OP CAR RHAB W/ECG: CPT

## 2021-10-29 ENCOUNTER — HOSPITAL ENCOUNTER (OUTPATIENT)
Dept: CARDIAC REHAB | Facility: CLINIC | Age: 86
End: 2021-10-29
Attending: INTERNAL MEDICINE
Payer: COMMERCIAL

## 2021-10-29 PROCEDURE — 93798 PHYS/QHP OP CAR RHAB W/ECG: CPT | Performed by: REHABILITATION PRACTITIONER

## 2021-10-29 PROCEDURE — 93797 PHYS/QHP OP CAR RHAB WO ECG: CPT | Mod: XU | Performed by: REHABILITATION PRACTITIONER

## 2021-11-11 NOTE — TELEPHONE ENCOUNTER
07/22/19 Spoke w patient who voiced understanding regard recommendations. Currently taking Torsemide 20 mg every day. He stated he feels great and breathing is improved. Instructed to continue w Torsemide 20 mg every day until appt 8/8 and to monitor for weight increase,  swelling or increased SOB before then and to call with questions or problems . Magdalene9:27 am   LM for the pt to call me back.

## 2021-11-16 ENCOUNTER — LAB (OUTPATIENT)
Dept: LAB | Facility: CLINIC | Age: 86
End: 2021-11-16
Payer: COMMERCIAL

## 2021-11-16 DIAGNOSIS — D64.9 ANEMIA, UNSPECIFIED TYPE: ICD-10-CM

## 2021-11-16 LAB
BASOPHILS # BLD AUTO: 0.1 10E3/UL (ref 0–0.2)
BASOPHILS NFR BLD AUTO: 1 %
EOSINOPHIL # BLD AUTO: 0.4 10E3/UL (ref 0–0.7)
EOSINOPHIL NFR BLD AUTO: 6 %
ERYTHROCYTE [DISTWIDTH] IN BLOOD BY AUTOMATED COUNT: 14.4 % (ref 10–15)
HCT VFR BLD AUTO: 43.1 % (ref 40–53)
HGB BLD-MCNC: 13.7 G/DL (ref 13.3–17.7)
LYMPHOCYTES # BLD AUTO: 1.5 10E3/UL (ref 0.8–5.3)
LYMPHOCYTES NFR BLD AUTO: 26 %
MCH RBC QN AUTO: 29.8 PG (ref 26.5–33)
MCHC RBC AUTO-ENTMCNC: 31.8 G/DL (ref 31.5–36.5)
MCV RBC AUTO: 94 FL (ref 78–100)
MONOCYTES # BLD AUTO: 0.6 10E3/UL (ref 0–1.3)
MONOCYTES NFR BLD AUTO: 11 %
NEUTROPHILS # BLD AUTO: 3.3 10E3/UL (ref 1.6–8.3)
NEUTROPHILS NFR BLD AUTO: 56 %
PLATELET # BLD AUTO: 193 10E3/UL (ref 150–450)
RBC # BLD AUTO: 4.59 10E6/UL (ref 4.4–5.9)
WBC # BLD AUTO: 5.9 10E3/UL (ref 4–11)

## 2021-11-16 PROCEDURE — 85025 COMPLETE CBC W/AUTO DIFF WBC: CPT

## 2021-11-16 PROCEDURE — 36415 COLL VENOUS BLD VENIPUNCTURE: CPT

## 2021-11-20 NOTE — PROGRESS NOTES
"[facility]    I had the pleasure of seeing Spencer when he came for follow up of AFib.  This 86 year old sees Dr. Lee and previously saw Dr. Camp for his history of:    1. Permanent AFib -  atypical atrial flutter and AFib first diagnosed 12/2018 in the setting of a normal EF. Rate control/anticoagulation strategy  2. Chronic AC with Eliquis 5 mg BID for CHADSVASc of 5 (HFpEF, HTN, presumed CAD, age)  3. Chronic GALDAMEZ, noted by Dr. Camp 6/2019.  30-pack-year history of tobacco use, quitting~2000. Saw Dr. Moreno and PFTs showed minimal restriction, muscular weakness and no obstruction. HFpEF, obesity, deconditioning all contributing  4. HFpEF - hydrochlorothiazide and later torsemide stopped d/t hypotension  5. Ascending aorta and aortic root dilatation (4.4 cm, 4.6 cm) noted on echocardiogram 6/2021 - stable  6. Presumed CAD based on stress echocardiogram 1/2011.  Dr. Camp opted to treat this medically in the absence of symptoms.  Repeat stress test 12/2014 was negative for ischemia   7. HTN with some hypotension resulting in discontinuation of some medications  8.  MAYITO - on ASV therapy for complex Central and Obstructive Sleep Apnea. Last visit 2/2021  9. Peripheral Venous Insufficiency   10. Dyslipidemia    I saw Spencer 7/2021 at which time he was feeling quite a bit better after switching his renally-cleared atenolol to metoprolol XL, with improved SOB/GALDAMEZ. BPs in cardiac rehab were running 90-110s and edema was \"pretty good\" on the torsemide after he started watching his sodium intake (had moved into AL). Weight at that 7/22 visit was 232#.    As he continued to c/o significant urinary urgency affecting his QOL, we agreed to reduce torsemide from 10 to 5 mg daily and start spironolactone 12.5. Follow-up BMPs have shown stable electrolytes/renal fxn.    He was in the ER 8/2021 after being sent there from Cardiac Rehab for increasing GALDAMEZ and hypotension (80s/60s). I asked him to HOLD torsemide 5 " "mg given he was dry and hypotensive. Follow-up BMP looked good and he was feeling quite a bit better off of the torsemide and 3 m follow-up planned.    Interval History:  Overall feels pretty good with maybe slight increase in SOB since I last saw him. Now completed cardiac rehab and wonders if he's getting \"out of shape.\"    No complaints of chest pain, pressure or tightness.  No orthopnea, PND but notes slight LE edema. Overall, he is feeling well but not quite as good as he had last time I saw him 7/2021.    EKG done today d/t low HR showed HR only 51 bpm! We've not seen it this low before.    VITALS:  Vitals: /67   Pulse (!) 45   Ht 1.715 m (5' 7.5\")   Wt 104.3 kg (230 lb)   SpO2 98%   BMI 35.49 kg/m      Diagnostic Testing:  EKG today on metoprolol  mg daily and Diltiazem 120 mg daily, which I overread, showed Atypical AFlutter 51 bpm with low voltage  EKG 6/24/2021 on atenolol 150 mg daily showed atypical atrial flutter 66 bpm.  Echo 7/2021 EF 55%. Nl RV. No sig valve dz. Asc aor 4.2 cm (previously read 4.5 cm on 9/2020 echo)  Echo 9/2020 with EF 55-60%. No RWMA. Nl RV. 1+MR. Mild MAC. 1+TR with RVSP  23 mmHg. Ao sclerosis. Mod root dilation 4.4 cm and ascending aorta mod dilated 4.6 cm  PFTs 8/2019 showed mild restriction. NIF ~75% of normal, without significant change c/w 7/2019  Aortoiliac US 7/23/2019 showed sacular AAA with mild enlargement with max diameter 3.3 x 2.2. <50% stenosis in aorta, iliac arteries  Exercise ABIs 7/23/2019 with normal ABIs and normal response to exercise. Developed calf and thigh tiredness after 2.5 minutes  EKG 7/22/2019 showed AFib at 79 bpm  MRA chest 5/2019 showed aortic root was borderline dilated (4 x 3.9 cm).  The ascending aorta was mildly dilated (4.4 x 4.5 at the level of bifurcation of pulmonary arteries)  Holter monitor 5/21/2019 showed atrial fibrillation with an average heart rate of 75 bpm.  Range was  bpm.  He had 2 \"events\" which correlated " with chronic atrial fibrillation with heart rates in the 70-80s.  Echocardiogram 12/2018 showed an EF of 55%.  He had mildly reduced right ventricular systolic function. LA size was moderately-severely dilated with a left atrial volume index of 51.2 mL/m .  Left atrial size of 6.0 cm.  As above, his aortic root and ascending aorta were both enlarged at 4.6 cm  Stress echocardiogram 12/2014 was negative for stress-induced wall motion abnormalities.  Component      Latest Ref Rng & Units 8/17/2021 11/16/2021   WBC      4.0 - 11.0 10e3/uL 5.9 5.9   RBC Count      4.40 - 5.90 10e6/uL 4.42 4.59   Hemoglobin      13.3 - 17.7 g/dL 12.9 (L) 13.7   Hematocrit      40.0 - 53.0 % 40.7 43.1   MCV      78 - 100 fL 92 94   MCH      26.5 - 33.0 pg 29.2 29.8   MCHC      31.5 - 36.5 g/dL 31.7 31.8   RDW      10.0 - 15.0 % 14.6 14.4   Platelet Count      150 - 450 10e3/uL 202 193     Component      Latest Ref Rng & Units 8/25/2021 10/22/2021   Sodium      133 - 144 mmol/L 140 141   Potassium      3.4 - 5.3 mmol/L 4.8 4.8   Chloride      94 - 109 mmol/L 112 (H) 113 (H)   Carbon Dioxide      20 - 32 mmol/L 25 20   Anion Gap      3 - 14 mmol/L 3 8   Urea Nitrogen      7 - 30 mg/dL 28 36 (H)   Creatinine      0.66 - 1.25 mg/dL 1.29 (H) 1.34 (H)   Calcium      8.5 - 10.1 mg/dL 9.1 9.5   Glucose      70 - 99 mg/dL 103 (H) 74         Plan:  1. Decrease metoprolol XL from 150 mg daily to 100 mg daily  2. [employer] message to be sent in ~3 weeks with update on breathing    Assessment/Plan:    1. SOB/GALDAMEZ    Remains on just spironolactone 12.5 mg daily which he feels is working well    Weight (232# at 7/2021 visit) is down to 230#    On exam, has clear lungs, mild HJR and 1+ edema      Hgb 11/2021 wnl    PFTs 2019 with mild restriction    Echo 7/2021 with nl EF and no sig valve abnls    EKG with HR only 51 bpm    PLAN:    Decrease metoprolol XL to just 100 mg daily given low HRs could be contributing to his exercise intolerance and slight  increase in SOB    Send [employer] message to me in ~3-4 weeks with update      2. Permanent AFib/atypical AFlutter    Remains on metoprolol XL in lieu of atenolol d/t renal insufficiency    On AC with Eliquis 5 mg BID.     PLAN:    D/t low HR, decrease metoprolol XL from 150 to 100 mg daily    Continue AC. Hgb looked good    Cherise Bennett PA-C, MSPAS      Orders Placed This Encounter   Procedures     EKG 12-lead complete w/read - Clinics (performed today)     Orders Placed This Encounter   Medications     metoprolol succinate ER (TOPROL-XL) 100 MG 24 hr tablet     Sig: Take 1 tablet (100 mg) by mouth daily     Dispense:  90 tablet     Refill:  3     Note dose change - pt does not need refill at this time as will use up his current Rx first.     Medications Discontinued During This Encounter   Medication Reason     metoprolol succinate ER (TOPROL-XL) 100 MG 24 hr tablet          Encounter Diagnoses   Name Primary?     Chronic diastolic heart failure (H)      Persistent atrial fibrillation (H)        CURRENT MEDICATIONS:  Current Outpatient Medications   Medication Sig Dispense Refill     albuterol (PROAIR HFA/PROVENTIL HFA/VENTOLIN HFA) 108 (90 Base) MCG/ACT inhaler Inhale 2 puffs into the lungs every 4 hours as needed for shortness of breath / dyspnea or wheezing 8 g 0     apixaban ANTICOAGULANT (ELIQUIS) 5 MG tablet Take 1 tablet (5 mg) by mouth 2 times daily 180 tablet 3     atorvastatin (LIPITOR) 40 MG tablet Take 1 tablet (40 mg) by mouth daily 90 tablet 3     Cholecalciferol (VITAMIN D-3) 25 MCG (1000 UT) CAPS Take by mouth daily       diltiazem ER (DILT-XR) 120 MG 24 hr capsule Take 1 capsule (120 mg) by mouth daily 90 capsule 3     EPINEPHrine (EPIPEN/ADRENACLICK/OR ANY BX GENERIC EQUIV) 0.3 MG/0.3ML injection 2-pack Inject 0.3 mLs (0.3 mg) into the muscle as needed for anaphylaxis 0.6 mL 1     escitalopram (LEXAPRO) 10 MG tablet Take 1 tablet (10 mg) by mouth daily 90 tablet 3     ipratropium (ATROVENT) 0.06 %  "nasal spray Spray 2 sprays in nostril 4 times daily as needed for rhinitis 3 Box 3     Loperamide HCl (IMODIUM OR) Take by mouth daily as needed       losartan (COZAAR) 100 MG tablet Take 1 tablet (100 mg) by mouth daily 90 tablet 3     metoprolol succinate ER (TOPROL-XL) 100 MG 24 hr tablet Take 1 tablet (100 mg) by mouth daily 90 tablet 3     spironolactone (ALDACTONE) 25 MG tablet Take 0.5 tablets (12.5 mg) by mouth daily 45 tablet 0     ASPIRIN NOT PRESCRIBED (INTENTIONAL) continuous prn for other Antiplatelet medication not prescribed intentionally due to Current anticoagulant therapy (warfarin/enoxaparin) (Patient not taking: Reported on 7/22/2021)       RISEdronate (ACTONEL) 35 MG tablet Take 1 tablet (35 mg) by mouth every 7 days (Patient not taking: Reported on 7/22/2021) 12 tablet 3       ALLERGIES     Allergies   Allergen Reactions     Augmentin Rash     Type III hypersensitivity     Bactrim [Sulfamethoxazole W/Trimethoprim] Rash     Serum sickness, type III hypersensitivity       Bee Venom Itching     Sulfa Drugs Hives     Celebrex [Celecoxib]      Lisinopril Cough     Naproxen Hives         Review of Systems:  Skin:  Negative     Eyes:  Positive for glasses  ENT:  Negative    Respiratory:  Positive for sleep apnea;CPAP;dyspnea on exertion;cough  Cardiovascular:  chest pain;palpitations;dizziness;syncope or near-syncope;cyanosis;Negative for Positive for;fatigue;edema;exercise intolerance;lightheadedness  Gastroenterology: Negative for hematochezia  Genitourinary:  Positive for urinary frequency;urgency;nocturia  Musculoskeletal:  Positive for joint pain  Neurologic:  Negative    Psychiatric:  Positive for depression  Heme/Lymph/Imm:  Positive for allergies  Endocrine:  Negative diabetes    Physical Exam:  Vitals: /67   Pulse (!) 45   Ht 1.715 m (5' 7.5\")   Wt 104.3 kg (230 lb)   SpO2 98%   BMI 35.49 kg/m      Constitutional:  cooperative, alert and oriented, well developed, well nourished, in " no acute distress        Skin:  warm and dry to the touch        Head:  normocephalic        Eyes:  pupils equal and round;conjunctivae and lids unremarkable;sclera white        ENT:  not assessed this visit        Neck:  no carotid bruit hepatojugular reflux      Chest:  clear to auscultation;normal symmetry;normal respiratory excursion        Cardiac: normal S1 and S2;no murmurs, gallops or rubs detected irregularly irregular rhythm;bradycardic distant heart sounds              Abdomen:    obese      Vascular:   pulses below the femoral arteries are diminished                               strong radial pulses, slighly diminshed dorsalis pedis and posterior tibia    Extremities and Back:  no deformities, clubbing, cyanosis, erythema observed        Neurological:  no gross motor deficits            PAST MEDICAL HISTORY:  Past Medical History:   Diagnosis Date     Actinic keratosis      Arthritis      Atrial fibrillation and flutter (H) 12/3/2018     CAD (coronary artery disease)     1/5/2011 lateral ischemia on stress echo, 12/2014 normal stress echo     Coronary artery disease 1/1/2011    Abnormal stress test 1/2011 and controlled with medical mgmt      Dyslipidemia      Emphysema of lung (H)      Essential hypertension, benign      Hernia, abdominal      Hypersomnia with sleep apnea, unspecified     on bipap, partially treated with residual apneas     Hypertrophy (benign) of prostate 5/01    Biopsy 5/01 negative for cancer; PSA 5     Lumbago      Mumps      Prostate cancer (H) 12/15/2006     Skin cancer, basal cell 1997     Spider veins        PAST SURGICAL HISTORY:  Past Surgical History:   Procedure Laterality Date     HERNIA REPAIR  child     Moh's procedure for basal cell carcinoma  01/2001     PROSTATE SURGERY       s/p lumbar laminectomy NOS  1988     SIGMOIDOSCOPY FLEXIBLE N/A 6/15/2020    Procedure: SIGMOIDOSCOPY, FLEXIBLE;  Surgeon: Sunni Patton MD;  Location:  GI     VITRECTOMY PARS PLANA  REMOVE PRERETINAL MEMBRANE   3/09       FAMILY HISTORY:  Family History   Problem Relation Age of Onset     Alzheimer Disease Mother      Hypertension Mother      Cardiovascular Father         D:86 complications fo CHF     Prostate Cancer Brother      Lung Cancer Brother 76     Prostate Cancer Brother        SOCIAL HISTORY:  Social History     Socioeconomic History     Marital status:      Spouse name: None     Number of children: None     Years of education: None     Highest education level: None   Occupational History     Occupation: retired   Tobacco Use     Smoking status: Former Smoker     Packs/day: 1.00     Types: Cigarettes     Start date:      Quit date: 1978     Years since quittin.9     Smokeless tobacco: Never Used   Substance and Sexual Activity     Alcohol use: Never     Drug use: No     Sexual activity: Yes     Partners: Female   Other Topics Concern      Service Not Asked     Blood Transfusions Not Asked     Caffeine Concern No     Comment: 0-2 cans of soda per day.     Occupational Exposure Not Asked     Hobby Hazards Not Asked     Sleep Concern Not Asked     Stress Concern Not Asked     Weight Concern Not Asked     Special Diet Not Asked     Back Care Not Asked     Exercise No     Bike Helmet Not Asked     Seat Belt Yes     Self-Exams Not Asked     Parent/sibling w/ CABG, MI or angioplasty before 65F 55M? No   Social History Narrative     None     Social Determinants of Health     Financial Resource Strain: Not on file   Food Insecurity: Not on file   Transportation Needs: Not on file   Physical Activity: Not on file   Stress: Not on file   Social Connections: Not on file   Intimate Partner Violence: Not At Risk     Fear of Current or Ex-Partner: No     Emotionally Abused: No     Physically Abused: No     Sexually Abused: No   Housing Stability: Not on file

## 2021-11-23 ENCOUNTER — OFFICE VISIT (OUTPATIENT)
Dept: CARDIOLOGY | Facility: CLINIC | Age: 86
End: 2021-11-23
Payer: COMMERCIAL

## 2021-11-23 ENCOUNTER — ONCOLOGY VISIT (OUTPATIENT)
Dept: ONCOLOGY | Facility: CLINIC | Age: 86
End: 2021-11-23
Attending: INTERNAL MEDICINE
Payer: COMMERCIAL

## 2021-11-23 VITALS
RESPIRATION RATE: 16 BRPM | TEMPERATURE: 97.2 F | HEART RATE: 69 BPM | OXYGEN SATURATION: 96 % | DIASTOLIC BLOOD PRESSURE: 67 MMHG | HEIGHT: 68 IN | SYSTOLIC BLOOD PRESSURE: 107 MMHG | BODY MASS INDEX: 35.09 KG/M2 | WEIGHT: 231.5 LBS

## 2021-11-23 VITALS
WEIGHT: 230 LBS | DIASTOLIC BLOOD PRESSURE: 67 MMHG | HEART RATE: 45 BPM | HEIGHT: 68 IN | SYSTOLIC BLOOD PRESSURE: 110 MMHG | BODY MASS INDEX: 34.86 KG/M2 | OXYGEN SATURATION: 98 %

## 2021-11-23 DIAGNOSIS — I50.32 CHRONIC DIASTOLIC HEART FAILURE (H): ICD-10-CM

## 2021-11-23 DIAGNOSIS — I48.19 PERSISTENT ATRIAL FIBRILLATION (H): ICD-10-CM

## 2021-11-23 DIAGNOSIS — D64.9 ANEMIA, UNSPECIFIED TYPE: Primary | ICD-10-CM

## 2021-11-23 PROCEDURE — 93000 ELECTROCARDIOGRAM COMPLETE: CPT | Performed by: PHYSICIAN ASSISTANT

## 2021-11-23 PROCEDURE — 99213 OFFICE O/P EST LOW 20 MIN: CPT | Performed by: INTERNAL MEDICINE

## 2021-11-23 PROCEDURE — 99214 OFFICE O/P EST MOD 30 MIN: CPT | Mod: 25 | Performed by: PHYSICIAN ASSISTANT

## 2021-11-23 RX ORDER — METOPROLOL SUCCINATE 100 MG/1
100 TABLET, EXTENDED RELEASE ORAL DAILY
Qty: 90 TABLET | Refills: 3 | Status: SHIPPED | OUTPATIENT
Start: 2021-11-23 | End: 2022-02-07

## 2021-11-23 ASSESSMENT — MIFFLIN-ST. JEOR
SCORE: 1706.64
SCORE: 1699.83

## 2021-11-23 ASSESSMENT — PAIN SCALES - GENERAL: PAINLEVEL: NO PAIN (0)

## 2021-11-23 NOTE — NURSING NOTE
"Oncology Rooming Note    November 23, 2021 10:14 AM   Nixon More is a 86 year old male who presents for:    Chief Complaint   Patient presents with     Oncology Clinic Visit     Malignant neoplasm of prostate     Initial Vitals: /67   Pulse 69   Temp 97.2  F (36.2  C) (Tympanic)   Resp 16   Ht 1.715 m (5' 7.5\")   Wt 105 kg (231 lb 8 oz)   SpO2 96%   BMI 35.72 kg/m   Estimated body mass index is 35.72 kg/m  as calculated from the following:    Height as of this encounter: 1.715 m (5' 7.5\").    Weight as of this encounter: 105 kg (231 lb 8 oz). Body surface area is 2.24 meters squared.  No Pain (0) Comment: Data Unavailable   No LMP for male patient.  Allergies reviewed: Yes  Medications reviewed: Yes    Medications: Medication refills not needed today.  Pharmacy name entered into Code[facility]:    [facility] [address]  [id number] - [place], [place] - [address] [address]    Clinical concerns: follow up        Ting Taylor CMA              "

## 2021-11-23 NOTE — PROGRESS NOTES
[facility]    Hematology/Oncology Established Patient Follow-up Note      Today's Date: 11/23/21    Reason for Follow-up: anemia    HISTORY OF PRESENT ILLNESS: Nixon More is a 86 year old male with PMHx of HTN, CAD, prostate cancer in 2006, atrial fibrillation/flutter on chronic apixaban, who presents with anemia.        On chart review, patient has had a normal hemoglobin, but in June 2020, his PCP noted that he developed a mild anemia in the 12 range.  Repeat labs in August and November show continued mild anemia with hemoglobin of around 12.  WBC and platelet count are normal.       He notes that he maybe feels a bit more short of breath with exertion.        He has history of prostate cancer s/p radiation and on intermittent ADT.  He most recently had Lupron in July 2019.  PSA remains undetectable.     Spencer notes that in September 2020, he received a cortisone injection.  After that, he had some hematuria.  He also had some rectal bleeding, for which he is following with colorectal surgery.  He was felt to have radiation proctitis and hemorrhoids, and he was treated with formalin.         INTERIM HISTORY: Spencer is doing well.  He has no new complaints today.        REVIEW OF SYSTEMS:   14 point ROS was reviewed and is negative other than as noted above in HPI.       HOME MEDICATIONS:  Current Outpatient Medications   Medication Sig Dispense Refill     albuterol (PROAIR HFA/PROVENTIL HFA/VENTOLIN HFA) 108 (90 Base) MCG/ACT inhaler Inhale 2 puffs into the lungs every 4 hours as needed for shortness of breath / dyspnea or wheezing 8 g 0     apixaban ANTICOAGULANT (ELIQUIS) 5 MG tablet Take 1 tablet (5 mg) by mouth 2 times daily 180 tablet 3     atorvastatin (LIPITOR) 40 MG tablet Take 1 tablet (40 mg) by mouth daily 90 tablet 3     Cholecalciferol (VITAMIN D-3) 25 MCG (1000 UT) CAPS Take by mouth daily       diltiazem ER (DILT-XR) 120 MG 24 hr capsule Take 1 capsule (120 mg) by mouth daily  90 capsule 3     EPINEPHrine (EPIPEN/ADRENACLICK/OR ANY BX GENERIC EQUIV) 0.3 MG/0.3ML injection 2-pack Inject 0.3 mLs (0.3 mg) into the muscle as needed for anaphylaxis 0.6 mL 1     escitalopram (LEXAPRO) 10 MG tablet Take 1 tablet (10 mg) by mouth daily 90 tablet 3     ipratropium (ATROVENT) 0.06 % nasal spray Spray 2 sprays in nostril 4 times daily as needed for rhinitis 3 Box 3     Loperamide HCl (IMODIUM OR) Take by mouth daily as needed       losartan (COZAAR) 100 MG tablet Take 1 tablet (100 mg) by mouth daily 90 tablet 3     metoprolol succinate ER (TOPROL-XL) 100 MG 24 hr tablet Take 1.5 tablets (150 mg) by mouth daily 45 tablet 1     spironolactone (ALDACTONE) 25 MG tablet Take 0.5 tablets (12.5 mg) by mouth daily 45 tablet 0     ASPIRIN NOT PRESCRIBED (INTENTIONAL) continuous prn for other Antiplatelet medication not prescribed intentionally due to Current anticoagulant therapy (warfarin/enoxaparin) (Patient not taking: Reported on 7/22/2021)       RISEdronate (ACTONEL) 35 MG tablet Take 1 tablet (35 mg) by mouth every 7 days (Patient not taking: Reported on 7/22/2021) 12 tablet 3         ALLERGIES:  Allergies   Allergen Reactions     Augmentin Rash     Type III hypersensitivity     Bactrim [Sulfamethoxazole W/Trimethoprim] Rash     Serum sickness, type III hypersensitivity       Bee Venom Itching     Sulfa Drugs Hives     Celebrex [Celecoxib]      Lisinopril Cough     Naproxen Hives         PAST MEDICAL HISTORY:  Past Medical History:   Diagnosis Date     Actinic keratosis      Arthritis      Atrial fibrillation and flutter (H) 12/3/2018     CAD (coronary artery disease)     1/5/2011 lateral ischemia on stress echo, 12/2014 normal stress echo     Coronary artery disease 1/1/2011    Abnormal stress test 1/2011 and controlled with medical mgmt      Dyslipidemia      Emphysema of lung (H)      Essential hypertension, benign      Hernia, abdominal      Hypersomnia with sleep apnea, unspecified     on bipap,  partially treated with residual apneas     Hypertrophy (benign) of prostate     Biopsy  negative for cancer; PSA 5     Lumbago      Mumps      Prostate cancer (H) 12/15/2006     Skin cancer, basal cell 1997     Spider veins          PAST SURGICAL HISTORY:  Past Surgical History:   Procedure Laterality Date     HERNIA REPAIR  child     Moh's procedure for basal cell carcinoma  2001     PROSTATE SURGERY       s/p lumbar laminectomy NOS  1988     SIGMOIDOSCOPY FLEXIBLE N/A 6/15/2020    Procedure: SIGMOIDOSCOPY, FLEXIBLE;  Surgeon: Sunni Patton MD;  Location:  GI     VITRECTOMY PARS PLANA REMOVE PRERETINAL MEMBRANE   3/09         SOCIAL HISTORY:  Social History     Socioeconomic History     Marital status:      Spouse name: Not on file     Number of children: Not on file     Years of education: Not on file     Highest education level: Not on file   Occupational History     Occupation: retired   Tobacco Use     Smoking status: Former Smoker     Packs/day: 1.00     Types: Cigarettes     Start date:      Quit date: 1978     Years since quittin.9     Smokeless tobacco: Never Used   Substance and Sexual Activity     Alcohol use: Never     Drug use: No     Sexual activity: Yes     Partners: Female   Other Topics Concern      Service Not Asked     Blood Transfusions Not Asked     Caffeine Concern No     Comment: 0-2 cans of soda per day.     Occupational Exposure Not Asked     Hobby Hazards Not Asked     Sleep Concern Not Asked     Stress Concern Not Asked     Weight Concern Not Asked     Special Diet Not Asked     Back Care Not Asked     Exercise No     Bike Helmet Not Asked     Seat Belt Yes     Self-Exams Not Asked     Parent/sibling w/ CABG, MI or angioplasty before 65F 55M? No   Social History Narrative     Not on file     Social Determinants of Health     Financial Resource Strain: Not on file   Food Insecurity: Not on file   Transportation Needs: Not on file   Physical  "Activity: Not on file   Stress: Not on file   Social Connections: Not on file   Intimate Partner Violence: Not At Risk     Fear of Current or Ex-Partner: No     Emotionally Abused: No     Physically Abused: No     Sexually Abused: No   Housing Stability: Not on file         FAMILY HISTORY:  Family History   Problem Relation Age of Onset     Alzheimer Disease Mother      Hypertension Mother      Cardiovascular Father         D:86 complications fo CHF     Prostate Cancer Brother      Lung Cancer Brother 76     Prostate Cancer Brother          PHYSICAL EXAM:  /67   Pulse 69   Temp 97.2  F (36.2  C) (Tympanic)   Resp 16   Ht 1.715 m (5' 7.5\")   Wt 105 kg (231 lb 8 oz)   SpO2 96%   BMI 35.72 kg/m    ECO  GENERAL/CONSTITUTIONAL: No acute distress. Daughter on speaker phone.  NEUROLOGIC: Alert, oriented, answers questions appropriately.  INTEGUMENTARY: No jaundice.      LABS:  CBC RESULTS: Recent Labs   Lab Test 21  1004   WBC 5.9   RBC 4.59   HGB 13.7   HCT 43.1   MCV 94   MCH 29.8   MCHC 31.8   RDW 14.4            PATHOLOGY:  Peripheral smear 21:  FINAL DIAGNOSIS:   Peripheral blood.   - Normochromic normocytic anemia.     COMMENT:   There is nothing specific on the peripheral smear morphologically   identified to determine the cause of the   anemia.  It may be multifactorial.  Blood loss, medication or toxin   reaction, chronic disease, renal disease,   splenic sequestration, endocrine or autoimmune abnormalities, nutritional   deficiencies, various disorders   involving the bone marrow, etc. would be in the differential diagnosis.     No obvious dysplastic changes or   blasts are seen on the peripheral smear.  Clinical correlation is   required.         ASSESSMENT/PLAN:  Nixon More is a 86 year old male with:    1) Anemia: Mild anemia since 2020.  Normocytic.  He has a very mild reticulocytosis.  WBC and platelets are normal.  Possible etiologies considered include iron " deficiency, blood loss, low-grade hemolysis, anemia of chronic disease, early MDS.  He has been on chronic anticoagulation with apixaban.  He had some urine and rectal bleeding recently, which has resolved.      His iron labs were normal, with elevated ferritin, likely reactive.  There is no evidence of hemolysis.   He occasionally gets rectal bleeding due to history of radiation proctitis, but it sounds like a very small amount.  SPEP was normal.  Serum free light chains slightly elevated, likely non-specific.  He has a mild CKD.  Peripheral smear was non-specific.      His hemoglobin remains stable.  He is asymptomatic.  We reviewed options of continued observation versus doing bone marrow biopsy.  His other 2 cell lines are normal, and anemia is mild, and bone marrow biopsy may not be of high yield at this time, and if he has an MDS, treatment would likely be observation.  He says that he is also going work on losing weight and cardiac rehab.  We decided on observation for now, which is very reasonable.  If hemoglobin trends down significantly, or if he develops abnormalities in the other cell lines, then we will proceed with bone marrow biopsy.      Hemoglobin is stable/improved at 13.7.  We will continue to monitor.    -can check CBC annually at this point, as hemoglobin has been stable, and normal this time.  -he is comfortable following with PCP with annual CBC, and can follow with hematology as needed if counts decrease significantly.    2) History of prostate cancer: s/p radiation and on intermittent ADT  -follows with urology, Dr. Pino     3) Cardiac: on chronic anticoagulation with apixaban for atrial fibrillation.    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  He underwent flex sig in June 2020.  He follows with colorectal surgery, Dr. Patton.       Nimisha Mercado MD  Hematology/Oncology  [facility]  [place] [facility]    Total time spent on day of visit, including review of tests, obtaining/reviewing separately obtained history, ordering medications/tests/procedures, communicating with PCP/consultants, and documenting in electronic medical record: 20 minutes

## 2021-11-23 NOTE — PATIENT INSTRUCTIONS
Spencer - good to see you today!    1. BPs really look good!    2. Weight is stable after stopping the torsemide 5 mg daily and continuing the spironolactone 12.5 mg daily  3. EKG today showed Heart Rate only 51 bpm on Diltiazem 120 mg daily and Metoprolol  mg daily.    PLAN:  1. Decrease metoprolol XL to just 100 mg daily as HR is low - let's see if this improves the breathing at all!    2. Send a Goombal message in 3-4 weeks with an update - did reducing the metoprolol help anything?? Before Barney    3. Get back on the nasal spray = even just once a day to see if this helps!    Call/My Chart if issues! [phone]

## 2021-11-23 NOTE — LETTER
11/23/2021         RE: Nixon More  [address]  [facility] [address]        Dear Colleague,    Thank you for referring your patient, Nixon More, to the [facility]. Please see a copy of my visit note below.    [facility]    Hematology/Oncology Established Patient Follow-up Note      Today's Date: 11/23/21    Reason for Follow-up: anemia    HISTORY OF PRESENT ILLNESS: Nixon More is a 86 year old male with PMHx of HTN, CAD, prostate cancer in 2006, atrial fibrillation/flutter on chronic apixaban, who presents with anemia.        On chart review, patient has had a normal hemoglobin, but in June 2020, his PCP noted that he developed a mild anemia in the 12 range.  Repeat labs in August and November show continued mild anemia with hemoglobin of around 12.  WBC and platelet count are normal.       He notes that he maybe feels a bit more short of breath with exertion.        He has history of prostate cancer s/p radiation and on intermittent ADT.  He most recently had Lupron in July 2019.  PSA remains undetectable.     Spencer notes that in September 2020, he received a cortisone injection.  After that, he had some hematuria.  He also had some rectal bleeding, for which he is following with colorectal surgery.  He was felt to have radiation proctitis and hemorrhoids, and he was treated with formalin.         INTERIM HISTORY: Spencer is doing well.  He has no new complaints today.        REVIEW OF SYSTEMS:   14 point ROS was reviewed and is negative other than as noted above in HPI.       HOME MEDICATIONS:  Current Outpatient Medications   Medication Sig Dispense Refill     albuterol (PROAIR HFA/PROVENTIL HFA/VENTOLIN HFA) 108 (90 Base) MCG/ACT inhaler Inhale 2 puffs into the lungs every 4 hours as needed for shortness of breath / dyspnea or wheezing 8 g 0     apixaban ANTICOAGULANT (ELIQUIS) 5 MG tablet Take 1 tablet (5 mg) by mouth 2 times  daily 180 tablet 3     atorvastatin (LIPITOR) 40 MG tablet Take 1 tablet (40 mg) by mouth daily 90 tablet 3     Cholecalciferol (VITAMIN D-3) 25 MCG (1000 UT) CAPS Take by mouth daily       diltiazem ER (DILT-XR) 120 MG 24 hr capsule Take 1 capsule (120 mg) by mouth daily 90 capsule 3     EPINEPHrine (EPIPEN/ADRENACLICK/OR ANY BX GENERIC EQUIV) 0.3 MG/0.3ML injection 2-pack Inject 0.3 mLs (0.3 mg) into the muscle as needed for anaphylaxis 0.6 mL 1     escitalopram (LEXAPRO) 10 MG tablet Take 1 tablet (10 mg) by mouth daily 90 tablet 3     ipratropium (ATROVENT) 0.06 % nasal spray Spray 2 sprays in nostril 4 times daily as needed for rhinitis 3 Box 3     Loperamide HCl (IMODIUM OR) Take by mouth daily as needed       losartan (COZAAR) 100 MG tablet Take 1 tablet (100 mg) by mouth daily 90 tablet 3     metoprolol succinate ER (TOPROL-XL) 100 MG 24 hr tablet Take 1.5 tablets (150 mg) by mouth daily 45 tablet 1     spironolactone (ALDACTONE) 25 MG tablet Take 0.5 tablets (12.5 mg) by mouth daily 45 tablet 0     ASPIRIN NOT PRESCRIBED (INTENTIONAL) continuous prn for other Antiplatelet medication not prescribed intentionally due to Current anticoagulant therapy (warfarin/enoxaparin) (Patient not taking: Reported on 7/22/2021)       RISEdronate (ACTONEL) 35 MG tablet Take 1 tablet (35 mg) by mouth every 7 days (Patient not taking: Reported on 7/22/2021) 12 tablet 3         ALLERGIES:  Allergies   Allergen Reactions     Augmentin Rash     Type III hypersensitivity     Bactrim [Sulfamethoxazole W/Trimethoprim] Rash     Serum sickness, type III hypersensitivity       Bee Venom Itching     Sulfa Drugs Hives     Celebrex [Celecoxib]      Lisinopril Cough     Naproxen Hives         PAST MEDICAL HISTORY:  Past Medical History:   Diagnosis Date     Actinic keratosis      Arthritis      Atrial fibrillation and flutter (H) 12/3/2018     CAD (coronary artery disease)     1/5/2011 lateral ischemia on stress echo, 12/2014 normal  stress echo     Coronary artery disease 2011    Abnormal stress test 2011 and controlled with medical mgmt      Dyslipidemia      Emphysema of lung (H)      Essential hypertension, benign      Hernia, abdominal      Hypersomnia with sleep apnea, unspecified     on bipap, partially treated with residual apneas     Hypertrophy (benign) of prostate     Biopsy  negative for cancer; PSA 5     Lumbago      Mumps      Prostate cancer (H) 12/15/2006     Skin cancer, basal cell 1997     Spider veins          PAST SURGICAL HISTORY:  Past Surgical History:   Procedure Laterality Date     HERNIA REPAIR  child     Moh's procedure for basal cell carcinoma  2001     PROSTATE SURGERY       s/p lumbar laminectomy NOS  1988     SIGMOIDOSCOPY FLEXIBLE N/A 6/15/2020    Procedure: SIGMOIDOSCOPY, FLEXIBLE;  Surgeon: Sunni Patton MD;  Location:  GI     VITRECTOMY PARS PLANA REMOVE PRERETINAL MEMBRANE   3/09         SOCIAL HISTORY:  Social History     Socioeconomic History     Marital status:      Spouse name: Not on file     Number of children: Not on file     Years of education: Not on file     Highest education level: Not on file   Occupational History     Occupation: retired   Tobacco Use     Smoking status: Former Smoker     Packs/day: 1.00     Types: Cigarettes     Start date:      Quit date: 1978     Years since quittin.9     Smokeless tobacco: Never Used   Substance and Sexual Activity     Alcohol use: Never     Drug use: No     Sexual activity: Yes     Partners: Female   Other Topics Concern      Service Not Asked     Blood Transfusions Not Asked     Caffeine Concern No     Comment: 0-2 cans of soda per day.     Occupational Exposure Not Asked     Hobby Hazards Not Asked     Sleep Concern Not Asked     Stress Concern Not Asked     Weight Concern Not Asked     Special Diet Not Asked     Back Care Not Asked     Exercise No     Bike Helmet Not Asked     Seat Belt Yes      "Self-Exams Not Asked     Parent/sibling w/ CABG, MI or angioplasty before 65F 55M? No   Social History Narrative     Not on file     Social Determinants of Health     Financial Resource Strain: Not on file   Food Insecurity: Not on file   Transportation Needs: Not on file   Physical Activity: Not on file   Stress: Not on file   Social Connections: Not on file   Intimate Partner Violence: Not At Risk     Fear of Current or Ex-Partner: No     Emotionally Abused: No     Physically Abused: No     Sexually Abused: No   Housing Stability: Not on file         FAMILY HISTORY:  Family History   Problem Relation Age of Onset     Alzheimer Disease Mother      Hypertension Mother      Cardiovascular Father         D:86 complications fo CHF     Prostate Cancer Brother      Lung Cancer Brother 76     Prostate Cancer Brother          PHYSICAL EXAM:  /67   Pulse 69   Temp 97.2  F (36.2  C) (Tympanic)   Resp 16   Ht 1.715 m (5' 7.5\")   Wt 105 kg (231 lb 8 oz)   SpO2 96%   BMI 35.72 kg/m    ECO  GENERAL/CONSTITUTIONAL: No acute distress. Daughter on speaker phone.  NEUROLOGIC: Alert, oriented, answers questions appropriately.  INTEGUMENTARY: No jaundice.      LABS:  CBC RESULTS: Recent Labs   Lab Test 21  1004   WBC 5.9   RBC 4.59   HGB 13.7   HCT 43.1   MCV 94   MCH 29.8   MCHC 31.8   RDW 14.4            PATHOLOGY:  Peripheral smear 21:  FINAL DIAGNOSIS:   Peripheral blood.   - Normochromic normocytic anemia.     COMMENT:   There is nothing specific on the peripheral smear morphologically   identified to determine the cause of the   anemia.  It may be multifactorial.  Blood loss, medication or toxin   reaction, chronic disease, renal disease,   splenic sequestration, endocrine or autoimmune abnormalities, nutritional   deficiencies, various disorders   involving the bone marrow, etc. would be in the differential diagnosis.     No obvious dysplastic changes or   blasts are seen on the peripheral " smear.  Clinical correlation is   required.         ASSESSMENT/PLAN:  Nixon More is a 86 year old male with:    1) Anemia: Mild anemia since June 2020.  Normocytic.  He has a very mild reticulocytosis.  WBC and platelets are normal.  Possible etiologies considered include iron deficiency, blood loss, low-grade hemolysis, anemia of chronic disease, early MDS.  He has been on chronic anticoagulation with apixaban.  He had some urine and rectal bleeding recently, which has resolved.      His iron labs were normal, with elevated ferritin, likely reactive.  There is no evidence of hemolysis.   He occasionally gets rectal bleeding due to history of radiation proctitis, but it sounds like a very small amount.  SPEP was normal.  Serum free light chains slightly elevated, likely non-specific.  He has a mild CKD.  Peripheral smear was non-specific.      His hemoglobin remains stable.  He is asymptomatic.  We reviewed options of continued observation versus doing bone marrow biopsy.  His other 2 cell lines are normal, and anemia is mild, and bone marrow biopsy may not be of high yield at this time, and if he has an MDS, treatment would likely be observation.  He says that he is also going work on losing weight and cardiac rehab.  We decided on observation for now, which is very reasonable.  If hemoglobin trends down significantly, or if he develops abnormalities in the other cell lines, then we will proceed with bone marrow biopsy.      Hemoglobin is stable/improved at 13.7.  We will continue to monitor.    -can check CBC annually at this point, as hemoglobin has been stable, and normal this time.  -he is comfortable following with PCP with annual CBC, and can follow with hematology as needed if counts decrease significantly.    2) History of prostate cancer: s/p radiation and on intermittent ADT  -follows with urology, Dr. Pino     3) Cardiac: on chronic anticoagulation with apixaban for atrial fibrillation.     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  He underwent flex sig in June 2020.  He follows with colorectal surgery, Dr. Patton.       Nimisha Mercado MD  Hematology/Oncology  [facility]    Total time spent on day of visit, including review of tests, obtaining/reviewing separately obtained history, ordering medications/tests/procedures, communicating with PCP/consultants, and documenting in electronic medical record: 20 minutes      Again, thank you for allowing me to participate in the care of your patient.        Sincerely,        Nimisha Mercaod MD

## 2021-11-23 NOTE — LETTER
"11/23/2021    Natalya Lewis MD  [address] [place] [address]    RE: Nixon More       Dear Colleague,    I had the pleasure of seeing Nixon More in the [facility].    [facility]    I had the pleasure of seeing Spencer when he came for follow up of AFib.  This 86 year old sees Dr. Lee and previously saw Dr. Camp for his history of:    1. Permanent AFib -  atypical atrial flutter and AFib first diagnosed 12/2018 in the setting of a normal EF. Rate control/anticoagulation strategy  2. Chronic AC with Eliquis 5 mg BID for CHADSVASc of 5 (HFpEF, HTN, presumed CAD, age)  3. Chronic GALDAMEZ, noted by Dr. Camp 6/2019.  30-pack-year history of tobacco use, quitting~2000. Saw Dr. Moreno and PFTs showed minimal restriction, muscular weakness and no obstruction. HFpEF, obesity, deconditioning all contributing  4. HFpEF - hydrochlorothiazide and later torsemide stopped d/t hypotension  5. Ascending aorta and aortic root dilatation (4.4 cm, 4.6 cm) noted on echocardiogram 6/2021 - stable  6. Presumed CAD based on stress echocardiogram 1/2011.  Dr. Camp opted to treat this medically in the absence of symptoms.  Repeat stress test 12/2014 was negative for ischemia   7. HTN with some hypotension resulting in discontinuation of some medications  8.  MAYITO - on ASV therapy for complex Central and Obstructive Sleep Apnea. Last visit 2/2021  9. Peripheral Venous Insufficiency   10. Dyslipidemia    I saw Spencer 7/2021 at which time he was feeling quite a bit better after switching his renally-cleared atenolol to metoprolol XL, with improved SOB/GALDAMEZ. BPs in cardiac rehab were running 90-110s and edema was \"pretty good\" on the torsemide after he started watching his sodium intake (had moved into AL). Weight at that 7/22 visit was 232#.    As he continued to c/o significant urinary urgency affecting his QOL, we agreed to reduce torsemide from " "10 to 5 mg daily and start spironolactone 12.5. Follow-up BMPs have shown stable electrolytes/renal fxn.    He was in the ER 8/2021 after being sent there from Cardiac Rehab for increasing GALDAMEZ and hypotension (80s/60s). I asked him to HOLD torsemide 5 mg given he was dry and hypotensive. Follow-up BMP looked good and he was feeling quite a bit better off of the torsemide and 3 m follow-up planned.    Interval History:  Overall feels pretty good with maybe slight increase in SOB since I last saw him. Now completed cardiac rehab and wonders if he's getting \"out of shape.\"    No complaints of chest pain, pressure or tightness.  No orthopnea, PND but notes slight LE edema. Overall, he is feeling well but not quite as good as he had last time I saw him 7/2021.    EKG done today d/t low HR showed HR only 51 bpm! We've not seen it this low before.    VITALS:  Vitals: /67   Pulse (!) 45   Ht 1.715 m (5' 7.5\")   Wt 104.3 kg (230 lb)   SpO2 98%   BMI 35.49 kg/m      Diagnostic Testing:  EKG today on metoprolol  mg daily and Diltiazem 120 mg daily, which I overread, showed Atypical AFlutter 51 bpm with low voltage  EKG 6/24/2021 on atenolol 150 mg daily showed atypical atrial flutter 66 bpm.  Echo 7/2021 EF 55%. Nl RV. No sig valve dz. Asc aor 4.2 cm (previously read 4.5 cm on 9/2020 echo)  Echo 9/2020 with EF 55-60%. No RWMA. Nl RV. 1+MR. Mild MAC. 1+TR with RVSP  23 mmHg. Ao sclerosis. Mod root dilation 4.4 cm and ascending aorta mod dilated 4.6 cm  PFTs 8/2019 showed mild restriction. NIF ~75% of normal, without significant change c/w 7/2019  Aortoiliac US 7/23/2019 showed sacular AAA with mild enlargement with max diameter 3.3 x 2.2. <50% stenosis in aorta, iliac arteries  Exercise ABIs 7/23/2019 with normal ABIs and normal response to exercise. Developed calf and thigh tiredness after 2.5 minutes  EKG 7/22/2019 showed AFib at 79 bpm  MRA chest 5/2019 showed aortic root was borderline dilated (4 x 3.9 cm). " " The ascending aorta was mildly dilated (4.4 x 4.5 at the level of bifurcation of pulmonary arteries)  Holter monitor 5/21/2019 showed atrial fibrillation with an average heart rate of 75 bpm.  Range was  bpm.  He had 2 \"events\" which correlated with chronic atrial fibrillation with heart rates in the 70-80s.  Echocardiogram 12/2018 showed an EF of 55%.  He had mildly reduced right ventricular systolic function. LA size was moderately-severely dilated with a left atrial volume index of 51.2 mL/m .  Left atrial size of 6.0 cm.  As above, his aortic root and ascending aorta were both enlarged at 4.6 cm  Stress echocardiogram 12/2014 was negative for stress-induced wall motion abnormalities.  Component      Latest Ref Rng & Units 8/17/2021 11/16/2021   WBC      4.0 - 11.0 10e3/uL 5.9 5.9   RBC Count      4.40 - 5.90 10e6/uL 4.42 4.59   Hemoglobin      13.3 - 17.7 g/dL 12.9 (L) 13.7   Hematocrit      40.0 - 53.0 % 40.7 43.1   MCV      78 - 100 fL 92 94   MCH      26.5 - 33.0 pg 29.2 29.8   MCHC      31.5 - 36.5 g/dL 31.7 31.8   RDW      10.0 - 15.0 % 14.6 14.4   Platelet Count      150 - 450 10e3/uL 202 193     Component      Latest Ref Rng & Units 8/25/2021 10/22/2021   Sodium      133 - 144 mmol/L 140 141   Potassium      3.4 - 5.3 mmol/L 4.8 4.8   Chloride      94 - 109 mmol/L 112 (H) 113 (H)   Carbon Dioxide      20 - 32 mmol/L 25 20   Anion Gap      3 - 14 mmol/L 3 8   Urea Nitrogen      7 - 30 mg/dL 28 36 (H)   Creatinine      0.66 - 1.25 mg/dL 1.29 (H) 1.34 (H)   Calcium      8.5 - 10.1 mg/dL 9.1 9.5   Glucose      70 - 99 mg/dL 103 (H) 74         Plan:  1. Decrease metoprolol XL from 150 mg daily to 100 mg daily  2. flipClass message to be sent in ~3 weeks with update on breathing    Assessment/Plan:    1. SOB/GALDAMEZ    Remains on just spironolactone 12.5 mg daily which he feels is working well    Weight (232# at 7/2021 visit) is down to 230#    On exam, has clear lungs, mild HJR and 1+ edema      Hgb 11/2021 " wnl    PFTs 2019 with mild restriction    Echo 7/2021 with nl EF and no sig valve abnls    EKG with HR only 51 bpm    PLAN:    Decrease metoprolol XL to just 100 mg daily given low HRs could be contributing to his exercise intolerance and slight increase in SOB    Send Kwarter message to me in ~3-4 weeks with update      2. Permanent AFib/atypical AFlutter    Remains on metoprolol XL in lieu of atenolol d/t renal insufficiency    On AC with Eliquis 5 mg BID.     PLAN:    D/t low HR, decrease metoprolol XL from 150 to 100 mg daily    Continue AC. Hgb looked good    Cherise Bennett PA-C, MSPAS      Orders Placed This Encounter   Procedures     EKG 12-lead complete w/read - Clinics (performed today)     Orders Placed This Encounter   Medications     metoprolol succinate ER (TOPROL-XL) 100 MG 24 hr tablet     Sig: Take 1 tablet (100 mg) by mouth daily     Dispense:  90 tablet     Refill:  3     Note dose change - pt does not need refill at this time as will use up his current Rx first.     Medications Discontinued During This Encounter   Medication Reason     metoprolol succinate ER (TOPROL-XL) 100 MG 24 hr tablet          Encounter Diagnoses   Name Primary?     Chronic diastolic heart failure (H)      Persistent atrial fibrillation (H)        CURRENT MEDICATIONS:  Current Outpatient Medications   Medication Sig Dispense Refill     albuterol (PROAIR HFA/PROVENTIL HFA/VENTOLIN HFA) 108 (90 Base) MCG/ACT inhaler Inhale 2 puffs into the lungs every 4 hours as needed for shortness of breath / dyspnea or wheezing 8 g 0     apixaban ANTICOAGULANT (ELIQUIS) 5 MG tablet Take 1 tablet (5 mg) by mouth 2 times daily 180 tablet 3     atorvastatin (LIPITOR) 40 MG tablet Take 1 tablet (40 mg) by mouth daily 90 tablet 3     Cholecalciferol (VITAMIN D-3) 25 MCG (1000 UT) CAPS Take by mouth daily       diltiazem ER (DILT-XR) 120 MG 24 hr capsule Take 1 capsule (120 mg) by mouth daily 90 capsule 3     EPINEPHrine (EPIPEN/ADRENACLICK/OR ANY  BX GENERIC EQUIV) 0.3 MG/0.3ML injection 2-pack Inject 0.3 mLs (0.3 mg) into the muscle as needed for anaphylaxis 0.6 mL 1     escitalopram (LEXAPRO) 10 MG tablet Take 1 tablet (10 mg) by mouth daily 90 tablet 3     ipratropium (ATROVENT) 0.06 % nasal spray Spray 2 sprays in nostril 4 times daily as needed for rhinitis 3 Box 3     Loperamide HCl (IMODIUM OR) Take by mouth daily as needed       losartan (COZAAR) 100 MG tablet Take 1 tablet (100 mg) by mouth daily 90 tablet 3     metoprolol succinate ER (TOPROL-XL) 100 MG 24 hr tablet Take 1 tablet (100 mg) by mouth daily 90 tablet 3     spironolactone (ALDACTONE) 25 MG tablet Take 0.5 tablets (12.5 mg) by mouth daily 45 tablet 0     ASPIRIN NOT PRESCRIBED (INTENTIONAL) continuous prn for other Antiplatelet medication not prescribed intentionally due to Current anticoagulant therapy (warfarin/enoxaparin) (Patient not taking: Reported on 7/22/2021)       RISEdronate (ACTONEL) 35 MG tablet Take 1 tablet (35 mg) by mouth every 7 days (Patient not taking: Reported on 7/22/2021) 12 tablet 3       ALLERGIES     Allergies   Allergen Reactions     Augmentin Rash     Type III hypersensitivity     Bactrim [Sulfamethoxazole W/Trimethoprim] Rash     Serum sickness, type III hypersensitivity       Bee Venom Itching     Sulfa Drugs Hives     Celebrex [Celecoxib]      Lisinopril Cough     Naproxen Hives         Review of Systems:  Skin:  Negative     Eyes:  Positive for glasses  ENT:  Negative    Respiratory:  Positive for sleep apnea;CPAP;dyspnea on exertion;cough  Cardiovascular:  chest pain;palpitations;dizziness;syncope or near-syncope;cyanosis;Negative for Positive for;fatigue;edema;exercise intolerance;lightheadedness  Gastroenterology: Negative for hematochezia  Genitourinary:  Positive for urinary frequency;urgency;nocturia  Musculoskeletal:  Positive for joint pain  Neurologic:  Negative    Psychiatric:  Positive for depression  Heme/Lymph/Imm:  Positive for  "allergies  Endocrine:  Negative diabetes    Physical Exam:  Vitals: /67   Pulse (!) 45   Ht 1.715 m (5' 7.5\")   Wt 104.3 kg (230 lb)   SpO2 98%   BMI 35.49 kg/m      Constitutional:  cooperative, alert and oriented, well developed, well nourished, in no acute distress        Skin:  warm and dry to the touch        Head:  normocephalic        Eyes:  pupils equal and round;conjunctivae and lids unremarkable;sclera white        ENT:  not assessed this visit        Neck:  no carotid bruit hepatojugular reflux      Chest:  clear to auscultation;normal symmetry;normal respiratory excursion        Cardiac: normal S1 and S2;no murmurs, gallops or rubs detected irregularly irregular rhythm;bradycardic distant heart sounds              Abdomen:    obese      Vascular:   pulses below the femoral arteries are diminished                               strong radial pulses, slighly diminshed dorsalis pedis and posterior tibia    Extremities and Back:  no deformities, clubbing, cyanosis, erythema observed        Neurological:  no gross motor deficits            PAST MEDICAL HISTORY:  Past Medical History:   Diagnosis Date     Actinic keratosis      Arthritis      Atrial fibrillation and flutter (H) 12/3/2018     CAD (coronary artery disease)     1/5/2011 lateral ischemia on stress echo, 12/2014 normal stress echo     Coronary artery disease 1/1/2011    Abnormal stress test 1/2011 and controlled with medical mgmt      Dyslipidemia      Emphysema of lung (H)      Essential hypertension, benign      Hernia, abdominal      Hypersomnia with sleep apnea, unspecified     on bipap, partially treated with residual apneas     Hypertrophy (benign) of prostate 5/01    Biopsy 5/01 negative for cancer; PSA 5     Lumbago      Mumps      Prostate cancer (H) 12/15/2006     Skin cancer, basal cell 1997     Spider veins        PAST SURGICAL HISTORY:  Past Surgical History:   Procedure Laterality Date     HERNIA REPAIR  child     Moh's " procedure for basal cell carcinoma  2001     PROSTATE SURGERY       s/p lumbar laminectomy NOS  1988     SIGMOIDOSCOPY FLEXIBLE N/A 6/15/2020    Procedure: SIGMOIDOSCOPY, FLEXIBLE;  Surgeon: Sunni Patton MD;  Location: [facility]     VITRECTOMY PARS PLANA REMOVE PRERETINAL MEMBRANE   3/09       FAMILY HISTORY:  Family History   Problem Relation Age of Onset     Alzheimer Disease Mother      Hypertension Mother      Cardiovascular Father         D:86 complications fo CHF     Prostate Cancer Brother      Lung Cancer Brother 76     Prostate Cancer Brother        SOCIAL HISTORY:  Social History     Socioeconomic History     Marital status:      Spouse name: None     Number of children: None     Years of education: None     Highest education level: None   Occupational History     Occupation: retired   Tobacco Use     Smoking status: Former Smoker     Packs/day: 1.00     Types: Cigarettes     Start date:      Quit date: 1978     Years since quittin.9     Smokeless tobacco: Never Used   Substance and Sexual Activity     Alcohol use: Never     Drug use: No     Sexual activity: Yes     Partners: Female   Other Topics Concern      Service Not Asked     Blood Transfusions Not Asked     Caffeine Concern No     Comment: 0-2 cans of soda per day.     Occupational Exposure Not Asked     Hobby Hazards Not Asked     Sleep Concern Not Asked     Stress Concern Not Asked     Weight Concern Not Asked     Special Diet Not Asked     Back Care Not Asked     Exercise No     Bike Helmet Not Asked     Seat Belt Yes     Self-Exams Not Asked     Parent/sibling w/ CABG, MI or angioplasty before 65F 55M? No   Social History Narrative     None     Social Determinants of Health     Financial Resource Strain: Not on file   Food Insecurity: Not on file   Transportation Needs: Not on file   Physical Activity: Not on file   Stress: Not on file   Social Connections: Not on file   Intimate Partner Violence: Not At Risk      Fear of Current or Ex-Partner: No     Emotionally Abused: No     Physically Abused: No     Sexually Abused: No   Housing Stability: Not on file             Thank you for allowing me to participate in the care of your patient.      Sincerely,     Nimisha Bennett PA-C     [facility]  cc:   Nimisha Bennett PA-C  [address]  [place]  [place] [address]

## 2021-12-01 DIAGNOSIS — I10 BENIGN ESSENTIAL HYPERTENSION: ICD-10-CM

## 2021-12-01 DIAGNOSIS — I48.19 PERSISTENT ATRIAL FIBRILLATION (H): ICD-10-CM

## 2021-12-01 RX ORDER — SPIRONOLACTONE 25 MG/1
12.5 TABLET ORAL DAILY
Qty: 45 TABLET | Refills: 3 | Status: SHIPPED | OUTPATIENT
Start: 2021-12-01 | End: 2022-11-07

## 2021-12-03 NOTE — PATIENT INSTRUCTIONS
Per Dr. Mercado, follow up as needed  Rupinder Morocho, RN, BSN, ROSA  RN Care Coordinator  [facility]  [phone]

## 2021-12-13 ENCOUNTER — OFFICE VISIT (OUTPATIENT)
Dept: NEUROPSYCHOLOGY | Facility: CLINIC | Age: 86
End: 2021-12-13
Payer: COMMERCIAL

## 2021-12-13 DIAGNOSIS — R41.844 FRONTAL LOBE AND EXECUTIVE FUNCTION DEFICIT: ICD-10-CM

## 2021-12-13 DIAGNOSIS — G31.84 MILD COGNITIVE IMPAIRMENT, SO STATED: Primary | ICD-10-CM

## 2021-12-13 DIAGNOSIS — F06.8 OTHER SPECIFIED MENTAL DISORDERS DUE TO KNOWN PHYSIOLOGICAL CONDITION: ICD-10-CM

## 2021-12-13 DIAGNOSIS — I67.9 CEREBROVASCULAR DISEASE, UNSPECIFIED: ICD-10-CM

## 2021-12-13 PROCEDURE — 90791 PSYCH DIAGNOSTIC EVALUATION: CPT | Performed by: CLINICAL NEUROPSYCHOLOGIST

## 2021-12-13 PROCEDURE — 96132 NRPSYC TST EVAL PHYS/QHP 1ST: CPT | Performed by: CLINICAL NEUROPSYCHOLOGIST

## 2021-12-13 PROCEDURE — 96139 PSYCL/NRPSYC TST TECH EA: CPT | Performed by: CLINICAL NEUROPSYCHOLOGIST

## 2021-12-13 PROCEDURE — 96133 NRPSYC TST EVAL PHYS/QHP EA: CPT | Performed by: CLINICAL NEUROPSYCHOLOGIST

## 2021-12-13 PROCEDURE — 96138 PSYCL/NRPSYC TECH 1ST: CPT | Performed by: CLINICAL NEUROPSYCHOLOGIST

## 2021-12-13 NOTE — PROGRESS NOTES
Pt was seen for neuropsychological evaluation at the request of Dr. Natalya Lewis for the purposes of diagnostic clarification and treatment planning. 163 minutes of test administration and scoring were provided by this writer. Please see Dr. Allen Thomas's report for a full interpretation of the findings.    Jay Waters  Psychometrist
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
## 2021-12-15 NOTE — PROGRESS NOTES
NAME  Nixon More    MRN  [id number]      [birth year]     AGE  86     SEX  Male     HANDEDNESS Right     EDUCATION 13     GALDAMEZ  21     PROVIDER  [facility]     STATION  OP            ORIENTATION      Time  -1     Personal Info.     Place      Presidents             WAIS-IV         RDS: 8             Raw SS    Similarities  29 14    Block Design 46 16    Digit Span  23 10    Coding  39 10           BETTIE-O COMPLEX FIGURE       Raw T %ile   Copy  31  >16   Short Delay Recall 21.5 >80 >99   Time to Copy 209  >16          WRAT-4         SS %ile GE   Reading  122 93 >12.9          COWAT FAS       Raw 27      SS 7      T 41             ANIMAL FLUENCY      Raw 20      SS 10      T 56              BOSTON NAMING TEST     Raw 59 /60     SS 16      %ile 84-89             COMPLEX IDEATIONAL MATERIAL     Raw 12      SS 12      T 59             TRAIL MAKING TEST       Time Errors SSa %ile   A 50 0 10 40-60   B 171 1 8 13-20          STROOP        Raw %ile     Word 80 38-46     Color 11 77-85     C/W 33              AZIZA   H   Raw 28      %ile 78-89             GROOVED PEGBOARD       Raw Drops SS T   RH 80 0 7 60   LH 87 0 7 58          JAMSHID-7       Raw 0      Interp. MINIMAL             GDS       Raw 8      Interp. MINIMAL              WMS-IV LOGICAL MEMORY OA    Raw SSa/%ile     LM I 32 12     LM II 21 12     Recog. 20 >75             HVLT   1     Raw T    Trial 1  3     Trial 2  4     Trial 3  9     Learning  6     Total Recall 16 37    Delayed Recall 7 48    Percent Retention 78% 48    True Positives 12     False Positives 3     Disc. Index  9 41            BVMT   1     Raw T/%ile    Trial 1  0     Trial 2  5     Trial 3  6     Learning  6     Total Recall 11 34    Delayed Recall 5 39    Percent Retention 83% >16    Recognition Hits 4     Recognition F.P 0     Disc. Index  4 11-16
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
## 2022-01-14 ENCOUNTER — TELEPHONE (OUTPATIENT)
Dept: ORTHOPEDICS | Facility: CLINIC | Age: 87
End: 2022-01-14
Payer: COMMERCIAL

## 2022-01-14 DIAGNOSIS — M17.12 PRIMARY OSTEOARTHRITIS OF LEFT KNEE: Primary | ICD-10-CM

## 2022-01-14 NOTE — TELEPHONE ENCOUNTER
Patient scheduled for appointment on 1/18/22 for discussion of viscosupplementation injection vs steroid injection of left knee.       injection has not been completed before       Prior authorization referral for SynviscOne injection pended.     Please advise.  Wendy Hendrickson, ATC

## 2022-01-18 ENCOUNTER — OFFICE VISIT (OUTPATIENT)
Dept: ORTHOPEDICS | Facility: CLINIC | Age: 87
End: 2022-01-18

## 2022-01-18 VITALS — BODY MASS INDEX: 34.86 KG/M2 | HEIGHT: 68 IN | WEIGHT: 230 LBS | HEART RATE: 78 BPM | RESPIRATION RATE: 16 BRPM

## 2022-01-18 DIAGNOSIS — M17.12 PRIMARY OSTEOARTHRITIS OF LEFT KNEE: Primary | ICD-10-CM

## 2022-01-18 PROCEDURE — 99213 OFFICE O/P EST LOW 20 MIN: CPT | Performed by: FAMILY MEDICINE

## 2022-01-18 ASSESSMENT — MIFFLIN-ST. JEOR: SCORE: 1699.83

## 2022-01-18 NOTE — PROGRESS NOTES
ASSESSMENT & PLAN  Patient Instructions     1. Primary osteoarthritis of left knee      - Patient has chronic knee pain, instability and weakness due to aggravation of arthritis  -Patient has had multiple falls when he is trying to bend and twist his knee due to muscular weakness and bone-on-bone arthritis  -Exam today does not show any structural injuries or tears  -Patient will start formal physical therapy and home exercise program to strengthen his muscles and improve his balance and stability  -An order was placed for a hinged knee brace to be worn as needed  -Patient will follow-up if pain and swelling worsen once he starts physical therapy for a potential drainage and cortisone injection if indicated  -Call direct clinic number [[phone]] at any time with questions or concerns.    Albert Yeo MD [facility]  [facility]          -----    SUBJECTIVE:  Nixon More is a 86 year old male who is seen in follow-up for left knee pain.They were last seen 7/09/2021, he has continued to have some instability in his knee.  Pain has improved.  He has been using a knee sleeve which has been helpful.    Here today with his daughter.  He did have a fall about 1 month ago.  Has had an increase in instability in his knee since his fall.  Pain is more present over the medial knee.      Since their last visit reports 0% - (About the same as last time). They indicate that their current pain level is 1/10. They have tried casting/splinting/bracing.      The patient is seen with their daughter.    Patient's past medical, surgical, social, and family histories were reviewed today and no changes are noted.    REVIEW OF SYSTEMS:  Constitutional: NEGATIVE for fever, chills, change in weight  Skin: NEGATIVE for worrisome rashes, moles or lesions  GI/: NEGATIVE for bowel or bladder changes  Neuro: NEGATIVE for weakness, dizziness or paresthesias    OBJECTIVE:  Pulse 78    "Resp 16   Ht 1.715 m (5' 7.5\")   Wt 104.3 kg (230 lb)   BMI 35.49 kg/m     General: healthy, alert and in no distress  HEENT: no scleral icterus or conjunctival erythema  Skin: no suspicious lesions or rash. No jaundice.  CV: regular rhythm by palpation, no pedal edema  Resp: normal respiratory effort without conversational dyspnea   Psych: normal mood and affect  Gait: normal steady gait with appropriate coordination and balance  Neuro: normal light touch sensory exam of the extremities.    MSK:  LEFT KNEE  Inspection:    normal alignment, mild swelling  Palpation:    Tender about the medial patellar facet and medial joint line. Remainder of bony and ligamentous landmarks are nontender.    mild effusion is present    Patellofemoral crepitus is Present  Range of Motion:     00 extension to 1100 flexion  Strength:    Quadriceps grossly intact    Extensor mechanism intact  Special Tests:    Positive: none    Negative: MCL/valgus stress (0 & 30 deg), LCL/varus stress (0 & 30 deg), Lachman's, anterior drawer, posterior drawer, José Miguel's     Independent visualization of the below image:  XR Knee Standing AP Bilat [place] Bilat Lat Left     Narrative     Medial compartment bone-on-bone arthritis with osteophytes.  Mild   calcinosis of the lateral compartment.  No acute fracture or dislocation.       Patient's conditions were thoroughly discussed during today's visit with total time spent face-to-face with the patient and documentation being 20 minutes.    Albert Yeo MD, [facility]        "

## 2022-01-18 NOTE — LETTER
1/18/2022         RE: Nixon More  [address] [place] [address] [address]  [facility] [address]        Dear Colleague,    Thank you for referring your patient, Nixon More, to the [facility] [place]. Please see a copy of my visit note below.    ASSESSMENT & PLAN  Patient Instructions     1. Primary osteoarthritis of left knee      - Patient has chronic knee pain, instability and weakness due to aggravation of arthritis  -Patient has had multiple falls when he is trying to bend and twist his knee due to muscular weakness and bone-on-bone arthritis  -Exam today does not show any structural injuries or tears  -Patient will start formal physical therapy and home exercise program to strengthen his muscles and improve his balance and stability  -An order was placed for a hinged knee brace to be worn as needed  -Patient will follow-up if pain and swelling worsen once he starts physical therapy for a potential drainage and cortisone injection if indicated  -Call direct clinic number [[phone]] at any time with questions or concerns.    Albert Yeo MD [facility] Orthopedics and Sports Medicine  [facility]          -----    SUBJECTIVE:  Nixon More is a 86 year old male who is seen in follow-up for left knee pain.They were last seen 7/09/2021, he has continued to have some instability in his knee.  Pain has improved.  He has been using a knee sleeve which has been helpful.    Here today with his daughter.  He did have a fall about 1 month ago.  Has had an increase in instability in his knee since his fall.  Pain is more present over the medial knee.      Since their last visit reports 0% - (About the same as last time). They indicate that their current pain level is 1/10. They have tried casting/splinting/bracing.      The patient is seen with their daughter.    Patient's past medical, surgical, social, and family histories were reviewed today and no changes are  "noted.    REVIEW OF SYSTEMS:  Constitutional: NEGATIVE for fever, chills, change in weight  Skin: NEGATIVE for worrisome rashes, moles or lesions  GI/: NEGATIVE for bowel or bladder changes  Neuro: NEGATIVE for weakness, dizziness or paresthesias    OBJECTIVE:  Pulse 78   Resp 16   Ht 1.715 m (5' 7.5\")   Wt 104.3 kg (230 lb)   BMI 35.49 kg/m     General: healthy, alert and in no distress  HEENT: no scleral icterus or conjunctival erythema  Skin: no suspicious lesions or rash. No jaundice.  CV: regular rhythm by palpation, no pedal edema  Resp: normal respiratory effort without conversational dyspnea   Psych: normal mood and affect  Gait: normal steady gait with appropriate coordination and balance  Neuro: normal light touch sensory exam of the extremities.    MSK:  LEFT KNEE  Inspection:    normal alignment, mild swelling  Palpation:    Tender about the medial patellar facet and medial joint line. Remainder of bony and ligamentous landmarks are nontender.    mild effusion is present    Patellofemoral crepitus is Present  Range of Motion:     00 extension to 1100 flexion  Strength:    Quadriceps grossly intact    Extensor mechanism intact  Special Tests:    Positive: none    Negative: MCL/valgus stress (0 & 30 deg), LCL/varus stress (0 & 30 deg), Lachman's, anterior drawer, posterior drawer, José Miguel's     Independent visualization of the below image:  XR Knee Standing AP Bilat [place] Bilat Lat Left     Narrative     Medial compartment bone-on-bone arthritis with osteophytes.  Mild   calcinosis of the lateral compartment.  No acute fracture or dislocation.       Patient's conditions were thoroughly discussed during today's visit with total time spent face-to-face with the patient and documentation being 20 minutes.    Albert Yeo MD, [facility]            Again, thank you for allowing me to participate in the care of your patient.        Sincerely,        Albert Yeo, MD    "

## 2022-01-18 NOTE — PATIENT INSTRUCTIONS
1. Primary osteoarthritis of left knee      - Patient has chronic knee pain, instability and weakness due to aggravation of arthritis  -Patient has had multiple falls when he is trying to bend and twist his knee due to muscular weakness and bone-on-bone arthritis  -Exam today does not show any structural injuries or tears  -Patient will start formal physical therapy and home exercise program to strengthen his muscles and improve his balance and stability  -An order was placed for a hinged knee brace to be worn as needed  -Patient will follow-up if pain and swelling worsen once he starts physical therapy for a potential drainage and cortisone injection if indicated  -Call direct clinic number [[phone]] at any time with questions or concerns.    Albert Yeo MD CAQSM  [place] [facility]  [facility]

## 2022-01-21 NOTE — ADDENDUM NOTE
Addended by: AMARILYS MEJIAS on: 2/19/2018 09:39 AM     Modules accepted: Orders     Medical assistant/nurse notes reviewed and accepted.     CHIEF COMPLAINT: Office Visit     HISTORY OF PRESENT ILLNESS: Bhanu Stein is a 41 year old White male who is here for Office Visit     The patient has no history of skin cancer, precancerous lesions, or dysplastic nevi.     The patient reports having a mole on his posterior scalp, right hip and left buttocks for most of his life but seems to be getting larger.     Skin Cancer History:  None     MEDICATIONS:  Current Outpatient Medications   Medication Sig Dispense Refill   • Adzenys XR-ODT 3.1 MG Tablet Extended Release Dispersible Take by mouth 2 times daily.     • escitalopram (LEXAPRO) 20 MG tablet Take 1 tablet by mouth daily. 90 tablet 1     No current facility-administered medications for this visit.     ALLERGIES:  ALLERGIES:  Patient has no known allergies.    PHYSICAL EXAM:  There were no vitals taken for this visit.  Well-appearing, in no acute distress.  Skin: Examination of the scalp/body hair, face and back and buttocks was performed   See below.    PLAN:     Suspected special site nevus - On the right parietal scalp, there is a 6 mm skin colored cerebriform papule. It is larger in size but this is expected for moles on this location. Overall symmetric with regular pigmentation. Reassurance provided    Intradermal nevi - Dome-shaped papule with comma-shaped vessels on dermoscopy, on the occipital scalp, right hip, left buttocks. Reassured pt on the benign appearance.      Epidermal Inclusion Cyst - 1.0 cm subcutaneous nodule with central punctum on the left back. The patient opted to observe at this time.    List of sunscreen recommendations provided     Return if symptoms worsen or fail to improve.    On 1/21/2022, Francisco JIMENEZ scribed the services personally performed by Vanessa Morales MD   I, Dr. Vanessa Morales, attest that I have reviewed the scribe's note and edited as appropriate. I also personally performed the history of present illness,  clinical impressions and plan.

## 2022-01-26 ENCOUNTER — THERAPY VISIT (OUTPATIENT)
Dept: PHYSICAL THERAPY | Facility: CLINIC | Age: 87
End: 2022-01-26
Attending: FAMILY MEDICINE
Payer: COMMERCIAL

## 2022-01-26 DIAGNOSIS — M17.12 PRIMARY OSTEOARTHRITIS OF LEFT KNEE: ICD-10-CM

## 2022-01-26 DIAGNOSIS — M25.562 LEFT KNEE PAIN, UNSPECIFIED CHRONICITY: ICD-10-CM

## 2022-01-26 PROCEDURE — 97161 PT EVAL LOW COMPLEX 20 MIN: CPT | Mod: GP | Performed by: PHYSICAL THERAPIST

## 2022-01-26 PROCEDURE — 97110 THERAPEUTIC EXERCISES: CPT | Mod: GP | Performed by: PHYSICAL THERAPIST

## 2022-01-26 ASSESSMENT — ACTIVITIES OF DAILY LIVING (ADL)
GO UP STAIRS: ACTIVITY IS VERY DIFFICULT
GIVING WAY, BUCKLING OR SHIFTING OF KNEE: THE SYMPTOM AFFECTS MY ACTIVITY MODERATELY
LIMPING: I HAVE THE SYMPTOM BUT IT DOES NOT AFFECT MY ACTIVITY
HOW_WOULD_YOU_RATE_THE_OVERALL_FUNCTION_OF_YOUR_KNEE_DURING_YOUR_USUAL_DAILY_ACTIVITIES?: ABNORMAL
WEAKNESS: THE SYMPTOM AFFECTS MY ACTIVITY MODERATELY
PAIN: THE SYMPTOM AFFECTS MY ACTIVITY SLIGHTLY
WALK: ACTIVITY IS SOMEWHAT DIFFICULT
STAND: ACTIVITY IS MINIMALLY DIFFICULT
RISE FROM A CHAIR: ACTIVITY IS SOMEWHAT DIFFICULT
SIT WITH YOUR KNEE BENT: ACTIVITY IS FAIRLY DIFFICULT
STIFFNESS: THE SYMPTOM AFFECTS MY ACTIVITY MODERATELY
SWELLING: THE SYMPTOM AFFECTS MY ACTIVITY MODERATELY
KNEE_ACTIVITY_OF_DAILY_LIVING_SUM: 29
KNEEL ON THE FRONT OF YOUR KNEE: I AM UNABLE TO DO THE ACTIVITY
KNEE_ACTIVITY_OF_DAILY_LIVING_SCORE: 41.43
GO DOWN STAIRS: ACTIVITY IS VERY DIFFICULT
HOW_WOULD_YOU_RATE_THE_CURRENT_FUNCTION_OF_YOUR_KNEE_DURING_YOUR_USUAL_DAILY_ACTIVITIES_ON_A_SCALE_FROM_0_TO_100_WITH_100_BEING_YOUR_LEVEL_OF_KNEE_FUNCTION_PRIOR_TO_YOUR_INJURY_AND_0_BEING_THE_INABILITY_TO_PERFORM_ANY_OF_YOUR_USUAL_DAILY_ACTIVITIES?: 50
SQUAT: I AM UNABLE TO DO THE ACTIVITY
RAW_SCORE: 29
AS_A_RESULT_OF_YOUR_KNEE_INJURY,_HOW_WOULD_YOU_RATE_YOUR_CURRENT_LEVEL_OF_DAILY_ACTIVITY?: ABNORMAL

## 2022-01-26 NOTE — PROGRESS NOTES
"Physical Therapy Initial Evaluation  Subjective:  The history is provided by the patient.   Patient Health History  Nixon More being seen for left knee pain.     Problem began: 11/26/2001.   Problem occurred: insidious, maybe from a fall.    Pain is reported as 2/10 on pain scale.  General health as reported by patient is fair.  Pertinent medical history includes: high blood pressure, diabetes, heart problems, osteoarthritis and sleep disorder/apnea (LBP, mm weakness, spinal stenosis).   Red flags:  None as reported by patient.     Surgeries include:  Cancer surgery (prostrate).    Current medications:  Pain medication and anti-inflammatory.    Current occupation is Retired.                     Therapist Generated HPI Evaluation  Problem details: Fell 6 weeks ago, twice within 30.\"  Saw Dr. Yeo in July, 2021 for left knee pain.  X-rays indicate \"bone on bone\"/arthritis.  Left knee started to hurt after the fall.  Main concern in mild pain but more importantly wants to gain strength.    Uses compression stockings @ 15 years.  Using left knee brace x 10 days.  Brace is helping but may be causing some swelling.  Falls - 2-3 falls in the past 2 years. States he is not afraid of falling.  Uses a FWW in his apartment.  .         Type of problem:  Left knee.    This is a recurrent condition.  Condition occurred with:  Insidious onset and a fall/slip.  Where condition occurred: for unknown reasons.  Patient reports pain:  In the joint and medial.  and is intermittent.    Since onset symptoms are unchanged.  Associated symptoms:  Loss of motion/stiffness and loss of strength. Symptoms are exacerbated by standing, weight bearing and walking  and relieved by rest and bracing/immobilizing.  Special tests included:  X-ray.                            Objective:    Gait:  Uses FWW at home.  Significant knee valgus B.    Gait Type:  Antalgic   Weight Bearing Status:  WBAT   Assistive Devices:  None  Deviations:  Knee:  Knee " extension decr L and knee extension decr RGeneral Deviations:  Modesta decr and stride length decr                                                      Knee Evaluation:  ROM:  Prom wnl knee: hamstring - 30 B.  tightness noted with hip flex, er, add, buttock, lateral hip.  Strength wnl knee: Gross l/e strength 4+/5.   quad set good on left, stronger on right.  able to maintain ext with SLR rt and lt.  5-7 reps on left.    AROM    Hyperextension:  Left:  0    Right: 0  Extension:  Left: 2    Right:  2  Flexion: Left: 120    Right: 125          Ligament Testing:  Not Assessed                Special Tests: Not Assessed      Palpation:    Left knee tenderness present at:  Medial Joint Line    Edema:  Normal (mild increase left > rt mid patella)      Functional Testing:  : independent with transfers, relies on B u/e with sit to and from stand.          Quad:    Single Leg Squat:  Left:      Right:        Bilateral Leg Squat:   Moderate loss of control                  General Evaluation:                      Balance:  Balance wnl general: TUG = 12 seconds.                                                       ROS    Assessment/Plan:    Patient is a 86 year old male with left side knee complaints.    Patient has the following significant findings with corresponding treatment plan.                Diagnosis 1:  Left knee pain, weakness  Pain -  self management, education, directional preference exercise and home program  Decreased ROM/flexibility - therapeutic exercise, therapeutic activity and home program  Decreased joint mobility - manual therapy, therapeutic exercise and home program  Decreased strength - therapeutic exercise, therapeutic activities and home program  Impaired balance - neuro re-education, therapeutic activities and home program  Decreased proprioception - neuro re-education, therapeutic activities and home program  Impaired gait - gait training and home program  Impaired muscle performance - neuro  re-education and home program    Therapy Evaluation Codes:     1) Clinical presentation characteristics are:   Stable/Uncomplicated.  2) Decision-Making    Low complexity using standardized patient assessment instrument and/or measureable assessment of functional outcome.  Cumulative Therapy Evaluation is: Low complexity.    Previous and current functional limitations:  (See Goal Flow Sheet for this information)    Short term and Long term goals: (See Goal Flow Sheet for this information)     Communication ability:  Patient appears to be able to clearly communicate and understand verbal and written communication and follow directions correctly.  Treatment Explanation - The following has been discussed with the patient:   RX ordered/plan of care  Anticipated outcomes  Possible risks and side effects  This patient would benefit from PT intervention to resume normal activities.   Rehab potential is good.    Frequency:  1 X week, once daily  Duration:  for 6 weeks  Discharge Plan:  Achieve all LTG.  Independent in home treatment program.  Reach maximal therapeutic benefit.    Please refer to the daily flowsheet for treatment today, total treatment time and time spent performing 1:1 timed codes.

## 2022-02-03 ENCOUNTER — THERAPY VISIT (OUTPATIENT)
Dept: PHYSICAL THERAPY | Facility: CLINIC | Age: 87
End: 2022-02-03
Payer: COMMERCIAL

## 2022-02-03 DIAGNOSIS — M25.562 LEFT KNEE PAIN, UNSPECIFIED CHRONICITY: ICD-10-CM

## 2022-02-03 PROCEDURE — 97110 THERAPEUTIC EXERCISES: CPT | Mod: GP | Performed by: PHYSICAL THERAPIST

## 2022-02-07 DIAGNOSIS — I48.19 PERSISTENT ATRIAL FIBRILLATION (H): ICD-10-CM

## 2022-02-07 RX ORDER — METOPROLOL SUCCINATE 100 MG/1
100 TABLET, EXTENDED RELEASE ORAL DAILY
Qty: 90 TABLET | Refills: 2 | Status: SHIPPED | OUTPATIENT
Start: 2022-02-07 | End: 2022-07-26

## 2022-02-09 DIAGNOSIS — M85.851 OSTEOPENIA OF BOTH HIPS: ICD-10-CM

## 2022-02-09 DIAGNOSIS — M85.852 OSTEOPENIA OF BOTH HIPS: ICD-10-CM

## 2022-02-09 RX ORDER — RISEDRONATE SODIUM 35 MG/1
35 TABLET, FILM COATED ORAL
Qty: 12 TABLET | Refills: 2 | Status: SHIPPED | OUTPATIENT
Start: 2022-02-09 | End: 2022-10-17

## 2022-02-09 NOTE — TELEPHONE ENCOUNTER
Routing refill request to provider for review/approval because:  Labs out of range:  creatinine  Creatinine   Date Value Ref Range Status   10/22/2021 1.34 (H) 0.66 - 1.25 mg/dL Final   06/18/2021 1.44 (H) 0.66 - 1.25 mg/dL Nino BULLOCK RN

## 2022-02-10 ENCOUNTER — THERAPY VISIT (OUTPATIENT)
Dept: PHYSICAL THERAPY | Facility: CLINIC | Age: 87
End: 2022-02-10
Payer: COMMERCIAL

## 2022-02-10 DIAGNOSIS — M25.562 LEFT KNEE PAIN, UNSPECIFIED CHRONICITY: ICD-10-CM

## 2022-02-10 PROCEDURE — 97110 THERAPEUTIC EXERCISES: CPT | Mod: GP | Performed by: PHYSICAL THERAPIST

## 2022-02-10 PROCEDURE — 97530 THERAPEUTIC ACTIVITIES: CPT | Mod: GP | Performed by: PHYSICAL THERAPIST

## 2022-02-17 ENCOUNTER — THERAPY VISIT (OUTPATIENT)
Dept: PHYSICAL THERAPY | Facility: CLINIC | Age: 87
End: 2022-02-17
Payer: COMMERCIAL

## 2022-02-17 DIAGNOSIS — M25.562 LEFT KNEE PAIN, UNSPECIFIED CHRONICITY: ICD-10-CM

## 2022-02-17 PROCEDURE — 97530 THERAPEUTIC ACTIVITIES: CPT | Mod: GP | Performed by: PHYSICAL THERAPIST

## 2022-02-17 PROCEDURE — 97110 THERAPEUTIC EXERCISES: CPT | Mod: GP | Performed by: PHYSICAL THERAPIST

## 2022-02-25 ENCOUNTER — LAB (OUTPATIENT)
Dept: LAB | Facility: CLINIC | Age: 87
End: 2022-02-25

## 2022-02-25 DIAGNOSIS — C61 PROSTATE CANCER (H): ICD-10-CM

## 2022-02-25 DIAGNOSIS — I50.32 CHRONIC DIASTOLIC HEART FAILURE (H): ICD-10-CM

## 2022-02-25 PROCEDURE — 36415 COLL VENOUS BLD VENIPUNCTURE: CPT

## 2022-02-25 PROCEDURE — 84153 ASSAY OF PSA TOTAL: CPT

## 2022-02-25 PROCEDURE — 80048 BASIC METABOLIC PNL TOTAL CA: CPT

## 2022-02-26 LAB
ANION GAP SERPL CALCULATED.3IONS-SCNC: 8 MMOL/L (ref 3–14)
BUN SERPL-MCNC: 31 MG/DL (ref 7–30)
CALCIUM SERPL-MCNC: 8.9 MG/DL (ref 8.5–10.1)
CHLORIDE BLD-SCNC: 111 MMOL/L (ref 94–109)
CO2 SERPL-SCNC: 20 MMOL/L (ref 20–32)
CREAT SERPL-MCNC: 1.39 MG/DL (ref 0.66–1.25)
GFR SERPL CREATININE-BSD FRML MDRD: 50 ML/MIN/1.73M2
GLUCOSE BLD-MCNC: 172 MG/DL (ref 70–99)
POTASSIUM BLD-SCNC: 4.5 MMOL/L (ref 3.4–5.3)
PSA SERPL-MCNC: <0.01 UG/L (ref 0–4)
SODIUM SERPL-SCNC: 139 MMOL/L (ref 133–144)

## 2022-02-28 ENCOUNTER — DOCUMENTATION ONLY (OUTPATIENT)
Dept: CARDIOLOGY | Facility: CLINIC | Age: 87
End: 2022-02-28
Payer: COMMERCIAL

## 2022-02-28 DIAGNOSIS — I48.19 PERSISTENT ATRIAL FIBRILLATION (H): Primary | ICD-10-CM

## 2022-02-28 DIAGNOSIS — I77.9 AORTA DISORDER (H): ICD-10-CM

## 2022-02-28 DIAGNOSIS — I50.32 CHRONIC DIASTOLIC HEART FAILURE (H): ICD-10-CM

## 2022-02-28 NOTE — PROGRESS NOTES
"Pls let Spencer/daughter Maria Del Carmen and let them know BMP is stable; still with renal insufficiency.     I saw him last 11/2021 and overall he was doing well, maybe slight increase in SOB d/t \"out of shape.\" I reduced metoprolol XL from 150 to 100 mg daily thinking low HR could be contributing.    * What is weight? Was 230# 11/2021  * How is breathing - any difference with the lower dose of metoprolol XL?      Component      Latest Ref Rng & Units 8/25/2021 10/22/2021 2/25/2022   Sodium      133 - 144 mmol/L 140 141 139   Potassium      3.4 - 5.3 mmol/L 4.8 4.8 4.5   Chloride      94 - 109 mmol/L 112 (H) 113 (H) 111 (H)   Carbon Dioxide      20 - 32 mmol/L 25 20 20   Anion Gap      3 - 14 mmol/L 3 8 8   Urea Nitrogen      7 - 30 mg/dL 28 36 (H) 31 (H)   Creatinine      0.66 - 1.25 mg/dL 1.29 (H) 1.34 (H) 1.39 (H)   Calcium      8.5 - 10.1 mg/dL 9.1 9.5 8.9   Glucose      70 - 99 mg/dL 103 (H) 74 172 (H)     "

## 2022-02-28 NOTE — PROGRESS NOTES
Spoke to pt and made him aware that his BMP is stable, still with renal insufficiency-Cr is 1.39. Pt states that his weight is 230 and had gained 10 pounds since dio. Pt stated the he took a walk a couple days agao and he had labored breathing. When he returned home his Sats were 98% and HR was 70. Pt then after sitting breathing was normal.  Asked if pt was active and he said not really, but does have a Nustep and when he is motivated does this for a 1/2 hour. States that he just is not always motivated. But tomorrow he and Daughter Amber are going to the [place] to walk and they can report back how the walk went.  Pt asked that this writer give Amber a call to discuss the lab and pt walk tomorrow. Jaguar

## 2022-03-01 NOTE — PROGRESS NOTES
Spoke to pt daughter Amber, who will be checking on pt tomorrow and will see how his weight looks and if he has any edema and then will also see how pt is doing with waking in the mall. Amber will then call [facility] nurses with an Update for Cherise. Jaguar

## 2022-03-03 ENCOUNTER — VIRTUAL VISIT (OUTPATIENT)
Dept: UROLOGY | Facility: CLINIC | Age: 87
End: 2022-03-03
Payer: COMMERCIAL

## 2022-03-03 VITALS — BODY MASS INDEX: 34.86 KG/M2 | WEIGHT: 230 LBS | HEIGHT: 68 IN

## 2022-03-03 DIAGNOSIS — R31.0 GROSS HEMATURIA: ICD-10-CM

## 2022-03-03 DIAGNOSIS — C61 PROSTATE CANCER (H): Primary | ICD-10-CM

## 2022-03-03 DIAGNOSIS — R39.15 URINARY URGENCY: ICD-10-CM

## 2022-03-03 PROCEDURE — 99213 OFFICE O/P EST LOW 20 MIN: CPT | Mod: 95 | Performed by: PHYSICIAN ASSISTANT

## 2022-03-03 ASSESSMENT — ENCOUNTER SYMPTOMS
NAUSEA: 0
FEVER: 0
DYSURIA: 0
UNEXPECTED WEIGHT CHANGE: 0
HEMATURIA: 1
CHILLS: 0
SHORTNESS OF BREATH: 1
VOMITING: 0

## 2022-03-03 ASSESSMENT — PAIN SCALES - GENERAL: PAINLEVEL: NO PAIN (0)

## 2022-03-03 NOTE — PROGRESS NOTES
PT WANTS TELE VISIT    Spencer is a 86 year old who is being evaluated via a billable telephone visit.      What phone number would you like to be contacted at?   [phone]  How would you like to obtain your AVS? Marie    Subjective      CHIEF COMPLAINT/REASON FOR VISIT   Patient of Dr. Pino's seen on my calendar  Prostate cancer recheck     HISTORY OF PRESENT ILLNESS   Mr. More is a very pleasant 86-year-old gentleman, who presents today for follow-up on prostate cancer.  He was last seen by myself on 08/26/2021.    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  He has had hematuria previously with negative evaluation in 2018.  He had prior radiotherapy in 2007.  Recurrence in 2009 and has been doing intermittent hormone therapy since then.    Last hormone injection was approximately 2.5 years ago.  His PSA has been undetectable since that time.  Plan is to only give him hormone therapy if PSA were to rise.  We are following him on a 6-month basis.    Since last time I saw him, patient has been doing well.  He does continue to note that he has some difficulties with urination including urgency and occasional urge incontinence.  He is wearing protection in his pants.  He does note intermittent small episodes of hematuria.  This is stable from when he had hematuria evaluation.    He does feel like his penis has gotten smaller and may have retracted.  Otherwise feels like he is doing well.    The following portions of the patient's history were reviewed and updated as appropriate: allergies, current medications, past family history, past medical history, past social history, past surgical history, and problem list.     REVIEW OF SYSTEMS   Review of Systems   Constitutional: Negative for chills, fever and unexpected weight change.   Respiratory: Positive for shortness of breath.    Cardiovascular: Negative for chest pain.   Gastrointestinal: Negative for nausea and vomiting.   Genitourinary:  "Positive for hematuria and urgency. Negative for dysuria.      Per HPI.     Patient Active Problem List   Diagnosis     Personal history of other malignant neoplasm of skin     Lumbago     Hypertrophy of prostate without urinary obstruction     Other emphysema (H)     Hypersomnia with sleep apnea     Malignant neoplasm of prostate     Impotence of organic origin     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, lower limb     CARDIOVASCULAR SCREENING; LDL GOAL LESS THAN 100     Vitamin D deficiency     Branch retinal vein occlusion     Bee sting-induced anaphylaxis     Advance Care Planning     Radiation proctitis     Coronary artery disease     Impaired fasting glucose     Health Care Home     Dyslipidemia     Benign essential hypertension     Spinal stenosis of lumbar region with neurogenic claudication     Dysthymia     Atrial fibrillation and flutter (H)     Dilated aortic root (H)     Generalized muscle weakness     Obesity (BMI 35.0-39.9) with comorbidity (H)     CKD (chronic kidney disease) stage 3, GFR 30-59 ml/min (H)     Mild major depression (H)     Angina pectoris (H)     Chronic diastolic heart failure (H)     Left knee pain      Past Medical History:   Diagnosis Date     Actinic keratosis      Arthritis      Atrial fibrillation and flutter (H) 12/3/2018     CAD (coronary artery disease)     1/5/2011 lateral ischemia on stress echo, 12/2014 normal stress echo     Coronary artery disease 1/1/2011    Abnormal stress test 1/2011 and controlled with medical mgmt      Dyslipidemia      Emphysema of lung (H)      Essential hypertension, benign      Hernia, abdominal      Hypersomnia with sleep apnea, unspecified     on bipap, partially treated with residual apneas     Hypertrophy (benign) of prostate 5/01    Biopsy 5/01 negative for cancer; PSA 5     Lumbago      Mumps      Prostate cancer (H) 12/15/2006     Skin cancer, basal cell 1997     Spider veins         Objective      PHYSICAL EXAM   Ht 1.715 m (5' 7.5\")   Wt " 104.3 kg (230 lb)   BMI 35.49 kg/m     healthy, alert and no distress  PSYCH: Alert and oriented times 3; coherent speech, normal   rate and volume, able to articulate logical thoughts, able   to abstract reason, no tangential thoughts, no hallucinations   or delusions  His affect is normal  RESP: No cough, no audible wheezing, able to talk in full sentences  Remainder of exam unable to be completed due to telephone visits    LABORATORY     Lab Results   Component Value Date    PSA <0.01 02/25/2022    PSA <0.01 07/29/2021      Recent Labs   Lab Test 01/12/21  1312 11/11/20  0737 12/23/19  1518   COLOR Yellow   < > Yellow   APPEARANCE Clear   < > Clear   URINEGLC Negative   < > Negative   URINEBILI Negative   < > Negative   URINEKETONE Negative   < > Negative   SG 1.020   < > 1.015   UBLD Negative   < > Moderate*   URINEPH 6.5   < > 6.5   PROTEIN Negative   < > Negative   UROBILINOGEN 0.2   < > 0.2   NITRITE Negative   < > Negative   LEUKEST Negative   < > Small*   RBCU  --   --  O - 2   WBCU  --   --  5-10*    < > = values in this interval not displayed.       Assessment & Plan    1. Prostate cancer (H)    2. Gross hematuria    3. Urinary urgency        I had the pleasure today meeting with Mr. More to discuss his prostate cancer.  His PSA remains undetectable at less than 0.01.  This is great news.    -Recommendation would be for follow-up in 6 months with a PSA and return visit.  If PSA were to become detectable, would plan on restarting Lupron.    We discussed his urinary symptomatology.  He continues to have occasional tiny amounts of hematuria as well as persistent urgency.  Previous negative hematuria evaluation.  He also notes that his urgency is not at a point where he would want to do anything.    -We will continue to monitor.  If urinary symptomatology worsen, would consider trial of an overactive bladder medication and a postvoid residual to ensure that you are emptying well.  If blood in the urine  worsens, would recommend repeat hematuria evaluation including CT urogram and cystoscopy.      Signed by:       Lanny Devine PA-C 3/3/2022 1:17 PM        Phone call duration: 8 minutes

## 2022-03-03 NOTE — PATIENT INSTRUCTIONS
-Recommendation would be for follow-up in 6 months with a PSA and return visit.  If PSA were to become detectable, would plan on restarting Lupron.    -We will continue to monitor urgency and blood in the urine.  If urinary symptomatology worsen, would consider trial of an overactive bladder medication and a postvoid residual to ensure that you are emptying well.  If blood in the urine worsens, would recommend repeat hematuria evaluation including CT urogram and cystoscopy.

## 2022-03-03 NOTE — LETTER
3/3/2022       RE: Nixon More  [address] [place] [address] [address]  [facility] [address]     Dear Colleague,    Thank you for referring your patient, Nixon More, to the [facility] [place] [facility]. Please see a copy of my visit note below.    PT WANTS TELE VISIT    Spencer is a 86 year old who is being evaluated via a billable telephone visit.      What phone number would you like to be contacted at?   [phone]  How would you like to obtain your AVS? [email or web address]    Subjective      CHIEF COMPLAINT/REASON FOR VISIT   Patient of Dr. Pino's seen on my calendar  Prostate cancer recheck     HISTORY OF PRESENT ILLNESS   Mr. More is a very pleasant 86-year-old gentleman, who presents today for follow-up on prostate cancer.  He was last seen by myself on 08/26/2021.    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  He has had hematuria previously with negative evaluation in 2018.  He had prior radiotherapy in 2007.  Recurrence in 2009 and has been doing intermittent hormone therapy since then.    Last hormone injection was approximately 2.5 years ago.  His PSA has been undetectable since that time.  Plan is to only give him hormone therapy if PSA were to rise.  We are following him on a 6-month basis.    Since last time I saw him, patient has been doing well.  He does continue to note that he has some difficulties with urination including urgency and occasional urge incontinence.  He is wearing protection in his pants.  He does note intermittent small episodes of hematuria.  This is stable from when he had hematuria evaluation.    He does feel like his penis has gotten smaller and may have retracted.  Otherwise feels like he is doing well.    The following portions of the patient's history were reviewed and updated as appropriate: allergies, current medications, past family history, past medical history, past social history,  past surgical history, and problem list.     REVIEW OF SYSTEMS   Review of Systems   Constitutional: Negative for chills, fever and unexpected weight change.   Respiratory: Positive for shortness of breath.    Cardiovascular: Negative for chest pain.   Gastrointestinal: Negative for nausea and vomiting.   Genitourinary: Positive for hematuria and urgency. Negative for dysuria.      Per HPI.     Patient Active Problem List   Diagnosis     Personal history of other malignant neoplasm of skin     Lumbago     Hypertrophy of prostate without urinary obstruction     Other emphysema (H)     Hypersomnia with sleep apnea     Malignant neoplasm of prostate     Impotence of organic origin     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, lower limb     CARDIOVASCULAR SCREENING; LDL GOAL LESS THAN 100     Vitamin D deficiency     Branch retinal vein occlusion     Bee sting-induced anaphylaxis     Advance Care Planning     Radiation proctitis     Coronary artery disease     Impaired fasting glucose     Health Care Home     Dyslipidemia     Benign essential hypertension     Spinal stenosis of lumbar region with neurogenic claudication     Dysthymia     Atrial fibrillation and flutter (H)     Dilated aortic root (H)     Generalized muscle weakness     Obesity (BMI 35.0-39.9) with comorbidity (H)     CKD (chronic kidney disease) stage 3, GFR 30-59 ml/min (H)     Mild major depression (H)     Angina pectoris (H)     Chronic diastolic heart failure (H)     Left knee pain      Past Medical History:   Diagnosis Date     Actinic keratosis      Arthritis      Atrial fibrillation and flutter (H) 12/3/2018     CAD (coronary artery disease)     1/5/2011 lateral ischemia on stress echo, 12/2014 normal stress echo     Coronary artery disease 1/1/2011    Abnormal stress test 1/2011 and controlled with medical mgmt      Dyslipidemia      Emphysema of lung (H)      Essential hypertension, benign      Hernia, abdominal      Hypersomnia with sleep apnea,  "unspecified     on bipap, partially treated with residual apneas     Hypertrophy (benign) of prostate 5/01    Biopsy 5/01 negative for cancer; PSA 5     Lumbago      Mumps      Prostate cancer (H) 12/15/2006     Skin cancer, basal cell 1997     Spider veins         Objective      PHYSICAL EXAM   Ht 1.715 m (5' 7.5\")   Wt 104.3 kg (230 lb)   BMI 35.49 kg/m     healthy, alert and no distress  PSYCH: Alert and oriented times 3; coherent speech, normal   rate and volume, able to articulate logical thoughts, able   to abstract reason, no tangential thoughts, no hallucinations   or delusions  His affect is normal  RESP: No cough, no audible wheezing, able to talk in full sentences  Remainder of exam unable to be completed due to telephone visits    LABORATORY     Lab Results   Component Value Date    PSA <0.01 02/25/2022    PSA <0.01 07/29/2021      Recent Labs   Lab Test 01/12/21  1312 11/11/20  0737 12/23/19  1518   COLOR Yellow   < > Yellow   APPEARANCE Clear   < > Clear   URINEGLC Negative   < > Negative   URINEBILI Negative   < > Negative   URINEKETONE Negative   < > Negative   SG 1.020   < > 1.015   UBLD Negative   < > Moderate*   URINEPH 6.5   < > 6.5   PROTEIN Negative   < > Negative   UROBILINOGEN 0.2   < > 0.2   NITRITE Negative   < > Negative   LEUKEST Negative   < > Small*   RBCU  --   --  O - 2   WBCU  --   --  5-10*    < > = values in this interval not displayed.       Assessment & Plan    1. Prostate cancer (H)    2. Gross hematuria    3. Urinary urgency        I had the pleasure today meeting with Mr. More to discuss his prostate cancer.  His PSA remains undetectable at less than 0.01.  This is great news.    -Recommendation would be for follow-up in 6 months with a PSA and return visit.  If PSA were to become detectable, would plan on restarting Lupron.    We discussed his urinary symptomatology.  He continues to have occasional tiny amounts of hematuria as well as persistent urgency.  Previous " negative hematuria evaluation.  He also notes that his urgency is not at a point where he would want to do anything.    -We will continue to monitor.  If urinary symptomatology worsen, would consider trial of an overactive bladder medication and a postvoid residual to ensure that you are emptying well.  If blood in the urine worsens, would recommend repeat hematuria evaluation including CT urogram and cystoscopy.      Signed by:       Lanny Devine PA-C 3/3/2022 1:17 PM        Phone call duration: 8 minutes

## 2022-03-15 NOTE — PROGRESS NOTES
3/15/22 Attempted to call pts daughter Amber,  but reached . PHUONG and requested callback. Magdalene 1110 am

## 2022-03-16 NOTE — PROGRESS NOTES
"3/16/22 Brando Amber called with an update on her dad.  She stated he has been doing well . She exercised with him recently at the gym where he rode the stationary bike, moving arms and legs for appox 30 minutes and he \"robyn fine\". His pulse has been stable in the 70's. He has not had any puffiness and has not needed to wear his support stockings .   Will update Cherise ARREOLA and call family back w any recommendations.  Magdalene 330 pm  "

## 2022-03-17 NOTE — PROGRESS NOTES
"That is great news!  I was hoping that reducing of the metoprolol would improve his symptoms.    He was a [facility] pt, and needs new General Cardiologist given h/o CAD, HFpEF, dilated ascending aorta along with an echo 7/2022.     I've placed order for both - \"any cardiologist\" as Dr. Camp didn't specify.  "

## 2022-03-28 PROBLEM — M25.562 LEFT KNEE PAIN: Status: RESOLVED | Noted: 2022-01-26 | Resolved: 2022-03-28

## 2022-03-28 NOTE — PROGRESS NOTES
"Discharge Note    Progress reporting period is from initial evaluation date (please see noted date below) to Feb 17, 2022.  Linked Episodes   Type: Episode: Status: Noted: Resolved: Last update: Updated by:   PHYSICAL THERAPY [place]1/26/22 Active 1/26/2022 2/17/2022  2:15 PM Rajat Nettles, PT      Comments:       Please see information below for last relevant information on current status.  Patient seen for 4 visits.    SUBJECTIVE  Subjective changes noted by patient:  Used NuStep at his condo - 20\" 3x this past week.  Standing is easier, feel more stable.  Tried some additional walking at his condo/halls.    .  Current pain level is  .     Previous pain level was   .   Changes in function:  Yes (See Goal flowsheet attached for changes in current functional level)  Adverse reaction to treatment or activity: None    OBJECTIVE  Changes noted in objective findings: Improved tolerance with activity, exercise.  See PTRx     ASSESSMENT/PLAN  Diagnosis: knee pain, OA   Updated problem list and treatment plan:   Decreased ROM/flexibility - HEP  Decreased function - HEP  Decreased strength - HEP  STG/LTGs have been met or progress has been made towards goals:  Yes, please see goal flowsheet for most current information  Assessment of Progress: current status is unknown.    Last current status:     Self Management Plans:  HEP  I have re-evaluated this patient and find that the nature, scope, duration and intensity of the therapy is appropriate for the medical condition of the patient.  Nixon continues to require the following intervention to meet STG and LTG's:  HEP.    Recommendations:  Discharge with current home program.  Patient to follow up with MD as needed.    Please refer to the daily flowsheet for treatment today, total treatment time and time spent performing 1:1 timed codes.        "

## 2022-04-12 ENCOUNTER — TRANSFERRED RECORDS (OUTPATIENT)
Dept: HEALTH INFORMATION MANAGEMENT | Facility: CLINIC | Age: 87
End: 2022-04-12
Payer: COMMERCIAL

## 2022-07-05 ENCOUNTER — HOSPITAL ENCOUNTER (OUTPATIENT)
Dept: CARDIOLOGY | Facility: CLINIC | Age: 87
Discharge: HOME | End: 2022-07-05
Attending: PHYSICIAN ASSISTANT | Admitting: PHYSICIAN ASSISTANT
Payer: COMMERCIAL

## 2022-07-05 DIAGNOSIS — I48.19 PERSISTENT ATRIAL FIBRILLATION (H): ICD-10-CM

## 2022-07-05 DIAGNOSIS — I50.32 CHRONIC DIASTOLIC HEART FAILURE (H): ICD-10-CM

## 2022-07-05 DIAGNOSIS — I77.9 AORTA DISORDER (H): ICD-10-CM

## 2022-07-05 LAB — LVEF ECHO: NORMAL

## 2022-07-05 PROCEDURE — 255N000002 HC RX 255 OP 636: Performed by: PHYSICIAN ASSISTANT

## 2022-07-05 PROCEDURE — 999N000208 ECHOCARDIOGRAM COMPLETE

## 2022-07-05 PROCEDURE — 93306 TTE W/DOPPLER COMPLETE: CPT | Mod: 26 | Performed by: INTERNAL MEDICINE

## 2022-07-05 RX ADMIN — HUMAN ALBUMIN MICROSPHERES AND PERFLUTREN 3 ML: 10; .22 INJECTION, SOLUTION INTRAVENOUS at 10:13

## 2022-07-12 ENCOUNTER — OFFICE VISIT (OUTPATIENT)
Dept: CARDIOLOGY | Facility: CLINIC | Age: 87
End: 2022-07-12
Payer: COMMERCIAL

## 2022-07-12 VITALS
HEIGHT: 68 IN | DIASTOLIC BLOOD PRESSURE: 69 MMHG | SYSTOLIC BLOOD PRESSURE: 105 MMHG | WEIGHT: 238 LBS | HEART RATE: 69 BPM | BODY MASS INDEX: 36.07 KG/M2

## 2022-07-12 DIAGNOSIS — I77.9 AORTA DISORDER (H): ICD-10-CM

## 2022-07-12 DIAGNOSIS — I48.19 PERSISTENT ATRIAL FIBRILLATION (H): ICD-10-CM

## 2022-07-12 DIAGNOSIS — I50.32 CHRONIC DIASTOLIC HEART FAILURE (H): ICD-10-CM

## 2022-07-12 PROCEDURE — 99214 OFFICE O/P EST MOD 30 MIN: CPT | Performed by: INTERNAL MEDICINE

## 2022-07-12 NOTE — LETTER
7/12/2022    Natalya Lewis MD  [address] [place] [address]    RE: Nixon More       Dear Colleague,     I had the pleasure of seeing Nixon More in the [facility].  HISTORY:    Nixon More is a very pleasant 86-year-old gentleman accompanied by his daughter today.  He is a previous patient of Dr. Camp's, seen by me for the first time today.  His past cardiac history includes what has been called permanent atrial fibrillation for which he is receiving rate control anticoagulation strategy.  He also has chronic dyspnea on exertion with a 30-year smoking history which he quit in 2000.  This is felt to be secondary to HFpEF obesity and deconditioning.  He has mild enlargement of the ascending aorta and presumed coronary artery disease based on stress testing done in 2011.  This has been treated conservatively without angiography.  He has a past history of hypertension which has been well controlled and actually with some intermittent hypotension.  He has obstructive sleep apnea for which she is on ASV therapy.  He has peripheral venous insufficiency and dyslipidemia.    Today Spencer reports that overall he feels that he is doing well.  His daughter points out that he is breathing much better now than he was 2 years ago.  He continues to experience some balance issues and complains of some weakness with shortness of breath and weakness of the legs upon walking a couple of 100 feet.  He denies exertional chest arm neck shoulder or jaw discomfort as well as any symptoms of PND or orthopnea.  He has not had syncope or near syncope, palpitations, strokelike symptoms, or symptoms of claudication.  He does have chronic peripheral edema which has been stable and well controlled.  He intermittently wears compression stockings.  He has gained some weight but he thinks this is caloric weight rather than fluid weight.  He expresses no cardiovascular concerns today.    An  echocardiogram was recently completed and I reviewed those results with him today.  He has a normal ejection fraction of 65 to 70% with mild LVH.  His left atrium is moderate to severely dilated and he has mild tricuspid regurgitation with borderline pulmonary hypertension.  His ascending aorta is mild to moderately dilated at 4.5 cm.      ASSESSMENT/PLAN:    1.  Dyspnea on exertion.  Likely multifactorial due to advancing age, history of smoking, some degree of diastolic dysfunction, and deconditioning.  There is no sign of volume overload on exam today except for some mild peripheral edema.  Continue current medications.  2.  Atrial fib/atypical atrial flutter.  The patient has been noted intermittently by family report to be in sinus rhythm.  He continues to use full anticoagulation.  3.  Ascending aortic enlargement.  I assured the patient that this is unlikely to ever be a problem.  His aortic size is just 4.5 cm and it will be many years before this reaches a level that we would consider surgery, probably at least 10 years which should put him in his mid 90s.    Thank you for inviting me to participate in the care of your patient.  Please don't hesitate to call if I can be of further assistance.  35 minutes were spent today reviewing the chart and other records, interviewing and examining the patient, and documenting our visit.  6-month follow-up visit will be planned and he will contact us if he has further cardiology questions or issues before then.    Chart documentation was completed, in part, with mphoria voice-recognition software. Even though reviewed, some grammatical, spelling, and word errors may remain.       Orders Placed This Encounter   Procedures     Follow-Up with Cardiology ZULMA     No orders of the defined types were placed in this encounter.    There are no discontinued medications.    10 year ASCVD risk: The ASCVD Risk score ([place]il DC Jr., et al., 2013) failed to calculate for the following  reasons:    The 2013 ASCVD risk score is only valid for ages 40 to 79    Encounter Diagnoses   Name Primary?     Persistent atrial fibrillation (H)      Chronic diastolic heart failure (H)      Aorta disorder (H)        CURRENT MEDICATIONS:  Current Outpatient Medications   Medication Sig Dispense Refill     apixaban ANTICOAGULANT (ELIQUIS) 5 MG tablet Take 1 tablet (5 mg) by mouth 2 times daily 180 tablet 3     atorvastatin (LIPITOR) 40 MG tablet Take 1 tablet (40 mg) by mouth daily 90 tablet 3     Cholecalciferol (VITAMIN D-3) 25 MCG (1000 UT) CAPS Take by mouth daily       diltiazem ER (DILT-XR) 120 MG 24 hr capsule Take 1 capsule (120 mg) by mouth daily 90 capsule 3     EPINEPHrine (EPIPEN/ADRENACLICK/OR ANY BX GENERIC EQUIV) 0.3 MG/0.3ML injection 2-pack Inject 0.3 mLs (0.3 mg) into the muscle as needed for anaphylaxis 0.6 mL 1     escitalopram (LEXAPRO) 10 MG tablet Take 1 tablet (10 mg) by mouth daily 90 tablet 3     ipratropium (ATROVENT) 0.06 % nasal spray Spray 2 sprays in nostril 4 times daily as needed for rhinitis 3 Box 3     Loperamide HCl (IMODIUM OR) Take by mouth daily as needed       losartan (COZAAR) 100 MG tablet Take 1 tablet (100 mg) by mouth daily 90 tablet 3     metoprolol succinate ER (TOPROL-XL) 100 MG 24 hr tablet Take 1 tablet (100 mg) by mouth daily 90 tablet 2     RISEdronate (ACTONEL) 35 MG tablet TAKE 1 TABLET (35 MG) BY MOUTH EVERY 7 DAYS 12 tablet 2     spironolactone (ALDACTONE) 25 MG tablet Take 0.5 tablets (12.5 mg) by mouth daily 45 tablet 3     albuterol (PROAIR HFA/PROVENTIL HFA/VENTOLIN HFA) 108 (90 Base) MCG/ACT inhaler Inhale 2 puffs into the lungs every 4 hours as needed for shortness of breath / dyspnea or wheezing (Patient not taking: Reported on 7/12/2022) 8 g 0     ASPIRIN NOT PRESCRIBED (INTENTIONAL) continuous prn for other Antiplatelet medication not prescribed intentionally due to Current anticoagulant therapy (warfarin/enoxaparin) (Patient not taking: Reported on  7/12/2022)         ALLERGIES     Allergies   Allergen Reactions     Augmentin Rash     Type III hypersensitivity     Bactrim [Sulfamethoxazole W/Trimethoprim] Rash     Serum sickness, type III hypersensitivity       Bee Venom Itching     Sulfa Drugs Hives     Celebrex [Celecoxib]      Lisinopril Cough     Naproxen Hives       PAST MEDICAL HISTORY:  Past Medical History:   Diagnosis Date     Actinic keratosis      Arthritis      Atrial fibrillation and flutter (H) 12/3/2018     CAD (coronary artery disease)     1/5/2011 lateral ischemia on stress echo, 12/2014 normal stress echo     Coronary artery disease 1/1/2011    Abnormal stress test 1/2011 and controlled with medical mgmt      Dyslipidemia      Emphysema of lung (H)      Essential hypertension, benign      Hernia, abdominal      Hypersomnia with sleep apnea, unspecified     on bipap, partially treated with residual apneas     Hypertrophy (benign) of prostate 5/01    Biopsy 5/01 negative for cancer; PSA 5     Lumbago      Mumps      Prostate cancer (H) 12/15/2006     Skin cancer, basal cell 1997     Spider veins        PAST SURGICAL HISTORY:  Past Surgical History:   Procedure Laterality Date     HERNIA REPAIR  child     Moh's procedure for basal cell carcinoma  01/2001     PROSTATE SURGERY       s/p lumbar laminectomy NOS  1988     SIGMOIDOSCOPY FLEXIBLE N/A 6/15/2020    Procedure: SIGMOIDOSCOPY, FLEXIBLE;  Surgeon: Sunni Patton MD;  Location: [facility]     VITRECTOMY PARS PLANA REMOVE PRERETINAL MEMBRANE   3/09       FAMILY HISTORY:  Family History   Problem Relation Age of Onset     Alzheimer Disease Mother      Hypertension Mother      Cardiovascular Father         D:86 complications fo CHF     Prostate Cancer Brother      Lung Cancer Brother 76     Prostate Cancer Brother        SOCIAL HISTORY:  Social History     Socioeconomic History     Marital status:      Spouse name: None     Number of children: None     Years of education: None     Highest  "education level: None   Occupational History     Occupation: retired   Tobacco Use     Smoking status: Former Smoker     Packs/day: 1.00     Types: Cigarettes     Start date:      Quit date: 1978     Years since quittin.5     Smokeless tobacco: Never Used   Substance and Sexual Activity     Alcohol use: Never     Drug use: No     Sexual activity: Yes     Partners: Female   Other Topics Concern     Caffeine Concern No     Comment: 0-2 cans of soda per day.     Exercise No     Seat Belt Yes     Parent/sibling w/ CABG, MI or angioplasty before 65F 55M? No     Social Determinants of Health     Intimate Partner Violence: Not At Risk     Fear of Current or Ex-Partner: No     Emotionally Abused: No     Physically Abused: No     Sexually Abused: No       Review of Systems:  Skin:        Eyes:       ENT:       Respiratory:  Positive for dyspnea on exertion  Cardiovascular:    edema;Positive for;lightheadedness  Gastroenterology:      Genitourinary:       Musculoskeletal:       Neurologic:       Psychiatric:       Heme/Lymph/Imm:       Endocrine:         Physical Exam:  Vitals: /69   Pulse 69   Ht 1.715 m (5' 7.5\")   Wt 108 kg (238 lb)   BMI 36.73 kg/m      Constitutional:  cooperative, alert and oriented, well developed, well nourished, in no acute distress obese      Skin:  warm and dry to the touch        Head:  normocephalic, no masses or lesions        Eyes:  no xanthalasma;sclera white        ENT:  no pallor or cyanosis   masked    Neck:  carotid pulses are full and equal bilaterally, JVP normal, no carotid bruit        Chest:  normal breath sounds, clear to auscultation, normal A-P diameter, normal symmetry, normal respiratory excursion, no use of accessory muscles        Cardiac: normal S1 and S2;no murmurs, gallops or rubs detected irregularly irregular rhythm;bradycardic distant heart sounds              Abdomen:  abdomen soft;BS normoactive        Vascular:   pulses below the femoral arteries " are diminished                               strong radial pulses, slighly diminshed dorsalis pedis and posterior tibia    Extremities and Muscular Skeletal:        bilateral LE edema;1+;L greater than R     Neurological:  no gross motor deficits        Psych:  affect appropriate, oriented to time, person and place     Recent Lab Results:  LIPID RESULTS:  Lab Results   Component Value Date    CHOL 121 05/17/2021    HDL 39 (L) 05/17/2021    LDL 55 05/17/2021    TRIG 137 05/17/2021    CHOLHDLRATIO 2.8 10/15/2015       LIVER ENZYME RESULTS:  Lab Results   Component Value Date    AST 31 08/25/2021    AST 32 01/12/2021    ALT 31 08/25/2021    ALT 33 01/12/2021       CBC RESULTS:  Lab Results   Component Value Date    WBC 5.9 11/16/2021    WBC 5.6 06/18/2021    RBC 4.59 11/16/2021    RBC 4.38 (L) 06/18/2021    HGB 13.7 11/16/2021    HGB 12.8 (L) 06/18/2021    HCT 43.1 11/16/2021    HCT 41.1 06/18/2021    MCV 94 11/16/2021    MCV 94 06/18/2021    MCH 29.8 11/16/2021    MCH 29.2 06/18/2021    MCHC 31.8 11/16/2021    MCHC 31.1 (L) 06/18/2021    RDW 14.4 11/16/2021    RDW 14.1 06/18/2021     11/16/2021     06/18/2021       BMP RESULTS:  Lab Results   Component Value Date     02/25/2022     06/18/2021    POTASSIUM 4.5 02/25/2022    POTASSIUM 4.4 06/18/2021    CHLORIDE 111 (H) 02/25/2022    CHLORIDE 109 06/18/2021    CO2 20 02/25/2022    CO2 29 06/18/2021    ANIONGAP 8 02/25/2022    ANIONGAP 4 06/18/2021     (H) 02/25/2022     (H) 06/18/2021    BUN 31 (H) 02/25/2022    BUN 28 06/18/2021    CR 1.39 (H) 02/25/2022    CR 1.44 (H) 06/18/2021    GFRESTIMATED 50 (L) 02/25/2022    GFRESTIMATED 44 (L) 06/18/2021    GFRESTBLACK 51 (L) 06/18/2021    MARLENE 8.9 02/25/2022    MARLENE 9.5 06/18/2021        A1C RESULTS:  Lab Results   Component Value Date    A1C 6.0 (H) 10/22/2021    A1C 5.6 11/11/2020       INR RESULTS:  Lab Results   Component Value Date    INR 1.03 12/03/2018    INR 1.07 11/27/2007          Vinod De La Paz MD, FAC    CC  Nimisha eBnnett, PA-C  [address]  [place] [address]    Thank you for allowing me to participate in the care of your patient.      Sincerely,     Vinod De La Paz MD     [facility]

## 2022-07-12 NOTE — PROGRESS NOTES
HISTORY:    Nixon More is a very pleasant 86-year-old gentleman accompanied by his daughter today.  He is a previous patient of Dr. Lozas, seen by me for the first time today.  His past cardiac history includes what has been called permanent atrial fibrillation for which he is receiving rate control anticoagulation strategy.  He also has chronic dyspnea on exertion with a 30-year smoking history which he quit in 2000.  This is felt to be secondary to HFpEF obesity and deconditioning.  He has mild enlargement of the ascending aorta and presumed coronary artery disease based on stress testing done in 2011.  This has been treated conservatively without angiography.  He has a past history of hypertension which has been well controlled and actually with some intermittent hypotension.  He has obstructive sleep apnea for which she is on ASV therapy.  He has peripheral venous insufficiency and dyslipidemia.    Today Spencer reports that overall he feels that he is doing well.  His daughter points out that he is breathing much better now than he was 2 years ago.  He continues to experience some balance issues and complains of some weakness with shortness of breath and weakness of the legs upon walking a couple of 100 feet.  He denies exertional chest arm neck shoulder or jaw discomfort as well as any symptoms of PND or orthopnea.  He has not had syncope or near syncope, palpitations, strokelike symptoms, or symptoms of claudication.  He does have chronic peripheral edema which has been stable and well controlled.  He intermittently wears compression stockings.  He has gained some weight but he thinks this is caloric weight rather than fluid weight.  He expresses no cardiovascular concerns today.    An echocardiogram was recently completed and I reviewed those results with him today.  He has a normal ejection fraction of 65 to 70% with mild LVH.  His left atrium is moderate to severely dilated and he has mild tricuspid  regurgitation with borderline pulmonary hypertension.  His ascending aorta is mild to moderately dilated at 4.5 cm.      ASSESSMENT/PLAN:    1.  Dyspnea on exertion.  Likely multifactorial due to advancing age, history of smoking, some degree of diastolic dysfunction, and deconditioning.  There is no sign of volume overload on exam today except for some mild peripheral edema.  Continue current medications.  2.  Atrial fib/atypical atrial flutter.  The patient has been noted intermittently by family report to be in sinus rhythm.  He continues to use full anticoagulation.  3.  Ascending aortic enlargement.  I assured the patient that this is unlikely to ever be a problem.  His aortic size is just 4.5 cm and it will be many years before this reaches a level that we would consider surgery, probably at least 10 years which should put him in his mid 90s.    Thank you for inviting me to participate in the care of your patient.  Please don't hesitate to call if I can be of further assistance.  35 minutes were spent today reviewing the chart and other records, interviewing and examining the patient, and documenting our visit.  6-month follow-up visit will be planned and he will contact us if he has further cardiology questions or issues before then.    Chart documentation was completed, in part, with [employer] voice-recognition software. Even though reviewed, some grammatical, spelling, and word errors may remain.       Orders Placed This Encounter   Procedures     Follow-Up with Cardiology ZULMA     No orders of the defined types were placed in this encounter.    There are no discontinued medications.    10 year ASCVD risk: The ASCVD Risk score (González ALEX Jr., et al., 2013) failed to calculate for the following reasons:    The 2013 ASCVD risk score is only valid for ages 40 to 79    Encounter Diagnoses   Name Primary?     Persistent atrial fibrillation (H)      Chronic diastolic heart failure (H)      Aorta disorder (H)         CURRENT MEDICATIONS:  Current Outpatient Medications   Medication Sig Dispense Refill     apixaban ANTICOAGULANT (ELIQUIS) 5 MG tablet Take 1 tablet (5 mg) by mouth 2 times daily 180 tablet 3     atorvastatin (LIPITOR) 40 MG tablet Take 1 tablet (40 mg) by mouth daily 90 tablet 3     Cholecalciferol (VITAMIN D-3) 25 MCG (1000 UT) CAPS Take by mouth daily       diltiazem ER (DILT-XR) 120 MG 24 hr capsule Take 1 capsule (120 mg) by mouth daily 90 capsule 3     EPINEPHrine (EPIPEN/ADRENACLICK/OR ANY BX GENERIC EQUIV) 0.3 MG/0.3ML injection 2-pack Inject 0.3 mLs (0.3 mg) into the muscle as needed for anaphylaxis 0.6 mL 1     escitalopram (LEXAPRO) 10 MG tablet Take 1 tablet (10 mg) by mouth daily 90 tablet 3     ipratropium (ATROVENT) 0.06 % nasal spray Spray 2 sprays in nostril 4 times daily as needed for rhinitis 3 Box 3     Loperamide HCl (IMODIUM OR) Take by mouth daily as needed       losartan (COZAAR) 100 MG tablet Take 1 tablet (100 mg) by mouth daily 90 tablet 3     metoprolol succinate ER (TOPROL-XL) 100 MG 24 hr tablet Take 1 tablet (100 mg) by mouth daily 90 tablet 2     RISEdronate (ACTONEL) 35 MG tablet TAKE 1 TABLET (35 MG) BY MOUTH EVERY 7 DAYS 12 tablet 2     spironolactone (ALDACTONE) 25 MG tablet Take 0.5 tablets (12.5 mg) by mouth daily 45 tablet 3     albuterol (PROAIR HFA/PROVENTIL HFA/VENTOLIN HFA) 108 (90 Base) MCG/ACT inhaler Inhale 2 puffs into the lungs every 4 hours as needed for shortness of breath / dyspnea or wheezing (Patient not taking: Reported on 7/12/2022) 8 g 0     ASPIRIN NOT PRESCRIBED (INTENTIONAL) continuous prn for other Antiplatelet medication not prescribed intentionally due to Current anticoagulant therapy (warfarin/enoxaparin) (Patient not taking: Reported on 7/12/2022)         ALLERGIES     Allergies   Allergen Reactions     Augmentin Rash     Type III hypersensitivity     Bactrim [Sulfamethoxazole W/Trimethoprim] Rash     Serum sickness, type III hypersensitivity        Bee Venom Itching     Sulfa Drugs Hives     Celebrex [Celecoxib]      Lisinopril Cough     Naproxen Hives       PAST MEDICAL HISTORY:  Past Medical History:   Diagnosis Date     Actinic keratosis      Arthritis      Atrial fibrillation and flutter (H) 12/3/2018     CAD (coronary artery disease)     1/5/2011 lateral ischemia on stress echo, 12/2014 normal stress echo     Coronary artery disease 1/1/2011    Abnormal stress test 1/2011 and controlled with medical mgmt      Dyslipidemia      Emphysema of lung (H)      Essential hypertension, benign      Hernia, abdominal      Hypersomnia with sleep apnea, unspecified     on bipap, partially treated with residual apneas     Hypertrophy (benign) of prostate 5/01    Biopsy 5/01 negative for cancer; PSA 5     Lumbago      Mumps      Prostate cancer (H) 12/15/2006     Skin cancer, basal cell 1997     Spider veins        PAST SURGICAL HISTORY:  Past Surgical History:   Procedure Laterality Date     HERNIA REPAIR  child     Moh's procedure for basal cell carcinoma  01/2001     PROSTATE SURGERY       s/p lumbar laminectomy NOS  1988     SIGMOIDOSCOPY FLEXIBLE N/A 6/15/2020    Procedure: SIGMOIDOSCOPY, FLEXIBLE;  Surgeon: Sunni Patton MD;  Location: [facility]     VITRECTOMY PARS PLANA REMOVE PRERETINAL MEMBRANE   3/09       FAMILY HISTORY:  Family History   Problem Relation Age of Onset     Alzheimer Disease Mother      Hypertension Mother      Cardiovascular Father         D:86 complications fo CHF     Prostate Cancer Brother      Lung Cancer Brother 76     Prostate Cancer Brother        SOCIAL HISTORY:  Social History     Socioeconomic History     Marital status:      Spouse name: None     Number of children: None     Years of education: None     Highest education level: None   Occupational History     Occupation: retired   Tobacco Use     Smoking status: Former Smoker     Packs/day: 1.00     Types: Cigarettes     Start date: 1948     Quit date: 1/1/1978      "Years since quittin.5     Smokeless tobacco: Never Used   Substance and Sexual Activity     Alcohol use: Never     Drug use: No     Sexual activity: Yes     Partners: Female   Other Topics Concern     Caffeine Concern No     Comment: 0-2 cans of soda per day.     Exercise No     Seat Belt Yes     Parent/sibling w/ CABG, MI or angioplasty before 65F 55M? No     Social Determinants of Health     Intimate Partner Violence: Not At Risk     Fear of Current or Ex-Partner: No     Emotionally Abused: No     Physically Abused: No     Sexually Abused: No       Review of Systems:  Skin:        Eyes:       ENT:       Respiratory:  Positive for dyspnea on exertion  Cardiovascular:    edema;Positive for;lightheadedness  Gastroenterology:      Genitourinary:       Musculoskeletal:       Neurologic:       Psychiatric:       Heme/Lymph/Imm:       Endocrine:         Physical Exam:  Vitals: /69   Pulse 69   Ht 1.715 m (5' 7.5\")   Wt 108 kg (238 lb)   BMI 36.73 kg/m      Constitutional:  cooperative, alert and oriented, well developed, well nourished, in no acute distress obese      Skin:  warm and dry to the touch        Head:  normocephalic, no masses or lesions        Eyes:  no xanthalasma;sclera white        ENT:  no pallor or cyanosis   masked    Neck:  carotid pulses are full and equal bilaterally, JVP normal, no carotid bruit        Chest:  normal breath sounds, clear to auscultation, normal A-P diameter, normal symmetry, normal respiratory excursion, no use of accessory muscles        Cardiac: normal S1 and S2;no murmurs, gallops or rubs detected irregularly irregular rhythm;bradycardic distant heart sounds              Abdomen:  abdomen soft;BS normoactive        Vascular:   pulses below the femoral arteries are diminished                               strong radial pulses, slighly diminshed dorsalis pedis and posterior tibia    Extremities and Muscular Skeletal:        bilateral LE edema;1+;L greater than R "     Neurological:  no gross motor deficits        Psych:  affect appropriate, oriented to time, person and place     Recent Lab Results:  LIPID RESULTS:  Lab Results   Component Value Date    CHOL 121 05/17/2021    HDL 39 (L) 05/17/2021    LDL 55 05/17/2021    TRIG 137 05/17/2021    CHOLHDLRATIO 2.8 10/15/2015       LIVER ENZYME RESULTS:  Lab Results   Component Value Date    AST 31 08/25/2021    AST 32 01/12/2021    ALT 31 08/25/2021    ALT 33 01/12/2021       CBC RESULTS:  Lab Results   Component Value Date    WBC 5.9 11/16/2021    WBC 5.6 06/18/2021    RBC 4.59 11/16/2021    RBC 4.38 (L) 06/18/2021    HGB 13.7 11/16/2021    HGB 12.8 (L) 06/18/2021    HCT 43.1 11/16/2021    HCT 41.1 06/18/2021    MCV 94 11/16/2021    MCV 94 06/18/2021    MCH 29.8 11/16/2021    MCH 29.2 06/18/2021    MCHC 31.8 11/16/2021    MCHC 31.1 (L) 06/18/2021    RDW 14.4 11/16/2021    RDW 14.1 06/18/2021     11/16/2021     06/18/2021       BMP RESULTS:  Lab Results   Component Value Date     02/25/2022     06/18/2021    POTASSIUM 4.5 02/25/2022    POTASSIUM 4.4 06/18/2021    CHLORIDE 111 (H) 02/25/2022    CHLORIDE 109 06/18/2021    CO2 20 02/25/2022    CO2 29 06/18/2021    ANIONGAP 8 02/25/2022    ANIONGAP 4 06/18/2021     (H) 02/25/2022     (H) 06/18/2021    BUN 31 (H) 02/25/2022    BUN 28 06/18/2021    CR 1.39 (H) 02/25/2022    CR 1.44 (H) 06/18/2021    GFRESTIMATED 50 (L) 02/25/2022    GFRESTIMATED 44 (L) 06/18/2021    GFRESTBLACK 51 (L) 06/18/2021    MARLENE 8.9 02/25/2022    MARLENE 9.5 06/18/2021        A1C RESULTS:  Lab Results   Component Value Date    A1C 6.0 (H) 10/22/2021    A1C 5.6 11/11/2020       INR RESULTS:  Lab Results   Component Value Date    INR 1.03 12/03/2018    INR 1.07 11/27/2007         Vinod De La Paz MD, FACC    CC  Nimisha Bennett PA-C  [address]  [place] [id number]

## 2022-07-15 ENCOUNTER — HOSPITAL ENCOUNTER (EMERGENCY)
Facility: CLINIC | Age: 87
Discharge: HOME | End: 2022-07-15
Attending: EMERGENCY MEDICINE | Admitting: EMERGENCY MEDICINE
Payer: COMMERCIAL

## 2022-07-15 ENCOUNTER — APPOINTMENT (OUTPATIENT)
Dept: CARDIOLOGY | Facility: CLINIC | Age: 87
End: 2022-07-15
Attending: EMERGENCY MEDICINE
Payer: COMMERCIAL

## 2022-07-15 VITALS
OXYGEN SATURATION: 97 % | DIASTOLIC BLOOD PRESSURE: 84 MMHG | SYSTOLIC BLOOD PRESSURE: 121 MMHG | RESPIRATION RATE: 22 BRPM | HEART RATE: 65 BPM | TEMPERATURE: 96.4 F

## 2022-07-15 DIAGNOSIS — I48.3 TYPICAL ATRIAL FLUTTER (H): ICD-10-CM

## 2022-07-15 LAB
ANION GAP SERPL CALCULATED.3IONS-SCNC: 7 MMOL/L (ref 3–14)
BASOPHILS # BLD AUTO: 0.1 10E3/UL (ref 0–0.2)
BASOPHILS NFR BLD AUTO: 1 %
BUN SERPL-MCNC: 36 MG/DL (ref 7–30)
CALCIUM SERPL-MCNC: 8.8 MG/DL (ref 8.5–10.1)
CHLORIDE BLD-SCNC: 112 MMOL/L (ref 94–109)
CO2 SERPL-SCNC: 21 MMOL/L (ref 20–32)
CREAT SERPL-MCNC: 1.33 MG/DL (ref 0.66–1.25)
EOSINOPHIL # BLD AUTO: 0.4 10E3/UL (ref 0–0.7)
EOSINOPHIL NFR BLD AUTO: 6 %
ERYTHROCYTE [DISTWIDTH] IN BLOOD BY AUTOMATED COUNT: 14.1 % (ref 10–15)
GFR SERPL CREATININE-BSD FRML MDRD: 53 ML/MIN/1.73M2
GLUCOSE BLD-MCNC: 102 MG/DL (ref 70–99)
HCT VFR BLD AUTO: 42.9 % (ref 40–53)
HGB BLD-MCNC: 13.4 G/DL (ref 13.3–17.7)
HOLD SPECIMEN: NORMAL
IMM GRANULOCYTES # BLD: 0 10E3/UL
IMM GRANULOCYTES NFR BLD: 0 %
LYMPHOCYTES # BLD AUTO: 1.5 10E3/UL (ref 0.8–5.3)
LYMPHOCYTES NFR BLD AUTO: 26 %
MCH RBC QN AUTO: 30 PG (ref 26.5–33)
MCHC RBC AUTO-ENTMCNC: 31.2 G/DL (ref 31.5–36.5)
MCV RBC AUTO: 96 FL (ref 78–100)
MONOCYTES # BLD AUTO: 0.6 10E3/UL (ref 0–1.3)
MONOCYTES NFR BLD AUTO: 11 %
NEUTROPHILS # BLD AUTO: 3.2 10E3/UL (ref 1.6–8.3)
NEUTROPHILS NFR BLD AUTO: 56 %
NRBC # BLD AUTO: 0 10E3/UL
NRBC BLD AUTO-RTO: 0 /100
PLATELET # BLD AUTO: 190 10E3/UL (ref 150–450)
POTASSIUM BLD-SCNC: 4.6 MMOL/L (ref 3.4–5.3)
RBC # BLD AUTO: 4.46 10E6/UL (ref 4.4–5.9)
SODIUM SERPL-SCNC: 140 MMOL/L (ref 133–144)
TROPONIN I SERPL HS-MCNC: 7 NG/L
WBC # BLD AUTO: 5.7 10E3/UL (ref 4–11)

## 2022-07-15 PROCEDURE — 85025 COMPLETE CBC W/AUTO DIFF WBC: CPT | Performed by: EMERGENCY MEDICINE

## 2022-07-15 PROCEDURE — 93005 ELECTROCARDIOGRAM TRACING: CPT

## 2022-07-15 PROCEDURE — 82310 ASSAY OF CALCIUM: CPT | Performed by: EMERGENCY MEDICINE

## 2022-07-15 PROCEDURE — 93246 EXT ECG>7D<15D RECORDING: CPT

## 2022-07-15 PROCEDURE — 36415 COLL VENOUS BLD VENIPUNCTURE: CPT | Performed by: EMERGENCY MEDICINE

## 2022-07-15 PROCEDURE — 84484 ASSAY OF TROPONIN QUANT: CPT | Performed by: EMERGENCY MEDICINE

## 2022-07-15 PROCEDURE — 99284 EMERGENCY DEPT VISIT MOD MDM: CPT

## 2022-07-15 PROCEDURE — 93244 EXT ECG>48HR<7D REV&INTERPJ: CPT | Performed by: INTERNAL MEDICINE

## 2022-07-15 ASSESSMENT — ENCOUNTER SYMPTOMS
LIGHT-HEADEDNESS: 0
SHORTNESS OF BREATH: 1

## 2022-07-15 NOTE — ED PROVIDER NOTES
History   Chief Complaint:  Shortness of Breath and Bradycardia       HPI   Nixon More is a 86 year old male with history of atrial fibrillation, obesity, HTN, CAD, and emphysema who presents with shortness of breath. The patient states that about two hours ago he was watching TV and noticed that he seemed to be out of breath. He took his pulse oximeter out and his saturation was 89 and moved up to 93. Then he noticed his heart rate was in the 40's. He called his daughter who is a NP and she said to check his blood pressure. His blood pressure was 100/50 and she said to come in. He notes that for the past week just sitting there he has felt light headed but not today. He states that he feels good in the ED. He denies any recent medication change. The patient takes blood pressure medication.      Echocardiogram taken on 07/05/2022  Interpretation Summary     There is mild eccentric left ventricular hypertrophy.  The visual ejection fraction is 65-70%.  The left atrium is moderate to severely dilated.  There is mild (1+) tricuspid regurgitation.  Mild to moderate aortic root dilatation.  The ascending aorta is Moderately to severely dilated.  Right ventricular systolic pressure is elevated, consistent with mild to  moderate pulmonary hypertension.  As compared with study from 2021, aortic dimensions and PA pressures have  increased.  The study was technically difficult. Contrast was used without apparent  complications.      Review of Systems   Respiratory: Positive for shortness of breath.    Neurological: Negative for light-headedness.   All other systems reviewed and are negative.    Allergies:  Augmentin  Bactrim [Sulfamethoxazole W/Trimethoprim]  Bee Venom  Sulfa Drugs  Celebrex [Celecoxib]  Lisinopril  Naproxen    Medications:  albuterol  apixaban   atorvastatin   escitalopram  losartan   RISEdronate   spironolactone     Past Medical History:     Malignant neoplasm of skin  Lumbago  Hypertrophy of the  prostate  Emphysema  Malignant neoplasm of prostate  Vitamin d deficiency  Branch retinal occlusion  CAD  Dyslipidemia  Atrial fibrillation  Dysthymia  Dilated aortic root  Obesity  CKD  Depression  Heart failure  HTN     Past Surgical History:    Hernia repair  Prostate surgery  Lumbar laminectomy  Vitrectomy   Sigmoidscopy    Family History:    Mother-Alzheimer disease, HTN  Mother-CHF  Brother-prostate cancer, lung cancer    Social History:  The patient presents to the ED with his daughter.    Physical Exam     Patient Vitals for the past 24 hrs:   BP Temp Temp src Pulse Resp SpO2   07/15/22 1500 122/83 -- -- 65 -- 95 %   07/15/22 1430 103/78 -- -- 66 -- 97 %   07/15/22 1400 95/75 -- -- 66 -- 94 %   07/15/22 1330 113/76 -- -- 67 -- 95 %   07/15/22 1315 109/74 -- -- 66 -- 95 %   07/15/22 1254 125/80 (!) 96.4  F (35.8  C) Temporal 68 22 96 %       Physical Exam  General: Patient is alert and cooperative.  HENT:  Normal appearance.   Eyes: EOMI. Normal conjunctiva.  Neck:  Normal range of motion and appearance.   Cardiovascular:  Normal rate, regular rhythm and normal heart sounds. Mild symmetric lower leg/pedal edema; no calf tenderness.   Pulmonary/Chest:  Effort normal. No wheezing or crackles.  Abdominal: Soft. No distension or tenderness.     Musculoskeletal: Normal range of motion. No edema or tenderness.   Neurological: oriented, normal strength, sensation, and coordination.   Skin: Warm and dry. No rash or bruising.   Psychiatric: Normal mood and affect. Normal behavior and judgement.      Emergency Department Course   ECG  This ECG was taken at 1303, and signed at 1310  Atrial flutter with variable AV block  Inferior infarct, age undetermined  Abnormal ECG  Vent. rate 67, IA interval *, QRS Duration 76, QT/QTc 428/452, P-R-T axes 118 -1 44      Imaging:  Leadless EKG Monitor 8 to 14 Days    (Results Pending)     Report per radiology    Laboratory:  Labs Ordered and Resulted from Time of ED Arrival to Time  of ED Departure   BASIC METABOLIC PANEL - Abnormal       Result Value    Sodium 140      Potassium 4.6      Chloride 112 (*)     Carbon Dioxide (CO2) 21      Anion Gap 7      Urea Nitrogen 36 (*)     Creatinine 1.33 (*)     Calcium 8.8      Glucose 102 (*)     GFR Estimate 53 (*)    CBC WITH PLATELETS AND DIFFERENTIAL - Abnormal    WBC Count 5.7      RBC Count 4.46      Hemoglobin 13.4      Hematocrit 42.9      MCV 96      MCH 30.0      MCHC 31.2 (*)     RDW 14.1      Platelet Count 190      % Neutrophils 56      % Lymphocytes 26      % Monocytes 11      % Eosinophils 6      % Basophils 1      % Immature Granulocytes 0      NRBCs per 100 WBC 0      Absolute Neutrophils 3.2      Absolute Lymphocytes 1.5      Absolute Monocytes 0.6      Absolute Eosinophils 0.4      Absolute Basophils 0.1      Absolute Immature Granulocytes 0.0      Absolute NRBCs 0.0     TROPONIN I - Normal    Troponin I High Sensitivity 7        Emergency Department Course:         Reviewed:  I reviewed nursing notes, vitals, past medical history and Care Everywhere    Assessments:  1320 I obtained history and examined the patient as noted above.   1454 I rechecked the patient and explained findings.     Disposition:  The patient was discharged to home.     Impression & Plan     Medical Decision Making:  Asymptomatic 86-year-old male with a history of chronic atrial fibrillation/atrial flutter for which she is anticoagulated on Eliquis presented after experiencing an episode of shortness of breath while at rest watching TV.  He checked his home pulse oximeter which read oxygen saturations 89 to 93% and also heart rate in the 40s.  He did not experiencing any associated lightheadedness or chest discomfort with this.  On arrival here, he is normotensive with a heart rate in the 60s and well-appearing.  He has an unremarkable physical exam.  An EKG demonstrates a rate controlled atrial flutter with a ventricular rate of 67 bpm.  He was placed on a  cardiac monitor and continuous pulse oximeter and was stable throughout his entire ED course.  Screening labs are unremarkable.  Include a high-sensitivity troponin of 70.  CBC BMP unremarkable.  He does have a history of hypertension for which she is medicated with diltiazem  mg daily, losartan 100 mg daily, and metoprolol  mg daily.  There is been no recent medication changes.  It is uncertain whether he had a true episode of bradycardia and if so with the clinical significance of it may be.  Worse case scenario as discussed with Spencer and his family of his sick sinus syndrome with an episode of transient heart block.  He is comfortable going home and there is no additional ED based testing indicated.  I arrange for home Zio patch monitor and advise follow-up either with his clinic or his cardiologist afterwards to discuss results and any further management as needed.    Diagnosis:    ICD-10-CM    1. Typical atrial flutter (H)  I48.3 CANCELED: Leadless EKG Monitor 8 to 14 Days       Discharge Medications:  Current Discharge Medication List          Scribe Disclosure:  Fabian COLÓN, am serving as a scribe at 1:16 PM on 7/15/2022 to document services personally performed by Jaya Alcazar MD based on my observations and the provider's statements to me.            Jaya Alcazar MD  07/15/22 [id number]

## 2022-07-15 NOTE — ED TRIAGE NOTES
Pt arrives with c/o pulse rate in the 40's at home this morning while watching TV. Pt also reports his BP was low with a SBP 80-90 at home at that time. Pt reports he noticed some SOB while watching TV prompting him to check his HR and BP. Pt denies chest pain. Pt had an echo on Tuesday of last week.     Triage Assessment     Row Name 07/15/22 7611       Triage Assessment (Adult)    Airway WDL WDL       Respiratory WDL    Respiratory WDL WDL       Skin Circulation/Temperature WDL    Skin Circulation/Temperature WDL WDL       Cardiac WDL    Cardiac WDL WDL       Peripheral/Neurovascular WDL    Peripheral Neurovascular WDL WDL       Cognitive/Neuro/Behavioral WDL    Cognitive/Neuro/Behavioral WDL WDL

## 2022-07-15 NOTE — ED NOTES
Bed: ED37  Expected date:   Expected time:   Means of arrival:   Comments:  Room clean, ready for triage pt

## 2022-07-18 ENCOUNTER — TELEPHONE (OUTPATIENT)
Dept: CARDIOLOGY | Facility: CLINIC | Age: 87
End: 2022-07-18

## 2022-07-18 DIAGNOSIS — I48.19 PERSISTENT ATRIAL FIBRILLATION (H): Primary | ICD-10-CM

## 2022-07-18 DIAGNOSIS — I48.92 ATRIAL FLUTTER WITH RAPID VENTRICULAR RESPONSE (H): ICD-10-CM

## 2022-07-18 LAB
ATRIAL RATE - MUSE: 267 BPM
DIASTOLIC BLOOD PRESSURE - MUSE: NORMAL MMHG
INTERPRETATION ECG - MUSE: NORMAL
P AXIS - MUSE: 118 DEGREES
PR INTERVAL - MUSE: NORMAL MS
QRS DURATION - MUSE: 76 MS
QT - MUSE: 428 MS
QTC - MUSE: 452 MS
R AXIS - MUSE: -1 DEGREES
SYSTOLIC BLOOD PRESSURE - MUSE: NORMAL MMHG
T AXIS - MUSE: 44 DEGREES
VENTRICULAR RATE- MUSE: 67 BPM

## 2022-07-18 NOTE — TELEPHONE ENCOUNTER
Call received form pt to review he was recently in the ED and said something about heart block.     Per ED note:   It is uncertain whether he had a true episode of bradycardia and if so with the clinical significance of it may be.  Worse case scenario as discussed with Spencer and his family of his sick sinus syndrome with an episode of transient heart block.  He is comfortable going home and there is no additional ED based testing indicated.  I arrange for home Zio patch monitor and advise follow-up either with his clinic or his cardiologist afterwards to discuss results and any further management as needed.      Will review with Care team if any additional recommendations.     Pt advised we will need to schedule OV for follow up once monitor has been completed and returned.   No answer - left detailed message instructing patient to follow up next available once monitor has been completed. Call back information given to pt for questions/ concerns.   SNEHA Lovett RN, BSN.

## 2022-07-19 NOTE — TELEPHONE ENCOUNTER
Call reviewed with provider. Plan to have pt schedule next available with ruy Bennett NP to review Ziopatch. Orders for follow up placed. SNEHA Lovett RN, BSN. 07/19/22 9:31 AM

## 2022-07-20 ENCOUNTER — NURSE TRIAGE (OUTPATIENT)
Dept: CARDIOLOGY | Facility: CLINIC | Age: 87
End: 2022-07-20

## 2022-07-20 ENCOUNTER — TELEPHONE (OUTPATIENT)
Dept: CARDIOLOGY | Facility: CLINIC | Age: 87
End: 2022-07-20

## 2022-07-20 DIAGNOSIS — I48.92 ATRIAL FLUTTER WITH RAPID VENTRICULAR RESPONSE (H): ICD-10-CM

## 2022-07-20 DIAGNOSIS — I48.92 ATRIAL FLUTTER WITH RAPID VENTRICULAR RESPONSE (H): Primary | ICD-10-CM

## 2022-07-20 PROCEDURE — 93000 ELECTROCARDIOGRAM COMPLETE: CPT | Performed by: INTERNAL MEDICINE

## 2022-07-20 NOTE — TELEPHONE ENCOUNTER
Pt in Clinic today to have EKG as he has concerns regarding his HR. Pt reports he had an episode this AM where he was monitoring his HR on his finger saturation monitor and HR was reading 40bpm. Pt reports he was at rest and had some moderate SOB. Pt states he got up to walk around and to get his phone to call the clinic for advisement. By the time he rested again HR returned to 63bpm.     This episode occurred about 45 minutes after he took his AM dose of metoprolol succinate 100mg. Pt report he was not advised to reduce or hold any medication after recent ED visit. Pt has Ziopatch in place but we will not receive this information until after the monitor has been completed returned and processed.     EKG completed in clinic today showing         Pt's daughter is requesting we have a monitor placed that has more active monitoring. Will route to care team to review if Holter would be appropriate for pt.   SNEHA Lovtet RN, BSN.

## 2022-07-20 NOTE — TELEPHONE ENCOUNTER
"Received call from pt's daughter stating that pt is c/o shortness of breath & HR in the 40 like he had last week that resulted in him going to the ED. Pt denies any lightheadedness or dizziness.     Per ED note dated 7/15/22, \"Asymptomatic 86-year-old male with a history of chronic atrial fibrillation/atrial flutter for which she is anticoagulated on Eliquis presented after experiencing an episode of shortness of breath while at rest watching TV.  He checked his home pulse oximeter which read oxygen saturations 89 to 93% and also heart rate in the 40s.  He did not experiencing any associated lightheadedness or chest discomfort with this.  On arrival here, he is normotensive with a heart rate in the 60s and well-appearing.  He has an unremarkable physical exam.  An EKG demonstrates a rate controlled atrial flutter with a ventricular rate of 67 bpm.  He was placed on a cardiac monitor and continuous pulse oximeter and was stable throughout his entire ED course.  Screening labs are unremarkable.  Include a high-sensitivity troponin of 70.  CBC BMP unremarkable.  He does have a history of hypertension for which she is medicated with diltiazem  mg daily, losartan 100 mg daily, and metoprolol  mg daily.  There is been no recent medication changes.  It is uncertain whether he had a true episode of bradycardia and if so with the clinical significance of it may be.  Worse case scenario as discussed with Spencer and his family of his sick sinus syndrome with an episode of transient heart block.  He is comfortable going home and there is no additional ED based testing indicated.  I arrange for home Zio patch monitor and advise follow-up either with his clinic or his cardiologist afterwards to discuss results and any further management as needed.\"     Pt is wearing a Ziopatch but we cannot see those results until completed & downloaded. Daughter advised to have pt come to clinic in [place] to have EKG to evaluate for " complete heart block. Spoke with nurse with Dr. De La Paz who agrees with plan. Pt will be brought by his daughter. Daughter advised that pt should not drive w/ HR in the 40s. Alfredo VALENZUELA

## 2022-07-21 NOTE — TELEPHONE ENCOUNTER
I was off yesterday so just seeing this. Dr. De La Paz replied to separate telephone note    Dr. De La Paz' note in response to Telephone Note from 7/20:    Vinod De La Paz MD Porter, Sarah, RN 11 hours ago (7:16 PM)     MT    There are multiple possibilities, including ventricular bigemini, heart block, inaccurate reading, etc.  The Toprol is not absorbed that fast and likely had nothing to do with the timing, especially since he was back to normal by the time he arrived in the ER.  Since he has only mild symptoms and they are not frequent (just once?) there is no urgency to getting the results.  Have him just complete 14 day Zio, but if he has another event he can send it back earlier.  We really will need to correlate rhythm with symptoms.    Message text       Sunni Lovett RN     SP    7/20/22 5:07 PM  Note  Pt in Clinic today to have EKG as he has concerns regarding his HR. Pt reports he had an episode this AM where he was monitoring his HR on his finger saturation monitor and HR was reading 40bpm. Pt reports he was at rest and had some moderate SOB. Pt states he got up to walk around and to get his phone to call the clinic for advisement. By the time he rested again HR returned to 63bpm.      This episode occurred about 45 minutes after he took his AM dose of metoprolol succinate 100mg. Pt report he was not advised to reduce or hold any medication after recent ED visit. Pt has Ziopatch in place but we will not receive this information until after the monitor has been completed returned and processed.      EKG completed in clinic today showing           Pt's daughter is requesting we have a monitor placed that has more active monitoring. Will route to care team to review if Holter would be appropriate for pt.   SNEHA Lovett RN, BSN.

## 2022-07-21 NOTE — TELEPHONE ENCOUNTER
Ana Cristina, Vinod White MD  You 12 hours ago (7:16 PM)     MT    There are multiple possibilities, including ventricular bigemini, heart block, inaccurate reading, etc.  The Toprol is not absorbed that fast and likely had nothing to do with the timing, especially since he was back to normal by the time he arrived in the ER.  Since he has only mild symptoms and they are not frequent (just once?) there is no urgency to getting the results.  Have him just complete 14 day Zio, but if he has another event he can send it back earlier.  We really will need to correlate rhythm with symptoms.    Message text        Call placed to pt to review recommendations per Dr De La Paz.    No answer - LVM 07/21/22 8:12 AM SNEHA Lovett RN, BSN.     Call back received from pt. Pt verbalized understanding of recommendations and plan of action should he note recurrent HR 40's with sxs. Pt report zio ordered for 7day. Pt will plan to remove and return tomorrow,. Pt schedule with Dr. De La Paz next available to review results. Pt given all contact number to ensure he is able to reach someone for guidance.   SNEHA Lovett RN, BSN. 07/21/22 10:13 AM

## 2022-07-26 DIAGNOSIS — I48.19 PERSISTENT ATRIAL FIBRILLATION (H): ICD-10-CM

## 2022-07-26 NOTE — TELEPHONE ENCOUNTER
Pls get report. I have copied his primary cardiologist Dr. De La Paz to this note as well.     Any sxs? Awake?      Cherise

## 2022-07-26 NOTE — TELEPHONE ENCOUNTER
7/26/22 Called pt regarding message below per request of Cherise Bennett PA-C . Per iRythm report it appears pt had  Longest pause occurring at 3:20 am on 7/2 lasting 4.7  Seconds  Second longest pause occurring at 3:56 am on 7/22 lasting 4.4 seconds  Third longest pause occurring at 3:46 am on 7/22 lasting 4.3 seconds    Spoke w pt who verified he was sleeping during this time of day and did not experience any symptoms.    He takes his Diltiazem  mg at HS and takes his Toprol in the am. He stated he recently was instructed to reduce his Toprol dose from 100 mg daily to 50 mg daily so that is what he has been doing since 7/22.   Informed pt this is a preliminary result and final report will be signed by MD. Pt has f/u w Dr De La Paz on 8/9  Will update Cherise Bennett PA-C and call pt back w recommendations. Copy of monitor on Rc desk  [facility] 334 pm

## 2022-07-26 NOTE — TELEPHONE ENCOUNTER
I rhythm called with abnormal result in pt's zio patch report. Caller reports back to back pause episodes totaling 6.2 seconds. Unable to get to report at this time for date and time of episode. Will continue looking for it to come through. Caller reports this can be seen on page 3 of 9. Reference number is #[id number]. Will attach report when available. Forwarding on to Cherise Bennett for recommendation. OMAR/BIANCA

## 2022-07-26 NOTE — TELEPHONE ENCOUNTER
"Dr. De La Paz - I reviewed preliminary ZioPatch (7/15--7/22/2022) results showing 100% AFib.    Avg HR 68 bpm (range 29 @ 0435 -- 141 bpm @ 2249 bpm).  Only trigger for \"fluttering\" was related to AFib.    All individual pauses lasted less than 4.7 seconds and all appeared to be nocturnal.  There were 2 pauses \"back-to-back\" @ 0431 on 7/20/022 while sleeping (3.2 and 3.0s) which they counted as 1, prompting the call for \"6.2-second pause.\"    Amber EP RN confirmed that Spencer was sleeping during all of these episodes.  No lightheadedness, dizziness, syncope.  He was in the ER 7/15 for SOB and HR in 40s.  It appears his metoprolol  mg daily may have been reduced to 50 mg, but this is not documented and I think this was done 7/22, after he turned this monitor in. I updated Epic med list to reflect this.    Spencer is currently taking Metoprolol XL 50 mg in AM and Diltiazem 120 mg at bedtime.    You see him 8/9/2022.  Please let me or EP nurse pool know if we can do anything before you see him.    Thanks-Cherise  July 26, 2022 at 6:40 PM          "

## 2022-07-27 ENCOUNTER — TELEPHONE (OUTPATIENT)
Dept: CARDIOLOGY | Facility: CLINIC | Age: 87
End: 2022-07-27

## 2022-07-27 RX ORDER — METOPROLOL SUCCINATE 50 MG/1
100 TABLET, EXTENDED RELEASE ORAL DAILY
Refills: 0 | COMMUNITY
Start: 2022-07-22 | End: 2022-09-08

## 2022-07-27 NOTE — TELEPHONE ENCOUNTER
"Dr. De La Paz - I reviewed preliminary ZioPatch (7/15--7/22/2022) results showing 100% AFib.     Avg HR 68 bpm (range 29 @ 0435 -- 141 bpm @ 2249 bpm).  Only trigger for \"fluttering\" was related to AFib.     All individual pauses lasted less than 4.7 seconds and all appeared to be nocturnal.  There were 2 pauses \"back-to-back\" @ 0431 on 7/20/022 while sleeping (3.2 and 3.0s) which they counted as 1, prompting the call for \"6.2-second pause.\"     Amber, EP RN confirmed that Spencer was sleeping during all of these episodes.  No lightheadedness, dizziness, syncope.  He was in the ER 7/15 for SOB and HR in 40s.  It appears his metoprolol  mg daily may have been reduced to 50 mg, but this is not documented and I think this was done 7/22, after he turned this monitor in. I updated Epic med list to reflect this.     Spencer is currently taking Metoprolol XL 50 mg in AM and Diltiazem 120 mg at bedtime.     You see him 8/9/2022.  Please let me or EP nurse pool know if we can do anything before you see him.       ** Reviewed Dr. De La Paz' response to RN - will be reviewed at upcoming appt; no changes prior **    Thanks-Cherise  July 26, 2022 at 6:40 PM        "

## 2022-08-08 DIAGNOSIS — I48.92 ATRIAL FLUTTER WITH RAPID VENTRICULAR RESPONSE (H): ICD-10-CM

## 2022-08-08 DIAGNOSIS — I10 HYPERTENSION GOAL BP (BLOOD PRESSURE) < 140/90: ICD-10-CM

## 2022-08-08 RX ORDER — DILTIAZEM HYDROCHLORIDE 120 MG/1
120 CAPSULE, EXTENDED RELEASE ORAL DAILY
Qty: 90 CAPSULE | Refills: 0 | Status: SHIPPED | OUTPATIENT
Start: 2022-08-08 | End: 2022-08-09

## 2022-08-08 NOTE — PROGRESS NOTES
[facility] Refill Guideline reviewed.  Medication meets criteria for refill. BECKY Antony RN

## 2022-08-09 ENCOUNTER — OFFICE VISIT (OUTPATIENT)
Dept: CARDIOLOGY | Facility: CLINIC | Age: 87
End: 2022-08-09
Payer: COMMERCIAL

## 2022-08-09 VITALS
HEART RATE: 71 BPM | HEIGHT: 68 IN | SYSTOLIC BLOOD PRESSURE: 96 MMHG | BODY MASS INDEX: 36.3 KG/M2 | OXYGEN SATURATION: 92 % | DIASTOLIC BLOOD PRESSURE: 70 MMHG | WEIGHT: 239.5 LBS

## 2022-08-09 DIAGNOSIS — I48.92 ATRIAL FLUTTER, UNSPECIFIED TYPE (H): Primary | ICD-10-CM

## 2022-08-09 PROCEDURE — 99214 OFFICE O/P EST MOD 30 MIN: CPT | Performed by: INTERNAL MEDICINE

## 2022-08-09 NOTE — LETTER
8/9/2022    Natalya Lewis MD  [address] [place] [address]    RE: Nixon More       Dear Colleague,     I had the pleasure of seeing Nixon More in the [facility].  HISTORY:    Nixon More is a very pleasant 86-year-old gentleman accompanied by his daughter who is a a nurse practitioner.  Spencer is a previous patient of Dr. Camp's and was seen by me for the first time in mid July.  He has a history of permanent atrial flutter for which he receives his anticoagulation and rate control.  He also has chronic dyspnea on exertion with COPD due to 30 years of smoking.  He quit smoking in the year 2000.  He is also felt to have some degree of HFpEF and deconditioning.  He had a stress test done in 2011 which was mildly abnormal but was treated conservatively and he has never experienced angina.  His other medical problems include mild dilation of the ascending aorta and hypertension as well as sleep apnea, peripheral venous insufficiency, and dyslipidemia.    Spencer is here today for an urgent add-on visit after a recent hospital visit.  The patient states that he was sitting on his couch when he began to feel short of breath.  This was unexplained and unusual for him so he checked his pulse and found it to be 38.  He presented to the emergency room where symptoms had essentially resolved and his pulse was back to normal.  He had another episode later and came into the office where his rate was found to be 72.  He also had a 7-day Holter monitor that I reviewed with him.  It showed an average heart rate of 67 with 71 pauses greater than 2 seconds the longest being 4.7 seconds and asymptomatic.  His fastest heart rate was 141.  His metoprolol succinate dose was dropped from 100 mg daily to 50 mg daily.  He has had no recurrence of his symptoms since that medication change.    Today Spencer reports that he feels like he is back to normal and having no problems.  However he was to the  dentist recently and was noted to have some hyperplasia at the base of his front teeth, felt possibly due to the diltiazem.  In clinic today his blood pressure is 96/70 with a heart rate of 71.      ASSESSMENT/PLAN:    1.  Permanent atrial flutter.  The runs of again reviewed with the patient the reason we are leaving him in atrial flutter.  He has severe atrial enlargement and long-term control of his arrhythmia is unlikely.  He would need to stay on the same medications whether we tried to fix this or not, and he is generally asymptomatic from his atrial flutter.  For this reason we will continue using anticoagulation plus rate control.  As for rate control, his heart rate on the half dose of metoprolol was still well controlled at rest.  The diltiazem may be causing gingival hyperplasia so I think we can safely discontinue this medication.  His blood pressure is consistently low so dropping the low-dose diltiazem is unlikely to have any meaningful effect, and its unlikely to have a significant effect on his heart rate.  I did ask him to contact me if he has either further bradycardia associated dyspnea, or if his heart rate rises to be consistently greater than 100.  Either of these 2 situation would require further medication adjustments.  If neither of these situations occur we will plan on seeing him back in 1 year as previously planned.    Thank you for inviting me to participate in the care of your patient.  Please don't hesitate to call if I can be of further assistance.  30 minutes were spent today reviewing the chart and other records, interviewing and examining the patient, and documenting our visit.    Chart documentation was completed, in part, with [employer] voice-recognition software. Even though reviewed, some grammatical, spelling, and word errors may remain.       No orders of the defined types were placed in this encounter.    No orders of the defined types were placed in this  encounter.    Medications Discontinued During This Encounter   Medication Reason     diltiazem ER (DILT-XR) 120 MG 24 hr capsule        10 year ASCVD risk: The ASCVD Risk score ([place] ALEX Jr., et al., 2013) failed to calculate for the following reasons:    The 2013 ASCVD risk score is only valid for ages 40 to 79    No diagnosis found.    CURRENT MEDICATIONS:  Current Outpatient Medications   Medication Sig Dispense Refill     albuterol (PROAIR HFA/PROVENTIL HFA/VENTOLIN HFA) 108 (90 Base) MCG/ACT inhaler Inhale 2 puffs into the lungs every 4 hours as needed for shortness of breath / dyspnea or wheezing 8 g 0     apixaban ANTICOAGULANT (ELIQUIS) 5 MG tablet Take 1 tablet (5 mg) by mouth 2 times daily 180 tablet 3     ASPIRIN NOT PRESCRIBED (INTENTIONAL) continuous prn for other Antiplatelet medication not prescribed intentionally due to Current anticoagulant therapy (warfarin/enoxaparin)       atorvastatin (LIPITOR) 40 MG tablet Take 1 tablet (40 mg) by mouth daily 90 tablet 3     Cholecalciferol (VITAMIN D-3) 25 MCG (1000 UT) CAPS Take by mouth daily       EPINEPHrine (EPIPEN/ADRENACLICK/OR ANY BX GENERIC EQUIV) 0.3 MG/0.3ML injection 2-pack Inject 0.3 mLs (0.3 mg) into the muscle as needed for anaphylaxis 0.6 mL 1     escitalopram (LEXAPRO) 10 MG tablet Take 1 tablet (10 mg) by mouth daily 90 tablet 3     ipratropium (ATROVENT) 0.06 % nasal spray Spray 2 sprays in nostril 4 times daily as needed for rhinitis 3 Box 3     Loperamide HCl (IMODIUM OR) Take by mouth daily as needed       losartan (COZAAR) 100 MG tablet Take 1 tablet (100 mg) by mouth daily 90 tablet 3     metoprolol succinate ER (TOPROL XL) 50 MG 24 hr tablet Take 1 tablet (50 mg) by mouth daily  0     RISEdronate (ACTONEL) 35 MG tablet TAKE 1 TABLET (35 MG) BY MOUTH EVERY 7 DAYS 12 tablet 2     spironolactone (ALDACTONE) 25 MG tablet Take 0.5 tablets (12.5 mg) by mouth daily 45 tablet 3       ALLERGIES     Allergies   Allergen Reactions     Augmentin  Rash     Type III hypersensitivity     Bactrim [Sulfamethoxazole W/Trimethoprim] Rash     Serum sickness, type III hypersensitivity       Bee Venom Itching     Sulfa Drugs Hives     Celebrex [Celecoxib]      Lisinopril Cough     Naproxen Hives       PAST MEDICAL HISTORY:  Past Medical History:   Diagnosis Date     Actinic keratosis      Arthritis      Atrial fibrillation and flutter (H) 12/3/2018     CAD (coronary artery disease)     1/5/2011 lateral ischemia on stress echo, 12/2014 normal stress echo     Coronary artery disease 1/1/2011    Abnormal stress test 1/2011 and controlled with medical mgmt      Dyslipidemia      Emphysema of lung (H)      Essential hypertension, benign      Hernia, abdominal      Hypersomnia with sleep apnea, unspecified     on bipap, partially treated with residual apneas     Hypertrophy (benign) of prostate 5/01    Biopsy 5/01 negative for cancer; PSA 5     Lumbago      Mumps      Prostate cancer (H) 12/15/2006     Skin cancer, basal cell 1997     Spider veins        PAST SURGICAL HISTORY:  Past Surgical History:   Procedure Laterality Date     HERNIA REPAIR  child     Moh's procedure for basal cell carcinoma  01/2001     PROSTATE SURGERY       s/p lumbar laminectomy NOS  1988     SIGMOIDOSCOPY FLEXIBLE N/A 6/15/2020    Procedure: SIGMOIDOSCOPY, FLEXIBLE;  Surgeon: Sunni Patton MD;  Location: [facility]     VITRECTOMY PARS PLANA REMOVE PRERETINAL MEMBRANE   3/09       FAMILY HISTORY:  Family History   Problem Relation Age of Onset     Alzheimer Disease Mother      Hypertension Mother      Cardiovascular Father         D:86 complications fo CHF     Prostate Cancer Brother      Lung Cancer Brother 76     Prostate Cancer Brother        SOCIAL HISTORY:  Social History     Socioeconomic History     Marital status:      Spouse name: None     Number of children: None     Years of education: None     Highest education level: None   Occupational History     Occupation: retired  "  Tobacco Use     Smoking status: Former Smoker     Packs/day: 1.00     Types: Cigarettes     Start date:      Quit date: 1978     Years since quittin.6     Smokeless tobacco: Never Used   Substance and Sexual Activity     Alcohol use: Never     Drug use: No     Sexual activity: Yes     Partners: Female   Other Topics Concern     Caffeine Concern No     Comment: 0-2 cans of soda per day.     Exercise No     Seat Belt Yes     Parent/sibling w/ CABG, MI or angioplasty before 65F 55M? No     Social Determinants of Health     Intimate Partner Violence: Not At Risk     Fear of Current or Ex-Partner: No     Emotionally Abused: No     Physically Abused: No     Sexually Abused: No       Review of Systems:  Skin:  not assessed     Eyes:  not assessed    ENT:  not assessed    Respiratory:  Positive for dyspnea on exertion;mucoid expectorant;sleep apnea  Cardiovascular:    palpitations;Positive for;edema  Gastroenterology: not assessed    Genitourinary:  not assessed    Musculoskeletal:  not assessed    Neurologic:  not assessed    Psychiatric:  not assessed    Heme/Lymph/Imm:  not assessed    Endocrine:  not assessed      Physical Exam:  Vitals: BP 96/70 (BP Location: Right arm, Patient Position: Sitting, Cuff Size: Adult Large)   Pulse 71   Ht 1.715 m (5' 7.5\")   Wt 108.6 kg (239 lb 8 oz)   SpO2 92%   BMI 36.96 kg/m      Constitutional:           Skin:           Head:           Eyes:           ENT:           Neck:           Chest:           Cardiac:                    Abdomen:           Vascular:                                        Extremities and Muscular Skeletal:              Neurological:           Psych:        Recent Lab Results:  LIPID RESULTS:  Lab Results   Component Value Date    CHOL 121 2021    HDL 39 (L) 2021    LDL 55 2021    TRIG 137 2021    CHOLHDLRATIO 2.8 10/15/2015       LIVER ENZYME RESULTS:  Lab Results   Component Value Date    AST 31 2021    AST 32 " 01/12/2021    ALT 31 08/25/2021    ALT 33 01/12/2021       CBC RESULTS:  Lab Results   Component Value Date    WBC 5.7 07/15/2022    WBC 5.6 06/18/2021    RBC 4.46 07/15/2022    RBC 4.38 (L) 06/18/2021    HGB 13.4 07/15/2022    HGB 12.8 (L) 06/18/2021    HCT 42.9 07/15/2022    HCT 41.1 06/18/2021    MCV 96 07/15/2022    MCV 94 06/18/2021    MCH 30.0 07/15/2022    MCH 29.2 06/18/2021    MCHC 31.2 (L) 07/15/2022    MCHC 31.1 (L) 06/18/2021    RDW 14.1 07/15/2022    RDW 14.1 06/18/2021     07/15/2022     06/18/2021       BMP RESULTS:  Lab Results   Component Value Date     07/15/2022     06/18/2021    POTASSIUM 4.6 07/15/2022    POTASSIUM 4.4 06/18/2021    CHLORIDE 112 (H) 07/15/2022    CHLORIDE 109 06/18/2021    CO2 21 07/15/2022    CO2 29 06/18/2021    ANIONGAP 7 07/15/2022    ANIONGAP 4 06/18/2021     (H) 07/15/2022     (H) 06/18/2021    BUN 36 (H) 07/15/2022    BUN 28 06/18/2021    CR 1.33 (H) 07/15/2022    CR 1.44 (H) 06/18/2021    GFRESTIMATED 53 (L) 07/15/2022    GFRESTIMATED 44 (L) 06/18/2021    GFRESTBLACK 51 (L) 06/18/2021    MARLENE 8.8 07/15/2022    MARLENE 9.5 06/18/2021        A1C RESULTS:  Lab Results   Component Value Date    A1C 6.0 (H) 10/22/2021    A1C 5.6 11/11/2020       INR RESULTS:  Lab Results   Component Value Date    INR 1.03 12/03/2018    INR 1.07 11/27/2007         Vinod De La Paz MD, FACC    CC  No referring provider defined for this encounter.    Thank you for allowing me to participate in the care of your patient.      Sincerely,     Vinod De La Paz MD     [facility]

## 2022-08-09 NOTE — PROGRESS NOTES
HISTORY:    Nixon More is a very pleasant 86-year-old gentleman accompanied by his daughter who is a a nurse practitioner.  Spencer is a previous patient of Dr. Camp's and was seen by me for the first time in mid July.  He has a history of permanent atrial flutter for which he receives his anticoagulation and rate control.  He also has chronic dyspnea on exertion with COPD due to 30 years of smoking.  He quit smoking in the year 2000.  He is also felt to have some degree of HFpEF and deconditioning.  He had a stress test done in 2011 which was mildly abnormal but was treated conservatively and he has never experienced angina.  His other medical problems include mild dilation of the ascending aorta and hypertension as well as sleep apnea, peripheral venous insufficiency, and dyslipidemia.    Spencer is here today for an urgent add-on visit after a recent hospital visit.  The patient states that he was sitting on his couch when he began to feel short of breath.  This was unexplained and unusual for him so he checked his pulse and found it to be 38.  He presented to the emergency room where symptoms had essentially resolved and his pulse was back to normal.  He had another episode later and came into the office where his rate was found to be 72.  He also had a 7-day Holter monitor that I reviewed with him.  It showed an average heart rate of 67 with 71 pauses greater than 2 seconds the longest being 4.7 seconds and asymptomatic.  His fastest heart rate was 141.  His metoprolol succinate dose was dropped from 100 mg daily to 50 mg daily.  He has had no recurrence of his symptoms since that medication change.    Today Spencer reports that he feels like he is back to normal and having no problems.  However he was to the dentist recently and was noted to have some hyperplasia at the base of his front teeth, felt possibly due to the diltiazem.  In clinic today his blood pressure is 96/70 with a heart rate of  71.      ASSESSMENT/PLAN:    1.  Permanent atrial flutter.  The runs of again reviewed with the patient the reason we are leaving him in atrial flutter.  He has severe atrial enlargement and long-term control of his arrhythmia is unlikely.  He would need to stay on the same medications whether we tried to fix this or not, and he is generally asymptomatic from his atrial flutter.  For this reason we will continue using anticoagulation plus rate control.  As for rate control, his heart rate on the half dose of metoprolol was still well controlled at rest.  The diltiazem may be causing gingival hyperplasia so I think we can safely discontinue this medication.  His blood pressure is consistently low so dropping the low-dose diltiazem is unlikely to have any meaningful effect, and its unlikely to have a significant effect on his heart rate.  I did ask him to contact me if he has either further bradycardia associated dyspnea, or if his heart rate rises to be consistently greater than 100.  Either of these 2 situation would require further medication adjustments.  If neither of these situations occur we will plan on seeing him back in 1 year as previously planned.    Thank you for inviting me to participate in the care of your patient.  Please don't hesitate to call if I can be of further assistance.  30 minutes were spent today reviewing the chart and other records, interviewing and examining the patient, and documenting our visit.    Chart documentation was completed, in part, with [employer] voice-recognition software. Even though reviewed, some grammatical, spelling, and word errors may remain.       No orders of the defined types were placed in this encounter.    No orders of the defined types were placed in this encounter.    Medications Discontinued During This Encounter   Medication Reason     diltiazem ER (DILT-XR) 120 MG 24 hr capsule        10 year ASCVD risk: The ASCVD Risk score ([place] DC Jr., et al., 2013) failed  to calculate for the following reasons:    The 2013 ASCVD risk score is only valid for ages 40 to 79    No diagnosis found.    CURRENT MEDICATIONS:  Current Outpatient Medications   Medication Sig Dispense Refill     albuterol (PROAIR HFA/PROVENTIL HFA/VENTOLIN HFA) 108 (90 Base) MCG/ACT inhaler Inhale 2 puffs into the lungs every 4 hours as needed for shortness of breath / dyspnea or wheezing 8 g 0     apixaban ANTICOAGULANT (ELIQUIS) 5 MG tablet Take 1 tablet (5 mg) by mouth 2 times daily 180 tablet 3     ASPIRIN NOT PRESCRIBED (INTENTIONAL) continuous prn for other Antiplatelet medication not prescribed intentionally due to Current anticoagulant therapy (warfarin/enoxaparin)       atorvastatin (LIPITOR) 40 MG tablet Take 1 tablet (40 mg) by mouth daily 90 tablet 3     Cholecalciferol (VITAMIN D-3) 25 MCG (1000 UT) CAPS Take by mouth daily       EPINEPHrine (EPIPEN/ADRENACLICK/OR ANY BX GENERIC EQUIV) 0.3 MG/0.3ML injection 2-pack Inject 0.3 mLs (0.3 mg) into the muscle as needed for anaphylaxis 0.6 mL 1     escitalopram (LEXAPRO) 10 MG tablet Take 1 tablet (10 mg) by mouth daily 90 tablet 3     ipratropium (ATROVENT) 0.06 % nasal spray Spray 2 sprays in nostril 4 times daily as needed for rhinitis 3 Box 3     Loperamide HCl (IMODIUM OR) Take by mouth daily as needed       losartan (COZAAR) 100 MG tablet Take 1 tablet (100 mg) by mouth daily 90 tablet 3     metoprolol succinate ER (TOPROL XL) 50 MG 24 hr tablet Take 1 tablet (50 mg) by mouth daily  0     RISEdronate (ACTONEL) 35 MG tablet TAKE 1 TABLET (35 MG) BY MOUTH EVERY 7 DAYS 12 tablet 2     spironolactone (ALDACTONE) 25 MG tablet Take 0.5 tablets (12.5 mg) by mouth daily 45 tablet 3       ALLERGIES     Allergies   Allergen Reactions     Augmentin Rash     Type III hypersensitivity     Bactrim [Sulfamethoxazole W/Trimethoprim] Rash     Serum sickness, type III hypersensitivity       Bee Venom Itching     Sulfa Drugs Hives     Celebrex [Celecoxib]       Lisinopril Cough     Naproxen Hives       PAST MEDICAL HISTORY:  Past Medical History:   Diagnosis Date     Actinic keratosis      Arthritis      Atrial fibrillation and flutter (H) 12/3/2018     CAD (coronary artery disease)     2011 lateral ischemia on stress echo, 2014 normal stress echo     Coronary artery disease 2011    Abnormal stress test 2011 and controlled with medical mgmt      Dyslipidemia      Emphysema of lung (H)      Essential hypertension, benign      Hernia, abdominal      Hypersomnia with sleep apnea, unspecified     on bipap, partially treated with residual apneas     Hypertrophy (benign) of prostate     Biopsy  negative for cancer; PSA 5     Lumbago      Mumps      Prostate cancer (H) 12/15/2006     Skin cancer, basal cell 1997     Spider veins        PAST SURGICAL HISTORY:  Past Surgical History:   Procedure Laterality Date     HERNIA REPAIR  child     Moh's procedure for basal cell carcinoma  2001     PROSTATE SURGERY       s/p lumbar laminectomy NOS  1988     SIGMOIDOSCOPY FLEXIBLE N/A 6/15/2020    Procedure: SIGMOIDOSCOPY, FLEXIBLE;  Surgeon: Sunni Patton MD;  Location: [facility]     VITRECTOMY PARS PLANA REMOVE PRERETINAL MEMBRANE   3/09       FAMILY HISTORY:  Family History   Problem Relation Age of Onset     Alzheimer Disease Mother      Hypertension Mother      Cardiovascular Father         D:86 complications fo CHF     Prostate Cancer Brother      Lung Cancer Brother 76     Prostate Cancer Brother        SOCIAL HISTORY:  Social History     Socioeconomic History     Marital status:      Spouse name: None     Number of children: None     Years of education: None     Highest education level: None   Occupational History     Occupation: retired   Tobacco Use     Smoking status: Former Smoker     Packs/day: 1.00     Types: Cigarettes     Start date:      Quit date: 1978     Years since quittin.6     Smokeless tobacco: Never Used   Substance and  "Sexual Activity     Alcohol use: Never     Drug use: No     Sexual activity: Yes     Partners: Female   Other Topics Concern     Caffeine Concern No     Comment: 0-2 cans of soda per day.     Exercise No     Seat Belt Yes     Parent/sibling w/ CABG, MI or angioplasty before 65F 55M? No     Social Determinants of Health     Intimate Partner Violence: Not At Risk     Fear of Current or Ex-Partner: No     Emotionally Abused: No     Physically Abused: No     Sexually Abused: No       Review of Systems:  Skin:  not assessed     Eyes:  not assessed    ENT:  not assessed    Respiratory:  Positive for dyspnea on exertion;mucoid expectorant;sleep apnea  Cardiovascular:    palpitations;Positive for;edema  Gastroenterology: not assessed    Genitourinary:  not assessed    Musculoskeletal:  not assessed    Neurologic:  not assessed    Psychiatric:  not assessed    Heme/Lymph/Imm:  not assessed    Endocrine:  not assessed      Physical Exam:  Vitals: BP 96/70 (BP Location: Right arm, Patient Position: Sitting, Cuff Size: Adult Large)   Pulse 71   Ht 1.715 m (5' 7.5\")   Wt 108.6 kg (239 lb 8 oz)   SpO2 92%   BMI 36.96 kg/m      Constitutional:           Skin:           Head:           Eyes:           ENT:           Neck:           Chest:           Cardiac:                    Abdomen:           Vascular:                                        Extremities and Muscular Skeletal:              Neurological:           Psych:        Recent Lab Results:  LIPID RESULTS:  Lab Results   Component Value Date    CHOL 121 05/17/2021    HDL 39 (L) 05/17/2021    LDL 55 05/17/2021    TRIG 137 05/17/2021    CHOLHDLRATIO 2.8 10/15/2015       LIVER ENZYME RESULTS:  Lab Results   Component Value Date    AST 31 08/25/2021    AST 32 01/12/2021    ALT 31 08/25/2021    ALT 33 01/12/2021       CBC RESULTS:  Lab Results   Component Value Date    WBC 5.7 07/15/2022    WBC 5.6 06/18/2021    RBC 4.46 07/15/2022    RBC 4.38 (L) 06/18/2021    HGB 13.4 " 07/15/2022    HGB 12.8 (L) 06/18/2021    HCT 42.9 07/15/2022    HCT 41.1 06/18/2021    MCV 96 07/15/2022    MCV 94 06/18/2021    MCH 30.0 07/15/2022    MCH 29.2 06/18/2021    MCHC 31.2 (L) 07/15/2022    MCHC 31.1 (L) 06/18/2021    RDW 14.1 07/15/2022    RDW 14.1 06/18/2021     07/15/2022     06/18/2021       BMP RESULTS:  Lab Results   Component Value Date     07/15/2022     06/18/2021    POTASSIUM 4.6 07/15/2022    POTASSIUM 4.4 06/18/2021    CHLORIDE 112 (H) 07/15/2022    CHLORIDE 109 06/18/2021    CO2 21 07/15/2022    CO2 29 06/18/2021    ANIONGAP 7 07/15/2022    ANIONGAP 4 06/18/2021     (H) 07/15/2022     (H) 06/18/2021    BUN 36 (H) 07/15/2022    BUN 28 06/18/2021    CR 1.33 (H) 07/15/2022    CR 1.44 (H) 06/18/2021    GFRESTIMATED 53 (L) 07/15/2022    GFRESTIMATED 44 (L) 06/18/2021    GFRESTBLACK 51 (L) 06/18/2021    MARLENE 8.8 07/15/2022    MARLENE 9.5 06/18/2021        A1C RESULTS:  Lab Results   Component Value Date    A1C 6.0 (H) 10/22/2021    A1C 5.6 11/11/2020       INR RESULTS:  Lab Results   Component Value Date    INR 1.03 12/03/2018    INR 1.07 11/27/2007         Vinod De La Paz MD, [facility]    CC  No referring provider defined for this encounter.

## 2022-08-25 ENCOUNTER — LAB (OUTPATIENT)
Dept: LAB | Facility: CLINIC | Age: 87
End: 2022-08-25
Payer: COMMERCIAL

## 2022-08-25 DIAGNOSIS — C61 PROSTATE CANCER (H): ICD-10-CM

## 2022-08-25 LAB — PSA SERPL-MCNC: <0.01 UG/L (ref 0–4)

## 2022-08-25 PROCEDURE — 84153 ASSAY OF PSA TOTAL: CPT

## 2022-08-25 PROCEDURE — 36415 COLL VENOUS BLD VENIPUNCTURE: CPT

## 2022-08-26 ENCOUNTER — TELEPHONE (OUTPATIENT)
Dept: UROLOGY | Facility: CLINIC | Age: 87
End: 2022-08-26

## 2022-08-26 NOTE — TELEPHONE ENCOUNTER
[facility] Call Center    Phone Message    May a detailed message be left on voicemail: yes     Reason for Call: Symptoms or Concerns     If patient has red-flag symptoms, warm transfer to triage line    Current symptom or concern: Blood in urine    Symptoms have been present for:  3 day(s)    Has patient previously been seen for this? No    Please reach out to patient to discuss. Thank you!    Action Taken: Message routed to:  [facility] Uro    Travel Screening: Not Applicable

## 2022-08-29 ENCOUNTER — TELEPHONE (OUTPATIENT)
Dept: CARDIOLOGY | Facility: CLINIC | Age: 87
End: 2022-08-29

## 2022-08-29 NOTE — TELEPHONE ENCOUNTER
[facility] Call Center    Phone Message    May a detailed message be left on voicemail: yes     Reason for Call: Symptoms or Concerns     If patient has red-flag symptoms, warm transfer to triage line    Current symptom or concern: Spencer calling to report that his heart rate goes up over 100 upon little exertion.  He states that Dr. De La Paz had him stop taking the diltiazam and to cut his metoprolol from 100mg to 50 mg.  He spoke with the on call Cardiologist on Sat who told to increase the medication to 75 mg.  He states that his pulse will go back down under 100 after a few minutes    Symptoms have been present for:  Last week or so    Has patient previously been seen for this? Yes    By: On Call Cardiologist    Date: 8/27/2022    Are there any new or worsening symptoms? No      Action Taken: Message routed to:  Other: Cardiology    Travel Screening: Not Applicable

## 2022-08-29 NOTE — TELEPHONE ENCOUNTER
Contacted patient and he states over the Weekend he was just walking around his room and he became short of breath and checked his heart rate and it was in the 120'-130's.  He called the Cardiologist on call and recommend he increase his toprol XL  form 50mg to 75mg.  Last visit with Dr. De La Paz 8-9-22 discontinued Diltiazem 120mg.  History of permanent Atrial Flutter.  He reports today that when sitting his heart rate is in the 70's but any walking around his heart rate is in the 100's to 110's.  But once he sits down it is in the 70's.  He wanted to update Dr. De La Paz and if he has any concerns with his heart rate going  over 100's with any exertion?  He denies any shortness of breath or heart pounding, dizziness at those times.  Will forward to Dr. De La Paz to review and advise.

## 2022-08-31 NOTE — TELEPHONE ENCOUNTER
[facility] Call Center    Phone Message    May a detailed message be left on voicemail: yes     Reason for Call: Patient is returning call to Veronica.  Please return call to patient.    Patient states he does not have blood in his urine currently but was there previously.    Action Taken: Message routed to:  [facility] Urology    Travel Screening: Not Applicable

## 2022-08-31 NOTE — TELEPHONE ENCOUNTER
Patient notified of Dr. De La Paz' recommendations and will go back on 100mg of torol XL. Shana Mccullough RN on 8/31/2022 at 12:07 PM      Diltiazem was stopped because it was felt to be causing gingival hyperplasia.   The tachycardia with exertion is not dangerous and if it does not bother him it really does not need to be treated, but it certainly will not hurt to try going back up to 100 mg daily on the metoprolol.  Lets have him do this and let us know if he has any issues with the higher dose.  Even if it does not keep his heart rate low with exercise, it does not matter.

## 2022-09-01 ENCOUNTER — MYC MEDICAL ADVICE (OUTPATIENT)
Dept: PEDIATRICS | Facility: CLINIC | Age: 87
End: 2022-09-01

## 2022-09-01 DIAGNOSIS — R31.0 GROSS HEMATURIA: ICD-10-CM

## 2022-09-01 DIAGNOSIS — E78.5 DYSLIPIDEMIA: Primary | ICD-10-CM

## 2022-09-01 DIAGNOSIS — R00.0 TACHYCARDIA: ICD-10-CM

## 2022-09-02 NOTE — TELEPHONE ENCOUNTER
Called and spoke to patient. Scheduled for lab visit on 9/03/22 at  Lab. Patient agreeable to plan.  Rupinder ARTEAGA RN, BSN

## 2022-09-02 NOTE — TELEPHONE ENCOUNTER
Called and spoke to patient. He had blood in his urine for 4 days about 1 week ago. Patient is scheduled to see Urology on 9/08/22 to address this issue. Patient noticed tachycardia with exertion. Patient did discuss with cardiology and they recommended he increase metoprolol to 100 mg from 50 mg. Patient has done this but is unsure if this has improved his tachycardia.    [facility] for [facility]    Discussed symptoms of anemia with patient. He denies increase fatigue, weakness, pallor, SOB, dizziness, cold hands or feet or headache.     Routing to PCP to advise. Ok to order lab or should patient wait for Urology visit to further discuss?    Thanks!  Rupinder ARTEAGA RN, BSN

## 2022-09-03 ENCOUNTER — LAB (OUTPATIENT)
Dept: LAB | Facility: CLINIC | Age: 87
End: 2022-09-03
Payer: COMMERCIAL

## 2022-09-03 DIAGNOSIS — R00.0 TACHYCARDIA: ICD-10-CM

## 2022-09-03 DIAGNOSIS — E78.5 DYSLIPIDEMIA: ICD-10-CM

## 2022-09-03 DIAGNOSIS — R31.0 GROSS HEMATURIA: ICD-10-CM

## 2022-09-03 LAB
CHOLEST SERPL-MCNC: 122 MG/DL
ERYTHROCYTE [DISTWIDTH] IN BLOOD BY AUTOMATED COUNT: 14.1 % (ref 10–15)
HCT VFR BLD AUTO: 44.5 % (ref 40–53)
HDLC SERPL-MCNC: 37 MG/DL
HGB BLD-MCNC: 13.7 G/DL (ref 13.3–17.7)
LDLC SERPL CALC-MCNC: 65 MG/DL
MCH RBC QN AUTO: 29.9 PG (ref 26.5–33)
MCHC RBC AUTO-ENTMCNC: 30.8 G/DL (ref 31.5–36.5)
MCV RBC AUTO: 97 FL (ref 78–100)
NONHDLC SERPL-MCNC: 85 MG/DL
PLATELET # BLD AUTO: 192 10E3/UL (ref 150–450)
RBC # BLD AUTO: 4.58 10E6/UL (ref 4.4–5.9)
TRIGL SERPL-MCNC: 99 MG/DL
WBC # BLD AUTO: 5.3 10E3/UL (ref 4–11)

## 2022-09-03 PROCEDURE — 85027 COMPLETE CBC AUTOMATED: CPT

## 2022-09-03 PROCEDURE — 80061 LIPID PANEL: CPT

## 2022-09-03 PROCEDURE — 36415 COLL VENOUS BLD VENIPUNCTURE: CPT

## 2022-09-08 DIAGNOSIS — I48.19 PERSISTENT ATRIAL FIBRILLATION (H): ICD-10-CM

## 2022-09-08 RX ORDER — METOPROLOL SUCCINATE 50 MG/1
100 TABLET, EXTENDED RELEASE ORAL DAILY
Qty: 180 TABLET | Refills: 3 | Status: SHIPPED | OUTPATIENT
Start: 2022-09-08 | End: 2023-09-07

## 2022-09-08 NOTE — TELEPHONE ENCOUNTER
Received refill request for:  Metoprolol  Last OV was: 8/9/22 Dr. De La Paz  Labs/EKG: N/a  F/U scheduled: Orders for 1/2023  Pharmacy: [facility] Refill Guideline reviewed.  Medication meets criteria for refill.    Carmelita Verdugo RN, BSN  09/08/22 at 2:03 PM

## 2022-09-09 ENCOUNTER — TRANSFERRED RECORDS (OUTPATIENT)
Dept: PEDIATRICS | Facility: CLINIC | Age: 87
End: 2022-09-09

## 2022-09-15 ENCOUNTER — VIRTUAL VISIT (OUTPATIENT)
Dept: UROLOGY | Facility: CLINIC | Age: 87
End: 2022-09-15
Payer: COMMERCIAL

## 2022-09-15 VITALS — BODY MASS INDEX: 35.16 KG/M2 | HEIGHT: 68 IN | WEIGHT: 232 LBS

## 2022-09-15 DIAGNOSIS — C61 PROSTATE CANCER (H): Primary | ICD-10-CM

## 2022-09-15 DIAGNOSIS — R39.15 URINARY URGENCY: ICD-10-CM

## 2022-09-15 DIAGNOSIS — R31.0 GROSS HEMATURIA: ICD-10-CM

## 2022-09-15 PROCEDURE — 99214 OFFICE O/P EST MOD 30 MIN: CPT | Mod: 95 | Performed by: PHYSICIAN ASSISTANT

## 2022-09-15 ASSESSMENT — PAIN SCALES - GENERAL: PAINLEVEL: NO PAIN (0)

## 2022-09-15 NOTE — LETTER
9/15/2022       RE: Nixon More  [address] [place] [address] [address]  [facility] [address]     Dear Colleague,    Thank you for referring your patient, Nixon More, to the [facility] [place] at [facility]. Please see a copy of my visit note below.    PT WOULD LIKE HIS DAUGHTER (NADIA) TO JOIN THE CALL TOO.  HER PHONE NUMBER:[phone]  PT WOULD LIKE TO TALK TO YOU ABOUT AN ISSUE    Spencer is a 86 year old who is being evaluated via a billable video visit.       How would you like to obtain your AVS? MyChart  If the video visit is dropped, the invitation should be resent by: Text to cell phone: [phone]  Will anyone else be joining your video visit? Yes: . How would they like to receive their invitation? Text to cell phone: [phone]      Video-Visit Details    Video Start Time: 1606    Type of service:  Video Visit    Video End Time:1618    Originating Location (pt. Location): Home    Distant Location (provider location):  [facility] [place]     Platform used for Video Visit: ITelagen     CHIEF COMPLAINT/REASON FOR VISIT   Follow up prostate cancer and gross hematuria    HISTORY OF PRESENT ILLNESS   Mr. More is a pleasant 87-year-old gentleman, who presents today for follow-up regarding his prostate cancer.  He was last seen by myself on 03/03/2022.  He is also had intermittent gross hematuria for which he has had a previous negative evaluation.    Since last time we saw him, patient has been doing okay.  He did note that he had 3 days of hematuria.  He has been on Eliquis the entire time he has been having intermittent hematuria.  Last hematuria evaluation was in 2018.    Patient continues to note some incontinence.  He wears a small pad.  He does endorse some urgency with occasional urge incontinence.  He does not feel like he is at a time where he would want to investigate this further.    He was previously  diagnosed with [place] 4+3 equal 7 adenocarcinoma the prostate.  He had completed radiotherapy in 2007 with recurrence in 2009 has been doing intermittent hormone therapy since then.  His last hormone injection was approximately 3 years ago.      PSA has been undetectable since that time.  Plan is to only give him hormone therapy if PSA were to rise.  We are following him up on a 6-month basis.    The following portions of the patient's history were reviewed and updated as appropriate: allergies, current medications, past family history, past medical history, past social history, past surgical history, and problem list.     REVIEW OF SYSTEMS   Review of Systems   Constitutional: Negative.    Genitourinary: Positive for hematuria and urgency.      Per HPI.     Patient Active Problem List   Diagnosis     Personal history of other malignant neoplasm of skin     Lumbago     Hypertrophy of prostate without urinary obstruction     Other emphysema (H)     Hypersomnia with sleep apnea     Malignant neoplasm of prostate     Impotence of organic origin     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, lower limb     CARDIOVASCULAR SCREENING; LDL GOAL LESS THAN 100     Vitamin D deficiency     Branch retinal vein occlusion     Bee sting-induced anaphylaxis     Advance Care Planning     Radiation proctitis     Coronary artery disease     Impaired fasting glucose     Health Care Home     Dyslipidemia     Benign essential hypertension     Spinal stenosis of lumbar region with neurogenic claudication     Dysthymia     Atrial fibrillation and flutter (H)     Dilated aortic root (H)     Generalized muscle weakness     Obesity (BMI 35.0-39.9) with comorbidity (H)     CKD (chronic kidney disease) stage 3, GFR 30-59 ml/min (H)     Mild major depression (H)     Angina pectoris (H)     Chronic diastolic heart failure (H)      Past Medical History:   Diagnosis Date     Actinic keratosis      Arthritis      Atrial fibrillation and flutter (H)  12/3/2018     CAD (coronary artery disease)     1/5/2011 lateral ischemia on stress echo, 12/2014 normal stress echo     Coronary artery disease 1/1/2011    Abnormal stress test 1/2011 and controlled with medical mgmt      Dyslipidemia      Emphysema of lung (H)      Essential hypertension, benign      Hernia, abdominal      Hypersomnia with sleep apnea, unspecified     on bipap, partially treated with residual apneas     Hypertrophy (benign) of prostate 5/01    Biopsy 5/01 negative for cancer; PSA 5     Lumbago      Mumps      Prostate cancer (H) 12/15/2006     Skin cancer, basal cell 1997     Spider veins         Objective      PHYSICAL EXAM   GENERAL: Healthy, alert and no distress  EYES: Eyes grossly normal to inspection.  No discharge or erythema, or obvious scleral/conjunctival abnormalities.  HENT: Normal cephalic/atraumatic.  External ears, nose and mouth without ulcers or lesions.  No nasal drainage visible.  NECK: No asymmetry, visible masses or scars  RESP: No audible wheeze, cough, or visible cyanosis.  No visible retractions or increased work of breathing.    MS: No gross musculoskeletal defects noted.  Normal range of motion.  No visible edema.  SKIN: Visible skin clear. No significant rash, abnormal pigmentation or lesions.  NEURO: Cranial nerves grossly intact.  Mentation and speech appropriate for age.  PSYCH: Mentation appears normal, affect normal/bright, judgement and insight intact, normal speech and appearance well-groomed.     LABORATORY     Lab Results   Component Value Date    PSA <0.01 08/25/2022    PSA <0.01 02/25/2022    PSA <0.01 07/29/2021    PSA <0.01 05/17/2021      Recent Labs   Lab Test 01/12/21  1312 11/11/20  0737 12/23/19  1518   COLOR Yellow   < > Yellow   APPEARANCE Clear   < > Clear   URINEGLC Negative   < > Negative   URINEBILI Negative   < > Negative   URINEKETONE Negative   < > Negative   SG 1.020   < > 1.015   UBLD Negative   < > Moderate*   URINEPH 6.5   < > 6.5   PROTEIN  Negative   < > Negative   UROBILINOGEN 0.2   < > 0.2   NITRITE Negative   < > Negative   LEUKEST Negative   < > Small*   RBCU  --   --  O - 2   WBCU  --   --  5-10*    < > = values in this interval not displayed.       Assessment & Plan    1. Prostate cancer (H)    2. Gross hematuria    3. Urinary urgency      I had the pleasure today of meeting with Mr. More to discuss his prostate cancer follow-up, urinary urgency, and gross hematuria.    Patient has been on intermittent hormone therapy.  He has not required an injection of Lupron in approximately 3 years.  PSA remains undetectable at less than 0.01.    -Follow-up PSA and return visit in 6 months.  Would consider additional hormone therapy if PSA becomes detectable.    Patient has had previous gross hematuria that has been stable.  He had a negative evaluation in 2018.  Our last discussion was to consider repeat evaluation if gross hematuria worsened.  He has since had an episode of gross hematuria that lasted for 3 days.    -Plan on CT urogram, cystoscopy, and urine cytology due to increasing gross hematuria.    -Would discuss with primary care if hematuria worsens like this again about holding Eliquis for a day or 2.    Patient continues to note some urgency and urge incontinence.  He notes that he is wearing a little pad and that seems to be reasonable.  He is not interested in postvoid residual or an overactive bladder medication trial at this time.    -Follow-up as needed for urinary symptomatology.    Signed by:       Lanny Devine PA-C 9/15/2022 3:28 PM

## 2022-09-15 NOTE — PROGRESS NOTES
PT WOULD LIKE HIS DAUGHTER (NADIA) TO JOIN THE CALL TOO.  HER PHONE NUMBER:[phone]  PT WOULD LIKE TO TALK TO YOU ABOUT AN ISSUE    Spencer is a 86 year old who is being evaluated via a billable video visit.       How would you like to obtain your AVS? Josehart  If the video visit is dropped, the invitation should be resent by: Text to cell phone: [phone]  Will anyone else be joining your video visit? Yes: . How would they like to receive their invitation? Text to cell phone: [phone]      Video-Visit Details    Video Start Time: 1606    Type of service:  Video Visit    Video End Time:1618    Originating Location (pt. Location): Home    Distant Location (provider location):  [facility] [place]     Platform used for Video Visit: [email or web address]     CHIEF COMPLAINT/REASON FOR VISIT   Follow up prostate cancer and gross hematuria    HISTORY OF PRESENT ILLNESS   Mr. More is a pleasant 87-year-old gentleman, who presents today for follow-up regarding his prostate cancer.  He was last seen by myself on 03/03/2022.  He is also had intermittent gross hematuria for which he has had a previous negative evaluation.    Since last time we saw him, patient has been doing okay.  He did note that he had 3 days of hematuria.  He has been on Eliquis the entire time he has been having intermittent hematuria.  Last hematuria evaluation was in 2018.    Patient continues to note some incontinence.  He wears a small pad.  He does endorse some urgency with occasional urge incontinence.  He does not feel like he is at a time where he would want to investigate this further.    He was previously diagnosed with [place] 4+3 equal 7 adenocarcinoma the prostate.  He had completed radiotherapy in 2007 with recurrence in 2009 has been doing intermittent hormone therapy since then.  His last hormone injection was approximately 3 years ago.      PSA has been undetectable since that time.  Plan is to only give him hormone  therapy if PSA were to rise.  We are following him up on a 6-month basis.    The following portions of the patient's history were reviewed and updated as appropriate: allergies, current medications, past family history, past medical history, past social history, past surgical history, and problem list.     REVIEW OF SYSTEMS   Review of Systems   Constitutional: Negative.    Genitourinary: Positive for hematuria and urgency.      Per HPI.     Patient Active Problem List   Diagnosis     Personal history of other malignant neoplasm of skin     Lumbago     Hypertrophy of prostate without urinary obstruction     Other emphysema (H)     Hypersomnia with sleep apnea     Malignant neoplasm of prostate     Impotence of organic origin     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, lower limb     CARDIOVASCULAR SCREENING; LDL GOAL LESS THAN 100     Vitamin D deficiency     Branch retinal vein occlusion     Bee sting-induced anaphylaxis     Advance Care Planning     Radiation proctitis     Coronary artery disease     Impaired fasting glucose     Health Care Home     Dyslipidemia     Benign essential hypertension     Spinal stenosis of lumbar region with neurogenic claudication     Dysthymia     Atrial fibrillation and flutter (H)     Dilated aortic root (H)     Generalized muscle weakness     Obesity (BMI 35.0-39.9) with comorbidity (H)     CKD (chronic kidney disease) stage 3, GFR 30-59 ml/min (H)     Mild major depression (H)     Angina pectoris (H)     Chronic diastolic heart failure (H)      Past Medical History:   Diagnosis Date     Actinic keratosis      Arthritis      Atrial fibrillation and flutter (H) 12/3/2018     CAD (coronary artery disease)     1/5/2011 lateral ischemia on stress echo, 12/2014 normal stress echo     Coronary artery disease 1/1/2011    Abnormal stress test 1/2011 and controlled with medical mgmt      Dyslipidemia      Emphysema of lung (H)      Essential hypertension, benign      Hernia, abdominal       Hypersomnia with sleep apnea, unspecified     on bipap, partially treated with residual apneas     Hypertrophy (benign) of prostate 5/01    Biopsy 5/01 negative for cancer; PSA 5     Lumbago      Mumps      Prostate cancer (H) 12/15/2006     Skin cancer, basal cell 1997     Spider veins         Objective      PHYSICAL EXAM   GENERAL: Healthy, alert and no distress  EYES: Eyes grossly normal to inspection.  No discharge or erythema, or obvious scleral/conjunctival abnormalities.  HENT: Normal cephalic/atraumatic.  External ears, nose and mouth without ulcers or lesions.  No nasal drainage visible.  NECK: No asymmetry, visible masses or scars  RESP: No audible wheeze, cough, or visible cyanosis.  No visible retractions or increased work of breathing.    MS: No gross musculoskeletal defects noted.  Normal range of motion.  No visible edema.  SKIN: Visible skin clear. No significant rash, abnormal pigmentation or lesions.  NEURO: Cranial nerves grossly intact.  Mentation and speech appropriate for age.  PSYCH: Mentation appears normal, affect normal/bright, judgement and insight intact, normal speech and appearance well-groomed.     LABORATORY     Lab Results   Component Value Date    PSA <0.01 08/25/2022    PSA <0.01 02/25/2022    PSA <0.01 07/29/2021    PSA <0.01 05/17/2021      Recent Labs   Lab Test 01/12/21  1312 11/11/20  0737 12/23/19  1518   COLOR Yellow   < > Yellow   APPEARANCE Clear   < > Clear   URINEGLC Negative   < > Negative   URINEBILI Negative   < > Negative   URINEKETONE Negative   < > Negative   SG 1.020   < > 1.015   UBLD Negative   < > Moderate*   URINEPH 6.5   < > 6.5   PROTEIN Negative   < > Negative   UROBILINOGEN 0.2   < > 0.2   NITRITE Negative   < > Negative   LEUKEST Negative   < > Small*   RBCU  --   --  O - 2   WBCU  --   --  5-10*    < > = values in this interval not displayed.       Assessment & Plan    1. Prostate cancer (H)    2. Gross hematuria    3. Urinary urgency      I had the  pleasure today of meeting with Mr. More to discuss his prostate cancer follow-up, urinary urgency, and gross hematuria.    Patient has been on intermittent hormone therapy.  He has not required an injection of Lupron in approximately 3 years.  PSA remains undetectable at less than 0.01.    -Follow-up PSA and return visit in 6 months.  Would consider additional hormone therapy if PSA becomes detectable.    Patient has had previous gross hematuria that has been stable.  He had a negative evaluation in 2018.  Our last discussion was to consider repeat evaluation if gross hematuria worsened.  He has since had an episode of gross hematuria that lasted for 3 days.    -Plan on CT urogram, cystoscopy, and urine cytology due to increasing gross hematuria.    -Would discuss with primary care if hematuria worsens like this again about holding Eliquis for a day or 2.    Patient continues to note some urgency and urge incontinence.  He notes that he is wearing a little pad and that seems to be reasonable.  He is not interested in postvoid residual or an overactive bladder medication trial at this time.    -Follow-up as needed for urinary symptomatology.    Signed by:       Lanny Devine PA-C 9/15/2022 3:28 PM

## 2022-09-16 ENCOUNTER — IMMUNIZATION (OUTPATIENT)
Dept: NURSING | Facility: CLINIC | Age: 87
End: 2022-09-16
Payer: COMMERCIAL

## 2022-09-16 PROCEDURE — G0008 ADMIN INFLUENZA VIRUS VAC: HCPCS

## 2022-09-16 PROCEDURE — 90662 IIV NO PRSV INCREASED AG IM: CPT

## 2022-09-19 ASSESSMENT — ENCOUNTER SYMPTOMS
HEMATURIA: 1
CONSTITUTIONAL NEGATIVE: 1

## 2022-09-19 NOTE — PATIENT INSTRUCTIONS
-Follow-up PSA and return visit in 6 months.  Would consider additional hormone therapy if PSA becomes detectable.    -Plan on CT urogram, cystoscopy, and urine cytology due to increasing blood in the urine.    -Would discuss with primary care if hematuria worsens like this again about holding Eliquis for a day or 2.    -Follow-up as needed for urinary symptomatology.  Would consider postvoid residual and trial of an overactive bladder medication in the future.

## 2022-09-26 ENCOUNTER — OFFICE VISIT (OUTPATIENT)
Dept: URGENT CARE | Facility: URGENT CARE | Age: 87
End: 2022-09-26
Payer: COMMERCIAL

## 2022-09-26 VITALS
DIASTOLIC BLOOD PRESSURE: 76 MMHG | WEIGHT: 232 LBS | SYSTOLIC BLOOD PRESSURE: 124 MMHG | OXYGEN SATURATION: 97 % | BODY MASS INDEX: 35.8 KG/M2 | HEART RATE: 77 BPM | TEMPERATURE: 99.1 F

## 2022-09-26 DIAGNOSIS — M18.11 PRIMARY OSTEOARTHRITIS OF FIRST CARPOMETACARPAL JOINT OF RIGHT HAND: ICD-10-CM

## 2022-09-26 DIAGNOSIS — M79.644 THUMB PAIN, RIGHT: Primary | ICD-10-CM

## 2022-09-26 PROCEDURE — 99213 OFFICE O/P EST LOW 20 MIN: CPT | Performed by: FAMILY MEDICINE

## 2022-09-26 NOTE — PROGRESS NOTES
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 No acute fractures per my read    PRN tylenol and icing for pain ( patient cannot take NSAIDs while on DOAC)    F/u with sports medicine or hand specialist as needed        Jose Diallo MD   [place] [facility]    Subjective     Spencer is a 87 year old male who presents to clinic today for the following health issues:  Chief Complaint   Patient presents with     Musculoskeletal Problem     Start today sx right thumb soreness hx 4 yrs ago similar thumb pain tx none       HPI    Patient had a similar flareup many years ago also in the absence of any injury.  No history of gout.  No remedies attempted.  The pain for this episode started in the middle of the night does not recall any direct injury or trauma.  No obvious swelling.  No known history of arthritis in this joint.        Patient Active Problem List    Diagnosis Date Noted     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     Priority: Medium     Angina pectoris (H) 10/27/2020     Priority: Medium     Obesity (BMI 35.0-39.9) with comorbidity (H) 11/08/2019     Priority: Medium     CKD (chronic kidney disease) stage 3, GFR 30-59 ml/min (H) 11/08/2019     Priority: Medium     Generalized muscle weakness 03/28/2019     Priority: Medium     Dilated aortic root (H) 12/06/2018     Priority: Medium     Atrial fibrillation and flutter (H) 12/03/2018     Priority: Medium     Dysthymia 09/27/2016     Priority: Medium     Spinal stenosis of lumbar region with neurogenic claudication 10/20/2015     Priority: Medium     Dyslipidemia      Priority: Medium     Benign essential hypertension      Priority: Medium     Health Care Home 01/29/2013     Priority: Medium     Maranda  Mercedez RN-BSN, [facility]  [phone].  FPA / FMG [facility]       DX V65.8 REPLACED WITH [id number] HEALTH CARE HOME (04/08/2013)       Impaired fasting glucose 08/10/2012     Priority: Medium     100 8/2012       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     Priority: Medium     Mild, noted on colonoscopy for hematochezia       Advance Care Planning 08/23/2011     Priority: Medium     Advance Care Planning 10/15/2015: Receipt of ACP document:  Received: Health Care Directive which was witnessed or notarized on 10-12-11.  Document previously scanned on 2-25-14.  Validation form completed and sent to be scanned.  Code Status reflects choices in most recent ACP document.  Confirmed/documented designated decision maker(s).  Added by Patricia Hansen RN Advance Care Planning Liaison with [employer]  Patient states has Advance Directive and will bring in a copy to clinic. 8/23/2011          Bee sting-induced anaphylaxis 05/13/2011     Priority: Medium     Branch retinal vein occlusion 02/10/2011     Priority: Medium     Following with optho       Coronary artery disease 01/01/2011     Priority: Medium     Abnormal stress test 1/2011 and controlled with medical mgmt       Vitamin D deficiency 12/10/2010     Priority: Medium     CARDIOVASCULAR SCREENING; LDL GOAL LESS THAN 100 02/10/2010     Priority: Medium     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, lower limb 11/02/2007     Priority: Medium     Impotence of organic origin 07/19/2007     Priority: Medium     Malignant neoplasm of prostate 12/15/2006     Priority: Medium     S/p radiation therapy and has now recurred.  [facility]       Hypersomnia with sleep apnea      Priority: Medium     Also with central apnea.  on bipap, controlled  Problem list name updated by automated process. Provider to review       Other emphysema (H) 06/16/2005     Priority: Medium     Hypertrophy of prostate without urinary obstruction 09/23/2004     Priority: Medium      Personal history of other malignant neoplasm of skin 08/26/2002     Priority: Medium     Lumbago 08/26/2002     Priority: Medium       Current Outpatient Medications   Medication     apixaban ANTICOAGULANT (ELIQUIS) 5 MG tablet     atorvastatin (LIPITOR) 40 MG tablet     Cholecalciferol (VITAMIN D-3) 25 MCG (1000 UT) CAPS     escitalopram (LEXAPRO) 10 MG tablet     Loperamide HCl (IMODIUM OR)     losartan (COZAAR) 100 MG tablet     metoprolol succinate ER (TOPROL XL) 50 MG 24 hr tablet     RISEdronate (ACTONEL) 35 MG tablet     spironolactone (ALDACTONE) 25 MG tablet     albuterol (PROAIR HFA/PROVENTIL HFA/VENTOLIN HFA) 108 (90 Base) MCG/ACT inhaler     ASPIRIN NOT PRESCRIBED (INTENTIONAL)     EPINEPHrine (EPIPEN/ADRENACLICK/OR ANY BX GENERIC EQUIV) 0.3 MG/0.3ML injection 2-pack     ipratropium (ATROVENT) 0.06 % nasal spray     No current facility-administered medications for this visit.         Objective    /76   Pulse 77   Temp 99.1  F (37.3  C)   Wt 105.2 kg (232 lb)   SpO2 97%   BMI 35.80 kg/m    Physical Exam     R hand: discomfort without swelling at the CMC 1st joint.     Results for orders placed or performed in visit on 09/26/22   XR Finger Right G/E 2 Views     Status: None (Preliminary result)    Narrative    EXAM: FINGER RIGHT TWO OR MORE VIEWS   DATE/TIME: 9/26/2022 2:21 PM     INDICATION: Right thumb pain.   COMPARISON: None.      Impression    IMPRESSION:  1.  Advanced right thumb CMC degenerative arthrosis.  2.  Mild thumb MCP and IP degenerative arthrosis.  3.  No fracture.                     The use of Dragon/Disqus dictation services may have been used to construct the content in this note; any grammatical or spelling errors are non-intentional. Please contact the author of this note directly if you are in need of any clarification.

## 2022-09-26 NOTE — PATIENT INSTRUCTIONS
Take tylenol 500mg every 4-6 hours as needed        If there is pain or mild swelling you can also try icing the joint 10 minutes every half hour or so        Appointment with sports medicine if things don't improve or if you have more issues with this thumb joint  Call [phone] to schedule an appointment

## 2022-10-13 DIAGNOSIS — M85.852 OSTEOPENIA OF BOTH HIPS: ICD-10-CM

## 2022-10-13 DIAGNOSIS — M85.851 OSTEOPENIA OF BOTH HIPS: ICD-10-CM

## 2022-10-17 RX ORDER — RISEDRONATE SODIUM 35 MG/1
35 TABLET, FILM COATED ORAL
Qty: 12 TABLET | Refills: 3 | Status: SHIPPED | OUTPATIENT
Start: 2022-10-17 | End: 2023-10-18

## 2022-10-17 NOTE — TELEPHONE ENCOUNTER
Routing refill request to provider for review/approval because:  Labs out of range:  Creatinine  Creatinine   Date Value Ref Range Status   07/15/2022 1.33 (H) 0.66 - 1.25 mg/dL Final   06/18/2021 1.44 (H) 0.66 - 1.25 mg/dL Final     Pt has appointment with Dr. Lewis in December 2022.     Sunni Meredith, RN, BSN, PHN  [facility]

## 2022-10-20 ENCOUNTER — HOSPITAL ENCOUNTER (OUTPATIENT)
Dept: CT IMAGING | Facility: CLINIC | Age: 87
Discharge: HOME | End: 2022-10-20
Attending: PHYSICIAN ASSISTANT | Admitting: PHYSICIAN ASSISTANT
Payer: COMMERCIAL

## 2022-10-20 DIAGNOSIS — R31.0 GROSS HEMATURIA: ICD-10-CM

## 2022-10-20 DIAGNOSIS — C61 PROSTATE CANCER (H): ICD-10-CM

## 2022-10-20 LAB
CREAT BLD-MCNC: 1.1 MG/DL (ref 0.7–1.3)
GFR SERPL CREATININE-BSD FRML MDRD: >60 ML/MIN/1.73M2

## 2022-10-20 PROCEDURE — 82565 ASSAY OF CREATININE: CPT

## 2022-10-20 PROCEDURE — 250N000009 HC RX 250: Performed by: PHYSICIAN ASSISTANT

## 2022-10-20 PROCEDURE — 250N000011 HC RX IP 250 OP 636: Performed by: PHYSICIAN ASSISTANT

## 2022-10-20 PROCEDURE — 74178 CT ABD&PLV WO CNTR FLWD CNTR: CPT

## 2022-10-20 RX ORDER — IOPAMIDOL 755 MG/ML
500 INJECTION, SOLUTION INTRAVASCULAR ONCE
Status: COMPLETED | OUTPATIENT
Start: 2022-10-20 | End: 2022-10-20

## 2022-10-20 RX ADMIN — IOPAMIDOL 90 ML: 755 INJECTION, SOLUTION INTRAVENOUS at 11:06

## 2022-10-20 RX ADMIN — SODIUM CHLORIDE 100 ML: 9 INJECTION, SOLUTION INTRAVENOUS at 11:07

## 2022-10-21 DIAGNOSIS — I71.43 INFRARENAL ABDOMINAL AORTIC ANEURYSM (AAA) WITHOUT RUPTURE (H): Primary | ICD-10-CM

## 2022-10-23 ENCOUNTER — MYC MEDICAL ADVICE (OUTPATIENT)
Dept: UROLOGY | Facility: CLINIC | Age: 87
End: 2022-10-23

## 2022-10-24 ENCOUNTER — TELEPHONE (OUTPATIENT)
Dept: OTHER | Facility: CLINIC | Age: 87
End: 2022-10-24

## 2022-10-24 DIAGNOSIS — I71.40 AAA (ABDOMINAL AORTIC ANEURYSM) (H): Primary | ICD-10-CM

## 2022-10-24 NOTE — TELEPHONE ENCOUNTER
Left voicemail with instructions for patient to call back to schedule their appointment(s)    October 24, 2022 , 12:44 PM

## 2022-10-24 NOTE — TELEPHONE ENCOUNTER
Pt referred to [facility] by Lanny Devine PA-C for AAA    Patient has CT urogram in EPIC    Pt needs to be scheduled for AAA US and consult with vascular medicine.  Will route to scheduling to coordinate an appointment at next available.    Appointment note: Pt referred to [facility] by Lanny Devnie PA-C for AAA      Sary GUZMAN, RN    [facility]  Office: [phone]  Fax: [phone]

## 2022-10-31 ENCOUNTER — OFFICE VISIT (OUTPATIENT)
Dept: UROLOGY | Facility: CLINIC | Age: 87
End: 2022-10-31
Payer: COMMERCIAL

## 2022-10-31 VITALS — WEIGHT: 232 LBS | HEIGHT: 68 IN | BODY MASS INDEX: 35.16 KG/M2

## 2022-10-31 DIAGNOSIS — D49.4 BLADDER TUMOR: ICD-10-CM

## 2022-10-31 DIAGNOSIS — R31.0 GROSS HEMATURIA: Primary | ICD-10-CM

## 2022-10-31 DIAGNOSIS — Z79.2 PROPHYLACTIC ANTIBIOTIC: ICD-10-CM

## 2022-10-31 LAB
ALBUMIN UR-MCNC: NEGATIVE MG/DL
APPEARANCE UR: CLEAR
BILIRUB UR QL STRIP: NEGATIVE
COLOR UR AUTO: YELLOW
GLUCOSE UR STRIP-MCNC: NEGATIVE MG/DL
HGB UR QL STRIP: NEGATIVE
KETONES UR STRIP-MCNC: NEGATIVE MG/DL
LEUKOCYTE ESTERASE UR QL STRIP: NEGATIVE
NITRATE UR QL: NEGATIVE
PH UR STRIP: 5 [PH] (ref 5–7)
SP GR UR STRIP: 1.02 (ref 1–1.03)
UROBILINOGEN UR STRIP-ACNC: 0.2 E.U./DL

## 2022-10-31 PROCEDURE — 52000 CYSTOURETHROSCOPY: CPT | Performed by: UROLOGY

## 2022-10-31 PROCEDURE — 81003 URINALYSIS AUTO W/O SCOPE: CPT | Mod: QW | Performed by: UROLOGY

## 2022-10-31 PROCEDURE — 99214 OFFICE O/P EST MOD 30 MIN: CPT | Mod: 25 | Performed by: UROLOGY

## 2022-10-31 RX ORDER — CEFAZOLIN SODIUM 2 G/50ML
2 SOLUTION INTRAVENOUS
Status: CANCELLED | OUTPATIENT
Start: 2022-10-31

## 2022-10-31 RX ORDER — CEFAZOLIN SODIUM 2 G/50ML
2 SOLUTION INTRAVENOUS SEE ADMIN INSTRUCTIONS
Status: CANCELLED | OUTPATIENT
Start: 2022-10-31

## 2022-10-31 RX ORDER — CIPROFLOXACIN 500 MG/1
500 TABLET, FILM COATED ORAL ONCE
Qty: 1 TABLET | Refills: 0 | Status: SHIPPED | OUTPATIENT
Start: 2022-10-31 | End: 2022-10-31

## 2022-10-31 RX ORDER — LIDOCAINE HYDROCHLORIDE 20 MG/ML
JELLY TOPICAL ONCE
Status: COMPLETED | OUTPATIENT
Start: 2022-10-31 | End: 2022-10-31

## 2022-10-31 RX ADMIN — LIDOCAINE HYDROCHLORIDE 5 ML: 20 JELLY TOPICAL at 10:41

## 2022-10-31 ASSESSMENT — PAIN SCALES - GENERAL: PAINLEVEL: NO PAIN (1)

## 2022-10-31 NOTE — NURSING NOTE
Chief Complaint   Patient presents with     Gross Hematuria     Pt here for in office cystoscopy       Prior to the start of the procedure and with procedural staff participation, I verbally confirmed the patient s identity using two indicators, relevant allergies, that the procedure was appropriate and matched the consent or emergent situation, and that the correct equipment/implants were available. Immediately prior to starting the procedure I conducted the Time Out with the procedural staff and re-confirmed the patient s name, procedure, and site/side. I have wiped the patient off with the povidone-Iodine solution, draped them,  used Lidocaine hydrochloride jelly, and instilled sterile water into the bladder. (The Joint Commission universal protocol was followed.)  Yes    Sedation (Moderate or Deep): None    5mL 2% lidocaine hydrochloride Urojet instilled into urethra.    NDC# [address]-5  Lot #: [id number]  Expiration Date:  04/24    Radha Alexander CMA

## 2022-10-31 NOTE — LETTER
10/31/2022       RE: Nixon More  [address] [place] [address] [address]  [facility] [address]     Dear Colleague,    Thank you for referring your patient, Nixon More, to the [facility] [place] at [facility]. Please see a copy of my visit note below.    Office Visit Note  [facility]  [phone]    UROLOGIC DIAGNOSES:   [place] 4+3 equal 7 prostate cancer  Hematuria    CURRENT INTERVENTIONS:   Negative hematuria work-up 2018  Previous hormonal therapy  Radiotherapy to the prostate 2007      HISTORY:   Spencer is set up for urologic follow-up today because he had another episode of hematuria that lasted for 3 days earlier this fall.  He is on Eliquis.  His PSA remains undetectable.  He was set up for another hematuria work-up.  He had a CT urogram performed 2 weeks ago that showed  normal upper urinary tracts.  He is here today for a cystoscopy in order to complete his work-up.      PAST MEDICAL HISTORY:   Past Medical History:   Diagnosis Date     Actinic keratosis      Arthritis      Atrial fibrillation and flutter (H) 12/3/2018     CAD (coronary artery disease)     1/5/2011 lateral ischemia on stress echo, 12/2014 normal stress echo     Coronary artery disease 1/1/2011    Abnormal stress test 1/2011 and controlled with medical mgmt      Dyslipidemia      Emphysema of lung (H)      Essential hypertension, benign      Hernia, abdominal      Hypersomnia with sleep apnea, unspecified     on bipap, partially treated with residual apneas     Hypertrophy (benign) of prostate 5/01    Biopsy 5/01 negative for cancer; PSA 5     Lumbago      Mumps      Prostate cancer (H) 12/15/2006     Skin cancer, basal cell 1997     Spider veins        PAST SURGICAL HISTORY:   Past Surgical History:   Procedure Laterality Date     HERNIA REPAIR  child     Moh's procedure for basal cell carcinoma  01/2001     PROSTATE SURGERY       s/p lumbar laminectomy NOS        SIGMOIDOSCOPY FLEXIBLE N/A 6/15/2020    Procedure: SIGMOIDOSCOPY, FLEXIBLE;  Surgeon: Sunni Patton MD;  Location: [facility]     VITRECTOMY PARS PLANA REMOVE PRERETINAL MEMBRANE   3/09       FAMILY HISTORY:   Family History   Problem Relation Age of Onset     Alzheimer Disease Mother      Hypertension Mother      Cardiovascular Father         D:86 complications fo CHF     Prostate Cancer Brother      Lung Cancer Brother 76     Prostate Cancer Brother        SOCIAL HISTORY:   Social History     Socioeconomic History     Marital status:    Occupational History     Occupation: retired   Tobacco Use     Smoking status: Former     Packs/day: 1.00     Types: Cigarettes     Start date:      Quit date: 1978     Years since quittin.8     Smokeless tobacco: Never   Substance and Sexual Activity     Alcohol use: Never     Drug use: No     Sexual activity: Yes     Partners: Female   Other Topics Concern     Caffeine Concern No     Comment: 0-2 cans of soda per day.     Exercise No     Seat Belt Yes     Parent/sibling w/ CABG, MI or angioplasty before 65F 55M? No     Social Determinants of Health     Intimate Partner Violence: Not At Risk     Fear of Current or Ex-Partner: No     Emotionally Abused: No     Physically Abused: No     Sexually Abused: No       Review Of Systems:  Skin: No rash, pruritis, or skin pigmentation  Eyes: No changes in vision  Ears/Nose/Throat: No changes in hearing, no nosebleeds  Respiratory: No shortness of breath, dyspnea on exertion, cough, or hemoptysis  Cardiovascular: No chest pain or palpitations  Gastrointestinal: No diarrhea or constipation. No abdominal pain. No hematochezia  Genitourinary: see HPI  Musculoskeletal: No pain or swelling of joints, normal range of motion  Neurologic: No weakness or tremors  Psychiatric: No recent changes in memory or mood  Hematologic/Lymphatic/Immunologic: No easy bruising or enlarged lymph nodes  Endocrine: No weight gain or  loss      PHYSICAL EXAM:    There were no vitals taken for this visit.    Constitutional: Well developed. Conversant and in no acute distress  Eyes: Anicteric sclera, conjunctiva clear, normal extraocular movements  ENT: Normocephalic and atraumatic,   Skin: Warm and dry. No rashes or lesions  Cardiac: No peripheral edema  Back/Flank: Not done  CNS/PNS: Normal musculature and movements, moves all extremities normally  Respiratory: Normal non-labored breathing  Abdomen: Soft nontender and nondistended  Peripheral Vascular: No peripheral edema  Mental Status/Psych: Alert and Oriented x 3. Normal mood and affect    Penis: Normal  Scrotal skin: Normal, no lesions  Testicles: Normal to palpation bilaterally  Epididymis: Normal to palpation bilaterally  Lymphatic: Normal inguinal lymph nodes  Digital Rectal Exam:     Cystoscopy: I performed flexible cystoscopy in the clinic today.  There were radiation changes to the bladder neck.  On the right side of the bladder and bladder neck there were 3-4 small papillary bladder tumors present that were contiguous with 1 another.    Imaging: None    Urinalysis: UA RESULTS:  Recent Labs   Lab Test 01/12/21  1312 11/11/20  0737 12/23/19  1518   COLOR Yellow   < > Yellow   APPEARANCE Clear   < > Clear   URINEGLC Negative   < > Negative   URINEBILI Negative   < > Negative   URINEKETONE Negative   < > Negative   SG 1.020   < > 1.015   UBLD Negative   < > Moderate*   URINEPH 6.5   < > 6.5   PROTEIN Negative   < > Negative   UROBILINOGEN 0.2   < > 0.2   NITRITE Negative   < > Negative   LEUKEST Negative   < > Small*   RBCU  --   --  O - 2   WBCU  --   --  5-10*    < > = values in this interval not displayed.       PSA: Undetectable    Post Void Residual:     Other labs: None today      IMPRESSION:  Bladder tumor    PLAN:  He has multiple small bladder tumors discovered on cystoscopy today.  These have a papillary appearance of a low-grade bladder cancer.  We discussed my findings today.   These will require transurethral resection of bladder tumor in the operating room.  We discussed the surgery in detail today along with its risks and expected recovery.  He wishes to proceed.  He will need to hold the Eliquis for 48 hours prior to the procedure.  He will likely need to be catheterized for at least 1 night after the procedure.  The procedure can likely be performed as an outpatient.  We will get him scheduled in the operating room.      Mark Pino M.D.

## 2022-10-31 NOTE — PROGRESS NOTES
Office Visit Note  [facility]  [phone]    UROLOGIC DIAGNOSES:   Cary 4+3 equal 7 prostate cancer  Hematuria    CURRENT INTERVENTIONS:   Negative hematuria work-up 2018  Previous hormonal therapy  Radiotherapy to the prostate 2007      HISTORY:   Spencer is set up for urologic follow-up today because he had another episode of hematuria that lasted for 3 days earlier this fall.  He is on Eliquis.  His PSA remains undetectable.  He was set up for another hematuria work-up.  He had a CT urogram performed 2 weeks ago that showed  normal upper urinary tracts.  He is here today for a cystoscopy in order to complete his work-up.      PAST MEDICAL HISTORY:   Past Medical History:   Diagnosis Date     Actinic keratosis      Arthritis      Atrial fibrillation and flutter (H) 12/3/2018     CAD (coronary artery disease)     1/5/2011 lateral ischemia on stress echo, 12/2014 normal stress echo     Coronary artery disease 1/1/2011    Abnormal stress test 1/2011 and controlled with medical mgmt      Dyslipidemia      Emphysema of lung (H)      Essential hypertension, benign      Hernia, abdominal      Hypersomnia with sleep apnea, unspecified     on bipap, partially treated with residual apneas     Hypertrophy (benign) of prostate 5/01    Biopsy 5/01 negative for cancer; PSA 5     Lumbago      Mumps      Prostate cancer (H) 12/15/2006     Skin cancer, basal cell 1997     Spider veins        PAST SURGICAL HISTORY:   Past Surgical History:   Procedure Laterality Date     HERNIA REPAIR  child     Moh's procedure for basal cell carcinoma  01/2001     PROSTATE SURGERY       s/p lumbar laminectomy NOS  1988     SIGMOIDOSCOPY FLEXIBLE N/A 6/15/2020    Procedure: SIGMOIDOSCOPY, FLEXIBLE;  Surgeon: Sunni Patton MD;  Location:  GI     VITRECTOMY PARS PLANA REMOVE PRERETINAL MEMBRANE   3/09       FAMILY HISTORY:   Family History   Problem Relation Age of Onset     Alzheimer Disease Mother      Hypertension Mother       Cardiovascular Father         D:86 complications fo CHF     Prostate Cancer Brother      Lung Cancer Brother 76     Prostate Cancer Brother        SOCIAL HISTORY:   Social History     Socioeconomic History     Marital status:    Occupational History     Occupation: retired   Tobacco Use     Smoking status: Former     Packs/day: 1.00     Types: Cigarettes     Start date:      Quit date: 1978     Years since quittin.8     Smokeless tobacco: Never   Substance and Sexual Activity     Alcohol use: Never     Drug use: No     Sexual activity: Yes     Partners: Female   Other Topics Concern     Caffeine Concern No     Comment: 0-2 cans of soda per day.     Exercise No     Seat Belt Yes     Parent/sibling w/ CABG, MI or angioplasty before 65F 55M? No     Social Determinants of Health     Intimate Partner Violence: Not At Risk     Fear of Current or Ex-Partner: No     Emotionally Abused: No     Physically Abused: No     Sexually Abused: No       Review Of Systems:  Skin: No rash, pruritis, or skin pigmentation  Eyes: No changes in vision  Ears/Nose/Throat: No changes in hearing, no nosebleeds  Respiratory: No shortness of breath, dyspnea on exertion, cough, or hemoptysis  Cardiovascular: No chest pain or palpitations  Gastrointestinal: No diarrhea or constipation. No abdominal pain. No hematochezia  Genitourinary: see HPI  Musculoskeletal: No pain or swelling of joints, normal range of motion  Neurologic: No weakness or tremors  Psychiatric: No recent changes in memory or mood  Hematologic/Lymphatic/Immunologic: No easy bruising or enlarged lymph nodes  Endocrine: No weight gain or loss      PHYSICAL EXAM:    There were no vitals taken for this visit.    Constitutional: Well developed. Conversant and in no acute distress  Eyes: Anicteric sclera, conjunctiva clear, normal extraocular movements  ENT: Normocephalic and atraumatic,   Skin: Warm and dry. No rashes or lesions  Cardiac: No peripheral  edema  Back/Flank: Not done  CNS/PNS: Normal musculature and movements, moves all extremities normally  Respiratory: Normal non-labored breathing  Abdomen: Soft nontender and nondistended  Peripheral Vascular: No peripheral edema  Mental Status/Psych: Alert and Oriented x 3. Normal mood and affect    Penis: Normal  Scrotal skin: Normal, no lesions  Testicles: Normal to palpation bilaterally  Epididymis: Normal to palpation bilaterally  Lymphatic: Normal inguinal lymph nodes  Digital Rectal Exam:     Cystoscopy: I performed flexible cystoscopy in the clinic today.  There were radiation changes to the bladder neck.  On the right side of the bladder and bladder neck there were 3-4 small papillary bladder tumors present that were contiguous with 1 another.    Imaging: None    Urinalysis: UA RESULTS:  Recent Labs   Lab Test 01/12/21  1312 11/11/20  0737 12/23/19  1518   COLOR Yellow   < > Yellow   APPEARANCE Clear   < > Clear   URINEGLC Negative   < > Negative   URINEBILI Negative   < > Negative   URINEKETONE Negative   < > Negative   SG 1.020   < > 1.015   UBLD Negative   < > Moderate*   URINEPH 6.5   < > 6.5   PROTEIN Negative   < > Negative   UROBILINOGEN 0.2   < > 0.2   NITRITE Negative   < > Negative   LEUKEST Negative   < > Small*   RBCU  --   --  O - 2   WBCU  --   --  5-10*    < > = values in this interval not displayed.       PSA: Undetectable    Post Void Residual:     Other labs: None today      IMPRESSION:  Bladder tumor    PLAN:  He has multiple small bladder tumors discovered on cystoscopy today.  These have a papillary appearance of a low-grade bladder cancer.  We discussed my findings today.  These will require transurethral resection of bladder tumor in the operating room.  We discussed the surgery in detail today along with its risks and expected recovery.  He wishes to proceed.  He will need to hold the Eliquis for 48 hours prior to the procedure.  He will likely need to be catheterized for at least 1  night after the procedure.  The procedure can likely be performed as an outpatient.  We will get him scheduled in the operating room.      Mark Pino M.D.

## 2022-10-31 NOTE — PATIENT INSTRUCTIONS

## 2022-11-07 DIAGNOSIS — I48.19 PERSISTENT ATRIAL FIBRILLATION (H): ICD-10-CM

## 2022-11-07 DIAGNOSIS — I10 BENIGN ESSENTIAL HYPERTENSION: ICD-10-CM

## 2022-11-07 RX ORDER — SPIRONOLACTONE 25 MG/1
12.5 TABLET ORAL DAILY
Qty: 45 TABLET | Refills: 3 | Status: SHIPPED | OUTPATIENT
Start: 2022-11-07 | End: 2023-12-04

## 2022-11-11 ENCOUNTER — HOSPITAL ENCOUNTER (OUTPATIENT)
Dept: ULTRASOUND IMAGING | Facility: CLINIC | Age: 87
Discharge: HOME | End: 2022-11-11
Attending: INTERNAL MEDICINE | Admitting: INTERNAL MEDICINE
Payer: COMMERCIAL

## 2022-11-11 DIAGNOSIS — I71.40 AAA (ABDOMINAL AORTIC ANEURYSM) (H): ICD-10-CM

## 2022-11-11 PROCEDURE — 76775 US EXAM ABDO BACK WALL LIM: CPT

## 2022-11-13 DIAGNOSIS — I10 HYPERTENSION GOAL BP (BLOOD PRESSURE) < 140/90: ICD-10-CM

## 2022-11-13 DIAGNOSIS — F32.0 MILD MAJOR DEPRESSION (H): ICD-10-CM

## 2022-11-15 NOTE — TELEPHONE ENCOUNTER
[facility]    Who is the name of the provider?:  Jessica    What is the location you see this provider at/preferred location?: [place]  Person calling: Amber Rodas (daughter)  Phone number:  [phone]  Nurse call back needed:  Not Applicable     Reason for call:     Informational only:    Patient has an appointment with Dr. Lopez tomorrow 11/16/22 @ 1:30pm  His daughter Amber will be coming with him.    She wanted to pass along this information in the event it is helpful for his appointment tomorrow:    [facility] and also CT scans at the [facility] in 2007 & 2008 during radiation treatments for positioning.        Pharmacy location:    Outside Imaging: YES   Can we leave a detailed message on this number?  Not Applicable

## 2022-11-16 ENCOUNTER — OFFICE VISIT (OUTPATIENT)
Dept: OTHER | Facility: CLINIC | Age: 87
End: 2022-11-16
Attending: PHYSICIAN ASSISTANT
Payer: COMMERCIAL

## 2022-11-16 ENCOUNTER — TELEPHONE (OUTPATIENT)
Dept: OTHER | Facility: CLINIC | Age: 87
End: 2022-11-16

## 2022-11-16 VITALS
WEIGHT: 238 LBS | HEIGHT: 68 IN | BODY MASS INDEX: 36.07 KG/M2 | HEART RATE: 96 BPM | DIASTOLIC BLOOD PRESSURE: 83 MMHG | OXYGEN SATURATION: 96 % | SYSTOLIC BLOOD PRESSURE: 122 MMHG

## 2022-11-16 DIAGNOSIS — I77.810 AORTIC ROOT DILATION (H): ICD-10-CM

## 2022-11-16 DIAGNOSIS — R09.89 DECREASED PEDAL PULSES: ICD-10-CM

## 2022-11-16 DIAGNOSIS — I10 BENIGN ESSENTIAL HYPERTENSION: ICD-10-CM

## 2022-11-16 DIAGNOSIS — I70.0 ATHEROSCLEROSIS OF AORTA (H): ICD-10-CM

## 2022-11-16 DIAGNOSIS — E78.5 HYPERLIPIDEMIA LDL GOAL <70: ICD-10-CM

## 2022-11-16 DIAGNOSIS — I71.43 INFRARENAL ABDOMINAL AORTIC ANEURYSM (AAA) WITHOUT RUPTURE (H): Primary | ICD-10-CM

## 2022-11-16 DIAGNOSIS — I72.3 ILIAC ARTERY ANEURYSM, BILATERAL (H): ICD-10-CM

## 2022-11-16 DIAGNOSIS — I77.810 ASCENDING AORTA DILATION (H): ICD-10-CM

## 2022-11-16 PROCEDURE — 99205 OFFICE O/P NEW HI 60 MIN: CPT | Performed by: INTERNAL MEDICINE

## 2022-11-16 PROCEDURE — 99417 PROLNG OP E/M EACH 15 MIN: CPT | Performed by: INTERNAL MEDICINE

## 2022-11-16 PROCEDURE — G0463 HOSPITAL OUTPT CLINIC VISIT: HCPCS

## 2022-11-16 NOTE — PATIENT INSTRUCTIONS
Continue current medications     Se vascular surgeon , referral done  for small saccular aneurysm eval     Go for CAT and bilateral leg arterial duplex order placed     Phone or video visit after tests done

## 2022-11-16 NOTE — TELEPHONE ENCOUNTER
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    Pt needs to be scheduled for consult with Vascular Surgery.  Will route to scheduling to coordinate an appointment at next available.    Appt note: Ref by Dr. Lopez for saccular AAA; notes and imaging in Epic.    Abigail Aviles, ASHLEYN, RN-[facility]

## 2022-11-16 NOTE — PROGRESS NOTES
[facility] INITIAL VASCULAR MEDICINE CONSULT    (New patient visit)      PRIMARY HEALTH CARE PROVIDER:  Natalya Lewis MD       REFERRING HEALTH CARE PROVIDER;  Lanny Devine PA-C      REASON FOR CONSULT: Evaluation and management of small infrarenal saccular aneurysm and also bilateral iliac artery aneurysms in a former smoker who smoked for 40 years and quit 30 years ago with multiple atherosclerotic risk factors      HPI: Nixon More is a 87 year old very pleasant male with past medical history of chronic atrial fibrillation/flutter taking DOAC, coronary artery disease, emphysema of the lungs, hypertension, hyperlipidemia, history of prostate cancer status post surgery in May 2001, bilateral lower extremity varicose veins no intervention underwent CT urogram recently which revealed small infrarenal saccular AAA measuring 1.8 x 1.6 x 2.1 cm transverse, AP and CC dimensions respectively and repeat ultrasound measurements about the same with extensive atherosclerosis of the aorta here for further evaluation and management.  Blood pressure is well controlled, lipids are well controlled.  He smoked for 40 years 1 pack daily and quit 30 years ago.  He accompanied by his daughter who is a nurse practitioner.   He is asymptomatic.    He also has a history of ascending aorta and aortic root dilatation on echo few months ago    He is new to this clinic reviewed available extensive records, imaging studies in the epic and updated chart      PAST MEDICAL HISTORY  Past Medical History:   Diagnosis Date     AAA (abdominal aortic aneurysm)      Actinic keratosis      Arthritis      Atrial fibrillation and flutter (H) 12/03/2018     CAD (coronary artery disease)     1/5/2011 lateral ischemia on stress echo, 12/2014 normal stress echo     Coronary artery disease 01/01/2011    Abnormal stress test 1/2011 and controlled with medical mgmt      Dyslipidemia      Emphysema of lung (H)       Essential hypertension, benign      Hernia, abdominal      Hypersomnia with sleep apnea, unspecified     on bipap, partially treated with residual apneas     Hypertrophy (benign) of prostate 05/2001    Biopsy 5/01 negative for cancer; PSA 5     Lumbago      Mumps      Prostate cancer (H) 12/15/2006     Skin cancer, basal cell 1997     Spider veins        CURRENT MEDICATIONS  albuterol (PROAIR HFA/PROVENTIL HFA/VENTOLIN HFA) 108 (90 Base) MCG/ACT inhaler, Inhale 2 puffs into the lungs every 4 hours as needed for shortness of breath / dyspnea or wheezing  apixaban ANTICOAGULANT (ELIQUIS) 5 MG tablet, Take 1 tablet (5 mg) by mouth 2 times daily  ASPIRIN NOT PRESCRIBED (INTENTIONAL), continuous prn for other Antiplatelet medication not prescribed intentionally due to Current anticoagulant therapy (warfarin/enoxaparin)  atorvastatin (LIPITOR) 40 MG tablet, Take 1 tablet (40 mg) by mouth daily  Cholecalciferol (VITAMIN D-3) 25 MCG (1000 UT) CAPS, Take by mouth daily  escitalopram (LEXAPRO) 10 MG tablet, Take 1 tablet (10 mg) by mouth daily  ipratropium (ATROVENT) 0.06 % nasal spray, Spray 2 sprays in nostril 4 times daily as needed for rhinitis  Loperamide HCl (IMODIUM OR), Take by mouth daily as needed  losartan (COZAAR) 100 MG tablet, Take 1 tablet (100 mg) by mouth daily  metoprolol succinate ER (TOPROL XL) 50 MG 24 hr tablet, Take 2 tablets (100 mg) by mouth daily  RISEdronate (ACTONEL) 35 MG tablet, TAKE 1 TABLET (35 MG) BY MOUTH EVERY 7 DAYS  spironolactone (ALDACTONE) 25 MG tablet, Take 0.5 tablets (12.5 mg) by mouth daily  EPINEPHrine (EPIPEN/ADRENACLICK/OR ANY BX GENERIC EQUIV) 0.3 MG/0.3ML injection 2-pack, Inject 0.3 mLs (0.3 mg) into the muscle as needed for anaphylaxis (Patient not taking: Reported on 11/16/2022)    No current facility-administered medications on file prior to visit.      PAST SURGICAL HISTORY:  Past Surgical History:   Procedure Laterality Date     HERNIA REPAIR  child     Moh's procedure  for basal cell carcinoma  2001     PROSTATE SURGERY       s/p lumbar laminectomy NOS  1988     SIGMOIDOSCOPY FLEXIBLE N/A 6/15/2020    Procedure: SIGMOIDOSCOPY, FLEXIBLE;  Surgeon: Sunni Patton MD;  Location: [facility]     VITRECTOMY PARS PLANA REMOVE PRERETINAL MEMBRANE   3/09       ALLERGIES     Allergies   Allergen Reactions     Augmentin Rash     Type III hypersensitivity     Bactrim [Sulfamethoxazole W/Trimethoprim] Rash     Serum sickness, type III hypersensitivity       Bee Venom Itching     Sulfa Drugs Hives     Celebrex [Celecoxib]      Lisinopril Cough     Naproxen Hives       FAMILY HISTORY  Family History   Problem Relation Age of Onset     Alzheimer Disease Mother      Hypertension Mother      Cardiovascular Father         D:86 complications fo CHF     Prostate Cancer Brother      Lung Cancer Brother 76     Prostate Cancer Brother        VASCULAR FAMILY HISTORY  1st order relative with atherosclerotic PAD: No  1st order relative with AAA: Yes (paternal uncle Isaak AAA , father AAA, Pat aunt Mely TAA ruptured age 70s)  Family history of Familial Hyperlipidemia No  Family History of Hypercoagulable state:No    VASCULAR RISK FACTORS  1. Diabetes:No   2. Smoking: quit smoking some time ago.  3. HTN: controlled  4.Hyperlipidemia: Yes - controlled      SOCIAL HISTORY  Social History     Socioeconomic History     Marital status:      Spouse name: Not on file     Number of children: Not on file     Years of education: Not on file     Highest education level: Not on file   Occupational History     Occupation: retired   Tobacco Use     Smoking status: Former     Packs/day: 1.00     Types: Cigarettes     Start date:      Quit date: 1978     Years since quittin.9     Smokeless tobacco: Never   Substance and Sexual Activity     Alcohol use: Never     Drug use: No     Sexual activity: Yes     Partners: Female   Other Topics Concern      Service Not Asked     Blood Transfusions Not  Asked     Caffeine Concern No     Comment: 0-2 cans of soda per day.     Occupational Exposure Not Asked     Hobby Hazards Not Asked     Sleep Concern Not Asked     Stress Concern Not Asked     Weight Concern Not Asked     Special Diet Not Asked     Back Care Not Asked     Exercise No     Bike Helmet Not Asked     Seat Belt Yes     Self-Exams Not Asked     Parent/sibling w/ CABG, MI or angioplasty before 65F 55M? No   Social History Narrative     Not on file     Social Determinants of Health     Financial Resource Strain: Not on file   Food Insecurity: Not on file   Transportation Needs: Not on file   Physical Activity: Not on file   Stress: Not on file   Social Connections: Not on file   Intimate Partner Violence: Not At Risk     Fear of Current or Ex-Partner: No     Emotionally Abused: No     Physically Abused: No     Sexually Abused: No   Housing Stability: Not on file       ROS:   General: No change in weight, sleep or appetite.  Normal energy.  No fever or chills  Eyes: Negative for vision changes or eye problems  ENT: No problems with ears, nose or throat.  No difficulty swallowing.  Resp: No coughing, wheezing or shortness of breath  CV: No chest pains or palpitations  GI: No nausea, vomiting,  heartburn, abdominal pain, diarrhea, constipation or change in bowel habits  : No urinary frequency or dysuria, bladder or kidney problems  Musculoskeletal: No significant muscle or joint pains  Neurologic: No headaches, numbness, tingling, weakness, problems with balance or coordination  Psychiatric: No problems with anxiety, depression or mental health  Heme/immune/allergy: No history of bleeding or clotting problems or anemia.  No allergies or immune system problems  Endocrine: No history of thyroid disease, diabetes or other endocrine disorders  Skin: No rashes,worrisome lesions or skin problems  Vascular:  No claudication, lifestyle limiting or otherwise; no ischemic rest pain; no non-healing ulcers. No  "weakness, No loss of sensation    Walks with walker  Left leg is larger than right leg underwent prostate cancer treatment in the past    EXAM:  /83 (BP Location: Left arm, Patient Position: Chair, Cuff Size: Adult Regular)   Pulse 96   Ht 5' 7.5\" (1.715 m)   Wt 238 lb (108 kg)   SpO2 96%   BMI 36.73 kg/m    In general, the patient is a pleasant male in no apparent distress.    HEENT: NC/AT.  PERRLA.  EOMI.  Sclerae white, not injected.  Nares clear.  Pharynx without erythema or exudate.  Dentition intact.    Neck: No adenopathy.  No thyromegaly. Carotids +2/2 bilaterally without bruits.  No jugular venous distension.   Heart: RRR. Normal S1, S2 splits physiologically. No murmur, rub, click, or gallop. The PMI is in the 5th ICS in the midclavicular line. There is no heave.    Lungs: CTA.  No ronchi, wheezes, rales.  No dullness to percussion.   Abdomen: Soft, nontender, nondistended. No organomegaly. No AAA.  No bruits.   Extremities: Vascular:  Good palpable bilateral DP pulses decreased PT pulses bilaterally  Left leg is larger than right leg but well perfused  No foot ulcers or leg ulcers  Bilateral lower extremity varicose veins CEAP 1-2     Labs:  LIPID RESULTS:  Lab Results   Component Value Date    CHOL 122 09/03/2022    CHOL 121 05/17/2021    HDL 37 (L) 09/03/2022    HDL 39 (L) 05/17/2021    LDL 65 09/03/2022    LDL 55 05/17/2021    TRIG 99 09/03/2022    TRIG 137 05/17/2021    CHOLHDLRATIO 2.8 10/15/2015       LIVER ENZYME RESULTS:  Lab Results   Component Value Date    AST 31 08/25/2021    AST 32 01/12/2021    ALT 31 08/25/2021    ALT 33 01/12/2021       CBC RESULTS:  Lab Results   Component Value Date    WBC 5.3 09/03/2022    WBC 5.6 06/18/2021    RBC 4.58 09/03/2022    RBC 4.38 (L) 06/18/2021    HGB 13.7 09/03/2022    HGB 12.8 (L) 06/18/2021    HCT 44.5 09/03/2022    HCT 41.1 06/18/2021    MCV 97 09/03/2022    MCV 94 06/18/2021    MCH 29.9 09/03/2022    MCH 29.2 06/18/2021    MCHC 30.8 (L) " 2022    MCHC 31.1 (L) 2021    RDW 14.1 2022    RDW 14.1 2021     2022     2021       BMP RESULTS:  Lab Results   Component Value Date     07/15/2022     2021    POTASSIUM 4.6 07/15/2022    POTASSIUM 4.4 2021    CHLORIDE 112 (H) 07/15/2022    CHLORIDE 109 2021    CO2 21 07/15/2022    CO2 29 2021    ANIONGAP 7 07/15/2022    ANIONGAP 4 2021     (H) 07/15/2022     (H) 2021    BUN 36 (H) 07/15/2022    BUN 28 2021    CR 1.1 10/20/2022    CR 1.33 (H) 07/15/2022    CR 1.44 (H) 2021    GFRESTIMATED >60 10/20/2022    GFRESTIMATED 44 (L) 2021    GFRESTBLACK 51 (L) 2021    MARLENE 8.8 07/15/2022    MARLENE 9.5 2021        A1C RESULTS:  Lab Results   Component Value Date    A1C 6.0 (H) 10/22/2021    A1C 5.6 2020       THYROID RESULTS:  Lab Results   Component Value Date    TSH 3.35 2021       Procedures:     [facility]  Echocardiography Laboratory  [address] [place] [address]     Name: YULISA BYRD  MRN: [id number]  : 1935  Study Date: 2022 09:43 AM  Age: 86 yrs  Gender: Male  Patient Location: [facility]  Reason For Study: Persistent atrial fibrillation, Chronic diastolic heart  failure  Ordering Physician: HIMA DE LA PAZ  Referring Physician: HIMA DE LA PAZ  Performed By: Liliana Archibald     BSA: 2.1 m2  Height: 67 in  Weight: 230 lb  HR: 66  ______________________________________________________________________________  Procedure  Complete Echo Adult. Optison (NDC #5009-1596) given intravenously.  ______________________________________________________________________________  Interpretation Summary     There is mild eccentric left ventricular hypertrophy.  The visual ejection fraction is 65-70%.  The left atrium is moderate to severely dilated.  There is mild (1+) tricuspid regurgitation.  Mild to moderate aortic root  dilatation.  The ascending aorta is Moderately to severely dilated.  Right ventricular systolic pressure is elevated, consistent with mild to  moderate pulmonary hypertension.  As compared with study from 2021, aortic dimensions and PA pressures have  increased.  The study was technically difficult. Contrast was used without apparent  complications.  ______________________________________________________________________________  Left Ventricle  The left ventricle is normal in size. There is mild eccentric left ventricular  hypertrophy. Left ventricular diastolic function is abnormal. The visual  ejection fraction is 65-70%.     Right Ventricle  The right ventricle is normal in structure, function and size.     Atria  The left atrium is moderate to severely dilated. The right atrium is mildly  dilated.     Mitral Valve  The mitral valve leaflets are mildly thickened. There is trace to mild mitral  regurgitation.     Tricuspid Valve  Normal tricuspid valve. There is mild (1+) tricuspid regurgitation. Right  ventricular systolic pressure is elevated, consistent with mild to moderate  pulmonary hypertension.     Aortic Valve  There is mild trileaflet aortic sclerosis. There is trace aortic  regurgitation.     Pulmonic Valve  Normal pulmonic valve. There is trace pulmonic valvular regurgitation.     Vessels  Mild aortic root dilatation. The ascending aorta is Moderately dilated. The  inferior vena cava is normal.     ______________________________________________________________________________  MMode/2D Measurements & Calculations  IVSd: 1.3 cm  LVIDd: 4.6 cm  LVIDs: 2.4 cm  LVPWd: 1.2 cm  FS: 48.2 %  LV mass(C)d: 220.1 grams  LV mass(C)dI: 102.6 grams/m2  Ao root diam: 4.4 cm  asc Aorta Diam: 4.5 cm     LVOT diam: 2.3 cm  LVOT area: 4.3 cm2  LA Volume (BP): 111.0 ml  LA Volume Index (BP): 51.6 ml/m2  RWT: 0.55     Doppler Measurements & Calculations  MV E max kate: 128.0 cm/sec  MV A max kate: 41.0 cm/sec  MV E/A:  3.1  MV max P.3 mmHg  MV mean P.2 mmHg  MV V2 VTI: 32.1 cm  MVA(VTI): 2.5 cm2  MV dec time: 0.13 sec  LV V1 max P.7 mmHg  LV V1 max: 82.0 cm/sec  LV V1 VTI: 18.7 cm     SV(LVOT): 80.1 ml  SI(LVOT): 37.3 ml/m2  TR max kate: 294.8 cm/sec  TR max P.8 mmHg  E/E' avg: 10.4  Lateral E/e': 8.8  Medial E/e': 12.0     ______________________________________________________________________________  Report approved by: Tigist Vallejo 2022 11:43 AM   ULTRASOUND  OF THE ABDOMINAL AORTA 2022 10:45 AM      HISTORY: Evaluate for AAA seen on CT urogram. AAA (abdominal aortic  aneurysm).     COMPARISON: CT abdomen and pelvis 10/20/2022.     FINDINGS:   Proximal abdominal aorta:  2.9 cm.   Middle abdominal aorta: 2.7 x 2.6 cm.   Distal abdominal aorta: The saccular portion of the aneurysm is  approximately 2.2 x 1.7 cm in transverse plane with craniocaudal  length of 1.5 cm. This is stable compared to 10/22/2022. The maximal  AP length of the distal abdominal aorta including the aneurysm is 3.6  cm on image 14, also stable.     Right common iliac artery: 1.5 x 1.6 cm.  Left common iliac artery: 1.8 x 1.6 cm.                                                                      IMPRESSION:    1. Stable saccular aneurysm off of the distal abdominal aorta with the  aneurysm measuring up to 2.2 cm in transverse plane. The total size of  the distal abdominal aorta including the aneurysm is 3.6 cm, stable.  2. Borderline aneurysmal sizes of the right common iliac artery  measuring 1.6 cm, and left common iliac artery measuring 1.8 cm,  stable.      BRENDA JOSUE MD      ADDENDUM: There is anterior saccular aneurysm of the infrarenal  abdominal aorta measuring 1.8 x 1.6 x 2.1 cm in transverse, AP and CC  dimensions, respectively.     VANITA RAMIREZ MD         SYSTEM ID:  [id number]   Addended by Vanita Ramirez MD on 10/26/2022  1:08 PM     Study Result    Narrative & Impression   CT UROGRAM WITHOUT AND WITH   CONTRAST   10/20/2022 11:27 AM      HISTORY: Gross hematuria, worsening. Prostate cancer (H).      TECHNIQUE: Multiphasic CT abdomen and pelvis was performed before and  after injection of 90mL Isovue-370 intravenously. Radiation dose for  this scan was reduced using automated exposure control, adjustment of  the mA and/or kV according to patient size, or iterative  reconstruction technique.     COMPARISON: None available     FINDINGS:  Lower chest: Mild basilar pulmonary opacities, likely atelectasis.  Mild cardiomegaly.     Right kidney: No radiodense kidney/ureteral stones, filling defect  within the renal collecting system or hydronephrosis. 5.1 cm renal  cyst at the lower pole of the right kidney. A few subcentimeter  hyperdense foci in the kidney are too small to characterize.     Left kidney: No radiodense kidney/ureteral stone, filling defect  within the renal collecting system nor hydronephrosis. Multiple  subcentimeter hyperdense foci in the kidneys, are too small to  characterize.     Urinary bladder: Mild diffuse urinary bladder wall thickening,  nonspecific, can be seen with chronic bladder obstruction or chronic  cholecystitis. No evidence of filling defect within the urinary  bladder.     Remainder of the abdomen and pelvis:     Hepatobiliary: No suspicious focal hepatic lesion. Cholelithiasis  without CT evidence of acute cholecystitis.     Pancreas: No main pancreatic ductal dilatation or definite solid  pancreatic mass.     Spleen: No splenomegaly.     Adrenal glands: No adrenal nodules.     Bowels: No abnormally dilated bowel loops. The appendix is visualized  and appears normal.     Peritoneum: No significant free fluid in the abdomen or pelvis. No  free peritoneal or portal venous gas.     Pelvic organs: Prostatic fiducial are noted.     Vascular: Moderate atherosclerotic vascular calcification of the  abdominal aorta and iliac vessels.     Lymph nodes: There is approximately 1 cm short axis  left periaortic  node, not significantly changed as compared to 10/28/2010 exam,  indeterminate, could be reactive.     Bones and soft tissue: Multilevel degenerative changes of the spine.  No suspicious osseous lesion.                                                                      IMPRESSION:   1. No radiodense kidney/ureteral stone, filling defect within the  renal collecting system or hydronephrosis in either kidney.  2. Mild diffuse urinary bladder wall thickening, nonspecific, can be  seen with chronic bladder outlet obstruction or chronic cystitis.  3. Cholelithiasis without CT evidence of acute cholecystitis.  4. Small saccular aneurysm of the infrarenal abdominal aorta.     TAMIKA SULLIVAN MD                 Assessment and Plan:     1. Infrarenal abdominal aortic aneurysm (AAA) without rupture, sacular.1.8 x 1.6 x 2.1 cm in transverse, AP and CC  2. Iliac artery aneurysm, bilateral (H) RT 1.6 and left 1.8 11/11/2022      This is a very pleasant 87-year-old male looks much younger than stated age underwent CT urogram recently which revealed infrarenal abdominal aortic aneurysm small saccular type and also has a strong family history of multiple first and second-degree relatives with aneurysms.  He is a former smoker quit many years ago smoked 1 pack cigarettes daily for 40 years.  He denies any abdominal pain or back pain.   Reviewed recent CT and abdominal ultrasound as delineated above  Extensive atherosclerosis with small saccular aneurysm infrarenal along with bilateral iliac artery aneurysms small.  Given family history of rupture of aneurysm and also saccular type even though it is small will ask vascular surgery service to evaluate this patient he lives near [place] and wanted to see the vascular surgeon there.    Optimize risk factors  Currently taking statin, DOAC, losartan and metoprolol continue the same  Walk as much as he can  We will arrange referral to see the vascular surgeon at  [facility]  Arrange bilateral lower extremity arterial duplex to evaluate peripheral aneurysms (15 to 20% of the patients with AAA tend to have popliteal artery aneurysms)  CAT with exercise due to decreased PT pulses.    Video or phone visit 1 week after completion of the tests    Given ascending aorta dilatation and aortic root dilatation and AAA Will plan for CTA of the chest abdomen pelvis in the near future after vascular surgery evaluation    - Vascular Surgery Referral  - Vascular Surgery Referral; Future  - US Lower Extremity Arterial Duplex Bilateral; Future  - US CAT Doppler with Exercise Bilateral; Future            3. Benign essential hypertension  Well-controlled with current medications continue the same    4. Hyperlipidemia LDL goal <70  Well-controlled with current medications continue the same    5. Atherosclerosis of aorta (H)  Aggressively control the risk factors fortunately he quit smoking long ago blood pressure well controlled lipids are well controlled A1c excellent range  Therapeutic lifestyle modification suggested he is taking anticoagulation for other reasons continue the same    6. Aortic root dilation (H)    7. Ascending aorta dilation (H)    Asymptomatic Will plan for CTA of the chest abdomen pelvis in the near future after the vascular surgery evaluation      8. Decreased pedal pulses  Given history of a AAA, iliac artery aneurysms and decreased pedal pulses specifically PT pulses I will arrange CAT with exercise if not TBI along with bilateral lower extremity arterial duplex to rule out any other peripheral aneurysms.    - US Lower Extremity Arterial Duplex Bilateral; Future  - US CAT Doppler with Exercise Bilateral; Future    90 minutes spent on the date of the encounter doing chart review, history and exam, documentation, and further activities as noted above.  Prolonged services due to medical complexity and he is new to this clinic reviewed available extensive records in the  epic and updated chart.  AVS with written instructions given he and his daughter had a lot of questions and all of them were answered    Thank you for the consultation !  This note was dictated by utilizing Dragon software    Copy of this note to primary care physician and referring provider    Braydon Lopez MD, FAHA, FSVM, FNLA, FACP  Vascular Medicine  Clinical Hypertension specialist  Clinical Lipidologist

## 2022-11-16 NOTE — PROGRESS NOTES
"Patient is here to discuss consult.    /83 (BP Location: Left arm, Patient Position: Chair, Cuff Size: Adult Regular)   Pulse 96   Ht 5' 7.5\" (1.715 m)   Wt 238 lb (108 kg)   SpO2 96%   BMI 36.73 kg/m      Questions patient would like addressed today are: N/A.    Refills are needed: N/A    Has homecare services and agency name:  [facility]  "

## 2022-11-17 ENCOUNTER — TELEPHONE (OUTPATIENT)
Dept: OTHER | Facility: CLINIC | Age: 87
End: 2022-11-17

## 2022-11-17 RX ORDER — ESCITALOPRAM OXALATE 10 MG/1
TABLET ORAL
Qty: 90 TABLET | Refills: 0 | Status: SHIPPED | OUTPATIENT
Start: 2022-11-17 | End: 2023-05-08

## 2022-11-17 RX ORDER — LOSARTAN POTASSIUM 100 MG/1
TABLET ORAL
Qty: 90 TABLET | Refills: 0 | Status: SHIPPED | OUTPATIENT
Start: 2022-11-17 | End: 2023-03-20

## 2022-11-17 NOTE — TELEPHONE ENCOUNTER
Routing refill request to provider for review/approval because:  Drug interaction warning  Labs not current:  CR  Outdated PHQ9    Kavitha Richey RN on 11/17/2022 at 10:31 AM

## 2022-11-17 NOTE — TELEPHONE ENCOUNTER
Patient is scheduled for his Ultrasounds on 12/02/22, Surgery Consult with Dr Fernández (both in [place]) 12/07/22 and follow up Video Visit with Dr Lopez on 12/08/22.

## 2022-11-17 NOTE — TELEPHONE ENCOUNTER
Patient wasn't by his calendar so he couldn't see when he other procedure was scheduled for. He will call us when he is ready to schedule his Consult Appointment.

## 2022-11-17 NOTE — TELEPHONE ENCOUNTER
"As  was inquiring if imaging needed prior to vasc sx AAA consult.     Please be sure to schedule both vas sx consult with imaging prior and then f/u appt with Dr. Lopez 1 week after imaging obtained.     Dr. Lopez noted:  \"We will arrange referral to see the vascular surgeon at [facility] [address]  Arrange bilateral lower extremity arterial duplex to evaluate peripheral aneurysms (15 to 20% of the patients with AAA tend to have popliteal artery aneurysms)  CAT with exercise due to decreased PT pulses.\"     Video or phone visit 1 week after completion of the tests     Routing to scheduling to coordinate bilateral lower extremity arterial duplex and CAT with exercise (PRIOR TO CONSULT WITH VASCULAR SURGERY),     then 1 week after imaging a video or phone visit with Dr. Lopez.      Appt note: f/u to 11/17/22, tele/vidieo visit to discuss results of bilateral LE arterial U/S and CAT with exercise.        MEGAN James, RN  [facility]  Office:  [phone] Fax: [phone]  "

## 2022-11-26 ENCOUNTER — HEALTH MAINTENANCE LETTER (OUTPATIENT)
Age: 87
End: 2022-11-26

## 2022-11-30 SDOH — ECONOMIC STABILITY: TRANSPORTATION INSECURITY
IN THE PAST 12 MONTHS, HAS THE LACK OF TRANSPORTATION KEPT YOU FROM MEDICAL APPOINTMENTS OR FROM GETTING MEDICATIONS?: NO

## 2022-11-30 SDOH — ECONOMIC STABILITY: INCOME INSECURITY: IN THE LAST 12 MONTHS, WAS THERE A TIME WHEN YOU WERE NOT ABLE TO PAY THE MORTGAGE OR RENT ON TIME?: NO

## 2022-11-30 SDOH — HEALTH STABILITY: PHYSICAL HEALTH: ON AVERAGE, HOW MANY DAYS PER WEEK DO YOU ENGAGE IN MODERATE TO STRENUOUS EXERCISE (LIKE A BRISK WALK)?: 1 DAY

## 2022-11-30 SDOH — HEALTH STABILITY: PHYSICAL HEALTH: ON AVERAGE, HOW MANY MINUTES DO YOU ENGAGE IN EXERCISE AT THIS LEVEL?: 10 MIN

## 2022-11-30 SDOH — ECONOMIC STABILITY: TRANSPORTATION INSECURITY
IN THE PAST 12 MONTHS, HAS LACK OF TRANSPORTATION KEPT YOU FROM MEETINGS, WORK, OR FROM GETTING THINGS NEEDED FOR DAILY LIVING?: NO

## 2022-11-30 SDOH — ECONOMIC STABILITY: FOOD INSECURITY: WITHIN THE PAST 12 MONTHS, THE FOOD YOU BOUGHT JUST DIDN'T LAST AND YOU DIDN'T HAVE MONEY TO GET MORE.: NEVER TRUE

## 2022-11-30 SDOH — ECONOMIC STABILITY: INCOME INSECURITY: HOW HARD IS IT FOR YOU TO PAY FOR THE VERY BASICS LIKE FOOD, HOUSING, MEDICAL CARE, AND HEATING?: NOT HARD AT ALL

## 2022-11-30 SDOH — ECONOMIC STABILITY: FOOD INSECURITY: WITHIN THE PAST 12 MONTHS, YOU WORRIED THAT YOUR FOOD WOULD RUN OUT BEFORE YOU GOT MONEY TO BUY MORE.: NEVER TRUE

## 2022-11-30 ASSESSMENT — SOCIAL DETERMINANTS OF HEALTH (SDOH)
HOW OFTEN DO YOU ATTEND CHURCH OR RELIGIOUS SERVICES?: MORE THAN 4 TIMES PER YEAR
IN A TYPICAL WEEK, HOW MANY TIMES DO YOU TALK ON THE PHONE WITH FAMILY, FRIENDS, OR NEIGHBORS?: THREE TIMES A WEEK
DO YOU BELONG TO ANY CLUBS OR ORGANIZATIONS SUCH AS CHURCH GROUPS UNIONS, FRATERNAL OR ATHLETIC GROUPS, OR SCHOOL GROUPS?: YES
HOW OFTEN DO YOU GET TOGETHER WITH FRIENDS OR RELATIVES?: TWICE A WEEK

## 2022-11-30 ASSESSMENT — LIFESTYLE VARIABLES
AUDIT-C TOTAL SCORE: 1
SKIP TO QUESTIONS 9-10: 1
HOW OFTEN DO YOU HAVE SIX OR MORE DRINKS ON ONE OCCASION: NEVER
HOW OFTEN DO YOU HAVE A DRINK CONTAINING ALCOHOL: MONTHLY OR LESS
HOW MANY STANDARD DRINKS CONTAINING ALCOHOL DO YOU HAVE ON A TYPICAL DAY: 1 OR 2

## 2022-12-02 ENCOUNTER — HOSPITAL ENCOUNTER (OUTPATIENT)
Dept: ULTRASOUND IMAGING | Facility: CLINIC | Age: 87
Discharge: HOME | End: 2022-12-02
Attending: INTERNAL MEDICINE
Payer: COMMERCIAL

## 2022-12-02 DIAGNOSIS — I72.3 ILIAC ARTERY ANEURYSM, BILATERAL (H): ICD-10-CM

## 2022-12-02 DIAGNOSIS — I71.43 INFRARENAL ABDOMINAL AORTIC ANEURYSM (AAA) WITHOUT RUPTURE (H): ICD-10-CM

## 2022-12-02 DIAGNOSIS — R09.89 DECREASED PEDAL PULSES: ICD-10-CM

## 2022-12-02 PROCEDURE — 93924 LWR XTR VASC STDY BILAT: CPT

## 2022-12-02 PROCEDURE — 93925 LOWER EXTREMITY STUDY: CPT

## 2022-12-04 ENCOUNTER — HOSPITAL ENCOUNTER (EMERGENCY)
Facility: CLINIC | Age: 87
Discharge: HOME | End: 2022-12-04
Attending: EMERGENCY MEDICINE | Admitting: EMERGENCY MEDICINE
Payer: COMMERCIAL

## 2022-12-04 ENCOUNTER — APPOINTMENT (OUTPATIENT)
Dept: GENERAL RADIOLOGY | Facility: CLINIC | Age: 87
End: 2022-12-04
Attending: EMERGENCY MEDICINE
Payer: COMMERCIAL

## 2022-12-04 ENCOUNTER — OFFICE VISIT (OUTPATIENT)
Dept: URGENT CARE | Facility: URGENT CARE | Age: 87
End: 2022-12-04
Payer: COMMERCIAL

## 2022-12-04 VITALS
TEMPERATURE: 99.3 F | HEART RATE: 117 BPM | OXYGEN SATURATION: 96 % | SYSTOLIC BLOOD PRESSURE: 116 MMHG | DIASTOLIC BLOOD PRESSURE: 70 MMHG

## 2022-12-04 VITALS
OXYGEN SATURATION: 95 % | TEMPERATURE: 97.6 F | SYSTOLIC BLOOD PRESSURE: 128 MMHG | DIASTOLIC BLOOD PRESSURE: 94 MMHG | HEART RATE: 112 BPM | RESPIRATION RATE: 14 BRPM

## 2022-12-04 DIAGNOSIS — I48.91 ATRIAL FIBRILLATION WITH RAPID VENTRICULAR RESPONSE (H): ICD-10-CM

## 2022-12-04 DIAGNOSIS — R53.83 FATIGUE, UNSPECIFIED TYPE: ICD-10-CM

## 2022-12-04 DIAGNOSIS — R68.89 FLU-LIKE SYMPTOMS: Primary | ICD-10-CM

## 2022-12-04 DIAGNOSIS — U07.1 INFECTION DUE TO 2019 NOVEL CORONAVIRUS: ICD-10-CM

## 2022-12-04 DIAGNOSIS — J02.9 ACUTE SORE THROAT: ICD-10-CM

## 2022-12-04 LAB
ANION GAP SERPL CALCULATED.3IONS-SCNC: 12 MMOL/L (ref 7–15)
BASOPHILS # BLD AUTO: 0.1 10E3/UL (ref 0–0.2)
BASOPHILS NFR BLD AUTO: 1 %
BUN SERPL-MCNC: 22.9 MG/DL (ref 8–23)
CALCIUM SERPL-MCNC: 9.5 MG/DL (ref 8.8–10.2)
CHLORIDE SERPL-SCNC: 102 MMOL/L (ref 98–107)
CREAT SERPL-MCNC: 1.12 MG/DL (ref 0.67–1.17)
DEPRECATED HCO3 PLAS-SCNC: 23 MMOL/L (ref 22–29)
DEPRECATED S PYO AG THROAT QL EIA: NEGATIVE
EOSINOPHIL # BLD AUTO: 0.1 10E3/UL (ref 0–0.7)
EOSINOPHIL NFR BLD AUTO: 1 %
ERYTHROCYTE [DISTWIDTH] IN BLOOD BY AUTOMATED COUNT: 14.6 % (ref 10–15)
FLUAV AG SPEC QL IA: NEGATIVE
FLUAV RNA SPEC QL NAA+PROBE: NEGATIVE
FLUBV AG SPEC QL IA: NEGATIVE
FLUBV RNA RESP QL NAA+PROBE: NEGATIVE
GFR SERPL CREATININE-BSD FRML MDRD: 64 ML/MIN/1.73M2
GLUCOSE SERPL-MCNC: 127 MG/DL (ref 70–99)
GROUP A STREP BY PCR: NOT DETECTED
HCT VFR BLD AUTO: 46 % (ref 40–53)
HGB BLD-MCNC: 13.9 G/DL (ref 13.3–17.7)
HOLD SPECIMEN: NORMAL
IMM GRANULOCYTES # BLD: 0 10E3/UL
IMM GRANULOCYTES NFR BLD: 0 %
LYMPHOCYTES # BLD AUTO: 1 10E3/UL (ref 0.8–5.3)
LYMPHOCYTES NFR BLD AUTO: 13 %
MCH RBC QN AUTO: 28.7 PG (ref 26.5–33)
MCHC RBC AUTO-ENTMCNC: 30.2 G/DL (ref 31.5–36.5)
MCV RBC AUTO: 95 FL (ref 78–100)
MONOCYTES # BLD AUTO: 1 10E3/UL (ref 0–1.3)
MONOCYTES NFR BLD AUTO: 13 %
NEUTROPHILS # BLD AUTO: 5.8 10E3/UL (ref 1.6–8.3)
NEUTROPHILS NFR BLD AUTO: 72 %
NRBC # BLD AUTO: 0 10E3/UL
NRBC BLD AUTO-RTO: 0 /100
PLATELET # BLD AUTO: 163 10E3/UL (ref 150–450)
POTASSIUM SERPL-SCNC: 4.5 MMOL/L (ref 3.4–5.3)
RBC # BLD AUTO: 4.85 10E6/UL (ref 4.4–5.9)
RSV RNA SPEC NAA+PROBE: NEGATIVE
SARS-COV-2 RNA RESP QL NAA+PROBE: POSITIVE
SODIUM SERPL-SCNC: 137 MMOL/L (ref 136–145)
TROPONIN T SERPL HS-MCNC: 19 NG/L
WBC # BLD AUTO: 8 10E3/UL (ref 4–11)

## 2022-12-04 PROCEDURE — 80048 BASIC METABOLIC PNL TOTAL CA: CPT | Performed by: EMERGENCY MEDICINE

## 2022-12-04 PROCEDURE — 93005 ELECTROCARDIOGRAM TRACING: CPT

## 2022-12-04 PROCEDURE — 87651 STREP A DNA AMP PROBE: CPT | Performed by: FAMILY MEDICINE

## 2022-12-04 PROCEDURE — 71046 X-RAY EXAM CHEST 2 VIEWS: CPT

## 2022-12-04 PROCEDURE — 36415 COLL VENOUS BLD VENIPUNCTURE: CPT | Performed by: EMERGENCY MEDICINE

## 2022-12-04 PROCEDURE — 96361 HYDRATE IV INFUSION ADD-ON: CPT

## 2022-12-04 PROCEDURE — 96374 THER/PROPH/DIAG INJ IV PUSH: CPT

## 2022-12-04 PROCEDURE — U0003 INFECTIOUS AGENT DETECTION BY NUCLEIC ACID (DNA OR RNA); SEVERE ACUTE RESPIRATORY SYNDROME CORONAVIRUS 2 (SARS-COV-2) (CORONAVIRUS DISEASE [COVID-19]), AMPLIFIED PROBE TECHNIQUE, MAKING USE OF HIGH THROUGHPUT TECHNOLOGIES AS DESCRIBED BY CMS-2020-01-R: HCPCS | Performed by: FAMILY MEDICINE

## 2022-12-04 PROCEDURE — 84484 ASSAY OF TROPONIN QUANT: CPT | Performed by: EMERGENCY MEDICINE

## 2022-12-04 PROCEDURE — 85025 COMPLETE CBC W/AUTO DIFF WBC: CPT | Performed by: EMERGENCY MEDICINE

## 2022-12-04 PROCEDURE — 99215 OFFICE O/P EST HI 40 MIN: CPT | Mod: CS | Performed by: FAMILY MEDICINE

## 2022-12-04 PROCEDURE — 99285 EMERGENCY DEPT VISIT HI MDM: CPT | Mod: CS,25

## 2022-12-04 PROCEDURE — 250N000011 HC RX IP 250 OP 636: Performed by: EMERGENCY MEDICINE

## 2022-12-04 PROCEDURE — 87804 INFLUENZA ASSAY W/OPTIC: CPT

## 2022-12-04 PROCEDURE — U0005 INFEC AGEN DETEC AMPLI PROBE: HCPCS | Performed by: FAMILY MEDICINE

## 2022-12-04 PROCEDURE — 87637 SARSCOV2&INF A&B&RSV AMP PRB: CPT | Performed by: EMERGENCY MEDICINE

## 2022-12-04 PROCEDURE — C9803 HOPD COVID-19 SPEC COLLECT: HCPCS

## 2022-12-04 PROCEDURE — 258N000003 HC RX IP 258 OP 636: Performed by: EMERGENCY MEDICINE

## 2022-12-04 RX ORDER — ONDANSETRON 2 MG/ML
4 INJECTION INTRAMUSCULAR; INTRAVENOUS ONCE
Status: COMPLETED | OUTPATIENT
Start: 2022-12-04 | End: 2022-12-04

## 2022-12-04 RX ORDER — ONDANSETRON 4 MG/1
4 TABLET, ORALLY DISINTEGRATING ORAL EVERY 6 HOURS PRN
Qty: 10 TABLET | Refills: 0 | Status: SHIPPED | OUTPATIENT
Start: 2022-12-04 | End: 2022-12-07

## 2022-12-04 RX ADMIN — SODIUM CHLORIDE 1000 ML: 9 INJECTION, SOLUTION INTRAVENOUS at 14:13

## 2022-12-04 RX ADMIN — ONDANSETRON 4 MG: 2 INJECTION INTRAMUSCULAR; INTRAVENOUS at 14:59

## 2022-12-04 ASSESSMENT — ACTIVITIES OF DAILY LIVING (ADL)
ADLS_ACUITY_SCORE: 35
ADLS_ACUITY_SCORE: 35

## 2022-12-04 ASSESSMENT — ENCOUNTER SYMPTOMS
VOMITING: 1
NAUSEA: 1
SHORTNESS OF BREATH: 0
FATIGUE: 1
SORE THROAT: 1
COUGH: 1

## 2022-12-04 NOTE — ED TRIAGE NOTES
Pt presents to ED with fatigue and elevated HR. Pt was seen at  for a mild sore throat, tested for influenza and strep at  which were negative . Sent here due to elevated HR. Hx of Afib. Denies chest pain or sob.      Triage Assessment     Row Name 12/04/22 1208       Triage Assessment (Adult)    Airway WDL WDL

## 2022-12-04 NOTE — PROGRESS NOTES
SUBJECTIVE:   Nixon More is a 87 year old male with a history of atrial fibrillation and flutter, coronary artery disease, abdominal aortic aneurysm, essential hypertension.  Patient is presenting with a chief complaint of mild sore throat starting two nights ago and currently improving.  He also complains of vomiting (twice so far without flood) since yesterday evening and fatigue since two nights ago.    No diarrhea  He is currently on Eliquis.  No new medications recently.      No fevers.    No shortness of breath.    No loss of smell/taste  His grandson recently had flu one week ago.    No nasal congestion  No cough  No runny nose  No chest pain  No new diarrhea  No abdominal pain  No bluish lips/toes/fingers  No increased swelling in the legs/feet.      Treatment measures tried include none. .  Predisposing factors include none    Patient is up to date with his COVID-19 vaccines, including the three boosters  No known COVID-19 exposure.      .    Past Medical History:   Diagnosis Date     AAA (abdominal aortic aneurysm)      Actinic keratosis      Arthritis      Atrial fibrillation and flutter (H) 12/03/2018     CAD (coronary artery disease)     1/5/2011 lateral ischemia on stress echo, 12/2014 normal stress echo     Coronary artery disease 01/01/2011    Abnormal stress test 1/2011 and controlled with medical mgmt      Dyslipidemia      Emphysema of lung (H)      Essential hypertension, benign      Hernia, abdominal      Hypersomnia with sleep apnea, unspecified     on bipap, partially treated with residual apneas     Hypertrophy (benign) of prostate 05/2001    Biopsy 5/01 negative for cancer; PSA 5     Lumbago      Mumps      Prostate cancer (H) 12/15/2006     Skin cancer, basal cell 1997     Spider veins      Current Outpatient Medications   Medication Sig Dispense Refill     albuterol (PROAIR HFA/PROVENTIL HFA/VENTOLIN HFA) 108 (90 Base) MCG/ACT inhaler Inhale 2 puffs into the lungs every 4 hours as  needed for shortness of breath / dyspnea or wheezing 8 g 0     apixaban ANTICOAGULANT (ELIQUIS) 5 MG tablet Take 1 tablet (5 mg) by mouth 2 times daily 180 tablet 3     ASPIRIN NOT PRESCRIBED (INTENTIONAL) continuous prn for other Antiplatelet medication not prescribed intentionally due to Current anticoagulant therapy (warfarin/enoxaparin)       atorvastatin (LIPITOR) 40 MG tablet Take 1 tablet (40 mg) by mouth daily 90 tablet 3     Cholecalciferol (VITAMIN D-3) 25 MCG (1000 UT) CAPS Take by mouth daily       escitalopram (LEXAPRO) 10 MG tablet TAKE 1 TABLET BY MOUTH EVERY DAY 90 tablet 0     ipratropium (ATROVENT) 0.06 % nasal spray Spray 2 sprays in nostril 4 times daily as needed for rhinitis 3 Box 3     Loperamide HCl (IMODIUM OR) Take by mouth daily as needed       losartan (COZAAR) 100 MG tablet TAKE 1 TABLET BY MOUTH EVERY DAY 90 tablet 0     metoprolol succinate ER (TOPROL XL) 50 MG 24 hr tablet Take 2 tablets (100 mg) by mouth daily 180 tablet 3     RISEdronate (ACTONEL) 35 MG tablet TAKE 1 TABLET (35 MG) BY MOUTH EVERY 7 DAYS 12 tablet 3     spironolactone (ALDACTONE) 25 MG tablet Take 0.5 tablets (12.5 mg) by mouth daily 45 tablet 3     Social History     Tobacco Use     Smoking status: Former     Packs/day: 1.00     Types: Cigarettes     Start date:      Quit date: 1978     Years since quittin.9     Smokeless tobacco: Never   Substance Use Topics     Alcohol use: Never       ROS:  CONSTITUTIONAL:positive for fatigue.    ENT/MOUTH:  Positive for recent sore throat .      OBJECTIVE:  /70   Pulse (!) 124   Temp 99.3  F (37.4  C) (Tympanic)   SpO2 96%  The heart rate has ranged between 96 to 110 to 117 to 124 beats per minute.    GENERAL APPEARANCE: healthy, alert and no distress.  Patient can speak in full sentences without difficulty.   Patient looks mildly fatigued but is alert.    HENT: mouth:  Moist.  Oropharynx is mildly erythematous.    NECK: supple, nontender, no  lymphadenopathy  RESP: lungs clear to auscultation - no rales, rhonchi or wheezes  CV: irregularly irregular rhythm with tachycardia present.    Extremities: no edema is present in the ankles and lower legs.    SKIN: no cyanosis/pallor.      LABS:    Results for orders placed or performed in visit on 12/04/22   Influenza A & B Antigen - Clinic Collect     Status: Normal    Specimen: Nose; Swab   Result Value Ref Range    Influenza A antigen Negative Negative    Influenza B antigen Negative Negative    Narrative    Test results must be correlated with clinical data. If necessary, results should be confirmed by a molecular assay or viral culture.   Streptococcus A Rapid Screen w/Reflex to PCR - Clinic Collect     Status: Normal    Specimen: Throat; Swab   Result Value Ref Range    Group A Strep antigen Negative Negative         ASSESSMENT:  Acute Sore Throat  Fatigue  Atrial Fibrillation with rapid ventricular response.      PLAN:  Patient will go to the emergency room (via private vehicle) at the [facility] emergency room for further evaluation of the fatigue and tachycardia.      Pending labs:  Strep PCR Test    Herber Tejeda MD

## 2022-12-04 NOTE — DISCHARGE INSTRUCTIONS

## 2022-12-04 NOTE — ED PROVIDER NOTES
History   Chief Complaint:  Fatigue       The history is provided by the patient.      Nixon More is a 87 year old male with history of CKD, hypertension, and atrial fibrillation and flutter who presents with fatigue, vomiting, cough, and sore throat. He got a sore throat 3 days ago. This morning he vomited and has been nauseous since. He reported to urgent care earlier today and was negative for strep and influenza. They were concerned about his fast heart rate and advised him to report to the ED. He has not eaten today. He denies chest pain or shortness of breath.     Review of Systems   Constitutional: Positive for fatigue.   HENT: Positive for sore throat.    Respiratory: Positive for cough. Negative for shortness of breath.    Cardiovascular: Negative for chest pain.   Gastrointestinal: Positive for nausea and vomiting.   All other systems reviewed and are negative.    Allergies:  Augmentin  Bactrim [Sulfamethoxazole W/Trimethoprim]  Bee Venom  Sulfa Drugs  Celebrex [Celecoxib]  Lisinopril  Naproxen    Medications:  Albuterol  Eliquis  Lipitor  Lexapro  Cozaar  Toprol   Actonel   Aldactone    Past Medical History:     Chronic diastolic heart failure  Angina pectoris  Depression  CKD  Dilated aortic root  Atrial fibrillation and flutter  Spinal stenosis of lumbar region with neurogenic claudication  Hypertension  CAD  Malignant neoplasm of prostate  Hypersomnia with sleep apnea  Lumbago     Past Surgical History:    Hernia repair  Moh's procedure for basal cell carcinoma  Prostate surgery  Lumbar laminectomy  Sigmoidoscopy flexible   Vitrectomy pars plana remove periretinal      Family History:    Mother: alzheimer disease, hypertension  Father: CHF  Brother: prostate cancer, lung cancer    Social History:  The patient presents to the ED alone.   PCP: Natalya Lewis     Physical Exam     Patient Vitals for the past 24 hrs:   BP Temp Temp src Pulse Resp SpO2   12/04/22 1600 (!) 125/101 -- -- 95 28 95 %    12/04/22 1207 (!) 155/107 97.6  F (36.4  C) Temporal (!) 132 20 94 %       Physical Exam  Nursing note and vitals reviewed.  HENT:   Mouth/Throat: Moist mucous membranes.   Eyes: EOMI, nonicteric sclera  Cardiovascular: tachycardic, irregularly irregular rhythm, no murmurs, rubs, or gallops  Pulmonary/Chest: Effort normal and breath sounds normal. No respiratory distress. No wheezes. No rales.   Abdominal: Soft. Nontender, nondistended, no guarding or rigidity.   Musculoskeletal: Normal range of motion.   Neurological: Alert. Moves all extremities spontaneously.   Skin: Skin is warm and dry. No rash noted.   Psychiatric: Normal mood and affect.     Emergency Department Course   ECG  ECG taken at 1216, ECG read at 1600  Atrial flutter with variable AV block  Possible inferior infarct, age undetermined  Abnormal ECG   Rate 116 bpm. NE interval * ms. QRS duration 72 ms. QT/QTc 324/450 ms. P-R-T axes * 12 10.     Imaging:  Chest XR,  PA & LAT   Final Result   IMPRESSION: Negative chest.        Imaging independently reviewed and agree with radiologist interpretation.     Laboratory:  Labs Ordered and Resulted from Time of ED Arrival to Time of ED Departure   BASIC METABOLIC PANEL - Abnormal       Result Value    Sodium 137      Potassium 4.5      Chloride 102      Carbon Dioxide (CO2) 23      Anion Gap 12      Urea Nitrogen 22.9      Creatinine 1.12      Calcium 9.5      Glucose 127 (*)     GFR Estimate 64     CBC WITH PLATELETS AND DIFFERENTIAL - Abnormal    WBC Count 8.0      RBC Count 4.85      Hemoglobin 13.9      Hematocrit 46.0      MCV 95      MCH 28.7      MCHC 30.2 (*)     RDW 14.6      Platelet Count 163      % Neutrophils 72      % Lymphocytes 13      % Monocytes 13      % Eosinophils 1      % Basophils 1      % Immature Granulocytes 0      NRBCs per 100 WBC 0      Absolute Neutrophils 5.8      Absolute Lymphocytes 1.0      Absolute Monocytes 1.0      Absolute Eosinophils 0.1      Absolute Basophils 0.1       Absolute Immature Granulocytes 0.0      Absolute NRBCs 0.0     INFLUENZA A/B & SARS-COV2 PCR MULTIPLEX - Abnormal    Influenza A PCR Negative      Influenza B PCR Negative      RSV PCR Negative      SARS CoV2 PCR Positive (*)    TROPONIN T, HIGH SENSITIVITY - Normal    Troponin T, High Sensitivity 19       Emergency Department Course:    Reviewed:  I reviewed nursing notes, vitals, past medical history and Care Everywhere    Assessments:  1355 I obtained history and examined the patient as noted above.   1630 I rechecked the patient and explained findings.     Interventions:  1413 NS 1000 mL IV  1459 Zofran 4 mg IV    Disposition:  The patient was discharged to home.     Impression & Plan     Medical Decision Making:  Patient presents with complaints of vague symptoms consistent with possible viral etiology.  He has tested positive for COVID-19 which certainly explains his symptoms.  He was referred here from urgent care due to tachycardia.  While he is in A. fib, he does have mild tachycardia that did resolve with IV fluid administration.  There is no evidence of complication of COVID-19 such as PE, COVID-pneumonia, myocarditis.  Patient feels symptomatically improved after ED interventions and wishes for discharge home.  We discussed use of antiviral medications for treatment of COVID-19.  Paxlovid would have too many interactions, especially with his blood thinner.  I therefore decided to use molnupiravir after discussion of the risks and benefits with patient.  I discussed with his daughter via speaker phone who is a nurse practitioner who voices understanding and agreement with plan.  All questions answered.  Patient discharged in stable condition.  Reasons to return discussed.    Diagnosis:    ICD-10-CM    1. Infection due to 2019 novel coronavirus  U07.1           Discharge Medications:  Discharge Medication List as of 12/4/2022  4:51 PM      START taking these medications    Details   molnupiravir (LAGEVRIO)  200 MG capsule Take 4 capsules (800 mg) by mouth every 12 hours for 5 days, Disp-40 capsule, R-0, E-PrescribeDate of symptom onset: 12/2/22 ; High risk for prog to severe Covid-19: Yes; Positive Covid-19 test: Yes      ondansetron (ZOFRAN ODT) 4 MG ODT tab Take 1 tablet (4 mg) by mouth every 6 hours as needed for nausea or vomiting, Disp-10 tablet, R-0, Local Print             Scribe Disclosure:  I, ANDREW KELSEY, am serving as a scribe at 1:51 PM on 12/4/2022 to document services personally performed by Hao Tinsley MD based on my observations and the provider's statements to me.       Hao Tinsley MD  12/06/22 6324

## 2022-12-05 LAB
ATRIAL RATE - MUSE: 286 BPM
DIASTOLIC BLOOD PRESSURE - MUSE: NORMAL MMHG
INTERPRETATION ECG - MUSE: NORMAL
P AXIS - MUSE: NORMAL DEGREES
PR INTERVAL - MUSE: NORMAL MS
QRS DURATION - MUSE: 72 MS
QT - MUSE: 324 MS
QTC - MUSE: 450 MS
R AXIS - MUSE: 12 DEGREES
SARS-COV-2 RNA RESP QL NAA+PROBE: POSITIVE
SYSTOLIC BLOOD PRESSURE - MUSE: NORMAL MMHG
T AXIS - MUSE: 10 DEGREES
VENTRICULAR RATE- MUSE: 116 BPM

## 2022-12-08 ENCOUNTER — VIRTUAL VISIT (OUTPATIENT)
Dept: OTHER | Facility: CLINIC | Age: 87
End: 2022-12-08
Attending: INTERNAL MEDICINE
Payer: COMMERCIAL

## 2022-12-08 DIAGNOSIS — E78.5 HYPERLIPIDEMIA LDL GOAL <70: ICD-10-CM

## 2022-12-08 DIAGNOSIS — I77.810 ASCENDING AORTA DILATION (H): ICD-10-CM

## 2022-12-08 DIAGNOSIS — I70.0 ATHEROSCLEROSIS OF AORTA (H): ICD-10-CM

## 2022-12-08 DIAGNOSIS — I72.3 ILIAC ARTERY ANEURYSM, BILATERAL (H): ICD-10-CM

## 2022-12-08 DIAGNOSIS — I10 BENIGN ESSENTIAL HYPERTENSION: ICD-10-CM

## 2022-12-08 DIAGNOSIS — I77.810 AORTIC ROOT DILATION (H): ICD-10-CM

## 2022-12-08 DIAGNOSIS — I71.43 INFRARENAL ABDOMINAL AORTIC ANEURYSM (AAA) WITHOUT RUPTURE (H): Primary | ICD-10-CM

## 2022-12-08 PROCEDURE — 99214 OFFICE O/P EST MOD 30 MIN: CPT | Mod: 95 | Performed by: INTERNAL MEDICINE

## 2022-12-08 NOTE — PROGRESS NOTES
Spencer is a 87 year old who is being evaluated via a billable video visit.      How would you like to obtain your AVS? MyChart  If the video visit is dropped, the invitation should be resent by: Text to cell phone: [phone]  Will anyone else be joining your video visit? Yes: [phone]. How would they like to receive their invitation? Text.        Jose Huerta    Provider visit note:    Chief complaint:  Follow up visit review of labs and imaging studies   Developed Covid last week on treatment , improving  CAT and arterial duplex  unremarkable  Rescheduled with Dr. Fernández on 12/21/22 due to recent covid   BP better at home   Creatinine normal now     History of present illness:    For full details please see my 11/16/22 initial consult note     Nixon More is a 87 year old very pleasant male with past medical history of chronic atrial fibrillation/flutter taking DOAC, coronary artery disease, emphysema of the lungs, hypertension, hyperlipidemia, history of prostate cancer status post surgery in May 2001, bilateral lower extremity varicose veins no intervention underwent CT urogram recently which revealed small infrarenal saccular AAA measuring 1.8 x 1.6 x 2.1 cm transverse, AP and CC dimensions respectively and repeat ultrasound measurements about the same with extensive atherosclerosis of the aorta here for further evaluation and management.  Blood pressure is well controlled, lipids are well controlled.  He smoked for 40 years 1 pack daily and quit 30 years ago.  He accompanied by his daughter who is a nurse practitioner.   He is asymptomatic.     He also has a history of ascending aorta and aortic root dilatation on echo few months ago    Reviewed recent CAT, art duplex , Labs etc  He developed Covid last week  getting treatment   Renal FX improved     Review of systems: Reviewed all 12 point review of systems as per HPI otherwise unremarkable    Physical exam:( no physical exam done this is virtual  visit)    Reviewed recent laboratory tests, imaging studies in the epic and updated chart    US LOWER EXTREMITY ARTERIAL DUPLEX BILATERAL  12/2/2022 2:23 PM      HISTORY:  Abdominal aortic aneurysm. Atrial fibrillation. Peripheral  vascular disease. Decreased pedal pulses.     COMPARISON: None     FINDINGS: Color Doppler and spectral waveform analysis performed.     The following peak systolic velocities are measured in cm/s.      RIGHT     CFA: 71  PFA: 42  SFA, proximal: 67  SFA, mid: 71  SFA, distal: 78  Popliteal: 42  Anterior tibial artery: 64  Posterior tibial artery: 93  Peroneal artery: 29     Waveforms: Normal high resistant triphasic waveforms throughout.     LEFT     CFA: 79  PFA: 56  SFA, proximal: 90  SFA, mid: 83  SFA, distal: 86  Popliteal: 53  Anterior tibial artery: 82  Posterior tibial artery: 88  Peroneal artery: The visualized     Waveforms: Normal high resistant triphasic waveforms throughout.                                                                      IMPRESSION: Visualized arteries of the lower extremities are patent  without evidence for significant stenosis.     MARINO MINAYA MD      IR CAT US CAT DOPPLER WITH EXERCISE BILATERAL   12/2/2022 2:23 PM      HISTORY: with toe pressures if unable to exercise; Infrarenal  abdominal aortic aneurysm (AAA) without rupture; Iliac artery  aneurysm, bilateral (H); Decreased pedal pulses     COMPARISON: None.     FINDINGS:  Right CAT:   DP: 0.99  PT: 1.06.     Left CAT:   DP: 1.03   PT: 1.02.     Right Digital brachial index: 0.52.  Left Digital Brachial index: 0.72     Waveforms: Multiphasic in the distal tibial arteries     Exercise: The patient walked on a treadmill for 5 minutes at 1.5 miles  per hour and at a 10% incline. At 1 minute 57 seconds, the patient had  thigh fatigue.     Right exercise CAT: 1.16.  Left exercise CAT: 1.06                                                                      IMPRESSION: Ankle brachial indices are  within normal limits.     CAT CRITERIA:  >0.95 Normal  0.90 - 0.94 Mild  0.5 - 0.89 Moderate  0.2 - 0.49 Severe  <0.2 Critical     MARINO MINAYA MD          Assessment and plan:  1. Infrarenal abdominal aortic aneurysm (AAA) without rupture, sacular.1.8 x 1.6 x 2.1 cm in transverse, AP and CC  2. 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  He is a former smoker quit many years ago smoked 1 pack cigarettes daily for 40 years.  He denies any abdominal pain or back pain.   Reviewed recent CT and abdominal ultrasound   Extensive atherosclerosis with small saccular aneurysm infrarenal along with bilateral iliac artery aneurysms small.  Given family history of rupture of aneurysm and also saccular type even though it is small will ask vascular surgery service to evaluate this patient he lives near [place] and wanted to see the vascular surgeon there. Currently scheduled to see Dr. Fernández on Dec 21st 2022   Optimize risk factors  Currently taking statin, DOAC, losartan and metoprolol continue the same  Walk as much as he can       Given ascending aorta dilatation and aortic root dilatation and AAA Will plan for CTA of the chest abdomen pelvis in the near future after vascular surgery evaluation .       3. Benign essential hypertension  Well-controlled with current medications continue the same     4. Hyperlipidemia LDL goal <70  Well-controlled with current medications continue the same     5. Atherosclerosis of aorta (H)  Aggressively control the risk factors fortunately he quit smoking long ago blood pressure well controlled lipids are well controlled A1c excellent range  Therapeutic lifestyle modification  suggested he is taking anticoagulation for other reasons continue the same     6. Aortic root dilation (H)     7. Ascending aorta dilation (H)     Asymptomatic Will plan for CTA of the chest abdomen pelvis in the near future after the vascular surgery evaluation      Video Visit Details    Type of Service: Video Visit    Video Start Time:  10:50 AM   Video End Time: 11:12 AM    30 minutes spent on the date of encounter doing chart review, Hx, review of recent labs and imaging studies and documentation etc     Originating Location (patient location):Home     Distant Location (provider location): [facility]/On license of [facility]    Mode of Communication:  Video Conference via [employer]      This visit is being conducted as a virtual visit due to the emphasis on mitigation of the COVID-19 virus pandemic. The clinician has decided that the risk of an in-office visit outweighs the benefit for this patient.     Braydon Lopez MD, FARENETTA, FSVM, FNLA, FACP  Vascular Medicine  Clinical Hypertension specialist  Clinical Lipidologist

## 2022-12-21 ENCOUNTER — OFFICE VISIT (OUTPATIENT)
Dept: SURGERY | Facility: CLINIC | Age: 87
End: 2022-12-21
Payer: COMMERCIAL

## 2022-12-21 VITALS
BODY MASS INDEX: 36.07 KG/M2 | DIASTOLIC BLOOD PRESSURE: 76 MMHG | OXYGEN SATURATION: 95 % | SYSTOLIC BLOOD PRESSURE: 124 MMHG | HEIGHT: 68 IN | RESPIRATION RATE: 16 BRPM | HEART RATE: 58 BPM | WEIGHT: 238 LBS

## 2022-12-21 DIAGNOSIS — I71.43 INFRARENAL ABDOMINAL AORTIC ANEURYSM (AAA) WITHOUT RUPTURE (H): ICD-10-CM

## 2022-12-21 PROCEDURE — 99215 OFFICE O/P EST HI 40 MIN: CPT | Performed by: SURGERY

## 2022-12-21 NOTE — PROGRESS NOTES
[facility]     Nixon More MRN# [id number]   YOB: 1935 Age: 87 year old        Reason for Clinic Visit: Penetrating aortic ulceration              History of Present Illness:   Nixon More is an 87 year old male who presents for evaluation of a possible saccular aneurysm, diagnosed on CT urogram.     He reports overall feeling well, maintaining his usual state of health.               Past Medical History:   I have personally reviewed the following:   Past Medical History:   Diagnosis Date     AAA (abdominal aortic aneurysm)      Actinic keratosis      Arthritis      Atrial fibrillation and flutter (H) 12/03/2018     CAD (coronary artery disease)     1/5/2011 lateral ischemia on stress echo, 12/2014 normal stress echo     Coronary artery disease 01/01/2011    Abnormal stress test 1/2011 and controlled with medical mgmt      Dyslipidemia      Emphysema of lung (H)      Essential hypertension, benign      Hernia, abdominal      Hypersomnia with sleep apnea, unspecified     on bipap, partially treated with residual apneas     Hypertrophy (benign) of prostate 05/2001    Biopsy 5/01 negative for cancer; PSA 5     Lumbago      Mumps      Prostate cancer (H) 12/15/2006     Skin cancer, basal cell 1997     Spider veins              Past Surgical History:   I have personally reviewed the following:   Past Surgical History:   Procedure Laterality Date     HERNIA REPAIR  child     Moh's procedure for basal cell carcinoma  01/2001     PROSTATE SURGERY       s/p lumbar laminectomy NOS  1988     SIGMOIDOSCOPY FLEXIBLE N/A 6/15/2020    Procedure: SIGMOIDOSCOPY, FLEXIBLE;  Surgeon: Sunni Patton MD;  Location:  GI     VITRECTOMY PARS PLANA REMOVE PRERETINAL MEMBRANE   3/09            Social History:   I have personally reviewed the following:   Social History     Tobacco Use     Smoking status: Former     Packs/day: 1.00     Types: Cigarettes     Start date: 1948     Quit date: 1/1/1978  "    Years since quittin.0     Smokeless tobacco: Never   Substance Use Topics     Alcohol use: Yes     Comment: occ \"like once a month\"     Smoked from age 15 until the 1970s, quit cold-turkey.   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 Enjoys reading and watching [employer] now. Somewhat socially isolated after the death of his second wife, who was his \"social \". Lives on his own at home.          Family History:     Family History   Problem Relation Age of Onset     Alzheimer Disease Mother      Hypertension Mother      Cardiovascular Father         D:86 complications fo CHF     Prostate Cancer Brother      Lung Cancer Brother 76     Prostate Cancer Brother      Father, paternal uncle, and paternal aunt had aneurysmal disease; aunt had a thoracic aortic aneurysm; other aneurysms unknown kind.           Allergies:     Allergies   Allergen Reactions     Augmentin Rash     Type III hypersensitivity     Bactrim [Sulfamethoxazole W/Trimethoprim] Rash     Serum sickness, type III hypersensitivity       Bee Venom Itching     Sulfa Drugs Hives     Celebrex [Celecoxib]      Lisinopril Cough     Naproxen Hives             Medications:     Current Outpatient Medications Ordered in Epic   Medication     albuterol (PROAIR HFA/PROVENTIL HFA/VENTOLIN HFA) 108 (90 Base) MCG/ACT inhaler     apixaban ANTICOAGULANT (ELIQUIS) 5 MG tablet     ASPIRIN NOT PRESCRIBED (INTENTIONAL)     atorvastatin (LIPITOR) 40 MG tablet     Cholecalciferol (VITAMIN D-3) 25 MCG (1000 UT) CAPS     escitalopram (LEXAPRO) 10 MG tablet     ipratropium (ATROVENT) 0.06 % nasal spray     Loperamide HCl (IMODIUM OR)     losartan (COZAAR) 100 MG tablet     metoprolol succinate ER (TOPROL XL) 50 MG 24 hr tablet     RISEdronate (ACTONEL) 35 MG tablet     spironolactone (ALDACTONE) 25 MG tablet     No current Epic-ordered facility-administered medications on file.             " "Review of Systems:   The 10 point Review of Systems is negative other than noted in the HPI          Physical Exam:   Vitals were reviewed      BP: 124/76 Pulse: 58   Resp: 16 SpO2: 95 %        General: sitting comfortably on exam table, NAD. Daughter available on face-time phone call.   Neuro/Psych: A&O x 4, pleasantly conversant   HENT: EOMI and conjugate. Moist mucous membranes. Wears glasses.   Cardiac: Regular rate and irregularly irregular rhythm, no m/g appreciated.   Pulm: Lungs CTAB, no w/r/r.  Abd: Soft, non-distended, no tenderness to palpation. Obese.  Extrem: Grossly normal and symmetric ROM in all four extremities. No edema.  Skin: Clean, dry, no rashes or wounds.  Vasc: Palpable BL DP pulses          Data:   Labs:       Lab Results   Component Value Date     12/04/2022     06/18/2021    Lab Results   Component Value Date    CHLORIDE 102 12/04/2022    CHLORIDE 112 07/15/2022    CHLORIDE 109 06/18/2021    Lab Results   Component Value Date    BUN 22.9 12/04/2022    BUN 36 07/15/2022    BUN 28 06/18/2021      Lab Results   Component Value Date    POTASSIUM 4.5 12/04/2022    POTASSIUM 4.6 07/15/2022    POTASSIUM 4.4 06/18/2021    Lab Results   Component Value Date    CO2 23 12/04/2022    CO2 21 07/15/2022    CO2 29 06/18/2021    Lab Results   Component Value Date    CR 1.12 12/04/2022    CR 1.44 06/18/2021        Lab Results   Component Value Date    WBC 8.0 12/04/2022    HGB 13.9 12/04/2022    HCT 46.0 12/04/2022    MCV 95 12/04/2022     12/04/2022       I have reviewed the following images:    CT from 2010, likely the area of plaque where this originated       Current CT imaging        CT Urogram (10/22/22): \"There is anterior saccular aneurysm of the infrarenal abdominal aorta measuring 1.8 x 1.6 x 2.1 cm in transverse, AP and CC dimensions, respectively.\"    ABIs with exercise (12/2/22): \"Ankle brachial indices are within normal limits.\"   R CAT 1.06, TBI 0.52, exercise 1.16  L CAT " 1.03, TBI 0.72, exercise 1.06         Assessment and Plan:   Mr. More is an 87 year old male with history of CAD with CHF and chronic a-fib/flutter on anticoagulation; prostate cancer in 2001; DL; HTN; CKD; who presents with a penetrating aortic ulceration with dilatation of the aorta at this location.     The imaging appears consistent with a partially thrombosed penetrating aortic ulceration, and I would favor serially imaging to evaluate for growth rather than intervention (I do not believe this is a clear saccular aneurysm and would not treat it as such).       Reviewed the imaging with Mr. More    Explained that we do no have longitudinal imaging (all his DEXA scans have narrow field of view, as does his prior lumbar MR)    Will plan imaging in 1 year to evaluate for changes in the size or morphology of the outpoutching    He will call or return sooner if any questions or concerns arise    Nimisha Fernández MD    Total time spent on the date of this encounter doing: chart review, review of test results, patient visit, physical exam, education, counseling, developing plan of care, and documenting = 55 minutes

## 2022-12-22 DIAGNOSIS — I47.19 ATRIAL TACHYCARDIA (H): ICD-10-CM

## 2022-12-22 RX ORDER — APIXABAN 5 MG/1
TABLET, FILM COATED ORAL
Qty: 180 TABLET | Refills: 3 | Status: SHIPPED | OUTPATIENT
Start: 2022-12-22 | End: 2024-02-12

## 2022-12-22 NOTE — TELEPHONE ENCOUNTER
Routing refill request to provider for review/approval because:  Over the age limit for RN protocol Patient is not 18-79 years of age    Katty Staton Registered Nurse  [facility]

## 2022-12-25 DIAGNOSIS — E78.5 DYSLIPIDEMIA: ICD-10-CM

## 2022-12-27 RX ORDER — ATORVASTATIN CALCIUM 40 MG/1
TABLET, FILM COATED ORAL
Qty: 90 TABLET | Refills: 0 | Status: SHIPPED | OUTPATIENT
Start: 2022-12-27 | End: 2023-03-24

## 2022-12-27 SDOH — ECONOMIC STABILITY: INCOME INSECURITY: HOW HARD IS IT FOR YOU TO PAY FOR THE VERY BASICS LIKE FOOD, HOUSING, MEDICAL CARE, AND HEATING?: NOT HARD AT ALL

## 2022-12-27 SDOH — HEALTH STABILITY: PHYSICAL HEALTH: ON AVERAGE, HOW MANY DAYS PER WEEK DO YOU ENGAGE IN MODERATE TO STRENUOUS EXERCISE (LIKE A BRISK WALK)?: 1 DAY

## 2022-12-27 SDOH — ECONOMIC STABILITY: FOOD INSECURITY: WITHIN THE PAST 12 MONTHS, YOU WORRIED THAT YOUR FOOD WOULD RUN OUT BEFORE YOU GOT MONEY TO BUY MORE.: NEVER TRUE

## 2022-12-27 SDOH — ECONOMIC STABILITY: INCOME INSECURITY: IN THE LAST 12 MONTHS, WAS THERE A TIME WHEN YOU WERE NOT ABLE TO PAY THE MORTGAGE OR RENT ON TIME?: NO

## 2022-12-27 SDOH — HEALTH STABILITY: PHYSICAL HEALTH: ON AVERAGE, HOW MANY MINUTES DO YOU ENGAGE IN EXERCISE AT THIS LEVEL?: 10 MIN

## 2022-12-27 SDOH — ECONOMIC STABILITY: FOOD INSECURITY: WITHIN THE PAST 12 MONTHS, THE FOOD YOU BOUGHT JUST DIDN'T LAST AND YOU DIDN'T HAVE MONEY TO GET MORE.: NEVER TRUE

## 2022-12-27 ASSESSMENT — LIFESTYLE VARIABLES
AUDIT-C TOTAL SCORE: 1
HOW OFTEN DO YOU HAVE SIX OR MORE DRINKS ON ONE OCCASION: NEVER
HOW OFTEN DO YOU HAVE A DRINK CONTAINING ALCOHOL: MONTHLY OR LESS
SKIP TO QUESTIONS 9-10: 1
HOW MANY STANDARD DRINKS CONTAINING ALCOHOL DO YOU HAVE ON A TYPICAL DAY: 1 OR 2

## 2022-12-27 ASSESSMENT — SOCIAL DETERMINANTS OF HEALTH (SDOH)
IN A TYPICAL WEEK, HOW MANY TIMES DO YOU TALK ON THE PHONE WITH FAMILY, FRIENDS, OR NEIGHBORS?: THREE TIMES A WEEK
HOW OFTEN DO YOU GET TOGETHER WITH FRIENDS OR RELATIVES?: TWICE A WEEK
HOW OFTEN DO YOU ATTEND CHURCH OR RELIGIOUS SERVICES?: MORE THAN 4 TIMES PER YEAR
DO YOU BELONG TO ANY CLUBS OR ORGANIZATIONS SUCH AS CHURCH GROUPS UNIONS, FRATERNAL OR ATHLETIC GROUPS, OR SCHOOL GROUPS?: YES

## 2022-12-27 NOTE — TELEPHONE ENCOUNTER
3 month eva refill approved. Further refills will be addressed at scheduled visit.     Kavitha Richey RN on 12/27/2022 at 11:25 AM

## 2022-12-29 ENCOUNTER — MYC MEDICAL ADVICE (OUTPATIENT)
Dept: PEDIATRICS | Facility: CLINIC | Age: 87
End: 2022-12-29

## 2023-01-03 ENCOUNTER — OFFICE VISIT (OUTPATIENT)
Dept: PEDIATRICS | Facility: CLINIC | Age: 88
End: 2023-01-03
Payer: COMMERCIAL

## 2023-01-03 VITALS
RESPIRATION RATE: 16 BRPM | TEMPERATURE: 98.2 F | WEIGHT: 235.8 LBS | SYSTOLIC BLOOD PRESSURE: 122 MMHG | HEART RATE: 63 BPM | HEIGHT: 68 IN | DIASTOLIC BLOOD PRESSURE: 78 MMHG | OXYGEN SATURATION: 96 % | BODY MASS INDEX: 35.74 KG/M2

## 2023-01-03 DIAGNOSIS — I48.91 ATRIAL FIBRILLATION AND FLUTTER (H): ICD-10-CM

## 2023-01-03 DIAGNOSIS — I48.92 ATRIAL FIBRILLATION AND FLUTTER (H): ICD-10-CM

## 2023-01-03 DIAGNOSIS — I25.10 CORONARY ARTERY DISEASE INVOLVING NATIVE CORONARY ARTERY OF NATIVE HEART WITHOUT ANGINA PECTORIS: ICD-10-CM

## 2023-01-03 DIAGNOSIS — G47.30 HYPERSOMNIA WITH SLEEP APNEA: ICD-10-CM

## 2023-01-03 DIAGNOSIS — G47.10 HYPERSOMNIA WITH SLEEP APNEA: ICD-10-CM

## 2023-01-03 DIAGNOSIS — J43.8 OTHER EMPHYSEMA (H): ICD-10-CM

## 2023-01-03 DIAGNOSIS — Z01.818 PREOP GENERAL PHYSICAL EXAM: Primary | ICD-10-CM

## 2023-01-03 DIAGNOSIS — I50.32 CHRONIC DIASTOLIC HEART FAILURE (H): ICD-10-CM

## 2023-01-03 DIAGNOSIS — N18.31 STAGE 3A CHRONIC KIDNEY DISEASE (H): ICD-10-CM

## 2023-01-03 PROBLEM — I20.9 ANGINA PECTORIS (H): Status: RESOLVED | Noted: 2020-10-27 | Resolved: 2023-01-03

## 2023-01-03 PROCEDURE — 99214 OFFICE O/P EST MOD 30 MIN: CPT | Performed by: INTERNAL MEDICINE

## 2023-01-03 RX ORDER — ALBUTEROL SULFATE 90 UG/1
2 AEROSOL, METERED RESPIRATORY (INHALATION) EVERY 4 HOURS PRN
Qty: 8 G | Refills: 0 | Status: SHIPPED | OUTPATIENT
Start: 2023-01-03 | End: 2023-03-09

## 2023-01-03 ASSESSMENT — PATIENT HEALTH QUESTIONNAIRE - PHQ9
SUM OF ALL RESPONSES TO PHQ QUESTIONS 1-9: 4
10. IF YOU CHECKED OFF ANY PROBLEMS, HOW DIFFICULT HAVE THESE PROBLEMS MADE IT FOR YOU TO DO YOUR WORK, TAKE CARE OF THINGS AT HOME, OR GET ALONG WITH OTHER PEOPLE: NOT DIFFICULT AT ALL
SUM OF ALL RESPONSES TO PHQ QUESTIONS 1-9: 4

## 2023-01-03 ASSESSMENT — PAIN SCALES - GENERAL: PAINLEVEL: NO PAIN (0)

## 2023-01-03 NOTE — PROGRESS NOTES
[facility] [place]  [address]  [address]  [place] [address]  Phone: [phone]  Fax: [phone]  Primary Provider: Fadia Lewis  Pre-op Performing Provider: FADIA LEWIS      PREOPERATIVE EVALUATION:  Today's date: 1/3/2023    Nixon More is a 87 year old male who presents for a preoperative evaluation.    Surgical Information:  Surgery/Procedure: Cystoscopy with transurethral resection of bladder tumor.  Surgery Location: [facility]   Surgeon: Dr Pino  Surgery Date: 1/6/23  Time of Surgery: arrives at 12:00 pm and surgery time 2pm  Where patient plans to recover: At home with family  Fax number for surgical facility: Note does not need to be faxed, will be available electronically in Epic.    Type of Anesthesia Anticipated: TBD     Assessment & Plan     The proposed surgical procedure is considered LOW risk.      ICD-10-CM    1. Preop general physical exam  Z01.818       2. Stage 3a chronic kidney disease (H)  N18.31       3. Other emphysema (H)  J43.8 albuterol (PROAIR HFA/PROVENTIL HFA/VENTOLIN HFA) 108 (90 Base) MCG/ACT inhaler      4. Coronary artery disease involving native coronary artery of native heart without angina pectoris  I25.10       5. Chronic diastolic heart failure (H)  I50.32       6. Hypersomnia with sleep apnea  G47.10     G47.30       7. Atrial fibrillation and flutter (H)  I48.91     I48.92               Risks and Recommendations:  The patient has the following additional risks and recommendations for perioperative complications:  Cardiovascular:  Known ascvd, no angina.  Medically managed  Pulmonary:    - Incentive spirometry post-op  Obstructive Sleep Apnea:   Bring bipap to procedure    Medication Instructions:  hold eliquis after tonight and take all other meds as usual    RECOMMENDATION:  APPROVAL GIVEN to proceed with proposed procedure, without further diagnostic evaluation.          Subjective     HPI related to  upcoming procedure: bladder tumor requiring resection    Preop Questions 12/27/2022   1. Have you ever had a heart attack or stroke? No   2. Have you ever had surgery on your heart or blood vessels, such as a stent placement, a coronary artery bypass, or surgery on an artery in your head, neck, heart, or legs? No   3. Do you have chest pain with activity? No   4. Do you have a history of  heart failure? No   5. Do you currently have a cold, bronchitis or symptoms of other infection? No   6. Do you have a cough, shortness of breath, or wheezing? YES - had covid last month.  Was mild but still some cough.   7. Do you or anyone in your family have previous history of blood clots? No   8. Do you or does anyone in your family have a serious bleeding problem such as prolonged bleeding following surgeries or cuts? No   9. Have you ever had problems with anemia or been told to take iron pills? No   10. Have you had any abnormal blood loss such as black, tarry or bloody stools? YES - blood in urine   11. Have you ever had a blood transfusion? No   12. Are you willing to have a blood transfusion if it is medically needed before, during, or after your surgery? Yes   13. Have you or any of your relatives ever had problems with anesthesia? YES - dad had problems with general anesthesia in the past   14. Do you have sleep apnea, excessive snoring or daytime drowsiness? YES - on bipap   14a. Do you have a CPAP machine? Yes   15. Do you have any artifical heart valves or other implanted medical devices like a pacemaker, defibrillator, or continuous glucose monitor? No   16. Do you have artificial joints? No   17. Are you allergic to latex? No       Health Care Directive:  Patient has a Health Care Directive on file      Preoperative Review of :   reviewed - no record of controlled substances prescribed.      Review of Systems  CONSTITUTIONAL: NEGATIVE for fever, chills, change in weight  ENT/MOUTH: NEGATIVE for ear, mouth and  throat problems  RESP: NEGATIVE for significant cough or SOB  CV: NEGATIVE for chest pain, palpitations or peripheral edema  : as above    Patient Active Problem List    Diagnosis Date Noted     Malignant neoplasm of prostate 12/15/2006     Priority: High     S/p radiation therapy and has now recurred.  Seeing Dr. Brar on [place].       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     Priority: Medium     Obesity (BMI 35.0-39.9) with comorbidity (H) 11/08/2019     Priority: Medium     CKD (chronic kidney disease) stage 3, GFR 30-59 ml/min (H) 11/08/2019     Priority: Medium     Generalized muscle weakness 03/28/2019     Priority: Medium     Dilated aortic root (H) 12/06/2018     Priority: Medium     Atrial fibrillation and flutter (H) 12/03/2018     Priority: Medium     Dysthymia 09/27/2016     Priority: Medium     Spinal stenosis of lumbar region with neurogenic claudication 10/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     Priority: Medium     Dyslipidemia      Priority: Medium     Benign essential hypertension      Priority: Medium     Impaired fasting glucose 08/10/2012     Priority: Medium     100 8/2012       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     Priority: Medium     Mild, noted on colonoscopy for hematochezia       Bee sting-induced anaphylaxis 05/13/2011     Priority: Medium     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  on bipap, controlled  Problem list name updated by automated process. Provider to review       Other  emphysema (H) 06/16/2005     Priority: Medium     Hypertrophy of prostate without urinary obstruction 09/23/2004     Priority: Medium     Health Care Home 01/29/2013     Priority: Low     Maranda Newsome RN-BSN, [facility]  [phone].  FPA / [facility]       DX V65.8 REPLACED WITH [id number] HEALTH CARE HOME (04/08/2013)       Advance Care Planning 08/23/2011     Priority: Low     Advance Care Planning 10/15/2015: Receipt of ACP document:  Received: Health Care Directive which was witnessed or notarized on 10-12-11.  Document previously scanned on 2-25-14.  Validation form completed and sent to be scanned.  Code Status reflects choices in most recent ACP document.  Confirmed/documented designated decision maker(s).  Added by Patricia Hansen RN Advance Care Planning Liaison with [employer]  Patient states has Advance Directive and will bring in a copy to clinic. 8/23/2011          CARDIOVASCULAR SCREENING; LDL GOAL LESS THAN 100 02/10/2010     Priority: Low     Personal history of other malignant neoplasm of skin 08/26/2002     Priority: Low     Lumbago 08/26/2002     Priority: Low      Past Medical History:   Diagnosis Date     AAA (abdominal aortic aneurysm)      Actinic keratosis      Angina pectoris (H) 10/27/2020     Antiplatelet or antithrombotic long-term use     Eliquis     Arthritis      Atrial fibrillation and flutter (H) 12/03/2018     CAD (coronary artery disease)     1/5/2011 lateral ischemia on stress echo, 12/2014 normal stress echo     Coronary artery disease 01/01/2011    Abnormal stress test 1/2011 and controlled with medical mgmt      Depressive disorder     Mild     Diarrhea     Urgency at times     Dyslipidemia      Emphysema of lung (H)      Essential hypertension, benign      Hernia, abdominal      Hypersomnia with sleep apnea, unspecified     on bipap, partially treated with residual apneas     Hypertrophy (benign) of prostate 05/2001    Biopsy 5/01 negative for  cancer; PSA 5     Lumbago      Mumps      Prostate cancer (H) 12/15/2006     Renal disease      Skin cancer, basal cell 1997     Sleep apnea     Uses a Bi-pap for centralized Apnea     Spider veins      Past Surgical History:   Procedure Laterality Date     BACK SURGERY  1988    L4/L5 decompression     BIOPSY  2022    Skin cancer basal cell     COLONOSCOPY       EYE SURGERY  2016    Cararact     GENITOURINARY SURGERY  12 07 2022    Tumors in bladder     HERNIA REPAIR  child     Moh's procedure for basal cell carcinoma  01/2001     PROSTATE SURGERY  2007    Radiation only     s/p lumbar laminectomy NOS  1988     SIGMOIDOSCOPY FLEXIBLE N/A 06/15/2020    Procedure: SIGMOIDOSCOPY, FLEXIBLE;  Surgeon: Sunni Patton MD;  Location: [facility]     VITRECTOMY PARS PLANA REMOVE PRERETINAL MEMBRANE   03/2009     Current Outpatient Medications   Medication Sig Dispense Refill     albuterol (PROAIR HFA/PROVENTIL HFA/VENTOLIN HFA) 108 (90 Base) MCG/ACT inhaler Inhale 2 puffs into the lungs every 4 hours as needed for shortness of breath or wheezing Or prior to physical activity 8 g 0     atorvastatin (LIPITOR) 40 MG tablet TAKE 1 TABLET BY MOUTH EVERY DAY 90 tablet 0     Cholecalciferol (VITAMIN D-3) 25 MCG (1000 UT) CAPS Take by mouth daily       ELIQUIS ANTICOAGULANT 5 MG tablet TAKE 1 TABLET BY MOUTH TWICE A  tablet 3     escitalopram (LEXAPRO) 10 MG tablet TAKE 1 TABLET BY MOUTH EVERY DAY 90 tablet 0     Loperamide HCl (IMODIUM OR) Take by mouth daily as needed       losartan (COZAAR) 100 MG tablet TAKE 1 TABLET BY MOUTH EVERY DAY 90 tablet 0     metoprolol succinate ER (TOPROL XL) 50 MG 24 hr tablet Take 2 tablets (100 mg) by mouth daily 180 tablet 3     RISEdronate (ACTONEL) 35 MG tablet TAKE 1 TABLET (35 MG) BY MOUTH EVERY 7 DAYS 12 tablet 3     spironolactone (ALDACTONE) 25 MG tablet Take 0.5 tablets (12.5 mg) by mouth daily 45 tablet 3     ASPIRIN NOT PRESCRIBED (INTENTIONAL) continuous prn for other  "Antiplatelet medication not prescribed intentionally due to Current anticoagulant therapy (warfarin/enoxaparin) (Patient not taking: Reported on 1/3/2023)         Allergies   Allergen Reactions     Augmentin Rash     Type III hypersensitivity     Bactrim [Sulfamethoxazole W/Trimethoprim] Rash     Serum sickness, type III hypersensitivity       Bee Venom Itching     Sulfa Drugs Hives     Celebrex [Celecoxib]      Lisinopril Cough     Naproxen Hives        Social History     Tobacco Use     Smoking status: Former     Packs/day: 1.00     Years: 30.00     Pack years: 30.00     Types: Cigarettes     Start date: 1948     Quit date: 1978     Years since quittin.0     Smokeless tobacco: Never   Substance Use Topics     Alcohol use: Not Currently     Comment: occ \"like once a month\"       History   Drug Use No         Objective     /78 (BP Location: Right arm, Patient Position: Sitting, Cuff Size: Adult Large)   Pulse 63   Temp 98.2  F (36.8  C) (Oral)   Resp 16   Ht 1.715 m (5' 7.5\")   Wt 107 kg (235 lb 12.8 oz)   SpO2 96%   BMI 36.39 kg/m      Physical Exam  GENERAL APPEARANCE: healthy, alert and no distress  HENT: ear canals and TM's normal and nose and mouth without ulcers or lesions  RESP: lungs clear to auscultation - no rales, rhonchi or wheezes  CV: regular rate and rhythm, normal S1 S2, no S3 or S4 and no murmur, click or rub   ABDOMEN: soft, nontender, no HSM or masses and bowel sounds normal  MS: extremities normal- no gross deformities noted  SKIN: no suspicious lesions or rashes  NEURO: Normal strength and tone, sensory exam grossly normal, mentation intact and speech normal  PSYCH: mentation appears normal and affect normal/bright    Recent Labs   Lab Test 22  1223 10/20/22  1102 22  0909 07/15/22  1336 21  1004 10/22/21  1439 21  1407 21  1440   HGB 13.9  --  13.7 13.4   < >  --    < >  --      --  192 190   < >  --    < >  --      --   --  " 140   < > 141   < > 140   POTASSIUM 4.5  --   --  4.6   < > 4.8   < > 4.4   CR 1.12 1.1  --  1.33*   < > 1.34*   < > 1.42*   A1C  --   --   --   --   --  6.0*  --  5.8*    < > = values in this interval not displayed.        Diagnostics:  No labs were ordered during this visit. Normal bmp and hemoglobin 12/22  No EKG required for low risk surgery (cataract, skin procedure, breast biopsy, etc).    Revised Cardiac Risk Index (RCRI):  The patient has the following serious cardiovascular risks for perioperative complications:   - Coronary Artery Disease (MI, positive stress test, angina, Qs on EKG) = 1 point     RCRI Interpretation: 1 point: Class II (low risk - 0.9% complication rate)         Signed Electronically by: Natalya Lewis MD  Copy of this evaluation report is provided to requesting physician.      Answers for HPI/ROS submitted by the patient on 1/3/2023  If you checked off any problems, how difficult have these problems made it for you to do your work, take care of things at home, or get along with other people?: Not difficult at all  PHQ9 TOTAL SCORE: 4

## 2023-01-03 NOTE — PATIENT INSTRUCTIONS
Take your bipap to the hospital with you    Stop the eliquis after tonight's dose until after surgery.    The day of surgery skip vitamin D but take your other prescription medications.    Get your tetanus shot at a pharmacy    Start walking the halls to help build up your strength    Preparing for Your Surgery  Getting started  A nurse will call you to review your health history and instructions. They will give you an arrival time based on your scheduled surgery time. Please be ready to share:  Your doctor's clinic name and phone number  Your medical, surgical, and anesthesia history  A list of allergies and sensitivities  A list of medicines, including herbal treatments and over-the-counter drugs  Whether the patient has a legal guardian (ask how to send us the papers in advance)       Preparing for surgery  Within 10 to 30 days of surgery: Have a pre-op exam (sometimes called an H&P, or History and Physical). This can be done at a clinic or pre-operative center.  If you're having a , you may not need this exam. Talk to your care team.  At your pre-op exam, talk to your care team about all medicines you take. If you need to stop any medicines before surgery, ask when to start taking them again.  We do this for your safety. Many medicines can make you bleed too much during surgery. Some change how well surgery (anesthesia) drugs work.  Call your insurance company to let them know you're having surgery. (If you don't have insurance, call [phone].)  Call your clinic if there's any change in your health. This includes signs of a cold or flu (sore throat, runny nose, cough, rash, fever). It also includes a scrape or scratch near the surgery site.  If you have questions on the day of surgery, call your hospital or surgery center.  Eating and drinking guidelines  For your safety: Unless your surgeon tells you otherwise, follow the guidelines below.  Eat and drink as usual until 8 hours before you arrive for  surgery. After that, no food or milk.  Drink clear liquids until 2 hours before you arrive. These are liquids you can see through, like water, Gatorade, and Propel Water. They also include plain black coffee and tea (no cream or milk), candy, and breath mints. You can spit out gum when you arrive.  If you drink alcohol: Stop drinking it the night before surgery.  If your care team tells you to take medicine on the morning of surgery, it's okay to take it with a sip of water.  Preventing infection  Shower or bathe the night before and morning of your surgery. Follow the instructions your clinic gave you. (If no instructions, use regular soap.)  Don't shave or clip hair near your surgery site. We'll remove the hair if needed.  Don't smoke or vape the morning of surgery. You may chew nicotine gum up to 2 hours before surgery. A nicotine patch is okay.  Note: Some surgeries require you to completely quit smoking and nicotine. Check with your surgeon.  Your care team will make every effort to keep you safe from infection. We will:  Clean our hands often with soap and water (or an alcohol-based hand rub).  Clean the skin at your surgery site with a special soap that kills germs.  Give you a special gown to keep you warm. (Cold raises the risk of infection.)  Wear special hair covers, masks, gowns and gloves during surgery.  Give antibiotic medicine, if prescribed. Not all surgeries need antibiotics.  What to bring on the day of surgery  Photo ID and insurance card  Copy of your health care directive, if you have one  Glasses and hearing aids (bring cases)  You can't wear contacts during surgery  Inhaler and eye drops, if you use them (tell us about these when you arrive)  CPAP machine or breathing device, if you use them  A few personal items, if spending the night  If you have . . .  A pacemaker, ICD (cardiac defibrillator) or other implant: Bring the ID card.  An implanted stimulator: Bring the remote control.  A legal  guardian: Bring a copy of the certified (court-stamped) guardianship papers.  Please remove any jewelry, including body piercings. Leave jewelry and other valuables at home.  If you're going home the day of surgery  You must have a responsible adult drive you home. They should stay with you overnight as well.  If you don't have someone to stay with you, and you aren't safe to go home alone, we may keep you overnight. Insurance often won't pay for this.  After surgery  If it's hard to control your pain or you need more pain medicine, please call your surgeon's office.

## 2023-01-05 ENCOUNTER — ANESTHESIA EVENT (OUTPATIENT)
Dept: SURGERY | Facility: CLINIC | Age: 88
End: 2023-01-05
Payer: COMMERCIAL

## 2023-01-06 ENCOUNTER — HOSPITAL ENCOUNTER (OUTPATIENT)
Facility: CLINIC | Age: 88
Discharge: HOME | End: 2023-01-06
Attending: UROLOGY | Admitting: UROLOGY
Payer: COMMERCIAL

## 2023-01-06 ENCOUNTER — ANESTHESIA (OUTPATIENT)
Dept: SURGERY | Facility: CLINIC | Age: 88
End: 2023-01-06
Payer: COMMERCIAL

## 2023-01-06 VITALS
WEIGHT: 233 LBS | BODY MASS INDEX: 36.57 KG/M2 | DIASTOLIC BLOOD PRESSURE: 60 MMHG | SYSTOLIC BLOOD PRESSURE: 100 MMHG | HEART RATE: 110 BPM | HEIGHT: 67 IN | RESPIRATION RATE: 14 BRPM | OXYGEN SATURATION: 93 % | TEMPERATURE: 97.7 F

## 2023-01-06 PROCEDURE — 360N000076 HC SURGERY LEVEL 3, PER MIN: Performed by: UROLOGY

## 2023-01-06 PROCEDURE — 52235 CYSTOSCOPY AND TREATMENT: CPT | Performed by: UROLOGY

## 2023-01-06 PROCEDURE — 272N000001 HC OR GENERAL SUPPLY STERILE: Performed by: UROLOGY

## 2023-01-06 PROCEDURE — 250N000009 HC RX 250: Performed by: UROLOGY

## 2023-01-06 PROCEDURE — 710N000009 HC RECOVERY PHASE 1, LEVEL 1, PER MIN: Performed by: UROLOGY

## 2023-01-06 PROCEDURE — 370N000017 HC ANESTHESIA TECHNICAL FEE, PER MIN: Performed by: UROLOGY

## 2023-01-06 PROCEDURE — C1769 GUIDE WIRE: HCPCS | Performed by: UROLOGY

## 2023-01-06 PROCEDURE — 88305 TISSUE EXAM BY PATHOLOGIST: CPT | Mod: TC | Performed by: UROLOGY

## 2023-01-06 PROCEDURE — 710N000012 HC RECOVERY PHASE 2, PER MINUTE: Performed by: UROLOGY

## 2023-01-06 PROCEDURE — 88305 TISSUE EXAM BY PATHOLOGIST: CPT | Mod: 26 | Performed by: PATHOLOGY

## 2023-01-06 PROCEDURE — 999N000141 HC STATISTIC PRE-PROCEDURE NURSING ASSESSMENT: Performed by: UROLOGY

## 2023-01-06 PROCEDURE — 250N000011 HC RX IP 250 OP 636: Performed by: NURSE ANESTHETIST, CERTIFIED REGISTERED

## 2023-01-06 PROCEDURE — 258N000003 HC RX IP 258 OP 636: Performed by: NURSE ANESTHETIST, CERTIFIED REGISTERED

## 2023-01-06 PROCEDURE — 250N000009 HC RX 250: Performed by: NURSE ANESTHETIST, CERTIFIED REGISTERED

## 2023-01-06 PROCEDURE — 250N000011 HC RX IP 250 OP 636: Performed by: UROLOGY

## 2023-01-06 RX ORDER — SODIUM CHLORIDE, SODIUM LACTATE, POTASSIUM CHLORIDE, CALCIUM CHLORIDE 600; 310; 30; 20 MG/100ML; MG/100ML; MG/100ML; MG/100ML
INJECTION, SOLUTION INTRAVENOUS CONTINUOUS
Status: DISCONTINUED | OUTPATIENT
Start: 2023-01-06 | End: 2023-01-06 | Stop reason: HOSPADM

## 2023-01-06 RX ORDER — METHADONE HYDROCHLORIDE 10 MG/ML
INJECTION, SOLUTION INTRAMUSCULAR; INTRAVENOUS; SUBCUTANEOUS PRN
Status: DISCONTINUED | OUTPATIENT
Start: 2023-01-06 | End: 2023-01-06

## 2023-01-06 RX ORDER — LABETALOL HYDROCHLORIDE 5 MG/ML
10 INJECTION, SOLUTION INTRAVENOUS
Status: DISCONTINUED | OUTPATIENT
Start: 2023-01-06 | End: 2023-01-06 | Stop reason: HOSPADM

## 2023-01-06 RX ORDER — DEXAMETHASONE SODIUM PHOSPHATE 4 MG/ML
INJECTION, SOLUTION INTRA-ARTICULAR; INTRALESIONAL; INTRAMUSCULAR; INTRAVENOUS; SOFT TISSUE PRN
Status: DISCONTINUED | OUTPATIENT
Start: 2023-01-06 | End: 2023-01-06

## 2023-01-06 RX ORDER — ACETAMINOPHEN 325 MG/1
975 TABLET ORAL ONCE
Status: DISCONTINUED | OUTPATIENT
Start: 2023-01-06 | End: 2023-01-06 | Stop reason: HOSPADM

## 2023-01-06 RX ORDER — ONDANSETRON 2 MG/ML
INJECTION INTRAMUSCULAR; INTRAVENOUS PRN
Status: DISCONTINUED | OUTPATIENT
Start: 2023-01-06 | End: 2023-01-06

## 2023-01-06 RX ORDER — ALBUTEROL SULFATE 0.83 MG/ML
2.5 SOLUTION RESPIRATORY (INHALATION) EVERY 4 HOURS PRN
Status: DISCONTINUED | OUTPATIENT
Start: 2023-01-06 | End: 2023-01-06 | Stop reason: HOSPADM

## 2023-01-06 RX ORDER — PROPOFOL 10 MG/ML
INJECTION, EMULSION INTRAVENOUS PRN
Status: DISCONTINUED | OUTPATIENT
Start: 2023-01-06 | End: 2023-01-06

## 2023-01-06 RX ORDER — CEFAZOLIN SODIUM/WATER 2 G/20 ML
2 SYRINGE (ML) INTRAVENOUS SEE ADMIN INSTRUCTIONS
Status: DISCONTINUED | OUTPATIENT
Start: 2023-01-06 | End: 2023-01-06 | Stop reason: HOSPADM

## 2023-01-06 RX ORDER — METHADONE HYDROCHLORIDE 10 MG/ML
2 INJECTION, SOLUTION INTRAMUSCULAR; INTRAVENOUS; SUBCUTANEOUS 3 TIMES DAILY PRN
Status: DISCONTINUED | OUTPATIENT
Start: 2023-01-06 | End: 2023-01-06 | Stop reason: HOSPADM

## 2023-01-06 RX ORDER — FENTANYL CITRATE 50 UG/ML
25 INJECTION, SOLUTION INTRAMUSCULAR; INTRAVENOUS
Status: DISCONTINUED | OUTPATIENT
Start: 2023-01-06 | End: 2023-01-06 | Stop reason: HOSPADM

## 2023-01-06 RX ORDER — LIDOCAINE 40 MG/G
CREAM TOPICAL
Status: DISCONTINUED | OUTPATIENT
Start: 2023-01-06 | End: 2023-01-06 | Stop reason: HOSPADM

## 2023-01-06 RX ORDER — FENTANYL CITRATE 50 UG/ML
25 INJECTION, SOLUTION INTRAMUSCULAR; INTRAVENOUS EVERY 5 MIN PRN
Status: DISCONTINUED | OUTPATIENT
Start: 2023-01-06 | End: 2023-01-06 | Stop reason: HOSPADM

## 2023-01-06 RX ORDER — METOPROLOL TARTRATE 1 MG/ML
INJECTION, SOLUTION INTRAVENOUS PRN
Status: DISCONTINUED | OUTPATIENT
Start: 2023-01-06 | End: 2023-01-06

## 2023-01-06 RX ORDER — SODIUM CHLORIDE, SODIUM LACTATE, POTASSIUM CHLORIDE, CALCIUM CHLORIDE 600; 310; 30; 20 MG/100ML; MG/100ML; MG/100ML; MG/100ML
INJECTION, SOLUTION INTRAVENOUS CONTINUOUS PRN
Status: DISCONTINUED | OUTPATIENT
Start: 2023-01-06 | End: 2023-01-06

## 2023-01-06 RX ORDER — HYDRALAZINE HYDROCHLORIDE 20 MG/ML
10 INJECTION INTRAMUSCULAR; INTRAVENOUS EVERY 10 MIN PRN
Status: DISCONTINUED | OUTPATIENT
Start: 2023-01-06 | End: 2023-01-06 | Stop reason: HOSPADM

## 2023-01-06 RX ORDER — ONDANSETRON 4 MG/1
4 TABLET, ORALLY DISINTEGRATING ORAL EVERY 30 MIN PRN
Status: DISCONTINUED | OUTPATIENT
Start: 2023-01-06 | End: 2023-01-06 | Stop reason: HOSPADM

## 2023-01-06 RX ORDER — LIDOCAINE HYDROCHLORIDE 10 MG/ML
INJECTION, SOLUTION INFILTRATION; PERINEURAL PRN
Status: DISCONTINUED | OUTPATIENT
Start: 2023-01-06 | End: 2023-01-06

## 2023-01-06 RX ORDER — CEFAZOLIN SODIUM/WATER 2 G/20 ML
2 SYRINGE (ML) INTRAVENOUS
Status: COMPLETED | OUTPATIENT
Start: 2023-01-06 | End: 2023-01-06

## 2023-01-06 RX ORDER — ONDANSETRON 2 MG/ML
4 INJECTION INTRAMUSCULAR; INTRAVENOUS EVERY 30 MIN PRN
Status: DISCONTINUED | OUTPATIENT
Start: 2023-01-06 | End: 2023-01-06 | Stop reason: HOSPADM

## 2023-01-06 RX ADMIN — PHENYLEPHRINE HYDROCHLORIDE 200 MCG: 10 INJECTION INTRAVENOUS at 12:28

## 2023-01-06 RX ADMIN — METOPROLOL TARTRATE 2 MG: 5 INJECTION INTRAVENOUS at 12:34

## 2023-01-06 RX ADMIN — DEXMEDETOMIDINE HYDROCHLORIDE 0.4 MCG/KG/HR: 100 INJECTION, SOLUTION INTRAVENOUS at 12:32

## 2023-01-06 RX ADMIN — Medication 2 G: at 12:18

## 2023-01-06 RX ADMIN — Medication 50 MG: at 12:25

## 2023-01-06 RX ADMIN — SUGAMMADEX 200 MG: 100 INJECTION, SOLUTION INTRAVENOUS at 12:47

## 2023-01-06 RX ADMIN — DEXAMETHASONE SODIUM PHOSPHATE 8 MG: 4 INJECTION, SOLUTION INTRA-ARTICULAR; INTRALESIONAL; INTRAMUSCULAR; INTRAVENOUS; SOFT TISSUE at 12:25

## 2023-01-06 RX ADMIN — ONDANSETRON 4 MG: 2 INJECTION INTRAMUSCULAR; INTRAVENOUS at 12:45

## 2023-01-06 RX ADMIN — METHADONE HYDROCHLORIDE 10 MG: 10 INJECTION, SOLUTION INTRAMUSCULAR; INTRAVENOUS; SUBCUTANEOUS at 12:25

## 2023-01-06 RX ADMIN — PROPOFOL 150 MG: 10 INJECTION, EMULSION INTRAVENOUS at 12:25

## 2023-01-06 RX ADMIN — LIDOCAINE HYDROCHLORIDE 50 MG: 10 INJECTION, SOLUTION INFILTRATION; PERINEURAL at 12:25

## 2023-01-06 RX ADMIN — SODIUM CHLORIDE, POTASSIUM CHLORIDE, SODIUM LACTATE AND CALCIUM CHLORIDE: 600; 310; 30; 20 INJECTION, SOLUTION INTRAVENOUS at 12:18

## 2023-01-06 ASSESSMENT — COPD QUESTIONNAIRES
CAT_SEVERITY: MODERATE
COPD: 1

## 2023-01-06 ASSESSMENT — ACTIVITIES OF DAILY LIVING (ADL)
ADLS_ACUITY_SCORE: 41
ADLS_ACUITY_SCORE: 37

## 2023-01-06 ASSESSMENT — ENCOUNTER SYMPTOMS: DYSRHYTHMIAS: 1

## 2023-01-06 ASSESSMENT — LIFESTYLE VARIABLES: TOBACCO_USE: 1

## 2023-01-06 NOTE — ANESTHESIA PREPROCEDURE EVALUATION
Anesthesia Pre-Procedure Evaluation    Patient: Nixon More   MRN: [id number] : 1935        Procedure : Procedure(s):  Cystoscopy with transurethral resection of bladder tumor          Past Medical History:   Diagnosis Date     AAA (abdominal aortic aneurysm)      Actinic keratosis      Angina pectoris (H) 10/27/2020     Antiplatelet or antithrombotic long-term use     Eliquis     Arthritis      Atrial fibrillation and flutter (H) 2018     CAD (coronary artery disease)     2011 lateral ischemia on stress echo, 2014 normal stress echo     Coronary artery disease 2011    Abnormal stress test 2011 and controlled with medical mgmt      Depressive disorder     Mild     Diarrhea     Urgency at times     Dyslipidemia      Emphysema of lung (H)      Essential hypertension, benign      Hernia, abdominal      Hypersomnia with sleep apnea, unspecified     on bipap, partially treated with residual apneas     Hypertrophy (benign) of prostate 2001    Biopsy  negative for cancer; PSA 5     Lumbago      Mumps      Prostate cancer (H) 12/15/2006     Renal disease      Skin cancer, basal cell 1997     Sleep apnea     Uses a Bi-pap for centralized Apnea     Spider veins       Past Surgical History:   Procedure Laterality Date     BACK SURGERY      L4/L5 decompression     BIOPSY      Skin cancer basal cell     COLONOSCOPY       EYE SURGERY  2016    Cararact     GENITOURINARY SURGERY  2022    Tumors in bladder     HERNIA REPAIR  child     Moh's procedure for basal cell carcinoma  2001     PROSTATE SURGERY  2007    Radiation only     s/p lumbar laminectomy NOS  1988     SIGMOIDOSCOPY FLEXIBLE N/A 06/15/2020    Procedure: SIGMOIDOSCOPY, FLEXIBLE;  Surgeon: Sunni Patton MD;  Location: [facility]     VITRECTOMY PARS PLANA REMOVE PRERETINAL MEMBRANE   2009      Allergies   Allergen Reactions     Augmentin Rash     Type III hypersensitivity     Bactrim [Sulfamethoxazole  "W/Trimethoprim] Rash     Serum sickness, type III hypersensitivity       Bee Venom Itching     Sulfa Drugs Hives     Celebrex [Celecoxib]      Lisinopril Cough     Naproxen Hives      Social History     Tobacco Use     Smoking status: Former     Packs/day: 1.00     Years: 30.00     Pack years: 30.00     Types: Cigarettes     Start date: 1948     Quit date: 1978     Years since quittin.0     Smokeless tobacco: Never   Substance Use Topics     Alcohol use: Not Currently     Comment: occ \"like once a month\"      Wt Readings from Last 1 Encounters:   23 105.7 kg (233 lb)        Anesthesia Evaluation   Pt has had prior anesthetic.     No history of anesthetic complications       ROS/MED HX  ENT/Pulmonary:     (+) sleep apnea, moderate, uses CPAP, tobacco use, Past use, moderate,  COPD,     Neurologic:  - neg neurologic ROS     Cardiovascular:     (+) Dyslipidemia hypertension-Peripheral Vascular Disease-- Non Symptomatic. CAD ---Taking blood thinners Pt has received instructions: dysrhythmias, a-fib, valvular problems/murmurs pulmonary hypertension, Previous cardiac testing   Echo: Date:  Results:  There is mild eccentric left ventricular hypertrophy.  The visual ejection fraction is 65-70%.  The left atrium is moderate to severely dilated.  There is mild (1+) tricuspid regurgitation.  Mild to moderate aortic root dilatation.  The ascending aorta is Moderately to severely dilated.  Right ventricular systolic pressure is elevated, consistent with mild to  moderate pulmonary hypertension.  As compared with study from , aortic dimensions and PA pressures have  increased.  The study was technically difficult. Contrast was used without apparent  complications.  Stress Test: Date: Results:    ECG Reviewed: Date: Results:    Cath: Date: Results:      METS/Exercise Tolerance:     Hematologic:       Musculoskeletal:       GI/Hepatic:  - neg GI/hepatic ROS     Renal/Genitourinary:     (+) renal disease, " type: CRI, Pt does not require dialysis,     Endo:     (+) Obesity,     Psychiatric/Substance Use:     (+) psychiatric history depression     Infectious Disease:  - neg infectious disease ROS     Malignancy:   (+) Malignancy, History of Prostate.Prostate CA Remission status post.        Other:            Physical Exam    Airway        Mallampati: II   TM distance: > 3 FB   Neck ROM: full   Mouth opening: > 3 cm    Respiratory Devices and Support         Dental  no notable dental history         Cardiovascular          Rhythm and rate: regular and normal     Pulmonary   pulmonary exam normal                OUTSIDE LABS:  CBC:   Lab Results   Component Value Date    WBC 8.0 12/04/2022    WBC 5.3 09/03/2022    HGB 13.9 12/04/2022    HGB 13.7 09/03/2022    HCT 46.0 12/04/2022    HCT 44.5 09/03/2022     12/04/2022     09/03/2022     BMP:   Lab Results   Component Value Date     12/04/2022     07/15/2022    POTASSIUM 4.5 12/04/2022    POTASSIUM 4.6 07/15/2022    CHLORIDE 102 12/04/2022    CHLORIDE 112 (H) 07/15/2022    CO2 23 12/04/2022    CO2 21 07/15/2022    BUN 22.9 12/04/2022    BUN 36 (H) 07/15/2022    CR 1.12 12/04/2022    CR 1.1 10/20/2022     (H) 12/04/2022     (H) 07/15/2022     COAGS:   Lab Results   Component Value Date    INR 1.03 12/03/2018     POC: No results found for: BGM, HCG, HCGS  HEPATIC:   Lab Results   Component Value Date    ALBUMIN 3.4 08/25/2021    PROTTOTAL 7.3 08/25/2021    ALT 31 08/25/2021    AST 31 08/25/2021    ALKPHOS 64 08/25/2021    BILITOTAL 0.7 08/25/2021     OTHER:   Lab Results   Component Value Date    A1C 6.0 (H) 10/22/2021    MARLENE 9.5 12/04/2022    PHOS 4.3 10/22/2021    MAG 2.4 (H) 10/22/2021    TSH 3.35 01/12/2021    CRP 0.6 12/03/2007    SED 13 12/03/2007       Anesthesia Plan    ASA Status:  3   NPO Status:  NPO Appropriate    Anesthesia Type: General.     - Airway: ETT   Induction: Intravenous.   Maintenance: Balanced.   Techniques and  Equipment:       - Drips/Meds: Dexmed. infusion     Consents    Anesthesia Plan(s) and associated risks, benefits, and realistic alternatives discussed. Questions answered and patient/representative(s) expressed understanding.    - Discussed:     - Discussed with:  Patient      - Extended Intubation/Ventilatory Support Discussed: No.      - Patient is DNR/DNI Status: No    Use of blood products discussed: No .     Postoperative Care    Pain management: IV analgesics, Oral pain medications, Multi-modal analgesia.     - Plan for long acting post-op opioid use   PONV prophylaxis: Ondansetron (or other 5HT-3), Dexamethasone or Solumedrol     Comments:                Jesse Hansen MD

## 2023-01-06 NOTE — ANESTHESIA PROCEDURE NOTES
Airway       Patient location during procedure: OR       Procedure Start/Stop Times: 1/6/2023 12:28 PM  Staff -        CRNA: Tien Hooper APRN CRNA       Performed By: CRNA  Consent for Airway        Urgency: elective  Indications and Patient Condition       Indications for airway management: abby-procedural       Induction type:intravenous       Mask difficulty assessment: 1 - vent by mask    Final Airway Details       Final airway type: endotracheal airway       Successful airway: ETT - single and Oral  Endotracheal Airway Details        ETT size (mm): 8.0       Cuffed: yes       Successful intubation technique: direct laryngoscopy       Grade View of Cords: 2       Adjucts: stylet       Position: Right       Measured from: gums/teeth       Secured at (cm): 24       Bite block used: None    Post intubation assessment        Placement verified by: capnometry, equal breath sounds and chest rise        Number of attempts at approach: 1       Secured with: plastic tape       Ease of procedure: easy       Dentition: Intact    Medication(s) Administered   Medication Administration Time: 1/6/2023 12:28 PM

## 2023-01-06 NOTE — ANESTHESIA POSTPROCEDURE EVALUATION
Patient: Nixon More    Procedure: Procedure(s):  Cystoscopy with transurethral resection of bladder tumor       Anesthesia Type:  General    Note:  Disposition: Outpatient   Postop Pain Control: Uneventful            Sign Out: Well controlled pain   PONV: No   Neuro/Psych: Uneventful            Sign Out: Acceptable/Baseline neuro status   Airway/Respiratory: Uneventful            Sign Out: Acceptable/Baseline resp. status   CV/Hemodynamics: Uneventful            Sign Out: Acceptable CV status   Other NRE: NONE   DID A NON-ROUTINE EVENT OCCUR? No           Last vitals:  Vitals Value Taken Time   /77 01/06/23 1315   Temp 97  F (36.1  C) 01/06/23 1313   Pulse 103 01/06/23 1330   Resp 7 01/06/23 1330   SpO2 94 % 01/06/23 1328   Vitals shown include unvalidated device data.    Electronically Signed By: Jesse Hansen MD  January 6, 2023  3:07 PM

## 2023-01-06 NOTE — PROVIDER NOTIFICATION
Pt and daughter were educated on discharge criteria. Pt and daughter understood the use of the IS, the at home oximeter, and using the patient's BIPAP when asleep (even during naps). Patient maintained sats above 92% on RA for 10 minutes.  Pt and daughter educated on signs and symptoms of hypoxia and if any issues to return to the ER.   Pt's daughter is a DNP and will be staying with the patient overnight. Both verbalized understanding of instructions.    Ayla Balderas RN on 1/6/2023 at 3:27 PM

## 2023-01-06 NOTE — OP NOTE
Procedure Date: 01/06/2023    ATTENDING SURGEON:  Mark Pino MD    PREOPERATIVE DIAGNOSIS:  Bladder tumor, medium, 2-5 cm.    POSTOPERATIVE DIAGNOSIS:  Bladder tumor, medium, 2-5 cm.    PROCEDURE PERFORMED:  Cystoscopy, transurethral resection of bladder tumor, medium, 2-5 cm.    ANESTHESIA:  General.    COMPLICATIONS:  None.    INDICATIONS FOR PROCEDURE:  Nixon More is an 87-year-old gentleman with a history of prostate radiotherapy for prostate cancer.  He recently presented with a new papillary bladder tumor on the right side of the bladder.  He now presents for transurethral resection of bladder tumor.    DESCRIPTION OF PROCEDURE:  The risks and benefits of the procedure were explained in detail to the patient and informed consent was obtained.  The patient was brought to the operating room and placed supine on the table.  He underwent general endotracheal anesthetic.  He was then moved down to the dorsal lithotomy position, where he was prepped and draped in the standard sterile fashion.    The procedure began by introducing the 26-Belizean resectoscope sheath using the visual obturator.  Of note, I used urethral sounds to dilate the meatus slightly before I could introduce the scope.  I performed a complete cystoscopy.  I identified each ureteral orifice.  The patient had fairly severe radiation changes throughout the bladder neck.  On the right side of the trigone just above and lateral to the right ureteral orifice, there were about 3 small papillary bladder tumors that were contiguous with one another.  The rest of the bladder showed no other tumors.  I used the cautery loop to resect out these tumors in their entirety.  I cauterized the base.  Once good hemostasis had been achieved, I passed a Glidewire into the bladder and backloaded off the scope.  Over the Glidewire, I passed an 18-[place]an [employer] [place] tip catheter.  The balloon was inflated.  The catheter was left to down drain.  The procedure was  concluded at this point.    The patient tolerated the procedure well without complications.  He went to the postanesthetic unit in good condition.  He will go home from there.  He will remove his own Arvizu catheter on Monday morning, 2023.    Mark Pino MD        D: 2023   T: 2023   MT: CARLA    Name:     YULISA BYRD  MRN:      [id number]        Account:        [id number]   :      1935           Procedure Date: 2023     Document: [id number]

## 2023-01-06 NOTE — ANESTHESIA CARE TRANSFER NOTE
Patient: Nixon More    Procedure: Procedure(s):  Cystoscopy with transurethral resection of bladder tumor       Diagnosis: Bladder tumor [D49.4]  Diagnosis Additional Information: No value filed.    Anesthesia Type:   General     Note:    Oropharynx: spontaneously breathing and oral airway in place  Level of Consciousness: drowsy  Oxygen Supplementation: face mask  Level of Supplemental Oxygen (L/min / FiO2): 6  Independent Airway: airway patency satisfactory and stable  Dentition: dentition unchanged  Vital Signs Stable: post-procedure vital signs reviewed and stable  Report to RN Given: handoff report given  Patient transferred to: PACU    Handoff Report: Identifed the Patient, Identified the Reponsible Provider, Reviewed the pertinent medical history, Discussed the surgical course, Reviewed Intra-OP anesthesia mangement and issues during anesthesia, Set expectations for post-procedure period and Allowed opportunity for questions and acknowledgement of understanding      Vitals:  Vitals Value Taken Time   /113 01/06/23 1258   Temp     Pulse 127 01/06/23 1259   Resp 22 01/06/23 1259   SpO2 95 % 01/06/23 1259   Vitals shown include unvalidated device data.    Electronically Signed By: LORELEI Mari CRNA  January 6, 2023  1:01 PM

## 2023-01-09 LAB
PATH REPORT.COMMENTS IMP SPEC: NORMAL
PATH REPORT.COMMENTS IMP SPEC: NORMAL
PATH REPORT.FINAL DX SPEC: NORMAL
PATH REPORT.GROSS SPEC: NORMAL
PATH REPORT.MICROSCOPIC SPEC OTHER STN: NORMAL
PATH REPORT.RELEVANT HX SPEC: NORMAL
PHOTO IMAGE: NORMAL

## 2023-01-11 ENCOUNTER — TELEPHONE (OUTPATIENT)
Dept: UROLOGY | Facility: CLINIC | Age: 88
End: 2023-01-11

## 2023-01-20 ENCOUNTER — TELEPHONE (OUTPATIENT)
Dept: UROLOGY | Facility: CLINIC | Age: 88
End: 2023-01-20
Payer: COMMERCIAL

## 2023-01-20 NOTE — TELEPHONE ENCOUNTER
Left message on ,phone went straight to . I left a message stating this can be normal,watch for worsening sxs.  Yuliya Iqbal LPN

## 2023-01-20 NOTE — TELEPHONE ENCOUNTER
[facility] Call Center    Phone Message    May a detailed message be left on voicemail: no     Reason for Call: Pt called to state that he has a small amount of blood in his urine after Cystoscopy but he believes it is not urgent. Pt would like a call back to further discuss. Thank you.    Action Taken: Other: URO    Travel Screening: Not Applicable

## 2023-01-23 ENCOUNTER — TELEPHONE (OUTPATIENT)
Dept: UROLOGY | Facility: CLINIC | Age: 88
End: 2023-01-23
Payer: COMMERCIAL

## 2023-01-23 DIAGNOSIS — R30.0 DYSURIA: Primary | ICD-10-CM

## 2023-01-23 NOTE — TELEPHONE ENCOUNTER
[facility] Call Center    Phone Message    May a detailed message be left on voicemail: yes     Reason for Call: Symptoms or Concerns     If patient has red-flag symptoms, warm transfer to triage line    Current symptom or concern: Pt had surgery with Alvarez on 1/6 pt is reporting small amount of blood in urine, frequent urination, and slight pain when he urinates    Symptoms have been present for:  5 day(s)    Has patient previously been seen for this? No      Are there any new or worsening symptoms? Yes:       Action Taken: Message routed to:  Other: Uro    Travel Screening: Not Applicable

## 2023-01-23 NOTE — TELEPHONE ENCOUNTER
"Spoke with patient and he thinks that he took his blood thinner \"wrong\",he feels like his other sxs are improving.  I advised him to push fluids and avoid bladder irritants.Orders placed if his sxs owrsen or change her will schedule a lab visit for UA/UC.  Yuliya Iqbal LPN  "

## 2023-01-25 ENCOUNTER — LAB (OUTPATIENT)
Dept: LAB | Facility: CLINIC | Age: 88
End: 2023-01-25
Payer: COMMERCIAL

## 2023-01-25 DIAGNOSIS — N18.30 CKD (CHRONIC KIDNEY DISEASE) STAGE 3, GFR 30-59 ML/MIN (H): Primary | ICD-10-CM

## 2023-01-25 DIAGNOSIS — R30.0 DYSURIA: ICD-10-CM

## 2023-01-25 LAB
ALBUMIN UR-MCNC: >=300 MG/DL
APPEARANCE UR: ABNORMAL
BILIRUB UR QL STRIP: ABNORMAL
COLOR UR AUTO: ABNORMAL
GLUCOSE UR STRIP-MCNC: NEGATIVE MG/DL
HGB UR QL STRIP: ABNORMAL
KETONES UR STRIP-MCNC: NEGATIVE MG/DL
LEUKOCYTE ESTERASE UR QL STRIP: ABNORMAL
NITRATE UR QL: NEGATIVE
PH UR STRIP: 5.5 [PH] (ref 5–7)
SP GR UR STRIP: 1.02 (ref 1–1.03)
UROBILINOGEN UR STRIP-ACNC: 0.2 E.U./DL

## 2023-01-25 PROCEDURE — 87086 URINE CULTURE/COLONY COUNT: CPT | Performed by: UROLOGY

## 2023-01-25 PROCEDURE — 81003 URINALYSIS AUTO W/O SCOPE: CPT | Mod: QW

## 2023-01-27 LAB — BACTERIA UR CULT: NORMAL

## 2023-01-30 ENCOUNTER — LAB (OUTPATIENT)
Dept: LAB | Facility: CLINIC | Age: 88
End: 2023-01-30
Payer: COMMERCIAL

## 2023-01-30 ENCOUNTER — OFFICE VISIT (OUTPATIENT)
Dept: CARDIOLOGY | Facility: CLINIC | Age: 88
End: 2023-01-30
Payer: COMMERCIAL

## 2023-01-30 VITALS
DIASTOLIC BLOOD PRESSURE: 78 MMHG | WEIGHT: 236 LBS | SYSTOLIC BLOOD PRESSURE: 116 MMHG | BODY MASS INDEX: 35.77 KG/M2 | HEART RATE: 96 BPM | HEIGHT: 68 IN

## 2023-01-30 DIAGNOSIS — I77.810 AORTIC ROOT DILATATION (H): ICD-10-CM

## 2023-01-30 DIAGNOSIS — E78.5 DYSLIPIDEMIA: ICD-10-CM

## 2023-01-30 DIAGNOSIS — R31.9 HEMATURIA, UNSPECIFIED TYPE: ICD-10-CM

## 2023-01-30 DIAGNOSIS — I10 BENIGN ESSENTIAL HYPERTENSION: ICD-10-CM

## 2023-01-30 DIAGNOSIS — N18.31 STAGE 3A CHRONIC KIDNEY DISEASE (H): ICD-10-CM

## 2023-01-30 DIAGNOSIS — I48.19 PERSISTENT ATRIAL FIBRILLATION (H): ICD-10-CM

## 2023-01-30 DIAGNOSIS — J43.8 OTHER EMPHYSEMA (H): ICD-10-CM

## 2023-01-30 DIAGNOSIS — I48.92 ATRIAL FLUTTER, CHRONIC (H): ICD-10-CM

## 2023-01-30 DIAGNOSIS — I25.10 CORONARY ARTERY DISEASE INVOLVING NATIVE CORONARY ARTERY OF NATIVE HEART WITHOUT ANGINA PECTORIS: ICD-10-CM

## 2023-01-30 DIAGNOSIS — I50.32 CHRONIC DIASTOLIC HEART FAILURE (H): ICD-10-CM

## 2023-01-30 DIAGNOSIS — I48.92 ATRIAL FLUTTER, CHRONIC (H): Primary | ICD-10-CM

## 2023-01-30 DIAGNOSIS — I77.9 AORTA DISORDER (H): ICD-10-CM

## 2023-01-30 LAB
BASOPHILS # BLD AUTO: 0.1 10E3/UL (ref 0–0.2)
BASOPHILS NFR BLD AUTO: 1 %
EOSINOPHIL # BLD AUTO: 0.4 10E3/UL (ref 0–0.7)
EOSINOPHIL NFR BLD AUTO: 7 %
ERYTHROCYTE [DISTWIDTH] IN BLOOD BY AUTOMATED COUNT: 15.2 % (ref 10–15)
HCT VFR BLD AUTO: 41.7 % (ref 40–53)
HGB BLD-MCNC: 13.1 G/DL (ref 13.3–17.7)
IMM GRANULOCYTES # BLD: 0 10E3/UL
IMM GRANULOCYTES NFR BLD: 0 %
LYMPHOCYTES # BLD AUTO: 1.2 10E3/UL (ref 0.8–5.3)
LYMPHOCYTES NFR BLD AUTO: 21 %
MCH RBC QN AUTO: 28.7 PG (ref 26.5–33)
MCHC RBC AUTO-ENTMCNC: 31.4 G/DL (ref 31.5–36.5)
MCV RBC AUTO: 91 FL (ref 78–100)
MONOCYTES # BLD AUTO: 0.5 10E3/UL (ref 0–1.3)
MONOCYTES NFR BLD AUTO: 9 %
NEUTROPHILS # BLD AUTO: 3.7 10E3/UL (ref 1.6–8.3)
NEUTROPHILS NFR BLD AUTO: 62 %
NRBC # BLD AUTO: 0 10E3/UL
NRBC BLD AUTO-RTO: 0 /100
PLATELET # BLD AUTO: 201 10E3/UL (ref 150–450)
RBC # BLD AUTO: 4.57 10E6/UL (ref 4.4–5.9)
WBC # BLD AUTO: 5.9 10E3/UL (ref 4–11)

## 2023-01-30 PROCEDURE — 99215 OFFICE O/P EST HI 40 MIN: CPT | Performed by: NURSE PRACTITIONER

## 2023-01-30 PROCEDURE — 85025 COMPLETE CBC W/AUTO DIFF WBC: CPT | Performed by: NURSE PRACTITIONER

## 2023-01-30 PROCEDURE — 36415 COLL VENOUS BLD VENIPUNCTURE: CPT | Performed by: NURSE PRACTITIONER

## 2023-01-30 NOTE — PATIENT INSTRUCTIONS
Thank you for your visit with the [facility] today.    Today's plan:   Check labs after the visit today.  Hold Eliquis for now.   Follow up with Dr. Pino to discuss the blood in the urine.   Follow up with Dr. De La Paz in about 6 months with a repeat echocardiogram beforehand.     If you have questions or concerns, please do not hesitate to call my nurse team at [phone].     Scheduling phone number: [phone]    It was a pleasure seeing you today!     LORELEI Truong, CNP  Nurse Practitioner  [facility]  January 30, 2023  ________________________________________________________

## 2023-01-30 NOTE — LETTER
"1/30/2023    Natalya Lewis MD  [address] [place] [address]    RE: Nixon RILEY Argenis       Dear Colleague,     I had the pleasure of seeing [facility] in the [facility].    Cardiology Clinic Progress Note    Service Date: January 30, 2023    Primary Cardiologist: Dr. De La Paz      Reason for Visit: 6 month follow up    HPI:   I had the pleasure of meeting Mr. Alicea \"Spencer\" Argenis in the clinic today. He is a very pleasant 87 year old male with a past medical history notable for permanent atrial flutter on anticoagulation and rate control, chronic dyspnea on exertion with COPD due to 30 years of smoking (quit in 2000), possible component of HFpEF, presumed CAD on the basis of a mildly abnormal stress test 2011 treated conservatively, mild dilation of the ascending aorta, hypertension, sleep apnea, peripheral venous insufficiency, dyslipidemia, and prostate cancer s/p radiation. 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    The patient had followed with Dr. De La Paz for his cardiology care. He was last seen in clinic in August 2022 and was doing well from a cardiac standpoint at that time. He was in the ER in July 2022 following an episode of sudden onset of shortness of breath at rest in the setting of bradycardia with heart rates in the 30s. Symptoms had essentially resolved and his pulse was back to normal once he got to the ER. He wore a 7-day Holter monitor that showed an average heart rate of 67 bpm with 71 pauses greater than 2 seconds the longest being 4.7 seconds and asymptomatic. His fastest heart rate was 141. His metoprolol succinate dose was decreased from 100 mg daily to 50 mg daily with no recurrence of his symptoms since that medication change. Diltiazem  mg once daily was also stopped at that time.    In the interim, the patient with in the Emergency " Department on 12/04/22 for fatigue, vomiting, cough, and sore throat. He was found to be COVID positive. He was tachycardic in atrial flutter at that time with heart rates in the 100-130 bpm range, but this resolved with rates improving after receiving IV fluids. He was started on Lagevrio (Molnupiravir) given high risk for severe COVID.     Today, Mr. More presents to the clinic in routine follow up. He is accompanied by his daughter Amber who is an NP via speaker phone. He tells me that he has been feeling generally well over the past few months. He continues to have mild exertional dyspnea chronically with his known history of COPD and possibly mild component of HFpEF and deconditioning.  He feels that symptoms overall have been stable over the past few months.  He also has mild lower extremity edema which he feels has been stable.  He otherwise denies symptoms of chest pain, palpitations, dizziness, or lightheadedness.  He has had mild hematuria intermittently following his bladder tumor resection earlier this month. This has been more prominent just over the past week. He reviewed this with his daughter and decided to hold Eliquis for the last 3 doses now.  His urine has cleared up some since holding Eliquis.  The patient and his daughter have a message out to his urologist for further guidance regarding this as well.    ASSESSMENT:  1. Chronic atrial flutter  2. Chronic HFpEF  3. Aortic root dilatation   4. Presumed coronary artery disease on the basis of mildly abnormal stress test in 2011, stable without anginal symptoms  5. Dyslipidemia  6. Essential hypertension  7. Stage 3 chronic kidney disease   8. COPD  9. History of prostate cancer s/p radiation with subsequently found bladder tumor recently s/p resection 1/6/23  10. Hematuria post procedurally follow recent bladder tumor infection    PLAN:  - Okay to continue to hold apixaban for now given hematuria. Recommend he review with urology for further  guidance. Could consider Watchman device placement if continued issues with bleeding/hematuria but suspect this may heal and resolve with a short duration of holding apixaban.  - Will check a CBC following the visit today.   - Continue rate control for A.Fib/Flutter with metoprolol 100 mg once daily.  - Follow up with Dr. De La Paz in about 6 months with a repeat echocardiogram beforehand.    Thank you for the opportunity to participate in this pleasant patient's care. We would be happy to see him sooner if needed for any concerns in the meantime.     40 total minutes was spent today including chart review, precharting, history and exam, post visit documentation, and reviewing studies as outlined above.     LORELEI Truong, CNP   Nurse Practitioner  [facility] [place] [facility]  Pager: [phone]  Text Page  (8am - 5pm, M-F)    Orders this Visit:  No orders of the defined types were placed in this encounter.    No orders of the defined types were placed in this encounter.    There are no discontinued medications.  Encounter Diagnoses   Name Primary?     Atrial flutter, chronic (H) Yes     Chronic diastolic heart failure (H)      Aortic root dilatation (H)      Coronary artery disease involving native coronary artery of native heart without angina pectoris      Dyslipidemia      Benign essential hypertension      Stage 3a chronic kidney disease (H)      Other emphysema (H)      Persistent atrial fibrillation (H)      Aorta disorder (H)        CURRENT MEDICATIONS:  Current Outpatient Medications   Medication Sig Dispense Refill     albuterol (PROAIR HFA/PROVENTIL HFA/VENTOLIN HFA) 108 (90 Base) MCG/ACT inhaler Inhale 2 puffs into the lungs every 4 hours as needed for shortness of breath or wheezing Or prior to physical activity 8 g 0     atorvastatin (LIPITOR) 40 MG tablet TAKE 1 TABLET BY MOUTH EVERY DAY 90 tablet 0     Cholecalciferol (VITAMIN D-3) 25 MCG (1000 UT) CAPS Take by mouth daily       ELIQUIS  "ANTICOAGULANT 5 MG tablet TAKE 1 TABLET BY MOUTH TWICE A  tablet 3     escitalopram (LEXAPRO) 10 MG tablet TAKE 1 TABLET BY MOUTH EVERY DAY 90 tablet 0     Loperamide HCl (IMODIUM OR) Take by mouth daily as needed       losartan (COZAAR) 100 MG tablet TAKE 1 TABLET BY MOUTH EVERY DAY 90 tablet 0     metoprolol succinate ER (TOPROL XL) 50 MG 24 hr tablet Take 2 tablets (100 mg) by mouth daily 180 tablet 3     RISEdronate (ACTONEL) 35 MG tablet TAKE 1 TABLET (35 MG) BY MOUTH EVERY 7 DAYS 12 tablet 3     spironolactone (ALDACTONE) 25 MG tablet Take 0.5 tablets (12.5 mg) by mouth daily 45 tablet 3     ASPIRIN NOT PRESCRIBED (INTENTIONAL) continuous prn for other Antiplatelet medication not prescribed intentionally due to Current anticoagulant therapy (warfarin/enoxaparin) (Patient not taking: Reported on 1/3/2023)         ALLERGIES  Allergies   Allergen Reactions     Augmentin Rash     Type III hypersensitivity     Bactrim [Sulfamethoxazole W/Trimethoprim] Rash     Serum sickness, type III hypersensitivity       Bee Venom Itching     Sulfa Drugs Hives     Celebrex [Celecoxib]      Lisinopril Cough     Naproxen Hives       PAST MEDICAL, SURGICAL, FAMILY HISTORY:  History was reviewed and updated as needed, see medical record.    SOCIAL HISTORY:  Social History     Socioeconomic History     Marital status:      Spouse name: Not on file     Number of children: Not on file     Years of education: Not on file     Highest education level: Not on file   Occupational History     Occupation: retired   Tobacco Use     Smoking status: Former     Packs/day: 1.00     Years: 30.00     Pack years: 30.00     Types: Cigarettes     Start date: 1948     Quit date: 1978     Years since quittin.1     Smokeless tobacco: Never   Vaping Use     Vaping Use: Never used   Substance and Sexual Activity     Alcohol use: Yes     Comment: occ \"like once a month\" or less     Drug use: No     Sexual activity: Not Currently    "  Partners: Female     Birth control/protection: Post-menopausal   Other Topics Concern      Service Not Asked     Blood Transfusions Not Asked     Caffeine Concern No     Comment: 0-2 cans of soda per day.     Occupational Exposure Not Asked     Hobby Hazards Not Asked     Sleep Concern Not Asked     Stress Concern Not Asked     Weight Concern Not Asked     Special Diet Not Asked     Back Care Not Asked     Exercise No     Bike Helmet Not Asked     Seat Belt Yes     Self-Exams Not Asked     Parent/sibling w/ CABG, MI or angioplasty before 65F 55M? No   Social History Narrative     Not on file     Social Determinants of Health     Financial Resource Strain: Low Risk      Difficulty of Paying Living Expenses: Not hard at all   Food Insecurity: No Food Insecurity     Worried About Running Out of Food in the Last Year: Never true     Ran Out of Food in the Last Year: Never true   Transportation Needs: No Transportation Needs     Lack of Transportation (Medical): No     Lack of Transportation (Non-Medical): No   Physical Activity: Insufficiently Active     Days of Exercise per Week: 1 day     Minutes of Exercise per Session: 10 min   Stress: No Stress Concern Present     Feeling of Stress : Not at all   Social Connections: Moderately Integrated     Frequency of Communication with Friends and Family: Three times a week     Frequency of Social Gatherings with Friends and Family: Twice a week     Attends Evangelical Services: More than 4 times per year     Active Member of Clubs or Organizations: Yes     Attends Club or Organization Meetings: Not on file     Marital Status:    Intimate Partner Violence: Not At Risk     Fear of Current or Ex-Partner: No     Emotionally Abused: No     Physically Abused: No     Sexually Abused: No   Housing Stability: Low Risk      Unable to Pay for Housing in the Last Year: No     Number of Places Lived in the Last Year: 2     Unstable Housing in the Last Year: No     Review of  "Systems:  Skin:  not assessed     Eyes:  not assessed    ENT:  not assessed    Respiratory:  Positive for dyspnea on exertion;sleep apnea  Cardiovascular:    Positive for;lightheadedness;edema  Gastroenterology: not assessed    Genitourinary:  not assessed    Musculoskeletal:  not assessed    Neurologic:  not assessed    Psychiatric:  not assessed    Heme/Lymph/Imm:  not assessed    Endocrine:  not assessed       Physical Exam:  Vitals: /78   Pulse 96   Ht 1.715 m (5' 7.5\")   Wt 107 kg (236 lb)   BMI 36.42 kg/m     Wt Readings from Last 4 Encounters:   01/30/23 107 kg (236 lb)   01/06/23 105.7 kg (233 lb)   01/03/23 107 kg (235 lb 12.8 oz)   12/21/22 108 kg (238 lb)     CONSTITUTIONAL: Appears his stated age, well nourished, and in no acute distress.  HEENT: Pupils equal, round. No scleral icterus.  C/V:  Regular rate and rhythm, normal S1 and S2, no S3 or S4, no murmur, rub or gallop.   RESP: Respirations are unlabored. Lungs are clear to auscultation with no wheezes or crackles bilaterally.  EXTREM: There is trace LE edema bilaterally, L>R.   NEURO: Alert and oriented, cooperative. No gross focal deficits.   PSYCH: Affect appropriate. Mentation normal.   SKIN: Warm and dry. No apparent rashes or bruising.    Recent Lab Results:  LIPID RESULTS:  Lab Results   Component Value Date    CHOL 122 09/03/2022    CHOL 121 05/17/2021    HDL 37 (L) 09/03/2022    HDL 39 (L) 05/17/2021    LDL 65 09/03/2022    LDL 55 05/17/2021    TRIG 99 09/03/2022    TRIG 137 05/17/2021    CHOLHDLRATIO 2.8 10/15/2015     LIVER ENZYME RESULTS:  Lab Results   Component Value Date    AST 31 08/25/2021    AST 32 01/12/2021    ALT 31 08/25/2021    ALT 33 01/12/2021     CBC RESULTS:  Lab Results   Component Value Date    WBC 8.0 12/04/2022    WBC 5.6 06/18/2021    RBC 4.85 12/04/2022    RBC 4.38 (L) 06/18/2021    HGB 13.9 12/04/2022    HGB 12.8 (L) 06/18/2021    HCT 46.0 12/04/2022    HCT 41.1 06/18/2021    MCV 95 12/04/2022    MCV 94 " 06/18/2021    MCH 28.7 12/04/2022    MCH 29.2 06/18/2021    MCHC 30.2 (L) 12/04/2022    MCHC 31.1 (L) 06/18/2021    RDW 14.6 12/04/2022    RDW 14.1 06/18/2021     12/04/2022     06/18/2021     BMP RESULTS:  Lab Results   Component Value Date     12/04/2022     06/18/2021    POTASSIUM 4.5 12/04/2022    POTASSIUM 4.6 07/15/2022    POTASSIUM 4.4 06/18/2021    CHLORIDE 102 12/04/2022    CHLORIDE 112 (H) 07/15/2022    CHLORIDE 109 06/18/2021    CO2 23 12/04/2022    CO2 21 07/15/2022    CO2 29 06/18/2021    ANIONGAP 12 12/04/2022    ANIONGAP 7 07/15/2022    ANIONGAP 4 06/18/2021     (H) 12/04/2022     (H) 07/15/2022     (H) 06/18/2021    BUN 22.9 12/04/2022    BUN 36 (H) 07/15/2022    BUN 28 06/18/2021    CR 1.12 12/04/2022    CR 1.44 (H) 06/18/2021    GFRESTIMATED 64 12/04/2022    GFRESTIMATED >60 10/20/2022    GFRESTIMATED 44 (L) 06/18/2021    GFRESTBLACK 51 (L) 06/18/2021    MARLENE 9.5 12/04/2022    MARLENE 9.5 06/18/2021      A1C RESULTS:  Lab Results   Component Value Date    A1C 6.0 (H) 10/22/2021    A1C 5.6 11/11/2020     INR RESULTS:  Lab Results   Component Value Date    INR 1.03 12/03/2018    INR 1.07 11/27/2007     CC  Vinod De La Paz MD  [address] [place] [id number]    This note was completed in part using Dragon voice recognition software. Although reviewed after completion, some word and grammatical errors may occur.    Thank you for allowing me to participate in the care of your patient.      Sincerely,     Domingo Gil NP     [facility]

## 2023-01-30 NOTE — PROGRESS NOTES
"  Cardiology Clinic Progress Note    Service Date: January 30, 2023    Primary Cardiologist: Dr. De La Paz      Reason for Visit: 6 month follow up    HPI:   I had the pleasure of meeting Mr. Alicea \"Spencer\" Argenis in the clinic today. He is a very pleasant 87 year old male with a past medical history notable for permanent atrial flutter on anticoagulation and rate control, chronic dyspnea on exertion with COPD due to 30 years of smoking (quit in 2000), possible component of HFpEF, presumed CAD on the basis of a mildly abnormal stress test 2011 treated conservatively, mild dilation of the ascending aorta, hypertension, sleep apnea, peripheral venous insufficiency, dyslipidemia, and prostate cancer s/p radiation. 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    The patient had followed with Dr. De La Paz for his cardiology care. He was last seen in clinic in August 2022 and was doing well from a cardiac standpoint at that time. He was in the ER in July 2022 following an episode of sudden onset of shortness of breath at rest in the setting of bradycardia with heart rates in the 30s. Symptoms had essentially resolved and his pulse was back to normal once he got to the ER. He wore a 7-day Holter monitor that showed an average heart rate of 67 bpm with 71 pauses greater than 2 seconds the longest being 4.7 seconds and asymptomatic. His fastest heart rate was 141. His metoprolol succinate dose was decreased from 100 mg daily to 50 mg daily with no recurrence of his symptoms since that medication change. Diltiazem  mg once daily was also stopped at that time.    In the interim, the patient with in the Emergency Department on 12/04/22 for fatigue, vomiting, cough, and sore throat. He was found to be COVID positive. He was tachycardic in atrial flutter at that time with heart rates in the 100-130 bpm range, but this resolved " with rates improving after receiving IV fluids. He was started on Lagevrio (Molnupiravir) given high risk for severe COVID.     Today, Mr. More presents to the clinic in routine follow up. He is accompanied by his daughter mAber who is an NP via speaker phone. He tells me that he has been feeling generally well over the past few months. He continues to have mild exertional dyspnea chronically with his known history of COPD and possibly mild component of HFpEF and deconditioning.  He feels that symptoms overall have been stable over the past few months.  He also has mild lower extremity edema which he feels has been stable.  He otherwise denies symptoms of chest pain, palpitations, dizziness, or lightheadedness.  He has had mild hematuria intermittently following his bladder tumor resection earlier this month. This has been more prominent just over the past week. He reviewed this with his daughter and decided to hold Eliquis for the last 3 doses now.  His urine has cleared up some since holding Eliquis.  The patient and his daughter have a message out to his urologist for further guidance regarding this as well.    ASSESSMENT:  1. Chronic atrial flutter  2. Chronic HFpEF  3. Aortic root dilatation   4. Presumed coronary artery disease on the basis of mildly abnormal stress test in 2011, stable without anginal symptoms  5. Dyslipidemia  6. Essential hypertension  7. Stage 3 chronic kidney disease   8. COPD  9. History of prostate cancer s/p radiation with subsequently found bladder tumor recently s/p resection 1/6/23  10. Hematuria post procedurally follow recent bladder tumor infection    PLAN:  - Okay to continue to hold apixaban for now given hematuria. Recommend he review with urology for further guidance. Could consider Watchman device placement if continued issues with bleeding/hematuria but suspect this may heal and resolve with a short duration of holding apixaban.  - Will check a CBC following the visit  today.   - Continue rate control for A.Fib/Flutter with metoprolol 100 mg once daily.  - Follow up with Dr. De La Paz in about 6 months with a repeat echocardiogram beforehand.    Thank you for the opportunity to participate in this pleasant patient's care. We would be happy to see him sooner if needed for any concerns in the meantime.     40 total minutes was spent today including chart review, precharting, history and exam, post visit documentation, and reviewing studies as outlined above.     LORELEI Truong, CNP   Nurse Practitioner  [facility]  Pager: [phone]  Text Page  (8am - 5pm, M-F)    Orders this Visit:  No orders of the defined types were placed in this encounter.    No orders of the defined types were placed in this encounter.    There are no discontinued medications.  Encounter Diagnoses   Name Primary?     Atrial flutter, chronic (H) Yes     Chronic diastolic heart failure (H)      Aortic root dilatation (H)      Coronary artery disease involving native coronary artery of native heart without angina pectoris      Dyslipidemia      Benign essential hypertension      Stage 3a chronic kidney disease (H)      Other emphysema (H)      Persistent atrial fibrillation (H)      Aorta disorder (H)        CURRENT MEDICATIONS:  Current Outpatient Medications   Medication Sig Dispense Refill     albuterol (PROAIR HFA/PROVENTIL HFA/VENTOLIN HFA) 108 (90 Base) MCG/ACT inhaler Inhale 2 puffs into the lungs every 4 hours as needed for shortness of breath or wheezing Or prior to physical activity 8 g 0     atorvastatin (LIPITOR) 40 MG tablet TAKE 1 TABLET BY MOUTH EVERY DAY 90 tablet 0     Cholecalciferol (VITAMIN D-3) 25 MCG (1000 UT) CAPS Take by mouth daily       ELIQUIS ANTICOAGULANT 5 MG tablet TAKE 1 TABLET BY MOUTH TWICE A  tablet 3     escitalopram (LEXAPRO) 10 MG tablet TAKE 1 TABLET BY MOUTH EVERY DAY 90 tablet 0     Loperamide HCl (IMODIUM OR) Take by mouth daily as needed    "    losartan (COZAAR) 100 MG tablet TAKE 1 TABLET BY MOUTH EVERY DAY 90 tablet 0     metoprolol succinate ER (TOPROL XL) 50 MG 24 hr tablet Take 2 tablets (100 mg) by mouth daily 180 tablet 3     RISEdronate (ACTONEL) 35 MG tablet TAKE 1 TABLET (35 MG) BY MOUTH EVERY 7 DAYS 12 tablet 3     spironolactone (ALDACTONE) 25 MG tablet Take 0.5 tablets (12.5 mg) by mouth daily 45 tablet 3     ASPIRIN NOT PRESCRIBED (INTENTIONAL) continuous prn for other Antiplatelet medication not prescribed intentionally due to Current anticoagulant therapy (warfarin/enoxaparin) (Patient not taking: Reported on 1/3/2023)         ALLERGIES  Allergies   Allergen Reactions     Augmentin Rash     Type III hypersensitivity     Bactrim [Sulfamethoxazole W/Trimethoprim] Rash     Serum sickness, type III hypersensitivity       Bee Venom Itching     Sulfa Drugs Hives     Celebrex [Celecoxib]      Lisinopril Cough     Naproxen Hives       PAST MEDICAL, SURGICAL, FAMILY HISTORY:  History was reviewed and updated as needed, see medical record.    SOCIAL HISTORY:  Social History     Socioeconomic History     Marital status:      Spouse name: Not on file     Number of children: Not on file     Years of education: Not on file     Highest education level: Not on file   Occupational History     Occupation: retired   Tobacco Use     Smoking status: Former     Packs/day: 1.00     Years: 30.00     Pack years: 30.00     Types: Cigarettes     Start date: 1948     Quit date: 1978     Years since quittin.1     Smokeless tobacco: Never   Vaping Use     Vaping Use: Never used   Substance and Sexual Activity     Alcohol use: Yes     Comment: occ \"like once a month\" or less     Drug use: No     Sexual activity: Not Currently     Partners: Female     Birth control/protection: Post-menopausal   Other Topics Concern      Service Not Asked     Blood Transfusions Not Asked     Caffeine Concern No     Comment: 0-2 cans of soda per day.     " Occupational Exposure Not Asked     Hobby Hazards Not Asked     Sleep Concern Not Asked     Stress Concern Not Asked     Weight Concern Not Asked     Special Diet Not Asked     Back Care Not Asked     Exercise No     Bike Helmet Not Asked     Seat Belt Yes     Self-Exams Not Asked     Parent/sibling w/ CABG, MI or angioplasty before 65F 55M? No   Social History Narrative     Not on file     Social Determinants of Health     Financial Resource Strain: Low Risk      Difficulty of Paying Living Expenses: Not hard at all   Food Insecurity: No Food Insecurity     Worried About Running Out of Food in the Last Year: Never true     Ran Out of Food in the Last Year: Never true   Transportation Needs: No Transportation Needs     Lack of Transportation (Medical): No     Lack of Transportation (Non-Medical): No   Physical Activity: Insufficiently Active     Days of Exercise per Week: 1 day     Minutes of Exercise per Session: 10 min   Stress: No Stress Concern Present     Feeling of Stress : Not at all   Social Connections: Moderately Integrated     Frequency of Communication with Friends and Family: Three times a week     Frequency of Social Gatherings with Friends and Family: Twice a week     Attends [employer] Services: More than 4 times per year     Active Member of Clubs or Organizations: Yes     Attends Club or Organization Meetings: Not on file     Marital Status:    Intimate Partner Violence: Not At Risk     Fear of Current or Ex-Partner: No     Emotionally Abused: No     Physically Abused: No     Sexually Abused: No   Housing Stability: Low Risk      Unable to Pay for Housing in the Last Year: No     Number of Places Lived in the Last Year: 2     Unstable Housing in the Last Year: No     Review of Systems:  Skin:  not assessed     Eyes:  not assessed    ENT:  not assessed    Respiratory:  Positive for dyspnea on exertion;sleep apnea  Cardiovascular:    Positive for;lightheadedness;edema  Gastroenterology: not  "assessed    Genitourinary:  not assessed    Musculoskeletal:  not assessed    Neurologic:  not assessed    Psychiatric:  not assessed    Heme/Lymph/Imm:  not assessed    Endocrine:  not assessed       Physical Exam:  Vitals: /78   Pulse 96   Ht 1.715 m (5' 7.5\")   Wt 107 kg (236 lb)   BMI 36.42 kg/m     Wt Readings from Last 4 Encounters:   01/30/23 107 kg (236 lb)   01/06/23 105.7 kg (233 lb)   01/03/23 107 kg (235 lb 12.8 oz)   12/21/22 108 kg (238 lb)     CONSTITUTIONAL: Appears his stated age, well nourished, and in no acute distress.  HEENT: Pupils equal, round. No scleral icterus.  C/V:  Regular rate and rhythm, normal S1 and S2, no S3 or S4, no murmur, rub or gallop.   RESP: Respirations are unlabored. Lungs are clear to auscultation with no wheezes or crackles bilaterally.  EXTREM: There is trace LE edema bilaterally, L>R.   NEURO: Alert and oriented, cooperative. No gross focal deficits.   PSYCH: Affect appropriate. Mentation normal.   SKIN: Warm and dry. No apparent rashes or bruising.    Recent Lab Results:  LIPID RESULTS:  Lab Results   Component Value Date    CHOL 122 09/03/2022    CHOL 121 05/17/2021    HDL 37 (L) 09/03/2022    HDL 39 (L) 05/17/2021    LDL 65 09/03/2022    LDL 55 05/17/2021    TRIG 99 09/03/2022    TRIG 137 05/17/2021    CHOLHDLRATIO 2.8 10/15/2015     LIVER ENZYME RESULTS:  Lab Results   Component Value Date    AST 31 08/25/2021    AST 32 01/12/2021    ALT 31 08/25/2021    ALT 33 01/12/2021     CBC RESULTS:  Lab Results   Component Value Date    WBC 8.0 12/04/2022    WBC 5.6 06/18/2021    RBC 4.85 12/04/2022    RBC 4.38 (L) 06/18/2021    HGB 13.9 12/04/2022    HGB 12.8 (L) 06/18/2021    HCT 46.0 12/04/2022    HCT 41.1 06/18/2021    MCV 95 12/04/2022    MCV 94 06/18/2021    MCH 28.7 12/04/2022    MCH 29.2 06/18/2021    MCHC 30.2 (L) 12/04/2022    MCHC 31.1 (L) 06/18/2021    RDW 14.6 12/04/2022    RDW 14.1 06/18/2021     12/04/2022     06/18/2021     BMP " RESULTS:  Lab Results   Component Value Date     12/04/2022     06/18/2021    POTASSIUM 4.5 12/04/2022    POTASSIUM 4.6 07/15/2022    POTASSIUM 4.4 06/18/2021    CHLORIDE 102 12/04/2022    CHLORIDE 112 (H) 07/15/2022    CHLORIDE 109 06/18/2021    CO2 23 12/04/2022    CO2 21 07/15/2022    CO2 29 06/18/2021    ANIONGAP 12 12/04/2022    ANIONGAP 7 07/15/2022    ANIONGAP 4 06/18/2021     (H) 12/04/2022     (H) 07/15/2022     (H) 06/18/2021    BUN 22.9 12/04/2022    BUN 36 (H) 07/15/2022    BUN 28 06/18/2021    CR 1.12 12/04/2022    CR 1.44 (H) 06/18/2021    GFRESTIMATED 64 12/04/2022    GFRESTIMATED >60 10/20/2022    GFRESTIMATED 44 (L) 06/18/2021    GFRESTBLACK 51 (L) 06/18/2021    MARLENE 9.5 12/04/2022    MARLENE 9.5 06/18/2021      A1C RESULTS:  Lab Results   Component Value Date    A1C 6.0 (H) 10/22/2021    A1C 5.6 11/11/2020     INR RESULTS:  Lab Results   Component Value Date    INR 1.03 12/03/2018    INR 1.07 11/27/2007     CC  Vinod De La Paz MD  [address] [place] [id number]    This note was completed in part using Dragon voice recognition software. Although reviewed after completion, some word and grammatical errors may occur.

## 2023-03-03 ENCOUNTER — E-VISIT (OUTPATIENT)
Dept: PEDIATRICS | Facility: CLINIC | Age: 88
End: 2023-03-03
Payer: COMMERCIAL

## 2023-03-03 DIAGNOSIS — R23.8 VESICLE OF SKIN: Primary | ICD-10-CM

## 2023-03-03 PROCEDURE — 99421 OL DIG E/M SVC 5-10 MIN: CPT | Performed by: INTERNAL MEDICINE

## 2023-03-09 ENCOUNTER — TELEPHONE (OUTPATIENT)
Dept: PEDIATRICS | Facility: CLINIC | Age: 88
End: 2023-03-09

## 2023-03-09 ENCOUNTER — OFFICE VISIT (OUTPATIENT)
Dept: PEDIATRICS | Facility: CLINIC | Age: 88
End: 2023-03-09
Payer: COMMERCIAL

## 2023-03-09 ENCOUNTER — TELEPHONE (OUTPATIENT)
Dept: WOUND CARE | Facility: CLINIC | Age: 88
End: 2023-03-09

## 2023-03-09 VITALS
BODY MASS INDEX: 37.03 KG/M2 | TEMPERATURE: 98.5 F | WEIGHT: 240 LBS | DIASTOLIC BLOOD PRESSURE: 66 MMHG | HEART RATE: 102 BPM | OXYGEN SATURATION: 95 % | SYSTOLIC BLOOD PRESSURE: 101 MMHG

## 2023-03-09 DIAGNOSIS — S90.821A BLISTER OF RIGHT FOOT, INITIAL ENCOUNTER: Primary | ICD-10-CM

## 2023-03-09 PROCEDURE — 99213 OFFICE O/P EST LOW 20 MIN: CPT | Performed by: PHYSICIAN ASSISTANT

## 2023-03-09 RX ORDER — HYDROCORTISONE 2.5 %
CREAM (GRAM) TOPICAL
COMMUNITY
Start: 2023-02-15 | End: 2023-10-25

## 2023-03-09 RX ORDER — KETOCONAZOLE 20 MG/G
CREAM TOPICAL
COMMUNITY
Start: 2023-02-15 | End: 2023-12-11

## 2023-03-09 ASSESSMENT — PAIN SCALES - GENERAL: PAINLEVEL: NO PAIN (0)

## 2023-03-09 NOTE — PROGRESS NOTES
"  Assessment & Plan     Blister of right foot, initial encounter    - Wound Care Referral; Future    Patient to followup with wound care for regular checks, dressings and care for wound.  Patient to be seen sooner if needed.           BMI:   Estimated body mass index is 37.03 kg/m  as calculated from the following:    Height as of 1/30/23: 1.715 m (5' 7.5\").    Weight as of this encounter: 108.9 kg (240 lb).       See Patient Instructions    No follow-ups on file.    KAYLA Robertson [facility] BEATRICE Palmer is a 87 year old, presenting for the following health issues:  Toe Pain (Right Big toe  Blister)      History of Present Illness       Reason for visit:  Blister on the right big toe  Symptom onset:  3-7 days ago  Symptoms include:  Iritaing and painful when I put on shoes  Symptom intensity:  Mild  Symptom progression:  Staying the same  Had these symptoms before:  No  What makes it worse:  When he puts certain shoes  What makes it better:  Raise his foot    He eats 0-1 servings of fruits and vegetables daily.He consumes 0 sweetened beverage(s) daily.He exercises with enough effort to increase his heart rate 9 or less minutes per day.  He exercises with enough effort to increase his heart rate 3 or less days per week.   He is taking medications regularly.       Patient is here to followup on his Big toe issue.  He developed a blister of the toe on 3/3/23.  He completed an evisit at that time and was going to watch it.  He is here today to have it looked at, as the blister is open now and is weeping.  The patient denies any surrounding pain, redness, swelling , fevers or chills.  He is not sure where he got the blister initially, but he has been dressing it with bacitracin and covering daily.      Review of Systems   Constitutional, HEENT, cardiovascular, pulmonary, gi and gu systems are negative, except as otherwise noted.      Objective    /66 (BP Location: Right arm, " Patient Position: Sitting, Cuff Size: Adult Regular)   Pulse 102   Temp 98.5  F (36.9  C) (Tympanic)   Wt 108.9 kg (240 lb)   SpO2 95%   BMI 37.03 kg/m    Body mass index is 37.03 kg/m .  Physical Exam   GENERAL: healthy, alert and no distress  EYES: Eyes grossly normal to inspection, PERRL and conjunctivae and sclerae normal  MS: no gross musculoskeletal defects noted, no edema  SKIN: no rashes  SKIN:  Lesion on right big toe.  Opened blister with clear drainage noted.  No edema or erythema or any surrounding rashes.  The lesion appears to be a healing wound that is still open, but does not appear infected or with any cellulitis.      Milday Duncan PA-C

## 2023-03-09 NOTE — TELEPHONE ENCOUNTER
Milady,    Pt called. He can't get into wound care for 2 weeks  Pt is wondering what he should do until then    Please advise    Thank you  Alberta Mckoy RN on 3/9/2023 at 3:48 PM

## 2023-03-09 NOTE — TELEPHONE ENCOUNTER
Consult received via Phone from Milady Duncan PA-C for wound of the great toe.    Please schedule with Dr. Crowley, Dr. Cooper, Dr. Darby, or Milady GARZA at [facility] [place] for next available appointment.    **If scheduling with Milady GARZA please schedule a follow up 2-3 weeks after initial appointment.    Is the patient able to make their own medical decisions? yes    Is patient a TRE lift? PLEASE INQUIRE WHEN MAKING THE APPOINTMENT AND PUT IN APPOINTMENT NOTES    Routing to  Wound Healing Scheduling.

## 2023-03-10 NOTE — TELEPHONE ENCOUNTER
Milady,    Can we teach him to do a dressing change at home?  I'm not sure what the other options are    Including pt's PAL in message    Alberta Mckoy RN on 3/10/2023 at 7:50 AM

## 2023-03-10 NOTE — TELEPHONE ENCOUNTER
Called and spoke to Wound Care Clinic. They scheduled patient for the soonest available. They did put patient on cancellation list.     Routing to provider to advise. Please see note below. Can we teach patient how to do dressing change himself?    Thanks!  Rupinder ARTEAGA RN, BSN

## 2023-03-13 ENCOUNTER — OFFICE VISIT (OUTPATIENT)
Dept: UROLOGY | Facility: CLINIC | Age: 88
End: 2023-03-13
Payer: COMMERCIAL

## 2023-03-13 VITALS
DIASTOLIC BLOOD PRESSURE: 84 MMHG | SYSTOLIC BLOOD PRESSURE: 134 MMHG | WEIGHT: 232 LBS | BODY MASS INDEX: 35.16 KG/M2 | HEIGHT: 68 IN

## 2023-03-13 DIAGNOSIS — Z79.2 PROPHYLACTIC ANTIBIOTIC: ICD-10-CM

## 2023-03-13 DIAGNOSIS — C61 PROSTATE CANCER (H): ICD-10-CM

## 2023-03-13 DIAGNOSIS — N99.110 POSTPROCEDURAL MALE URETHRAL MEATAL STRICTURE: ICD-10-CM

## 2023-03-13 DIAGNOSIS — D49.4 BLADDER TUMOR: Primary | ICD-10-CM

## 2023-03-13 LAB
ALBUMIN UR-MCNC: ABNORMAL MG/DL
APPEARANCE UR: CLEAR
BILIRUB UR QL STRIP: NEGATIVE
COLOR UR AUTO: YELLOW
GLUCOSE UR STRIP-MCNC: NEGATIVE MG/DL
HGB UR QL STRIP: ABNORMAL
KETONES UR STRIP-MCNC: NEGATIVE MG/DL
LEUKOCYTE ESTERASE UR QL STRIP: NEGATIVE
NITRATE UR QL: NEGATIVE
PH UR STRIP: 5.5 [PH] (ref 5–7)
SP GR UR STRIP: 1.02 (ref 1–1.03)
UROBILINOGEN UR STRIP-ACNC: 0.2 E.U./DL

## 2023-03-13 PROCEDURE — 52000 CYSTOURETHROSCOPY: CPT | Performed by: UROLOGY

## 2023-03-13 PROCEDURE — 81003 URINALYSIS AUTO W/O SCOPE: CPT | Mod: QW | Performed by: UROLOGY

## 2023-03-13 PROCEDURE — 99213 OFFICE O/P EST LOW 20 MIN: CPT | Mod: 25 | Performed by: UROLOGY

## 2023-03-13 PROCEDURE — 88112 CYTOPATH CELL ENHANCE TECH: CPT | Performed by: PATHOLOGY

## 2023-03-13 RX ORDER — LIDOCAINE HYDROCHLORIDE 20 MG/ML
JELLY TOPICAL ONCE
Status: COMPLETED | OUTPATIENT
Start: 2023-03-13 | End: 2023-03-13

## 2023-03-13 RX ORDER — CIPROFLOXACIN 500 MG/1
500 TABLET, FILM COATED ORAL ONCE
Qty: 1 TABLET | Refills: 0 | Status: SHIPPED | OUTPATIENT
Start: 2023-03-13 | End: 2023-03-13

## 2023-03-13 RX ADMIN — LIDOCAINE HYDROCHLORIDE 5 ML: 20 JELLY TOPICAL at 09:38

## 2023-03-13 ASSESSMENT — PAIN SCALES - GENERAL: PAINLEVEL: NO PAIN (0)

## 2023-03-13 NOTE — NURSING NOTE
Chief Complaint   Patient presents with     Bladder tumor     Pt here for in office cystoscopy     Prior to the start of the procedure and with procedural staff participation, I verbally confirmed the patient s identity using two indicators, relevant allergies, that the procedure was appropriate and matched the consent or emergent situation, and that the correct equipment/implants were available. Immediately prior to starting the procedure I conducted the Time Out with the procedural staff and re-confirmed the patient s name, procedure, and site/side. I have wiped the patient off with the povidone-Iodine solution, draped them,  used Lidocaine hydrochloride jelly, and instilled sterile water into the bladder. (The Joint Commission universal protocol was followed.)  Yes    Sedation (Moderate or Deep): None    5mL 2% lidocaine hydrochloride Urojet instilled into urethra.    NDC# [address]-4  Lot #: [id number]  Expiration Date:  09/24    Radha Alexander CMA

## 2023-03-13 NOTE — PATIENT INSTRUCTIONS

## 2023-03-13 NOTE — TELEPHONE ENCOUNTER
Provider can do a provider to provider call to see if they will see pt sooner.    Thank you,    Liz-Referral Coordinator

## 2023-03-13 NOTE — LETTER
"3/13/2023       RE: Nixon More  [address] [place] [address] [address]  [facility] [id number]     Dear Colleague,    Thank you for referring your patient, Nixon More, to the [facility] [place] at [facility]. Please see a copy of my visit note below.    Office Visit Note- Bladder Cancer Follow up  [facility]    [phone]     /84   Ht 1.715 m (5' 7.5\")   Wt 105.2 kg (232 lb)   BMI 35.80 kg/m      Bladder Cancer Diagnosis: Low grade Ta  Also history of prostate cancer S/P radiotherapy in 2007 with previous hormonal therapy    Past Interventions: TURBT x 1    Most Recent Pathology: Jan 2023    Most Recent Upper Tract Imaging: October 2022 CT scan    Most Recent FISH/Cytology: None    History:   Spencer returns to clinic today for screening cystoscopy.  He underwent transurethral resection of bladder tumor in January and pathology showed a low-grade noninvasive lesion.  He has felt well since the procedure.    Cystoscopy:   I first needed to perform a dilation of the urethral meatus in order to introduce the cystoscope.  I used a 16 through 20 Belarusian sounds to dilate the meatus.  I performed flexible cystoscopy today and there were no tumors identified throughout the bladder.  The area of previous resection was still healing but I saw no tumor present.    Impression:   Doing well, no evidence of recurrence  History of prostate cancer  Mild meatal stenosis    Plan:   He is doing well with no evidence of bladder cancer recurrence at this time.  However, he is still healing in the area of previous resection.  He will be due for a PSA in a short amount of time as well.  I recommended that in 3 months we have him come back for a PSA recheck and also another cystoscopy with potential dilation in clinic.    Mark Pino M.D.      "

## 2023-03-13 NOTE — PROGRESS NOTES
"Office Visit Note- Bladder Cancer Follow up  [facility]    [phone]     /84   Ht 1.715 m (5' 7.5\")   Wt 105.2 kg (232 lb)   BMI 35.80 kg/m      Bladder Cancer Diagnosis: Low grade Ta  Also history of prostate cancer S/P radiotherapy in 2007 with previous hormonal therapy    Past Interventions: TURBT x 1    Most Recent Pathology: Jan 2023    Most Recent Upper Tract Imaging: October 2022 CT scan    Most Recent FISH/Cytology: None    History:   Spencer returns to clinic today for screening cystoscopy.  He underwent transurethral resection of bladder tumor in January and pathology showed a low-grade noninvasive lesion.  He has felt well since the procedure.    Cystoscopy:   I first needed to perform a dilation of the urethral meatus in order to introduce the cystoscope.  I used a 16 through 20 [place]ruguayan sounds to dilate the meatus.  I performed flexible cystoscopy today and there were no tumors identified throughout the bladder.  The area of previous resection was still healing but I saw no tumor present.    Impression:   Doing well, no evidence of recurrence  History of prostate cancer  Mild meatal stenosis    Plan:   He is doing well with no evidence of bladder cancer recurrence at this time.  However, he is still healing in the area of previous resection.  He will be due for a PSA in a short amount of time as well.  I recommended that in 3 months we have him come back for a PSA recheck and also another cystoscopy with potential dilation in clinic.    Mark Pino M.D.      "

## 2023-03-14 LAB
PATH REPORT.COMMENTS IMP SPEC: NORMAL
PATH REPORT.FINAL DX SPEC: NORMAL
PATH REPORT.GROSS SPEC: NORMAL
PATH REPORT.MICROSCOPIC SPEC OTHER STN: NORMAL
PATH REPORT.RELEVANT HX SPEC: NORMAL

## 2023-03-14 NOTE — TELEPHONE ENCOUNTER
For provider to provider call, the office number for Dr. Darby is [phone].  Provider can call to request to speak with a provider, or send a message in epic/staff message to this provider to see if patient could be seen sooner.  Routing to JOSE DANIEL Wood.  Patient is currently scheduled for 3/27.    Glenys Gallardo RN

## 2023-03-15 ENCOUNTER — HOSPITAL ENCOUNTER (OUTPATIENT)
Dept: WOUND CARE | Facility: CLINIC | Age: 88
Discharge: HOME | End: 2023-03-15
Attending: PHYSICIAN ASSISTANT | Admitting: PHYSICIAN ASSISTANT
Payer: COMMERCIAL

## 2023-03-15 VITALS — HEART RATE: 87 BPM | TEMPERATURE: 97.8 F | SYSTOLIC BLOOD PRESSURE: 123 MMHG | DIASTOLIC BLOOD PRESSURE: 76 MMHG

## 2023-03-15 DIAGNOSIS — L97.512 TOE ULCER, RIGHT, WITH FAT LAYER EXPOSED (H): ICD-10-CM

## 2023-03-15 DIAGNOSIS — R73.03 PREDIABETES: ICD-10-CM

## 2023-03-15 DIAGNOSIS — S91.109A: Primary | ICD-10-CM

## 2023-03-15 LAB — HBA1C MFR BLD: 6.2 %

## 2023-03-15 PROCEDURE — 97602 WOUND(S) CARE NON-SELECTIVE: CPT

## 2023-03-15 PROCEDURE — 97597 DBRDMT OPN WND 1ST 20 CM/<: CPT | Performed by: PHYSICIAN ASSISTANT

## 2023-03-15 PROCEDURE — 36415 COLL VENOUS BLD VENIPUNCTURE: CPT | Performed by: PHYSICIAN ASSISTANT

## 2023-03-15 PROCEDURE — 99203 OFFICE O/P NEW LOW 30 MIN: CPT | Mod: 25 | Performed by: PHYSICIAN ASSISTANT

## 2023-03-15 PROCEDURE — 83036 HEMOGLOBIN GLYCOSYLATED A1C: CPT | Performed by: PHYSICIAN ASSISTANT

## 2023-03-15 NOTE — PROGRESS NOTES
[facility]    ASSESSMENT:   1. Full-thickness neuropathic ulcer of R hallux  2. Pre-diabetes    PLAN/DISCUSSION:   1. Recheck HbA1C  2. Elevate leg to help with edema, will hold off on compression stockings due to concern for more trauma  3. Wound care plan: iodosorb and cover dressing, change daily  4. Dietary recommendations discussed, see AVS   5. See bottom of note for detailed wound care and patient instructions    HISTORY OF PRESENT ILLNESS:   Nixon More is a 87 year old male with preidabetes, emphysema, hx of prostate ca, CKD3, CHF, and CAD who presents today for a an ulceration of his R great toe. This started as a blister about two weeks ago without a real known cause. He suspects he was wearing poor fitting shoes. He has prediabetes and has not had an A1C checked recently. No peripheral arterial disease but does have CAD and evidence of venous disease. Notes swelling at baseline with some worsening with this issue.       TREATMENT COURSE:  3/15/2023 : Presents for initial visit at our clinic.     VITALS: /76 (BP Location: Right arm)   Pulse 87   Temp 97.8  F (36.6  C) (Temporal)      PHYSICAL EXAM:  GENERAL: Patient is alert and oriented and in no acute distress  CV: pedal pulses palpable  INTEGUMENTARY:   Wound (used by OP [facility] only) 03/15/23 1242 Right medial 1 toe (Active)   Thickness/Stage full thickness 03/15/23 1200   Base pink 03/15/23 1200   Periwound macerated 03/15/23 1200   Length (cm) 2.8 03/15/23 1200   Width (cm) 2.5 03/15/23 1200   Depth (cm) 0.1 03/15/23 1200   Wound (cm^2) 7 cm^2 03/15/23 1200   Wound Volume (cm^3) 0.7 cm^3 03/15/23 1200   Drainage Amount moderate 03/15/23 1200   Care, Wound non-select wound debridement performed 03/15/23 1200       Incision/Surgical Site 01/06/23 Other (Comment) (Active)           PROCEDURES: 4% topical lidocaine was applied to the wound by the nursing staff. Patient was determined to be capable of making their own  medical decisions and informed consent was obtained. Using a tissue scissors a selective debridement of non-viable tissue was performed of <20cm2. Hemostasis was achieved with pressure. The patient tolerated the procedure well.      MDM: 30 minutes were spent on the date of the visit reviewing previous chart notes, evaluating patient and developing the treatment plan, this excludes any time spent on procedures    PATIENT INSTRUCTIONS      Further instructions from your care team       Nixon More      1935    A DME order for supplies has been placed to [facility]. If there are any issues with your order including not receiving the order please call [facility] at [phone] option 3. They can also provide a tracking number for you if you had supplies shipped to you.    A1C blood test done today at the office, check result on My chart    Wound Dressing Change:    RIGHT HALLUX / 1st toe   - wash your hands with soap and water before you begin your dressing change and prepare a clean surface for dressings  - after cleansing with mild unscented soap (such as Cetaphil, Cerave or Dove) and water (ok to take a shower)  - pat dry  - apply Iodosorb to the Medipore pad  - place to the wound  CHANGE EVERY DAY      Cat Cosme PA-C March 15, 2023    Call us at [phone] if you have any questions about your wounds, have redness or swelling around your wound, have a fever of 101 or greater or if you have any other problems or concerns. We answer the phone Monday through Friday 8 am to 4 pm, please leave a message as we check the voicemail frequently throughout the day.     If you had a positive experience please indicate that on your patient satisfaction survey form that [facility] will be sending you.    It was a pleasure meeting with you today.  Thank you for allowing me and my team the privilege of caring for you today.  YOU are the reason we are here, and I truly hope we  provided you with the excellent service you deserve.  Please let us know if there is anything else we can do for you so that we can be sure you are leaving completely satisfied with your care experience.      If you have any billing related questions please call the [facility] Business office at [phone]. The clinic staff does not handle billing related matters.    If you are scheduled to have a follow up appointment, you will receive a reminder call the day before your visit. On the appointment day please arrive 15 minutes prior to your appointment time. If you are unable to keep that appointment, please call the clinic to cancel or reschedule. If you are more than 10 minutes late or greater for your appointment, the clinic policy is that you may be asked to reschedule.        Durable Medical Equipment Wound Care Orders     Wound Care Order for DME - ONLY FOR DME   As directed      DME Provider: Patricia Collins Comment - room 471    Start Date: 3/15/2023    Wound Supply Order Options: Complex Wound    Optional: .dmewound can be used to pull in order specific information into documentation    Wound Number: Wound 1    Wound 1 Location: right 1st toe    Wound 1 Dressing Change Frequency: Daily    Wound 1 Length of Need: 30 days    Wound 1 - Dressing Supplies: Tape/Securing    Wound 1 - Tape Type: Other (comments) Comment - Medipore+Pad    Wound 1 - Other Tape Size: 2 inch x 2 3/4 inch    Wound 1 - Other Tape Quantity: 30          Electronically signed by Cat Cosme PA-C on March 15, 2023

## 2023-03-15 NOTE — DISCHARGE INSTRUCTIONS
Nixon More      1935    A DME order for supplies has been placed to [facility]. If there are any issues with your order including not receiving the order please call [facility] at [phone] option 3. They can also provide a tracking number for you if you had supplies shipped to you.    A1C blood test done today at the office, check result on My chart    Wound Dressing Change:    RIGHT HALLUX / 1st toe   - wash your hands with soap and water before you begin your dressing change and prepare a clean surface for dressings  - after cleansing with mild unscented soap (such as Cetaphil, Cerave or Dove) and water (ok to take a shower)  - pat dry  - apply Iodosorb to the Medipore pad  - place to the wound  CHANGE EVERY DAY      Cat Cosme PA-C March 15, 2023    Call us at [phone] if you have any questions about your wounds, have redness or swelling around your wound, have a fever of 101 or greater or if you have any other problems or concerns. We answer the phone Monday through Friday 8 am to 4 pm, please leave a message as we check the voicemail frequently throughout the day.     If you had a positive experience please indicate that on your patient satisfaction survey form that [facility] will be sending you.    It was a pleasure meeting with you today.  Thank you for allowing me and my team the privilege of caring for you today.  YOU are the reason we are here, and I truly hope we provided you with the excellent service you deserve.  Please let us know if there is anything else we can do for you so that we can be sure you are leaving completely satisfied with your care experience.      If you have any billing related questions please call the [facility] Business office at [phone]. The clinic staff does not handle billing related matters.    If you are scheduled to have a follow up appointment, you will receive a reminder call the day before your visit. On the  appointment day please arrive 15 minutes prior to your appointment time. If you are unable to keep that appointment, please call the clinic to cancel or reschedule. If you are more than 10 minutes late or greater for your appointment, the clinic policy is that you may be asked to reschedule.

## 2023-03-17 DIAGNOSIS — I10 HYPERTENSION GOAL BP (BLOOD PRESSURE) < 140/90: ICD-10-CM

## 2023-03-20 RX ORDER — LOSARTAN POTASSIUM 100 MG/1
TABLET ORAL
Qty: 90 TABLET | Refills: 2 | Status: SHIPPED | OUTPATIENT
Start: 2023-03-20 | End: 2024-01-02

## 2023-03-21 ENCOUNTER — OFFICE VISIT (OUTPATIENT)
Dept: SURGERY | Facility: CLINIC | Age: 88
End: 2023-03-21
Payer: COMMERCIAL

## 2023-03-21 VITALS
WEIGHT: 232 LBS | HEIGHT: 68 IN | DIASTOLIC BLOOD PRESSURE: 76 MMHG | OXYGEN SATURATION: 95 % | HEART RATE: 65 BPM | BODY MASS INDEX: 35.16 KG/M2 | SYSTOLIC BLOOD PRESSURE: 124 MMHG | RESPIRATION RATE: 16 BRPM

## 2023-03-21 DIAGNOSIS — E78.5 HYPERLIPIDEMIA LDL GOAL <70: ICD-10-CM

## 2023-03-21 DIAGNOSIS — I70.0 ATHEROSCLEROSIS OF AORTA (H): ICD-10-CM

## 2023-03-21 DIAGNOSIS — I48.20 CHRONIC ATRIAL FIBRILLATION (H): ICD-10-CM

## 2023-03-21 DIAGNOSIS — I10 BENIGN ESSENTIAL HYPERTENSION: ICD-10-CM

## 2023-03-21 DIAGNOSIS — I77.810 AORTIC ROOT DILATION (H): ICD-10-CM

## 2023-03-21 DIAGNOSIS — I71.43 INFRARENAL ABDOMINAL AORTIC ANEURYSM (AAA) WITHOUT RUPTURE (H): Primary | ICD-10-CM

## 2023-03-21 DIAGNOSIS — I72.3 ILIAC ARTERY ANEURYSM, BILATERAL (H): ICD-10-CM

## 2023-03-21 DIAGNOSIS — I77.810 ASCENDING AORTA DILATION (H): ICD-10-CM

## 2023-03-21 PROCEDURE — 99215 OFFICE O/P EST HI 40 MIN: CPT | Performed by: INTERNAL MEDICINE

## 2023-03-21 NOTE — PROGRESS NOTES
[facility] VASCULAR MEDICINE FOLLOW UP       PRIMARY HEALTH CARE PROVIDER:  Natalya Lewis MD       REASON FOR VISIT:  Follow up visit     he was initially seen few months ago for Evaluation and management of small infrarenal ??? saccular aneurysm and also bilateral iliac artery aneurysms in a former smoker who smoked for 40 years and quit 30 years ago with multiple atherosclerotic risk factors        HPI: Nixon More is a 87 year old very pleasant male with past medical history of chronic atrial fibrillation/flutter taking DOAC, coronary artery disease, emphysema of the lungs, hypertension, hyperlipidemia, history of prostate cancer status post surgery in May 2001, bilateral lower extremity varicose veins no intervention underwent CT urogram recently which revealed small infrarenal saccular AAA measuring 1.8 x 1.6 x 2.1 cm transverse, AP and CC dimensions respectively and repeat ultrasound measurements about the same with extensive atherosclerosis of the aorta here for further evaluation and management.  Blood pressure is well controlled, lipids are well controlled.  He smoked for 40 years 1 pack daily and quit 30 years ago.  He accompanied by his daughter who is a nurse practitioner.   He is asymptomatic.    He also has a history of ascending aorta and aortic root dilatation on echo few months ago          PAST MEDICAL HISTORY  Past Medical History:   Diagnosis Date     AAA (abdominal aortic aneurysm)      Actinic keratosis      Angina pectoris (H) 10/27/2020     Antiplatelet or antithrombotic long-term use     Eliquis     Arthritis      Atrial fibrillation and flutter (H) 12/03/2018     CAD (coronary artery disease)     1/5/2011 lateral ischemia on stress echo, 12/2014 normal stress echo     Coronary artery disease 01/01/2011    Abnormal stress test 1/2011 and controlled with medical mgmt      Depressive disorder     Mild     Diarrhea     Urgency at times     Dyslipidemia       Emphysema of lung (H)      Essential hypertension, benign      Hernia, abdominal      Hypersomnia with sleep apnea, unspecified     on bipap, partially treated with residual apneas     Hypertrophy (benign) of prostate 05/2001    Biopsy 5/01 negative for cancer; PSA 5     Lumbago      Mumps      Prostate cancer (H) 12/15/2006     Renal disease      Skin cancer, basal cell 1997     Sleep apnea     Uses a Bi-pap for centralized Apnea     Spider veins        CURRENT MEDICATIONS  ASPIRIN NOT PRESCRIBED (INTENTIONAL), continuous prn for other Antiplatelet medication not prescribed intentionally due to Current anticoagulant therapy (warfarin/enoxaparin)  atorvastatin (LIPITOR) 40 MG tablet, TAKE 1 TABLET BY MOUTH EVERY DAY  Cholecalciferol (VITAMIN D-3) 25 MCG (1000 UT) CAPS, Take by mouth daily  ELIQUIS ANTICOAGULANT 5 MG tablet, TAKE 1 TABLET BY MOUTH TWICE A DAY  escitalopram (LEXAPRO) 10 MG tablet, TAKE 1 TABLET BY MOUTH EVERY DAY  hydrocortisone 2.5 % cream, APPLY TO AREAS OF RASH IN THE LEFT AXILLAE 2 TIMES A DAY X3-5 DAYS  ketoconazole (NIZORAL) 2 % external cream, APPLY TO AREAS OF RASH IN THE LEFT AXILLAE DAILY  Loperamide HCl (IMODIUM OR), Take by mouth daily as needed  losartan (COZAAR) 100 MG tablet, TAKE 1 TABLET BY MOUTH EVERY DAY  metoprolol succinate ER (TOPROL XL) 50 MG 24 hr tablet, Take 2 tablets (100 mg) by mouth daily  RISEdronate (ACTONEL) 35 MG tablet, TAKE 1 TABLET (35 MG) BY MOUTH EVERY 7 DAYS  spironolactone (ALDACTONE) 25 MG tablet, Take 0.5 tablets (12.5 mg) by mouth daily    No current facility-administered medications on file prior to visit.      PAST SURGICAL HISTORY:  Past Surgical History:   Procedure Laterality Date     BACK SURGERY  1988    L4/L5 decompression     BIOPSY  2022    Skin cancer basal cell     COLONOSCOPY       CYSTOSCOPY       CYSTOSCOPY, TRANSURETHRAL RESECTION (TUR) TUMOR BLADDER, COMBINED N/A 01/06/2023    Procedure: Cystoscopy, transurethral resection of bladder tumor,  medium, 2-5 cm;  Surgeon: Mark Pino MD;  Location: [facility] OR     EYE SURGERY  2016    Cararact     GENITOURINARY SURGERY  2022    Tumors in bladder     HERNIA REPAIR  child     Moh's procedure for basal cell carcinoma  2001     PROSTATE SURGERY  2007    Radiation only     s/p lumbar laminectomy NOS  1988     SIGMOIDOSCOPY FLEXIBLE N/A 06/15/2020    Procedure: SIGMOIDOSCOPY, FLEXIBLE;  Surgeon: Sunni Patton MD;  Location: [facility] GI     VITRECTOMY PARS PLANA REMOVE PRERETINAL MEMBRANE   2009       ALLERGIES     Allergies   Allergen Reactions     Augmentin Rash     Type III hypersensitivity     Bactrim [Sulfamethoxazole W/Trimethoprim] Rash     Serum sickness, type III hypersensitivity       Bee Venom Itching     Sulfa Drugs Hives     Celebrex [Celecoxib]      Lisinopril Cough     Naproxen Hives       FAMILY HISTORY  Family History   Problem Relation Age of Onset     Alzheimer Disease Mother      Hypertension Mother      Cardiovascular Father         D:86 complications fo CHF     Anesthesia Reaction Father          after 2 weeks     Prostate Cancer Brother      Lung Cancer Brother 76     Other Cancer Brother      Prostate Cancer Brother        VASCULAR FAMILY HISTORY  1st order relative with atherosclerotic PAD: No  1st order relative with AAA: Yes (paternal uncle Isaak AAA , father AAA, Pat aunt Mely TAA ruptured age 70s)  Family history of Familial Hyperlipidemia No  Family History of Hypercoagulable state:No    VASCULAR RISK FACTORS  1. Diabetes:No   2. Smoking: quit smoking some time ago.  3. HTN: controlled  4.Hyperlipidemia: Yes - controlled      SOCIAL HISTORY  Social History     Socioeconomic History     Marital status:      Spouse name: Not on file     Number of children: Not on file     Years of education: Not on file     Highest education level: Not on file   Occupational History     Occupation: retired   Tobacco Use     Smoking status: Former     Packs/day: 1.00      "Years: 30.00     Pack years: 30.00     Types: Cigarettes     Start date: 1948     Quit date: 1978     Years since quittin.2     Smokeless tobacco: Never   Vaping Use     Vaping Use: Never used   Substance and Sexual Activity     Alcohol use: Yes     Comment: occ \"like once a month\" or less     Drug use: No     Sexual activity: Not Currently     Partners: Female     Birth control/protection: Post-menopausal   Other Topics Concern      Service Not Asked     Blood Transfusions Not Asked     Caffeine Concern No     Comment: 0-2 cans of soda per day.     Occupational Exposure Not Asked     Hobby Hazards Not Asked     Sleep Concern Not Asked     Stress Concern Not Asked     Weight Concern Not Asked     Special Diet Not Asked     Back Care Not Asked     Exercise No     Bike Helmet Not Asked     Seat Belt Yes     Self-Exams Not Asked     Parent/sibling w/ CABG, MI or angioplasty before 65F 55M? No   Social History Narrative     Not on file     Social Determinants of Health     Financial Resource Strain: Low Risk      Difficulty of Paying Living Expenses: Not hard at all   Food Insecurity: No Food Insecurity     Worried About Running Out of Food in the Last Year: Never true     Ran Out of Food in the Last Year: Never true   Transportation Needs: No Transportation Needs     Lack of Transportation (Medical): No     Lack of Transportation (Non-Medical): No   Physical Activity: Insufficiently Active     Days of Exercise per Week: 1 day     Minutes of Exercise per Session: 10 min   Stress: No Stress Concern Present     Feeling of Stress : Not at all   Social Connections: Moderately Integrated     Frequency of Communication with Friends and Family: Three times a week     Frequency of Social Gatherings with Friends and Family: Twice a week     Attends Sabianism Services: More than 4 times per year     Active Member of Clubs or Organizations: Yes     Attends Club or Organization Meetings: Not on file     Marital " "Status:    Intimate Partner Violence: Not At Risk     Fear of Current or Ex-Partner: No     Emotionally Abused: No     Physically Abused: No     Sexually Abused: No   Housing Stability: Low Risk      Unable to Pay for Housing in the Last Year: No     Number of Places Lived in the Last Year: 2     Unstable Housing in the Last Year: No       ROS:   General: No change in weight, sleep or appetite.  Normal energy.  No fever or chills  Eyes: Negative for vision changes or eye problems  ENT: No problems with ears, nose or throat.  No difficulty swallowing.  Resp: No coughing, wheezing or shortness of breath  CV: No chest pains or palpitations  GI: No nausea, vomiting,  heartburn, abdominal pain, diarrhea, constipation or change in bowel habits  : No urinary frequency or dysuria, bladder or kidney problems  Musculoskeletal: No significant muscle or joint pains  Neurologic: No headaches, numbness, tingling, weakness, problems with balance or coordination  Psychiatric: No problems with anxiety, depression or mental health  Heme/immune/allergy: No history of bleeding or clotting problems or anemia.  No allergies or immune system problems  Endocrine: No history of thyroid disease, diabetes or other endocrine disorders  Skin: No rashes,worrisome lesions or skin problems  Vascular:  No claudication, lifestyle limiting or otherwise; no ischemic rest pain; no non-healing ulcers. No weakness, No loss of sensation    Walks with walker  Left leg is larger than right leg underwent prostate cancer treatment in the past    EXAM:  /76   Pulse 65   Resp 16   Ht 5' 7.5\" (1.715 m)   Wt 232 lb (105.2 kg)   SpO2 95%   BMI 35.80 kg/m    In general, the patient is a pleasant male in no apparent distress.    HEENT: NC/AT.  PERRLA.  EOMI.  Sclerae white, not injected.  Nares clear.  Pharynx without erythema or exudate.  Dentition intact.    Neck: No adenopathy.  No thyromegaly. Carotids +2/2 bilaterally without bruits.  No " jugular venous distension.   Heart: RRR. Normal S1, S2 splits physiologically. No murmur, rub, click, or gallop. The PMI is in the 5th ICS in the midclavicular line. There is no heave.    Lungs: CTA.  No ronchi, wheezes, rales.  No dullness to percussion.   Abdomen: Soft, nontender, nondistended. No organomegaly. No AAA.  No bruits.   Extremities: Vascular:  Good palpable bilateral DP pulses decreased PT pulses bilaterally  Left leg is larger than right leg but well perfused  No foot ulcers or leg ulcers  Bilateral lower extremity varicose veins CEAP 1-2     Labs:  LIPID RESULTS:  Lab Results   Component Value Date    CHOL 122 09/03/2022    CHOL 121 05/17/2021    HDL 37 (L) 09/03/2022    HDL 39 (L) 05/17/2021    LDL 65 09/03/2022    LDL 55 05/17/2021    TRIG 99 09/03/2022    TRIG 137 05/17/2021    CHOLHDLRATIO 2.8 10/15/2015       LIVER ENZYME RESULTS:  Lab Results   Component Value Date    AST 31 08/25/2021    AST 32 01/12/2021    ALT 31 08/25/2021    ALT 33 01/12/2021       CBC RESULTS:  Lab Results   Component Value Date    WBC 5.9 01/30/2023    WBC 5.6 06/18/2021    RBC 4.57 01/30/2023    RBC 4.38 (L) 06/18/2021    HGB 13.1 (L) 01/30/2023    HGB 12.8 (L) 06/18/2021    HCT 41.7 01/30/2023    HCT 41.1 06/18/2021    MCV 91 01/30/2023    MCV 94 06/18/2021    MCH 28.7 01/30/2023    MCH 29.2 06/18/2021    MCHC 31.4 (L) 01/30/2023    MCHC 31.1 (L) 06/18/2021    RDW 15.2 (H) 01/30/2023    RDW 14.1 06/18/2021     01/30/2023     06/18/2021       BMP RESULTS:  Lab Results   Component Value Date     12/04/2022     06/18/2021    POTASSIUM 4.5 12/04/2022    POTASSIUM 4.6 07/15/2022    POTASSIUM 4.4 06/18/2021    CHLORIDE 102 12/04/2022    CHLORIDE 112 (H) 07/15/2022    CHLORIDE 109 06/18/2021    CO2 23 12/04/2022    CO2 21 07/15/2022    CO2 29 06/18/2021    ANIONGAP 12 12/04/2022    ANIONGAP 7 07/15/2022    ANIONGAP 4 06/18/2021     (H) 12/04/2022     (H) 07/15/2022     (H)  2021    BUN 22.9 2022    BUN 36 (H) 07/15/2022    BUN 28 2021    CR 1.12 2022    CR 1.44 (H) 2021    GFRESTIMATED 64 2022    GFRESTIMATED >60 10/20/2022    GFRESTIMATED 44 (L) 2021    GFRESTBLACK 51 (L) 2021    MARLENE 9.5 2022    MARLENE 9.5 2021        A1C RESULTS:  Lab Results   Component Value Date    A1C 6.2 (H) 03/15/2023    A1C 5.6 2020       THYROID RESULTS:  Lab Results   Component Value Date    TSH 3.35 2021       Procedures:     [facility]  Echocardiography Laboratory  [address] [place] [address]     Name: YULISA BYRD  MRN: [id number]  : 1935  Study Date: 2022 09:43 AM  Age: 86 yrs  Gender: Male  Patient Location: [facility]  Reason For Study: Persistent atrial fibrillation, Chronic diastolic heart  failure  Ordering Physician: HIMA DE LA PAZ  Referring Physician: HIMA DE LA PAZ  Performed By: Liliana Archibald     BSA: 2.1 m2  Height: 67 in  Weight: 230 lb  HR: 66  ______________________________________________________________________________  Procedure  Complete Echo Adult. Optison (NDC #[id number]) given intravenously.  ______________________________________________________________________________  Interpretation Summary     There is mild eccentric left ventricular hypertrophy.  The visual ejection fraction is 65-70%.  The left atrium is moderate to severely dilated.  There is mild (1+) tricuspid regurgitation.  Mild to moderate aortic root dilatation.  The ascending aorta is Moderately to severely dilated.  Right ventricular systolic pressure is elevated, consistent with mild to  moderate pulmonary hypertension.  As compared with study from , aortic dimensions and PA pressures have  increased.  The study was technically difficult. Contrast was used without apparent  complications.  ______________________________________________________________________________  Left  Ventricle  The left ventricle is normal in size. There is mild eccentric left ventricular  hypertrophy. Left ventricular diastolic function is abnormal. The visual  ejection fraction is 65-70%.     Right Ventricle  The right ventricle is normal in structure, function and size.     Atria  The left atrium is moderate to severely dilated. The right atrium is mildly  dilated.     Mitral Valve  The mitral valve leaflets are mildly thickened. There is trace to mild mitral  regurgitation.     Tricuspid Valve  Normal tricuspid valve. There is mild (1+) tricuspid regurgitation. Right  ventricular systolic pressure is elevated, consistent with mild to moderate  pulmonary hypertension.     Aortic Valve  There is mild trileaflet aortic sclerosis. There is trace aortic  regurgitation.     Pulmonic Valve  Normal pulmonic valve. There is trace pulmonic valvular regurgitation.     Vessels  Mild aortic root dilatation. The ascending aorta is Moderately dilated. The  inferior vena cava is normal.     ______________________________________________________________________________  MMode/2D Measurements & Calculations  IVSd: 1.3 cm  LVIDd: 4.6 cm  LVIDs: 2.4 cm  LVPWd: 1.2 cm  FS: 48.2 %  LV mass(C)d: 220.1 grams  LV mass(C)dI: 102.6 grams/m2  Ao root diam: 4.4 cm  asc Aorta Diam: 4.5 cm     LVOT diam: 2.3 cm  LVOT area: 4.3 cm2  LA Volume (BP): 111.0 ml  LA Volume Index (BP): 51.6 ml/m2  RWT: 0.55     Doppler Measurements & Calculations  MV E max kate: 128.0 cm/sec  MV A max kate: 41.0 cm/sec  MV E/A: 3.1  MV max P.3 mmHg  MV mean P.2 mmHg  MV V2 VTI: 32.1 cm  MVA(VTI): 2.5 cm2  MV dec time: 0.13 sec  LV V1 max P.7 mmHg  LV V1 max: 82.0 cm/sec  LV V1 VTI: 18.7 cm     SV(LVOT): 80.1 ml  SI(LVOT): 37.3 ml/m2  TR max kate: 294.8 cm/sec  TR max P.8 mmHg  E/E' avg: 10.4  Lateral E/e': 8.8  Medial E/e': 12.0     ______________________________________________________________________________  Report approved by: Tigist Vallejo  07/05/2022 11:43 AM   ULTRASOUND  OF THE ABDOMINAL AORTA 11/11/2022 10:45 AM      HISTORY: Evaluate for AAA seen on CT urogram. AAA (abdominal aortic  aneurysm).     COMPARISON: CT abdomen and pelvis 10/20/2022.     FINDINGS:   Proximal abdominal aorta:  2.9 cm.   Middle abdominal aorta: 2.7 x 2.6 cm.   Distal abdominal aorta: The saccular portion of the aneurysm is  approximately 2.2 x 1.7 cm in transverse plane with craniocaudal  length of 1.5 cm. This is stable compared to 10/22/2022. The maximal  AP length of the distal abdominal aorta including the aneurysm is 3.6  cm on image 14, also stable.     Right common iliac artery: 1.5 x 1.6 cm.  Left common iliac artery: 1.8 x 1.6 cm.                                                                      IMPRESSION:    1. Stable saccular aneurysm off of the distal abdominal aorta with the  aneurysm measuring up to 2.2 cm in transverse plane. The total size of  the distal abdominal aorta including the aneurysm is 3.6 cm, stable.  2. Borderline aneurysmal sizes of the right common iliac artery  measuring 1.6 cm, and left common iliac artery measuring 1.8 cm,  stable.      BRENDA JOSUE MD      ADDENDUM: There is anterior saccular aneurysm of the infrarenal  abdominal aorta measuring 1.8 x 1.6 x 2.1 cm in transverse, AP and CC  dimensions, respectively.     VANITA RAMIREZ MD         SYSTEM ID:  [id number]   Addended by Vanita Ramirez MD on 10/26/2022  1:08 PM     Study Result    Narrative & Impression   CT UROGRAM WITHOUT AND WITH  CONTRAST   10/20/2022 11:27 AM      HISTORY: Gross hematuria, worsening. Prostate cancer (H).      TECHNIQUE: Multiphasic CT abdomen and pelvis was performed before and  after injection of 90mL Isovue-370 intravenously. Radiation dose for  this scan was reduced using automated exposure control, adjustment of  the mA and/or kV according to patient size, or iterative  reconstruction technique.     COMPARISON: None  available     FINDINGS:  Lower chest: Mild basilar pulmonary opacities, likely atelectasis.  Mild cardiomegaly.     Right kidney: No radiodense kidney/ureteral stones, filling defect  within the renal collecting system or hydronephrosis. 5.1 cm renal  cyst at the lower pole of the right kidney. A few subcentimeter  hyperdense foci in the kidney are too small to characterize.     Left kidney: No radiodense kidney/ureteral stone, filling defect  within the renal collecting system nor hydronephrosis. Multiple  subcentimeter hyperdense foci in the kidneys, are too small to  characterize.     Urinary bladder: Mild diffuse urinary bladder wall thickening,  nonspecific, can be seen with chronic bladder obstruction or chronic  cholecystitis. No evidence of filling defect within the urinary  bladder.     Remainder of the abdomen and pelvis:     Hepatobiliary: No suspicious focal hepatic lesion. Cholelithiasis  without CT evidence of acute cholecystitis.     Pancreas: No main pancreatic ductal dilatation or definite solid  pancreatic mass.     Spleen: No splenomegaly.     Adrenal glands: No adrenal nodules.     Bowels: No abnormally dilated bowel loops. The appendix is visualized  and appears normal.     Peritoneum: No significant free fluid in the abdomen or pelvis. No  free peritoneal or portal venous gas.     Pelvic organs: Prostatic fiducial are noted.     Vascular: Moderate atherosclerotic vascular calcification of the  abdominal aorta and iliac vessels.     Lymph nodes: There is approximately 1 cm short axis left periaortic  node, not significantly changed as compared to 10/28/2010 exam,  indeterminate, could be reactive.     Bones and soft tissue: Multilevel degenerative changes of the spine.  No suspicious osseous lesion.                                                                      IMPRESSION:   1. No radiodense kidney/ureteral stone, filling defect within the  renal collecting system or hydronephrosis in  either kidney.  2. Mild diffuse urinary bladder wall thickening, nonspecific, can be  seen with chronic bladder outlet obstruction or chronic cystitis.  3. Cholelithiasis without CT evidence of acute cholecystitis.  4. Small saccular aneurysm of the infrarenal abdominal aorta.     TAMIKA SULLIVAN MD           IR CAT US CAT DOPPLER WITH EXERCISE BILATERAL   12/2/2022 2:23 PM      HISTORY: with toe pressures if unable to exercise; Infrarenal  abdominal aortic aneurysm (AAA) without rupture; Iliac artery  aneurysm, bilateral (H); Decreased pedal pulses     COMPARISON: None.     FINDINGS:  Right CAT:   DP: 0.99  PT: 1.06.     Left CAT:   DP: 1.03   PT: 1.02.     Right Digital brachial index: 0.52.  Left Digital Brachial index: 0.72     Waveforms: Multiphasic in the distal tibial arteries     Exercise: The patient walked on a treadmill for 5 minutes at 1.5 miles  per hour and at a 10% incline. At 1 minute 57 seconds, the patient had  thigh fatigue.     Right exercise CAT: 1.16.  Left exercise CAT: 1.06                                                                      IMPRESSION: Ankle brachial indices are within normal limits.     CAT CRITERIA:  >0.95 Normal  0.90 - 0.94 Mild  0.5 - 0.89 Moderate  0.2 - 0.49 Severe  <0.2 Critical     MARINO MINAYA MD     US LOWER EXTREMITY ARTERIAL DUPLEX BILATERAL  12/2/2022 2:23 PM      HISTORY:  Abdominal aortic aneurysm. Atrial fibrillation. Peripheral  vascular disease. Decreased pedal pulses.     COMPARISON: None     FINDINGS: Color Doppler and spectral waveform analysis performed.     The following peak systolic velocities are measured in cm/s.      RIGHT     CFA: 71  PFA: 42  SFA, proximal: 67  SFA, mid: 71  SFA, distal: 78  Popliteal: 42  Anterior tibial artery: 64  Posterior tibial artery: 93  Peroneal artery: 29     Waveforms: Normal high resistant triphasic waveforms throughout.     LEFT     CFA: 79  PFA: 56  SFA, proximal: 90  SFA, mid: 83  SFA, distal:  86  Popliteal: 53  Anterior tibial artery: 82  Posterior tibial artery: 88  Peroneal artery: The visualized     Waveforms: Normal high resistant triphasic waveforms throughout.                                                                      IMPRESSION: Visualized arteries of the lower extremities are patent  without evidence for significant stenosis.     MARINO MINAYA MD          Assessment and Plan:     1. Infrarenal abdominal aortic aneurysm (AAA) without rupture, ??? sacular.1.8 x 1.6 x 2.1 cm   (He was seen and evaluated by Dr. Fernández vascular surgeon and she thought this was penetrating aortic ulceration with dilatation of the aorta at that location, suggested monitoring a year from the last test)  2. Iliac artery aneurysm, bilateral (H) RT 1.6 and left 1.8 11/11/2022      This is a very pleasant 87-year-old male looks much younger than stated age underwent CT urogram recently which revealed infrarenal abdominal aortic aneurysm small  ??  saccular type and also has a strong family history of multiple first and second-degree relatives with aneurysms.  He is a former smoker quit many years ago smoked 1 pack cigarettes daily for 40 years.  He denies any abdominal pain or back pain.   Reviewed recent CT and abdominal ultrasound as delineated above  Extensive atherosclerosis with small saccular aneurysm infrarenal along with bilateral iliac artery aneurysms small.  Given family history of rupture of aneurysm and also saccular type even though it is small will ask vascular surgery service to evaluate this patient he lives near [place] and wanted to see the vascular surgeon there.    Optimize risk factors  Currently taking statin, DOAC, losartan and metoprolol continue the same  Walk as much as he can  Plan for CTA chest abdomen pelvis in 6 months  Go for fasting lipids, CBC and CMP lab orders placed      3. Benign essential hypertension  Well-controlled with current medications continue the same    4.  Hyperlipidemia LDL goal <70  Well-controlled with current medications continue the same    5. Atherosclerosis of aorta (H)  Aggressively control the risk factors fortunately he quit smoking long ago blood pressure well controlled lipids are well controlled A1c excellent range  Therapeutic lifestyle modification suggested he is taking anticoagulation for other reasons continue the same    6. Aortic root dilation (H)    7. Ascending aorta dilation (H)    Asymptomatic Will plan for CTA of the chest abdomen pelvis in  6 months      40 minutes spent on the date of the encounter doing chart review, history and exam, documentation, and further activities as noted above.    AVS with written instructions given he and his daughter had a lot of questions and all of them were answered    This note was dictated by utilizing Dragon software    Copy of this note to primary care physician and referring provider    Braydon Lopez MD, FARENETTA, FSVM, FNLA, FACP  Vascular Medicine  Clinical Hypertension specialist  Clinical Lipidologist

## 2023-03-21 NOTE — PATIENT INSTRUCTIONS
Continue current medications    Plan for CTA chest, abd, pelvis in 6 months then office visit with me     Fasting lipids, CMP, CBC in 6 months order placed     Please discuss with your primary regarding Actonel  duration etc

## 2023-03-23 DIAGNOSIS — E78.5 DYSLIPIDEMIA: ICD-10-CM

## 2023-03-24 RX ORDER — ATORVASTATIN CALCIUM 40 MG/1
TABLET, FILM COATED ORAL
Qty: 90 TABLET | Refills: 0 | Status: SHIPPED | OUTPATIENT
Start: 2023-03-24 | End: 2023-06-20

## 2023-03-29 ENCOUNTER — HOSPITAL ENCOUNTER (OUTPATIENT)
Dept: WOUND CARE | Facility: CLINIC | Age: 88
Discharge: HOME | End: 2023-03-29
Attending: PHYSICIAN ASSISTANT | Admitting: PHYSICIAN ASSISTANT
Payer: COMMERCIAL

## 2023-03-29 VITALS — HEART RATE: 118 BPM | SYSTOLIC BLOOD PRESSURE: 126 MMHG | DIASTOLIC BLOOD PRESSURE: 80 MMHG | TEMPERATURE: 97.9 F

## 2023-03-29 DIAGNOSIS — S90.821A BLISTER OF RIGHT FOOT, INITIAL ENCOUNTER: ICD-10-CM

## 2023-03-29 DIAGNOSIS — L97.512 TOE ULCER, RIGHT, WITH FAT LAYER EXPOSED (H): Primary | ICD-10-CM

## 2023-03-29 PROCEDURE — 11042 DBRDMT SUBQ TIS 1ST 20SQCM/<: CPT | Performed by: PHYSICIAN ASSISTANT

## 2023-03-29 NOTE — DISCHARGE INSTRUCTIONS
Nixon More      1935    A DME order for supplies has been placed to [facility]. If there are any  issues with your order including not receiving the order please call [facility] at [phone] option 3. They can also provide a tracking number for you  if you had supplies shipped to you.    Wound Dressing Change: RIGHT HALLUX / 1st toe  -Wash your hands with soap and water before you begin your dressing change and prepare a clean surface for dressings  -After cleansing with mild unscented soap (such as Cetaphil, Cerave or Dove) and water (ok to take a shower)  -Pat dry  -Apply peasize amount of Skintegrity hydrogel to wound bed  -Cover with bandaid   CHANGE EVERY DAY     Cat Cosme PA-C March 29, 2023    Call us at [phone] if you have any questions about your wounds, have redness or swelling around your wound, have a fever of 101 or greater or if you have any other problems or concerns. We answer the phone Monday through Friday 8 am to 4 pm, please leave a message as we check the voicemail frequently throughout the day.     If you had a positive experience please indicate that on your patient satisfaction survey form that [facility] will be sending you.    It was a pleasure meeting with you today.  Thank you for allowing me and my team the privilege of caring for you today.  YOU are the reason we are here, and I truly hope we provided you with the excellent service you deserve.  Please let us know if there is anything else we can do for you so that we can be sure you are leaving completely satisfied with your care experience.      If you have any billing related questions please call the [facility] Business office at [phone]. The clinic staff does not handle billing related matters.    If you are scheduled to have a follow up appointment, you will receive a reminder call the day before your visit. On the appointment day please arrive 15 minutes prior to your  appointment time. If you are unable to keep that appointment, please call the clinic to cancel or reschedule. If you are more than 10 minutes late or greater for your appointment, the clinic policy is that you may be asked to reschedule.

## 2023-03-31 NOTE — PROGRESS NOTES
[facility]    ASSESSMENT:   1. Full-thickness neuropathic ulcer of R hallux  2. Pre-diabetes    PLAN/DISCUSSION:   1. Elevate leg to help with edema, will hold off on compression stockings due to concern for more trauma  2. Wound care plan: hydrogel (new) and cover dressing, change daily. Was too dry with iodosorb  3. Dietary recommendations discussed, see AVS   4. See bottom of note for detailed wound care and patient instructions    HISTORY OF PRESENT ILLNESS:   Nixon More is a 87 year old male with preidabetes, emphysema, hx of prostate ca, CKD3, CHF, and CAD who presents today for a an ulceration of his R great toe. This started as a blister about two weeks ago without a real known cause. He suspects he was wearing poor fitting shoes. He has prediabetes and has not had an A1C checked recently. No peripheral arterial disease but does have CAD and evidence of venous disease. Notes swelling at baseline with some worsening with this issue.       TREATMENT COURSE:  3/15/2023 : Presents for initial visit at our clinic. Advised iodosorb and bandaid. Checked A1C which was 6.2%.   3/29: Returns to clinic. Area dried out and patient stopped dressing it. Has eschar and scab present.     VITALS: /80 (BP Location: Left arm, Patient Position: Sitting, Cuff Size: Adult Regular)   Pulse 118   Temp 97.9  F (36.6  C) (Temporal)      PHYSICAL EXAM:  GENERAL: Patient is alert and oriented and in no acute distress  CV: pedal pulses palpable  INTEGUMENTARY:   Wound (used by OP [facility] only) 03/15/23 1242 Right medial 1 toe (Active)   Thickness/Stage full thickness 03/29/23 1441   Base pink;red 03/29/23 1441   Periwound pink 03/29/23 1441   Periwound Temperature warm 03/29/23 1441   Periwound Skin Turgor soft 03/29/23 1441   Length (cm) 1.1 03/29/23 1441   Width (cm) 1.1 03/29/23 1441   Depth (cm) 0.1 03/29/23 1441   Wound (cm^2) 1.21 cm^2 03/29/23 1441   Wound Volume (cm^3) 0.12 cm^3 03/29/23 1441    Wound healing % 82.71 03/29/23 1441   Drainage Characteristics/Odor serosanguineous 03/29/23 1441   Drainage Amount moderate 03/29/23 1441   Care, Wound debrided 03/29/23 1441       PROCEDURES: 4% topical lidocaine was applied to the wound bed. Patient was determined to be capable of making their own medical decisions and informed consent was obtained. Using a 15 blade a surgical debridement was performed down to and including subcutaneous tissue of <20cm2. Hemostasis was achieved with pressure. The patient tolerated the procedure well.      MDM: 30 minutes were spent on the date of the visit reviewing previous chart notes, evaluating patient and developing the treatment plan, this excludes any time spent on procedures    PATIENT INSTRUCTIONS      Further instructions from your care team       Nixon More      1935      A DME order for supplies has been placed to [facility]. If there are any  issues with your order including not receiving the order please call [facility] at [phone] option 3. They can also provide a tracking number for you  if you had supplies shipped to you.    Wound Dressing Change: RIGHT HALLUX / 1st toe  -Wash your hands with soap and water before you begin your dressing change and prepare a clean surface for dressings  -After cleansing with mild unscented soap (such as Cetaphil, Cerave or Dove) and water (ok to take a shower)  -Pat dry  -Apply peasize amount of Skintegrity hydrogel to wound bed  -Cover with bandaid   CHANGE EVERY DAY     Cat Cosme PA-C March 29, 2023    Call us at [phone] if you have any questions about your wounds, have redness or swelling around your wound, have a fever of 101 or greater or if you have any other problems or concerns. We answer the phone Monday through Friday 8 am to 4 pm, please leave a message as we check the voicemail frequently throughout the day.     If you had a positive experience please indicate that  on your patient satisfaction survey form that [facility] will be sending you.    It was a pleasure meeting with you today.  Thank you for allowing me and my team the privilege of caring for you today.  YOU are the reason we are here, and I truly hope we provided you with the excellent service you deserve.  Please let us know if there is anything else we can do for you so that we can be sure you are leaving completely satisfied with your care experience.      If you have any billing related questions please call the [facility] Business office at [phone]. The clinic staff does not handle billing related matters.    If you are scheduled to have a follow up appointment, you will receive a reminder call the day before your visit. On the appointment day please arrive 15 minutes prior to your appointment time. If you are unable to keep that appointment, please call the clinic to cancel or reschedule. If you are more than 10 minutes late or greater for your appointment, the clinic policy is that you may be asked to reschedule.

## 2023-04-11 ENCOUNTER — HOSPITAL ENCOUNTER (OUTPATIENT)
Dept: WOUND CARE | Facility: CLINIC | Age: 88
Discharge: HOME | End: 2023-04-11
Attending: PHYSICIAN ASSISTANT | Admitting: PHYSICIAN ASSISTANT
Payer: COMMERCIAL

## 2023-04-11 VITALS — HEART RATE: 97 BPM | SYSTOLIC BLOOD PRESSURE: 146 MMHG | DIASTOLIC BLOOD PRESSURE: 76 MMHG | TEMPERATURE: 97.7 F

## 2023-04-11 DIAGNOSIS — L97.512 TOE ULCER, RIGHT, WITH FAT LAYER EXPOSED (H): Primary | ICD-10-CM

## 2023-04-11 PROCEDURE — 11042 DBRDMT SUBQ TIS 1ST 20SQCM/<: CPT | Performed by: PHYSICIAN ASSISTANT

## 2023-04-11 NOTE — DISCHARGE INSTRUCTIONS
Nixon More      1935    A DME order was not completed because supplies were not needed    Wound Dressing Change: RIGHT medial 1st toe  -Wash your hands with soap and water before you begin your dressing change and prepare a clean surface for dressings  -After cleansing with mild unscented soap (such as Cetaphil, Cerave or Dove) and water (ok to take a shower)  -Pat dry  -Apply peasize amount of Skintegrity hydrogel to wound bed  -Cover with bandaid  CHANGE EVERY DAY    Blood Sugars  Keep blood sugar below 150 or A1C below 7.0     PROTEIN  A diet high in protein is important for wound healing, we recommend getting 90 grams of protein per day. Taking protein shakes or bars are a good way to get extra protein in your diet.     Good sources of protein:  Pork 26g per 3 oz  Whey protein powder - 24g per scoop (on average)  Greek yogurt - 23g per 8oz   Chicken or Turkey - 23g per 3oz  Fish - 20-25g per 3oz  Beef - 18-23g per 3oz  Navy beans - 20g per cup  Cottage cheese - 14g per 1/2 cup   Lentils - 13g per 1/4 cup  Beef jerky 13g per 1oz  2% milk - 8g per cup  Peanut butter - 8g per 2 tablespoons  Eggs - 6g per egg  Mixed nuts - 6g per 2oz      Cat Cosme PA-C April 11, 2023    Call us at [phone] if you have any questions about your wounds, have redness or swelling around your wound, have a fever of 101 or greater or if you have any other problems or concerns. We answer the phone Monday through Friday 8 am to 4 pm, please leave a message as we check the voicemail frequently throughout the day.     If you had a positive experience please indicate that on your patient satisfaction survey form that [facility] will be sending you.    It was a pleasure meeting with you today.  Thank you for allowing me and my team the privilege of caring for you today.  YOU are the reason we are here, and I truly hope we provided you with the excellent service you deserve.  Please let us know if there is anything  else we can do for you so that we can be sure you are leaving completely satisfied with your care experience.      If you have any billing related questions please call the [facility] Business office at [phone]. The clinic staff does not handle billing related matters.    If you are scheduled to have a follow up appointment, you will receive a reminder call the day before your visit. On the appointment day please arrive 15 minutes prior to your appointment time. If you are unable to keep that appointment, please call the clinic to cancel or reschedule. If you are more than 10 minutes late or greater for your appointment, the clinic policy is that you may be asked to reschedule.

## 2023-04-11 NOTE — PROGRESS NOTES
[facility]    ASSESSMENT:   1. Full-thickness neuropathic ulcer of R hallux  2. Pre-diabetes    PLAN/DISCUSSION:   1. Wound care plan: hydrogel and cover dressing, change daily.   2. Dietary recommendations discussed, see AVS   3. See bottom of note for detailed wound care and patient instructions    HISTORY OF PRESENT ILLNESS:   Nixon More is a 87 year old male with preidabetes, emphysema, hx of prostate ca, CKD3, CHF, and CAD who presents today for a an ulceration of his R great toe. This started as a blister about two weeks ago without a real known cause. He suspects he was wearing poor fitting shoes. He has prediabetes and has not had an A1C checked recently. No peripheral arterial disease but does have CAD and evidence of venous disease. Notes swelling at baseline with some worsening with this issue.       TREATMENT COURSE:  3/15/2023 : Presents for initial visit at our clinic. Advised iodosorb and bandaid. Checked A1C which was 6.2%.   3/29: Returns to clinic. Area dried out and patient stopped dressing it. Has eschar and scab present. Switched to hydrogel.  04/11/23: Returns to clinic. Wound improving but still has some fibrinous slough in base. Using hydrogel.    VITALS: BP (!) 146/76 (BP Location: Left arm)   Pulse 97   Temp 97.7  F (36.5  C) (Temporal)      PHYSICAL EXAM:  GENERAL: Patient is alert and oriented and in no acute distress  CV: pedal pulses palpable  INTEGUMENTARY:   Wound (used by OP [facility] only) 03/15/23 1242 Right medial 1 toe (Active)   Thickness/Stage full thickness 04/11/23 1015   Base pink;red;yellow 04/11/23 1015   Periwound pink 04/11/23 1015   Periwound Temperature warm 04/11/23 1015   Periwound Skin Turgor soft 04/11/23 1015   Length (cm) 0.9 04/11/23 1015   Width (cm) 0.7 04/11/23 1015   Depth (cm) 0.1 04/11/23 1015   Wound (cm^2) 0.63 cm^2 04/11/23 1015   Wound Volume (cm^3) 0.06 cm^3 04/11/23 1015   Wound healing % 91 04/11/23 1015   Drainage  Characteristics/Odor serosanguineous 04/11/23 1015   Drainage Amount moderate 04/11/23 1015   Care, Wound debrided 04/11/23 1015       PROCEDURES: 4% topical lidocaine was applied to the wound bed. Patient was determined to be capable of making their own medical decisions and informed consent was obtained. Using a 15 blade a surgical debridement was performed down to and including subcutaneous tissue of <20cm2. Hemostasis was achieved with pressure. The patient tolerated the procedure well.      PATIENT INSTRUCTIONS      Further instructions from your care team       Nixon More      1935    A DME order was not completed because supplies were not needed    Wound Dressing Change: RIGHT medial 1st toe  -Wash your hands with soap and water before you begin your dressing change and prepare a clean surface for dressings  -After cleansing with mild unscented soap (such as Cetaphil, Cerave or Dove) and water (ok to take a shower)  -Pat dry  -Apply peasize amount of Skintegrity hydrogel to wound bed  -Cover with bandaid  CHANGE EVERY DAY    Blood Sugars  Keep blood sugar below 150 or A1C below 7.0     PROTEIN  A diet high in protein is important for wound healing, we recommend getting 90 grams of protein per day. Taking protein shakes or bars are a good way to get extra protein in your diet.     Good sources of protein:  Pork 26g per 3 oz  Whey protein powder - 24g per scoop (on average)  Greek yogurt - 23g per 8oz   Chicken or Turkey - 23g per 3oz  Fish - 20-25g per 3oz  Beef - 18-23g per 3oz  Navy beans - 20g per cup  Cottage cheese - 14g per 1/2 cup   Lentils - 13g per 1/4 cup  Beef jerky 13g per 1oz  2% milk - 8g per cup  Peanut butter - 8g per 2 tablespoons  Eggs - 6g per egg  Mixed nuts - 6g per 2oz      Cat Cosme PA-C April 11, 2023    Call us at [phone] if you have any questions about your wounds, have redness or swelling around your wound, have a fever of 101 or greater or if you have any  other problems or concerns. We answer the phone Monday through Friday 8 am to 4 pm, please leave a message as we check the voicemail frequently throughout the day.     If you had a positive experience please indicate that on your patient satisfaction survey form that [facility] will be sending you.    It was a pleasure meeting with you today.  Thank you for allowing me and my team the privilege of caring for you today.  YOU are the reason we are here, and I truly hope we provided you with the excellent service you deserve.  Please let us know if there is anything else we can do for you so that we can be sure you are leaving completely satisfied with your care experience.      If you have any billing related questions please call the [facility] Business office at [phone]. The clinic staff does not handle billing related matters.    If you are scheduled to have a follow up appointment, you will receive a reminder call the day before your visit. On the appointment day please arrive 15 minutes prior to your appointment time. If you are unable to keep that appointment, please call the clinic to cancel or reschedule. If you are more than 10 minutes late or greater for your appointment, the clinic policy is that you may be asked to reschedule.

## 2023-05-02 ENCOUNTER — HOSPITAL ENCOUNTER (OUTPATIENT)
Dept: WOUND CARE | Facility: CLINIC | Age: 88
Discharge: HOME | End: 2023-05-02
Attending: PHYSICIAN ASSISTANT | Admitting: PHYSICIAN ASSISTANT
Payer: COMMERCIAL

## 2023-05-02 VITALS — TEMPERATURE: 96.9 F | DIASTOLIC BLOOD PRESSURE: 89 MMHG | HEART RATE: 78 BPM | SYSTOLIC BLOOD PRESSURE: 138 MMHG

## 2023-05-02 DIAGNOSIS — L97.512 TOE ULCER, RIGHT, WITH FAT LAYER EXPOSED (H): Primary | ICD-10-CM

## 2023-05-02 PROCEDURE — 99213 OFFICE O/P EST LOW 20 MIN: CPT | Performed by: PHYSICIAN ASSISTANT

## 2023-05-02 PROCEDURE — G0463 HOSPITAL OUTPT CLINIC VISIT: HCPCS | Mod: 25

## 2023-05-02 PROCEDURE — 97602 WOUND(S) CARE NON-SELECTIVE: CPT

## 2023-05-02 NOTE — DISCHARGE INSTRUCTIONS
[facility]  [address] [place] [address]  Appointment Phone [phone]     Nixon OSVALDO More      1935  Your wound is Healed!! Dressings are no longer required.     May 2, 2023  Take special care  Here are tips for taking special care of your feet:  Inspect your feet daily for problems such as redness, blisters, cracks, dry skin, or numbness. Use a mirror to see the bottoms of your feet. Or, ask for help.  Manage your diabetes. Monitor and control your blood sugar. Take all your medicines as prescribed.  Avoid walking barefoot, even indoors. Always wear socks inside your shoes.   Wash your feet with warm water and mild soap. Dry well, especially between toes.  Don t treat corns or calluses yourself. Talk to your healthcare provider or podiatrist (a healthcare provider who specializes in foot care) if you need assistance trimming your toenails.  Use moisturizing cream or lotion if you have dry skin, but don t use it between toes.  Don t use heating pads on your feet. If you have neuropathy, you could get a burn and not feel it.  Stop smoking. Smoking restricts blood flow and can make it harder for wounds to heal.  Don't use sharp blades to trim your nails. Use a nail clipper and file instead.   Have regular checkups  Foot problems can develop quickly. So be sure to follow your healthcare team s schedule for regular checkups. During office visits, take off your shoes and socks as soon as you get in the exam room. Ask your healthcare provider to examine your feet for problems. This will make it easier to find and treat small skin irritations before they get worse. Regular checkups can also help keep track of the blood flow and feeling in your feet. If you have neuropathy, you may need to have checkups more often.  Wear proper footwear  Wearing proper footwear is very important. If areas of your feet have been damaged by too much pressure, your healthcare provider  may recommend changing your footwear. In some cases, avoiding high heels or tight work boots may be all that s needed. Or, your healthcare provider may recommend special shoes or custom inserts. These help protect your feet and keep existing irritations from getting worse. If you need special footwear, ask your healthcare provider if you qualify for Medicare's custom-molded and extra-depth diabetic shoe and insert program.   Make sure shoes and socks fit  Any pair of shoes--new or old--should feel comfortable as soon as you put them on. There shouldn t be any rubbing when you walk. Wear the right shoe for any activity. For instance, a running shoe is designed to keep your feet injury-free while jogging. Buy shoes at the end of the day, when your feet are larger. Make sure they provide support without feeling too loose. Make sure your socks fit, too. Wear soft, seamless, well-padded socks for activity. Cotton or microfiber socks are best to help to absorb sweat. To protect your feet, avoid shoes that are open-toed or open-heeled. If you have questions about what kinds of shoes and socks are best, talk to your healthcare team.

## 2023-05-02 NOTE — PROGRESS NOTES
Patient arrived for wound care visit. Certified Wound Care Nurse time spent evaluating patient record, completed a full evaluation and documented wound(s) & abby-wound skin; provided recommendation based on treatment plan. Applied dressing, reviewed discharge instructions, patient education, and discussed plan of care with appropriate medical team staff members and patient and/or family members.   Patient is healed and education provided on wound care and future follow up with good teach back.

## 2023-05-02 NOTE — PROGRESS NOTES
[facility]    ASSESSMENT:   1. Full-thickness neuropathic ulcer of R hallux  2. Pre-diabetes    PLAN/DISCUSSION:   1. Wound care plan: hydrogel and cover dressing, change daily until healed  2. Dietary recommendations discussed, see AVS   3. See bottom of note for detailed wound care and patient instructions    HISTORY OF PRESENT ILLNESS:   Nixon More is a 87 year old male with preidabetes, emphysema, hx of prostate ca, CKD3, CHF, and CAD who presents today for a an ulceration of his R great toe. This started as a blister about two weeks ago without a real known cause. He suspects he was wearing poor fitting shoes. He has prediabetes and has not had an A1C checked recently. No peripheral arterial disease but does have CAD and evidence of venous disease. Notes swelling at baseline with some worsening with this issue.       TREATMENT COURSE:  3/15/2023 : Presents for initial visit at our clinic. Advised iodosorb and bandaid. Checked A1C which was 6.2%.   3/29: Returns to clinic. Area dried out and patient stopped dressing it. Has eschar and scab present. Switched to hydrogel.  04/11/23: Returns to clinic. Wound improving but still has some fibrinous slough in base. Using hydrogel.  05/02/23: Returns to clinic. Wound much improved. Continues to use hydrogel.     VITALS: /89   Pulse 78   Temp 96.9  F (36.1  C) (Temporal)      PHYSICAL EXAM:  GENERAL: Patient is alert and oriented and in no acute distress  CV: pedal pulses palpable  INTEGUMENTARY:   Wound (used by OP [facility] only) 03/15/23 1242 Right medial 1 toe (Active)   Thickness/Stage full thickness 05/02/23 1400   Base epithelialization;pink 05/02/23 1400   Periwound intact 05/02/23 1400   Periwound Temperature warm 05/02/23 1400   Periwound Skin Turgor soft 05/02/23 1400   Edges rolled/closed 05/02/23 1400   Length (cm) 0.4 05/02/23 1400   Width (cm) 0.4 05/02/23 1400   Depth (cm) 0.1 05/02/23 1400   Wound (cm^2) 0.16 cm^2 05/02/23  1400   Wound Volume (cm^3) 0.02 cm^3 05/02/23 1400   Wound healing % 97.71 05/02/23 1400   Drainage Characteristics/Odor serosanguineous 05/02/23 1400   Drainage Amount scant 05/02/23 1400   Care, Wound non-select wound debridement performed 05/02/23 1400       MDM: 20 minutes was spent on the day of visit reviewing previous chart notes, assessing the patient and developing the plan of care, this excludes time spent on any procedures.       Electronically signed by Cat Cosme PA-C on May 2, 2023  Cat Cosme PA-C

## 2023-05-06 DIAGNOSIS — F32.0 MILD MAJOR DEPRESSION (H): ICD-10-CM

## 2023-05-08 RX ORDER — ESCITALOPRAM OXALATE 10 MG/1
TABLET ORAL
Qty: 90 TABLET | Refills: 0 | Status: SHIPPED | OUTPATIENT
Start: 2023-05-08 | End: 2023-06-30

## 2023-06-01 ENCOUNTER — LAB (OUTPATIENT)
Dept: LAB | Facility: CLINIC | Age: 88
End: 2023-06-01
Payer: COMMERCIAL

## 2023-06-01 DIAGNOSIS — C61 PROSTATE CANCER (H): ICD-10-CM

## 2023-06-01 LAB — PSA SERPL DL<=0.01 NG/ML-MCNC: 0.03 NG/ML

## 2023-06-01 PROCEDURE — 36415 COLL VENOUS BLD VENIPUNCTURE: CPT

## 2023-06-01 PROCEDURE — 84153 ASSAY OF PSA TOTAL: CPT

## 2023-06-14 ENCOUNTER — MYC MEDICAL ADVICE (OUTPATIENT)
Dept: PEDIATRICS | Facility: CLINIC | Age: 88
End: 2023-06-14
Payer: COMMERCIAL

## 2023-06-15 NOTE — TELEPHONE ENCOUNTER
Routing to PCP to review. Patient is having pain in his hand. Patient is inquiring if it would be okay to apply Voltaren. Patient is currently on ELIQUIS ANTICOAGULANT 5 MG tablet BID. Please advise if okay to use. Patient has upcoming visit:  Next 5 appointments (look out 90 days)    Jun 30, 2023 10:30 AM  (Arrive by 10:10 AM)  Annual Wellness Visit with Natalya Lewis MD  [facility] ([facility] ) [address]  [address]  [place] [id number] [phone]        Patient Quality Outreach Health Maintenance - PAL RN    Summary:    PAL RN contacted pt regarding overdue health maintenance    Patient is due/failing the following:   Diabetes -  Microalbumin  Depression  -  PHQ-9 needed  Physical Annual Wellness Visit      Topic Date Due     Diptheria Tetanus Pertussis (DTAP/TDAP/TD) Vaccine (2 - Td or Tdap) 08/14/2022     COVID-19 Vaccine (6 - Pfizer series) 01/16/2023       Health Maintenance Due   Topic Date Due     DTAP/TDAP/TD IMMUNIZATION (2 - Td or Tdap) 08/14/2022     MEDICARE ANNUAL WELLNESS VISIT  10/22/2022     MICROALBUMIN  10/22/2022     COVID-19 Vaccine (6 - Pfizer series) 01/16/2023     PHQ-9  07/03/2023       Type of outreach:    Chart review performed, no outreach needed.    - Advised patient if any questions or concerns comes up to call the PAL RN.   - Patient given opportunity to ask questions and at this time there is nothing further needed.     Questions for provider review:    See above                                                                                     Chart routed to Provider.    Rupinder ARTEAGA RN, BSN

## 2023-06-18 DIAGNOSIS — E78.5 DYSLIPIDEMIA: ICD-10-CM

## 2023-06-20 RX ORDER — ATORVASTATIN CALCIUM 40 MG/1
TABLET, FILM COATED ORAL
Qty: 90 TABLET | Refills: 0 | Status: SHIPPED | OUTPATIENT
Start: 2023-06-20 | End: 2023-08-04

## 2023-06-20 NOTE — TELEPHONE ENCOUNTER
LDL Cholesterol Calculated   Date Value Ref Range Status   09/03/2022 65 <=100 mg/dL Final   05/17/2021 55 <100 mg/dL Final     Comment:     Desirable:       <100 mg/dl     Prescription approved per [facility] Refill Protocol.  Brittni Miles RN, BSN  [facility]

## 2023-06-21 ENCOUNTER — OFFICE VISIT (OUTPATIENT)
Dept: UROLOGY | Facility: CLINIC | Age: 88
End: 2023-06-21
Payer: COMMERCIAL

## 2023-06-21 VITALS
DIASTOLIC BLOOD PRESSURE: 84 MMHG | SYSTOLIC BLOOD PRESSURE: 124 MMHG | HEIGHT: 70 IN | WEIGHT: 225 LBS | BODY MASS INDEX: 32.21 KG/M2

## 2023-06-21 DIAGNOSIS — D49.4 BLADDER TUMOR: Primary | ICD-10-CM

## 2023-06-21 DIAGNOSIS — C61 PROSTATE CANCER (H): ICD-10-CM

## 2023-06-21 DIAGNOSIS — Z79.2 PROPHYLACTIC ANTIBIOTIC: ICD-10-CM

## 2023-06-21 LAB
ALBUMIN UR-MCNC: ABNORMAL MG/DL
APPEARANCE UR: CLEAR
BILIRUB UR QL STRIP: NEGATIVE
COLOR UR AUTO: YELLOW
GLUCOSE UR STRIP-MCNC: NEGATIVE MG/DL
HGB UR QL STRIP: ABNORMAL
KETONES UR STRIP-MCNC: NEGATIVE MG/DL
LEUKOCYTE ESTERASE UR QL STRIP: ABNORMAL
NITRATE UR QL: NEGATIVE
PH UR STRIP: 5.5 [PH] (ref 5–7)
SP GR UR STRIP: 1.02 (ref 1–1.03)
UROBILINOGEN UR STRIP-ACNC: 0.2 E.U./DL

## 2023-06-21 PROCEDURE — 99213 OFFICE O/P EST LOW 20 MIN: CPT | Mod: 25 | Performed by: UROLOGY

## 2023-06-21 PROCEDURE — 81003 URINALYSIS AUTO W/O SCOPE: CPT | Mod: QW | Performed by: UROLOGY

## 2023-06-21 PROCEDURE — 52000 CYSTOURETHROSCOPY: CPT | Performed by: UROLOGY

## 2023-06-21 RX ORDER — LIDOCAINE HYDROCHLORIDE 20 MG/ML
JELLY TOPICAL ONCE
Status: COMPLETED | OUTPATIENT
Start: 2023-06-21 | End: 2023-06-21

## 2023-06-21 RX ORDER — CIPROFLOXACIN 500 MG/1
500 TABLET, FILM COATED ORAL ONCE
Qty: 1 TABLET | Refills: 0 | Status: SHIPPED | OUTPATIENT
Start: 2023-06-21 | End: 2023-06-21

## 2023-06-21 RX ADMIN — LIDOCAINE HYDROCHLORIDE 5 ML: 20 JELLY TOPICAL at 10:35

## 2023-06-21 ASSESSMENT — PAIN SCALES - GENERAL: PAINLEVEL: NO PAIN (0)

## 2023-06-21 NOTE — PROGRESS NOTES
Office Visit Note- Bladder Cancer Follow up  [facility]    [phone]     There were no vitals taken for this visit.    Bladder Cancer Diagnosis: Low-grade Ta  Also history of radiotherapy for prostate cancer with previous hormonal therapy  Urethral meatal stricture    Past Interventions: TURBT x1    Most Recent Pathology: January 2023    Most Recent Upper Tract Imaging: October 2022 CT scan    Most Recent FISH/Cytology: March 2023    History:   Spencer returns to clinic today for follow-up on both bladder cancer and prostate cancer.  A recent PSA was minimally detectable at 0.03.  He has had some minor episodes of gross hematuria recently.    Cystoscopy:   I performed flexible cystoscopy today and there were radiation changes to the bladder neck throughout.  No tumors identified throughout the bladder.  The area of previous resection is now healed well.    Impression:   History of bladder cancer  History of prostate cancer     Plan:   I discussed the state of both his bladder cancer and prostate cancer with Spencer and his daughter today.  He is doing well from a bladder cancer standpoint.  I recommended his next screening cystoscopy in 6 months.  The PSA has become minimally detectable again.  We will continue to follow this closely.  If the PSA does rise precipitously in the future he may need another injection of Lupron.  We will recheck the PSA in 6 months as well.      Mark Pino M.D.

## 2023-06-21 NOTE — PATIENT INSTRUCTIONS

## 2023-06-21 NOTE — NURSING NOTE
Chief Complaint   Patient presents with     Bladder tumor     Pt here for in office cystoscopy     Prior to the start of the procedure and with procedural staff participation, I verbally confirmed the patient s identity using two indicators, relevant allergies, that the procedure was appropriate and matched the consent or emergent situation, and that the correct equipment/implants were available. Immediately prior to starting the procedure I conducted the Time Out with the procedural staff and re-confirmed the patient s name, procedure, and site/side. I have wiped the patient off with the povidone-Iodine solution, draped them,  used Lidocaine hydrochloride jelly, and instilled sterile water into the bladder. (The Joint Commission universal protocol was followed.)  Yes    Sedation (Moderate or Deep): None    5mL 2% lidocaine hydrochloride Urojet instilled into urethra.    NDC# [address]-7  Lot #: [id number]  Expiration Date:  11/24    Radha Alexander CMA

## 2023-06-21 NOTE — LETTER
6/21/2023       RE: Nixon More  [address] [place] [address] [address]  [facility] [id number]     Dear Colleague,    Thank you for referring your patient, Nixon More, to the [facility]. Please see a copy of my visit note below.    Office Visit Note- Bladder Cancer Follow up  [facility]    [phone]     There were no vitals taken for this visit.    Bladder Cancer Diagnosis: Low-grade Ta  Also history of radiotherapy for prostate cancer with previous hormonal therapy  Urethral meatal stricture    Past Interventions: TURBT x1    Most Recent Pathology: January 2023    Most Recent Upper Tract Imaging: October 2022 CT scan    Most Recent FISH/Cytology: March 2023    History:   Spencer returns to clinic today for follow-up on both bladder cancer and prostate cancer.  A recent PSA was minimally detectable at 0.03.  He has had some minor episodes of gross hematuria recently.    Cystoscopy:   I performed flexible cystoscopy today and there were radiation changes to the bladder neck throughout.  No tumors identified throughout the bladder.  The area of previous resection is now healed well.    Impression:   History of bladder cancer  History of prostate cancer     Plan:   I discussed the state of both his bladder cancer and prostate cancer with Spencer and his daughter today.  He is doing well from a bladder cancer standpoint.  I recommended his next screening cystoscopy in 6 months.  The PSA has become minimally detectable again.  We will continue to follow this closely.  If the PSA does rise precipitously in the future he may need another injection of Lupron.  We will recheck the PSA in 6 months as well.      Mark Pino M.D.

## 2023-06-23 ASSESSMENT — ENCOUNTER SYMPTOMS
SORE THROAT: 0
PALPITATIONS: 0
WEAKNESS: 0
DIARRHEA: 1
CONSTIPATION: 0
HEMATURIA: 1
COUGH: 0
ARTHRALGIAS: 1
JOINT SWELLING: 0
FREQUENCY: 1
DIZZINESS: 0
NAUSEA: 0
ABDOMINAL PAIN: 0
HEARTBURN: 0
SHORTNESS OF BREATH: 1
CHILLS: 0
PARESTHESIAS: 0
NERVOUS/ANXIOUS: 0
HEMATOCHEZIA: 0
EYE PAIN: 0
FEVER: 0
DYSURIA: 0
HEADACHES: 0
MYALGIAS: 0

## 2023-06-23 ASSESSMENT — ACTIVITIES OF DAILY LIVING (ADL): CURRENT_FUNCTION: NO ASSISTANCE NEEDED

## 2023-06-30 ENCOUNTER — OFFICE VISIT (OUTPATIENT)
Dept: PEDIATRICS | Facility: CLINIC | Age: 88
End: 2023-06-30
Payer: COMMERCIAL

## 2023-06-30 VITALS
RESPIRATION RATE: 16 BRPM | DIASTOLIC BLOOD PRESSURE: 80 MMHG | HEART RATE: 70 BPM | BODY MASS INDEX: 35.66 KG/M2 | WEIGHT: 235.3 LBS | OXYGEN SATURATION: 97 % | SYSTOLIC BLOOD PRESSURE: 128 MMHG | TEMPERATURE: 97.6 F | HEIGHT: 68 IN

## 2023-06-30 DIAGNOSIS — I50.32 CHRONIC DIASTOLIC HEART FAILURE (H): ICD-10-CM

## 2023-06-30 DIAGNOSIS — K62.7 RADIATION PROCTITIS: ICD-10-CM

## 2023-06-30 DIAGNOSIS — J43.8 OTHER EMPHYSEMA (H): ICD-10-CM

## 2023-06-30 DIAGNOSIS — N18.31 STAGE 3A CHRONIC KIDNEY DISEASE (H): ICD-10-CM

## 2023-06-30 DIAGNOSIS — I48.92 ATRIAL FIBRILLATION AND FLUTTER (H): ICD-10-CM

## 2023-06-30 DIAGNOSIS — Z00.00 ENCOUNTER FOR MEDICARE ANNUAL WELLNESS EXAM: Primary | ICD-10-CM

## 2023-06-30 DIAGNOSIS — I48.91 ATRIAL FIBRILLATION AND FLUTTER (H): ICD-10-CM

## 2023-06-30 DIAGNOSIS — F32.0 MILD MAJOR DEPRESSION (H): ICD-10-CM

## 2023-06-30 DIAGNOSIS — Z23 NEED FOR DIPHTHERIA-TETANUS-PERTUSSIS (TDAP) VACCINE: ICD-10-CM

## 2023-06-30 DIAGNOSIS — E66.01 MORBID OBESITY (H): ICD-10-CM

## 2023-06-30 PROBLEM — L97.512 TOE ULCER, RIGHT, WITH FAT LAYER EXPOSED (H): Status: RESOLVED | Noted: 2023-03-15 | Resolved: 2023-06-30

## 2023-06-30 PROCEDURE — G0439 PPPS, SUBSEQ VISIT: HCPCS | Performed by: INTERNAL MEDICINE

## 2023-06-30 RX ORDER — ESCITALOPRAM OXALATE 10 MG/1
10 TABLET ORAL DAILY
Qty: 90 TABLET | Refills: 3 | Status: SHIPPED | OUTPATIENT
Start: 2023-06-30 | End: 2024-07-23

## 2023-06-30 ASSESSMENT — ENCOUNTER SYMPTOMS
WEAKNESS: 0
ARTHRALGIAS: 1
PARESTHESIAS: 0
HEMATOCHEZIA: 0
HEARTBURN: 0
HEADACHES: 0
COUGH: 0
ABDOMINAL PAIN: 0
CHILLS: 0
EYE PAIN: 0
DIZZINESS: 0
FEVER: 0
DYSURIA: 0
CONSTIPATION: 0
PALPITATIONS: 0
JOINT SWELLING: 0
SORE THROAT: 0
HEMATURIA: 1
NAUSEA: 0
SHORTNESS OF BREATH: 1
MYALGIAS: 0
NERVOUS/ANXIOUS: 0
FREQUENCY: 1
DIARRHEA: 1

## 2023-06-30 ASSESSMENT — PATIENT HEALTH QUESTIONNAIRE - PHQ9
10. IF YOU CHECKED OFF ANY PROBLEMS, HOW DIFFICULT HAVE THESE PROBLEMS MADE IT FOR YOU TO DO YOUR WORK, TAKE CARE OF THINGS AT HOME, OR GET ALONG WITH OTHER PEOPLE: NOT DIFFICULT AT ALL
SUM OF ALL RESPONSES TO PHQ QUESTIONS 1-9: 0
SUM OF ALL RESPONSES TO PHQ QUESTIONS 1-9: 0

## 2023-06-30 ASSESSMENT — ACTIVITIES OF DAILY LIVING (ADL): CURRENT_FUNCTION: NO ASSISTANCE NEEDED

## 2023-06-30 NOTE — PROGRESS NOTES
"SUBJECTIVE:   Spencer is a 87 year old who presents for Preventive Visit.      6/30/2023    10:04 AM   Additional Questions   Roomed by Lanny   Accompanied by self         6/30/2023    10:04 AM   Patient Reported Additional Medications   Patient reports taking the following new medications none     Are you in the first 12 months of your Medicare coverage?  No    Healthy Habits:     In general, how would you rate your overall health?  Good    Frequency of exercise:  1 day/week    Duration of exercise:  Less than 15 minutes    Do you usually eat at least 4 servings of fruit and vegetables a day, include whole grains    & fiber and avoid regularly eating high fat or \"junk\" foods?  No    Taking medications regularly:  Yes    Medication side effects:  None    Ability to successfully perform activities of daily living:  No assistance needed    Home Safety:  Throw rugs in the hallway    Hearing Impairment:  Difficulty following a conversation in a noisy restaurant or crowded room and difficulty understanding soft or whispered speech    In the past 6 months, have you been bothered by leaking of urine? Yes    In general, how would you rate your overall mental or emotional health?  Good    Additional concerns today:  Yes  diarrhea - still vexing.  Hasn't seen cr surgery in a few years.  Using imodium as needed.  Is not taking metamucil    Mood - not worrying.  Tends to be introvert      10/22/2021     1:38 PM 1/3/2023     4:21 PM 6/30/2023     9:52 AM   PHQ-9 SCORE   PHQ-9 Total Score MyChart 2 (Minimal depression) 4 (Minimal depression) 0   PHQ-9 Total Score 2 4 0       Have you ever done Advance Care Planning? (For example, a Health Directive, POLST, or a discussion with a medical provider or your loved ones about your wishes): Yes, advance care planning is on file.       Fall risk  Fallen 2 or more times in the past year?: No  Any fall with injury in the past year?: No    Cognitive Screening   1) Repeat 3 items (Leader, " "Season, Table)    2) Clock draw: NORMAL  3) 3 item recall: Recalls 3 objects  Results: 3 items recalled: COGNITIVE IMPAIRMENT LESS LIKELY    Mini-CogTM Copyright AGNES Roman. Licensed by the author for use in [facility]; reprinted with permission (alfa@[email or web address]). All rights reserved.      Do you have sleep apnea, excessive snoring or daytime drowsiness?: yes    Reviewed and updated as needed this visit by clinical staff   Tobacco  Allergies  Meds  Problems             Reviewed and updated as needed this visit by Provider    Allergies  Meds  Problems            Social History     Tobacco Use    Smoking status: Former     Packs/day: 1.00     Years: 30.00     Pack years: 30.00     Types: Cigarettes     Start date: 1948     Quit date: 1978     Years since quittin.5    Smokeless tobacco: Never   Substance Use Topics    Alcohol use: Not Currently     Comment: occ \"like once a month\" or less             2023    12:01 PM   Alcohol Use   Prescreen: >3 drinks/day or >7 drinks/week? No     Do you have a current opioid prescription? No  Do you use any other controlled substances or medications that are not prescribed by a provider? None      Current providers sharing in care for this patient include:   Patient Care Team:  Natalya Lewis MD as PCP - General  Natalya Lewis MD as Assigned PCP  Yeo, Albert, MD as Assigned Musculoskeletal Provider  Rupinder Childers, RN as Personal Advocate & Liaison (PAL)  Braydon Lopez MD as Assigned Heart and Vascular Provider  Mark Pino MD as Assigned Surgical Provider  Vinod De La Paz MD as MD (Cardiovascular Disease)    The following health maintenance items are reviewed in Epic and correct as of today:  Health Maintenance   Topic Date Due    DTAP/TDAP/TD IMMUNIZATION (2 - Td or Tdap) 2022    MICROALBUMIN  10/22/2022    COVID-19 Vaccine (6 - Pfizer series) 2023    LIPID  2023    BMP  2023    PHQ-9 " " 12/30/2023    ANNUAL REVIEW OF HM ORDERS  01/03/2024    HEMOGLOBIN  01/30/2024    MEDICARE ANNUAL WELLNESS VISIT  06/30/2024    FALL RISK ASSESSMENT  06/30/2024    DEXA  07/19/2024    COLORECTAL CANCER SCREENING  06/15/2025    ADVANCE CARE PLANNING  06/30/2028    SPIROMETRY  Completed    COPD ACTION PLAN  Completed    DEPRESSION ACTION PLAN  Completed    INFLUENZA VACCINE  Completed    Pneumococcal Vaccine: 65+ Years  Completed    URINALYSIS  Completed    ZOSTER IMMUNIZATION  Completed    IPV IMMUNIZATION  Aged Out    MENINGITIS IMMUNIZATION  Aged Out     Labs reviewed in EPIC    Review of Systems   Constitutional: Negative for chills and fever.   HENT: Negative for congestion, ear pain, hearing loss and sore throat.    Eyes: Negative for pain and visual disturbance.   Respiratory: Positive for shortness of breath. Negative for cough.    Cardiovascular: Negative for chest pain, palpitations and peripheral edema.   Gastrointestinal: Positive for diarrhea. Negative for abdominal pain, constipation, heartburn, hematochezia and nausea.   Genitourinary: Positive for frequency, hematuria, impotence and urgency. Negative for dysuria, genital sores and penile discharge.   Musculoskeletal: Positive for arthralgias. Negative for joint swelling and myalgias.   Skin: Negative for rash.   Neurological: Negative for dizziness, weakness, headaches and paresthesias.   Psychiatric/Behavioral: Negative for mood changes. The patient is not nervous/anxious.    shortness of breath - notices after holds breath or certain positions like putting on shoes.    OBJECTIVE:   /80 (BP Location: Right arm, Patient Position: Sitting, Cuff Size: Adult Large)   Pulse 70   Temp 97.6  F (36.4  C) (Tympanic)   Resp 16   Ht 1.727 m (5' 8\")   Wt 106.7 kg (235 lb 4.8 oz)   SpO2 97%   BMI 35.78 kg/m   Estimated body mass index is 35.78 kg/m  as calculated from the following:    Height as of this encounter: 1.727 m (5' 8\").    Weight as of " this encounter: 106.7 kg (235 lb 4.8 oz).  Physical Exam  GENERAL: healthy, alert and no distress  EYES: Eyes grossly normal to inspection, PERRL and conjunctivae and sclerae normal  HENT: ear canals and TM's normal, nose and mouth without ulcers or lesions  NECK: no adenopathy, no asymmetry, masses, or scars and thyroid normal to palpation  RESP: lungs clear to auscultation - no rales, rhonchi or wheezes  CV: regular rate and rhythm, normal S1 S2, no S3 or S4, no murmur, click or rub, no peripheral edema and peripheral pulses strong  ABDOMEN: soft, nontender, without hepatosplenomegaly or masses, bowel sounds normal and obese  MS: 2+ edema to just above ankle  SKIN: no suspicious lesions or rashes  NEURO: Normal strength and tone, mentation intact and speech normal  PSYCH: mentation appears normal, affect normal/bright    Diagnostic Test Results:  Labs reviewed in Epic    ASSESSMENT / PLAN:   Encounter for Medicare annual wellness exam  Routine health education discussed: calcium, diet, exercise, weight, safety.     Radiation proctitis  Discussed metamucil and imodium, refer back  - Adult Colorectal Surgery  Referral - [facility] ([facility]); Future    Mild major depression (H)  Controlled, refill  - escitalopram (LEXAPRO) 10 MG tablet; Take 1 tablet (10 mg) by mouth daily    Stage 3a chronic kidney disease (H)  Recheck labs  - Albumin Random Urine Quantitative with Creat Ratio; Future    Obesity (BMI 35.0-39.9) with comorbidity (H)  Discussed exercise  - Cardiac Rehab Referral; Future    Atrial fibrillation and flutter (H)  Follow-up with cardiology as scheduled, continue anticoagulation  - Cardiac Rehab Referral; Future    Chronic diastolic heart failure (H)  Follow-up with cardiology, try WEL program  - Cardiac Rehab Referral; Future    Other emphysema (H)  Consider inhaler if shortness of breath persists.  Has had before but doesn't use.    Need for diphtheria-tetanus-pertussis (Tdap)  "vaccine    - Tdap, tetanus-diptheria-acell pertussis, (BOOSTRIX) 5-2.5-18.5 LF-MCG/0.5 HECTOR injection; Inject 0.5 mLs into the muscle once for 1 dose        Patient has been advised of split billing requirements and indicates understanding: Yes      COUNSELING:  Reviewed preventive health counseling, as reflected in patient instructions      BMI:   Estimated body mass index is 35.78 kg/m  as calculated from the following:    Height as of this encounter: 1.727 m (5' 8\").    Weight as of this encounter: 106.7 kg (235 lb 4.8 oz).   Weight management plan: Discussed healthy diet and exercise guidelines      He reports that he quit smoking about 45 years ago. His smoking use included cigarettes. He started smoking about 75 years ago. He has a 30.00 pack-year smoking history. He has never used smokeless tobacco.      Appropriate preventive services were discussed with this patient, including applicable screening as appropriate for cardiovascular disease, diabetes, osteopenia/osteoporosis, and glaucoma.  As appropriate for age/gender, discussed screening for colorectal cancer, prostate cancer, breast cancer, and cervical cancer. Checklist reviewing preventive services available has been given to the patient.    Reviewed patients plan of care and provided an AVS. The Basic Care Plan (routine screening as documented in Health Maintenance) for Nixon meets the Care Plan requirement. This Care Plan has been established and reviewed with the Patient and daughter.          Natalya Lewis MD  [facility]    Identified Health Risks:  I have reviewed Opioid Use Disorder and Substance Use Disorder risk factors and made any needed referrals.     Answers for HPI/ROS submitted by the patient on 6/30/2023  If you checked off any problems, how difficult have these problems made it for you to do your work, take care of things at home, or get along with other people?: Not difficult at all  PHQ9 TOTAL SCORE: 0      He is " at risk for lack of exercise and has been provided with information to increase physical activity for the benefit of his well-being.  The patient was counseled and encouraged to consider modifying their diet and eating habits. He was provided with information on recommended healthy diet options.  The patient was provided with written information regarding signs of hearing loss.  Follows with urology for urinary incontinence

## 2023-06-30 NOTE — PATIENT INSTRUCTIONS
Try starting the metamucil again.  Also take imodium before going out.  Schedule follow-up with Dr. Patton - they should call you.  Get your updated covid vaccine this fall  Get your tetanus shot (tdap) at your pharmacy  Do your labs as scheduled.  It is ok to use voltaren topically on your hands    I encourage you to increase your activity to a goal of at least 150 mins/wk or 10,000 steps/day.  However, every little bit of activity you do is good for you!  Some methods to make exercise more fun (or less unfun) include:  Be social: exercise with friends, a group, or a good, qualified .  Entertain yourself: listen to music, podcasts, or books, or watch a movie.  Exercise outside in a beautiful environment.  Dance or play sports and games.  Variety is enjoyable - experiment and mix things up.  Choose realistic goals based on time, not performance, so you don t set yourself up for disappointment.  Reward yourself for exercising.  Remember to start slow and you can gradually increase.  Exercising takes time to become less unpleasant and hopefully eventually becomes enjoyable. Because we were not designed to be inactive and out of shape, the changes in our bodies that make physical activity feel rewarding and become a habit develop only after the several months of effort it takes to improve fitness. Slowly and gradually, exercise switches from being a negative feedback loop in which discomfort and lack of reward discourage us from exercising again to being a positive feedback loop in which exercise becomes satisfying. (Suggestions from Antionette Nelson, for more information on his work: [email or web address]://[email or web address])     Patient Education   Personalized Prevention Plan  You are due for the preventive services outlined below.  Your care team is available to assist you in scheduling these services.  If you have already completed any of these items, please share that  information with your care team to update in your medical record.  Health Maintenance Due   Topic Date Due    Diptheria Tetanus Pertussis (DTAP/TDAP/TD) Vaccine (2 - Td or Tdap) 08/14/2022    Kidney Microalbumin Urine Test  10/22/2022    COVID-19 Vaccine (6 - Pfizer series) 01/16/2023       Exercise for a Healthier Heart  You may wonder how you can improve the health of your heart. If you re thinking about exercise, you re on the right track. You don t need to become an athlete. But you do need a certain amount of brisk exercise to help strengthen your heart. If you have been diagnosed with a heart condition, your healthcare provider may advise exercise to help your condition. To help make exercise a habit, choose safe, fun activities.      Exercise with a friend. When activity is fun, you're more likely to stick with it.     Before you start  Check with your healthcare provider before starting an exercise program. This is especially important if you haven't been active for a while. It's also important if you have a long-term (chronic) health problem such as heart disease, diabetes, or obesity. Also check with your provider if you're at high risk for having these problems.   Why exercise?  Exercising regularly offers many healthy rewards. It can help you do all of these:   Improve your blood cholesterol level to help prevent further heart trouble.  Lower your blood pressure to help prevent a stroke or heart attack.  Control diabetes or reduce your risk of getting this disease.  Improve your heart and lung function.  Reach and stay at a healthy weight.  Make your muscles stronger so you can stay active.  Prevent falls and fractures by slowing the loss of bone mass (osteoporosis).  Manage stress better.  Improve your sense of self and your body image.  Exercise tips    Ease into your routine. Set small goals. Then build on them. Talk with your healthcare provider first before starting an exercise routine if you're not  sure what your activity level should be.  Exercise on most days. Aim for a total of at least 150 minutes (2 hours and 30 minutes) or more of moderate-intensity aerobic activity each week. You could also do 75 minutes (1 hour and 15 minutes) or more of vigorous-intensity aerobic activity each week. Or try for a combination of both. Moderate activity means that you breathe heavier and your heart rate increases, but you can still talk. Think about doing at least 30 minutes of moderate exercise, 5 times a week. It's OK to work up to the 30-minute period over time. Examples of moderate-intensity activity are brisk walking, gardening, and water aerobics.  Step up your daily activity level.  Along with your exercise program, try being more active the whole day. Walk instead of drive. Or park further away so that you take more steps each day. Do more household tasks or yard work. You may not be able to meet the advised amount of physical activity. But doing some moderate- or vigorous-intensity aerobic activity can help reduce your risk for heart disease. Your healthcare provider can help you figure out what is best for you.  Choose 1 or more activities you enjoy.  Walking is one of the easiest things you can do. You can also try swimming, riding a bike, dancing, or taking an exercise class.    Call 911  Call 911 right away if any of these occur:   Chest pain that doesn't go away quickly with rest  New burning, tightness, pressure, or heaviness in your chest, neck, shoulders, back, or arms  Abnormal or severe shortness of breath  A very fast or irregular heartbeat (palpitations)  Fainting  When to call your healthcare provider  Call your healthcare provider if you have any of these:   Dizziness or lightheadedness  Mild shortness of breath or chest pain  Increased or new joint or muscle pain    [employer] last reviewed this educational content on 7/1/2022 2000-2022 [employer]. All rights reserved. This  information is not intended as a substitute for professional medical care. Always follow your healthcare professional's instructions.          Understanding USDA MyPlate  The USDA has guidelines to help you make healthy food choices. These are called MyPlate. MyPlate shows the food groups that make up healthy meals using the image of a place setting. Before you eat, think about the healthiest choices for what to put on your plate or in your cup or bowl. To learn more about building a healthy plate, visit www.choosemyplate.gov.     The food groups  Fruits. Any fruit or 100% fruit juice counts as part of the Fruit Group. Fruits may be fresh, canned, frozen, or dried, and may be whole, cut-up, or pureed. Make 1/2 of your plate fruits and vegetables.  Vegetables. Any vegetable or 100% vegetable juice counts as a member of the Vegetable Group. Vegetables may be fresh, frozen, canned, or dried. They can be served raw or cooked and may be whole, cut-up, or mashed. Make 1/2 of your plate fruits and vegetables.  Grains. All foods made from grains are part of the Grains Group. These include wheat, rice, oats, cornmeal, and barley. Grains are often used to make foods such as bread, pasta, oatmeal, cereal, tortillas, and grits. Grains should be no more than 1/4 of your plate. At least half of your grains should be whole grains.  Protein. This group includes meat, poultry, seafood, beans and peas, eggs, processed soy products (such as tofu), nuts (including nut butters), and seeds. Make protein choices no more than 1/4 of your plate. Meat and poultry choices should be lean or low fat.  Dairy. The Dairy Group includes all fluid milk products and foods made from milk that contain calcium, such as yogurt and cheese. (Foods that have little calcium, such as cream, butter, and cream cheese, are not part of this group.) Most dairy choices should be low-fat or fat-free.  Oils. Oils aren't a food group, but they do contain essential  nutrients. However it's important to watch your intake of oils. These are fats that are liquid at room temperature. They include canola, corn, olive, soybean, vegetable, and sunflower oil. Foods that are mainly oil include mayonnaise, certain salad dressings, and soft margarines. You likely already get your daily oil allowance from the foods you eat.  Things to limit  Eating healthy also means limiting these things in your diet:  Salt (sodium). Many processed foods have a lot of sodium. To keep sodium intake down, eat fresh vegetables, meats, poultry, and seafood when possible. Purchase low-sodium, reduced-sodium, or no-salt-added food products at the store. And don't add salt to your meals at home. Instead, season them with herbs and spices such as dill, oregano, cumin, and paprika. Or try adding flavor with lemon or lime zest and juice.  Saturated fat. Saturated fats are most often found in animal products such as beef, pork, and chicken. They are often solid at room temperature, such as butter. To reduce your saturated fat intake, choose leaner cuts of meat and poultry. And try healthier cooking methods such as grilling, broiling, roasting, or baking. For a simple lower-fat swap, use plain nonfat yogurt instead of mayonnaise when making potato salad or macaroni salad.  Added sugars. These are sugars added to foods. They are in foods such as ice cream, candy, soda, fruit drinks, sports drinks, energy drinks, cookies, pastries, jams, and syrups. Cut down on added sugars by sharing sweet treats with a family member or friend. You can also choose fruit for dessert, and drink water or other unsweetened beverages.  [employer] last reviewed this educational content on 6/1/2020 2000-2022 [employer]. All rights reserved. This information is not intended as a substitute for professional medical care. Always follow your healthcare professional's instructions.          Signs of Hearing Loss  Hearing loss is a  problem shared by many people. In fact, it's one of the most common health problems, particularly as people age. Most people aged 65 and older have some hearing loss. By age 80, almost everyone does. Hearing loss often occurs slowly over the years. So, you may not realize your hearing has gotten worse.   When sudden hearing loss occurs, it's important to contact your healthcare provider right away. Your provider will do a medical exam and a hearing exam as soon as possible. This is to help find the cause and type of your sudden hearing loss. Based on your diagnosis, your healthcare provider will discuss possible treatments.      Hearing much better with one ear can be a sign of hearing loss.     Have your hearing checked  Call your healthcare provider if you:   Have to strain to hear normal conversation  Have to watch other people s faces very carefully to follow what they re saying  Need to ask people to repeat what they ve said  Often misunderstand what people are saying  Turn the volume of the television or radio up so high that others complain  Feel that people are mumbling when they re talking to you  Find that the effort to hear leaves you feeling tired and irritated  Notice, when using the phone, that you hear better with one ear than the other  [employer] last reviewed this educational content on 6/1/2022 2000-2022 [employer]. All rights reserved. This information is not intended as a substitute for professional medical care. Always follow your healthcare professional's instructions.

## 2023-07-22 ENCOUNTER — APPOINTMENT (OUTPATIENT)
Dept: GENERAL RADIOLOGY | Facility: CLINIC | Age: 88
End: 2023-07-22
Attending: EMERGENCY MEDICINE
Payer: COMMERCIAL

## 2023-07-22 ENCOUNTER — APPOINTMENT (OUTPATIENT)
Dept: CT IMAGING | Facility: CLINIC | Age: 88
End: 2023-07-22
Attending: EMERGENCY MEDICINE
Payer: COMMERCIAL

## 2023-07-22 ENCOUNTER — HOSPITAL ENCOUNTER (EMERGENCY)
Facility: CLINIC | Age: 88
Discharge: HOME | End: 2023-07-22
Attending: EMERGENCY MEDICINE | Admitting: EMERGENCY MEDICINE
Payer: COMMERCIAL

## 2023-07-22 VITALS
SYSTOLIC BLOOD PRESSURE: 159 MMHG | HEART RATE: 118 BPM | TEMPERATURE: 97.4 F | RESPIRATION RATE: 16 BRPM | DIASTOLIC BLOOD PRESSURE: 75 MMHG | BODY MASS INDEX: 34.25 KG/M2 | OXYGEN SATURATION: 99 % | WEIGHT: 226 LBS | HEIGHT: 68 IN

## 2023-07-22 DIAGNOSIS — S09.90XA CLOSED HEAD INJURY, INITIAL ENCOUNTER: ICD-10-CM

## 2023-07-22 DIAGNOSIS — T14.8XXA SKIN AVULSION: ICD-10-CM

## 2023-07-22 DIAGNOSIS — S82.024A CLOSED NONDISPLACED LONGITUDINAL FRACTURE OF RIGHT PATELLA, INITIAL ENCOUNTER: ICD-10-CM

## 2023-07-22 PROCEDURE — 73562 X-RAY EXAM OF KNEE 3: CPT | Mod: RT

## 2023-07-22 PROCEDURE — 93005 ELECTROCARDIOGRAM TRACING: CPT

## 2023-07-22 PROCEDURE — 73630 X-RAY EXAM OF FOOT: CPT | Mod: RT

## 2023-07-22 PROCEDURE — 99284 EMERGENCY DEPT VISIT MOD MDM: CPT | Mod: 25

## 2023-07-22 PROCEDURE — 70450 CT HEAD/BRAIN W/O DYE: CPT

## 2023-07-22 PROCEDURE — 27520 TREAT KNEECAP FRACTURE: CPT | Mod: RT

## 2023-07-22 PROCEDURE — 250N000013 HC RX MED GY IP 250 OP 250 PS 637: Performed by: EMERGENCY MEDICINE

## 2023-07-22 RX ORDER — ACETAMINOPHEN 500 MG
1000 TABLET ORAL ONCE
Status: COMPLETED | OUTPATIENT
Start: 2023-07-22 | End: 2023-07-22

## 2023-07-22 RX ADMIN — ACETAMINOPHEN 1000 MG: 500 TABLET, FILM COATED ORAL at 22:18

## 2023-07-22 ASSESSMENT — ACTIVITIES OF DAILY LIVING (ADL): ADLS_ACUITY_SCORE: 35

## 2023-07-23 ENCOUNTER — MYC MEDICAL ADVICE (OUTPATIENT)
Dept: PEDIATRICS | Facility: CLINIC | Age: 88
End: 2023-07-23
Payer: COMMERCIAL

## 2023-07-23 DIAGNOSIS — S82.024D CLOSED NONDISPLACED LONGITUDINAL FRACTURE OF RIGHT PATELLA WITH ROUTINE HEALING, SUBSEQUENT ENCOUNTER: Primary | ICD-10-CM

## 2023-07-23 NOTE — ED PROVIDER NOTES
"  History     Chief Complaint:  Fall       The history is provided by the patient.      Nixon More is a 87 year old male who presents after a fall. Patient states that he was walking into his garage when he tripped on the little step up into the garage. When he did this he caught his right great toe and then fell and hit his need and face on the garage door. States though that a month ago he went in somewhere to get his right toe looked at as he had a blister on the same toe and took a couple months to heal. Minh any hip and ankle pain. Notes that he might be borderline diabetic.      Independent Historian:   None - Patient Only    Review of External Notes:   Prior x-rays of his right patella showing a vertical fracture      Medications:    Atorvastatin   eliquis   escitalopram   Hydrocortisone   Ketoconazole   Loperamide   Losartan   Metoprolol succinate   Risedronate   Spironolactone       Past Medical History:    AAA  Actinic keratosis   Arthritis   Atrial fibrillation   CAD  Depression   Dyslipidemia   Emphysema   Hypertension   Asthma   Arthropathy   Lumbago   Prostate cancer   Lumbago   Sleep apnea     Past Surgical History:    Laminectomy   Hernia repair   Colonoscopy   Cytoscopy x2  Prostate surgery   Sigmoidoscopy   Genitourinary surgery      Physical Exam     Patient Vitals for the past 24 hrs:   BP Temp Pulse Resp SpO2 Height Weight   07/22/23 2221 -- -- -- -- 99 % -- --   07/22/23 2220 (!) 159/75 -- 118 -- -- -- --   07/22/23 2024 (!) 125/98 97.4  F (36.3  C) (!) 124 16 95 % 1.727 m (5' 8\") 102.5 kg (226 lb)        Physical Exam  GENERAL: well developed, pleasant  HEAD: atraumatic  EYES: pupils reactive, extraocular muscles intact, conjunctivae normal  ENT:  mucus membranes moist  NECK:  trachea midline, normal range of motion  RESPIRATORY: no tachypnea, breath sounds clear to auscultation   CVS: normal S1/S2, no murmurs, intact distal pulses  ABDOMEN: soft, nontender, " nondistention  MUSCULOSKELETAL: no deformities  SKIN: warm and dry, no acute rashes or ulceration, abrasion to right great toe with errashed skin nail not involved, contusion to right knee.  NEURO: GCS 15, cranial nerves intact, alert and oriented x3  PSYCH:  Mood/affect normal      Emergency Department Course     Imaging:  XR Knee Right 3 Views   Preliminary Result   IMPRESSION: There is an acute minimally displaced fracture through the lateral aspect of the patella. There is a slight step-off along the articular surface posteriorly. Moderate degenerative arthritis right knee. There is a knee joint effusion present.    Vascular calcification.      Foot  XR, G/E 3 views, right   Final Result   IMPRESSION:    1. No visualized acute fracture, malalignment or other acute osseous abnormality of the right foot.   2. Mild-to-moderate degenerative changes in the 1st metatarsophalangeal joint.   3. Plantar calcaneal spur.       Head CT w/o contrast   Final Result   IMPRESSION:   1.  No acute intracranial abnormality.   2.  Chronic changes, as described.         Report per radiology    Emergency Department Course & Assessments:       Interventions:  Medications   acetaminophen (TYLENOL) tablet 1,000 mg (1,000 mg Oral $Given 7/22/23 2218)        Assessments:  2203 I obtained history and examined the patient as noted above.  2320 I reevaluated the patient and explained the findings. We discussed plans for discharge and the patient was okay with the plan.    Independent Interpretation (X-rays, CTs, rhythm strip):  None    Consultations/Discussion of Management or Tests:  None        Social Determinants of Health affecting care:   None    Disposition:  The patient was discharged to home.     Impression & Plan    CMS Diagnoses: None    Medical Decision Making:  Patient presents with mechanical fall with abrasion to his big toe with loss of skin that does not look to be amenable to suturing, right knee pain and head injury.  Wound  to the toe is cleansed and dressed.  Knee x-ray shows a patellar fracture vertical.  He has had something similar in the past but this looks to be acute and likely reinjured the prior fracture.  Head injury is negative.  Patient was given a knee immobilizer and walker.  He does not feel he needs anything for at home.  He is up-to-date on his tetanus.    Diagnosis:    ICD-10-CM    1. Closed nondisplaced longitudinal fracture of right patella, initial encounter  S82.024A       2. Closed head injury, initial encounter  S09.90XA       3. Skin avulsion  T14.8XXA            Discharge Medications:  New Prescriptions    No medications on file          Scribe Disclosure:  I, Malachi Ford, am serving as a scribe at 10:30 PM on 7/22/2023 to document services personally performed by Kevin Hassan MD based on my observations and the provider's statements to me.   7/22/2023   Kevin Hassan MD Adams, Shaun L, MD  07/23/23 0022

## 2023-07-23 NOTE — DISCHARGE INSTRUCTIONS
Discharge Instructions  Head Injury    You have been seen today for a head injury. You were checked for serious problems, like bleeding on the brain, but these problems cannot always be found right away.  Due to this risk, you should not be alone for 24 hours after your injury.  Follow up with your regular physician in 3-5 days. If you are taking a blood thinner, such as aspirin, Pradaxa  (dabigatran), Coumadin  (warfarin), or Plavix  (clopidogrel), you are at especially high risk for immediate or delayed bleeding, and need to re-check with a physician in 24 hours, or sooner if any of the symptoms below happen.     Return to the Emergency Department if:  You are confused, have amnesia, or you are not acting right.  Your headache gets worse or you start to have a really bad headache even with your recommended treatment plan.  You vomit more than once.  You have a convulsion or seizure.  You have trouble walking.  You have weakness or paralysis in an arm or a leg.  You have blood or fluid coming from your ears or nose.  You have new symptoms or anything that worries you.    Sleeping:  It is okay for you to sleep, but someone should wake you up as instructed by your doctor, and someone should check on you at your usual time to wake up.     Activity:  Do not drive for at least 24 hours.  Do not drive if you have dizzy spells or trouble concentrating, or remembering things.  Do not return to any contact sports until cleared by your regular doctor.     Follow-up:  It is very important that you make an appointment with your clinic and go to the appointment.  If you do not follow-up with your regular doctor, it may result in missing an important development which could result in permanent injury or disability and/or lasting pain.  If there is any problem keeping your appointment, call your doctor or return to the Emergency Department.    MORE INFORMATION:    Concussion:  A concussion is a minor head injury that may cause  temporary problems with the way your brain works.  Some symptoms include:  confusion, amnesia, nausea and vomiting, dizziness, fatigue, memory or concentration problems, irritability and sleep problems.    CT Scans: Your evaluation today may have included a CT scan (CAT scan) to look for things like bleeding or a skull fracture (break).  CT scans involve radiation and too many CT scans can cause serious health problems like cancer, especially in children.  Because of this, your doctor may not have ordered a CT scan today if they think you are at low risk for a serious or life threatening problem.    If you were given a prescription for medicine here today, be sure to read all of the information (including the package insert) that comes with your prescription.  This will include important information about the medicine, its side effects, and any warnings that you need to know about.  The pharmacist who fills the prescription can provide more information and answer questions you may have about the medicine.  If you have questions or concerns that the pharmacist cannot address, please call or return to the Emergency Department.     Opioid Medication Information    Pain medications are among the most commonly prescribed medicines, so we are including this information for all our patients. If you did not receive pain medication or get a prescription for pain medicine, you can ignore it.     You may have been given a prescription for an opioid (narcotic) pain medicine and/or have received a pain medicine while here in the Emergency Department. These medicines can make you drowsy or impaired. You must not drive, operate dangerous equipment, or engage in any other dangerous activities while taking these medications. If you drive while taking these medications, you could be arrested for DUI, or driving under the influence. Do not drink any alcohol while you are taking these medications.     Opioid pain medications can cause  addiction. If you have a history of chemical dependency of any type, you are at a higher risk of becoming addicted to pain medications.  Only take these prescribed medications to treat your pain when all other options have been tried. Take it for as short a time and as few doses as possible. Store your pain pills in a secure place, as they are frequently stolen and provide a dangerous opportunity for children or visitors in your house to start abusing these powerful medications. We will not replace any lost or stolen medicine.  As soon as your pain is better, you should flush all your remaining medication.     Many prescription pain medications contain Tylenol  (acetaminophen), including Vicodin , Tylenol #3 , Norco , Lortab , and Percocet .  You should not take any extra pills of Tylenol  if you are using these prescription medications or you can get very sick.  Do not ever take more than 3000 mg of acetaminophen in any 24 hour period.    All opioids tend to cause constipation. Drink plenty of water and eat foods that have a lot of fiber, such as fruits, vegetables, prune juice, apple juice and high fiber cereal.  Take a laxative if you don t move your bowels at least every other day. Miralax , Milk of Magnesia, Colace , or Senna  can be used to keep you regular.      Remember that you can always come back to the Emergency Department if you are not able to see your regular doctor in the amount of time listed above, if you get any new symptoms, or if there is anything that worries you.

## 2023-07-23 NOTE — ED TRIAGE NOTES
Pt presents for complaint of a right toe, right knee and head injury. Pt states he was walking in his garage when his toe caught causing him to fall. States injury to the right toe with existing healing issues. States pain in the right knee with noted abrasion. No pain on palpation and patella feels intact and in place. Denies LOC, but states he takes a blood thinner. ABC Intact, A&Ox4.     Triage Assessment     Row Name 07/22/23 2026       Triage Assessment (Adult)    Airway WDL WDL       Respiratory WDL    Respiratory WDL WDL       Skin Circulation/Temperature WDL    Skin Circulation/Temperature WDL WDL       Cardiac WDL    Cardiac WDL WDL       Peripheral/Neurovascular WDL    Peripheral Neurovascular WDL WDL       Cognitive/Neuro/Behavioral WDL    Cognitive/Neuro/Behavioral WDL WDL       [place] Coma Scale    Best Eye Response 4-->(E4) spontaneous    Best Motor Response 6-->(M6) obeys commands    Best Verbal Response 5-->(V5) oriented    [place] Coma Scale Score 15

## 2023-07-24 ENCOUNTER — TRANSFERRED RECORDS (OUTPATIENT)
Dept: HEALTH INFORMATION MANAGEMENT | Facility: CLINIC | Age: 88
End: 2023-07-24
Payer: COMMERCIAL

## 2023-07-24 LAB
ATRIAL RATE - MUSE: 280 BPM
DIASTOLIC BLOOD PRESSURE - MUSE: NORMAL MMHG
INTERPRETATION ECG - MUSE: NORMAL
P AXIS - MUSE: NORMAL DEGREES
PR INTERVAL - MUSE: NORMAL MS
QRS DURATION - MUSE: 76 MS
QT - MUSE: 362 MS
QTC - MUSE: 454 MS
R AXIS - MUSE: -8 DEGREES
SYSTOLIC BLOOD PRESSURE - MUSE: NORMAL MMHG
T AXIS - MUSE: 11 DEGREES
VENTRICULAR RATE- MUSE: 95 BPM

## 2023-07-24 RX ORDER — HYDROCODONE BITARTRATE AND ACETAMINOPHEN 5; 325 MG/1; MG/1
1 TABLET ORAL EVERY 6 HOURS PRN
Qty: 10 TABLET | Refills: 0 | Status: SHIPPED | OUTPATIENT
Start: 2023-07-24 | End: 2023-07-27

## 2023-07-24 NOTE — TELEPHONE ENCOUNTER
"MA/TC: Please reach out to the pt and schedule a hospital follow-up for fall and knee injury.      - Beltran \"Hayder\" Jennifer (he/him/his), RN - Patient Advocate Liason (PAL)  [facility]  "

## 2023-07-24 NOTE — TELEPHONE ENCOUNTER
"Dr Lewis: See pt message and advise.      - Beltran \"Hayder\" Jennifer (he/him/his), RN - Patient Advocate Liason (PAL)  [facility]    "

## 2023-07-24 NOTE — TELEPHONE ENCOUNTER
Needs to be seen for ER follow-up asap.  Please book in any open appointment - same day, next day, extender etc

## 2023-08-02 DIAGNOSIS — E78.5 DYSLIPIDEMIA: ICD-10-CM

## 2023-08-04 RX ORDER — ATORVASTATIN CALCIUM 40 MG/1
TABLET, FILM COATED ORAL
Qty: 90 TABLET | Refills: 0 | Status: SHIPPED | OUTPATIENT
Start: 2023-08-04 | End: 2024-01-02

## 2023-08-15 ENCOUNTER — MYC MEDICAL ADVICE (OUTPATIENT)
Dept: PEDIATRICS | Facility: CLINIC | Age: 88
End: 2023-08-15
Payer: COMMERCIAL

## 2023-08-15 NOTE — TELEPHONE ENCOUNTER
"Called the pt.    States the letter indicated concerns about medication interactions and risks for elevated potassium. Letter did not indicated a discontinuation or risks for coverage, but to consult with PCP.    Pt has been taking these medications for years and reviewed multiple times by PCP and cardiology. Recommended pt ignore for now, but update us if they have any issues.      - Beltran \"Hayder\" Jennifer (he/him/his), RN - Patient Advocate Liason (PAL)  [facility]    "

## 2023-08-21 ENCOUNTER — HOSPITAL ENCOUNTER (OUTPATIENT)
Dept: CARDIOLOGY | Facility: CLINIC | Age: 88
Discharge: HOME | End: 2023-08-21
Attending: NURSE PRACTITIONER | Admitting: NURSE PRACTITIONER
Payer: COMMERCIAL

## 2023-08-21 DIAGNOSIS — I77.810 AORTIC ROOT DILATATION (H): ICD-10-CM

## 2023-08-21 DIAGNOSIS — I48.92 ATRIAL FLUTTER, CHRONIC (H): ICD-10-CM

## 2023-08-21 DIAGNOSIS — I50.32 CHRONIC DIASTOLIC HEART FAILURE (H): ICD-10-CM

## 2023-08-21 LAB — LVEF ECHO: NORMAL

## 2023-08-21 PROCEDURE — 93306 TTE W/DOPPLER COMPLETE: CPT | Mod: 26 | Performed by: INTERNAL MEDICINE

## 2023-08-21 PROCEDURE — 255N000002 HC RX 255 OP 636: Performed by: NURSE PRACTITIONER

## 2023-08-21 PROCEDURE — 999N000208 ECHOCARDIOGRAM COMPLETE

## 2023-08-21 RX ADMIN — HUMAN ALBUMIN MICROSPHERES AND PERFLUTREN 2 ML: 10; .22 INJECTION, SOLUTION INTRAVENOUS at 10:34

## 2023-08-22 ENCOUNTER — TRANSFERRED RECORDS (OUTPATIENT)
Dept: HEALTH INFORMATION MANAGEMENT | Facility: CLINIC | Age: 88
End: 2023-08-22

## 2023-08-22 ENCOUNTER — OFFICE VISIT (OUTPATIENT)
Dept: CARDIOLOGY | Facility: CLINIC | Age: 88
End: 2023-08-22
Attending: NURSE PRACTITIONER
Payer: COMMERCIAL

## 2023-08-22 VITALS
SYSTOLIC BLOOD PRESSURE: 120 MMHG | HEART RATE: 53 BPM | BODY MASS INDEX: 35.64 KG/M2 | OXYGEN SATURATION: 96 % | WEIGHT: 235.2 LBS | DIASTOLIC BLOOD PRESSURE: 78 MMHG | HEIGHT: 68 IN

## 2023-08-22 DIAGNOSIS — N18.31 STAGE 3A CHRONIC KIDNEY DISEASE (H): ICD-10-CM

## 2023-08-22 DIAGNOSIS — E78.5 DYSLIPIDEMIA: ICD-10-CM

## 2023-08-22 DIAGNOSIS — I48.19 PERSISTENT ATRIAL FIBRILLATION (H): ICD-10-CM

## 2023-08-22 DIAGNOSIS — I51.89 MASS OF RIGHT CARDIAC VENTRICLE: Primary | ICD-10-CM

## 2023-08-22 DIAGNOSIS — I77.810 AORTIC ROOT DILATATION (H): ICD-10-CM

## 2023-08-22 DIAGNOSIS — R31.9 HEMATURIA, UNSPECIFIED TYPE: ICD-10-CM

## 2023-08-22 DIAGNOSIS — I77.9 AORTA DISORDER (H): ICD-10-CM

## 2023-08-22 DIAGNOSIS — I50.32 CHRONIC DIASTOLIC HEART FAILURE (H): ICD-10-CM

## 2023-08-22 DIAGNOSIS — I10 BENIGN ESSENTIAL HYPERTENSION: ICD-10-CM

## 2023-08-22 DIAGNOSIS — I48.92 ATRIAL FLUTTER, CHRONIC (H): ICD-10-CM

## 2023-08-22 DIAGNOSIS — J43.8 OTHER EMPHYSEMA (H): ICD-10-CM

## 2023-08-22 DIAGNOSIS — I25.10 CORONARY ARTERY DISEASE INVOLVING NATIVE CORONARY ARTERY OF NATIVE HEART WITHOUT ANGINA PECTORIS: ICD-10-CM

## 2023-08-22 PROCEDURE — 99215 OFFICE O/P EST HI 40 MIN: CPT | Performed by: INTERNAL MEDICINE

## 2023-08-22 NOTE — LETTER
8/22/2023    Natalya Lewis MD  [address] [place] [address]    RE: Nixon More       Dear Colleague,     I had the pleasure of seeing Nixon More in the [facility].  HISTORY:    Nixon More is a pleasant 87-year-old gentleman with a history of permanent atrial flutter using anticoagulation and rate control, dyspnea on exertion due to COPD with 30 years of smoking history, HFpEF, possible CAD based on a mildly abnormal stress test in 2011 which has been treated conservatively without angina, mild dilation of the ascending aorta, hypertension, sleep apnea, peripheral venous insufficiency, dyslipidemia, prostate cancer with a history of radiation therapy, and a papillary bladder tumor with complete resection.  He is accompanied by his daughter who is a nurse practitioner today.    Today Spencer reports that he feels that overall he is doing well.  He denies exertional chest arm neck shoulder or jaw discomfort, PND/orthopnea, palpitations, syncope or near syncope, strokelike symptoms, peripheral edema, or claudication.  His dyspnea is stable.    Spencer had a echocardiogram in anticipation of today's visit.  I personally reviewed the echo images.  The ejection fraction was found to be normal.  The RV function also appears to be normal but there is a apical mass in the RV measuring about 2 cm with a rounded shape, smooth margins, may be due to thrombus or fat, seems to involve the myocardium so infiltrative mass is also possible.  Mild pulmonary hypertension was present along with mild ascending aorta enlargement.        ASSESSMENT/PLAN:    1.  Permanent atrial fib flutter.  We are using rate control successfully along with Eliquis for stroke prophylaxis.  2.  Hypertension well-controlled on losartan and metoprolol along with spironolactone.  3.  Right ventricular apical mass.  It is unclear what this might be but it is clearly seen with contrast in the right ventricle.  A cardiac  MRI will be arranged to further investigate.    Thank you for inviting me to participate in the care of your patient.  Please don't hesitate to call if I can be of further assistance.  40 minutes were spent today reviewing the chart and other records, interviewing and examining the patient, and documenting our visit.    Chart documentation was completed, in part, with [employer] voice-recognition software. Even though reviewed, some grammatical, spelling, and word errors may remain.       Orders Placed This Encounter   Procedures    Follow-Up with Cardiology ZULMA     No orders of the defined types were placed in this encounter.    There are no discontinued medications.    10 year ASCVD risk: The ASCVD Risk score (Rafal DK, et al., 2019) failed to calculate for the following reasons:    The 2019 ASCVD risk score is only valid for ages 40 to 79    Encounter Diagnoses   Name Primary?    Atrial flutter, chronic (H)     Benign essential hypertension     Aortic root dilatation (H)     Chronic diastolic heart failure (H)     Coronary artery disease involving native coronary artery of native heart without angina pectoris     Dyslipidemia     Stage 3a chronic kidney disease (H)     Other emphysema (H)     Persistent atrial fibrillation (H)     Aorta disorder (H)     Hematuria, unspecified type     Mass of right cardiac ventricle Yes       CURRENT MEDICATIONS:  Current Outpatient Medications   Medication Sig Dispense Refill    atorvastatin (LIPITOR) 40 MG tablet TAKE 1 TABLET BY MOUTH EVERY DAY 90 tablet 0    Cholecalciferol (VITAMIN D-3) 25 MCG (1000 UT) CAPS Take by mouth daily      ELIQUIS ANTICOAGULANT 5 MG tablet TAKE 1 TABLET BY MOUTH TWICE A  tablet 3    escitalopram (LEXAPRO) 10 MG tablet Take 1 tablet (10 mg) by mouth daily 90 tablet 3    hydrocortisone 2.5 % cream APPLY TO AREAS OF RASH IN THE LEFT AXILLAE 2 TIMES A DAY X3-5 DAYS      ketoconazole (NIZORAL) 2 % external cream APPLY TO AREAS OF RASH IN THE LEFT  AXILLAE DAILY      Loperamide HCl (IMODIUM OR) Take by mouth daily as needed      losartan (COZAAR) 100 MG tablet TAKE 1 TABLET BY MOUTH EVERY DAY 90 tablet 2    metoprolol succinate ER (TOPROL XL) 50 MG 24 hr tablet Take 2 tablets (100 mg) by mouth daily 180 tablet 3    RISEdronate (ACTONEL) 35 MG tablet TAKE 1 TABLET (35 MG) BY MOUTH EVERY 7 DAYS 12 tablet 3    spironolactone (ALDACTONE) 25 MG tablet Take 0.5 tablets (12.5 mg) by mouth daily 45 tablet 3    ASPIRIN NOT PRESCRIBED (INTENTIONAL) continuous prn for other Antiplatelet medication not prescribed intentionally due to Current anticoagulant therapy (warfarin/enoxaparin)         ALLERGIES     Allergies   Allergen Reactions    Amoxicillin-Pot Clavulanate Rash     Type III hypersensitivity    Bactrim [Sulfamethoxazole W/Trimethoprim] Rash     Serum sickness, type III hypersensitivity      Bee Venom Itching    Sulfa Antibiotics Hives    Celebrex [Celecoxib]     Lisinopril Cough    Naproxen Hives    Penicillin G        PAST MEDICAL HISTORY:  Past Medical History:   Diagnosis Date    AAA (abdominal aortic aneurysm) (H)     Actinic keratosis     Angina pectoris (H) 10/27/2020    Antiplatelet or antithrombotic long-term use     Eliquis    Arthritis     Atrial fibrillation and flutter (H) 12/03/2018    CAD (coronary artery disease)     1/5/2011 lateral ischemia on stress echo, 12/2014 normal stress echo    Coronary artery disease 01/01/2011    Abnormal stress test 1/2011 and controlled with medical mgmt     Depressive disorder     Mild    Diarrhea     Urgency at times    Dyslipidemia     Emphysema of lung (H)     Essential hypertension, benign     Hernia, abdominal     Hypersomnia with sleep apnea, unspecified     on bipap, partially treated with residual apneas    Hypertrophy (benign) of prostate 05/2001    Biopsy 5/01 negative for cancer; PSA 5    Lumbago     Mumps     Prostate cancer (H) 12/15/2006    Renal disease     Skin cancer, basal cell 1997    Sleep apnea   "   Uses a Bi-pap for centralized Apnea    Spider veins        PAST SURGICAL HISTORY:  Past Surgical History:   Procedure Laterality Date    ABDOMEN SURGERY      BACK SURGERY      L4/L5 decompression    BIOPSY      Skin cancer basal cell    COLONOSCOPY      CYSTOSCOPY      CYSTOSCOPY, TRANSURETHRAL RESECTION (TUR) TUMOR BLADDER, COMBINED N/A 2023    Procedure: Cystoscopy, transurethral resection of bladder tumor, medium, 2-5 cm;  Surgeon: Mark Pino MD;  Location: [facility] OR    EYE SURGERY      Cararact    GENITOURINARY SURGERY  2022    Tumors in bladder    HERNIA REPAIR  child    Moh's procedure for basal cell carcinoma  2001    PROSTATE SURGERY  2007    Radiation only    s/p lumbar laminectomy NOS  1988    SIGMOIDOSCOPY FLEXIBLE N/A 06/15/2020    Procedure: SIGMOIDOSCOPY, FLEXIBLE;  Surgeon: Sunni Patton MD;  Location:  GI    VITRECTOMY PARS PLANA REMOVE PRERETINAL MEMBRANE   2009       FAMILY HISTORY:  Family History   Problem Relation Age of Onset    Alzheimer Disease Mother     Hypertension Mother     Cardiovascular Father         D:86 complications fo CHF    Anesthesia Reaction Father          after 2 weeks    Prostate Cancer Brother     Lung Cancer Brother 76    Other Cancer Brother         Lung    Prostate Cancer Brother        SOCIAL HISTORY:  Social History     Socioeconomic History    Marital status:      Spouse name: None    Number of children: None    Years of education: None    Highest education level: None   Occupational History    Occupation: retired   Tobacco Use    Smoking status: Former     Packs/day: 1.00     Years: 30.00     Pack years: 30.00     Types: Cigarettes     Start date: 1948     Quit date: 1978     Years since quittin.6    Smokeless tobacco: Never   Vaping Use    Vaping Use: Never used   Substance and Sexual Activity    Alcohol use: Not Currently     Comment: occ \"like once a month\" or less    Drug use: No    Sexual " activity: Not Currently     Partners: Female     Birth control/protection: Post-menopausal   Other Topics Concern    Caffeine Concern No     Comment: 0-2 cans of soda per day.    Exercise No    Seat Belt Yes    Parent/sibling w/ CABG, MI or angioplasty before 65F 55M? No     Social Determinants of Health     Financial Resource Strain: Low Risk  (12/27/2022)    Overall Financial Resource Strain (CARDIA)     Difficulty of Paying Living Expenses: Not hard at all   Food Insecurity: No Food Insecurity (12/27/2022)    Hunger Vital Sign     Worried About Running Out of Food in the Last Year: Never true     Ran Out of Food in the Last Year: Never true   Transportation Needs: No Transportation Needs (12/27/2022)    PRAPARE - Transportation     Lack of Transportation (Medical): No     Lack of Transportation (Non-Medical): No   Physical Activity: Insufficiently Active (12/27/2022)    Exercise Vital Sign     Days of Exercise per Week: 1 day     Minutes of Exercise per Session: 10 min   Stress: No Stress Concern Present (12/27/2022)    [employer] - Occupational Stress Questionnaire     Feeling of Stress : Not at all   Social Connections: Moderately Integrated (12/27/2022)    Social Connection and Isolation Panel [NHANES]     Frequency of Communication with Friends and Family: Three times a week     Frequency of Social Gatherings with Friends and Family: Twice a week     Attends Mu-ism Services: More than 4 times per year     Active Member of Clubs or Organizations: Yes     Marital Status:    Intimate Partner Violence: Not At Risk (11/23/2021)    Humiliation, Afraid, Rape, and Kick questionnaire     Fear of Current or Ex-Partner: No     Emotionally Abused: No     Physically Abused: No     Sexually Abused: No   Housing Stability: Low Risk  (12/27/2022)    Housing Stability Vital Sign     Unable to Pay for Housing in the Last Year: No     Number of Places Lived in the Last Year: 2      "Unstable Housing in the Last Year: No       Review of Systems:  Skin:  Negative     Eyes:  Positive for glasses  ENT:  Negative    Respiratory:  Positive for sleep apnea;dyspnea on exertion  Cardiovascular:    edema;Positive for  Gastroenterology: Negative    Genitourinary:       Musculoskeletal:       Neurologic:       Psychiatric:       Heme/Lymph/Imm:       Endocrine:         Physical Exam:  Vitals: /78 (BP Location: Right arm, Patient Position: Sitting, Cuff Size: Adult Large)   Pulse 53   Ht 1.727 m (5' 8\")   Wt 106.7 kg (235 lb 3.2 oz)   SpO2 96%   BMI 35.76 kg/m      Constitutional:  cooperative, alert and oriented, well developed, well nourished, in no acute distress obese      Skin:  warm and dry to the touch, no apparent skin lesions or masses noted        Head:  normocephalic, no masses or lesions        Eyes:  pupils equal and round, conjunctivae and lids unremarkable, sclera white, no xanthalasma, EOMS intact, no nystagmus        ENT:  no pallor or cyanosis, dentition good        Neck:  carotid pulses are full and equal bilaterally, JVP normal, no carotid bruit        Chest:  normal breath sounds, clear to auscultation, normal A-P diameter, normal symmetry, normal respiratory excursion, no use of accessory muscles        Cardiac: normal S1 and S2;no S3 or S4 irregularly irregular rhythm;bradycardic distant heart sounds              Abdomen:  abdomen soft;BS normoactive        Vascular: pulses full and equal                                      Extremities and Muscular Skeletal:        bilateral LE edema;pitting;1+     Neurological:  no gross motor deficits        Psych:  affect appropriate, oriented to time, person and place     Recent Lab Results:  LIPID RESULTS:  Lab Results   Component Value Date    CHOL 122 09/03/2022    CHOL 121 05/17/2021    HDL 37 (L) 09/03/2022    HDL 39 (L) 05/17/2021    LDL 65 09/03/2022    LDL 55 05/17/2021    TRIG 99 09/03/2022    TRIG 137 05/17/2021    " CHOLHDLRATIO 2.8 10/15/2015       LIVER ENZYME RESULTS:  Lab Results   Component Value Date    AST 31 08/25/2021    AST 32 01/12/2021    ALT 31 08/25/2021    ALT 33 01/12/2021       CBC RESULTS:  Lab Results   Component Value Date    WBC 5.9 01/30/2023    WBC 5.6 06/18/2021    RBC 4.57 01/30/2023    RBC 4.38 (L) 06/18/2021    HGB 13.1 (L) 01/30/2023    HGB 12.8 (L) 06/18/2021    HCT 41.7 01/30/2023    HCT 41.1 06/18/2021    MCV 91 01/30/2023    MCV 94 06/18/2021    MCH 28.7 01/30/2023    MCH 29.2 06/18/2021    MCHC 31.4 (L) 01/30/2023    MCHC 31.1 (L) 06/18/2021    RDW 15.2 (H) 01/30/2023    RDW 14.1 06/18/2021     01/30/2023     06/18/2021       BMP RESULTS:  Lab Results   Component Value Date     12/04/2022     06/18/2021    POTASSIUM 4.5 12/04/2022    POTASSIUM 4.6 07/15/2022    POTASSIUM 4.4 06/18/2021    CHLORIDE 102 12/04/2022    CHLORIDE 112 (H) 07/15/2022    CHLORIDE 109 06/18/2021    CO2 23 12/04/2022    CO2 21 07/15/2022    CO2 29 06/18/2021    ANIONGAP 12 12/04/2022    ANIONGAP 7 07/15/2022    ANIONGAP 4 06/18/2021     (H) 12/04/2022     (H) 07/15/2022     (H) 06/18/2021    BUN 22.9 12/04/2022    BUN 36 (H) 07/15/2022    BUN 28 06/18/2021    CR 1.12 12/04/2022    CR 1.44 (H) 06/18/2021    GFRESTIMATED 64 12/04/2022    GFRESTIMATED >60 10/20/2022    GFRESTIMATED 44 (L) 06/18/2021    GFRESTBLACK 51 (L) 06/18/2021    MARLENE 9.5 12/04/2022    MARLENE 9.5 06/18/2021        A1C RESULTS:  Lab Results   Component Value Date    A1C 6.2 (H) 03/15/2023    A1C 5.6 11/11/2020       INR RESULTS:  Lab Results   Component Value Date    INR 1.03 12/03/2018    INR 1.07 11/27/2007         Vinod De La Paz MD, [facility]    CC  [address]  [address] [place] [address]      Thank you for allowing me to participate in the care of your patient.      Sincerely,     Vinod De La Paz MD     [facility]

## 2023-08-22 NOTE — PROGRESS NOTES
HISTORY:    Nixon More is a pleasant 87-year-old gentleman with a history of permanent atrial flutter using anticoagulation and rate control, dyspnea on exertion due to COPD with 30 years of smoking history, HFpEF, possible CAD based on a mildly abnormal stress test in 2011 which has been treated conservatively without angina, mild dilation of the ascending aorta, hypertension, sleep apnea, peripheral venous insufficiency, dyslipidemia, prostate cancer with a history of radiation therapy, and a papillary bladder tumor with complete resection.  He is accompanied by his daughter who is a nurse practitioner today.    Today Spencer reports that he feels that overall he is doing well.  He denies exertional chest arm neck shoulder or jaw discomfort, PND/orthopnea, palpitations, syncope or near syncope, strokelike symptoms, peripheral edema, or claudication.  His dyspnea is stable.    Spencer had a echocardiogram in anticipation of today's visit.  I personally reviewed the echo images.  The ejection fraction was found to be normal.  The RV function also appears to be normal but there is a apical mass in the RV measuring about 2 cm with a rounded shape, smooth margins, may be due to thrombus or fat, seems to involve the myocardium so infiltrative mass is also possible.  Mild pulmonary hypertension was present along with mild ascending aorta enlargement.        ASSESSMENT/PLAN:    1.  Permanent atrial fib flutter.  We are using rate control successfully along with Eliquis for stroke prophylaxis.  2.  Hypertension well-controlled on losartan and metoprolol along with spironolactone.  3.  Right ventricular apical mass.  It is unclear what this might be but it is clearly seen with contrast in the right ventricle.  A cardiac MRI will be arranged to further investigate.    Thank you for inviting me to participate in the care of your patient.  Please don't hesitate to call if I can be of further assistance.  40 minutes were spent  today reviewing the chart and other records, interviewing and examining the patient, and documenting our visit.    Chart documentation was completed, in part, with [employer] voice-recognition software. Even though reviewed, some grammatical, spelling, and word errors may remain.       Orders Placed This Encounter   Procedures    Follow-Up with Cardiology [facility]     No orders of the defined types were placed in this encounter.    There are no discontinued medications.    10 year ASCVD risk: The ASCVD Risk score (Rafal DK, et al., 2019) failed to calculate for the following reasons:    The 2019 ASCVD risk score is only valid for ages 40 to 79    Encounter Diagnoses   Name Primary?    Atrial flutter, chronic (H)     Benign essential hypertension     Aortic root dilatation (H)     Chronic diastolic heart failure (H)     Coronary artery disease involving native coronary artery of native heart without angina pectoris     Dyslipidemia     Stage 3a chronic kidney disease (H)     Other emphysema (H)     Persistent atrial fibrillation (H)     Aorta disorder (H)     Hematuria, unspecified type     Mass of right cardiac ventricle Yes       CURRENT MEDICATIONS:  Current Outpatient Medications   Medication Sig Dispense Refill    atorvastatin (LIPITOR) 40 MG tablet TAKE 1 TABLET BY MOUTH EVERY DAY 90 tablet 0    Cholecalciferol (VITAMIN D-3) 25 MCG (1000 UT) CAPS Take by mouth daily      ELIQUIS ANTICOAGULANT 5 MG tablet TAKE 1 TABLET BY MOUTH TWICE A  tablet 3    escitalopram (LEXAPRO) 10 MG tablet Take 1 tablet (10 mg) by mouth daily 90 tablet 3    hydrocortisone 2.5 % cream APPLY TO AREAS OF RASH IN THE LEFT AXILLAE 2 TIMES A DAY X3-5 DAYS      ketoconazole (NIZORAL) 2 % external cream APPLY TO AREAS OF RASH IN THE LEFT AXILLAE DAILY      Loperamide HCl (IMODIUM OR) Take by mouth daily as needed      losartan (COZAAR) 100 MG tablet TAKE 1 TABLET BY MOUTH EVERY DAY 90 tablet 2    metoprolol succinate ER (TOPROL XL) 50 MG 24 hr  tablet Take 2 tablets (100 mg) by mouth daily 180 tablet 3    RISEdronate (ACTONEL) 35 MG tablet TAKE 1 TABLET (35 MG) BY MOUTH EVERY 7 DAYS 12 tablet 3    spironolactone (ALDACTONE) 25 MG tablet Take 0.5 tablets (12.5 mg) by mouth daily 45 tablet 3    ASPIRIN NOT PRESCRIBED (INTENTIONAL) continuous prn for other Antiplatelet medication not prescribed intentionally due to Current anticoagulant therapy (warfarin/enoxaparin)         ALLERGIES     Allergies   Allergen Reactions    Amoxicillin-Pot Clavulanate Rash     Type III hypersensitivity    Bactrim [Sulfamethoxazole W/Trimethoprim] Rash     Serum sickness, type III hypersensitivity      Bee Venom Itching    Sulfa Antibiotics Hives    Celebrex [Celecoxib]     Lisinopril Cough    Naproxen Hives    Penicillin G        PAST MEDICAL HISTORY:  Past Medical History:   Diagnosis Date    AAA (abdominal aortic aneurysm) (H)     Actinic keratosis     Angina pectoris (H) 10/27/2020    Antiplatelet or antithrombotic long-term use     Eliquis    Arthritis     Atrial fibrillation and flutter (H) 12/03/2018    CAD (coronary artery disease)     1/5/2011 lateral ischemia on stress echo, 12/2014 normal stress echo    Coronary artery disease 01/01/2011    Abnormal stress test 1/2011 and controlled with medical mgmt     Depressive disorder     Mild    Diarrhea     Urgency at times    Dyslipidemia     Emphysema of lung (H)     Essential hypertension, benign     Hernia, abdominal     Hypersomnia with sleep apnea, unspecified     on bipap, partially treated with residual apneas    Hypertrophy (benign) of prostate 05/2001    Biopsy 5/01 negative for cancer; PSA 5    Lumbago     Mumps     Prostate cancer (H) 12/15/2006    Renal disease     Skin cancer, basal cell 1997    Sleep apnea     Uses a Bi-pap for centralized Apnea    Spider veins        PAST SURGICAL HISTORY:  Past Surgical History:   Procedure Laterality Date    ABDOMEN SURGERY      BACK SURGERY  1988    L4/L5 decompression     "BIOPSY      Skin cancer basal cell    COLONOSCOPY      CYSTOSCOPY      CYSTOSCOPY, TRANSURETHRAL RESECTION (TUR) TUMOR BLADDER, COMBINED N/A 2023    Procedure: Cystoscopy, transurethral resection of bladder tumor, medium, 2-5 cm;  Surgeon: Mark Pino MD;  Location: [facility] OR    EYE SURGERY  2016    Cararact    GENITOURINARY SURGERY  2022    Tumors in bladder    HERNIA REPAIR  child    Moh's procedure for basal cell carcinoma  2001    PROSTATE SURGERY  2007    Radiation only    s/p lumbar laminectomy NOS  1988    SIGMOIDOSCOPY FLEXIBLE N/A 06/15/2020    Procedure: SIGMOIDOSCOPY, FLEXIBLE;  Surgeon: Sunni Patton MD;  Location: [facility]    VITRECTOMY PARS PLANA REMOVE PRERETINAL MEMBRANE   2009       FAMILY HISTORY:  Family History   Problem Relation Age of Onset    Alzheimer Disease Mother     Hypertension Mother     Cardiovascular Father         D:86 complications fo CHF    Anesthesia Reaction Father          after 2 weeks    Prostate Cancer Brother     Lung Cancer Brother 76    Other Cancer Brother         Lung    Prostate Cancer Brother        SOCIAL HISTORY:  Social History     Socioeconomic History    Marital status:      Spouse name: None    Number of children: None    Years of education: None    Highest education level: None   Occupational History    Occupation: retired   Tobacco Use    Smoking status: Former     Packs/day: 1.00     Years: 30.00     Pack years: 30.00     Types: Cigarettes     Start date: 1948     Quit date: 1978     Years since quittin.6    Smokeless tobacco: Never   Vaping Use    Vaping Use: Never used   Substance and Sexual Activity    Alcohol use: Not Currently     Comment: occ \"like once a month\" or less    Drug use: No    Sexual activity: Not Currently     Partners: Female     Birth control/protection: Post-menopausal   Other Topics Concern    Caffeine Concern No     Comment: 0-2 cans of soda per day.    Exercise No    Seat Belt " Yes    Parent/sibling w/ CABG, MI or angioplasty before 65F 55M? No     Social Determinants of Health     Financial Resource Strain: Low Risk  (12/27/2022)    Overall Financial Resource Strain (CARDIA)     Difficulty of Paying Living Expenses: Not hard at all   Food Insecurity: No Food Insecurity (12/27/2022)    Hunger Vital Sign     Worried About Running Out of Food in the Last Year: Never true     Ran Out of Food in the Last Year: Never true   Transportation Needs: No Transportation Needs (12/27/2022)    PRAPARE - Transportation     Lack of Transportation (Medical): No     Lack of Transportation (Non-Medical): No   Physical Activity: Insufficiently Active (12/27/2022)    Exercise Vital Sign     Days of Exercise per Week: 1 day     Minutes of Exercise per Session: 10 min   Stress: No Stress Concern Present (12/27/2022)    [employer] - Occupational Stress Questionnaire     Feeling of Stress : Not at all   Social Connections: Moderately Integrated (12/27/2022)    Social Connection and Isolation Panel [NHANES]     Frequency of Communication with Friends and Family: Three times a week     Frequency of Social Gatherings with Friends and Family: Twice a week     Attends [employer] Services: More than 4 times per year     Active Member of Clubs or Organizations: Yes     Marital Status:    Intimate Partner Violence: Not At Risk (11/23/2021)    Humiliation, Afraid, Rape, and Kick questionnaire     Fear of Current or Ex-Partner: No     Emotionally Abused: No     Physically Abused: No     Sexually Abused: No   Housing Stability: Low Risk  (12/27/2022)    Housing Stability Vital Sign     Unable to Pay for Housing in the Last Year: No     Number of Places Lived in the Last Year: 2     Unstable Housing in the Last Year: No       Review of Systems:  Skin:  Negative     Eyes:  Positive for glasses  ENT:  Negative    Respiratory:  Positive for sleep apnea;dyspnea on exertion  Cardiovascular:     "edema;Positive for  Gastroenterology: Negative    Genitourinary:       Musculoskeletal:       Neurologic:       Psychiatric:       Heme/Lymph/Imm:       Endocrine:         Physical Exam:  Vitals: /78 (BP Location: Right arm, Patient Position: Sitting, Cuff Size: Adult Large)   Pulse 53   Ht 1.727 m (5' 8\")   Wt 106.7 kg (235 lb 3.2 oz)   SpO2 96%   BMI 35.76 kg/m      Constitutional:  cooperative, alert and oriented, well developed, well nourished, in no acute distress obese      Skin:  warm and dry to the touch, no apparent skin lesions or masses noted        Head:  normocephalic, no masses or lesions        Eyes:  pupils equal and round, conjunctivae and lids unremarkable, sclera white, no xanthalasma, EOMS intact, no nystagmus        ENT:  no pallor or cyanosis, dentition good        Neck:  carotid pulses are full and equal bilaterally, JVP normal, no carotid bruit        Chest:  normal breath sounds, clear to auscultation, normal A-P diameter, normal symmetry, normal respiratory excursion, no use of accessory muscles        Cardiac: normal S1 and S2;no S3 or S4 irregularly irregular rhythm;bradycardic distant heart sounds              Abdomen:  abdomen soft;BS normoactive        Vascular: pulses full and equal                                      Extremities and Muscular Skeletal:        bilateral LE edema;pitting;1+     Neurological:  no gross motor deficits        Psych:  affect appropriate, oriented to time, person and place     Recent Lab Results:  LIPID RESULTS:  Lab Results   Component Value Date    CHOL 122 09/03/2022    CHOL 121 05/17/2021    HDL 37 (L) 09/03/2022    HDL 39 (L) 05/17/2021    LDL 65 09/03/2022    LDL 55 05/17/2021    TRIG 99 09/03/2022    TRIG 137 05/17/2021    CHOLHDLRATIO 2.8 10/15/2015       LIVER ENZYME RESULTS:  Lab Results   Component Value Date    AST 31 08/25/2021    AST 32 01/12/2021    ALT 31 08/25/2021    ALT 33 01/12/2021       CBC RESULTS:  Lab Results   Component " Value Date    WBC 5.9 01/30/2023    WBC 5.6 06/18/2021    RBC 4.57 01/30/2023    RBC 4.38 (L) 06/18/2021    HGB 13.1 (L) 01/30/2023    HGB 12.8 (L) 06/18/2021    HCT 41.7 01/30/2023    HCT 41.1 06/18/2021    MCV 91 01/30/2023    MCV 94 06/18/2021    MCH 28.7 01/30/2023    MCH 29.2 06/18/2021    MCHC 31.4 (L) 01/30/2023    MCHC 31.1 (L) 06/18/2021    RDW 15.2 (H) 01/30/2023    RDW 14.1 06/18/2021     01/30/2023     06/18/2021       BMP RESULTS:  Lab Results   Component Value Date     12/04/2022     06/18/2021    POTASSIUM 4.5 12/04/2022    POTASSIUM 4.6 07/15/2022    POTASSIUM 4.4 06/18/2021    CHLORIDE 102 12/04/2022    CHLORIDE 112 (H) 07/15/2022    CHLORIDE 109 06/18/2021    CO2 23 12/04/2022    CO2 21 07/15/2022    CO2 29 06/18/2021    ANIONGAP 12 12/04/2022    ANIONGAP 7 07/15/2022    ANIONGAP 4 06/18/2021     (H) 12/04/2022     (H) 07/15/2022     (H) 06/18/2021    BUN 22.9 12/04/2022    BUN 36 (H) 07/15/2022    BUN 28 06/18/2021    CR 1.12 12/04/2022    CR 1.44 (H) 06/18/2021    GFRESTIMATED 64 12/04/2022    GFRESTIMATED >60 10/20/2022    GFRESTIMATED 44 (L) 06/18/2021    GFRESTBLACK 51 (L) 06/18/2021    MARLENE 9.5 12/04/2022    MARLENE 9.5 06/18/2021        A1C RESULTS:  Lab Results   Component Value Date    A1C 6.2 (H) 03/15/2023    A1C 5.6 11/11/2020       INR RESULTS:  Lab Results   Component Value Date    INR 1.03 12/03/2018    INR 1.07 11/27/2007         Vinod De La Paz MD, FACC    [facility]  [address] [place] [address]

## 2023-08-23 ENCOUNTER — APPOINTMENT (OUTPATIENT)
Dept: CT IMAGING | Facility: CLINIC | Age: 88
End: 2023-08-23
Attending: EMERGENCY MEDICINE
Payer: COMMERCIAL

## 2023-08-23 ENCOUNTER — APPOINTMENT (OUTPATIENT)
Dept: GENERAL RADIOLOGY | Facility: CLINIC | Age: 88
End: 2023-08-23
Attending: EMERGENCY MEDICINE
Payer: COMMERCIAL

## 2023-08-23 ENCOUNTER — PATIENT OUTREACH (OUTPATIENT)
Dept: PEDIATRICS | Facility: CLINIC | Age: 88
End: 2023-08-23
Payer: COMMERCIAL

## 2023-08-23 ENCOUNTER — HOSPITAL ENCOUNTER (EMERGENCY)
Facility: CLINIC | Age: 88
Discharge: HOME | End: 2023-08-23
Attending: PHYSICIAN ASSISTANT | Admitting: PHYSICIAN ASSISTANT
Payer: COMMERCIAL

## 2023-08-23 VITALS
OXYGEN SATURATION: 99 % | SYSTOLIC BLOOD PRESSURE: 156 MMHG | TEMPERATURE: 97.8 F | RESPIRATION RATE: 18 BRPM | HEART RATE: 98 BPM | DIASTOLIC BLOOD PRESSURE: 109 MMHG

## 2023-08-23 DIAGNOSIS — T14.8XXA ABRASION: ICD-10-CM

## 2023-08-23 DIAGNOSIS — S01.81XA LACERATION OF FOREHEAD, INITIAL ENCOUNTER: ICD-10-CM

## 2023-08-23 DIAGNOSIS — S80.01XA CONTUSION OF RIGHT KNEE, INITIAL ENCOUNTER: ICD-10-CM

## 2023-08-23 DIAGNOSIS — S09.90XA CLOSED HEAD INJURY, INITIAL ENCOUNTER: ICD-10-CM

## 2023-08-23 PROCEDURE — 99284 EMERGENCY DEPT VISIT MOD MDM: CPT | Mod: 25

## 2023-08-23 PROCEDURE — 73562 X-RAY EXAM OF KNEE 3: CPT | Mod: RT

## 2023-08-23 PROCEDURE — 250N000009 HC RX 250: Performed by: PHYSICIAN ASSISTANT

## 2023-08-23 PROCEDURE — 12011 RPR F/E/E/N/L/M 2.5 CM/<: CPT

## 2023-08-23 PROCEDURE — 70450 CT HEAD/BRAIN W/O DYE: CPT

## 2023-08-23 RX ADMIN — Medication: at 17:45

## 2023-08-23 ASSESSMENT — ACTIVITIES OF DAILY LIVING (ADL)
ADLS_ACUITY_SCORE: 33
ADLS_ACUITY_SCORE: 35

## 2023-08-23 NOTE — ED NOTES
PIT/Triage Evaluation    Patient presented for evaluation after falling forward off a curb around 3 pm today. There was no loss of consciousness at this time. He currently has several facial lacerations. He also struck his right knee in the fall and says he broke his right patella about one month ago after another fall. Spencer notes swelling to the right knee, but does not have significant pain to the area. Denies jaw pain and a headache. Of note, patient is on Eliquis for atrial fibrillation.     Exam is notable for:    Patient Vitals for the past 24 hrs:   BP Temp Temp src Pulse Resp SpO2   08/23/23 1643 (!) 156/109 97.8  F (36.6  C) Temporal (!) 127 16 96 %     HENT:  mmm. Normal phonation. Normal jaw ROM  Eyes: PERRL B/L  Neck: Supple  CV: Peripheral pulses in tact and regular  Resp: Speaking in full sentences without any respiratory distress  Musculoskeletal:  Right knee swelling and mild pain    Skin: Warm, dry, well perfused. Roughly 1.5 cm laceration above right eyebrow. Abrasion noted to nose, upper lip, and right TMJ region. Also abrasion on right knee.  Neuro: Alert, no gross motor or sensory deficits      Appropriate interventions for symptom management were initiated if applicable.  Appropriate diagnostic tests were initiated if indicated.    Important information for subsequent clinician:  Fall on eliquis. CT and XR ordered. Will need sutures.    I briefly evaluated the patient and developed an initial plan of care. I discussed this plan and explained that this brief interaction does not constitute a full evaluation. Patient/family understands that they should wait to be fully evaluated and discuss any test results with another clinician prior to leaving the hospital.     Sherman Iniguez,   08/23/23 [id number]

## 2023-08-23 NOTE — ED PROVIDER NOTES
History     Chief Complaint:  Fall     The history is provided by the patient.     Nixon More is a 87 year old male on Eliquis with history of CAD and hypertension presenting to the emergency department for evaluation of a fall. Nixon explains that he fell earlier today after tripping on a curb. He states that he was feeling at baseline before falling. He endorses hitting his head and right knee. He denies other trauma. He endorses being able to stand after falling. Nixon explains that he broke his right kneecap approximately one month ago and was seen for a follow-up X-ray yesterday, which was unremarkable. No headache, neck pain, dizziness, numbness, weakness or sob.. He denies chest pain and abdominal pain. He reports chronic back pain.    Independent Historian:   None - Patient Only    Review of External Notes:   none     Medications:    Lipitor  Eliquis  Lexapro  Imodium  Cozaar  Toprol  Actonel  Aldactone  Tenormin  Cardizem  Prozac  Hydrodiuril  Norco    Past Medical History:    Abdominal aortic aneurysm  Actinic keratosis  Angina pectoris  Antiplatelet or antithrombotic long-term use  Arthritis  Atrial fibrillation and flutter  CAD  Depressive disorder  Dyslipidemia  Emphysema of lung  Hypertension  Hernia, abdominal  Hypersomnia with sleep apnea  Hypertrophy of prostate  Lumbago  Prostate cancer  Renal disease  Skin cancer, basal cell  CKD, stage 3  Dysthymia  Branch retinal vein occlusion  Impaired fasting glucose    Past Surgical History:    Abdomen surgery  Back surgery, L4/L5 decompression  Biopsy, skin cancer basal cell  Colonoscopy  Cystoscopy  Cystoscopy, transurethral resection tumor bladder, combined  Eye surgery  Genitourinary surgery, tumors in bladder  Hernia repair  Moh's procedure for basal cell carcinoma  Prostate surgery, radiation only  Lumbar laminectomy  Sigmoidoscopy flexible  Vitrectomy pars plana remove preretinal membrane    Physical Exam   Patient Vitals for the past 24 hrs:    BP Temp Temp src Pulse Resp SpO2   08/23/23 1700 -- -- -- 98 18 99 %   08/23/23 1643 (!) 156/109 97.8  F (36.6  C) Temporal (!) 127 16 96 %     Physical Exam  General: Alert and awake.  Head:  Scalp with small hematoma above R eyebrow to forehead.  1.5 cm overlying laceration which does not violate galea or musculature.  No FB.  Superficial abrasion to cheek.  is NC/AT without bruising, hematomas. No ptosis, able to wrinkle forehead normally.  Eyes:  Globes normal and atraumatic.  PERRL, EOMI.   ENT:  No bruising of facial bone ttp or step-offs.    TM's normal bilaterally    Oropharynx atraumatic.  Neck:  Normal range of motion without rigidity. Able to rotate 45 degrees BL. No bruising or swelling.  CV:  Regular rate and rhythm. No M/R/G.  Resp:  Breath sounds are clear bilaterally. Normal effort.  Abdomen: Abdomen is soft, no distension, no tenderness, no masses.  MS:  No midline cervical, thoracic, or lumbar tenderness    No tenderness over sternum, scapula, ribs, clavicles.    Mild ttp over right anterior patella.  No other bony ttp to tibia, fibula, femur, lateral joint space.  Ranging knee normally.  No ligamentous laxity.  Superficial abrasion overlying.  Normal patellar and quadriceps tendon tracking.  Extremities otherwise atraumatic.    Skin:  Warm and dry, No bruising.  Extremities warm and well perfused 2+ DP and PT pulses BL.  Neuro:  Alert and oriented.  Strength and sensation grossly intact in all 4 extremities.  Cranial nerves 2-12 intact. GCS: 15. Gait normal.  Psych: Alert.  Normal affect.  Cooperative.      Emergency Department Course   Imaging:  XR Knee Right 3 Views   Final Result   IMPRESSION: Redemonstrated is a minimally displaced fracture lateral patella. No significant interval displacement or healing. No new fracture or joint malalignment. Medial and patellofemoral compartment right knee osteoarthritis is similar to prior.    Chondrocalcinosis. Knee soft tissue swelling. Arterial  calcification. Knee joint effusion with anterior knee soft tissue swelling.      Head CT w/o contrast   Final Result   IMPRESSION:   1.  Right supraorbital scalp laceration without acute intracranial abnormality.         Report per radiology    Laboratory:  Labs Ordered and Resulted from Time of ED Arrival to Time of ED Departure - No data to display     Procedures     Laceration Repair      Procedure: Laceration Repair    Indication: Laceration    Consent: Verbal    Location: Right Forehead    Length: 1.5 cm    Preparation: Irrigation with Sterile Saline.    Anesthesia/Sedation: Topical -LET      Treatment/Exploration: Wound explored, no foreign bodies found     Closure: The wound was closed with one layer. Skin/superficial layer was closed with 3 x 5-0 Polypropylene (prolene)  using Interrupted sutures.     Patient Status: The patient tolerated the procedure well: Yes. There were no complications.     Emergency Department Course & Assessments:       Interventions:  Medications   lido-EPINEPHrine-tetracaine (LET) topical gel GEL ( Topical $Given 23 174)      Assessments:   I obtained history and examined the patient as noted above.    I performed the laceration repair procedure as noted above. I discussed findings and discharge with the patient. All questions answered.     Independent Interpretation (X-rays, CTs, rhythm strip):  Independently reviewed head CT no evidence of bleed or skull fracture.    Consultations/Discussion of Management or Tests:  None        Social Determinants of Health affecting care:   None    Disposition:  The patient was discharged to home.     Impression & Plan    Medical Decision Makin-year-old male presents with head injury and forehead laceration after mechanical fall.  Also with a little bit of pain to the right knee recent patellar fracture now healing well per Ortho.  CT of the head is negative.  C-spine cleared clinically using Nexus criteria no evidence of  facial or dental fracture.  X-ray of the knee shows no abnormal healing no new fracture or dislocation.  No evidence to suggest patellar or quadriceps tendon rupture or occult knee dislocation CMS intact.  Head to toe trauma exam otherwise nonconcerning.  Laceration repaired as above.  Tetanus is up-to-date.  No evidence of foreign body.  No signs of muscular or tendon injury to the facial muscles.  Sutures out in 5 days.  Discussed watch for signs of infection and scarring precautions.  Return precautions given for signs of unlikely but possible delayed head bleed other new or worsening symptoms.  Abrasions cleaned and dressed.    Diagnosis:    ICD-10-CM    1. Laceration of forehead, initial encounter  S01.81XA       2. Closed head injury, initial encounter  S09.90XA       3. Abrasion  T14.8XXA       4. Contusion of right knee, initial encounter  S80.01XA          Scribe Disclosure:  Kuldip COLÓN, am serving as a scribe at 5:38 PM on 8/23/2023 to document services personally performed by Sam Hatch PA-C based on my observations and the provider's statements to me.   8/23/2023   Sam Hatch PA-C Etten, Clark Ellsworth, PA-C  08/23/23 1952

## 2023-08-23 NOTE — TELEPHONE ENCOUNTER
Patient Quality Outreach Health Maintenance - PAL RN    Summary:    PAL RN contacted pt regarding overdue health maintenance    Patient is due/failing the following:       Topic Date Due    COVID-19 Vaccine (6 - Pfizer series) 01/16/2023       Health Maintenance Due   Topic Date Due    HF ACTION PLAN  Never done    ALT  08/25/2022    MICROALBUMIN  10/22/2022    COVID-19 Vaccine (6 - Pfizer series) 01/16/2023    LIPID  09/03/2023       Type of outreach:    Chart review performed, no outreach needed. Patient has lab appointment scheduled for 9/21/23.      Questions for provider review:    None                                                                                       Rupinder ARTEAGA RN, BSN

## 2023-08-23 NOTE — ED TRIAGE NOTES
Patient reports fall off of a curb forward.  Several facial lacerations noted.  No LOC per pt.  Patient reports he did fall onto his right knee.  He reports a previous broken knee cap about 1 month ago.  He reports taking Eliquis.  History of A fib, emphysema.  ABCs intact, A&Ox4.     Triage Assessment       Row Name 08/23/23 1640       Triage Assessment (Adult)    Airway WDL WDL       Respiratory WDL    Respiratory WDL WDL       Skin Circulation/Temperature WDL    Skin Circulation/Temperature WDL X  facial lacerations       Cardiac WDL    Cardiac WDL WDL       Peripheral/Neurovascular WDL    Peripheral Neurovascular WDL WDL       Cognitive/Neuro/Behavioral WDL    Cognitive/Neuro/Behavioral WDL WDL

## 2023-08-24 ENCOUNTER — PATIENT OUTREACH (OUTPATIENT)
Dept: CARE COORDINATION | Facility: CLINIC | Age: 88
End: 2023-08-24
Payer: COMMERCIAL

## 2023-08-24 NOTE — LETTER
[facility] CARE COORDINATION  [address] [place] [address]    August 24, 2023    Nixon More  [address]   [place] [address]      Dear Nixon,        I am a  clinic community health worker who works with Natalya Lewis MD with the [facility]. I wanted to thank you for spending the time to talk with me.  Below is a description of clinic care coordination and how we can further assist you.       The clinic care coordination team is made up of a registered nurse, , financial resource worker and community health worker who understand the health care system. The goal of clinic care coordination is to help you manage your health and improve access to the health care system. Our team works alongside your provider to assist you in determining your health and social needs. We can help you obtain health care and community resources, providing you with necessary information and education. We can work with you through any barriers and develop a care plan that helps coordinate and strengthen the communication between you and your care team.  Our services are voluntary and are offered without charge to you personally.    If you wish to connect with the Clinic Care Coordination Team, please let your [facility] Primary Care Provider or Clinic Care Team know and they can place a referral. The Clinic Care Coordination team will then reach out by phone to further support you.    We are focused on providing you with the highest-quality healthcare experience possible.    Sincerely,   Your care team with [facility]

## 2023-08-24 NOTE — DISCHARGE INSTRUCTIONS
Discharge Instructions  Laceration (Cut)    You were seen today for a laceration (cut).  Your provider examined your laceration for any problems such a buried foreign body (like glass, a splinter, or gravel), or injury to blood vessels, tendons, and nerves.  Your provider may have also rinsed and/or scrubbed your laceration to help prevent an infection. It may not be possible to find all problems with your laceration on the first visit; occasionally foreign bodies or a tendon injury can go undetected.    Your laceration may have been closed in one of several ways:  No closure: many wounds will heal just fine without closure.  Stitches: regular stitches that require removal.  Staples: skin staples are often used in the scalp/head.  Wound adhesive (glue): skin glue can be used for certain lacerations and doesn t require removal.  Wound strips (aka Butterfly bandages or steri-strips): these are bandages that help to close a wound.  Absorbable stitches:  dissolving  stitches that go away on their own and usually don t require removal.    A small percentage of wounds will develop an infection regardless of how well the wound is cared for. Antibiotics are generally not indicated to prevent an infection so are only given for a small number of high-risk wounds. Some lacerations are too high risk to close, and are left open to heal because closure can increase the likelihood that an infection will develop.    Remember that all lacerations, no matter how expertly repaired, will cause scarring. We consider many factors, techniques, and materials, in our efforts to provide the best possible cosmetic outcome.    Generally, every Emergency Department visit should have a follow-up clinic visit with either a primary or a specialty clinic/provider. Please follow-up as instructed by your emergency provider today.     Return to the Emergency Department right away if:  You have more redness, swelling, pain, drainage (pus), a bad smell,  or red streaking from your laceration as these symptoms could indicate an infection.  You have a fever of 100.4 F or more.  You have bleeding that you cannot stop at home. If your cut starts to bleed, hold pressure on the bleeding area with a clean cloth or put pressure over the bandage.  If the bleeding does not stop after using constant pressure for 30 minutes, you should return to the Emergency Department for further treatment.  An area past the laceration is cool, pale, or blue compared with the other side, or has a slower return of color when squeezed.  Your dressing seems too tight or starts to get uncomfortable or painful. For children, signs of a problem might be irritability or restlessness.  You have loss of normal function or use of an area, such as being unable to straighten or bend a finger normally.  You have a numb area past the laceration.    Return to the Emergency Department or see your regular provider if:  The laceration starts to come open.   You have something coming out of the cut or a feeling that there is something in the laceration.  Your wound will not heal, or keeps breaking open. There can always be glass, wood, dirt or other things in any wound.  They will not always show up, even on x-rays.  If a wound does not heal, this may be why, and it is important to follow-up with your regular provider.    Home Care:  Take your dressing off in 12-24 hours, or as instructed by your provider, to check your laceration. Remove the dressing sooner if it seems too tight or painful, or if it is getting numb, tingly, or pale past the dressing.  Gently wash your laceration 1-2 times daily with clean water and mild soap. It is okay to shower or run clean water over the laceration, but do not let the laceration soak in water (no swimming).  If your laceration was closed with wound adhesive or strips: pat it dry and leave it open to the air. For all other repairs: after you wash your laceration, or at least  2 times a day, apply antibiotic ointment (such as Neosporin  or Bacitracin ) to the laceration, then cover it with a Band-Aid  or gauze.  Keep the laceration clean. Wear gloves or other protective clothing if you are around dirt.    Follow-up for removal:  If your wound was closed with staples or regular stitches, they need to be removed according to the instructions and timeline specified by your provider today.  If your wound was closed with absorbable ( dissolving ) sutures, they should fall out, dissolve, or not be visible in about one week. If they are still visible, then they should be removed according to the instructions and timeline specified by your provider today.    Scars:  To help minimize scarring:  Wear sunscreen over the healed laceration when out in the sun.  Massage the area regularly once healed.  You may apply Vitamin E to the healed wound.  Wait. Scars improve in appearance over months and years.    If you were given a prescription for medicine here today, be sure to read all of the information (including the package insert) that comes with your prescription.  This will include important information about the medicine, its side effects, and any warnings that you need to know about.  The pharmacist who fills the prescription can provide more information and answer questions you may have about the medicine.  If you have questions or concerns that the pharmacist cannot address, please call or return to the Emergency Department.       Remember that you can always come back to the Emergency Department if you are not able to see your regular provider in the amount of time listed above, if you get any new symptoms, or if there is anything that worries you.  Discharge Instructions  Head Injury    You have been seen today for a head injury. Your evaluation included a history and physical examination. You may have had a CT (CAT) scan performed, though most head injuries do not require a scan. Based on  this evaluation, your provider today does not feel that your head injury is serious.    Generally, every Emergency Department visit should have a follow-up clinic visit with either a primary or a specialty clinic/provider. Please follow-up as instructed by your emergency provider today.  Return to the Emergency Department if:  You are confused or you are not acting right.  Your headache gets worse or you start to have a really bad headache even with your recommended treatment plan.  You vomit (throw up) more than once.  You have a seizure.  You have trouble walking.  You have weakness or paralysis (cannot move) in an arm or a leg.  You have blood or fluid coming from your ears or nose.  You have new symptoms or anything that worries you.    Sleeping:  It is okay for you to sleep, but someone should wake you up if instructed by your provider, and someone should check on you at your usual time to wake up.     Activity:  Do not drive for at least 24 hours.  Do not drive if you have dizzy spells or trouble concentrating, or remembering things.  Do not return to any contact sports until cleared by your regular provider.     MORE INFORMATION:    Concussion:  A concussion is a minor head injury that may cause temporary problems with the way the brain works. Although concussions are important, they are generally not an emergency or a reason that a person needs to be hospitalized. Some concussion symptoms include confusion, amnesia (forgetful), nausea (sick to your stomach) and vomiting (throwing up), dizziness, fatigue, memory or concentration problems, irritability and sleep problems. For most people, concussions are mild and temporary but some will have more severe and persistent symptoms that require on-going care and treatment.  CT Scans: Your evaluation today may have included a CT scan (CAT scan) to look for things like bleeding or a skull fracture (broken bone).  CT scans involve radiation and too many CT scans can  cause serious health problems like cancer, especially in children.  Because of this, your provider may not have ordered a CT scan today if they think you are at low risk for a serious or life threatening problem.    If you were given a prescription for medicine here today, be sure to read all of the information (including the package insert) that comes with your prescription.  This will include important information about the medicine, its side effects, and any warnings that you need to know about.  The pharmacist who fills the prescription can provide more information and answer questions you may have about the medicine.  If you have questions or concerns that the pharmacist cannot address, please call or return to the Emergency Department.     Remember that you can always come back to the Emergency Department if you are not able to see your regular provider in the amount of time listed above, if you get any new symptoms, or if there is anything that worries you.  Discharge Instructions  Extremity Injury    You were seen today for an injury to an extremity (arm, hand, leg, or foot). You may have a bruise, strain, or fracture (broken bone).    Generally, every Emergency Department visit should have a follow-up clinic visit with either a primary or a specialty clinic/provider. Please follow-up as instructed by your emergency provider today.  Return to the Emergency Department right away if:  Your pain seems to change or get worse or there is pain in a new area that wasn t evaluated today.  Your extremity becomes pale, cool, blue, or numb or tingling past the injury.  You have more drainage, redness or pain in the area of the cut or abrasion.  You have pain that you cannot control with the medicine recommended or prescribed here, or you have pain that seems too much for your injury.  Your child (who is injured) will not stop crying or is much more fussy than normal.  You have new symptoms or anything that worries  you.    What to Expect:  Your swelling and pain may be worse the day after your injury, but should not be severe and should start getting better after that. You should not have new symptoms and your pain should not get worse.  You may start to get a bruise over the injured area or below the injured area (bruising can follow gravity).  Your movement and strength should get better with time.  Some injuries may not show up until after you have left the Emergency Department so it is important to follow-up as directed.  Your injury may prevent you from working.  Follow-up with your regular provider to get a work release note.  Pain medications or your injury may make it unsafe to drive or operate machinery.    Home Care:  RICE: Rest, Ice, Compression, Elevation  Rest: Rest your injured area for at least 1-2 days. After that you may start using your extremity again as long as there is not too much pain.   Ice: Apply ice your injured area for 15 minutes at a time, at least 3 times a day. Use a cloth between the ice bag and your skin to prevent frostbite. Do not sleep with an ice pack or heating pad on, since this can cause burns or skin injury.  Compression: You may use an elastic bandage (Ace  Wrap) if it makes you more comfortable. Wrap it just tight enough to provide light compression, like a new pair of socks feels. Loosen the bandage if you have swelling past the bandage.  Elevation: Raise the injured area above the level of your heart as much as possible in the first 1-2 days.    Use Tylenol  (acetaminophen), Motrin (ibuprofen), or Advil  (ibuprofen) for your pain unless you have an allergy or are told not to use these medications by your provider.  Take the medications as instructed on the package. Tylenol  (acetaminophen) is in many prescription medicines and non-prescription medicines--check all of your medicines to be sure you aren t taking more than 3000 mg per day.  Please follow any other instructions that  were discussed with you by your provider.    Stretching/Exercises:  You may have been provided with instructions for stretching or exercises. If your injury was to your arm or shoulder and your provider put you in a sling or an immobilizer, it is important that you take off your immobilizer within 3 days and stretch/move your shoulder, unless your provider specifically tells you to not move your shoulder.  This is to prevent further injury such as a  frozen shoulder .     If you were given a prescription for medicine here today, be sure to read all of the information (including the package insert) that comes with your prescription.  This will include important information about the medicine, its side effects, and any warnings that you need to know about.  The pharmacist who fills the prescription can provide more information and answer questions you may have about the medicine.  If you have questions or concerns that the pharmacist cannot address, please call or return to the Emergency Department.     Remember that you can always come back to the Emergency Department if you are not able to see your regular provider in the amount of time listed above, if you get any new symptoms, or if there is anything that worries you.

## 2023-08-24 NOTE — PROGRESS NOTES
"Clinic Care Coordination Contact  Community Health Worker Initial Outreach    CHW Initial Information Gathering:  Referral Source: ED Follow-Up  Preferred Hospital: [facility], [place]  [phone]  Preferred Urgent Care: [facility] [place], [phone]  No PCP office visit in Past Year: No       Patient accepts CC: No, due to the patient stating \"No, I'm good\". Patient will be sent Care Coordination introduction letter for future reference.     ELLEN Aden  [phone]  [facility]  [facility]    "

## 2023-09-01 ENCOUNTER — HOSPITAL ENCOUNTER (OUTPATIENT)
Dept: CARDIOLOGY | Facility: CLINIC | Age: 88
Discharge: HOME | End: 2023-09-01
Attending: INTERNAL MEDICINE | Admitting: INTERNAL MEDICINE
Payer: COMMERCIAL

## 2023-09-01 DIAGNOSIS — I51.89 MASS OF RIGHT CARDIAC VENTRICLE: ICD-10-CM

## 2023-09-01 PROCEDURE — 250N000013 HC RX MED GY IP 250 OP 250 PS 637: Performed by: INTERNAL MEDICINE

## 2023-09-01 PROCEDURE — 75561 CARDIAC MRI FOR MORPH W/DYE: CPT

## 2023-09-01 PROCEDURE — 255N000002 HC RX 255 OP 636: Performed by: INTERNAL MEDICINE

## 2023-09-01 PROCEDURE — A9585 GADOBUTROL INJECTION: HCPCS | Performed by: INTERNAL MEDICINE

## 2023-09-01 PROCEDURE — 75561 CARDIAC MRI FOR MORPH W/DYE: CPT | Mod: 26 | Performed by: INTERNAL MEDICINE

## 2023-09-01 RX ORDER — GADOBUTROL 604.72 MG/ML
14 INJECTION INTRAVENOUS ONCE
Status: COMPLETED | OUTPATIENT
Start: 2023-09-01 | End: 2023-09-01

## 2023-09-01 RX ORDER — DIPHENHYDRAMINE HCL 25 MG
25 CAPSULE ORAL
Status: DISCONTINUED | OUTPATIENT
Start: 2023-09-01 | End: 2023-09-02 | Stop reason: HOSPADM

## 2023-09-01 RX ORDER — DIPHENHYDRAMINE HYDROCHLORIDE 50 MG/ML
25-50 INJECTION INTRAMUSCULAR; INTRAVENOUS
Status: DISCONTINUED | OUTPATIENT
Start: 2023-09-01 | End: 2023-09-02 | Stop reason: HOSPADM

## 2023-09-01 RX ORDER — DIAZEPAM 5 MG
5 TABLET ORAL EVERY 30 MIN PRN
Status: DISCONTINUED | OUTPATIENT
Start: 2023-09-01 | End: 2023-09-02 | Stop reason: HOSPADM

## 2023-09-01 RX ORDER — ACYCLOVIR 200 MG/1
0-1 CAPSULE ORAL
Status: DISCONTINUED | OUTPATIENT
Start: 2023-09-01 | End: 2023-09-02 | Stop reason: HOSPADM

## 2023-09-01 RX ORDER — METHYLPREDNISOLONE SODIUM SUCCINATE 125 MG/2ML
125 INJECTION, POWDER, LYOPHILIZED, FOR SOLUTION INTRAMUSCULAR; INTRAVENOUS
Status: DISCONTINUED | OUTPATIENT
Start: 2023-09-01 | End: 2023-09-02 | Stop reason: HOSPADM

## 2023-09-01 RX ORDER — ONDANSETRON 2 MG/ML
4 INJECTION INTRAMUSCULAR; INTRAVENOUS
Status: DISCONTINUED | OUTPATIENT
Start: 2023-09-01 | End: 2023-09-02 | Stop reason: HOSPADM

## 2023-09-01 RX ADMIN — GADOBUTROL 14 ML: 604.72 INJECTION INTRAVENOUS at 09:39

## 2023-09-01 RX ADMIN — DIAZEPAM 5 MG: 5 TABLET ORAL at 07:15

## 2023-09-01 NOTE — PROGRESS NOTES
Pebbles Chatman MD  P  [employer]  MRI Cardiac Mass protocol. 4 chamber SSFP stack. RV 2 chamber SSFP stack.  Contrast administered per weight based protocol.

## 2023-09-02 ENCOUNTER — TRANSFERRED RECORDS (OUTPATIENT)
Dept: HEALTH INFORMATION MANAGEMENT | Facility: CLINIC | Age: 88
End: 2023-09-02
Payer: COMMERCIAL

## 2023-09-07 ENCOUNTER — TELEPHONE (OUTPATIENT)
Dept: CARDIOLOGY | Facility: CLINIC | Age: 88
End: 2023-09-07
Payer: COMMERCIAL

## 2023-09-07 DIAGNOSIS — I48.19 PERSISTENT ATRIAL FIBRILLATION (H): ICD-10-CM

## 2023-09-07 RX ORDER — METOPROLOL SUCCINATE 50 MG/1
100 TABLET, EXTENDED RELEASE ORAL DAILY
Qty: 180 TABLET | Refills: 4 | Status: ON HOLD | OUTPATIENT
Start: 2023-09-07 | End: 2024-09-08

## 2023-09-07 NOTE — TELEPHONE ENCOUNTER
----- Message from Vinod De La Paz MD sent at 9/7/2023  1:26 PM CDT -----  Good news, the suspected RV mass is just adipose.  No action needed.

## 2023-09-07 NOTE — TELEPHONE ENCOUNTER
Patient notified of resuls and the RV mass is adipose so no further action needed.  Patient verbalized understanding.

## 2023-09-21 ENCOUNTER — HOSPITAL ENCOUNTER (OUTPATIENT)
Dept: CT IMAGING | Facility: CLINIC | Age: 88
Discharge: HOME | End: 2023-09-21
Attending: INTERNAL MEDICINE | Admitting: INTERNAL MEDICINE
Payer: COMMERCIAL

## 2023-09-21 ENCOUNTER — LAB (OUTPATIENT)
Dept: LAB | Facility: CLINIC | Age: 88
End: 2023-09-21
Attending: INTERNAL MEDICINE
Payer: COMMERCIAL

## 2023-09-21 DIAGNOSIS — I70.0 ATHEROSCLEROSIS OF AORTA (H): ICD-10-CM

## 2023-09-21 DIAGNOSIS — I10 BENIGN ESSENTIAL HYPERTENSION: ICD-10-CM

## 2023-09-21 DIAGNOSIS — I71.43 INFRARENAL ABDOMINAL AORTIC ANEURYSM (AAA) WITHOUT RUPTURE (H): ICD-10-CM

## 2023-09-21 DIAGNOSIS — E78.5 HYPERLIPIDEMIA LDL GOAL <70: Primary | ICD-10-CM

## 2023-09-21 DIAGNOSIS — N18.30 CKD (CHRONIC KIDNEY DISEASE) STAGE 3, GFR 30-59 ML/MIN (H): ICD-10-CM

## 2023-09-21 DIAGNOSIS — N18.31 STAGE 3A CHRONIC KIDNEY DISEASE (H): ICD-10-CM

## 2023-09-21 DIAGNOSIS — I77.810 AORTIC ROOT DILATION (H): ICD-10-CM

## 2023-09-21 DIAGNOSIS — I72.3 ILIAC ARTERY ANEURYSM, BILATERAL (H): ICD-10-CM

## 2023-09-21 DIAGNOSIS — I77.810 ASCENDING AORTA DILATION (H): ICD-10-CM

## 2023-09-21 DIAGNOSIS — C61 PROSTATE CANCER (H): ICD-10-CM

## 2023-09-21 LAB
ALBUMIN SERPL BCG-MCNC: 3.9 G/DL (ref 3.5–5.2)
ALP SERPL-CCNC: 64 U/L (ref 40–129)
ALT SERPL W P-5'-P-CCNC: 21 U/L (ref 0–70)
ANION GAP SERPL CALCULATED.3IONS-SCNC: 11 MMOL/L (ref 7–15)
AST SERPL W P-5'-P-CCNC: 28 U/L (ref 0–45)
BASOPHILS # BLD AUTO: 0 10E3/UL (ref 0–0.2)
BASOPHILS NFR BLD AUTO: 1 %
BILIRUB SERPL-MCNC: 1 MG/DL
BUN SERPL-MCNC: 21.8 MG/DL (ref 8–23)
CALCIUM SERPL-MCNC: 9.4 MG/DL (ref 8.8–10.2)
CHLORIDE SERPL-SCNC: 107 MMOL/L (ref 98–107)
CHOLEST SERPL-MCNC: 115 MG/DL
CREAT BLD-MCNC: 1.1 MG/DL (ref 0.7–1.3)
CREAT SERPL-MCNC: 1.07 MG/DL (ref 0.67–1.17)
CREAT UR-MCNC: 125 MG/DL
DEPRECATED HCO3 PLAS-SCNC: 21 MMOL/L (ref 22–29)
EGFRCR SERPLBLD CKD-EPI 2021: 68 ML/MIN/1.73M2
EGFRCR SERPLBLD CKD-EPI 2021: >60 ML/MIN/1.73M2
EOSINOPHIL # BLD AUTO: 0.4 10E3/UL (ref 0–0.7)
EOSINOPHIL NFR BLD AUTO: 7 %
ERYTHROCYTE [DISTWIDTH] IN BLOOD BY AUTOMATED COUNT: 14.4 % (ref 10–15)
GLUCOSE SERPL-MCNC: 113 MG/DL (ref 70–99)
HCT VFR BLD AUTO: 43.7 % (ref 40–53)
HDLC SERPL-MCNC: 42 MG/DL
HGB BLD-MCNC: 14.1 G/DL (ref 13.3–17.7)
IMM GRANULOCYTES # BLD: 0 10E3/UL
IMM GRANULOCYTES NFR BLD: 0 %
LDLC SERPL CALC-MCNC: 54 MG/DL
LYMPHOCYTES # BLD AUTO: 1.4 10E3/UL (ref 0.8–5.3)
LYMPHOCYTES NFR BLD AUTO: 24 %
MCH RBC QN AUTO: 29.3 PG (ref 26.5–33)
MCHC RBC AUTO-ENTMCNC: 32.3 G/DL (ref 31.5–36.5)
MCV RBC AUTO: 91 FL (ref 78–100)
MICROALBUMIN UR-MCNC: 30.9 MG/L
MICROALBUMIN/CREAT UR: 24.72 MG/G CR (ref 0–17)
MONOCYTES # BLD AUTO: 0.6 10E3/UL (ref 0–1.3)
MONOCYTES NFR BLD AUTO: 11 %
NEUTROPHILS # BLD AUTO: 3.2 10E3/UL (ref 1.6–8.3)
NEUTROPHILS NFR BLD AUTO: 58 %
NONHDLC SERPL-MCNC: 73 MG/DL
PLATELET # BLD AUTO: 197 10E3/UL (ref 150–450)
POTASSIUM SERPL-SCNC: 4.3 MMOL/L (ref 3.4–5.3)
PROT SERPL-MCNC: 7.1 G/DL (ref 6.4–8.3)
PSA SERPL DL<=0.01 NG/ML-MCNC: 0.03 NG/ML
RBC # BLD AUTO: 4.81 10E6/UL (ref 4.4–5.9)
SODIUM SERPL-SCNC: 139 MMOL/L (ref 136–145)
TRIGL SERPL-MCNC: 94 MG/DL
WBC # BLD AUTO: 5.6 10E3/UL (ref 4–11)

## 2023-09-21 PROCEDURE — 84153 ASSAY OF PSA TOTAL: CPT | Performed by: INTERNAL MEDICINE

## 2023-09-21 PROCEDURE — 82570 ASSAY OF URINE CREATININE: CPT | Performed by: INTERNAL MEDICINE

## 2023-09-21 PROCEDURE — 82565 ASSAY OF CREATININE: CPT | Performed by: INTERNAL MEDICINE

## 2023-09-21 PROCEDURE — 85025 COMPLETE CBC W/AUTO DIFF WBC: CPT | Performed by: INTERNAL MEDICINE

## 2023-09-21 PROCEDURE — 80061 LIPID PANEL: CPT | Performed by: INTERNAL MEDICINE

## 2023-09-21 PROCEDURE — 250N000009 HC RX 250: Performed by: INTERNAL MEDICINE

## 2023-09-21 PROCEDURE — 82043 UR ALBUMIN QUANTITATIVE: CPT | Performed by: INTERNAL MEDICINE

## 2023-09-21 PROCEDURE — 82565 ASSAY OF CREATININE: CPT

## 2023-09-21 PROCEDURE — 250N000011 HC RX IP 250 OP 636: Performed by: INTERNAL MEDICINE

## 2023-09-21 PROCEDURE — 75635 CT ANGIO ABDOMINAL ARTERIES: CPT

## 2023-09-21 PROCEDURE — 36415 COLL VENOUS BLD VENIPUNCTURE: CPT | Performed by: INTERNAL MEDICINE

## 2023-09-21 RX ORDER — IOPAMIDOL 755 MG/ML
500 INJECTION, SOLUTION INTRAVASCULAR ONCE
Status: COMPLETED | OUTPATIENT
Start: 2023-09-21 | End: 2023-09-21

## 2023-09-21 RX ADMIN — SODIUM CHLORIDE 80 ML: 9 INJECTION, SOLUTION INTRAVENOUS at 09:39

## 2023-09-21 RX ADMIN — IOPAMIDOL 90 ML: 755 INJECTION, SOLUTION INTRAVENOUS at 09:39

## 2023-10-03 ENCOUNTER — TRANSFERRED RECORDS (OUTPATIENT)
Dept: HEALTH INFORMATION MANAGEMENT | Facility: CLINIC | Age: 88
End: 2023-10-03

## 2023-10-03 ENCOUNTER — OFFICE VISIT (OUTPATIENT)
Dept: SURGERY | Facility: CLINIC | Age: 88
End: 2023-10-03
Attending: INTERNAL MEDICINE
Payer: COMMERCIAL

## 2023-10-03 VITALS
OXYGEN SATURATION: 93 % | BODY MASS INDEX: 35.61 KG/M2 | HEART RATE: 74 BPM | HEIGHT: 68 IN | WEIGHT: 235 LBS | DIASTOLIC BLOOD PRESSURE: 86 MMHG | SYSTOLIC BLOOD PRESSURE: 124 MMHG

## 2023-10-03 DIAGNOSIS — I70.0 ATHEROSCLEROSIS OF AORTA (H): ICD-10-CM

## 2023-10-03 DIAGNOSIS — I77.810 ASCENDING AORTA DILATION (H): ICD-10-CM

## 2023-10-03 DIAGNOSIS — I72.3 ILIAC ARTERY ANEURYSM, BILATERAL (H): ICD-10-CM

## 2023-10-03 DIAGNOSIS — E78.5 HYPERLIPIDEMIA LDL GOAL <70: ICD-10-CM

## 2023-10-03 DIAGNOSIS — I71.43 INFRARENAL ABDOMINAL AORTIC ANEURYSM (AAA) WITHOUT RUPTURE (H): Primary | ICD-10-CM

## 2023-10-03 DIAGNOSIS — I10 BENIGN ESSENTIAL HYPERTENSION: ICD-10-CM

## 2023-10-03 DIAGNOSIS — I48.20 CHRONIC ATRIAL FIBRILLATION (H): ICD-10-CM

## 2023-10-03 DIAGNOSIS — I77.810 AORTIC ROOT DILATION (H): ICD-10-CM

## 2023-10-03 PROCEDURE — 99215 OFFICE O/P EST HI 40 MIN: CPT | Performed by: INTERNAL MEDICINE

## 2023-10-03 NOTE — PATIENT INSTRUCTIONS
Continue current medications     Follow up with Dr. Fernández vascular surgeon at [facility] , our staff will schedule     Lipids and kidney function looks good     Follow up in 6 months

## 2023-10-10 ENCOUNTER — APPOINTMENT (OUTPATIENT)
Dept: CT IMAGING | Facility: CLINIC | Age: 88
End: 2023-10-10
Attending: EMERGENCY MEDICINE
Payer: COMMERCIAL

## 2023-10-10 ENCOUNTER — TELEPHONE (OUTPATIENT)
Dept: OTHER | Facility: CLINIC | Age: 88
End: 2023-10-10

## 2023-10-10 ENCOUNTER — HOSPITAL ENCOUNTER (EMERGENCY)
Facility: CLINIC | Age: 88
Discharge: HOME | End: 2023-10-10
Attending: EMERGENCY MEDICINE | Admitting: EMERGENCY MEDICINE
Payer: COMMERCIAL

## 2023-10-10 ENCOUNTER — APPOINTMENT (OUTPATIENT)
Dept: GENERAL RADIOLOGY | Facility: CLINIC | Age: 88
End: 2023-10-10
Attending: EMERGENCY MEDICINE
Payer: COMMERCIAL

## 2023-10-10 VITALS
DIASTOLIC BLOOD PRESSURE: 67 MMHG | TEMPERATURE: 99 F | HEART RATE: 79 BPM | RESPIRATION RATE: 20 BRPM | SYSTOLIC BLOOD PRESSURE: 102 MMHG | OXYGEN SATURATION: 91 %

## 2023-10-10 DIAGNOSIS — U07.1 COVID-19 VIRUS INFECTION: ICD-10-CM

## 2023-10-10 LAB
ALBUMIN SERPL BCG-MCNC: 3.6 G/DL (ref 3.5–5.2)
ALBUMIN UR-MCNC: 20 MG/DL
ALP SERPL-CCNC: 59 U/L (ref 40–129)
ALT SERPL W P-5'-P-CCNC: 25 U/L (ref 0–70)
ANION GAP SERPL CALCULATED.3IONS-SCNC: 11 MMOL/L (ref 7–15)
APPEARANCE UR: CLEAR
AST SERPL W P-5'-P-CCNC: 25 U/L (ref 0–45)
BASO+EOS+MONOS # BLD AUTO: ABNORMAL 10*3/UL
BASO+EOS+MONOS NFR BLD AUTO: ABNORMAL %
BASOPHILS # BLD AUTO: 0 10E3/UL (ref 0–0.2)
BASOPHILS NFR BLD AUTO: 1 %
BILIRUB DIRECT SERPL-MCNC: 0.32 MG/DL (ref 0–0.3)
BILIRUB SERPL-MCNC: 1.3 MG/DL
BILIRUB UR QL STRIP: NEGATIVE
BUN SERPL-MCNC: 24.4 MG/DL (ref 8–23)
CALCIUM SERPL-MCNC: 9.2 MG/DL (ref 8.8–10.2)
CHLORIDE SERPL-SCNC: 100 MMOL/L (ref 98–107)
COLOR UR AUTO: YELLOW
CREAT SERPL-MCNC: 1.01 MG/DL (ref 0.67–1.17)
D DIMER PPP FEU-MCNC: 1.04 UG/ML FEU (ref 0–0.5)
DEPRECATED HCO3 PLAS-SCNC: 23 MMOL/L (ref 22–29)
EGFRCR SERPLBLD CKD-EPI 2021: 72 ML/MIN/1.73M2
EOSINOPHIL # BLD AUTO: 0.1 10E3/UL (ref 0–0.7)
EOSINOPHIL NFR BLD AUTO: 1 %
ERYTHROCYTE [DISTWIDTH] IN BLOOD BY AUTOMATED COUNT: 14.7 % (ref 10–15)
FLUAV RNA SPEC QL NAA+PROBE: NEGATIVE
FLUBV RNA RESP QL NAA+PROBE: NEGATIVE
GLUCOSE SERPL-MCNC: 113 MG/DL (ref 70–99)
GLUCOSE UR STRIP-MCNC: NEGATIVE MG/DL
HCT VFR BLD AUTO: 45.1 % (ref 40–53)
HGB BLD-MCNC: 14.4 G/DL (ref 13.3–17.7)
HGB UR QL STRIP: NEGATIVE
HOLD SPECIMEN: NORMAL
HOLD SPECIMEN: NORMAL
HYALINE CASTS: 1 /LPF
IMM GRANULOCYTES # BLD: 0.1 10E3/UL
IMM GRANULOCYTES NFR BLD: 1 %
KETONES UR STRIP-MCNC: NEGATIVE MG/DL
LEUKOCYTE ESTERASE UR QL STRIP: NEGATIVE
LIPASE SERPL-CCNC: 218 U/L (ref 13–60)
LYMPHOCYTES # BLD AUTO: 0.5 10E3/UL (ref 0.8–5.3)
LYMPHOCYTES NFR BLD AUTO: 7 %
MCH RBC QN AUTO: 29.4 PG (ref 26.5–33)
MCHC RBC AUTO-ENTMCNC: 31.9 G/DL (ref 31.5–36.5)
MCV RBC AUTO: 92 FL (ref 78–100)
MONOCYTES # BLD AUTO: 1 10E3/UL (ref 0–1.3)
MONOCYTES NFR BLD AUTO: 13 %
MUCOUS THREADS #/AREA URNS LPF: PRESENT /LPF
NEUTROPHILS # BLD AUTO: 5.9 10E3/UL (ref 1.6–8.3)
NEUTROPHILS NFR BLD AUTO: 77 %
NITRATE UR QL: NEGATIVE
NRBC # BLD AUTO: 0 10E3/UL
NRBC BLD AUTO-RTO: 0 /100
NT-PROBNP SERPL-MCNC: 1054 PG/ML (ref 0–1800)
PH UR STRIP: 5 [PH] (ref 5–7)
PLATELET # BLD AUTO: 169 10E3/UL (ref 150–450)
POTASSIUM SERPL-SCNC: 4.3 MMOL/L (ref 3.4–5.3)
PROT SERPL-MCNC: 6.8 G/DL (ref 6.4–8.3)
RBC # BLD AUTO: 4.9 10E6/UL (ref 4.4–5.9)
RBC URINE: 1 /HPF
RSV RNA SPEC NAA+PROBE: NEGATIVE
SARS-COV-2 RNA RESP QL NAA+PROBE: POSITIVE
SODIUM SERPL-SCNC: 134 MMOL/L (ref 135–145)
SP GR UR STRIP: 1.02 (ref 1–1.03)
TROPONIN T SERPL HS-MCNC: 17 NG/L
UROBILINOGEN UR STRIP-MCNC: NORMAL MG/DL
WBC # BLD AUTO: 7.6 10E3/UL (ref 4–11)
WBC URINE: <1 /HPF

## 2023-10-10 PROCEDURE — 85025 COMPLETE CBC W/AUTO DIFF WBC: CPT | Performed by: EMERGENCY MEDICINE

## 2023-10-10 PROCEDURE — 83690 ASSAY OF LIPASE: CPT | Performed by: EMERGENCY MEDICINE

## 2023-10-10 PROCEDURE — 99285 EMERGENCY DEPT VISIT HI MDM: CPT | Mod: 25

## 2023-10-10 PROCEDURE — 81001 URINALYSIS AUTO W/SCOPE: CPT | Performed by: EMERGENCY MEDICINE

## 2023-10-10 PROCEDURE — 258N000003 HC RX IP 258 OP 636: Performed by: EMERGENCY MEDICINE

## 2023-10-10 PROCEDURE — 250N000011 HC RX IP 250 OP 636: Performed by: EMERGENCY MEDICINE

## 2023-10-10 PROCEDURE — 71046 X-RAY EXAM CHEST 2 VIEWS: CPT

## 2023-10-10 PROCEDURE — 85379 FIBRIN DEGRADATION QUANT: CPT | Performed by: EMERGENCY MEDICINE

## 2023-10-10 PROCEDURE — 93005 ELECTROCARDIOGRAM TRACING: CPT

## 2023-10-10 PROCEDURE — 84484 ASSAY OF TROPONIN QUANT: CPT | Performed by: EMERGENCY MEDICINE

## 2023-10-10 PROCEDURE — 82248 BILIRUBIN DIRECT: CPT | Performed by: EMERGENCY MEDICINE

## 2023-10-10 PROCEDURE — 85014 HEMATOCRIT: CPT | Performed by: EMERGENCY MEDICINE

## 2023-10-10 PROCEDURE — 83880 ASSAY OF NATRIURETIC PEPTIDE: CPT | Performed by: EMERGENCY MEDICINE

## 2023-10-10 PROCEDURE — 87637 SARSCOV2&INF A&B&RSV AMP PRB: CPT | Performed by: EMERGENCY MEDICINE

## 2023-10-10 PROCEDURE — 250N000009 HC RX 250: Performed by: EMERGENCY MEDICINE

## 2023-10-10 PROCEDURE — 71275 CT ANGIOGRAPHY CHEST: CPT

## 2023-10-10 PROCEDURE — 80053 COMPREHEN METABOLIC PANEL: CPT | Performed by: EMERGENCY MEDICINE

## 2023-10-10 PROCEDURE — 96360 HYDRATION IV INFUSION INIT: CPT | Mod: 59

## 2023-10-10 PROCEDURE — 36415 COLL VENOUS BLD VENIPUNCTURE: CPT | Performed by: EMERGENCY MEDICINE

## 2023-10-10 RX ORDER — IOPAMIDOL 755 MG/ML
500 INJECTION, SOLUTION INTRAVASCULAR ONCE
Status: COMPLETED | OUTPATIENT
Start: 2023-10-10 | End: 2023-10-10

## 2023-10-10 RX ADMIN — IOPAMIDOL 85 ML: 755 INJECTION, SOLUTION INTRAVENOUS at 19:42

## 2023-10-10 RX ADMIN — SODIUM CHLORIDE 90 ML: 9 INJECTION, SOLUTION INTRAVENOUS at 19:42

## 2023-10-10 RX ADMIN — SODIUM CHLORIDE 500 ML: 9 INJECTION, SOLUTION INTRAVENOUS at 18:04

## 2023-10-10 ASSESSMENT — ACTIVITIES OF DAILY LIVING (ADL)
ADLS_ACUITY_SCORE: 35

## 2023-10-10 NOTE — TELEPHONE ENCOUNTER
Dr. Lopez would like this patient to be scheduled with Dr. Fernández at next available to follow up to the CTA done 9/21/23.      Appt Note:  Partially thrombosed penetrating aortic ulceration  - 1 year follow up to 12/21/22

## 2023-10-10 NOTE — ED PROVIDER NOTES
"  History     Chief Complaint:  Generalized Weakness and Cough       The history is provided by the patient.      Nixon More is a 88 year old male on Eliquis who presents via EMS with generalized weakness and cough. Patient reports he went to sit down in a chair today, but slid off due to \"sitting down wrong\". He had difficulty getting up, so his independent living facility, [facility], insisted he present to the ED. Denies injury. He adds this morning, he was vomiting and dry heaving, and thinks he was shaky due to dehydration. He notes he had a cough on Monday, which has since resolved. Patient adds he has chronic shortness of breath. Denies chest pain. He did not have a COVID test at his facility. Patient is otherwise healthy and does not use a walker.     Independent Historian:   None - Patient Only    Review of External Notes:       Medications:    Methylprednisolone  Eliquis  Atorvastatin  Escitalopram oxalate  Hydrocortisone  Loperamide  Losartan potassium   Metoprolol succinate  Risedronate sodium  Spironolactone     Past Medical History:    AAA  Actinic keratosis  Angina pectoris  Arthritis   Atrial fibrillation and flutter  CAD  Depressive disorder  Dyslipidemia   Dysthymia   Emphysema of lung  Hypertension  Hernia abdominal   Hypersomnia  Sleep apnea  Hypertrophy of prostate  Lumbago  Mumps  Prostate cancer  Renal disease  Skin cancer basal cell  Sleep apnea  Spider veins  Osteoarthrosis involving spine  Colonic polyps  Cataract  Vitamin D deficiency  Emphysema  Spinal stenosis   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  Impotence of organic origin     Past Surgical History:    Abdominal surgery  L4/L5 decompression  Skin cancer basal cell biopsy  Colonoscopy   Cystoscopy  Cystoscopy, transurethral resection tumor bladder combined  Cataract surgery  Hernia repair  Moh's procedure  Prostate surgery  Lumbar laminectomy   Tumor in bladder surgery  Sigmoidoscopy   Vitrectomy pars plana    Physical Exam "   Patient Vitals for the past 24 hrs:   BP Temp Temp src Pulse Resp SpO2   10/10/23 1845 -- -- -- 91 -- 91 %   10/10/23 1830 118/84 -- -- 93 -- (!) 88 %   10/10/23 1815 113/82 -- -- 89 -- 90 %   10/10/23 1811 -- -- -- 87 -- 90 %   10/10/23 1756 (!) 123/96 -- -- 86 -- 92 %   10/10/23 1741 123/84 -- -- 94 -- 98 %   10/10/23 1735 -- -- -- 80 -- 92 %   10/10/23 1730 129/86 -- -- 93 -- --   10/10/23 1700 114/74 -- -- 75 -- 91 %   10/10/23 1645 118/75 -- -- 77 -- 94 %   10/10/23 1630 128/87 -- -- 81 -- 92 %   10/10/23 1628 125/83 99  F (37.2  C) Oral 76 20 98 %      Physical Exam  Vitals reviewed.   HENT:      Head: Normocephalic.   Eyes:      Pupils: Pupils are equal, round, and reactive to light.   Cardiovascular:      Rate and Rhythm: Normal rate.   Pulmonary:      Effort: Pulmonary effort is normal.   Abdominal:      General: Abdomen is flat. Bowel sounds are normal.   Musculoskeletal:         General: Normal range of motion.   Skin:     General: Skin is warm.      Capillary Refill: Capillary refill takes less than 2 seconds.   Neurological:      General: No focal deficit present.      Mental Status: He is alert. Mental status is at baseline.   Psychiatric:         Mood and Affect: Mood normal.           Emergency Department Course   ECG  ECG taken at 1632, ECG read at 1700  Atrial flutter with variable AV block   Inferior infarct, age undetermined   Rate 87 bpm. IN interval * ms. QRS duration 70 ms. QT/QTc 384/462 ms. P-R-T axes * 0 25.     Imaging:  CT Chest Pulmonary Embolism w Contrast   Final Result   IMPRESSION:   1.  Negative for pulmonary emboli and negative for dissections.      2.  Mild dependent atelectasis in the lungs. Lungs otherwise clear.      3.  Generalized cardiac enlargement.      4.  Stable 4.5 cm dilatation ascending thoracic aorta.      5.  Gallstone.      XR Chest 2 Views   Final Result   IMPRESSION: Cardiomegaly. Pulmonary vascularity normal. Minimal atelectasis in the bases, lungs otherwise  clear. Hypertrophic changes thoracic spine.         Laboratory:  Labs Ordered and Resulted from Time of ED Arrival to Time of ED Departure   BASIC METABOLIC PANEL - Abnormal       Result Value    Sodium 134 (*)     Potassium 4.3      Chloride 100      Carbon Dioxide (CO2) 23      Anion Gap 11      Urea Nitrogen 24.4 (*)     Creatinine 1.01      GFR Estimate 72      Calcium 9.2      Glucose 113 (*)    INFLUENZA A/B, RSV, & SARS-COV2 PCR - Abnormal    Influenza A PCR Negative      Influenza B PCR Negative      RSV PCR Negative      SARS CoV2 PCR Positive (*)    CBC WITH PLATELETS AND DIFFERENTIAL - Abnormal    WBC Count 7.6      RBC Count 4.90      Hemoglobin 14.4      Hematocrit 45.1      MCV 92      MCH 29.4      MCHC 31.9      RDW 14.7      Platelet Count 169      % Neutrophils 77      % Lymphocytes 7      % Monocytes 13      Mids % (Monos, Eos, Basos)        % Eosinophils 1      % Basophils 1      % Immature Granulocytes 1      NRBCs per 100 WBC 0      Absolute Neutrophils 5.9      Absolute Lymphocytes 0.5 (*)     Absolute Monocytes 1.0      Mids Abs (Monos, Eos, Basos)        Absolute Eosinophils 0.1      Absolute Basophils 0.0      Absolute Immature Granulocytes 0.1      Absolute NRBCs 0.0     HEPATIC FUNCTION PANEL - Abnormal    Protein Total 6.8      Albumin 3.6      Bilirubin Total 1.3 (*)     Alkaline Phosphatase 59      AST 25      ALT 25      Bilirubin Direct 0.32 (*)    LIPASE - Abnormal    Lipase 218 (*)    ROUTINE UA WITH MICROSCOPIC REFLEX TO CULTURE - Abnormal    Color Urine Yellow      Appearance Urine Clear      Glucose Urine Negative      Bilirubin Urine Negative      Ketones Urine Negative      Specific Gravity Urine 1.022      Blood Urine Negative      pH Urine 5.0      Protein Albumin Urine 20 (*)     Urobilinogen Urine Normal      Nitrite Urine Negative      Leukocyte Esterase Urine Negative      Mucus Urine Present (*)     RBC Urine 1      WBC Urine <1      Hyaline Casts Urine 1     D DIMER  QUANTITATIVE - Abnormal    D-Dimer Quantitative 1.04 (*)    TROPONIN T, HIGH SENSITIVITY - Normal    Troponin T, High Sensitivity 17     NT PROBNP INPATIENT - Normal    N terminal Pro BNP Inpatient 1,054        Emergency Department Course & Assessments  Interventions:  Medications   sodium chloride 0.9% BOLUS 500 mL (500 mLs Intravenous $New Bag 10/10/23 1804)   CT Scan Flush (90 mLs Intravenous $Given 10/10/23 1942)   iopamidol (ISOVUE-370) solution 500 mL (85 mLs Intravenous $Given 10/10/23 1942)      Independent Interpretation (X-rays, CTs, rhythm strip):      Assessments/Consultations/Discussion of Management or Tests:   ED Course as of 10/10/23 2015   Tue Oct 10, 2023   1735 Dr. Lockwood's evaluation   1925 Rechecked     Social Determinants of Health affecting care:   None    Disposition:  The patient was discharged home  Impression & Plan    Patient presents with concerns for coughing and slip and fall to ground.  On arrival patient has no complaints.  Has no evidence for significant injury is noted to be borderline hypoxic.  COVID testing is positive.  Ultimately D-dimer led to CT scan and was negative for PE.  Patient initially was hypoxic but did seem to improve with time seems to be positional may be related to atelectasis.  Patient does have COVID and is 88.  Timing of onset is the last few days.  Offered a short course of Paxlovid.  Did consider admission due to hypoxia with negative CT scan negative chest x-ray normal lab work recommended outpatient treatment for Paxlovid.  Family agreed to the plan was discharged home in stable condition.    Diagnosis:    ICD-10-CM    1. COVID-19 virus infection  U07.1            Scribe Disclosure:  I, Tory Donnelly, am serving as a scribe at 6:48 PM on 10/10/2023 to document services personally performed by Jaya Locwkood MD based on my observations and the provider's statements to me.    10/10/2023   Jaya Lockwood MD Goodman, Brian Samuel,  MD  10/13/23 0317

## 2023-10-10 NOTE — ED TRIAGE NOTES
"Arrives via EMS for cough, nausea, and weakness. Lives in assisted living, had a fall this afternoon from chair. Pt reports sliding out of chair due to weakness. Denies hitting head or other injuries. Does report cough and sore throat since yesterday. Also reports worsening incontinence of bowel and bladder over the past few days. Denies CP. Reports \"chronic\" SOB. Was given 400ml NS and 4mg zofran via EMS PTA. VSS. ABCs intact. A/Ox4.         "

## 2023-10-11 ENCOUNTER — PATIENT OUTREACH (OUTPATIENT)
Dept: CARE COORDINATION | Facility: CLINIC | Age: 88
End: 2023-10-11
Payer: COMMERCIAL

## 2023-10-11 LAB
ATRIAL RATE - MUSE: 300 BPM
DIASTOLIC BLOOD PRESSURE - MUSE: NORMAL MMHG
INTERPRETATION ECG - MUSE: NORMAL
P AXIS - MUSE: NORMAL DEGREES
PR INTERVAL - MUSE: NORMAL MS
QRS DURATION - MUSE: 70 MS
QT - MUSE: 384 MS
QTC - MUSE: 462 MS
R AXIS - MUSE: 0 DEGREES
SYSTOLIC BLOOD PRESSURE - MUSE: NORMAL MMHG
T AXIS - MUSE: 25 DEGREES
VENTRICULAR RATE- MUSE: 87 BPM

## 2023-10-11 NOTE — ED NOTES
"[facility]  ED Nurse Handoff Report    ED Chief complaint: Generalized Weakness and Cough  . ED Diagnosis:   Final diagnoses:   None       Allergies:   Allergies   Allergen Reactions    Amoxicillin-Pot Clavulanate Rash     Type III hypersensitivity    Bactrim [Sulfamethoxazole W/Trimethoprim] Rash     Serum sickness, type III hypersensitivity      Bee Venom Itching    Sulfa Antibiotics Hives    Celebrex [Celecoxib]     Lisinopril Cough    Naproxen Hives    Penicillin G        Code Status: Full Code    Activity level - Baseline/Home:  independent.  Activity Level - Current:   assist of 1.   Lift room needed: No.   Bariatric: No   Needed: No   Isolation: Yes.   Infection: COVID+.     Respiratory status: Nasal cannula    Vital Signs (within 30 minutes):   Vitals:    10/10/23 1947 10/10/23 2002 10/10/23 2017 10/10/23 2032   BP: 130/84 119/84     Pulse:  72     Resp:       Temp:       TempSrc:       SpO2:  99% 98% 97%       Cardiac Rhythm:  ,   Cardiac  Cardiac Rhythm: Atrial flutter  Pain level:    Patient confused: No.   Patient Falls Risk: nonskid shoes/slippers when out of bed, arm band in place, and patient and family education.   Elimination Status: Has voided     Patient Report - Initial Complaint: Cough, nausea, weakness.   Focused Assessment: 88 year old male on Eliquis who presents via EMS with generalized weakness and cough. Patient reports he went to sit down in a chair today, but slid off due to \"sitting down wrong\". He had difficulty getting up, so his independent living facility, [facility], insisted he present to the ED. Denies injury. He adds this morning, he was vomiting and dry heaving, and thinks he was shaky due to dehydration. He notes he had a cough on Monday, which has since resolved. Patient adds he has chronic shortness of breath. Denies chest pain. He did not have a COVID test at his facility. Patient is otherwise healthy and does not use a walker.  "     Abnormal Results:   Labs Ordered and Resulted from Time of ED Arrival to Time of ED Departure   BASIC METABOLIC PANEL - Abnormal       Result Value    Sodium 134 (*)     Potassium 4.3      Chloride 100      Carbon Dioxide (CO2) 23      Anion Gap 11      Urea Nitrogen 24.4 (*)     Creatinine 1.01      GFR Estimate 72      Calcium 9.2      Glucose 113 (*)    INFLUENZA A/B, RSV, & SARS-COV2 PCR - Abnormal    Influenza A PCR Negative      Influenza B PCR Negative      RSV PCR Negative      SARS CoV2 PCR Positive (*)    CBC WITH PLATELETS AND DIFFERENTIAL - Abnormal    WBC Count 7.6      RBC Count 4.90      Hemoglobin 14.4      Hematocrit 45.1      MCV 92      MCH 29.4      MCHC 31.9      RDW 14.7      Platelet Count 169      % Neutrophils 77      % Lymphocytes 7      % Monocytes 13      Mids % (Monos, Eos, Basos)        % Eosinophils 1      % Basophils 1      % Immature Granulocytes 1      NRBCs per 100 WBC 0      Absolute Neutrophils 5.9      Absolute Lymphocytes 0.5 (*)     Absolute Monocytes 1.0      Mids Abs (Monos, Eos, Basos)        Absolute Eosinophils 0.1      Absolute Basophils 0.0      Absolute Immature Granulocytes 0.1      Absolute NRBCs 0.0     HEPATIC FUNCTION PANEL - Abnormal    Protein Total 6.8      Albumin 3.6      Bilirubin Total 1.3 (*)     Alkaline Phosphatase 59      AST 25      ALT 25      Bilirubin Direct 0.32 (*)    LIPASE - Abnormal    Lipase 218 (*)    ROUTINE UA WITH MICROSCOPIC REFLEX TO CULTURE - Abnormal    Color Urine Yellow      Appearance Urine Clear      Glucose Urine Negative      Bilirubin Urine Negative      Ketones Urine Negative      Specific Gravity Urine 1.022      Blood Urine Negative      pH Urine 5.0      Protein Albumin Urine 20 (*)     Urobilinogen Urine Normal      Nitrite Urine Negative      Leukocyte Esterase Urine Negative      Mucus Urine Present (*)     RBC Urine 1      WBC Urine <1      Hyaline Casts Urine 1     D DIMER QUANTITATIVE - Abnormal    D-Dimer  Quantitative 1.04 (*)    TROPONIN T, HIGH SENSITIVITY - Normal    Troponin T, High Sensitivity 17     NT PROBNP INPATIENT - Normal    N terminal Pro BNP Inpatient 1,054          CT Chest Pulmonary Embolism w Contrast   Final Result   IMPRESSION:   1.  Negative for pulmonary emboli and negative for dissections.      2.  Mild dependent atelectasis in the lungs. Lungs otherwise clear.      3.  Generalized cardiac enlargement.      4.  Stable 4.5 cm dilatation ascending thoracic aorta.      5.  Gallstone.      XR Chest 2 Views   Final Result   IMPRESSION: Cardiomegaly. Pulmonary vascularity normal. Minimal atelectasis in the bases, lungs otherwise clear. Hypertrophic changes thoracic spine.          Treatments provided: Labs, urine, EKG, CT, xray  Family Comments: Daughter at bedside.   OBS brochure/video discussed/provided to patient:  N/A  ED Medications:   Medications   sodium chloride 0.9% BOLUS 500 mL (0 mLs Intravenous Stopped 10/10/23 1904)   CT Scan Flush (90 mLs Intravenous $Given 10/10/23 1942)   iopamidol (ISOVUE-370) solution 500 mL (85 mLs Intravenous $Given 10/10/23 1942)       Drips infusing:  No  For the majority of the shift this patient was Green.   Interventions performed were N/A.    Sepsis treatment initiated: No    Cares/treatment/interventions/medications to be completed following ED care: Oxygen therapy    ED Nurse Name: Beatris Urena RN  8:49 PM

## 2023-10-11 NOTE — PROGRESS NOTES
Clinic Care Coordination Contact  Community Health Worker Initial Outreach    CHW Initial Information Gathering:  Referral Source: ED Follow-Up  Preferred Hospital: [facility]  [phone]  Preferred Urgent Care: [facility] [place], [phone]  No PCP office visit in Past Year: No       Patient accepts CC: No, due to the patient stating that at this time there are no concerns or questions for CCC to assist with. Patient will be sent Care Coordination introduction letter for future reference.     ELLEN Aden  [phone]  Connected Care Resource [facility]

## 2023-10-11 NOTE — TELEPHONE ENCOUNTER
Future Appointments   Date Time Provider Department Center   10/25/2023  2:30 PM Nimisha Fernández MD [facility] SXR

## 2023-10-11 NOTE — LETTER
[facility] CARE COORDINATION  [address] [place] [address]    October 11, 2023    Nixon More  [address]   [place] [address]      Dear Nixon,        I am a  clinic community health worker who works with Natalya Lewis MD with the [facility]. I wanted to thank you for spending the time to talk with me.  Below is a description of clinic care coordination and how we can further assist you.       The clinic care coordination team is made up of a registered nurse, , financial resource worker and community health worker who understand the health care system. The goal of clinic care coordination is to help you manage your health and improve access to the health care system. Our team works alongside your provider to assist you in determining your health and social needs. We can help you obtain health care and community resources, providing you with necessary information and education. We can work with you through any barriers and develop a care plan that helps coordinate and strengthen the communication between you and your care team.  Our services are voluntary and are offered without charge to you personally.    If you wish to connect with the Clinic Care Coordination Team, please let your [facility] Primary Care Provider or Clinic Care Team know and they can place a referral. The Clinic Care Coordination team will then reach out by phone to further support you.    We are focused on providing you with the highest-quality healthcare experience possible.    Sincerely,   Your care team with [facility]

## 2023-10-11 NOTE — DISCHARGE INSTRUCTIONS
Your oxygen levels have improved your imaging shows no pneumonia.  Please keep an eye on your oxygen levels at home if you are consistently less than 88% return to the emergency room.  We are recommending initiating Paxlovid due to your age and COVID.  Please hold Lipitor for 5 days while on Paxlovid.  Please reduce Eliquis to 2.5 from 5 mg twice a day.  Return the emergency with severe increase in shortness of breath or other concerns.  Thanks for your patience today.

## 2023-10-18 DIAGNOSIS — M85.852 OSTEOPENIA OF BOTH HIPS: ICD-10-CM

## 2023-10-18 DIAGNOSIS — M85.851 OSTEOPENIA OF BOTH HIPS: ICD-10-CM

## 2023-10-18 RX ORDER — RISEDRONATE SODIUM 35 MG/1
35 TABLET, FILM COATED ORAL
Qty: 12 TABLET | Refills: 1 | Status: SHIPPED | OUTPATIENT
Start: 2023-10-18 | End: 2024-04-17

## 2023-10-19 ENCOUNTER — TELEPHONE (OUTPATIENT)
Dept: PEDIATRICS | Facility: CLINIC | Age: 88
End: 2023-10-19
Payer: COMMERCIAL

## 2023-10-19 NOTE — TELEPHONE ENCOUNTER
Received a voicemail from patient. He was calling to inquire how long he has eligio on RISEdronate (ACTONEL) 35 MG tablet. He stated in message that he thought he was only supposed to be on this medication for 5 years.    Per up-to-date, recommend to be on this medication for 3-5 years. Patient was started during 6/22/21 visit.     Called and spoke to patient. Advised he has been on RISEdronate (ACTONEL) 35 MG tablet for a little over 2 years. Scheduled patient's AWV for 7/05/24. Advised patient added to visit not to discuss Risedronate at that visit at he will be at the 3 year marcel.  Rupinder ARTEAGA RN, BSN

## 2023-10-19 NOTE — MR AVS SNAPSHOT
After Visit Summary   9/21/2018    Nixon More    MRN: [id number]           Patient Information     Date Of Birth          1935        Visit Information        Provider Department      9/21/2018 2:00 PM EA NURSE AB [facility]        Today's Diagnoses     Need for prophylactic vaccination and inoculation against influenza    -  1       Follow-ups after your visit        Your next 10 appointments already scheduled     Oct 23, 2018 12:50 PM CDT   Marie Physical Adult with Natalya Lewis MD   [facility]    [address]  [address]  [place] [address] [phone]            Feb 11, 2019  9:00 AM CST   Cystoscopy with Mark Pino MD, UB CYF   [facility] ([facility])    [address]  [address]  [place] [facility] [place] [address] [phone]              Who to contact     If you have questions or need follow up information about today's clinic visit or your schedule please contact [facility] directly at [phone].  Normal or non-critical lab and imaging results will be communicated to you by MyChart, letter or phone within 4 business days after the clinic has received the results. If you do not hear from us within 7 days, please contact the clinic through tuulhart or phone. If you have a critical or abnormal lab result, we will notify you by phone as soon as possible.  Submit refill requests through [employer] or call your pharmacy and they will forward the refill request to us. Please allow 3 business days for your refill to be completed.          Additional Information About Your Visit        MyChart Information     [employer] gives you secure access to your electronic health record. If you see a primary care provider, you can also send messages to your care team and make appointments. If you have questions, please call your primary care  Pt intubated for apnea, RVP positive for rhinoentero and adenovirus clinic.  If you do not have a primary care provider, please call [phone] and they will assist you.        Care EveryWhere ID     This is your Care EveryWhere ID. This could be used by other organizations to access your [place] medical records  [id number]         Blood Pressure from Last 3 Encounters:   07/27/18 128/84   07/24/18 140/80   07/12/18 (!) 150/94    Weight from Last 3 Encounters:   08/20/18 227 lb (103 kg)   07/27/18 227 lb (103 kg)   07/24/18 227 lb 4.8 oz (103.1 kg)              We Performed the Following     ADMIN INFLUENZA (For MEDICARE Patients ONLY) []     FLU VACCINE, INCREASED ANTIGEN, PRESV FREE, AGE 65+ [97196]        Primary Care Provider Office Phone # Fax #    Natalya Lewis [phone] [address] [place] [address]        Equal Access to Services     TETE [facility] CenterFEROZ : Hadii leanne prakash hadasho Soomaali, waaxda luqadaha, qaybta kaalmada adeegyada, anila wall . So [facility] [phone].    ATENCIÓN: Si habla español, tiene a coelho disposición servicios gratuitos de asistencia lingüística. Llame al [phone].    We comply with applicable federal civil rights laws and [place] laws. We do not discriminate on the basis of race, color, national origin, age, disability, sex, sexual orientation, or gender identity.            Thank you!     Thank you for choosing [facility]  for your care. Our goal is always to provide you with excellent care. Hearing back from our patients is one way we can continue to improve our services. Please take a few minutes to complete the written survey that you may receive in the mail after your visit with us. Thank you!             Your Updated Medication List - Protect others around you: Learn how to safely use, store and throw away your medicines at www.disposemymeds.org.          This list is accurate as of 9/21/18  2:27 PM.  Always use your most recent med list.                   Brand  Name Dispense Instructions for use Diagnosis    aspirin 81 MG tablet     100    1 tab po QD (Once per day)    Essential hypertension, benign       atenolol 50 MG tablet    TENORMIN    90 tablet    Take 1 tablet (50 mg) by mouth daily    Essential hypertension with goal blood pressure less than 140/90       celecoxib 100 MG capsule    celeBREX    180 capsule    Take 1 capsule (100 mg) by mouth 2 times daily as needed for moderate pain    Arthritis       CENTRUM SILVER PO           EPINEPHrine 0.3 MG/0.3ML injection 2-pack    EPIPEN/ADRENACLICK/or ANY BX GENERIC EQUIV    0.6 mL    Inject 0.3 mLs (0.3 mg) into the muscle as needed for anaphylaxis    Bee sting-induced anaphylaxis, undetermined intent, subsequent encounter       FLUoxetine 10 MG capsule    PROzac    90 capsule    Take 1 capsule (10 mg) by mouth daily    Mild major depression (H)       hydrocortisone 2.5 % cream           ipratropium 0.06 % spray    ATROVENT    3 Box    Spray 2 sprays in nostril 4 times daily as needed for rhinitis    Nasal congestion       leuprolide 45 MG kit    ELIGARD    1 each    Inject 45 mg Subcutaneous every 6 months.        losartan-hydrochlorothiazide 100-12.5 MG per tablet    HYZAAR    90 tablet    Take 1 tablet by mouth daily    Essential hypertension with goal blood pressure less than 140/90       PROSTEON PO      Take 2,000 Units by mouth With vitamin D        simvastatin 40 MG tablet    ZOCOR    90 tablet    Take 1 tablet (40 mg) by mouth At Bedtime    Dyslipidemia

## 2023-10-24 ENCOUNTER — MYC MEDICAL ADVICE (OUTPATIENT)
Dept: CARDIOLOGY | Facility: CLINIC | Age: 88
End: 2023-10-24
Payer: COMMERCIAL

## 2023-10-25 ENCOUNTER — OFFICE VISIT (OUTPATIENT)
Dept: SURGERY | Facility: CLINIC | Age: 88
End: 2023-10-25
Payer: COMMERCIAL

## 2023-10-25 VITALS
BODY MASS INDEX: 35.61 KG/M2 | HEART RATE: 82 BPM | RESPIRATION RATE: 16 BRPM | DIASTOLIC BLOOD PRESSURE: 60 MMHG | WEIGHT: 235 LBS | SYSTOLIC BLOOD PRESSURE: 90 MMHG | OXYGEN SATURATION: 95 % | HEIGHT: 68 IN

## 2023-10-25 DIAGNOSIS — M79.641 PAIN OF RIGHT HAND: ICD-10-CM

## 2023-10-25 DIAGNOSIS — I71.43 INFRARENAL ABDOMINAL AORTIC ANEURYSM (AAA) WITHOUT RUPTURE (H): Primary | ICD-10-CM

## 2023-10-25 PROCEDURE — 99215 OFFICE O/P EST HI 40 MIN: CPT | Performed by: SURGERY

## 2023-10-25 NOTE — PROGRESS NOTES
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 More reports doing well overall in the last year. He says he thinks he has slowed down a little, and has fewer excuses to both socialize and exercise; he has toyed with the idea of joining a gym class but doesn't want to commit, and notes that he is also hesitant to commit to long-term social activities. He does want to be slightly more active than he is now.   No abdominal, chest, or back pain. He does have right hand pain after falling on his outstretched arm and hand a few weeks ago; he has been wearing a brace and will be following up with Orthopedics for this.      Current Outpatient Medications:     ASPIRIN NOT PRESCRIBED (INTENTIONAL), continuous prn for other Antiplatelet medication not prescribed intentionally due to Current anticoagulant therapy (warfarin/enoxaparin), Disp: , Rfl:     atorvastatin (LIPITOR) 40 MG tablet, TAKE 1 TABLET BY MOUTH EVERY DAY, Disp: 90 tablet, Rfl: 0    Cholecalciferol (VITAMIN D-3) 25 MCG (1000 UT) CAPS, Take by mouth daily, Disp: , Rfl:     ELIQUIS ANTICOAGULANT 5 MG tablet, TAKE 1 TABLET BY MOUTH TWICE A DAY, Disp: 180 tablet, Rfl: 3    escitalopram (LEXAPRO) 10 MG tablet, Take 1 tablet (10 mg) by mouth daily, Disp: 90 tablet, Rfl: 3    ketoconazole (NIZORAL) 2 % external cream, APPLY TO AREAS OF RASH IN THE LEFT AXILLAE DAILY, Disp: , Rfl:     Loperamide HCl (IMODIUM OR), Take by mouth daily as needed, Disp: , Rfl:     losartan (COZAAR) 100 MG tablet, TAKE 1 TABLET BY MOUTH EVERY DAY, Disp: 90 tablet, Rfl: 2    metoprolol succinate ER (TOPROL XL) 50 MG 24 hr tablet, Take 2 tablets (100 mg) by mouth daily, Disp: 180 tablet, Rfl: 4    RISEdronate (ACTONEL) 35 MG tablet, TAKE 1 TABLET (35 MG) BY MOUTH EVERY 7 DAYS, Disp: 12 tablet, Rfl: 1    spironolactone (ALDACTONE) 25 MG tablet, Take  "0.5 tablets (12.5 mg) by mouth daily, Disp: 45 tablet, Rfl: 3     Physical Exam       BP: 90/60 Pulse: 82   Resp: 16 SpO2: 95 %      Vital Signs with Ranges  Pulse:  [82] 82  Resp:  [16] 16  BP: (90)/(60) 90/60  SpO2:  [95 %] 95 %  235 lbs 0 oz    Constitutional: cooperative, no apparent distress, sitting comfortably in chair. Spoke with his daughter via videophone as well during the visit.  Vascular: bilateral DP and PT pulses palpable; strong right radial pulse.  Musculoskeletal: grossly normal and symmetric ROM and strength in BL extremities; mild bilateral edema evident at the sock line. Brace on right wrist.   Neurologic: Awake, alert, oriented to name, place, time, and situation    Imaging:  I have reviewed the following imaging studies:  CTA chest/abd/pelvis (9/21/23): \"1.  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 There is a focal area of contrast outpouching along the anterior wall which likely represents contrast extending into the aneurysmal sac versus a small penetrating atherosclerotic ulcer. 2.  Ectatic ascending aorta measuring up to 4.5 cm. 3.  Ectatic left common iliac artery measuring up to 1.7 cm. 4.  Bipartite right patella.\"       Maximal size 35.2 x 21.6 mm       Maximal size 35.1 x 22.6 mm      Assessment & Plan   Mr. 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     Explained to Mr. More (and his daughter, via phone) that I would recommend at least 3-5 years of annual imaging of the aorta to ensure stability. I am happy with the current appearance - no significant change in the size (maybe 1 mm of growth compared to prior) and no change in morphology.   Presuming that he has stable annual imaging (either repeat next year or repeat next year and for the following " 1-2 years), we can likely space out his imaging somewhat to every other year or longer intervals.   Discussed that if he did get to a point of not wanting any surgical intervention, we would not need to continue surveillance. At current, he is a reasonable surgical candidate for endovascular repair if significant change were seen in this area.   Will see him in 1 year with a CTA of the abd/pelvis for repeat evaluation.     Nimisha Fernández MD    Total time spent on the date of this encounter doing: chart review, review of test results, patient visit, physical exam, education, counseling, developing plan of care, and documenting = 40 minutes

## 2023-10-25 NOTE — PROGRESS NOTES
[facility]    OUTPATIENT Physical Therapy ORTHOPEDIC EVALUATION  PLAN OF TREATMENT FOR OUTPATIENT REHABILITATION  (COMPLETE FOR INITIAL CLAIMS ONLY)  Patient's Last Name, First Name, M.I.  YOB: 1935  Nixon More    Provider s Name:  [facility]   Medical Record No.  [id number]   Start of Care Date:  01/26/22   Onset Date:   11/26/21   Type:     _X__PT   ___OT Medical Diagnosis:    Encounter Diagnoses   Name Primary?     Primary osteoarthritis of left knee      Left knee pain, unspecified chronicity         Treatment Diagnosis:  knee pain, OA        Goals:     01/26/22 0500   Body Part   Goals listed below are for lt knee pain   Goal #1   Goal #1 squatting/kneeling   Previous Functional Level Could kneel ;Can do a partial squat   Current Functional Level Cannot kneel;Cannot squat;Can do a partial squat   STG [employer] Performance Kneel on a soft surface;Reduce pain with squatting to    Performance Level 1/10 or less   Rationale for proper body mechanics while performing housework;for proper body mechanics while performing yardwork and home maintenance;for proper body mechanics when lifting, personal hygiene, dressing   Due date 02/16/22   LTG [employer] Performance Kneel on a soft surface;Do a partial squat;Do a full squat   Rationale for proper body mechanics while performing housework;for proper body mechanics while performing yardwork and home maintenance;for proper body mechanics when lifting, personal hygiene, dressing   Due date 03/09/22       Therapy Frequency:  1x/week  Predicted Duration of Therapy Intervention:  7 weeks    Rajat Nettles, PT                 I CERTIFY THE NEED FOR THESE SERVICES FURNISHED UNDER        THIS PLAN OF TREATMENT AND WHILE UNDER MY CARE     (Physician attestation of this document indicates review and certification of the therapy  plan).                       Certification Date From:  01/26/22   Certification Date To:  03/16/22    Referring Provider:  Albert Yeo    Initial Assessment        See Epic Evaluation SOC Date: 01/26/22                                                                  Attending

## 2023-11-08 ENCOUNTER — TRANSFERRED RECORDS (OUTPATIENT)
Dept: HEALTH INFORMATION MANAGEMENT | Facility: CLINIC | Age: 88
End: 2023-11-08

## 2023-11-08 PROBLEM — E66.09 OBESITY DUE TO EXCESS CALORIES: Status: ACTIVE | Noted: 2019-11-08

## 2023-11-08 PROBLEM — E66.812 CLASS 2 SEVERE OBESITY DUE TO EXCESS CALORIES WITH SERIOUS COMORBIDITY IN ADULT (H): Status: ACTIVE | Noted: 2019-11-08

## 2023-11-20 ENCOUNTER — NURSE TRIAGE (OUTPATIENT)
Dept: NURSING | Facility: CLINIC | Age: 88
End: 2023-11-20
Payer: COMMERCIAL

## 2023-11-20 ENCOUNTER — TELEPHONE (OUTPATIENT)
Dept: UROLOGY | Facility: CLINIC | Age: 88
End: 2023-11-20
Payer: COMMERCIAL

## 2023-11-20 NOTE — TELEPHONE ENCOUNTER
[facility] Call Center    Phone Message    May a detailed message be left on voicemail: yes     Reason for Call: Symptoms or Concerns     If patient has red-flag symptoms, warm transfer to triage line    Current symptom or concern: gross hematuria/blood clots    Symptoms have been present for:  1 week(s)    Has patient previously been seen for this? Yes    By : Alvarez     Are there any new or worsening symptoms? Yes: noticing blood every time he urinates and clots    Action Taken: Patient transferred to: warm transfer to nurse triage line     Travel Screening: Not Applicable

## 2023-11-20 NOTE — TELEPHONE ENCOUNTER
"      Nurse Triage SBAR    Is this a 2nd Level Triage? YES, LICENSED PRACTITIONER REVIEW IS REQUIRED    Situation:   Mid week noticed some small blood clots and some blood on pad he is wearing in his briefs.  Complaint of slight urgency and frequency, especially when getting up from sitting down, reclining.    Background:   History of bladder and prostrate cancer.  December 2023  Cystoscopy scheduled    Assessment:   Patient with complaint of blood in his pad in his briefs and small blood clots int he toilet.  He also has some slight urgency and frequency, it especially hits when he gets up from reclining.    Protocol Recommended Disposition:   Routing to Urology for call back        Routed to provider          Reason for Disposition   Urinating more frequently than usual (i.e., frequency)    Additional Information   Negative: Shock suspected (e.g., cold/pale/clammy skin, too weak to stand, low BP, rapid pulse)   Negative: Sounds like a life-threatening emergency to the triager   Negative: Unable to urinate (or only a few drops) > 4 hours and bladder feels very full (e.g., palpable bladder or strong urge to urinate)   Negative: Decreased urination and drinking very little and dehydration suspected (e.g., dark urine, no urine > 12 hours, very dry mouth, very lightheaded)   Negative: Patient sounds very sick or weak to the triager   Negative: Fever > 100.4 F  (38.0 C)    Answer Assessment - Initial Assessment Questions  1. SYMPTOM: \"What's the main symptom you're concerned about?\" (e.g., frequency, incontinence)      Traces of blood in pad he wears and in the toilet bowel.  Looks like small clots  Pt feels that the blood is not mixed with urine in the bladder, so maybe urethra?  2. ONSET: \"When did the    start?\"      Started mid week  3. PAIN: \"Is there any pain?\" If Yes, ask: \"How bad is it?\" (Scale: 1-10; mild, moderate, severe)      No more than usual  4. CAUSE: \"What do you think is causing the symptoms?\"      " "unknown  5. OTHER SYMPTOMS: \"Do you have any other symptoms?\" (e.g., blood in urine, fever, flank pain, pain with urination)     Left lower back, just above the hip. Ongoing for 4- 5 months.  No fever or chills,    Voiding with some slight urgency and frequency.   Lemon colored urine and some pinkish blood mixed into the urine.  Pt voids sitting down due to shortness of penis.   No fall or injury   6. PREGNANCY: \"Is there any chance you are pregnant?\" \"When was your last menstrual period?\"      no    Protocols used: Urinary Symptoms-A-OH    "

## 2023-11-21 ENCOUNTER — PATIENT OUTREACH (OUTPATIENT)
Dept: PEDIATRICS | Facility: CLINIC | Age: 88
End: 2023-11-21
Payer: COMMERCIAL

## 2023-11-21 NOTE — TELEPHONE ENCOUNTER
1st / 2nd Attempt Patient Quality Outreach Health Maintenance - PAL RN    Summary:    PAL RN attempted (attempt # 2) to contact pt regarding overdue health maintenance    Patient is due/failing the following:       Topic Date Due    COVID-19 Vaccine (6 - 2023-24 season) 09/01/2023       Health Maintenance Due   Topic Date Due    HF ACTION PLAN  Never done    RSV VACCINE (Pregnancy & 60+) (1 - 1-dose 60+ series) Never done    COVID-19 Vaccine (6 - 2023-24 season) 09/01/2023       Type of outreach:    Sent [employer] message.      Next Steps:  Max number of attempts reached: No. Will try again in 90 days if patient still on fail list.    Reach out within 90 days via [employer].    Questions for provider review:    None      Rupinder ARTEAGA RN, BSN

## 2023-11-21 NOTE — TELEPHONE ENCOUNTER
Spoke with patient, since last Wednesday he's been experiencing urinary frequency/urgency when going from sitting to standing and intermittent gross hematuria with small blood clots.  Patient denies fever/chills or dysuria.  Patient has h/o bladder cancer and prostate cancer and is on surveillance.  Will discuss further with Dr. Pino.  Patient states understanding.    Alecia Hill RN, BSN  Care Coordinator  [facility]

## 2023-11-25 NOTE — MR AVS SNAPSHOT
After Visit Summary   10/23/2018    Nixon More    MRN: [id number]           Patient Information     Date Of Birth          1935        Visit Information        Provider Department      10/23/2018 3:30 PM Natalya Lewis MD [facility]        Today's Diagnoses     Routine history and physical examination of adult    -  1    Bilateral sensorineural hearing loss        Benign paroxysmal positional vertigo, unspecified laterality        Chronic right shoulder pain        Essential hypertension with goal blood pressure less than 140/90        Mild major depression (H)        Dyslipidemia        Morbid obesity (H)        Angina pectoris (H)        Other emphysema (H)          Care Instructions      Preventive Health Recommendations:       Male Ages 65 and over    Yearly exam:             See your health care provider every year in order to  o   Review health changes.   o   Discuss preventive care.    o   Review your medicines if your doctor has prescribed any.    Talk with your health care provider about whether you should have a test to screen for prostate cancer (PSA).    Every 3 years, have a diabetes test (fasting glucose). If you are at risk for diabetes, you should have this test more often.    Every 5 years, have a cholesterol test. Have this test more often if you are at risk for high cholesterol or heart disease.     Every 10 years, have a colonoscopy. Or, have a yearly FIT test (stool test). These exams will check for colon cancer.    Talk to with your health care provider about screening for Abdominal Aortic Aneurysm if you have a family history of AAA or have a history of smoking.  Shots:     Get a flu shot each year.     Get a tetanus shot every 10 years - you are due in 2022.     Talk to your doctor about your pneumonia vaccines. You have had all yours  Nutrition:     Eat at least 5 servings of fruits and vegetables each day.     Eat whole-grain bread, whole-wheat pasta and  brown rice instead of white grains and rice.     Get adequate Calcium and Vitamin D.   Lifestyle    Exercise for at least 150 minutes a week (30 minutes a day, 5 days a week). This will help you control your weight and prevent disease.     Limit alcohol to one drink per day.     No smoking.     Wear sunscreen to prevent skin cancer.     See your dentist every six months for an exam and cleaning.     See your eye doctor every 1 to 2 years to screen for conditions such as glaucoma, macular degeneration and cataracts.    Follow-up with ENT specialty care about hearing aids.  They will call you about physical therapy for your balance.  They will call you for physical therapy for your shoulder.          Follow-ups after your visit        Additional Services     [facility] PT, HAND, AND CHIROPRACTIC REFERRAL       Physical Therapy, Hand Therapy and Chiropractic Care are available through:  *[facility]  *Hand Therapy (Occupational Therapy or Physical Therapy)  *[facility]    Call one number to schedule at any of the above locations: [phone].    Physical therapy, Hand therapy and/or Chiropractic care has been recommended by your physician as an excellent treatment option to reduce pain and help people return to normal activities, including sports.  Therapy and/or chiropractic care services are a great complement or alternative to expensive and invasive surgery, injections, or long-term use of prescription medications. The primary goal is to identify the underlying problem and provide you the tools to manage your condition on your own.     Please be aware that coverage of these services is subject to the terms and limitations of your health insurance plan.  Call member services at your health plan with any benefit or coverage questions.      Please bring the following to your appointment:  *Your personal calendar for scheduling future appointments  *Comfortable clothing             OTOLARYNGOLOGY REFERRAL       Your provider has referred you to: [facility]: [facility] [phone]   [email or web address]    Please be aware that coverage of these services is subject to the terms and limitations of your health insurance plan.  Call member services at your health plan with any benefit or coverage questions.      Please bring the following with you to your appointment:    (1) Any X-Rays, CTs or MRIs which have been performed.  Contact the facility where they were done to arrange for  prior to your scheduled appointment.   (2) List of current medications  (3) This referral request   (4) Any documents/labs given to you for this referral            PHYSICAL THERAPY REFERRAL       If you have not heard from the scheduling office within 2 business days, please call [phone] for all locations, with the exception of [place], please call [phone] and [facility] [place], please call [phone].    Please be aware that coverage of these services is subject to the terms and limitations of your health insurance plan.  Call member services at your health plan with any benefit or coverage questions.                  Follow-up notes from your care team     Return in about 1 year (around 10/23/2019) for Physical Exam.      Your next 10 appointments already scheduled     Feb 11, 2019  9:00 AM CST   Cystoscopy with Mark Pino MD,  CYF   [facility] [place] ([facility] [place])    [address]  [address]  [facility] [id number] [phone]              Future tests that were ordered for you today     Open Future Orders        Priority Expected Expires Ordered    GAIL PT, HAND, AND CHIROPRACTIC REFERRAL Routine  10/23/2019 10/23/2018    PHYSICAL THERAPY REFERRAL Routine  10/23/2019 10/23/2018            Who to contact     If you have questions or need follow  Chief complaint: TIA/CVA    Patient seen and examined at bedside. No acute overnight events reported. Patient states he is feeling well, currently has no complaints. No fever, chills, cough, nausea or vomiting.     Vital Signs Last 24 Hrs  T(F): 98.2 (25 Nov 2023 11:54), Max: 98.2 (24 Nov 2023 20:24)  HR: 63 (25 Nov 2023 11:54) (49 - 74)  BP: 182/72 (25 Nov 2023 11:54) (144/66 - 189/50)  RR: 19 (25 Nov 2023 11:54) (16 - 19)  SpO2: 98% (25 Nov 2023 11:54) (95% - 99%)    Physical Exam:  Constitutional: alert and oriented, in no acute distress   Neck: Soft and supple  Respiratory: Clear to auscultation bilaterally  Cardiovascular: Regular rate and rhythm  Gastrointestinal: Soft, non-tender to palpation, +bs  Vascular: 2+ peripheral pulses  Musculoskeletal:  no lower extremity edema bilaterally    Labs:                        12.3   5.33  )-----------( 237      ( 25 Nov 2023 09:39 )             36.5   11-25    140  |  102  |  27.0<H>  ----------------------------<  105<H>  4.3   |  22.0  |  1.67<H>    Ca    9.6      25 Nov 2023 09:39  Phos  3.4     11-25  Mg     2.0     11-25    TPro  7.5  /  Alb  4.4  /  TBili  0.6  /  DBili  x   /  AST  27  /  ALT  20  /  AlkPhos  78  11-25   "up information about today's clinic visit or your schedule please contact [facility] directly at [phone].  Normal or non-critical lab and imaging results will be communicated to you by MyChart, letter or phone within 4 business days after the clinic has received the results. If you do not hear from us within 7 days, please contact the clinic through [employer] or phone. If you have a critical or abnormal lab result, we will notify you by phone as soon as possible.  Submit refill requests through [employer] or call your pharmacy and they will forward the refill request to us. Please allow 3 business days for your refill to be completed.          Additional Information About Your Visit        [employer] Information     [employer] gives you secure access to your electronic health record. If you see a primary care provider, you can also send messages to your care team and make appointments. If you have questions, please call your primary care clinic.  If you do not have a primary care provider, please call [phone] and they will assist you.        Care EveryWhere ID     This is your Care EveryWhere ID. This could be used by other organizations to access your [place] medical records  [id number]        Your Vitals Were     Pulse Temperature Height Pulse Oximetry BMI (Body Mass Index)       84 97.7  F (36.5  C) (Oral) 5' 7\" (1.702 m) 97% 35.3 kg/m2        Blood Pressure from Last 3 Encounters:   10/23/18 112/60   07/27/18 128/84   07/24/18 140/80    Weight from Last 3 Encounters:   10/23/18 225 lb 6.4 oz (102.2 kg)   08/20/18 227 lb (103 kg)   07/27/18 227 lb (103 kg)              We Performed the Following     OTOLARYNGOLOGY REFERRAL          Where to get your medicines      These medications were sent to [facility] [address] [place] [address] [address]  [address] [address]     Phone:  [phone]     atenolol 50 MG tablet    FLUoxetine 10 MG capsule    " losartan-hydrochlorothiazide 100-12.5 MG per tablet    simvastatin 40 MG tablet          Primary Care Provider Office Phone # Fax #    Natalya Lewis [phone]       [facility] [place] [address]        Equal Access to Services     ANTONIA BRAVO : Hadii leanne prakash hadmandyo Soomaali, waaxda luqadaha, qaybta kaalmada adeegyada, anila omerin haytiton tiffaniealysa alvarez [facility]. So [facility] [phone].    ATENCIÓN: Si habla español, tiene a coelho disposición servicios gratuitos de asistencia lingüística. Llame al [phone].    We comply with applicable federal civil rights laws and [place] laws. We do not discriminate on the basis of race, color, national origin, age, disability, sex, sexual orientation, or gender identity.            Thank you!     Thank you for choosing [facility]  for your care. Our goal is always to provide you with excellent care. Hearing back from our patients is one way we can continue to improve our services. Please take a few minutes to complete the written survey that you may receive in the mail after your visit with us. Thank you!             Your Updated Medication List - Protect others around you: Learn how to safely use, store and throw away your medicines at www.disposemymeds.org.          This list is accurate as of 10/23/18  4:38 PM.  Always use your most recent med list.                   Brand Name Dispense Instructions for use Diagnosis    aspirin 81 MG tablet     100    1 tab po QD (Once per day)    Essential hypertension, benign       atenolol 50 MG tablet    TENORMIN    90 tablet    Take 1 tablet (50 mg) by mouth daily    Essential hypertension with goal blood pressure less than 140/90       celecoxib 100 MG capsule    celeBREX    180 capsule    Take 1 capsule (100 mg) by mouth 2 times daily as needed for moderate pain    Arthritis       CENTRUM SILVER PO           EPINEPHrine 0.3 MG/0.3ML injection 2-pack    EPIPEN/ADRENACLICK/or ANY BX GENERIC  EQUIV    0.6 mL    Inject 0.3 mLs (0.3 mg) into the muscle as needed for anaphylaxis    Bee sting-induced anaphylaxis, undetermined intent, subsequent encounter       FLUoxetine 10 MG capsule    PROzac    90 capsule    Take 1 capsule (10 mg) by mouth daily    Mild major depression (H)       ipratropium 0.06 % spray    ATROVENT    3 Box    Spray 2 sprays in nostril 4 times daily as needed for rhinitis    Nasal congestion       leuprolide 45 MG kit    ELIGARD    1 each    Inject 45 mg Subcutaneous every 6 months.        losartan-hydrochlorothiazide 100-12.5 MG per tablet    HYZAAR    90 tablet    Take 1 tablet by mouth daily    Essential hypertension with goal blood pressure less than 140/90       PROSTEON PO      Take 2,000 Units by mouth With vitamin D        simvastatin 40 MG tablet    ZOCOR    90 tablet    Take 1 tablet (40 mg) by mouth At Bedtime    Dyslipidemia

## 2023-11-29 ENCOUNTER — TELEPHONE (OUTPATIENT)
Dept: OTHER | Facility: CLINIC | Age: 88
End: 2023-11-29
Payer: COMMERCIAL

## 2023-11-30 ENCOUNTER — TRANSFERRED RECORDS (OUTPATIENT)
Dept: HEALTH INFORMATION MANAGEMENT | Facility: CLINIC | Age: 88
End: 2023-11-30
Payer: COMMERCIAL

## 2023-12-04 DIAGNOSIS — I10 BENIGN ESSENTIAL HYPERTENSION: ICD-10-CM

## 2023-12-04 DIAGNOSIS — I48.19 PERSISTENT ATRIAL FIBRILLATION (H): ICD-10-CM

## 2023-12-04 RX ORDER — SPIRONOLACTONE 25 MG/1
12.5 TABLET ORAL DAILY
Qty: 45 TABLET | Refills: 2 | Status: SHIPPED | OUTPATIENT
Start: 2023-12-04 | End: 2024-02-22

## 2023-12-10 ASSESSMENT — PATIENT HEALTH QUESTIONNAIRE - PHQ9
SUM OF ALL RESPONSES TO PHQ QUESTIONS 1-9: 1
SUM OF ALL RESPONSES TO PHQ QUESTIONS 1-9: 1
10. IF YOU CHECKED OFF ANY PROBLEMS, HOW DIFFICULT HAVE THESE PROBLEMS MADE IT FOR YOU TO DO YOUR WORK, TAKE CARE OF THINGS AT HOME, OR GET ALONG WITH OTHER PEOPLE: NOT DIFFICULT AT ALL

## 2023-12-11 ENCOUNTER — OFFICE VISIT (OUTPATIENT)
Dept: PEDIATRICS | Facility: CLINIC | Age: 88
End: 2023-12-11
Payer: COMMERCIAL

## 2023-12-11 VITALS
BODY MASS INDEX: 36.87 KG/M2 | SYSTOLIC BLOOD PRESSURE: 102 MMHG | HEART RATE: 136 BPM | DIASTOLIC BLOOD PRESSURE: 52 MMHG | TEMPERATURE: 97.9 F | OXYGEN SATURATION: 96 % | HEIGHT: 67 IN | RESPIRATION RATE: 20 BRPM | WEIGHT: 234.9 LBS

## 2023-12-11 DIAGNOSIS — N39.0 URINARY TRACT INFECTION WITH HEMATURIA, SITE UNSPECIFIED: ICD-10-CM

## 2023-12-11 DIAGNOSIS — Z01.818 PREOP GENERAL PHYSICAL EXAM: Primary | ICD-10-CM

## 2023-12-11 DIAGNOSIS — E66.01 MORBID OBESITY (H): ICD-10-CM

## 2023-12-11 DIAGNOSIS — H34.8392 BRANCH RETINAL VEIN OCCLUSION, UNSPECIFIED COMPLICATION STATUS, UNSPECIFIED LATERALITY (H): ICD-10-CM

## 2023-12-11 DIAGNOSIS — I48.92 ATRIAL FIBRILLATION AND FLUTTER (H): ICD-10-CM

## 2023-12-11 DIAGNOSIS — R31.9 URINARY TRACT INFECTION WITH HEMATURIA, SITE UNSPECIFIED: ICD-10-CM

## 2023-12-11 DIAGNOSIS — N18.31 STAGE 3A CHRONIC KIDNEY DISEASE (H): ICD-10-CM

## 2023-12-11 DIAGNOSIS — G56.01 CARPAL TUNNEL SYNDROME OF RIGHT WRIST: ICD-10-CM

## 2023-12-11 DIAGNOSIS — R31.0 GROSS HEMATURIA: ICD-10-CM

## 2023-12-11 DIAGNOSIS — Z23 NEED FOR VACCINATION: ICD-10-CM

## 2023-12-11 DIAGNOSIS — I48.91 ATRIAL FIBRILLATION AND FLUTTER (H): ICD-10-CM

## 2023-12-11 LAB
ALBUMIN UR-MCNC: 30 MG/DL
APPEARANCE UR: CLEAR
BACTERIA #/AREA URNS HPF: ABNORMAL /HPF
BILIRUB UR QL STRIP: NEGATIVE
COLOR UR AUTO: YELLOW
GLUCOSE UR STRIP-MCNC: NEGATIVE MG/DL
HGB BLD-MCNC: 13.3 G/DL (ref 13.3–17.7)
HGB UR QL STRIP: ABNORMAL
KETONES UR STRIP-MCNC: NEGATIVE MG/DL
LEUKOCYTE ESTERASE UR QL STRIP: ABNORMAL
NITRATE UR QL: NEGATIVE
PH UR STRIP: 5.5 [PH] (ref 5–7)
RBC #/AREA URNS AUTO: ABNORMAL /HPF
SP GR UR STRIP: 1.02 (ref 1–1.03)
SQUAMOUS #/AREA URNS AUTO: ABNORMAL /LPF
UROBILINOGEN UR STRIP-ACNC: 0.2 E.U./DL
WBC #/AREA URNS AUTO: ABNORMAL /HPF

## 2023-12-11 PROCEDURE — 87186 SC STD MICRODIL/AGAR DIL: CPT | Performed by: NURSE PRACTITIONER

## 2023-12-11 PROCEDURE — 91320 SARSCV2 VAC 30MCG TRS-SUC IM: CPT | Performed by: NURSE PRACTITIONER

## 2023-12-11 PROCEDURE — 99204 OFFICE O/P NEW MOD 45 MIN: CPT | Mod: 25 | Performed by: NURSE PRACTITIONER

## 2023-12-11 PROCEDURE — 90480 ADMN SARSCOV2 VAC 1/ONLY CMP: CPT | Performed by: NURSE PRACTITIONER

## 2023-12-11 PROCEDURE — 80048 BASIC METABOLIC PNL TOTAL CA: CPT | Performed by: NURSE PRACTITIONER

## 2023-12-11 PROCEDURE — 85018 HEMOGLOBIN: CPT | Performed by: NURSE PRACTITIONER

## 2023-12-11 PROCEDURE — 84153 ASSAY OF PSA TOTAL: CPT | Performed by: NURSE PRACTITIONER

## 2023-12-11 PROCEDURE — 87088 URINE BACTERIA CULTURE: CPT | Performed by: NURSE PRACTITIONER

## 2023-12-11 PROCEDURE — 36415 COLL VENOUS BLD VENIPUNCTURE: CPT | Performed by: NURSE PRACTITIONER

## 2023-12-11 PROCEDURE — 87086 URINE CULTURE/COLONY COUNT: CPT | Performed by: NURSE PRACTITIONER

## 2023-12-11 PROCEDURE — 81001 URINALYSIS AUTO W/SCOPE: CPT | Performed by: NURSE PRACTITIONER

## 2023-12-11 RX ORDER — RESPIRATORY SYNCYTIAL VIRUS VACCINE 120MCG/0.5
0.5 KIT INTRAMUSCULAR ONCE
Qty: 1 EACH | Refills: 0 | Status: CANCELLED | OUTPATIENT
Start: 2023-12-11 | End: 2023-12-11

## 2023-12-11 ASSESSMENT — PAIN SCALES - GENERAL: PAINLEVEL: MILD PAIN (2)

## 2023-12-11 NOTE — PROGRESS NOTES
[employer] [employer]  [address]  [address]  [place] [address]  Phone: [phone]  Fax: [phone]  Primary Provider: Natalya Lewis  Pre-op Performing Provider: JESSICA ANGLIN      PREOPERATIVE EVALUATION:  Today's date: 12/11/2023    Spencer is a 88 year old, presenting for the following:  Pre-Op Exam        12/11/2023    10:27 AM   Additional Questions   Roomed by Divya Hansen CMA       Surgical Information:  Surgery/Procedure: Carpal tunnel repair - right  Surgery Location: [facility]  Surgeon: Dr. AGNES Adan  Surgery Date: 12/20/23  Time of Surgery: TBD  Where patient plans to recover: At home with family  Fax number for surgical facility: [phone]    Assessment & Plan     The proposed surgical procedure is considered LOW risk.    ASSESSMENT / PLAN:  (Z01.818) Preop general physical exam  (primary encounter diagnosis)  Comment:   Plan:     (G56.01) Carpal tunnel syndrome of right wrist  Comment:   Plan:     (Z23) Need for vaccination  Comment:   Plan: COVID-19 12+ (2023-24) (PFIZER)          (I48.91,  I48.92) Atrial fibrillation and flutter (H)  Comment:   Plan:     (N18.31) Stage 3a chronic kidney disease (H)  Comment:   Creatinine   Date Value Ref Range Status   12/11/2023 1.12 0.67 - 1.17 mg/dL Final   06/18/2021 1.44 (H) 0.66 - 1.25 mg/dL Final     Plan:         Possible Sleep Apnea: uses BIPAP machine    Provider consider completing risk assessment  Link to [employer] Flowsheet :[id number]}         Risks and Recommendations:  The patient has the following additional risks and recommendations for perioperative complications:   - Consult Hospitalist / IM to assist with post-op medical management  Cardiovascular:  Last EKG was within 90 days.     Antiplatelet or Anticoagulation Medication Instructions:   - apixaban (Eliquis), edoxaban (Savaysa), rivaroxaban (Xarelto): Bleeding risk is low for this procedure AND CrCl (>=) 50 mL/min. HOLD 1 day before surgery.       Additional Medication Instructions:   - ACE/ARB: Continue without modification (e.g., MAC anesthesia, neurosurgery, spine surgery, heart failure, or labile hypertension with risk of hypertension).   - Beta Blockers: Continue taking on the day of surgery.   - Statins: Continue taking on the day of surgery.     -HOLD eliquis day before surgery    RECOMMENDATION:  APPROVAL GIVEN to proceed with proposed procedure, without further diagnostic evaluation.          Subjective       HPI related to upcoming procedure: carpal tunnel        12/5/2023    12:01 PM   Preop Questions   1. Have you ever had a heart attack or stroke? No   2. Have you ever had surgery on your heart or blood vessels, such as a stent placement, a coronary artery bypass, or surgery on an artery in your head, neck, heart, or legs? No   3. Do you have chest pain with activity? No   4. Do you have a history of  heart failure? YES   5. Do you currently have a cold, bronchitis or symptoms of other infection? No   6. Do you have a cough, shortness of breath, or wheezing? No   7. Do you or anyone in your family have previous history of blood clots? No   8. Do you or does anyone in your family have a serious bleeding problem such as prolonged bleeding following surgeries or cuts? No   9. Have you ever had problems with anemia or been told to take iron pills? No   10. Have you had any abnormal blood loss such as black, tarry or bloody stools? No   11. Have you ever had a blood transfusion? No   12. Are you willing to have a blood transfusion if it is medically needed before, during, or after your surgery? Yes   13. Have you or any of your relatives ever had problems with anesthesia? UNKNOWN - dad had reaction to anesthesia, not sure exact details   14. Do you have sleep apnea, excessive snoring or daytime drowsiness? YES - MAYITO, uses bipap machine   14a. Do you have a CPAP machine? Yes   15. Do you have any artifical heart valves or other implanted medical  devices like a pacemaker, defibrillator, or continuous glucose monitor? No   16. Do you have artificial joints? No   17. Are you allergic to latex? No       Health Care Directive:  Patient has a Health Care Directive on file      Preoperative Review of :   reviewed - no record of controlled substances prescribed.      Status of Chronic Conditions:  See problem list for active medical problems.  Problems all longstanding and stable, except as noted/documented.  See ROS for pertinent symptoms related to these conditions.    HYPERLIPIDEMIA - Patient has a long history of significant Hyperlipidemia requiring medication for treatment with recent good control. Patient reports no problems or side effects with the medication.     HYPERTENSION - Patient has longstanding history of HTN , currently denies any symptoms referable to elevated blood pressure. Specifically denies chest pain, palpitations, dyspnea, orthopnea, PND or peripheral edema. Blood pressure readings have been in normal range. Current medication regimen is as listed below. Patient denies any side effects of medication.     Review of Systems  CONSTITUTIONAL: NEGATIVE for fever, chills, change in weight  ENT/MOUTH: NEGATIVE for ear, mouth and throat problems  RESP: NEGATIVE for significant cough or SOB  CV: NEGATIVE for chest pain, palpitations or peripheral edema    Patient Active Problem List    Diagnosis Date Noted    Chronic diastolic heart failure (H) 06/22/2021     Priority: Medium    Mild major depression (H) 10/27/2020     Priority: Medium    Class 2 severe obesity due to excess calories with serious comorbidity in adult (H) 11/08/2019     Priority: Medium    CKD (chronic kidney disease) stage 3, GFR 30-59 ml/min (H) 11/08/2019     Priority: Medium    Generalized muscle weakness 03/28/2019     Priority: Medium    Dilated aortic root (H24) 12/06/2018     Priority: Medium    Atrial fibrillation and flutter (H) 12/03/2018     Priority: Medium     Dysthymia 09/27/2016     Priority: Medium    Spinal stenosis of lumbar region with neurogenic claudication 10/20/2015     Priority: Medium    Dyslipidemia      Priority: Medium    Benign essential hypertension      Priority: Medium    Health Care Home 01/29/2013     Priority: Medium     Maranda Newsome RN-BSN, [facility]  [phone].  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  Document previously scanned on 2-25-14.  Validation form completed and sent to be scanned.  Code Status reflects choices in most recent ACP document.  Confirmed/documented designated decision maker(s).  Added by Patricia Hansen RN Advance Care Planning Liaison with [employer]ost[employer]  Patient states has Advance Directive and will bring in a copy to clinic. 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  Seeing Dr. Brar on [place].      Hypersomnia with sleep  apnea      Priority: Medium     Also with central apnea.  on bipap, controlled  Problem list name updated by automated process. Provider to review      Other emphysema (H) 06/16/2005     Priority: Medium    Hypertrophy of prostate without urinary obstruction 09/23/2004     Priority: Medium    Personal history of other malignant neoplasm of skin 08/26/2002     Priority: Medium    Lumbago 08/26/2002     Priority: Medium      Past Medical History:   Diagnosis Date    AAA (abdominal aortic aneurysm) (H24)     Actinic keratosis     Angina pectoris (H24) 10/27/2020    Antiplatelet or antithrombotic long-term use     Eliquis    Arthritis     Atrial fibrillation and flutter (H) 12/03/2018    CAD (coronary artery disease)     1/5/2011 lateral ischemia on stress echo, 12/2014 normal stress echo    Coronary artery disease 01/01/2011    Abnormal stress test 1/2011 and controlled with medical mgmt     Depressive disorder     Mild    Diarrhea     Urgency at times    Dyslipidemia     Emphysema of lung (H)     Essential hypertension, benign     Hernia, abdominal     Hypersomnia with sleep apnea, unspecified     on bipap, partially treated with residual apneas    Hypertrophy (benign) of prostate 05/2001    Biopsy 5/01 negative for cancer; PSA 5    Lumbago     Mumps     Prostate cancer (H) 12/15/2006    Renal disease     Skin cancer, basal cell 1997    Sleep apnea     Uses a Bi-pap for centralized Apnea    Spider veins      Past Surgical History:   Procedure Laterality Date    ABDOMEN SURGERY      BACK SURGERY  1988    L4/L5 decompression    BIOPSY  2022    Skin cancer basal cell    COLONOSCOPY      CYSTOSCOPY      CYSTOSCOPY, TRANSURETHRAL RESECTION (TUR) TUMOR BLADDER, COMBINED N/A 01/06/2023    Procedure: Cystoscopy, transurethral resection of bladder tumor, medium, 2-5 cm;  Surgeon: Mark Pino MD;  Location: [facility] OR    EYE SURGERY  2016    Cararact    GENITOURINARY SURGERY  12 07 2022    Tumors in bladder    HERNIA  REPAIR  child    Moh's procedure for basal cell carcinoma  01/2001    PROSTATE SURGERY  2007    Radiation only    s/p lumbar laminectomy NOS  1988    SIGMOIDOSCOPY FLEXIBLE N/A 06/15/2020    Procedure: SIGMOIDOSCOPY, FLEXIBLE;  Surgeon: Sunni Patton MD;  Location:  GI    VITRECTOMY PARS PLANA REMOVE PRERETINAL MEMBRANE   03/2009     Current Outpatient Medications   Medication Sig Dispense Refill    atorvastatin (LIPITOR) 40 MG tablet TAKE 1 TABLET BY MOUTH EVERY DAY 90 tablet 0    Cholecalciferol (VITAMIN D-3) 25 MCG (1000 UT) CAPS Take by mouth daily      ELIQUIS ANTICOAGULANT 5 MG tablet TAKE 1 TABLET BY MOUTH TWICE A  tablet 3    escitalopram (LEXAPRO) 10 MG tablet Take 1 tablet (10 mg) by mouth daily 90 tablet 3    Loperamide HCl (IMODIUM OR) Take by mouth daily as needed      losartan (COZAAR) 100 MG tablet TAKE 1 TABLET BY MOUTH EVERY DAY 90 tablet 2    metoprolol succinate ER (TOPROL XL) 50 MG 24 hr tablet Take 2 tablets (100 mg) by mouth daily 180 tablet 4    RISEdronate (ACTONEL) 35 MG tablet TAKE 1 TABLET (35 MG) BY MOUTH EVERY 7 DAYS 12 tablet 1    spironolactone (ALDACTONE) 25 MG tablet Take 0.5 tablets (12.5 mg) by mouth daily 45 tablet 2    ASPIRIN NOT PRESCRIBED (INTENTIONAL) continuous prn for other Antiplatelet medication not prescribed intentionally due to Current anticoagulant therapy (warfarin/enoxaparin) (Patient not taking: Reported on 12/11/2023)         Allergies   Allergen Reactions    Amoxicillin-Pot Clavulanate Rash     Type III hypersensitivity    Bactrim [Sulfamethoxazole W/Trimethoprim] Rash     Serum sickness, type III hypersensitivity      Bee Venom Itching    Sulfa Antibiotics Hives    Celebrex [Celecoxib]     Lisinopril Cough    Naproxen Hives    Penicillin G         Social History     Tobacco Use    Smoking status: Former     Packs/day: 1.00     Years: 30.00     Additional pack years: 0.00     Total pack years: 30.00     Types: Cigarettes     Start date: 1/1/1948  "    Quit date: 1978     Years since quittin.9     Passive exposure: Never    Smokeless tobacco: Never   Substance Use Topics    Alcohol use: Not Currently     Comment: occ \"like once a month\" or less     Family History   Problem Relation Age of Onset    Alzheimer Disease Mother     Hypertension Mother     Cardiovascular Father         D:86 complications fo CHF    Anesthesia Reaction Father          after 2 weeks    Prostate Cancer Brother     Lung Cancer Brother 76    Other Cancer Brother         Lung    Prostate Cancer Brother      History   Drug Use No         Objective     /52 (BP Location: Right arm, Cuff Size: Adult Large)   Pulse (!) 136   Temp 97.9  F (36.6  C) (Tympanic)   Resp 20   Ht 1.695 m (5' 6.75\")   Wt 106.5 kg (234 lb 14.4 oz)   SpO2 96%   BMI 37.07 kg/m      Physical Exam  GENERAL APPEARANCE: healthy, alert and no distress  HENT: ear canals and TM's normal and nose and mouth without ulcers or lesions  RESP: lungs clear to auscultation - no rales, rhonchi or wheezes  CV: regular rate and rhythm, normal S1 S2, no S3 or S4 and no murmur, click or rub   ABDOMEN: soft, nontender, no HSM or masses and bowel sounds normal  NEURO: Normal strength and tone, sensory exam grossly normal, mentation intact and speech normal    Recent Labs   Lab Test 10/10/23  1634 23  0936 23  0900 03/15/23  1346   HGB 14.4  --  14.1  --      --  197  --    *  --  139  --    POTASSIUM 4.3  --  4.3  --    CR 1.01 1.1 1.07  --    A1C  --   --   --  6.2*        Diagnostics:  Recent Results (from the past 24 hour(s))   Hemoglobin    Collection Time: 23 11:34 AM   Result Value Ref Range    Hemoglobin 13.3 13.3 - 17.7 g/dL   Basic metabolic panel  (Ca, Cl, CO2, Creat, Gluc, K, Na, BUN)    Collection Time: 23 11:34 AM   Result Value Ref Range    Sodium 138 135 - 145 mmol/L    Potassium 4.5 3.4 - 5.3 mmol/L    Chloride 108 (H) 98 - 107 mmol/L    Carbon Dioxide (CO2) 21 (L) " 22 - 29 mmol/L    Anion Gap 9 7 - 15 mmol/L    Urea Nitrogen 26.5 (H) 8.0 - 23.0 mg/dL    Creatinine 1.12 0.67 - 1.17 mg/dL    GFR Estimate 64 >60 mL/min/1.73m2    Calcium 9.3 8.8 - 10.2 mg/dL    Glucose 134 (H) 70 - 99 mg/dL   PSA, tumor marker    Collection Time: 12/11/23 11:34 AM   Result Value Ref Range    PSA Tumor Marker 0.03 ng/mL   UA Macroscopic with reflex to Microscopic and Culture - Clinic Collect    Collection Time: 12/11/23 11:48 AM    Specimen: Urine, Midstream   Result Value Ref Range    Color Urine Yellow Colorless, Straw, Light Yellow, Yellow    Appearance Urine Clear Clear    Glucose Urine Negative Negative mg/dL    Bilirubin Urine Negative Negative    Ketones Urine Negative Negative mg/dL    Specific Gravity Urine 1.020 1.003 - 1.035    Blood Urine Large (A) Negative    pH Urine 5.5 5.0 - 7.0    Protein Albumin Urine 30 (A) Negative mg/dL    Urobilinogen Urine 0.2 0.2, 1.0 E.U./dL    Nitrite Urine Negative Negative    Leukocyte Esterase Urine Moderate (A) Negative   Urine Microscopic Exam    Collection Time: 12/11/23 11:48 AM   Result Value Ref Range    Bacteria Urine Few (A) None Seen /HPF    RBC Urine 25-50 (A) 0-2 /HPF /HPF    WBC Urine 25-50 (A) 0-5 /HPF /HPF    Squamous Epithelials Urine Few (A) None Seen /LPF      No EKG this visit, completed in the last 90 days.    Revised Cardiac Risk Index (RCRI):  The patient has the following serious cardiovascular risks for perioperative complications:   - No serious cardiac risks = 0 points     RCRI Interpretation: 0 points: Class I (very low risk - 0.4% complication rate)         Signed Electronically by: LORELEI Clay CNP  Copy of this evaluation report is provided to requesting physician.      Answers submitted by the patient for this visit:  Patient Health Questionnaire (Submitted on 12/10/2023)  If you checked off any problems, how difficult have these problems made it for you to do your work, take care of things at home, or get  along with other people?: Not difficult at all  PHQ9 TOTAL SCORE: 1

## 2023-12-11 NOTE — PATIENT INSTRUCTIONS
Preparing for Your Surgery  Getting started  A nurse will call you to review your health history and instructions. They will give you an arrival time based on your scheduled surgery time. Please be ready to share:  Your doctor's clinic name and phone number  Your medical, surgical, and anesthesia history  A list of allergies and sensitivities  A list of medicines, including herbal treatments and over-the-counter drugs  Whether the patient has a legal guardian (ask how to send us the papers in advance)  Please tell us if you're pregnant--or if there's any chance you might be pregnant. Some surgeries may injure a fetus (unborn baby), so they require a pregnancy test. Surgeries that are safe for a fetus don't always need a test, and you can choose whether to have one.   If you have a child who's having surgery, please ask for a copy of Preparing for Your Child's Surgery.    Preparing for surgery  Within 10 to 30 days of surgery: Have a pre-op exam (sometimes called an H&P, or History and Physical). This can be done at a clinic or pre-operative center.  If you're having a , you may not need this exam. Talk to your care team.  At your pre-op exam, talk to your care team about all medicines you take. If you need to stop any medicines before surgery, ask when to start taking them again.  We do this for your safety. Many medicines can make you bleed too much during surgery. Some change how well surgery (anesthesia) drugs work.  Call your insurance company to let them know you're having surgery. (If you don't have insurance, call [phone].)  Call your clinic if there's any change in your health. This includes signs of a cold or flu (sore throat, runny nose, cough, rash, fever). It also includes a scrape or scratch near the surgery site.  If you have questions on the day of surgery, call your hospital or surgery center.  Eating and drinking guidelines  For your safety: Unless your surgeon tells you otherwise,  follow the guidelines below.  Eat and drink as usual until 8 hours before you arrive for surgery. After that, no food or milk.  Drink clear liquids until 2 hours before you arrive. These are liquids you can see through, like water, Gatorade, and Propel Water. They also include plain black coffee and tea (no cream or milk), candy, and breath mints. You can spit out gum when you arrive.  If you drink alcohol: Stop drinking it the night before surgery.  If your care team tells you to take medicine on the morning of surgery, it's okay to take it with a sip of water.  Preventing infection  Shower or bathe the night before and morning of your surgery. Follow the instructions your clinic gave you. (If no instructions, use regular soap.)  Don't shave or clip hair near your surgery site. We'll remove the hair if needed.  Don't smoke or vape the morning of surgery. You may chew nicotine gum up to 2 hours before surgery. A nicotine patch is okay.  Note: Some surgeries require you to completely quit smoking and nicotine. Check with your surgeon.  Your care team will make every effort to keep you safe from infection. We will:  Clean our hands often with soap and water (or an alcohol-based hand rub).  Clean the skin at your surgery site with a special soap that kills germs.  Give you a special gown to keep you warm. (Cold raises the risk of infection.)  Wear special hair covers, masks, gowns and gloves during surgery.  Give antibiotic medicine, if prescribed. Not all surgeries need antibiotics.  What to bring on the day of surgery  Photo ID and insurance card  Copy of your health care directive, if you have one  Glasses and hearing aids (bring cases)  You can't wear contacts during surgery  Inhaler and eye drops, if you use them (tell us about these when you arrive)  CPAP machine or breathing device, if you use them  A few personal items, if spending the night  If you have . . .  A pacemaker, ICD (cardiac defibrillator) or other  implant: Bring the ID card.  An implanted stimulator: Bring the remote control.  A legal guardian: Bring a copy of the certified (court-stamped) guardianship papers.  Please remove any jewelry, including body piercings. Leave jewelry and other valuables at home.  If you're going home the day of surgery  You must have a responsible adult drive you home. They should stay with you overnight as well.  If you don't have someone to stay with you, and you aren't safe to go home alone, we may keep you overnight. Insurance often won't pay for this.  After surgery  If it's hard to control your pain or you need more pain medicine, please call your surgeon's office.  Questions?   If you have any questions for your care team, list them here: _________________________________________________________________________________________________________________________________________________________________________ ____________________________________ ____________________________________ ____________________________________  For informational purposes only. Not to replace the advice of your health care provider. Copyright   2003, 2019 [employer]. All rights reserved. Clinically reviewed by Laura Robin MD. [employer] [id number] - REV 12/22.    How to Take Your Medication Before Surgery  - HOLD (do not take) ELIQUIS the night before surgery

## 2023-12-12 LAB
ANION GAP SERPL CALCULATED.3IONS-SCNC: 9 MMOL/L (ref 7–15)
BACTERIA UR CULT: ABNORMAL
BUN SERPL-MCNC: 26.5 MG/DL (ref 8–23)
CALCIUM SERPL-MCNC: 9.3 MG/DL (ref 8.8–10.2)
CHLORIDE SERPL-SCNC: 108 MMOL/L (ref 98–107)
CREAT SERPL-MCNC: 1.12 MG/DL (ref 0.67–1.17)
DEPRECATED HCO3 PLAS-SCNC: 21 MMOL/L (ref 22–29)
EGFRCR SERPLBLD CKD-EPI 2021: 64 ML/MIN/1.73M2
GLUCOSE SERPL-MCNC: 134 MG/DL (ref 70–99)
POTASSIUM SERPL-SCNC: 4.5 MMOL/L (ref 3.4–5.3)
PSA SERPL DL<=0.01 NG/ML-MCNC: 0.03 NG/ML
SODIUM SERPL-SCNC: 138 MMOL/L (ref 135–145)

## 2023-12-13 RX ORDER — CIPROFLOXACIN 250 MG/1
250 TABLET, FILM COATED ORAL 2 TIMES DAILY
Qty: 14 TABLET | Refills: 0 | Status: SHIPPED | OUTPATIENT
Start: 2023-12-13 | End: 2024-01-22

## 2023-12-27 ENCOUNTER — OFFICE VISIT (OUTPATIENT)
Dept: UROLOGY | Facility: CLINIC | Age: 88
End: 2023-12-27
Payer: COMMERCIAL

## 2023-12-27 VITALS
SYSTOLIC BLOOD PRESSURE: 128 MMHG | HEIGHT: 68 IN | WEIGHT: 234 LBS | DIASTOLIC BLOOD PRESSURE: 82 MMHG | BODY MASS INDEX: 35.46 KG/M2

## 2023-12-27 DIAGNOSIS — C61 PROSTATE CANCER (H): ICD-10-CM

## 2023-12-27 DIAGNOSIS — D49.4 BLADDER TUMOR: Primary | ICD-10-CM

## 2023-12-27 DIAGNOSIS — Z79.2 PROPHYLACTIC ANTIBIOTIC: ICD-10-CM

## 2023-12-27 LAB
ALBUMIN UR-MCNC: NEGATIVE MG/DL
APPEARANCE UR: CLEAR
BILIRUB UR QL STRIP: NEGATIVE
COLOR UR AUTO: YELLOW
GLUCOSE UR STRIP-MCNC: NEGATIVE MG/DL
HGB UR QL STRIP: NEGATIVE
KETONES UR STRIP-MCNC: NEGATIVE MG/DL
LEUKOCYTE ESTERASE UR QL STRIP: NEGATIVE
NITRATE UR QL: NEGATIVE
PH UR STRIP: 5.5 [PH] (ref 5–7)
SP GR UR STRIP: 1.02 (ref 1–1.03)
UROBILINOGEN UR STRIP-ACNC: 0.2 E.U./DL

## 2023-12-27 PROCEDURE — 81003 URINALYSIS AUTO W/O SCOPE: CPT | Mod: QW | Performed by: UROLOGY

## 2023-12-27 PROCEDURE — 88112 CYTOPATH CELL ENHANCE TECH: CPT

## 2023-12-27 PROCEDURE — 99212 OFFICE O/P EST SF 10 MIN: CPT | Mod: 25 | Performed by: UROLOGY

## 2023-12-27 PROCEDURE — 52000 CYSTOURETHROSCOPY: CPT | Performed by: UROLOGY

## 2023-12-27 RX ORDER — CIPROFLOXACIN 500 MG/1
500 TABLET, FILM COATED ORAL ONCE
Qty: 1 TABLET | Refills: 0 | Status: SHIPPED | OUTPATIENT
Start: 2023-12-27 | End: 2023-12-27

## 2023-12-27 RX ORDER — LIDOCAINE HYDROCHLORIDE 20 MG/ML
JELLY TOPICAL ONCE
Status: COMPLETED | OUTPATIENT
Start: 2023-12-27 | End: 2023-12-27

## 2023-12-27 RX ADMIN — LIDOCAINE HYDROCHLORIDE 5 ML: 20 JELLY TOPICAL at 10:42

## 2023-12-27 ASSESSMENT — PAIN SCALES - GENERAL: PAINLEVEL: NO PAIN (0)

## 2023-12-27 NOTE — NURSING NOTE
Chief Complaint   Patient presents with    Bladder tumor     Pt here for in office cystoscopy        Prior to the start of the procedure and with procedural staff participation, I verbally confirmed the patient s identity using two indicators, relevant allergies, that the procedure was appropriate and matched the consent or emergent situation, and that the correct equipment/implants were available. Immediately prior to starting the procedure I conducted the Time Out with the procedural staff and re-confirmed the patient s name, procedure, and site/side. I have wiped the patient off with the povidone-Iodine solution, draped them,  used Lidocaine hydrochloride jelly, and instilled sterile water into the bladder. (The Joint Commission universal protocol was followed.)  Yes    Sedation (Moderate or Deep): None     5mL 2% lidocaine hydrochloride Urojet instilled into urethra.    NDC# [address]-1  Lot #: [id number]  Expiration Date:  04/25    Radha Alexander CMA

## 2023-12-27 NOTE — PROGRESS NOTES
Office Visit Note- Bladder Cancer Follow up  [facility]    [phone]     There were no vitals taken for this visit.    Bladder Cancer Diagnosis: Low-grade Ta  History of radiotherapy for prostate cancer with previous hormonal therapy  Urethral meatal stricture    Past Interventions: TURBT x 1    Most Recent Pathology: January 2023    Most Recent Upper Tract Imaging: October 2022 CT scan    Most Recent FISH/Cytology: March 2023    History:   Spencer returns to clinic today for follow-up on both bladder cancer and prostate cancer.  His PSA is unchanged at 0.03.    Cystoscopy:   I performed flexible cystoscopy today and there were radiation changes to the bladder neck but otherwise the bladder was normal throughout.  No tumors in the bladder.    Impression:   Doing well, no evidence of recurrence    Plan:   He is doing well with no evidence of bladder cancer recurrence.  His PSA is stable as well.  I recommended he see me again in 1 year for his next PSA and next screening cystoscopy.      Mark Pino M.D.

## 2023-12-27 NOTE — LETTER
12/27/2023       RE: Nixon More  [address] [place] [address] [address]  [facility] [address]     Dear Colleague,    Thank you for referring your patient, Nixon More, to the [facility] [place] at [facility]. Please see a copy of my visit note below.    Office Visit Note- Bladder Cancer Follow up  [facility]    [phone]     There were no vitals taken for this visit.    Bladder Cancer Diagnosis: Low-grade Ta  History of radiotherapy for prostate cancer with previous hormonal therapy  Urethral meatal stricture    Past Interventions: TURBT x 1    Most Recent Pathology: January 2023    Most Recent Upper Tract Imaging: October 2022 CT scan    Most Recent FISH/Cytology: March 2023    History:   Spencer returns to clinic today for follow-up on both bladder cancer and prostate cancer.  His PSA is unchanged at 0.03.    Cystoscopy:   I performed flexible cystoscopy today and there were radiation changes to the bladder neck but otherwise the bladder was normal throughout.  No tumors in the bladder.    Impression:   Doing well, no evidence of recurrence    Plan:   He is doing well with no evidence of bladder cancer recurrence.  His PSA is stable as well.  I recommended he see me again in 1 year for his next PSA and next screening cystoscopy.      Mark Pino M.D.

## 2023-12-27 NOTE — PATIENT INSTRUCTIONS

## 2023-12-29 LAB
PATH REPORT.COMMENTS IMP SPEC: ABNORMAL
PATH REPORT.COMMENTS IMP SPEC: YES
PATH REPORT.FINAL DX SPEC: ABNORMAL
PATH REPORT.GROSS SPEC: ABNORMAL
PATH REPORT.MICROSCOPIC SPEC OTHER STN: ABNORMAL
PATH REPORT.RELEVANT HX SPEC: ABNORMAL

## 2023-12-31 DIAGNOSIS — I10 HYPERTENSION GOAL BP (BLOOD PRESSURE) < 140/90: ICD-10-CM

## 2023-12-31 DIAGNOSIS — E78.5 DYSLIPIDEMIA: ICD-10-CM

## 2024-01-02 ENCOUNTER — TRANSFERRED RECORDS (OUTPATIENT)
Dept: HEALTH INFORMATION MANAGEMENT | Facility: CLINIC | Age: 89
End: 2024-01-02
Payer: COMMERCIAL

## 2024-01-02 RX ORDER — LOSARTAN POTASSIUM 100 MG/1
TABLET ORAL
Qty: 90 TABLET | Refills: 2 | Status: SHIPPED | OUTPATIENT
Start: 2024-01-02 | End: 2024-07-23

## 2024-01-02 RX ORDER — ATORVASTATIN CALCIUM 40 MG/1
TABLET, FILM COATED ORAL
Qty: 90 TABLET | Refills: 0 | Status: SHIPPED | OUTPATIENT
Start: 2024-01-02 | End: 2024-04-22

## 2024-01-05 ENCOUNTER — OFFICE VISIT (OUTPATIENT)
Dept: URGENT CARE | Facility: URGENT CARE | Age: 89
End: 2024-01-05
Payer: COMMERCIAL

## 2024-01-05 ENCOUNTER — NURSE TRIAGE (OUTPATIENT)
Dept: PEDIATRICS | Facility: CLINIC | Age: 89
End: 2024-01-05
Payer: COMMERCIAL

## 2024-01-05 VITALS
WEIGHT: 233.9 LBS | TEMPERATURE: 97.8 F | RESPIRATION RATE: 16 BRPM | SYSTOLIC BLOOD PRESSURE: 129 MMHG | HEART RATE: 110 BPM | OXYGEN SATURATION: 97 % | DIASTOLIC BLOOD PRESSURE: 87 MMHG | BODY MASS INDEX: 36.09 KG/M2

## 2024-01-05 DIAGNOSIS — R31.0 GROSS HEMATURIA: Primary | ICD-10-CM

## 2024-01-05 LAB
ALBUMIN UR-MCNC: NEGATIVE MG/DL
APPEARANCE UR: CLEAR
BACTERIA #/AREA URNS HPF: ABNORMAL /HPF
BILIRUB UR QL STRIP: NEGATIVE
COLOR UR AUTO: YELLOW
GLUCOSE UR STRIP-MCNC: NEGATIVE MG/DL
HGB UR QL STRIP: ABNORMAL
KETONES UR STRIP-MCNC: NEGATIVE MG/DL
LEUKOCYTE ESTERASE UR QL STRIP: NEGATIVE
NITRATE UR QL: NEGATIVE
PH UR STRIP: 5.5 [PH] (ref 5–7)
RBC #/AREA URNS AUTO: ABNORMAL /HPF
SP GR UR STRIP: 1.02 (ref 1–1.03)
SQUAMOUS #/AREA URNS AUTO: ABNORMAL /LPF
UROBILINOGEN UR STRIP-ACNC: 0.2 E.U./DL
WBC #/AREA URNS AUTO: ABNORMAL /HPF

## 2024-01-05 PROCEDURE — 99214 OFFICE O/P EST MOD 30 MIN: CPT | Performed by: FAMILY MEDICINE

## 2024-01-05 PROCEDURE — 81001 URINALYSIS AUTO W/SCOPE: CPT | Performed by: FAMILY MEDICINE

## 2024-01-05 NOTE — TELEPHONE ENCOUNTER
Called and spoke to patient. Advised of provider message. Patient is agreeable to go to UC and will head there now.  Rupinder ARTEAGA RN, BSN

## 2024-01-05 NOTE — TELEPHONE ENCOUNTER
Today blood in urine    Cystoscopy on Dec.27th- everything looked good    Uti (Cipro) a week or two before - Dec.11th  [place] through Cipro, blood in urine stopped    Nurse Triage SBAR    Is this a 2nd Level Triage? YES, LICENSED PRACTITIONER REVIEW IS REQUIRED    Situation: hematuria    Background: Pt takes Eliquis daily. Pt has history of tumor in bladder and Urethral meatal stricture . Pt had blood in his urine initially starting beginning of December and was prescribed Cipro for UTI at visit for pre-op exam on Dec.11th. [place] through course of Cipro, hematuria resolved. Pt had cystoscopy on Dec.27th with Dr. Pino and was told everything looked good. Pt has not had blood in urine again until today when he had visualized blood on two separate urinations.     Assessment: One dark red large blood clot measuring approximately a half inch long by an eighth inch wide at start of urine stream. Pt then had one more smaller clot following the large clot and then two very small clots (total of 4). He is also experiencing increased frequency of urination.  Denies back/flank pain, abdomen pain, vomiting, pain with urination, fever    Protocol Recommended Disposition:   Go to ED Now    Recommendation:   Advised he should be seen today.    ED or UC?   Routing to PCP to review and advise.  Zenaida HOOVER RN       Routed to provider    Does the patient meet one of the following criteria for ADS visit consideration? 16+ years old, with an [facility] PCP     TIP  Providers, please consider if this condition is appropriate for management at one of our Acute and Diagnostic Services sites.     If patient is a good candidate, please use dotphrase <dot>triageresponse and select Refer to ADS to document.    Reason for Disposition   Passing pure blood or large blood clots (i.e., larger than a dime or grape)    Additional Information   Negative: Shock suspected (e.g., cold/pale/clammy skin, too weak to stand, low BP, rapid pulse)    "Negative: Sounds like a life-threatening emergency to the triager   Negative: Urinary catheter, questions about   Negative: Recent back or abdominal injury   Negative: Recent genital injury   Negative: Unable to urinate (or only a few drops) > 4 hours and bladder feels very full (e.g., palpable bladder or strong urge to urinate)   Negative: Diffuse (all over) muscle pains in the shoulders, arms, legs, and back and dark (cola or tea-colored) or red-colored urine    Answer Assessment - Initial Assessment Questions  1. COLOR of URINE: \"Describe the color of the urine.\"  (e.g., tea-colored, pink, red, bloody) \"Do you have blood clots in your urine?\" (e.g., none, pea, grape, small coin)      Dark red Blood clot at the start of urine (half inch long by eighth inch wide)  One large clot and one smaller one and 2 very small ones  2. ONSET: \"When did the bleeding start?\"       Today, this morning  3. EPISODES: \"How many times has there been blood in the urine?\" or \"How many times today?\"      twice  4. PAIN with URINATION: \"Is there any pain with passing your urine?\" If Yes, ask: \"How bad is the pain?\"  (Scale 1-10; or mild, moderate, severe)     - MILD: Complains slightly about urination hurting.     - MODERATE: Interferes with normal activities.       - SEVERE: Excruciating, unwilling or unable to urinate because of the pain.       no  5. FEVER: \"Do you have a fever?\" If Yes, ask: \"What is your temperature, how was it measured, and when did it start?\"      no  6. ASSOCIATED SYMPTOMS: \"Are you passing urine more frequently than usual?\"      Yes, increased frequency  7. OTHER SYMPTOMS: \"Do you have any other symptoms?\" (e.g., back/flank pain, abdomen pain, vomiting)      Denies back/flank pain, abdomen pain, vomiting, pain with urination  Left hip pain-ongoing issue (since had a fall in the fall)  8. PREGNANCY: \"Is there any chance you are pregnant?\" \"When was your last menstrual period?\"      N/a    Protocols used: Urine - " Blood In-A-OH

## 2024-01-05 NOTE — PROGRESS NOTES
SUBJECTIVE:   Nixon More is a 88 year old male who is on Eliquis and who has a history of a tumor of the urinary bladder, prostate cancer, and a stricture at the urethral meatus.  He is presenting with a chief complaint of producing blood clots in the urine today at noon.  At the beginning of the urine stream around noon today, patient noticed one large dark-red blood clot (about 0.5 inches x 1/8 inch).  Next, a smaller clot followed by a large clot and then two very small clots appeared .     No incomplete voiding.   No genital pain. No kidney pain.     No visible clots in the urine since noon today.  The urine has been clear since noon today.      No lightheadedness.  No kidney pain/vomiting/nausea/fevers.      Patient had undergone a December 27, 2023, flexible cystoscopy.  The following findings were found:  there were radiation changes to the bladder neck but otherwise the bladder was normal throughout.  No tumors were seen in the bladder.  No bladder cancer recurrence was seen.  Patient's PSA was also stable.        Past Medical History:   Diagnosis Date    AAA (abdominal aortic aneurysm) (H24)     Actinic keratosis     Angina pectoris (H24) 10/27/2020    Antiplatelet or antithrombotic long-term use     Eliquis    Arthritis     Atrial fibrillation and flutter (H) 12/03/2018    CAD (coronary artery disease)     1/5/2011 lateral ischemia on stress echo, 12/2014 normal stress echo    Coronary artery disease 01/01/2011    Abnormal stress test 1/2011 and controlled with medical mgmt     Depressive disorder     Mild    Diarrhea     Urgency at times    Dyslipidemia     Emphysema of lung (H)     Essential hypertension, benign     Hernia, abdominal     Hypersomnia with sleep apnea, unspecified     on bipap, partially treated with residual apneas    Hypertrophy (benign) of prostate 05/2001    Biopsy 5/01 negative for cancer; PSA 5    Lumbago     Mumps     Prostate cancer (H) 12/15/2006    Renal disease     Skin  "cancer, basal cell     Sleep apnea     Uses a Bi-pap for centralized Apnea    Spider veins      Current Outpatient Medications   Medication Sig Dispense Refill    ASPIRIN NOT PRESCRIBED (INTENTIONAL) continuous prn for other Antiplatelet medication not prescribed intentionally due to Current anticoagulant therapy (warfarin/enoxaparin)      atorvastatin (LIPITOR) 40 MG tablet TAKE 1 TABLET BY MOUTH EVERY DAY 90 tablet 0    Cholecalciferol (VITAMIN D-3) 25 MCG (1000 UT) CAPS Take by mouth daily      ELIQUIS ANTICOAGULANT 5 MG tablet TAKE 1 TABLET BY MOUTH TWICE A  tablet 3    escitalopram (LEXAPRO) 10 MG tablet Take 1 tablet (10 mg) by mouth daily 90 tablet 3    Loperamide HCl (IMODIUM OR) Take by mouth daily as needed      losartan (COZAAR) 100 MG tablet TAKE 1 TABLET BY MOUTH EVERY DAY 90 tablet 2    metoprolol succinate ER (TOPROL XL) 50 MG 24 hr tablet Take 2 tablets (100 mg) by mouth daily 180 tablet 4    RISEdronate (ACTONEL) 35 MG tablet TAKE 1 TABLET (35 MG) BY MOUTH EVERY 7 DAYS 12 tablet 1    spironolactone (ALDACTONE) 25 MG tablet Take 0.5 tablets (12.5 mg) by mouth daily 45 tablet 2     Social History     Tobacco Use    Smoking status: Former     Packs/day: 1.00     Years: 30.00     Additional pack years: 0.00     Total pack years: 30.00     Types: Cigarettes     Start date: 1948     Quit date: 1978     Years since quittin.0     Passive exposure: Never    Smokeless tobacco: Never   Substance Use Topics    Alcohol use: Not Currently     Comment: occ \"like once a month\" or less       ROS:  CONSTITUTIONAL:negative for fever.   :  positive for recent passing of blood clots in the urine today at noon.     OBJECTIVE:  /87   Pulse 110   Temp 97.8  F (36.6  C)   Resp 16   Wt 106.1 kg (233 lb 14.4 oz)   SpO2 97%   BMI 36.09 kg/m    GENERAL APPEARANCE: healthy, alert and no distress. He is not in pain and is sitting comfortably.   BACK:  No costovertebral angle pain with " percussion.      LAB:    Results for orders placed or performed in visit on 01/05/24   UA Macroscopic with reflex to Microscopic and Culture - Clinic Collect     Status: Abnormal    Specimen: Urine, Clean Catch   Result Value Ref Range    Color Urine Yellow Colorless, Straw, Light Yellow, Yellow    Appearance Urine Clear Clear    Glucose Urine Negative Negative mg/dL    Bilirubin Urine Negative Negative    Ketones Urine Negative Negative mg/dL    Specific Gravity Urine 1.020 1.003 - 1.035    Blood Urine Large (A) Negative    pH Urine 5.5 5.0 - 7.0    Protein Albumin Urine Negative Negative mg/dL    Urobilinogen Urine 0.2 0.2, 1.0 E.U./dL    Nitrite Urine Negative Negative    Leukocyte Esterase Urine Negative Negative   UA Microscopic with Reflex to Culture     Status: Abnormal   Result Value Ref Range    Bacteria Urine None Seen None Seen /HPF    RBC Urine 10-25 (A) 0-2 /HPF /HPF    WBC Urine 0-5 0-5 /HPF /HPF    Squamous Epithelials Urine Few (A) None Seen /LPF    Narrative    Urine Culture not indicated         ASSESSMENT:  Gross Hematuria, now improving.  Patient is on Eliquis and has a history of urinary bladder tumor.  No blood clots and discolored urine when urinating after 12 pm today.  Patient had undergone a flexible cystoscopy on December 27, 2023, and the clots may have been from that procedure.  The December 27, 2023, flexible cystoscopy found no recurrence of the bladder cancer, so the blood clots are not due to a tumor in the bladder.      Patient has no symptoms of urinary obstruction.      PLAN:  Go to the emergency room if you can't fully empty your urinary bladder or if you notice a lot more blood clots this weekend.      Otherwise, follow up with your urologist next week.    Herber Tejeda MD

## 2024-01-05 NOTE — PATIENT INSTRUCTIONS
Go to the emergency room if you can't fully empty your urinary bladder or if you notice a lot more blood clots this weekend.      Otherwise, follow up with your urologist next week.

## 2024-01-05 NOTE — TELEPHONE ENCOUNTER
Is fine to go to UC instead of ED.  They can do UA and labs.    Natalya Lewis MD     Placed call to patient.   Advised of Dr. Solis's response.   Patient states she would like to have Dr. Pleitez provide a more definite answer and would like to change appointment to see PCP rather than Dr. Solis.     Changed appointment for patient. Used Dr. Pleitez same day slot. Needs answers for fertility clinic for messages below.     Fwd to Dr. Pleitez please see below and advise

## 2024-01-22 ENCOUNTER — OFFICE VISIT (OUTPATIENT)
Dept: URGENT CARE | Facility: URGENT CARE | Age: 89
End: 2024-01-22
Payer: COMMERCIAL

## 2024-01-22 ENCOUNTER — OFFICE VISIT (OUTPATIENT)
Dept: PEDIATRICS | Facility: CLINIC | Age: 89
End: 2024-01-22
Attending: PHYSICIAN ASSISTANT
Payer: COMMERCIAL

## 2024-01-22 ENCOUNTER — ANCILLARY PROCEDURE (OUTPATIENT)
Dept: ULTRASOUND IMAGING | Facility: CLINIC | Age: 89
End: 2024-01-22
Attending: INTERNAL MEDICINE
Payer: COMMERCIAL

## 2024-01-22 VITALS
SYSTOLIC BLOOD PRESSURE: 104 MMHG | TEMPERATURE: 97.2 F | RESPIRATION RATE: 20 BRPM | HEART RATE: 66 BPM | WEIGHT: 236.2 LBS | OXYGEN SATURATION: 95 % | HEIGHT: 68 IN | BODY MASS INDEX: 35.8 KG/M2 | DIASTOLIC BLOOD PRESSURE: 64 MMHG

## 2024-01-22 VITALS
TEMPERATURE: 98.4 F | HEART RATE: 121 BPM | BODY MASS INDEX: 36.22 KG/M2 | DIASTOLIC BLOOD PRESSURE: 88 MMHG | SYSTOLIC BLOOD PRESSURE: 121 MMHG | OXYGEN SATURATION: 96 % | WEIGHT: 234.7 LBS

## 2024-01-22 DIAGNOSIS — N50.812 LEFT TESTICULAR PAIN: ICD-10-CM

## 2024-01-22 DIAGNOSIS — R31.9 URINARY TRACT INFECTION WITH HEMATURIA, SITE UNSPECIFIED: ICD-10-CM

## 2024-01-22 DIAGNOSIS — N45.1 ACUTE EPIDIDYMITIS: ICD-10-CM

## 2024-01-22 DIAGNOSIS — N50.812 LEFT TESTICULAR PAIN: Primary | ICD-10-CM

## 2024-01-22 DIAGNOSIS — N50.82 ACUTE PAIN IN SCROTUM: Primary | ICD-10-CM

## 2024-01-22 DIAGNOSIS — N39.0 URINARY TRACT INFECTION WITH HEMATURIA, SITE UNSPECIFIED: ICD-10-CM

## 2024-01-22 DIAGNOSIS — N30.01 ACUTE CYSTITIS WITH HEMATURIA: ICD-10-CM

## 2024-01-22 LAB
ALBUMIN UR-MCNC: ABNORMAL MG/DL
ANION GAP SERPL CALCULATED.3IONS-SCNC: 10 MMOL/L (ref 3–14)
APPEARANCE UR: CLEAR
BACTERIA #/AREA URNS HPF: ABNORMAL /HPF
BASOPHILS # BLD AUTO: 0 10E3/UL (ref 0–0.2)
BASOPHILS NFR BLD AUTO: 0 %
BILIRUB UR QL STRIP: NEGATIVE
BUN SERPL-MCNC: 20 MG/DL (ref 7–30)
CALCIUM SERPL-MCNC: 10 MG/DL (ref 8.5–10.1)
CHLORIDE BLD-SCNC: 105 MMOL/L (ref 94–109)
CO2 SERPL-SCNC: 29 MMOL/L (ref 20–32)
COLOR UR AUTO: YELLOW
CREAT SERPL-MCNC: 1.1 MG/DL (ref 0.66–1.25)
EGFRCR SERPLBLD CKD-EPI 2021: 65 ML/MIN/1.73M2
EOSINOPHIL # BLD AUTO: 0.2 10E3/UL (ref 0–0.7)
EOSINOPHIL NFR BLD AUTO: 3 %
ERYTHROCYTE [DISTWIDTH] IN BLOOD BY AUTOMATED COUNT: 14.4 % (ref 10–15)
GLUCOSE BLD-MCNC: 118 MG/DL (ref 70–99)
GLUCOSE UR STRIP-MCNC: NEGATIVE MG/DL
HCT VFR BLD AUTO: 44 % (ref 40–53)
HGB BLD-MCNC: 13.5 G/DL (ref 13.3–17.7)
HGB UR QL STRIP: ABNORMAL
IMM GRANULOCYTES # BLD: 0 10E3/UL
IMM GRANULOCYTES NFR BLD: 0 %
KETONES UR STRIP-MCNC: NEGATIVE MG/DL
LEUKOCYTE ESTERASE UR QL STRIP: ABNORMAL
LYMPHOCYTES # BLD AUTO: 1.2 10E3/UL (ref 0.8–5.3)
LYMPHOCYTES NFR BLD AUTO: 13 %
MCH RBC QN AUTO: 29.1 PG (ref 26.5–33)
MCHC RBC AUTO-ENTMCNC: 30.7 G/DL (ref 31.5–36.5)
MCV RBC AUTO: 95 FL (ref 78–100)
MONOCYTES # BLD AUTO: 0.8 10E3/UL (ref 0–1.3)
MONOCYTES NFR BLD AUTO: 8 %
NEUTROPHILS # BLD AUTO: 7.1 10E3/UL (ref 1.6–8.3)
NEUTROPHILS NFR BLD AUTO: 76 %
NITRATE UR QL: POSITIVE
PH UR STRIP: 5.5 [PH] (ref 5–7)
PLATELET # BLD AUTO: 225 10E3/UL (ref 150–450)
POTASSIUM BLD-SCNC: 4.9 MMOL/L (ref 3.4–5.3)
RBC # BLD AUTO: 4.64 10E6/UL (ref 4.4–5.9)
RBC #/AREA URNS AUTO: ABNORMAL /HPF
SODIUM SERPL-SCNC: 144 MMOL/L (ref 135–145)
SP GR UR STRIP: 1.02 (ref 1–1.03)
SQUAMOUS #/AREA URNS AUTO: ABNORMAL /LPF
UROBILINOGEN UR STRIP-ACNC: 0.2 E.U./DL
WBC # BLD AUTO: 9.3 10E3/UL (ref 4–11)
WBC #/AREA URNS AUTO: >100 /HPF

## 2024-01-22 PROCEDURE — 80048 BASIC METABOLIC PNL TOTAL CA: CPT | Performed by: PHYSICIAN ASSISTANT

## 2024-01-22 PROCEDURE — 87086 URINE CULTURE/COLONY COUNT: CPT | Performed by: PHYSICIAN ASSISTANT

## 2024-01-22 PROCEDURE — 93976 VASCULAR STUDY: CPT

## 2024-01-22 PROCEDURE — 36415 COLL VENOUS BLD VENIPUNCTURE: CPT | Performed by: PHYSICIAN ASSISTANT

## 2024-01-22 PROCEDURE — 85025 COMPLETE CBC W/AUTO DIFF WBC: CPT | Performed by: PHYSICIAN ASSISTANT

## 2024-01-22 PROCEDURE — 99214 OFFICE O/P EST MOD 30 MIN: CPT | Performed by: INTERNAL MEDICINE

## 2024-01-22 PROCEDURE — 87186 SC STD MICRODIL/AGAR DIL: CPT | Performed by: PHYSICIAN ASSISTANT

## 2024-01-22 PROCEDURE — 81001 URINALYSIS AUTO W/SCOPE: CPT | Performed by: PHYSICIAN ASSISTANT

## 2024-01-22 PROCEDURE — 76870 US EXAM SCROTUM: CPT

## 2024-01-22 PROCEDURE — 99207 REFERRAL TO ACUTE AND DIAGNOSTIC SERVICES: CPT | Performed by: PHYSICIAN ASSISTANT

## 2024-01-22 RX ORDER — CIPROFLOXACIN 250 MG/1
250 TABLET, FILM COATED ORAL 2 TIMES DAILY
Qty: 14 TABLET | Refills: 0 | Status: SHIPPED | OUTPATIENT
Start: 2024-01-22 | End: 2024-01-22

## 2024-01-22 RX ORDER — LEVOFLOXACIN 500 MG/1
500 TABLET, FILM COATED ORAL DAILY
Qty: 10 TABLET | Refills: 0 | Status: SHIPPED | OUTPATIENT
Start: 2024-01-22 | End: 2024-02-22

## 2024-01-22 ASSESSMENT — PAIN SCALES - GENERAL: PAINLEVEL: NO PAIN (0)

## 2024-01-22 ASSESSMENT — ENCOUNTER SYMPTOMS
FREQUENCY: 0
DYSURIA: 0

## 2024-01-22 NOTE — PROGRESS NOTES
Marleni Palmer is a 88 year old, presenting for the following health issues:  Urinary Problem (Patient seen at [facility].   Patient needs US of the left testicle. )    HPI     Genitourinary - Male  Onset/Duration: 1/21/2024  Description:   Dysuria (painful urination): No}  Hematuria (blood in urine): No  Frequency: YES  Waking at night to urinate: YES  Hesitancy (delay in urine): YES  Retention (unable to empty): No  Decrease in urinary flow: YES  Incontinence: YES  Progression of Symptoms:  worsening and intermittent  Accompanying Signs & Symptoms:  Fever: No  Back/Flank pain: No  Urethral discharge: No  Testicle lumps/masses/pain: YES  Nausea and/or vomiting: No vomiting but had two episodes of dry heaves.    Abdominal pain: No  History:   History of frequent UTI s: No  History of kidney stones: No  History of hernias: YES 40 to 50 plus years  Personal or Family history of Prostate problems: YES  Sexually active: No  Precipitating or alleviating factors: None  Therapies tried and outcome: none      Pain in left testicle   Nixon More did have a urinary tract infection 2 months ago treated with ciprofloxacin.  He has not had any urinary symptoms, but notes increased frequency and cloudy urine x 1-2 weeks.  Notes that frequency of urination has increased to once every 1-2 hours.  No pain or blood with urination.  Did have trace blood in urine 2-3 weeks ago.  Notes urinary leakage at times which is not a new concern.  He does use a urinary absorbing pad which may be a source of infection.    Notes that left testicular pain started yesterday AM.  He was going over to his daughters and notes that at [facility] he had swelling and soreness.  Symptoms came on gradually.  Maximum pain 3/10.  Pain was more acute yesterday than it was today.  No longer having any pain unless palpating the testicle.    He has known prostate cancer status post radiation.      Past Medical History:   Diagnosis Date    AAA (abdominal aortic  aneurysm) (H24)     Actinic keratosis     Angina pectoris (H24) 10/27/2020    Antiplatelet or antithrombotic long-term use     Eliquis    Arthritis     Atrial fibrillation and flutter (H) 12/03/2018    CAD (coronary artery disease)     1/5/2011 lateral ischemia on stress echo, 12/2014 normal stress echo    Coronary artery disease 01/01/2011    Abnormal stress test 1/2011 and controlled with medical mgmt     Depressive disorder     Mild    Diarrhea     Urgency at times    Dyslipidemia     Emphysema of lung (H)     Essential hypertension, benign     Hernia, abdominal     Hypersomnia with sleep apnea, unspecified     on bipap, partially treated with residual apneas    Hypertrophy (benign) of prostate 05/2001    Biopsy 5/01 negative for cancer; PSA 5    Lumbago     Mumps     Prostate cancer (H) 12/15/2006    Renal disease     Skin cancer, basal cell 1997    Sleep apnea     Uses a Bi-pap for centralized Apnea    Spider veins      Patient Active Problem List   Diagnosis    Personal history of other malignant neoplasm of skin    Lumbago    Hypertrophy of prostate without urinary obstruction    Other emphysema (H)    Hypersomnia with sleep apnea    Malignant neoplasm of prostate    Impotence of organic origin    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, lower limb    CARDIOVASCULAR SCREENING; LDL GOAL LESS THAN 100    Vitamin D deficiency    Branch retinal vein occlusion (H28)    Bee sting-induced anaphylaxis    Advance Care Planning    Radiation proctitis    Coronary artery disease    Impaired fasting glucose    Health Care Home    Dyslipidemia    Benign essential hypertension    Spinal stenosis of lumbar region with neurogenic claudication    Dysthymia    Atrial fibrillation and flutter (H)    Dilated aortic root (H24)    Generalized muscle weakness    Class 2 severe obesity due to excess calories with serious comorbidity in adult (H)    CKD (chronic kidney disease) stage 3, GFR 30-59 ml/min (H)    Mild major depression (H)     "Chronic diastolic heart failure (H)     Current Outpatient Medications   Medication Sig Dispense Refill    ASPIRIN NOT PRESCRIBED (INTENTIONAL) continuous prn for other Antiplatelet medication not prescribed intentionally due to Current anticoagulant therapy (warfarin/enoxaparin)      atorvastatin (LIPITOR) 40 MG tablet TAKE 1 TABLET BY MOUTH EVERY DAY 90 tablet 0    Cholecalciferol (VITAMIN D-3) 25 MCG (1000 UT) CAPS Take by mouth daily      ELIQUIS ANTICOAGULANT 5 MG tablet TAKE 1 TABLET BY MOUTH TWICE A  tablet 3    escitalopram (LEXAPRO) 10 MG tablet Take 1 tablet (10 mg) by mouth daily 90 tablet 3    levofloxacin (LEVAQUIN) 500 MG tablet Take 1 tablet (500 mg) by mouth daily 10 tablet 0    Loperamide HCl (IMODIUM OR) Take by mouth daily as needed      losartan (COZAAR) 100 MG tablet TAKE 1 TABLET BY MOUTH EVERY DAY 90 tablet 2    metoprolol succinate ER (TOPROL XL) 50 MG 24 hr tablet Take 2 tablets (100 mg) by mouth daily 180 tablet 4    RISEdronate (ACTONEL) 35 MG tablet TAKE 1 TABLET (35 MG) BY MOUTH EVERY 7 DAYS 12 tablet 1    spironolactone (ALDACTONE) 25 MG tablet Take 0.5 tablets (12.5 mg) by mouth daily 45 tablet 2        Allergies   Allergen Reactions    Amoxicillin-Pot Clavulanate Rash     Type III hypersensitivity    Bactrim [Sulfamethoxazole W/Trimethoprim] Rash     Serum sickness, type III hypersensitivity      Bee Venom Itching    Sulfa Antibiotics Hives    Celebrex [Celecoxib]     Lisinopril Cough    Naproxen Hives    Penicillin G      Social History     Tobacco Use    Smoking status: Former     Packs/day: 1.00     Years: 30.00     Additional pack years: 0.00     Total pack years: 30.00     Types: Cigarettes     Start date: 1948     Quit date: 1978     Years since quittin.0     Passive exposure: Never    Smokeless tobacco: Never   Vaping Use    Vaping Use: Never used   Substance Use Topics    Alcohol use: Not Currently     Comment: occ \"like once a month\" or less    Drug use: " "No     Family History   Problem Relation Age of Onset    Alzheimer Disease Mother     Hypertension Mother     Cardiovascular Father         D:86 complications fo CHF    Anesthesia Reaction Father          after 2 weeks    Prostate Cancer Brother     Lung Cancer Brother 76    Other Cancer Brother         Lung    Prostate Cancer Brother                Objective    /64 (BP Location: Right arm, Patient Position: Sitting, Cuff Size: Adult Large)   Pulse 66   Temp 97.2  F (36.2  C) (Temporal)   Resp 20   Ht 1.715 m (5' 7.5\")   Wt 107.1 kg (236 lb 3.2 oz)   SpO2 95%   BMI 36.45 kg/m    Body mass index is 36.45 kg/m .  Physical Exam   GENERAL: alert and no distress  EYES: Eyes grossly normal to in  NECK: no adenopathy, no asymmetry, masses, or scars  RESP: lungs clear to auscultation - no rales, rhonchi or wheezes  CV: irregular rate and rhythm   ABDOMEN: obese, soft, nontender,    : large firm, tender left testicle  MS: no gross musculoskeletal defects noted, no edema  SKIN: no suspicious lesions or rashes  NEURO: Normal strength and tone, mentation intact and speech normal  PSYCH: mentation appears normal, affect normal/bright  LYMPH: no cervical, supraclavicular     Results for orders placed or performed in visit on 24 (from the past 24 hour(s))   CBC with platelets and differential    Narrative    The following orders were created for panel order CBC with platelets and differential.  Procedure                               Abnormality         Status                     ---------                               -----------         ------                     CBC with platelets and d...[[id number]]  Abnormal            Final result                 Please view results for these tests on the individual orders.   Basic metabolic panel   Result Value Ref Range    Sodium 144 135 - 145 mmol/L    Potassium 4.9 3.4 - 5.3 mmol/L    Chloride 105 94 - 109 mmol/L    Carbon Dioxide (CO2) 29 20 - 32 mmol/L    Anion " Gap 10 3 - 14 mmol/L    Urea Nitrogen 20 7 - 30 mg/dL    Creatinine 1.10 0.66 - 1.25 mg/dL    GFR Estimate 65 >60 mL/min/1.73m2    Calcium 10.0 8.5 - 10.1 mg/dL    Glucose 118 (H) 70 - 99 mg/dL   CBC with platelets and differential   Result Value Ref Range    WBC Count 9.3 4.0 - 11.0 10e3/uL    RBC Count 4.64 4.40 - 5.90 10e6/uL    Hemoglobin 13.5 13.3 - 17.7 g/dL    Hematocrit 44.0 40.0 - 53.0 %    MCV 95 78 - 100 fL    MCH 29.1 26.5 - 33.0 pg    MCHC 30.7 (L) 31.5 - 36.5 g/dL    RDW 14.4 10.0 - 15.0 %    Platelet Count 225 150 - 450 10e3/uL    % Neutrophils 76 %    % Lymphocytes 13 %    % Monocytes 8 %    % Eosinophils 3 %    % Basophils 0 %    % Immature Granulocytes 0 %    Absolute Neutrophils 7.1 1.6 - 8.3 10e3/uL    Absolute Lymphocytes 1.2 0.8 - 5.3 10e3/uL    Absolute Monocytes 0.8 0.0 - 1.3 10e3/uL    Absolute Eosinophils 0.2 0.0 - 0.7 10e3/uL    Absolute Basophils 0.0 0.0 - 0.2 10e3/uL    Absolute Immature Granulocytes 0.0 <=0.4 10e3/uL   UA Macroscopic with reflex to Microscopic and Culture - Clinic Collect    Specimen: Urine, Clean Catch   Result Value Ref Range    Color Urine Yellow Colorless, Straw, Light Yellow, Yellow    Appearance Urine Clear Clear    Glucose Urine Negative Negative mg/dL    Bilirubin Urine Negative Negative    Ketones Urine Negative Negative mg/dL    Specific Gravity Urine 1.020 1.003 - 1.035    Blood Urine Moderate (A) Negative    pH Urine 5.5 5.0 - 7.0    Protein Albumin Urine Trace (A) Negative mg/dL    Urobilinogen Urine 0.2 0.2, 1.0 E.U./dL    Nitrite Urine Positive (A) Negative    Leukocyte Esterase Urine Moderate (A) Negative   Urine Microscopic Exam   Result Value Ref Range    Bacteria Urine Many (A) None Seen /HPF    RBC Urine 5-10 (A) 0-2 /HPF /HPF    WBC Urine >100 (A) 0-5 /HPF /HPF    Squamous Epithelials Urine Few (A) None Seen /LPF     EXAM: US TESTICULAR AND SCROTUM WITH DOPPLER LIMITED  LOCATION: [facility]  DATE: 1/22/2024     INDICATION:   Left testicular pain  COMPARISON: None.  TECHNIQUE: Ultrasound of scrotum with color flow and spectral Doppler with waveform analysis performed.     FINDINGS:     RIGHT: Right testicle measures 3.2 x 2.4 x 2.0 cm. Normal testicle with no masses. Normal arterial duplex and normal color flow. Several epididymal head cysts are present measuring up to 5 mm. No hydrocele. No varicocele.     LEFT: Left testicle measures 2.9 x 2.6 x 2.4 cm. Hypervascular appearance of the epididymis. Several epididymal head cysts are present measuring up to 7 mm. Moderate, complex hydrocele with internal septations seen. No varicocele.                                                                      IMPRESSION:  1.  Hypervascular appearance of the left epididymis compatible with epididymitis.  2.  Moderate, complex left-sided hydrocele with multiple internal septations.  3.  Multiple bilateral epididymal cysts measuring up to 5 mm along the right and 7 mm along the left.     Findings were communicated with Dr. Roberts by the ultrasound technologist at 15:50 hours on 01/22/2024.        Patient Instructions   (N50.812) Left testicular pain  (primary encounter diagnosis)  Comment: We will treat empirically with levaquin for diagnosed epididymitis seen on ultrasound.  Recommend also follow up in urology  Plan: US Testicular & Scrotum w Doppler Ltd            (N39.0,  R31.9) Urinary tract infection with hematuria, site unspecified  Comment: as above.  Urine culture is pending   Plan:              (N45.1) Acute epididymitis  Comment: as above   Plan: levofloxacin (LEVAQUIN) 500 MG tablet                Signed Electronically by: Jaya Roberts MD, MD

## 2024-01-22 NOTE — PROGRESS NOTES
Assessment & Plan     Acute pain in scrotum    -UA is suggestive of acute cystitis  -CBC and BMP are reassuring  -Have referred patient to [facility] for further evaluation and treatment.  Patient will need imaging to evaluate swelling and pain in the left testicle    - UA Macroscopic with reflex to Microscopic and Culture - Clinic Collect  - CBC with platelets and differential  - Basic metabolic panel  - Urine Microscopic Exam  - Urine Culture    Acute cystitis with hematuria      Referral to Acute and Diagnostic Services    [phone] ([place]) [place] [address] [address] [facility], [address], [place] [address]    Transition to [facility] Services Clinic has been discussed with patient, and he agrees with next level of care.   Patient understands that evaluation/treatment at [facility] typically takes significantly longer than in clinic/urgent care (>2 hours).  The [facility] has been contacted by provider/staff to confirm patient acceptance.         Special issues:      None                               .       Results for orders placed or performed in visit on 01/22/24   UA Macroscopic with reflex to Microscopic and Culture - Clinic Collect     Status: Abnormal    Specimen: Urine, Clean Catch   Result Value Ref Range    Color Urine Yellow Colorless, Straw, Light Yellow, Yellow    Appearance Urine Clear Clear    Glucose Urine Negative Negative mg/dL    Bilirubin Urine Negative Negative    Ketones Urine Negative Negative mg/dL    Specific Gravity Urine 1.020 1.003 - 1.035    Blood Urine Moderate (A) Negative    pH Urine 5.5 5.0 - 7.0    Protein Albumin Urine Trace (A) Negative mg/dL    Urobilinogen Urine 0.2 0.2, 1.0 E.U./dL    Nitrite Urine Positive (A) Negative    Leukocyte Esterase Urine Moderate (A) Negative   Basic metabolic panel     Status: Abnormal   Result Value Ref Range    Sodium 144 135 - 145 mmol/L    Potassium 4.9 3.4 - 5.3 mmol/L    Chloride 105 94 - 109 mmol/L    Carbon  Dioxide (CO2) 29 20 - 32 mmol/L    Anion Gap 10 3 - 14 mmol/L    Urea Nitrogen 20 7 - 30 mg/dL    Creatinine 1.10 0.66 - 1.25 mg/dL    GFR Estimate 65 >60 mL/min/1.73m2    Calcium 10.0 8.5 - 10.1 mg/dL    Glucose 118 (H) 70 - 99 mg/dL   CBC with platelets and differential     Status: Abnormal   Result Value Ref Range    WBC Count 9.3 4.0 - 11.0 10e3/uL    RBC Count 4.64 4.40 - 5.90 10e6/uL    Hemoglobin 13.5 13.3 - 17.7 g/dL    Hematocrit 44.0 40.0 - 53.0 %    MCV 95 78 - 100 fL    MCH 29.1 26.5 - 33.0 pg    MCHC 30.7 (L) 31.5 - 36.5 g/dL    RDW 14.4 10.0 - 15.0 %    Platelet Count 225 150 - 450 10e3/uL    % Neutrophils 76 %    % Lymphocytes 13 %    % Monocytes 8 %    % Eosinophils 3 %    % Basophils 0 %    % Immature Granulocytes 0 %    Absolute Neutrophils 7.1 1.6 - 8.3 10e3/uL    Absolute Lymphocytes 1.2 0.8 - 5.3 10e3/uL    Absolute Monocytes 0.8 0.0 - 1.3 10e3/uL    Absolute Eosinophils 0.2 0.0 - 0.7 10e3/uL    Absolute Basophils 0.0 0.0 - 0.2 10e3/uL    Absolute Immature Granulocytes 0.0 <=0.4 10e3/uL   Urine Microscopic Exam     Status: Abnormal   Result Value Ref Range    Bacteria Urine Many (A) None Seen /HPF    RBC Urine 5-10 (A) 0-2 /HPF /HPF    WBC Urine >100 (A) 0-5 /HPF /HPF    Squamous Epithelials Urine Few (A) None Seen /LPF   CBC with platelets and differential     Status: Abnormal    Narrative    The following orders were created for panel order CBC with platelets and differential.  Procedure                               Abnormality         Status                     ---------                               -----------         ------                     CBC with platelets and d...[[id number]]  Abnormal            Final result                 Please view results for these tests on the individual orders.       There are no Patient Instructions on file for this visit.    Return for Follow up, PCP after [facility] visit.    Patient understood and agreed to plan. Patient was stable for discharge.    Subjective      Spencer is a 88 year old male who presents to clinic today for the following health issues:  Chief Complaint   Patient presents with    Testicular Problem     Start yesterday sx left testicle is swelling and painful, and pressure causes pain, currently- 0/10 at worst 3/10 tx none      HPI    Patient reports swelling and pain on the left testicle which started one day ago.  He reports pain when ambulating and no pain when sitting.  He rates the pain at 3/10 when worse.  Symptoms are new.  He reports treatment for UTI over 1 month ago.  He reports he was treated with Cipro which resolved the symptoms.  He denies fever or chills, dysuria, frequency, urgency, nausea or vomiting, body aches.    Review of Systems   Genitourinary:  Positive for scrotal swelling and testicular pain. Negative for dysuria and frequency.   All other systems reviewed and are negative.      Problem List:  2023-03: Toe ulcer, right, with fat layer exposed (H)  2022-01: Left knee pain  2021-06: Chronic diastolic heart failure (H)  2020-10: Mild major depression (H)  2020-10: Angina pectoris (H24)  2019-11: Class 2 severe obesity due to excess calories with serious   comorbidity in adult (H)  2019-11: CKD (chronic kidney disease) stage 3, GFR 30-59 ml/min (H)  2019-03: Generalized muscle weakness  2018-12: Closed fracture of one rib of right side, initial encounter  2018-12: Dilated aortic root (H24)  2018-12: Atrial fibrillation and flutter (H)  2018-10: Obesity (BMI 35.0-39.9) with comorbidity (H)  2018-08: Acute pain of right shoulder  2016-09: Dysthymia  2015-10: Spinal stenosis of lumbar region with neurogenic claudication  2015-01: Thumb pain  2013-09: Enthesopathy  2013-09: Thumb pain  2013-06: Low back pain  2013-01: Health Care Home  2012-09: Lumbar radiculopathy  2012-08: Obesity  2012-08: Impaired fasting glucose  2011-12: Shoulder pain  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  2011-08: Advance Care Planning  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, radiation  2011-05:  Bee sting-induced anaphylaxis  2011-05: OA (osteoarthritis)  2011-02: Branch retinal vein occlusion (H28)  2011-01: Coronary artery disease  2010-12: Vitamin D deficiency  2010-02: CARDIOVASCULAR SCREENING; LDL GOAL LESS THAN 100  2007-11: 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, lower limb  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  2006-12: Malignant neoplasm of prostate  2005-06: Other emphysema (H)  2004-09: Acute prostatitis  2004-09: Hypertrophy of prostate without urinary obstruction  2003-08: Essential hypertension, benign  2002-08: Personal history of other malignant neoplasm of skin  2002-08: Lumbago  2002-08: Hypertrophy (benign) of prostate  Hypersomnia with sleep apnea  Dyslipidemia  Benign essential hypertension      Past Medical History:   Diagnosis Date    AAA (abdominal aortic aneurysm) (H24)     Actinic keratosis     Angina pectoris (H24) 10/27/2020    Antiplatelet or antithrombotic long-term use     Eliquis    Arthritis     Atrial fibrillation and flutter (H) 12/03/2018    CAD (coronary artery disease)     1/5/2011 lateral ischemia on stress echo, 12/2014 normal stress echo    Coronary artery disease 01/01/2011    Abnormal stress test 1/2011 and controlled with medical mgmt     Depressive disorder     Mild    Diarrhea     Urgency at times    Dyslipidemia     Emphysema of lung (H)     Essential hypertension, benign     Hernia, abdominal     Hypersomnia with sleep apnea, unspecified     on bipap, partially treated with residual apneas    Hypertrophy (benign) of prostate 05/2001    Biopsy 5/01 negative for cancer; PSA 5    Lumbago     Mumps     Prostate cancer (H) 12/15/2006    Renal disease     Skin cancer, basal cell 1997    Sleep apnea     Uses a Bi-pap for centralized Apnea    Spider veins        Social History     Tobacco Use    Smoking status: Former     Packs/day: 1.00     Years: 30.00     Additional pack years: 0.00     Total pack years: 30.00     Types: Cigarettes     Start date: 1/1/1948     Quit  "date: 1978     Years since quittin.0     Passive exposure: Never    Smokeless tobacco: Never   Substance Use Topics    Alcohol use: Not Currently     Comment: occ \"like once a month\" or less           Objective    /88   Pulse (!) 121   Temp 98.4  F (36.9  C)   Wt 106.5 kg (234 lb 11.2 oz)   SpO2 96%   BMI 36.22 kg/m    Physical Exam  Vitals and nursing note reviewed.   Constitutional:       Appearance: Normal appearance.   HENT:      Head: Normocephalic.   Cardiovascular:      Rate and Rhythm: Normal rate and regular rhythm.   Pulmonary:      Effort: Pulmonary effort is normal.      Breath sounds: Normal breath sounds.   Abdominal:      General: Abdomen is flat.      Palpations: Abdomen is soft.      Tenderness: There is no abdominal tenderness. There is no right CVA tenderness or left CVA tenderness.   Genitourinary:     Testes: Cremasteric reflex is present.         Left: Tenderness and swelling present.      Epididymis:      Left: Tenderness present.   Musculoskeletal:      Cervical back: Normal range of motion and neck supple.   Neurological:      Mental Status: He is alert.   Psychiatric:         Behavior: Behavior normal.              Patrica Rojo PA-C    "

## 2024-01-22 NOTE — PATIENT INSTRUCTIONS
(N50.812) Left testicular pain  (primary encounter diagnosis)  Comment: We will treat empirically with levaquin for diagnosed epididymitis seen on ultrasound.  Recommend also follow up in urology  Plan: US Testicular & Scrotum w Doppler Ltd            (N39.0,  R31.9) Urinary tract infection with hematuria, site unspecified  Comment: as above.  Urine culture is pending   Plan:              (N45.1) Acute epididymitis  Comment: as above   Plan: levofloxacin (LEVAQUIN) 500 MG tablet

## 2024-01-23 LAB — BACTERIA UR CULT: ABNORMAL

## 2024-01-23 NOTE — RESULT ENCOUNTER NOTE
Darion Alicea,    I have had the opportunity to review your recent results and an interpretation is as follows:  Your follow up ultrasound does confirm the presence of epididymitis as we discussed      Sincerely,  Jaya Roberts MD

## 2024-01-24 ENCOUNTER — TELEPHONE (OUTPATIENT)
Dept: URGENT CARE | Facility: URGENT CARE | Age: 89
End: 2024-01-24
Payer: COMMERCIAL

## 2024-01-24 ENCOUNTER — TELEPHONE (OUTPATIENT)
Dept: UROLOGY | Facility: CLINIC | Age: 89
End: 2024-01-24
Payer: COMMERCIAL

## 2024-01-24 DIAGNOSIS — N30.00 ACUTE CYSTITIS WITHOUT HEMATURIA: Primary | ICD-10-CM

## 2024-01-24 DIAGNOSIS — N30.90 BLADDER INFECTION: Primary | ICD-10-CM

## 2024-01-24 RX ORDER — CEFDINIR 300 MG/1
300 CAPSULE ORAL 2 TIMES DAILY
Qty: 28 CAPSULE | Refills: 0 | Status: SHIPPED | OUTPATIENT
Start: 2024-01-24 | End: 2024-02-07

## 2024-01-24 RX ORDER — CEPHALEXIN 500 MG/1
500 CAPSULE ORAL 2 TIMES DAILY
Qty: 14 CAPSULE | Refills: 0 | Status: SHIPPED | OUTPATIENT
Start: 2024-01-24 | End: 2024-01-31

## 2024-01-24 NOTE — TELEPHONE ENCOUNTER
[facility] Call Center    Phone Message    May a detailed message be left on voicemail: yes     Reason for Call: Other: pt would like a call back to discuss with care team about his test results. Please call pt. Thank you.      Action Taken: Message routed to:  Other: uro    Travel Screening: Not Applicable

## 2024-01-24 NOTE — TELEPHONE ENCOUNTER
Continue with levaquin for epididymitis.   Urine culture grew out E Coli.  I will have him add on Keflex to cover for UTI.   He has been on Ancef in 2023 without reaction.   All questions answered.     Palma Elam PA-C         DISPLAY PLAN FREE TEXT DISPLAY PLAN FREE TEXT DISPLAY PLAN FREE TEXT

## 2024-01-24 NOTE — TELEPHONE ENCOUNTER
Seen at Urgent care  testicular  soreness and swelling  . SEE ultrasound and culture  results . Was given Levaquin   so he needs medication change . Please give orders    and any follow up ? Thanks

## 2024-02-06 ENCOUNTER — PATIENT OUTREACH (OUTPATIENT)
Dept: PEDIATRICS | Facility: CLINIC | Age: 89
End: 2024-02-06
Payer: COMMERCIAL

## 2024-02-12 DIAGNOSIS — I48.92 ATRIAL FIBRILLATION AND FLUTTER (H): Primary | ICD-10-CM

## 2024-02-12 DIAGNOSIS — I48.91 ATRIAL FIBRILLATION AND FLUTTER (H): Primary | ICD-10-CM

## 2024-02-12 DIAGNOSIS — I47.19 ATRIAL TACHYCARDIA (H): ICD-10-CM

## 2024-02-12 RX ORDER — APIXABAN 5 MG/1
TABLET, FILM COATED ORAL
Qty: 180 TABLET | Refills: 3 | Status: ON HOLD | OUTPATIENT
Start: 2024-02-12 | End: 2024-09-10

## 2024-02-21 NOTE — PROGRESS NOTES
[facility]    I had the pleasure of seeing Nixon when he came for follow up of Afib.  This 88 year old sees Dr. De La Paz for his history of:    1. Permanent AFib/Atypical AFlutter -  first diagnosed 12/2018 in the setting of a normal EF. Rate control/anticoagulation strategy. CHADSVASc of 5 (HFpEF, HTN, presumed CAD, age)  2. Chronic GALDAMEZ, noted by Dr. Camp 6/2019.  30-pack-year history of tobacco use, quitting~2000. Saw Dr. Moreno and PFTs showed minimal restriction, muscular weakness and no obstruction. HFpEF, obesity, deconditioning all contributing  3. HFpEF - diuresis limited by hypotension at times  5. Ascending aorta and aortic root dilatation (4.5 cm, stable)  - echo 2023  6. Presumed CAD based on stress echocardiogram 1/2011.  Dr. Camp opted to treat this medically in the absence of symptoms.  Repeat stress test 12/2014 was negative for ischemia   7. HTN with some hypotension resulting in discontinuation of some medications  8.  MAYITO - on ASV therapy for complex Central and Obstructive Sleep Apnea.   9. Peripheral Venous Insufficiency   10. Dyslipidemia  11. RA Mass - noted on echo 2023. Cardiac MRI confirmed epicardial fat adjacent to RV apex  12. H/o Bladder, Prostate CA - sees Dr. Pino. No evidence of recurrence based on cystoscopy and PSA (12/2023)        Last Visit & Interval History:  I saw Spencer back in 11/2021 at which time he was doing fine from EP perspective, with stable SVR on BB/CCB.     He has subsequently been seen routinely by Dr. De La Paz (former Dr. Camp pt) and Herminio Gil NP.  Most recently, Dr. De La Paz saw him 8/2023 and  he and Amber noted he was doing well. Echo remained with nl LVEF and there was an apical mass in RV noted. MRI recommended.This confirmed just adipose tissue and no follow-up required for this.    Today's Visit:  Today Spencer reports that he is doing well. His daughter Amber joined the visit via telephone call and agrees.     Spencer remains unaware of  "his permanent arrhythmia. He checks his pulse at home and it typically ranges between  bpm. Reports good tolerance of the metoprolol.     He denies any chest pain, palpitations, or dizziness. He reports rare transient lightheadedness that lasts 15-30 seconds and goes away on its own, no known triggers such as standing quickly, etc.  He denies concurrent dizziness, pre-syncope or syncope, chest pain, palpitations or SOB with these episodes. Patient's BP today 124/74, he does take his BPs regularly at home and notes that his systolic pressures are usually in the 120s. No low BPs as he used to get.    Spencer notes mildly increased shortness of breath, this typically occurs with exertion, but recently has been also been occurring with rest or when he's doing activities around the house. Spencer also reports that he has woken up recently short of breath at night. He does wear a CPAP for MAYITO, and notes that the humidifier on the mask is typically empty in the morning. He and Amber wonder if the mask is not sealed properly and if this is contributing to his SOB/PND.    He also reports swelling in the BLE's that has worsened over the past couple weeks. Denies orthopnea, although he was sleeping in a recliner for a period of time due to recent low back pain.  He confirms  he's now back in bed, without the HOB raised and on \"normal\" pillows    VITALS:  Vitals: /74   Pulse 71   Ht 1.715 m (5' 7.5\")   Wt 108 kg (238 lb)   SpO2 97%   BMI 36.73 kg/m      Diagnostic Testing:  cMRI 9/2023 LVEF 57% without rWMA. RVEF 69%. No RV mass noted, prominent epicardial fat pad noted. Mod-sev PATRICK. Mild MR/TR. LGE without abnls.  Echo 8/2023 LVEF 60-65%. Mass-like hypodensity in RV apex d/t fat or thrombus. PATRICK. 1+TR. Trace MR with mild MAC. Asc ao 4.5 cm, root 4.3 cm. Nl Trace AI  PFTs 8/2019 showed mild restriction. 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 showed sacular AAA with mild enlargement with max diameter 3.3 x 2.2. <50% stenosis in aorta, iliac arteries  Exercise ABIs 12/2022 with normal ABIs and normal response to exercise.   MRA chest 5/2019 showed aortic root was borderline dilated (4 x 3.9 cm).  The ascending aorta was mildly dilated (4.4 x 4.5 at the level of bifurcation of pulmonary arteries)  Holter monitor 5/21/2019 showed atrial fibrillation with an average heart rate of 75 bpm.  Range was  bpm.  He had 2 \"events\" which correlated with chronic atrial fibrillation with heart rates in the 70-80s.  Stress echocardiogram 12/2014 was negative for stress-induced wall motion abnormalities.      Plan:  INCREASE spironolactone to 25 mg daily  2.  Get BMP in 1 week to assess potassium and kidney function  3.  F/U with Dr. De La Paz with echo 8/2024 - ordered     Assessment/Plan:    Permanent AFib/atypical AFlutter  Remains on metoprolol  daily  HR at home 70s-100s  On Eliquis for CHADSVASc 4-5 (HFpEF, HTN, presumed CAD, age).  Last Hgb 1/2024 was wnl 13.5 g/dL    PLAN:  No EP follow-up required at this point given stable/asx'c AFib with rate control/AC strategy continued.   Continue Eliquis and metoprolol XL    HFpEF  Currently on spironolactone 12.5, Metoprolol , losartan 100  In the past, had hypotension causing reducing in diuretic therapy (torsemide)  Last echo 8/2023 with nl LVEF and no sig valve abnls. MRI 9/2023 for evaluation of RV mass (adipose) confirmed   Given increased SOB and BLE edema, recommended diuresis with low dose torsemide  Daughter Amber would like to avoid torsemide as she is worried patient will fall going to the bathroom at night if he has significant urgency with the loop diuretic and suggests we increase his spironolactone instead.   Last BMP on 1/22/24 shows K 4.9, Cr 1.10 and GFR 65.   Agreed this is reasonable with close follow-up.    PLAN:  INCREASE spironolactone to 25 mg daily  Obtain BMP in 1 week " to check potassium and kidney fx. When we contact him with results, will get update on:  Swelling  Breathing (especially at night)  Any increased lightheadedness  What is weight?    Discussed the importance of weights at home. He will weigh himself tomorrow morning and again in 1 week.   Patient will also monitor for increasing dizziness or lightheadedness on the increased spironolactone. He will call if concerns.     Aortic Dilation  Last echo 8/2023 with root 4.3cm and ascending aorta 4.5cm (stable). No sig aortic valve dz  AAA US 11/2022 with stable saccular aneurysm off of the distal abdominal aorta with aneurysm measuring up to 2.2 cm in transverse plane. Total size of distal abdominal aorta including the aneurysm is 3.6 cm (stable)    Remains on losartan  BPs 120s/70s-80s.   Has been free of tobacco for decades    PLAN:  Continue losartan and BP monitoring at home  Repeat echo in August at appointment with NATHANAEL Laurent, KAYLA Islas was present with the ZULMA student who participated in the service and in the documentation of the note.  I have verified the history and personally performed the physical exam and medical decision making.  I agree with the assessment and plan of care as documented in the note.       Items personally reviewed: vitals, labs, and exam and agree with the interpretation documented in the note.  I agree with the interpretation documented in the note and the assessment and plan as above.    Cherise Bennett PA-C, CARRIE      Orders Placed This Encounter   Procedures    Basic metabolic panel    Follow-Up with Cardiology    Echocardiogram Complete     Orders Placed This Encounter   Medications    spironolactone (ALDACTONE) 25 MG tablet     Sig: Take 1 tablet (25 mg) by mouth daily     Medications Discontinued During This Encounter   Medication Reason    levofloxacin (LEVAQUIN) 500 MG tablet Therapy completed (No AVS)    spironolactone (ALDACTONE) 25  MG tablet          Encounter Diagnoses   Name Primary?    Atrial flutter, chronic (H)     Mass of right cardiac ventricle     Persistent atrial fibrillation (H)     Benign essential hypertension        CURRENT MEDICATIONS:  Current Outpatient Medications   Medication Sig Dispense Refill    atorvastatin (LIPITOR) 40 MG tablet TAKE 1 TABLET BY MOUTH EVERY DAY 90 tablet 0    Cholecalciferol (VITAMIN D-3) 25 MCG (1000 UT) CAPS Take by mouth daily      ELIQUIS ANTICOAGULANT 5 MG tablet TAKE 1 TABLET BY MOUTH TWICE A  tablet 3    escitalopram (LEXAPRO) 10 MG tablet Take 1 tablet (10 mg) by mouth daily 90 tablet 3    Loperamide HCl (IMODIUM OR) Take by mouth daily as needed      losartan (COZAAR) 100 MG tablet TAKE 1 TABLET BY MOUTH EVERY DAY 90 tablet 2    metoprolol succinate ER (TOPROL XL) 50 MG 24 hr tablet Take 2 tablets (100 mg) by mouth daily 180 tablet 4    RISEdronate (ACTONEL) 35 MG tablet TAKE 1 TABLET (35 MG) BY MOUTH EVERY 7 DAYS 12 tablet 1    spironolactone (ALDACTONE) 25 MG tablet Take 1 tablet (25 mg) by mouth daily      ASPIRIN NOT PRESCRIBED (INTENTIONAL) continuous prn for other Antiplatelet medication not prescribed intentionally due to Current anticoagulant therapy (warfarin/enoxaparin) (Patient not taking: Reported on 2/22/2024)         ALLERGIES     Allergies   Allergen Reactions    Amoxicillin-Pot Clavulanate Rash     Type III hypersensitivity    Bactrim [Sulfamethoxazole W/Trimethoprim] Rash     Serum sickness, type III hypersensitivity      Bee Venom Itching    Sulfa Antibiotics Hives    Celebrex [Celecoxib]     Lisinopril Cough    Naproxen Hives    Penicillin G          Review of Systems:  Skin:        Eyes:       ENT:       Respiratory:  Positive for dyspnea on exertion;shortness of breath  Cardiovascular:  Negative for;palpitations;chest pain;syncope or near-syncope;dizziness;fatigue edema;lightheadedness  Gastroenterology:      Genitourinary:       Musculoskeletal:       Neurologic:     "   Psychiatric:       Heme/Lymph/Imm:       Endocrine:         Physical Exam:  Vitals: /74   Pulse 71   Ht 1.715 m (5' 7.5\")   Wt 108 kg (238 lb)   SpO2 97%   BMI 36.73 kg/m      Constitutional:  cooperative, alert and oriented, well developed, well nourished, in no acute distress        Skin:  warm and dry to the touch, no apparent skin lesions or masses noted        Head:  normocephalic, no masses or lesions        Eyes:  pupils equal and round;sclera white;conjunctivae and lids unremarkable        ENT:           Neck:  no carotid bruit;carotid pulses are full and equal bilaterally hepatojugular reflux;JVP elevated      Chest:  normal breath sounds, clear to auscultation, normal A-P diameter, normal symmetry, normal respiratory excursion, no use of accessory muscles        Cardiac: normal S1 and S2;no S3 or S4 irregularly irregular rhythm distant heart sounds              Abdomen:  abdomen soft        Vascular: pulses full and equal                                      Extremities and Back:           Neurological:  no gross motor deficits            PAST MEDICAL HISTORY:  Past Medical History:   Diagnosis Date    AAA (abdominal aortic aneurysm) (H24)     Actinic keratosis     Angina pectoris (H24) 10/27/2020    Antiplatelet or antithrombotic long-term use     Eliquis    Arthritis     Atrial fibrillation and flutter (H) 12/03/2018    CAD (coronary artery disease)     1/5/2011 lateral ischemia on stress echo, 12/2014 normal stress echo    Coronary artery disease 01/01/2011    Abnormal stress test 1/2011 and controlled with medical mgmt     Depressive disorder     Mild    Diarrhea     Urgency at times    Dyslipidemia     Emphysema of lung (H)     Essential hypertension, benign     Hernia, abdominal     Hypersomnia with sleep apnea, unspecified     on bipap, partially treated with residual apneas    Hypertrophy (benign) of prostate 05/2001    Biopsy 5/01 negative for cancer; PSA 5    Lumbago     Mumps     " Prostate cancer (H) 12/15/2006    Renal disease     Skin cancer, basal cell 1997    Sleep apnea     Uses a Bi-pap for centralized Apnea    Spider veins        PAST SURGICAL HISTORY:  Past Surgical History:   Procedure Laterality Date    ABDOMEN SURGERY      BACK SURGERY      L4/L5 decompression    BIOPSY      Skin cancer basal cell    COLONOSCOPY      CYSTOSCOPY      CYSTOSCOPY, TRANSURETHRAL RESECTION (TUR) TUMOR BLADDER, COMBINED N/A 2023    Procedure: Cystoscopy, transurethral resection of bladder tumor, medium, 2-5 cm;  Surgeon: Mark Pino MD;  Location: [facility] OR    EYE SURGERY  2016    Cararact    GENITOURINARY SURGERY  2022    Tumors in bladder    HERNIA REPAIR  child    Moh's procedure for basal cell carcinoma  2001    PROSTATE SURGERY  2007    Radiation only    s/p lumbar laminectomy NOS  1988    SIGMOIDOSCOPY FLEXIBLE N/A 06/15/2020    Procedure: SIGMOIDOSCOPY, FLEXIBLE;  Surgeon: Sunni Patton MD;  Location:  GI    VITRECTOMY PARS PLANA REMOVE PRERETINAL MEMBRANE   2009       FAMILY HISTORY:  Family History   Problem Relation Age of Onset    Alzheimer Disease Mother     Hypertension Mother     Cardiovascular Father         D:86 complications fo CHF    Anesthesia Reaction Father          after 2 weeks    Prostate Cancer Brother     Lung Cancer Brother 76    Other Cancer Brother         Lung    Prostate Cancer Brother        SOCIAL HISTORY:  Social History     Socioeconomic History    Marital status:      Spouse name: None    Number of children: None    Years of education: None    Highest education level: None   Occupational History    Occupation: retired   Tobacco Use    Smoking status: Former     Packs/day: 1.00     Years: 30.00     Additional pack years: 0.00     Total pack years: 30.00     Types: Cigarettes     Start date: 1948     Quit date: 1978     Years since quittin.1     Passive exposure: Never    Smokeless tobacco: Never   Vaping  "Use    Vaping Use: Never used   Substance and Sexual Activity    Alcohol use: Not Currently     Comment: occ \"like once a month\" or less    Drug use: No    Sexual activity: Not Currently     Partners: Female     Birth control/protection: Post-menopausal   Other Topics Concern    Caffeine Concern No     Comment: 0-2 cans of soda per day.    Exercise No    Seat Belt Yes    Parent/sibling w/ CABG, MI or angioplasty before 65F 55M? No     Social Determinants of Health     Financial Resource Strain: Low Risk  (12/5/2023)    Financial Resource Strain     Within the past 12 months, have you or your family members you live with been unable to get utilities (heat, electricity) when it was really needed?: No   Food Insecurity: Low Risk  (12/5/2023)    Food Insecurity     Within the past 12 months, did you worry that your food would run out before you got money to buy more?: No     Within the past 12 months, did the food you bought just not last and you didn t have money to get more?: No   Transportation Needs: Low Risk  (12/5/2023)    Transportation Needs     Within the past 12 months, has lack of transportation kept you from medical appointments, getting your medicines, non-medical meetings or appointments, work, or from getting things that you need?: No   Physical Activity: Insufficiently Active (12/27/2022)    Exercise Vital Sign     Days of Exercise per Week: 1 day     Minutes of Exercise per Session: 10 min   Stress: No Stress Concern Present (12/27/2022)    [employer] [employer] - Occupational Stress Questionnaire     Feeling of Stress : Not at all   Social Connections: Moderately Integrated (12/27/2022)    Social Connection and Isolation Panel [NHANES]     Frequency of Communication with Friends and Family: Three times a week     Frequency of Social Gatherings with Friends and Family: Twice a week     Attends Yazidism Services: More than 4 times per year     Active Member of Clubs or Organizations: " Yes     Marital Status:    Interpersonal Safety: Low Risk  (12/11/2023)    Interpersonal Safety     Do you feel physically and emotionally safe where you currently live?: Yes     Within the past 12 months, have you been hit, slapped, kicked or otherwise physically hurt by someone?: No     Within the past 12 months, have you been humiliated or emotionally abused in other ways by your partner or ex-partner?: No   Housing Stability: Low Risk  (12/5/2023)    Housing Stability     Do you have housing? : Yes     Are you worried about losing your housing?: No       CC  Vinod De La Paz MD  [address] [place] [id number]

## 2024-02-22 ENCOUNTER — OFFICE VISIT (OUTPATIENT)
Dept: CARDIOLOGY | Facility: CLINIC | Age: 89
End: 2024-02-22
Attending: INTERNAL MEDICINE
Payer: COMMERCIAL

## 2024-02-22 VITALS
HEIGHT: 68 IN | WEIGHT: 238 LBS | DIASTOLIC BLOOD PRESSURE: 74 MMHG | SYSTOLIC BLOOD PRESSURE: 124 MMHG | OXYGEN SATURATION: 97 % | HEART RATE: 71 BPM | BODY MASS INDEX: 36.07 KG/M2

## 2024-02-22 DIAGNOSIS — I48.19 PERSISTENT ATRIAL FIBRILLATION (H): ICD-10-CM

## 2024-02-22 DIAGNOSIS — I51.89 MASS OF RIGHT CARDIAC VENTRICLE: ICD-10-CM

## 2024-02-22 DIAGNOSIS — I10 BENIGN ESSENTIAL HYPERTENSION: ICD-10-CM

## 2024-02-22 DIAGNOSIS — I48.92 ATRIAL FLUTTER, CHRONIC (H): ICD-10-CM

## 2024-02-22 PROCEDURE — 99214 OFFICE O/P EST MOD 30 MIN: CPT | Performed by: PHYSICIAN ASSISTANT

## 2024-02-22 RX ORDER — SPIRONOLACTONE 25 MG/1
25 TABLET ORAL DAILY
Start: 2024-02-22 | End: 2024-03-12

## 2024-02-22 NOTE — LETTER
2/22/2024    Natalya Lewis MD  [address] [place] [address]    RE: Nixon RILEY Argenis       Dear Colleague,     I had the pleasure of seeing Nixon More in the [facility].  [facility]    I had the pleasure of seeing Nixon when he came for follow up of Afib.  This 88 year old sees Dr. De La Paz for his history of:    1. Permanent AFib/Atypical AFlutter -  first diagnosed 12/2018 in the setting of a normal EF. Rate control/anticoagulation strategy. CHADSVASc of 5 (HFpEF, HTN, presumed CAD, age)  2. Chronic GALDAMEZ, noted by Dr. Camp 6/2019.  30-pack-year history of tobacco use, quitting~2000. Saw Dr. Moreno and PFTs showed minimal restriction, muscular weakness and no obstruction. HFpEF, obesity, deconditioning all contributing  3. HFpEF - diuresis limited by hypotension at times  5. Ascending aorta and aortic root dilatation (4.5 cm, stable)  - echo 2023  6. Presumed CAD based on stress echocardiogram 1/2011.  Dr. Camp opted to treat this medically in the absence of symptoms.  Repeat stress test 12/2014 was negative for ischemia   7. HTN with some hypotension resulting in discontinuation of some medications  8.  MAYITO - on ASV therapy for complex Central and Obstructive Sleep Apnea.   9. Peripheral Venous Insufficiency   10. Dyslipidemia  11. RA Mass - noted on echo 2023. Cardiac MRI confirmed epicardial fat adjacent to RV apex  12. H/o Bladder, Prostate CA - sees Dr. Pino. No evidence of recurrence based on cystoscopy and PSA (12/2023)        Last Visit & Interval History:  I saw Spencer back in 11/2021 at which time he was doing fine from EP perspective, with stable SVR on BB/CCB.     He has subsequently been seen routinely by Dr. De La Paz (former Dr. Camp pt) and Herminio Gil NP.  Most recently, Dr. De La Paz saw him 8/2023 and  he and Amber noted he was doing well. Echo remained with nl LVEF and there was an apical mass in RV noted. MRI recommended.This  "confirmed just adipose tissue and no follow-up required for this.    Today's Visit:  Today Spencer reports that he is doing well. His daughter Amber joined the visit via telephone call and agrees.     Spencer remains unaware of his permanent arrhythmia. He checks his pulse at home and it typically ranges between  bpm. Reports good tolerance of the metoprolol.     He denies any chest pain, palpitations, or dizziness. He reports rare transient lightheadedness that lasts 15-30 seconds and goes away on its own, no known triggers such as standing quickly, etc.  He denies concurrent dizziness, pre-syncope or syncope, chest pain, palpitations or SOB with these episodes. Patient's BP today 124/74, he does take his BPs regularly at home and notes that his systolic pressures are usually in the 120s. No low BPs as he used to get.    Spencer notes mildly increased shortness of breath, this typically occurs with exertion, but recently has been also been occurring with rest or when he's doing activities around the house. Spencer also reports that he has woken up recently short of breath at night. He does wear a CPAP for MAYITO, and notes that the humidifier on the mask is typically empty in the morning. He and Amber wonder if the mask is not sealed properly and if this is contributing to his SOB/PND.    He also reports swelling in the BLE's that has worsened over the past couple weeks. Denies orthopnea, although he was sleeping in a recliner for a period of time due to recent low back pain.  He confirms  he's now back in bed, without the HOB raised and on \"normal\" pillows    VITALS:  Vitals: /74   Pulse 71   Ht 1.715 m (5' 7.5\")   Wt 108 kg (238 lb)   SpO2 97%   BMI 36.73 kg/m      Diagnostic Testing:  cMRI 9/2023 LVEF 57% without rWMA. RVEF 69%. No RV mass noted, prominent epicardial fat pad noted. Mod-sev PATRICK. Mild MR/TR. LGE without abnls.  Echo 8/2023 LVEF 60-65%. Mass-like hypodensity in RV apex d/t fat or thrombus. PATRICK. 1+TR. " "Trace MR with mild MAC. Asc ao 4.5 cm, root 4.3 cm. Nl Trace AI  PFTs 8/2019 showed mild restriction. 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 showed sacular AAA with mild enlargement with max diameter 3.3 x 2.2. <50% stenosis in aorta, iliac arteries  Exercise ABIs 12/2022 with normal ABIs and normal response to exercise.   MRA chest 5/2019 showed aortic root was borderline dilated (4 x 3.9 cm).  The ascending aorta was mildly dilated (4.4 x 4.5 at the level of bifurcation of pulmonary arteries)  Holter monitor 5/21/2019 showed atrial fibrillation with an average heart rate of 75 bpm.  Range was  bpm.  He had 2 \"events\" which correlated with chronic atrial fibrillation with heart rates in the 70-80s.  Stress echocardiogram 12/2014 was negative for stress-induced wall motion abnormalities.      Plan:  INCREASE spironolactone to 25 mg daily  2.  Get BMP in 1 week to assess potassium and kidney function  3.  F/U with Dr. De La Paz with echo 8/2024 - ordered     Assessment/Plan:    Permanent AFib/atypical AFlutter  Remains on metoprolol  daily  HR at home 70s-100s  On Eliquis for CHADSVASc 4-5 (HFpEF, HTN, presumed CAD, age).  Last Hgb 1/2024 was wnl 13.5 g/dL    PLAN:  No EP follow-up required at this point given stable/asx'c AFib with rate control/AC strategy continued.   Continue Eliquis and metoprolol XL    HFpEF  Currently on spironolactone 12.5, Metoprolol , losartan 100  In the past, had hypotension causing reducing in diuretic therapy (torsemide)  Last echo 8/2023 with nl LVEF and no sig valve abnls. MRI 9/2023 for evaluation of RV mass (adipose) confirmed   Given increased SOB and BLE edema, recommended diuresis with low dose torsemide  Daughter Amber would like to avoid torsemide as she is worried patient will fall going to the bathroom at night if he has significant urgency with the loop diuretic and " suggests we increase his spironolactone instead.   Last BMP on 1/22/24 shows K 4.9, Cr 1.10 and GFR 65.   Agreed this is reasonable with close follow-up.    PLAN:  INCREASE spironolactone to 25 mg daily  Obtain BMP in 1 week to check potassium and kidney fx. When we contact him with results, will get update on:  Swelling  Breathing (especially at night)  Any increased lightheadedness  What is weight?    Discussed the importance of weights at home. He will weigh himself tomorrow morning and again in 1 week.   Patient will also monitor for increasing dizziness or lightheadedness on the increased spironolactone. He will call if concerns.     Aortic Dilation  Last echo 8/2023 with root 4.3cm and ascending aorta 4.5cm (stable). No sig aortic valve dz  AAA US 11/2022 with stable saccular aneurysm off of the distal abdominal aorta with aneurysm measuring up to 2.2 cm in transverse plane. Total size of distal abdominal aorta including the aneurysm is 3.6 cm (stable)    Remains on losartan  BPs 120s/70s-80s.   Has been free of tobacco for decades    PLAN:  Continue losartan and BP monitoring at home  Repeat echo in August at appointment with NATHANAEL Laurent    I, KAYLA Islas was present with the ZULMA student who participated in the service and in the documentation of the note.  I have verified the history and personally performed the physical exam and medical decision making.  I agree with the assessment and plan of care as documented in the note.       Items personally reviewed: vitals, labs, and exam and agree with the interpretation documented in the note.  I agree with the interpretation documented in the note and the assessment and plan as above.    Cherise Bennett PA-C, CARRIE      Orders Placed This Encounter   Procedures    Basic metabolic panel    Follow-Up with Cardiology    Echocardiogram Complete     Orders Placed This Encounter   Medications    spironolactone (ALDACTONE) 25 MG  tablet     Sig: Take 1 tablet (25 mg) by mouth daily     Medications Discontinued During This Encounter   Medication Reason    levofloxacin (LEVAQUIN) 500 MG tablet Therapy completed (No AVS)    spironolactone (ALDACTONE) 25 MG tablet          Encounter Diagnoses   Name Primary?    Atrial flutter, chronic (H)     Mass of right cardiac ventricle     Persistent atrial fibrillation (H)     Benign essential hypertension        CURRENT MEDICATIONS:  Current Outpatient Medications   Medication Sig Dispense Refill    atorvastatin (LIPITOR) 40 MG tablet TAKE 1 TABLET BY MOUTH EVERY DAY 90 tablet 0    Cholecalciferol (VITAMIN D-3) 25 MCG (1000 UT) CAPS Take by mouth daily      ELIQUIS ANTICOAGULANT 5 MG tablet TAKE 1 TABLET BY MOUTH TWICE A  tablet 3    escitalopram (LEXAPRO) 10 MG tablet Take 1 tablet (10 mg) by mouth daily 90 tablet 3    Loperamide HCl (IMODIUM OR) Take by mouth daily as needed      losartan (COZAAR) 100 MG tablet TAKE 1 TABLET BY MOUTH EVERY DAY 90 tablet 2    metoprolol succinate ER (TOPROL XL) 50 MG 24 hr tablet Take 2 tablets (100 mg) by mouth daily 180 tablet 4    RISEdronate (ACTONEL) 35 MG tablet TAKE 1 TABLET (35 MG) BY MOUTH EVERY 7 DAYS 12 tablet 1    spironolactone (ALDACTONE) 25 MG tablet Take 1 tablet (25 mg) by mouth daily      ASPIRIN NOT PRESCRIBED (INTENTIONAL) continuous prn for other Antiplatelet medication not prescribed intentionally due to Current anticoagulant therapy (warfarin/enoxaparin) (Patient not taking: Reported on 2/22/2024)         ALLERGIES     Allergies   Allergen Reactions    Amoxicillin-Pot Clavulanate Rash     Type III hypersensitivity    Bactrim [Sulfamethoxazole W/Trimethoprim] Rash     Serum sickness, type III hypersensitivity      Bee Venom Itching    Sulfa Antibiotics Hives    Celebrex [Celecoxib]     Lisinopril Cough    Naproxen Hives    Penicillin G          Review of Systems:  Skin:        Eyes:       ENT:       Respiratory:  Positive for dyspnea on  "exertion;shortness of breath  Cardiovascular:  Negative for;palpitations;chest pain;syncope or near-syncope;dizziness;fatigue edema;lightheadedness  Gastroenterology:      Genitourinary:       Musculoskeletal:       Neurologic:       Psychiatric:       Heme/Lymph/Imm:       Endocrine:         Physical Exam:  Vitals: /74   Pulse 71   Ht 1.715 m (5' 7.5\")   Wt 108 kg (238 lb)   SpO2 97%   BMI 36.73 kg/m      Constitutional:  cooperative, alert and oriented, well developed, well nourished, in no acute distress        Skin:  warm and dry to the touch, no apparent skin lesions or masses noted        Head:  normocephalic, no masses or lesions        Eyes:  pupils equal and round;sclera white;conjunctivae and lids unremarkable        ENT:           Neck:  no carotid bruit;carotid pulses are full and equal bilaterally hepatojugular reflux;JVP elevated      Chest:  normal breath sounds, clear to auscultation, normal A-P diameter, normal symmetry, normal respiratory excursion, no use of accessory muscles        Cardiac: normal S1 and S2;no S3 or S4 irregularly irregular rhythm distant heart sounds              Abdomen:  abdomen soft        Vascular: pulses full and equal                                      Extremities and Back:           Neurological:  no gross motor deficits            PAST MEDICAL HISTORY:  Past Medical History:   Diagnosis Date    AAA (abdominal aortic aneurysm) (H24)     Actinic keratosis     Angina pectoris (H24) 10/27/2020    Antiplatelet or antithrombotic long-term use     Eliquis    Arthritis     Atrial fibrillation and flutter (H) 12/03/2018    CAD (coronary artery disease)     1/5/2011 lateral ischemia on stress echo, 12/2014 normal stress echo    Coronary artery disease 01/01/2011    Abnormal stress test 1/2011 and controlled with medical mgmt     Depressive disorder     Mild    Diarrhea     Urgency at times    Dyslipidemia     Emphysema of lung (H)     Essential hypertension, benign  "    Hernia, abdominal     Hypersomnia with sleep apnea, unspecified     on bipap, partially treated with residual apneas    Hypertrophy (benign) of prostate 2001    Biopsy  negative for cancer; PSA 5    Lumbago     Mumps     Prostate cancer (H) 12/15/2006    Renal disease     Skin cancer, basal cell 1997    Sleep apnea     Uses a Bi-pap for centralized Apnea    Spider veins        PAST SURGICAL HISTORY:  Past Surgical History:   Procedure Laterality Date    ABDOMEN SURGERY      BACK SURGERY      L4/L5 decompression    BIOPSY      Skin cancer basal cell    COLONOSCOPY      CYSTOSCOPY      CYSTOSCOPY, TRANSURETHRAL RESECTION (TUR) TUMOR BLADDER, COMBINED N/A 2023    Procedure: Cystoscopy, transurethral resection of bladder tumor, medium, 2-5 cm;  Surgeon: Mark Pino MD;  Location: [facility] OR    EYE SURGERY      Cararact    GENITOURINARY SURGERY  2022    Tumors in bladder    HERNIA REPAIR  child    Moh's procedure for basal cell carcinoma  2001    PROSTATE SURGERY  2007    Radiation only    s/p lumbar laminectomy NOS  1988    SIGMOIDOSCOPY FLEXIBLE N/A 06/15/2020    Procedure: SIGMOIDOSCOPY, FLEXIBLE;  Surgeon: Sunni Patton MD;  Location: [facility]    VITRECTOMY PARS PLANA REMOVE PRERETINAL MEMBRANE   2009       FAMILY HISTORY:  Family History   Problem Relation Age of Onset    Alzheimer Disease Mother     Hypertension Mother     Cardiovascular Father         D:86 complications fo CHF    Anesthesia Reaction Father          after 2 weeks    Prostate Cancer Brother     Lung Cancer Brother 76    Other Cancer Brother         Lung    Prostate Cancer Brother        SOCIAL HISTORY:  Social History     Socioeconomic History    Marital status:      Spouse name: None    Number of children: None    Years of education: None    Highest education level: None   Occupational History    Occupation: retired   Tobacco Use    Smoking status: Former     Packs/day: 1.00     Years:  "30.00     Additional pack years: 0.00     Total pack years: 30.00     Types: Cigarettes     Start date: 1948     Quit date: 1978     Years since quittin.1     Passive exposure: Never    Smokeless tobacco: Never   Vaping Use    Vaping Use: Never used   Substance and Sexual Activity    Alcohol use: Not Currently     Comment: occ \"like once a month\" or less    Drug use: No    Sexual activity: Not Currently     Partners: Female     Birth control/protection: Post-menopausal   Other Topics Concern    Caffeine Concern No     Comment: 0-2 cans of soda per day.    Exercise No    Seat Belt Yes    Parent/sibling w/ CABG, MI or angioplasty before 65F 55M? No     Social Determinants of Health     Financial Resource Strain: Low Risk  (2023)    Financial Resource Strain     Within the past 12 months, have you or your family members you live with been unable to get utilities (heat, electricity) when it was really needed?: No   Food Insecurity: Low Risk  (2023)    Food Insecurity     Within the past 12 months, did you worry that your food would run out before you got money to buy more?: No     Within the past 12 months, did the food you bought just not last and you didn t have money to get more?: No   Transportation Needs: Low Risk  (2023)    Transportation Needs     Within the past 12 months, has lack of transportation kept you from medical appointments, getting your medicines, non-medical meetings or appointments, work, or from getting things that you need?: No   Physical Activity: Insufficiently Active (2022)    Exercise Vital Sign     Days of Exercise per Week: 1 day     Minutes of Exercise per Session: 10 min   Stress: No Stress Concern Present (2022)    [place] [employer] - Occupational Stress Questionnaire     Feeling of Stress : Not at all   Social Connections: Moderately Integrated (2022)    Social Connection and Isolation Panel [NHANES]     Frequency of " Communication with Friends and Family: Three times a week     Frequency of Social Gatherings with Friends and Family: Twice a week     Attends [employer] Services: More than 4 times per year     Active Member of Clubs or Organizations: Yes     Marital Status:    Interpersonal Safety: Low Risk  (12/11/2023)    Interpersonal Safety     Do you feel physically and emotionally safe where you currently live?: Yes     Within the past 12 months, have you been hit, slapped, kicked or otherwise physically hurt by someone?: No     Within the past 12 months, have you been humiliated or emotionally abused in other ways by your partner or ex-partner?: No   Housing Stability: Low Risk  (12/5/2023)    Housing Stability     Do you have housing? : Yes     Are you worried about losing your housing?: No       CC  Vinod De La Paz MD  [address] [place] [id number]            Thank you for allowing me to participate in the care of your patient.      Sincerely,     Nimisha Bennett PA-C     [facility]

## 2024-02-22 NOTE — PATIENT INSTRUCTIONS
Spencer - it was good to see you and speak with Amber today!    Reviewed that overall you're doing well! Maybe some mild fluid overload  Blood work 1/2024 looked OK  Echo 8/2023 showed normal pumping function. RV had that funny mass, but MRI confirmed it was adipose (fat)    PLAN:  Increase spironolactone to 25 mg daily (full tablet)  Get non-fasting blood work in 1 week to make sure your kidneys tolerating the increased spirolactone without issues  When we call with results, give us update on:  Swelling  Breathing (especially at night)  Any increased lightheadedness  What is weight?    4. Will plan to see Dr. De La Paz with echo 8/2024

## 2024-02-29 ENCOUNTER — OFFICE VISIT (OUTPATIENT)
Dept: URGENT CARE | Facility: URGENT CARE | Age: 89
End: 2024-02-29
Payer: COMMERCIAL

## 2024-02-29 ENCOUNTER — LAB (OUTPATIENT)
Dept: LAB | Facility: CLINIC | Age: 89
End: 2024-02-29
Payer: COMMERCIAL

## 2024-02-29 ENCOUNTER — ANCILLARY PROCEDURE (OUTPATIENT)
Dept: GENERAL RADIOLOGY | Facility: CLINIC | Age: 89
End: 2024-02-29
Attending: PHYSICIAN ASSISTANT
Payer: COMMERCIAL

## 2024-02-29 VITALS
DIASTOLIC BLOOD PRESSURE: 85 MMHG | TEMPERATURE: 98.5 F | RESPIRATION RATE: 18 BRPM | HEART RATE: 125 BPM | OXYGEN SATURATION: 95 % | SYSTOLIC BLOOD PRESSURE: 127 MMHG

## 2024-02-29 DIAGNOSIS — S49.92XA SHOULDER INJURY, LEFT, INITIAL ENCOUNTER: ICD-10-CM

## 2024-02-29 DIAGNOSIS — M24.812 INTERNAL DERANGEMENT OF SHOULDER, LEFT: ICD-10-CM

## 2024-02-29 DIAGNOSIS — I48.92 ATRIAL FLUTTER, CHRONIC (H): ICD-10-CM

## 2024-02-29 DIAGNOSIS — M75.32 CALCIFIC TENDINITIS OF LEFT SHOULDER: ICD-10-CM
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
## 2024-02-29 LAB
ANION GAP SERPL CALCULATED.3IONS-SCNC: 12 MMOL/L (ref 7–15)
BUN SERPL-MCNC: 18.4 MG/DL (ref 8–23)
CALCIUM SERPL-MCNC: 9.8 MG/DL (ref 8.8–10.2)
CHLORIDE SERPL-SCNC: 105 MMOL/L (ref 98–107)
CREAT SERPL-MCNC: 1.15 MG/DL (ref 0.67–1.17)
DEPRECATED HCO3 PLAS-SCNC: 21 MMOL/L (ref 22–29)
EGFRCR SERPLBLD CKD-EPI 2021: 62 ML/MIN/1.73M2
GLUCOSE SERPL-MCNC: 188 MG/DL (ref 70–99)
POTASSIUM SERPL-SCNC: 4.5 MMOL/L (ref 3.4–5.3)
SODIUM SERPL-SCNC: 138 MMOL/L (ref 135–145)

## 2024-02-29 PROCEDURE — 36415 COLL VENOUS BLD VENIPUNCTURE: CPT

## 2024-02-29 PROCEDURE — 99213 OFFICE O/P EST LOW 20 MIN: CPT | Performed by: PHYSICIAN ASSISTANT

## 2024-02-29 PROCEDURE — 80048 BASIC METABOLIC PNL TOTAL CA: CPT

## 2024-02-29 PROCEDURE — 73030 X-RAY EXAM OF SHOULDER: CPT | Mod: TC | Performed by: RADIOLOGY

## 2024-02-29 NOTE — PROGRESS NOTES
"  Assessment & Plan     Fall, initial encounter    Fall 2 days ago    Shoulder injury, left, initial encounter    Xray shoulder Negative for acute findings, read by El MORAN at time of visit.    ROM exercises  Referral to orthopedics  - XR Shoulder Left G/E 3 Views; Future  - Orthopedic  Referral; Future    Internal derangement of shoulder, left    Xray shoulder Negative for acute findings, read by El MORAN at time of visit.  Ice compresses  Rom exercises  Referral to orthopedics  - XR Shoulder Left G/E 3 Views; Future  - Orthopedic  Referral; Future    Calcific tendinitis of left shoulder    Found on xray  Follow up with orthopedics    Review of external notes as documented elsewhere in note    BMI  Estimated body mass index is 36.73 kg/m  as calculated from the following:    Height as of 2/22/24: 1.715 m (5' 7.5\").    Weight as of 2/22/24: 108 kg (238 lb).       CONSULTATION/REFERRAL to [facility]    No follow-ups on file.    Subjective   Spencer is a 88 year old, presenting for the following health issues:  Fall (Left shoulder, fell on it last Tuesday.)    HPI     Review of Systems  Constitutional, neuro, ENT, endocrine, pulmonary, cardiac, gastrointestinal, genitourinary, musculoskeletal, integument and psychiatric systems are negative, except as otherwise noted.      Objective    /85 (BP Location: Right arm, Patient Position: Sitting, Cuff Size: Adult Large)   Pulse (!) 125   Temp 98.5  F (36.9  C)   Resp 18   SpO2 95%   There is no height or weight on file to calculate BMI.  Physical Exam   GENERAL: alert and no distress  MS: Unable to lift shoulder, DROM due to inability to move shoulder  SKIN: no suspicious lesions or rashes  NEURO: Normal strength and tone, mentation intact and speech normal  PSYCH: mentation appears normal, affect normal/bright    Xray - Reviewed and interpreted by me.  Negative for acute findings, read by El MORAN at time of visit.   "      Signed Electronically by: El Cadet, LUISA, PARadhaC

## 2024-03-01 ENCOUNTER — DOCUMENTATION ONLY (OUTPATIENT)
Dept: CARDIOLOGY | Facility: CLINIC | Age: 89
End: 2024-03-01
Payer: COMMERCIAL

## 2024-03-01 NOTE — PROGRESS NOTES
As he does not think that increasing spironolactone has made any difference, I do think he would do better with torsemide.      Amber had been hesitant to do this given concern for urinary urgency leading to falls.  If you would like, I can contact her to see if she agrees with adding torsemide to his low-dose spironolactone at this point given he does not feel much different on still notes increased GALDAMEZ (even with rest/doing activities around the house), and waking up short of breath.    1.  Let me know if I can call Amber for him  2.  If he would prefer I not contact her, please have him reduce spironolactone back to 12.5 mg daily, add torsemide 10 mg daily in AM and get BMP in 10 days.    Chriss Luong  March 1, 2024 at 12:18 PM

## 2024-03-01 NOTE — PROGRESS NOTES
Patient called and had daughter on call as well.  Discussed the plan recommended per JOSE DANIEL Guallpa.   Both patient and daughter (Amber) do not want to proceed with plan as outlined.  They would like to give the spironolactone another week.  Patient had a fall this past week trip trying to get to the bathroom as he is dealing with loose stools and has urgency with this.  Patient will keep diary of weights and breathing concerns and will review in a week on whether or not changes need to occur.  Will update JOSE DNAIEL Guallpa regarding above.  BIANCA Gabriel

## 2024-03-01 NOTE — PROGRESS NOTES
Spoke to patient to review recommendations per Cherise Bennett, PA:  As he does not think that increasing spironolactone has made any difference, I do think he would do better with torsemide.       Amber had been hesitant to do this given concern for urinary urgency leading to falls.  If you would like, I can contact her to see if she agrees with adding torsemide to his low-dose spironolactone at this point given he does not feel much different on still notes increased GALDAMEZ (even with rest/doing activities around the house), and waking up short of breath.     1.  Let me know if I can call Amber for him  2.  If he would prefer I not contact her, please have him reduce spironolactone back to 12.5 mg daily, add torsemide 10 mg daily in AM and get BMP in 10 days.    Patient is going to call Amber (daughter) to review with her and get back to us with decision on what to do for his medications.  BIANCA Gabriel

## 2024-03-01 NOTE — PROGRESS NOTES
Spoke to patient to review labs and recommendations per JOSE DANIEL Guallpa    let Spencer know I reviewed labs. I saw him 2/22 and asked she increase spironolactone to 25 mg daily for SOB/edema (daughter Amber was hesitant to start torsemide given c/o urinary urgency/possible falls).     Is swelling/breathing (amada at night) any better?    Dose not notice any changes in his breathing.  Swelling better.  What is weight? Was 238# at OV 2/22 - he was to weigh self 2/23 and again 2/29 day of lab so we could compare   2/23 weight 228/2, 2/27 weight 229/4 and 2/29 weight 227.8  Any problems with lightheadedness?  no problems with lightheadedness.      ** If he's feeling better and not having issues, pls continue spironolactone 25 mg daily, see me in 2-3 m with repeat BMP (pls order)     ** If no better OR having issues, let me know as I think we'll have to start torsemide    Overall patient does not feel the increase in spironolactone has made any difference.  Will update JOSE DANIEL Guallpa regarding above.  BIANCA Gabriel

## 2024-03-01 NOTE — PROGRESS NOTES
Pls let Spencer know I reviewed labs. I saw him 2/22 and asked she increase spironolactone to 25 mg daily for SOB/edema (daughter Amber was hesitant to start torsemide given c/o urinary urgency/possible falls).    Is swelling/breathing (amada at night) any better?  What is weight? Was 238# at OV 2/22 - he was to weigh self 2/23 and again 2/29 day of lab so we could compare  Any problems with lightheadedness?       Component      Latest Ref Rng 12/11/2023  11:34 AM 1/22/2024  10:59 AM 2/29/2024  9:09 AM   Sodium      135 - 145 mmol/L 138  144  138    Potassium      3.4 - 5.3 mmol/L 4.5  4.9 4.5    Chloride      98 - 107 mmol/L 108 (H)  105 105    Carbon Dioxide (CO2)      22 - 29 mmol/L 21 (L)  29 21 (L)    Anion Gap      7 - 15 mmol/L 9  10  12    Urea Nitrogen      8.0 - 23.0 mg/dL 26.5 (H)  20 18.4    Creatinine      0.67 - 1.17 mg/dL 1.12  1.10  1.15    GFR Estimate      >60 mL/min/1.73m2 64  65  62    Calcium      8.8 - 10.2 mg/dL 9.3  10.0  9.8    Glucose      70 - 99 mg/dL 134 (H)  118 (H) 188 (H)         ** If he's feeling better and not having issues, pls continue spironolactone 25 mg daily, see me in 2-3 m with repeat BMP (pls order)    ** If no better OR having issues, let me know as I think we'll have to start torsemide    Chriss Cherise

## 2024-03-01 NOTE — PROGRESS NOTES
Sounds reasonable.  Thanks for calling him.    Pls make a note to call him in 1-2 weeks to get update (if he hasn't called)    Chriss Luong  March 1, 2024 at 3:29 PM

## 2024-03-12 ENCOUNTER — MYC MEDICAL ADVICE (OUTPATIENT)
Dept: CARDIOLOGY | Facility: CLINIC | Age: 89
End: 2024-03-12
Payer: COMMERCIAL

## 2024-03-12 DIAGNOSIS — I48.19 PERSISTENT ATRIAL FIBRILLATION (H): ICD-10-CM

## 2024-03-12 DIAGNOSIS — I10 BENIGN ESSENTIAL HYPERTENSION: ICD-10-CM

## 2024-03-12 RX ORDER — SPIRONOLACTONE 25 MG/1
25 TABLET ORAL DAILY
Qty: 30 TABLET | Refills: 3 | Status: SHIPPED | OUTPATIENT
Start: 2024-03-12 | End: 2024-04-09

## 2024-03-12 NOTE — PROGRESS NOTES
See MyChart - pt declines torsemide. Spironolactone 25 mg daily continued  Encounter closed    March 12, 2024 at 4:27 PM

## 2024-04-09 ENCOUNTER — MYC REFILL (OUTPATIENT)
Dept: CARDIOLOGY | Facility: CLINIC | Age: 89
End: 2024-04-09
Payer: COMMERCIAL

## 2024-04-09 DIAGNOSIS — I48.19 PERSISTENT ATRIAL FIBRILLATION (H): ICD-10-CM

## 2024-04-09 DIAGNOSIS — I10 BENIGN ESSENTIAL HYPERTENSION: ICD-10-CM

## 2024-04-09 RX ORDER — SPIRONOLACTONE 25 MG/1
25 TABLET ORAL DAILY
Qty: 30 TABLET | Refills: 3 | Status: ON HOLD | OUTPATIENT
Start: 2024-04-09 | End: 2024-09-10

## 2024-04-09 NOTE — TELEPHONE ENCOUNTER
Please update Rx to lower dose as requested and send Rx in.    He and Dr. De La Paz can decide if adjustments in diuretic therapy will be needed when he is seen 8/2024 with updated echo.  Based on last echo, I think he needs loop diuretic, but daughter has been hesitant given concern for urinary frequency    Randall  April 9, 2024 at 3:16 PM    
Clothing

## 2024-04-17 DIAGNOSIS — M85.851 OSTEOPENIA OF BOTH HIPS: ICD-10-CM

## 2024-04-17 DIAGNOSIS — M85.852 OSTEOPENIA OF BOTH HIPS: ICD-10-CM

## 2024-04-17 RX ORDER — RISEDRONATE SODIUM 35 MG/1
35 TABLET, FILM COATED ORAL
Qty: 12 TABLET | Refills: 0 | Status: SHIPPED | OUTPATIENT
Start: 2024-04-17 | End: 2024-07-02

## 2024-04-22 DIAGNOSIS — E78.5 DYSLIPIDEMIA: ICD-10-CM

## 2024-04-22 RX ORDER — ATORVASTATIN CALCIUM 40 MG/1
TABLET, FILM COATED ORAL
Qty: 90 TABLET | Refills: 0 | Status: SHIPPED | OUTPATIENT
Start: 2024-04-22 | End: 2024-07-23

## 2024-04-25 ENCOUNTER — MYC MEDICAL ADVICE (OUTPATIENT)
Dept: PEDIATRICS | Facility: CLINIC | Age: 89
End: 2024-04-25
Payer: COMMERCIAL

## 2024-04-25 ENCOUNTER — E-VISIT (OUTPATIENT)
Dept: URGENT CARE | Facility: CLINIC | Age: 89
End: 2024-04-25
Payer: COMMERCIAL

## 2024-04-25 DIAGNOSIS — R30.0 DYSURIA: ICD-10-CM

## 2024-04-25 DIAGNOSIS — R41.3 MEMORY LOSS: Primary | ICD-10-CM

## 2024-04-25 PROCEDURE — 99421 OL DIG E/M SVC 5-10 MIN: CPT | Performed by: PHYSICIAN ASSISTANT

## 2024-04-25 NOTE — PATIENT INSTRUCTIONS
Dear Nixon More,     After reviewing your responses, I would like you to come in for a urine test to make sure we treat you correctly. This urine test is to evaluate you for a possible urinary tract infection, and should be scheduled for today or tomorrow. Schedule a Lab Only appointment here.     Lab appointments are not available at most locations on the weekends. If no Lab Only appointment is available, you should be seen in any of our convenient Walk-in or Urgent Care Centers, which can be found on our website here.     You will receive instructions with your results in [employer] once they are available.     If your symptoms worsen, you develop pain in your back or stomach, develop fevers, or are not improving in 5 days, please contact your primary care provider for an appointment or visit a Walk-in or Urgent Care Center to be seen.     Thanks again for choosing us as your health care partner,     Zenaida Macias PA-C

## 2024-04-26 ENCOUNTER — LAB (OUTPATIENT)
Dept: LAB | Facility: CLINIC | Age: 89
End: 2024-04-26
Payer: COMMERCIAL

## 2024-04-26 DIAGNOSIS — R41.3 MEMORY LOSS: ICD-10-CM

## 2024-04-26 PROCEDURE — 81003 URINALYSIS AUTO W/O SCOPE: CPT

## 2024-06-13 ENCOUNTER — TRANSFERRED RECORDS (OUTPATIENT)
Dept: HEALTH INFORMATION MANAGEMENT | Facility: CLINIC | Age: 89
End: 2024-06-13
Payer: COMMERCIAL

## 2024-06-26 ENCOUNTER — TRANSFERRED RECORDS (OUTPATIENT)
Dept: HEALTH INFORMATION MANAGEMENT | Facility: CLINIC | Age: 89
End: 2024-06-26
Payer: COMMERCIAL

## 2024-06-27 ENCOUNTER — TRANSFERRED RECORDS (OUTPATIENT)
Dept: HEALTH INFORMATION MANAGEMENT | Facility: CLINIC | Age: 89
End: 2024-06-27
Payer: COMMERCIAL

## 2024-07-02 DIAGNOSIS — M85.852 OSTEOPENIA OF BOTH HIPS: ICD-10-CM

## 2024-07-02 DIAGNOSIS — M85.851 OSTEOPENIA OF BOTH HIPS: ICD-10-CM

## 2024-07-02 RX ORDER — RISEDRONATE SODIUM 35 MG/1
35 TABLET, FILM COATED ORAL
Qty: 12 TABLET | Refills: 0 | Status: SHIPPED | OUTPATIENT
Start: 2024-07-02 | End: 2024-07-23

## 2024-07-18 SDOH — HEALTH STABILITY: PHYSICAL HEALTH: ON AVERAGE, HOW MANY MINUTES DO YOU ENGAGE IN EXERCISE AT THIS LEVEL?: 10 MIN

## 2024-07-18 SDOH — HEALTH STABILITY: PHYSICAL HEALTH: ON AVERAGE, HOW MANY DAYS PER WEEK DO YOU ENGAGE IN MODERATE TO STRENUOUS EXERCISE (LIKE A BRISK WALK)?: 1 DAY

## 2024-07-18 ASSESSMENT — SOCIAL DETERMINANTS OF HEALTH (SDOH): HOW OFTEN DO YOU GET TOGETHER WITH FRIENDS OR RELATIVES?: THREE TIMES A WEEK

## 2024-07-23 ENCOUNTER — OFFICE VISIT (OUTPATIENT)
Dept: PEDIATRICS | Facility: CLINIC | Age: 89
End: 2024-07-23
Attending: INTERNAL MEDICINE
Payer: COMMERCIAL

## 2024-07-23 ENCOUNTER — MYC MEDICAL ADVICE (OUTPATIENT)
Dept: UROLOGY | Facility: CLINIC | Age: 89
End: 2024-07-23

## 2024-07-23 VITALS
WEIGHT: 235 LBS | TEMPERATURE: 97.5 F | OXYGEN SATURATION: 97 % | HEART RATE: 78 BPM | BODY MASS INDEX: 36.88 KG/M2 | RESPIRATION RATE: 12 BRPM | SYSTOLIC BLOOD PRESSURE: 109 MMHG | DIASTOLIC BLOOD PRESSURE: 70 MMHG | HEIGHT: 67 IN

## 2024-07-23 DIAGNOSIS — F32.0 MILD MAJOR DEPRESSION (H): ICD-10-CM

## 2024-07-23 DIAGNOSIS — I10 HYPERTENSION GOAL BP (BLOOD PRESSURE) < 140/90: ICD-10-CM

## 2024-07-23 DIAGNOSIS — E78.5 DYSLIPIDEMIA: ICD-10-CM

## 2024-07-23 DIAGNOSIS — E66.812 CLASS 2 SEVERE OBESITY DUE TO EXCESS CALORIES WITH SERIOUS COMORBIDITY AND BODY MASS INDEX (BMI) OF 36.0 TO 36.9 IN ADULT (H): ICD-10-CM

## 2024-07-23 DIAGNOSIS — H61.21 IMPACTED CERUMEN OF RIGHT EAR: ICD-10-CM

## 2024-07-23 DIAGNOSIS — Z00.00 ENCOUNTER FOR MEDICARE ANNUAL WELLNESS EXAM: Primary | ICD-10-CM

## 2024-07-23 DIAGNOSIS — H34.8392 BRANCH RETINAL VEIN OCCLUSION, UNSPECIFIED COMPLICATION STATUS, UNSPECIFIED LATERALITY (H): ICD-10-CM

## 2024-07-23 DIAGNOSIS — E55.9 VITAMIN D DEFICIENCY: ICD-10-CM

## 2024-07-23 DIAGNOSIS — G47.30 HYPERSOMNIA WITH SLEEP APNEA: ICD-10-CM

## 2024-07-23 DIAGNOSIS — M85.852 OSTEOPENIA OF BOTH HIPS: ICD-10-CM

## 2024-07-23 DIAGNOSIS — R73.01 IMPAIRED FASTING GLUCOSE: ICD-10-CM

## 2024-07-23 DIAGNOSIS — N18.31 STAGE 3A CHRONIC KIDNEY DISEASE (H): ICD-10-CM

## 2024-07-23 DIAGNOSIS — M85.851 OSTEOPENIA OF BOTH HIPS: ICD-10-CM

## 2024-07-23 DIAGNOSIS — E66.01 CLASS 2 SEVERE OBESITY DUE TO EXCESS CALORIES WITH SERIOUS COMORBIDITY AND BODY MASS INDEX (BMI) OF 36.0 TO 36.9 IN ADULT (H): ICD-10-CM

## 2024-07-23 DIAGNOSIS — G47.10 HYPERSOMNIA WITH SLEEP APNEA: ICD-10-CM

## 2024-07-23 DIAGNOSIS — J43.8 OTHER EMPHYSEMA (H): ICD-10-CM

## 2024-07-23 DIAGNOSIS — Z79.83 LONG TERM (CURRENT) USE OF BISPHOSPHONATES: ICD-10-CM

## 2024-07-23 DIAGNOSIS — C61 PROSTATE CANCER (H): ICD-10-CM

## 2024-07-23 DIAGNOSIS — R73.9 HYPERGLYCEMIA: ICD-10-CM

## 2024-07-23 LAB
ALBUMIN SERPL BCG-MCNC: 3.9 G/DL (ref 3.5–5.2)
ALBUMIN UR-MCNC: NEGATIVE MG/DL
ALP SERPL-CCNC: 58 U/L (ref 40–150)
ALT SERPL W P-5'-P-CCNC: 19 U/L (ref 0–70)
ANION GAP SERPL CALCULATED.3IONS-SCNC: 12 MMOL/L (ref 7–15)
APPEARANCE UR: CLEAR
AST SERPL W P-5'-P-CCNC: 32 U/L (ref 0–45)
BACTERIA #/AREA URNS HPF: ABNORMAL /HPF
BILIRUB SERPL-MCNC: 0.7 MG/DL
BILIRUB UR QL STRIP: NEGATIVE
BUN SERPL-MCNC: 22.4 MG/DL (ref 8–23)
CALCIUM SERPL-MCNC: 9.4 MG/DL (ref 8.8–10.4)
CHLORIDE SERPL-SCNC: 108 MMOL/L (ref 98–107)
CHOLEST SERPL-MCNC: 114 MG/DL
COLOR UR AUTO: YELLOW
CREAT SERPL-MCNC: 1.13 MG/DL (ref 0.67–1.17)
EGFRCR SERPLBLD CKD-EPI 2021: 63 ML/MIN/1.73M2
FASTING STATUS PATIENT QL REPORTED: ABNORMAL
FASTING STATUS PATIENT QL REPORTED: NORMAL
GLUCOSE SERPL-MCNC: 96 MG/DL (ref 70–99)
GLUCOSE UR STRIP-MCNC: NEGATIVE MG/DL
HBA1C MFR BLD: 6 % (ref 0–5.6)
HCO3 SERPL-SCNC: 20 MMOL/L (ref 22–29)
HDLC SERPL-MCNC: 41 MG/DL
HGB UR QL STRIP: ABNORMAL
HYALINE CASTS #/AREA URNS LPF: ABNORMAL /LPF
KETONES UR STRIP-MCNC: NEGATIVE MG/DL
LDLC SERPL CALC-MCNC: 49 MG/DL
LEUKOCYTE ESTERASE UR QL STRIP: NEGATIVE
MUCOUS THREADS #/AREA URNS LPF: PRESENT /LPF
NITRATE UR QL: NEGATIVE
NONHDLC SERPL-MCNC: 73 MG/DL
PH UR STRIP: 5 [PH] (ref 5–7)
POTASSIUM SERPL-SCNC: 4.4 MMOL/L (ref 3.4–5.3)
PROT SERPL-MCNC: 7.2 G/DL (ref 6.4–8.3)
RBC #/AREA URNS AUTO: ABNORMAL /HPF
SODIUM SERPL-SCNC: 140 MMOL/L (ref 135–145)
SP GR UR STRIP: 1.02 (ref 1–1.03)
SQUAMOUS #/AREA URNS AUTO: ABNORMAL /LPF
TRIGL SERPL-MCNC: 121 MG/DL
UROBILINOGEN UR STRIP-ACNC: 0.2 E.U./DL
WBC #/AREA URNS AUTO: ABNORMAL /HPF

## 2024-07-23 PROCEDURE — G0439 PPPS, SUBSEQ VISIT: HCPCS | Performed by: INTERNAL MEDICINE

## 2024-07-23 PROCEDURE — 36415 COLL VENOUS BLD VENIPUNCTURE: CPT | Performed by: INTERNAL MEDICINE

## 2024-07-23 PROCEDURE — 80061 LIPID PANEL: CPT | Performed by: INTERNAL MEDICINE

## 2024-07-23 PROCEDURE — 82570 ASSAY OF URINE CREATININE: CPT | Performed by: INTERNAL MEDICINE

## 2024-07-23 PROCEDURE — 69209 REMOVE IMPACTED EAR WAX UNI: CPT | Mod: RT | Performed by: INTERNAL MEDICINE

## 2024-07-23 PROCEDURE — 81001 URINALYSIS AUTO W/SCOPE: CPT | Performed by: INTERNAL MEDICINE

## 2024-07-23 PROCEDURE — 80053 COMPREHEN METABOLIC PANEL: CPT | Performed by: INTERNAL MEDICINE

## 2024-07-23 PROCEDURE — 82043 UR ALBUMIN QUANTITATIVE: CPT | Performed by: INTERNAL MEDICINE

## 2024-07-23 PROCEDURE — 99214 OFFICE O/P EST MOD 30 MIN: CPT | Mod: 25 | Performed by: INTERNAL MEDICINE

## 2024-07-23 PROCEDURE — 83036 HEMOGLOBIN GLYCOSYLATED A1C: CPT | Performed by: INTERNAL MEDICINE

## 2024-07-23 RX ORDER — ATORVASTATIN CALCIUM 40 MG/1
40 TABLET, FILM COATED ORAL DAILY
Qty: 90 TABLET | Refills: 3 | Status: SHIPPED | OUTPATIENT
Start: 2024-07-23

## 2024-07-23 RX ORDER — ESCITALOPRAM OXALATE 10 MG/1
10 TABLET ORAL DAILY
Qty: 90 TABLET | Refills: 3 | Status: SHIPPED | OUTPATIENT
Start: 2024-07-23

## 2024-07-23 RX ORDER — LOSARTAN POTASSIUM 100 MG/1
100 TABLET ORAL DAILY
Qty: 90 TABLET | Refills: 3 | Status: ON HOLD | OUTPATIENT
Start: 2024-07-23 | End: 2024-09-10

## 2024-07-23 RX ORDER — ALBUTEROL SULFATE 90 UG/1
2 AEROSOL, METERED RESPIRATORY (INHALATION) EVERY 6 HOURS PRN
Qty: 8 G | Refills: 2 | Status: SHIPPED | OUTPATIENT
Start: 2024-07-23

## 2024-07-23 RX ORDER — RISEDRONATE SODIUM 35 MG/1
35 TABLET, FILM COATED ORAL
Qty: 12 TABLET | Refills: 4 | Status: SHIPPED | OUTPATIENT
Start: 2024-07-23

## 2024-07-23 ASSESSMENT — PATIENT HEALTH QUESTIONNAIRE - PHQ9
SUM OF ALL RESPONSES TO PHQ QUESTIONS 1-9: 1
SUM OF ALL RESPONSES TO PHQ QUESTIONS 1-9: 1
10. IF YOU CHECKED OFF ANY PROBLEMS, HOW DIFFICULT HAVE THESE PROBLEMS MADE IT FOR YOU TO DO YOUR WORK, TAKE CARE OF THINGS AT HOME, OR GET ALONG WITH OTHER PEOPLE: SOMEWHAT DIFFICULT

## 2024-07-23 ASSESSMENT — PAIN SCALES - GENERAL: PAINLEVEL: NO PAIN (0)

## 2024-07-23 ASSESSMENT — ACTIVITIES OF DAILY LIVING (ADL): CURRENT_FUNCTION: NO ASSISTANCE NEEDED

## 2024-07-23 NOTE — NURSING NOTE
Patient identified using two patient identifiers.  Ear exam showing wax occlusion completed by provider.  H202/H20 was placed in the right ear(s) via irrigation tool: elephant ear.      Unable to complete pt will try Debrox at home per pcp recommendation    Tigist Pina MA 1:45 PM 7/23/2024

## 2024-07-23 NOTE — PATIENT INSTRUCTIONS
Patient Education     Do something to get moving most days of the week.  Look into classes in your building - work with Amber to get it on your calendar.    Start the anoro inhaler once daily and use the albuterol inhaler every four hrs as needed.  Preventive Care Advice   This is general advice given by our system to help you stay healthy. However, your care team may have specific advice just for you. Please talk to your care team about your preventive care needs.  Nutrition  Eat 5 or more servings of fruits and vegetables each day.  Try wheat bread, brown rice and whole grain pasta (instead of white bread, rice, and pasta).  Get enough calcium and vitamin D. Check the label on foods and aim for 100% of the RDA (recommended daily allowance).  Lifestyle  Exercise at least 150 minutes each week  (30 minutes a day, 5 days a week).  Do muscle strengthening activities 2 days a week. These help control your weight and prevent disease.  No smoking.  Wear sunscreen to prevent skin cancer.  Have a dental exam and cleaning every 6 months.  Yearly exams  See your health care team every year to talk about:  Any changes in your health.  Any medicines your care team has prescribed.  Preventive care, family planning, and ways to prevent chronic diseases.  Shots (vaccines)   HPV shots (up to age 26), if you've never had them before.  Hepatitis B shots (up to age 59), if you've never had them before.  COVID-19 shot: Get this shot when it's due.  Flu shot: Get a flu shot every year.  Tetanus shot: Get a tetanus shot every 10 years.  Pneumococcal, hepatitis A, and RSV shots: Ask your care team if you need these based on your risk.  Shingles shot (for age 50 and up)  General health tests  Diabetes screening:  Starting at age 35, Get screened for diabetes at least every 3 years.  If you are younger than age 35, ask your care team if you should be screened for diabetes.  Cholesterol test: At age 39, start having a cholesterol test every  5 years, or more often if advised.  Bone density scan (DEXA): At age 50, ask your care team if you should have this scan for osteoporosis (brittle bones).  Hepatitis C: Get tested at least once in your life.  STIs (sexually transmitted infections)  Before age 24: Ask your care team if you should be screened for STIs.  After age 24: Get screened for STIs if you're at risk. You are at risk for STIs (including HIV) if:  You are sexually active with more than one person.  You don't use condoms every time.  You or a partner was diagnosed with a sexually transmitted infection.  If you are at risk for HIV, ask about PrEP medicine to prevent HIV.  Get tested for HIV at least once in your life, whether you are at risk for HIV or not.  Cancer screening tests  Cervical cancer screening: If you have a cervix, begin getting regular cervical cancer screening tests starting at age 21.  Breast cancer scan (mammogram): If you've ever had breasts, begin having regular mammograms starting at age 40. This is a scan to check for breast cancer.  Colon cancer screening: It is important to start screening for colon cancer at age 45.  Have a colonoscopy test every 10 years (or more often if you're at risk) Or, ask your provider about stool tests like a FIT test every year or Cologuard test every 3 years.  To learn more about your testing options, visit:   .  For help making a decision, visit:   https://bit.ly/eb45539.  Prostate cancer screening test: If you have a prostate, ask your care team if a prostate cancer screening test (PSA) at age 55 is right for you.  Lung cancer screening: If you are a current or former smoker ages 50 to 80, ask your care team if ongoing lung cancer screenings are right for you.  For informational purposes only. Not to replace the advice of your health care provider. Copyright   2023 [employer]. All rights reserved. Clinically reviewed by the [facility]. [employer] 941975  - REV 01/24.  Preventing Falls: Care Instructions  Injuries and health problems such as trouble walking or poor eyesight can increase your risk of falling. So can some medicines. But there are things you can do to help prevent falls. You can exercise to get stronger. You can also arrange your home to make it safer.    Talk to your doctor about the medicines you take. Ask if any of them increase the risk of falls and whether they can be changed or stopped.   Try to exercise regularly. It can help improve your strength and balance. This can help lower your risk of falling.     Practice fall safety and prevention.    Wear low-heeled shoes that fit well and give your feet good support. Talk to your doctor if you have foot problems that make this hard.  Carry a cellphone or wear a medical alert device that you can use to call for help.  Use stepladders instead of chairs to reach high objects. Don't climb if you're at risk for falls. Ask for help, if needed.  Wear the correct eyeglasses, if you need them.    Make your home safer.    Remove rugs, cords, clutter, and furniture from walkways.  Keep your house well lit. Use night-lights in hallways and bathrooms.  Install and use sturdy handrails on stairways.  Wear nonskid footwear, even inside. Don't walk barefoot or in socks without shoes.    Be safe outside.    Use handrails, curb cuts, and ramps whenever possible.  Keep your hands free by using a shoulder bag or backpack.  Try to walk in well-lit areas. Watch out for uneven ground, changes in pavement, and debris.  Be careful in the winter. Walk on the grass or gravel when sidewalks are slippery. Use de-icer on steps and walkways. Add non-slip devices to shoes.    Put grab bars and nonskid mats in your shower or tub and near the toilet. Try to use a shower chair or bath bench when bathing.   Get into a tub or shower by putting in your weaker leg first. Get out with your strong side first. Have a phone or medical alert  "device in the bathroom with you.   Where can you learn more?  Go to https://www.Medesen.net/patiented  Enter G117 in the search box to learn more about \"Preventing Falls: Care Instructions.\"  Current as of: July 17, 2023               Content Version: 14.0    7133-4354 [employer].   Care instructions adapted under license by your healthcare professional. If you have questions about a medical condition or this instruction, always ask your healthcare professional. [employer] disclaims any warranty or liability for your use of this information.      Hearing Loss: Care Instructions  Overview     Hearing loss is a sudden or slow decrease in how well you hear. It can range from slight to profound. Permanent hearing loss can occur with aging. It also can happen when you are exposed long-term to loud noise. Examples include listening to loud music, riding motorcycles, or being around other loud machines.  Hearing loss can affect your work and home life. It can make you feel lonely or depressed. You may feel that you have lost your independence. But hearing aids and other devices can help you hear better and feel connected to others.  Follow-up care is a key part of your treatment and safety. Be sure to make and go to all appointments, and call your doctor if you are having problems. It's also a good idea to know your test results and keep a list of the medicines you take.  How can you care for yourself at home?  Avoid loud noises whenever possible. This helps keep your hearing from getting worse.  Always wear hearing protection around loud noises.  Wear a hearing aid as directed.  A professional can help you pick a hearing aid that will work best for you.  You can also get hearing aids over the counter for mild to moderate hearing loss.  Have hearing tests as your doctor suggests. They can show whether your hearing has changed. Your hearing aid may need to be adjusted.  Use other devices as " "needed. These may include:  Telephone amplifiers and hearing aids that can connect to a television, stereo, radio, or microphone.  Devices that use lights or vibrations. These alert you to the doorbell, a ringing telephone, or a baby monitor.  Television closed-captioning. This shows the words at the bottom of the screen. Most new TVs can do this.  TTY (text telephone). This lets you type messages back and forth on the telephone instead of talking or listening. These devices are also called TDD. When messages are typed on the keyboard, they are sent over the phone line to a receiving TTY. The message is shown on a monitor.  Use text messaging, social media, and email if it is hard for you to communicate by telephone.  Try to learn a listening technique called speechreading. It is not lipreading. You pay attention to people's gestures, expressions, posture, and tone of voice. These clues can help you understand what a person is saying. Face the person you are talking to, and have them face you. Make sure the lighting is good. You need to see the other person's face clearly.  Think about counseling if you need help to adjust to your hearing loss.  When should you call for help?  Watch closely for changes in your health, and be sure to contact your doctor if:    You think your hearing is getting worse.     You have new symptoms, such as dizziness or nausea.   Where can you learn more?  Go to https://www.Zokem.net/patiented  Enter R798 in the search box to learn more about \"Hearing Loss: Care Instructions.\"  Current as of: September 27, 2023               Content Version: 14.0    5166-6226 [employer].   Care instructions adapted under license by your healthcare professional. If you have questions about a medical condition or this instruction, always ask your healthcare professional. [employer] disclaims any warranty or liability for your use of this information.      Bladder Training: " Care Instructions  Your Care Instructions     Bladder training is used to treat urge incontinence and stress incontinence. Urge incontinence means that the need to urinate comes on so fast that you can't get to a toilet in time. Stress incontinence means that you leak urine because of pressure on your bladder. For example, it may happen when you laugh, cough, or lift something heavy.  Bladder training can increase how long you can wait before you have to urinate. It can also help your bladder hold more urine. And it can give you better control over the urge to urinate.  It is important to remember that bladder training takes a few weeks to a few months to make a difference. You may not see results right away, but don't give up.  Follow-up care is a key part of your treatment and safety. Be sure to make and go to all appointments, and call your doctor if you are having problems. It's also a good idea to know your test results and keep a list of the medicines you take.  How can you care for yourself at home?  Work with your doctor to come up with a bladder training program that is right for you. You may use one or more of the following methods.  Delayed urination  In the beginning, try to keep from urinating for 5 minutes after you first feel the need to go.  While you wait, take deep, slow breaths to relax. Kegel exercises can also help you delay the need to go to the bathroom.  After some practice, when you can easily wait 5 minutes to urinate, try to wait 10 minutes before you urinate.  Slowly increase the waiting period until you are able to control when you have to urinate.  Scheduled urination  Empty your bladder when you first wake up in the morning.  Schedule times throughout the day when you will urinate.  Start by going to the bathroom every hour, even if you don't need to go.  Slowly increase the time between trips to the bathroom.  When you have found a schedule that works well for you, keep doing it.  If  "you wake up during the night and have to urinate, do it. Apply your schedule to waking hours only.  Kegel exercises  These tighten and strengthen pelvic muscles, which can help you control the flow of urine. (If doing these exercises causes pain, stop doing them and talk with your doctor.) To do Kegel exercises:  Squeeze your muscles as if you were trying not to pass gas. Or squeeze your muscles as if you were stopping the flow of urine. Your belly, legs, and buttocks shouldn't move.  Hold the squeeze for 3 seconds, then relax for 5 to 10 seconds.  Start with 3 seconds, then add 1 second each week until you are able to squeeze for 10 seconds.  Repeat the exercise 10 times a session. Do 3 to 8 sessions a day.  When should you call for help?  Watch closely for changes in your health, and be sure to contact your doctor if:    Your incontinence is getting worse.     You do not get better as expected.   Where can you learn more?  Go to https://www.[employer]teGreat.net/patiented  Enter V684 in the search box to learn more about \"Bladder Training: Care Instructions.\"  Current as of: November 15, 2023               Content Version: 14.0    9003-6030 [employer].   Care instructions adapted under license by your healthcare professional. If you have questions about a medical condition or this instruction, always ask your healthcare professional. [employer] disclaims any warranty or liability for your use of this information.         "

## 2024-07-23 NOTE — PROGRESS NOTES
Preventive Care Visit  [facility] BEATRICE Lewis MD, Internal Medicine  Jul 23, 2024      Assessment & Plan     Encounter for Medicare annual wellness exam  Routine health education discussed: calcium, diet, exercise, weight, safety.     Other emphysema (H)  Uncontrolled.  Shortness of breath limiting activity.  Start lama-laba daily and albuterol as needed.    - umeclidinium-vilanterol (ANORO ELLIPTA) 62.5-25 MCG/ACT oral inhaler; Inhale 1 puff into the lungs daily  - albuterol (PROAIR HFA/PROVENTIL HFA/VENTOLIN HFA) 108 (90 Base) MCG/ACT inhaler; Inhale 2 puffs into the lungs every 6 hours as needed for shortness of breath, wheezing or cough    Prostate cancer (H)  Continue to follow-up with urology, psa stable and low  - Comprehensive metabolic panel (BMP + Alb, Alk Phos, ALT, AST, Total. Bili, TP); Future  - UA with Microscopic reflex to Culture - lab collect; Future  - Comprehensive metabolic panel (BMP + Alb, Alk Phos, ALT, AST, Total. Bili, TP)  - UA with Microscopic reflex to Culture - lab collect  - UA Microscopic with Reflex to Culture    Mild major depression (H24)  Controlled.  Continue medication   - escitalopram (LEXAPRO) 10 MG tablet; Take 1 tablet (10 mg) by mouth daily  - Comprehensive metabolic panel (BMP + Alb, Alk Phos, ALT, AST, Total. Bili, TP); Future  - Comprehensive metabolic panel (BMP + Alb, Alk Phos, ALT, AST, Total. Bili, TP)    Hypertension goal BP (blood pressure) < 140/90  Controlled.  Continue medication and check labs  - losartan (COZAAR) 100 MG tablet; Take 1 tablet (100 mg) by mouth daily  - Hemoglobin A1c; Future  - Lipid panel reflex to direct LDL Non-fasting; Future  - Albumin Random Urine Quantitative with Creat Ratio; Future  - Comprehensive metabolic panel (BMP + Alb, Alk Phos, ALT, AST, Total. Bili, TP); Future  - UA with Microscopic reflex to Culture - lab collect; Future  - Hemoglobin A1c  - Lipid panel reflex to direct LDL Non-fasting  - Albumin  Random Urine Quantitative with Creat Ratio  - Comprehensive metabolic panel (BMP + Alb, Alk Phos, ALT, AST, Total. Bili, TP)  - UA with Microscopic reflex to Culture - lab collect  - UA Microscopic with Reflex to Culture    Stage 3a chronic kidney disease (H)  Monitor renal function and continue with ARB  - Hemoglobin A1c; Future  - Albumin Random Urine Quantitative with Creat Ratio; Future  - Comprehensive metabolic panel (BMP + Alb, Alk Phos, ALT, AST, Total. Bili, TP); Future  - UA with Microscopic reflex to Culture - lab collect; Future  - Hemoglobin A1c  - Albumin Random Urine Quantitative with Creat Ratio  - Comprehensive metabolic panel (BMP + Alb, Alk Phos, ALT, AST, Total. Bili, TP)  - UA with Microscopic reflex to Culture - lab collect  - UA Microscopic with Reflex to Culture    Class 2 severe obesity due to excess calories with serious comorbidity and body mass index (BMI) of 36.0 to 36.9 in adult (H)  Discussed increasing activity.  Hopefully decrease in shortness of breath will help.  - Hemoglobin A1c; Future  - Hemoglobin A1c    Hyperglycemia  Check labs, encouraged exercise  - Hemoglobin A1c; Future  - Comprehensive metabolic panel (BMP + Alb, Alk Phos, ALT, AST, Total. Bili, TP); Future  - UA with Microscopic reflex to Culture - lab collect; Future  - Hemoglobin A1c  - Comprehensive metabolic panel (BMP + Alb, Alk Phos, ALT, AST, Total. Bili, TP)  - UA with Microscopic reflex to Culture - lab collect  - UA Microscopic with Reflex to Culture    Impaired fasting glucose  Discussed glucose elevation.  Discuss this is a pre-diabetic condition.  Recommended eating healthily, exercising and maintaining a healthy weight to prevent the development of diabetes.  Recommended blood sugar checks at least yearly to monitor this.   - Hemoglobin A1c; Future  - Albumin Random Urine Quantitative with Creat Ratio; Future  - Comprehensive metabolic panel (BMP + Alb, Alk Phos, ALT, AST, Total. Bili, TP); Future  -  "Hemoglobin A1c  - Albumin Random Urine Quantitative with Creat Ratio  - Comprehensive metabolic panel (BMP + Alb, Alk Phos, ALT, AST, Total. Bili, TP)    Branch retinal vein occlusion, unspecified complication status, unspecified laterality (H28)  Just had eye exam.  Got prisms    Vitamin D deficiency  Check labs and dexa  - DEXA HIP/PELVIS/SPINE - Future; Future  - Hemoglobin A1c; Future  - Albumin Random Urine Quantitative with Creat Ratio; Future  - Comprehensive metabolic panel (BMP + Alb, Alk Phos, ALT, AST, Total. Bili, TP); Future  - UA with Microscopic reflex to Culture - lab collect; Future  - Hemoglobin A1c  - Albumin Random Urine Quantitative with Creat Ratio  - Comprehensive metabolic panel (BMP + Alb, Alk Phos, ALT, AST, Total. Bili, TP)  - UA with Microscopic reflex to Culture - lab collect  - UA Microscopic with Reflex to Culture    Dyslipidemia  Continue statin and check labs  - atorvastatin (LIPITOR) 40 MG tablet; Take 1 tablet (40 mg) by mouth daily  - Hemoglobin A1c; Future  - Lipid panel reflex to direct LDL Non-fasting; Future  - Comprehensive metabolic panel (BMP + Alb, Alk Phos, ALT, AST, Total. Bili, TP); Future  - Hemoglobin A1c  - Lipid panel reflex to direct LDL Non-fasting  - Comprehensive metabolic panel (BMP + Alb, Alk Phos, ALT, AST, Total. Bili, TP)    Long term (current) use of bisphosphonates  Check dexa  - DEXA HIP/PELVIS/SPINE - Future; Future    Osteopenia of both hips  Check dexa, continue medication   - RISEdronate (ACTONEL) 35 MG tablet; Take 1 tablet (35 mg) by mouth every 7 days    Hypersomnia with sleep apnea  Continue cpap    Impacted cerumen of right ear  Unable to remove, will do debrox at home  - PA REMOVAL IMPACTED CERUMEN IRRIGATION/LVG UNILAT (RN/MA); Standing            BMI  Estimated body mass index is 36.88 kg/m  as calculated from the following:    Height as of this encounter: 1.7 m (5' 6.93\").    Weight as of this encounter: 106.6 kg (235 lb).   Weight " "management plan: Discussed healthy diet and exercise guidelines    Counseling  Appropriate preventive services were addressed with this patient via screening, questionnaire, or discussion as appropriate for fall prevention, nutrition, physical activity, Tobacco-use cessation, weight loss and cognition.  Checklist reviewing preventive services available has been given to the patient.  Reviewed patient's diet, addressing concerns and/or questions.   He is at risk for lack of exercise and has been provided with information to increase physical activity for the benefit of his well-being.   Information on urinary incontinence and treatment options given to patient.       See Patient Instructions    Subjective   Spencer is a 88 year old, presenting for the following:  Physical        7/23/2024    11:05 AM   Additional Questions   Roomed by OLAMIDE Piedra   Accompanied by DOUG         7/23/2024    11:05 AM   Patient Reported Additional Medications   Patient reports taking the following new medications No         Health Care Directive  Patient has a Health Care Directive on file  Advance care planning document is on file and is current.    Healthy Habits:     In general, how would you rate your overall health?  Good    Frequency of exercise:  None    Duration of exercise:  Less than 15 minutes    Do you usually eat at least 4 servings of fruit and vegetables a day, include whole grains    & fiber and avoid regularly eating high fat or \"junk\" foods?  No    Taking medications regularly:  Yes    Barriers to taking medications:  Problems remembering to take them    Medication side effects:  None    Ability to successfully perform activities of daily living:  No assistance needed    Home Safety:  No safety concerns identified    Hearing Impairment:  No hearing concerns    In the past 6 months, have you been bothered by leaking of urine? Yes    In general, how would you rate your overall mental or emotional health?  Good    Additional " concerns today:  Yes (diarrhea and memory)  Using walker so hasn't fallen.  Not exercising.    Diarrhea and stool incontinence - fiber helps.      Hypertension - BP controlled.    COPD - holds breath sometimes and oxygen goes to the high 80's and goes back up right away.  No wheezing.  Sometimes coughs up phlegm - anytime during the day.  mMRC 2     Urination has some white patches on the surface    Just had eye exam - got prisms          7/18/2024   General Health   How would you rate your overall physical health? (!) FAIR   Feel stress (tense, anxious, or unable to sleep) Not at all            7/18/2024   Nutrition   Diet: Regular (no restrictions)            7/18/2024   Exercise   Days per week of moderate/strenous exercise 1 day   Average minutes spent exercising at this level 10 min      (!) EXERCISE CONCERN      7/18/2024   Social Factors   Frequency of gathering with friends or relatives Three times a week   Worry food won't last until get money to buy more No   Food not last or not have enough money for food? No   Do you have housing? (Housing is defined as stable permanent housing and does not include staying ouside in a car, in a tent, in an abandoned building, in an overnight shelter, or couch-surfing.) Yes   Are you worried about losing your housing? No   Lack of transportation? No   Unable to get utilities (heat,electricity)? Yes   Want help with housing or utility concern? No      (!) FINANCIAL RESOURCE STRAIN CONCERN      7/23/2024   Fall Risk   Gait Speed Test (Document in seconds) 5.25   Gait Speed Test Interpretation Greater than 5.01 seconds - ABNORMAL             7/18/2024   Activities of Daily Living- Home Safety   Needs help with the following daily activites None of the above   Safety concerns in the home None of the above            7/18/2024   Dental   Dentist two times every year? Yes            7/18/2024   Hearing Screening   Hearing concerns? None of the above            7/18/2024  "  Driving Risk Screening   Patient/family members have concerns about driving No            2024   General Alertness/Fatigue Screening   Have you been more tired than usual lately? No            2024   Urinary Incontinence Screening   Bothered by leaking urine in past 6 months Yes            2024   TB Screening   Were you born outside of the [place]? No          Today's PHQ-9 Score:       2024    10:46 AM   PHQ-9 SCORE   PHQ-9 Total Score MyChart 1 (Minimal depression)   PHQ-9 Total Score 1         2024   Substance Use   Alcohol more than 3/day or more than 7/wk No   Do you have a current opioid prescription? No   How severe/bad is pain from 1 to 10? /10   Do you use any other substances recreationally? No        Social History     Tobacco Use    Smoking status: Former     Current packs/day: 0.00     Average packs/day: 1 pack/day for 30.0 years (30.0 ttl pk-yrs)     Types: Cigarettes     Start date: 1948     Quit date: 1978     Years since quittin.5     Passive exposure: Never    Smokeless tobacco: Never   Vaping Use    Vaping status: Never Used   Substance Use Topics    Alcohol use: Not Currently     Comment: occ \"like once a month\" or less    Drug use: No             Reviewed and updated as needed this visit by Provider   Tobacco  Allergies  Meds  Problems  Med Hx  Surg Hx  Fam Hx     Sexual Activity          Labs reviewed in EPIC  Current providers sharing in care for this patient include:  Patient Care Team:  Natalya Lewis MD as PCP - General  Natalya Lewis MD as Assigned PCP  Mark Pino MD as Assigned Surgical Provider  Vinod De La Paz MD as MD (Cardiovascular Disease)  Braydon Lopez MD as Assigned Heart and Vascular Provider    The following health maintenance items are reviewed in Epic and correct as of today:  Health Maintenance   Topic Date Due    HF ACTION PLAN  Never done    DEXA  2024    INFLUENZA VACCINE (1) " "09/01/2024    LIPID  09/21/2024    MICROALBUMIN  09/21/2024    ALT  10/10/2024    CBC  01/22/2025    HEMOGLOBIN  01/22/2025    PHQ-9  01/23/2025    ANNUAL REVIEW OF HM ORDERS  02/12/2025    BMP  02/28/2025    COLORECTAL CANCER SCREENING  06/15/2025    MEDICARE ANNUAL WELLNESS VISIT  07/23/2025    FALL RISK ASSESSMENT  07/23/2025    ADVANCE CARE PLANNING  07/23/2029    DTAP/TDAP/TD IMMUNIZATION (3 - Td or Tdap) 07/03/2033    TSH W/FREE T4 REFLEX  Completed    SPIROMETRY  Completed    COPD ACTION PLAN  Completed    DEPRESSION ACTION PLAN  Completed    Pneumococcal Vaccine: 65+ Years  Completed    URINALYSIS  Completed    ZOSTER IMMUNIZATION  Completed    RSV VACCINE (Pregnancy & 60+)  Completed    COVID-19 Vaccine  Completed    IPV IMMUNIZATION  Aged Out    HPV IMMUNIZATION  Aged Out    MENINGITIS IMMUNIZATION  Aged Out    RSV MONOCLONAL ANTIBODY  Aged Out            Objective    Exam  /70 (BP Location: Right arm, Patient Position: Sitting, Cuff Size: Adult Large)   Pulse 78   Temp 97.5  F (36.4  C) (Tympanic)   Resp 12   Ht 1.7 m (5' 6.93\")   Wt 106.6 kg (235 lb)   SpO2 97%   BMI 36.88 kg/m     Estimated body mass index is 36.88 kg/m  as calculated from the following:    Height as of this encounter: 1.7 m (5' 6.93\").    Weight as of this encounter: 106.6 kg (235 lb).    Physical Exam  GENERAL: alert and no distress  EYES: Eyes grossly normal to inspection, PERRL and conjunctivae and sclerae normal  HENT: ear canals and TM's normal, nose and mouth without ulcers or lesions  NECK: no adenopathy, no asymmetry, masses, or scars  RESP: lungs clear to auscultation - no rales, rhonchi or wheezes  CV: regular rate and rhythm, normal S1 S2, no S3 or S4, no murmur, click or rub, no peripheral edema  ABDOMEN: soft, nontender, no hepatosplenomegaly, no masses and bowel sounds normal  MS: no gross musculoskeletal defects noted, no edema  SKIN: mul seb k and maybe some AK - will follow-up with derm  NEURO: Normal " strength and tone, mentation intact and speech normal  PSYCH: mentation appears normal, affect normal/bright        7/23/2024   Mini Cog   Clock Draw Score 2 Normal   3 Item Recall 3 objects recalled   Mini Cog Total Score 5                 Signed Electronically by: Natalya Lewis MD    Answers submitted by the patient for this visit:      6/30/2023     9:52 AM 12/10/2023    11:43 AM 7/23/2024    10:46 AM   PHQ-9 SCORE   PHQ-9 Total Score MyChart 0 1 (Minimal depression) 1 (Minimal depression)   PHQ-9 Total Score 0 1 1      Patient Health Questionnaire (Submitted on 7/23/2024)  If you checked off any problems, how difficult have these problems made it for you to do your work, take care of things at home, or get along with other people?: Somewhat difficult  PHQ9 TOTAL SCORE: 1

## 2024-07-24 LAB
CREAT UR-MCNC: 160 MG/DL
MICROALBUMIN UR-MCNC: 37.1 MG/L
MICROALBUMIN/CREAT UR: 23.19 MG/G CR (ref 0–17)

## 2024-08-15 ENCOUNTER — HOSPITAL ENCOUNTER (OUTPATIENT)
Dept: CARDIOLOGY | Facility: CLINIC | Age: 89
Discharge: HOME | End: 2024-08-15
Attending: PHYSICIAN ASSISTANT | Admitting: PHYSICIAN ASSISTANT
Payer: COMMERCIAL

## 2024-08-15 DIAGNOSIS — I48.92 ATRIAL FLUTTER, CHRONIC (H): ICD-10-CM

## 2024-08-15 DIAGNOSIS — I51.89 MASS OF RIGHT CARDIAC VENTRICLE: ICD-10-CM

## 2024-08-15 LAB — LVEF ECHO: NORMAL

## 2024-08-15 PROCEDURE — 255N000002 HC RX 255 OP 636: Performed by: PHYSICIAN ASSISTANT

## 2024-08-15 PROCEDURE — 93306 TTE W/DOPPLER COMPLETE: CPT | Mod: 26 | Performed by: INTERNAL MEDICINE

## 2024-08-15 PROCEDURE — C8929 TTE W OR WO FOL WCON,DOPPLER: HCPCS

## 2024-08-15 RX ADMIN — HUMAN ALBUMIN MICROSPHERES AND PERFLUTREN 3 ML: 10; .22 INJECTION, SOLUTION INTRAVENOUS at 10:13

## 2024-08-20 ENCOUNTER — OFFICE VISIT (OUTPATIENT)
Dept: CARDIOLOGY | Facility: CLINIC | Age: 89
End: 2024-08-20
Attending: PHYSICIAN ASSISTANT
Payer: COMMERCIAL

## 2024-08-20 VITALS
HEIGHT: 68 IN | HEART RATE: 77 BPM | OXYGEN SATURATION: 97 % | BODY MASS INDEX: 36.37 KG/M2 | DIASTOLIC BLOOD PRESSURE: 70 MMHG | WEIGHT: 240 LBS | SYSTOLIC BLOOD PRESSURE: 120 MMHG

## 2024-08-20 DIAGNOSIS — I51.89 MASS OF RIGHT CARDIAC VENTRICLE: ICD-10-CM

## 2024-08-20 DIAGNOSIS — I48.92 ATRIAL FLUTTER, CHRONIC (H): ICD-10-CM

## 2024-08-20 DIAGNOSIS — I48.19 PERSISTENT ATRIAL FIBRILLATION (H): Primary | ICD-10-CM

## 2024-08-20 DIAGNOSIS — I77.810 AORTIC ROOT DILATATION (H): ICD-10-CM

## 2024-08-20 PROCEDURE — 99214 OFFICE O/P EST MOD 30 MIN: CPT | Performed by: INTERNAL MEDICINE

## 2024-08-20 NOTE — LETTER
8/20/2024    Natalya Lewis MD  [address] [place] [address]    RE: Nixon More       Dear Colleague,     I had the pleasure of seeing Nixon More in the [facility].  HISTORY:    Nixon More is a pleasant 88-year-old gentleman with a history of permanent atrial fibrillation first found in 2018, normal ejection fraction.  We have been following a rate control approach using anticoagulation.  He also has a history of chronic dyspnea on exertion and a 30-pack-year history.  This is considered to be due to a combination of some lung disease, HFpEF, obesity, and deconditioning.  He has an ascending aortic aneurysm that measures stably in the range of 4.5 to 4.6 cm.  He had a stress echo done over a decade ago suggesting possible ischemia but he has had no angina and this was not pursued.  He has a history of hypertension, obstructive sleep apnea, peripheral venous insufficiency, dyslipidemia, and a history of bladder and prostate cancer in the past.  When I saw him last year he had an echo that showed showed a possible apical mass but a subsequent MRI showed that this was just an epicardial fat collection.  He is here today for routine visit.    Today Nixon reports that he feels that overall he is doing very well.  He uses a walker for balance but states that his overall energy and stamina are normal.  He can do the things he wants to including walking up and down the [place] of a [employer] store without difficulty.  He denies exertional chest pain, PND/orthopnea, syncope or near syncope, or strokelike symptoms.  He has not noticed problems with peripheral edema and does not complain of claudication.  Overall his level of dyspnea has remained stable and is not interfering with his lifestyle.    An echocardiogram was recently completed and I reviewed those results with him.  He continues to have a normal ejection fraction and his aortic root measurement is 4.5 cm with an ascending  aortic diameter 4.6 cm, essentially unchanged over the last couple of years.  There are no significant valve abnormalities.  He was read as having mild concentric LVH but his LV thickness is in the upper normal range and PA pressures are also high normal.      ASSESSMENT/PLAN:    1.  Permanent atrial fibs/flutter.  The patient continues to use Eliquis with good rate control, continue current medications.  2.  Hypertension well-controlled on losartan and metoprolol along with spironolactone.  3.  Ascending aortic aneurysm.  We talked about this quite extensively.  The size of the aneurysm is only mildly increased and is stable.  It is unlikely that this will be a problem within his lifetime.  I explained that we are not enthusiastic about aortic aneurysm repair in people in their 90s, and this aneurysm is unlikely to reach a surgical stage before age 95.  We will recheck it in 2 years with a follow-up echo.  3.  Obstructive sleep apnea using ASV therapy for central obstructive sleep apnea  4.  Possible CAD by stress testing over a decade ago but without angina, no further evaluation unless he develops chest pain.  5.  Hyperlipidemia well-controlled  6.  History of HFpEF well-controlled without evidence of volume overload on exam and without symptoms of significant dyspnea on exertion.    Thank you for inviting me to participate in the care of your patient.  Please don't hesitate to call if I can be of further assistance.  30 minutes were spent today reviewing the chart and other records, interviewing and examining the patient, and documenting our visit.    Chart documentation was completed, in part, with [employer] voice-recognition software. Even though reviewed, some grammatical, spelling, and word errors may remain.       Orders Placed This Encounter   Procedures     Follow-Up with Cardiology     No orders of the defined types were placed in this encounter.    There are no discontinued medications.    10 year ASCVD  risk: The ASCVD Risk score (Rafal NEVES, et al., 2019) failed to calculate for the following reasons:    The 2019 ASCVD risk score is only valid for ages 40 to 79    Encounter Diagnoses   Name Primary?     Atrial flutter, chronic (H)      Mass of right cardiac ventricle      Persistent atrial fibrillation (H) Yes     Aortic root dilatation (H24)        CURRENT MEDICATIONS:  Current Outpatient Medications   Medication Sig Dispense Refill     albuterol (PROAIR HFA/PROVENTIL HFA/VENTOLIN HFA) 108 (90 Base) MCG/ACT inhaler Inhale 2 puffs into the lungs every 6 hours as needed for shortness of breath, wheezing or cough 8 g 2     atorvastatin (LIPITOR) 40 MG tablet Take 1 tablet (40 mg) by mouth daily 90 tablet 3     Cholecalciferol (VITAMIN D-3) 25 MCG (1000 UT) CAPS Take by mouth daily       ELIQUIS ANTICOAGULANT 5 MG tablet TAKE 1 TABLET BY MOUTH TWICE A  tablet 3     escitalopram (LEXAPRO) 10 MG tablet Take 1 tablet (10 mg) by mouth daily 90 tablet 3     Loperamide HCl (IMODIUM OR) Take by mouth daily as needed       losartan (COZAAR) 100 MG tablet Take 1 tablet (100 mg) by mouth daily 90 tablet 3     metoprolol succinate ER (TOPROL XL) 50 MG 24 hr tablet Take 2 tablets (100 mg) by mouth daily 180 tablet 4     RISEdronate (ACTONEL) 35 MG tablet Take 1 tablet (35 mg) by mouth every 7 days 12 tablet 4     spironolactone (ALDACTONE) 25 MG tablet Take 1 tablet (25 mg) by mouth daily 30 tablet 3     umeclidinium-vilanterol (ANORO ELLIPTA) 62.5-25 MCG/ACT oral inhaler Inhale 1 puff into the lungs daily 1 each 11     ASPIRIN NOT PRESCRIBED (INTENTIONAL) continuous prn for other Antiplatelet medication not prescribed intentionally due to Current anticoagulant therapy (warfarin/enoxaparin) (Patient not taking: Reported on 7/23/2024)         ALLERGIES     Allergies   Allergen Reactions     Amoxicillin-Pot Clavulanate Rash     Type III hypersensitivity     Bactrim [Sulfamethoxazole W/Trimethoprim] Rash     Serum sickness,  type III hypersensitivity       Bee Venom Itching     Sulfa Antibiotics Hives     Celebrex [Celecoxib]      Lisinopril Cough     Naproxen Hives     Penicillin G        PAST MEDICAL HISTORY:  Past Medical History:   Diagnosis Date     AAA (abdominal aortic aneurysm) (H24)      Actinic keratosis      Angina pectoris (H24) 10/27/2020     Antiplatelet or antithrombotic long-term use     Eliquis     Arthritis      Atrial fibrillation and flutter (H) 12/03/2018     CAD (coronary artery disease)     1/5/2011 lateral ischemia on stress echo, 12/2014 normal stress echo     Coronary artery disease 01/01/2011    Abnormal stress test 1/2011 and controlled with medical mgmt      Depressive disorder     Mild     Diarrhea     Urgency at times     Dyslipidemia      Emphysema of lung (H)      Essential hypertension, benign      Hernia, abdominal      Hypersomnia with sleep apnea, unspecified     on bipap, partially treated with residual apneas     Hypertrophy (benign) of prostate 05/2001    Biopsy 5/01 negative for cancer; PSA 5     Lumbago      Mumps      Prostate cancer (H) 12/15/2006     Renal disease      Skin cancer, basal cell 1997     Sleep apnea     Uses a Bi-pap for centralized Apnea     Spider veins        PAST SURGICAL HISTORY:  Past Surgical History:   Procedure Laterality Date     ABDOMEN SURGERY       BACK SURGERY  1988    L4/L5 decompression     BIOPSY  2022    Skin cancer basal cell     COLONOSCOPY       CYSTOSCOPY       CYSTOSCOPY, TRANSURETHRAL RESECTION (TUR) TUMOR BLADDER, COMBINED N/A 01/06/2023    Procedure: Cystoscopy, transurethral resection of bladder tumor, medium, 2-5 cm;  Surgeon: Mark Pino MD;  Location: [facility] OR     EYE SURGERY  2016    Cararact     GENITOURINARY SURGERY  12 07 2022    Tumors in bladder     HERNIA REPAIR  child     Moh's procedure for basal cell carcinoma  01/2001     PROSTATE SURGERY  2007    Radiation only     s/p lumbar laminectomy NOS  1988     SIGMOIDOSCOPY FLEXIBLE N/A  "06/15/2020    Procedure: SIGMOIDOSCOPY, FLEXIBLE;  Surgeon: Sunni Patton MD;  Location: [facility] GI     VITRECTOMY PARS PLANA REMOVE PRERETINAL MEMBRANE   2009       FAMILY HISTORY:  Family History   Problem Relation Age of Onset     Alzheimer Disease Mother      Hypertension Mother      Cardiovascular Father         D:86 complications fo CHF     Anesthesia Reaction Father          after 2 weeks     Prostate Cancer Brother      Lung Cancer Brother 76       SOCIAL HISTORY:  Social History     Socioeconomic History     Marital status:      Spouse name: None     Number of children: None     Years of education: None     Highest education level: None   Occupational History     Occupation: retired   Tobacco Use     Smoking status: Former     Current packs/day: 0.00     Average packs/day: 1 pack/day for 30.0 years (30.0 ttl pk-yrs)     Types: Cigarettes     Start date: 1948     Quit date: 1978     Years since quittin.6     Passive exposure: Never     Smokeless tobacco: Never   Vaping Use     Vaping status: Never Used   Substance and Sexual Activity     Alcohol use: Not Currently     Comment: occ \"like once a month\" or less     Drug use: No     Sexual activity: Not Currently     Partners: Female     Birth control/protection: Post-menopausal   Other Topics Concern     Caffeine Concern No     Comment: 0-2 cans of soda per day.     Exercise No     Seat Belt Yes     Parent/sibling w/ CABG, MI or angioplasty before 65F 55M? No     Social Determinants of Health     Financial Resource Strain: High Risk (2024)    Financial Resource Strain      Within the past 12 months, have you or your family members you live with been unable to get utilities (heat, electricity) when it was really needed?: Yes   Food Insecurity: Low Risk  (2024)    Food Insecurity      Within the past 12 months, did you worry that your food would run out before you got money to buy more?: No      Within the past 12 months, " did the food you bought just not last and you didn t have money to get more?: No   Transportation Needs: Low Risk  (7/18/2024)    Transportation Needs      Within the past 12 months, has lack of transportation kept you from medical appointments, getting your medicines, non-medical meetings or appointments, work, or from getting things that you need?: No   Physical Activity: Insufficiently Active (7/18/2024)    Exercise Vital Sign      Days of Exercise per Week: 1 day      Minutes of Exercise per Session: 10 min   Stress: No Stress Concern Present (7/18/2024)    [employer] [place] [employer] - Occupational Stress Questionnaire      Feeling of Stress : Not at all   Social Connections: Unknown (7/18/2024)    Social Connection and Isolation Panel [NHANES]      Frequency of Social Gatherings with Friends and Family: Three times a week   Interpersonal Safety: Low Risk  (7/23/2024)    Interpersonal Safety      Do you feel physically and emotionally safe where you currently live?: Yes      Within the past 12 months, have you been hit, slapped, kicked or otherwise physically hurt by someone?: No      Within the past 12 months, have you been humiliated or emotionally abused in other ways by your partner or ex-partner?: No   Housing Stability: Low Risk  (7/18/2024)    Housing Stability      Do you have housing? : Yes      Are you worried about losing your housing?: No       Review of Systems:  Skin:  not assessed     Eyes:  Positive for glasses  ENT:  not assessed    Respiratory:  Positive for dyspnea on exertion;shortness of breath;sleep apnea  Cardiovascular:    edema;Positive for;lightheadedness;dizziness  Gastroenterology: not assessed    Genitourinary:  not assessed    Musculoskeletal:  not assessed    Neurologic:  not assessed    Psychiatric:  not assessed    Heme/Lymph/Imm:  not assessed    Endocrine:  not assessed      Physical Exam:  Vitals: /70 (BP Location: Right arm, Patient Position: Sitting, Cuff  "Size: Adult Large)   Pulse 77   Ht 1.715 m (5' 7.5\")   Wt 108.9 kg (240 lb)   SpO2 97%   BMI 37.03 kg/m      Constitutional:    obese      Skin:  warm and dry to the touch, no apparent skin lesions or masses noted        Head:  normocephalic, no masses or lesions        Eyes:  pupils equal and round, conjunctivae and lids unremarkable, sclera white, no xanthalasma, EOMS intact, no nystagmus        ENT:  no pallor or cyanosis, dentition good        Neck:  carotid pulses are full and equal bilaterally, JVP normal, no carotid bruit        Chest:  normal breath sounds, clear to auscultation, normal A-P diameter, normal symmetry, normal respiratory excursion, no use of accessory muscles        Cardiac: normal S1 and S2;no S3 or S4 irregularly irregular rhythm distant heart sounds              Abdomen:  abdomen soft;BS normoactive        Vascular: pulses full and equal                                      Extremities and Muscular Skeletal:        bilateral LE edema;trace;pitting     Neurological:  no gross motor deficits        Psych:  affect appropriate, oriented to time, person and place     Recent Lab Results:  LIPID RESULTS:  Lab Results   Component Value Date    CHOL 114 07/23/2024    CHOL 121 05/17/2021    HDL 41 07/23/2024    HDL 39 (L) 05/17/2021    LDL 49 07/23/2024    LDL 55 05/17/2021    TRIG 121 07/23/2024    TRIG 137 05/17/2021    CHOLHDLRATIO 2.8 10/15/2015       LIVER ENZYME RESULTS:  Lab Results   Component Value Date    AST 32 07/23/2024    AST 32 01/12/2021    ALT 19 07/23/2024    ALT 33 01/12/2021       CBC RESULTS:  Lab Results   Component Value Date    WBC 9.3 01/22/2024    WBC 5.6 06/18/2021    RBC 4.64 01/22/2024    RBC 4.38 (L) 06/18/2021    HGB 13.5 01/22/2024    HGB 12.8 (L) 06/18/2021    HCT 44.0 01/22/2024    HCT 41.1 06/18/2021    MCV 95 01/22/2024    MCV 94 06/18/2021    MCH 29.1 01/22/2024    MCH 29.2 06/18/2021    MCHC 30.7 (L) 01/22/2024    MCHC 31.1 (L) 06/18/2021    RDW 14.4 " 01/22/2024    RDW 14.1 06/18/2021     01/22/2024     06/18/2021       BMP RESULTS:  Lab Results   Component Value Date     07/23/2024     06/18/2021    POTASSIUM 4.4 07/23/2024    POTASSIUM 4.9 01/22/2024    POTASSIUM 4.4 06/18/2021    CHLORIDE 108 (H) 07/23/2024    CHLORIDE 105 01/22/2024    CHLORIDE 109 06/18/2021    CO2 20 (L) 07/23/2024    CO2 29 01/22/2024    CO2 29 06/18/2021    ANIONGAP 12 07/23/2024    ANIONGAP 10 01/22/2024    ANIONGAP 4 06/18/2021    GLC 96 07/23/2024     (H) 01/22/2024     (H) 06/18/2021    BUN 22.4 07/23/2024    BUN 20 01/22/2024    BUN 28 06/18/2021    CR 1.13 07/23/2024    CR 1.44 (H) 06/18/2021    GFRESTIMATED 63 07/23/2024    GFRESTIMATED >60 09/21/2023    GFRESTIMATED 44 (L) 06/18/2021    GFRESTBLACK 51 (L) 06/18/2021    MARLENE 9.4 07/23/2024    MARLENE 9.5 06/18/2021        A1C RESULTS:  Lab Results   Component Value Date    A1C 6.0 (H) 07/23/2024    A1C 5.6 11/11/2020       INR RESULTS:  Lab Results   Component Value Date    INR 1.03 12/03/2018    INR 1.07 11/27/2007         Vinod De La Paz MD, [facility]    CC  Nimisha Bennett PA-C  [address]  [place] [address]                    Thank you for allowing me to participate in the care of your patient.      Sincerely,     Vinod De La Paz MD     [facility]  cc:   Nimisha Bennett PA-C  [address]  [place] [address]

## 2024-08-20 NOTE — PROGRESS NOTES
HISTORY:    Nixon More is a pleasant 88-year-old gentleman with a history of permanent atrial fibrillation first found in 2018, normal ejection fraction.  We have been following a rate control approach using anticoagulation.  He also has a history of chronic dyspnea on exertion and a 30-pack-year history.  This is considered to be due to a combination of some lung disease, HFpEF, obesity, and deconditioning.  He has an ascending aortic aneurysm that measures stably in the range of 4.5 to 4.6 cm.  He had a stress echo done over a decade ago suggesting possible ischemia but he has had no angina and this was not pursued.  He has a history of hypertension, obstructive sleep apnea, peripheral venous insufficiency, dyslipidemia, and a history of bladder and prostate cancer in the past.  When I saw him last year he had an echo that showed showed a possible apical mass but a subsequent MRI showed that this was just an epicardial fat collection.  He is here today for routine visit.    Today Nixon reports that he feels that overall he is doing very well.  He uses a walker for balance but states that his overall energy and stamina are normal.  He can do the things he wants to including walking up and down the [place] of a [employer] store without difficulty.  He denies exertional chest pain, PND/orthopnea, syncope or near syncope, or strokelike symptoms.  He has not noticed problems with peripheral edema and does not complain of claudication.  Overall his level of dyspnea has remained stable and is not interfering with his lifestyle.    An echocardiogram was recently completed and I reviewed those results with him.  He continues to have a normal ejection fraction and his aortic root measurement is 4.5 cm with an ascending aortic diameter 4.6 cm, essentially unchanged over the last couple of years.  There are no significant valve abnormalities.  He was read as having mild concentric LVH but his LV thickness is in the upper normal  range and PA pressures are also high normal.      ASSESSMENT/PLAN:    1.  Permanent atrial fibs/flutter.  The patient continues to use Eliquis with good rate control, continue current medications.  2.  Hypertension well-controlled on losartan and metoprolol along with spironolactone.  3.  Ascending aortic aneurysm.  We talked about this quite extensively.  The size of the aneurysm is only mildly increased and is stable.  It is unlikely that this will be a problem within his lifetime.  I explained that we are not enthusiastic about aortic aneurysm repair in people in their 90s, and this aneurysm is unlikely to reach a surgical stage before age 95.  We will recheck it in 2 years with a follow-up echo.  3.  Obstructive sleep apnea using ASV therapy for central obstructive sleep apnea  4.  Possible CAD by stress testing over a decade ago but without angina, no further evaluation unless he develops chest pain.  5.  Hyperlipidemia well-controlled  6.  History of HFpEF well-controlled without evidence of volume overload on exam and without symptoms of significant dyspnea on exertion.    Thank you for inviting me to participate in the care of your patient.  Please don't hesitate to call if I can be of further assistance.  30 minutes were spent today reviewing the chart and other records, interviewing and examining the patient, and documenting our visit.    Chart documentation was completed, in part, with MIGSIF voice-recognition software. Even though reviewed, some grammatical, spelling, and word errors may remain.       Orders Placed This Encounter   Procedures    Follow-Up with Cardiology     No orders of the defined types were placed in this encounter.    There are no discontinued medications.    10 year ASCVD risk: The ASCVD Risk score (Rafal DK, et al., 2019) failed to calculate for the following reasons:    The 2019 ASCVD risk score is only valid for ages 40 to 79    Encounter Diagnoses   Name Primary?    Atrial  flutter, chronic (H)     Mass of right cardiac ventricle     Persistent atrial fibrillation (H) Yes    Aortic root dilatation (H24)        CURRENT MEDICATIONS:  Current Outpatient Medications   Medication Sig Dispense Refill    albuterol (PROAIR HFA/PROVENTIL HFA/VENTOLIN HFA) 108 (90 Base) MCG/ACT inhaler Inhale 2 puffs into the lungs every 6 hours as needed for shortness of breath, wheezing or cough 8 g 2    atorvastatin (LIPITOR) 40 MG tablet Take 1 tablet (40 mg) by mouth daily 90 tablet 3    Cholecalciferol (VITAMIN D-3) 25 MCG (1000 UT) CAPS Take by mouth daily      ELIQUIS ANTICOAGULANT 5 MG tablet TAKE 1 TABLET BY MOUTH TWICE A  tablet 3    escitalopram (LEXAPRO) 10 MG tablet Take 1 tablet (10 mg) by mouth daily 90 tablet 3    Loperamide HCl (IMODIUM OR) Take by mouth daily as needed      losartan (COZAAR) 100 MG tablet Take 1 tablet (100 mg) by mouth daily 90 tablet 3    metoprolol succinate ER (TOPROL XL) 50 MG 24 hr tablet Take 2 tablets (100 mg) by mouth daily 180 tablet 4    RISEdronate (ACTONEL) 35 MG tablet Take 1 tablet (35 mg) by mouth every 7 days 12 tablet 4    spironolactone (ALDACTONE) 25 MG tablet Take 1 tablet (25 mg) by mouth daily 30 tablet 3    umeclidinium-vilanterol (ANORO ELLIPTA) 62.5-25 MCG/ACT oral inhaler Inhale 1 puff into the lungs daily 1 each 11    ASPIRIN NOT PRESCRIBED (INTENTIONAL) continuous prn for other Antiplatelet medication not prescribed intentionally due to Current anticoagulant therapy (warfarin/enoxaparin) (Patient not taking: Reported on 7/23/2024)         ALLERGIES     Allergies   Allergen Reactions    Amoxicillin-Pot Clavulanate Rash     Type III hypersensitivity    Bactrim [Sulfamethoxazole W/Trimethoprim] Rash     Serum sickness, type III hypersensitivity      Bee Venom Itching    Sulfa Antibiotics Hives    Celebrex [Celecoxib]     Lisinopril Cough    Naproxen Hives    Penicillin G        PAST MEDICAL HISTORY:  Past Medical History:   Diagnosis Date     AAA (abdominal aortic aneurysm) (H24)     Actinic keratosis     Angina pectoris (H24) 10/27/2020    Antiplatelet or antithrombotic long-term use     Eliquis    Arthritis     Atrial fibrillation and flutter (H) 12/03/2018    CAD (coronary artery disease)     1/5/2011 lateral ischemia on stress echo, 12/2014 normal stress echo    Coronary artery disease 01/01/2011    Abnormal stress test 1/2011 and controlled with medical mgmt     Depressive disorder     Mild    Diarrhea     Urgency at times    Dyslipidemia     Emphysema of lung (H)     Essential hypertension, benign     Hernia, abdominal     Hypersomnia with sleep apnea, unspecified     on bipap, partially treated with residual apneas    Hypertrophy (benign) of prostate 05/2001    Biopsy 5/01 negative for cancer; PSA 5    Lumbago     Mumps     Prostate cancer (H) 12/15/2006    Renal disease     Skin cancer, basal cell 1997    Sleep apnea     Uses a Bi-pap for centralized Apnea    Spider veins        PAST SURGICAL HISTORY:  Past Surgical History:   Procedure Laterality Date    ABDOMEN SURGERY      BACK SURGERY  1988    L4/L5 decompression    BIOPSY  2022    Skin cancer basal cell    COLONOSCOPY      CYSTOSCOPY      CYSTOSCOPY, TRANSURETHRAL RESECTION (TUR) TUMOR BLADDER, COMBINED N/A 01/06/2023    Procedure: Cystoscopy, transurethral resection of bladder tumor, medium, 2-5 cm;  Surgeon: Mark Pino MD;  Location: [facility] OR    EYE SURGERY  2016    Cararact    GENITOURINARY SURGERY  12 07 2022    Tumors in bladder    HERNIA REPAIR  child    Moh's procedure for basal cell carcinoma  01/2001    PROSTATE SURGERY  2007    Radiation only    s/p lumbar laminectomy NOS  1988    SIGMOIDOSCOPY FLEXIBLE N/A 06/15/2020    Procedure: SIGMOIDOSCOPY, FLEXIBLE;  Surgeon: Sunni Patton MD;  Location:  GI    VITRECTOMY PARS PLANA REMOVE PRERETINAL MEMBRANE   03/2009       FAMILY HISTORY:  Family History   Problem Relation Age of Onset    Alzheimer Disease Mother      "Hypertension Mother     Cardiovascular Father         D:86 complications fo CHF    Anesthesia Reaction Father          after 2 weeks    Prostate Cancer Brother     Lung Cancer Brother 76       SOCIAL HISTORY:  Social History     Socioeconomic History    Marital status:      Spouse name: None    Number of children: None    Years of education: None    Highest education level: None   Occupational History    Occupation: retired   Tobacco Use    Smoking status: Former     Current packs/day: 0.00     Average packs/day: 1 pack/day for 30.0 years (30.0 ttl pk-yrs)     Types: Cigarettes     Start date: 1948     Quit date: 1978     Years since quittin.6     Passive exposure: Never    Smokeless tobacco: Never   Vaping Use    Vaping status: Never Used   Substance and Sexual Activity    Alcohol use: Not Currently     Comment: occ \"like once a month\" or less    Drug use: No    Sexual activity: Not Currently     Partners: Female     Birth control/protection: Post-menopausal   Other Topics Concern    Caffeine Concern No     Comment: 0-2 cans of soda per day.    Exercise No    Seat Belt Yes    Parent/sibling w/ CABG, MI or angioplasty before 65F 55M? No     Social Determinants of Health     Financial Resource Strain: High Risk (2024)    Financial Resource Strain     Within the past 12 months, have you or your family members you live with been unable to get utilities (heat, electricity) when it was really needed?: Yes   Food Insecurity: Low Risk  (2024)    Food Insecurity     Within the past 12 months, did you worry that your food would run out before you got money to buy more?: No     Within the past 12 months, did the food you bought just not last and you didn t have money to get more?: No   Transportation Needs: Low Risk  (2024)    Transportation Needs     Within the past 12 months, has lack of transportation kept you from medical appointments, getting your medicines, non-medical meetings or " "appointments, work, or from getting things that you need?: No   Physical Activity: Insufficiently Active (7/18/2024)    Exercise Vital Sign     Days of Exercise per Week: 1 day     Minutes of Exercise per Session: 10 min   Stress: No Stress Concern Present (7/18/2024)    [employer] [place] [employer] - Occupational Stress Questionnaire     Feeling of Stress : Not at all   Social Connections: Unknown (7/18/2024)    Social Connection and Isolation Panel [NHANES]     Frequency of Social Gatherings with Friends and Family: Three times a week   Interpersonal Safety: Low Risk  (7/23/2024)    Interpersonal Safety     Do you feel physically and emotionally safe where you currently live?: Yes     Within the past 12 months, have you been hit, slapped, kicked or otherwise physically hurt by someone?: No     Within the past 12 months, have you been humiliated or emotionally abused in other ways by your partner or ex-partner?: No   Housing Stability: Low Risk  (7/18/2024)    Housing Stability     Do you have housing? : Yes     Are you worried about losing your housing?: No       Review of Systems:  Skin:  not assessed     Eyes:  Positive for glasses  ENT:  not assessed    Respiratory:  Positive for dyspnea on exertion;shortness of breath;sleep apnea  Cardiovascular:    edema;Positive for;lightheadedness;dizziness  Gastroenterology: not assessed    Genitourinary:  not assessed    Musculoskeletal:  not assessed    Neurologic:  not assessed    Psychiatric:  not assessed    Heme/Lymph/Imm:  not assessed    Endocrine:  not assessed      Physical Exam:  Vitals: /70 (BP Location: Right arm, Patient Position: Sitting, Cuff Size: Adult Large)   Pulse 77   Ht 1.715 m (5' 7.5\")   Wt 108.9 kg (240 lb)   SpO2 97%   BMI 37.03 kg/m      Constitutional:    obese      Skin:  warm and dry to the touch, no apparent skin lesions or masses noted        Head:  normocephalic, no masses or lesions        Eyes:  pupils equal and " round, conjunctivae and lids unremarkable, sclera white, no xanthalasma, EOMS intact, no nystagmus        ENT:  no pallor or cyanosis, dentition good        Neck:  carotid pulses are full and equal bilaterally, JVP normal, no carotid bruit        Chest:  normal breath sounds, clear to auscultation, normal A-P diameter, normal symmetry, normal respiratory excursion, no use of accessory muscles        Cardiac: normal S1 and S2;no S3 or S4 irregularly irregular rhythm distant heart sounds              Abdomen:  abdomen soft;BS normoactive        Vascular: pulses full and equal                                      Extremities and Muscular Skeletal:        bilateral LE edema;trace;pitting     Neurological:  no gross motor deficits        Psych:  affect appropriate, oriented to time, person and place     Recent Lab Results:  LIPID RESULTS:  Lab Results   Component Value Date    CHOL 114 07/23/2024    CHOL 121 05/17/2021    HDL 41 07/23/2024    HDL 39 (L) 05/17/2021    LDL 49 07/23/2024    LDL 55 05/17/2021    TRIG 121 07/23/2024    TRIG 137 05/17/2021    CHOLHDLRATIO 2.8 10/15/2015       LIVER ENZYME RESULTS:  Lab Results   Component Value Date    AST 32 07/23/2024    AST 32 01/12/2021    ALT 19 07/23/2024    ALT 33 01/12/2021       CBC RESULTS:  Lab Results   Component Value Date    WBC 9.3 01/22/2024    WBC 5.6 06/18/2021    RBC 4.64 01/22/2024    RBC 4.38 (L) 06/18/2021    HGB 13.5 01/22/2024    HGB 12.8 (L) 06/18/2021    HCT 44.0 01/22/2024    HCT 41.1 06/18/2021    MCV 95 01/22/2024    MCV 94 06/18/2021    MCH 29.1 01/22/2024    MCH 29.2 06/18/2021    MCHC 30.7 (L) 01/22/2024    MCHC 31.1 (L) 06/18/2021    RDW 14.4 01/22/2024    RDW 14.1 06/18/2021     01/22/2024     06/18/2021       BMP RESULTS:  Lab Results   Component Value Date     07/23/2024     06/18/2021    POTASSIUM 4.4 07/23/2024    POTASSIUM 4.9 01/22/2024    POTASSIUM 4.4 06/18/2021    CHLORIDE 108 (H) 07/23/2024    CHLORIDE 105  01/22/2024    CHLORIDE 109 06/18/2021    CO2 20 (L) 07/23/2024    CO2 29 01/22/2024    CO2 29 06/18/2021    ANIONGAP 12 07/23/2024    ANIONGAP 10 01/22/2024    ANIONGAP 4 06/18/2021    GLC 96 07/23/2024     (H) 01/22/2024     (H) 06/18/2021    BUN 22.4 07/23/2024    BUN 20 01/22/2024    BUN 28 06/18/2021    CR 1.13 07/23/2024    CR 1.44 (H) 06/18/2021    GFRESTIMATED 63 07/23/2024    GFRESTIMATED >60 09/21/2023    GFRESTIMATED 44 (L) 06/18/2021    GFRESTBLACK 51 (L) 06/18/2021    MARLENE 9.4 07/23/2024    MARLENE 9.5 06/18/2021        A1C RESULTS:  Lab Results   Component Value Date    A1C 6.0 (H) 07/23/2024    A1C 5.6 11/11/2020       INR RESULTS:  Lab Results   Component Value Date    INR 1.03 12/03/2018    INR 1.07 11/27/2007         Vinod De La Paz MD, FACC    CC  Nimisha Bennett PA-C  [id number]  [place] [id number]

## 2024-09-03 NOTE — TELEPHONE ENCOUNTER
Patient Quality Outreach Health Maintenance - PAL RN    Summary:    PAL RN contacted pt regarding overdue health maintenance    Patient is due/failing the following:   There are no preventive care reminders to display for this patient.    Health Maintenance Due   Topic Date Due    HF ACTION PLAN  Never done    RSV VACCINE (Pregnancy & 60+) (1 - 1-dose 60+ series) Never done    ANNUAL REVIEW OF HM ORDERS  01/03/2024       Type of outreach:    Sent [employer] message.    - Advised patient if any questions or concerns comes up to call the PAL RN.   - Patient given opportunity to ask questions and at this time there is nothing further needed.     Questions for provider review:    None                                                                                     Rupinder ARTEAGA RN, BSN    
Opt out

## 2024-09-04 ENCOUNTER — E-VISIT (OUTPATIENT)
Dept: PEDIATRICS | Facility: CLINIC | Age: 89
End: 2024-09-04
Payer: COMMERCIAL

## 2024-09-04 DIAGNOSIS — N64.4 BREAST PAIN: Primary | ICD-10-CM

## 2024-09-04 PROCEDURE — 99207 PR NON-BILLABLE SERV PER CHARTING: CPT | Performed by: INTERNAL MEDICINE

## 2024-09-04 NOTE — PATIENT INSTRUCTIONS
Thank you for choosing us for your care. I think an in-clinic or virtual visit would be the best next step based on your symptoms. Please schedule a clinic appointment; you won t be charged for this eVisit.      You can schedule an appointment by clicking here in [employer], or call [phone].

## 2024-09-04 NOTE — TELEPHONE ENCOUNTER
Please reach out to schedule in-person visit with me or extender or resident.  Ok to use a same day or next day or WILMER block for me

## 2024-09-06 ENCOUNTER — APPOINTMENT (OUTPATIENT)
Dept: GENERAL RADIOLOGY | Facility: CLINIC | Age: 89
End: 2024-09-06
Attending: PHYSICIAN ASSISTANT
Payer: COMMERCIAL

## 2024-09-06 ENCOUNTER — APPOINTMENT (OUTPATIENT)
Dept: CT IMAGING | Facility: CLINIC | Age: 89
End: 2024-09-06
Attending: PHYSICIAN ASSISTANT
Payer: COMMERCIAL

## 2024-09-06 ENCOUNTER — HOSPITAL ENCOUNTER (EMERGENCY)
Facility: CLINIC | Age: 89
Discharge: HOME | End: 2024-09-07
Attending: PHYSICIAN ASSISTANT | Admitting: PHYSICIAN ASSISTANT
Payer: COMMERCIAL

## 2024-09-06 DIAGNOSIS — I60.9 SAH (SUBARACHNOID HEMORRHAGE) (H): ICD-10-CM

## 2024-09-06 DIAGNOSIS — M25.511 RIGHT SHOULDER PAIN: ICD-10-CM

## 2024-09-06 DIAGNOSIS — Z79.01 ANTICOAGULATED: ICD-10-CM

## 2024-09-06 DIAGNOSIS — R79.89 ELEVATED SERUM CREATININE: ICD-10-CM
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
## 2024-09-06 LAB — RADIOLOGIST FLAGS: ABNORMAL

## 2024-09-06 PROCEDURE — 93005 ELECTROCARDIOGRAM TRACING: CPT

## 2024-09-06 PROCEDURE — 99285 EMERGENCY DEPT VISIT HI MDM: CPT | Mod: 25

## 2024-09-06 PROCEDURE — 96360 HYDRATION IV INFUSION INIT: CPT

## 2024-09-06 PROCEDURE — 73030 X-RAY EXAM OF SHOULDER: CPT | Mod: RT

## 2024-09-06 PROCEDURE — 96361 HYDRATE IV INFUSION ADD-ON: CPT

## 2024-09-06 PROCEDURE — 72125 CT NECK SPINE W/O DYE: CPT

## 2024-09-06 PROCEDURE — 70450 CT HEAD/BRAIN W/O DYE: CPT

## 2024-09-06 ASSESSMENT — COLUMBIA-SUICIDE SEVERITY RATING SCALE - C-SSRS
2. HAVE YOU ACTUALLY HAD ANY THOUGHTS OF KILLING YOURSELF IN THE PAST MONTH?: NO
6. HAVE YOU EVER DONE ANYTHING, STARTED TO DO ANYTHING, OR PREPARED TO DO ANYTHING TO END YOUR LIFE?: NO
1. IN THE PAST MONTH, HAVE YOU WISHED YOU WERE DEAD OR WISHED YOU COULD GO TO SLEEP AND NOT WAKE UP?: NO

## 2024-09-06 ASSESSMENT — ACTIVITIES OF DAILY LIVING (ADL): ADLS_ACUITY_SCORE: 36

## 2024-09-07 ENCOUNTER — APPOINTMENT (OUTPATIENT)
Dept: CT IMAGING | Facility: CLINIC | Age: 89
End: 2024-09-07
Attending: EMERGENCY MEDICINE
Payer: COMMERCIAL

## 2024-09-07 VITALS
HEART RATE: 58 BPM | SYSTOLIC BLOOD PRESSURE: 133 MMHG | OXYGEN SATURATION: 93 % | WEIGHT: 242.51 LBS | DIASTOLIC BLOOD PRESSURE: 92 MMHG | TEMPERATURE: 97.2 F | RESPIRATION RATE: 13 BRPM | BODY MASS INDEX: 36.75 KG/M2 | HEIGHT: 68 IN

## 2024-09-07 LAB
ANION GAP SERPL CALCULATED.3IONS-SCNC: 13 MMOL/L (ref 7–15)
ATRIAL RATE - MUSE: NORMAL BPM
BASOPHILS # BLD AUTO: 0.1 10E3/UL (ref 0–0.2)
BASOPHILS NFR BLD AUTO: 1 %
BUN SERPL-MCNC: 23.8 MG/DL (ref 8–23)
CALCIUM SERPL-MCNC: 9.1 MG/DL (ref 8.8–10.4)
CHLORIDE SERPL-SCNC: 107 MMOL/L (ref 98–107)
CREAT SERPL-MCNC: 1.31 MG/DL (ref 0.67–1.17)
DIASTOLIC BLOOD PRESSURE - MUSE: NORMAL MMHG
EGFRCR SERPLBLD CKD-EPI 2021: 53 ML/MIN/1.73M2
EOSINOPHIL # BLD AUTO: 0.3 10E3/UL (ref 0–0.7)
EOSINOPHIL NFR BLD AUTO: 4 %
ERYTHROCYTE [DISTWIDTH] IN BLOOD BY AUTOMATED COUNT: 14.3 % (ref 10–15)
GLUCOSE SERPL-MCNC: 103 MG/DL (ref 70–99)
HCO3 SERPL-SCNC: 21 MMOL/L (ref 22–29)
HCT VFR BLD AUTO: 42.6 % (ref 40–53)
HGB BLD-MCNC: 13.8 G/DL (ref 13.3–17.7)
IMM GRANULOCYTES # BLD: 0 10E3/UL
IMM GRANULOCYTES NFR BLD: 0 %
INTERPRETATION ECG - MUSE: NORMAL
LYMPHOCYTES # BLD AUTO: 1.5 10E3/UL (ref 0.8–5.3)
LYMPHOCYTES NFR BLD AUTO: 20 %
MCH RBC QN AUTO: 30.1 PG (ref 26.5–33)
MCHC RBC AUTO-ENTMCNC: 32.4 G/DL (ref 31.5–36.5)
MCV RBC AUTO: 93 FL (ref 78–100)
MONOCYTES # BLD AUTO: 0.9 10E3/UL (ref 0–1.3)
MONOCYTES NFR BLD AUTO: 11 %
NEUTROPHILS # BLD AUTO: 4.7 10E3/UL (ref 1.6–8.3)
NEUTROPHILS NFR BLD AUTO: 63 %
NRBC # BLD AUTO: 0 10E3/UL
NRBC BLD AUTO-RTO: 0 /100
P AXIS - MUSE: NORMAL DEGREES
PLATELET # BLD AUTO: 166 10E3/UL (ref 150–450)
POTASSIUM SERPL-SCNC: 4.3 MMOL/L (ref 3.4–5.3)
PR INTERVAL - MUSE: NORMAL MS
QRS DURATION - MUSE: 74 MS
QT - MUSE: 402 MS
QTC - MUSE: 458 MS
R AXIS - MUSE: -5 DEGREES
RBC # BLD AUTO: 4.58 10E6/UL (ref 4.4–5.9)
SODIUM SERPL-SCNC: 141 MMOL/L (ref 135–145)
SYSTOLIC BLOOD PRESSURE - MUSE: NORMAL MMHG
T AXIS - MUSE: 11 DEGREES
VENTRICULAR RATE- MUSE: 78 BPM
WBC # BLD AUTO: 7.4 10E3/UL (ref 4–11)

## 2024-09-07 PROCEDURE — 70450 CT HEAD/BRAIN W/O DYE: CPT

## 2024-09-07 PROCEDURE — 85025 COMPLETE CBC W/AUTO DIFF WBC: CPT | Performed by: EMERGENCY MEDICINE

## 2024-09-07 PROCEDURE — 258N000003 HC RX IP 258 OP 636: Performed by: PHYSICIAN ASSISTANT

## 2024-09-07 PROCEDURE — 80048 BASIC METABOLIC PNL TOTAL CA: CPT | Performed by: EMERGENCY MEDICINE

## 2024-09-07 PROCEDURE — 36415 COLL VENOUS BLD VENIPUNCTURE: CPT | Performed by: EMERGENCY MEDICINE

## 2024-09-07 PROCEDURE — 258N000003 HC RX IP 258 OP 636: Performed by: EMERGENCY MEDICINE

## 2024-09-07 RX ADMIN — SODIUM CHLORIDE 500 ML: 9 INJECTION, SOLUTION INTRAVENOUS at 04:10

## 2024-09-07 RX ADMIN — SODIUM CHLORIDE 1000 ML: 9 INJECTION, SOLUTION INTRAVENOUS at 01:35

## 2024-09-07 ASSESSMENT — ACTIVITIES OF DAILY LIVING (ADL)
ADLS_ACUITY_SCORE: 36

## 2024-09-07 NOTE — ED TRIAGE NOTES
"Pt states fell after getting out of car tonight. States did hit head, is on blood thinners. Has abrasion to left forehead. States also hit left knee which was broken last year in a fall. Denies LOC but felt \"woozy\" getting up. Also endorses right shoulder pain. Pt AxO. Denies neck/ back pain.        "

## 2024-09-07 NOTE — ED PROVIDER NOTES
Tiny SAH after mech. Fall on eliquis, declined transfer to [facility] for monitoring. NSGY aware, repeat head CT pending, if stable, likely discharge. Not reversed.    Repeat head CT stable, touch base with neurosurgery, they recommend holding his Eliquis till repeat follow-up in 2 weeks.  Return precautions were reviewed and he was discharged.    Naseem Neri MD  [employer]  6:36 AM 09/07/24        Naseem Neri MD  09/07/24 0944

## 2024-09-07 NOTE — PROGRESS NOTES
Contacted regarding 87 yo male anti-coagulated on Eliquis presenting to ER after fall on Eliquis.    Head CT reveals small SAH.    Head CT  IMPRESSION:  1.  Minimal subarachnoid hemorrhage left temporoparietal sulci.  2.  Brain atrophy and presumed chronic microvascular ischemic changes as above.    RECOMMENDATIONS:  Hold Eliquis  Observe at [facility].  Every 2 hour neuro checks.  Repeat CT at 6 AM  Maintain systolic BP less than 150    ADDENDUM 12:20 AM  Hospitalist will not accept at [facility].  Plan to transfer to [facility] for repeat head CT    ADDENDUM 1:00 AM  Patient refusing transfer.   Plan to observe in ER with repeat CT at 5 AM for stability and then can discharge from [facility] ER if stable.    VIRA Herron  [facility] Neurosurgery  [address] [address]    Tel [phone]  Pager [phone]

## 2024-09-07 NOTE — ED NOTES
[facility]  ED Nurse Handoff Report    ED Chief complaint: Fall  . ED Diagnosis:   Final diagnoses:   SAH (subarachnoid hemorrhage) (H)   Fall, initial encounter   Right shoulder pain   Anticoagulated       Allergies:   Allergies   Allergen Reactions    Amoxicillin-Pot Clavulanate Rash     Type III hypersensitivity    Bactrim [Sulfamethoxazole W/Trimethoprim] Rash     Serum sickness, type III hypersensitivity      Bee Venom Itching    Sulfa Antibiotics Hives    Celebrex [Celecoxib]     Lisinopril Cough    Naproxen Hives    Penicillin G        Code Status: Full Code    Activity level - Baseline/Home:  independent.  Activity Level - Current:   standby.   Lift room needed: No.   Bariatric: No   Needed: No   Isolation: No.   Infection: Not Applicable.     Respiratory status: Room air    Vital Signs (within 30 minutes):   Vitals:    09/06/24 2130 09/06/24 2221 09/06/24 2226 09/06/24 2231   BP: (!) 149/97 (!) 138/95     Pulse: 69 74     Resp: 18      Temp: 97.2  F (36.2  C)      SpO2: 98% 97% 97% 96%   Weight:       Height:           Cardiac Rhythm:  ,      Pain level:    Patient confused: No.   Patient Falls Risk: activity supervised.   Elimination Status: Has voided     Patient Report - Initial Complaint: Pt to ER with c/o fall  .   Focused Assessment: Pt fell out off car in garage and struck head pt on blood thinners     Abnormal Results:   Labs Ordered and Resulted from Time of ED Arrival to Time of ED Departure   BASIC METABOLIC PANEL - Abnormal       Result Value    Sodium 141      Potassium 4.3      Chloride 107      Carbon Dioxide (CO2) 21 (*)     Anion Gap 13      Urea Nitrogen 23.8 (*)     Creatinine 1.31 (*)     GFR Estimate 53 (*)     Calcium 9.1      Glucose 103 (*)    CBC WITH PLATELETS AND DIFFERENTIAL    WBC Count 7.4      RBC Count 4.58      Hemoglobin 13.8      Hematocrit 42.6      MCV 93      MCH 30.1      MCHC 32.4      RDW 14.3      Platelet Count 166      %  Neutrophils 63      % Lymphocytes 20      % Monocytes 11      % Eosinophils 4      % Basophils 1      % Immature Granulocytes 0      NRBCs per 100 WBC 0      Absolute Neutrophils 4.7      Absolute Lymphocytes 1.5      Absolute Monocytes 0.9      Absolute Eosinophils 0.3      Absolute Basophils 0.1      Absolute Immature Granulocytes 0.0      Absolute NRBCs 0.0          XR Shoulder Right G/E 3 Views   Final Result   IMPRESSION: No acute fracture or dislocation.      CT Cervical Spine w/o Contrast   Final Result   IMPRESSION:   1.  No fracture or posttraumatic subluxation.   2.  Facet hypertrophic changes with foraminal narrowing on the right side from C3 through C6 as described above.      Head CT w/o contrast   Final Result   Abnormal   IMPRESSION:   1.  Minimal subarachnoid hemorrhage left temporoparietal sulci.   2.  Brain atrophy and presumed chronic microvascular ischemic changes as above.         [Critical Result: Acute intracranial hemorrhage]      Finding was identified on 9/6/2024 11:26 PM CDT.       1.  JOSE DANIEL Blunt was contacted by me on 9/6/2024 11:29 PM CDT and verbalized understanding of the critical result.           Treatments provided: Monitor  Family Comments: Daughter at bedside  OBS brochure/video discussed/provided to patient:  Yes  ED Medications: Medications - No data to display    Drips infusing:  No  For the majority of the shift this patient was Green.   Interventions performed wereN/A.    Sepsis treatment initiated: No    Cares/treatment/interventions/medications to be completed following ED care:See [facility]    ED Nurse Name: Elza Hansen RN  12:53 AM

## 2024-09-07 NOTE — ED PROVIDER NOTES
ED APC SUPERVISION NOTE:   I evaluated this patient in conjunction with Jaron Howard PA-C  I have participated in the care of the patient and personally performed key elements of the history, exam, and medical decision making.      HPI:   Nixon More is a 88 year old male afib on eliquis who presents to the ED for evaluation after a fall. Patient reports that around 1600 this afternoon he was stepping out of his car when his foot got stuck on the lip of the door, causing him to fall forward and strike his head. No LOC. He states he was able to get up with some help. He noted an abrasion to his left forehead. He states that he has felt overall well with the exception of his right shoulder, however spoke with family this evening and was convinced to come to the ED. He denies headache or vision changes. No neck pain. No numbness or tingling. He denies chest pain, dyspnea, left arm pain, abdominal pain, nausea, vomiting, or pain to the lower extremities. He has been up and ambulating since the fall.      Independent Historian:   None    Review of External Notes:      EXAM:   General: Alert. Appears comfortable  Head:  The L. Frontal region is with slight contusion and abrasion  Eyes:  Sclera white; Pupils are equal and round  ENT:    External ears normal.  No hemotympanum.      External nares normal.  No septal hematoma.    Neck:  No midline tenderness or pain with full ROM.  CV:  Rate as above with regular rhythm   2/2 radial and dorsal pedal pulses  Resp:  Breath sounds clear and equal bilaterally    Non-labored, no retractions or accessory muscle use  GI:  Abdomen soft, non-tender, non-distended    No rebound tenderness or guarding  MSK:  No midline tenderness or bony step-off    No deformity    Moves all extremities equally and symmetrically  Skin:  No rash or lesions noted. Slight L. Knee abrasion, full active ROM  Neuro:   No apparent deficit.    Strength 5/5 x4.  Sensation intact x4.     GCS:  15  Psych:  Normal affect.        Independent Interpretation (X-rays, CTs, rhythm strip):  XR Shoulder Right G/E 3 Views   Final Result   IMPRESSION: No acute fracture or dislocation.      CT Cervical Spine w/o Contrast   Final Result   IMPRESSION:   1.  No fracture or posttraumatic subluxation.   2.  Facet hypertrophic changes with foraminal narrowing on the right side from C3 through C6 as described above.      Head CT w/o contrast   Final Result   Abnormal   IMPRESSION:   1.  Minimal subarachnoid hemorrhage left temporoparietal sulci.   2.  Brain atrophy and presumed chronic microvascular ischemic changes as above.         [Critical Result: Acute intracranial hemorrhage]      Finding was identified on 9/6/2024 11:26 PM CDT.       1.  JOSE DANIEL Blunt was contacted by me on 9/6/2024 11:29 PM CDT and verbalized understanding of the critical result.             Consultations/Discussion of Management or Tests:  PA spoke to neurosurgery JOSE DANIEL Turcios    Social Determinants of Health affecting care:   None     MEDICAL DECISION MAKING/ASSESSMENT AND PLAN:   1:00AM  PA signed out and patient now declining transfer to [facility]. PA initially spoke to [facility] hospitalist who requested transfer to [facility] given anticoagulation history in particular. Neurosurgery comfortable holding off on emergent anticoagulation reversal.  Will plan on 6 hour repeat CT head at 6AM and then reconnect with neurosurgery.     5:45AM  No acute events overnight. Signed out to Dr. Neri pending CT head and reconnecting with neurosurgery team.     DIAGNOSIS:     ICD-10-CM    1. SAH (subarachnoid hemorrhage) (H)  I60.9       2. Fall, initial encounter  W19.XXXA       3. Right shoulder pain  M25.511       4. Anticoagulated  Z79.01       5. Elevated serum creatinine  R79.89           DISPOSITION:   Signed out to Dr. Neri     Scribe Disclosure:  El COLÓN, am serving as a scribe at 11:46 PM on 9/6/2024 to document services personally  performed by Jaron Howard PA-C based on my observations and the provider's statements to me.     9/6/2024  [facility] EMERGENCY DEPT     Britney Morton,   09/07/24 0321

## 2024-09-07 NOTE — ED PROVIDER NOTES
Emergency Department Note      History of Present Illness     Chief Complaint   Fall      HPI   Nixon More is a 88 year old male afib on eliquis who presents to the ED for evaluation after a fall. Patient reports that around 1600 this afternoon he was stepping out of his car when his foot got stuck on the lip of the door, causing him to fall forward and strike his head. No LOC. He states he was able to get up with some help. He noted an abrasion to his left forehead. He states that he has felt overall well with the exception of his right shoulder, however spoke with family this evening and was convinced to come to the ED. He denies headache or vision changes. No neck pain. No numbness or tingling. He denies chest pain, dyspnea, left arm pain, abdominal pain, nausea, vomiting, or pain to the lower extremities. He has been up and ambulating since the fall.    Independent Historian   None    Review of External Notes   MIIC tetanus 2023    Past Medical History     Medical History and Problem List   Past Medical History:   Diagnosis Date    AAA (abdominal aortic aneurysm) (H24)     Actinic keratosis     Angina pectoris (H24) 10/27/2020    Antiplatelet or antithrombotic long-term use     Arthritis     Atrial fibrillation and flutter (H) 12/03/2018    CAD (coronary artery disease)     Coronary artery disease 01/01/2011    Depressive disorder     Diarrhea     Dyslipidemia     Emphysema of lung (H)     Essential hypertension, benign     Hernia, abdominal     Hypersomnia with sleep apnea, unspecified     Hypertrophy (benign) of prostate 05/2001    Lumbago     Mumps     Prostate cancer (H) 12/15/2006    Renal disease     Skin cancer, basal cell 1997    Sleep apnea     Spider veins        Medications   albuterol (PROAIR HFA/PROVENTIL HFA/VENTOLIN HFA) 108 (90 Base) MCG/ACT inhaler  ASPIRIN NOT PRESCRIBED (INTENTIONAL)  atorvastatin (LIPITOR) 40 MG tablet  Cholecalciferol (VITAMIN D-3) 25 MCG (1000 UT) CAPS  ELIQUIS  "ANTICOAGULANT 5 MG tablet  escitalopram (LEXAPRO) 10 MG tablet  Loperamide HCl (IMODIUM OR)  losartan (COZAAR) 100 MG tablet  metoprolol succinate ER (TOPROL XL) 50 MG 24 hr tablet  RISEdronate (ACTONEL) 35 MG tablet  spironolactone (ALDACTONE) 25 MG tablet  umeclidinium-vilanterol (ANORO ELLIPTA) 62.5-25 MCG/ACT oral inhaler        Surgical History   Past Surgical History:   Procedure Laterality Date    ABDOMEN SURGERY      BACK SURGERY  1988    L4/L5 decompression    BIOPSY  2022    Skin cancer basal cell    COLONOSCOPY      CYSTOSCOPY      CYSTOSCOPY, TRANSURETHRAL RESECTION (TUR) TUMOR BLADDER, COMBINED N/A 01/06/2023    Procedure: Cystoscopy, transurethral resection of bladder tumor, medium, 2-5 cm;  Surgeon: Mark Pino MD;  Location: [facility] OR    EYE SURGERY  2016    Cararact    GENITOURINARY SURGERY  12 07 2022    Tumors in bladder    HERNIA REPAIR  child    Moh's procedure for basal cell carcinoma  01/2001    PROSTATE SURGERY  2007    Radiation only    s/p lumbar laminectomy NOS  1988    SIGMOIDOSCOPY FLEXIBLE N/A 06/15/2020    Procedure: SIGMOIDOSCOPY, FLEXIBLE;  Surgeon: Sunni Patton MD;  Location:  GI    VITRECTOMY PARS PLANA REMOVE PRERETINAL MEMBRANE   03/2009       Physical Exam     Patient Vitals for the past 24 hrs:   BP Temp Pulse Resp SpO2 Height Weight   09/06/24 2231 -- -- -- -- 96 % -- --   09/06/24 2226 -- -- -- -- 97 % -- --   09/06/24 2221 (!) 138/95 -- 74 -- 97 % -- --   09/06/24 2130 (!) 149/97 97.2  F (36.2  C) 69 18 98 % -- --   09/06/24 2128 -- -- -- -- -- 1.721 m (5' 7.75\") 110 kg (242 lb 8.1 oz)     Physical Exam  Constitutional: Pleasant. Cooperative.   Eyes: Pupils equally round and reactive  HENT: Abrasion noted to left temple. No bony step-off or crepitus. No facial bone tenderness or instability. No periorbital ecchymosis or Ley signs. Oropharynx is normal with moist mucus membranes. No evidence for intraoral injury.  Cardiovascular: Regular rate and rhythm " and without murmurs.  Respiratory: Normal respiratory effort, lungs are clear bilaterally.  GI: Abdomen is soft, non-tender, non-distended. No guarding, rebound, or rigidity.  Musculoskeletal: No midline cervical, thoracic, or lumbar tenderness. Normal painless ROM of the neck. No clavicular tenderness. Mild TTP to right shoulder, proximal humerus primarily, otherwise no other upper extremity TTP. No lower extremity tenderness. Decreased ROM right upper extremity, otherwise normal ROM of extremities.. No tenderness with compression of the rib cage or pelvis.  Skin: No rashes. Superficial abrasion noted to left knee.  Neurologic: Cranial nerves grossly intact, normal cognition, no focal deficits. Alert and oriented x 3. GCS 15  Psychiatric: Normal affect.  Nursing notes and vital signs reviewed.    Diagnostics     Lab Results   Labs Ordered and Resulted from Time of ED Arrival to Time of ED Departure   CBC WITH PLATELETS AND DIFFERENTIAL       Result Value    WBC Count 7.4      RBC Count 4.58      Hemoglobin 13.8      Hematocrit 42.6      MCV 93      MCH 30.1      MCHC 32.4      RDW 14.3      Platelet Count 166      % Neutrophils 63      % Lymphocytes 20      % Monocytes 11      % Eosinophils 4      % Basophils 1      % Immature Granulocytes 0      NRBCs per 100 WBC 0      Absolute Neutrophils 4.7      Absolute Lymphocytes 1.5      Absolute Monocytes 0.9      Absolute Eosinophils 0.3      Absolute Basophils 0.1      Absolute Immature Granulocytes 0.0      Absolute NRBCs 0.0     BASIC METABOLIC PANEL       Imaging   XR Shoulder Right G/E 3 Views   Final Result   IMPRESSION: No acute fracture or dislocation.      CT Cervical Spine w/o Contrast   Final Result   IMPRESSION:   1.  No fracture or posttraumatic subluxation.   2.  Facet hypertrophic changes with foraminal narrowing on the right side from C3 through C6 as described above.      Head CT w/o contrast   Final Result   Abnormal   IMPRESSION:   1.  Minimal  subarachnoid hemorrhage left temporoparietal sulci.   2.  Brain atrophy and presumed chronic microvascular ischemic changes as above.         [Critical Result: Acute intracranial hemorrhage]      Finding was identified on 9/6/2024 11:26 PM CDT.       1.  JOSE DANIEL Blunt was contacted by me on 9/6/2024 11:29 PM CDT and verbalized understanding of the critical result.           EKG   ECG results from 09/06/24   EKG 12-lead, tracing only     Value    Systolic Blood Pressure     Diastolic Blood Pressure     Ventricular Rate 78    Atrial Rate     WY Interval     QRS Duration 74        QTc 458    P Axis     R AXIS -5    T Axis 11    Interpretation ECG      Atrial fibrillation  Inferior infarct (cited on or before 03-Dec-2018)  Abnormal ECG  When compared with ECG of 10-Oct-2023 16:32,  Atrial fibrillation has replaced Atrial flutter       *Note: Due to a large number of results and/or encounters for the requested time period, some results have not been displayed. A complete set of results can be found in Results Review.         Independent Interpretation   None    ED Course      Medications Administered   Medications - No data to display    Procedures   Procedures     Discussion of Management   Neurosurgery - 2 hours neuro, BP < 150, CT 1800, Hold on thinner, no reverse  [facility] hospitalist - Declines admission due to anticoagulated SAH  [facility] hospitalist - awaiting    ED Course        Additional Documentation  None    Medical Decision Making / Diagnosis     CMS Diagnoses: None    MIPS       None    MDM   Nixon More is a 88 year old male with a history of Afib on Eliquis who presents to the ED for evaluation after a mechanical fall in which he struck his head. No LOC. He notes some right shoulder pain, unsure if he struck this with the fall however. See HPI as above for additional details. Vitals and physical exam as above. CT of head and C spine obtained as above. CT of head with evidence for small SAH. CT  of C spine negative for acute abnormality, and XR of right shoulder is negative. No indication for any further imaging based upon history and exam. Case discussed with neurosurgery, who recommended 2 hours neuro checks, keeping systolic BP < 150, repeat head CT 0600 assuming no change in symptoms, and to hold on blood thinner at this time, but no indication for reversal currently. Neurosurgery did feel that patient would be appropriate for [facility] admission, however ultimately [facility] hospitalist declines admission given anticoagulation status with SAH and absence of neurosurgery coverage at [facility] at night/weekends. Baseline labs obtained as above as well as baseline ECG. Facial abrasion cleaned and bandaged. At time of this dictation, awaiting call from [facility] regarding bed availability. Care signed over to Dr. Morton with disposition pending.    ADDENDUM: Patient declines transfer to [facility]. Will observe patient in ED at this time until repeat head CT at 0600.     Disposition   Care of the patient was transferred to my colleague Dr. Morton pending ultimate disposition.     Diagnosis     ICD-10-CM    1. SAH (subarachnoid hemorrhage) (H)  I60.9       2. Fall, initial encounter  W19.XXXA       3. Right shoulder pain  M25.511       4. Anticoagulated  Z79.01          This record was created at least in part using electronic voice recognition software, so please excuse any typographical errors.       Jaron Howard PA-C  09/07/24 0030       Jaron Howard PA-C  09/07/24 0053

## 2024-09-07 NOTE — DISCHARGE INSTRUCTIONS
safe to restartDo not take your Eliquis until you are seen by neurosurgery in 2 weeks they will want to repeat head CT at that time to determine if it is safe.  Certainly return to the emergency department should you have progressive headache, dizziness, nausea or fall and hit your head again.  It is located take Tylenol for mild headaches.  Do not take ibuprofen Advil Motrin Aleve as these can cause increased risk of bleeding.

## 2024-09-07 NOTE — PROGRESS NOTES
Neurosurgery progress note:    Repeat head CT stable:    Head CT:    IMPRESSION:  1.  Stable minimal subarachnoid hemorrhage in the left temporoparietal region. No new intracranial hemorrhage and no mass effect.    RECOMMENDATIONS:  Okay to discharge.'  Hold Eliquis  Follow up in [facility] clinic in 2 weeks with repeat head CT day day of appt.    Discussed with Dr. Pino.    Neeru Serrano MPAS  [facility]  [address] [address]    Tel [phone]  Pager [phone]

## 2024-09-08 ENCOUNTER — APPOINTMENT (OUTPATIENT)
Dept: MRI IMAGING | Facility: CLINIC | Age: 89
DRG: 086 | End: 2024-09-08
Attending: STUDENT IN AN ORGANIZED HEALTH CARE EDUCATION/TRAINING PROGRAM
Payer: COMMERCIAL

## 2024-09-08 ENCOUNTER — APPOINTMENT (OUTPATIENT)
Dept: GENERAL RADIOLOGY | Facility: CLINIC | Age: 89
DRG: 086 | End: 2024-09-08
Attending: EMERGENCY MEDICINE
Payer: COMMERCIAL

## 2024-09-08 ENCOUNTER — HOSPITAL ENCOUNTER (INPATIENT)
Facility: CLINIC | Age: 89
LOS: 2 days | Discharge: NURSING, REHAB OR LONG-TERM CARE | DRG: 086 | End: 2024-09-10
Attending: EMERGENCY MEDICINE | Admitting: HOSPITALIST
Payer: COMMERCIAL

## 2024-09-08 ENCOUNTER — APPOINTMENT (OUTPATIENT)
Dept: CT IMAGING | Facility: CLINIC | Age: 89
DRG: 086 | End: 2024-09-08
Attending: EMERGENCY MEDICINE
Payer: COMMERCIAL

## 2024-09-08 DIAGNOSIS — R55 NEAR SYNCOPE: ICD-10-CM

## 2024-09-08 DIAGNOSIS — I48.19 PERSISTENT ATRIAL FIBRILLATION (H): ICD-10-CM

## 2024-09-08 DIAGNOSIS — R53.1 WEAKNESS: ICD-10-CM

## 2024-09-08 DIAGNOSIS — K59.00 CONSTIPATION, UNSPECIFIED CONSTIPATION TYPE: ICD-10-CM

## 2024-09-08 DIAGNOSIS — I10 BENIGN ESSENTIAL HYPERTENSION: ICD-10-CM

## 2024-09-08 DIAGNOSIS — N63.10 MASS OF RIGHT BREAST, UNSPECIFIED QUADRANT: ICD-10-CM

## 2024-09-08 DIAGNOSIS — S46.911D STRAIN OF RIGHT SHOULDER, SUBSEQUENT ENCOUNTER: ICD-10-CM

## 2024-09-08 DIAGNOSIS — E78.5 DYSLIPIDEMIA: Primary | ICD-10-CM

## 2024-09-08 DIAGNOSIS — I62.00 SUBDURAL HEMORRHAGE (H): ICD-10-CM

## 2024-09-08 LAB
ALBUMIN SERPL BCG-MCNC: 3.7 G/DL (ref 3.5–5.2)
ALBUMIN UR-MCNC: 10 MG/DL
ALP SERPL-CCNC: 57 U/L (ref 40–150)
ALT SERPL W P-5'-P-CCNC: 21 U/L (ref 0–70)
ANION GAP SERPL CALCULATED.3IONS-SCNC: 12 MMOL/L (ref 7–15)
ANION GAP SERPL CALCULATED.3IONS-SCNC: 12 MMOL/L (ref 7–15)
APPEARANCE UR: CLEAR
AST SERPL W P-5'-P-CCNC: 31 U/L (ref 0–45)
BASE EXCESS BLDV CALC-SCNC: -0.8 MMOL/L (ref -3–3)
BASOPHILS # BLD AUTO: 0.1 10E3/UL (ref 0–0.2)
BASOPHILS # BLD AUTO: 0.1 10E3/UL (ref 0–0.2)
BASOPHILS NFR BLD AUTO: 1 %
BASOPHILS NFR BLD AUTO: 1 %
BILIRUB SERPL-MCNC: 1.3 MG/DL
BILIRUB UR QL STRIP: NEGATIVE
BUN SERPL-MCNC: 15.7 MG/DL (ref 8–23)
BUN SERPL-MCNC: 17.4 MG/DL (ref 8–23)
CALCIUM SERPL-MCNC: 8.8 MG/DL (ref 8.8–10.4)
CALCIUM SERPL-MCNC: 8.9 MG/DL (ref 8.8–10.4)
CHLORIDE SERPL-SCNC: 105 MMOL/L (ref 98–107)
CHLORIDE SERPL-SCNC: 105 MMOL/L (ref 98–107)
COLOR UR AUTO: ABNORMAL
CREAT SERPL-MCNC: 1.12 MG/DL (ref 0.67–1.17)
CREAT SERPL-MCNC: 1.13 MG/DL (ref 0.67–1.17)
EGFRCR SERPLBLD CKD-EPI 2021: 63 ML/MIN/1.73M2
EGFRCR SERPLBLD CKD-EPI 2021: 64 ML/MIN/1.73M2
EOSINOPHIL # BLD AUTO: 0 10E3/UL (ref 0–0.7)
EOSINOPHIL # BLD AUTO: 0.1 10E3/UL (ref 0–0.7)
EOSINOPHIL NFR BLD AUTO: 0 %
EOSINOPHIL NFR BLD AUTO: 1 %
ERYTHROCYTE [DISTWIDTH] IN BLOOD BY AUTOMATED COUNT: 14.3 % (ref 10–15)
ERYTHROCYTE [DISTWIDTH] IN BLOOD BY AUTOMATED COUNT: 14.4 % (ref 10–15)
GLUCOSE SERPL-MCNC: 129 MG/DL (ref 70–99)
GLUCOSE SERPL-MCNC: 152 MG/DL (ref 70–99)
GLUCOSE UR STRIP-MCNC: NEGATIVE MG/DL
HCO3 BLDV-SCNC: 25 MMOL/L (ref 21–28)
HCO3 SERPL-SCNC: 20 MMOL/L (ref 22–29)
HCO3 SERPL-SCNC: 21 MMOL/L (ref 22–29)
HCT VFR BLD AUTO: 40.9 % (ref 40–53)
HCT VFR BLD AUTO: 41.2 % (ref 40–53)
HGB BLD-MCNC: 13.1 G/DL (ref 13.3–17.7)
HGB BLD-MCNC: 13.3 G/DL (ref 13.3–17.7)
HGB UR QL STRIP: ABNORMAL
HOLD SPECIMEN: NORMAL
HOLD SPECIMEN: NORMAL
IMM GRANULOCYTES # BLD: 0 10E3/UL
IMM GRANULOCYTES # BLD: 0.1 10E3/UL
IMM GRANULOCYTES NFR BLD: 1 %
IMM GRANULOCYTES NFR BLD: 1 %
KETONES UR STRIP-MCNC: NEGATIVE MG/DL
LEUKOCYTE ESTERASE UR QL STRIP: NEGATIVE
LYMPHOCYTES # BLD AUTO: 0.8 10E3/UL (ref 0.8–5.3)
LYMPHOCYTES # BLD AUTO: 1.4 10E3/UL (ref 0.8–5.3)
LYMPHOCYTES NFR BLD AUTO: 16 %
LYMPHOCYTES NFR BLD AUTO: 9 %
MCH RBC QN AUTO: 29.7 PG (ref 26.5–33)
MCH RBC QN AUTO: 30 PG (ref 26.5–33)
MCHC RBC AUTO-ENTMCNC: 32 G/DL (ref 31.5–36.5)
MCHC RBC AUTO-ENTMCNC: 32.3 G/DL (ref 31.5–36.5)
MCV RBC AUTO: 92 FL (ref 78–100)
MCV RBC AUTO: 94 FL (ref 78–100)
MONOCYTES # BLD AUTO: 1 10E3/UL (ref 0–1.3)
MONOCYTES # BLD AUTO: 1.2 10E3/UL (ref 0–1.3)
MONOCYTES NFR BLD AUTO: 11 %
MONOCYTES NFR BLD AUTO: 15 %
MUCOUS THREADS #/AREA URNS LPF: PRESENT /LPF
NEUTROPHILS # BLD AUTO: 5.8 10E3/UL (ref 1.6–8.3)
NEUTROPHILS # BLD AUTO: 6.7 10E3/UL (ref 1.6–8.3)
NEUTROPHILS NFR BLD AUTO: 68 %
NEUTROPHILS NFR BLD AUTO: 77 %
NITRATE UR QL: NEGATIVE
NRBC # BLD AUTO: 0 10E3/UL
NRBC # BLD AUTO: 0 10E3/UL
NRBC BLD AUTO-RTO: 0 /100
NRBC BLD AUTO-RTO: 0 /100
NT-PROBNP SERPL-MCNC: 1903 PG/ML (ref 0–1800)
O2/TOTAL GAS SETTING VFR VENT: 32 %
OXYHGB MFR BLDV: 72 % (ref 70–75)
PCO2 BLDV: 44 MM HG (ref 40–50)
PH BLDV: 7.36 [PH] (ref 7.32–7.43)
PH UR STRIP: 5.5 [PH] (ref 5–7)
PLATELET # BLD AUTO: 159 10E3/UL (ref 150–450)
PLATELET # BLD AUTO: 161 10E3/UL (ref 150–450)
PO2 BLDV: 39 MM HG (ref 25–47)
POTASSIUM SERPL-SCNC: 4.2 MMOL/L (ref 3.4–5.3)
POTASSIUM SERPL-SCNC: 4.4 MMOL/L (ref 3.4–5.3)
PROT SERPL-MCNC: 6.7 G/DL (ref 6.4–8.3)
RBC # BLD AUTO: 4.37 10E6/UL (ref 4.4–5.9)
RBC # BLD AUTO: 4.48 10E6/UL (ref 4.4–5.9)
RBC URINE: 3 /HPF
SAO2 % BLDV: 73.1 % (ref 70–75)
SODIUM SERPL-SCNC: 137 MMOL/L (ref 135–145)
SODIUM SERPL-SCNC: 138 MMOL/L (ref 135–145)
SP GR UR STRIP: 1.02 (ref 1–1.03)
TROPONIN T SERPL HS-MCNC: 17 NG/L
UROBILINOGEN UR STRIP-MCNC: NORMAL MG/DL
WBC # BLD AUTO: 8.5 10E3/UL (ref 4–11)
WBC # BLD AUTO: 8.6 10E3/UL (ref 4–11)
WBC URINE: <1 /HPF

## 2024-09-08 PROCEDURE — 70450 CT HEAD/BRAIN W/O DYE: CPT

## 2024-09-08 PROCEDURE — 99207 PR APP CREDIT; MD BILLING SHARED VISIT: CPT | Performed by: INTERNAL MEDICINE

## 2024-09-08 PROCEDURE — 250N000013 HC RX MED GY IP 250 OP 250 PS 637: Performed by: INTERNAL MEDICINE

## 2024-09-08 PROCEDURE — 71046 X-RAY EXAM CHEST 2 VIEWS: CPT

## 2024-09-08 PROCEDURE — G0378 HOSPITAL OBSERVATION PER HR: HCPCS

## 2024-09-08 PROCEDURE — 93005 ELECTROCARDIOGRAM TRACING: CPT

## 2024-09-08 PROCEDURE — 250N000013 HC RX MED GY IP 250 OP 250 PS 637: Performed by: STUDENT IN AN ORGANIZED HEALTH CARE EDUCATION/TRAINING PROGRAM

## 2024-09-08 PROCEDURE — 84484 ASSAY OF TROPONIN QUANT: CPT | Performed by: EMERGENCY MEDICINE

## 2024-09-08 PROCEDURE — 85004 AUTOMATED DIFF WBC COUNT: CPT | Performed by: HOSPITALIST

## 2024-09-08 PROCEDURE — 250N000013 HC RX MED GY IP 250 OP 250 PS 637: Performed by: HOSPITALIST

## 2024-09-08 PROCEDURE — 82040 ASSAY OF SERUM ALBUMIN: CPT | Performed by: EMERGENCY MEDICINE

## 2024-09-08 PROCEDURE — 120N000001 HC R&B MED SURG/OB

## 2024-09-08 PROCEDURE — 99285 EMERGENCY DEPT VISIT HI MDM: CPT | Mod: 25

## 2024-09-08 PROCEDURE — 83880 ASSAY OF NATRIURETIC PEPTIDE: CPT | Performed by: EMERGENCY MEDICINE

## 2024-09-08 PROCEDURE — 99223 1ST HOSP IP/OBS HIGH 75: CPT | Performed by: HOSPITALIST

## 2024-09-08 PROCEDURE — 85025 COMPLETE CBC W/AUTO DIFF WBC: CPT | Performed by: EMERGENCY MEDICINE

## 2024-09-08 PROCEDURE — 73221 MRI JOINT UPR EXTREM W/O DYE: CPT | Mod: RT

## 2024-09-08 PROCEDURE — 36415 COLL VENOUS BLD VENIPUNCTURE: CPT | Performed by: EMERGENCY MEDICINE

## 2024-09-08 PROCEDURE — 36415 COLL VENOUS BLD VENIPUNCTURE: CPT | Performed by: HOSPITALIST

## 2024-09-08 PROCEDURE — 81001 URINALYSIS AUTO W/SCOPE: CPT | Performed by: HOSPITALIST

## 2024-09-08 PROCEDURE — 82805 BLOOD GASES W/O2 SATURATION: CPT | Performed by: HOSPITALIST

## 2024-09-08 RX ORDER — LOSARTAN POTASSIUM 100 MG/1
100 TABLET ORAL DAILY
Status: DISCONTINUED | OUTPATIENT
Start: 2024-09-08 | End: 2024-09-08

## 2024-09-08 RX ORDER — AMOXICILLIN 250 MG
1 CAPSULE ORAL 2 TIMES DAILY PRN
Status: DISCONTINUED | OUTPATIENT
Start: 2024-09-08 | End: 2024-09-10 | Stop reason: HOSPADM

## 2024-09-08 RX ORDER — METOPROLOL SUCCINATE 100 MG/1
100 TABLET, EXTENDED RELEASE ORAL DAILY
Status: DISCONTINUED | OUTPATIENT
Start: 2024-09-08 | End: 2024-09-10 | Stop reason: HOSPADM

## 2024-09-08 RX ORDER — AMOXICILLIN 250 MG
2 CAPSULE ORAL 2 TIMES DAILY PRN
Status: DISCONTINUED | OUTPATIENT
Start: 2024-09-08 | End: 2024-09-10 | Stop reason: HOSPADM

## 2024-09-08 RX ORDER — LOSARTAN POTASSIUM 50 MG/1
50 TABLET ORAL DAILY
Status: DISCONTINUED | OUTPATIENT
Start: 2024-09-08 | End: 2024-09-10 | Stop reason: HOSPADM

## 2024-09-08 RX ORDER — LOPERAMIDE HCL 2 MG
2 CAPSULE ORAL 2 TIMES DAILY PRN
COMMUNITY

## 2024-09-08 RX ORDER — LOPERAMIDE HCL 2 MG
2 CAPSULE ORAL 2 TIMES DAILY PRN
Status: DISCONTINUED | OUTPATIENT
Start: 2024-09-08 | End: 2024-09-10 | Stop reason: HOSPADM

## 2024-09-08 RX ORDER — LORAZEPAM 0.5 MG/1
0.5 TABLET ORAL ONCE
Status: COMPLETED | OUTPATIENT
Start: 2024-09-08 | End: 2024-09-08

## 2024-09-08 RX ORDER — ATORVASTATIN CALCIUM 40 MG/1
40 TABLET, FILM COATED ORAL DAILY
Status: DISCONTINUED | OUTPATIENT
Start: 2024-09-08 | End: 2024-09-10 | Stop reason: HOSPADM

## 2024-09-08 RX ORDER — ONDANSETRON 2 MG/ML
4 INJECTION INTRAMUSCULAR; INTRAVENOUS EVERY 6 HOURS PRN
Status: DISCONTINUED | OUTPATIENT
Start: 2024-09-08 | End: 2024-09-10 | Stop reason: HOSPADM

## 2024-09-08 RX ORDER — LORAZEPAM 0.5 MG/1
0.5 TABLET ORAL EVERY 4 HOURS PRN
Status: DISCONTINUED | OUTPATIENT
Start: 2024-09-08 | End: 2024-09-08

## 2024-09-08 RX ORDER — ONDANSETRON 4 MG/1
4 TABLET, ORALLY DISINTEGRATING ORAL EVERY 6 HOURS PRN
Status: DISCONTINUED | OUTPATIENT
Start: 2024-09-08 | End: 2024-09-10 | Stop reason: HOSPADM

## 2024-09-08 RX ORDER — METOPROLOL SUCCINATE 50 MG/1
100 TABLET, EXTENDED RELEASE ORAL DAILY
COMMUNITY

## 2024-09-08 RX ORDER — ACETAMINOPHEN 325 MG/1
650 TABLET ORAL EVERY 4 HOURS PRN
Status: DISCONTINUED | OUTPATIENT
Start: 2024-09-08 | End: 2024-09-10 | Stop reason: HOSPADM

## 2024-09-08 RX ORDER — ACETAMINOPHEN 650 MG/1
650 SUPPOSITORY RECTAL EVERY 4 HOURS PRN
Status: DISCONTINUED | OUTPATIENT
Start: 2024-09-08 | End: 2024-09-10 | Stop reason: HOSPADM

## 2024-09-08 RX ORDER — SPIRONOLACTONE 25 MG
12.5 TABLET ORAL DAILY
Status: DISCONTINUED | OUTPATIENT
Start: 2024-09-08 | End: 2024-09-09

## 2024-09-08 RX ORDER — IPRATROPIUM BROMIDE AND ALBUTEROL SULFATE 2.5; .5 MG/3ML; MG/3ML
3 SOLUTION RESPIRATORY (INHALATION) EVERY 4 HOURS PRN
Status: DISCONTINUED | OUTPATIENT
Start: 2024-09-08 | End: 2024-09-10 | Stop reason: HOSPADM

## 2024-09-08 RX ORDER — LANOLIN ALCOHOL/MO/W.PET/CERES
3 CREAM (GRAM) TOPICAL
Status: DISCONTINUED | OUTPATIENT
Start: 2024-09-08 | End: 2024-09-10 | Stop reason: HOSPADM

## 2024-09-08 RX ORDER — METOPROLOL SUCCINATE 100 MG/1
100 TABLET, EXTENDED RELEASE ORAL DAILY
Status: ON HOLD | COMMUNITY
End: 2024-09-08

## 2024-09-08 RX ORDER — ESCITALOPRAM OXALATE 10 MG/1
10 TABLET ORAL DAILY
Status: DISCONTINUED | OUTPATIENT
Start: 2024-09-08 | End: 2024-09-10 | Stop reason: HOSPADM

## 2024-09-08 RX ADMIN — Medication 5 CAPSULE: at 14:41

## 2024-09-08 RX ADMIN — LOSARTAN POTASSIUM 50 MG: 50 TABLET, FILM COATED ORAL at 21:53

## 2024-09-08 RX ADMIN — UMECLIDINIUM BROMIDE AND VILANTEROL TRIFENATATE 1 PUFF: 62.5; 25 POWDER RESPIRATORY (INHALATION) at 16:28

## 2024-09-08 RX ADMIN — ACETAMINOPHEN 650 MG: 325 TABLET ORAL at 09:09

## 2024-09-08 RX ADMIN — ATORVASTATIN CALCIUM 40 MG: 40 TABLET, FILM COATED ORAL at 21:53

## 2024-09-08 RX ADMIN — LORAZEPAM 0.5 MG: 0.5 TABLET ORAL at 22:13

## 2024-09-08 RX ADMIN — ESCITALOPRAM OXALATE 10 MG: 10 TABLET ORAL at 11:42

## 2024-09-08 ASSESSMENT — ACTIVITIES OF DAILY LIVING (ADL)
ADLS_ACUITY_SCORE: 28
ADLS_ACUITY_SCORE: 35
ADLS_ACUITY_SCORE: 35
ADLS_ACUITY_SCORE: 28
ADLS_ACUITY_SCORE: 36
ADLS_ACUITY_SCORE: 35
ADLS_ACUITY_SCORE: 36
ADLS_ACUITY_SCORE: 28
ADLS_ACUITY_SCORE: 25
ADLS_ACUITY_SCORE: 28
ADLS_ACUITY_SCORE: 28
ADLS_ACUITY_SCORE: 36
ADLS_ACUITY_SCORE: 28
ADLS_ACUITY_SCORE: 31
ADLS_ACUITY_SCORE: 28
ADLS_ACUITY_SCORE: 28
ADLS_ACUITY_SCORE: 36

## 2024-09-08 ASSESSMENT — COLUMBIA-SUICIDE SEVERITY RATING SCALE - C-SSRS
2. HAVE YOU ACTUALLY HAD ANY THOUGHTS OF KILLING YOURSELF IN THE PAST MONTH?: NO
1. IN THE PAST MONTH, HAVE YOU WISHED YOU WERE DEAD OR WISHED YOU COULD GO TO SLEEP AND NOT WAKE UP?: NO
6. HAVE YOU EVER DONE ANYTHING, STARTED TO DO ANYTHING, OR PREPARED TO DO ANYTHING TO END YOUR LIFE?: NO

## 2024-09-08 NOTE — CONSULTS
Care Management Initial Consult    General Information  Assessment completed with: Patient, Children, Patient and Daughter Amber at bedside  Type of CM/SW Visit: CM Role Introduction    Primary Care Provider verified and updated as needed: Yes   Readmission within the last 30 days: no previous admission in last 30 days      Reason for Consult: discharge planning  Advance Care Planning: Advance Care Planning Reviewed: present on chart, other (see comments) (per EPIC ACP)          Communication Assessment  Patient's communication style: spoken language (English or Bilingual)    Hearing Difficulty or Deaf: no   Wear Glasses or Blind: yes    Cognitive  Cognitive/Neuro/Behavioral: WDL  Level of Consciousness: alert  Arousal Level: opens eyes spontaneously  Orientation: oriented x 4     Best Language: 0 - No aphasia  Speech: clear, logical    Living Environment:   People in home: alone     Current living Arrangements: apartment      Able to return to prior arrangements: other (see comments) (pending recommendations)       Family/Social Support:  Care provided by: self  Provides care for: no one  Marital Status:   Support system: Children          Description of Support System: Supportive, Involved    Support Assessment: Adequate social supports    Current Resources:   Patient receiving home care services: No        Community Resources: None  Equipment currently used at home: walker, rolling (walker with seated bench)  Supplies currently used at home: Oxygen Tubing/Supplies, Other (CPAP)    Employment/Financial:  Employment Status: retired        Financial Concerns: none   Referral to Financial Worker: No       Does the patient's insurance plan have a 3 day qualifying hospital stay waiver?  Yes     Which insurance plan 3 day waiver is available? Alternative insurance waiver    Will the waiver be used for post-acute placement? Undetermined at this time    Lifestyle & Psychosocial Needs:  Social Determinants of Health      Food Insecurity: Low Risk  (9/8/2024)    Food Insecurity     Within the past 12 months, did you worry that your food would run out before you got money to buy more?: No     Within the past 12 months, did the food you bought just not last and you didn t have money to get more?: No   Depression: Not at risk (7/23/2024)    PHQ-2     PHQ-2 Score: 0   Housing Stability: Low Risk  (9/8/2024)    Housing Stability     Do you have housing? : Yes     Are you worried about losing your housing?: No   Tobacco Use: Medium Risk (8/20/2024)    Patient History     Smoking Tobacco Use: Former     Smokeless Tobacco Use: Never     Passive Exposure: Never   Financial Resource Strain: Low Risk  (9/8/2024)    Financial Resource Strain     Within the past 12 months, have you or your family members you live with been unable to get utilities (heat, electricity) when it was really needed?: No   Recent Concern: Financial Resource Strain - High Risk (7/18/2024)    Financial Resource Strain     Within the past 12 months, have you or your family members you live with been unable to get utilities (heat, electricity) when it was really needed?: Yes   Alcohol Use: Not At Risk (12/27/2022)    AUDIT-C     Frequency of Alcohol Consumption: Monthly or less     Average Number of Drinks: 1 or 2     Frequency of Binge Drinking: Never   Transportation Needs: Low Risk  (9/8/2024)    Transportation Needs     Within the past 12 months, has lack of transportation kept you from medical appointments, getting your medicines, non-medical meetings or appointments, work, or from getting things that you need?: No   Physical Activity: Insufficiently Active (7/18/2024)    Exercise Vital Sign     Days of Exercise per Week: 1 day     Minutes of Exercise per Session: 10 min   Interpersonal Safety: Low Risk  (9/8/2024)    Interpersonal Safety     Do you feel physically and emotionally safe where you currently live?: Yes     Within the past 12 months, have you been hit,  "slapped, kicked or otherwise physically hurt by someone?: No     Within the past 12 months, have you been humiliated or emotionally abused in other ways by your partner or ex-partner?: No   Stress: No Stress Concern Present (7/18/2024)    [place] [employer] - Occupational Stress Questionnaire     Feeling of Stress : Not at all   Social Connections: Unknown (7/18/2024)    Social Connection and Isolation Panel [NHANES]     Frequency of Communication with Friends and Family: Not on file     Frequency of Social Gatherings with Friends and Family: Three times a week     Attends Faith Services: Not on file     Active Member of Clubs or Organizations: Not on file     Attends Club or Organization Meetings: Not on file     Marital Status: Not on file   Health Literacy: Not on file       Functional Status:  Prior to admission patient needed assistance:   Dependent ADLs:: Independent, Ambulation-walker          Mental Health Status:  Mental Health Status: No Current Concerns       Chemical Dependency Status:  Chemical Dependency Status: No Current Concerns             Values/Beliefs:  Spiritual, Cultural Beliefs, Faith Practices, Values that affect care: yes       Cultural/Faith Practices Patient Routinely Participates In: other (see comments) (attends [employer] services)       Discussed  Partnership in Safe Discharge Planning  document with patient/family: No    Additional Information:  Writer met with patient and Daughter Amber at the bedside.    Went over role and scope of safe discharge planning. Prior to this admission [facility]. Patient ambulates with a walker with seated bench in the apartment, however, not always in the community.  Patient fell in the garage of his apartment while getting out of his vehicle went to the ED and returned home.  He noted his right foot \"turned out\" which caused the fall.  Patient was released from ED " "and was at home In the recliner most of the day he noted \"My legs were weak, so I lowered myself down.\"   Patient is complaining of a sore right shoulder and is up in chair at this time.  Writer informed the patient that we will follow him for discharge planning and we are awaiting Physical therapy to see him.  Writer discussed Medicare MARTINEZ for observation and answered all questions.  Went over possible discharge options to include the possible need for homecare (RN/PT/OT) went over homebound status and frequency of visit 2-3xweek 1/hour per visit for a few weeks.  Also briefly discussed TCU if recommended with 7-10 day stay with goal to return to independent apartment.  Patients Daughter noted that the patient may have an option to go to the [facility] side of [facility] if needed without moving and they may check with the [facility] 09/09.  Patient and Daughter are aware that the patient could be cleared medically for discharge as soon as tomorrow pending final recommendations.  Patient has two Daughters Amber lives ~1 hour away and is retired and can assist the patient if needed and Maria Del Carmen ~10 mins away, but works.  Patient and Daughter Amber agreeable for initial homecare referral to be sent through [facility] and if PT recommends TCU  we can present a list of facilities. [facility] would review based on medical necessity.  Patient and Daughter had no further questions at this time.  Care Transitions will continue to follow.    Stacey Bustamante RN, BSN, ACM   Care Transitions Specialist  [facility]  Care Transitions Specialist  [address] [place]. [address]  [email or web address]  Office: [phone] Fax: [phone]  [facility]    Next Steps: pending Echo, PT assessment and final recommendations of rounding providers.          "

## 2024-09-08 NOTE — PROGRESS NOTES
[facility]    Medicine Progress Note - Hospitalist Service    Date of Admission:  9/8/2024    Assessment & Plan /  Nixon RILEY Argenis is a markedly pleasant 88 year old gentleman with past medical history that is most notable for permanent atrial fibrillation on DOAC, s well as COPD, MAYITO, ascending aortic aneurysm, CKD Stage 2, and bladder and prostate cancer, among others; who presents with falls and is found to have ongoing traumatic anti-coagulation associated SAH..     PLAN      Ongoing traumatic anti-coagulation associated subarachnoid hemorrhage  Mr. More has permanent atrial fibrillation, followed by [facility], and is normally prescribed Eliquis. He also has MAYITO, COPD, and severe morbid obesity. His most recent TTE from 8/15/2024 showed preserved LVEF with concentric LVH, severely dilated LA, mild pulmonary hypertension, and trace to mild AR.     He presents after two falls, the first on 9/6/2024, evaluated on that date in the [facility] ED. CTH showed a very small subarachnoid hemorrhage in the left temporoparietal sulci. This was observed overnight with repeat CTH in AM on 9/7 that showed no worsening of bleeding. X-rays of his right shoulder were negative for fracture or dislocation. 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 Eliquis was stopped for minimum of two weeks, and it was recommended that he follow up as soon as possible as an outpatient.      Now, he presents to our ED after a second fall at home, earlier today (9/7), after trying to stand and walk. He did not strike his head on this second fall. In the ED, he is afebrile, without hypotension, tachycardia, or hypoxia. WBC is normal. He has ongoing mild metabolic acidosis and CKD as discussed below. Pro-BNP is elevated. Troponin is not elevated. EKG shows atrial fibrillation with rate 84 and no ST segment changes. CXR shows no acute cardiopulmonary abnormalities. CTH tonight shows decreased  conspicuity of the previously seen subarachnoid hemorrhage along the left temporal and parietal lobes.      Overall, the cause of his falls is unclear. We will rule out occult infectious process. He could have orthostatic hypotension. Pro-BNP is elevated, though he does not appear substantially hypervolemic, it is difficult to assess intravascular volume status given body habitus. We will rule out acute diastolic CHF exacerbation. We will monitor for symptomatic arrhythmia. He is not wheezing and COPD exacerbation seems less likely. Last, we will assess for general deconditioning.    Fall precautions. Orthostatics ordered.    Some blood pressure readings are soft, hold spironolactone, reduce dose of ARB as below.  Continue PTA metoprolol.  Add lower extremity compression stockings.to increase venous return  Telemetry. TTE ordered  UA is unremarkable, less than 1 WBC/hpf  Tylenol and anti-emetics as needed  Neurosurgery put in recommendations when patient was in [facility] ED 9/6, they signed off on 9/7 and have not been re-consulted given stable or diminished ongoing SAH. Continue to hold Eliquis pending outpatient follow up in 2 weeks  Repeat CBC and BMP are wnl   PT evaluate and treat.    SW consulted for disposition planning, he is willing to go to TCU if recommended    Right shoulder pain  Patient fell on his right arm with one of his recent falls.  He now says he cannot raise the right arm without using his left arm to support the right arm.  He had shoulder x-ray at [facility] that was negative for fracture  Daughter asks if he needs shoulder MRI, should he be seen by orthopedics?  Ortho consult requested, PT evaluate as above.  It will certainly further impair his mobility if he has impaired ability to bear weight on the right arm    CKD Stage 2 and ongoing metabolic acidosis: Renal function improved tonight after IVF given in [facility] ED yesterday. Ongoing metabolic acidosis noted since at least 12/2023.  VBG, repeat BMP  "are unremarkable           Recent Labs   Lab Test 09/08/24  0049 09/07/24  0002 07/23/24  1237   CR 1.13 1.31* 1.13      Isolated hyperbilirubinemia: Mild, intermittent; also had been seen on 10/2023. Clinical significance unclear; close outpatient follow up recommended at discharge     Permanent afib: DOAC held. Resume PTA Toprol with hold parameters  Hypertension: decrease PTA Cozaar from 100 mg to 50 mg daily due to soft blood pressure readings.  Hold spironolactone due to soft blood pressure readings and concern for possible volume contraction, as above  Hyperlipidemia: Resume PTA Lipitor   Ascending aortic aneurysm: With prior penetrating ulcer of infrarenal aorta as well: followed by  Vascular Surgery as an outpatient. Noted  COPD: Resume PTA Anora Ellipta. Prn nebs ordered while hospitalized  MAYITO: Night time BIPAP continued  Chronic depression: Resume PTA Lexapro      History of bladder and prostate cancer: Followed by  Urology; status post TURBT and radiotherapy. He is on Risedronate for bone loss. Noted     Severe morbid obesity: Noted  Estimated body mass index is 37.15 kg/m  as calculated from the following:    Height as of 9/6/24: 1.721 m (5' 7.75\").    Weight as of 9/6/24: 110 kg (242 lb 8.1 oz).        Observation Goals: -diagnostic tests and consults completed and resulted, -vital signs normal or at patient baseline, -returns to baseline functional status, -safe disposition plan has been identified, Nurse to notify provider when observation goals have been met and patient is ready for discharge.  Diet: Regular Diet Adult    DVT Prophylaxis: Pneumatic Compression Devices  Arvizu Catheter: Not present  Lines: None     Cardiac Monitoring: ACTIVE order. Indication: Tachyarrhythmias, acute (48 hours)  Code Status: Full Code      Clinically Significant Risk Factors Present on Admission               # Drug Induced Coagulation Defect: home medication list includes an anticoagulant medication    # " "Hypertension: Noted on problem list         # Obesity: Estimated body mass index is 37.15 kg/m  as calculated from the following:    Height as of 9/6/24: 1.721 m (5' 7.75\").    Weight as of 9/6/24: 110 kg (242 lb 8.1 oz).               Disposition Plan     Medically Ready for Discharge: Anticipated Tomorrow pending completion of workup outlined above, PT eval, social work eval, safe disposition identified      Estela Su MD  Hospitalist Service  [facility]  Securely message with [employer] (more info)  Text page via [facility] Paging/Directory   ______________________________________________________________________    Interval History   \"I was just trying to get a really close look at the floor.\"  Patient jokes about his fall.  Today, he denies headache, no respiratory or GI complaints.  He says that he sometimes gets up, feels unsteady and starts to fall but cannot write himself.  Daughter, Amber, is at the bedside.  She is an advanced practice provider, and we reviewed test results and images.    Physical Exam   Vital Signs: Temp: 98.7  F (37.1  C) Temp src: Oral BP: 124/78 Pulse: 79   Resp: 16 SpO2: 93 % O2 Device: BiPAP/CPAP Oxygen Delivery: 4 LPM  Weight: 0 lbs 0 oz    Constitutional: Awake, alert, cooperative, no apparent distress  Respiratory: Clear to auscultation bilaterally, no crackles or wheezing  Cardiovascular: Regular rate and rhythm  GI: Normal bowel sounds, soft, non-distended, non-tender  Skin/Integumen: No rash on exposed skin.  Trace bilateral lower extremity edema  Other: Mood is very pleasant      Medical Decision Making       50 MINUTES SPENT BY ME on the date of service doing chart review, history, exam, documentation & further activities per the note.   Including discussion, image review with patient, daughter Amber    Data     I have personally reviewed the following data over the past 24 hrs:    8.6  \   13.3   / 159     137 105 17.4 /  152 (H)   4.2 20 (L) 1.13 \ "     ALT: 21 AST: 31 AP: 57 TBILI: 1.3 (H)   ALB: 3.7 TOT PROTEIN: 6.7 LIPASE: N/A     Trop: 17 BNP: 1,903 (H)       Imaging results reviewed over the past 24 hrs:   Recent Results (from the past 24 hour(s))   XR Chest 2 Views    Narrative    EXAM: XR CHEST 2 VIEWS  LOCATION: [facility]  DATE: 9/8/2024    INDICATION: sob, near syncope, weakness  COMPARISON: 10/10/2023      Impression    IMPRESSION: Stable chest with no acute cardiopulmonary abnormalities. Moderate calcified thoracic aortic arch. Slight thoracic curvature convex to the right.   CT Head w/o Contrast    Narrative    EXAM: CT HEAD W/O CONTRAST  LOCATION: [facility]  DATE: 9/8/2024    INDICATION: near syncope, recent bleed, weakness, nausea vomiting  COMPARISON: 9/7/2024  TECHNIQUE: Routine CT Head without IV contrast. Multiplanar reformats. Dose reduction techniques were used.    FINDINGS:  INTRACRANIAL CONTENTS: Decreased conspicuity of the subarachnoid hemorrhage along the left temporal and parietal lobes. No new or progressive intracranial hemorrhage or adverse mass effect. No CT evidence of acute infarct. Mild to moderate presumed   chronic small vessel ischemic changes. Mild generalized volume loss. No hydrocephalus.     VISUALIZED ORBITS/SINUSES/MASTOIDS: Prior bilateral cataract surgery. Visualized portions of the orbits are otherwise unremarkable. Trace ethmoid mucosal thickening. No middle ear or mastoid effusion.    BONES/SOFT TISSUES: No acute abnormality.      Impression    IMPRESSION:  1.  Decreased conspicuity of the previously described subarachnoid hemorrhage along the left temporal and parietal lobes.

## 2024-09-08 NOTE — PROGRESS NOTES
RECEIVING UNIT ED HANDOFF REVIEW    ED Nurse Handoff Report was reviewed by: Marita Molina RN on September 8, 2024 at 3:06 AM

## 2024-09-08 NOTE — PROGRESS NOTES
RN spoke with Echo team regarding need for Echo and patient's observation status. Due to long list of Echo needs, states its unlikely it will be completed today but tech states he will do his best.

## 2024-09-08 NOTE — CONSULTS
[facility]    Orthopedic Consultation    Nixon More MRN# [id number]   Age: 88 year old YOB: 1935     Date of Admission:  9/8/2024    Reason for consult: Fall, right shoulder pain       Requesting physician: Estela Su MD        Level of consult: One-time consult to assist in determining a diagnosis and to recommend an appropriate treatment plan           Assessment and Plan:   Assessment:   Patient is a markedly pleasant 88-year-old male with increased right shoulder pain after fall, and reduced ability to range.  He has positive rotator cuff special tests.  It is very likely that patient has rotator cuff tendinopathy and injury, acute on chronic versus proximal biceps tendon tear      Plan:   I discussed with the patient and his family member that his presentation is very consistent with rotator cuff tear versus proximal biceps tendon tear.  The initial management of this injury is physical therapy.  No surgical recommendations will be made on inpatient basis.  It is recommended to follow-up with shoulder specialist in an outpatient setting.  Family does feel that it is prudent to pursue MRI of the right shoulder, orthopedic team agreeable.  MRI right shoulder will be ordered with Ativan for sedation (requested by patient).  There will likely be no further orthopedic recommendations following the MRI.  Our team will comment once results are available, and provide any recommendations if required based on these findings.    Please contact orthopedic trauma team if any questions or concerns arise.           Chief Complaint:   Right shoulder pain         History of Present Illness:   Nixon More is a markedly pleasant 88 year old gentleman with past medical history that is most notable for permanent atrial fibrillation on DOAC, s well as COPD, MAYITO, ascending aortic aneurysm, CKD Stage 2, and bladder and prostate cancer, among others; who presents with falls  and is found to have ongoing traumatic anti-coagulation associated SAH.    Patient reports reduced ability to lift and range arm following this fall.  This is affecting his ability to mobilize.  He likely will need to change level of care at his living facility and needs further workup completed       Past Medical History:     Past Medical History:   Diagnosis Date    (HFpEF) heart failure with preserved ejection fraction (H)     AAA (abdominal aortic aneurysm) (H24)     Actinic keratosis     Angina pectoris (H24) 10/27/2020    Antiplatelet or antithrombotic long-term use     Eliquis    Arthritis     Atrial fibrillation and flutter (H) 12/03/2018    Bladder cancer (H)     CAD (coronary artery disease)     1/5/2011 lateral ischemia on stress echo, 12/2014 normal stress echo    CKD (chronic kidney disease) stage 2, GFR 60-89 ml/min     Depressive disorder     Mild    Diarrhea     Urgency at times    Dyslipidemia     Emphysema of lung (H)     Hernia, abdominal     HTN (hypertension)     Hypertrophy (benign) of prostate 05/2001    Biopsy 5/01 negative for cancer; PSA 5    Lumbago     Mumps     Prostate cancer (H) 12/15/2006    Skin cancer, basal cell 1997    Sleep apnea     Uses a Bi-pap for centralized Apnea    Spider veins              Past Surgical History:     Past Surgical History:   Procedure Laterality Date    ABDOMEN SURGERY      BACK SURGERY  1988    L4/L5 decompression    BIOPSY  2022    Skin cancer basal cell    COLONOSCOPY      CYSTOSCOPY      CYSTOSCOPY, TRANSURETHRAL RESECTION (TUR) TUMOR BLADDER, COMBINED N/A 01/06/2023    Procedure: Cystoscopy, transurethral resection of bladder tumor, medium, 2-5 cm;  Surgeon: Mark Pino MD;  Location: [facility] OR    EYE SURGERY  2016    Cararact    GENITOURINARY SURGERY  12 07 2022    Tumors in bladder    HERNIA REPAIR  child    Moh's procedure for basal cell carcinoma  01/2001    PROSTATE SURGERY  2007    Radiation only    s/p lumbar laminectomy NOS  1988     "SIGMOIDOSCOPY FLEXIBLE N/A 06/15/2020    Procedure: SIGMOIDOSCOPY, FLEXIBLE;  Surgeon: Sunni Patton MD;  Location: [facility] GI    VITRECTOMY PARS PLANA REMOVE PRERETINAL MEMBRANE   2009             Social History:     Social History     Tobacco Use    Smoking status: Former     Current packs/day: 0.00     Average packs/day: 1 pack/day for 30.0 years (30.0 ttl pk-yrs)     Types: Cigarettes     Start date: 1948     Quit date: 1978     Years since quittin.7     Passive exposure: Never    Smokeless tobacco: Never   Substance Use Topics    Alcohol use: Not Currently     Comment: occ \"like once a month\" or less             Family History:     Family History   Problem Relation Age of Onset    Alzheimer Disease Mother     Hypertension Mother     Cardiovascular Father         D:86 complications fo CHF    Anesthesia Reaction Father          after 2 weeks    Prostate Cancer Brother     Lung Cancer Brother 76             Immunizations:     VACCINE/DOSE   Diptheria   DPT   DTAP   HBIG   Hepatitis A   Hepatitis B   HIB   Influenza   Measles   Meningococcal   MMR   Mumps   Pneumococcal   Polio   Rubella   Small Pox   TDAP   Varicella   Zoster             Allergies:     Allergies   Allergen Reactions    Amoxicillin-Pot Clavulanate Rash     Type III hypersensitivity    Bactrim [Sulfamethoxazole W/Trimethoprim] Rash     Serum sickness, type III hypersensitivity      Bee Venom Itching    Sulfa Antibiotics Hives    Celebrex [Celecoxib]     Lisinopril Cough    Naproxen Hives    Penicillin G              Medications:     Current Facility-Administered Medications   Medication Dose Route Frequency Provider Last Rate Last Admin    acetaminophen (TYLENOL) tablet 650 mg  650 mg Oral Q4H PRN Bassem Adame MD   650 mg at 24 0909    Or    acetaminophen (TYLENOL) Suppository 650 mg  650 mg Rectal Q4H PRN Bassem Adame MD        atorvastatin (LIPITOR) tablet 40 mg  40 mg Oral Daily Estela Su " MD Itzel        escitalopram (LEXAPRO) tablet 10 mg  10 mg Oral Daily Estela Su MD   10 mg at 09/08/24 1142    ipratropium - albuterol 0.5 mg/2.5 mg/3 mL (DUONEB) neb solution 3 mL  3 mL Nebulization Q4H PRN Bassem Adame MD        loperamide (IMODIUM) capsule 2 mg  2 mg Oral BID PRN Estela Su MD        LORazepam (ATIVAN) tablet 0.5 mg  0.5 mg Oral Once Jennyfer Shabazz PA-C        losartan (COZAAR) tablet 50 mg  50 mg Oral Daily Estela Su MD        melatonin tablet 3 mg  3 mg Oral At Bedtime PRN Bassem Adame MD        metoprolol succinate ER (TOPROL XL) 24 hr tablet 100 mg  100 mg Oral Daily Estela Su MD        ondansetron (ZOFRAN ODT) ODT tab 4 mg  4 mg Oral Q6H PRN Bassem Adame MD        Or    ondansetron (ZOFRAN) injection 4 mg  4 mg Intravenous Q6H PRN Bassem Adame MD        psyllium (METAMUCIL/KONSYL) capsule 5 capsule  5 capsule Oral Daily Estela Su MD   5 capsule at 09/08/24 1441    senna-docusate (SENOKOT-S/PERICOLACE) 8.6-50 MG per tablet 1 tablet  1 tablet Oral BID PRN Bassem Adame MD        Or    senna-docusate (SENOKOT-S/PERICOLACE) 8.6-50 MG per tablet 2 tablet  2 tablet Oral BID PRN Bassem Adame MD        [Held by provider] spironolactone (ALDACTONE) half-tab 12.5 mg  12.5 mg Oral Daily Estela Su MD        umeclidinium-vilanterol (ANORO ELLIPTA) 62.5-25 MCG/ACT oral inhaler 1 puff  1 puff Inhalation Daily Estela Su MD   1 puff at 09/08/24 1628                  Physical Exam:   All vitals have been reviewed  Patient Vitals for the past 24 hrs:   BP Temp Temp src Pulse Resp SpO2 Weight   09/08/24 1635 111/66 98  F (36.7  C) Oral 77 18 92 % --   09/08/24 1409 -- -- -- -- -- -- 110.8 kg (244 lb 3.2 oz)   09/08/24 1139 103/75 97.5  F (36.4  C) Oral 80 14 95 % --   09/08/24 0720 124/78 98.7  F (37.1  C) Oral 79 16 93 % --   09/08/24 0419 108/71 98.6  F (37  C) Oral  82 16 93 % --   09/08/24 0230 130/83 -- -- 96 26 92 % --   09/08/24 0200 128/83 -- -- 93 11 94 % --   09/08/24 0130 -- -- -- 87 12 90 % --   09/08/24 0047 -- 98.4  F (36.9  C) Oral -- 19 93 % --   09/08/24 0027 (!) 147/97 -- -- 91 17 96 % --       Intake/Output Summary (Last 24 hours) at 9/8/2024 1743  Last data filed at 9/8/2024 1434  Gross per 24 hour   Intake 480 ml   Output 200 ml   Net 280 ml       Constitutional: Pleasant, alert, appropriate, following commands.  HEENT: Head atraumatic normocephalic.   Respiratory: Unlabored breathing no audible wheeze  Cardiovascular: Regular rate and rhythm per pulses.  Genitourinary:  No kearns  Skin: No rashes, no cyanosis, no edema.  Musculoskeletal: On examination of the right shoulder there is pinpoint tenderness over the anterior distal third aspect of the shoulder joint.  This is located over insertion of proximal biceps tendon.  Skin is intact, no erythema or ecchymosis.  Patient able to forward flex to 20 degrees approximately.  Unable to lift any further.  On passive forward flexion causes him significant discomfort.  He is able to supinate and pronate and flex and extend at the elbow joint.  Passive abduction is painful.  Patient unable to actively abduct the shoulder.  Positive Fernández, positive liftoff test positive.  Negative drop arm test.  Patient able to actively forward flex left shoulder to greater than 90 degrees in comparison            Data:   All laboratory data reviewed  Results for orders placed or performed during the hospital encounter of 09/08/24   CT Head w/o Contrast     Status: None    Narrative    EXAM: CT HEAD W/O CONTRAST  LOCATION: [facility]  DATE: 9/8/2024    INDICATION: near syncope, recent bleed, weakness, nausea vomiting  COMPARISON: 9/7/2024  TECHNIQUE: Routine CT Head without IV contrast. Multiplanar reformats. Dose reduction techniques were used.    FINDINGS:  INTRACRANIAL CONTENTS: Decreased conspicuity of  the subarachnoid hemorrhage along the left temporal and parietal lobes. No new or progressive intracranial hemorrhage or adverse mass effect. No CT evidence of acute infarct. Mild to moderate presumed   chronic small vessel ischemic changes. Mild generalized volume loss. No hydrocephalus.     VISUALIZED ORBITS/SINUSES/MASTOIDS: Prior bilateral cataract surgery. Visualized portions of the orbits are otherwise unremarkable. Trace ethmoid mucosal thickening. No middle ear or mastoid effusion.    BONES/SOFT TISSUES: No acute abnormality.      Impression    IMPRESSION:  1.  Decreased conspicuity of the previously described subarachnoid hemorrhage along the left temporal and parietal lobes.   XR Chest 2 Views     Status: None    Narrative    EXAM: XR CHEST 2 VIEWS  LOCATION: [facility]  DATE: 9/8/2024    INDICATION: sob, near syncope, weakness  COMPARISON: 10/10/2023      Impression    IMPRESSION: Stable chest with no acute cardiopulmonary abnormalities. Moderate calcified thoracic aortic arch. Slight thoracic curvature convex to the right.   [place] Draw     Status: None    Narrative    The following orders were created for panel order [place] Draw.  Procedure                               Abnormality         Status                     ---------                               -----------         ------                     Extra Blue Top Tube[[id number]]                              Final result               Extra Green Top (Lithium...[[id number]]                                                 Extra Purple Top Tube[[id number]]                                                       Extra Blood Bank Purple ...[[id number]]                      Final result                 Please view results for these tests on the individual orders.   Comprehensive metabolic panel     Status: Abnormal   Result Value Ref Range    Sodium 137 135 - 145 mmol/L    Potassium 4.2 3.4 - 5.3 mmol/L    Carbon Dioxide (CO2) 20 (L)  22 - 29 mmol/L    Anion Gap 12 7 - 15 mmol/L    Urea Nitrogen 17.4 8.0 - 23.0 mg/dL    Creatinine 1.13 0.67 - 1.17 mg/dL    GFR Estimate 63 >60 mL/min/1.73m2    Calcium 8.9 8.8 - 10.4 mg/dL    Chloride 105 98 - 107 mmol/L    Glucose 152 (H) 70 - 99 mg/dL    Alkaline Phosphatase 57 40 - 150 U/L    AST 31 0 - 45 U/L    ALT 21 0 - 70 U/L    Protein Total 6.7 6.4 - 8.3 g/dL    Albumin 3.7 3.5 - 5.2 g/dL    Bilirubin Total 1.3 (H) <=1.2 mg/dL   Troponin T, High Sensitivity     Status: Normal   Result Value Ref Range    Troponin T, High Sensitivity 17 <=22 ng/L   UA with Microscopic reflex to Culture     Status: Abnormal    Specimen: Urine, Midstream   Result Value Ref Range    Color Urine Light Yellow Colorless, Straw, Light Yellow, Yellow    Appearance Urine Clear Clear    Glucose Urine Negative Negative mg/dL    Bilirubin Urine Negative Negative    Ketones Urine Negative Negative mg/dL    Specific Gravity Urine 1.017 1.003 - 1.035    Blood Urine Trace (A) Negative    pH Urine 5.5 5.0 - 7.0    Protein Albumin Urine 10 (A) Negative mg/dL    Urobilinogen Urine Normal Normal, 2.0 mg/dL    Nitrite Urine Negative Negative    Leukocyte Esterase Urine Negative Negative    Mucus Urine Present (A) None Seen /LPF    RBC Urine 3 (H) <=2 /HPF    WBC Urine <1 <=5 /HPF    Narrative    Urine Culture not indicated   Nt probnp inpatient (BNP)     Status: Abnormal   Result Value Ref Range    N terminal Pro BNP Inpatient 1,903 (H) 0 - 1,800 pg/mL   Extra Blue Top Tube     Status: None   Result Value Ref Range    Hold Specimen [facility]    Extra Blood Bank Purple Top Tube     Status: None   Result Value Ref Range    Hold Specimen [facility]    CBC with platelets and differential     Status: None   Result Value Ref Range    WBC Count 8.6 4.0 - 11.0 10e3/uL    RBC Count 4.48 4.40 - 5.90 10e6/uL    Hemoglobin 13.3 13.3 - 17.7 g/dL    Hematocrit 41.2 40.0 - 53.0 %    MCV 92 78 - 100 fL    MCH 29.7 26.5 - 33.0 pg    MCHC 32.3 31.5 - 36.5 g/dL    RDW 14.3  10.0 - 15.0 %    Platelet Count 159 150 - 450 10e3/uL    % Neutrophils 77 %    % Lymphocytes 9 %    % Monocytes 11 %    % Eosinophils 1 %    % Basophils 1 %    % Immature Granulocytes 1 %    NRBCs per 100 WBC 0 <1 /100    Absolute Neutrophils 6.7 1.6 - 8.3 10e3/uL    Absolute Lymphocytes 0.8 0.8 - 5.3 10e3/uL    Absolute Monocytes 1.0 0.0 - 1.3 10e3/uL    Absolute Eosinophils 0.1 0.0 - 0.7 10e3/uL    Absolute Basophils 0.1 0.0 - 0.2 10e3/uL    Absolute Immature Granulocytes 0.1 <=0.4 10e3/uL    Absolute NRBCs 0.0 10e3/uL   Blood gas venous     Status: None   Result Value Ref Range    pH Venous 7.36 7.32 - 7.43    pCO2 Venous 44 40 - 50 mm Hg    pO2 Venous 39 25 - 47 mm Hg    Bicarbonate Venous 25 21 - 28 mmol/L    Base Excess/Deficit Venous -0.8 -3.0 - 3.0 mmol/L    FIO2 32     Oxyhemoglobin Venous 72 70 - 75 %    O2 Sat, Venous 73.1 70.0 - 75.0 %    Narrative    In healthy individuals, oxyhemoglobin (O2Hb) and oxygen saturation (SO2) are approximately equal. In the presence of dyshemoglobins, oxyhemoglobin can be considerably lower than oxygen saturation.   Basic metabolic panel     Status: Abnormal   Result Value Ref Range    Sodium 138 135 - 145 mmol/L    Potassium 4.4 3.4 - 5.3 mmol/L    Chloride 105 98 - 107 mmol/L    Carbon Dioxide (CO2) 21 (L) 22 - 29 mmol/L    Anion Gap 12 7 - 15 mmol/L    Urea Nitrogen 15.7 8.0 - 23.0 mg/dL    Creatinine 1.12 0.67 - 1.17 mg/dL    GFR Estimate 64 >60 mL/min/1.73m2    Calcium 8.8 8.8 - 10.4 mg/dL    Glucose 129 (H) 70 - 99 mg/dL   CBC with platelets and differential     Status: Abnormal   Result Value Ref Range    WBC Count 8.5 4.0 - 11.0 10e3/uL    RBC Count 4.37 (L) 4.40 - 5.90 10e6/uL    Hemoglobin 13.1 (L) 13.3 - 17.7 g/dL    Hematocrit 40.9 40.0 - 53.0 %    MCV 94 78 - 100 fL    MCH 30.0 26.5 - 33.0 pg    MCHC 32.0 31.5 - 36.5 g/dL    RDW 14.4 10.0 - 15.0 %    Platelet Count 161 150 - 450 10e3/uL    % Neutrophils 68 %    % Lymphocytes 16 %    % Monocytes 15 %    %  Eosinophils 0 %    % Basophils 1 %    % Immature Granulocytes 1 %    NRBCs per 100 WBC 0 <1 /100    Absolute Neutrophils 5.8 1.6 - 8.3 10e3/uL    Absolute Lymphocytes 1.4 0.8 - 5.3 10e3/uL    Absolute Monocytes 1.2 0.0 - 1.3 10e3/uL    Absolute Eosinophils 0.0 0.0 - 0.7 10e3/uL    Absolute Basophils 0.1 0.0 - 0.2 10e3/uL    Absolute Immature Granulocytes 0.0 <=0.4 10e3/uL    Absolute NRBCs 0.0 10e3/uL   EKG 12-lead, tracing only     Status: None (Preliminary result)   Result Value Ref Range    Systolic Blood Pressure  mmHg    Diastolic Blood Pressure  mmHg    Ventricular Rate 84 BPM    Atrial Rate  BPM    DC Interval  ms    QRS Duration 76 ms     ms    QTc 451 ms    P Axis  degrees    R AXIS 22 degrees    T Axis 34 degrees    Interpretation ECG       Atrial fibrillation  Abnormal ECG  When compared with ECG of 06-Sep-2024 23:51,  No significant change was found     CBC with platelets differential     Status: None    Narrative    The following orders were created for panel order CBC with platelets differential.  Procedure                               Abnormality         Status                     ---------                               -----------         ------                     CBC with platelets and d...[[id number]]                      Final result                 Please view results for these tests on the individual orders.   CBC with platelets differential     Status: Abnormal    Narrative    The following orders were created for panel order CBC with platelets differential.  Procedure                               Abnormality         Status                     ---------                               -----------         ------                     CBC with platelets and d...[[id number]]  Abnormal            Final result                 Please view results for these tests on the individual orders.          Attestation:  I have reviewed today's vital signs, notes, medications, labs and imaging with   Adam.  Amount of time performed on this consult: 50 minutes.    Jennyefr Shabazz PA-C  [facility]

## 2024-09-08 NOTE — ED NOTES
Bed: ED30  Expected date:   Expected time:   Means of arrival:   Comments:  595 88M fall on thinners, No LOC, no injury

## 2024-09-08 NOTE — ED NOTES
[facility]  ED Nurse Handoff Report    ED Chief complaint: Fall      ED Diagnosis:   Final diagnoses:   Weakness   Near syncope   Subdural hemorrhage (H)   Strain of right shoulder, subsequent encounter       Code Status: for hospitalist to address    Allergies:   Allergies   Allergen Reactions    Amoxicillin-Pot Clavulanate Rash     Type III hypersensitivity    Bactrim [Sulfamethoxazole W/Trimethoprim] Rash     Serum sickness, type III hypersensitivity      Bee Venom Itching    Sulfa Antibiotics Hives    Celebrex [Celecoxib]     Lisinopril Cough    Naproxen Hives    Penicillin G        Patient Story: BIBA from independent living. Pt had an unwitnessed fall when he was walking with his walker from the bed to the bathroom on a carpeted room. Pt denies LOC, Neck pain or any other injuries. Had a fall recently and diagnosed with a SAH yesterday morning at [facility]. Takes eliquis.   Focused Assessment:  ambulatory with a walker, A&Ox4, respirations even and unlabored. Skin dry, warm, color wnl. Has a bump to the right forehead and scrapes to his right arm. C/o right shoulder pain.   Results for orders placed or performed during the hospital encounter of 09/08/24   CT Head w/o Contrast     Status: None    Narrative    EXAM: CT HEAD W/O CONTRAST  LOCATION: [facility]  DATE: 9/8/2024    INDICATION: near syncope, recent bleed, weakness, nausea vomiting  COMPARISON: 9/7/2024  TECHNIQUE: Routine CT Head without IV contrast. Multiplanar reformats. Dose reduction techniques were used.    FINDINGS:  INTRACRANIAL CONTENTS: Decreased conspicuity of the subarachnoid hemorrhage along the left temporal and parietal lobes. No new or progressive intracranial hemorrhage or adverse mass effect. No CT evidence of acute infarct. Mild to moderate presumed   chronic small vessel ischemic changes. Mild generalized volume loss. No hydrocephalus.     VISUALIZED ORBITS/SINUSES/MASTOIDS: Prior bilateral  cataract surgery. Visualized portions of the orbits are otherwise unremarkable. Trace ethmoid mucosal thickening. No middle ear or mastoid effusion.    BONES/SOFT TISSUES: No acute abnormality.      Impression    IMPRESSION:  1.  Decreased conspicuity of the previously described subarachnoid hemorrhage along the left temporal and parietal lobes.   XR Chest 2 Views     Status: None    Narrative    EXAM: XR CHEST 2 VIEWS  LOCATION: [facility]  DATE: 9/8/2024    INDICATION: sob, near syncope, weakness  COMPARISON: 10/10/2023      Impression    IMPRESSION: Stable chest with no acute cardiopulmonary abnormalities. Moderate calcified thoracic aortic arch. Slight thoracic curvature convex to the right.   [place] Draw     Status: None (In process)    Narrative    The following orders were created for panel order [place] Draw.  Procedure                               Abnormality         Status                     ---------                               -----------         ------                     Extra Blue Top Tube[[id number]]                              In process                 Extra Green Top (Lithium...[[id number]]                                                 Extra Purple Top Tube[[id number]]                                                       Extra Blood Bank Purple ...[[id number]]                      In process                   Please view results for these tests on the individual orders.   Comprehensive metabolic panel     Status: Abnormal   Result Value Ref Range    Sodium 137 135 - 145 mmol/L    Potassium 4.2 3.4 - 5.3 mmol/L    Carbon Dioxide (CO2) 20 (L) 22 - 29 mmol/L    Anion Gap 12 7 - 15 mmol/L    Urea Nitrogen 17.4 8.0 - 23.0 mg/dL    Creatinine 1.13 0.67 - 1.17 mg/dL    GFR Estimate 63 >60 mL/min/1.73m2    Calcium 8.9 8.8 - 10.4 mg/dL    Chloride 105 98 - 107 mmol/L    Glucose 152 (H) 70 - 99 mg/dL    Alkaline Phosphatase 57 40 - 150 U/L    AST 31 0 - 45 U/L    ALT 21 0 - 70  U/L    Protein Total 6.7 6.4 - 8.3 g/dL    Albumin 3.7 3.5 - 5.2 g/dL    Bilirubin Total 1.3 (H) <=1.2 mg/dL   Troponin T, High Sensitivity     Status: Normal   Result Value Ref Range    Troponin T, High Sensitivity 17 <=22 ng/L   Nt probnp inpatient (BNP)     Status: Abnormal   Result Value Ref Range    N terminal Pro BNP Inpatient 1,903 (H) 0 - 1,800 pg/mL   CBC with platelets and differential     Status: None   Result Value Ref Range    WBC Count 8.6 4.0 - 11.0 10e3/uL    RBC Count 4.48 4.40 - 5.90 10e6/uL    Hemoglobin 13.3 13.3 - 17.7 g/dL    Hematocrit 41.2 40.0 - 53.0 %    MCV 92 78 - 100 fL    MCH 29.7 26.5 - 33.0 pg    MCHC 32.3 31.5 - 36.5 g/dL    RDW 14.3 10.0 - 15.0 %    Platelet Count 159 150 - 450 10e3/uL    % Neutrophils 77 %    % Lymphocytes 9 %    % Monocytes 11 %    % Eosinophils 1 %    % Basophils 1 %    % Immature Granulocytes 1 %    NRBCs per 100 WBC 0 <1 /100    Absolute Neutrophils 6.7 1.6 - 8.3 10e3/uL    Absolute Lymphocytes 0.8 0.8 - 5.3 10e3/uL    Absolute Monocytes 1.0 0.0 - 1.3 10e3/uL    Absolute Eosinophils 0.1 0.0 - 0.7 10e3/uL    Absolute Basophils 0.1 0.0 - 0.2 10e3/uL    Absolute Immature Granulocytes 0.1 <=0.4 10e3/uL    Absolute NRBCs 0.0 10e3/uL   EKG 12-lead, tracing only     Status: None (Preliminary result)   Result Value Ref Range    Systolic Blood Pressure  mmHg    Diastolic Blood Pressure  mmHg    Ventricular Rate 84 BPM    Atrial Rate  BPM    TX Interval  ms    QRS Duration 76 ms     ms    QTc 451 ms    P Axis  degrees    R AXIS 22 degrees    T Axis 34 degrees    Interpretation ECG       Atrial fibrillation  Abnormal ECG  When compared with ECG of 06-Sep-2024 23:51,  No significant change was found     CBC with platelets differential     Status: None    Narrative    The following orders were created for panel order CBC with platelets differential.  Procedure                               Abnormality         Status                     ---------                                -----------         ------                     CBC with platelets and d...[[id number]]                      Final result                 Please view results for these tests on the individual orders.       Treatments and/or interventions provided: full cardiac monitoring  Patient's response to treatments and/or interventions: comfortably resting in stretcher, does not seem to be in distress,     To be done/followed up on inpatient unit:  continue with POC    Does this patient have any cognitive concerns?:  n/a    Activity level - Baseline/Home:  Walker  Activity Level - Current:   Stand with assist x2    Patient's Preferred language: English   Needed?: No    Isolation: None  Infection: Not Applicable  Patient tested for COVID 19 prior to admission: NO  Bariatric?: No    Vital Signs:   Vitals:    09/08/24 0027 09/08/24 0047   BP: (!) 147/97    Pulse: 91    Resp: 17 19   Temp:  98.4  F (36.9  C)   TempSrc:  Oral   SpO2: 96% 93%       Cardiac Rhythm:     Was the PSS-3 completed:   Yes  What interventions are required if any?    Family Comments: daughter at bedside  OBS brochure/video discussed/provided to patient/family: N/A  For the majority of the shift this patient's behavior was Green.   Behavioral interventions performed were n/a.    ED NURSE PHONE NUMBER: [phone]

## 2024-09-08 NOTE — PHARMACY-ADMISSION MEDICATION HISTORY
Pharmacy Intern Admission Medication History    Admission medication history is complete. The information provided in this note is only as accurate as the sources available at the time of the update.    Information Source(s): Patient and CareEverywhere/SureScripts via in-person    Pertinent Information: None    Changes made to PTA medication list:  Added: None  Deleted: None  Changed:   Spironolactone daily dosage 12.5 mg --> 25 mg     Allergies reviewed with patient and updates made in EHR: yes    Medication History Completed By: Olivia Yanes 9/8/2024 10:33 AM    PTA Med List   Medication Sig Last Dose    albuterol (PROAIR HFA/PROVENTIL HFA/VENTOLIN HFA) 108 (90 Base) MCG/ACT inhaler Inhale 2 puffs into the lungs every 6 hours as needed for shortness of breath, wheezing or cough prn    atorvastatin (LIPITOR) 40 MG tablet Take 1 tablet (40 mg) by mouth daily 9/6/2024 at pm    Cholecalciferol (VITAMIN D-3) 25 MCG (1000 UT) CAPS Take 1 capsule by mouth daily. 9/7/2024 at am    ELIQUIS ANTICOAGULANT 5 MG tablet TAKE 1 TABLET BY MOUTH TWICE A DAY 9/7/2024 at am    escitalopram (LEXAPRO) 10 MG tablet Take 1 tablet (10 mg) by mouth daily 9/7/2024 at am    loperamide (IMODIUM) 2 MG capsule Take 2 mg by mouth 2 times daily as needed for diarrhea. prn    losartan (COZAAR) 100 MG tablet Take 1 tablet (100 mg) by mouth daily 9/7/2024 at am    metoprolol succinate ER (TOPROL XL) 50 MG 24 hr tablet Take 2 tablets (100 mg) by mouth daily 9/7/2024 at am    RISEdronate (ACTONEL) 35 MG tablet Take 1 tablet (35 mg) by mouth every 7 days 9/2/2024    spironolactone (ALDACTONE) 25 MG tablet Take 1 tablet (25 mg) by mouth daily (Patient taking differently: Take 12.5 mg by mouth daily.) 9/7/2024 at am    umeclidinium-vilanterol (ANORO ELLIPTA) 62.5-25 MCG/ACT oral inhaler Inhale 1 puff into the lungs daily 9/6/2024 at am

## 2024-09-08 NOTE — PROGRESS NOTES
Observation goals 9/8 1200    -diagnostic tests and consults completed and resulted- Not Met, Echo and PT eval pending  -vital signs normal or at patient baseline Met  -returns to baseline functional status Not met- RUE weak/pain  -safe disposition plan has been identified Pending PT eval    Nurse to notify provider when observation goals have been met and patient is ready for discharge.

## 2024-09-08 NOTE — ED TRIAGE NOTES
BIBA from independent living. Pt had an unwitnessed fall when he was walking with his walker from the bed to the bathroom on a carpeted room. Pt denies LOC, Neck pain or any other injuries. Had a fall recently and diagnosed with a SAH yesterday morning at [facility]. Takes eliquis.     Triage Assessment (Adult)       Row Name 09/08/24 0026          Triage Assessment    Airway WDL WDL        Respiratory WDL    Respiratory WDL WDL        Skin Circulation/Temperature WDL    Skin Circulation/Temperature WDL WDL        Cardiac WDL    Cardiac WDL WDL        Peripheral/Neurovascular WDL    Peripheral Neurovascular WDL WDL        Cognitive/Neuro/Behavioral WDL    Cognitive/Neuro/Behavioral WDL WDL

## 2024-09-08 NOTE — PLAN OF CARE
Marita Molina, RN on 9/8/2024 at 6:13 AM    Reason for Admission: second fall in 2 days, SAH    Cognitive/Mentation: A/Ox 4  Neuros/CMS: stable ex forgetfulness per patient.  LUE 5/5, RUE 3/5 (shoulder pain due to fall), LLE 5/5, RLE 5/5. No order for neuro checks.  VS: stable on RA. SBP<180. BiPAP used overnight. Q8 vitals  Tele: A-Fib.  /GI: self reported intermittently incontinent of bowel. Urinal used at bedside. Last BM PTA.. Abdomen is rounded.   Pulmonary: LS clear bilaterally.  Pain: 3/10 R shoulder pain from fall. Pt denied ice packs and prn tylenol.    Drains/Lines: PIV SL  Skin: bandage on L forehead from fall.  Abrasion near L eye.  Abrasion on L knee.  Scattered scabs.  Activity: Assist x 1 with GB/W.  Diet: regular with thin liquids. Takes pills whole with water.     Therapies recs: pending  Discharge: pending observation goals being met    Aggression Stoplight Tool: green    Observation Goals:  -diagnostic tests and consults completed and resulted: not met   -vital signs normal or at patient baseline: not met  -returns to baseline functional status: not met   -safe disposition plan has been identified : not met      End of shift summary: Pt admitted to floor from ED around 0400.  Daughter was with pt. Pt belongings charted. Pt uses his BiPAP from home overnight.  Eliquis is being held.  Echo planned for today

## 2024-09-08 NOTE — ED PROVIDER NOTES
Emergency Department Note      History of Present Illness     Chief Complaint   Fall      HPI   Nixon More is a 88 year old male with recent history of fall and subdural seen in the emergency department yesterday and sent home after appropriate workup, abdominal aortic aneurysm, heart failure, arthritis, A-fib, bladder cancer, coronary disease amongst others presents for weakness and near syncope.  He notes getting out of his car yesterday and falling and striking his shoulder and head.  He had a head CT showing a small subdural and was repeated and neurosurgery consulted and repeat CT showed improvement and patient did not want to be admitted so went home.  Also had x-ray of the shoulder that was negative for injury.  He has been laying low at home.  He got up and is on his way to the bathroom and then felt weak and had a slow controlled fall.  He was weak and could not get up and had to crawl and call the for 911.  Denies hitting his head again.  As he was talking to his daughter he started having emesis that was dry heaves but denies abdominal pain.  He has some mild pain in the right shoulder worse with movement but no pain at rest.  Otherwise no new pains.    Independent Historian   None    Review of External Notes   Clinic notes prior workups and imaging    Past Medical History     Medical History and Problem List   Past Medical History:   Diagnosis Date    (HFpEF) heart failure with preserved ejection fraction (H)     AAA (abdominal aortic aneurysm) (H24)     Actinic keratosis     Angina pectoris (H24) 10/27/2020    Antiplatelet or antithrombotic long-term use     Arthritis     Atrial fibrillation and flutter (H) 12/03/2018    Bladder cancer (H)     CAD (coronary artery disease)     CKD (chronic kidney disease) stage 2, GFR 60-89 ml/min     Depressive disorder     Diarrhea     Dyslipidemia     Emphysema of lung (H)     Hernia, abdominal     HTN (hypertension)     Hypertrophy (benign) of prostate  05/2001    Lumbago     Mumps     Prostate cancer (H) 12/15/2006    Skin cancer, basal cell 1997    Sleep apnea     Spider veins        Medications   albuterol (PROAIR HFA/PROVENTIL HFA/VENTOLIN HFA) 108 (90 Base) MCG/ACT inhaler  ASPIRIN NOT PRESCRIBED (INTENTIONAL)  atorvastatin (LIPITOR) 40 MG tablet  Cholecalciferol (VITAMIN D-3) 25 MCG (1000 UT) CAPS  ELIQUIS ANTICOAGULANT 5 MG tablet  escitalopram (LEXAPRO) 10 MG tablet  Loperamide HCl (IMODIUM OR)  losartan (COZAAR) 100 MG tablet  metoprolol succinate ER (TOPROL XL) 50 MG 24 hr tablet  RISEdronate (ACTONEL) 35 MG tablet  spironolactone (ALDACTONE) 25 MG tablet  umeclidinium-vilanterol (ANORO ELLIPTA) 62.5-25 MCG/ACT oral inhaler        Surgical History   Past Surgical History:   Procedure Laterality Date    ABDOMEN SURGERY      BACK SURGERY  1988    L4/L5 decompression    BIOPSY  2022    Skin cancer basal cell    COLONOSCOPY      CYSTOSCOPY      CYSTOSCOPY, TRANSURETHRAL RESECTION (TUR) TUMOR BLADDER, COMBINED N/A 01/06/2023    Procedure: Cystoscopy, transurethral resection of bladder tumor, medium, 2-5 cm;  Surgeon: Mark Pino MD;  Location:  OR    EYE SURGERY  2016    Cararact    GENITOURINARY SURGERY  12 07 2022    Tumors in bladder    HERNIA REPAIR  child    Moh's procedure for basal cell carcinoma  01/2001    PROSTATE SURGERY  2007    Radiation only    s/p lumbar laminectomy NOS  1988    SIGMOIDOSCOPY FLEXIBLE N/A 06/15/2020    Procedure: SIGMOIDOSCOPY, FLEXIBLE;  Surgeon: Sunni Patton MD;  Location:  GI    VITRECTOMY PARS PLANA REMOVE PRERETINAL MEMBRANE   03/2009       Physical Exam     Patient Vitals for the past 24 hrs:   BP Temp Temp src Pulse Resp SpO2   09/08/24 0200 128/83 -- -- 93 11 94 %   09/08/24 0130 -- -- -- 87 12 90 %   09/08/24 0047 -- 98.4  F (36.9  C) Oral -- 19 93 %   09/08/24 0027 (!) 147/97 -- -- 91 17 96 %     Physical Exam  GENERAL: well developed, pleasant  HEAD: Bandage to the left forehead  EYES: pupils  reactive, extraocular muscles intact, conjunctivae normal  ENT:  mucus membranes moist  NECK:  trachea midline, normal range of motion  RESPIRATORY: no tachypnea, breath sounds clear to auscultation   CVS: normal S1/S2, no murmurs, intact distal pulses  ABDOMEN: soft, nontender, nondistention  MUSCULOSKELETAL: no deformities  SKIN: warm and dry, no acute rashes or ulceration  NEURO: GCS 15, cranial nerves intact, alert and oriented x3  PSYCH:  Mood/affect normal      Diagnostics     Lab Results   Labs Ordered and Resulted from Time of ED Arrival to Time of ED Departure   COMPREHENSIVE METABOLIC PANEL - Abnormal       Result Value    Sodium 137      Potassium 4.2      Carbon Dioxide (CO2) 20 (*)     Anion Gap 12      Urea Nitrogen 17.4      Creatinine 1.13      GFR Estimate 63      Calcium 8.9      Chloride 105      Glucose 152 (*)     Alkaline Phosphatase 57      AST 31      ALT 21      Protein Total 6.7      Albumin 3.7      Bilirubin Total 1.3 (*)    NT PROBNP INPATIENT - Abnormal    N terminal Pro BNP Inpatient 1,903 (*)    TROPONIN T, HIGH SENSITIVITY - Normal    Troponin T, High Sensitivity 17     CBC WITH PLATELETS AND DIFFERENTIAL    WBC Count 8.6      RBC Count 4.48      Hemoglobin 13.3      Hematocrit 41.2      MCV 92      MCH 29.7      MCHC 32.3      RDW 14.3      Platelet Count 159      % Neutrophils 77      % Lymphocytes 9      % Monocytes 11      % Eosinophils 1      % Basophils 1      % Immature Granulocytes 1      NRBCs per 100 WBC 0      Absolute Neutrophils 6.7      Absolute Lymphocytes 0.8      Absolute Monocytes 1.0      Absolute Eosinophils 0.1      Absolute Basophils 0.1      Absolute Immature Granulocytes 0.1      Absolute NRBCs 0.0     ROUTINE UA WITH MICROSCOPIC REFLEX TO CULTURE       Imaging   CT Head w/o Contrast   Final Result   IMPRESSION:   1.  Decreased conspicuity of the previously described subarachnoid hemorrhage along the left temporal and parietal lobes.      XR Chest 2 Views    Final Result   IMPRESSION: Stable chest with no acute cardiopulmonary abnormalities. Moderate calcified thoracic aortic arch. Slight thoracic curvature convex to the right.          EKG   ECG results from 09/08/24   EKG 12-lead, tracing only     Value    Systolic Blood Pressure     Diastolic Blood Pressure     Ventricular Rate 84    Atrial Rate     FL Interval     QRS Duration 76        QTc 451    P Axis     R AXIS 22    T Axis 34    Interpretation ECG      Atrial fibrillation  Abnormal ECG  When compared with ECG of 06-Sep-2024 23:51,  No significant change was found       *Note: Due to a large number of results and/or encounters for the requested time period, some results have not been displayed. A complete set of results can be found in Results Review.         Independent Interpretation   None    ED Course      Medications Administered   Medications - No data to display    Procedures   Procedures     Discussion of Management   Admitting Hospitalist,      ED Course        Additional Documentation  None    Medical Decision Making / Diagnosis     CMS Diagnoses: None    MIPS       None    [facility]   Nixon More is a 88 year old male presents with weakness and near syncopal episode after recent fall and subdural hemorrhage.  Imaging of the head shows no expansion worsening of symptoms.  Consider differential including arrhythmia, infectious etiology, deconditioning, orthostatic hypotension, vasovagal with this component of pain amongst others.  EKG shows no ischemic changes.  Otherwise hemodynamically stable.  Patient is no specific complaints but notes feeling weak and difficulty getting around at home certainly the right shoulder is probably contributing to this with some likely underlying rotator cuff injury or strain from the fall.  Spoke with the hospitalist regarding admission as well as the family.    Disposition   The patient was admitted to the hospital.     Diagnosis     ICD-10-CM    1. Weakness   R53.1       2. Near syncope  R55       3. Subdural hemorrhage (H)  I62.00       4. Strain of right shoulder, subsequent encounter  S46.911D            Discharge Medications   New Prescriptions    No medications on file         MD Mo Bowden Shaun L, MD  09/08/24 [id number]
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
## 2024-09-08 NOTE — PLAN OF CARE
Pt here with fall, SAH. A&Ox4. Neuros stable, RUE 3/5 weak and shoulder pain from fall. SBP<180. Tele Afib CVR. Reg diet, Thin liquids. Takes pills whole. Up with A1 GB/W. PRN tylenol given x1 for RUE shoulder pain. Pt scoring green on the Aggression Stop Light Tool. Plan Echo, PT eval, ortho consult for RUE pain after fall. Discharge pending.

## 2024-09-08 NOTE — PROGRESS NOTES
Observation goals 9/8 0800    -diagnostic tests and consults completed and resulted- Not Met, Echo and PT eval pending  -vital signs normal or at patient baseline Met  -returns to baseline functional status Not met- RUE weak/pain  -safe disposition plan has been identified Pending PT eval    Nurse to notify provider when observation goals have been met and patient is ready for discharge.

## 2024-09-09 ENCOUNTER — APPOINTMENT (OUTPATIENT)
Dept: CARDIOLOGY | Facility: CLINIC | Age: 89
DRG: 086 | End: 2024-09-09
Attending: HOSPITALIST
Payer: COMMERCIAL

## 2024-09-09 ENCOUNTER — APPOINTMENT (OUTPATIENT)
Dept: OCCUPATIONAL THERAPY | Facility: CLINIC | Age: 89
DRG: 086 | End: 2024-09-09
Attending: HOSPITALIST
Payer: COMMERCIAL

## 2024-09-09 ENCOUNTER — APPOINTMENT (OUTPATIENT)
Dept: PHYSICAL THERAPY | Facility: CLINIC | Age: 89
DRG: 086 | End: 2024-09-09
Attending: INTERNAL MEDICINE
Payer: COMMERCIAL

## 2024-09-09 DIAGNOSIS — I60.9 SAH (SUBARACHNOID HEMORRHAGE) (H): Primary | ICD-10-CM

## 2024-09-09 LAB
ANION GAP SERPL CALCULATED.3IONS-SCNC: 9 MMOL/L (ref 7–15)
BUN SERPL-MCNC: 21 MG/DL (ref 8–23)
CALCIUM SERPL-MCNC: 8.9 MG/DL (ref 8.8–10.4)
CHLORIDE SERPL-SCNC: 107 MMOL/L (ref 98–107)
CREAT SERPL-MCNC: 1.19 MG/DL (ref 0.67–1.17)
EGFRCR SERPLBLD CKD-EPI 2021: 59 ML/MIN/1.73M2
ERYTHROCYTE [DISTWIDTH] IN BLOOD BY AUTOMATED COUNT: 14.4 % (ref 10–15)
GLUCOSE SERPL-MCNC: 117 MG/DL (ref 70–99)
HCO3 SERPL-SCNC: 22 MMOL/L (ref 22–29)
HCT VFR BLD AUTO: 39.7 % (ref 40–53)
HGB BLD-MCNC: 12.6 G/DL (ref 13.3–17.7)
LVEF ECHO: NORMAL
MCH RBC QN AUTO: 29.5 PG (ref 26.5–33)
MCHC RBC AUTO-ENTMCNC: 31.7 G/DL (ref 31.5–36.5)
MCV RBC AUTO: 93 FL (ref 78–100)
PLATELET # BLD AUTO: 146 10E3/UL (ref 150–450)
POTASSIUM SERPL-SCNC: 4.3 MMOL/L (ref 3.4–5.3)
RBC # BLD AUTO: 4.27 10E6/UL (ref 4.4–5.9)
SODIUM SERPL-SCNC: 138 MMOL/L (ref 135–145)
VIT D+METAB SERPL-MCNC: 25 NG/ML (ref 20–50)
WBC # BLD AUTO: 6.4 10E3/UL (ref 4–11)

## 2024-09-09 PROCEDURE — 99232 SBSQ HOSP IP/OBS MODERATE 35: CPT | Performed by: HOSPITALIST

## 2024-09-09 PROCEDURE — 255N000002 HC RX 255 OP 636: Performed by: HOSPITALIST

## 2024-09-09 PROCEDURE — 250N000013 HC RX MED GY IP 250 OP 250 PS 637: Performed by: HOSPITALIST

## 2024-09-09 PROCEDURE — 82306 VITAMIN D 25 HYDROXY: CPT | Performed by: HOSPITALIST

## 2024-09-09 PROCEDURE — 36415 COLL VENOUS BLD VENIPUNCTURE: CPT | Performed by: INTERNAL MEDICINE

## 2024-09-09 PROCEDURE — 97165 OT EVAL LOW COMPLEX 30 MIN: CPT | Mod: GO | Performed by: OCCUPATIONAL THERAPIST

## 2024-09-09 PROCEDURE — 120N000001 HC R&B MED SURG/OB

## 2024-09-09 PROCEDURE — 97535 SELF CARE MNGMENT TRAINING: CPT | Mod: GO | Performed by: OCCUPATIONAL THERAPIST

## 2024-09-09 PROCEDURE — 80048 BASIC METABOLIC PNL TOTAL CA: CPT | Performed by: INTERNAL MEDICINE

## 2024-09-09 PROCEDURE — 85027 COMPLETE CBC AUTOMATED: CPT | Performed by: HOSPITALIST

## 2024-09-09 PROCEDURE — 97530 THERAPEUTIC ACTIVITIES: CPT | Mod: GP | Performed by: PHYSICAL THERAPIST

## 2024-09-09 PROCEDURE — 97110 THERAPEUTIC EXERCISES: CPT | Mod: GO | Performed by: OCCUPATIONAL THERAPIST

## 2024-09-09 PROCEDURE — C8929 TTE W OR WO FOL WCON,DOPPLER: HCPCS

## 2024-09-09 PROCEDURE — 250N000013 HC RX MED GY IP 250 OP 250 PS 637: Performed by: INTERNAL MEDICINE

## 2024-09-09 PROCEDURE — 999N000208 ECHOCARDIOGRAM COMPLETE

## 2024-09-09 PROCEDURE — 97162 PT EVAL MOD COMPLEX 30 MIN: CPT | Mod: GP | Performed by: PHYSICAL THERAPIST

## 2024-09-09 PROCEDURE — 93306 TTE W/DOPPLER COMPLETE: CPT | Mod: 26 | Performed by: INTERNAL MEDICINE

## 2024-09-09 RX ORDER — SPIRONOLACTONE 25 MG
12.5 TABLET ORAL DAILY
Status: DISCONTINUED | OUTPATIENT
Start: 2024-09-09 | End: 2024-09-10 | Stop reason: HOSPADM

## 2024-09-09 RX ORDER — SPIRONOLACTONE 25 MG
12.5 TABLET ORAL DAILY
Status: DISCONTINUED | OUTPATIENT
Start: 2024-09-09 | End: 2024-09-09

## 2024-09-09 RX ADMIN — Medication 5 CAPSULE: at 11:21

## 2024-09-09 RX ADMIN — ATORVASTATIN CALCIUM 40 MG: 40 TABLET, FILM COATED ORAL at 20:18

## 2024-09-09 RX ADMIN — METOPROLOL SUCCINATE 100 MG: 100 TABLET, EXTENDED RELEASE ORAL at 07:49

## 2024-09-09 RX ADMIN — ACETAMINOPHEN 650 MG: 325 TABLET ORAL at 07:49

## 2024-09-09 RX ADMIN — SPIRONOLACTONE 12.5 MG: 25 TABLET ORAL at 15:58

## 2024-09-09 RX ADMIN — ESCITALOPRAM OXALATE 10 MG: 10 TABLET ORAL at 07:49

## 2024-09-09 RX ADMIN — UMECLIDINIUM BROMIDE AND VILANTEROL TRIFENATATE 1 PUFF: 62.5; 25 POWDER RESPIRATORY (INHALATION) at 11:21

## 2024-09-09 RX ADMIN — HUMAN ALBUMIN MICROSPHERES AND PERFLUTREN 9 ML: 10; .22 INJECTION, SOLUTION INTRAVENOUS at 12:14

## 2024-09-09 RX ADMIN — LOSARTAN POTASSIUM 50 MG: 50 TABLET, FILM COATED ORAL at 20:18

## 2024-09-09 ASSESSMENT — ACTIVITIES OF DAILY LIVING (ADL)
ADLS_ACUITY_SCORE: 33
ADLS_ACUITY_SCORE: 35
ADLS_ACUITY_SCORE: 33
ADLS_ACUITY_SCORE: 35
ADLS_ACUITY_SCORE: 33
ADLS_ACUITY_SCORE: 35

## 2024-09-09 NOTE — TREATMENT PLAN
Right shoulder pain    MRI on 9/8/2024:  1.  Complete tear of the supraspinatus tendon and complete or near-complete tear of the subscapularis tendon. Moderate subscapularis muscle strain. Severe infraspinatus tendinosis.  2.  Severe intra-articular biceps tendinosis and/or near-complete tear.  3.  Small glenohumeral joint effusion and trace subacromial subdeltoid bursal fluid. Diffuse surrounding soft tissue edema.    Plan:  No emergent surgical intervention required  Follow-up with shoulder specialist at [facility] regarding right shoulder upon discharge.   Able to WBAT right UE with gentle ROMAT  Ice shoulder prn  Analgesics for pain control    Estee Asher PA-C on 9/9/2024 at 7:00 AM

## 2024-09-09 NOTE — PLAN OF CARE
Pt here with fall, SAH. A&Ox4. Neuros stable, RUE 3/5 weak and shoulder pain from fall. SBP<180. Tele Afib CVR. Reg diet, Thin liquids. Takes pills whole. Up with A1 GB/W. PRN tylenol given x1 for RUE shoulder pain. Pt scoring green on the Aggression Stop Light Tool. Echo completed this AM. Discharge plan TCU pending placement.

## 2024-09-09 NOTE — PROGRESS NOTES
[facility]    Medicine Progress Note - Hospitalist Service    Date of Admission:  9/8/2024    Assessment & Plan     This is a 88-year-old male with medical history which includes permanent atrial fibrillation on DOAC, COPD, obstructive sleep apnea, ascending aortic aneurysm, CKD stage II, history of bladder and prostate cancer who presented to the hospital on 9/8/2024 with a chief complaint of fall and was diagnosed with ongoing traumatic anticoagulation associated subarachnoid hemorrhage admitted to the hospital      Subarachnoid hemorrhage in the setting of DOAC use  Recurrent mechanical fall  -Patient has been having recurrent falls and first was seen at [facility] on 9/6  -CTH on 9/6 showed-a very small subarachnoid hemorrhage in the left temporoparietal sulci. This was observed overnight with repeat CTH in AM on 9/7 that showed no worsening of bleeding. X-rays of his right shoulder were negative for fracture or dislocation.  Patient's Eliquis was stopped for 2 weeks and he was told to follow-up as outpatient    -Presented with a fall again on 9/7 after trying to stand and walk and did not hit his head  -On admission patient was afebrile, blood pressure stable with no tachycardia or hypoxia  -EKG showed atrial fibrillation   -proBNP mildly elevated at 1/9/2003  -Troponin negative  -Normal WBC count  -UA negative for infection  -X-ray chest did not show any evidence of any hemorrhage  -CT head on 9/7-stable minimal subarachnoid hemorrhage in left temporoparietal region  -CT head 9/8-decrease conspicuity of the above hemorrhage  -As per neurosurgery recommendations from 9/7 continue to hold Eliquis until follow-up evaluation in 2 weeks  -Orthostatics negative  -Echo was done which shows normal EF but RV is now moderately to severely dilated and left atrium is also severely dilated  -There were a couple of readings initially on 9/9 and his spironolactone was held and dose of losartan  was decreased to 50 mg instead of 100 mg  -Continue with fall precautions  -PT OT consulted and recommending TCU and patient and daughter are in agreement  -Add vitamin D levels    Right shoulder pain due to complete tear of supraspinatus tendon, complete to near tear of subscapularis and moderate subscapularis muscle strain  -Patient has been complaining of pain to his right arm due to his recent falls  -MRI showed complete tear of the supraspinatus tendon, complete or near complete tear of the subscapularis and moderate subscapularis muscle strain and detailed report as below  -Orthopedics following and no surgical intervention indicated  -Able to WBAT right UE with gentle ROMAT  -Ice shoulder prn  -Analgesics for pain control  -Follow-up as outpatient with shoulder specialist at [facility] postdischarge    Right breast lump  -On exam he does have mild tenderness over the right nipple with no discharge and there is a mild lump and as per patient this has been ongoing for 2 weeks and pain is better.  Of note he used to be on Lupron about 3 years back  -I did order ultrasound of the breast but did not get a notification from the nurse that the radiology department does not do routine ultrasounds and I spoke with the radiologist Dr. Tello who recommended that patient should follow as outpatient in the breast center and start with mammogram and if abnormal then he should have ultrasound breast I communicated this to the patient and daughter who are in agreement and will follow-up as outpatient and order was canceled      History of permanent atrial fibrillation  -Recent TTE from 8/15/2024 showed preserved LVEF with concentric LVH, severely dilated LA, mild pulmonary hypertension, and trace to mild AR   -PTA regimen includes Eliquis and metoprolol  -His DOAC will be held for at least 2 weeks until he sees neurosurgery  -Continue with PTA metoprolol    Hypertension  Hyperlipidemia  -PTA regimen includes  "atorvastatin 40 mg daily, losartan 100 mg daily, metoprolol succinate 100 mg daily, spironolactone 25 mg daily  -Continue with atorvastatin, metoprolol 100 mg daily and losartan was started at a lower dose at 50 mg daily and discussed with daughter who is a nurse practitioner and continue with the same dose and restart PTA spironolactone   -Will monitor his blood pressure closely    COPD not in exacerbation  -PTA regimen includes Anoro Ellipta and as needed albuterol  -On exam does not have any wheezing  -Continue with PTA inhalers and substituted in the hospital as per formulary and as needed nebs available    CKD stage II  -Recent baseline creatinine has been between 1.13-1.31  -Creatinine on admission has been stable at 1.13    Obstructive sleep apnea  -Continue with CPAP/BiPAP    Chronic depression  -Continue with PTA Lexapro    History of ascending aortic aneurysm with prior penetrating ulcer of the infrarenal aorta  -Noted and follow-up with [facility] as outpatient    History of bladder and prostate cancer  - Followed by  Urology; status post TURBT and radiotherapy.   -He is on Risedronate for bone loss.   -Noted     Family communication  -I did meet with patient's daughter Amber in person and patient and daughter are in agreement with going to TCU            Diet: Regular Diet Adult    DVT Prophylaxis: Pneumatic Compression Devices  Arvizu Catheter: Not present  Lines: None     Cardiac Monitoring: ACTIVE order. Indication: Tachyarrhythmias, acute (48 hours)  Code Status: Full Code      Clinically Significant Risk Factors Present on Admission               # Drug Induced Coagulation Defect: home medication list includes an anticoagulant medication    # Hypertension: Noted on problem list         # Obesity: Estimated body mass index is 37.41 kg/m  as calculated from the following:    Height as of 9/6/24: 1.721 m (5' 7.75\").    Weight as of this encounter: 110.8 kg (244 lb 3.2 oz).       # " Financial/Environmental Concerns: none               Disposition Plan     Medically Ready for Discharge: Anticipated Tomorrow, pending workup for the breast lump             Glynn Tejeda MD  Hospitalist Service  [facility]  Securely message with [employer] (more info)  Text page via [employer] Paging/Directory     This note was completed in part using dictation via the Dragon voice recognition software. Some word and grammatical errors may occur and must be interpreted in the appropriate clinical context. If there are any questions pertaining to this issue, please contact me for further clarification.    ______________________________________________________________________    Interval History     -Reviewed events and denies any shortness of breath, chest discomfort, nausea or vomiting  -Tolerating diet and is alert oriented x 3  -Has pain over the right shoulder  -As per discussion with the patient and the daughter patient does have a lump over the right breast for the past 2 weeks with local pain and as per patient the pain is getting better      Physical Exam   Vital Signs: Temp: 98.2  F (36.8  C) Temp src: Oral BP: 128/89 Pulse: 70   Resp: 16 SpO2: 94 % O2 Device: None (Room air)    Weight: 244 lbs 3.2 oz        General: Patient appears comfortable and in no acute distress.  HEENT: Head is atraumatic, normocephalic.    Neck: Neck is supple  Respiratory: ctla  Right breast: Exam done in presence of patient's daughter and patient was in agreement and there is mild area of lump with mild tenderness over the nipple  Cardiovascular: Regular rate , S1 and S2 normal with no murmer or rubs or gallops  Abdomen:   soft , non tender   Skin: Left forehead abrasion  Neurologic: Higher functions are within normal limits. No obvious defects in speech, language and memory. No facial droop  Musculoskeletal: Normal Range of motion over upper and lower extremities bilaterally except the right shoulder which is  restricted  Psychiatric: cooperative      Medical Decision Making           Time spent in care of patient is 45 minutes and I reviewed labs including CBC, basic metabolic panel, MRI of the shoulder and also reviewed notes from the consulting team and discussed plan of care in detail with the patient, nursing staff and also his family        Data     I have personally reviewed the following data over the past 24 hrs:    6.4  \   12.6 (L)   / 146 (L)     138 107 21.0 /  117 (H)   4.3 22 1.19 (H) \       Imaging results reviewed over the past 24 hrs:   Recent Results (from the past 24 hour(s))   MR Shoulder Right w/o Contrast    Narrative    EXAM: MR SHOULDER RIGHT W/O CONTRAST  LOCATION: [facility]  DATE: 9/9/2024    INDICATION: Reduced range of motion after fall.  COMPARISON: None.  TECHNIQUE: Unenhanced.    FINDINGS:    ROTATOR CUFF:  -Supraspinatus: Complete tear of the supraspinatus tendon, with torn tendon fibers retracted to the level of the glenohumeral joint. Mild fatty infiltration of the muscle.  -Infraspinatus: Thickening and increased signal throughout the infraspinatus tendon, with discrete tear.  -Subscapularis: Complete or near-complete tear of the subscapularis tendon, with minimal residual fibers at the insertion. Diffuse edema of the muscle.  -Teres minor: No tendon tear, tendinopathy or fatty atrophy.    CORACOACROMIAL ARCH:  -Morphology: Type II acromion. No subacromial spur. Subcoracoid space are normal.   -Bursa: Trace fluid within the subacromial subdeltoid bursa with diffuse surrounding edema.    ACROMIOCLAVICULAR JOINT:   -Moderate degenerative changes. No significant joint fluid.     LONG HEAD OF BICEPS TENDON:   -The intra-articular biceps tendon is markedly attenuated and increased signal, likely severe tendinosis with near complete tear.    GLENOHUMERAL JOINT:   -Labrum: Circumferential degenerative changes and fraying of the labrum, with markedly diminutive  superior labrum. Tear of the anterior labrum.  -Cartilage: Moderate chondral thinning, without focal chondral defect.   -Joint space: Small glenohumeral joint effusion.  -Glenohumeral ligaments and capsule: Diffuse pericapsular edema.    BONES:   -No acute fracture or dislocation.  -No T1 hypointense marrow replacing lesion.    SOFT TISSUES:   -Normal deltoid muscle bulk.  -Diffuse soft tissue edema about the shoulder.      Impression    IMPRESSION:  1.  Complete tear of the supraspinatus tendon and complete or near-complete tear of the subscapularis tendon. Moderate subscapularis muscle strain. Severe infraspinatus tendinosis.    2.  Severe intra-articular biceps tendinosis and/or near-complete tear.    3.  Small glenohumeral joint effusion and trace subacromial subdeltoid bursal fluid. Diffuse surrounding soft tissue edema.

## 2024-09-09 NOTE — PROGRESS NOTES
09/09/24 1435   Appointment Info   Signing Clinician's Name / Credentials (OT) Julia Host, OTR/L   Living Environment   People in Home alone   Current Living Arrangements independent living facility   Home Accessibility no concerns   Transportation Anticipated family or friend will provide   Living Environment Comments Pt lives in ILF, alone. Walk in shower w/ grab bars, shower chair.   Self-Care   Usual Activity Tolerance good   Current Activity Tolerance fair   Equipment Currently Used at Home walker, rolling;walker, standard;grab bar, tub/shower;grab bar, toilet;raised toilet seat;shower chair   Fall history within last six months yes   Number of times patient has fallen within last six months 2   Activity/Exercise/Self-Care Comment Independent w/ ADLs, most IADLs. Pt has hired .   General Information   Onset of Illness/Injury or Date of Surgery 09/08/24   Referring Physician Glynn Tejeda MD   Patient/Family Therapy Goal Statement (OT) go to TCU   Additional Occupational Profile Info/Pertinent History of Current Problem This is a 88-year-old male with medical history which includes permanent atrial fibrillation on DOAC, COPD, obstructive sleep apnea, ascending aortic aneurysm, CKD stage II, history of bladder and prostate cancer who presented to the hospital on 9/8/2024 with a chief complaint of fall and was diagnosed with ongoing traumatic anticoagulation associated subarachnoid hemorrhage admitted to the hospital   Existing Precautions/Restrictions fall   Cognitive Status Examination   Orientation Status orientation to person, place and time   Follows Commands follows one-step commands   Memory Deficit short-term memory   Visual Perception   Visual Impairment/Limitations corrective lenses full-time   Pain Assessment   Patient Currently in Pain Yes, see Vital Sign flowsheet  (R shoulder)   Range of Motion Comprehensive   General Range of Motion upper extremity range of motion deficits identified    General Upper Extremity Assessment (Range of Motion)   Upper Extremity: Range of Motion shoulder, right: UE ROM;scapula, right: UE ROM   Strength Comprehensive (MMT)   Comment, General Manual Muscle Testing (MMT) Assessment 2/5 RUE   Transfers   Transfers sit-stand transfer   Sit-Stand Transfer   Sit-Stand [place] (Transfers) contact guard   Assistive Device (Sit-Stand Transfers) walker, front-wheeled   Activities of Daily Living   BADL Assessment/Intervention lower body dressing;toileting   Lower Body Dressing Assessment/Training   [place] Level (Lower Body Dressing) maximum assist (25% patient effort)   Toileting   [place] Level (Toileting) minimum assist (75% patient effort)   Clinical Impression   Criteria for Skilled Therapeutic Interventions Met (OT) Yes, treatment indicated   OT Diagnosis decreased ADL independence   OT Problem List-Impairments impacting ADL problems related to;activity tolerance impaired;cognition;mobility;pain;range of motion (ROM);strength   Assessment of Occupational Performance 1-3 Performance Deficits   Identified Performance Deficits toileting, dressing, functional mobility   Planned Therapy Interventions (OT) ADL retraining;ROM;progressive activity/exercise   Clinical Decision Making Complexity (OT) problem focused assessment/low complexity   Risk & Benefits of therapy have been explained patient;daughter   OT Total Evaluation Time   OT Eval, Low Complexity Minutes (37808) 10   OT Goals   Therapy Frequency (OT) 5 times/week   OT Predicted Duration/Target Date for Goal Attainment 09/16/24   OT Goals Hygiene/Grooming;Lower Body Dressing;Toilet Transfer/Toileting;OT Goal 1   OT: Hygiene/Grooming supervision/stand-by assist   OT: Lower Body Dressing Supervision/stand-by assist;using adaptive equipment   OT: Toilet Transfer/Toileting Supervision/stand-by assist;toilet transfer;using adaptive equipment;cleaning and garment management   OT: Goal 1 Pt will perform gentle HEP  for R shoulder to increase ROM in prep for ADLs   Self-Care/Home Management   Self-Care/Home Mgmt/ADL, Compensatory, Meal Prep Minutes (10114) 13   Treatment Detail/Skilled Intervention Pt edu on toilet tongs to increase independence w/ toileting tasks d/t decreased R shoulder ROM. Pt and daughter verbalized understanding. Pt completed toilet transfer w/ min A and FWW w/ x1 VC for safe hand placement technique. Therapist demo'ed technique to aubrie compression socks w/ plastic bag to increase independence. Both pt and daughter appreciative and verbalized understanding.   Therapeutic Procedures/Exercise   Therapeutic Procedure: strength, endurance, ROM, flexibillity minutes (81990) 10   Symptoms Noted During/After Treatment increased pain   Treatment Detail/Skilled Intervention Pt performed gentle R shoulder ROM per ortho orders. Ex included shoulder flexion, abduction, protraction/retraction and shoulder stretches. Pt completed ~10 reps. Pt reports slight increase in pain but able to tolerate gentle ROM. Edu on HEP and frequency. Pt and daughter verbalized understanding of HEP.   OT Discharge Planning   OT Plan G/H sinkside, gentle R shoulder ROM ex, LB dressing   OT Discharge Recommendation (DC Rec) Transitional Care Facility   OT Rationale for DC Rec Pt below baseline for ADL performance. Readmit after recurrent falls. Daughters involved and supportive. Recommend TCU to increase independence, safety prior to returning home alone.   OT Brief overview of current status Goals of therapy will be to address safe mobility and make recs for d/c to next level of care. Pt and RN will continue to follow all falls risk precautions as documented by RN staff while hospitalized  (CGA x1 w/ FWW)   OT Equipment Needed at Discharge other (see comments)  (toilet tongs)   Total Session Time   Timed Code Treatment Minutes 23   Total Session Time (sum of timed and untimed services) 33

## 2024-09-09 NOTE — DISCHARGE INSTRUCTIONS
Right shoulder: follow-up with shoulder specialist at [facility] for treatment plan once discharged from hospital.  Call [phone] for appointment.     Know the warning signs and symptoms of stroke: BE FAST     B = Balance loss   E = Eyesight changes   F = Facial droop or numbness   A = Arm or leg weakness   S = Speech difficulty, slurred speech   T = Time to call [phone] for help

## 2024-09-09 NOTE — PLAN OF CARE
Goal Outcome Evaluation:  Reason for Admission: Fall + SAH    Cognitive/Mentation: A/Ox 4  Neuros/CMS: Intact ex RUE weakness d/t shoulder pain from fall  VS: Stable RA CPAP at night.   /GI: Continent.   Pulmonary: LS clear .  Pain: R. Shoulder pain.   Drains/Lines: PIV SL  Skin: Scattered abrasion L.forehead/ Scattered scab  Activity: Assist x 1 with GBW.  Diet: Reg with thin liquids. Takes pills whole with water.     Therapies recs: Ortho/PT  Discharge: Pending    Aggression Stoplight Tool: Green

## 2024-09-09 NOTE — PLAN OF CARE
Goal Outcome Evaluation:  Reason for Admission: Fall + SAH    Cognitive/Mentation: A/Ox 4  Neuros/CMS: Intact ex RUE weakness d/t shoulder pain from fall  VS: VSS on RA, CPAP at night.   /GI: Continent.   Pulmonary: LS clear .  Pain: R. Shoulder pain. Declines medication management.    Drains/Lines: PIV SL  Skin: Scattered abrasion L.forehead/ Scattered scabbing  Activity: Assist x 1 with GBW.  Diet: Reg with thin liquids. Takes pills whole with water.     Therapies recs: Ortho/PT  Discharge: Pending    Aggression Stoplight Tool: Green    End of Shift Summary:  MRI of right shoulder completed. Tear of tendon and moderate muscle sprain. See results.   Orthostatics Negative this AM.

## 2024-09-09 NOTE — PROGRESS NOTES
Care Management Follow Up    Length of Stay (days): 1    Expected Discharge Date: 09/10/2024     Concerns to be Addressed: discharge planning     Patient plan of care discussed at interdisciplinary rounds: Yes    Anticipated Discharge Disposition: Home, Home Care, Outpatient Rehab (PT, OT, SLP, Cardiac or Pulmonary), Transitional Care              Anticipated Discharge Services: Other (see comment) (pending final recommendations)  Anticipated Discharge DME: Other (see comment) (pending final recommendations)    Patient/family educated on Medicare website which has current facility and service quality ratings:    Education Provided on the Discharge Plan: Yes  Patient/Family in Agreement with the Plan: yes    Referrals Placed by CM/SW: External Care Coordination, Homecare  Private pay costs discussed: Not applicable    Discussed  Partnership in Safe Discharge Planning  document with patient/family: No     Handoff Completed: No, handoff not indicated or clinically appropriate    Additional Information:  Writer met with patient and daughter to discuss TCU. Pts daughter provided choices and writer sent referrals.     ADD 1614:   Writer called patients daughter Amber to discuss accepting facilities. Amber stated that [facility] would be okay but she would like to wait until morning to see if other agencies respond.     Next Steps: follow up for acceptance.     JOSE JUAN MAYA  [facility]  INPATIENT CARE COORDINATION

## 2024-09-10 ENCOUNTER — PATIENT OUTREACH (OUTPATIENT)
Dept: ONCOLOGY | Facility: CLINIC | Age: 89
End: 2024-09-10
Payer: COMMERCIAL

## 2024-09-10 ENCOUNTER — TELEPHONE (OUTPATIENT)
Dept: PEDIATRICS | Facility: CLINIC | Age: 89
End: 2024-09-10

## 2024-09-10 ENCOUNTER — LAB REQUISITION (OUTPATIENT)
Dept: LAB | Facility: CLINIC | Age: 89
End: 2024-09-10

## 2024-09-10 ENCOUNTER — DOCUMENTATION ONLY (OUTPATIENT)
Dept: GERIATRICS | Facility: CLINIC | Age: 89
End: 2024-09-10
Payer: COMMERCIAL

## 2024-09-10 ENCOUNTER — APPOINTMENT (OUTPATIENT)
Dept: PHYSICAL THERAPY | Facility: CLINIC | Age: 89
DRG: 086 | End: 2024-09-10
Payer: COMMERCIAL

## 2024-09-10 VITALS
RESPIRATION RATE: 14 BRPM | SYSTOLIC BLOOD PRESSURE: 116 MMHG | HEART RATE: 89 BPM | WEIGHT: 237.9 LBS | TEMPERATURE: 97.8 F | BODY MASS INDEX: 36.44 KG/M2 | OXYGEN SATURATION: 96 % | DIASTOLIC BLOOD PRESSURE: 82 MMHG

## 2024-09-10 DIAGNOSIS — N64.9 DISORDER OF BREAST, UNSPECIFIED: ICD-10-CM

## 2024-09-10 DIAGNOSIS — N64.89 OTHER SPECIFIED DISORDERS OF BREAST: ICD-10-CM

## 2024-09-10 DIAGNOSIS — Z00.01 ENCOUNTER FOR GENERAL ADULT MEDICAL EXAMINATION WITH ABNORMAL FINDINGS: ICD-10-CM

## 2024-09-10 DIAGNOSIS — N63.10 MASS OF RIGHT BREAST, UNSPECIFIED QUADRANT: Primary | ICD-10-CM

## 2024-09-10 LAB
ATRIAL RATE - MUSE: NORMAL BPM
DIASTOLIC BLOOD PRESSURE - MUSE: NORMAL MMHG
INTERPRETATION ECG - MUSE: NORMAL
P AXIS - MUSE: NORMAL DEGREES
PR INTERVAL - MUSE: NORMAL MS
QRS DURATION - MUSE: 76 MS
QT - MUSE: 382 MS
QTC - MUSE: 451 MS
R AXIS - MUSE: 22 DEGREES
SYSTOLIC BLOOD PRESSURE - MUSE: NORMAL MMHG
T AXIS - MUSE: 34 DEGREES
VENTRICULAR RATE- MUSE: 84 BPM

## 2024-09-10 PROCEDURE — 250N000013 HC RX MED GY IP 250 OP 250 PS 637: Performed by: INTERNAL MEDICINE

## 2024-09-10 PROCEDURE — 97116 GAIT TRAINING THERAPY: CPT | Mod: GP | Performed by: PHYSICAL THERAPIST

## 2024-09-10 PROCEDURE — 99239 HOSP IP/OBS DSCHRG MGMT >30: CPT | Performed by: HOSPITALIST

## 2024-09-10 PROCEDURE — 250N000013 HC RX MED GY IP 250 OP 250 PS 637: Performed by: HOSPITALIST

## 2024-09-10 RX ORDER — LOSARTAN POTASSIUM 50 MG/1
50 TABLET ORAL DAILY
DISCHARGE
Start: 2024-09-10 | End: 2024-09-17

## 2024-09-10 RX ORDER — SPIRONOLACTONE 25 MG/1
12.5 TABLET ORAL DAILY
DISCHARGE
Start: 2024-09-10 | End: 2024-09-17

## 2024-09-10 RX ORDER — AMOXICILLIN 250 MG
1 CAPSULE ORAL 2 TIMES DAILY PRN
DISCHARGE
Start: 2024-09-10 | End: 2024-09-18

## 2024-09-10 RX ADMIN — METOPROLOL SUCCINATE 100 MG: 100 TABLET, EXTENDED RELEASE ORAL at 08:58

## 2024-09-10 RX ADMIN — Medication 5 CAPSULE: at 12:00

## 2024-09-10 RX ADMIN — UMECLIDINIUM BROMIDE AND VILANTEROL TRIFENATATE 1 PUFF: 62.5; 25 POWDER RESPIRATORY (INHALATION) at 12:00

## 2024-09-10 RX ADMIN — ESCITALOPRAM OXALATE 10 MG: 10 TABLET ORAL at 08:58

## 2024-09-10 RX ADMIN — SPIRONOLACTONE 12.5 MG: 25 TABLET ORAL at 08:58

## 2024-09-10 ASSESSMENT — ACTIVITIES OF DAILY LIVING (ADL)
ADLS_ACUITY_SCORE: 35

## 2024-09-10 NOTE — PLAN OF CARE
Goal Outcome Evaluation:  Reason for Admission: Fall + SAH    Cognitive/Mentation: A/Ox 4  Neuros/CMS: Intact ex RUE weakness d/t shoulder pain from fall  VS: Stable RA CPAP at night.   /GI: Continent.   Pulmonary: LS clear .  Pain: R. Shoulder pain.   Drains/Lines: PIV SL  Skin: Scattered abrasion L.forehead/ Scattered scab  Activity: Assist x 1 with GBW.  Diet: Reg with thin liquids. Takes pills whole with water.     Discharge: Probably tomorrow    Aggression Stoplight Tool: Green

## 2024-09-10 NOTE — PLAN OF CARE
Occupational Therapy Discharge Summary    Reason for therapy discharge:    Discharged to transitional care facility. Plan to discharge to [place] this afternoon.    Progress towards therapy goal(s). See goals on Care Plan in [facility] electronic health record for goal details.  Goals partially met.  Barriers to achieving goals:   discharge from facility.    Therapy recommendation(s):      Continued therapy is recommended.  Rationale/Recommendations:   .OT Rationale for DC Rec: Pt below baseline for ADL performance. Readmit after recurrent falls. Daughters involved and supportive. Recommend TCU to increase independence, safety prior to returning home alone.    Writing therapist did not treat pt, note written based on previous treating therapist notes and recommendations. Please see chart and flow sheet for additional details.

## 2024-09-10 NOTE — PLAN OF CARE
Pt here with L SAH s/p fall. A&O x4. Neuros intact ex. R shoulder pain with limited movement. VSS on home CPAP overnight. Tele A fib CVR. Regular diet, thin liquids. Takes pills whole. Up with A1 GB/W. Denies pain. Pt scoring green on the Aggression Stop Light Tool. Plan for discharge to TCU possibly today.

## 2024-09-10 NOTE — DISCHARGE SUMMARY
"[facility]  Hospitalist Discharge Summary      Date of Admission:  9/8/2024  Date of Discharge:  9/10/2024  Discharging Provider: Glynn Tejeda MD  Discharge Service: Hospitalist Service    Discharge Diagnoses     Subarachnoid hemorrhage in the setting of DOAC use  Recurrent mechanical fall  Right shoulder pain due to complete tear of supraspinatus tendon, complete to near tear of subscapularis and moderate subscapularis muscle strain   Right breast lump   Permanent atrial fibrillation  Hypertension  Hyperlipidemia    Clinically Significant Risk Factors     # Obesity: Estimated body mass index is 36.44 kg/m  as calculated from the following:    Height as of 9/6/24: 1.721 m (5' 7.75\").    Weight as of this encounter: 107.9 kg (237 lb 14.4 oz).       Follow-ups Needed After Discharge   Follow-up Appointments     Follow Up and recommended labs and tests      Follow up with residential physician.  The following labs/tests are   recommended: cbc and basic.  Follow-up with neurosurgery in 10-12 days.  Continue to hold Eliquis until   cleared by neurosurgery and then discussed with them when to restart   Eliquis  Follow-up with cardiology in 2 to 3 weeks  Will need to follow as outpatient with breast center for mammogram  Follow-up with shoulder specialist at [facility] regarding   right shoulder upon discharge. In 1-2 weeks  Follow-up with your primary care within 1 week of discharge from the TCU   and also discussed with them about the right breast lump if mammogram has   not been scheduled at that point        {Additional follow-up instructions/to-do's for PCP        Unresulted Labs Ordered in the Past 30 Days of this Admission       No orders found from 8/9/2024 to 9/9/2024.        These results will be followed up by     Discharge Disposition   Discharged to home  Condition at discharge: Stable    Hospital Course     This is a 88-year-old male with medical history which includes " permanent atrial fibrillation on DOAC, COPD, obstructive sleep apnea, ascending aortic aneurysm, CKD stage II, history of bladder and prostate cancer who presented to the hospital on 9/8/2024 with a chief complaint of fall and was diagnosed with ongoing traumatic anticoagulation associated subarachnoid hemorrhage admitted to the hospital        Subarachnoid hemorrhage in the setting of DOAC use  Recurrent mechanical fall  -Patient has been having recurrent falls and first was seen at [facility] on 9/6  -CTH on 9/6 showed-a very small subarachnoid hemorrhage in the left temporoparietal sulci. This was observed overnight with repeat CTH in AM on 9/7 that showed no worsening of bleeding. X-rays of his right shoulder were negative for fracture or dislocation.  Patient's Eliquis was stopped for 2 weeks and he was told to follow-up as outpatient     -Presented with a fall again on 9/7 after trying to stand and walk and did not hit his head  -On admission patient was afebrile, blood pressure stable with no tachycardia or hypoxia  -EKG showed atrial fibrillation   -proBNP mildly elevated at 1/9/2003  -Troponin negative  -Normal WBC count  -UA negative for infection  -X-ray chest did not show any evidence of any hemorrhage  -CT head on 9/7-stable minimal subarachnoid hemorrhage in left temporoparietal region  -CT head 9/8-decrease conspicuity of the above hemorrhage  - vitamin D levels adequate  -Mildly decreased hemoglobin and mild platelet count without any evidence of any melena, hematemesis and hematochezia  -As per neurosurgery recommendations from 9/7 continue to hold Eliquis until follow-up evaluation in 2 weeks  -Orthostatics negative  -Echo was done which shows normal EF but RV is now moderately to severely dilated and left atrium is also severely dilated  -Continue with fall precautions at TCU  -On day of discharge patient and family in agreement with going to transitional care unit  -Repeat CBC and basic  metabolic panel at TCU       Right shoulder pain due to complete tear of supraspinatus tendon, complete to near tear of subscapularis and moderate subscapularis muscle strain  -Patient has been complaining of pain to his right arm due to his recent falls  -MRI showed complete tear of the supraspinatus tendon, complete or near complete tear of the subscapularis and moderate subscapularis muscle strain and detailed report as below  -Orthopedics following and no surgical intervention indicated  -Able to WBAT right UE with gentle ROMAT  -Ice shoulder prn  -Analgesics for pain control  -Follow-up as outpatient with shoulder specialist at [facility] postdischarge IN 1-2 WEEKS      Right breast lump  -On exam he does have mild tenderness over the right nipple with no discharge and there is a mild lump and as per patient this has been ongoing for 2 weeks and pain is better.  Of note he used to be on Lupron about 3 years back  -I did order ultrasound of the breast but did not get a notification from the nurse that the radiology department does not do routine ultrasounds and I spoke with the radiologist Dr. Tello who recommended that patient should follow as outpatient in the breast center and start with mammogram and if abnormal then he should have ultrasound breast I communicated this to the patient and daughter who are in agreement and will follow-up as outpatient and order was canceled  -Referral placed to breast center and daughter made aware that if they did not get a call for appointment then she will call the breast center        History of permanent atrial fibrillation  -Recent TTE from 8/15/2024 showed preserved LVEF with concentric LVH, severely dilated LA, mild pulmonary hypertension, and trace to mild AR   -Updated echo as above  -PTA regimen includes Eliquis and metoprolol  -His DOAC will be held for at least 2 weeks until he sees neurosurgery  -Continue with PTA metoprolol  -Follow as outpatient with  cardiology     Hypertension  Hyperlipidemia  -PTA regimen includes atorvastatin 40 mg daily, losartan 100 mg daily, metoprolol succinate 100 mg daily, spironolactone 25 mg daily  -Continue with atorvastatin, PTA metoprolol 100 mg daily and losartan dose was cut to 50 mg given lower blood pressure on admission and continue with spironolactone 12.5 mg daily     COPD not in exacerbation  -PTA regimen includes Anoro Ellipta and as needed albuterol  -On exam does not have any wheezing  -Continue with PTA inhalers and substituted in the hospital as per formulary and as needed nebs available     CKD stage II  -Recent baseline creatinine has been between 1.13-1.31  -Creatinine on admission has been stable at 1.13     Obstructive sleep apnea  -Continue with CPAP/BiPAP     Chronic depression  -Continue with PTA Lexapro     History of ascending aortic aneurysm with prior penetrating ulcer of the infrarenal aorta  -Noted and follow-up with [place] vascular surgery as outpatient     History of bladder and prostate cancer  - Followed by  Urology; status post TURBT and radiotherapy.   -He is on Risedronate for bone loss.   -Noted       Consultations This Hospital Stay   CARE MANAGEMENT / SOCIAL WORK IP CONSULT  PHYSICAL THERAPY ADULT IP CONSULT  ORTHOPEDIC SURGERY IP CONSULT  OCCUPATIONAL THERAPY ADULT IP CONSULT  PHYSICAL THERAPY ADULT IP CONSULT  OCCUPATIONAL THERAPY ADULT IP CONSULT    Code Status   Full Code    Time Spent on this Encounter   I, Glynn Tejeda MD, personally saw the patient today and spent greater than 30 minutes discharging this patient.       Glynn Tejeda MD  [facility] NEUROSCIENCE UNIT  [facility] [place] [address]  Phone: [phone]  ______________________________________________________________________    Physical Exam   Vital Signs: Temp: 97.8  F (36.6  C) Temp src: Oral BP: 116/82 Pulse: 89   Resp: 14 SpO2: 96 % O2 Device: None (Room air)    Weight: 237 lbs 14.4  oz    General: aox3  HEENT: Left forehead abrasion  Respiratory: ctla  Right breast: Exam done in presence of patient's daughter and patient was in agreement and there is mild area of lump with mild tenderness over the nipple  Cardiovascular: Regular rate , S1 and S2 normal with no murmer or rubs or gallops  Abdomen:   soft , non tender   Neurologic: no facial   Musculoskeletal: Normal Range of motion over upper and lower extremities bilaterally except the right shoulder which is restricted  Psychiatric: cooperative           Primary Care Physician   Natalya HOOVER Short    Discharge Orders      Primary Care - Care Coordination Referral      Breast Provider  Referral      General info for SNF    Length of Stay Estimate: Short Term Care: Estimated # of Days <30  Condition at Discharge: Improving  Level of care:skilled   Rehabilitation Potential: Good  Admission H&P remains valid and up-to-date: Yes  Recent Chemotherapy: N/A  Use Nursing Home Standing Orders: Yes     Mantoux instructions    Give two-step Mantoux (PPD) Per Facility Policy Yes     Reason for your hospital stay    Fall     Activity - Up with assistive device     Follow Up and recommended labs and tests    Follow up with alf physician.  The following labs/tests are recommended: cbc and basic.  Follow-up with neurosurgery in 10-12 days.  Continue to hold Eliquis until cleared by neurosurgery and then discussed with them when to restart Eliquis  Follow-up with cardiology in 2 to 3 weeks  Will need to follow as outpatient with breast center for mammogram  Follow-up with shoulder specialist at [facility] regarding right shoulder upon discharge. In 1-2 weeks  Follow-up with your primary care within 1 week of discharge from the TCU and also discussed with them about the right breast lump if mammogram has not been scheduled at that point     BiPAP - Continue Home BiPAP    Continue Home BiPAP per home equipment settings.     Physical  Therapy Adult Consult    Evaluate and treat as clinically indicated.    Reason: Weakness     Occupational Therapy Adult Consult    Evaluate and treat as clinically indicated.    Reason: Weakness     Fall precautions     Diet    Follow this diet upon discharge: Current Diet:Orders Placed This Encounter      Regular Diet Adult       Significant Results and Procedures   Most Recent 3 CBC's:  Recent Labs   Lab Test 09/09/24  0642 09/08/24  0732 09/08/24  0049   WBC 6.4 8.5 8.6   HGB 12.6* 13.1* 13.3   MCV 93 94 92   * 161 159     Most Recent 3 BMP's:  Recent Labs   Lab Test 09/09/24  0642 09/08/24  0732 09/08/24  0049    138 137   POTASSIUM 4.3 4.4 4.2   CHLORIDE 107 105 105   CO2 22 21* 20*   BUN 21.0 15.7 17.4   CR 1.19* 1.12 1.13   ANIONGAP 9 12 12   MARLENE 8.9 8.8 8.9   * 129* 152*     Most Recent 2 LFT's:  Recent Labs   Lab Test 09/08/24  0049 07/23/24  1237   AST 31 32   ALT 21 19   ALKPHOS 57 58   BILITOTAL 1.3* 0.7     Most Recent 3 INR's:  Recent Labs   Lab Test 12/03/18  1313   INR 1.03     Most Recent INR's and Anticoagulation Dosing History:  Anticoagulation Dose History          Latest Ref Rng & Units 11/27/2007 12/3/2018   Recent Dosing and Labs   INR 0.86 - 1.14 1.07  1.03       Details                 Most Recent 3 Creatinines:  Recent Labs   Lab Test 09/09/24  0642 09/08/24  0732 09/08/24  0049   CR 1.19* 1.12 1.13     Most Recent 3 Hemoglobins:  Recent Labs   Lab Test 09/09/24  0642 09/08/24  0732 09/08/24  0049   HGB 12.6* 13.1* 13.3   ,   Results for orders placed or performed during the hospital encounter of 09/08/24   CT Head w/o Contrast    Narrative    EXAM: CT HEAD W/O CONTRAST  LOCATION: [facility]  DATE: 9/8/2024    INDICATION: near syncope, recent bleed, weakness, nausea vomiting  COMPARISON: 9/7/2024  TECHNIQUE: Routine CT Head without IV contrast. Multiplanar reformats. Dose reduction techniques were used.    FINDINGS:  INTRACRANIAL CONTENTS:  Decreased conspicuity of the subarachnoid hemorrhage along the left temporal and parietal lobes. No new or progressive intracranial hemorrhage or adverse mass effect. No CT evidence of acute infarct. Mild to moderate presumed   chronic small vessel ischemic changes. Mild generalized volume loss. No hydrocephalus.     VISUALIZED ORBITS/SINUSES/MASTOIDS: Prior bilateral cataract surgery. Visualized portions of the orbits are otherwise unremarkable. Trace ethmoid mucosal thickening. No middle ear or mastoid effusion.    BONES/SOFT TISSUES: No acute abnormality.      Impression    IMPRESSION:  1.  Decreased conspicuity of the previously described subarachnoid hemorrhage along the left temporal and parietal lobes.   XR Chest 2 Views    Narrative    EXAM: XR CHEST 2 VIEWS  LOCATION: [facility]  DATE: 9/8/2024    INDICATION: sob, near syncope, weakness  COMPARISON: 10/10/2023      Impression    IMPRESSION: Stable chest with no acute cardiopulmonary abnormalities. Moderate calcified thoracic aortic arch. Slight thoracic curvature convex to the right.   MR Shoulder Right w/o Contrast    Narrative    EXAM: MR SHOULDER RIGHT W/O CONTRAST  LOCATION: [facility]  DATE: 9/9/2024    INDICATION: Reduced range of motion after fall.  COMPARISON: None.  TECHNIQUE: Unenhanced.    FINDINGS:    ROTATOR CUFF:  -Supraspinatus: Complete tear of the supraspinatus tendon, with torn tendon fibers retracted to the level of the glenohumeral joint. Mild fatty infiltration of the muscle.  -Infraspinatus: Thickening and increased signal throughout the infraspinatus tendon, with discrete tear.  -Subscapularis: Complete or near-complete tear of the subscapularis tendon, with minimal residual fibers at the insertion. Diffuse edema of the muscle.  -Teres minor: No tendon tear, tendinopathy or fatty atrophy.    CORACOACROMIAL ARCH:  -Morphology: Type II acromion. No subacromial spur. Subcoracoid space  are normal.   -Bursa: Trace fluid within the subacromial subdeltoid bursa with diffuse surrounding edema.    ACROMIOCLAVICULAR JOINT:   -Moderate degenerative changes. No significant joint fluid.     LONG HEAD OF BICEPS TENDON:   -The intra-articular biceps tendon is markedly attenuated and increased signal, likely severe tendinosis with near complete tear.    GLENOHUMERAL JOINT:   -Labrum: Circumferential degenerative changes and fraying of the labrum, with markedly diminutive superior labrum. Tear of the anterior labrum.  -Cartilage: Moderate chondral thinning, without focal chondral defect.   -Joint space: Small glenohumeral joint effusion.  -Glenohumeral ligaments and capsule: Diffuse pericapsular edema.    BONES:   -No acute fracture or dislocation.  -No T1 hypointense marrow replacing lesion.    SOFT TISSUES:   -Normal deltoid muscle bulk.  -Diffuse soft tissue edema about the shoulder.      Impression    IMPRESSION:  1.  Complete tear of the supraspinatus tendon and complete or near-complete tear of the subscapularis tendon. Moderate subscapularis muscle strain. Severe infraspinatus tendinosis.    2.  Severe intra-articular biceps tendinosis and/or near-complete tear.    3.  Small glenohumeral joint effusion and trace subacromial subdeltoid bursal fluid. Diffuse surrounding soft tissue edema.   Echocardiogram Complete     Value    LVEF  60-65%    [facility]    [id number]  [id number]  [id number]^JULIAN^PAPA^YULISA     [facility]  Echocardiography Laboratory  [address] [place] [address]     Name: YULISA BYRD  MRN: [id number]  : 1935  Study Date: 2024 10:16 AM  Age: 88 yrs  Gender: Male  Patient Location: [facility]  Reason For Study: Syncope and Collapse  Ordering Physician: PAPA JORDAN  Referring Physician: Natalya Lewis MD  Performed By: Jailene Aldrich     BSA: 2.2 m2  Height: 67 in  Weight: 244 lb  HR: 83  BP: 130/83  mmHg  ______________________________________________________________________________  Procedure  Complete Portable Echo Adult. Optison (NDC #[id number]) given intravenously.  ______________________________________________________________________________  Interpretation Summary     Left ventricular systolic function is normal.The visual ejection fraction is  60-65%.  The right ventricle is moderate to severely dilated.The right ventricular  systolic function is normal.  The left atrium is severely dilated.  No significant valvular stenosis or regurgitation on doppler interrogation.  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  ______________________________________________________________________________  Left Ventricle  The left ventricle is normal in size. There is mild concentric left  ventricular hypertrophy. Diastolic Doppler findings (E/E' ratio and/or other  parameters) suggest left ventricular filling pressures are indeterminate. Left  ventricular systolic function is normal. The visual ejection fraction is 60-  65%. No regional wall motion abnormalities noted.     Right Ventricle  The right ventricle is moderate to severely dilated. The right ventricular  systolic function is normal.     Atria  The left atrium is severely dilated. The right atrium is moderately dilated.  There is no color Doppler evidence of an atrial shunt.     Mitral Valve  There is mild mitral annular calcification. There is trace mitral  regurgitation.     Tricuspid Valve  There is trace tricuspid regurgitation. The right ventricular systolic  pressure is approximated at 32.3 mmHg plus the right atrial pressure.     Aortic Valve  Aortic valve sclerosis noted. There is trace aortic regurgitation. No aortic  stenosis is present.     Pulmonic Valve  There is no pulmonic valvular stenosis.     Vessels  Aortic root aneurysm is  present. 4.5 cm. Ascending aorta aneurysm is present.  4.5 cm. Inferior vena cava not well visualized for estimation of right atrial  pressure.     Pericardium  There is no pericardial effusion.     ______________________________________________________________________________  MMode/2D Measurements & Calculations  IVSd: 1.4 cm  LVIDd: 5.3 cm  LVIDs: 3.4 cm  LVPWd: 1.8 cm  FS: 36.5 %  LV mass(C)d: 391.8 grams  LV mass(C)dI: 178.1 grams/m2     Ao root diam: 4.0 cm  LA dimension: 6.4 cm  asc Aorta Diam: 4.5 cm  LA/Ao: 1.6  LVOT diam: 2.3 cm  LVOT area: 4.0 cm2  Ao root diam index Ht(cm/m): 2.4  Ao root diam index BSA (cm/m2): 1.8  Asc Ao diam index BSA (cm/m2): 2.1  Asc Ao diam index Ht(cm/m): 2.7  LA Volume (BP): 152.0 ml  LA Volume Index (BP): 69.1 ml/m2  RV Base: 5.4 cm     RWT: 0.67  TAPSE: 1.7 cm     Doppler Measurements & Calculations  MV E max kash: 114.0 cm/sec  MV dec slope: 650.1 cm/sec2  MV dec time: 0.18 sec  PA acc time: 0.15 sec  TR max kash: 284.1 cm/sec  TR max P.3 mmHg  E/E' av.2  Lateral E/e': 13.1  Medial E/e': 13.3  RV S Kash: 12.6 cm/sec     ______________________________________________________________________________  Report approved by: Tigist Lorenzo 2024 01:04 PM           *Note: Due to a large number of results and/or encounters for the requested time period, some results have not been displayed. A complete set of results can be found in Results Review.       Discharge Medications   Current Discharge Medication List        START taking these medications    Details   senna-docusate (SENOKOT-S/PERICOLACE) 8.6-50 MG tablet Take 1 tablet by mouth 2 times daily as needed for constipation.    Associated Diagnoses: Constipation, unspecified constipation type           CONTINUE these medications which have CHANGED    Details   losartan (COZAAR) 50 MG tablet Take 1 tablet (50 mg) by mouth daily.    Associated Diagnoses: Dyslipidemia      spironolactone (ALDACTONE) 25 MG tablet Take  0.5 tablets (12.5 mg) by mouth daily.    Associated Diagnoses: Persistent atrial fibrillation (H); Benign essential hypertension           CONTINUE these medications which have NOT CHANGED    Details   albuterol (PROAIR HFA/PROVENTIL HFA/VENTOLIN HFA) 108 (90 Base) MCG/ACT inhaler Inhale 2 puffs into the lungs every 6 hours as needed for shortness of breath, wheezing or cough  Qty: 8 g, Refills: 2    Comments: Pharmacy may dispense brand covered by insurance (Proair, or proventil or ventolin or generic albuterol inhaler)  Associated Diagnoses: Other emphysema (H)      atorvastatin (LIPITOR) 40 MG tablet Take 1 tablet (40 mg) by mouth daily  Qty: 90 tablet, Refills: 3    Associated Diagnoses: Dyslipidemia      Cholecalciferol (VITAMIN D-3) 25 MCG (1000 UT) CAPS Take 1 capsule by mouth daily.      escitalopram (LEXAPRO) 10 MG tablet Take 1 tablet (10 mg) by mouth daily  Qty: 90 tablet, Refills: 3    Associated Diagnoses: Mild major depression (H24)      loperamide (IMODIUM) 2 MG capsule Take 2 mg by mouth 2 times daily as needed for diarrhea.      metoprolol succinate ER (TOPROL XL) 50 MG 24 hr tablet Take 100 mg by mouth daily.      RISEdronate (ACTONEL) 35 MG tablet Take 1 tablet (35 mg) by mouth every 7 days  Qty: 12 tablet, Refills: 4    Associated Diagnoses: Osteopenia of both hips      umeclidinium-vilanterol (ANORO ELLIPTA) 62.5-25 MCG/ACT oral inhaler Inhale 1 puff into the lungs daily  Qty: 1 each, Refills: 11    Associated Diagnoses: Other emphysema (H)           STOP taking these medications       ASPIRIN NOT PRESCRIBED (INTENTIONAL) Comments:   Reason for Stopping:         ELIQUIS ANTICOAGULANT 5 MG tablet Comments:   Reason for Stopping:             Allergies   Allergies   Allergen Reactions    Amoxicillin-Pot Clavulanate Rash     Type III hypersensitivity    Bactrim [Sulfamethoxazole W/Trimethoprim] Rash     Serum sickness, type III hypersensitivity      Bee Venom Itching    Sulfa Antibiotics Hives     Celebrex [Celecoxib]     Lisinopril Cough    Naproxen Hives    Penicillin G

## 2024-09-10 NOTE — TELEPHONE ENCOUNTER
Please let patient know that I ordered mammogram and breast US to evaluate his breast lump rather than seeing the breast center to save him the drive

## 2024-09-10 NOTE — CARE PLAN
Pt here with L SAH s/p fall    Neuros intact. Has some weakness in R shoulder due to rotator cuff tear. VSS RA. Regular diet, thin liquids. A1/GB/W. Continent for the most part. BM today. Skin WDL ex for scattered bruising. Tele afib CVR. L face abrasion covered. Discharging to [facility], daughter providing transport. Follow ups gone over.

## 2024-09-10 NOTE — PLAN OF CARE
Goal Outcome Evaluation:      Plan of Care Reviewed With: child, caregiver          Outcome Evaluation: Patient discharging to [facility] TCU today.    JIM Cohen, LGSW   Social Work   [facility]

## 2024-09-10 NOTE — PROGRESS NOTES
New Patient Oncology Nurse Navigator Note     Referring provider: Glynn Tejeda MD      Referring Clinic/Organization: Hospitalist at [facility]      Referred to (specialty:) Breast Provider Referral      Date Referral Received: September 10, 2024     Evaluation for:  N63.10 (ICD-10-CM) - Mass of right breast, unspecified quadrant     Clinical History (per Nurse review of records provided):      Spencer More is an 88 year old male who was recently admitted to [facility] for   chief complaint of fall and was diagnosed with ongoing traumatic anticoagulation associated subarachnoid hemorrhage admitted to the hospital. Discharge disposition to [facility].   Patient had mild tenderness over the right nipple on assessment during admission, with no discharge. There is a mild lump and as per patient this has been ongoing for 2 weeks and pain is better. Of note he used to be on Lupron about 3 years back    Patient resides in [place], [place]. [place] is closest however patient is at TCU at [facility] and [place] closest during that stay.     PCP entered diagnostic imaging orders.    9/24/24 - Bilateral diagnostic mammograms showed moderate bilateral gynecomastia. No concerning findings identified. Clinical follow up recommended.     9/11 1634 - Telephoned and spoke with patient. Explained my role and purpose for the call. Dr. Lewis has ordered some specialty breast imaging and the next step is to have those performed. Call transferred to Specialty Breast Imaging Scheduling. Patient has a follow up with Dr. Lewis on 10/1. Referral message sent and will await provider feedback.     Records Location: See Bookmarked material

## 2024-09-10 NOTE — PROGRESS NOTES
Care Management Discharge Note    Discharge Date: 09/10/2024       Discharge Disposition: [facility]  Discharge Services: therapies  Discharge Transportation: family     Private pay costs discussed: private room/amenity fees and transportation costs    Does the patient's insurance plan have a 3 day qualifying hospital stay waiver?  Yes   Which insurance plan 3 day waiver is available? Alternative insurance waiver  Will the waiver be used for post-acute placement? Yes    PAS Confirmation Code: [id number]  Patient/family educated on Medicare website which has current facility and service quality ratings:  yes    Education Provided on the Discharge Plan: Yes  Persons Notified of Discharge Plans: family, facility, bedside RN  Patient/Family in Agreement with the Plan: yes    Handoff Referral Completed: No, handoff not indicated or clinically appropriate    Additional Information:  Patient medically ready to discharge and [facility] MELISSA called other pending referrals to see if they could have a bed today and they do not. MELISSA called walter Clark to discuss this and make a plan for [facility]. [facility] Orders faxed. MELISSA notified Beatris in admissions of timing and private room preference. Family aware and agreeable to daily cost of the private room.    PAS-RR    D: Per DHS regulation, MELISSA completed and submitted PAS-RR to [place] Board on Aging Direct Connect via the Senior LinkAge Line.  PAS-RR confirmation # is : [id number]    P: Further questions may be directed to Senior LinkAge Line at #[phone], option #4 for PAS-RR staff.    Rita Andrade, MSW, SW   Social Work   [facility]

## 2024-09-10 NOTE — PROGRESS NOTES
"   09/09/24 1100   Appointment Info   Signing Clinician's Name / Credentials (PT) Melba Fraser, PT, DPT   Rehab Comments (PT) Rotator cuff and biceps tears, see Ortho 9/9 note; \"WBAT right UE with gentle ROMAT\"   Living Environment   People in Home alone   Current Living Arrangements independent living facility   Home Accessibility no concerns   Transportation Anticipated family or friend will provide   Living Environment Comments Uses 4WW inside home, no AD in commnity. 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   Self-Care   Usual Activity Tolerance good   Current Activity Tolerance fair   Equipment Currently Used at Home walker, rolling   Fall history within last six months yes   Number of times patient has fallen within last six months 2   General Information   Onset of Illness/Injury or Date of Surgery 09/08/24   Referring Physician Estela Su MD   Patient/Family Therapy Goals Statement (PT) Pt endorses \"I'll do what my dtr wants me to do.\" Dtr endorses she thinks a rehab stay would be helpful.   Pertinent History of Current Problem (include personal factors and/or comorbidities that impact the POC) 88-year-old male with medical history which includes permanent atrial fibrillation on DOAC, COPD, obstructive sleep apnea, ascending aortic aneurysm, CKD stage II, history of bladder and prostate cancer who presented to the hospital on 9/8/2024 with a chief complaint of fall and was diagnosed with ongoing traumatic anticoagulation associated subarachnoid hemorrhage admitted to the hospital.   Existing Precautions/Restrictions fall   Cognition   Affect/Mental Status (Cognition) WFL   Orientation Status (Cognition) oriented x 4   Follows Commands (Cognition) WFL   Safety Deficit (Cognition) minimal deficit;insight into deficits/self-awareness;problem-solving   Pain Assessment   Patient Currently in Pain Yes, see Vital Sign flowsheet   Integumentary/Edema   Integumentary/Edema Comments Baseline B LE " edema, does not wear compression socks b/c of inability to put them on himself.   Posture    Posture Forward head position;Protracted shoulders;Kyphosis   Range of Motion (ROM)   Range of Motion ROM deficits secondary to pain;ROM deficits secondary to weakness   Strength (Manual Muscle Testing)   Strength Comments Grossly 4+/5 BLE; grossly deconditioned   Bed Mobility   Comment, (Bed Mobility) SBA   Transfers   Comment, (Transfers) CGA   Gait/Stairs (Locomotion)   Comment, (Gait/Stairs) CGA   Balance   Balance Comments Needs B UE support   Sensory Examination   Sensory Perception patient reports no sensory changes   Coordination   Coordination no deficits were identified   Muscle Tone   Muscle Tone no deficits were identified   Clinical Impression   Criteria for Skilled Therapeutic Intervention Yes, treatment indicated   PT Diagnosis (PT) Impaired safety and indep w/fn mob   Influenced by the following impairments Strength, balance, activity tolernace, cardiopulm endurance   Functional limitations due to impairments Transfers, amb, ability to care for self   Clinical Presentation (PT Evaluation Complexity) evolving   Clinical Presentation Rationale Multiple affecting comorbidities, assessment inc  strength, balance, fn mob. Evolving presentation   Clinical Decision Making (Complexity) moderate complexity   Planned Therapy Interventions (PT) balance training;bed mobility training;gait training;home exercise program;patient/family education;postural re-education;strengthening   Risk & Benefits of therapy have been explained evaluation/treatment results reviewed;care plan/treatment goals reviewed;risks/benefits reviewed;current/potential barriers reviewed;participants voiced agreement with care plan;participants included;patient;daughter   PT Total Evaluation Time   PT Eval, Moderate Complexity Minutes (91983) 5   Physical Therapy Goals   PT Frequency 5x/week   PT Predicted Duration/Target Date for Goal Attainment  "09/19/24   PT Goals Bed Mobility;Transfers;Gait;Aerobic Activity   PT: Bed Mobility Modified independent   PT: Transfers Modified independent   PT: Gait Modified independent   PT: Perform aerobic activity with stable cardiovascular response intermittent activity;continuous activity;5 minutes;10 minutes   Interventions   Interventions Quick Adds Therapeutic Activity   Therapeutic Activity   Therapeutic Activities: dynamic activities to improve functional performance Minutes (90779) 26   Symptoms Noted During/After Treatment Fatigue   Treatment Detail/Skilled Intervention Amenable. Short of breath, VSS O2sat 94% on RA. Spot checked with activity, remained >90%, pt familiar w/enrgy [employer] and pursed lip breathing. Does endorse \"I don't always do what I'm supposed to do.\" Completed transfers w/CGA at 2WW - unsteady during first 5s of standing. Amb hallway w/close CGA at 4WW - heavy B UE support and pusihng walker too far out. Reviewed safety and sequencing, pt amenable to keeping walker closer, but with fatigue reverts back. Incr time answering dtr's questions re POC, d/c rec, balance and strength impairments. All quetions answered by end of session. Chair alarm on, call light in reach.   PT Discharge Planning   PT Plan Amb w/4WW; dynamic balance; Outcome measures: BBS, 6MWT   PT Discharge Recommendation (DC Rec) Transitional Care Facility;home with assist;home with home care physical therapy   PT Rationale for DC Rec Pt with recent 2 falls presents below (I) baseline, is at incr falls risk. Exhbiits impaimrnets in strength, balance, activity tolerance, cardiopulm endurance. TCU recommended to reduce falls risk and risk of re-hospitalization. If dc home, would benefit from 24/7 supervision and HHPT for aforementioned impairments.   PT Brief overview of current status CGA   Total Session Time   Timed Code Treatment Minutes 26   Total Session Time (sum of timed and untimed services) 31     "

## 2024-09-11 ENCOUNTER — TRANSITIONAL CARE UNIT VISIT (OUTPATIENT)
Dept: GERIATRICS | Facility: CLINIC | Age: 89
End: 2024-09-11
Payer: COMMERCIAL

## 2024-09-11 ENCOUNTER — PATIENT OUTREACH (OUTPATIENT)
Dept: CARE COORDINATION | Facility: CLINIC | Age: 89
End: 2024-09-11

## 2024-09-11 VITALS
SYSTOLIC BLOOD PRESSURE: 134 MMHG | DIASTOLIC BLOOD PRESSURE: 91 MMHG | HEART RATE: 87 BPM | TEMPERATURE: 97.3 F | RESPIRATION RATE: 18 BRPM | OXYGEN SATURATION: 93 %

## 2024-09-11 DIAGNOSIS — R53.81 PHYSICAL DECONDITIONING: ICD-10-CM

## 2024-09-11 DIAGNOSIS — I10 BENIGN ESSENTIAL HYPERTENSION: ICD-10-CM

## 2024-09-11 DIAGNOSIS — I48.91 ATRIAL FIBRILLATION, UNSPECIFIED TYPE (H): ICD-10-CM

## 2024-09-11 DIAGNOSIS — Z78.9 IMPAIRED MOBILITY AND ACTIVITIES OF DAILY LIVING: ICD-10-CM

## 2024-09-11 DIAGNOSIS — R29.6 RECURRENT FALLS: ICD-10-CM

## 2024-09-11 DIAGNOSIS — N63.10 MASS OF RIGHT BREAST, UNSPECIFIED QUADRANT: ICD-10-CM

## 2024-09-11 DIAGNOSIS — Z74.09 IMPAIRED MOBILITY AND ACTIVITIES OF DAILY LIVING: ICD-10-CM

## 2024-09-11 DIAGNOSIS — N40.0 HYPERTROPHY OF PROSTATE WITHOUT URINARY OBSTRUCTION: ICD-10-CM

## 2024-09-11 DIAGNOSIS — S46.911D STRAIN OF RIGHT SHOULDER, SUBSEQUENT ENCOUNTER: ICD-10-CM

## 2024-09-11 DIAGNOSIS — N18.30 STAGE 3 CHRONIC KIDNEY DISEASE, UNSPECIFIED WHETHER STAGE 3A OR 3B CKD (H): ICD-10-CM

## 2024-09-11 DIAGNOSIS — I60.9 SUBARACHNOID HEMORRHAGE (H): Primary | ICD-10-CM

## 2024-09-11 DIAGNOSIS — J44.9 CHRONIC OBSTRUCTIVE PULMONARY DISEASE, UNSPECIFIED COPD TYPE (H): ICD-10-CM

## 2024-09-11 DIAGNOSIS — G47.33 OSA (OBSTRUCTIVE SLEEP APNEA): ICD-10-CM

## 2024-09-11 PROCEDURE — P9604 ONE-WAY ALLOW PRORATED TRIP: HCPCS | Performed by: PHYSICIAN ASSISTANT

## 2024-09-11 PROCEDURE — 99310 SBSQ NF CARE HIGH MDM 45: CPT | Performed by: PHYSICIAN ASSISTANT

## 2024-09-11 PROCEDURE — 86481 TB AG RESPONSE T-CELL SUSP: CPT | Performed by: PHYSICIAN ASSISTANT

## 2024-09-11 PROCEDURE — 36415 COLL VENOUS BLD VENIPUNCTURE: CPT | Performed by: PHYSICIAN ASSISTANT

## 2024-09-11 NOTE — PROGRESS NOTES
[facility] GERIATRICS    PRIMARY CARE PROVIDER AND CLINIC:  Natalya Lewis MD, [address]  / [place] [address]  Chief Complaint   Patient presents with    Hospital F/U      Era Medical Record Number:  [id number]  Place of Service where encounter took place:  [facility] ([facility]) [[id number]]      HPI:    88-year-old male with past medical history of A-fib on chronic anticoagulation with apixaban and recent mechanical fall 9/6 with findings of subarachnoid hemorrhage who was admitted at [facility] from 9/8 - 9/10, 2024 after presenting with recurrent falls.  Workup included a repeat CT head which indicated stable subarachnoid hemorrhage.  Remainder of workup was unremarkable.  He was noted to have low-normal blood pressures and antihypertensives adjusted.  Also reported ongoing right shoulder pain, MRI indicated rotator cuff injury.  Manage conservatively under care of orthopedics.  Following evaluations by therapies, referred to U for ongoing rehab and medical management.    Patient is presently evaluated as initial visit.  He is sitting up in chair at bedside, reports that he is feeling quite well.  He has been evaluated by therapies this morning, has been upgraded to an orange tag at this time.  Further denies any pain, headache, shoulder discomfort, lightheadedness, dizziness, chest pain, shortness of breath.  States he is tolerating oral intake.  Denies constipation or urinary symptoms.  Prior to hospitalization, patient lives alone in a single-family home in [place].  Plans to return when able.  Full code.    Following initial visit with patient, daughter presents at bedside and expresses dissatisfaction with the amount of therapies that patient has been receiving thus far at [facility].  Would like him evaluated for balance issues and would also like blood pressures monitored more closely.  Also states that patient did not receive medications last night due to  confusion, details remain unclear at this time.  Discussed expectations while at TCU and level of care that can be provided.  Daughter has requested a list of medications, blood pressures, and to speak with nurse manager and/or DON.     CODE STATUS/ADVANCE DIRECTIVES DISCUSSION:  Prior  CPR/Full code   ALLERGIES:   Allergies   Allergen Reactions    Amoxicillin-Pot Clavulanate Rash     Type III hypersensitivity    Bactrim [Sulfamethoxazole W/Trimethoprim] Rash     Serum sickness, type III hypersensitivity      Bee Venom Itching    Sulfa Antibiotics Hives    Celebrex [Celecoxib]     Lisinopril Cough    Naproxen Hives    Penicillin G       PAST MEDICAL HISTORY:   Past Medical History:   Diagnosis Date    (HFpEF) heart failure with preserved ejection fraction (H)     AAA (abdominal aortic aneurysm) (H24)     Actinic keratosis     Angina pectoris (H24) 10/27/2020    Antiplatelet or antithrombotic long-term use     Eliquis    Arthritis     Atrial fibrillation and flutter (H) 12/03/2018    Bladder cancer (H)     CAD (coronary artery disease)     1/5/2011 lateral ischemia on stress echo, 12/2014 normal stress echo    CKD (chronic kidney disease) stage 2, GFR 60-89 ml/min     Depressive disorder     Mild    Diarrhea     Urgency at times    Dyslipidemia     Emphysema of lung (H)     Hernia, abdominal     HTN (hypertension)     Hypertrophy (benign) of prostate 05/2001    Biopsy 5/01 negative for cancer; PSA 5    Lumbago     Mumps     Prostate cancer (H) 12/15/2006    Skin cancer, basal cell 1997    Sleep apnea     Uses a Bi-pap for centralized Apnea    Spider veins       PAST SURGICAL HISTORY:   has a past surgical history that includes Moh's procedure for basal cell carcinoma (01/2001); s/p lumbar laminectomy NOS (1988); hernia repair (child); VITRECTOMY PARS PLANA REMOVE PRERETINAL MEMBRANE  (03/2009); Prostate surgery (2007); Sigmoidoscopy flexible (N/A, 06/15/2020); biopsy (2022); colonoscopy; Eye surgery (2016); Abdomen  surgery (12 07 2022); back surgery (1988); Cystoscopy, transurethral resection (TUR) tumor bladder, combined (N/A, 01/06/2023); Cystoscopy; and Abdomen surgery.  FAMILY HISTORY: family history includes Alzheimer Disease in his mother; Anesthesia Reaction in his father; Cardiovascular in his father; Hypertension in his mother; Lung Cancer (age of onset: 76) in his brother; Prostate Cancer in his brother.  SOCIAL HISTORY:   reports that he quit smoking about 46 years ago. His smoking use included cigarettes. He started smoking about 76 years ago. He has a 30 pack-year smoking history. He has never been exposed to tobacco smoke. He has never used smokeless tobacco. He reports that he does not currently use alcohol. He reports that he does not use drugs.  Patient's living condition: lives alone    Post Discharge Medication Reconciliation Status:   MED REC REQUIRED  Post Medication Reconciliation Status: discharge medications reconciled, continue medications without change       Current Outpatient Medications   Medication Sig Dispense Refill    albuterol (PROAIR HFA/PROVENTIL HFA/VENTOLIN HFA) 108 (90 Base) MCG/ACT inhaler Inhale 2 puffs into the lungs every 6 hours as needed for shortness of breath, wheezing or cough 8 g 2    atorvastatin (LIPITOR) 40 MG tablet Take 1 tablet (40 mg) by mouth daily 90 tablet 3    Cholecalciferol (VITAMIN D-3) 25 MCG (1000 UT) CAPS Take 1 capsule by mouth daily.      escitalopram (LEXAPRO) 10 MG tablet Take 1 tablet (10 mg) by mouth daily 90 tablet 3    loperamide (IMODIUM) 2 MG capsule Take 2 mg by mouth 2 times daily as needed for diarrhea.      losartan (COZAAR) 50 MG tablet Take 1 tablet (50 mg) by mouth daily.      metoprolol succinate ER (TOPROL XL) 50 MG 24 hr tablet Take 100 mg by mouth daily.      RISEdronate (ACTONEL) 35 MG tablet Take 1 tablet (35 mg) by mouth every 7 days 12 tablet 4    spironolactone (ALDACTONE) 25 MG tablet Take 0.5 tablets (12.5 mg) by mouth daily.       umeclidinium-vilanterol (ANORO ELLIPTA) 62.5-25 MCG/ACT oral inhaler Inhale 1 puff into the lungs daily 1 each 11    senna-docusate (SENOKOT-S/PERICOLACE) 8.6-50 MG tablet Take 1 tablet by mouth 2 times daily as needed for constipation.       No current facility-administered medications for this visit.       ROS:  4 point ROS including Respiratory, CV, GI and , other than that noted in the HPI,  is negative    Vitals:  BP (!) 134/91   Pulse 87   Temp 97.3  F (36.3  C)   Resp 18   SpO2 93%   Exam:  GEN: well-developed, well-nourished, appears comfortable  HEENT: NCAT, EOM intact bilaterally, sclera clear, conjunctiva normal, nose & mouth patent, mucous membranes moist  CHEST: lungs CTA bilaterally, no increased work of breathing, no wheeze, crackles, rhonchi  HEART: RRR, S1 & S2  ABD: soft, nontender, nondistended, no guarding or rigidity, +BS in all 4 quadrants  MSK: AROM bilateral UE/LE, pedal & radial pulses 2+ bilaterally  NEURO: awake, alert, oriented to name, place, and time. CN II-XII grossly intact. Sensation grossly intact to light touch.   SKIN: warm & dry without rash, no pedal edema    Lab/Diagnostic data:  Recent labs in [facility] reviewed by me today.  and Most Recent 3 CBC's:  Recent Labs   Lab Test 09/09/24  0642 09/08/24  0732 09/08/24  0049   WBC 6.4 8.5 8.6   HGB 12.6* 13.1* 13.3   MCV 93 94 92   * 161 159     Most Recent 3 BMP's:  Recent Labs   Lab Test 09/09/24  0642 09/08/24  0732 09/08/24  0049    138 137   POTASSIUM 4.3 4.4 4.2   CHLORIDE 107 105 105   CO2 22 21* 20*   BUN 21.0 15.7 17.4   CR 1.19* 1.12 1.13   ANIONGAP 9 12 12   MARLENE 8.9 8.8 8.9   * 129* 152*     Most Recent 2 LFT's:  Recent Labs   Lab Test 09/08/24  0049 07/23/24  1237   AST 31 32   ALT 21 19   ALKPHOS 57 58   BILITOTAL 1.3* 0.7       ASSESSMENT/PLAN:    SAH  Recurrent fall  Physical deconditioning  Impaired mobility and ADLs  Initially sustained fall 9/6 with findings of SAH in setting of apixaban use.  Repeat imaging stable, pt discharged after instructed to hold apixaban x2 weeks. Recurrent falls prompted repeat hospitalization, imaging stable.  -Holding apixaban  -Supportive management  -PT/OT evaluations  -SW for discharge planning    Right shoulder pain  MRI with finding of complete tear of the supraspinatus tendon, complete or near complete tear of the subscapularis and moderate subscapularis muscle strain. Seen by orthopedics, nonop mgmt.  -WBAT to RUE, ROM as tolerates  -Follow-up with ortho in 1-2 weeks  -Voltaren gel TID PRN pain    Right breast lump  -Outpatient breast US ordered per PCP, to be performed following TCU stay    Permanent afib  -Continues on metoprolol  -Holding apixaban as above    HTN / HLD  Chronic. Regimen reduced dt soft-normal bps.  -Continue metoprolol, losartan 25mg/d (), spironolactone 12.5mg/d (PTA 25), statin  -Monitor BP trend    COPD  Chronic, stable.  -Continue inhalers    CKD-3  Baseline Cr 1.1-1.3.  -Monitor periodically    MAYITO  -CPAP with home settings    Depression  Chronic, stable.  -Continue lexapro    Hx bladder, prostate CA s/p TURBT, radiotherapy  Follows with [facility].    Total time spent with patient visit at the skilled nursing facility was 45 min including patient visit and review of past records.     Electronically signed by:  Ac Burns PA-C

## 2024-09-11 NOTE — PROGRESS NOTES
"Clinic Care Coordination Contact  Care Coordination Transition Communication    Clinical Data: Patient was hospitalized at [facility] from 09/08/2024 to 09/10/2024 with diagnosis of: \"Subarachnoid hemorrhage in the setting of DOAC use  Recurrent mechanical fall  Right shoulder pain due to complete tear of supraspinatus tendon, complete to near tear of subscapularis and moderate subscapularis muscle strain   Right breast lump   Permanent atrial fibrillation  Hypertension  Hyperlipidemia\".     Assessment: Patient has transitioned to TCU/ARU for short term rehabilitation:    [facility]   Transition Communication:  Notified facility of Primary Care- Care Coordination support via TCU hand-in e-mail.    Ambulatory Care Coordination to TCU Hand In Communication:     Name: Nixon More is a patient of Natalya Lewis MD at [facility] and I am the care coordinator.   CC Contact Information: Email: [email or web address]   Phone: [phone]   Follow up recommendations:   - Scheduled:   Appointments in Next Year            Sep 23, 2024 3:40 PM  (Arrive by 3:25 PM)  CT HEAD W/O CONTRAST with RSCCCT1  [facility] ([facility] ) [phone]     Sep 24, 2024 2:00 PM  Return Adult Neurosurg with Amilcar Andrews PA-C  [facility] [place] ([facility] ) [phone]     Dec 11, 2024 10:30 AM  LAB with RI LAB  [facility] ([facility] ) [phone]     Dec 18, 2024 10:30 AM  (Arrive by 10:15 AM)  Cystoscopy with Mark Pino MD, UB CYF  [facility] ([facility] [place] ) [phone]     Jul 29, 2025 10:30 AM  (Arrive by 10:10 AM)  Annual Wellness Visit with Natalya ALARCON" MD Joshua  [facility] ([facility] ) [phone]           - To be scheduled: Cardiology 2-3 weeks, Mammogram, Primary Care Provider within 14 days of TCU discharge.     I would like to collaborate with the TCU Care team during this patient's stay; please invite me to any care conferences.     Please feel free to contact me with questions or further collaboration in discharge planning.       Plan: Care Coordinator will await notification from facility staff informing of patient's discharge plans/needs. Care Coordinator will review chart and outreach to facility staff every 4 weeks and as needed.     Brandy Ambrocio, RN Care Coordinator  [facility] [place], [place], [place]  Email: [email or web address]  Phone: [phone]

## 2024-09-12 ENCOUNTER — MYC MEDICAL ADVICE (OUTPATIENT)
Dept: CARDIOLOGY | Facility: CLINIC | Age: 89
End: 2024-09-12
Payer: COMMERCIAL

## 2024-09-12 DIAGNOSIS — I77.9 AORTA DISORDER (H): Primary | ICD-10-CM

## 2024-09-12 LAB
GAMMA INTERFERON BACKGROUND BLD IA-ACNC: 0 IU/ML
M TB IFN-G BLD-IMP: NEGATIVE
M TB IFN-G CD4+ BCKGRND COR BLD-ACNC: 1.72 IU/ML
MITOGEN IGNF BCKGRD COR BLD-ACNC: 0.01 IU/ML
MITOGEN IGNF BCKGRD COR BLD-ACNC: 0.01 IU/ML
QUANTIFERON MITOGEN: 1.72 IU/ML
QUANTIFERON NIL TUBE: 0 IU/ML
QUANTIFERON TB1 TUBE: 0.01 IU/ML
QUANTIFERON TB2 TUBE: 0.01

## 2024-09-12 NOTE — TELEPHONE ENCOUNTER
Health Call Center    Phone Message    May a detailed message be left on voicemail: yes     Reason for Call: Other: Amber called back to schedule her Dad a post hospital visit. I was confused about the [facility] message. Generally if its a Urgent referral we schedule with the next available MD, but if its a post hospital visit we would tend to schedule with an ZULMA if their primary cardiologist is not available .  Please review and reach out to daughter Amber to schedule. Thank you     Action Taken: Other: cardiology    Travel Screening: Not Applicable  Thank you!  Specialty Access Center       Date of Service:

## 2024-09-13 ENCOUNTER — NURSE TRIAGE (OUTPATIENT)
Dept: CARDIOLOGY | Facility: CLINIC | Age: 89
End: 2024-09-13

## 2024-09-13 NOTE — TELEPHONE ENCOUNTER
"Spoke with patient's daughter Amber regarding patient's low blood pressures.  Amber currently has other [employer] messages in to Cardiology. She notes more on patient's medical history.   Amber states patient has been at a TCU for a week now. Recently discharged from hospital due to falls and head bleed. In hospital, patient had low blood pressures. Losartan was cut in half. Patient's spironolactone was held in hospital as his weight was up 10 lb. He is now back on it and weight is around 236 lb. Amber has current concern as the BP parameters at TCU are to hold medication with systolic <100. This morning patient's BP was 108/71. Patient took metoprolol this morning. Patient's systolic last night was 106. He took losartan last night. She notes the only changes with patient is that he is more SOB with some also occurring at rest. Amber states patient had an Echo at the hospital with right ventricular changes and NTproBNP was elevated which Amber thinks is contributing to low BP.   Amber states patient did not see Cardiology while hospitalized.   Advised Amber this will be routed to Dr. De La Paz' nurse for follow-up.  Reviewed ED precautions and she verblalized understanding.     1. BLOOD PRESSURE: \"What is your blood pressure?\" \"Did you take at least two measurements 5 minutes apart?\" This morning 108/71. 106 systolic last night.  2. ONSET: \"When did you take your blood pressure?\" Noted above.  3. HOW: \"How did you take your blood pressure?\" (e.g., visiting nurse, automatic home BP monitor) TCU center.  4. HISTORY: \"Do you have a history of low blood pressure?\" \"What is your blood pressure normally?\" Lower range per Epic readings.  5. MEDICINES: \"Are you taking any medicines for blood pressure?\" If Yes, ask: \"Have they been changed recently?\" Took metoprolol this AM, and losartan last night.  6. PULSE RATE: \"Do you know what your pulse rate is?\" Not provided.  7. OTHER SYMPTOMS: \"Have you been sick recently?\" \"Have you " "had a recent injury?\" Yes had falls and head injury. Hospitalized 9/8/24-9/10/24.  8. PREGNANCY: \"Is there any chance you are pregnant?\" \"When was your last menstrual period?\"      Additional Information    Negative: Systolic BP < 90 and feeling dizzy, lightheaded, or weak    Protocols used: Blood Pressure - Low-A-OH    "

## 2024-09-17 ENCOUNTER — DISCHARGE SUMMARY NURSING HOME (OUTPATIENT)
Dept: GERIATRICS | Facility: CLINIC | Age: 89
End: 2024-09-17
Payer: COMMERCIAL

## 2024-09-17 VITALS
WEIGHT: 230.4 LBS | HEART RATE: 79 BPM | TEMPERATURE: 97.3 F | OXYGEN SATURATION: 96 % | DIASTOLIC BLOOD PRESSURE: 69 MMHG | SYSTOLIC BLOOD PRESSURE: 120 MMHG | RESPIRATION RATE: 18 BRPM | BODY MASS INDEX: 34.92 KG/M2 | HEIGHT: 68 IN

## 2024-09-17 DIAGNOSIS — N63.10 MASS OF RIGHT BREAST, UNSPECIFIED QUADRANT: ICD-10-CM

## 2024-09-17 DIAGNOSIS — I48.92 ATRIAL FIBRILLATION AND FLUTTER (H): Primary | ICD-10-CM

## 2024-09-17 DIAGNOSIS — N18.30 STAGE 3 CHRONIC KIDNEY DISEASE, UNSPECIFIED WHETHER STAGE 3A OR 3B CKD (H): ICD-10-CM

## 2024-09-17 DIAGNOSIS — I48.19 PERSISTENT ATRIAL FIBRILLATION (H): ICD-10-CM

## 2024-09-17 DIAGNOSIS — E78.5 DYSLIPIDEMIA: ICD-10-CM

## 2024-09-17 DIAGNOSIS — I48.91 ATRIAL FIBRILLATION AND FLUTTER (H): Primary | ICD-10-CM

## 2024-09-17 DIAGNOSIS — R29.6 RECURRENT FALLS: ICD-10-CM

## 2024-09-17 DIAGNOSIS — I62.00 SUBDURAL HEMORRHAGE (H): ICD-10-CM

## 2024-09-17 DIAGNOSIS — G47.33 OSA (OBSTRUCTIVE SLEEP APNEA): ICD-10-CM

## 2024-09-17 DIAGNOSIS — I10 BENIGN ESSENTIAL HYPERTENSION: ICD-10-CM

## 2024-09-17 PROCEDURE — 99316 NF DSCHRG MGMT 30 MIN+: CPT | Performed by: PHYSICIAN ASSISTANT

## 2024-09-17 RX ORDER — LOSARTAN POTASSIUM 50 MG/1
50 TABLET ORAL DAILY
Qty: 30 TABLET | Refills: 1 | Status: SHIPPED | OUTPATIENT
Start: 2024-09-17

## 2024-09-17 RX ORDER — SPIRONOLACTONE 25 MG/1
12.5 TABLET ORAL DAILY
Qty: 15 TABLET | Refills: 1 | Status: SHIPPED | OUTPATIENT
Start: 2024-09-17

## 2024-09-17 NOTE — PROGRESS NOTES
[facility] GERIATRICS DISCHARGE SUMMARY  PATIENT'S NAME: Nixon More  YOB: 1935  MEDICAL RECORD NUMBER:  [id number]  Place of Service where encounter took place:  [facility] (TCU) [[id number]]    PRIMARY CARE PROVIDER AND CLINIC RESPONSIBLE AFTER TRANSFER:   Natalya Lewis MD, [address] [facility]  / [place] [address]    [facility]     Transferring providers: Ac Burns PA-C, Dr. Lexi MD  Recent Hospitalization/ED:  [facility] stay 9/8/24 to 9/10/24.  Date of SNF Admission:  9/10/24  Date of SNF (anticipated) Discharge:  9/18/24  Discharged to: previous independent home  Cognitive Scores: SLUMS: 26/30  Physical Function: Ambulating 250 ft with FWW  DME: SNF  coordinating DME needs     CODE STATUS/ADVANCE DIRECTIVES DISCUSSION:  Prior   ALLERGIES: Amoxicillin-pot clavulanate, Bactrim [sulfamethoxazole w/trimethoprim], Bee venom, Sulfa antibiotics, Celebrex [celecoxib], Lisinopril, Naproxen, and Penicillin g    NURSING FACILITY COURSE   Medication Changes/Rationale:   none    Summary of nursing facility stay:   88-year-old male with past medical history of A-fib on chronic anticoagulation with apixaban and recent mechanical fall 9/6 with findings of subarachnoid hemorrhage who was admitted at [facility] from 9/8 - 9/10, 2024 after presenting with recurrent falls.  Workup included a repeat CT head which indicated stable subarachnoid hemorrhage.  Remainder of workup was unremarkable.  He was noted to have low-normal blood pressures and antihypertensives adjusted.  Also reported ongoing right shoulder pain, MRI indicated rotator cuff injury.  Manage conservatively under care of orthopedics.  Following evaluations by therapies, referred to TCU for ongoing rehab and medical management.     Pt is seen today for discharge planning. Sitting up in chair, finishing a session with OT. Feels well. Has been progressing steadily  in therapies, is now upgraded to a green tag for ambulation. Has been cleared to return to prior independent home. No lightheadedness, dizziness. Does admit to intermittent shortness of breath which can occur at rest and with activity, does not appear to have precipitating factor. No chest pain, diaphoresis. Has an appointment scheduled with cardiology. No further questions prior to discharge. The following were managed during TCU stay:    SAH  Recurrent fall  Physical deconditioning  Impaired mobility and ADLs  Initially sustained fall 9/6 with findings of SAH in setting of apixaban use. Repeat imaging stable, pt discharged after instructed to hold apixaban x2 weeks. Recurrent falls prompted repeat hospitalization, imaging stable.  -Holding apixaban until follow-up with neurosurgery  -Supportive management  -PT/OT continuing  -Follow-up with neurosurgery with repeat imaging as scheduled     Right shoulder pain  MRI with finding of complete tear of the supraspinatus tendon, complete or near complete tear of the subscapularis and moderate subscapularis muscle strain. Seen by orthopedics, nonop mgmt.  -WBAT to RUE, ROM as tolerates  -Follow-up with ortho in 1-2 weeks     Right breast lump  -Outpatient breast US ordered per PCP, to be performed following TCU stay     Permanent afib  HRs controlled.  -Continues on metoprolol 100mg/d  -Holding apixaban as above     HTN / HLD  Chronic. Regimen reduced dt soft-normal bps. BPs ranging 101-127 with decreased regimen at TCU.  -Continue metoprolol, losartan 50mg/d (), spironolactone 12.5mg/d (PTA 25), statin     COPD  Chronic, stable.  -Continue inhalers     CKD-3  Baseline Cr 1.1-1.3. stable.     MAYITO  -CPAP with home settings     Depression  Chronic, stable.  -Continue lexapro     Hx bladder, prostate CA s/p TURBT, radiotherapy  Follows with  Urology.    Discharge Medications:  MED REC REQUIRED  Post Medication Reconciliation Status: medication reconcilation  previously completed during another office visit     Current Outpatient Medications   Medication Sig Dispense Refill    losartan (COZAAR) 50 MG tablet Take 1 tablet (50 mg) by mouth daily. 30 tablet 1    spironolactone (ALDACTONE) 25 MG tablet Take 0.5 tablets (12.5 mg) by mouth daily. 15 tablet 1    albuterol (PROAIR HFA/PROVENTIL HFA/VENTOLIN HFA) 108 (90 Base) MCG/ACT inhaler Inhale 2 puffs into the lungs every 6 hours as needed for shortness of breath, wheezing or cough 8 g 2    atorvastatin (LIPITOR) 40 MG tablet Take 1 tablet (40 mg) by mouth daily 90 tablet 3    Cholecalciferol (VITAMIN D-3) 25 MCG (1000 UT) CAPS Take 1 capsule by mouth daily.      escitalopram (LEXAPRO) 10 MG tablet Take 1 tablet (10 mg) by mouth daily 90 tablet 3    loperamide (IMODIUM) 2 MG capsule Take 2 mg by mouth 2 times daily as needed for diarrhea.      metoprolol succinate ER (TOPROL XL) 50 MG 24 hr tablet Take 100 mg by mouth daily.      RISEdronate (ACTONEL) 35 MG tablet Take 1 tablet (35 mg) by mouth every 7 days 12 tablet 4    senna-docusate (SENOKOT-S/PERICOLACE) 8.6-50 MG tablet Take 1 tablet by mouth 2 times daily as needed for constipation.      umeclidinium-vilanterol (ANORO ELLIPTA) 62.5-25 MCG/ACT oral inhaler Inhale 1 puff into the lungs daily 1 each 11        Controlled medications:   not applicable/none     Past Medical History:   Past Medical History:   Diagnosis Date    (HFpEF) heart failure with preserved ejection fraction (H)     AAA (abdominal aortic aneurysm) (H24)     Actinic keratosis     Angina pectoris (H24) 10/27/2020    Antiplatelet or antithrombotic long-term use     Eliquis    Arthritis     Atrial fibrillation and flutter (H) 12/03/2018    Bladder cancer (H)     CAD (coronary artery disease)     1/5/2011 lateral ischemia on stress echo, 12/2014 normal stress echo    CKD (chronic kidney disease) stage 2, GFR 60-89 ml/min     Depressive disorder     Mild    Diarrhea     Urgency at times    Dyslipidemia      "Emphysema of lung (H)     Hernia, abdominal     HTN (hypertension)     Hypertrophy (benign) of prostate 05/2001    Biopsy 5/01 negative for cancer; PSA 5    Lumbago     Mumps     Prostate cancer (H) 12/15/2006    Skin cancer, basal cell 1997    Sleep apnea     Uses a Bi-pap for centralized Apnea    Spider veins      Physical Exam:   Vitals: /69   Pulse 79   Temp 97.3  F (36.3  C)   Resp 18   Ht 1.727 m (5' 8\")   Wt 104.5 kg (230 lb 6.4 oz)   SpO2 96%   BMI 35.03 kg/m    BMI: Body mass index is 35.03 kg/m .  GEN: well-developed, well-nourished, appears comfortable  HEENT: NCAT, EOM intact bilaterally, sclera clear, conjunctiva normal, nose & mouth patent, mucous membranes moist  CHEST: lungs CTA bilaterally, no increased work of breathing, no wheeze, crackles, rhonchi  HEART: RRR, S1 & S2  ABD: soft, nontender, nondistended, no guarding or rigidity, +BS in all 4 quadrants  MSK: AROM bilateral UE/LE, pedal & radial pulses 2+ bilaterally  NEURO: awake, alert, oriented to name, place, and time. CN II-XII grossly intact. Sensation grossly intact to light touch.   SKIN: warm & dry without rash, no pedal edema    SNF labs: Recent labs in [facility] reviewed by me today.  and Most Recent 3 CBC's:  Recent Labs   Lab Test 09/09/24  0642 09/08/24  0732 09/08/24  0049   WBC 6.4 8.5 8.6   HGB 12.6* 13.1* 13.3   MCV 93 94 92   * 161 159     Most Recent 3 BMP's:  Recent Labs   Lab Test 09/09/24  0642 09/08/24  0732 09/08/24  0049    138 137   POTASSIUM 4.3 4.4 4.2   CHLORIDE 107 105 105   CO2 22 21* 20*   BUN 21.0 15.7 17.4   CR 1.19* 1.12 1.13   ANIONGAP 9 12 12   MARLENE 8.9 8.8 8.9   * 129* 152*       DISCHARGE PLAN:  Follow up labs: No labs orders/due  Medical Follow Up:      Follow up with primary care provider in 1-2 weeks  Follow up with specialist neurosurgery, cardiology as scheduled   Current [place] scheduled appointments:     Discharge Services: Home Care:  Occupational Therapy, Physical " Therapy, Registered Nurse, and Home Health Aide  Discharge Instructions Verbalized to Patient at Discharge:   None    TOTAL DISCHARGE TIME:   Greater than 30min  Electronically signed by:  Ac Burns PA-C     Documentation of Face to Face and Certification for Home Health Services    I certify that services are/were furnished while this patient was under the care of a physician and that a physician or an allowed non-physician practitioner (NPP), had a face-to-face encounter that meets the physician face-to-face encounter requirements. The encounter was in whole, or in part, related to the primary reason for home health. The patient is confined to his/her home and needs intermittent skilled nursing, physical therapy, speech-language pathology, or the continued need for occupational therapy. A plan of care has been established by a physician and is periodically reviewed by a physician.  Date of Face-to-Face Encounter: 9/17/2024.    I certify that, based on my findings, the following services are medically necessary home health services: Nursing, Occupational Therapy, and Physical Therapy.    My clinical findings support the need for the above skilled services because: Requires assistance of another person or specialized equipment to access medical services because patient: Range of motion limitations prevents ability to exit home safely...    Patient to re-establish plan of care with their PCP within 7-10 days after leaving the facility to reestablish care.  Medicare certified [place] provider: Ac Burns PA-C  Date: September 17, 2024

## 2024-09-19 ENCOUNTER — PATIENT OUTREACH (OUTPATIENT)
Dept: CARE COORDINATION | Facility: CLINIC | Age: 89
End: 2024-09-19
Payer: COMMERCIAL

## 2024-09-19 DIAGNOSIS — Z09 HOSPITAL DISCHARGE FOLLOW-UP: ICD-10-CM

## 2024-09-19 NOTE — PROGRESS NOTES
"Clinic Care Coordination Contact  Transitions of Care Outreach  Chief Complaint   Patient presents with    Clinic Care Coordination - Homecare/TCU    Clinic Care Coordination - Initial    Clinic Care Coordination - Post Hospital       Most Recent Admission Date: 9/8/2024   Most Recent Admission Diagnosis: Subdural hemorrhage (H) - I62.00  Weakness - R53.1  Near syncope - R55  Strain of right shoulder, subsequent encounter - S46.911D   Most Recent Discharge Date: 9/10/2024   Most Recent Discharge Diagnosis: Weakness - R53.1  Near syncope - R55  Subdural hemorrhage (H) - I62.00  Strain of right shoulder, subsequent encounter - S46.911D  Dyslipidemia - E78.5  Constipation, unspecified constipation type - K59.00  Persistent atrial fibrillation (H) - I48.19  Benign essential hypertension - I10  Mass of right breast, unspecified quadrant - N63.10     Patient discharged from TCU on 09/18/2024 home with home care therapies.     Transitions of Care Assessment  Discharge Assessment  How are you doing now that you are home?: \"I'm doing okay, being back home feels pretty routine. Daughter is actively involved in assisting with discharge follow up plan\"  How are your symptoms? (Red Flag symptoms escalate to triage hotline per guidelines): Improved  Do you know how to contact your clinic care team if you have future questions or changes to your health status? : Yes  Does the patient have their discharge instructions? : Yes  Does the patient have questions regarding their discharge instructions? : No  Were you started on any new medications or were there changes to any of your previous medications? : Yes  Does the patient have all of their medications?: Yes  Do you have questions regarding any of your medications? : No  Do you have all of your needed medical supplies or equipment (DME)?  (i.e. oxygen tank, CPAP, cane, etc.): Yes         Post-op (Clinicians Only)  Did the patient have surgery or a procedure: No    Follow up Plan "   Discharge Follow-Up  Discharge follow up appointment scheduled in alignment with recommended follow up timeframe or Transitions of Risk Category? (Low = within 30 days; Moderate= within 14 days; High= within 7 days): Yes  Discharge Follow Up Appointment Date: 10/01/24  Discharge Follow Up Appointment Scheduled with?: Primary Care Provider    Future Appointments   Date Time Provider Department Center   9/23/2024  3:40 PM RSCCCT1 RHSCCT RSCC   9/24/2024 12:30 PM RHBCMAD1 RHWIC [facility] BC BVIL   9/24/2024  1:00 PM RHBCUS1 RHBCUS [facility] BC BVIL   9/24/2024  2:00 PM Amilcar Andrews PA-C RHSBRS RSCC   9/30/2024  9:30 AM [facility] Bañuelos APRN [facility]   10/1/2024  5:30 PM Natalya Lewis MD EAFP EA   12/11/2024 10:30 AM RI LAB RILABR RI   12/18/2024 10:30 AM Mark Pino MD UBURO UB PHY BURNS   7/29/2025 10:30 AM Natalya Lewis MD EAFP EA     Outpatient Plan as outlined on AVS reviewed with patient.    For any urgent concerns, please contact our 24 hour nurse triage line: [phone] ([phone])       Brandy Ambrocio RN

## 2024-09-23 ENCOUNTER — HOSPITAL ENCOUNTER (OUTPATIENT)
Dept: CT IMAGING | Facility: CLINIC | Age: 89
Discharge: HOME | End: 2024-09-23
Attending: PHYSICIAN ASSISTANT | Admitting: PHYSICIAN ASSISTANT
Payer: COMMERCIAL

## 2024-09-23 ENCOUNTER — TELEPHONE (OUTPATIENT)
Dept: PHARMACY | Facility: OTHER | Age: 89
End: 2024-09-23
Payer: COMMERCIAL

## 2024-09-23 DIAGNOSIS — I60.9 SAH (SUBARACHNOID HEMORRHAGE) (H): ICD-10-CM

## 2024-09-23 PROCEDURE — 70450 CT HEAD/BRAIN W/O DYE: CPT

## 2024-09-23 NOTE — TELEPHONE ENCOUNTER
MTM referral from: Transitions of Care (recent hospital discharge or ED visit)    MTM referral outreach attempt #1 on September 23, 2024 at 8:45 AM      Outcome: Spoke with patient declined    Use Aletha Dunn medicare part D map, TCU 9/18 for the carrier/Plan on the flowsheet          Bindu Mccabe [facility]  -[facility]  [phone]

## 2024-09-24 ENCOUNTER — OFFICE VISIT (OUTPATIENT)
Dept: NEUROSURGERY | Facility: CLINIC | Age: 89
End: 2024-09-24
Attending: PHYSICIAN ASSISTANT
Payer: COMMERCIAL

## 2024-09-24 ENCOUNTER — HOSPITAL ENCOUNTER (OUTPATIENT)
Dept: MAMMOGRAPHY | Facility: CLINIC | Age: 89
Discharge: HOME | End: 2024-09-24
Attending: INTERNAL MEDICINE
Payer: COMMERCIAL

## 2024-09-24 VITALS
OXYGEN SATURATION: 97 % | DIASTOLIC BLOOD PRESSURE: 81 MMHG | HEART RATE: 75 BPM | SYSTOLIC BLOOD PRESSURE: 129 MMHG | BODY MASS INDEX: 34.86 KG/M2 | WEIGHT: 230 LBS | HEIGHT: 68 IN

## 2024-09-24 DIAGNOSIS — N64.9 DISORDER OF BREAST, UNSPECIFIED: ICD-10-CM

## 2024-09-24 DIAGNOSIS — I60.9 SAH (SUBARACHNOID HEMORRHAGE) (H): Primary | ICD-10-CM

## 2024-09-24 DIAGNOSIS — N63.10 MASS OF RIGHT BREAST, UNSPECIFIED QUADRANT: ICD-10-CM

## 2024-09-24 PROCEDURE — 99213 OFFICE O/P EST LOW 20 MIN: CPT | Performed by: PHYSICIAN ASSISTANT

## 2024-09-24 PROCEDURE — G0463 HOSPITAL OUTPT CLINIC VISIT: HCPCS | Performed by: PHYSICIAN ASSISTANT

## 2024-09-24 PROCEDURE — 77066 DX MAMMO INCL CAD BI: CPT

## 2024-09-24 ASSESSMENT — PAIN SCALES - GENERAL: PAINLEVEL: NO PAIN (0)

## 2024-09-24 NOTE — PROGRESS NOTES
NEUROSURGERY CLINIC PROGRESS NOTE    DATE OF VISIT: 9/24/2024    HPI:     Nixon More is a pleasant 89 year old male who presents to the clinic today for a two-week post-operative follow-up visit. On 09/06/2024 the patient fell resulting a subarachnoid hemorrhage in the left temporoparietal sulci. He was anticoagulated on Eliquis due to a-fib.      Today, the patient reports that overall he is doing well as has no concerns.     Current Outpatient Medications   Medication Sig Dispense Refill    albuterol (PROAIR HFA/PROVENTIL HFA/VENTOLIN HFA) 108 (90 Base) MCG/ACT inhaler Inhale 2 puffs into the lungs every 6 hours as needed for shortness of breath, wheezing or cough 8 g 2    atorvastatin (LIPITOR) 40 MG tablet Take 1 tablet (40 mg) by mouth daily 90 tablet 3    Cholecalciferol (VITAMIN D-3) 25 MCG (1000 UT) CAPS Take 1 capsule by mouth daily.      escitalopram (LEXAPRO) 10 MG tablet Take 1 tablet (10 mg) by mouth daily 90 tablet 3    loperamide (IMODIUM) 2 MG capsule Take 2 mg by mouth 2 times daily as needed for diarrhea.      losartan (COZAAR) 50 MG tablet Take 1 tablet (50 mg) by mouth daily. 30 tablet 1    metoprolol succinate ER (TOPROL XL) 50 MG 24 hr tablet Take 100 mg by mouth daily.      RISEdronate (ACTONEL) 35 MG tablet Take 1 tablet (35 mg) by mouth every 7 days 12 tablet 4    spironolactone (ALDACTONE) 25 MG tablet Take 0.5 tablets (12.5 mg) by mouth daily. 15 tablet 1    umeclidinium-vilanterol (ANORO ELLIPTA) 62.5-25 MCG/ACT oral inhaler Inhale 1 puff into the lungs daily 1 each 11     No current facility-administered medications for this visit.       Allergies   Allergen Reactions    Amoxicillin-Pot Clavulanate Rash     Type III hypersensitivity    Bactrim [Sulfamethoxazole W/Trimethoprim] Rash     Serum sickness, type III hypersensitivity      Bee Venom Itching    Sulfa Antibiotics Hives    Celebrex [Celecoxib]     Lisinopril Cough    Naproxen Hives    Penicillin G        Past Medical History:  "  Diagnosis Date    (HFpEF) heart failure with preserved ejection fraction (H)     AAA (abdominal aortic aneurysm) (H24)     Actinic keratosis     Angina pectoris (H24) 10/27/2020    Antiplatelet or antithrombotic long-term use     Eliquis    Arthritis     Atrial fibrillation and flutter (H) 12/03/2018    Bladder cancer (H)     CAD (coronary artery disease)     1/5/2011 lateral ischemia on stress echo, 12/2014 normal stress echo    CKD (chronic kidney disease) stage 2, GFR 60-89 ml/min     Depressive disorder     Mild    Diarrhea     Urgency at times    Dyslipidemia     Emphysema of lung (H)     Hernia, abdominal     HTN (hypertension)     Hypertrophy (benign) of prostate 05/2001    Biopsy 5/01 negative for cancer; PSA 5    Lumbago     Mumps     Prostate cancer (H) 12/15/2006    Skin cancer, basal cell 1997    Sleep apnea     Uses a Bi-pap for centralized Apnea    Spider veins        Review Of Systems    Skin: negative  Eyes: negative  Ears/Nose/Throat: negative  Respiratory: No shortness of breath, dyspnea on exertion, cough, or hemoptysis  Cardiovascular: negative  Gastrointestinal: negative  Musculoskeletal: negative  Neurologic: negative  Psychiatric: negative  Hematologic/Lymphatic/Immunologic: negative  Endocrine: negative    OBJECTIVE:    /81   Pulse 75   Ht 1.715 m (5' 7.5\")   Wt 104.3 kg (230 lb)   SpO2 97%   BMI 35.49 kg/m      Imaging:    CT SCAN OF THE HEAD WITHOUT CONTRAST   9/23/2024 3:35 PM      HISTORY: SAH (subarachnoid hemorrhage).     TECHNIQUE:  Axial images of the head and coronal reformations without  IV contrast material. Radiation dose for this scan was reduced using  automated exposure control, adjustment of the mA and/or kV according  to patient size, or iterative reconstruction technique.     COMPARISON: CT of the head 9/18/2024.     FINDINGS: The previously demonstrated small volume subarachnoid  hemorrhage along the left frontotemporal convexity/sylvian fissure  region no " longer evident. No new intracranial hemorrhage, extra-axial  fluid collection, or mass effect. Mild generalized brain parenchymal  volume loss, as before. Mild scattered patchy nonspecific  hypoattenuation in the cerebral white matter, as before, likely  representing chronic small vessel ischemic disease. The ventricles  appear stable in size and configuration. Scattered intracranial  atherosclerotic calcifications.     Bilateral lens implants. Tiny probable retention cyst or polyp right  maxillary sinus with otherwise clear paranasal sinuses. Mastoid and  middle ear cavities are clear. The calvarium appears intact.                                                                 IMPRESSION:  1. Previously demonstrated small volume subarachnoid hemorrhage along  the left frontotemporal convexity/sylvian fissure region no longer  evident. No new intracranial hemorrhage.  2. No CT findings of acute intracranial process.     BRAYAN PHILLIP MD     Radiographic Findings: Full radiological report in chart. I personally reviewed the images with the patient today.    Exam:    He appears comfortable and in no apparent distress, moving all extremities.   CN II-XII intact, alert and appropriate with conversation and following commands.   Able to name 3/3 objects.   Finger to nose slow and accurate.   Rapid hand movements intact.   Absent for upper and lower extremity drift.   Bilateral upper and lower extremities 5/5. DTR's wnl. Sensation intact throughout. Normal ROM.   Calves soft and non-tender.      ASSESSMENT:    1. SAH (subarachnoid hemorrhage) (H)        PLAN:    Nixon More is two weeks out from a fall resulting a subarachnoid hemorrhage in the left temporoparietal sulci. Today the patient  reports reports that overall he is doing well as has no concerns.     Imaging was reviewed today and show the previously demonstrated small volume subarachnoid hemorrhage along the left frontotemporal convexity/sylvian fissure  region is no longer evident.   There is no new intracranial hemorrhage or any CT findings of an acute intracranial process.    The patient has remained compliant with the restrictions which included not lifting anything greater than 5-10 lbs. Today we discussed increasing activity from  10 lbs by 2-5 lbs per week, but encouraged continuing to avoid excessive jostling and jarring activities.     A referral was placed to physical therapy for additional strengthening exercises.    Cardiology or PCP to determine when to initiate anticoagulation. No that the head CT does not exhibit any concerning pathology, from a neurosurgical perspective, if the anticoagulation must be resumed they may do so any any time. We suggest that a repeat head CT be obtained 24-48 hours after starting the Eliquis.      His daughter, a nurse practitioner, was on the phone during the evaluation. Both she and Mr. More gave verbal understanding and are in agreement with the above plan. They will call or return to the clinic for any worsening or changes in symptoms.      Respectfully,     VIRA Ames PA-C

## 2024-09-24 NOTE — LETTER
9/24/2024      Nixon More  [address] [place] [address] [address]  [facility] [address]      Dear Colleague,    Thank you for referring your patient, Nixon More, to the [facility] [place]. Please see a copy of my visit note below.    NEUROSURGERY CLINIC PROGRESS NOTE    DATE OF VISIT: 9/24/2024    HPI:     Nixon More is a pleasant 89 year old male who presents to the clinic today for a two-week post-operative follow-up visit. On 09/06/2024 the patient fell resulting a subarachnoid hemorrhage in the left temporoparietal sulci. He was anticoagulated on Eliquis due to a-fib.      Today, the patient reports that overall he is doing well as has no concerns.     Current Outpatient Medications   Medication Sig Dispense Refill     albuterol (PROAIR HFA/PROVENTIL HFA/VENTOLIN HFA) 108 (90 Base) MCG/ACT inhaler Inhale 2 puffs into the lungs every 6 hours as needed for shortness of breath, wheezing or cough 8 g 2     atorvastatin (LIPITOR) 40 MG tablet Take 1 tablet (40 mg) by mouth daily 90 tablet 3     Cholecalciferol (VITAMIN D-3) 25 MCG (1000 UT) CAPS Take 1 capsule by mouth daily.       escitalopram (LEXAPRO) 10 MG tablet Take 1 tablet (10 mg) by mouth daily 90 tablet 3     loperamide (IMODIUM) 2 MG capsule Take 2 mg by mouth 2 times daily as needed for diarrhea.       losartan (COZAAR) 50 MG tablet Take 1 tablet (50 mg) by mouth daily. 30 tablet 1     metoprolol succinate ER (TOPROL XL) 50 MG 24 hr tablet Take 100 mg by mouth daily.       RISEdronate (ACTONEL) 35 MG tablet Take 1 tablet (35 mg) by mouth every 7 days 12 tablet 4     spironolactone (ALDACTONE) 25 MG tablet Take 0.5 tablets (12.5 mg) by mouth daily. 15 tablet 1     umeclidinium-vilanterol (ANORO ELLIPTA) 62.5-25 MCG/ACT oral inhaler Inhale 1 puff into the lungs daily 1 each 11     No current facility-administered medications for this visit.       Allergies   Allergen Reactions     Amoxicillin-Pot Clavulanate Rash     " Type III hypersensitivity     Bactrim [Sulfamethoxazole W/Trimethoprim] Rash     Serum sickness, type III hypersensitivity       Bee Venom Itching     Sulfa Antibiotics Hives     Celebrex [Celecoxib]      Lisinopril Cough     Naproxen Hives     Penicillin G        Past Medical History:   Diagnosis Date     (HFpEF) heart failure with preserved ejection fraction (H)      AAA (abdominal aortic aneurysm) (H24)      Actinic keratosis      Angina pectoris (H24) 10/27/2020     Antiplatelet or antithrombotic long-term use     Eliquis     Arthritis      Atrial fibrillation and flutter (H) 12/03/2018     Bladder cancer (H)      CAD (coronary artery disease)     1/5/2011 lateral ischemia on stress echo, 12/2014 normal stress echo     CKD (chronic kidney disease) stage 2, GFR 60-89 ml/min      Depressive disorder     Mild     Diarrhea     Urgency at times     Dyslipidemia      Emphysema of lung (H)      Hernia, abdominal      HTN (hypertension)      Hypertrophy (benign) of prostate 05/2001    Biopsy 5/01 negative for cancer; PSA 5     Lumbago      Mumps      Prostate cancer (H) 12/15/2006     Skin cancer, basal cell 1997     Sleep apnea     Uses a Bi-pap for centralized Apnea     Spider veins        Review Of Systems    Skin: negative  Eyes: negative  Ears/Nose/Throat: negative  Respiratory: No shortness of breath, dyspnea on exertion, cough, or hemoptysis  Cardiovascular: negative  Gastrointestinal: negative  Musculoskeletal: negative  Neurologic: negative  Psychiatric: negative  Hematologic/Lymphatic/Immunologic: negative  Endocrine: negative    OBJECTIVE:    /81   Pulse 75   Ht 1.715 m (5' 7.5\")   Wt 104.3 kg (230 lb)   SpO2 97%   BMI 35.49 kg/m      Imaging:    CT SCAN OF THE HEAD WITHOUT CONTRAST   9/23/2024 3:35 PM      HISTORY: SAH (subarachnoid hemorrhage).     TECHNIQUE:  Axial images of the head and coronal reformations without  IV contrast material. Radiation dose for this scan was reduced " using  automated exposure control, adjustment of the mA and/or kV according  to patient size, or iterative reconstruction technique.     COMPARISON: CT of the head 9/18/2024.     FINDINGS: The previously demonstrated small volume subarachnoid  hemorrhage along the left frontotemporal convexity/sylvian fissure  region no longer evident. No new intracranial hemorrhage, extra-axial  fluid collection, or mass effect. Mild generalized brain parenchymal  volume loss, as before. Mild scattered patchy nonspecific  hypoattenuation in the cerebral white matter, as before, likely  representing chronic small vessel ischemic disease. The ventricles  appear stable in size and configuration. Scattered intracranial  atherosclerotic calcifications.     Bilateral lens implants. Tiny probable retention cyst or polyp right  maxillary sinus with otherwise clear paranasal sinuses. Mastoid and  middle ear cavities are clear. The calvarium appears intact.                                                                 IMPRESSION:  1. Previously demonstrated small volume subarachnoid hemorrhage along  the left frontotemporal convexity/sylvian fissure region no longer  evident. No new intracranial hemorrhage.  2. No CT findings of acute intracranial process.     BRAYAN PHILLIP MD     Radiographic Findings: Full radiological report in chart. I personally reviewed the images with the patient today.    Exam:    He appears comfortable and in no apparent distress, moving all extremities.   CN II-XII intact, alert and appropriate with conversation and following commands.   Able to name 3/3 objects.   Finger to nose slow and accurate.   Rapid hand movements intact.   Absent for upper and lower extremity drift.   Bilateral upper and lower extremities 5/5. DTR's wnl. Sensation intact throughout. Normal ROM.   Calves soft and non-tender.      ASSESSMENT:    1. SAH (subarachnoid hemorrhage) (H)        PLAN:    Nixon More is two weeks out from a  fall resulting a subarachnoid hemorrhage in the left temporoparietal sulci. Today the patient  reports reports that overall he is doing well as has no concerns.     Imaging was reviewed today and show the previously demonstrated small volume subarachnoid hemorrhage along the left frontotemporal convexity/sylvian fissure region is no longer evident.   There is no new intracranial hemorrhage or any CT findings of an acute intracranial process.    The patient has remained compliant with the restrictions which included not lifting anything greater than 5-10 lbs. Today we discussed increasing activity from  10 lbs by 2-5 lbs per week, but encouraged continuing to avoid excessive jostling and jarring activities.     A referral was placed to physical therapy for additional strengthening exercises.    Cardiology or PCP to determine when to initiate anticoagulation. No that the head CT does not exhibit any concerning pathology, from a neurosurgical perspective, if the anticoagulation must be resumed they may do so any any time. We suggest that a repeat head CT be obtained 24-48 hours after starting the Eliquis.      His daughter, a nurse practitioner, was on the phone during the evaluation. Both she and Mr. More gave verbal understanding and are in agreement with the above plan. They will call or return to the clinic for any worsening or changes in symptoms.      Respectfully,     VIRA Ames PA-C      Again, thank you for allowing me to participate in the care of your patient.        Sincerely,        Amilcar Andrews PA-C

## 2024-09-25 ENCOUNTER — MYC MEDICAL ADVICE (OUTPATIENT)
Dept: PEDIATRICS | Facility: CLINIC | Age: 89
End: 2024-09-25
Payer: COMMERCIAL

## 2024-09-25 NOTE — TELEPHONE ENCOUNTER
Disp Refills Start End CARLOTTA   losartan (COZAAR) 100 MG tablet (Discontinued) 90 tablet 3 7/23/2024 9/10/2024 No   Sig - Route: Take 1 tablet (100 mg) by mouth daily - Oral   Sent to pharmacy as: Losartan Potassium 100 MG Oral Tablet (COZAAR)   Class: E-Prescribe

## 2024-09-25 NOTE — PROGRESS NOTES
HISTORY OF PRESENT ILLNESS:    This is a 89 year old male who follows with Dr De La Paz at [facility]  His past medical history includes:  Atrial Flutter/fibrillation, chronic HFpEF, COPD, ascending aorta dilatation, coronary artery disease, hypertension, sleep apnea, hyperlipidemia, venous insufficiency, remote heavy smoker, prostate cancer s/p radiation, papillary bladder tumor with complete resection, AAA, CKD    Mr More has chronic dyspnea on exertion and prior PFTs showing some restriction and muscular weakness  A stress echo (2011) was abnormal, but he has been treated medically due to lack of anginal symptoms  A repeat stress test (2014) was negative for ischemia     He developed A-fib and atypical atrial flutter in 2018 and was started on anticoagulation  He has been rate controlled due to lack of symptoms     He has complex sleep apnea and uses ASV  therapy    ECHO (2022) LVEF 65-70%, normal RV function, mod-severe left atrial enlargement, mild-moderate pulmonary hypertension, 1+ TR, ascending aorta  4.5, aortic root 4.4 cm    Holter monitor (2022) showed persistent A-fib with average HR 68 bpm, ranging from  bpm  Multiple pauses up to 4.7 seconds in the early am    ECHO (8/2023) showed preserved biventricular function  There was a 2 cm mass noted in the apical portion of the right ventricle ascending aorta 4.5 cm  Subsequent cMRI showed epicardial fat adjacent to the RV apex    He has a tendency for hypotension at times with increased diuretics  His Spironolactone was increased earlier this year for mild heart failure symptoms  ECHO (8/15/24) showed LVEF 60%, normal RV function, severe left atrial enlargement, no significant valvular pathology, mild pulmonary hypertension, ascending aorta 4.5 cm    He was hospitalized (9/8/24) after suffering two falls with resultant shoulder injury and subarachnoid hemorrhage These were felt to be mechanical falls  His Eliquis was discontinued until  cleared by neurosurgery  He did not appear to be in acute heart failure and his orthostatic BPs were negative  However, his Losartan dose was lowered   ECHO showed LVEF 60%, normal RV function, mod-severe RV enlargement (new), no significant valvular pathology, ascending aorta 4.5 cm  His shoulder was conservatively managed and outpatient follow up was arranged  He was discharged to TCU    Our visit today is for further review    Mr More comes in with his daughter who is a retired nurse practitioner who is attentive to his care  He denies any chest pain or significant shortness of breath  He has been home from the hospital now for 2 wks and is back to his usual routine He has not had any further falls and progressed with PT/OT  He started on an inhaler recently for underlying COPD  He denies palpitations, lightheadedness or peripheral edema  We talked about avoiding salty foods  He checks his BP daily and I reviewed some of these recordings  His SBP stays around 120 mmHg        VITAL SIGNS:  BP: 119/71  Pulse:  93  Weight:  234 lbs  (BMI: 36)        IMPRESSION AND PLAN:    Recurrent Mechanical Fall s/p subarachnoid hemorrhage (9/7/24)  -off anticoagulation, but recent imaging showed resolution of SAH    -has follow up with PMD tomorrow to consider restarting Eliquis    Permanent Atrial Fibrillation/Flutter  -HRs controlled Metoprolol  mg  - Eliquis on hold  (CHADS2 Vasc score: 5)    Hypertension:  -on Losartan 50 mg, Metoprolol  mg, Spironolactone 12.5 mg  -BP controlled    Chronic HFpEF  LVEF 55%, normal RV function with mod-severe RV enlargement  -no significant signs or symptoms of heart failure  -encouraged low salt diet  -on Spironolactone 12.5 mg  -weight stable  -open to starting Jardiance in near future  Will check price and consider starting this in a few weeks    Ascending Aorta Dilatation  -4.5 cm (stable)  -consider repeating ECHO in 6-9 months    Complex Sleep Apnea  -using ASV  therapy    COPD  -will assess PFT    The total time for the visit today was 30 minutes which includes patient visit, reviewing of records, discussion, and placing of orders of the outpatient coordination of cardiovascular care as described.  The level of medical decision making during this visit was of moderate complexity.  Thank you for allowing me to participate in their care.    The longitudinal plan of care for the diagnosis(es)/condition(s) as documented were addressed during this visit. Due to the added complexity in care, I will continue to support Spencer in the subsequent management and with ongoing continuity of care.        Orders Placed This Encounter   Procedures    Follow-Up with Cardiology    General PFT Lab (Please always keep checked)    Pulmonary Function Test       No orders of the defined types were placed in this encounter.      There are no discontinued medications.      Encounter Diagnoses   Name Primary?    Aorta disorder (H)     Chronic diastolic heart failure (H) Yes       CURRENT MEDICATIONS:  Current Outpatient Medications   Medication Sig Dispense Refill    albuterol (PROAIR HFA/PROVENTIL HFA/VENTOLIN HFA) 108 (90 Base) MCG/ACT inhaler Inhale 2 puffs into the lungs every 6 hours as needed for shortness of breath, wheezing or cough 8 g 2    atorvastatin (LIPITOR) 40 MG tablet Take 1 tablet (40 mg) by mouth daily 90 tablet 3    Cholecalciferol (VITAMIN D-3) 25 MCG (1000 UT) CAPS Take 1 capsule by mouth daily.      escitalopram (LEXAPRO) 10 MG tablet Take 1 tablet (10 mg) by mouth daily 90 tablet 3    loperamide (IMODIUM) 2 MG capsule Take 2 mg by mouth 2 times daily as needed for diarrhea.      losartan (COZAAR) 50 MG tablet Take 1 tablet (50 mg) by mouth daily. 30 tablet 1    metoprolol succinate ER (TOPROL XL) 50 MG 24 hr tablet Take 100 mg by mouth daily.      RISEdronate (ACTONEL) 35 MG tablet Take 1 tablet (35 mg) by mouth every 7 days 12 tablet 4    spironolactone (ALDACTONE) 25 MG  tablet Take 0.5 tablets (12.5 mg) by mouth daily. 15 tablet 1    umeclidinium-vilanterol (ANORO ELLIPTA) 62.5-25 MCG/ACT oral inhaler Inhale 1 puff into the lungs daily 1 each 11       ALLERGIES     Allergies   Allergen Reactions    Amoxicillin-Pot Clavulanate Rash     Type III hypersensitivity    Bactrim [Sulfamethoxazole W/Trimethoprim] Rash     Serum sickness, type III hypersensitivity      Bee Venom Itching    Sulfa Antibiotics Hives    Celebrex [Celecoxib]     Lisinopril Cough    Naproxen Hives    Penicillin G        PAST MEDICAL HISTORY:  Past Medical History:   Diagnosis Date    (HFpEF) heart failure with preserved ejection fraction (H)     AAA (abdominal aortic aneurysm) (H)     Actinic keratosis     Angina pectoris (H) 10/27/2020    Antiplatelet or antithrombotic long-term use     Eliquis    Arthritis     Atrial fibrillation and flutter (H) 12/03/2018    Bladder cancer (H)     CAD (coronary artery disease)     1/5/2011 lateral ischemia on stress echo, 12/2014 normal stress echo    CKD (chronic kidney disease) stage 2, GFR 60-89 ml/min     Depressive disorder     Mild    Diarrhea     Urgency at times    Dyslipidemia     Emphysema of lung (H)     Hernia, abdominal     HTN (hypertension)     Hypertrophy (benign) of prostate 05/2001    Biopsy 5/01 negative for cancer; PSA 5    Lumbago     Mumps     Prostate cancer (H) 12/15/2006    Skin cancer, basal cell 1997    Sleep apnea     Uses a Bi-pap for centralized Apnea    Spider veins        PAST SURGICAL HISTORY:  Past Surgical History:   Procedure Laterality Date    ABDOMEN SURGERY      BACK SURGERY  1988    L4/L5 decompression    BIOPSY  2022    Skin cancer basal cell    COLONOSCOPY      CYSTOSCOPY      CYSTOSCOPY, TRANSURETHRAL RESECTION (TUR) TUMOR BLADDER, COMBINED N/A 01/06/2023    Procedure: Cystoscopy, transurethral resection of bladder tumor, medium, 2-5 cm;  Surgeon: Mark Pino MD;  Location: [facility] OR    [facility]     "GENITOURINARY SURGERY  2022    Tumors in bladder    HERNIA REPAIR  child    Moh's procedure for basal cell carcinoma  2001    PROSTATE SURGERY  2007    Radiation only    s/p lumbar laminectomy NOS  1988    SIGMOIDOSCOPY FLEXIBLE N/A 06/15/2020    Procedure: SIGMOIDOSCOPY, FLEXIBLE;  Surgeon: Sunni Patton MD;  Location: [facility] GI    VITRECTOMY PARS PLANA REMOVE PRERETINAL MEMBRANE   2009       FAMILY HISTORY:  Family History   Problem Relation Age of Onset    Alzheimer Disease Mother     Hypertension Mother     Cardiovascular Father         D:86 complications fo CHF    Anesthesia Reaction Father          after 2 weeks    Prostate Cancer Brother     Lung Cancer Brother 76       SOCIAL HISTORY:  Social History     Socioeconomic History    Marital status:      Spouse name: None    Number of children: None    Years of education: None    Highest education level: None   Occupational History    Occupation: retired   Tobacco Use    Smoking status: Former     Current packs/day: 0.00     Average packs/day: 1 pack/day for 30.0 years (30.0 ttl pk-yrs)     Types: Cigarettes     Start date: 1948     Quit date: 1978     Years since quittin.7     Passive exposure: Never    Smokeless tobacco: Never   Vaping Use    Vaping status: Never Used   Substance and Sexual Activity    Alcohol use: Not Currently     Comment: occ \"like once a month\" or less    Drug use: No    Sexual activity: Not Currently     Partners: Female     Birth control/protection: Post-menopausal   Other Topics Concern    Caffeine Concern No     Comment: 0-2 cans of soda per day.    Exercise No    Seat Belt Yes    Parent/sibling w/ CABG, MI or angioplasty before 65F 55M? No     Social Determinants of Health     Financial Resource Strain: Low Risk  (2024)    Financial Resource Strain     Within the past 12 months, have you or your family members you live with been unable to get utilities (heat, electricity) when it was really " needed?: No   Recent Concern: Financial Resource Strain - High Risk (7/18/2024)    Financial Resource Strain     Within the past 12 months, have you or your family members you live with been unable to get utilities (heat, electricity) when it was really needed?: Yes   Food Insecurity: Low Risk  (9/8/2024)    Food Insecurity     Within the past 12 months, did you worry that your food would run out before you got money to buy more?: No     Within the past 12 months, did the food you bought just not last and you didn t have money to get more?: No   Transportation Needs: Low Risk  (9/8/2024)    Transportation Needs     Within the past 12 months, has lack of transportation kept you from medical appointments, getting your medicines, non-medical meetings or appointments, work, or from getting things that you need?: No   Physical Activity: Insufficiently Active (7/18/2024)    Exercise Vital Sign     Days of Exercise per Week: 1 day     Minutes of Exercise per Session: 10 min   Stress: No Stress Concern Present (7/18/2024)    [employer] - Occupational Stress Questionnaire     Feeling of Stress : Not at all   Social Connections: Unknown (7/18/2024)    Social Connection and Isolation Panel [NHANES]     Frequency of Social Gatherings with Friends and Family: Three times a week   Interpersonal Safety: Low Risk  (9/8/2024)    Interpersonal Safety     Do you feel physically and emotionally safe where you currently live?: Yes     Within the past 12 months, have you been hit, slapped, kicked or otherwise physically hurt by someone?: No     Within the past 12 months, have you been humiliated or emotionally abused in other ways by your partner or ex-partner?: No   Housing Stability: Low Risk  (9/8/2024)    Housing Stability     Do you have housing? : Yes     Are you worried about losing your housing?: No       Review of Systems:  Skin:  not assessed       Eyes:  Positive for glasses    ENT:  not assessed   "    Respiratory:  Positive for shortness of breath;dyspnea on exertion;cough;sleep apnea occ cough,wears BiPAP at night   Cardiovascular:    edema;Positive for \"slight edema\"  Gastroenterology: not assessed      Genitourinary:  not assessed      Musculoskeletal:  not assessed      Neurologic:  not assessed      Psychiatric:  not assessed      Heme/Lymph/Imm:  not assessed      Endocrine:  not assessed        Physical Exam:  Vitals: /71 (BP Location: Right arm, Patient Position: Sitting, Cuff Size: Adult Large)   Pulse 93   Ht 1.715 m (5' 7.5\")   Wt 106.5 kg (234 lb 11.2 oz)   SpO2 97%   BMI 36.22 kg/m      Constitutional:    obese      Skin:  warm and dry to the touch          Head:  normocephalic        Eyes:  pupils equal and round        Lymph:      ENT:  no pallor or cyanosis        Neck:  JVP normal        Respiratory:  clear to auscultation;normal respiratory excursion         Cardiac: normal S1 and S2;no S3 or S4 irregularly irregular rhythm distant heart sounds            pulses full and equal                                        GI:  abdomen soft obese      Extremities and Muscular Skeletal:  no edema              Neurological:  no gross motor deficits   using walker    Psych:  affect appropriate, oriented to time, person and place          CC  Vinod De La Paz MD  [address] [place] [id number]                    " The patient is a 59y Male complaining of flank pain.

## 2024-09-26 ENCOUNTER — E-VISIT (OUTPATIENT)
Dept: PEDIATRICS | Facility: CLINIC | Age: 89
End: 2024-09-26
Payer: COMMERCIAL

## 2024-09-26 DIAGNOSIS — I48.91 ATRIAL FIBRILLATION AND FLUTTER (H): Primary | ICD-10-CM

## 2024-09-26 DIAGNOSIS — I48.92 ATRIAL FIBRILLATION AND FLUTTER (H): Primary | ICD-10-CM

## 2024-09-26 PROCEDURE — 99207 PR NON-BILLABLE SERV PER CHARTING: CPT | Performed by: INTERNAL MEDICINE

## 2024-09-30 ENCOUNTER — TELEPHONE (OUTPATIENT)
Dept: CARDIOLOGY | Facility: CLINIC | Age: 89
End: 2024-09-30

## 2024-09-30 ENCOUNTER — TRANSFERRED RECORDS (OUTPATIENT)
Dept: HEALTH INFORMATION MANAGEMENT | Facility: CLINIC | Age: 89
End: 2024-09-30

## 2024-09-30 ENCOUNTER — OFFICE VISIT (OUTPATIENT)
Dept: CARDIOLOGY | Facility: CLINIC | Age: 89
End: 2024-09-30
Payer: COMMERCIAL

## 2024-09-30 VITALS
SYSTOLIC BLOOD PRESSURE: 119 MMHG | DIASTOLIC BLOOD PRESSURE: 71 MMHG | OXYGEN SATURATION: 97 % | HEART RATE: 93 BPM | WEIGHT: 234.7 LBS | HEIGHT: 68 IN | BODY MASS INDEX: 35.57 KG/M2

## 2024-09-30 DIAGNOSIS — I77.9 AORTA DISORDER (H): ICD-10-CM

## 2024-09-30 DIAGNOSIS — I50.32 CHRONIC DIASTOLIC HEART FAILURE (H): Primary | ICD-10-CM

## 2024-09-30 PROCEDURE — 99214 OFFICE O/P EST MOD 30 MIN: CPT | Performed by: NURSE PRACTITIONER

## 2024-09-30 NOTE — TELEPHONE ENCOUNTER
Patient was seen for OV today with Neisha Bañuelos CNP would like RX coverage check for Jardiance.  Shana Mccullough RN on 9/30/2024 at 1:49 PM

## 2024-09-30 NOTE — TELEPHONE ENCOUNTER
"Patient has Medicare D through [facility].  Patient's drug costs have exceeded $5,030, and as such will now pay a 25% coinsurance on all covered drugs.  (Also called the \"coverage gap\" or \"donut hole.\")      Jardiance  --$150/mo.  --If total out-of-pocket costs exceed $8000, patient will pay $0 for the remainder of 2024.    If this is cost-prohibitive, I can apply for a Mangia andrzej to pay for cardiomyopathy medication copays.    Michelle Day  Pharmacy Technician/Liaison, Discharge Pharmacy   [phone] (voice or text)  [email or web address]    "

## 2024-09-30 NOTE — TELEPHONE ENCOUNTER
Sent information of cost of Jardiance via MY Chart and if agree will call back and send in RX. If agreeable with medication and cost.    Shana Mccullough RN on 9/30/2024 at 3:14 PM

## 2024-09-30 NOTE — LETTER
9/30/2024    Natalya Lewis MD  [address] [place] [address]    RE: Nixon RILEY Argenis       Dear Colleague,     I had the pleasure of seeing Nixon More in the [facility].  HISTORY OF PRESENT ILLNESS:    This is a 89 year old male who follows with Dr De La Paz at [facility]  His past medical history includes:  Atrial Flutter/fibrillation, chronic HFpEF, COPD, ascending aorta dilatation, coronary artery disease, hypertension, sleep apnea, hyperlipidemia, venous insufficiency, remote heavy smoker, prostate cancer s/p radiation, papillary bladder tumor with complete resection, AAA, CKD    Mr More has chronic dyspnea on exertion and prior PFTs showing some restriction and muscular weakness  A stress echo (2011) was abnormal, but he has been treated medically due to lack of anginal symptoms  A repeat stress test (2014) was negative for ischemia     He developed A-fib and atypical atrial flutter in 2018 and was started on anticoagulation  He has been rate controlled due to lack of symptoms     He has complex sleep apnea and uses ASV  therapy    ECHO (2022) LVEF 65-70%, normal RV function, mod-severe left atrial enlargement, mild-moderate pulmonary hypertension, 1+ TR, ascending aorta  4.5, aortic root 4.4 cm    Holter monitor (2022) showed persistent A-fib with average HR 68 bpm, ranging from  bpm  Multiple pauses up to 4.7 seconds in the early am    ECHO (8/2023) showed preserved biventricular function  There was a 2 cm mass noted in the apical portion of the right ventricle ascending aorta 4.5 cm  Subsequent cMRI showed epicardial fat adjacent to the RV apex    He has a tendency for hypotension at times with increased diuretics  His Spironolactone was increased earlier this year for mild heart failure symptoms  ECHO (8/15/24) showed LVEF 60%, normal RV function, severe left atrial enlargement, no significant valvular pathology, mild pulmonary hypertension,  ascending aorta 4.5 cm    He was hospitalized (9/8/24) after suffering two falls with resultant shoulder injury and subarachnoid hemorrhage These were felt to be mechanical falls  His Eliquis was discontinued until cleared by neurosurgery  He did not appear to be in acute heart failure and his orthostatic BPs were negative  However, his Losartan dose was lowered   ECHO showed LVEF 60%, normal RV function, mod-severe RV enlargement (new), no significant valvular pathology, ascending aorta 4.5 cm  His shoulder was conservatively managed and outpatient follow up was arranged  He was discharged to TCU    Our visit today is for further review    Mr More comes in with his daughter who is a retired nurse practitioner who is attentive to his care  He denies any chest pain or significant shortness of breath  He has been home from the hospital now for 2 wks and is back to his usual routine He has not had any further falls and progressed with PT/OT  He started on an inhaler recently for underlying COPD  He denies palpitations, lightheadedness or peripheral edema  We talked about avoiding salty foods  He checks his BP daily and I reviewed some of these recordings  His SBP stays around 120 mmHg        VITAL SIGNS:  BP: 119/71  Pulse:  93  Weight:  234 lbs  (BMI: 36)        IMPRESSION AND PLAN:    Recurrent Mechanical Fall s/p subarachnoid hemorrhage (9/7/24)  -off anticoagulation, but recent imaging showed resolution of SAH    -has follow up with PMD tomorrow to consider restarting Eliquis    Permanent Atrial Fibrillation/Flutter  -HRs controlled Metoprolol  mg  - Eliquis on hold  (CHADS2 Vasc score: 5)    Hypertension:  -on Losartan 50 mg, Metoprolol  mg, Spironolactone 12.5 mg  -BP controlled    Chronic HFpEF  LVEF 55%, normal RV function with mod-severe RV enlargement  -no significant signs or symptoms of heart failure  -encouraged low salt diet  -on Spironolactone 12.5 mg  -weight stable  -open to starting  Jardiance in near future  Will check price and consider starting this in a few weeks    Ascending Aorta Dilatation  -4.5 cm (stable)  -consider repeating ECHO in 6-9 months    Complex Sleep Apnea  -using ASV therapy    COPD  -will assess PFT    The total time for the visit today was 30 minutes which includes patient visit, reviewing of records, discussion, and placing of orders of the outpatient coordination of cardiovascular care as described.  The level of medical decision making during this visit was of moderate complexity.  Thank you for allowing me to participate in their care.    The longitudinal plan of care for the diagnosis(es)/condition(s) as documented were addressed during this visit. Due to the added complexity in care, I will continue to support Spencer in the subsequent management and with ongoing continuity of care.        Orders Placed This Encounter   Procedures     Follow-Up with Cardiology     General PFT Lab (Please always keep checked)     Pulmonary Function Test       No orders of the defined types were placed in this encounter.      There are no discontinued medications.      Encounter Diagnoses   Name Primary?     Aorta disorder (H)      Chronic diastolic heart failure (H) Yes       CURRENT MEDICATIONS:  Current Outpatient Medications   Medication Sig Dispense Refill     albuterol (PROAIR HFA/PROVENTIL HFA/VENTOLIN HFA) 108 (90 Base) MCG/ACT inhaler Inhale 2 puffs into the lungs every 6 hours as needed for shortness of breath, wheezing or cough 8 g 2     atorvastatin (LIPITOR) 40 MG tablet Take 1 tablet (40 mg) by mouth daily 90 tablet 3     Cholecalciferol (VITAMIN D-3) 25 MCG (1000 UT) CAPS Take 1 capsule by mouth daily.       escitalopram (LEXAPRO) 10 MG tablet Take 1 tablet (10 mg) by mouth daily 90 tablet 3     loperamide (IMODIUM) 2 MG capsule Take 2 mg by mouth 2 times daily as needed for diarrhea.       losartan (COZAAR) 50 MG tablet Take 1 tablet (50 mg) by mouth daily. 30 tablet 1      metoprolol succinate ER (TOPROL XL) 50 MG 24 hr tablet Take 100 mg by mouth daily.       RISEdronate (ACTONEL) 35 MG tablet Take 1 tablet (35 mg) by mouth every 7 days 12 tablet 4     spironolactone (ALDACTONE) 25 MG tablet Take 0.5 tablets (12.5 mg) by mouth daily. 15 tablet 1     umeclidinium-vilanterol (ANORO ELLIPTA) 62.5-25 MCG/ACT oral inhaler Inhale 1 puff into the lungs daily 1 each 11       ALLERGIES     Allergies   Allergen Reactions     Amoxicillin-Pot Clavulanate Rash     Type III hypersensitivity     Bactrim [Sulfamethoxazole W/Trimethoprim] Rash     Serum sickness, type III hypersensitivity       Bee Venom Itching     Sulfa Antibiotics Hives     Celebrex [Celecoxib]      Lisinopril Cough     Naproxen Hives     Penicillin G        PAST MEDICAL HISTORY:  Past Medical History:   Diagnosis Date     (HFpEF) heart failure with preserved ejection fraction (H)      AAA (abdominal aortic aneurysm) (H)      Actinic keratosis      Angina pectoris (H) 10/27/2020     Antiplatelet or antithrombotic long-term use     Eliquis     Arthritis      Atrial fibrillation and flutter (H) 12/03/2018     Bladder cancer (H)      CAD (coronary artery disease)     1/5/2011 lateral ischemia on stress echo, 12/2014 normal stress echo     CKD (chronic kidney disease) stage 2, GFR 60-89 ml/min      Depressive disorder     Mild     Diarrhea     Urgency at times     Dyslipidemia      Emphysema of lung (H)      Hernia, abdominal      HTN (hypertension)      Hypertrophy (benign) of prostate 05/2001    Biopsy 5/01 negative for cancer; PSA 5     Lumbago      Mumps      Prostate cancer (H) 12/15/2006     Skin cancer, basal cell 1997     Sleep apnea     Uses a Bi-pap for centralized Apnea     Spider veins        PAST SURGICAL HISTORY:  Past Surgical History:   Procedure Laterality Date     ABDOMEN SURGERY       BACK SURGERY  1988    L4/L5 decompression     BIOPSY  2022    Skin cancer basal cell     COLONOSCOPY       CYSTOSCOPY        "CYSTOSCOPY, TRANSURETHRAL RESECTION (TUR) TUMOR BLADDER, COMBINED N/A 2023    Procedure: Cystoscopy, transurethral resection of bladder tumor, medium, 2-5 cm;  Surgeon: Mark Pino MD;  Location: [facility] OR     EYE SURGERY  2016    Cararact     GENITOURINARY SURGERY  2022    Tumors in bladder     HERNIA REPAIR  child     Moh's procedure for basal cell carcinoma  2001     PROSTATE SURGERY  2007    Radiation only     s/p lumbar laminectomy NOS  1988     SIGMOIDOSCOPY FLEXIBLE N/A 06/15/2020    Procedure: SIGMOIDOSCOPY, FLEXIBLE;  Surgeon: Sunni Patton MD;  Location: [facility] GI     VITRECTOMY PARS PLANA REMOVE PRERETINAL MEMBRANE   2009       FAMILY HISTORY:  Family History   Problem Relation Age of Onset     Alzheimer Disease Mother      Hypertension Mother      Cardiovascular Father         D:86 complications fo CHF     Anesthesia Reaction Father          after 2 weeks     Prostate Cancer Brother      Lung Cancer Brother 76       SOCIAL HISTORY:  Social History     Socioeconomic History     Marital status:      Spouse name: None     Number of children: None     Years of education: None     Highest education level: None   Occupational History     Occupation: retired   Tobacco Use     Smoking status: Former     Current packs/day: 0.00     Average packs/day: 1 pack/day for 30.0 years (30.0 ttl pk-yrs)     Types: Cigarettes     Start date: 1948     Quit date: 1978     Years since quittin.7     Passive exposure: Never     Smokeless tobacco: Never   Vaping Use     Vaping status: Never Used   Substance and Sexual Activity     Alcohol use: Not Currently     Comment: occ \"like once a month\" or less     Drug use: No     Sexual activity: Not Currently     Partners: Female     Birth control/protection: Post-menopausal   Other Topics Concern     Caffeine Concern No     Comment: 0-2 cans of soda per day.     Exercise No     Seat Belt Yes     Parent/sibling w/ CABG, MI or " angioplasty before 65F 55M? No     Social Determinants of Health     Financial Resource Strain: Low Risk  (9/8/2024)    Financial Resource Strain      Within the past 12 months, have you or your family members you live with been unable to get utilities (heat, electricity) when it was really needed?: No   Recent Concern: Financial Resource Strain - High Risk (7/18/2024)    Financial Resource Strain      Within the past 12 months, have you or your family members you live with been unable to get utilities (heat, electricity) when it was really needed?: Yes   Food Insecurity: Low Risk  (9/8/2024)    Food Insecurity      Within the past 12 months, did you worry that your food would run out before you got money to buy more?: No      Within the past 12 months, did the food you bought just not last and you didn t have money to get more?: No   Transportation Needs: Low Risk  (9/8/2024)    Transportation Needs      Within the past 12 months, has lack of transportation kept you from medical appointments, getting your medicines, non-medical meetings or appointments, work, or from getting things that you need?: No   Physical Activity: Insufficiently Active (7/18/2024)    Exercise Vital Sign      Days of Exercise per Week: 1 day      Minutes of Exercise per Session: 10 min   Stress: No Stress Concern Present (7/18/2024)    [employer] [place] [employer] - Occupational Stress Questionnaire      Feeling of Stress : Not at all   Social Connections: Unknown (7/18/2024)    Social Connection and Isolation Panel [NHANES]      Frequency of Social Gatherings with Friends and Family: Three times a week   Interpersonal Safety: Low Risk  (9/8/2024)    Interpersonal Safety      Do you feel physically and emotionally safe where you currently live?: Yes      Within the past 12 months, have you been hit, slapped, kicked or otherwise physically hurt by someone?: No      Within the past 12 months, have you been humiliated or emotionally  "abused in other ways by your partner or ex-partner?: No   Housing Stability: Low Risk  (9/8/2024)    Housing Stability      Do you have housing? : Yes      Are you worried about losing your housing?: No       Review of Systems:  Skin:  not assessed       Eyes:  Positive for glasses    ENT:  not assessed      Respiratory:  Positive for shortness of breath;dyspnea on exertion;cough;sleep apnea occ cough,wears BiPAP at night   Cardiovascular:    edema;Positive for \"slight edema\"  Gastroenterology: not assessed      Genitourinary:  not assessed      Musculoskeletal:  not assessed      Neurologic:  not assessed      Psychiatric:  not assessed      Heme/Lymph/Imm:  not assessed      Endocrine:  not assessed        Physical Exam:  Vitals: /71 (BP Location: Right arm, Patient Position: Sitting, Cuff Size: Adult Large)   Pulse 93   Ht 1.715 m (5' 7.5\")   Wt 106.5 kg (234 lb 11.2 oz)   SpO2 97%   BMI 36.22 kg/m      Constitutional:    obese      Skin:  warm and dry to the touch          Head:  normocephalic        Eyes:  pupils equal and round        Lymph:      ENT:  no pallor or cyanosis        Neck:  JVP normal        Respiratory:  clear to auscultation;normal respiratory excursion         Cardiac: normal S1 and S2;no S3 or S4 irregularly irregular rhythm distant heart sounds            pulses full and equal                                        GI:  abdomen soft obese      Extremities and Muscular Skeletal:  no edema              Neurological:  no gross motor deficits   using walker    Psych:  affect appropriate, oriented to time, person and place          CC  Vinod De La Paz MD  [address] [place] [phone]                      Thank you for allowing me to participate in the care of your patient.      Sincerely,     [facility]  cc:   Vinod De La Paz MD  [address] [place] [address] " [phone]

## 2024-10-01 ENCOUNTER — VIRTUAL VISIT (OUTPATIENT)
Dept: PEDIATRICS | Facility: CLINIC | Age: 89
End: 2024-10-01
Payer: COMMERCIAL

## 2024-10-01 DIAGNOSIS — Z79.01 LONG TERM CURRENT USE OF ANTICOAGULANT THERAPY: ICD-10-CM

## 2024-10-01 DIAGNOSIS — I60.9 SUBARACHNOID HEMORRHAGE (H): Primary | ICD-10-CM

## 2024-10-01 DIAGNOSIS — I48.92 ATRIAL FIBRILLATION AND FLUTTER (H): ICD-10-CM

## 2024-10-01 DIAGNOSIS — I48.91 ATRIAL FIBRILLATION AND FLUTTER (H): ICD-10-CM

## 2024-10-01 DIAGNOSIS — F32.0 MILD MAJOR DEPRESSION (H): ICD-10-CM

## 2024-10-01 DIAGNOSIS — I50.32 CHRONIC DIASTOLIC HEART FAILURE (H): ICD-10-CM

## 2024-10-01 PROCEDURE — 99495 TRANSJ CARE MGMT MOD F2F 14D: CPT | Mod: 95 | Performed by: INTERNAL MEDICINE

## 2024-10-01 NOTE — PROGRESS NOTES
Spencer is a 89 year old who is being evaluated via a billable video visit.    How would you like to obtain your AVS? MyChart  If the video visit is dropped, the invitation should be resent by: Text to cell phone: [phone]  Will anyone else be joining your video visit? daughter Amber at [phone] please text link      Assessment & Plan     Subarachnoid hemorrhage (H)  Reviewed neurosurgery and cardiology notes.  Discussed HasBled and CHADSVASC scores.  Resume anticoagulation but proceed with evaluation for watchman to decrease bleeding risk.  Head CT tomorrow  - CT Head w/o Contrast; Future    Atrial fibrillation and flutter (H)  Discussed risks.  Resume anticoagulation due to concerns about stroke risk.  Recheck head CT tomorrow.  Cardiology evaluation for watchman  - apixaban ANTICOAGULANT (ELIQUIS) 5 MG tablet; Take 1 tablet (5 mg) by mouth 2 times daily.  - Left Atrial Appendage Closure Referral; Future    Long term current use of anticoagulant therapy    - apixaban ANTICOAGULANT (ELIQUIS) 5 MG tablet; Take 1 tablet (5 mg) by mouth 2 times daily.  - Left Atrial Appendage Closure Referral; Future    Mild major depression (H)  May be worse with recent head injury.  Continue serotonin specific reuptake inhibitor and discussed stress and coping.  Consider increase in dose in 1-2 months if not improving    Chronic diastolic heart failure (H)  Stable.  Continue on lower dose ARB.  Follow-up with cardiology as scheduled.      I have personally discussed with Spencer as it pertains to their candidacy for the Left Atrial Appendage Occlusion.  The patient has documented Non-Valvular atrial fibrillation and is not currently taking an anticoagulant due to his recent head bleed.    The patient indication to come off anticoagulation are: fall, increased risk of bleeding and recent head bleed which is the indication for her/him to pursue a DANIEL occlusion to allow discontinuing anticoagulation  while still lowering stroke  risk.        MED REC REQUIRED  Post Medication Reconciliation Status: discharge medications reconciled and changed, per note/orders    See Patient Instructions    Subjective   Spencer is a 89 year old, presenting for the following health issues:  Hospital F/U      10/1/2024     5:21 PM   Additional Questions   Roomed by Divya Hansen CMA   Accompanied by daughter     Video Start Time: 5:49 PM    Hospitals in [place]        Hospital Follow-up Visit:    Hospital/Nursing Home/IP Rehab Facility: [facility]  Date of Admission: 9/8/27   Date of Discharge: 9/10/24  Reason(s) for Admission: Subarachnoid hemorrhage in the setting of DOAC use  Recurrent mechanical fall  Right shoulder pain due to complete tear of supraspinatus tendon, complete to near tear of subscapularis and moderate subscapularis muscle strain   Right breast lump   Permanent atrial fibrillation  Hypertension  Hyperlipidemia  Was the patient in the ICU or did the patient experience delirium during hospitalization?  No  Do you have any other stressors you would like to discuss with your provider? No    Problems taking medications regularly:  None  Medication changes since discharge: None  Problems adhering to non-medication therapy:  None    Summary of hospitalization:  [facility] discharge summary reviewed  Diagnostic Tests/Treatments reviewed.  Follow up needed: none  Other Healthcare Providers Involved in Patient s Care:         Specialist appointment - cardiology and ortho  Update since discharge: improved.     Fell in garage when getting out of car.  Toe got caught and went down and hit head.  Had someone help him get up.  Fell on left side.  Normal for 2 hrs after but then son texted him and he called son and had no symptoms.  Family took him to ER.  Then after got home started feeling worse and fell again and couldn't get back up easily and called 911 and went back to ER.    Saw ortho yesterday - Dr. Mccauley, about shoulder.   Doing PT.    Saw cardiology yesterday.  Off Eliquis due to bleed  [id number] score= 5, putting stroke risk at 7.2 - 12.2% % per year.  HAS-BLED risk score=3, putting bleeding risk at 5.8% and 3.72 bleeds per 100 patient-years.  not doing stairs and has a walker that he uses all the time with only 2 wheels.  Did PT and OT in TCU  Mood has been a little worse - is socializing in building and going to exercise three times a week.    Had low BP in the hospital.  Held spironolactone and then weight went up and legs swollen.  Then resumed spironolactone and cut losartan in half.  BP has been good at home.    Plan of care communicated with patient and family                     Objective           Vitals:  No vitals were obtained today due to virtual visit.    Physical Exam   GENERAL: alert and no distress  EYES: Eyes grossly normal to inspection.  No discharge or erythema, or obvious scleral/conjunctival abnormalities.  RESP: No audible wheeze, cough, or visible cyanosis.    SKIN: Visible skin clear. No significant rash, abnormal pigmentation or lesions.  NEURO: Cranial nerves grossly intact.  Mentation and speech appropriate for age.  PSYCH: Appropriate affect, tone, and pace of words            Video-Visit Details    Type of service:  Video Visit   Video End Time:6:26 PM  Originating Location (pt. Location): Home    Distant Location (provider location):  On-site  Platform used for Video Visit: [employer]  Signed Electronically by: Natalya Lewis MD

## 2024-10-01 NOTE — PATIENT INSTRUCTIONS
Bleeding risk:  3: Risk is 5.8% and 3.72 bleeds per 100 patient-years. Alternatives to anticoagulation should be considered: Patient is at high risk for major bleeding.     Stroke risk:  5: 7.2 - 12.2%     Start back on Eliquis but I will put in a referral to cardiology about the procedure to hopefully get you off of the eliquis.    Let me know if your mood isn't getting better in a couple of months.    Get your flu and covid vaccines.

## 2024-10-02 ENCOUNTER — HOSPITAL ENCOUNTER (OUTPATIENT)
Dept: CT IMAGING | Facility: CLINIC | Age: 89
Discharge: HOME | End: 2024-10-02
Attending: INTERNAL MEDICINE | Admitting: INTERNAL MEDICINE
Payer: COMMERCIAL

## 2024-10-02 ENCOUNTER — TELEPHONE (OUTPATIENT)
Dept: CARDIOLOGY | Facility: CLINIC | Age: 89
End: 2024-10-02
Payer: COMMERCIAL

## 2024-10-02 DIAGNOSIS — I60.9 SUBARACHNOID HEMORRHAGE (H): ICD-10-CM

## 2024-10-02 DIAGNOSIS — R29.6 FALLS FREQUENTLY: ICD-10-CM

## 2024-10-02 DIAGNOSIS — I48.19 PERSISTENT ATRIAL FIBRILLATION (H): ICD-10-CM

## 2024-10-02 DIAGNOSIS — I48.92 ATRIAL FLUTTER, CHRONIC (H): Primary | ICD-10-CM

## 2024-10-02 PROCEDURE — 70450 CT HEAD/BRAIN W/O DYE: CPT

## 2024-10-02 NOTE — NURSING NOTE
Called and let daughter Amber (c2c on file) know appointment for CT scan for 10/2/24 today is at 3:45 at [address]. Daughter expressed understanding.  Divya Hansen, CMA

## 2024-10-02 NOTE — TELEPHONE ENCOUNTER
[facility] REFERRAL    Referral received from:  Natalya Lewis MD    Reason for referral: Falls, subarachnoid hemorrhage    Current anticoagulation: Eliquis    Imaging: CT heart structure scheduled 10/4    Contrast allergy: No    Contacted patient to introduce self, provide education on [facility].    Arranged consult with Dr. Dasilva on 10/10    Provided direct contact information.     Valentine Calles RN  Structural Heart Coordinator  [facility]

## 2024-10-04 ENCOUNTER — HOSPITAL ENCOUNTER (OUTPATIENT)
Dept: CARDIOLOGY | Facility: CLINIC | Age: 89
Discharge: HOME | End: 2024-10-04
Attending: INTERNAL MEDICINE | Admitting: INTERNAL MEDICINE
Payer: COMMERCIAL

## 2024-10-04 VITALS — HEART RATE: 80 BPM | SYSTOLIC BLOOD PRESSURE: 129 MMHG | DIASTOLIC BLOOD PRESSURE: 74 MMHG

## 2024-10-04 DIAGNOSIS — R29.6 FALLS FREQUENTLY: ICD-10-CM

## 2024-10-04 DIAGNOSIS — I48.92 ATRIAL FLUTTER, CHRONIC (H): ICD-10-CM

## 2024-10-04 DIAGNOSIS — I48.19 PERSISTENT ATRIAL FIBRILLATION (H): ICD-10-CM

## 2024-10-04 PROCEDURE — 75572 CT HRT W/3D IMAGE: CPT

## 2024-10-04 PROCEDURE — 250N000011 HC RX IP 250 OP 636: Performed by: INTERNAL MEDICINE

## 2024-10-04 PROCEDURE — 75572 CT HRT W/3D IMAGE: CPT | Mod: 26 | Performed by: INTERNAL MEDICINE

## 2024-10-04 RX ORDER — IOPAMIDOL 755 MG/ML
500 INJECTION, SOLUTION INTRAVASCULAR ONCE
Status: COMPLETED | OUTPATIENT
Start: 2024-10-04 | End: 2024-10-04

## 2024-10-04 RX ORDER — METHYLPREDNISOLONE SODIUM SUCCINATE 125 MG/2ML
125 INJECTION INTRAMUSCULAR; INTRAVENOUS
Status: DISCONTINUED | OUTPATIENT
Start: 2024-10-04 | End: 2024-10-05 | Stop reason: HOSPADM

## 2024-10-04 RX ORDER — ONDANSETRON 2 MG/ML
4 INJECTION INTRAMUSCULAR; INTRAVENOUS
Status: DISCONTINUED | OUTPATIENT
Start: 2024-10-04 | End: 2024-10-05 | Stop reason: HOSPADM

## 2024-10-04 RX ORDER — LIDOCAINE 40 MG/G
CREAM TOPICAL
Status: DISCONTINUED | OUTPATIENT
Start: 2024-10-04 | End: 2024-10-05 | Stop reason: HOSPADM

## 2024-10-04 RX ORDER — DIPHENHYDRAMINE HYDROCHLORIDE 50 MG/ML
25-50 INJECTION INTRAMUSCULAR; INTRAVENOUS
Status: DISCONTINUED | OUTPATIENT
Start: 2024-10-04 | End: 2024-10-05 | Stop reason: HOSPADM

## 2024-10-04 RX ORDER — DIPHENHYDRAMINE HCL 25 MG
25 CAPSULE ORAL
Status: DISCONTINUED | OUTPATIENT
Start: 2024-10-04 | End: 2024-10-05 | Stop reason: HOSPADM

## 2024-10-04 RX ADMIN — IOPAMIDOL 100 ML: 755 INJECTION, SOLUTION INTRAVENOUS at 12:41

## 2024-10-10 ENCOUNTER — OFFICE VISIT (OUTPATIENT)
Dept: CARDIOLOGY | Facility: CLINIC | Age: 89
End: 2024-10-10
Attending: INTERNAL MEDICINE
Payer: COMMERCIAL

## 2024-10-10 VITALS
OXYGEN SATURATION: 97 % | WEIGHT: 239 LBS | HEIGHT: 68 IN | BODY MASS INDEX: 36.22 KG/M2 | DIASTOLIC BLOOD PRESSURE: 80 MMHG | HEART RATE: 80 BPM | SYSTOLIC BLOOD PRESSURE: 128 MMHG

## 2024-10-10 DIAGNOSIS — Z79.01 LONG TERM CURRENT USE OF ANTICOAGULANT THERAPY: ICD-10-CM

## 2024-10-10 DIAGNOSIS — I48.91 ATRIAL FIBRILLATION AND FLUTTER (H): ICD-10-CM

## 2024-10-10 DIAGNOSIS — I48.92 ATRIAL FIBRILLATION AND FLUTTER (H): ICD-10-CM

## 2024-10-10 PROCEDURE — 99214 OFFICE O/P EST MOD 30 MIN: CPT | Performed by: INTERNAL MEDICINE

## 2024-10-10 NOTE — PROGRESS NOTES
CARDIOLOGY CLINIC CONSULT    REASON FOR CONSULT: Left atrial appendage occlusion     PRIMARY CARE PHYSICIAN:  Natalya Lewis  Primary cardiologist is Dr. Yost.  He was recently seen in cardiology clinic by Neisha Bañuelos on 9/10/2024.    HISTORY OF PRESENT ILLNESS:  88 yo M with history of permanent atrial fibrillation and falls causing injury including an admission  in September 2024 after two mechanical falls resulting in s subarachnoid hemorrhage and significant right shoulder injury.  He is here today with his daughter.    He also has a history of hronic HFpEF, COPD, ascending aorta dilatation, coronary artery disease, hypertension, sleep apnea, hyperlipidemia, venous insufficiency, remote heavy smoker, prostate cancer s/p radiation, papillary bladder tumor with complete resection, AAA, CKD    He was  referred for consideration of left atrial appendage occlusion .  He is a poor candidate for long-term anticoagulation due to major bleeding with history of subarachnoid hemorrhage and falls.    He has followed up with neurosurgery and his PCP and has resumed his anticoagulation.  He has not had any subsequent falls.     We had a long and detailed discussion about the potential risks and benefits of left atrial appendage occlusion, procedural considerations and also risks and benefits of long-term anticoagulation today.  His daughter mentioned that she read a paper about left atrial appendage occlusion which she would like for me to weigh in on prior to scheduling the procedure.  She will send me a link to that paper through [employer].    PAST MEDICAL HISTORY:  Past Medical History:   Diagnosis Date    (HFpEF) heart failure with preserved ejection fraction (H)     AAA (abdominal aortic aneurysm) (H)     Actinic keratosis     Angina pectoris (H) 10/27/2020    Antiplatelet or antithrombotic long-term use     Eliquis    Arthritis     Atrial fibrillation and flutter (H) 12/03/2018    Bladder cancer (H)     CAD (coronary  artery disease)     1/5/2011 lateral ischemia on stress echo, 12/2014 normal stress echo    CKD (chronic kidney disease) stage 2, GFR 60-89 ml/min     Depressive disorder     Mild    Diarrhea     Urgency at times    Dyslipidemia     Emphysema of lung (H)     Hernia, abdominal     HTN (hypertension)     Hypertrophy (benign) of prostate 05/2001    Biopsy 5/01 negative for cancer; PSA 5    Lumbago     Mumps     Prostate cancer (H) 12/15/2006    Skin cancer, basal cell 1997    Sleep apnea     Uses a Bi-pap for centralized Apnea    Spider veins        MEDICATIONS:  Current Outpatient Medications   Medication Sig Dispense Refill    albuterol (PROAIR HFA/PROVENTIL HFA/VENTOLIN HFA) 108 (90 Base) MCG/ACT inhaler Inhale 2 puffs into the lungs every 6 hours as needed for shortness of breath, wheezing or cough 8 g 2    apixaban ANTICOAGULANT (ELIQUIS) 5 MG tablet Take 1 tablet (5 mg) by mouth 2 times daily. 60 tablet 2    atorvastatin (LIPITOR) 40 MG tablet Take 1 tablet (40 mg) by mouth daily 90 tablet 3    Cholecalciferol (VITAMIN D-3) 25 MCG (1000 UT) CAPS Take 1 capsule by mouth daily.      escitalopram (LEXAPRO) 10 MG tablet Take 1 tablet (10 mg) by mouth daily 90 tablet 3    loperamide (IMODIUM) 2 MG capsule Take 2 mg by mouth 2 times daily as needed for diarrhea.      losartan (COZAAR) 50 MG tablet Take 1 tablet (50 mg) by mouth daily. 30 tablet 1    metoprolol succinate ER (TOPROL XL) 50 MG 24 hr tablet Take 100 mg by mouth daily.      RISEdronate (ACTONEL) 35 MG tablet Take 1 tablet (35 mg) by mouth every 7 days 12 tablet 4    spironolactone (ALDACTONE) 25 MG tablet Take 0.5 tablets (12.5 mg) by mouth daily. 15 tablet 1    umeclidinium-vilanterol (ANORO ELLIPTA) 62.5-25 MCG/ACT oral inhaler Inhale 1 puff into the lungs daily 1 each 11     No current facility-administered medications for this visit.       ALLERGIES:  Allergies   Allergen Reactions    Amoxicillin-Pot Clavulanate Rash     Type III hypersensitivity     Bactrim [Sulfamethoxazole W/Trimethoprim] Rash     Serum sickness, type III hypersensitivity      Bee Venom Itching    Sulfa Antibiotics Hives    Celebrex [Celecoxib]     Lisinopril Cough    Naproxen Hives    Penicillin G        SOCIAL HISTORY:  Nonsmoker  Lives independently      REVIEW OF SYSTEMS:  Skin:        Eyes:       ENT:       Respiratory:  Positive for shortness of breath;dyspnea on exertion;cough;sleep apnea;CPAP  Cardiovascular:    edema;Positive for  Gastroenterology:      Genitourinary:       Musculoskeletal:       Neurologic:       Psychiatric:       Heme/Lymph/Imm:       Endocrine:  Negative thyroid disorder;diabetes    PHYSICAL EXAM:      BP: 128/80 Pulse: 80     SpO2: 97 %      Vital Signs with Ranges  Pulse:  [80] 80  BP: (128)/(80) 128/80  SpO2:  [97 %] 97 %  239 lbs 0 oz    Constitutional: awake, alert, no distress  Eyes: sclera nonicteric  ENT: trachea midline  Respiratory: clear bilaterally  Cardiovascular: irregularly irregular   GI: nondistended, nontender, bowel sounds present  Skin: dry, no rash trace bilateral ankle edema  Musculoskeletal: grossly normal muscle bulk and tone  Neuropsychiatric: normal affect      DATA:   The following labs were reviewed today:    LAST CHOLESTEROL:  Lab Results   Component Value Date    CHOL 114 07/23/2024    CHOL 121 05/17/2021     Lab Results   Component Value Date    HDL 41 07/23/2024    HDL 39 05/17/2021     Lab Results   Component Value Date    LDL 49 07/23/2024    LDL 55 05/17/2021     Lab Results   Component Value Date    TRIG 121 07/23/2024    TRIG 137 05/17/2021     Lab Results   Component Value Date    CHOLHDLRATIO 2.8 10/15/2015       LAST BMP:  Lab Results   Component Value Date     09/09/2024     06/18/2021      Lab Results   Component Value Date    POTASSIUM 4.3 09/09/2024    POTASSIUM 4.9 01/22/2024    POTASSIUM 4.4 06/18/2021     Lab Results   Component Value Date    CHLORIDE 107 09/09/2024    CHLORIDE 105 01/22/2024    CHLORIDE  109 06/18/2021     Lab Results   Component Value Date    MARLENE 8.9 09/09/2024    MARLENE 9.5 06/18/2021     Lab Results   Component Value Date    CO2 22 09/09/2024    CO2 29 01/22/2024    CO2 29 06/18/2021     Lab Results   Component Value Date    BUN 21.0 09/09/2024    BUN 20 01/22/2024    BUN 28 06/18/2021     Lab Results   Component Value Date    CR 1.19 09/09/2024    CR 1.44 06/18/2021     Lab Results   Component Value Date     09/09/2024     01/22/2024     06/18/2021       LAST CBC:  Lab Results   Component Value Date    WBC 6.4 09/09/2024    WBC 5.6 06/18/2021     Lab Results   Component Value Date    RBC 4.27 09/09/2024    RBC 4.38 06/18/2021     Lab Results   Component Value Date    HGB 12.6 09/09/2024    HGB 12.8 06/18/2021     Lab Results   Component Value Date    HCT 39.7 09/09/2024    HCT 41.1 06/18/2021     Lab Results   Component Value Date    MCV 93 09/09/2024    MCV 94 06/18/2021     Lab Results   Component Value Date    MCH 29.5 09/09/2024    MCH 29.2 06/18/2021     Lab Results   Component Value Date    MCHC 31.7 09/09/2024    MCHC 31.1 06/18/2021     Lab Results   Component Value Date    RDW 14.4 09/09/2024    RDW 14.1 06/18/2021     Lab Results   Component Value Date     09/09/2024     06/18/2021         Echo9/9/24 Echo result w/o MOPS: Interpretation Summary Left ventricular systolic function is normal.The visual ejection fraction is60-65%.The right ventricle is moderate to severely dilated.The right ventricularsystolic function is normal.The left atrium is severely dilated.No significant valvular stenosis or regurgitation on doppler interrogation.Aortic root aneurysm is present- 4.5 cmAscending aorta aneurysm is present- 4.5 cm Compared to echo dated 08/15/2024 there is interval increase in RV size.    CTA structural heart from 10/4/2024 was reviewed.  The appendage is large and there is no thrombus.    ASSESSMENT:  permanent atrial fibrillation  Large left atrial  appendage. Appears appropriate for 40mm device.  History of multiple falls with injury  Severe left atrial enlargement  HfPEF.   Ascending aorta aneurysm 4.5cm.   HTN, well-controlled     He is currently tolerating anticoagulation but is not a good candidate for long-term anticoagulation due to history of falls.    RECOMMENDATIONS:  He will consider Left atrial appendage occlusion.    His daughter has a paper she would like for me to review prior to scheduling Left atrial appendage occlusion.     The longitudinal plan of care for the diagnosis(es)/condition(s) as documented were addressed during this visit. Due to the added complexity in care, I will continue to support Spencer in the subsequent management and with ongoing continuity of care.      Marci Dasilva MD [facility]  Text Page

## 2024-10-10 NOTE — LETTER
10/10/2024    Natalya Lewis MD  [address] [place] [address]    RE: Nixon More       Dear Colleague,     I had the pleasure of seeing Nixon More in the [facility].  CARDIOLOGY CLINIC CONSULT    REASON FOR CONSULT: Left atrial appendage occlusion     PRIMARY CARE PHYSICIAN:  Natalya Lewis  Primary cardiologist is Dr. Yost.  He was recently seen in cardiology clinic by Neisha Bañuelos on 9/10/2024.    HISTORY OF PRESENT ILLNESS:  88 yo M with history of permanent atrial fibrillation and falls causing injury including an admission  in September 2024 after two mechanical falls resulting in s subarachnoid hemorrhage and significant right shoulder injury.  He is here today with his daughter.    He also has a history of hronic HFpEF, COPD, ascending aorta dilatation, coronary artery disease, hypertension, sleep apnea, hyperlipidemia, venous insufficiency, remote heavy smoker, prostate cancer s/p radiation, papillary bladder tumor with complete resection, AAA, CKD    He was  referred for consideration of left atrial appendage occlusion .  He is a poor candidate for long-term anticoagulation due to major bleeding with history of subarachnoid hemorrhage and falls.    He has followed up with neurosurgery and his PCP and has resumed his anticoagulation.  He has not had any subsequent falls.     We had a long and detailed discussion about the potential risks and benefits of left atrial appendage occlusion, procedural considerations and also risks and benefits of long-term anticoagulation today.  His daughter mentioned that she read a paper about left atrial appendage occlusion which she would like for me to weigh in on prior to scheduling the procedure.  She will send me a link to that paper through [employer].    PAST MEDICAL HISTORY:  Past Medical History:   Diagnosis Date     (HFpEF) heart failure with preserved ejection fraction (H)      AAA (abdominal aortic aneurysm) (H)      Actinic  keratosis      Angina pectoris (H) 10/27/2020     Antiplatelet or antithrombotic long-term use     Eliquis     Arthritis      Atrial fibrillation and flutter (H) 12/03/2018     Bladder cancer (H)      CAD (coronary artery disease)     1/5/2011 lateral ischemia on stress echo, 12/2014 normal stress echo     CKD (chronic kidney disease) stage 2, GFR 60-89 ml/min      Depressive disorder     Mild     Diarrhea     Urgency at times     Dyslipidemia      Emphysema of lung (H)      Hernia, abdominal      HTN (hypertension)      Hypertrophy (benign) of prostate 05/2001    Biopsy 5/01 negative for cancer; PSA 5     Lumbago      Mumps      Prostate cancer (H) 12/15/2006     Skin cancer, basal cell 1997     Sleep apnea     Uses a Bi-pap for centralized Apnea     Spider veins        MEDICATIONS:  Current Outpatient Medications   Medication Sig Dispense Refill     albuterol (PROAIR HFA/PROVENTIL HFA/VENTOLIN HFA) 108 (90 Base) MCG/ACT inhaler Inhale 2 puffs into the lungs every 6 hours as needed for shortness of breath, wheezing or cough 8 g 2     apixaban ANTICOAGULANT (ELIQUIS) 5 MG tablet Take 1 tablet (5 mg) by mouth 2 times daily. 60 tablet 2     atorvastatin (LIPITOR) 40 MG tablet Take 1 tablet (40 mg) by mouth daily 90 tablet 3     Cholecalciferol (VITAMIN D-3) 25 MCG (1000 UT) CAPS Take 1 capsule by mouth daily.       escitalopram (LEXAPRO) 10 MG tablet Take 1 tablet (10 mg) by mouth daily 90 tablet 3     loperamide (IMODIUM) 2 MG capsule Take 2 mg by mouth 2 times daily as needed for diarrhea.       losartan (COZAAR) 50 MG tablet Take 1 tablet (50 mg) by mouth daily. 30 tablet 1     metoprolol succinate ER (TOPROL XL) 50 MG 24 hr tablet Take 100 mg by mouth daily.       RISEdronate (ACTONEL) 35 MG tablet Take 1 tablet (35 mg) by mouth every 7 days 12 tablet 4     spironolactone (ALDACTONE) 25 MG tablet Take 0.5 tablets (12.5 mg) by mouth daily. 15 tablet 1     umeclidinium-vilanterol (ANORO ELLIPTA) 62.5-25 MCG/ACT oral  inhaler Inhale 1 puff into the lungs daily 1 each 11     No current facility-administered medications for this visit.       ALLERGIES:  Allergies   Allergen Reactions     Amoxicillin-Pot Clavulanate Rash     Type III hypersensitivity     Bactrim [Sulfamethoxazole W/Trimethoprim] Rash     Serum sickness, type III hypersensitivity       Bee Venom Itching     Sulfa Antibiotics Hives     Celebrex [Celecoxib]      Lisinopril Cough     Naproxen Hives     Penicillin G        SOCIAL HISTORY:  Nonsmoker  Lives independently      REVIEW OF SYSTEMS:  Skin:        Eyes:       ENT:       Respiratory:  Positive for shortness of breath;dyspnea on exertion;cough;sleep apnea;CPAP  Cardiovascular:    edema;Positive for  Gastroenterology:      Genitourinary:       Musculoskeletal:       Neurologic:       Psychiatric:       Heme/Lymph/Imm:       Endocrine:  Negative thyroid disorder;diabetes    PHYSICAL EXAM:      BP: 128/80 Pulse: 80     SpO2: 97 %      Vital Signs with Ranges  Pulse:  [80] 80  BP: (128)/(80) 128/80  SpO2:  [97 %] 97 %  239 lbs 0 oz    Constitutional: awake, alert, no distress  Eyes: sclera nonicteric  ENT: trachea midline  Respiratory: clear bilaterally  Cardiovascular: irregularly irregular   GI: nondistended, nontender, bowel sounds present  Skin: dry, no rash trace bilateral ankle edema  Musculoskeletal: grossly normal muscle bulk and tone  Neuropsychiatric: normal affect      DATA:   The following labs were reviewed today:    LAST CHOLESTEROL:  Lab Results   Component Value Date    CHOL 114 07/23/2024    CHOL 121 05/17/2021     Lab Results   Component Value Date    HDL 41 07/23/2024    HDL 39 05/17/2021     Lab Results   Component Value Date    LDL 49 07/23/2024    LDL 55 05/17/2021     Lab Results   Component Value Date    TRIG 121 07/23/2024    TRIG 137 05/17/2021     Lab Results   Component Value Date    CHOLHDLRATIO 2.8 10/15/2015       LAST BMP:  Lab Results   Component Value Date     09/09/2024    NA  142 06/18/2021      Lab Results   Component Value Date    POTASSIUM 4.3 09/09/2024    POTASSIUM 4.9 01/22/2024    POTASSIUM 4.4 06/18/2021     Lab Results   Component Value Date    CHLORIDE 107 09/09/2024    CHLORIDE 105 01/22/2024    CHLORIDE 109 06/18/2021     Lab Results   Component Value Date    MARLENE 8.9 09/09/2024    MARLENE 9.5 06/18/2021     Lab Results   Component Value Date    CO2 22 09/09/2024    CO2 29 01/22/2024    CO2 29 06/18/2021     Lab Results   Component Value Date    BUN 21.0 09/09/2024    BUN 20 01/22/2024    BUN 28 06/18/2021     Lab Results   Component Value Date    CR 1.19 09/09/2024    CR 1.44 06/18/2021     Lab Results   Component Value Date     09/09/2024     01/22/2024     06/18/2021       LAST CBC:  Lab Results   Component Value Date    WBC 6.4 09/09/2024    WBC 5.6 06/18/2021     Lab Results   Component Value Date    RBC 4.27 09/09/2024    RBC 4.38 06/18/2021     Lab Results   Component Value Date    HGB 12.6 09/09/2024    HGB 12.8 06/18/2021     Lab Results   Component Value Date    HCT 39.7 09/09/2024    HCT 41.1 06/18/2021     Lab Results   Component Value Date    MCV 93 09/09/2024    MCV 94 06/18/2021     Lab Results   Component Value Date    MCH 29.5 09/09/2024    MCH 29.2 06/18/2021     Lab Results   Component Value Date    MCHC 31.7 09/09/2024    MCHC 31.1 06/18/2021     Lab Results   Component Value Date    RDW 14.4 09/09/2024    RDW 14.1 06/18/2021     Lab Results   Component Value Date     09/09/2024     06/18/2021         Echo9/9/24 Echo result w/o MOPS: Interpretation Summary Left ventricular systolic function is normal.The visual ejection fraction is60-65%.The right ventricle is moderate to severely dilated.The right ventricularsystolic function is normal.The left atrium is severely dilated.No significant valvular stenosis or regurgitation on doppler interrogation.Aortic root aneurysm is present- 4.5 cmAscending aorta aneurysm is present- 4.5 cm  Compared to echo dated 08/15/2024 there is interval increase in RV size.    CTA structural heart from 10/4/2024 was reviewed.  The appendage is large and there is no thrombus.    ASSESSMENT:  permanent atrial fibrillation  Large left atrial appendage. Appears appropriate for 40mm device.  History of multiple falls with injury  Severe left atrial enlargement  HfPEF.   Ascending aorta aneurysm 4.5cm.   HTN, well-controlled     He is currently tolerating anticoagulation but is not a good candidate for long-term anticoagulation due to history of falls.    RECOMMENDATIONS:  He will consider Left atrial appendage occlusion.    His daughter has a paper she would like for me to review prior to scheduling Left atrial appendage occlusion.     The longitudinal plan of care for the diagnosis(es)/condition(s) as documented were addressed during this visit. Due to the added complexity in care, I will continue to support Spencer in the subsequent management and with ongoing continuity of care.      Marci Dasilva MD [facility]  Text Page         Thank you for allowing me to participate in the care of your patient.      Sincerely,     Marci Dasilva MD     [facility]  cc:   Marci Dasilva MD  [address] [place] [address]

## 2024-10-16 DIAGNOSIS — I10 BENIGN ESSENTIAL HYPERTENSION: ICD-10-CM

## 2024-10-16 DIAGNOSIS — I48.19 PERSISTENT ATRIAL FIBRILLATION (H): ICD-10-CM

## 2024-10-16 RX ORDER — SPIRONOLACTONE 25 MG/1
12.5 TABLET ORAL DAILY
Qty: 45 TABLET | Refills: 1 | OUTPATIENT
Start: 2024-10-16

## 2024-10-25 ENCOUNTER — HOSPITAL ENCOUNTER (OUTPATIENT)
Dept: CT IMAGING | Facility: CLINIC | Age: 89
Discharge: HOME | End: 2024-10-25
Attending: SURGERY | Admitting: SURGERY
Payer: COMMERCIAL

## 2024-10-25 DIAGNOSIS — I71.43 INFRARENAL ABDOMINAL AORTIC ANEURYSM (AAA) WITHOUT RUPTURE (H): ICD-10-CM

## 2024-10-25 LAB
CREAT BLD-MCNC: 1.2 MG/DL (ref 0.7–1.3)
EGFRCR SERPLBLD CKD-EPI 2021: 59 ML/MIN/1.73M2

## 2024-10-25 PROCEDURE — 82565 ASSAY OF CREATININE: CPT

## 2024-10-25 PROCEDURE — 250N000009 HC RX 250: Performed by: SURGERY

## 2024-10-25 PROCEDURE — 250N000011 HC RX IP 250 OP 636: Performed by: SURGERY

## 2024-10-25 PROCEDURE — 74174 CTA ABD&PLVS W/CONTRAST: CPT

## 2024-10-25 RX ORDER — IOPAMIDOL 755 MG/ML
500 INJECTION, SOLUTION INTRAVASCULAR ONCE
Status: COMPLETED | OUTPATIENT
Start: 2024-10-25 | End: 2024-10-25

## 2024-10-25 RX ADMIN — IOPAMIDOL 71 ML: 755 INJECTION, SOLUTION INTRAVENOUS at 14:28

## 2024-10-25 RX ADMIN — SODIUM CHLORIDE 48 ML: 9 INJECTION, SOLUTION INTRAVENOUS at 14:30

## 2024-11-02 ENCOUNTER — MYC MEDICAL ADVICE (OUTPATIENT)
Dept: PEDIATRICS | Facility: CLINIC | Age: 89
End: 2024-11-02
Payer: COMMERCIAL

## 2024-11-04 ENCOUNTER — HOSPITAL ENCOUNTER (OUTPATIENT)
Dept: RESPIRATORY THERAPY | Facility: CLINIC | Age: 89
Discharge: HOME | End: 2024-11-04
Attending: NURSE PRACTITIONER | Admitting: NURSE PRACTITIONER
Payer: COMMERCIAL

## 2024-11-04 ENCOUNTER — NURSE TRIAGE (OUTPATIENT)
Dept: PEDIATRICS | Facility: CLINIC | Age: 89
End: 2024-11-04
Payer: COMMERCIAL

## 2024-11-04 DIAGNOSIS — I50.32 CHRONIC DIASTOLIC HEART FAILURE (H): ICD-10-CM

## 2024-11-04 PROCEDURE — 999N000157 HC STATISTIC RCP TIME EA 10 MIN

## 2024-11-04 PROCEDURE — 94010 BREATHING CAPACITY TEST: CPT

## 2024-11-04 PROCEDURE — 94729 DIFFUSING CAPACITY: CPT

## 2024-11-04 PROCEDURE — 94726 PLETHYSMOGRAPHY LUNG VOLUMES: CPT

## 2024-11-04 NOTE — PROGRESS NOTES
PFT Note:        Pt completed pulmonary function testing with DLCO.  Good Pt effort and cooperation.  All testing meets ATS recommendations.  DLCO is an average of 2 maneuvers.  No recent Hgb available for DLCO correction.    November 4, 2024.2:47 PM  Jaron Badillo, RT

## 2024-11-04 NOTE — TELEPHONE ENCOUNTER
S-(situation): difficulty starting urination    B-(background): patient unsure when this started. Has been more aware of it since starting ellipta inhaler 4-5 months ago due to reading about side effects.    A-(assessment): Patient endorsing that he has trouble starting to urinate, intermittent. Occurs a few times a week. Patient is wondering about if this could be side effect of Ellipta inhaler    Patient denies pain or burning with urination, fever, blood in urine, urgency.     R-(recommendations): Per protocol, scheduled within two weeks. Address symptom and possible side effect of Ellipta. If possibility, discuss alternatives. Instructed pt to call back if new or worsening symptoms. Patient was given an opportunity to ask questions, verbalized understanding of plan, and is agreeable.      Rosario Reddy RN            Reason for Disposition   Urination is difficult to start (i.e., hesitancy) or straining    Additional Information   Negative: Shock suspected (e.g., cold/pale/clammy skin, too weak to stand, low BP, rapid pulse)   Negative: Sounds like a life-threatening emergency to the triager   Negative: Followed a female genital area injury (e.g., labia, vagina, vulva)   Negative: Followed a male genital area injury (penis, scrotum)   Negative: Vaginal discharge   Negative: Pus (white, yellow) or bloody discharge from end of penis   Negative: Pain or burning with passing urine (urination) and pregnant   Negative: Pain or burning with passing urine (urination) and female   Negative: Pain or burning with passing urine (urination) and male   Negative: Pain or itching in the vulvar area   Negative: Pain in scrotum is main symptom   Negative: Blood in the urine is main symptom   Negative: Symptoms arising from use of a urinary catheter (e.g., Coude, Arvizu)   Negative: Unable to urinate (or only a few drops) > 4 hours and bladder feels very full (e.g., palpable bladder or strong urge to urinate)   Negative:  "Decreased urination and drinking very little and dehydration suspected (e.g., dark urine, no urine > 12 hours, very dry mouth, very lightheaded)   Negative: Patient sounds very sick or weak to the triager   Negative: Fever > 100.4 F  (38.0 C)   Negative: Side (flank) or lower back pain present   Negative: Bad or foul-smelling urine   Negative: Urinating more frequently than usual (i.e., frequency)   Negative: Can't control passage of urine (i.e., urinary incontinence) and new-onset (< 2 weeks) or worsening   Negative: Patient wants to be seen    Answer Assessment - Initial Assessment Questions  1. SYMPTOM: \"What's the main symptom you're concerned about?\" (e.g., frequency, incontinence)      Having trouble urinating   2. ONSET: \"When did the  *No Answer*  start?\"      Unsure- could have been since prostate cancer or not  3. PAIN: \"Is there any pain?\" If Yes, ask: \"How bad is it?\" (Scale: 1-10; mild, moderate, severe)      no  4. CAUSE: \"What do you think is causing the symptoms?\"      Ellipta?   5. OTHER SYMPTOMS: \"Do you have any other symptoms?\" (e.g., blood in urine, fever, flank pain, pain with urination)      Blood in urine, fever, flank pain, urgency (no to all)  6. PREGNANCY: \"Is there any chance you are pregnant?\" \"When was your last menstrual period?\"      N/a    Protocols used: Urinary Symptoms-A-[place]    "

## 2024-11-05 ENCOUNTER — E-VISIT (OUTPATIENT)
Dept: PEDIATRICS | Facility: CLINIC | Age: 89
End: 2024-11-05
Payer: COMMERCIAL

## 2024-11-05 DIAGNOSIS — I48.91 ATRIAL FIBRILLATION AND FLUTTER (H): Primary | ICD-10-CM

## 2024-11-05 DIAGNOSIS — I48.92 ATRIAL FIBRILLATION AND FLUTTER (H): Primary | ICD-10-CM

## 2024-11-05 DIAGNOSIS — I25.118 CORONARY ARTERY DISEASE OF NATIVE ARTERY OF NATIVE HEART WITH STABLE ANGINA PECTORIS (H): ICD-10-CM

## 2024-11-05 DIAGNOSIS — I50.32 CHRONIC DIASTOLIC HEART FAILURE (H): ICD-10-CM

## 2024-11-05 DIAGNOSIS — R06.09 DOE (DYSPNEA ON EXERTION): ICD-10-CM

## 2024-11-05 DIAGNOSIS — L23.0 CONTACT DERMATITIS DUE TO METALS, UNSPECIFIED CONTACT DERMATITIS TYPE: ICD-10-CM

## 2024-11-05 LAB
DLCOUNC-%PRED-PRE: 72 %
DLCOUNC-PRE: 15.82 ML/MIN/MMHG
DLCOUNC-PRED: 21.93 ML/MIN/MMHG
ERV-%PRED-PRE: 46 %
ERV-PRE: 0.51 L
ERV-PRED: 1.1 L
EXPTIME-PRE: 7.66 SEC
FEF2575-%PRED-PRE: 169 %
FEF2575-PRE: 2.73 L/SEC
FEF2575-PRED: 1.61 L/SEC
FEFMAX-%PRED-PRE: 120 %
FEFMAX-PRE: 6.98 L/SEC
FEFMAX-PRED: 5.78 L/SEC
FEV1-%PRED-PRE: 104 %
FEV1-PRE: 2.43 L
FEV1FEV6-PRE: 82 %
FEV1FEV6-PRED: 75 %
FEV1FVC-PRE: 82 %
FEV1FVC-PRED: 75 %
FEV1SVC-PRE: 78 %
FEV1SVC-PRED: 67 %
FIFMAX-PRE: 3.73 L/SEC
FRCPLETH-%PRED-PRE: 69 %
FRCPLETH-PRE: 2.57 L
FRCPLETH-PRED: 3.72 L
FVC-%PRED-PRE: 94 %
FVC-PRE: 2.95 L
FVC-PRED: 3.12 L
IC-%PRED-PRE: 118 %
IC-PRE: 2.6 L
IC-PRED: 2.2 L
RVPLETH-PRE: 2.06 L
TLCPLETH-%PRED-PRE: 77 %
TLCPLETH-PRE: 5.17 L
TLCPLETH-PRED: 6.67 L
VA-%PRED-PRE: 86 %
VA-PRE: 4.97 L
VC-%PRED-PRE: 89 %
VC-PRE: 3.12 L
VC-PRED: 3.48 L

## 2024-11-05 PROCEDURE — 99421 OL DIG E/M SVC 5-10 MIN: CPT | Performed by: INTERNAL MEDICINE

## 2024-11-07 ENCOUNTER — TELEPHONE (OUTPATIENT)
Dept: DERMATOLOGY | Facility: CLINIC | Age: 89
End: 2024-11-07
Payer: COMMERCIAL

## 2024-11-07 DIAGNOSIS — L23.0 CONTACT DERMATITIS DUE TO METALS, UNSPECIFIED CONTACT DERMATITIS TYPE: Primary | ICD-10-CM

## 2024-11-07 DIAGNOSIS — I48.91 ATRIAL FIBRILLATION AND FLUTTER (H): ICD-10-CM

## 2024-11-07 DIAGNOSIS — I48.92 ATRIAL FIBRILLATION AND FLUTTER (H): ICD-10-CM

## 2024-11-07 NOTE — TELEPHONE ENCOUNTER
[facility] Call Center    Phone Message    May a detailed message be left on voicemail: no     Reason for Call: Appointment Intake    Referring Provider Name: Natalya Lewis MD in [facility]  Diagnosis and/or Symptoms: Atrial fibrillation and flutter (H) [I48.91, I48.92] Contact dermatitis due to metals, unspecified contact dermatitis type [L23.0]   Priority: 1-2 Weeks  Reason for Referral: Patch testing  Additional Information: a fib with falls and head bleeding considering watchman device but has history of skin reaction to metal and needs patch testing first.    Routing to clinic due to urgency of referral and the services needed/requested    Action Taken: Message routed to:  [facility]    Travel Screening: Not Applicable     Date of Service:             No need to reply to sender of message

## 2024-11-07 NOTE — TELEPHONE ENCOUNTER
Check [facility] and [facility] and [facility] do patch testing.  Called [facility] and they can do testing for nickel allergy.  Referral placed.

## 2024-11-11 NOTE — PROGRESS NOTES
HISTORY OF PRESENT ILLNESS:     This is a 89 year old male who follows with Dr De La Paz at [facility]  His past medical history includes:  Atrial Flutter/fibrillation, chronic HFpEF, COPD, ascending aorta dilatation, coronary artery disease, hypertension, sleep apnea, hyperlipidemia, venous insufficiency, remote heavy smoker, prostate cancer s/p radiation, papillary bladder tumor with complete resection, AAA, CKD     Mr More has chronic dyspnea on exertion and prior PFTs showing some restriction and muscular weakness  A stress echo (2011) was abnormal, but he has been treated medically due to lack of anginal symptoms  A repeat stress test (2014) was negative for ischemia      He developed A-fib and atypical atrial flutter in 2018 and was started on anticoagulation  He has been rate controlled due to lack of symptoms      He has complex sleep apnea and uses ASV  therapy     ECHO (2022) LVEF 65-70%, normal RV function, mod-severe left atrial enlargement, mild-moderate pulmonary hypertension, 1+ TR, ascending aorta  4.5, aortic root 4.4 cm     Holter monitor (2022) showed persistent A-fib with average HR 68 bpm, ranging from  bpm  Multiple pauses up to 4.7 seconds in the early am     ECHO (8/2023) showed preserved biventricular function  There was a 2 cm mass noted in the apical portion of the right ventricle ascending aorta 4.5 cm  Subsequent cMRI showed epicardial fat adjacent to the RV apex     He has a tendency for hypotension at times with increased diuretics  His Spironolactone was increased earlier this year for mild heart failure symptoms  ECHO (8/15/24) showed LVEF 60%, normal RV function, severe left atrial enlargement, no significant valvular pathology, mild pulmonary hypertension, ascending aorta 4.5 cm     He was hospitalized (9/8/24) after suffering two mechanical falls with resultant shoulder injury and subarachnoid hemorrhage  His Eliquis was temporarily held and his Losartan dose  was lowered  ECHO showed LVEF 60%, normal RV function, mod-severe RV enlargement (new), no significant valvular pathology, ascending aorta 4.5 cm      Pulmonary function test showed mild restriction    He has since been evaluated by Dr Dasilva to consider left atrial appendage occlusion but family prefers to continue with anticoagulation for now.     Our visit today is for further review     Mr More comes in with his daughter She is a retired nurse practitioner who is attentive to his care  He continues to complain of dyspnea on exertion but this pattern has been unchanged now for over a year  His daughter states that he is breathing better than he did in 2022  He now has a new inhaler, which he does not think he gets much benefit from.  He admits that he does not exercise on a regular basis  He is starting with pulmonary rehab in the near future  He denies chest pain, resting shortness of breath, palpitations or lightheadedness  He uses a walker most of the time and denies any recent falls or near-syncope  His leg swelling has been minimal and his weights have been fairly stable  His main complaint is urinary retention and he has an upcoming visit with Urology            VITAL SIGNS:  BP: 116/70  Pulse:  68  Weight: 239 lbs  (BMI: 36)           IMPRESSION AND PLAN:      Permanent Atrial Fibrillation/Flutter  -HRs controlled Metoprolol  mg  - on Eliquis  (CHADS2 Vasc score: 5)  -defers Watchman device for now and prefers to stay on Eliquis  Some concern for possible Nickel allergy     Hypertension:  -on Losartan 50 mg, Metoprolol  mg, Spironolactone 12.5 mg  -BP controlled     Chronic HFpEF  LVEF 55%, normal RV function with mod-severe RV enlargement  -no significant signs or symptoms of heart failure  -encouraged low salt diet  -on Spironolactone 12.5 mg  -weight stable at home  -Discussed GMT with Jardiance and Entresto  Pt/family agreed to trial Jardiance  Would consider Entresto in future, but  recent history of hypotension may make it difficult for him to tolerate     Ascending Aorta Dilatation  -4.5 cm (stable)  -consider repeating ECHO in 6-9 months     Complex Sleep Apnea  -using ASV therapy    Hyperlipidemia:  -on Atorvastatin 40 mg  -LDL  49     COPD  Prediabetes    The total time for the visit today was 32 minutes which includes patient visit, reviewing of records, discussion, and placing of orders of the outpatient coordination of cardiovascular care as described.  The level of medical decision making during this visit was of moderate complexity.  Thank you for allowing me to participate in their care.    The longitudinal plan of care for the diagnosis(es)/condition(s) as documented were addressed during this visit. Due to the added complexity in care, I will continue to support Spencer in the subsequent management and with ongoing continuity of care.    Orders Placed This Encounter   Procedures    Basic metabolic panel    Follow-Up with Cardiology       Orders Placed This Encounter   Medications    empagliflozin (JARDIANCE) 10 MG TABS tablet     Sig: Take 1 tablet (10 mg) by mouth daily.     Dispense:  90 tablet     Refill:  1       There are no discontinued medications.      Encounter Diagnosis   Name Primary?    Chronic diastolic heart failure (H)        CURRENT MEDICATIONS:  Current Outpatient Medications   Medication Sig Dispense Refill    albuterol (PROAIR HFA/PROVENTIL HFA/VENTOLIN HFA) 108 (90 Base) MCG/ACT inhaler Inhale 2 puffs into the lungs every 6 hours as needed for shortness of breath, wheezing or cough 8 g 2    apixaban ANTICOAGULANT (ELIQUIS) 5 MG tablet Take 1 tablet (5 mg) by mouth 2 times daily. 60 tablet 2    atorvastatin (LIPITOR) 40 MG tablet Take 1 tablet (40 mg) by mouth daily 90 tablet 3    Cholecalciferol (VITAMIN D-3) 25 MCG (1000 UT) CAPS Take 1 capsule by mouth daily.      empagliflozin (JARDIANCE) 10 MG TABS tablet Take 1 tablet (10 mg) by mouth daily. 90 tablet 1     escitalopram (LEXAPRO) 10 MG tablet Take 1 tablet (10 mg) by mouth daily 90 tablet 3    loperamide (IMODIUM) 2 MG capsule Take 2 mg by mouth 2 times daily as needed for diarrhea.      losartan (COZAAR) 50 MG tablet Take 1 tablet (50 mg) by mouth daily. 30 tablet 1    metoprolol succinate ER (TOPROL XL) 50 MG 24 hr tablet Take 100 mg by mouth daily.      RISEdronate (ACTONEL) 35 MG tablet Take 1 tablet (35 mg) by mouth every 7 days 12 tablet 4    spironolactone (ALDACTONE) 25 MG tablet Take 0.5 tablets (12.5 mg) by mouth daily. 15 tablet 1    umeclidinium-vilanterol (ANORO ELLIPTA) 62.5-25 MCG/ACT oral inhaler Inhale 1 puff into the lungs daily 1 each 11       ALLERGIES     Allergies   Allergen Reactions    Amoxicillin-Pot Clavulanate Rash     Type III hypersensitivity    Bactrim [Sulfamethoxazole W/Trimethoprim] Rash     Serum sickness, type III hypersensitivity      Bee Venom Itching    Sulfa Antibiotics Hives    Celebrex [Celecoxib]     Lisinopril Cough    Naproxen Hives    Penicillin G        PAST MEDICAL HISTORY:  Past Medical History:   Diagnosis Date    (HFpEF) heart failure with preserved ejection fraction (H)     AAA (abdominal aortic aneurysm) (H)     Actinic keratosis     Angina pectoris (H) 10/27/2020    Antiplatelet or antithrombotic long-term use     Eliquis    Arthritis     Atrial fibrillation and flutter (H) 12/03/2018    Bladder cancer (H)     CAD (coronary artery disease)     1/5/2011 lateral ischemia on stress echo, 12/2014 normal stress echo    CKD (chronic kidney disease) stage 2, GFR 60-89 ml/min     Depressive disorder     Mild    Diarrhea     Urgency at times    Dyslipidemia     Emphysema of lung (H)     Hernia, abdominal     HTN (hypertension)     Hypertrophy (benign) of prostate 05/2001    Biopsy 5/01 negative for cancer; PSA 5    Lumbago     Mumps     Prostate cancer (H) 12/15/2006    Skin cancer, basal cell 1997    Sleep apnea     Uses a Bi-pap for centralized Apnea    Spider veins         PAST SURGICAL HISTORY:  Past Surgical History:   Procedure Laterality Date    ABDOMEN SURGERY      BACK SURGERY      L4/L5 decompression    BIOPSY      Skin cancer basal cell    COLONOSCOPY      CYSTOSCOPY      CYSTOSCOPY, TRANSURETHRAL RESECTION (TUR) TUMOR BLADDER, COMBINED N/A 2023    Procedure: Cystoscopy, transurethral resection of bladder tumor, medium, 2-5 cm;  Surgeon: Mark Pino MD;  Location: [facility] OR    EYE SURGERY      Cararact    GENITOURINARY SURGERY  2022    Tumors in bladder    HERNIA REPAIR  child    Moh's procedure for basal cell carcinoma  2001    PROSTATE SURGERY  2007    Radiation only    s/p lumbar laminectomy NOS  1988    SIGMOIDOSCOPY FLEXIBLE N/A 06/15/2020    Procedure: SIGMOIDOSCOPY, FLEXIBLE;  Surgeon: Sunni Patton MD;  Location: [facility]    VITRECTOMY PARS PLANA REMOVE PRERETINAL MEMBRANE   2009       FAMILY HISTORY:  Family History   Problem Relation Age of Onset    Alzheimer Disease Mother     Hypertension Mother     Cardiovascular Father         D:86 complications fo CHF    Anesthesia Reaction Father          after 2 weeks    Prostate Cancer Brother     Lung Cancer Brother 76       SOCIAL HISTORY:  Social History     Socioeconomic History    Marital status:      Spouse name: None    Number of children: None    Years of education: None    Highest education level: None   Occupational History    Occupation: retired   Tobacco Use    Smoking status: Former     Current packs/day: 0.00     Average packs/day: 1 pack/day for 30.0 years (30.0 ttl pk-yrs)     Types: Cigarettes     Start date: 1948     Quit date: 1978     Years since quittin.8     Passive exposure: Never    Smokeless tobacco: Never   Vaping Use    Vaping status: Never Used   Substance and Sexual Activity    Alcohol use: Yes     Comment: Rare    Drug use: No    Sexual activity: Not Currently     Partners: Female     Birth control/protection: Post-menopausal    Other Topics Concern    Caffeine Concern No     Comment: 0-2 cans of soda per day.    Exercise No    Seat Belt Yes    Parent/sibling w/ CABG, MI or angioplasty before 65F 55M? No     Social Drivers of Health     Financial Resource Strain: Low Risk  (9/8/2024)    Financial Resource Strain     Within the past 12 months, have you or your family members you live with been unable to get utilities (heat, electricity) when it was really needed?: No   Recent Concern: Financial Resource Strain - High Risk (7/18/2024)    Financial Resource Strain     Within the past 12 months, have you or your family members you live with been unable to get utilities (heat, electricity) when it was really needed?: Yes   Food Insecurity: Low Risk  (9/8/2024)    Food Insecurity     Within the past 12 months, did you worry that your food would run out before you got money to buy more?: No     Within the past 12 months, did the food you bought just not last and you didn t have money to get more?: No   Transportation Needs: Low Risk  (9/8/2024)    Transportation Needs     Within the past 12 months, has lack of transportation kept you from medical appointments, getting your medicines, non-medical meetings or appointments, work, or from getting things that you need?: No   Physical Activity: Insufficiently Active (7/18/2024)    Exercise Vital Sign     Days of Exercise per Week: 1 day     Minutes of Exercise per Session: 10 min   Stress: No Stress Concern Present (7/18/2024)    [employer] - Occupational Stress Questionnaire     Feeling of Stress : Not at all   Social Connections: Unknown (7/18/2024)    Social Connection and Isolation Panel [NHANES]     Frequency of Social Gatherings with Friends and Family: Three times a week   Interpersonal Safety: Low Risk  (9/8/2024)    Interpersonal Safety     Do you feel physically and emotionally safe where you currently live?: Yes     Within the past 12 months, have you been hit,  "slapped, kicked or otherwise physically hurt by someone?: No     Within the past 12 months, have you been humiliated or emotionally abused in other ways by your partner or ex-partner?: No   Housing Stability: Low Risk  (9/8/2024)    Housing Stability     Do you have housing? : Yes     Are you worried about losing your housing?: No       Review of Systems:  Skin:          Eyes:         ENT:         Respiratory:  Positive for shortness of breath;dyspnea on exertion;cough;sleep apnea Uses BiPAP. GALDAMEZ getting a little worse   Cardiovascular:    edema;Positive for    Gastroenterology:        Genitourinary:         Musculoskeletal:         Neurologic:         Psychiatric:         Heme/Lymph/Imm:         Endocrine:           Physical Exam:  Vitals: /70 (BP Location: Right arm, Patient Position: Sitting, Cuff Size: Adult Large)   Pulse 68   Ht 1.715 m (5' 7.5\")   Wt 108.7 kg (239 lb 9.6 oz)   SpO2 99%   BMI 36.97 kg/m      Constitutional:    obese      Skin:  warm and dry to the touch          Head:  normocephalic        Eyes:  pupils equal and round        Lymph:      ENT:  no pallor or cyanosis        Neck:  JVP normal        Respiratory:  clear to auscultation;normal respiratory excursion         Cardiac: normal S1 and S2;no S3 or S4 irregularly irregular rhythm distant heart sounds            pulses full and equal                                        GI:    obese      Extremities and Muscular Skeletal:      bilateral LE edema;trace          Neurological:  no gross motor deficits   using walker    Psych:  affect appropriate, oriented to time, person and place          LORELEI Matias CNP  [id number]  [place] [id number]                    "

## 2024-11-12 ENCOUNTER — OFFICE VISIT (OUTPATIENT)
Dept: CARDIOLOGY | Facility: CLINIC | Age: 89
End: 2024-11-12
Attending: NURSE PRACTITIONER
Payer: COMMERCIAL

## 2024-11-12 VITALS
HEIGHT: 68 IN | BODY MASS INDEX: 36.31 KG/M2 | HEART RATE: 68 BPM | SYSTOLIC BLOOD PRESSURE: 116 MMHG | WEIGHT: 239.6 LBS | DIASTOLIC BLOOD PRESSURE: 70 MMHG | OXYGEN SATURATION: 99 %

## 2024-11-12 DIAGNOSIS — I50.32 CHRONIC DIASTOLIC HEART FAILURE (H): ICD-10-CM

## 2024-11-12 NOTE — LETTER
11/12/2024    Natalya Lewis MD  [address] [place] [address]    RE: Nixon RILEY Argenis       Dear Colleague,     I had the pleasure of seeing Nixon More in the [facility].  HISTORY OF PRESENT ILLNESS:     This is a 89 year old male who follows with Dr De La Paz at [facility]  His past medical history includes:  Atrial Flutter/fibrillation, chronic HFpEF, COPD, ascending aorta dilatation, coronary artery disease, hypertension, sleep apnea, hyperlipidemia, venous insufficiency, remote heavy smoker, prostate cancer s/p radiation, papillary bladder tumor with complete resection, AAA, CKD     Mr More has chronic dyspnea on exertion and prior PFTs showing some restriction and muscular weakness  A stress echo (2011) was abnormal, but he has been treated medically due to lack of anginal symptoms  A repeat stress test (2014) was negative for ischemia      He developed A-fib and atypical atrial flutter in 2018 and was started on anticoagulation  He has been rate controlled due to lack of symptoms      He has complex sleep apnea and uses ASV  therapy     ECHO (2022) LVEF 65-70%, normal RV function, mod-severe left atrial enlargement, mild-moderate pulmonary hypertension, 1+ TR, ascending aorta  4.5, aortic root 4.4 cm     Holter monitor (2022) showed persistent A-fib with average HR 68 bpm, ranging from  bpm  Multiple pauses up to 4.7 seconds in the early am     ECHO (8/2023) showed preserved biventricular function  There was a 2 cm mass noted in the apical portion of the right ventricle ascending aorta 4.5 cm  Subsequent cMRI showed epicardial fat adjacent to the RV apex     He has a tendency for hypotension at times with increased diuretics  His Spironolactone was increased earlier this year for mild heart failure symptoms  ECHO (8/15/24) showed LVEF 60%, normal RV function, severe left atrial enlargement, no significant valvular pathology, mild pulmonary  hypertension, ascending aorta 4.5 cm     He was hospitalized (9/8/24) after suffering two mechanical falls with resultant shoulder injury and subarachnoid hemorrhage  His Eliquis was temporarily held and his Losartan dose was lowered  ECHO showed LVEF 60%, normal RV function, mod-severe RV enlargement (new), no significant valvular pathology, ascending aorta 4.5 cm      Pulmonary function test showed mild restriction    He has since been evaluated by Dr Dasilva to consider left atrial appendage occlusion but family prefers to continue with anticoagulation for now.     Our visit today is for further review     Mr More comes in with his daughter She is a retired nurse practitioner who is attentive to his care  He continues to complain of dyspnea on exertion but this pattern has been unchanged now for over a year  His daughter states that he is breathing better than he did in 2022  He now has a new inhaler, which he does not think he gets much benefit from.  He admits that he does not exercise on a regular basis  He is starting with pulmonary rehab in the near future  He denies chest pain, resting shortness of breath, palpitations or lightheadedness  He uses a walker most of the time and denies any recent falls or near-syncope  His leg swelling has been minimal and his weights have been fairly stable  His main complaint is urinary retention and he has an upcoming visit with Urology            VITAL SIGNS:  BP: 116/70  Pulse:  68  Weight: 239 lbs  (BMI: 36)           IMPRESSION AND PLAN:      Permanent Atrial Fibrillation/Flutter  -HRs controlled Metoprolol  mg  - on Eliquis  (CHADS2 Vasc score: 5)  -defers Watchman device for now and prefers to stay on Eliquis  Some concern for possible Nickel allergy     Hypertension:  -on Losartan 50 mg, Metoprolol  mg, Spironolactone 12.5 mg  -BP controlled     Chronic HFpEF  LVEF 55%, normal RV function with mod-severe RV enlargement  -no significant signs or  symptoms of heart failure  -encouraged low salt diet  -on Spironolactone 12.5 mg  -weight stable at home  -Discussed GMT with Jaranjali and Entresto  Pt/family agreed to trial Azra  Would consider Entresto in future, but recent history of hypotension may make it difficult for him to tolerate     Ascending Aorta Dilatation  -4.5 cm (stable)  -consider repeating ECHO in 6-9 months     Complex Sleep Apnea  -using ASV therapy    Hyperlipidemia:  -on Atorvastatin 40 mg  -LDL  49     COPD  Prediabetes    The total time for the visit today was 32 minutes which includes patient visit, reviewing of records, discussion, and placing of orders of the outpatient coordination of cardiovascular care as described.  The level of medical decision making during this visit was of moderate complexity.  Thank you for allowing me to participate in their care.    The longitudinal plan of care for the diagnosis(es)/condition(s) as documented were addressed during this visit. Due to the added complexity in care, I will continue to support Spencer in the subsequent management and with ongoing continuity of care.    Orders Placed This Encounter   Procedures     Basic metabolic panel     Follow-Up with Cardiology       Orders Placed This Encounter   Medications     empagliflozin (JARDIANCE) 10 MG TABS tablet     Sig: Take 1 tablet (10 mg) by mouth daily.     Dispense:  90 tablet     Refill:  1       There are no discontinued medications.      Encounter Diagnosis   Name Primary?     Chronic diastolic heart failure (H)        CURRENT MEDICATIONS:  Current Outpatient Medications   Medication Sig Dispense Refill     albuterol (PROAIR HFA/PROVENTIL HFA/VENTOLIN HFA) 108 (90 Base) MCG/ACT inhaler Inhale 2 puffs into the lungs every 6 hours as needed for shortness of breath, wheezing or cough 8 g 2     apixaban ANTICOAGULANT (ELIQUIS) 5 MG tablet Take 1 tablet (5 mg) by mouth 2 times daily. 60 tablet 2     atorvastatin (LIPITOR) 40 MG tablet Take 1  tablet (40 mg) by mouth daily 90 tablet 3     Cholecalciferol (VITAMIN D-3) 25 MCG (1000 UT) CAPS Take 1 capsule by mouth daily.       empagliflozin (JARDIANCE) 10 MG TABS tablet Take 1 tablet (10 mg) by mouth daily. 90 tablet 1     escitalopram (LEXAPRO) 10 MG tablet Take 1 tablet (10 mg) by mouth daily 90 tablet 3     loperamide (IMODIUM) 2 MG capsule Take 2 mg by mouth 2 times daily as needed for diarrhea.       losartan (COZAAR) 50 MG tablet Take 1 tablet (50 mg) by mouth daily. 30 tablet 1     metoprolol succinate ER (TOPROL XL) 50 MG 24 hr tablet Take 100 mg by mouth daily.       RISEdronate (ACTONEL) 35 MG tablet Take 1 tablet (35 mg) by mouth every 7 days 12 tablet 4     spironolactone (ALDACTONE) 25 MG tablet Take 0.5 tablets (12.5 mg) by mouth daily. 15 tablet 1     umeclidinium-vilanterol (ANORO ELLIPTA) 62.5-25 MCG/ACT oral inhaler Inhale 1 puff into the lungs daily 1 each 11       ALLERGIES     Allergies   Allergen Reactions     Amoxicillin-Pot Clavulanate Rash     Type III hypersensitivity     Bactrim [Sulfamethoxazole W/Trimethoprim] Rash     Serum sickness, type III hypersensitivity       Bee Venom Itching     Sulfa Antibiotics Hives     Celebrex [Celecoxib]      Lisinopril Cough     Naproxen Hives     Penicillin G        PAST MEDICAL HISTORY:  Past Medical History:   Diagnosis Date     (HFpEF) heart failure with preserved ejection fraction (H)      AAA (abdominal aortic aneurysm) (H)      Actinic keratosis      Angina pectoris (H) 10/27/2020     Antiplatelet or antithrombotic long-term use     Eliquis     Arthritis      Atrial fibrillation and flutter (H) 12/03/2018     Bladder cancer (H)      CAD (coronary artery disease)     1/5/2011 lateral ischemia on stress echo, 12/2014 normal stress echo     CKD (chronic kidney disease) stage 2, GFR 60-89 ml/min      Depressive disorder     Mild     Diarrhea     Urgency at times     Dyslipidemia      Emphysema of lung (H)      Hernia, abdominal      HTN  (hypertension)      Hypertrophy (benign) of prostate 2001    Biopsy  negative for cancer; PSA 5     Lumbago      Mumps      Prostate cancer (H) 12/15/2006     Skin cancer, basal cell 1997     Sleep apnea     Uses a Bi-pap for centralized Apnea     Spider veins        PAST SURGICAL HISTORY:  Past Surgical History:   Procedure Laterality Date     ABDOMEN SURGERY       BACK SURGERY      L4/L5 decompression     BIOPSY      Skin cancer basal cell     COLONOSCOPY       CYSTOSCOPY       CYSTOSCOPY, TRANSURETHRAL RESECTION (TUR) TUMOR BLADDER, COMBINED N/A 2023    Procedure: Cystoscopy, transurethral resection of bladder tumor, medium, 2-5 cm;  Surgeon: Mark Pino MD;  Location: [facility] OR     EYE SURGERY      Cararact     GENITOURINARY SURGERY  2022    Tumors in bladder     HERNIA REPAIR  child     Moh's procedure for basal cell carcinoma  2001     PROSTATE SURGERY  2007    Radiation only     s/p lumbar laminectomy NOS  1988     SIGMOIDOSCOPY FLEXIBLE N/A 06/15/2020    Procedure: SIGMOIDOSCOPY, FLEXIBLE;  Surgeon: Sunni Patton MD;  Location: [facility] GI     VITRECTOMY PARS PLANA REMOVE PRERETINAL MEMBRANE   2009       FAMILY HISTORY:  Family History   Problem Relation Age of Onset     Alzheimer Disease Mother      Hypertension Mother      Cardiovascular Father         D:86 complications fo CHF     Anesthesia Reaction Father          after 2 weeks     Prostate Cancer Brother      Lung Cancer Brother 76       SOCIAL HISTORY:  Social History     Socioeconomic History     Marital status:      Spouse name: None     Number of children: None     Years of education: None     Highest education level: None   Occupational History     Occupation: retired   Tobacco Use     Smoking status: Former     Current packs/day: 0.00     Average packs/day: 1 pack/day for 30.0 years (30.0 ttl pk-yrs)     Types: Cigarettes     Start date: 1948     Quit date: 1978     Years since  quittin.8     Passive exposure: Never     Smokeless tobacco: Never   Vaping Use     Vaping status: Never Used   Substance and Sexual Activity     Alcohol use: Yes     Comment: Rare     Drug use: No     Sexual activity: Not Currently     Partners: Female     Birth control/protection: Post-menopausal   Other Topics Concern     Caffeine Concern No     Comment: 0-2 cans of soda per day.     Exercise No     Seat Belt Yes     Parent/sibling w/ CABG, MI or angioplasty before 65F 55M? No     Social Drivers of Health     Financial Resource Strain: Low Risk  (2024)    Financial Resource Strain      Within the past 12 months, have you or your family members you live with been unable to get utilities (heat, electricity) when it was really needed?: No   Recent Concern: Financial Resource Strain - High Risk (2024)    Financial Resource Strain      Within the past 12 months, have you or your family members you live with been unable to get utilities (heat, electricity) when it was really needed?: Yes   Food Insecurity: Low Risk  (2024)    Food Insecurity      Within the past 12 months, did you worry that your food would run out before you got money to buy more?: No      Within the past 12 months, did the food you bought just not last and you didn t have money to get more?: No   Transportation Needs: Low Risk  (2024)    Transportation Needs      Within the past 12 months, has lack of transportation kept you from medical appointments, getting your medicines, non-medical meetings or appointments, work, or from getting things that you need?: No   Physical Activity: Insufficiently Active (2024)    Exercise Vital Sign      Days of Exercise per Week: 1 day      Minutes of Exercise per Session: 10 min   Stress: No Stress Concern Present (2024)    [employer] [place] [employer] - Occupational Stress Questionnaire      Feeling of Stress : Not at all   Social Connections: Unknown (2024)    Social  "Connection and Isolation Panel [NHANES]      Frequency of Social Gatherings with Friends and Family: Three times a week   Interpersonal Safety: Low Risk  (9/8/2024)    Interpersonal Safety      Do you feel physically and emotionally safe where you currently live?: Yes      Within the past 12 months, have you been hit, slapped, kicked or otherwise physically hurt by someone?: No      Within the past 12 months, have you been humiliated or emotionally abused in other ways by your partner or ex-partner?: No   Housing Stability: Low Risk  (9/8/2024)    Housing Stability      Do you have housing? : Yes      Are you worried about losing your housing?: No       Review of Systems:  Skin:          Eyes:         ENT:         Respiratory:  Positive for shortness of breath;dyspnea on exertion;cough;sleep apnea Uses BiPAP. GALDAMEZ getting a little worse   Cardiovascular:    edema;Positive for    Gastroenterology:        Genitourinary:         Musculoskeletal:         Neurologic:         Psychiatric:         Heme/Lymph/Imm:         Endocrine:           Physical Exam:  Vitals: /70 (BP Location: Right arm, Patient Position: Sitting, Cuff Size: Adult Large)   Pulse 68   Ht 1.715 m (5' 7.5\")   Wt 108.7 kg (239 lb 9.6 oz)   SpO2 99%   BMI 36.97 kg/m      Constitutional:    obese      Skin:  warm and dry to the touch          Head:  normocephalic        Eyes:  pupils equal and round        Lymph:      ENT:  no pallor or cyanosis        Neck:  JVP normal        Respiratory:  clear to auscultation;normal respiratory excursion         Cardiac: normal S1 and S2;no S3 or S4 irregularly irregular rhythm distant heart sounds            pulses full and equal                                        GI:    obese      Extremities and Muscular Skeletal:      bilateral LE edema;trace          Neurological:  no gross motor deficits   using walker    Psych:  affect appropriate, oriented to time, person and place          CC  LORELEI Manrique " CNP  [address]  [place] [id number]                      Thank you for allowing me to participate in the care of your patient.      Sincerely,     LORELEI Dewitt CNP     [facility]  cc:   LORELEI Manrique CNP  [address]  [place] [id number]

## 2024-11-13 ENCOUNTER — OFFICE VISIT (OUTPATIENT)
Dept: PEDIATRICS | Facility: CLINIC | Age: 89
End: 2024-11-13
Payer: COMMERCIAL

## 2024-11-13 ENCOUNTER — TELEPHONE (OUTPATIENT)
Dept: PHARMACY | Facility: OTHER | Age: 89
End: 2024-11-13

## 2024-11-13 VITALS
BODY MASS INDEX: 35.16 KG/M2 | WEIGHT: 232 LBS | RESPIRATION RATE: 16 BRPM | DIASTOLIC BLOOD PRESSURE: 81 MMHG | HEART RATE: 78 BPM | HEIGHT: 68 IN | OXYGEN SATURATION: 98 % | TEMPERATURE: 98.4 F | SYSTOLIC BLOOD PRESSURE: 128 MMHG

## 2024-11-13 DIAGNOSIS — R39.11 DELAYED ONSET OF URINATION: Primary | ICD-10-CM

## 2024-11-13 DIAGNOSIS — N18.30 STAGE 3 CHRONIC KIDNEY DISEASE, UNSPECIFIED WHETHER STAGE 3A OR 3B CKD (H): ICD-10-CM

## 2024-11-13 DIAGNOSIS — J43.8 OTHER EMPHYSEMA (H): ICD-10-CM

## 2024-11-13 DIAGNOSIS — C61 MALIGNANT NEOPLASM OF PROSTATE (H): ICD-10-CM

## 2024-11-13 LAB
ALBUMIN UR-MCNC: NEGATIVE MG/DL
ANION GAP SERPL CALCULATED.3IONS-SCNC: 12 MMOL/L (ref 7–15)
APPEARANCE UR: CLEAR
BACTERIA #/AREA URNS HPF: ABNORMAL /HPF
BILIRUB UR QL STRIP: NEGATIVE
BUN SERPL-MCNC: 24.8 MG/DL (ref 8–23)
CALCIUM SERPL-MCNC: 9.5 MG/DL (ref 8.8–10.4)
CHLORIDE SERPL-SCNC: 108 MMOL/L (ref 98–107)
COLOR UR AUTO: YELLOW
CREAT SERPL-MCNC: 1.25 MG/DL (ref 0.67–1.17)
EGFRCR SERPLBLD CKD-EPI 2021: 56 ML/MIN/1.73M2
GLUCOSE SERPL-MCNC: 94 MG/DL (ref 70–99)
GLUCOSE UR STRIP-MCNC: NEGATIVE MG/DL
HCO3 SERPL-SCNC: 22 MMOL/L (ref 22–29)
HGB UR QL STRIP: NEGATIVE
KETONES UR STRIP-MCNC: NEGATIVE MG/DL
LEUKOCYTE ESTERASE UR QL STRIP: NEGATIVE
MUCOUS THREADS #/AREA URNS LPF: PRESENT /LPF
NITRATE UR QL: NEGATIVE
PH UR STRIP: 5.5 [PH] (ref 5–7)
POTASSIUM SERPL-SCNC: 4.5 MMOL/L (ref 3.4–5.3)
PSA SERPL DL<=0.01 NG/ML-MCNC: 0.03 NG/ML
RBC #/AREA URNS AUTO: ABNORMAL /HPF
SODIUM SERPL-SCNC: 142 MMOL/L (ref 135–145)
SP GR UR STRIP: 1.02 (ref 1–1.03)
SQUAMOUS #/AREA URNS AUTO: ABNORMAL /LPF
UROBILINOGEN UR STRIP-ACNC: 0.2 E.U./DL
WBC #/AREA URNS AUTO: ABNORMAL /HPF

## 2024-11-13 PROCEDURE — G2211 COMPLEX E/M VISIT ADD ON: HCPCS | Performed by: NURSE PRACTITIONER

## 2024-11-13 PROCEDURE — 99214 OFFICE O/P EST MOD 30 MIN: CPT | Performed by: NURSE PRACTITIONER

## 2024-11-13 PROCEDURE — 84153 ASSAY OF PSA TOTAL: CPT | Performed by: NURSE PRACTITIONER

## 2024-11-13 PROCEDURE — 36415 COLL VENOUS BLD VENIPUNCTURE: CPT | Performed by: NURSE PRACTITIONER

## 2024-11-13 PROCEDURE — 81001 URINALYSIS AUTO W/SCOPE: CPT | Performed by: NURSE PRACTITIONER

## 2024-11-13 PROCEDURE — 80048 BASIC METABOLIC PNL TOTAL CA: CPT | Performed by: NURSE PRACTITIONER

## 2024-11-13 ASSESSMENT — PAIN SCALES - GENERAL: PAINLEVEL_OUTOF10: NO PAIN (0)

## 2024-11-13 NOTE — TELEPHONE ENCOUNTER
MTM Recruitment: [facility] insurance     Referral outreach attempt #1 on November 13, 2024      Outcome: left voicemail- Call back number [phone]. Mychart also sent.     Yasmin Greenberg CPhT  [facility]

## 2024-11-13 NOTE — PROGRESS NOTES
"  Assessment & Plan     Delayed onset of urination  Will r/o infection and check PSA. Stop LAMA (although seems unlikely cause) and follow-up with urology in 1 mos.  - Basic metabolic panel; Future  - Prostate Specific Antigen Screen; Future  - Basic metabolic panel  - Prostate Specific Antigen Screen  - UA with Microscopic reflex to Culture; Future  - UA with Microscopic reflex to Culture  - UA Microscopic with Reflex to Culture    Malignant neoplasm of prostate  Has follow-up with urology in 1 mos.    Stage 3 chronic kidney disease, unspecified whether stage 3a or 3b CKD (H)  Recheck labs.    Dyspnea on exertion  PFTs showed mild restriction. PCP had started LAMA/LABA therapy before PFT results but not helpful for symptoms.     The longitudinal plan of care for the diagnosis(es)/condition(s) as documented were addressed during this visit. Due to the added complexity in care, I will continue to support Spencer in the subsequent management and with ongoing continuity of care.        There are no Patient Instructions on file for this visit.    Subjective   Spencer is a 89 year old, presenting for the following health issues:  Urinary Problem        11/13/2024     3:02 PM   Additional Questions   Roomed by [facility]   Accompanied by self     Pt here today to discuss urinary concern  Pt notes the past 1 month he has had difficulties starting his urine stream  Worst in the mornings or middle of the night  Feels he is urinating lower volumes  Denies any suprapubic pain, fever, chills, hematuria  Urine possibly looks darker    Hx of urge incontinence and dribbling-not new or worsening    Hx of prostate Ca  Off meds for 4 years  PSAs has been undetectable  Has follow up with urology next month            Objective    /81 (BP Location: Right arm, Patient Position: Sitting, Cuff Size: Adult Large)   Pulse 78   Temp 98.4  F (36.9  C) (Temporal)   Resp 16   Ht 1.715 m (5' 7.5\")   Wt 105.2 kg (232 lb)   SpO2 98%   BMI 35.80 " kg/m    Body mass index is 35.8 kg/m .  Physical Exam   GENERAL: alert and no distress  PSYCH: mentation appears normal, affect normal/bright    Results for orders placed or performed in visit on 11/13/24   Basic metabolic panel     Status: Abnormal   Result Value Ref Range    Sodium 142 135 - 145 mmol/L    Potassium 4.5 3.4 - 5.3 mmol/L    Chloride 108 (H) 98 - 107 mmol/L    Carbon Dioxide (CO2) 22 22 - 29 mmol/L    Anion Gap 12 7 - 15 mmol/L    Urea Nitrogen 24.8 (H) 8.0 - 23.0 mg/dL    Creatinine 1.25 (H) 0.67 - 1.17 mg/dL    GFR Estimate 56 (L) >60 mL/min/1.73m2    Calcium 9.5 8.8 - 10.4 mg/dL    Glucose 94 70 - 99 mg/dL   Prostate Specific Antigen Screen     Status: None   Result Value Ref Range    Prostate Specific Antigen Screen 0.03 ng/mL    Narrative    This result is obtained using the Roche Elecsys total PSA method on the josseline e801 immunoassay analyzer, which is an ultrasensitive method. Results obtained with different assay methods or kits cannot be used interchangeably.  This test is intended for initial prostate cancer screening. PSA values exceeding the age-specific limits are suspicious for prostate disease, but additional testing, such as prostate biopsy, is needed to diagnose prostate pathology. The American Cancer Society recommends annual examination with digital rectal examination and serum PSA beginning at age 50 and for men with a life expectancy of at least 10 years after detection of prostate cancer. For men in high-risk groups, such as  Americans or men with a first-degree relative diagnosed at a younger age, testing should begin at a younger age. It is generally recommended that information be provided to patients about the benefits and limitations of testing and treatment so they can make informed decisions.   UA with Microscopic reflex to Culture     Status: Normal    Specimen: Urine, Clean Catch   Result Value Ref Range    Color Urine Yellow Colorless, Straw, Light Yellow,  Yellow    Appearance Urine Clear Clear    Glucose Urine Negative Negative mg/dL    Bilirubin Urine Negative Negative    Ketones Urine Negative Negative mg/dL    Specific Gravity Urine 1.025 1.003 - 1.035    Blood Urine Negative Negative    pH Urine 5.5 5.0 - 7.0    Protein Albumin Urine Negative Negative mg/dL    Urobilinogen Urine 0.2 0.2, 1.0 E.U./dL    Nitrite Urine Negative Negative    Leukocyte Esterase Urine Negative Negative   UA Microscopic with Reflex to Culture     Status: Abnormal   Result Value Ref Range    Bacteria Urine Few (A) None Seen /HPF    RBC Urine 2-5 (A) 0-2 /HPF /HPF    WBC Urine 0-5 0-5 /HPF /HPF    Squamous Epithelials Urine Moderate (A) None Seen /LPF    Mucus Urine Present (A) None Seen /LPF    Narrative    Urine Culture not indicated           Signed Electronically by: Maggie Davis NP

## 2024-11-14 ENCOUNTER — MYC MEDICAL ADVICE (OUTPATIENT)
Dept: PEDIATRICS | Facility: CLINIC | Age: 89
End: 2024-11-14
Payer: COMMERCIAL

## 2024-11-14 DIAGNOSIS — N18.30 STAGE 3 CHRONIC KIDNEY DISEASE, UNSPECIFIED WHETHER STAGE 3A OR 3B CKD (H): Primary | ICD-10-CM

## 2024-11-15 DIAGNOSIS — I48.91 A-FIB (H): Primary | ICD-10-CM

## 2024-11-15 RX ORDER — METOPROLOL SUCCINATE 50 MG/1
100 TABLET, EXTENDED RELEASE ORAL DAILY
Qty: 180 TABLET | Refills: 4 | Status: SHIPPED | OUTPATIENT
Start: 2024-11-15

## 2024-11-21 ENCOUNTER — HOSPITAL ENCOUNTER (OUTPATIENT)
Dept: CARDIAC REHAB | Facility: CLINIC | Age: 89
Discharge: HOME | End: 2024-11-21
Attending: INTERNAL MEDICINE
Payer: COMMERCIAL

## 2024-11-21 DIAGNOSIS — I25.118 CORONARY ARTERY DISEASE OF NATIVE ARTERY OF NATIVE HEART WITH STABLE ANGINA PECTORIS (H): ICD-10-CM

## 2024-11-21 DIAGNOSIS — I48.91 ATRIAL FIBRILLATION AND FLUTTER (H): ICD-10-CM

## 2024-11-21 DIAGNOSIS — I48.92 ATRIAL FIBRILLATION AND FLUTTER (H): ICD-10-CM

## 2024-11-21 DIAGNOSIS — I50.32 CHRONIC DIASTOLIC HEART FAILURE (H): ICD-10-CM

## 2024-11-21 DIAGNOSIS — R06.09 DOE (DYSPNEA ON EXERTION): ICD-10-CM

## 2024-11-21 PROCEDURE — 93798 PHYS/QHP OP CAR RHAB W/ECG: CPT

## 2024-11-21 PROCEDURE — 93797 PHYS/QHP OP CAR RHAB WO ECG: CPT

## 2024-11-22 ENCOUNTER — HOSPITAL ENCOUNTER (OUTPATIENT)
Dept: CARDIAC REHAB | Facility: CLINIC | Age: 89
Discharge: HOME | End: 2024-11-22
Attending: INTERNAL MEDICINE
Payer: COMMERCIAL

## 2024-11-22 PROCEDURE — 93798 PHYS/QHP OP CAR RHAB W/ECG: CPT

## 2024-11-25 ENCOUNTER — TELEPHONE (OUTPATIENT)
Dept: PHARMACY | Facility: OTHER | Age: 89
End: 2024-11-25
Payer: COMMERCIAL

## 2024-11-25 NOTE — TELEPHONE ENCOUNTER
MT Recruitment: [facility] insurance     Referral outreach attempt #2 on November 25, 2024      Outcome: left voicemail- Call back number [phone].     Yasmin Greenberg CPhT  [facility]

## 2024-11-27 ENCOUNTER — HOSPITAL ENCOUNTER (OUTPATIENT)
Dept: CARDIAC REHAB | Facility: CLINIC | Age: 89
Discharge: HOME | End: 2024-11-27
Attending: INTERNAL MEDICINE
Payer: COMMERCIAL

## 2024-11-27 PROCEDURE — 93798 PHYS/QHP OP CAR RHAB W/ECG: CPT

## 2024-11-29 ENCOUNTER — HOSPITAL ENCOUNTER (OUTPATIENT)
Dept: CARDIAC REHAB | Facility: CLINIC | Age: 89
Discharge: HOME | End: 2024-11-29
Attending: INTERNAL MEDICINE
Payer: COMMERCIAL

## 2024-11-29 PROCEDURE — 93798 PHYS/QHP OP CAR RHAB W/ECG: CPT

## 2024-12-02 ENCOUNTER — HOSPITAL ENCOUNTER (OUTPATIENT)
Dept: CARDIAC REHAB | Facility: CLINIC | Age: 89
Discharge: HOME | End: 2024-12-02
Attending: INTERNAL MEDICINE
Payer: COMMERCIAL

## 2024-12-02 PROCEDURE — 93798 PHYS/QHP OP CAR RHAB W/ECG: CPT

## 2024-12-04 ENCOUNTER — HOSPITAL ENCOUNTER (OUTPATIENT)
Dept: CARDIAC REHAB | Facility: CLINIC | Age: 89
Discharge: HOME | End: 2024-12-04
Attending: INTERNAL MEDICINE
Payer: COMMERCIAL

## 2024-12-04 PROCEDURE — 93798 PHYS/QHP OP CAR RHAB W/ECG: CPT

## 2024-12-06 ENCOUNTER — HOSPITAL ENCOUNTER (OUTPATIENT)
Dept: CARDIAC REHAB | Facility: CLINIC | Age: 89
Discharge: HOME | End: 2024-12-06
Attending: INTERNAL MEDICINE
Payer: COMMERCIAL

## 2024-12-06 PROCEDURE — 93798 PHYS/QHP OP CAR RHAB W/ECG: CPT

## 2024-12-09 ENCOUNTER — HOSPITAL ENCOUNTER (OUTPATIENT)
Dept: CARDIAC REHAB | Facility: CLINIC | Age: 89
Discharge: HOME | End: 2024-12-09
Attending: INTERNAL MEDICINE
Payer: COMMERCIAL

## 2024-12-09 PROCEDURE — 93798 PHYS/QHP OP CAR RHAB W/ECG: CPT

## 2024-12-11 ENCOUNTER — HOSPITAL ENCOUNTER (OUTPATIENT)
Dept: CARDIAC REHAB | Facility: CLINIC | Age: 89
Discharge: HOME | End: 2024-12-11
Attending: INTERNAL MEDICINE
Payer: COMMERCIAL

## 2024-12-11 ENCOUNTER — LAB (OUTPATIENT)
Dept: LAB | Facility: CLINIC | Age: 89
End: 2024-12-11
Payer: COMMERCIAL

## 2024-12-11 DIAGNOSIS — I50.32 CHRONIC DIASTOLIC HEART FAILURE (H): ICD-10-CM

## 2024-12-11 DIAGNOSIS — C61 PROSTATE CANCER (H): ICD-10-CM

## 2024-12-11 PROCEDURE — 36415 COLL VENOUS BLD VENIPUNCTURE: CPT

## 2024-12-11 PROCEDURE — 80048 BASIC METABOLIC PNL TOTAL CA: CPT

## 2024-12-11 PROCEDURE — 84153 ASSAY OF PSA TOTAL: CPT

## 2024-12-12 ENCOUNTER — MYC MEDICAL ADVICE (OUTPATIENT)
Dept: CARDIOLOGY | Facility: CLINIC | Age: 89
End: 2024-12-12

## 2024-12-12 ENCOUNTER — TELEPHONE (OUTPATIENT)
Dept: ANTICOAGULATION | Facility: CLINIC | Age: 89
End: 2024-12-12

## 2024-12-12 DIAGNOSIS — N18.31 STAGE 3A CHRONIC KIDNEY DISEASE (H): Primary | ICD-10-CM

## 2024-12-12 LAB
ANION GAP SERPL CALCULATED.3IONS-SCNC: 14 MMOL/L (ref 7–15)
BUN SERPL-MCNC: 28.3 MG/DL (ref 8–23)
CALCIUM SERPL-MCNC: 9.6 MG/DL (ref 8.8–10.4)
CHLORIDE SERPL-SCNC: 108 MMOL/L (ref 98–107)
CREAT SERPL-MCNC: 1.51 MG/DL (ref 0.67–1.17)
EGFRCR SERPLBLD CKD-EPI 2021: 44 ML/MIN/1.73M2
GLUCOSE SERPL-MCNC: 173 MG/DL (ref 70–99)
HCO3 SERPL-SCNC: 20 MMOL/L (ref 22–29)
POTASSIUM SERPL-SCNC: 5 MMOL/L (ref 3.4–5.3)
PSA SERPL DL<=0.01 NG/ML-MCNC: 0.03 NG/ML
SODIUM SERPL-SCNC: 142 MMOL/L (ref 135–145)

## 2024-12-12 NOTE — PROGRESS NOTES
Medication Therapy Management (MTM) Encounter    ASSESSMENT:                            Medication Adherence/Access: No issues identified.    Afib::    [id number] score indicates appropriate anticoagulation.    HFpEF (>/=50%)/CAD/CKD3  Patient is meeting BP goal of < 130/80mmHg..    Not more than a 30% increase in creatinine with addition of SGLT-2 inhibitor, discussed the below information and suggested we continue recheck basic metabolic panel in 1 month and monitor home blood pressure.  Not able to reduce diuretic.    Ishan CONTRERAS, Chanel MESA, eds. Pharmacotherapy Self-Assessment Program, 2023 Book 1. Endocrinology and Nephrology. [place]: American College of [employer] Pharm[employer], 2023:  The proposed mechanism of SGLT2 inhibitors for kidney protection is mainly by restoration of impaired tubuloglomerular feedback and reduced hyperfiltration. It is hypothesized that the inhibition of glucose and sodium reuptake in a 1:1 ratio at the proximal tubule leads to increased sodium excretion detected at the macula densa, which facilitates vasoconstriction of the afferent arteriole and reduced intraglomerular pressure through tubuloglomerular feedback. Although vasoconstriction of the afferent arteriole induces a transient decline in eGFR during the first weeks of treatment with SGLT2 inhibitors, eGFR gradually returns to baseline and stabilizes while reducing glomerular hyperfiltration to have longterm kidney-protective effects. Similar to the initiation of an ACEI or ARB, the transient eGFR decline with SGLT2 inhibitor initiation should be expected and is usually not an indication to discontinue therapy (KDIGO 2020).     Hyperlipidemia   LDL <70 Stable.    COPD   Stable    Depression  Discussed side effect of diarrhea and selective serotonin reuptake inhibitor, he prefers to continue and manage diarrhea  Per UpToDate: Gastrointestinal: Diarrhea (6% to 14%), nausea (15% to 18%)     Osteopenia:   Should repeat DEXA June 2026.  Patient is not meeting RDI of calcium 1200mg/day. Patient would benefit from additional supplementation with calcium.    Diarrhea: discussed taking psyllium daily and increasing dose    Vaccines: UpToDate  PLAN:                            Heart Failure:    Continue Jardiance for now.    Check basic metabolic panel in one month and make follow-up with Neisha Bañuelos CNP  Check blood pressure every morning on the days you do not have cardiac rehab    Diarrhea:  take 2 psyllium capsules at lunch     Osteopenia:   Due for DEXA June 2026  Add in Citra-Robert Slow Release Calcium 2 tablets every day     Follow-up: Return in about 1 year (around 12/17/2025) for MTM Pharmacist Visit.    SUBJECTIVE/OBJECTIVE:                          Spencer More is a 89 year old male seen for an initial visit. He was referred to me from Maggie Davis NP.  His daughter, Amber (#[phone]), joined phone visit today.    Reason for visit: he wants to know if what he is taking is correct.    Allergies/ADRs: Reviewed in chart, will be checked for nickel allergy  Past Medical History: Reviewed in chart History of low grade bladder and prostate cancer  Tobacco: He reports that he quit smoking about 46 years ago. His smoking use included cigarettes. He started smoking about 77 years ago. He has a 30 pack-year smoking history. He has never been exposed to tobacco smoke. He has never used smokeless tobacco.  Alcohol: Less than 1 beverage / month--maybe a beer  Caffeine: 1 cup 1-2 times a week    Medication Adherence/Access: Patient uses pill box(es).  Patient takes medications 2 time(s) per day.   Medication barriers: none.   The patient fills medications at [facility]: NO, fills medications at [facility].    Hypertension   Afib/Hypertension/CAD/HpEF:  Eliquis 5mg twice daily for stroke prevention  Beta Blocker: metoprolol ER 100mg every day   ACEi/ARB/ARNI: losartan 50mg every day   SGLT2i: Jardiance 10mg every day   MRA: spironolactone 12.5mg every day    Cardiac rehab program  Patient reports the following medication side effects: low blood pressure in the past but okay now, dizziness at time, urinary urgency and frequency, will see urology this week, decline in GFR.  When spironolactone stopped previously he had edema and then restarted.  Failed taking torsemide.  He reports his urine is not too dark, his daughter suggests he needs to drink more water.  Patient reports these HF symptoms: shortness of breath with exertion   Patient is not measuring daily weights but getting done three times a week and weight is stable.   Patient does not self-monitor blood pressure, occasionally takes this but at cardiac rehab is taking.  110/65 and yesterday 98.  [id number] score of 5.   Dr. De La Paz at [facility]  Serum creatinine: 1.51 mg/dL (H) 12/11/24 1030  Estimated Glomerular Filtration Rate: 44.4 mL/min/1.73m2 (A)        Hyperlipidemia   atorvastatin 10mg daily  Patient reports no significant myalgias or other side effects.  Recent Labs   Lab Test 07/23/24  1237 09/21/23  0900   CHOL 114 115   HDL 41 42   LDL 49 54   TRIG 121 94        COPD   He is not taking anything for breathing and does not want to take anything  Previously on LABA/LAMA not effective and LAMA-delayed urination      Patient reports no current medication side effects.    Patient reports the following symptoms: increased shortness of breath at times, maybe holding his breath, sometimes with exertion  PFT done 8/9/2018 FEV1/FEV% 71 %, mild restrictive impairment  Saw Dr. Moreno in the past       Mental Health   Depression  Escitalopram 10mg once daily.   Patient reports the following medication side effects: diarrhea.  Current symptoms include: none had depression when he was not taking this medication especially in the winter  Patient reports symptoms are stable.     Bone Health   Osteopenia:   Vitamin D 1000 units every day   risedronate (Actonel) 35mg weekly (has been on current therapy  since June 2021)  Take Monday morning and stay upright for 30 minutes.  Gets a spasm in esophagus at times  Patient is not experiencing side effects.  DEXA History: 7/19/21  Patient is getting  1 glass of milk, 1 serving(s)/day of calcium in their diet.     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  ----------------      I spent 59 minutes with this patient today. All changes were made via collaborative practice agreement with Natalya Lewis MD.     Post Discharge Medication Reconciliation Status: discharge medications reconciled and changed, per note/orders.      A summary of these recommendations was sent via [employer].    Yasmin Voss, PharmD BCPS  Medication Therapy Management Practitioner  Pharmacist    Telemedicine Visit Details  The patient's medications can be safely assessed via a telemedicine encounter.  Type of service:  Telephone visit  Originating Location (pt. Location): Home    Distant Location (provider location):  On-site  Start Time:  8:00AM  End Time: 8:59 AM     Medication Therapy Recommendations  Diarrhea, unspecified type   1 Current Medication: PSYLLIUM HUSK PO   Current Medication Sig: Take 1-2 tablets by mouth daily as needed (diarrhea).   Rationale: Patient forgets to take - Adherence - Adherence   Recommendation: Provide Education   Status: Patient Agreed - Adherence/Education   Identified Date: 12/17/2024 Completed Date: 12/17/2024         Osteopenia, unspecified location   1 Current Medication: RISEdronate (ACTONEL) 35 MG tablet    Current Medication Sig: Take 1 tablet (35 mg) by mouth every 7 days   Rationale: Synergistic therapy - Needs additional medication therapy - Indication   Recommendation: Start Medication - Citracal Slow Release 600- MG-MG-UNIT Tb24   Status: Accepted - no CPA Needed   Identified Date: 12/17/2024 Completed Date: 12/17/2024

## 2024-12-12 NOTE — TELEPHONE ENCOUNTER
ANTICOAGULATION DIRECT ORAL ANTICOAGULANT MONITORING    SUBJECTIVE     The [facility] is evaluating Nixon More's Apixaban (Eliquis) as part of its Anticoagulation Monitoring Program.    Indication:Atrial Fibrillation  Current dose per medication list: Apixaban 5 mg TWICE daily  Recent hospitalizations/ED/Office Visits for bleeding/clotting concerns: Yes: subarachnoid hemorrhage noted in 10/1/24 virtual visit, anticoagulation was held and then resumed.  Other bleeding or side effect concerns: Yes: noted in chart that patient is not a good candidate for long-term anticoagulation due to history of falls.  Additional findings: deferring Watchman device per 11/12/24 cardiology office visit     OBJECTIVE     Age: 89 year old    Wt Readings from Last 2 Encounters:   11/22/24 108 kg (238 lb)   11/13/24 105.2 kg (232 lb)      Lab Results   Component Value Date    CR 1.51 (H) 12/11/2024    CR 1.25 (H) 11/13/2024    CR 1.2 10/25/2024     Creatinine Clearance (using actual bodyweight, mL/min):     Lab Results   Component Value Date    HGB 12.6 09/09/2024    HGB 12.8 06/18/2021     09/09/2024     06/18/2021     ASSESSMENT/PLAN     A chart review for Direct Oral Anticoagulant (DOAC) Stewardship has been completed for:     Lab monitoring: is current. Next serum creatinine recommended to be assessed on: 03/25     Plan made per [facility] anticoagulation protocol    Niharika Priest RN  Anticoagulation Clinic

## 2024-12-13 ENCOUNTER — HOSPITAL ENCOUNTER (OUTPATIENT)
Dept: CARDIAC REHAB | Facility: CLINIC | Age: 89
Discharge: HOME | End: 2024-12-13
Attending: INTERNAL MEDICINE
Payer: COMMERCIAL

## 2024-12-13 PROCEDURE — 93798 PHYS/QHP OP CAR RHAB W/ECG: CPT

## 2024-12-15 ENCOUNTER — TELEPHONE (OUTPATIENT)
Dept: CARDIOLOGY | Facility: CLINIC | Age: 89
End: 2024-12-15
Payer: COMMERCIAL

## 2024-12-15 DIAGNOSIS — N18.31 STAGE 3A CHRONIC KIDNEY DISEASE (H): Primary | ICD-10-CM

## 2024-12-16 ENCOUNTER — HOSPITAL ENCOUNTER (OUTPATIENT)
Dept: CARDIAC REHAB | Facility: CLINIC | Age: 89
Discharge: HOME | End: 2024-12-16
Attending: INTERNAL MEDICINE
Payer: COMMERCIAL

## 2024-12-16 PROCEDURE — 93798 PHYS/QHP OP CAR RHAB W/ECG: CPT | Performed by: OCCUPATIONAL THERAPIST

## 2024-12-17 ENCOUNTER — VIRTUAL VISIT (OUTPATIENT)
Dept: PHARMACY | Facility: CLINIC | Age: 89
End: 2024-12-17
Payer: COMMERCIAL

## 2024-12-17 DIAGNOSIS — I50.32 CHRONIC DIASTOLIC HEART FAILURE (H): Primary | ICD-10-CM

## 2024-12-17 DIAGNOSIS — N18.31 STAGE 3A CHRONIC KIDNEY DISEASE (H): ICD-10-CM

## 2024-12-17 DIAGNOSIS — J43.8 OTHER EMPHYSEMA (H): ICD-10-CM

## 2024-12-17 DIAGNOSIS — F32.9 MAJOR DEPRESSIVE DISORDER, REMISSION STATUS UNSPECIFIED, UNSPECIFIED WHETHER RECURRENT: ICD-10-CM

## 2024-12-17 DIAGNOSIS — I48.91 ATRIAL FIBRILLATION AND FLUTTER (H): ICD-10-CM

## 2024-12-17 DIAGNOSIS — M85.80 OSTEOPENIA, UNSPECIFIED LOCATION: ICD-10-CM

## 2024-12-17 DIAGNOSIS — E78.5 DYSLIPIDEMIA: ICD-10-CM

## 2024-12-17 DIAGNOSIS — I25.118 CORONARY ARTERY DISEASE OF NATIVE ARTERY OF NATIVE HEART WITH STABLE ANGINA PECTORIS (H): ICD-10-CM

## 2024-12-17 DIAGNOSIS — R19.7 DIARRHEA, UNSPECIFIED TYPE: ICD-10-CM

## 2024-12-17 DIAGNOSIS — I48.92 ATRIAL FIBRILLATION AND FLUTTER (H): ICD-10-CM

## 2024-12-17 DIAGNOSIS — Z71.85 VACCINE COUNSELING: ICD-10-CM

## 2024-12-17 PROCEDURE — 99605 MTMS BY PHARM NP 15 MIN: CPT | Mod: 93 | Performed by: PHARMACIST

## 2024-12-17 PROCEDURE — 99607 MTMS BY PHARM ADDL 15 MIN: CPT | Mod: 93 | Performed by: PHARMACIST

## 2024-12-17 RX ORDER — DIETHYLTOLUAMIDE 7 %
2 SPRAY, NON-AEROSOL (ML) TOPICAL DAILY
COMMUNITY

## 2024-12-17 NOTE — LETTER
"Recommended To-Do List      Prepared on: Dec 17, 2024       You can get the best results from your medications by completing the items on this \"To-Do List.\"      Bring your To-Do List when you go to your doctor. And, share it with your family or caregivers.    My To-Do List:  What we talked about: What I should do:   The importance of taking your medication as intended    Take 2 psyllium capsules at lunch           What we talked about: What I should do:   A new medication that may be of benefit to you    Start taking Citracal Slow Release          What we talked about: What I should do:                       "

## 2024-12-17 NOTE — LETTER
_  Medication List        Prepared on: Dec 17, 2024     Bring your Medication List when you go to the doctor, hospital, or   emergency room. And, share it with your family or caregivers.     Note any changes to how you take your medications.  Cross out medications when you no longer use them.    Medication How I take it Why I use it Prescriber   apixaban ANTICOAGULANT (ELIQUIS) 5 MG tablet Take 1 tablet (5 mg) by mouth 2 times daily. Atrial fibrillation and flutter (H); Long term current use of anticoagulant therapy Natalya Lewis MD   atorvastatin (LIPITOR) 40 MG tablet Take 1 tablet (40 mg) by mouth daily Dyslipidemia Natalya Lewis MD   Calcium-Magnesium-Vitamin D (CITRACAL SLOW RELEASE) 600- MG-MG-UNIT TB24 Take 2 tablets by mouth daily. Osteopenia Patient Reported   Cholecalciferol (VITAMIN D-3) 25 MCG (1000 UT) CAPS Take 1 capsule by mouth daily. Osteopenia Patient Reported   empagliflozin (JARDIANCE) 10 MG TABS tablet Take 10 mg by mouth daily. Heart Failure Neisha Bañuelos CNP   escitalopram (LEXAPRO) 10 MG tablet Take 1 tablet (10 mg) by mouth daily Mild Major Depression (H) Natalya Lewis MD   loperamide (IMODIUM) 2 MG capsule Take 2 mg by mouth 2 times daily as needed for diarrhea. Diarrhea Entered By History Unknown   losartan (COZAAR) 50 MG tablet Take 1 tablet (50 mg) by mouth daily. Dyslipidemia Ac Burns PA-C   metoprolol succinate ER (TOPROL XL) 50 MG 24 hr tablet Take 2 tablets (100 mg) by mouth daily. A-Fib (H) LORELEI Manrique CNP   PSYLLIUM HUSK PO Take 1-2 tablets by mouth daily as needed (diarrhea). Diarhea Patient Reported   RISEdronate (ACTONEL) 35 MG tablet Take 1 tablet (35 mg) by mouth every 7 days Osteopenia of both hips Natalya Lewis MD   spironolactone (ALDACTONE) 25 MG tablet Take 0.5 tablets (12.5 mg) by mouth daily. Persistent Atrial Fibrillation (H); Benign Essential Hypertension Ac Burns PA-C         Add new medications, over-the-counter drugs, herbals, vitamins,  or  minerals in the blank rows below.    Medication How I take it Why I use it Prescriber                                      Allergies:      - Amoxicillin-pot Clavulanate - Rash  - Bactrim [sulfamethoxazole W/trimethoprim] - Rash  - Bee Venom - Itching  - Sulfa Antibiotics - Hives  - Celebrex [celecoxib]  - Lisinopril - Cough  - Naproxen - Hives  - Penicillin G        Side effects I have had:      Not on File        Other Information:              My notes and questions:

## 2024-12-17 NOTE — PATIENT INSTRUCTIONS
Recommendations from today's MTM visit:                                                    MTM (medication therapy management) is a service provided by a clinical pharmacist designed to help you get the most of out of your medicines.     Heart Failure:    Continue Jardiance for now.    Check basic metabolic panel in one month and make follow-up with Neisha Bañuelos CNP  Check blood pressure every morning on the days you do not have cardiac rehab    Diarrhea:  take 2 psyllium capsule at lunch     Osteopenia:   Due for DEXA June 2026  Add in Citra-Robert Slow Release Calcium 2 tablets every day     Follow-up: Return in about 1 year (around 12/17/2025) for MTM Pharmacist Visit.    To schedule another MTM appointment, please call the clinic directly or you may call the MTM scheduling line at [phone] or toll-free at [phone].     My Clinical Pharmacist's contact information:                                                      It was a pleasure seeing you today!  Please feel free to contact me with any questions or concerns you have.      Yasmin Voss, PharmD BCPS  Medication Therapy Management Practitioner    It was great to speak with you today.  I value your experience and would be very thankful for your time with providing feedback on our clinic survey. You may receive a survey via email or text message in the next few days.

## 2024-12-17 NOTE — LETTER
December 17, 2024  Nixon Kingon  [address]  [place] [address]    Dear Mariusz KingonMADYSON [facility]ICE     Thank you for talking with me on Dec 17, 2024 about your health and medications. As a follow-up to our conversation, I have included two documents:      Your Recommended To-Do List has steps you should take to get the best results from your medications.  Your Medication List will help you keep track of your medications and how to take them.    If you want to talk about these documents, please call Yasmin Voss RPH at phone: [phone], Monday-Friday 8-4:30pm.    I look forward to working with you and your doctors to make sure your medications work well for you.    Sincerely,  Yasmin Voss RPH  [facility] Pharmacist, [facility]

## 2024-12-18 ENCOUNTER — HOSPITAL ENCOUNTER (OUTPATIENT)
Dept: CARDIAC REHAB | Facility: CLINIC | Age: 89
Discharge: HOME | End: 2024-12-18
Attending: INTERNAL MEDICINE
Payer: COMMERCIAL

## 2024-12-18 ENCOUNTER — OFFICE VISIT (OUTPATIENT)
Dept: UROLOGY | Facility: CLINIC | Age: 89
End: 2024-12-18
Payer: COMMERCIAL

## 2024-12-18 VITALS — DIASTOLIC BLOOD PRESSURE: 86 MMHG | SYSTOLIC BLOOD PRESSURE: 126 MMHG

## 2024-12-18 DIAGNOSIS — C61 MALIGNANT NEOPLASM OF PROSTATE (H): ICD-10-CM

## 2024-12-18 DIAGNOSIS — Z79.2 PROPHYLACTIC ANTIBIOTIC: Primary | ICD-10-CM

## 2024-12-18 DIAGNOSIS — C61 PROSTATE CANCER (H): ICD-10-CM

## 2024-12-18 DIAGNOSIS — Z85.51 PERSONAL HISTORY OF MALIGNANT NEOPLASM OF BLADDER: ICD-10-CM

## 2024-12-18 LAB
ALBUMIN UR-MCNC: 30 MG/DL
APPEARANCE UR: CLEAR
BILIRUB UR QL STRIP: NEGATIVE
COLOR UR AUTO: YELLOW
GLUCOSE UR STRIP-MCNC: >=1000 MG/DL
HGB UR QL STRIP: NEGATIVE
KETONES UR STRIP-MCNC: NEGATIVE MG/DL
LEUKOCYTE ESTERASE UR QL STRIP: NEGATIVE
NITRATE UR QL: NEGATIVE
PH UR STRIP: 5.5 [PH] (ref 5–7)
SP GR UR STRIP: 1.02 (ref 1–1.03)
UROBILINOGEN UR STRIP-ACNC: 0.2 E.U./DL

## 2024-12-18 PROCEDURE — 99212 OFFICE O/P EST SF 10 MIN: CPT | Mod: 25 | Performed by: UROLOGY

## 2024-12-18 PROCEDURE — 52000 CYSTOURETHROSCOPY: CPT | Performed by: UROLOGY

## 2024-12-18 PROCEDURE — 81003 URINALYSIS AUTO W/O SCOPE: CPT | Mod: QW | Performed by: UROLOGY

## 2024-12-18 PROCEDURE — 93798 PHYS/QHP OP CAR RHAB W/ECG: CPT

## 2024-12-18 RX ORDER — CIPROFLOXACIN 500 MG/1
500 TABLET, FILM COATED ORAL ONCE
Qty: 1 TABLET | Refills: 0 | Status: SHIPPED | OUTPATIENT
Start: 2024-12-18 | End: 2024-12-18

## 2024-12-18 RX ORDER — LIDOCAINE HYDROCHLORIDE 20 MG/ML
JELLY TOPICAL ONCE
Status: COMPLETED | OUTPATIENT
Start: 2024-12-18 | End: 2024-12-18

## 2024-12-18 RX ADMIN — LIDOCAINE HYDROCHLORIDE: 20 JELLY TOPICAL at 10:38

## 2024-12-18 ASSESSMENT — PAIN SCALES - GENERAL: PAINLEVEL_OUTOF10: NO PAIN (0)

## 2024-12-18 NOTE — PROGRESS NOTES
Office Visit Note- Bladder Cancer Follow up  [facility]    [phone]     /86     Bladder Cancer Diagnosis: Low grade Ta  History of radiotherapy for prostate cancer with previous hormonal therapy  Urethral meatal stricture    Past Interventions: TURBT x 1    Most Recent Pathology: January 2023    Most Recent Upper Tract Imaging: October 2022 CT scan    Most Recent FISH/Cytology: March 2023    History:   Spencer returns for follow-up on both history of prostate cancer and bladder cancer.  He has no urinary symptoms or complaints.  His PSA is unchanged at 0.03.    Cystoscopy:   I performed flexible cystoscopy today and the bladder showed radiation changes throughout, particularly at the bladder neck, but otherwise was normal.  No tumors identified throughout the bladder.    Impression:   Doing well    Plan:   He has no evidence of bladder cancer or prostate cancer recurrence.  I recommended he see me again in 1 year to recheck PSA and recheck office cystoscopy.      Mark Pino M.D.

## 2024-12-18 NOTE — NURSING NOTE
Chief Complaint   Patient presents with    Bladder tumor     Pt here for in office cystoscopy     Prior to the start of the procedure and with procedural staff participation, I verbally confirmed the patient s identity using two indicators, relevant allergies, that the procedure was appropriate and matched the consent or emergent situation, and that the correct equipment/implants were available. Immediately prior to starting the procedure I conducted the Time Out with the procedural staff and re-confirmed the patient s name, procedure, and site/side. I have wiped the patient off with the povidone-Iodine solution, draped them, and used Lidocaine hydrochloride jelly. (The Joint Commission universal protocol was followed.)  Yes    Sedation (Moderate or Deep): None    5mL 2% lidocaine hydrochloride Urojet instilled into urethra.    NDC# [address]-0  Lot #: [id number]  Expiration Date:  6/26    Edie Root, Clinic Assistant

## 2024-12-18 NOTE — PATIENT INSTRUCTIONS

## 2024-12-18 NOTE — LETTER
12/18/2024       RE: Nixon More  [address]  [place] [address]     Dear Colleague,    Thank you for referring your patient, Nixon More, to the [facility] [place] at [facility]. Please see a copy of my visit note below.    Office Visit Note- Bladder Cancer Follow up  [facility]    [phone]     /86     Bladder Cancer Diagnosis: Low grade Ta  History of radiotherapy for prostate cancer with previous hormonal therapy  Urethral meatal stricture    Past Interventions: TURBT x 1    Most Recent Pathology: January 2023    Most Recent Upper Tract Imaging: October 2022 CT scan    Most Recent FISH/Cytology: March 2023    History:   Spencer returns for follow-up on both history of prostate cancer and bladder cancer.  He has no urinary symptoms or complaints.  His PSA is unchanged at 0.03.    Cystoscopy:   I performed flexible cystoscopy today and the bladder showed radiation changes throughout, particularly at the bladder neck, but otherwise was normal.  No tumors identified throughout the bladder.    Impression:   Doing well    Plan:   He has no evidence of bladder cancer or prostate cancer recurrence.  I recommended he see me again in 1 year to recheck PSA and recheck office cystoscopy.      Mark Pino M.D.      Again, thank you for allowing me to participate in the care of your patient.      Sincerely,    Mark Pino MD

## 2024-12-23 ENCOUNTER — HOSPITAL ENCOUNTER (OUTPATIENT)
Dept: CARDIAC REHAB | Facility: CLINIC | Age: 89
Discharge: HOME | End: 2024-12-23
Attending: INTERNAL MEDICINE
Payer: COMMERCIAL

## 2024-12-23 ENCOUNTER — TELEPHONE (OUTPATIENT)
Dept: PEDIATRICS | Facility: CLINIC | Age: 89
End: 2024-12-23
Payer: COMMERCIAL

## 2024-12-23 PROCEDURE — 93798 PHYS/QHP OP CAR RHAB W/ECG: CPT

## 2024-12-23 NOTE — TELEPHONE ENCOUNTER
"Daughter is requesting that a phone call be made to set up a lab appointment for her father, this is for basic metabolic panel non fasting.  \"He would like to do it in [place] before or after one of his Cardiac Rehab appointments.\"  "

## 2024-12-23 NOTE — TELEPHONE ENCOUNTER
Talked with [facility] - cannot schedule for cardiac rehab - do not know if they have a lab. Called and left VM for daughter Amber (C2C on file) to contact cardiac rehab and see if they have a lab in their building he can set up an appt with, otherwise he can walk-in at hospital.  Divya Hansen, CMA

## 2024-12-27 ENCOUNTER — TRANSFERRED RECORDS (OUTPATIENT)
Dept: HEALTH INFORMATION MANAGEMENT | Facility: CLINIC | Age: 89
End: 2024-12-27

## 2024-12-27 ENCOUNTER — HOSPITAL ENCOUNTER (OUTPATIENT)
Dept: CARDIAC REHAB | Facility: CLINIC | Age: 89
Discharge: HOME | End: 2024-12-27
Attending: INTERNAL MEDICINE
Payer: COMMERCIAL

## 2024-12-27 PROCEDURE — 93798 PHYS/QHP OP CAR RHAB W/ECG: CPT

## 2024-12-30 ENCOUNTER — HOSPITAL ENCOUNTER (OUTPATIENT)
Dept: CARDIAC REHAB | Facility: CLINIC | Age: 89
Discharge: HOME | End: 2024-12-30
Attending: INTERNAL MEDICINE
Payer: COMMERCIAL

## 2024-12-30 PROCEDURE — 93798 PHYS/QHP OP CAR RHAB W/ECG: CPT

## 2025-01-03 ENCOUNTER — HOSPITAL ENCOUNTER (OUTPATIENT)
Dept: CARDIAC REHAB | Facility: CLINIC | Age: 89
Discharge: HOME | End: 2025-01-03
Attending: INTERNAL MEDICINE
Payer: COMMERCIAL

## 2025-01-03 PROCEDURE — 93798 PHYS/QHP OP CAR RHAB W/ECG: CPT

## 2025-01-06 DIAGNOSIS — I48.19 PERSISTENT ATRIAL FIBRILLATION (H): ICD-10-CM

## 2025-01-06 DIAGNOSIS — I10 BENIGN ESSENTIAL HYPERTENSION: ICD-10-CM

## 2025-01-06 RX ORDER — SPIRONOLACTONE 25 MG/1
12.5 TABLET ORAL DAILY
Qty: 45 TABLET | Refills: 1 | Status: SHIPPED | OUTPATIENT
Start: 2025-01-06

## 2025-01-07 ENCOUNTER — HOSPITAL ENCOUNTER (OUTPATIENT)
Dept: CARDIAC REHAB | Facility: CLINIC | Age: 89
Discharge: HOME | End: 2025-01-07
Attending: INTERNAL MEDICINE
Payer: COMMERCIAL

## 2025-01-07 PROCEDURE — 93798 PHYS/QHP OP CAR RHAB W/ECG: CPT

## 2025-01-09 ENCOUNTER — LAB (OUTPATIENT)
Dept: LAB | Facility: CLINIC | Age: 89
End: 2025-01-09
Payer: COMMERCIAL

## 2025-01-09 DIAGNOSIS — C61 MALIGNANT NEOPLASM OF PROSTATE (H): ICD-10-CM

## 2025-01-09 DIAGNOSIS — I50.32 CHRONIC DIASTOLIC HEART FAILURE (H): ICD-10-CM

## 2025-01-09 DIAGNOSIS — Z85.51 PERSONAL HISTORY OF MALIGNANT NEOPLASM OF BLADDER: ICD-10-CM

## 2025-01-09 DIAGNOSIS — N18.31 STAGE 3A CHRONIC KIDNEY DISEASE (H): ICD-10-CM

## 2025-01-09 LAB
ANION GAP SERPL CALCULATED.3IONS-SCNC: 11 MMOL/L (ref 7–15)
BUN SERPL-MCNC: 26.9 MG/DL (ref 8–23)
CALCIUM SERPL-MCNC: 9.9 MG/DL (ref 8.8–10.4)
CHLORIDE SERPL-SCNC: 107 MMOL/L (ref 98–107)
CREAT SERPL-MCNC: 1.37 MG/DL (ref 0.67–1.17)
EGFRCR SERPLBLD CKD-EPI 2021: 50 ML/MIN/1.73M2
GLUCOSE SERPL-MCNC: 109 MG/DL (ref 70–99)
HCO3 SERPL-SCNC: 24 MMOL/L (ref 22–29)
POTASSIUM SERPL-SCNC: 4.5 MMOL/L (ref 3.4–5.3)
PSA SERPL DL<=0.01 NG/ML-MCNC: 0.04 NG/ML
SODIUM SERPL-SCNC: 142 MMOL/L (ref 135–145)

## 2025-01-16 NOTE — TELEPHONE ENCOUNTER
ANTICOAGULATION DIRECT ORAL ANTICOAGULANT MONITORING    OBJECTIVE     Age: 89 year old    Wt Readings from Last 2 Encounters:   11/22/24 108 kg (238 lb)   11/13/24 105.2 kg (232 lb)      Lab Results   Component Value Date    CR 1.37 (H) 01/09/2025    CR 1.51 (H) 12/11/2024    CR 1.25 (H) 11/13/2024     Creatinine Clearance (using actual bodyweight, mL/min):     Lab Results   Component Value Date    HGB 12.6 09/09/2024    HGB 12.8 06/18/2021     09/09/2024     06/18/2021     ASSESSMENT/PLAN     A chart review for Direct Oral Anticoagulant (DOAC) Stewardship has been completed for:     Dosing: dosing appropriate for weight and kidney function    Plan made per [facility] anticoagulation protocol    Niharika Priest, RN  Anticoagulation Clinic

## 2025-03-16 DIAGNOSIS — Z79.01 LONG TERM CURRENT USE OF ANTICOAGULANT THERAPY: ICD-10-CM

## 2025-03-16 DIAGNOSIS — I48.91 ATRIAL FIBRILLATION AND FLUTTER (H): ICD-10-CM

## 2025-03-16 DIAGNOSIS — I48.92 ATRIAL FIBRILLATION AND FLUTTER (H): ICD-10-CM

## 2025-03-17 RX ORDER — APIXABAN 5 MG/1
5 TABLET, FILM COATED ORAL 2 TIMES DAILY
Qty: 180 TABLET | Refills: 0 | Status: SHIPPED | OUTPATIENT
Start: 2025-03-17

## 2025-03-18 ENCOUNTER — TELEPHONE (OUTPATIENT)
Dept: PEDIATRICS | Facility: CLINIC | Age: 89
End: 2025-03-18
Payer: COMMERCIAL

## 2025-03-18 NOTE — TELEPHONE ENCOUNTER
Forms/Letter Request    Type of form/letter: Transportation ([employer])      Is Release of Information needed?: No    Do we have the form/letter: Yes:    Who is the form from? Patient    Where did/will the form come from? Patient or family brought in       When is form/letter needed by: ASAP    How would you like the form/letter returned: Patient sent in message, he would like this mailed to him.    Patient Notified form requests are processed in 5-7 business days:Yes    Could we send this information to you in [facility] or would you prefer to receive a phone call?:   Patient would prefer a phone call   Okay to leave a detailed message?: Yes at Home number on file [phone] (home)

## 2025-03-19 NOTE — TELEPHONE ENCOUNTER
Handicap parking permit  Message [phone]  From  [facility] Pool Sent  3/18/2025  1:56 PM       Topic: Non-Medical Question.     Can you mail me the forms I brought in for you to sign?  Thanks!

## 2025-03-26 NOTE — PROGRESS NOTES
Office Visit Note  [facility]  [phone]    UROLOGIC DIAGNOSES:   Prostate cancer    CURRENT INTERVENTIONS:   S/P radiotherapy with recurrence, on Eligard    HISTORY:   Spencer returns to clinic today for prostate cancer follow-up. He reports doing well with no symptoms. His PSA remains undetectable.      PAST MEDICAL HISTORY:   Past Medical History:   Diagnosis Date     Actinic keratosis      Acute prostatitis 9/23/2004     Angina pectoris (H)      CAD (coronary artery disease)     1/5/2011 lateral ischemia on stress echo, 12/2014 normal stress echo     Dyslipidemia      Emphysema of lung (H)      Essential hypertension, benign      HTN (hypertension)      Hypersomnia with sleep apnea, unspecified     on bipap, partially treated with residual apneas     Hypertrophy (benign) of prostate 5/01    Biopsy 5/01 negative for cancer; PSA 5     Lumbago      MALIGN NEOPL PROSTATE 12/15/2006     Palpitations      Personal history of other malignant neoplasm of skin 1997    basal cell cancer       PAST SURGICAL HISTORY:   Past Surgical History:   Procedure Laterality Date     C 2ND STAGE CHEMOSURG MOHS  1/09/01    Moh's procedure for basal cell carcinoma     C NONSPECIFIC PROCEDURE  1988    s/p lumbar laminectomy     C NONSPECIFIC PROCEDURE  child    hernia repair     C VITRECTOMY PARS PLANA REMOVE PRERETINAL MEMBRANE  3/09       FAMILY HISTORY:   Family History   Problem Relation Age of Onset     Alzheimer Disease Mother      Hypertension Mother      Cardiovascular Father      D:86 complications fo CHF     Prostate Cancer Brother      Lung Cancer Brother 76     Prostate Cancer Brother        SOCIAL HISTORY:   Social History   Substance Use Topics     Smoking status: Former Smoker     Packs/day: 1.00     Years: 30.00     Types: Cigarettes     Quit date: 1/1/1978     Smokeless tobacco: Never Used     Alcohol use 1.0 oz/week     2 Standard drinks or equivalent per week      Comment: socially       Current  "Outpatient Prescriptions   Medication     FLUoxetine (PROZAC) 20 MG capsule     simvastatin (ZOCOR) 40 MG tablet     losartan-hydrochlorothiazide (HYZAAR) 100-12.5 MG per tablet     naproxen (NAPROSYN) 500 MG tablet     ipratropium (ATROVENT) 0.06 % nasal spray     atenolol (TENORMIN) 50 MG tablet     Multiple Minerals-Vitamins (PROSTEON PO)     leuprolide (ELIGARD) 45 MG injection     ASPIRIN 81 MG OR TABS     fluorouracil (EFUDEX) 5 % cream     EPINEPHrine (EPIPEN 2-MICHAEL) 0.3 MG/0.3ML injection     nitroglycerin (NITROSTAT) 0.4 MG SL tablet     [DISCONTINUED] atenolol-chlorthalidone (TENORETIC 50) 50-25 MG per tablet     No current facility-administered medications for this visit.      Facility-Administered Medications Ordered in Other Visits   Medication     leuprolide (ELIGARD) injection 45 mg         PHYSICAL EXAM:    /78  Pulse 76  Ht 1.753 m (5' 9\")  Wt 99.8 kg (220 lb)  BMI 32.49 kg/m2    HEENT: Normocephalic and atraumatic   Cardiac: Not done  Back/Flank: Not done  CNS/PNS: Not done  Respiratory: Normal non-labored breathing  Abdomen: Soft nontender and nondistended  Peripheral Vascular: Not done  Mental Status: Not done    Penis: Not done  Scrotal Skin: Not done  Testicles: Not done  Epididymis: Not done  Digital Rectal Exam:     Cystoscopy: Not done    Imaging: None    Urinalysis: UA RESULTS:  No results for input(s): COLOR, APPEARANCE, URINEGLC, URINEBILI, URINEKETONE, SG, UBLD, URINEPH, PROTEIN, UROBILINOGEN, NITRITE, LEUKEST, RBCU, WBCU in the last [id number] hours.    PSA: Undetectable    Post Void Residual:     Other labs: None today      IMPRESSION:  Doing well, PSA undetectable    PLAN:  We discussed the treatment plan. He wishes to continue the Eligard. He received another injection today and he will return to clinic again in 6 months for another SA and Eligard injection.    Total Time:  15 minutes                                      Total in Consultation:  15 minutes      Mark Pino, " M.D.             Yes...

## 2025-03-31 DIAGNOSIS — I48.91 ATRIAL FIBRILLATION AND FLUTTER (H): ICD-10-CM

## 2025-03-31 DIAGNOSIS — I48.92 ATRIAL FIBRILLATION AND FLUTTER (H): ICD-10-CM

## 2025-03-31 DIAGNOSIS — Z79.01 LONG TERM CURRENT USE OF ANTICOAGULANT THERAPY: ICD-10-CM

## 2025-03-31 RX ORDER — APIXABAN 5 MG/1
5 TABLET, FILM COATED ORAL 2 TIMES DAILY
Qty: 180 TABLET | Refills: 0 | OUTPATIENT
Start: 2025-03-31

## 2025-04-12 ENCOUNTER — HEALTH MAINTENANCE LETTER (OUTPATIENT)
Age: 89
End: 2025-04-12

## 2025-04-28 DIAGNOSIS — I48.91 ATRIAL FIBRILLATION AND FLUTTER (H): ICD-10-CM

## 2025-04-28 DIAGNOSIS — Z79.01 LONG TERM CURRENT USE OF ANTICOAGULANT THERAPY: ICD-10-CM

## 2025-04-28 DIAGNOSIS — I48.92 ATRIAL FIBRILLATION AND FLUTTER (H): ICD-10-CM

## 2025-04-28 RX ORDER — APIXABAN 5 MG/1
5 TABLET, FILM COATED ORAL 2 TIMES DAILY
Qty: 180 TABLET | Refills: 0 | OUTPATIENT
Start: 2025-04-28

## 2025-05-01 ENCOUNTER — OFFICE VISIT (OUTPATIENT)
Dept: CARDIOLOGY | Facility: CLINIC | Age: 89
End: 2025-05-01
Attending: NURSE PRACTITIONER
Payer: COMMERCIAL

## 2025-05-01 ENCOUNTER — LAB (OUTPATIENT)
Dept: LAB | Facility: CLINIC | Age: 89
End: 2025-05-01
Payer: COMMERCIAL

## 2025-05-01 VITALS
DIASTOLIC BLOOD PRESSURE: 60 MMHG | SYSTOLIC BLOOD PRESSURE: 99 MMHG | OXYGEN SATURATION: 95 % | HEART RATE: 85 BPM | HEIGHT: 68 IN | BODY MASS INDEX: 36.09 KG/M2 | WEIGHT: 238.1 LBS

## 2025-05-01 DIAGNOSIS — R29.6 FALLS FREQUENTLY: ICD-10-CM

## 2025-05-01 DIAGNOSIS — E78.5 DYSLIPIDEMIA: ICD-10-CM

## 2025-05-01 DIAGNOSIS — I77.810 ASCENDING AORTA DILATATION: ICD-10-CM

## 2025-05-01 DIAGNOSIS — I25.10 CORONARY ARTERY DISEASE INVOLVING NATIVE CORONARY ARTERY OF NATIVE HEART WITHOUT ANGINA PECTORIS: ICD-10-CM

## 2025-05-01 DIAGNOSIS — I48.92 ATRIAL FLUTTER, CHRONIC (H): ICD-10-CM

## 2025-05-01 DIAGNOSIS — I48.20 CHRONIC ATRIAL FIBRILLATION (H): ICD-10-CM

## 2025-05-01 DIAGNOSIS — N18.31 STAGE 3A CHRONIC KIDNEY DISEASE (H): Primary | ICD-10-CM

## 2025-05-01 DIAGNOSIS — I50.32 CHRONIC DIASTOLIC HEART FAILURE (H): ICD-10-CM

## 2025-05-01 DIAGNOSIS — I77.810 AORTIC ROOT DILATATION: ICD-10-CM

## 2025-05-01 DIAGNOSIS — I10 BENIGN ESSENTIAL HYPERTENSION: ICD-10-CM

## 2025-05-01 DIAGNOSIS — N18.31 STAGE 3A CHRONIC KIDNEY DISEASE (H): ICD-10-CM

## 2025-05-01 RX ORDER — LOSARTAN POTASSIUM 25 MG/1
25 TABLET ORAL DAILY
Qty: 90 TABLET | Refills: 3 | Status: SHIPPED | OUTPATIENT
Start: 2025-05-01

## 2025-05-01 NOTE — LETTER
5/1/2025    Natalya Lewis MD  [address] [place] [address]    RE: Nixon More       Dear Colleague,     I had the pleasure of seeing Nixon More in the [facility].  HPI and Plan:   It is my pleasure to see your patient Spencer More is an extremely pleasant 89-year-old patient who is followed by Neisha Bañuelos NP and my just retired partner Waylon De La Paz.  This is a patient with a history of chronic atrial fibrillation/atrial flutter, diastolic congestive heart failure, aneurysmal ascending aorta with prior measurements of 4.5 cm and mildly dilated aortic root and who also has a history of falls.  Watchman device implantation was discussed in the past but it was felt by the patient and family that they would like to continue with anticoagulation with Eliquis.  In the past this patient also has a history of essential hypertension and also renal insufficiency.  This patient also has a history of lung disease.  This makes assessment of heart failure somewhat difficult since it could also be related to lung issues.  He has obstructive sleep apnea and uses BiPAP.    He was last seen by Neisha Bañuelos in November 2024.  At that time he was doing well.  Last echocardiogram was performed in September 2024.  This showed that the ejection fraction was 60 to 65% with no wall motion abnormalities.  The aortic root measured 4.0 cm and the ascending aorta measured 4.5 cm.    With respect to his congestive heart failure, he is breathing is at baseline.  His weight is unchanged from last November and he weighs 238 pounds.  He has not noticed any increasing ankle edema.  Last assessment of renal function was in early January when his creatinine was 1.37 and BUN was 26.9 with a GFR of 50.  That was an improvement from mid December 2024.  His last lipid profile was in July of last year and was excellent with an LDL of 49 HDL of 41 and triglycerides of 121 with a total cholesterol of 114.  He was  mildly anemic last September with a hemoglobin of 12.6.    In 2022 he did have an event monitor placed.  He did have some pausing at nighttime.  There does appear to have been some change in his medications and diltiazem was stopped.  He is on metoprolol alone for ventricular rate control.    With respect to the atrial fibrillation his ventricular rate today is 85 bpm.  His blood pressure is on the lower side today at 99/60.  I rechecked his blood pressure at the end of our visit and it was 100/64.    Impression:  1.  Chronic atrial fibrillation.  The ventricular rate appears to be well-controlled.  He is anticoagulated with Eliquis without bleeding complications.  2.  Chronic diastolic congestive heart failure.  The patient appears to be euvolemic today and his weights are unchanged from November.  3.  Aneurysmal ascending aorta with a measurement of 4.5 cm last September.  Mild dilatation of the aortic root.  4.  Renal insufficiency.  This has improved from December and was last checked in January.  5.  Blood pressure is running on the lower side.  This may put him at risk for falls.    Plan:  1.  I will lower the dose of losartan from 50 mg down to 25 mg/day.  2.  I will check a basic metabolic profile and a proBNP today.  3.  I will of the patient follow-up with Neisha Bañuelos in 4 months time at that stage I will check an echocardiogram and I will also check a basic metabolic profile and lipid profile.  4.  In 1 years time I will see the patient back again myself and I will check a basic metabolic profile at that time.    As always the patient is been told to contact me if he has any questions or any concerns.    This was a complex case and that I had to review all of the past history and investigations [echoes, laboratory investigations, event monitors, previous cardiology notes.]  This was a 55-minute visit.    The longitudinal plan of care for the diagnosis(es)/condition(s) as documented were addressed during  this visit. Due to the added complexity in care, I will continue to support Spencer in the subsequent management and with ongoing continuity of care.    Curry Walker MD, FACC, FRCPI        Orders Placed This Encounter   Procedures     Basic metabolic panel     N terminal pro BNP outpatient     Basic metabolic panel     Basic metabolic panel     Lipid Profile     ALT     Follow-Up with Cardiology ZULMA     Follow-Up with Cardiology     Echocardiogram Complete       Orders Placed This Encounter   Medications     losartan (COZAAR) 25 MG tablet     Sig: Take 1 tablet (25 mg) by mouth daily.     Dispense:  90 tablet     Refill:  3       Medications Discontinued During This Encounter   Medication Reason     losartan (COZAAR) 50 MG tablet          Encounter Diagnoses   Name Primary?     Chronic diastolic heart failure (H)      Dyslipidemia      Stage 3a chronic kidney disease (H) Yes     Chronic atrial fibrillation (H)      Atrial flutter, chronic (H)      Falls frequently      Aortic root dilatation      Benign essential hypertension      Coronary artery disease involving native coronary artery of native heart without angina pectoris      Ascending aorta dilatation        CURRENT MEDICATIONS:  Current Outpatient Medications   Medication Sig Dispense Refill     apixaban ANTICOAGULANT (ELIQUIS ANTICOAGULANT) 5 MG tablet TAKE 1 TABLET BY MOUTH TWICE A  tablet 0     atorvastatin (LIPITOR) 40 MG tablet Take 1 tablet (40 mg) by mouth daily 90 tablet 3     Calcium-Magnesium-Vitamin D (CITRACAL SLOW RELEASE) 600- MG-MG-UNIT TB24 Take 2 tablets by mouth daily.       Cholecalciferol (VITAMIN D-3) 25 MCG (1000 UT) CAPS Take 1 capsule by mouth daily.       empagliflozin (JARDIANCE) 10 MG TABS tablet Take 1 tablet (10 mg) by mouth daily. 90 tablet 1     escitalopram (LEXAPRO) 10 MG tablet Take 1 tablet (10 mg) by mouth daily 90 tablet 3     loperamide (IMODIUM) 2 MG capsule Take 2 mg by mouth 2 times daily as needed for  diarrhea.       losartan (COZAAR) 25 MG tablet Take 1 tablet (25 mg) by mouth daily. 90 tablet 3     metoprolol succinate ER (TOPROL XL) 50 MG 24 hr tablet Take 2 tablets (100 mg) by mouth daily. 180 tablet 4     PSYLLIUM HUSK PO Take 1-2 tablets by mouth daily as needed (diarrhea).       RISEdronate (ACTONEL) 35 MG tablet Take 1 tablet (35 mg) by mouth every 7 days 12 tablet 4     spironolactone (ALDACTONE) 25 MG tablet Take 0.5 tablets (12.5 mg) by mouth daily. 45 tablet 1       ALLERGIES     Allergies   Allergen Reactions     Amoxicillin-Pot Clavulanate Rash     Type III hypersensitivity     Bactrim [Sulfamethoxazole W/Trimethoprim] Rash     Serum sickness, type III hypersensitivity       Bee Venom Itching     Sulfa Antibiotics Hives     Celebrex [Celecoxib]      Lisinopril Cough     Naproxen Hives     Penicillin G        PAST MEDICAL HISTORY:  Past Medical History:   Diagnosis Date     (HFpEF) heart failure with preserved ejection fraction (H)      AAA (abdominal aortic aneurysm)      Actinic keratosis      Angina pectoris 10/27/2020     Antiplatelet or antithrombotic long-term use     Eliquis     Arthritis      Atrial fibrillation and flutter (H) 12/03/2018     Bladder cancer (H)      CAD (coronary artery disease)     1/5/2011 lateral ischemia on stress echo, 12/2014 normal stress echo     CKD (chronic kidney disease) stage 2, GFR 60-89 ml/min      Depressive disorder     Mild     Diarrhea     Urgency at times     Dyslipidemia      Emphysema of lung (H)      Hernia, abdominal      HTN (hypertension)      Hypertrophy (benign) of prostate 05/2001    Biopsy 5/01 negative for cancer; PSA 5     Lumbago      Mumps      Prostate cancer (H) 12/15/2006     Skin cancer, basal cell 1997     Sleep apnea     Uses a Bi-pap for centralized Apnea     Spider veins        PAST SURGICAL HISTORY:  Past Surgical History:   Procedure Laterality Date     ABDOMEN SURGERY       BACK SURGERY  1988    L4/L5 decompression     BIOPSY  2022     Skin cancer basal cell     COLONOSCOPY       CYSTOSCOPY       CYSTOSCOPY, TRANSURETHRAL RESECTION (TUR) TUMOR BLADDER, COMBINED N/A 2023    Procedure: Cystoscopy, transurethral resection of bladder tumor, medium, 2-5 cm;  Surgeon: Mark Pino MD;  Location: [facility] OR     EYE SURGERY  2016    Cararact     GENITOURINARY SURGERY  2022    Tumors in bladder     HERNIA REPAIR  child     Moh's procedure for basal cell carcinoma  2001     PROSTATE SURGERY  2007    Radiation only     s/p lumbar laminectomy NOS  1988     SIGMOIDOSCOPY FLEXIBLE N/A 06/15/2020    Procedure: SIGMOIDOSCOPY, FLEXIBLE;  Surgeon: Sunni Patton MD;  Location: [facility]     VITRECTOMY PARS PLANA REMOVE PRERETINAL MEMBRANE   2009       FAMILY HISTORY:  Family History   Problem Relation Age of Onset     Alzheimer Disease Mother      Hypertension Mother      Cardiovascular Father         D:86 complications fo CHF     Anesthesia Reaction Father          after 2 weeks     Prostate Cancer Brother      Lung Cancer Brother 76       SOCIAL HISTORY:  Social History     Socioeconomic History     Marital status:      Spouse name: None     Number of children: None     Years of education: None     Highest education level: None   Occupational History     Occupation: retired   Tobacco Use     Smoking status: Former     Current packs/day: 0.00     Average packs/day: 1 pack/day for 30.0 years (30.0 ttl pk-yrs)     Types: Cigarettes     Start date: 1948     Quit date: 1978     Years since quittin.3     Passive exposure: Never     Smokeless tobacco: Never   Vaping Use     Vaping status: Never Used   Substance and Sexual Activity     Alcohol use: Yes     Comment: Rare     Drug use: No     Sexual activity: Not Currently     Partners: Female     Birth control/protection: Post-menopausal   Other Topics Concern     Caffeine Concern No     Comment: 0-2 cans of soda per day.     Exercise No     Seat Belt Yes      Parent/sibling w/ CABG, MI or angioplasty before 65F 55M? No     Social Drivers of Health     Financial Resource Strain: Low Risk  (9/8/2024)    Financial Resource Strain      Within the past 12 months, have you or your family members you live with been unable to get utilities (heat, electricity) when it was really needed?: No   Recent Concern: Financial Resource Strain - High Risk (7/18/2024)    Financial Resource Strain      Within the past 12 months, have you or your family members you live with been unable to get utilities (heat, electricity) when it was really needed?: Yes   Food Insecurity: Low Risk  (9/8/2024)    Food Insecurity      Within the past 12 months, did you worry that your food would run out before you got money to buy more?: No      Within the past 12 months, did the food you bought just not last and you didn t have money to get more?: No   Transportation Needs: Low Risk  (9/8/2024)    Transportation Needs      Within the past 12 months, has lack of transportation kept you from medical appointments, getting your medicines, non-medical meetings or appointments, work, or from getting things that you need?: No   Physical Activity: Insufficiently Active (7/18/2024)    Exercise Vital Sign      Days of Exercise per Week: 1 day      Minutes of Exercise per Session: 10 min   Stress: No Stress Concern Present (7/18/2024)    [employer] - Occupational Stress Questionnaire      Feeling of Stress : Not at all   Social Connections: Unknown (7/18/2024)    Social Connection and Isolation Panel [NHANES]      Frequency of Social Gatherings with Friends and Family: Three times a week   Interpersonal Safety: Low Risk  (9/8/2024)    Interpersonal Safety      Do you feel physically and emotionally safe where you currently live?: Yes      Within the past 12 months, have you been hit, slapped, kicked or otherwise physically hurt by someone?: No      Within the past 12 months, have you been  "humiliated or emotionally abused in other ways by your partner or ex-partner?: No   Housing Stability: Low Risk  (9/8/2024)    Housing Stability      Do you have housing? : Yes      Are you worried about losing your housing?: No       Review of Systems:  Skin:          Eyes:         ENT:         Respiratory:  Positive for sleep apnea, CPAP, dyspnea on exertion     Cardiovascular:    Positive for, edema    Gastroenterology:        Genitourinary:         Musculoskeletal:         Neurologic:         Psychiatric:         Heme/Lymph/Imm:         Endocrine:           Physical Exam:  Vitals: BP 99/60   Pulse 85   Ht 1.715 m (5' 7.5\")   Wt 108 kg (238 lb 1.6 oz)   SpO2 95%   BMI 36.74 kg/m      Constitutional:  cooperative, alert and oriented, well developed, well nourished, in no acute distress obese      Skin:  warm and dry to the touch          Head:  normocephalic        Eyes:  pupils equal and round        Lymph:No Cervical lymphadenopathy present     ENT:  no pallor or cyanosis   masked    Neck:  JVP normal, carotid pulses are full and equal bilaterally   No HJR    Respiratory:  clear to auscultation, normal symmetry         Cardiac:  (Variable S1 and normal S2.) irregularly irregular rhythm distant heart sounds              pulses below the femoral arteries are diminished                                 strong radial pulses, slighly diminshed dorsalis pedis and posterior tibia    GI:    obese Firm    Extremities and Muscular Skeletal:      bilateral LE edema, trace          Neurological:  no gross motor deficits, affect appropriate   using walker    Psych:  Alert and Oriented x 3        CC  LORELEI Manrique CNP  [address]  [place] [address]                  Thank you for allowing me to participate in the care of your patient.      Sincerely,     Curry Walker MD, MD     [facility]  cc:   LORELEI Manrique CNP  [address]  [place] [id number]

## 2025-05-01 NOTE — PROGRESS NOTES
HPI and Plan:   It is my pleasure to see your patient Spencer More is an extremely pleasant 89-year-old patient who is followed by Neisha Bañuelos NP and my just retired partner Waylon Reneeaquilino.  This is a patient with a history of chronic atrial fibrillation/atrial flutter, diastolic congestive heart failure, aneurysmal ascending aorta with prior measurements of 4.5 cm and mildly dilated aortic root and who also has a history of falls.  Watchman device implantation was discussed in the past but it was felt by the patient and family that they would like to continue with anticoagulation with Eliquis.  In the past this patient also has a history of essential hypertension and also renal insufficiency.  This patient also has a history of lung disease.  This makes assessment of heart failure somewhat difficult since it could also be related to lung issues.  He has obstructive sleep apnea and uses BiPAP.    He was last seen by Neisha Bañuelos in November 2024.  At that time he was doing well.  Last echocardiogram was performed in September 2024.  This showed that the ejection fraction was 60 to 65% with no wall motion abnormalities.  The aortic root measured 4.0 cm and the ascending aorta measured 4.5 cm.    With respect to his congestive heart failure, he is breathing is at baseline.  His weight is unchanged from last November and he weighs 238 pounds.  He has not noticed any increasing ankle edema.  Last assessment of renal function was in early January when his creatinine was 1.37 and BUN was 26.9 with a GFR of 50.  That was an improvement from mid December 2024.  His last lipid profile was in July of last year and was excellent with an LDL of 49 HDL of 41 and triglycerides of 121 with a total cholesterol of 114.  He was mildly anemic last September with a hemoglobin of 12.6.    In 2022 he did have an event monitor placed.  He did have some pausing at nighttime.  There does appear to have been some change in his medications and  diltiazem was stopped.  He is on metoprolol alone for ventricular rate control.    With respect to the atrial fibrillation his ventricular rate today is 85 bpm.  His blood pressure is on the lower side today at 99/60.  I rechecked his blood pressure at the end of our visit and it was 100/64.    Impression:  1.  Chronic atrial fibrillation.  The ventricular rate appears to be well-controlled.  He is anticoagulated with Eliquis without bleeding complications.  2.  Chronic diastolic congestive heart failure.  The patient appears to be euvolemic today and his weights are unchanged from November.  3.  Aneurysmal ascending aorta with a measurement of 4.5 cm last September.  Mild dilatation of the aortic root.  4.  Renal insufficiency.  This has improved from December and was last checked in January.  5.  Blood pressure is running on the lower side.  This may put him at risk for falls.    Plan:  1.  I will lower the dose of losartan from 50 mg down to 25 mg/day.  2.  I will check a basic metabolic profile and a proBNP today.  3.  I will of the patient follow-up with Neisha Bañuelos in 4 months time at that stage I will check an echocardiogram and I will also check a basic metabolic profile and lipid profile.  4.  In 1 years time I will see the patient back again myself and I will check a basic metabolic profile at that time.    As always the patient is been told to contact me if he has any questions or any concerns.    This was a complex case and that I had to review all of the past history and investigations [echoes, laboratory investigations, event monitors, previous cardiology notes.]  This was a 55-minute visit.    The longitudinal plan of care for the diagnosis(es)/condition(s) as documented were addressed during this visit. Due to the added complexity in care, I will continue to support Spencer in the subsequent management and with ongoing continuity of care.    Curry Walker MD, FACC, FRCPI        Orders Placed This  Encounter   Procedures    Basic metabolic panel    N terminal pro BNP outpatient    Basic metabolic panel    Basic metabolic panel    Lipid Profile    ALT    Follow-Up with Cardiology ZULMA    Follow-Up with Cardiology    Echocardiogram Complete       Orders Placed This Encounter   Medications    losartan (COZAAR) 25 MG tablet     Sig: Take 1 tablet (25 mg) by mouth daily.     Dispense:  90 tablet     Refill:  3       Medications Discontinued During This Encounter   Medication Reason    losartan (COZAAR) 50 MG tablet          Encounter Diagnoses   Name Primary?    Chronic diastolic heart failure (H)     Dyslipidemia     Stage 3a chronic kidney disease (H) Yes    Chronic atrial fibrillation (H)     Atrial flutter, chronic (H)     Falls frequently     Aortic root dilatation     Benign essential hypertension     Coronary artery disease involving native coronary artery of native heart without angina pectoris     Ascending aorta dilatation        CURRENT MEDICATIONS:  Current Outpatient Medications   Medication Sig Dispense Refill    apixaban ANTICOAGULANT (ELIQUIS ANTICOAGULANT) 5 MG tablet TAKE 1 TABLET BY MOUTH TWICE A  tablet 0    atorvastatin (LIPITOR) 40 MG tablet Take 1 tablet (40 mg) by mouth daily 90 tablet 3    Calcium-Magnesium-Vitamin D (CITRACAL SLOW RELEASE) 600- MG-MG-UNIT TB24 Take 2 tablets by mouth daily.      Cholecalciferol (VITAMIN D-3) 25 MCG (1000 UT) CAPS Take 1 capsule by mouth daily.      empagliflozin (JARDIANCE) 10 MG TABS tablet Take 1 tablet (10 mg) by mouth daily. 90 tablet 1    escitalopram (LEXAPRO) 10 MG tablet Take 1 tablet (10 mg) by mouth daily 90 tablet 3    loperamide (IMODIUM) 2 MG capsule Take 2 mg by mouth 2 times daily as needed for diarrhea.      losartan (COZAAR) 25 MG tablet Take 1 tablet (25 mg) by mouth daily. 90 tablet 3    metoprolol succinate ER (TOPROL XL) 50 MG 24 hr tablet Take 2 tablets (100 mg) by mouth daily. 180 tablet 4    PSYLLIUM HUSK PO Take 1-2  tablets by mouth daily as needed (diarrhea).      RISEdronate (ACTONEL) 35 MG tablet Take 1 tablet (35 mg) by mouth every 7 days 12 tablet 4    spironolactone (ALDACTONE) 25 MG tablet Take 0.5 tablets (12.5 mg) by mouth daily. 45 tablet 1       ALLERGIES     Allergies   Allergen Reactions    Amoxicillin-Pot Clavulanate Rash     Type III hypersensitivity    Bactrim [Sulfamethoxazole W/Trimethoprim] Rash     Serum sickness, type III hypersensitivity      Bee Venom Itching    Sulfa Antibiotics Hives    Celebrex [Celecoxib]     Lisinopril Cough    Naproxen Hives    Penicillin G        PAST MEDICAL HISTORY:  Past Medical History:   Diagnosis Date    (HFpEF) heart failure with preserved ejection fraction (H)     AAA (abdominal aortic aneurysm)     Actinic keratosis     Angina pectoris 10/27/2020    Antiplatelet or antithrombotic long-term use     Eliquis    Arthritis     Atrial fibrillation and flutter (H) 12/03/2018    Bladder cancer (H)     CAD (coronary artery disease)     1/5/2011 lateral ischemia on stress echo, 12/2014 normal stress echo    CKD (chronic kidney disease) stage 2, GFR 60-89 ml/min     Depressive disorder     Mild    Diarrhea     Urgency at times    Dyslipidemia     Emphysema of lung (H)     Hernia, abdominal     HTN (hypertension)     Hypertrophy (benign) of prostate 05/2001    Biopsy 5/01 negative for cancer; PSA 5    Lumbago     Mumps     Prostate cancer (H) 12/15/2006    Skin cancer, basal cell 1997    Sleep apnea     Uses a Bi-pap for centralized Apnea    Spider veins        PAST SURGICAL HISTORY:  Past Surgical History:   Procedure Laterality Date    ABDOMEN SURGERY      BACK SURGERY  1988    L4/L5 decompression    BIOPSY  2022    Skin cancer basal cell    COLONOSCOPY      CYSTOSCOPY      CYSTOSCOPY, TRANSURETHRAL RESECTION (TUR) TUMOR BLADDER, COMBINED N/A 01/06/2023    Procedure: Cystoscopy, transurethral resection of bladder tumor, medium, 2-5 cm;  Surgeon: Mark Pino MD;   Location: [facility] OR    EYE SURGERY  2016    Cararact    GENITOURINARY SURGERY  2022    Tumors in bladder    HERNIA REPAIR  child    Moh's procedure for basal cell carcinoma  2001    PROSTATE SURGERY  2007    Radiation only    s/p lumbar laminectomy NOS  1988    SIGMOIDOSCOPY FLEXIBLE N/A 06/15/2020    Procedure: SIGMOIDOSCOPY, FLEXIBLE;  Surgeon: Sunni Patton MD;  Location: [facility]    VITRECTOMY PARS PLANA REMOVE PRERETINAL MEMBRANE   2009       FAMILY HISTORY:  Family History   Problem Relation Age of Onset    Alzheimer Disease Mother     Hypertension Mother     Cardiovascular Father         D:86 complications fo CHF    Anesthesia Reaction Father          after 2 weeks    Prostate Cancer Brother     Lung Cancer Brother 76       SOCIAL HISTORY:  Social History     Socioeconomic History    Marital status:      Spouse name: None    Number of children: None    Years of education: None    Highest education level: None   Occupational History    Occupation: retired   Tobacco Use    Smoking status: Former     Current packs/day: 0.00     Average packs/day: 1 pack/day for 30.0 years (30.0 ttl pk-yrs)     Types: Cigarettes     Start date: 1948     Quit date: 1978     Years since quittin.3     Passive exposure: Never    Smokeless tobacco: Never   Vaping Use    Vaping status: Never Used   Substance and Sexual Activity    Alcohol use: Yes     Comment: Rare    Drug use: No    Sexual activity: Not Currently     Partners: Female     Birth control/protection: Post-menopausal   Other Topics Concern    Caffeine Concern No     Comment: 0-2 cans of soda per day.    Exercise No    Seat Belt Yes    Parent/sibling w/ CABG, MI or angioplasty before 65F 55M? No     Social Drivers of Health     Financial Resource Strain: Low Risk  (2024)    Financial Resource Strain     Within the past 12 months, have you or your family members you live with been unable to get utilities (heat, electricity) when it was  really needed?: No   Recent Concern: Financial Resource Strain - High Risk (7/18/2024)    Financial Resource Strain     Within the past 12 months, have you or your family members you live with been unable to get utilities (heat, electricity) when it was really needed?: Yes   Food Insecurity: Low Risk  (9/8/2024)    Food Insecurity     Within the past 12 months, did you worry that your food would run out before you got money to buy more?: No     Within the past 12 months, did the food you bought just not last and you didn t have money to get more?: No   Transportation Needs: Low Risk  (9/8/2024)    Transportation Needs     Within the past 12 months, has lack of transportation kept you from medical appointments, getting your medicines, non-medical meetings or appointments, work, or from getting things that you need?: No   Physical Activity: Insufficiently Active (7/18/2024)    Exercise Vital Sign     Days of Exercise per Week: 1 day     Minutes of Exercise per Session: 10 min   Stress: No Stress Concern Present (7/18/2024)    [employer] - Occupational Stress Questionnaire     Feeling of Stress : Not at all   Social Connections: Unknown (7/18/2024)    Social Connection and Isolation Panel [NHANES]     Frequency of Social Gatherings with Friends and Family: Three times a week   Interpersonal Safety: Low Risk  (9/8/2024)    Interpersonal Safety     Do you feel physically and emotionally safe where you currently live?: Yes     Within the past 12 months, have you been hit, slapped, kicked or otherwise physically hurt by someone?: No     Within the past 12 months, have you been humiliated or emotionally abused in other ways by your partner or ex-partner?: No   Housing Stability: Low Risk  (9/8/2024)    Housing Stability     Do you have housing? : Yes     Are you worried about losing your housing?: No       Review of Systems:  Skin:          Eyes:         ENT:         Respiratory:  Positive for  "sleep apnea, CPAP, dyspnea on exertion     Cardiovascular:    Positive for, edema    Gastroenterology:        Genitourinary:         Musculoskeletal:         Neurologic:         Psychiatric:         Heme/Lymph/Imm:         Endocrine:           Physical Exam:  Vitals: BP 99/60   Pulse 85   Ht 1.715 m (5' 7.5\")   Wt 108 kg (238 lb 1.6 oz)   SpO2 95%   BMI 36.74 kg/m      Constitutional:  cooperative, alert and oriented, well developed, well nourished, in no acute distress obese      Skin:  warm and dry to the touch          Head:  normocephalic        Eyes:  pupils equal and round        Lymph:No Cervical lymphadenopathy present     ENT:  no pallor or cyanosis   masked    Neck:  JVP normal, carotid pulses are full and equal bilaterally   No HJR    Respiratory:  clear to auscultation, normal symmetry         Cardiac:  (Variable S1 and normal S2.) irregularly irregular rhythm distant heart sounds              pulses below the femoral arteries are diminished                                 strong radial pulses, slighly diminshed dorsalis pedis and posterior tibia    GI:    obese Firm    Extremities and Muscular Skeletal:      bilateral LE edema, trace          Neurological:  no gross motor deficits, affect appropriate   using walker    Psych:  Alert and Oriented x 3        CC  Neisha Bañuelos, APRN CNP  [address]  [place] [address]                "

## 2025-05-02 ENCOUNTER — TELEPHONE (OUTPATIENT)
Dept: CARDIOLOGY | Facility: CLINIC | Age: 89
End: 2025-05-02
Payer: COMMERCIAL

## 2025-05-02 DIAGNOSIS — I50.32 CHRONIC DIASTOLIC HEART FAILURE (H): Primary | ICD-10-CM

## 2025-05-02 DIAGNOSIS — N18.31 STAGE 3A CHRONIC KIDNEY DISEASE (H): ICD-10-CM

## 2025-05-02 NOTE — TELEPHONE ENCOUNTER
Renal function is mildly up.  Would recheck again in a week's time.  proBNP is within normal limits.  Thanks

## 2025-05-02 NOTE — TELEPHONE ENCOUNTER
Attempted to call patient with lab results as well as recommendations from Dr. Jacques Bañuelos, left message for patient to call back.  RAMSEY Salas RN

## 2025-05-02 NOTE — TELEPHONE ENCOUNTER
Reviewed labs showing    Latest Reference Range & Units 05/01/25 13:05   Sodium 135 - 145 mmol/L 140   Potassium 3.4 - 5.3 mmol/L 4.6   Chloride 98 - 107 mmol/L 106   Carbon Dioxide (CO2) 22 - 29 mmol/L 22   Urea Nitrogen 8.0 - 23.0 mg/dL 37.4 (H)   Creatinine 0.67 - 1.17 mg/dL 1.53 (H)   GFR Estimate >60 mL/min/1.73m2 44 (L)   Calcium 8.8 - 10.4 mg/dL 9.7   Anion Gap 7 - 15 mmol/L 12   Glucose 70 - 99 mg/dL 123 (H)   N-Terminal Pro Bnp 0 - 1,800 pg/mL 1,693   (H): Data is abnormally high  (L): Data is abnormally low    Per office note dated 5/01/25, Dr. Walker recommended:   1.  I will lower the dose of losartan from 50 mg down to 25 mg/day.  2.  I will check a basic metabolic profile and a proBNP today.  3.  I will of the patient follow-up with Neisha Bañuelos in 4 months time at that stage I will check an echocardiogram and I will also check a basic metabolic profile and lipid profile.  4.  In 1 years time I will see the patient back again myself and I will check a basic metabolic profile at that time.    Will message Dr. Walker to review. Alfredo VALENZUELA

## 2025-05-05 NOTE — TELEPHONE ENCOUNTER
Recommendations from Dr. Jacques Bañuelos were sent to patient via result note with request to call scheduling to arrange BMP in 1 week.  Patient advised to reply or call with questions or concerns.  RAMSEY Salas RN

## 2025-05-20 ENCOUNTER — LAB (OUTPATIENT)
Dept: LAB | Facility: CLINIC | Age: 89
End: 2025-05-20
Payer: COMMERCIAL

## 2025-05-20 DIAGNOSIS — I25.10 CORONARY ARTERY DISEASE INVOLVING NATIVE CORONARY ARTERY OF NATIVE HEART WITHOUT ANGINA PECTORIS: ICD-10-CM

## 2025-05-20 DIAGNOSIS — I50.32 CHRONIC DIASTOLIC HEART FAILURE (H): ICD-10-CM

## 2025-05-20 DIAGNOSIS — N18.31 STAGE 3A CHRONIC KIDNEY DISEASE (H): ICD-10-CM

## 2025-05-20 LAB
ALT SERPL W P-5'-P-CCNC: 20 U/L (ref 0–70)
ANION GAP SERPL CALCULATED.3IONS-SCNC: 12 MMOL/L (ref 7–15)
BUN SERPL-MCNC: 24.1 MG/DL (ref 8–23)
CALCIUM SERPL-MCNC: 9.8 MG/DL (ref 8.8–10.4)
CHLORIDE SERPL-SCNC: 106 MMOL/L (ref 98–107)
CHOLEST SERPL-MCNC: 119 MG/DL
CREAT SERPL-MCNC: 1.27 MG/DL (ref 0.67–1.17)
EGFRCR SERPLBLD CKD-EPI 2021: 55 ML/MIN/1.73M2
FASTING STATUS PATIENT QL REPORTED: YES
FASTING STATUS PATIENT QL REPORTED: YES
GLUCOSE SERPL-MCNC: 113 MG/DL (ref 70–99)
HCO3 SERPL-SCNC: 23 MMOL/L (ref 22–29)
HDLC SERPL-MCNC: 39 MG/DL
LDLC SERPL CALC-MCNC: 61 MG/DL
NONHDLC SERPL-MCNC: 80 MG/DL
POTASSIUM SERPL-SCNC: 4.6 MMOL/L (ref 3.4–5.3)
SODIUM SERPL-SCNC: 141 MMOL/L (ref 135–145)
TRIGL SERPL-MCNC: 96 MG/DL

## 2025-05-20 PROCEDURE — 80061 LIPID PANEL: CPT

## 2025-05-20 PROCEDURE — 36415 COLL VENOUS BLD VENIPUNCTURE: CPT

## 2025-05-20 PROCEDURE — 84460 ALANINE AMINO (ALT) (SGPT): CPT

## 2025-05-20 PROCEDURE — 80048 BASIC METABOLIC PNL TOTAL CA: CPT

## 2025-06-02 ENCOUNTER — NURSE TRIAGE (OUTPATIENT)
Dept: NURSING | Facility: CLINIC | Age: 89
End: 2025-06-02
Payer: COMMERCIAL

## 2025-06-02 NOTE — TELEPHONE ENCOUNTER
"Nurse Triage SBAR    Is this a 2nd Level Triage? YES, LICENSED PRACTITIONER REVIEW IS REQUIRED    Situation:  Patient calling in with hematuria and clots since yesterday     Background: Patient with history of Cystoscopy, transurethral resection of bladder tumor, medium, 2-5 cm in 2023, prostate cancer with radiation, bladder cancer, hypertrophy of prostate. PmHx: Atrial fibrillation, on eliquis, CAD, hypertension, on metoprolol, CKD 2    Assessment:   -chief complaint: blood and clots in urine since yesterday   -reports blood in urine still today, but \"not as dark as yesterday\"  -Patient reports blood clots size roughly 1/8\" x 1/4\"   -denies pain with urination  -denies flank pain, abd pain, fever  -did report does not change brief but every 3 days, \"or if looks dirty\"  -reports to urinate every 2 hours, sometimes 3 (baseline)  -takes eliquis 5mg 2x/day    Protocol Recommended Disposition:   Go To Office Now    Recommendation:  Patient advised to change brief more frequently, drink 8-10 cups of liquids/day (per patient, not on fluid restrictions), call back if fever or pain occurs, or you become worse. Patient also told that care team will be notified to follow up as well. Patient agrees with recommendations.     Routed to provider     BIANCA Gaspar  [facility] Central Nursing/Red Flag Triage & Med Refill Team       Reason for Disposition   Taking Coumadin (warfarin) or other strong blood thinner, or known bleeding disorder (e.g., thrombocytopenia)    Additional Information   Negative: Shock suspected (e.g., cold/pale/clammy skin, too weak to stand, low BP, rapid pulse)   Negative: Sounds like a life-threatening emergency to the triager   Negative: Urinary catheter, questions about   Negative: Recent back or abdominal injury   Negative: Recent genital injury   Negative: Unable to urinate (or only a few drops) > 4 hours and bladder feels very full (e.g., palpable bladder or strong urge to urinate)   Negative: Diffuse (all over) " muscle pains in the shoulders, arms, legs, and back and dark (cola or tea-colored) or red-colored urine   Negative: Passing pure blood or large blood clots (i.e., larger than a dime or grape)   Negative: Fever > 100.4 F (38.0 C)   Negative: Patient sounds very sick or weak to the triager   Negative: Known sickle cell disease    Answer Assessment - Initial Assessment Questions  1. COLOR of URINE: Not quite as dark as yesterday, but definitely blood - clot size   2. ONSET: Yesterday   3. EPISODES: Q2 hours, twice today - wearing briefs, blood tinged   4. PAIN with URINATION: Denies pain  5. FEVER: Denies   6. ASSOCIATED SYMPTOMS:   7. OTHER SYMPTOMS: Denies all other symptoms   8. PREGNANCY: NA    Protocols used: Urine - Blood In-A-OH

## 2025-06-04 ENCOUNTER — OFFICE VISIT (OUTPATIENT)
Dept: UROLOGY | Facility: CLINIC | Age: 89
End: 2025-06-04
Payer: COMMERCIAL

## 2025-06-04 VITALS
HEIGHT: 68 IN | BODY MASS INDEX: 34.86 KG/M2 | DIASTOLIC BLOOD PRESSURE: 70 MMHG | WEIGHT: 230 LBS | SYSTOLIC BLOOD PRESSURE: 106 MMHG

## 2025-06-04 DIAGNOSIS — Z85.51 PERSONAL HISTORY OF MALIGNANT NEOPLASM OF BLADDER: Primary | ICD-10-CM

## 2025-06-04 DIAGNOSIS — Z79.2 PROPHYLACTIC ANTIBIOTIC: ICD-10-CM

## 2025-06-04 LAB
ALBUMIN UR-MCNC: ABNORMAL MG/DL
APPEARANCE UR: CLEAR
BILIRUB UR QL STRIP: NEGATIVE
COLOR UR AUTO: YELLOW
GLUCOSE UR STRIP-MCNC: >=1000 MG/DL
HGB UR QL STRIP: ABNORMAL
KETONES UR STRIP-MCNC: NEGATIVE MG/DL
LEUKOCYTE ESTERASE UR QL STRIP: NEGATIVE
NITRATE UR QL: NEGATIVE
PH UR STRIP: 5.5 [PH] (ref 5–7)
SP GR UR STRIP: 1.01 (ref 1–1.03)
UROBILINOGEN UR STRIP-ACNC: 0.2 E.U./DL

## 2025-06-04 PROCEDURE — 52000 CYSTOURETHROSCOPY: CPT | Performed by: UROLOGY

## 2025-06-04 PROCEDURE — 1126F AMNT PAIN NOTED NONE PRSNT: CPT | Performed by: UROLOGY

## 2025-06-04 PROCEDURE — 3078F DIAST BP <80 MM HG: CPT | Performed by: UROLOGY

## 2025-06-04 PROCEDURE — 81003 URINALYSIS AUTO W/O SCOPE: CPT | Mod: QW | Performed by: UROLOGY

## 2025-06-04 PROCEDURE — 99213 OFFICE O/P EST LOW 20 MIN: CPT | Mod: 25 | Performed by: UROLOGY

## 2025-06-04 PROCEDURE — 3074F SYST BP LT 130 MM HG: CPT | Performed by: UROLOGY

## 2025-06-04 RX ORDER — CIPROFLOXACIN 500 MG/1
500 TABLET, FILM COATED ORAL ONCE
Qty: 1 TABLET | Refills: 0 | Status: SHIPPED | OUTPATIENT
Start: 2025-06-04 | End: 2025-06-04

## 2025-06-04 RX ORDER — LIDOCAINE HYDROCHLORIDE 20 MG/ML
JELLY TOPICAL ONCE
Status: COMPLETED | OUTPATIENT
Start: 2025-06-04 | End: 2025-06-04

## 2025-06-04 RX ADMIN — LIDOCAINE HYDROCHLORIDE 5 ML: 20 JELLY TOPICAL at 11:03

## 2025-06-04 ASSESSMENT — PAIN SCALES - GENERAL: PAINLEVEL_OUTOF10: NO PAIN (0)

## 2025-06-04 NOTE — LETTER
"6/4/2025       RE: Nixon More  [address]  [facility] [address]     Dear Colleague,    Thank you for referring your patient, Nixon More, to the [facility] [place] at [facility]. Please see a copy of my visit note below.    Office Visit Note- Bladder Cancer Follow up  [facility]    [phone]     /70   Ht 1.715 m (5' 7.5\")   Wt 104.3 kg (230 lb)   BMI 35.49 kg/m      Bladder Cancer Diagnosis: Low-grade Ta  History of radiotherapy for prostate cancer  Previous hormonal therapy  Urethral meatal stricture    Past Interventions: TURBT x 1    Most Recent Pathology: January 2023    Most Recent Upper Tract Imaging: October 2022 CT scan    Most Recent FISH/Cytology: March 2023    History:   Spencer contacted the clinic and reported that he was having recurrence of his gross hematuria so we moved up his screening cystoscopy.  He has now stopped his Eliquis and the hematuria is resolved.  He has no other urinary complaints.    Cystoscopy:   I performed flexible cystoscopy today and there were radiation changes of bladder neck but otherwise the cystoscopy was normal.  No bladder tumors identified throughout the bladder.  No active bleeding from the prostate.    Impression:   Gross hematuria, resolved  History of prostate cancer and bladder cancer    Plan:   His cystoscopy shows no evidence of tumor recurrence and only shows radiation changes to the bladder neck.  He is provided reassurance.  I counseled him that he can start his Eliquis again tomorrow.  I recommended he proceed with his next screening cystoscopy in 1 year.      Mark Pino M.D.      Again, thank you for allowing me to participate in the care of your patient.      Sincerely,    Mark Pino MD    "

## 2025-06-04 NOTE — NURSING NOTE
Chief Complaint   Patient presents with    Bladder cancer     Pt here for in office cystoscopy      Prior to the start of the procedure and with procedural staff participation, I verbally confirmed the patient s identity using two indicators, relevant allergies, that the procedure was appropriate and matched the consent or emergent situation, and that the correct equipment/implants were available. Immediately prior to starting the procedure I conducted the Time Out with the procedural staff and re-confirmed the patient s name, procedure, and site/side. I have wiped the patient off with the povidone-Iodine solution, draped them,  used Lidocaine hydrochloride jelly, and instilled sterile water into the bladder. (The Joint Commission universal protocol was followed.)  Yes    Sedation (Moderate or Deep): None     5mL 2% lidocaine hydrochloride Urojet instilled into urethra.    NDC# [address]-5  Lot #: [id number]  Expiration Date:  01/27    Radha Alexander CMA

## 2025-06-04 NOTE — PATIENT INSTRUCTIONS

## 2025-06-04 NOTE — PROGRESS NOTES
"Office Visit Note- Bladder Cancer Follow up  [facility]    [phone]     /70   Ht 1.715 m (5' 7.5\")   Wt 104.3 kg (230 lb)   BMI 35.49 kg/m      Bladder Cancer Diagnosis: Low-grade Ta  History of radiotherapy for prostate cancer  Previous hormonal therapy  Urethral meatal stricture    Past Interventions: TURBT x 1    Most Recent Pathology: January 2023    Most Recent Upper Tract Imaging: October 2022 CT scan    Most Recent FISH/Cytology: March 2023    History:   Spencer contacted the clinic and reported that he was having recurrence of his gross hematuria so we moved up his screening cystoscopy.  He has now stopped his Eliquis and the hematuria is resolved.  He has no other urinary complaints.    Cystoscopy:   I performed flexible cystoscopy today and there were radiation changes of bladder neck but otherwise the cystoscopy was normal.  No bladder tumors identified throughout the bladder.  No active bleeding from the prostate.    Impression:   Gross hematuria, resolved  History of prostate cancer and bladder cancer    Plan:   His cystoscopy shows no evidence of tumor recurrence and only shows radiation changes to the bladder neck.  He is provided reassurance.  I counseled him that he can start his Eliquis again tomorrow.  I recommended he proceed with his next screening cystoscopy in 1 year.      Mark Pino M.D.    "

## 2025-06-09 ENCOUNTER — TELEPHONE (OUTPATIENT)
Dept: CARDIOLOGY | Facility: CLINIC | Age: 89
End: 2025-06-09
Payer: COMMERCIAL

## 2025-06-09 NOTE — TELEPHONE ENCOUNTER
"Patient daughter is reporting that since recent cystoscopy last week patient has had pink tinged urine.  She states it was also this way prior to procedure but when Eliquis was held for procedure the pink tinged urine had gone away.  She states nothing was found on cystoscopy and is requesting a dose reduction of Eliquis.  RN did ask if they have updated urology and if urology was requesting dose reduction, she stated no, and she ascencio not feel she needs to contact them.  She states that she would like him to reduce his Eliquis dose so he doesn t develop anemia from hematuria. Plus, she states \"his history of subarachnoid hemorrhage after he fell and hit his head last autumn, and his recently worsening renal function are two more reasons to have a short leash for considering a dose reduction.\"  Routing to provider to advise.  Chasity Welsh RN on 6/9/2025 at 5:00 PM    "

## 2025-06-10 NOTE — TELEPHONE ENCOUNTER
Firstly, patient should contact urology if that the urine is still pink.  There are other sources of hematuria apart from the bladder.  Dose reduction will reduce the beneficial effect of preventing stroke with atrial fibrillation.  Should also reconsider about the use of a Watchman device given that he had a fall with a head bleed.  Thanks

## 2025-06-11 NOTE — TELEPHONE ENCOUNTER
Called patient's daughter with recommendations from Dr. Jacques Bañuelos.  Daughter states she will call Dr. Pino regarding continued pink in the urine.  Daughter states they did have a watchman consult but states patient was at the upper limits with the size available and states that were waiting for a different size to come out.  RAMSEY Salas RN

## 2025-06-24 NOTE — PROGRESS NOTES
"Medication Therapy Management (MTM) Encounter    ASSESSMENT:                            Medication Adherence/Access: See below for considerations.    Afib/urine bleeding:    [id number] score indicates appropriate anticoagulation and currently on appropriate Eliquis.  Discussed the recommended dose of Eliquis per UpToDate: \"Oral: 5 mg twice daily unless patient has any 2 of the following: Age >=80 years, body weight <=60 kg, or serum creatinine >=1.5 mg/dL (133 micromole/L), then reduce dose to 2.5 mg twice daily.\"  With concern for bleeding will check CBC and iron studies.  I do not know about the Factor XI inhibitor medications that are in studies.    HFpEF (>/=50%)/CAD/CKD3  Patient is meeting BP goal of < 130/80mmHg..  Pt would benefit from continuing to monitor blood pressure and if continue to be low to consider reducing spironolactone every other day .     Hyperlipidemia   LDL <70 Stable.    Depression  stable    Osteopenia:   Should repeat DEXA June-July 2026, 5 years after risedronate treatment to consider drug holiday.   Recommend starting calcium.    Diarrhea: suggested taking psyllium consistently    Vaccines: UpToDate    PLAN:                            Bleeding in the urine: check CBC and iron studies and then check with urology on next steps    Blood Pressure: watch the blood pressure and if systolic <100 can reduce spironolactone 1/2 every other day     Diarrhea:  take 3-5 psyllium capsules at lunch     Osteopenia:   Start Citracal D 2 tablets every day   Due for DEXA June 2026    Follow-up: Return in about 7 months (around 2/2/2026) for MTM Pharmacist Visit, phone visit.    Appointments in Next Year      Jul 29, 2025 10:30 AM  (Arrive by 10:10 AM)  Annual Wellness Visit with Natalya Lewis MD  [facility] ([facility] ) [phone]     Aug 29, 2025 10:45 AM  (Arrive by 10:30 AM)  ECHO COMPLETE with RSCCECHO2  [facility] ([facility] " [facility] ) [phone]     Aug 29, 2025 11:45 AM  LAB with [facility]  [facility] ([facility]) [phone]     Sep 03, 2025 2:00 PM  (Arrive by 1:55 PM)  Return Cardiology with LORELEI Manrique CNP  [facility] ([facility]) [phone]          SUBJECTIVE/OBJECTIVE:                          Spencer More is a 89 year old male seen for a follow-up visit from 12/17/2025. Patient was accompanied by Amber who is NP.      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  Urology is said to consider hyperbaric oxygen chamber. 3)  Amber wondering about Factor XI inhibitors which are in studies    Allergies/ADRs: Reviewed in chart  Past Medical History: Reviewed in chart  Tobacco: He reports that he quit smoking about 47 years ago. His smoking use included cigarettes. He started smoking about 77 years ago. He has a 30 pack-year smoking history. He has never been exposed to tobacco smoke. He has never used smokeless tobacco.  Alcohol: Less than 1 beverage / month--maybe a beer  Caffeine: 1 cup 1-2 times a week    Medication Adherence/Access:   Patient uses pill box(es).  Patient takes medications 2 time(s) per day.   Medication barriers: none.   The patient fills medications at [place]: NO, fills medications at [facility].    Hypertension   Afib/Hypertension/CAD/HpEF:  Eliquis 5mg twice daily for stroke prevention  Beta Blocker: metoprolol ER 100mg every day   ACEi/ARB/ARNI: losartan 25mg every day (reduced 5/1 by Dr. Walker)  SGLT2i: Jardiance 10mg every day   MRA: spironolactone 12.5mg every day   Cardiac rehab program completed this winter  History of MAYITO, on BiPAP  Patient reports  "the following medication side effects: bleeding in urine, see chief complaint, currently not bleeding.  Daughter would like CBC and iron studies checked with recent bleeding.  Dizziness at time or different aura not that he is going to faint. When spironolactone stopped previously he had edema and then restarted.  He states he gets some puffiness in his legs.  Failed taking torsemide.    Patient reports these HF symptoms: shortness of breath with exertion although breathing is better overall with Jardiance addition  Patient is not measuring daily weights but getting done three times a week and weight is stable.   Patient does self-monitor blood pressure. Reports lower side in normal. But top number >100  Exercise: biking a few times a week, 10 minutes  [id number] score of 5.   Dr. Walker, [facility], Cardiologist     BP Readings from Last 3 Encounters:   06/04/25 106/70   05/01/25 99/60   12/18/24 126/86      Pulse Readings from Last 3 Encounters:   05/01/25 85   11/22/24 91   11/13/24 78        Hyperlipidemia   atorvastatin 10mg daily  Patient reports no significant myalgias or other side effects.       Mental Health   Depression  Escitalopram 10mg once daily.   Patient reports the following medication side effects: diarrhea.  Current symptoms include: \"think alright and answered questionnaire yesterday which was good\" had depression when he was not taking this medication especially in the winter  Patient reports symptoms are stable.     Bone Health   Osteopenia:   Vitamin D 1000 units every day   Not taking Citra-Robert Slow Release Calcium 2 tablets every day-1200 calcium and 1000 units vitamin D   risedronate (Actonel) 35mg weekly (has been on current therapy since June 2021)  Take Monday morning and stay upright for 30 minutes.  Gets a spasm in esophagus at times -Forgot to take last Monday  Patient is not experiencing side effects.  DEXA History: 7/19/21  Patient is getting 1 glass of " milk, 1 serving(s)/day of calcium in their diet.       Diarrhea:   Psyllium husk capsules every day as needed --forgets to take  Loperamide 2mg x2 tablets when he has diarrhea, uses 2 tablets every 2-3 day  Daughter reports the psyllium helps with accidents.  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  ----------------    I spent 46 minutes with this patient today. All changes were made via collaborative practice agreement with Natalya Lewis MD.     A summary of these recommendations was sent via [employer]    Jessica YoussefD BCPS  Medication Therapy Management Practitioner  Pharmacist    Telemedicine Visit Details  The patient's medications can be safely assessed via a telemedicine encounter.  Type of service:  Telephone visit  Originating Location (pt. Location): Home    Distant Location (provider location):  On-site  Start Time: 11:05am  End Time: 11:51 AM     Medication Therapy Recommendations  Atrial fibrillation and flutter (H)   1 Current Medication: ELIQUIS ANTICOAGULANT 5 MG tablet   Current Medication Sig: TAKE 1 TABLET BY MOUTH TWICE A DAY   Rationale: Undesirable effect - Adverse medication event - Safety   Recommendation: Continue to Monitor - Add lab   Status: Accepted per CPA   Identified Date: 6/26/2025 Completed Date: 6/26/2025         Diarrhea, unspecified type   1 Current Medication: PSYLLIUM HUSK PO   Current Medication Sig: Take 3-5 tablets by mouth daily (with lunch).   Rationale: Patient forgets to take - Adherence - Adherence   Recommendation: Provide Adherence Intervention   Status: Patient Agreed - Adherence/Education   Identified Date: 6/26/2025 Completed Date: 6/26/2025         Osteopenia, unspecified location   1 Current Medication: RISEdronate (ACTONEL) 35 MG tablet   Current Medication Sig: Take 1 tablet (35 mg) by mouth every 7 days    Rationale: Synergistic therapy - Needs additional medication therapy - Indication   Recommendation: Start Medication - Citracal Calcium +D3 600- MG-MG-UNIT Tb24   Status: Accepted - no CPA Needed   Identified Date: 6/26/2025 Completed Date: 6/26/2025

## 2025-06-26 ENCOUNTER — TELEPHONE (OUTPATIENT)
Dept: CARDIOLOGY | Facility: CLINIC | Age: 89
End: 2025-06-26
Payer: COMMERCIAL

## 2025-06-26 ENCOUNTER — VIRTUAL VISIT (OUTPATIENT)
Dept: PHARMACY | Facility: CLINIC | Age: 89
End: 2025-06-26
Payer: COMMERCIAL

## 2025-06-26 DIAGNOSIS — R31.9 HEMATURIA, UNSPECIFIED TYPE: ICD-10-CM

## 2025-06-26 DIAGNOSIS — I25.118 CORONARY ARTERY DISEASE OF NATIVE ARTERY OF NATIVE HEART WITH STABLE ANGINA PECTORIS: ICD-10-CM

## 2025-06-26 DIAGNOSIS — N18.31 STAGE 3A CHRONIC KIDNEY DISEASE (H): ICD-10-CM

## 2025-06-26 DIAGNOSIS — Z71.85 VACCINE COUNSELING: ICD-10-CM

## 2025-06-26 DIAGNOSIS — I50.32 CHRONIC DIASTOLIC HEART FAILURE (H): ICD-10-CM

## 2025-06-26 DIAGNOSIS — R19.7 DIARRHEA, UNSPECIFIED TYPE: ICD-10-CM

## 2025-06-26 DIAGNOSIS — E78.5 DYSLIPIDEMIA: ICD-10-CM

## 2025-06-26 DIAGNOSIS — M85.80 OSTEOPENIA, UNSPECIFIED LOCATION: ICD-10-CM

## 2025-06-26 DIAGNOSIS — I48.91 ATRIAL FIBRILLATION AND FLUTTER (H): Primary | ICD-10-CM

## 2025-06-26 DIAGNOSIS — I48.92 ATRIAL FIBRILLATION AND FLUTTER (H): Primary | ICD-10-CM

## 2025-06-26 DIAGNOSIS — F32.9 MAJOR DEPRESSIVE DISORDER, REMISSION STATUS UNSPECIFIED, UNSPECIFIED WHETHER RECURRENT: ICD-10-CM

## 2025-06-26 NOTE — LETTER
"Recommended To-Do List      Prepared on: Jun 26, 2025       You can get the best results from your medications by completing the items on this \"To-Do List.\"      Bring your To-Do List when you go to your doctor. And, share it with your family or caregivers.    My To-Do List:  What we talked about: What I should do:   An issue with your medication    Monitor bleeding in urine with checking your CBC and iron studies           What we talked about: What I should do:   The importance of taking your medication as intended    Reminder to take your medication as prescribed for PSYLLIUM HUSK PO          What we talked about: What I should do:   A new medication that may be of benefit to you    Start taking Citracal Calcium +D3          What we talked about: What I should do:                     "

## 2025-06-26 NOTE — TELEPHONE ENCOUNTER
1st attempt- Left voicemail for the patient to call back and schedule the following:    Appointment type:  Return Cardiology  Provider:  Neisha Bañuelos  Return date:  September 2025  Additional appointment(s) needed:  -  Additional Notes:  reschedule due to orphaned  Specialty phone number: [phone]

## 2025-06-26 NOTE — LETTER
June 26, 2025  Nixon Kingon  [address]  [place] [address]    Dear Mariusz KingonMADYSON [facility] BEATRICE     Thank you for talking with me on Jun 26, 2025 about your health and medications. As a follow-up to our conversation, I have included two documents:      Your Recommended To-Do List has steps you should take to get the best results from your medications.  Your Medication List will help you keep track of your medications and how to take them.    If you want to talk about these documents, please call Yasmin Voss RPH at phone: [phone], Monday-Friday 8-4:30pm.    I look forward to working with you and your doctors to make sure your medications work well for you.    Sincerely,  Yasmin Voss RPH  [facility] Pharmacist, [facility]

## 2025-06-26 NOTE — PATIENT INSTRUCTIONS
Recommendations from today's MTM visit:                                                      Bleeding in the urine: check CBC and iron studies and then check with urology on next steps    Blood Pressure: watch the blood pressure and if systolic <100 can reduce spironolactone 1/2 every other day     Diarrhea:  take 3-5 psyllium capsules at lunch     Osteopenia:   Start Citracal D 2 tablets every day   Due for DEXA June 2026    Follow-up: Return in about 7 months (around 2/2/2026) for MTM Pharmacist Visit, phone visit.    To schedule another MTM appointment, please call the clinic directly or you may call the MTM scheduling line at [phone] or toll-free at [phone].     My Clinical Pharmacist's contact information:                                                      It was a pleasure seeing you today!  Please feel free to contact me with any questions or concerns you have.      Yasmin Voss, PharmD BCPS  Medication Therapy Management Practitioner    It was great to speak with you today.  I value your experience and would be very thankful for your time with providing feedback on our clinic survey. You may receive a survey via email or text message in the next few days.

## 2025-06-26 NOTE — LETTER
_  Medication List        Prepared on: Jun 26, 2025     Bring your Medication List when you go to the doctor, hospital, or   emergency room. And, share it with your family or caregivers.     Note any changes to how you take your medications.  Cross out medications when you no longer use them.    Medication How I take it Why I use it Prescriber   atorvastatin (LIPITOR) 40 MG tablet Take 1 tablet (40 mg) by mouth daily Dyslipidemia Natalya Lewis MD   Calcium-Magnesium-Vitamin D (CITRACAL SLOW RELEASE) 600- MG-MG-UNIT TB24 Take 2 tablets by mouth daily. Osteopenia Patient Reported   Cholecalciferol (VITAMIN D-3) 25 MCG (1000 UT) CAPS Take 1 capsule by mouth daily. Osteopenia Patient Reported   ELIQUIS ANTICOAGULANT 5 MG tablet TAKE 1 TABLET BY MOUTH TWICE A DAY Atrial fibrillation and flutter (H); Long term current use of anticoagulant therapy Romelia Obando PA-C   empagliflozin (JARDIANCE) 10 MG TABS tablet Take 1 tablet (10 mg) by mouth daily. Chronic Diastolic Heart Failure (H) LORELEI Manrique CNP   escitalopram (LEXAPRO) 10 MG tablet Take 1 tablet (10 mg) by mouth daily Mild Major Depression Natalya Lewis MD   loperamide (IMODIUM) 2 MG capsule Take 2 mg by mouth 2 times daily as needed for diarrhea. Diarrhea Patient Reported   losartan (COZAAR) 25 MG tablet Take 1 tablet (25 mg) by mouth daily. Dyslipidemia Curry Walker MD   metoprolol succinate ER (TOPROL XL) 50 MG 24 hr tablet Take 2 tablets (100 mg) by mouth daily. A-Fib (H) LORELEI Manrique CNP   PSYLLIUM HUSK PO Take 3-5 tablets by mouth daily (with lunch). Diarrhea Patient Reported   RISEdronate (ACTONEL) 35 MG tablet Take 1 tablet (35 mg) by mouth every 7 days Osteopenia of both hips Natalya Lewis MD   spironolactone (ALDACTONE) 25 MG tablet Take 0.5 tablets (12.5 mg) by mouth daily. Persistent Atrial Fibrillation (H); Benign Essential Hypertension Natalya Lewis MD         Add new medications, over-the-counter drugs, herbals,  vitamins, or  minerals in the blank rows below.    Medication How I take it Why I use it Prescriber                                      Allergies:      - Amoxicillin-pot Clavulanate - Rash  - Bactrim [sulfamethoxazole W/trimethoprim] - Rash  - Bee Venom - Itching  - Sulfa Antibiotics - Hives  - Celebrex [celecoxib]  - Lisinopril - Cough  - Naproxen - Hives  - Penicillin G        Side effects I have had:      Not on File        Other Information:              My notes and questions:

## 2025-07-02 ENCOUNTER — RESULTS FOLLOW-UP (OUTPATIENT)
Dept: PHARMACY | Facility: CLINIC | Age: 89
End: 2025-07-02

## 2025-07-02 ENCOUNTER — LAB (OUTPATIENT)
Dept: LAB | Facility: CLINIC | Age: 89
End: 2025-07-02
Payer: COMMERCIAL

## 2025-07-02 DIAGNOSIS — I48.92 ATRIAL FIBRILLATION AND FLUTTER (H): ICD-10-CM

## 2025-07-02 DIAGNOSIS — R31.9 HEMATURIA, UNSPECIFIED TYPE: ICD-10-CM

## 2025-07-02 DIAGNOSIS — I48.91 ATRIAL FIBRILLATION AND FLUTTER (H): ICD-10-CM

## 2025-07-02 LAB
ERYTHROCYTE [DISTWIDTH] IN BLOOD BY AUTOMATED COUNT: 15.3 % (ref 10–15)
FERRITIN SERPL-MCNC: 516 NG/ML (ref 31–409)
HCT VFR BLD AUTO: 44.2 % (ref 40–53)
HGB BLD-MCNC: 14 G/DL (ref 13.3–17.7)
IRON BINDING CAPACITY (ROCHE): 236 UG/DL (ref 240–430)
IRON SATN MFR SERPL: 29 % (ref 15–46)
IRON SERPL-MCNC: 68 UG/DL (ref 61–157)
MCH RBC QN AUTO: 29 PG (ref 26.5–33)
MCHC RBC AUTO-ENTMCNC: 31.7 G/DL (ref 31.5–36.5)
MCV RBC AUTO: 92 FL (ref 78–100)
PLATELET # BLD AUTO: 209 10E3/UL (ref 150–450)
RBC # BLD AUTO: 4.83 10E6/UL (ref 4.4–5.9)
WBC # BLD AUTO: 5.6 10E3/UL (ref 4–11)

## 2025-07-02 PROCEDURE — 82728 ASSAY OF FERRITIN: CPT

## 2025-07-02 PROCEDURE — 85027 COMPLETE CBC AUTOMATED: CPT

## 2025-07-02 PROCEDURE — 83550 IRON BINDING TEST: CPT

## 2025-07-02 PROCEDURE — 36415 COLL VENOUS BLD VENIPUNCTURE: CPT

## 2025-07-02 PROCEDURE — 83540 ASSAY OF IRON: CPT

## 2025-07-06 DIAGNOSIS — I10 BENIGN ESSENTIAL HYPERTENSION: ICD-10-CM

## 2025-07-06 DIAGNOSIS — I48.19 PERSISTENT ATRIAL FIBRILLATION (H): ICD-10-CM

## 2025-07-06 DIAGNOSIS — E78.5 DYSLIPIDEMIA: ICD-10-CM

## 2025-07-07 RX ORDER — ATORVASTATIN CALCIUM 40 MG/1
40 TABLET, FILM COATED ORAL DAILY
Qty: 90 TABLET | Refills: 2 | Status: SHIPPED | OUTPATIENT
Start: 2025-07-07

## 2025-07-07 RX ORDER — SPIRONOLACTONE 25 MG/1
12.5 TABLET ORAL DAILY
Qty: 45 TABLET | Refills: 0 | Status: SHIPPED | OUTPATIENT
Start: 2025-07-07

## 2025-07-17 ENCOUNTER — TRANSFERRED RECORDS (OUTPATIENT)
Dept: HEALTH INFORMATION MANAGEMENT | Facility: CLINIC | Age: 89
End: 2025-07-17
Payer: COMMERCIAL

## 2025-07-24 SDOH — HEALTH STABILITY: PHYSICAL HEALTH: ON AVERAGE, HOW MANY DAYS PER WEEK DO YOU ENGAGE IN MODERATE TO STRENUOUS EXERCISE (LIKE A BRISK WALK)?: 1 DAY

## 2025-07-24 SDOH — HEALTH STABILITY: PHYSICAL HEALTH: ON AVERAGE, HOW MANY MINUTES DO YOU ENGAGE IN EXERCISE AT THIS LEVEL?: 10 MIN

## 2025-07-24 ASSESSMENT — SOCIAL DETERMINANTS OF HEALTH (SDOH): HOW OFTEN DO YOU GET TOGETHER WITH FRIENDS OR RELATIVES?: THREE TIMES A WEEK

## 2025-07-27 DIAGNOSIS — I50.32 CHRONIC DIASTOLIC HEART FAILURE (H): ICD-10-CM

## 2025-07-28 RX ORDER — EMPAGLIFLOZIN 10 MG/1
10 TABLET, FILM COATED ORAL DAILY
Qty: 90 TABLET | Refills: 3 | Status: SHIPPED | OUTPATIENT
Start: 2025-07-28

## 2025-07-28 ASSESSMENT — PATIENT HEALTH QUESTIONNAIRE - PHQ9
SUM OF ALL RESPONSES TO PHQ QUESTIONS 1-9: 1
10. IF YOU CHECKED OFF ANY PROBLEMS, HOW DIFFICULT HAVE THESE PROBLEMS MADE IT FOR YOU TO DO YOUR WORK, TAKE CARE OF THINGS AT HOME, OR GET ALONG WITH OTHER PEOPLE: NOT DIFFICULT AT ALL
SUM OF ALL RESPONSES TO PHQ QUESTIONS 1-9: 1

## 2025-07-29 ENCOUNTER — OFFICE VISIT (OUTPATIENT)
Dept: PEDIATRICS | Facility: CLINIC | Age: 89
End: 2025-07-29
Payer: COMMERCIAL

## 2025-07-29 VITALS
HEART RATE: 73 BPM | HEIGHT: 67 IN | RESPIRATION RATE: 18 BRPM | WEIGHT: 231.7 LBS | DIASTOLIC BLOOD PRESSURE: 52 MMHG | OXYGEN SATURATION: 95 % | BODY MASS INDEX: 36.37 KG/M2 | SYSTOLIC BLOOD PRESSURE: 106 MMHG | TEMPERATURE: 97.9 F

## 2025-07-29 DIAGNOSIS — E66.01 CLASS 2 SEVERE OBESITY DUE TO EXCESS CALORIES WITH SERIOUS COMORBIDITY AND BODY MASS INDEX (BMI) OF 36.0 TO 36.9 IN ADULT (H): ICD-10-CM

## 2025-07-29 DIAGNOSIS — Z85.46 PERSONAL HISTORY OF PROSTATE CANCER: ICD-10-CM

## 2025-07-29 DIAGNOSIS — E55.9 VITAMIN D DEFICIENCY: ICD-10-CM

## 2025-07-29 DIAGNOSIS — Z79.01 LONG TERM CURRENT USE OF ANTICOAGULANT THERAPY: ICD-10-CM

## 2025-07-29 DIAGNOSIS — J44.9 CHRONIC OBSTRUCTIVE PULMONARY DISEASE, UNSPECIFIED COPD TYPE (H): ICD-10-CM

## 2025-07-29 DIAGNOSIS — I48.91 ATRIAL FIBRILLATION AND FLUTTER (H): ICD-10-CM

## 2025-07-29 DIAGNOSIS — R41.3 MEMORY CHANGE: ICD-10-CM

## 2025-07-29 DIAGNOSIS — M85.851 OSTEOPENIA OF BOTH HIPS: ICD-10-CM

## 2025-07-29 DIAGNOSIS — Z00.00 ENCOUNTER FOR MEDICARE ANNUAL WELLNESS EXAM: Primary | ICD-10-CM

## 2025-07-29 DIAGNOSIS — Z85.51 PERSONAL HISTORY OF MALIGNANT NEOPLASM OF BLADDER: ICD-10-CM

## 2025-07-29 DIAGNOSIS — E66.812 CLASS 2 SEVERE OBESITY DUE TO EXCESS CALORIES WITH SERIOUS COMORBIDITY AND BODY MASS INDEX (BMI) OF 36.0 TO 36.9 IN ADULT (H): ICD-10-CM

## 2025-07-29 DIAGNOSIS — I10 BENIGN ESSENTIAL HYPERTENSION: ICD-10-CM

## 2025-07-29 DIAGNOSIS — H34.8392 BRANCH RETINAL VEIN OCCLUSION, UNSPECIFIED COMPLICATION STATUS, UNSPECIFIED LATERALITY (H): ICD-10-CM

## 2025-07-29 DIAGNOSIS — F32.0 MILD MAJOR DEPRESSION: ICD-10-CM

## 2025-07-29 DIAGNOSIS — M85.852 OSTEOPENIA OF BOTH HIPS: ICD-10-CM

## 2025-07-29 DIAGNOSIS — I50.32 CHRONIC DIASTOLIC HEART FAILURE (H): ICD-10-CM

## 2025-07-29 DIAGNOSIS — I48.92 ATRIAL FIBRILLATION AND FLUTTER (H): ICD-10-CM

## 2025-07-29 DIAGNOSIS — N18.31 STAGE 3A CHRONIC KIDNEY DISEASE (H): ICD-10-CM

## 2025-07-29 PROBLEM — S46.911D STRAIN OF RIGHT SHOULDER, SUBSEQUENT ENCOUNTER: Status: RESOLVED | Noted: 2024-09-08 | Resolved: 2025-07-29

## 2025-07-29 PROBLEM — H90.3 BILATERAL SENSORINEURAL HEARING LOSS: Status: ACTIVE | Noted: 2025-07-29

## 2025-07-29 PROBLEM — C67.9 PRIMARY MALIGNANT NEOPLASM OF BLADDER (H): Status: ACTIVE | Noted: 2025-07-29

## 2025-07-29 PROBLEM — G47.33 OBSTRUCTIVE SLEEP APNEA SYNDROME: Status: ACTIVE | Noted: 2024-09-10

## 2025-07-29 PROBLEM — I62.00 SUBDURAL HEMORRHAGE (H): Status: RESOLVED | Noted: 2024-09-08 | Resolved: 2025-07-29

## 2025-07-29 PROCEDURE — 99214 OFFICE O/P EST MOD 30 MIN: CPT | Mod: 25 | Performed by: INTERNAL MEDICINE

## 2025-07-29 PROCEDURE — G2211 COMPLEX E/M VISIT ADD ON: HCPCS | Performed by: INTERNAL MEDICINE

## 2025-07-29 PROCEDURE — G0439 PPPS, SUBSEQ VISIT: HCPCS | Performed by: INTERNAL MEDICINE

## 2025-07-29 PROCEDURE — 1126F AMNT PAIN NOTED NONE PRSNT: CPT | Performed by: INTERNAL MEDICINE

## 2025-07-29 PROCEDURE — 3078F DIAST BP <80 MM HG: CPT | Performed by: INTERNAL MEDICINE

## 2025-07-29 PROCEDURE — 3074F SYST BP LT 130 MM HG: CPT | Performed by: INTERNAL MEDICINE

## 2025-07-29 RX ORDER — RISEDRONATE SODIUM 35 MG/1
35 TABLET, FILM COATED ORAL
Qty: 12 TABLET | Refills: 4 | Status: CANCELLED | OUTPATIENT
Start: 2025-07-29

## 2025-07-29 RX ORDER — ESCITALOPRAM OXALATE 10 MG/1
10 TABLET ORAL DAILY
Qty: 90 TABLET | Refills: 3 | Status: SHIPPED | OUTPATIENT
Start: 2025-07-29

## 2025-07-29 RX ORDER — SPIRONOLACTONE 25 MG/1
12.5 TABLET ORAL DAILY
Qty: 45 TABLET | Refills: 0 | Status: SHIPPED | OUTPATIENT
Start: 2025-07-29

## 2025-07-29 RX ORDER — ALBUTEROL SULFATE 90 UG/1
2 INHALANT RESPIRATORY (INHALATION) EVERY 6 HOURS PRN
COMMUNITY
Start: 2024-07-23

## 2025-07-29 RX ORDER — METOPROLOL SUCCINATE 100 MG/1
100 TABLET, EXTENDED RELEASE ORAL DAILY
Qty: 90 TABLET | Refills: 0 | Status: SHIPPED | OUTPATIENT
Start: 2025-07-29

## 2025-07-29 ASSESSMENT — PAIN SCALES - GENERAL: PAINLEVEL_OUTOF10: NO PAIN (0)

## 2025-07-29 NOTE — PROGRESS NOTES
Preventive Care Visit  [facility] BEATRICE Lewis MD, Internal Medicine  Jul 29, 2025      Assessment & Plan     Encounter for Medicare annual wellness exam  - Routine health education discussed: diet, exercise, safety, health maintenance schedule.   - flu and covid vaccines in the fall    Stage 3a chronic kidney disease:  - Kidney function is improving, possibly due to Jardiance.    Atrial fibrillation and flutter:  - Rate control managed with metoprolol.  - Change metoprolol prescription to 100 mg tablets once daily.    Personal history of malignant neoplasm of bladder:  - No return of bladder cancer; mucosa damage from radiation noted.    Branch retinal vein occlusion, unspecified complication status, unspecified laterality  Continue risk factor reduction and follow-up with ophtho    Chronic obstructive pulmonary disease, unspecified COPD type:  - Breathing is stable; no inhaler use reported.    Class 2 severe obesity due to excess calories with serious comorbidity and body mass index (BMI) of 36.0 to 36.9 in adult  - encouraged adding exercise    Chronic diastolic heart failure:  - Jardiance may be improving heart function.  - Continue current management.    Mild major depression  - stable, continue lexapro    Benign essential hypertension  - controlled.  Adjust metoprolol dose to 100mg daily and continue medications     Vitamin D deficiency  - normal in 2024.  Recheck dexa    Osteopenia of both hips  Recheck dexa and consider stopping risedronate.    Memory change:  - Short-term memory is slightly worse since brain bleed.  - Refer to occupational therapy for memory support.    Long term current use of anticoagulant therapy:  - Intermittent gross hematuria; not anemic; kidney function stable.  - Continue current anticoagulant therapy.    Personal history of prostate cancer  Continue labs with urology    Shoulder pain:  - Left shoulder pain with limited range of motion; rotator cuff  "suspect.  - Cortisone injection administered; follow-up with orthopedic on August 6, 2025.    Consent was obtained from the patient to use an AI documentation tool in the creation of this note.      BMI  Estimated body mass index is 36.02 kg/m  as calculated from the following:    Height as of this encounter: 1.708 m (5' 7.25\").    Weight as of this encounter: 105.1 kg (231 lb 11.2 oz).   Weight management plan: Discussed healthy diet and exercise guidelines    Counseling  Appropriate preventive services were addressed with this patient via screening, questionnaire, or discussion as appropriate for fall prevention, nutrition, physical activity, Tobacco-use cessation, social engagement, weight loss and cognition.  Checklist reviewing preventive services available has been given to the patient.  Reviewed patient's diet, addressing concerns and/or questions.   He is at risk for lack of exercise and has been provided with information to increase physical activity for the benefit of his well-being.   Information on urinary incontinence and treatment options given to patient.   Reviewed preventive health counseling, as reflected in patient instructions        Marleni Palmer is a 89 year old, presenting for the following:  Annual Visit        7/29/2025     9:52 AM   Additional Questions   Roomed by Divya Hansen CMA          HPI  Spencer More, 89 years with daughter, Amber, on the phone.    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, required hospitalization and one week of acute rehab  - Experienced low blood pressures during hospitalization and rehab; losartan dose was reduced and held if systolic BP <100 mmHg  - Spironolactone was held in hospital, resulting in fluid overload and worsened heart echo; spironolactone was restarted  - Started Jardiance in fall 2024; reported improved functional status and less shortness of breath with activity since " "then  - Losartan dose reduced to 25 mg daily in early May 2025 by cardiologist    COPD  - No recent use of inhaler; has inhaler available but not needed    Edema  - Swelling in legs, worse later in the day.  Not wearing compression socks  - Edema improved after resuming spironolactone    Bladder cancer and hematuria  - Intermittent blood in urine since fall 2024  - Cystoscopy by urology showed no recurrence of bladder cancer; mucosal damage attributed to prior radiation  - No anemia; hemoglobin and iron levels normal    Cognitive changes  - Noted mild worsening of short-term memory since subarachnoid hemorrhage in fall 2024  - Occasional confusion with driving routes, but able to resolve without incident  - Able to perform activities of daily living independently    Left shoulder pain  - Acute onset of left shoulder pain and limited range of motion approximately 2 weeks prior to visit  - X-ray performed; rotator cuff pathology suspected  - Received cortisone injection with improvement in pain and function  - has ortho follow-up scheduled.    Bowel issues  - History of bowel urgency and loose stools due to radiation proctitis  - Fiber supplementation increased from 3 to 4 capsules daily, with plan to increase to 5; improvement in bowel control but still occasional need for Imodium  - No bowel accidents during previous vacation when fiber intake was increased    Taste disturbance  - Diminished sense of taste, described as initial taste present but fades with subsequent bites  - Suspects relation to prior COVID infection  - Sense of smell reported as \"somewhat OK\"      Advance Care Planning    Document on file is a Health Care Directive or POLST.        7/24/2025   General Health   How would you rate your overall physical health? Good   Feel stress (tense, anxious, or unable to sleep) Not at all         7/24/2025   Nutrition   Diet: Regular (no restrictions)         7/24/2025   Exercise   Days per week of " moderate/strenous exercise 1 day   Average minutes spent exercising at this level 10 min   (!) EXERCISE CONCERN      7/24/2025   Social Factors   Frequency of gathering with friends or relatives Three times a week   Worry food won't last until get money to buy more No   Food not last or not have enough money for food? No   Do you have housing? (Housing is defined as stable permanent housing and does not include staying outside in a car, in a tent, in an abandoned building, in an overnight shelter, or couch-surfing.) Yes   Are you worried about losing your housing? No   Lack of transportation? No   Unable to get utilities (heat,electricity)? No         7/29/2025   Fall Risk   Gait Speed Test (Document in seconds) 5.34   Gait Speed Test Interpretation Greater than 5.01 seconds - ABNORMAL          7/24/2025   Activities of Daily Living- Home Safety   Needs help with the following daily activites None of the above   Safety concerns in the home None of the above         7/24/2025   Dental   Dentist two times every year? Yes         7/24/2025   Hearing Screening   Hearing concerns? None of the above         7/24/2025   Driving Risk Screening   Patient/family members have concerns about driving No         7/24/2025   General Alertness/Fatigue Screening   Have you been more tired than usual lately? No         7/24/2025   Urinary Incontinence Screening   Bothered by leaking urine in past 6 months Yes       Today's PHQ-9 Score:       7/28/2025    10:59 AM   PHQ-9 SCORE   PHQ-9 Total Score MyChart 1 (Minimal depression)   PHQ-9 Total Score 1        Patient-reported         7/24/2025   Substance Use   Alcohol more than 3/day or more than 7/wk Not Applicable   Do you have a current opioid prescription? No   How severe/bad is pain from 1 to 10? 0/10 (No Pain)   Do you use any other substances recreationally? No     Social History     Tobacco Use    Smoking status: Former     Current packs/day: 0.00     Average packs/day: 1  pack/day for 30.0 years (30.0 ttl pk-yrs)     Types: Cigarettes     Start date: 1948     Quit date: 1978     Years since quittin.6     Passive exposure: Never    Smokeless tobacco: Never   Vaping Use    Vaping status: Never Used   Substance Use Topics    Alcohol use: Yes     Comment: Rare    Drug use: No             Reviewed and updated as needed this visit by Provider   Tobacco  Allergies  Meds  Problems  Med Hx  Surg Hx  Fam Hx            Labs reviewed in EPIC  Current providers sharing in care for this patient include:  Patient Care Team:  Natalya Lewis MD as PCP - General (Internal Medicine)  Natalya Lewis MD as Assigned PCP  Mark Pino MD as Assigned Surgical Provider  Vinod De La Paz MD as MD (Cardiovascular Disease)  Amilcar Andrews PA-C as Assigned Neuroscience Provider  Oralia Soler EP as Cardiac Rehabilitation Therapist  Yasmin Voss [facility] as Pharmacist (Pharmacist)  Yasmin Voss [facility] as Assigned MTM Pharmacist  Neisha Bañuelos APRN CNP as Assigned Heart and Vascular Provider  Ara Burns [facility] as Pharmacist (Pharmacist)    The following health maintenance items are reviewed in Epic and correct as of today:  Health Maintenance   Topic Date Due    HF ACTION PLAN  Never done    DEXA  2024    MICROALBUMIN  2025    INFLUENZA VACCINE (1) 2025    PHQ-9  2026    ALT  2026    BMP  2026    LIPID  2026    CBC  2026    HEMOGLOBIN  2026    MEDICARE ANNUAL WELLNESS VISIT  2026    ANNUAL REVIEW OF HM ORDERS  2026    FALL RISK ASSESSMENT  2026    ADVANCE CARE PLANNING  2030    DTAP/TDAP/TD VACCINE (3 - Td or Tdap) 2033    TSH W/FREE T4 REFLEX  Completed    SPIROMETRY  Completed    COPD ACTION PLAN  Completed    DEPRESSION ACTION PLAN  Completed    PNEUMOCOCCAL VACCINE 50+ YEARS  Completed    URINALYSIS  Completed    HPV VACCINE (No Doses Required)  "Completed    ZOSTER VACCINE  Completed    RSV VACCINE  Completed    COVID-19 VACCINE  Completed    MENINGITIS VACCINE  Aged Out    COLORECTAL CANCER SCREENING  Discontinued            Objective    Exam  /52 (BP Location: Right arm, Cuff Size: Adult Large)   Pulse 73   Temp 97.9  F (36.6  C) (Tympanic)   Resp 18   Ht 1.708 m (5' 7.25\")   Wt 105.1 kg (231 lb 11.2 oz)   SpO2 95%   BMI 36.02 kg/m     Estimated body mass index is 36.02 kg/m  as calculated from the following:    Height as of this encounter: 1.708 m (5' 7.25\").    Weight as of this encounter: 105.1 kg (231 lb 11.2 oz).    Physical Exam  GENERAL: alert and no distress  EYES: Eyes grossly normal to inspection, PERRL and conjunctivae and sclerae normal  HENT: ear canals and TM's normal, nose and mouth without ulcers or lesions  NECK: no adenopathy, no asymmetry, masses, or scars  RESP: lungs clear to auscultation - no rales, rhonchi or wheezes  CV: regular rate and rhythm, normal S1 S2, no S3 or S4, no murmur, click or rub, no peripheral edema  MS: 1+ edema to ankles  SKIN: no suspicious lesions or rashes  NEURO: Normal strength and tone, mentation intact and speech normal  PSYCH: mentation appears normal, affect normal/bright  Gait and balance assessed per Gait Speed Test.  Result as above.        7/29/2025   Mini Cog   Clock Draw Score 2 Normal   3 Item Recall 3 objects recalled   Mini Cog Total Score 5            The longitudinal plan of care for the diagnosis(es)/condition(s) as documented were addressed during this visit. Due to the added complexity in care, I will continue to support Spencer in the subsequent management and with ongoing continuity of care.    Signed Electronically by: Natalya Lewis MD    Answers submitted by the patient for this visit:  Patient Health Questionnaire (Submitted on 7/28/2025)  If you checked off any problems, how difficult have these problems made it for you to do your work, take care of things at home, or get " along with other people?: Not difficult at all  PHQ9 TOTAL SCORE: 1

## 2025-07-29 NOTE — PATIENT INSTRUCTIONS
"Patient Education     Use your recumbent bike and/or go to exercise class!  Set a schedule and do it.    Talk to orthopedics about doing physical therapy.    Get your flu and covid vaccines in the fall.    Wear compression socks - try the milder over the counter kind.  Preventive Care Advice   This is general advice we often give to help people stay healthy. Your care team may have specific advice just for you. Please talk to your care team about your own preventive care needs.  Lifestyle  Exercise at least 150 minutes each week (30 minutes a day, 5 days a week).  Do muscle strengthening activities 2 days a week. These help control your weight and prevent disease.  No smoking.  Wear sunscreen to prevent skin cancer.  Take time with family and friends.  Have your home tested for radon every 2 to 5 years. Radon is a colorless, odorless gas that can harm your lungs. To learn more, go to www.health.[facility].mn. and search for \"Radon in Homes.\"  Keep guns unloaded and locked up in a safe place like a safe or gun vault, or, use a gun lock and hide the keys. Always lock away bullets separately. To learn more, visit [place].gov and search for \"safe gun storage.\"  Nutrition  Eat 5 or more servings of fruits and vegetables each day.  Try wheat bread, brown rice and whole grain pasta (instead of white bread, rice, and pasta).  Get enough calcium and vitamin D. Check the label on foods and aim for 100% of the RDA (recommended daily allowance).  Regular exams  Have a dental exam and cleaning every 6 months.  Older adults: Ask your care team how often to have memory testing.  See your health care team every year to talk about:  Any changes in your health.  Any medicines your care team has prescribed.  Preventive care, family planning, and ways to prevent chronic diseases.  Shots (vaccines)   COVID-19 shot: Get this shot when it's due.  Flu shot: Get a flu shot every year.  Tetanus shot: Get a tetanus shot every 10 years.    General " health tests  Diabetes screening:  Starting at age 35, Get screened for diabetes at least every 3 years.  If you are younger than age 35, ask your care team if you should be screened for diabetes.  Cholesterol test: At age 39, start having a cholesterol test every 5 years, or more often if advised.  Bone density scan (DEXA): At age 50, ask your care team if you should have this scan for osteoporosis (brittle bones).  Hepatitis C: Get tested at least once in your life.  Abdominal aortic aneurysm screening: Talk to your doctor about having this screening if you:  Have ever smoked; and  Are biologically male; and  Are between the ages of 65 and 75.  STIs (sexually transmitted infections)  Before age 24: Ask your care team if you should be screened for STIs.  After age 24: Get screened for STIs if you're at risk. You are at risk for STIs (including HIV) if:  You are sexually active with more than one person.  You don't use condoms every time.  You or a partner was diagnosed with a sexually transmitted infection.  If you are at risk for HIV, ask about PrEP medicine to prevent HIV.  Get tested for HIV at least once in your life, whether you are at risk for HIV or not.  Cancer screening tests  Cervical cancer screening: If you have a cervix, begin getting regular cervical cancer screening tests at age 21. Most people who have regular screenings with normal results can stop after age 65. Talk about this with your provider.  Breast cancer scan (mammogram): If you've ever had breasts, begin having regular mammograms starting at age 40. This is a scan to check for breast cancer.  Colon cancer screening: It is important to start screening for colon cancer at age 45.  Have a colonoscopy test every 10 years (or more often if you're at risk) Or, ask your provider about stool tests like a FIT test every year or Cologuard test every 3 years.  To learn more about your testing options, visit: www.[employer].Sunrise/434542.pdf.  For help  making a decision, visit: ike/va81986.  Prostate cancer screening test: If you have a prostate and are age 55 to 69, ask your provider if you would benefit from a yearly prostate cancer screening test.  Lung cancer screening: If you are a current or former smoker age 50 to 80, ask your care team if ongoing lung cancer screenings are right for you.    For informational purposes only. Not to replace the advice of your health care provider. Copyright   2023 [employer]. All rights reserved. Clinically reviewed by the [employer] [facility]. [employer] [id number] - REV 6/25.  Preventing Falls: Care Instructions  Injuries and health problems such as trouble walking or poor eyesight can increase your risk of falling. So can some medicines. But there are things you can do to help prevent falls. You can exercise to get stronger. You can also arrange your home to make it safer.    Talk to your doctor about the medicines you take. Ask if any of them increase the risk of falls and whether they can be changed or stopped.   Try to exercise regularly. It can help improve your strength and balance. This can help lower your risk of falling.         Practice fall safety and prevention.   Wear low-heeled shoes that fit well and give your feet good support. Talk to your doctor if you have foot problems that make this hard.  Carry a cellphone or wear a medical alert device that you can use to call for help.  Use stepladders instead of chairs to reach high objects. Don't climb if you're at risk for falls. Ask for help, if needed.  Wear the correct eyeglasses, if you need them.        Make your home safer.   Remove rugs, cords, clutter, and furniture from walkways.  Keep your house well lit. Use night-lights in hallways and bathrooms.  Install and use sturdy handrails on stairways.  Wear nonskid footwear, even inside. Don't walk barefoot or in socks without shoes.        Be safe outside.   Use handrails, curb  "cuts, and ramps whenever possible.  Keep your hands free by using a shoulder bag or backpack.  Try to walk in well-lit areas. Watch out for uneven ground, changes in pavement, and debris.  Be careful in the winter. Walk on the grass or gravel when sidewalks are slippery. Use de-icer on steps and walkways. Add non-slip devices to shoes.    Put grab bars and nonskid mats in your shower or tub and near the toilet. Try to use a shower chair or bath bench when bathing.   Get into a tub or shower by putting in your weaker leg first. Get out with your strong side first. Have a phone or medical alert device in the bathroom with you.   Where can you learn more?  Go to https://www.TrialPay.net/patiented  Enter G117 in the search box to learn more about \"Preventing Falls: Care Instructions.\"  Current as of: July 31, 2024  Content Version: 14.5    6802-6601 [employer].   Care instructions adapted under license by your healthcare professional. If you have questions about a medical condition or this instruction, always ask your healthcare professional. [employer] disclaims any warranty or liability for your use of this information.    Bladder Training: Care Instructions  Your Care Instructions     Bladder training is used to treat urge incontinence and stress incontinence. Urge incontinence means that the need to urinate comes on so fast that you can't get to a toilet in time. Stress incontinence means that you leak urine because of pressure on your bladder. For example, it may happen when you laugh, cough, or lift something heavy.  Bladder training can increase how long you can wait before you have to urinate. It can also help your bladder hold more urine. And it can give you better control over the urge to urinate.  It is important to remember that bladder training takes a few weeks to a few months to make a difference. You may not see results right away, but don't give up.  Follow-up care is a key " part of your treatment and safety. Be sure to make and go to all appointments, and call your doctor if you are having problems. It's also a good idea to know your test results and keep a list of the medicines you take.  How can you care for yourself at home?  Work with your doctor to come up with a bladder training program that is right for you. You may use one or more of the following methods.  Delayed urination  In the beginning, try to keep from urinating for 5 minutes after you first feel the need to go.  While you wait, take deep, slow breaths to relax. Kegel exercises can also help you delay the need to go to the bathroom.  After some practice, when you can easily wait 5 minutes to urinate, try to wait 10 minutes before you urinate.  Slowly increase the waiting period until you are able to control when you have to urinate.  Scheduled urination  Empty your bladder when you first wake up in the morning.  Schedule times throughout the day when you will urinate.  Start by going to the bathroom every hour, even if you don't need to go.  Slowly increase the time between trips to the bathroom.  When you have found a schedule that works well for you, keep doing it.  If you wake up during the night and have to urinate, do it. Apply your schedule to waking hours only.  Kegel exercises  These tighten and strengthen pelvic muscles, which can help you control the flow of urine. (If doing these exercises causes pain, stop doing them and talk with your doctor.) To do Kegel exercises:  Squeeze your muscles as if you were trying not to pass gas. Or squeeze your muscles as if you were stopping the flow of urine. Your belly, legs, and buttocks shouldn't move.  Hold the squeeze for 3 seconds, then relax for 5 to 10 seconds.  Start with 3 seconds, then add 1 second each week until you are able to squeeze for 10 seconds.  Repeat the exercise 10 times a session. Do 3 to 8 sessions a day.  When should you call for help?  Watch  "closely for changes in your health, and be sure to contact your doctor if:    Your incontinence is getting worse.     You do not get better as expected.   Where can you learn more?  Go to https://www.[employer].net/patiented  Enter V684 in the search box to learn more about \"Bladder Training: Care Instructions.\"  Current as of: April 30, 2024  Content Version: 14.5 2024-2025 [employer].   Care instructions adapted under license by your healthcare professional. If you have questions about a medical condition or this instruction, always ask your healthcare professional. [employer] disclaims any warranty or liability for your use of this information.       "

## 2025-08-05 ENCOUNTER — OFFICE VISIT (OUTPATIENT)
Dept: URGENT CARE | Facility: URGENT CARE | Age: 89
End: 2025-08-05
Payer: COMMERCIAL

## 2025-08-05 VITALS
HEART RATE: 73 BPM | HEIGHT: 67 IN | RESPIRATION RATE: 18 BRPM | TEMPERATURE: 98.4 F | SYSTOLIC BLOOD PRESSURE: 99 MMHG | DIASTOLIC BLOOD PRESSURE: 60 MMHG | WEIGHT: 230.7 LBS | OXYGEN SATURATION: 97 % | BODY MASS INDEX: 36.21 KG/M2

## 2025-08-05 DIAGNOSIS — N39.0 URINARY TRACT INFECTION WITH HEMATURIA, SITE UNSPECIFIED: ICD-10-CM

## 2025-08-05 DIAGNOSIS — R35.0 URINARY FREQUENCY: Primary | ICD-10-CM

## 2025-08-05 DIAGNOSIS — R31.9 URINARY TRACT INFECTION WITH HEMATURIA, SITE UNSPECIFIED: ICD-10-CM

## 2025-08-05 DIAGNOSIS — N18.30 STAGE 3 CHRONIC KIDNEY DISEASE, UNSPECIFIED WHETHER STAGE 3A OR 3B CKD (H): ICD-10-CM

## 2025-08-05 DIAGNOSIS — Z79.01 CURRENT USE OF LONG TERM ANTICOAGULATION: ICD-10-CM

## 2025-08-05 LAB
ALBUMIN UR-MCNC: NEGATIVE MG/DL
APPEARANCE UR: CLEAR
BACTERIA #/AREA URNS HPF: ABNORMAL /HPF
BILIRUB UR QL STRIP: NEGATIVE
COLOR UR AUTO: YELLOW
GLUCOSE UR STRIP-MCNC: >=1000 MG/DL
HGB UR QL STRIP: ABNORMAL
KETONES UR STRIP-MCNC: NEGATIVE MG/DL
LEUKOCYTE ESTERASE UR QL STRIP: NEGATIVE
NITRATE UR QL: POSITIVE
PH UR STRIP: 5.5 [PH] (ref 5–7)
RBC #/AREA URNS AUTO: ABNORMAL /HPF
SP GR UR STRIP: 1.01 (ref 1–1.03)
SQUAMOUS #/AREA URNS AUTO: ABNORMAL /LPF
UROBILINOGEN UR STRIP-ACNC: 0.2 E.U./DL
WBC #/AREA URNS AUTO: ABNORMAL /HPF
WBC CLUMPS #/AREA URNS HPF: PRESENT /HPF

## 2025-08-05 PROCEDURE — 3074F SYST BP LT 130 MM HG: CPT | Performed by: PHYSICIAN ASSISTANT

## 2025-08-05 PROCEDURE — 99214 OFFICE O/P EST MOD 30 MIN: CPT | Performed by: PHYSICIAN ASSISTANT

## 2025-08-05 PROCEDURE — 81001 URINALYSIS AUTO W/SCOPE: CPT | Performed by: PHYSICIAN ASSISTANT

## 2025-08-05 PROCEDURE — 87086 URINE CULTURE/COLONY COUNT: CPT | Performed by: PHYSICIAN ASSISTANT

## 2025-08-05 PROCEDURE — 87088 URINE BACTERIA CULTURE: CPT | Performed by: PHYSICIAN ASSISTANT

## 2025-08-05 PROCEDURE — 87186 SC STD MICRODIL/AGAR DIL: CPT | Performed by: PHYSICIAN ASSISTANT

## 2025-08-05 PROCEDURE — 3078F DIAST BP <80 MM HG: CPT | Performed by: PHYSICIAN ASSISTANT

## 2025-08-05 RX ORDER — CIPROFLOXACIN 500 MG/1
500 TABLET, FILM COATED ORAL 2 TIMES DAILY
Qty: 20 TABLET | Refills: 0 | Status: SHIPPED | OUTPATIENT
Start: 2025-08-05 | End: 2025-08-15

## 2025-08-06 ENCOUNTER — TRANSFERRED RECORDS (OUTPATIENT)
Dept: HEALTH INFORMATION MANAGEMENT | Facility: CLINIC | Age: 89
End: 2025-08-06
Payer: COMMERCIAL

## 2025-08-07 LAB — BACTERIA UR CULT: ABNORMAL

## 2025-08-22 ENCOUNTER — LAB (OUTPATIENT)
Dept: LAB | Facility: CLINIC | Age: 89
End: 2025-08-22
Payer: COMMERCIAL

## 2025-08-22 DIAGNOSIS — R30.0 DYSURIA: ICD-10-CM

## 2025-08-22 DIAGNOSIS — N18.31 STAGE 3A CHRONIC KIDNEY DISEASE (H): ICD-10-CM

## 2025-08-22 LAB
ALBUMIN UR-MCNC: 30 MG/DL
APPEARANCE UR: ABNORMAL
BILIRUB UR QL STRIP: NEGATIVE
COLOR UR AUTO: ABNORMAL
CREAT UR-MCNC: 101 MG/DL
GLUCOSE UR STRIP-MCNC: >=1000 MG/DL
HGB UR QL STRIP: ABNORMAL
KETONES UR STRIP-MCNC: NEGATIVE MG/DL
LEUKOCYTE ESTERASE UR QL STRIP: ABNORMAL
MICROALBUMIN UR-MCNC: 371 MG/L
MICROALBUMIN/CREAT UR: 367.33 MG/G CR (ref 0–17)
MUCOUS THREADS #/AREA URNS LPF: PRESENT /LPF
NITRATE UR QL: NEGATIVE
PH UR STRIP: 5.5 [PH] (ref 5–7)
RBC URINE: >182 /HPF
SP GR UR STRIP: 1.02 (ref 1–1.03)
UROBILINOGEN UR STRIP-MCNC: NORMAL MG/DL
WBC URINE: 0 /HPF

## 2025-08-22 PROCEDURE — 82570 ASSAY OF URINE CREATININE: CPT

## 2025-08-22 PROCEDURE — 81003 URINALYSIS AUTO W/O SCOPE: CPT

## 2025-08-29 ENCOUNTER — MYC MEDICAL ADVICE (OUTPATIENT)
Dept: PEDIATRICS | Facility: CLINIC | Age: 89
End: 2025-08-29

## 2025-08-29 ENCOUNTER — HOSPITAL ENCOUNTER (OUTPATIENT)
Dept: CARDIOLOGY | Facility: CLINIC | Age: 89
Discharge: HOME | End: 2025-08-29
Attending: INTERNAL MEDICINE | Admitting: INTERNAL MEDICINE
Payer: COMMERCIAL

## 2025-08-29 ENCOUNTER — MYC MEDICAL ADVICE (OUTPATIENT)
Dept: CARDIOLOGY | Facility: CLINIC | Age: 89
End: 2025-08-29

## 2025-08-29 DIAGNOSIS — I77.810 ASCENDING AORTA DILATATION: ICD-10-CM

## 2025-08-29 DIAGNOSIS — I50.32 CHRONIC DIASTOLIC HEART FAILURE (H): ICD-10-CM

## 2025-08-29 DIAGNOSIS — I77.810 AORTIC ROOT DILATATION: ICD-10-CM

## 2025-08-29 LAB — LVEF ECHO: NORMAL

## 2025-08-29 PROCEDURE — 255N000002 HC RX 255 OP 636: Performed by: INTERNAL MEDICINE

## 2025-08-29 PROCEDURE — 999N000208 ECHOCARDIOGRAM COMPLETE

## 2025-08-29 PROCEDURE — 93306 TTE W/DOPPLER COMPLETE: CPT | Mod: 26 | Performed by: INTERNAL MEDICINE

## 2025-08-29 RX ADMIN — HUMAN ALBUMIN MICROSPHERES AND PERFLUTREN 3 ML (DILUTED): 10; .22 INJECTION, SOLUTION INTRAVENOUS at 14:25

## 2025-08-31 DIAGNOSIS — M85.851 OSTEOPENIA OF BOTH HIPS: ICD-10-CM

## 2025-08-31 DIAGNOSIS — M85.852 OSTEOPENIA OF BOTH HIPS: ICD-10-CM

## 2025-09-02 RX ORDER — RISEDRONATE SODIUM 35 MG/1
35 TABLET, FILM COATED ORAL
Qty: 12 TABLET | Refills: 4 | Status: SHIPPED | OUTPATIENT
Start: 2025-09-02

## (undated) DEVICE — TUBING IRRIG TUR Y TYPE 96" LF 6543-01

## (undated) DEVICE — BAG URINARY DRAIN 4000ML LF 153509

## (undated) DEVICE — KIT ENDO TURNOVER/PROCEDURE W/CLEAN A SCOPE LINERS 103888

## (undated) DEVICE — BAG CLEAR TRASH 1.3M 39X33" P4040C

## (undated) DEVICE — PACK CYSTO CUSTOM RIDGES

## (undated) DEVICE — GLOVE BIOGEL PI MICRO SZ 7.5 48575

## (undated) DEVICE — GUIDEWIRE SENSOR DUAL FLEX STR 0.035"X150CM M0066703080

## (undated) DEVICE — EVACUATOR BLADDER UROVAC LATEX M0067301250

## (undated) DEVICE — SOL WATER IRRIG 3000ML BAG 2B7117

## (undated) DEVICE — DRSG TELFA 2X3"

## (undated) DEVICE — PREP SCRUB SOL EXIDINE 4% CHG 4OZ 29002-404

## (undated) DEVICE — ESU GROUND PAD ADULT W/CORD E7507

## (undated) DEVICE — ESU ELEC LOOP 24FR 20750G

## (undated) DEVICE — PAD CHUX UNDERPAD 30X36" P3036C

## (undated) DEVICE — SOL WATER IRRIG 1000ML BOTTLE 2F7114

## (undated) DEVICE — BAG URINARY LEG 570ML

## (undated) DEVICE — CATH FOLEY COUNCIL 20FR 5ML LATEX 0196SI20

## (undated) DEVICE — INFLATION DEVICE BIG 60 ENDO-AN6012

## (undated) DEVICE — SYR 50ML CATH TIP W/O NDL 309620

## (undated) DEVICE — LINEN HALF SHEET 5512

## (undated) DEVICE — LINEN FULL SHEET 5511

## (undated) RX ORDER — DIAZEPAM 5 MG
TABLET ORAL
Status: DISPENSED
Start: 2019-05-14

## (undated) RX ORDER — METHYLPREDNISOLONE ACETATE 40 MG/ML
INJECTION, SUSPENSION INTRA-ARTICULAR; INTRALESIONAL; INTRAMUSCULAR; SOFT TISSUE
Status: DISPENSED
Start: 2020-12-15

## (undated) RX ORDER — ONDANSETRON 2 MG/ML
INJECTION INTRAMUSCULAR; INTRAVENOUS
Status: DISPENSED
Start: 2023-01-06

## (undated) RX ORDER — DEXAMETHASONE SODIUM PHOSPHATE 4 MG/ML
INJECTION, SOLUTION INTRA-ARTICULAR; INTRALESIONAL; INTRAMUSCULAR; INTRAVENOUS; SOFT TISSUE
Status: DISPENSED
Start: 2023-01-06

## (undated) RX ORDER — DEXMEDETOMIDINE HYDROCHLORIDE 4 UG/ML
INJECTION, SOLUTION INTRAVENOUS
Status: DISPENSED
Start: 2023-01-06

## (undated) RX ORDER — METOPROLOL TARTRATE 1 MG/ML
INJECTION, SOLUTION INTRAVENOUS
Status: DISPENSED
Start: 2023-01-06

## (undated) RX ORDER — CEFAZOLIN SODIUM/WATER 2 G/20 ML
SYRINGE (ML) INTRAVENOUS
Status: DISPENSED
Start: 2023-01-06

## (undated) RX ORDER — METHADONE HYDROCHLORIDE 10 MG/ML
INJECTION, SOLUTION INTRAMUSCULAR; INTRAVENOUS; SUBCUTANEOUS
Status: DISPENSED
Start: 2023-01-06

## (undated) RX ORDER — GLYCOPYRROLATE 0.2 MG/ML
INJECTION INTRAMUSCULAR; INTRAVENOUS
Status: DISPENSED
Start: 2023-01-06

## (undated) RX ORDER — LIDOCAINE HYDROCHLORIDE 10 MG/ML
INJECTION, SOLUTION EPIDURAL; INFILTRATION; INTRACAUDAL; PERINEURAL
Status: DISPENSED
Start: 2020-12-15

## (undated) RX ORDER — PROPOFOL 10 MG/ML
INJECTION, EMULSION INTRAVENOUS
Status: DISPENSED
Start: 2023-01-06

## (undated) RX ORDER — LIDOCAINE HYDROCHLORIDE 10 MG/ML
INJECTION, SOLUTION EPIDURAL; INFILTRATION; INTRACAUDAL; PERINEURAL
Status: DISPENSED
Start: 2023-01-06

## (undated) RX ORDER — FENTANYL CITRATE 50 UG/ML
INJECTION, SOLUTION INTRAMUSCULAR; INTRAVENOUS
Status: DISPENSED
Start: 2023-01-06

## (undated) RX ORDER — DIAZEPAM 5 MG
TABLET ORAL
Status: DISPENSED
Start: 2023-09-01